# Patient Record
Sex: MALE | Race: WHITE | NOT HISPANIC OR LATINO | Employment: OTHER | URBAN - METROPOLITAN AREA
[De-identification: names, ages, dates, MRNs, and addresses within clinical notes are randomized per-mention and may not be internally consistent; named-entity substitution may affect disease eponyms.]

---

## 2017-02-28 ENCOUNTER — RESOLUTE PROFESSIONAL BILLING HOSPITAL PROF FEE (OUTPATIENT)
Dept: HOSPITALIST | Facility: MEDICAL CENTER | Age: 82
End: 2017-02-28
Payer: MEDICARE

## 2017-02-28 ENCOUNTER — APPOINTMENT (OUTPATIENT)
Dept: RADIOLOGY | Facility: MEDICAL CENTER | Age: 82
End: 2017-02-28
Attending: EMERGENCY MEDICINE
Payer: MEDICARE

## 2017-02-28 ENCOUNTER — HOSPITAL ENCOUNTER (OUTPATIENT)
Facility: MEDICAL CENTER | Age: 82
End: 2017-03-01
Attending: EMERGENCY MEDICINE | Admitting: INTERNAL MEDICINE
Payer: MEDICARE

## 2017-02-28 DIAGNOSIS — I48.0 PAROXYSMAL ATRIAL FIBRILLATION (HCC): ICD-10-CM

## 2017-02-28 DIAGNOSIS — R07.9 CHEST PAIN, UNSPECIFIED TYPE: ICD-10-CM

## 2017-02-28 DIAGNOSIS — R06.02 SHORTNESS OF BREATH: ICD-10-CM

## 2017-02-28 PROBLEM — I48.92 ATRIAL FLUTTER (HCC): Status: ACTIVE | Noted: 2017-02-28

## 2017-02-28 LAB
25(OH)D3 SERPL-MCNC: 72 NG/ML (ref 30–100)
ALBUMIN SERPL BCP-MCNC: 3.9 G/DL (ref 3.2–4.9)
ALBUMIN/GLOB SERPL: 1.2 G/DL
ALP SERPL-CCNC: 89 U/L (ref 30–99)
ALT SERPL-CCNC: 8 U/L (ref 2–50)
ANION GAP SERPL CALC-SCNC: 11 MMOL/L (ref 0–11.9)
APTT PPP: 29.2 SEC (ref 24.7–36)
AST SERPL-CCNC: 11 U/L (ref 12–45)
BASOPHILS # BLD AUTO: 0.4 % (ref 0–1.8)
BASOPHILS # BLD: 0.06 K/UL (ref 0–0.12)
BILIRUB SERPL-MCNC: 0.9 MG/DL (ref 0.1–1.5)
BNP SERPL-MCNC: 23 PG/ML (ref 0–100)
BUN SERPL-MCNC: 21 MG/DL (ref 8–22)
CALCIUM SERPL-MCNC: 10.8 MG/DL (ref 8.5–10.5)
CHLORIDE SERPL-SCNC: 103 MMOL/L (ref 96–112)
CO2 SERPL-SCNC: 21 MMOL/L (ref 20–33)
CREAT SERPL-MCNC: 1.05 MG/DL (ref 0.5–1.4)
EKG IMPRESSION: NORMAL
EKG IMPRESSION: NORMAL
EOSINOPHIL # BLD AUTO: 0.06 K/UL (ref 0–0.51)
EOSINOPHIL NFR BLD: 0.4 % (ref 0–6.9)
ERYTHROCYTE [DISTWIDTH] IN BLOOD BY AUTOMATED COUNT: 40.4 FL (ref 35.9–50)
GFR SERPL CREATININE-BSD FRML MDRD: >60 ML/MIN/1.73 M 2
GLOBULIN SER CALC-MCNC: 3.3 G/DL (ref 1.9–3.5)
GLUCOSE SERPL-MCNC: 130 MG/DL (ref 65–99)
HCT VFR BLD AUTO: 45.6 % (ref 42–52)
HGB BLD-MCNC: 15.7 G/DL (ref 14–18)
IMM GRANULOCYTES # BLD AUTO: 0.1 K/UL (ref 0–0.11)
IMM GRANULOCYTES NFR BLD AUTO: 0.7 % (ref 0–0.9)
INR PPP: 1.04 (ref 0.87–1.13)
LIPASE SERPL-CCNC: 27 U/L (ref 11–82)
LYMPHOCYTES # BLD AUTO: 2.49 K/UL (ref 1–4.8)
LYMPHOCYTES NFR BLD: 18.6 % (ref 22–41)
MCH RBC QN AUTO: 29.6 PG (ref 27–33)
MCHC RBC AUTO-ENTMCNC: 34.4 G/DL (ref 33.7–35.3)
MCV RBC AUTO: 85.9 FL (ref 81.4–97.8)
MONOCYTES # BLD AUTO: 1.12 K/UL (ref 0–0.85)
MONOCYTES NFR BLD AUTO: 8.4 % (ref 0–13.4)
NEUTROPHILS # BLD AUTO: 9.55 K/UL (ref 1.82–7.42)
NEUTROPHILS NFR BLD: 71.5 % (ref 44–72)
NRBC # BLD AUTO: 0.02 K/UL
NRBC BLD AUTO-RTO: 0.1 /100 WBC
PLATELET # BLD AUTO: 347 K/UL (ref 164–446)
PMV BLD AUTO: 10.3 FL (ref 9–12.9)
POTASSIUM SERPL-SCNC: 4.4 MMOL/L (ref 3.6–5.5)
PROT SERPL-MCNC: 7.2 G/DL (ref 6–8.2)
PROTHROMBIN TIME: 13.9 SEC (ref 12–14.6)
PTH-INTACT SERPL-MCNC: 76.6 PG/ML (ref 14–72)
RBC # BLD AUTO: 5.31 M/UL (ref 4.7–6.1)
SODIUM SERPL-SCNC: 135 MMOL/L (ref 135–145)
TROPONIN I SERPL-MCNC: <0.01 NG/ML (ref 0–0.04)
WBC # BLD AUTO: 13.4 K/UL (ref 4.8–10.8)

## 2017-02-28 PROCEDURE — 83880 ASSAY OF NATRIURETIC PEPTIDE: CPT

## 2017-02-28 PROCEDURE — 99285 EMERGENCY DEPT VISIT HI MDM: CPT

## 2017-02-28 PROCEDURE — G0378 HOSPITAL OBSERVATION PER HR: HCPCS

## 2017-02-28 PROCEDURE — 85025 COMPLETE CBC W/AUTO DIFF WBC: CPT

## 2017-02-28 PROCEDURE — A9270 NON-COVERED ITEM OR SERVICE: HCPCS | Performed by: NURSE PRACTITIONER

## 2017-02-28 PROCEDURE — 700102 HCHG RX REV CODE 250 W/ 637 OVERRIDE(OP): Performed by: INTERNAL MEDICINE

## 2017-02-28 PROCEDURE — 96361 HYDRATE IV INFUSION ADD-ON: CPT

## 2017-02-28 PROCEDURE — 99220 PR INITIAL OBSERVATION CARE,LEVL III: CPT | Performed by: INTERNAL MEDICINE

## 2017-02-28 PROCEDURE — 93010 ELECTROCARDIOGRAM REPORT: CPT | Mod: 77 | Performed by: INTERNAL MEDICINE

## 2017-02-28 PROCEDURE — 85610 PROTHROMBIN TIME: CPT

## 2017-02-28 PROCEDURE — 96372 THER/PROPH/DIAG INJ SC/IM: CPT | Mod: XU

## 2017-02-28 PROCEDURE — 84484 ASSAY OF TROPONIN QUANT: CPT

## 2017-02-28 PROCEDURE — A9270 NON-COVERED ITEM OR SERVICE: HCPCS | Performed by: INTERNAL MEDICINE

## 2017-02-28 PROCEDURE — 700102 HCHG RX REV CODE 250 W/ 637 OVERRIDE(OP)

## 2017-02-28 PROCEDURE — 93005 ELECTROCARDIOGRAM TRACING: CPT

## 2017-02-28 PROCEDURE — 83970 ASSAY OF PARATHORMONE: CPT

## 2017-02-28 PROCEDURE — 80053 COMPREHEN METABOLIC PANEL: CPT

## 2017-02-28 PROCEDURE — A9270 NON-COVERED ITEM OR SERVICE: HCPCS

## 2017-02-28 PROCEDURE — 71275 CT ANGIOGRAPHY CHEST: CPT

## 2017-02-28 PROCEDURE — 83690 ASSAY OF LIPASE: CPT

## 2017-02-28 PROCEDURE — 36415 COLL VENOUS BLD VENIPUNCTURE: CPT

## 2017-02-28 PROCEDURE — 93005 ELECTROCARDIOGRAM TRACING: CPT | Performed by: EMERGENCY MEDICINE

## 2017-02-28 PROCEDURE — 700105 HCHG RX REV CODE 258: Performed by: INTERNAL MEDICINE

## 2017-02-28 PROCEDURE — 700117 HCHG RX CONTRAST REV CODE 255: Performed by: EMERGENCY MEDICINE

## 2017-02-28 PROCEDURE — 82306 VITAMIN D 25 HYDROXY: CPT

## 2017-02-28 PROCEDURE — 85730 THROMBOPLASTIN TIME PARTIAL: CPT

## 2017-02-28 PROCEDURE — 700112 HCHG RX REV CODE 229: Performed by: INTERNAL MEDICINE

## 2017-02-28 PROCEDURE — 93005 ELECTROCARDIOGRAM TRACING: CPT | Performed by: INTERNAL MEDICINE

## 2017-02-28 PROCEDURE — 700102 HCHG RX REV CODE 250 W/ 637 OVERRIDE(OP): Performed by: NURSE PRACTITIONER

## 2017-02-28 PROCEDURE — 71010 DX-CHEST-PORTABLE (1 VIEW): CPT

## 2017-02-28 PROCEDURE — A9270 NON-COVERED ITEM OR SERVICE: HCPCS | Performed by: EMERGENCY MEDICINE

## 2017-02-28 PROCEDURE — 700111 HCHG RX REV CODE 636 W/ 250 OVERRIDE (IP): Performed by: INTERNAL MEDICINE

## 2017-02-28 PROCEDURE — 700102 HCHG RX REV CODE 250 W/ 637 OVERRIDE(OP): Performed by: EMERGENCY MEDICINE

## 2017-02-28 PROCEDURE — 96360 HYDRATION IV INFUSION INIT: CPT

## 2017-02-28 RX ORDER — DOCUSATE SODIUM 100 MG/1
100 CAPSULE, LIQUID FILLED ORAL EVERY MORNING
COMMUNITY
End: 2017-09-13

## 2017-02-28 RX ORDER — AMOXICILLIN 250 MG
1 CAPSULE ORAL
Status: DISCONTINUED | OUTPATIENT
Start: 2017-02-28 | End: 2017-03-01 | Stop reason: HOSPADM

## 2017-02-28 RX ORDER — AMOXICILLIN 250 MG
1 CAPSULE ORAL NIGHTLY
Status: DISCONTINUED | OUTPATIENT
Start: 2017-02-28 | End: 2017-03-01 | Stop reason: HOSPADM

## 2017-02-28 RX ORDER — ONDANSETRON 4 MG/1
4 TABLET, ORALLY DISINTEGRATING ORAL EVERY 4 HOURS PRN
Status: DISCONTINUED | OUTPATIENT
Start: 2017-02-28 | End: 2017-03-01 | Stop reason: HOSPADM

## 2017-02-28 RX ORDER — NITROGLYCERIN 0.4 MG/1
0.4 TABLET SUBLINGUAL
Status: DISCONTINUED | OUTPATIENT
Start: 2017-02-28 | End: 2017-03-01 | Stop reason: HOSPADM

## 2017-02-28 RX ORDER — ASPIRIN 81 MG/1
324 TABLET, CHEWABLE ORAL DAILY
Status: DISCONTINUED | OUTPATIENT
Start: 2017-02-28 | End: 2017-03-01 | Stop reason: HOSPADM

## 2017-02-28 RX ORDER — METOPROLOL SUCCINATE 50 MG/1
50 TABLET, EXTENDED RELEASE ORAL EVERY MORNING
Status: DISCONTINUED | OUTPATIENT
Start: 2017-03-01 | End: 2017-03-01 | Stop reason: HOSPADM

## 2017-02-28 RX ORDER — ASPIRIN 300 MG/1
300 SUPPOSITORY RECTAL DAILY
Status: DISCONTINUED | OUTPATIENT
Start: 2017-02-28 | End: 2017-03-01 | Stop reason: HOSPADM

## 2017-02-28 RX ORDER — LACTULOSE 20 G/30ML
30 SOLUTION ORAL
Status: DISCONTINUED | OUTPATIENT
Start: 2017-02-28 | End: 2017-03-01 | Stop reason: HOSPADM

## 2017-02-28 RX ORDER — HYDROCODONE BITARTRATE AND ACETAMINOPHEN 5; 325 MG/1; MG/1
1 TABLET ORAL EVERY 6 HOURS PRN
Status: DISCONTINUED | OUTPATIENT
Start: 2017-02-28 | End: 2017-03-01 | Stop reason: HOSPADM

## 2017-02-28 RX ORDER — ASPIRIN 81 MG/1
TABLET, CHEWABLE ORAL
Status: COMPLETED
Start: 2017-02-28 | End: 2017-02-28

## 2017-02-28 RX ORDER — ATORVASTATIN CALCIUM 20 MG/1
20 TABLET, FILM COATED ORAL EVERY MORNING
Status: DISCONTINUED | OUTPATIENT
Start: 2017-03-01 | End: 2017-03-01 | Stop reason: HOSPADM

## 2017-02-28 RX ORDER — DOCUSATE SODIUM 100 MG/1
100 CAPSULE, LIQUID FILLED ORAL EVERY MORNING
Status: DISCONTINUED | OUTPATIENT
Start: 2017-02-28 | End: 2017-02-28

## 2017-02-28 RX ORDER — ONDANSETRON 2 MG/ML
4 INJECTION INTRAMUSCULAR; INTRAVENOUS EVERY 4 HOURS PRN
Status: DISCONTINUED | OUTPATIENT
Start: 2017-02-28 | End: 2017-03-01 | Stop reason: HOSPADM

## 2017-02-28 RX ORDER — ATORVASTATIN CALCIUM 20 MG/1
20 TABLET, FILM COATED ORAL EVERY MORNING
COMMUNITY
End: 2017-03-21

## 2017-02-28 RX ORDER — DIGOXIN 125 MCG
62.5 TABLET ORAL EVERY MORNING
COMMUNITY
End: 2017-03-21

## 2017-02-28 RX ORDER — ASPIRIN 325 MG
325 TABLET ORAL DAILY
Status: DISCONTINUED | OUTPATIENT
Start: 2017-02-28 | End: 2017-03-01 | Stop reason: HOSPADM

## 2017-02-28 RX ORDER — METOPROLOL SUCCINATE 50 MG/1
25 TABLET, EXTENDED RELEASE ORAL EVERY MORNING
COMMUNITY
End: 2017-03-21

## 2017-02-28 RX ORDER — HYDROCODONE BITARTRATE AND ACETAMINOPHEN 5; 325 MG/1; MG/1
1 TABLET ORAL ONCE
Status: COMPLETED | OUTPATIENT
Start: 2017-02-28 | End: 2017-02-28

## 2017-02-28 RX ORDER — ENEMA 19; 7 G/133ML; G/133ML
1 ENEMA RECTAL
Status: DISCONTINUED | OUTPATIENT
Start: 2017-02-28 | End: 2017-03-01 | Stop reason: HOSPADM

## 2017-02-28 RX ORDER — BISACODYL 10 MG
10 SUPPOSITORY, RECTAL RECTAL
Status: DISCONTINUED | OUTPATIENT
Start: 2017-02-28 | End: 2017-03-01 | Stop reason: HOSPADM

## 2017-02-28 RX ORDER — SODIUM CHLORIDE 9 MG/ML
INJECTION, SOLUTION INTRAVENOUS CONTINUOUS
Status: ACTIVE | OUTPATIENT
Start: 2017-02-28 | End: 2017-03-01

## 2017-02-28 RX ORDER — DIGOXIN 125 MCG
62.5 TABLET ORAL EVERY MORNING
Status: DISCONTINUED | OUTPATIENT
Start: 2017-03-01 | End: 2017-03-01 | Stop reason: HOSPADM

## 2017-02-28 RX ORDER — HEPARIN SODIUM 5000 [USP'U]/ML
5000 INJECTION, SOLUTION INTRAVENOUS; SUBCUTANEOUS EVERY 8 HOURS
Status: DISCONTINUED | OUTPATIENT
Start: 2017-02-28 | End: 2017-03-01 | Stop reason: HOSPADM

## 2017-02-28 RX ORDER — DOCUSATE SODIUM 100 MG/1
100 CAPSULE, LIQUID FILLED ORAL 2 TIMES DAILY
Status: DISCONTINUED | OUTPATIENT
Start: 2017-02-28 | End: 2017-03-01 | Stop reason: HOSPADM

## 2017-02-28 RX ADMIN — STANDARDIZED SENNA CONCENTRATE AND DOCUSATE SODIUM 1 TABLET: 8.6; 5 TABLET, FILM COATED ORAL at 20:28

## 2017-02-28 RX ADMIN — HEPARIN SODIUM 5000 UNITS: 5000 INJECTION, SOLUTION INTRAVENOUS; SUBCUTANEOUS at 15:53

## 2017-02-28 RX ADMIN — ASPIRIN 324 MG: 81 TABLET, CHEWABLE ORAL at 14:45

## 2017-02-28 RX ADMIN — HYDROCODONE BITARTRATE AND ACETAMINOPHEN 1 TABLET: 5; 325 TABLET ORAL at 22:33

## 2017-02-28 RX ADMIN — HYDROCODONE BITARTRATE AND ACETAMINOPHEN 1 TABLET: 5; 325 TABLET ORAL at 10:23

## 2017-02-28 RX ADMIN — MAGNESIUM CITRATE 296 ML: 1.75 LIQUID ORAL at 22:55

## 2017-02-28 RX ADMIN — IOHEXOL 75 ML: 350 INJECTION, SOLUTION INTRAVENOUS at 12:09

## 2017-02-28 RX ADMIN — DOCUSATE SODIUM 100 MG: 100 CAPSULE ORAL at 20:28

## 2017-02-28 RX ADMIN — HEPARIN SODIUM 5000 UNITS: 5000 INJECTION, SOLUTION INTRAVENOUS; SUBCUTANEOUS at 22:33

## 2017-02-28 RX ADMIN — SODIUM CHLORIDE: 9 INJECTION, SOLUTION INTRAVENOUS at 15:53

## 2017-02-28 ASSESSMENT — COPD QUESTIONNAIRES
DURING THE PAST 4 WEEKS HOW MUCH DID YOU FEEL SHORT OF BREATH: NONE/LITTLE OF THE TIME
DO YOU EVER COUGH UP ANY MUCUS OR PHLEGM?: NO/ONLY WITH OCCASIONAL COLDS OR INFECTIONS
COPD SCREENING SCORE: 2
DURING THE PAST 4 WEEKS HOW MUCH DID YOU FEEL SHORT OF BREATH: NONE/LITTLE OF THE TIME
DO YOU EVER COUGH UP ANY MUCUS OR PHLEGM?: NO/ONLY WITH OCCASIONAL COLDS OR INFECTIONS
HAVE YOU SMOKED AT LEAST 100 CIGARETTES IN YOUR ENTIRE LIFE: NO/DON'T KNOW
HAVE YOU SMOKED AT LEAST 100 CIGARETTES IN YOUR ENTIRE LIFE: NO/DON'T KNOW
COPD SCREENING SCORE: 2

## 2017-02-28 ASSESSMENT — PAIN SCALES - GENERAL
PAINLEVEL_OUTOF10: 5
PAINLEVEL_OUTOF10: 5
PAINLEVEL_OUTOF10: 0
PAINLEVEL_OUTOF10: 0

## 2017-02-28 ASSESSMENT — LIFESTYLE VARIABLES
DO YOU DRINK ALCOHOL: NO
EVER_SMOKED: YES
DO YOU DRINK ALCOHOL: NO
EVER_SMOKED: YES
ALCOHOL_USE: NO

## 2017-02-28 NOTE — PROGRESS NOTES
Pt arrived to unit via gurney from main ER at 1522.  Pt insisted on scooting from gurney to bed.  Pt able to scoot independently.  Pt oriented to unit, call light.  Assessment completed, pt denies any pain at this time.  POC updated. Monitors applied, atrial fibrillation noted.   Bed in locked, lowest position.  Call light and possessions within reach.  Yellow socks placed on patient, appropriate signs placed at door.   Boxed meal provided   Needs met, will continue to monitor.

## 2017-02-28 NOTE — ED NOTES
Pt to triage c/o chest pain and right shoulder pain for weeks. + SOB, increasing with activity.  Pt denies any new swelling. Pt still recovering from fall 1.5 weeks ago. Pt advised to return to triage nurse for any changes or concerns.

## 2017-02-28 NOTE — ED NOTES
Med rec complete per pt at bedside  Allergies reviewed - NKDA  No ABX in last month  [facility] [place] [place]  [facility], because he   Just moved here last week (per pt)

## 2017-02-28 NOTE — ED PROVIDER NOTES
.  ED Provider Note    Scribed for Candice Borjas M.D. by Sandhya Mathis. 2/28/2017  10:08 AM    Primary care provider: None  Means of arrival: Walk-in  History obtained from: Patient  History limited by: None    CHIEF COMPLAINT  Chief Complaint   Patient presents with   • Chest Pain       HPI  Jersey Alexis is a 86 y.o. male who presents to the Emergency Department for worsened right-sided chest pains onset 2 weeks ago. The patient reports associated shortness of breath and states symptoms worsen with exertion with pain radiating to the right shoulder. He has been seen by PCP regarding symptoms and had a chest xray performed, which was normal. The patient reports having a ground level fall 1.5 weeks ago and developed left knee and foot pain after falling sideways and twisting his left knee. He had xrays performed on the extremity by his PCP with negative fractures, but he reports of continued left foot pain. The patient experienced another episode of chest pain last night that he describes as pressured and delayed coming to the ED until this morning. He has chronic bilateral leg swelling, but denies symptoms of fever or cough. The patient has past history of atrial fibrillation, but not history of MI or emphysema. He recently moved to town from [place] and has not yet established a PCP.     REVIEW OF SYSTEMS  CARDIAC: Chest pain  PULMONARY: Shortness of breath, no cough  Neuro: Fall  Musculoskeletal: Right shoulder pain, left foot pain, chronic bilateral lower extremity swelling  Endocrine: no fevers    See history of present illness. All other systems are negative. C.     PAST MEDICAL HISTORY  Diabetes, asthma as a child. Atrial fibrillation.     SURGICAL HISTORY  patient denies any surgical history    SOCIAL HISTORY  Recently moved from [place] to [place]. Lives with son's wing.      FAMILY HISTORY  No family history noted    CURRENT MEDICATIONS  Medications can be found in nursing notes.    ALLERGIES  No Known  "Allergies    PHYSICAL EXAM  VITAL SIGNS: /95 mmHg  Pulse 76  Temp(Src) 36.3 °C (97.3 °F) (Temporal)  Resp 14  Ht 1.803 m (5' 11\")  SpO2 97%    Constitutional: Well developed, Well nourished, No acute distress, chronically ill-appearance.   HEENT: Normocephalic, Atraumatic,  external ears normal, pharynx pink,  Mucous  Membranes moist, No rhinorrhea or mucosal edema  Eyes: PERRL, EOMI, Conjunctiva normal, No discharge.   Neck: Normal range of motion, No tenderness, Supple, No stridor.   Lymphatic: No lymphadenopathy    Cardiovascular: Irregularly irregular, No murmurs,  rubs, or gallops.   Thorax & Lungs: Lungs clear to auscultation bilaterally, No respiratory distress, No wheezes, rhales or rhonchi, No chest wall tenderness.   Abdomen: Bowel sounds normal, Small umbilical hernia easily reducible, Soft, non tender, non distended, no rebound guarding or peritoneal signs.   Skin: Warm, Dry, Sutures on left 2nd toe without evidence of infection  Back:  No CVA tenderness,  No spinal tenderness, bony crepitance, step offs, or instability.   Neurologic: Alert & oriented x 3, Normal motor function, Normal sensory function, No focal deficits noted. Normal reflexes. Normal Cranial Nerves.  Extremities: Bilateral lower extremity edema, Equal, intact distal pulses, No cyanosis, clubbing, No tenderness.   Musculoskeletal: Good range of motion in all major joints. No tenderness to palpation or major deformities noted.     DIAGNOSTIC STUDIES / PROCEDURES    LABS  Results for orders placed or performed during the hospital encounter of 02/28/17   CBC WITH DIFFERENTIAL   Result Value Ref Range    WBC 13.4 (H) 4.8 - 10.8 K/uL    RBC 5.31 4.70 - 6.10 M/uL    Hemoglobin 15.7 14.0 - 18.0 g/dL    Hematocrit 45.6 42.0 - 52.0 %    MCV 85.9 81.4 - 97.8 fL    MCH 29.6 27.0 - 33.0 pg    MCHC 34.4 33.7 - 35.3 g/dL    RDW 40.4 35.9 - 50.0 fL    Platelet Count 347 164 - 446 K/uL    MPV 10.3 9.0 - 12.9 fL    Neutrophils-Polys 71.50 " 44.00 - 72.00 %    Lymphocytes 18.60 (L) 22.00 - 41.00 %    Monocytes 8.40 0.00 - 13.40 %    Eosinophils 0.40 0.00 - 6.90 %    Basophils 0.40 0.00 - 1.80 %    Immature Granulocytes 0.70 0.00 - 0.90 %    Nucleated RBC 0.10 /100 WBC    Neutrophils (Absolute) 9.55 (H) 1.82 - 7.42 K/uL    Lymphs (Absolute) 2.49 1.00 - 4.80 K/uL    Monos (Absolute) 1.12 (H) 0.00 - 0.85 K/uL    Eos (Absolute) 0.06 0.00 - 0.51 K/uL    Baso (Absolute) 0.06 0.00 - 0.12 K/uL    Immature Granulocytes (abs) 0.10 0.00 - 0.11 K/uL    NRBC (Absolute) 0.02 K/uL   COMP METABOLIC PANEL   Result Value Ref Range    Sodium 135 135 - 145 mmol/L    Potassium 4.4 3.6 - 5.5 mmol/L    Chloride 103 96 - 112 mmol/L    Co2 21 20 - 33 mmol/L    Anion Gap 11.0 0.0 - 11.9    Glucose 130 (H) 65 - 99 mg/dL    Bun 21 8 - 22 mg/dL    Creatinine 1.05 0.50 - 1.40 mg/dL    Calcium 10.8 (H) 8.5 - 10.5 mg/dL    AST(SGOT) 11 (L) 12 - 45 U/L    ALT(SGPT) 8 2 - 50 U/L    Alkaline Phosphatase 89 30 - 99 U/L    Total Bilirubin 0.9 0.1 - 1.5 mg/dL    Albumin 3.9 3.2 - 4.9 g/dL    Total Protein 7.2 6.0 - 8.2 g/dL    Globulin 3.3 1.9 - 3.5 g/dL    A-G Ratio 1.2 g/dL   LIPASE   Result Value Ref Range    Lipase 27 11 - 82 U/L   PROTHROMBIN TIME   Result Value Ref Range    PT 13.9 12.0 - 14.6 sec    INR 1.04 0.87 - 1.13   APTT   Result Value Ref Range    APTT 29.2 24.7 - 36.0 sec   TROPONIN   Result Value Ref Range    Troponin I <0.01 0.00 - 0.04 ng/mL   BTYPE NATRIURETIC PEPTIDE   Result Value Ref Range    B Natriuretic Peptide 23 0 - 100 pg/mL   ESTIMATED GFR   Result Value Ref Range    GFR If African American >60 >60 mL/min/1.73 m 2    GFR If Non African American >60 >60 mL/min/1.73 m 2   EKG (NOW)   Result Value Ref Range    Report       [facility]    Test Date:  2017  Pt Name:    BRIAN SPARROW               Department: ER  MRN:        [id number]                      Room:  Gender:     M                            Technician: [id number]  :         1930-10-18                   Requested By:ER TRIAGE PROTOCOL  Order #:    [id number]                    Reading MD:    Measurements  Intervals                                Axis  Rate:       73                           P:  PA:                                      QRS:        -14  QRSD:       88                           T:          -13  QT:         384  QTc:        424    Interpretive Statements  ATRIAL FLUTTER/FIBRILLATION, A-RATE 306  EARLY PRECORDIAL R/S TRANSITION  BORDERLINE T ABNORMALITIES, INFERIOR LEADS  No previous ECG available for comparison     All labs reviewed by me.    EKG  12 lead EKG; Interpreted by emergency department physician    Rhythm: Atrial flutter  Rate: 73  Axis: Normal  R Wave: Normal R wave progression  Normal ST-T segments  ST Segments: No ST segment elevation   T waves: Normal  Q waves: None    Clinical Impression: atrial flutter rate controlled, no acute ischemic changes    RADIOLOGY  CT-CTA CHEST PULMONARY ARTERY W/ RECONS   Final Result      No central or segmental pulmonary embolus is identified.      Atherosclerotic plaque.      Emphysematous changes honeycombing in the peripheral right upper lobe.      Mild bilateral fibrotic changes.      Bilateral pulmonary nodules.      Mildly prominent right paratracheal lymph node is nonspecific      Low Risk Patient:  Follow up CT at 12 months; if stable, no further follow up.      High Risk Patient:  Initial follow up CT at 6-12 months, then at 18-24 months if no change.         Low Risk - Minimal or absent history of smoking and of other known risk factors.   High Risk - History of smoking or of other known risk factors.   Fleischner Society Guidelines for Pulmonary Nodules:  Radiology, 237:2005         DX-CHEST-PORTABLE (1 VIEW)   Final Result         1. No acute cardiopulmonary abnormalities are identified.      The radiologist's interpretation of all radiological studies have been reviewed by me.    COURSE & MEDICAL DECISION  MAKING  Nursing notes, VS, PMSFHx reviewed in chart.    10:08 AM - Patient seen and examined at bedside. Patient will be treated with Norco 5-325mg PO. Ordered CTA PE, DX-chest, CBC, CMP, lipase, prothrombin time, APTT, troponin, BNP, and EKG to evaluate his symptoms. The differential diagnoses include but are not limited to: chest wall pain, PE, rib fracture, PE, PNA, arthritis     12:03 PM Reviewed the patient's lab and chest xray result, which are shown above. Patient is in CT now.    12:33 PM Reviewed the patient's CT result, which shows emphysemic changes, but no PE or rib fractures. Patient was reevaluated at bedside. He is resting comfortably in bed and reports mild pain relief after medication administration. Discussed lab and radiology results with the patient and family member and informed them of the results which are shown above. Advised the patient be admitted to the Hospitalist for further medical treatment and care, including stress test, which he understands and agrees with.     12:44 PM I discussed the patient's case and the above findings with Dr. Bower (Hospitalist) who agreed to admit the patient.    DISPOSITION:  Patient will be admitted to Dr. Bower in guarded condition.    FINAL IMPRESSION  1. Chest pain, unspecified type    2. Paroxysmal atrial fibrillation (CMS-HCC)    3. Shortness of breath       Sandhya RENEE (Ambar), am scribing for, and in the presence of, Candice Borjas M.D..    Electronically signed by: Sandhya Mathis (Ambar), 2/28/2017    Candice RENEE M.D. personally performed the services described in this documentation, as scribed by Sandhya Mathis in my presence, and it is both accurate and complete.    The note accurately reflects work and decisions made by me.  Candice Borjas  2/28/2017  1:12 PM

## 2017-02-28 NOTE — ED NOTES
Report called to Alexandr HENRY for room T214-00, vss at time of admission, no acute distress noted.  Pt taken to room via gurney, no acute distress noted.

## 2017-02-28 NOTE — IP AVS SNAPSHOT
" Home Care Instructions                                                                                                                  Name:Jersey Alexis  Medical Record Number:[id number]  CSN: [id number]    YOB: 1930   Age: 86 y.o.  Sex: male  HT:1.803 m (5' 11\") WT: 92.9 kg (204 lb 12.9 oz)          Admit Date: 2/28/2017     Discharge Date:   Today's Date: 3/1/2017  Attending Doctor:  Georgi Grey M.D.                  Allergies:  Review of patient's allergies indicates no known allergies.            Discharge Instructions       Activity: As tolerated.   Diet: cardiac   Followup:   -PCP 1 week   -Home health agency, arrange visit tomorrow   -Followup CT scan in 6 months, eval pulmonary nodules (sept 2017)   Instructions:   -Given instructions to return to the ER if any new or worsening symptoms, worsening condition, or failure to improve   -Call PCP for followup   -No smoking, no alcohol, no caffeine   -Encourage risk factor reduction with tobacco and alcohol abstinence, diet changes, weight loss, and exercise      Discharge Instructions    Discharged to home by car with relative. Discharged via wheelchair, hospital escort: Yes.  Special equipment needed: Not Applicable    Be sure to schedule a follow-up appointment with your primary care doctor or any specialists as instructed.     Discharge Plan:   Diet Plan: Discussed  Activity Level: Discussed  Confirmed Follow up Appointment: Patient to Call and Schedule Appointment  Confirmed Symptoms Management: Discussed  Medication Reconciliation Updated: Yes  Influenza Vaccine Indication: Patient Refuses    I understand that a diet low in cholesterol, fat, and sodium is recommended for good health. Unless I have been given specific instructions below for another diet, I accept this instruction as my diet prescription.   Other diet: Heart healthy    Special Instructions: None    · Is patient discharged on Warfarin / Coumadin?   No     · Is patient Post " Blood Transfusion?  No    Depression / Suicide Risk    As you are discharged from this [facility], it is important to learn how to keep safe from harming yourself.    Recognize the warning signs:  · Abrupt changes in personality, positive or negative- including increase in energy   · Giving away possessions  · Change in eating patterns- significant weight changes-  positive or negative  · Change in sleeping patterns- unable to sleep or sleeping all the time   · Unwillingness or inability to communicate  · Depression  · Unusual sadness, discouragement and loneliness  · Talk of wanting to die  · Neglect of personal appearance   · Rebelliousness- reckless behavior  · Withdrawal from people/activities they love  · Confusion- inability to concentrate     If you or a loved one observes any of these behaviors or has concerns about self-harm, here's what you can do:  · Talk about it- your feelings and reasons for harming yourself  · Remove any means that you might use to hurt yourself (examples: pills, rope, extension cords, firearm)  · Get professional help from the community (Mental Health, Substance Abuse, psychological counseling)  · Do not be alone:Call your Safe Contact- someone whom you trust who will be there for you.  · Call your local CRISIS HOTLINE 643-4629 or 100-394-7560  · Call your local Children's Mobile Crisis Response Team [place] (533) 641-6145 or www.Unified Office  · Call the toll free National Suicide Prevention Hotlines   · National Suicide Prevention Lifeline 425-744-EOYP (7381)  · National Hope Line Network 800-SUICIDE (694-9815)        Your appointments     Mar 02, 2017 11:30 AM   CARE MANAGER TELEPHONE VISIT with CARE MANAGER   [facility] ([facility])    [address] [place] [address] [address]  [place] [address] [phone]           ***IMPORTANT**** This is a phone visit only. Do not come into the office. The Care Manager will call you at the scheduled time for Care Manager  Telephone Visit.            Mar 06, 2017  2:20 PM   Established Patient with JG Roman   [facility] ([facility])    [address] [place] [address] [address] [facility]  [place] [id number] [phone]           You will be receiving a confirmation call a few days before your appointment from our automated call confirmation system.              Follow-up Information     1. Follow up with Followup CT scan. Schedule an appointment as soon as possible for a visit in 6 months.    Why:  Discuss with PCP, to evaluate lung nodules seen on CT scan        2. Follow up with [facility]. Schedule an appointment as soon as possible for a visit in 1 week.    Why:  Please fast for 12 hours prior for blood work. Thank you.    Contact information    To establish with a primary care provider  [address] [place] [address] Amy  [phone]  Monday-Friday check in before 8:00 am.        3. Follow up with Home health agency. Schedule an appointment as soon as possible for a visit in 1 day.    Why:  For PT/OT therapy         Discharge Medication Instructions:    Below are the medications your physician expects you to take upon discharge:    Review all your home medications and newly ordered medications with your doctor and/or pharmacist. Follow medication instructions as directed by your doctor and/or pharmacist.    Please keep your medication list with you and share with your physician.               Medication List      START taking these medications        Instructions    bisacodyl 10 MG Supp   Commonly known as:  DULCOLAX    Insert 1 Suppository in rectum every 24 hours as needed (if lactulose ineffective).   Dose:  10 mg       fleet 7-19 GM/118ML Enem    Insert 133 mL in rectum Once PRN (if bisacodyl ineffective).   Dose:  1 Each       ketorolac 10 MG Tabs   Commonly known as:  TORADOL    Take 1 Tab by mouth every 6 hours as needed for Moderate Pain for up to 5 days. Do not combine with other NSAIDS Do not take longer than 5 days Take with  food   Dose:  10 mg       senna-docusate 8.6-50 MG Tabs   Last time this was given:  1 Tab on 2/28/2017  8:28 PM   Commonly known as:  PERICOLACE or SENOKOT S    Take 1 Tab by mouth every 24 hours as needed for Constipation.   Dose:  1 Tab       tramadol 50 MG Tabs   Commonly known as:  ULTRAM    Take 1 Tab by mouth every 8 hours as needed for Severe Pain.   Dose:  50 mg         CONTINUE taking these medications        Instructions    atorvastatin 20 MG Tabs   Last time this was given:  20 mg on 3/1/2017  9:38 AM   Commonly known as:  LIPITOR    Take 20 mg by mouth every morning.   Dose:  20 mg       digoxin 125 MCG Tabs   Last time this was given:  62.5 mcg on 3/1/2017  9:38 AM   Commonly known as:  LANOXIN    Take 62.5 mcg by mouth every morning. Indications: Chronic Atrial Fibrillation   Dose:  62.5 mcg       docusate sodium 100 MG Caps   Last time this was given:  100 mg on 3/1/2017  9:00 AM   Commonly known as:  COLACE    Take 100 mg by mouth every morning.   Dose:  100 mg       metoprolol SR 50 MG Tb24   Last time this was given:  50 mg on 3/1/2017  9:39 AM   Commonly known as:  TOPROL XL    Take 50 mg by mouth every morning. Indications: A-Fib   Dose:  50 mg       nystatin Powd   Commonly known as:  MYCOSTATIN    Apply 1 Application to affected area(s) 2 Times a Day.   Dose:  1 Application       Vitamin D3 5000 UNITS Tabs    Take 500-10,000 Units by mouth every morning. 5,000 units (1 tab) 4 days a week - Tuesday/Wednesday/Friday/Saturday. 10,000 units (2 tabs) 3 days a week - Sunday/Monday/Thursday   Dose:  500-99879 Units               Instructions           Diet / Nutrition:    Follow any diet instructions given to you by your doctor or the dietician, including how much salt (sodium) you are allowed each day.    If you are overweight, talk to your doctor about a weight reduction plan.    Activity:    Remain physically active following your doctor's instructions about exercise and activity.    Rest often.        Any time you become even a little tired or short of breath, SIT DOWN and rest.    Worsening Symptoms:    Report any of the following signs and symptoms to the doctor's office immediately:    *Pain of jaw, arm, or neck  *Chest pain not relieved by medication                               *Dizziness or loss of consciousness  *Difficulty breathing even when at rest   *More tired than usual                                       *Bleeding drainage or swelling of surgical site  *Swelling of feet, ankles, legs or stomach                 *Fever (>100ºF)  *Pink or blood tinged sputum  *Weight gain (3lbs/day or 5lbs /week)           *Shock from internal defibrillator (if applicable)  *Palpitations or irregular heartbeats                *Cool and/or numb extremities    Stroke Awareness    Common Risk Factors for Stroke include:    Age  Atrial Fibrillation  Carotid Artery Stenosis  Diabetes Mellitus  Excessive alcohol consumption  High blood pressure  Overweight   Physical inactivity  Smoking    Warning signs and symptoms of a stroke include:    *Sudden numbness or weakness of the face, arm or leg (especially on one side of the body).  *Sudden confusion, trouble speaking or understanding.  *Sudden trouble seeing in one or both eyes.  *Sudden trouble walking, dizziness, loss of balance or coordination.Sudden severe headache with no known cause.    It is very important to get treatment quickly when a stroke occurs. If you experience any of the above warning signs, call 911 immediately.                   Disclaimer         Quit Smoking / Tobacco Use:    I understand the use of any tobacco products increases my chance of suffering from future heart disease or stroke and could cause other illnesses which may shorten my life. Quitting the use of tobacco products is the single most important thing I can do to improve my health. For further information on smoking / tobacco cessation call a Toll Free Quit Line at 1-120.831.4849  (*National Cancer [place]) or 1-270.777.6535 (American Lung Association) or you can access the web based program at www.lungusa.org.    [place] Tobacco Users Help Line:  (108) 111-3298       Toll Free: 6-824-463-4173  Quit Tobacco Program [facility] (606)830-8651    Crisis Hotline:    [place] Crisis Hotline:  4-341-IJIQRHU or 1-632.874.1375    [place] Crisis Hotline:    1-295.881.8499 or 830-772-1016    Discharge Survey:   Thank you for choosing [facility]. We hope we did everything we could to make your hospital stay a pleasant one. You may be receiving a phone survey and we would appreciate your time and participation in answering the questions. Your input is very valuable to us in our efforts to improve our service to our patients and their families.        My signature on this form indicates that:    1. I have reviewed and understand the above information.  2. My questions regarding this information have been answered to my satisfaction.  3. I have formulated a plan with my discharge nurse to obtain my prescribed medications for home.                  Disclaimer         __________________________________                     __________       ________                       Patient Signature                                                 Date                    Time

## 2017-03-01 ENCOUNTER — APPOINTMENT (OUTPATIENT)
Dept: RADIOLOGY | Facility: MEDICAL CENTER | Age: 82
End: 2017-03-01
Attending: INTERNAL MEDICINE
Payer: MEDICARE

## 2017-03-01 ENCOUNTER — PATIENT OUTREACH (OUTPATIENT)
Dept: HEALTH INFORMATION MANAGEMENT | Facility: OTHER | Age: 82
End: 2017-03-01

## 2017-03-01 ENCOUNTER — APPOINTMENT (OUTPATIENT)
Dept: RADIOLOGY | Facility: MEDICAL CENTER | Age: 82
End: 2017-03-01
Attending: NURSE PRACTITIONER
Payer: MEDICARE

## 2017-03-01 VITALS
WEIGHT: 204.81 LBS | TEMPERATURE: 97.5 F | DIASTOLIC BLOOD PRESSURE: 62 MMHG | BODY MASS INDEX: 28.67 KG/M2 | OXYGEN SATURATION: 95 % | HEART RATE: 100 BPM | SYSTOLIC BLOOD PRESSURE: 106 MMHG | HEIGHT: 71 IN | RESPIRATION RATE: 18 BRPM

## 2017-03-01 PROBLEM — I48.92 ATRIAL FLUTTER (HCC): Status: RESOLVED | Noted: 2017-02-28 | Resolved: 2017-03-01

## 2017-03-01 PROBLEM — R07.9 CHEST PAIN: Status: RESOLVED | Noted: 2017-02-28 | Resolved: 2017-03-01

## 2017-03-01 PROBLEM — R91.8 PULMONARY NODULES/LESIONS, MULTIPLE: Status: ACTIVE | Noted: 2017-03-01

## 2017-03-01 LAB
ANION GAP SERPL CALC-SCNC: 8 MMOL/L (ref 0–11.9)
BASOPHILS # BLD AUTO: 0.5 % (ref 0–1.8)
BASOPHILS # BLD: 0.06 K/UL (ref 0–0.12)
BUN SERPL-MCNC: 24 MG/DL (ref 8–22)
CALCIUM SERPL-MCNC: 10.5 MG/DL (ref 8.5–10.5)
CHLORIDE SERPL-SCNC: 104 MMOL/L (ref 96–112)
CO2 SERPL-SCNC: 23 MMOL/L (ref 20–33)
CREAT SERPL-MCNC: 1.03 MG/DL (ref 0.5–1.4)
EKG IMPRESSION: NORMAL
EOSINOPHIL # BLD AUTO: 0.23 K/UL (ref 0–0.51)
EOSINOPHIL NFR BLD: 1.9 % (ref 0–6.9)
ERYTHROCYTE [DISTWIDTH] IN BLOOD BY AUTOMATED COUNT: 41.2 FL (ref 35.9–50)
GFR SERPL CREATININE-BSD FRML MDRD: >60 ML/MIN/1.73 M 2
GLUCOSE SERPL-MCNC: 122 MG/DL (ref 65–99)
HCT VFR BLD AUTO: 42.7 % (ref 42–52)
HGB BLD-MCNC: 14.1 G/DL (ref 14–18)
IMM GRANULOCYTES # BLD AUTO: 0.13 K/UL (ref 0–0.11)
IMM GRANULOCYTES NFR BLD AUTO: 1.1 % (ref 0–0.9)
LYMPHOCYTES # BLD AUTO: 3.24 K/UL (ref 1–4.8)
LYMPHOCYTES NFR BLD: 27.2 % (ref 22–41)
Lab: NORMAL
MAGNESIUM SERPL-MCNC: 1.7 MG/DL (ref 1.5–2.5)
MCH RBC QN AUTO: 28.6 PG (ref 27–33)
MCHC RBC AUTO-ENTMCNC: 33 G/DL (ref 33.7–35.3)
MCV RBC AUTO: 86.6 FL (ref 81.4–97.8)
MONOCYTES # BLD AUTO: 1.29 K/UL (ref 0–0.85)
MONOCYTES NFR BLD AUTO: 10.8 % (ref 0–13.4)
NEUTROPHILS # BLD AUTO: 6.95 K/UL (ref 1.82–7.42)
NEUTROPHILS NFR BLD: 58.5 % (ref 44–72)
NRBC # BLD AUTO: 0 K/UL
NRBC BLD AUTO-RTO: 0 /100 WBC
PLATELET # BLD AUTO: 290 K/UL (ref 164–446)
PMV BLD AUTO: 10.7 FL (ref 9–12.9)
POTASSIUM SERPL-SCNC: 3.9 MMOL/L (ref 3.6–5.5)
RBC # BLD AUTO: 4.93 M/UL (ref 4.7–6.1)
SODIUM SERPL-SCNC: 135 MMOL/L (ref 135–145)
WBC # BLD AUTO: 11.9 K/UL (ref 4.8–10.8)

## 2017-03-01 PROCEDURE — A9270 NON-COVERED ITEM OR SERVICE: HCPCS | Performed by: INTERNAL MEDICINE

## 2017-03-01 PROCEDURE — A9502 TC99M TETROFOSMIN: HCPCS

## 2017-03-01 PROCEDURE — 80048 BASIC METABOLIC PNL TOTAL CA: CPT

## 2017-03-01 PROCEDURE — 700102 HCHG RX REV CODE 250 W/ 637 OVERRIDE(OP): Performed by: INTERNAL MEDICINE

## 2017-03-01 PROCEDURE — 73030 X-RAY EXAM OF SHOULDER: CPT | Mod: RT

## 2017-03-01 PROCEDURE — 96361 HYDRATE IV INFUSION ADD-ON: CPT

## 2017-03-01 PROCEDURE — 700111 HCHG RX REV CODE 636 W/ 250 OVERRIDE (IP): Performed by: INTERNAL MEDICINE

## 2017-03-01 PROCEDURE — 36415 COLL VENOUS BLD VENIPUNCTURE: CPT

## 2017-03-01 PROCEDURE — 97162 PT EVAL MOD COMPLEX 30 MIN: CPT

## 2017-03-01 PROCEDURE — G0378 HOSPITAL OBSERVATION PER HR: HCPCS

## 2017-03-01 PROCEDURE — 85025 COMPLETE CBC W/AUTO DIFF WBC: CPT

## 2017-03-01 PROCEDURE — A9270 NON-COVERED ITEM OR SERVICE: HCPCS | Performed by: NURSE PRACTITIONER

## 2017-03-01 PROCEDURE — 700112 HCHG RX REV CODE 229: Performed by: INTERNAL MEDICINE

## 2017-03-01 PROCEDURE — G8987 SELF CARE CURRENT STATUS: HCPCS | Mod: CK

## 2017-03-01 PROCEDURE — G8988 SELF CARE GOAL STATUS: HCPCS | Mod: CI

## 2017-03-01 PROCEDURE — 99217 PR OBSERVATION CARE DISCHARGE: CPT | Performed by: HOSPITALIST

## 2017-03-01 PROCEDURE — 700102 HCHG RX REV CODE 250 W/ 637 OVERRIDE(OP): Performed by: NURSE PRACTITIONER

## 2017-03-01 PROCEDURE — G8978 MOBILITY CURRENT STATUS: HCPCS | Mod: CL

## 2017-03-01 PROCEDURE — 700111 HCHG RX REV CODE 636 W/ 250 OVERRIDE (IP)

## 2017-03-01 PROCEDURE — 96372 THER/PROPH/DIAG INJ SC/IM: CPT

## 2017-03-01 PROCEDURE — G8979 MOBILITY GOAL STATUS: HCPCS | Mod: CJ

## 2017-03-01 PROCEDURE — 97165 OT EVAL LOW COMPLEX 30 MIN: CPT

## 2017-03-01 PROCEDURE — 83735 ASSAY OF MAGNESIUM: CPT

## 2017-03-01 RX ORDER — ENEMA 19; 7 G/133ML; G/133ML
1 ENEMA RECTAL
Status: SHIPPED | COMMUNITY
Start: 2017-03-01 | End: 2017-03-21

## 2017-03-01 RX ORDER — KETOROLAC TROMETHAMINE 10 MG/1
10 TABLET, FILM COATED ORAL EVERY 6 HOURS PRN
Qty: 20 TAB | Refills: 0 | Status: SHIPPED | OUTPATIENT
Start: 2017-03-01 | End: 2017-03-06

## 2017-03-01 RX ORDER — AMOXICILLIN 250 MG
1 CAPSULE ORAL
Qty: 30 TAB | Refills: 0 | Status: SHIPPED | COMMUNITY
Start: 2017-03-01 | End: 2017-03-21

## 2017-03-01 RX ORDER — TRAMADOL HYDROCHLORIDE 50 MG/1
50 TABLET ORAL EVERY 8 HOURS PRN
Qty: 20 TAB | Refills: 0 | Status: SHIPPED | OUTPATIENT
Start: 2017-03-01 | End: 2017-03-21

## 2017-03-01 RX ORDER — BISACODYL 10 MG
10 SUPPOSITORY, RECTAL RECTAL
Refills: 0 | Status: SHIPPED | COMMUNITY
Start: 2017-03-01 | End: 2017-03-21

## 2017-03-01 RX ORDER — REGADENOSON 0.08 MG/ML
INJECTION, SOLUTION INTRAVENOUS
Status: COMPLETED
Start: 2017-03-01 | End: 2017-03-01

## 2017-03-01 RX ADMIN — HYDROCODONE BITARTRATE AND ACETAMINOPHEN 1 TABLET: 5; 325 TABLET ORAL at 16:15

## 2017-03-01 RX ADMIN — METOPROLOL SUCCINATE 50 MG: 50 TABLET, EXTENDED RELEASE ORAL at 09:39

## 2017-03-01 RX ADMIN — ROSUVASTATIN CALCIUM 62.5 MCG: 10 TABLET, FILM COATED ORAL at 09:38

## 2017-03-01 RX ADMIN — HEPARIN SODIUM 5000 UNITS: 5000 INJECTION, SOLUTION INTRAVENOUS; SUBCUTANEOUS at 05:03

## 2017-03-01 RX ADMIN — REGADENOSON 0.4 MG: 0.08 INJECTION, SOLUTION INTRAVENOUS at 08:15

## 2017-03-01 RX ADMIN — DOCUSATE SODIUM 100 MG: 100 CAPSULE ORAL at 09:00

## 2017-03-01 RX ADMIN — ATORVASTATIN CALCIUM 20 MG: 20 TABLET, FILM COATED ORAL at 09:38

## 2017-03-01 RX ADMIN — ASPIRIN 325 MG: 325 TABLET, COATED ORAL at 09:37

## 2017-03-01 RX ADMIN — HYDROCODONE BITARTRATE AND ACETAMINOPHEN 1 TABLET: 5; 325 TABLET ORAL at 05:03

## 2017-03-01 ASSESSMENT — PAIN SCALES - GENERAL
PAINLEVEL_OUTOF10: 0
PAINLEVEL_OUTOF10: 8
PAINLEVEL_OUTOF10: 7
PAINLEVEL_OUTOF10: 0
PAINLEVEL_OUTOF10: 0

## 2017-03-01 ASSESSMENT — GAIT ASSESSMENTS
ASSISTIVE DEVICE: FRONT WHEEL WALKER
DEVIATION: ANTALGIC;STEP TO;BRADYKINETIC
DISTANCE (FEET): 100
GAIT LEVEL OF ASSIST: CONTACT GUARD ASSIST

## 2017-03-01 ASSESSMENT — LIFESTYLE VARIABLES: DO YOU DRINK ALCOHOL: NO

## 2017-03-01 ASSESSMENT — ACTIVITIES OF DAILY LIVING (ADL): TOILETING: INDEPENDENT

## 2017-03-01 NOTE — PROGRESS NOTES
Pt up to standup scale with use of front wheel walker, gait unsteady.  Pt reports dizziness.    Pt back in bed, encouraged to use call light.    Urinal provided to patient

## 2017-03-01 NOTE — PROGRESS NOTES
Raj cabrera r/g pt's c/o constipation, Right shoulder pain, pt requesting Magnesium citrate, [place].

## 2017-03-01 NOTE — PROGRESS NOTES
While sleeping, patient's oxygen saturation at 88% on room air.  Applied 1.5 liters of oxygen via nasal cannula, oxygen saturation currently 97%.

## 2017-03-01 NOTE — FACE TO FACE
Face to Face Supporting Documentation - Home Health    The encounter with this patient was in whole or in part the primary reason for home health admission.    Date of encounter:   Patient:                    MRN:                       YOB: 2017  Jersey Alxeis  [id number]  10/18/1930     Home health to see patient for:  Skilled Nursing care for assessment, interventions & education, Physical Therapy evaluation and treatment and Occupational therapy evaluation and treatment    Skilled need for:  Recent Deterioration of Health Status frequent falls    Skilled nursing interventions to include:  Comment: evaluation and education    Homebound status evidenced by:  Need the aid of supportive devices such as crutches, canes, wheelchairs or walkers. Leaving home requires a considerable and taxing effort. There is a normal inability to leave the home.    Community Physician to provide follow up care: Pcp Pt States None     Optional Interventions? No      I certify the face to face encounter for this home health care referral meets the CMS requirements and the encounter/clinical assessment with the patient was, in whole, or in part, for the medical condition(s) listed above, which is the primary reason for home health care. Based on my clinical findings: the service(s) are medically necessary, support the need for home health care, and the homebound criteria are met.  I certify that this patient has had a face to face encounter by myself.  Camelia Osullivan A.P.R.N. - NPI: [id number]

## 2017-03-01 NOTE — PROGRESS NOTES
Assessment per flow chart. Pt on tele monitor, Afib, flutter noted, HR-75. Pt A&Ox4, denies pain, dizziness.  Right f/a IV site- c,d,i.  Pt's SpO2-96% on 1.5L n/c.  Discussed POC with pt, pt verbalized understanding and agreement.  Pt instructed to call for assistance, pt's belonging's and call light within reach. Will continue to monitor.

## 2017-03-01 NOTE — THERAPY
"Occupational Therapy Evaluation completed.   Functional Status:  Pt very pleasant & co-operative.  Pt c/o pain in his left ankle, worse with WB, as well as pain in his right shoulder blade.  Pt reports a pinching feeling with right shoulder flex. ? If pt may have a slight rotator cuff tear?  Pt also is limited by his fear of falling.  Pt declined to amb to sink with FWW due to fear of falling.  Pt did stand with CGA & took few side steps with FWW.  Pt was SBA for bed mobility in/out of bed.  Pt given AROM exercises to completed in bed for BUE.  Plan of Care: Will benefit from Occupational Therapy 3 times per week  Discharge Recommendations:  Equipment: Will Continue to Assess for Equipment Needs. Post-acute therapy recommended before discharged home.    Pt currently does not appear safe to return home at this time.  Pt is alone during the day while his son-in-law is at work.  Pt also has 3 steps to enter his home.  Pt very fearful of mobilizing with FWW & limited by left ankle pain.    See \"Rehab Therapy-Acute\" Patient Summary Report for complete documentation.    "

## 2017-03-01 NOTE — H&P
CHIEF COMPLAINT:  Chest pain and right shoulder pain for weeks.    HISTORY OF PRESENT ILLNESS:  The patient is an 86-year-old male with history   of multiple medical problems and moved here from [place], [place].  He says   approximately a month ago in [place], [place] he fell.  It was mechanical in   nature.  He apparently went to go to see doctor there and he told him to get a   repeat x-ray.  For he could do that he moved down here.  He did have another   fall a week and half ago, same thing mechanical in nature, never loss of   consciousness.  After that, he has had right-sided chest pain on and off for   the last month and also he has had increasing back pain that is somewhat above   his chronic levels.  Back pain is constant 3/10.  Chest pain at home was up   to an 8/10.  He says there is no position that seems to make it comfortable,   denies any new medications.  No nausea, vomiting, or diarrhea.  There is no   associated diaphoresis with it.  Chest pain is not associated with food.  He   says he used to be on anticoagulation in the past, but after the recurrent   fall, his cardiologist took him off it.    He also endorses some increasing shortness of breath with exertion as well on   and off for the last 4 weeks.    REVIEW OF SYSTEMS:  All other systems reviewed are negative.    In the ED, no intervention was done other than Teller.    PAST MEDICAL HISTORY:  Atrial fibrillation flutter.    PAST SURGICAL HISTORY:  1.  Two back spinal stenosis surgeries.  2.  Appendectomy.  3.  Tonsillectomy.    FAMILY HISTORY:  None.    SOCIAL HISTORY:  Recently moved to [place], [place].  No alcohol, tobacco, or   drugs.    ALLERGIES:  None.    MEDICATIONS PRIOR TO ADMISSION:  1.  Atorvastatin 20 mg every morning.  2.  Vitamin D3 5000 units p.o. 4 days a week.  3.  Digoxin 62.5 mcg every morning.  4.  Docusate 100 mg every morning.  5.  Metoprolol sustained release 50 mg every morning.  6.  Nystatin powder applied to the  affected area.    PHYSICAL EXAMINATION:  VITAL SIGNS:  Temperature is 36.3, heart rate 80, respiratory rate 14, oxygen   saturation 97% on 3 L room air, blood pressure 141/79.  GENERAL:  No acute distress, speaking in full sentences.  A and O x4.  HEENT:  Normocephalic, atraumatic.  Pupils are equal and reactive to light.  NECK:  Flat JVD.  CARDIOVASCULAR:  Irregularly irregular, not tachycardic.  No murmurs, rubs, or   gallops appreciated.  LUNGS:  Clear to auscultation bilaterally.  No use of accessory muscles.  CHEST WALL:  No tenderness to bilateral pectoral region.  ABDOMEN:  Obese, soft, nontender, nondistended, normoactive bowel sounds,   hepatosplenomegaly.  EXTREMITIES:  No clubbing, cyanosis, or edema.  He is warm peripherally, 2 +   radial pulses equal and symmetric.  NEUROLOGIC:  Intact sensation to soft touch, nonfocal.  MUSCULOSKELETAL:  Full range of motion of all extremities, 5/5 muscle strength   throughout.  SKIN:  No rashes, lesions, or excoriations.  PSYCHOLOGICAL:  Appropriate affect, A and O x4.    LABORATORY DATA:  CBC remarkable for white blood cell count 13.4, H and H 15.7   and 45.6, platelet count is 347.  CMP:  Sodium 135, potassium 4.4, glucose   130, BUN and creatinine 20 and 1.05, calcium 10.8, AST and ALT 11 and 8,   lipase 27, troponin less than 0.01 x2.  BNP 23.  PT/INR to 30.0 and 1.04, PTT   29.2.  Vitamin D 25-hydroxy 72.  Intact PTH is 76.    IMAGING:  CT of the chest with contrast.  No central segmental pulmonary   embolus identified.  Atherosclerotic plaque.  Emphysematous changes   honeycombing the peripheral right upper lobe.  Mild bilateral fibrotic   changes.  Bilateral pulmonary nodules.  Mildly prominent right peritracheal   lymph nodes, nonspecific.    EKG personally reviewed by me showed AFib flutter, heart rate 73.  No evidence   of acute ST segment changes.    ASSESSMENT:  1.  Chest pain.  2.  Ground level falls, recurrent.  3.  Elevated white blood cell count, no  evidence of infection.  4.  Hypercalcemia, mild.  5.  Atrial fibrillation flutter.    PLAN:  The patient will be admitted to the telemetry at this time, and it   appears that he has chest pain.  We need to rule out cardiac causes.  He is in   atrial flutter, which is chronic for him and is rate controlled.  He is no   longer on anticoagulation due to recurrent falls.  We will continue all of his   outpatient heart medications, but do full dose aspirin and NS fluids at 50 mL   an hour and get a nuclear medicine cardiac stress test in the morning.  As   far as his elevated calcium, it could be all due to GI.  No evidence of kidney   dysfunction.  He does have a normal vitamin D level and has a mildly elevated   IPTH.  We will see what his calcium is in the morning with gentle hydration;   otherwise, he may need to be further worked up.    CODE STATUS:  Full code, full care.    DIET:  Cardiac, n.p.o. after midnight.    Deep venous thrombosis prophylaxis, heparin 5000 units every 8 hours.       ____________________________________     MD CLAU NASH / JUSTINE    DD:  02/28/2017 18:24:05  DT:  02/28/2017 21:33:03    D#:  [id number]  Job#:  [id number]

## 2017-03-01 NOTE — PROGRESS NOTES
Raj Rosas updated r/g pt's c/o constipation, rt shoulder pain, order received for Norco, Mag Citrate, see MAR.

## 2017-03-01 NOTE — DISCHARGE PLANNING
Care Transition Team Assessment    Information Source  Information Given By: Patient  Informant's Name: Jersey Alexis          Elopement Risk  Legal Hold: No  Ambulatory or Self Mobile in Wheelchair: Yes  Disoriented: No  Psychiatric Symptoms: None  History of Wandering: No  Elopement this Admit: No  Vocalizing Wanting to Leave: No  Displays Behaviors, Body Language Wanting to Leave: No-Not at Risk for Elopement  Elopement Risk: Not at Risk for Elopement    Interdisciplinary Discharge Planning  Does Admitting Nurse Feel This Could be a Complex Discharge?: No  Lives with - Patient's Self Care Capacity: Unrelated Adult  Patient or legal guardian wants to designate a caregiver (see row info): No  Support Systems: Friends / Neighbors  Housing / Facility: [address]  Do You Take your Prescribed Medications Regularly: Yes  Able to Return to Previous ADL's: Yes  Mobility Issues: Yes  Prior Services: None  Patient Expects to be Discharged to:: home  Assistance Needed: No  Durable Medical Equipment: Walker                   Vision / Hearing Impairment  Vision Impairment : Yes  Right Eye Vision: Impaired, Wears Glasses  Left Eye Vision: Impaired, Wears Glasses  Hearing Impairment : No    Values / Beliefs / Concerns  Values / Beliefs Concerns : No         Domestic Abuse  Have you ever been the victim of abuse or violence?: No  Physical Abuse or Sexual Abuse: No  Verbal Abuse or Emotional Abuse: No  Possible Abuse Reported to:: Not Applicable

## 2017-03-01 NOTE — PROGRESS NOTES
Pt on call light c/o rt shoulder pain, see flowchart. Pt requesting Norco for pain, refused Nitroglycerin. Pt requesting Magnesium Citrate for constipation. Dr Ragland paged.

## 2017-03-01 NOTE — PROGRESS NOTES
Assumed patient care. Report received from CARL James.  Pt A&Ox4.  Respirations even, unlabored on room air.  Pt denies any pain at this time.  Monitors applied, Afib, flutter noted.   Call light within reach.  Pt updated on POC, updated communication board. Needs met, will continue to monitor.  Patient remains NPO for upcoming stress test

## 2017-03-01 NOTE — DISCHARGE PLANNING
"Late note: Earlier tubed choice form to CDU, pt signed, faxed to [facility].  Call from RN that pt does not have PCP.  Called  who met with pt and family @ bedside, [facility] as \"[facility]\".   has made arrangements for [facility], in interim this CM met with pt and family and they would like to have apt with [facility] so that they may have [facility]  For now intill able [facility].  Pt is ambulatory in room with walker and CYN O x3.  "

## 2017-03-01 NOTE — THERAPY
"Pt is an 87 y/o male presenting to acute physical therapy with impairments in L shoulder ROM, LE weakness, balance deficits and gross endurance deficits (deconditioning). Pt reports todays ambulation of 100 ft was the most he has walked at one time in a long time. Pt SOB with any functional mobility. Son in law stated he continues to encourage pt to ambulate and exercise; however, pt keeps to short ambualtion distances and highly relies on UE strength and scooter for support. Recommend post acute PT services prior to, or at the least upon D/C home. Son in law and pt verbalized understanding of PT recommendations to follow up with musculoskeletal pain with OP PT.     Physical Therapy Evaluation completed.   Bed Mobility:  Supine to Sit: Supervised  Transfers: Sit to Stand: Contact Guard Assist  Gait: Level Of Assist: Contact Guard Assist with Front-Wheel Walker       Plan of Care: Will benefit from Physical Therapy 3 times per week  Discharge Recommendations: Equipment: Will Continue to Assess for Equipment Needs. Post-acute therapy recommended before/after discharged home depending on support upon D/C.    See \"Rehab Therapy-Acute\" Patient Summary Report for complete documentation.     "

## 2017-03-01 NOTE — DISCHARGE SUMMARY
Hospital Medicine Discharge Note     Patient ID:  Jersey Alexis  [id number]  86 y.o.male  10/18/1930    Admit Date:  2/28/2017       Discharge Date:   3/1/2017    [facility]    Admitting Physician: Lee Bower M.D.  Discharging Physician: Georgi Grey MD    Chief Complaint: chest pain    Discharge Diagnoses:     Chest pain- resolved    Negative nuclear medicine stress test    Pulmonary nodules/lesions, multiple; needs F/U CT in 6 months 9/2017    Hypercalcemia- resolved    Chronic Medical Problems:  Atrial fibrillation/flutter    Code Status: Full Code    Hospital Summary:       Please refer to H&P by Dr Bower on 2/28/2017 for full details.      In brief, Jersey Alexis is a 86 y.o. male who was admitted 2/28/2017 for chest pain and right shoulder pain. He has had right shoulder pain for weeks after falling and hurting his right shoulder. He complains of right sided chest pain that is intermittent for the past month. He has had frequent mechanical falls.     The patient underwent workup as below. He was observed overnight on telemetry without reported event. He has remained chest pain free since admission. His workup has remained benign. He underwent OT evaluation with recommendations for post acute therapy placement, however he does not wish for placement at this time. He has been set up for home health to provide PT/OT.      The patient had hypercalcemia upon admission that resolved with hydration, likely 2/2 dehydration. He is encouraged to hydrate.     Therefore, he is discharged in good and stable condition with close outpatient follow-up.    Consultants:      None    Studies:    Imaging/ Testing:      NM-CARDIAC STRESS TEST   Final Result      NUCLEAR IMAGING INTERPRETATION   No myocardial infarct or inducible ischemia.   Normal LEFT ventricular function.     CT-CTA CHEST PULMONARY ARTERY W/ RECONS   Final Result      No central or segmental pulmonary embolus is identified.       Atherosclerotic plaque.      Emphysematous changes honeycombing in the peripheral right upper lobe.      Mild bilateral fibrotic changes.      Bilateral pulmonary nodules.      Mildly prominent right paratracheal lymph node is nonspecific      Low Risk Patient:  Follow up CT at 12 months; if stable, no further follow up.      High Risk Patient:  Initial follow up CT at 6-12 months, then at 18-24 months if no change.         Low Risk - Minimal or absent history of smoking and of other known risk factors.   High Risk - History of smoking or of other known risk factors.   Fleischner Society Guidelines for Pulmonary Nodules:  Radiology, 237:2005         DX-CHEST-PORTABLE (1 VIEW)   Final Result      1. No acute cardiopulmonary abnormalities are identified.      DX-SHOULDER 2+ RIGHT      1.  No fracture or dislocation of RIGHT shoulder.  2.  Moderate degenerative change of AC joint.  3.  Diffuse osteopenia.      Laboratory:   Recent Labs      02/28/17   1000  03/01/17   0100   WBC  13.4*  11.9*   RBC  5.31  4.93   HEMOGLOBIN  15.7  14.1   HEMATOCRIT  45.6  42.7   MCV  85.9  86.6   MCH  29.6  28.6   MCHC  34.4  33.0*   RDW  40.4  41.2   PLATELETCT  347  290   MPV  10.3  10.7       Recent Labs      02/28/17   1000  03/01/17   0100   SODIUM  135  135   POTASSIUM  4.4  3.9   CHLORIDE  103  104   CO2  21  23   GLUCOSE  130*  122*   BUN  21  24*   CREATININE  1.05  1.03   CALCIUM  10.8*  10.5       Recent Labs      02/28/17   1000  03/01/17   0100   ALTSGPT  8   --    ASTSGOT  11*   --    ALKPHOSPHAT  89   --    TBILIRUBIN  0.9   --    LIPASE  27   --    GLUCOSE  130*  122*        Recent Labs      02/28/17   1000   BNPBTYPENAT  23     Vitamin D 72  PTH 76.6    Recent Labs      02/28/17   1000  02/28/17   1430  02/28/17   1830   TROPONINI  <0.01  <0.01  <0.01     Procedures/Surgeries:        None    Disposition: Discharge home    Condition:  Stable    Instructions:   Activity: As tolerated.  Diet: cardiac  Followup:   -PCP 1  week- [facility]  -Home health agency, arrange visit for PT/OT  -Followup CT scan in 6 months, eval pulmonary nodules (sept 2017)  Instructions:  -Given instructions to return to the ER if any new or worsening symptoms, worsening condition, or failure to improve  -Call PCP for followup  -No smoking, no alcohol, no caffeine  -Encourage risk factor reduction with tobacco and alcohol abstinence, diet changes, weight loss, and exercise.     Future Appointments  Date Time Provider Department Center   3/2/2017 11:30 AM CARE MANAGER [facility]   3/6/2017 2:20 PM [facility]       Discharge Medications:           Medication List      START taking these medications       Instructions    bisacodyl 10 MG Supp   Commonly known as:  DULCOLAX    Insert 1 Suppository in rectum every 24 hours as needed (if lactulose ineffective).   Dose:  10 mg       fleet 7-19 GM/118ML Enem    Insert 133 mL in rectum Once PRN (if bisacodyl ineffective).   Dose:  1 Each       ketorolac 10 MG Tabs   Commonly known as:  TORADOL    Take 1 Tab by mouth every 6 hours as needed for Moderate Pain for up to 5 days. Do not combine with other NSAIDS Do not take longer than 5 days Take with food   Dose:  10 mg       senna-docusate 8.6-50 MG Tabs   Last time this was given:  1 Tab on 2/28/2017  8:28 PM   Commonly known as:  PERICOLACE or SENOKOT S    Take 1 Tab by mouth every 24 hours as needed for Constipation.   Dose:  1 Tab       tramadol 50 MG Tabs   Commonly known as:  ULTRAM    Take 1 Tab by mouth every 8 hours as needed for Severe Pain.   Dose:  50 mg         CONTINUE taking these medications       Instructions    atorvastatin 20 MG Tabs   Last time this was given:  20 mg on 3/1/2017  9:38 AM   Commonly known as:  LIPITOR    Take 20 mg by mouth every morning.   Dose:  20 mg       digoxin 125 MCG Tabs   Last time this was given:  62.5 mcg on 3/1/2017  9:38 AM   Commonly known as:  LANOXIN    Take 62.5  mcg by mouth every morning. Indications: Chronic Atrial Fibrillation   Dose:  62.5 mcg       docusate sodium 100 MG Caps   Last time this was given:  100 mg on 3/1/2017  9:00 AM   Commonly known as:  COLACE    Take 100 mg by mouth every morning.   Dose:  100 mg       metoprolol SR 50 MG Tb24   Last time this was given:  50 mg on 3/1/2017  9:39 AM   Commonly known as:  TOPROL XL    Take 50 mg by mouth every morning. Indications: A-Fib   Dose:  50 mg       nystatin Powd   Commonly known as:  MYCOSTATIN    Apply 1 Application to affected area(s) 2 Times a Day.   Dose:  1 Application       Vitamin D3 5000 UNITS Tabs    Take 500-10,000 Units by mouth every morning. 5,000 units (1 tab) 4 days a week - Tuesday/Wednesday/Friday/Saturday. 10,000 units (2 tabs) 3 days a week - Sunday/Monday/Thursday   Dose:  500-07287 Units           Please CC the above physicians    JG Peres  3/1/2017  1:33 PM

## 2017-03-02 ENCOUNTER — PATIENT OUTREACH (OUTPATIENT)
Dept: HEALTH INFORMATION MANAGEMENT | Facility: OTHER | Age: 82
End: 2017-03-02

## 2017-03-02 NOTE — DISCHARGE INSTRUCTIONS
Activity: As tolerated.   Diet: cardiac   Followup:   -PCP 1 week   -Home health agency, arrange visit tomorrow   -Followup CT scan in 6 months, eval pulmonary nodules (sept 2017)   Instructions:   -Given instructions to return to the ER if any new or worsening symptoms, worsening condition, or failure to improve   -Call PCP for followup   -No smoking, no alcohol, no caffeine   -Encourage risk factor reduction with tobacco and alcohol abstinence, diet changes, weight loss, and exercise      Discharge Instructions    Discharged to home by car with relative. Discharged via wheelchair, hospital escort: Yes.  Special equipment needed: Not Applicable    Be sure to schedule a follow-up appointment with your primary care doctor or any specialists as instructed.     Discharge Plan:   Diet Plan: Discussed  Activity Level: Discussed  Confirmed Follow up Appointment: Patient to Call and Schedule Appointment  Confirmed Symptoms Management: Discussed  Medication Reconciliation Updated: Yes  Influenza Vaccine Indication: Patient Refuses    I understand that a diet low in cholesterol, fat, and sodium is recommended for good health. Unless I have been given specific instructions below for another diet, I accept this instruction as my diet prescription.   Other diet: Heart healthy    Special Instructions: None    · Is patient discharged on Warfarin / Coumadin?   No     · Is patient Post Blood Transfusion?  No    Depression / Suicide Risk    As you are discharged from this [facility] facility, it is important to learn how to keep safe from harming yourself.    Recognize the warning signs:  · Abrupt changes in personality, positive or negative- including increase in energy   · Giving away possessions  · Change in eating patterns- significant weight changes-  positive or negative  · Change in sleeping patterns- unable to sleep or sleeping all the time   · Unwillingness or inability to communicate  · Depression  · Unusual sadness,  discouragement and loneliness  · Talk of wanting to die  · Neglect of personal appearance   · Rebelliousness- reckless behavior  · Withdrawal from people/activities they love  · Confusion- inability to concentrate     If you or a loved one observes any of these behaviors or has concerns about self-harm, here's what you can do:  · Talk about it- your feelings and reasons for harming yourself  · Remove any means that you might use to hurt yourself (examples: pills, rope, extension cords, firearm)  · Get professional help from the community (Mental Health, Substance Abuse, psychological counseling)  · Do not be alone:Call your Safe Contact- someone whom you trust who will be there for you.  · Call your local CRISIS HOTLINE 035-4052 or 942-847-5880  · Call your local Children's Mobile Crisis Response Team [place] (377) 530-5517 or www.[employer]  · Call the toll free National Suicide Prevention Hotlines   · National Suicide Prevention Lifeline 809-415-DJIP (1121)  · National Hope Line Network 800-SUICIDE (173-0990)

## 2017-03-02 NOTE — PROGRESS NOTES
IV dc'd.  Discharge instructions given to patient; patient verbalizes understanding, all questions answered.  Copy of DC summary provided, signed copy in chart.  2 prescriptions provided to patient, copies in chart.  Pt states personal belongings are in possession.  Pt escorted off unit by tech without incident.

## 2017-03-06 ENCOUNTER — OFFICE VISIT (OUTPATIENT)
Dept: MEDICAL GROUP | Facility: CLINIC | Age: 82
End: 2017-03-06
Payer: MEDICARE

## 2017-03-06 VITALS
HEART RATE: 73 BPM | HEIGHT: 71 IN | SYSTOLIC BLOOD PRESSURE: 138 MMHG | RESPIRATION RATE: 16 BRPM | WEIGHT: 209 LBS | OXYGEN SATURATION: 98 % | BODY MASS INDEX: 29.26 KG/M2 | TEMPERATURE: 96.6 F | DIASTOLIC BLOOD PRESSURE: 74 MMHG

## 2017-03-06 DIAGNOSIS — Z09 HOSPITAL DISCHARGE FOLLOW-UP: ICD-10-CM

## 2017-03-06 DIAGNOSIS — M19.019 DEGENERATIVE JOINT DISEASE OF ACROMIOCLAVICULAR JOINT: ICD-10-CM

## 2017-03-06 DIAGNOSIS — R91.8 PULMONARY NODULES/LESIONS, MULTIPLE: ICD-10-CM

## 2017-03-06 PROCEDURE — 99496 TRANSJ CARE MGMT HIGH F2F 7D: CPT | Performed by: NURSE PRACTITIONER

## 2017-03-06 RX ORDER — CELECOXIB 100 MG/1
200 CAPSULE ORAL 2 TIMES DAILY
Qty: 30 CAP | Refills: 1 | Status: SHIPPED | OUTPATIENT
Start: 2017-03-06 | End: 2017-03-21

## 2017-03-06 ASSESSMENT — ENCOUNTER SYMPTOMS
NERVOUS/ANXIOUS: 0
FEVER: 0
COUGH: 0
WHEEZING: 0
HEARTBURN: 0
DIZZINESS: 0
DEPRESSION: 0
PALPITATIONS: 0
FOCAL WEAKNESS: 0
CONSTIPATION: 1
CHILLS: 0
FALLS: 1
HEADACHES: 0
SPUTUM PRODUCTION: 0
VOMITING: 0
SHORTNESS OF BREATH: 0
EYE PAIN: 0
BLURRED VISION: 0
SORE THROAT: 0
MYALGIAS: 1
DIARRHEA: 0
ABDOMINAL PAIN: 0
WEAKNESS: 0
NAUSEA: 0

## 2017-03-06 ASSESSMENT — PATIENT HEALTH QUESTIONNAIRE - PHQ9: CLINICAL INTERPRETATION OF PHQ2 SCORE: 0

## 2017-03-06 NOTE — MR AVS SNAPSHOT
"        Jersey Alexis   3/6/2017 2:20 PM   Office Visit   MRN: [id number]    Department:  [facility]   Dept Phone:  [phone]    Description:  Male : 10/18/1930   Provider:  JG Roman           Reason for Visit     Hospital Follow-up           Allergies as of 3/6/2017     No Known Allergies      You were diagnosed with     Hospital discharge follow-up   [981916]       Pulmonary nodules/lesions, multiple   [863776]       Degenerative joint disease of acromioclavicular joint   [311277]         Vital Signs     Blood Pressure Pulse Temperature Respirations Height Weight    138/74 mmHg 73 35.9 °C (96.6 °F) 16 1.803 m (5' 11\") 94.802 kg (209 lb)    Body Mass Index Oxygen Saturation Smoking Status             29.16 kg/m2 98% Former Smoker         Basic Information     Date Of Birth Sex Race Ethnicity Preferred Language    10/18/1930 Male White Non- English      Your appointments     Mar 21, 2017  8:00 AM   New Patient with Tanisha Flores M.D.   [facility] [address]    [address]  [address]  [facility] [address]   [phone]           Please bring Photo ID, Insurance Cards, All Medication Bottles and copies of any legal documents (such as Living Will, Power of ) If speaking a language besides English please bring an adult . Please arrive 30 minutes prior for check in and registration. You will be receiving a confirmation call a few days before your appointment from our automated call confirmation system.              Problem List              ICD-10-CM Priority Class Noted - Resolved    Pulmonary nodules/lesions, multiple; needs F/U CT in 6 months  R91.8   3/1/2017 - Present      Health Maintenance        Date Due Completion Dates    IMM DTaP/Tdap/Td Vaccine (1 - Tdap) 10/18/1949 ---    COLONOSCOPY 10/18/1980 ---    IMM ZOSTER VACCINE 10/18/1990 ---    IMM PNEUMOCOCCAL 65+ (ADULT) LOW/MEDIUM RISK SERIES (1 of 2 - PCV13) " 10/18/1995 ---    IMM INFLUENZA (1) 9/1/2016 ---            Current Immunizations     No immunizations on file.      Below and/or attached are the medications your provider expects you to take. Review all of your home medications and newly ordered medications with your provider and/or pharmacist. Follow medication instructions as directed by your provider and/or pharmacist. Please keep your medication list with you and share with your provider. Update the information when medications are discontinued, doses are changed, or new medications (including over-the-counter products) are added; and carry medication information at all times in the event of emergency situations     Allergies:  No Known Allergies          Medications  Valid as of: March 06, 2017 -  4:25 PM    Generic Name Brand Name Tablet Size Instructions for use    Atorvastatin Calcium (Tab) LIPITOR 20 MG Take 20 mg by mouth every morning.        Bisacodyl (Suppos) DULCOLAX 10 MG Insert 1 Suppository in rectum every 24 hours as needed (if lactulose ineffective).        Celecoxib (Cap) CELEBREX 100 MG Take 2 Caps by mouth 2 times a day.        Cholecalciferol (Tab) Vitamin D3 5000 UNITS Take 500-10,000 Units by mouth every morning. 5,000 units (1 tab) 4 days a week - Tuesday/Wednesday/Friday/Saturday. 10,000 units (2 tabs) 3 days a week - Sunday/Monday/Thursday        Digoxin (Tab) LANOXIN 125 MCG Take 62.5 mcg by mouth every morning. Indications: Chronic Atrial Fibrillation        Docusate Sodium (Cap) COLACE 100 MG Take 100 mg by mouth every morning.        Lidocaine (Gel) Lidocaine 4 % 1 Application by Apply externally route every 12 hours as needed.        Metoprolol Succinate (TABLET SR 24 HR) TOPROL XL 50 MG Take 50 mg by mouth every morning. Indications: A-Fib        Nystatin (Powder) MYCOSTATIN  Apply 1 Application to affected area(s) 2 Times a Day.        Sennosides-Docusate Sodium (Tab) PERICOLACE or SENOKOT S 8.6-50 MG Take 1 Tab by mouth every 24  hours as needed for Constipation.        Sodium Phosphates (Enema) fleet 7-19 GM/118ML Insert 133 mL in rectum Once PRN (if bisacodyl ineffective).        TraMADol HCl (Tab) ULTRAM 50 MG Take 1 Tab by mouth every 8 hours as needed for Severe Pain.        .                 Medicines prescribed today were sent to:     [id number]  [place] [address] [place] [id number] of [facility] ([place]) [place] [address]    Phone: [phone] Fax: [phone]    Open 24 Hours?: No      Medication refill instructions:       If your prescription bottle indicates you have medication refills left, it is not necessary to call your provider’s office. Please contact your pharmacy and they will refill your medication.    If your prescription bottle indicates you do not have any refills left, you may request refills at any time through one of the following ways: The online Seventymm system (except Urgent Care), by calling your provider’s office, or by asking your pharmacy to contact your provider’s office with a refill request. Medication refills are processed only during regular business hours and may not be available until the next business day. Your provider may request additional information or to have a follow-up visit with you prior to refilling your medication.   *Please Note: Medication refills are assigned a new Rx number when refilled electronically. Your pharmacy may indicate that no refills were authorized even though a new prescription for the same medication is available at the pharmacy. Please request the medicine by name with the pharmacy before contacting your provider for a refill.        Instructions    If you need further assistance, or have any questions; concerns or lingering symptoms before seeing your Primary Care Provider or specialist.     Do not hesitate to contact us.     Please contact us at the Post-Hospital Follow Up Program at [phone].   Our offices hours are Monday-Friday 8 am-5 pm.                    Moe Delo Access Code: Activation code not generated  Current Moe Delo Status: Active

## 2017-03-06 NOTE — PATIENT INSTRUCTIONS
If you need further assistance, or have any questions; concerns or lingering symptoms before seeing your Primary Care Provider or specialist.     Do not hesitate to contact us.     Please contact us at the Post-Hospital Follow Up Program at [phone].   Our offices hours are Monday-Friday 8 am-5 pm.

## 2017-03-06 NOTE — PROGRESS NOTES
Subjective:     Jersey Alexis is a 86 y.o. male who presents for Hospital Follow-up.  Chart reviewed. Discharge summary available for review: Yes   Date of discharge 3/1/2017.  48- hour post discharge RN call completed on 3/2/2017 and documented in the medical record by Alok Stephen.    HPI: Recently hospitalized for chest pain, right shoulder pain, fall incident. Hospitalized from 2/28 to 3/1.     Right shoulder xray:  1. No fracture or dislocation of RIGHT shoulder.  2.  Moderate degenerative change of AC joint.  3.  Diffuse osteopenia., hospitalized from 2/28 to 3/1.     Negative stress test.  CT-CTA bilateral pulmonary nodules (4-5 mm), need follow up in 3-6 months    Since returning home, patient reports still having right sided chest pain, localized to mid clavicle right chest, reproducible and only about quarter size area. No bruises or injury to that area. Pt reported that comes very often. He is discharged with toradol and tramadol and he tries to limit the medication if he can. He has not started tramadol yet because he has no have severe pain, it is just mild to moderate pain. He took toradol once to twice in the past few days.      Upcoming appointment: [facility] PCP[facility] not going to see a provider, only there to register care or pharmacy coverage.    The patient denied weakness and difficulty taking care of self at home. Pt has walker and scooter at home. His most recent fall is because he tripped on the cord for his heating pad and get pulled back by the cord and fall on his back. No injury, just a little bit tenderness to right lower back. However, pt's son reported that he feels that pt seems a little bit winded after walking, maybe less than 20 feet. Pt denied sob or wheezing.     Patient reports taking medications as instructed.    Allergies:   Review of patient's allergies indicates no known allergies.    Social History:  Social History   Substance  "Use Topics   • Smoking status: Former Smoker     Quit date: 01/01/1980   • Smokeless tobacco: Never Used   • Alcohol Use: Not on file        ROS:  Review of Systems   Constitutional: Positive for malaise/fatigue. Negative for fever and chills.   HENT: Negative for congestion, hearing loss, sore throat and tinnitus.    Eyes: Negative for blurred vision and pain.   Respiratory: Negative for cough, sputum production, shortness of breath and wheezing.    Cardiovascular: Positive for chest pain ( right side). Negative for palpitations and leg swelling.   Gastrointestinal: Positive for constipation. Negative for heartburn, nausea, vomiting, abdominal pain and diarrhea.   Genitourinary: Positive for frequency. Negative for dysuria and urgency.   Musculoskeletal: Positive for myalgias, joint pain and falls ( tripped once after discharge. ).   Skin: Negative for rash.   Neurological: Negative for dizziness, focal weakness, weakness and headaches.   Psychiatric/Behavioral: Negative for depression. The patient is not nervous/anxious.         Objective:     Blood pressure 138/74, pulse 73, temperature 35.9 °C (96.6 °F), resp. rate 16, height 1.803 m (5' 11\"), weight 94.802 kg (209 lb), SpO2 98 %.     Physical Exam:  Physical Exam   Constitutional: He is oriented to person, place, and time and well-developed, well-nourished, and in no distress.   HENT:   Head: Normocephalic and atraumatic.   Eyes: Conjunctivae are normal.   Neck: Neck supple. No JVD present.   Cardiovascular: Normal rate and regular rhythm.    No murmur heard.  Pulmonary/Chest: Effort normal and breath sounds normal. No respiratory distress.   Abdominal: Soft. Bowel sounds are normal. He exhibits no distension. There is no tenderness. There is no rebound.   Musculoskeletal: He exhibits no edema.   Limited rom due to left knee disease and right shoulder pain. Has walker and scooter at home.    Neurological: He is alert and oriented to person, place, and time. No " cranial nerve deficit. Gait normal.   Skin: Skin is warm.   Vitals reviewed.        Assessment and Plan:     1. Hospital discharge follow-up  Hospitalization and results reviewed with patient. High risk conditions requiring teaching or care coordination were identified and addressed.The patient demonstrate understanding of admission and underlying conditions. The patient understands discharge instructions and when to seek medical attention. Medications reviewed including instructions regarding high risk medications, dosing and side effects.    The patient is able to safely adhere to ADL/IADL, treatment and medication regimen, self-manage of high-risk conditions? No   The patient requires physical therapy/home health/DME referral? Yes   The patient requires referral to care coordination/behavioral health/social work?  Yes   Patient requires referral for pharmacy consult? No   Advance directive/POLST on file?  No   Required counseled on advance directive?  No     - REFERRAL TO CARE MANAGEMENT SERVICES Reason for Consult:: new pt to [facility], just moved from [place] to [place] few weeks ago. Pt went to hospital for chest pain, [facility] but [facility] to have PCP at [facility]    2. Pulmonary nodules/lesions, multiple; needs F/U CT in 6 months 9/17  - CT report printed out and given to pt. [facility] but they are not sure it is for pharmacy or for primary physician. Pt would prefer to establish one with [facility] right now.     3. Degenerative joint disease of acromioclavicular joint  - celecoxib (CELEBREX) 100 MG Cap; Take 2 Caps by mouth 2 times a day.  Dispense: 30 Cap; Refill: 1  - Lidocaine 4 % Gel; 1 Application by Apply externally route every 12 hours as needed.  Dispense: 1 Tube; Refill: 1 for right chest-mid clavicle area.   - Can continue heating pad  -  order sent but appeared case closed because [facility]. MA has left message to . Will  place another referral order if needed. Waiting for call back.      Medication Reconciliation  Medication list at end of encounter:   Current Outpatient Prescriptions   Medication Sig Dispense Refill   • celecoxib (CELEBREX) 100 MG Cap Take 2 Caps by mouth 2 times a day. 30 Cap 1   • Lidocaine 4 % Gel 1 Application by Apply externally route every 12 hours as needed. 1 Tube 1   • senna-docusate (PERICOLACE OR SENOKOT S) 8.6-50 MG Tab Take 1 Tab by mouth every 24 hours as needed for Constipation. 30 Tab 0   • bisacodyl (DULCOLAX) 10 MG Suppos Insert 1 Suppository in rectum every 24 hours as needed (if lactulose ineffective).  0   • Sodium Phosphates (FLEET) 7-19 GM/118ML Enema Insert 133 mL in rectum Once PRN (if bisacodyl ineffective).     • tramadol (ULTRAM) 50 MG Tab Take 1 Tab by mouth every 8 hours as needed for Severe Pain. 20 Tab 0   • metoprolol SR (TOPROL XL) 50 MG TABLET SR 24 HR Take 50 mg by mouth every morning. Indications: A-Fib     • nystatin (MYCOSTATIN) Powder Apply 1 Application to affected area(s) 2 Times a Day.     • atorvastatin (LIPITOR) 20 MG Tab Take 20 mg by mouth every morning.     • digoxin (LANOXIN) 125 MCG Tab Take 62.5 mcg by mouth every morning. Indications: Chronic Atrial Fibrillation     • docusate sodium (COLACE) 100 MG Cap Take 100 mg by mouth every morning.     • Cholecalciferol (VITAMIN D3) 5000 UNITS Tab Take 500-10,000 Units by mouth every morning. 5,000 units (1 tab) 4 days a week - Tuesday/Wednesday/Friday/Saturday. 10,000 units (2 tabs) 3 days a week - Sunday/Monday/Thursday       No current facility-administered medications for this visit.       Primary care follow-up:  New health conditions identified during hospitalization? Yes   Labs/pathology/imaging requires future PCP follow-up?  No   Changes to medications during hospitalization or today? No     Recommended followup: No Follow-up on file. with Pcp Pt States None; MA to find one for pt --> scheduled to see new PCP with  [facility] on 3/21.  Future Appointments       Provider Department Center    3/6/2017 2:20 PM JG Roman [facility]          Patient Instruction  Patient offered educational material on discharge diagnosis and management of symptoms/red flags. Patient instructed to keep follow-up appointments and to bring written questions and and actual medications to each office visit. Patient instructed to call PCP/specialist with any problems/questions/concerns. Patient verbalizes understanding and has no further questions at this time.    Face-to-face transitional care management services with high complexity medical decision making.

## 2017-03-06 NOTE — PROGRESS NOTES
POST DISCHARGE CALL MADE BY Alok Stephen  Discharge Date:3/1/2017   Date of Outreach Call: 3/2/2017 11:05 AM  Now that you're home, how are you doing? Fair  Comment:Spoke with pt's caregiver and son-in-law Chandler.   Chandler lives with pt.  Chandler states that pt is still having  right sided rib and shoulder pain.  [facility]  was ordered  for pt, however Chandler states that [facility].   [facility]   Do you have questions about your medications? No    Did you fill your medications? No  Comment:Chandler states he will fill pt's prescriptions  today, 3/2.    Do you have a follow-up appointment scheduled?Yes  Comment:Pt will f/u at discharge clinic on 3/6 at 2:20  PM.  Chandler will drive and accompany pt.  Instructed Chandler on  location of [place] .  [facility]   SNF was offered to pt during admission and he declined.    Discharging Department: Emergency Department Regional    Number of Attempts: 1  Current or previous attempts completed within two business days of discharge? Yes  Provided education regarding treatment plan, medication, self-management, ADLs? Yes  Comment:Pt was d/c'ed from the CDU at [facility].  Chandler states  that pt does not walk at all and is w/c bound.  Has patient completed Advance Directive? If yes, advise them to bring to appointment. No  Care Manager phone number provided? Yes  Comment:Dalila at [phone]  Is there anything else I can help you with? No

## 2017-03-10 ENCOUNTER — TELEPHONE (OUTPATIENT)
Dept: MEDICAL GROUP | Facility: MEDICAL CENTER | Age: 82
End: 2017-03-10

## 2017-03-10 RX ORDER — OMEPRAZOLE 20 MG/1
20 CAPSULE, DELAYED RELEASE ORAL DAILY
COMMUNITY
End: 2017-09-13

## 2017-03-10 RX ORDER — MELATONIN 10 MG
TABLET, SUBLINGUAL SUBLINGUAL
COMMUNITY
End: 2017-03-21

## 2017-03-10 RX ORDER — KETOROLAC TROMETHAMINE 10 MG/1
10 TABLET, FILM COATED ORAL EVERY 4 HOURS PRN
COMMUNITY
End: 2017-03-21

## 2017-03-10 RX ORDER — METOPROLOL SUCCINATE 25 MG/1
12.5 TABLET, EXTENDED RELEASE ORAL 2 TIMES DAILY
COMMUNITY
End: 2017-03-21

## 2017-03-10 RX ORDER — ATORVASTATIN CALCIUM 10 MG/1
10 TABLET, FILM COATED ORAL DAILY
COMMUNITY
End: 2017-07-27

## 2017-03-10 NOTE — Clinical Note
March 10, 2017         Dear Chandler Seo,    We are pleased to provide you with secure, online access to medical information via [employer] for:    Jersey Alexis       How Do I Sign Up?  1. In your Internet browser, go to [email or web address].   2. Click on the Sign Up Now link in the Sign In box. You will see the New Member Sign Up page.  3. Enter your [employer]Amp Access Code exactly as it appears below. You will not need to use this code after you’ve completed the sign-up process. If you do not sign up before the expiration date, you must request a new code.  CalAmp Access Code: [email or web address]  Expiration Date: 4/9/2017  3:40 PM    4. Enter your email address and Date of Birth (mm/dd/yyyy) as indicated and click Submit. You will be taken to the next sign-up page.  5. Create a CalAmp ID. This will be your CalAmp login ID and cannot be changed, so think of one that is secure and easy to remember.  6. Create a CalAmp password (case sensitive).   · Your password must be a length of at least 6 characters/digits.  · It must include at least 1 numeric.  · You can change your password at any time.  7. Enter your Password Reset Question and Answer. This can be used at a later time if you forget your password.   8. Enter your e-mail address. You will receive e-mail notification when new information is available in CalAmp.  9. Click Sign Up. You can now view your medical record.       Additional Information  If you have questions, you can email [email or web address] . Remember, CalAmp is NOT to be used for urgent needs. For medical emergencies, dial 911.  Sincerely,

## 2017-03-10 NOTE — TELEPHONE ENCOUNTER
Future Appointments      Provider Department Center   3/21/2017 8:00 AM Tanisha Flores M.D. [facility] [address]       Called Jersey Alexis in order to verify health topics prior to new patient appointment:      1) Medication/Allergies list was updated yes.   2) Emergency contact was updated? yes  3) Communication Preference? yes  4) Pharmacy & Lab were updated? yes  5) All care teams were updated? yes  6) Health, Surgical, Family and Social History was updated?  yes            Health Maintenance Due   Topic Date Due   • Annual Wellness Visit  10/18/1930   • COLONOSCOPY   10/18/1980   • IMM DTaP/Tdap/Td Vaccine (1 - Tdap) 10/18/1949   • IMM ZOSTER VACCINE  10/18/1990   • IMM PREVNAR 13 10/18/1995   • IMM INFLUENZA (1) 09/01/2016       9)  Web Iz Recommendations:        A) requesting records    10) Records Requested:       A) sent to previous PCP       11) The following was sent to pt.:       A)  New Patient Packet emailed to patient?  yes     12)Other medical issues to discuss with the provider during the visit:       A) PT has had non heart related chest pain    Pt was encouraged to keep the appointment and to arrive at least 15-20 minutes early.

## 2017-03-10 NOTE — Clinical Note
[facility]  Tanisha Flores M.D.  [address]  [place] [address]  Phone:[phone]  Fax: [phone]   Authorization for Release/Disclosure of   Protected Health Information   Name: JERSEY ALEXIS : 10/18/1930 SSN: [id number]   Address: [address] [place], [place] [address] [place] [address] Phone:    [phone] (home)    I authorize the entity listed below to release/disclose the PHI below to:   [facility]/Pcp Pt States None and Tanisha Flores M.D.   Provider or Entity Name: Dr. Chandler Jolly   Address   [facility], [facility], [facility]   Phone:    Fax: [phone]   Reason for request: continuity of care   Information to be released:    [  ] LAST COLONOSCOPY,  including any PATH REPORT and follow-up  [  ] LAST FIT/COLOGUARD RESULT [  ] LAST DEXA  [  ] LAST MAMMOGRAM  [  ] LAST PAP  [  ] LAST LABS [  ] RETINA EXAM REPORT  [x] IMMUNIZATION RECORDS  [x] Release all info      [  ] Check here and initial the line next to each item to release ALL health information INCLUDING  _____ Care and treatment for drug and / or alcohol abuse  _____ HIV testing, infection status, or AIDS  _____ Genetic Testing    DATES OF SERVICE OR TIME PERIOD TO BE DISCLOSED: _____________  I understand and acknowledge that:  * This Authorization may be revoked at any time by you in writing, except if your health information has already been used or disclosed.  * Your health information that will be used or disclosed as a result of you signing this authorization could be re-disclosed by the recipient. If this occurs, your re-disclosed health information may no longer be protected by State or Federal laws.  * You may refuse to sign this Authorization. Your refusal will not affect your ability to obtain treatment.  * This Authorization becomes effective upon signing and will  on (date) __________.      If no date is indicated, this Authorization will  one (1) year from the signature date.    Name: Jersey Alexis    Signature:  continuity of care   Date:  3/10/2017       PLEASE FAX REQUESTED RECORDS BACK TO: [phone]

## 2017-03-16 ENCOUNTER — PATIENT OUTREACH (OUTPATIENT)
Dept: HEALTH INFORMATION MANAGEMENT | Facility: OTHER | Age: 82
End: 2017-03-16

## 2017-03-16 NOTE — PROGRESS NOTES
Pt referred to  to assess for community resources/determine if he has any social needs. Per review pt  new to [place], moving here form [place]. He went to [facility] 2/28/2017-3/1/2017 for chest pain. Indicated [facility] but [facility] have [facility]CP at [facility]. Per notes order from home health care has been placed. LSW unable to determine which home health care company orders were sent to.     Outreach call to pt to introduce self and discuss pt's identified needs. Pt reported he is living with his son-in-law, explaining his only daughter passed away. He reported his son-in-law is his care giver and helps with cooking dinner and transportation to medical appts.     Discussed if pt had any identified social needs. Pt denies any. Discussed possible needs such as with the cost of medicine or with ADLs. Pt denied. He reported he has grab bars in the shower and bathroom and is cautions to not fall.     Pt did report he has never heard form home health care and no one has established.  Discussed if pt knew the name of agency he selected to receive services from. Pt did not. Per chart review it is unclear where order was sent. There is a note suggesting it was possibly sent to [facility]. Pt does have appt to establish with PCP (Dr. Flores) on 03/21/2017. Encouraged pt to have discussion with PCP on sending a new order for home health care in the event home health is not established prior.     Outreach call to [facility] and left message to determine if they received order.     Plan:  · LSW will follow up with home health concerns but will not actively follow pt at this time as he denies any current social needs.

## 2017-03-17 ENCOUNTER — PATIENT OUTREACH (OUTPATIENT)
Dept: HEALTH INFORMATION MANAGEMENT | Facility: OTHER | Age: 82
End: 2017-03-17

## 2017-03-17 NOTE — PROGRESS NOTES
Received return call from [facility]. Representative reported home health referral was not open as pt did not have PCP. Indicated pt must have PCP for home health to obtain orders after discharge and agency will not establish with individuals that do not have PCP in place.     Outreach call to pt to inform. Pt voiced understanding. Pt has appt Tuesday 03/21/2017 to establish with Dr. Flores. Informed pt during appt he can request new orders to be sent for home health care services. Pt voiced understanding.     Plan:  · LSW will not actively follow pt at this time as pt denies any other social needs.

## 2017-03-21 ENCOUNTER — APPOINTMENT (OUTPATIENT)
Dept: RADIOLOGY | Facility: MEDICAL CENTER | Age: 82
DRG: 309 | End: 2017-03-21
Attending: EMERGENCY MEDICINE
Payer: MEDICARE

## 2017-03-21 ENCOUNTER — OFFICE VISIT (OUTPATIENT)
Dept: MEDICAL GROUP | Facility: MEDICAL CENTER | Age: 82
End: 2017-03-21
Payer: MEDICARE

## 2017-03-21 ENCOUNTER — HOSPITAL ENCOUNTER (INPATIENT)
Facility: MEDICAL CENTER | Age: 82
LOS: 1 days | DRG: 309 | End: 2017-03-22
Attending: EMERGENCY MEDICINE | Admitting: HOSPITALIST
Payer: MEDICARE

## 2017-03-21 ENCOUNTER — RESOLUTE PROFESSIONAL BILLING HOSPITAL PROF FEE (OUTPATIENT)
Dept: HOSPITALIST | Facility: MEDICAL CENTER | Age: 82
End: 2017-03-21
Payer: MEDICARE

## 2017-03-21 VITALS
WEIGHT: 204 LBS | DIASTOLIC BLOOD PRESSURE: 58 MMHG | RESPIRATION RATE: 14 BRPM | HEART RATE: 148 BPM | SYSTOLIC BLOOD PRESSURE: 80 MMHG | TEMPERATURE: 96.4 F | OXYGEN SATURATION: 96 % | BODY MASS INDEX: 28.56 KG/M2 | HEIGHT: 71 IN

## 2017-03-21 DIAGNOSIS — R53.83 OTHER FATIGUE: ICD-10-CM

## 2017-03-21 DIAGNOSIS — R53.81 PHYSICAL DEBILITY: ICD-10-CM

## 2017-03-21 DIAGNOSIS — I47.10 SVT (SUPRAVENTRICULAR TACHYCARDIA): ICD-10-CM
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
## 2017-03-21 PROBLEM — I48.92 ATRIAL FLUTTER (HCC): Status: ACTIVE | Noted: 2017-03-21

## 2017-03-21 LAB
ALBUMIN SERPL BCP-MCNC: 4 G/DL (ref 3.2–4.9)
ALBUMIN/GLOB SERPL: 1.1 G/DL
ALP SERPL-CCNC: 101 U/L (ref 30–99)
ALT SERPL-CCNC: 9 U/L (ref 2–50)
ANION GAP SERPL CALC-SCNC: 14 MMOL/L (ref 0–11.9)
APTT PPP: 28.6 SEC (ref 24.7–36)
AST SERPL-CCNC: 14 U/L (ref 12–45)
BASOPHILS # BLD AUTO: 0.4 % (ref 0–1.8)
BASOPHILS # BLD: 0.04 K/UL (ref 0–0.12)
BILIRUB SERPL-MCNC: 0.6 MG/DL (ref 0.1–1.5)
BNP SERPL-MCNC: 23 PG/ML (ref 0–100)
BUN SERPL-MCNC: 25 MG/DL (ref 8–22)
CALCIUM SERPL-MCNC: 10.5 MG/DL (ref 8.5–10.5)
CHLORIDE SERPL-SCNC: 104 MMOL/L (ref 96–112)
CO2 SERPL-SCNC: 20 MMOL/L (ref 20–33)
CREAT SERPL-MCNC: 1.04 MG/DL (ref 0.5–1.4)
EKG IMPRESSION: NORMAL
EOSINOPHIL # BLD AUTO: 0.14 K/UL (ref 0–0.51)
EOSINOPHIL NFR BLD: 1.5 % (ref 0–6.9)
ERYTHROCYTE [DISTWIDTH] IN BLOOD BY AUTOMATED COUNT: 42.7 FL (ref 35.9–50)
FLUAV H1 2009 PAND RNA SPEC QL NAA+PROBE: NOT DETECTED
FLUAV RNA SPEC QL NAA+PROBE: NEGATIVE
FLUAV+FLUBV AG SPEC QL IA: NORMAL
FLUBV RNA SPEC QL NAA+PROBE: NEGATIVE
GFR SERPL CREATININE-BSD FRML MDRD: >60 ML/MIN/1.73 M 2
GLOBULIN SER CALC-MCNC: 3.7 G/DL (ref 1.9–3.5)
GLUCOSE SERPL-MCNC: 113 MG/DL (ref 65–99)
HCT VFR BLD AUTO: 47.1 % (ref 42–52)
HGB BLD-MCNC: 15.5 G/DL (ref 14–18)
IMM GRANULOCYTES # BLD AUTO: 0.05 K/UL (ref 0–0.11)
IMM GRANULOCYTES NFR BLD AUTO: 0.5 % (ref 0–0.9)
INR PPP: 0.97 (ref 0.87–1.13)
LACTATE BLD-SCNC: 1.3 MMOL/L (ref 0.5–2)
LACTATE BLD-SCNC: 1.3 MMOL/L (ref 0.5–2)
LACTATE BLD-SCNC: 3.3 MMOL/L (ref 0.5–2)
LIPASE SERPL-CCNC: 33 U/L (ref 11–82)
LYMPHOCYTES # BLD AUTO: 2.75 K/UL (ref 1–4.8)
LYMPHOCYTES NFR BLD: 29.8 % (ref 22–41)
MAGNESIUM SERPL-MCNC: 1.6 MG/DL (ref 1.5–2.5)
MCH RBC QN AUTO: 28.8 PG (ref 27–33)
MCHC RBC AUTO-ENTMCNC: 32.9 G/DL (ref 33.7–35.3)
MCV RBC AUTO: 87.4 FL (ref 81.4–97.8)
MONOCYTES # BLD AUTO: 0.68 K/UL (ref 0–0.85)
MONOCYTES NFR BLD AUTO: 7.4 % (ref 0–13.4)
NEUTROPHILS # BLD AUTO: 5.58 K/UL (ref 1.82–7.42)
NEUTROPHILS NFR BLD: 60.4 % (ref 44–72)
NRBC # BLD AUTO: 0 K/UL
NRBC BLD AUTO-RTO: 0 /100 WBC
PLATELET # BLD AUTO: 257 K/UL (ref 164–446)
PMV BLD AUTO: 10.3 FL (ref 9–12.9)
POTASSIUM SERPL-SCNC: 4.2 MMOL/L (ref 3.6–5.5)
PROT SERPL-MCNC: 7.7 G/DL (ref 6–8.2)
PROTHROMBIN TIME: 13.2 SEC (ref 12–14.6)
RBC # BLD AUTO: 5.39 M/UL (ref 4.7–6.1)
SIGNIFICANT IND 70042: NORMAL
SITE SITE: NORMAL
SODIUM SERPL-SCNC: 138 MMOL/L (ref 135–145)
SOURCE SOURCE: NORMAL
T4 FREE SERPL-MCNC: 0.76 NG/DL (ref 0.53–1.43)
TROPONIN I SERPL-MCNC: <0.01 NG/ML (ref 0–0.04)
TSH SERPL DL<=0.005 MIU/L-ACNC: 2.1 UIU/ML (ref 0.3–3.7)
WBC # BLD AUTO: 9.2 K/UL (ref 4.8–10.8)

## 2017-03-21 PROCEDURE — 74177 CT ABD & PELVIS W/CONTRAST: CPT

## 2017-03-21 PROCEDURE — 700111 HCHG RX REV CODE 636 W/ 250 OVERRIDE (IP): Performed by: HOSPITALIST

## 2017-03-21 PROCEDURE — 85025 COMPLETE CBC W/AUTO DIFF WBC: CPT

## 2017-03-21 PROCEDURE — 87502 INFLUENZA DNA AMP PROBE: CPT

## 2017-03-21 PROCEDURE — 71010 DX-CHEST-PORTABLE (1 VIEW): CPT

## 2017-03-21 PROCEDURE — 4040F PNEUMOC VAC/ADMIN/RCVD: CPT | Mod: 8P | Performed by: FAMILY MEDICINE

## 2017-03-21 PROCEDURE — 770020 HCHG ROOM/CARE - TELE (206)

## 2017-03-21 PROCEDURE — 84439 ASSAY OF FREE THYROXINE: CPT

## 2017-03-21 PROCEDURE — 700117 HCHG RX CONTRAST REV CODE 255: Performed by: EMERGENCY MEDICINE

## 2017-03-21 PROCEDURE — 36415 COLL VENOUS BLD VENIPUNCTURE: CPT

## 2017-03-21 PROCEDURE — 84484 ASSAY OF TROPONIN QUANT: CPT

## 2017-03-21 PROCEDURE — 99214 OFFICE O/P EST MOD 30 MIN: CPT | Mod: 25 | Performed by: FAMILY MEDICINE

## 2017-03-21 PROCEDURE — 96360 HYDRATION IV INFUSION INIT: CPT

## 2017-03-21 PROCEDURE — 99223 1ST HOSP IP/OBS HIGH 75: CPT | Mod: AI | Performed by: HOSPITALIST

## 2017-03-21 PROCEDURE — 83605 ASSAY OF LACTIC ACID: CPT

## 2017-03-21 PROCEDURE — 93005 ELECTROCARDIOGRAM TRACING: CPT | Performed by: EMERGENCY MEDICINE

## 2017-03-21 PROCEDURE — A9270 NON-COVERED ITEM OR SERVICE: HCPCS | Performed by: HOSPITALIST

## 2017-03-21 PROCEDURE — 0518F FALL PLAN OF CARE DOCD: CPT | Mod: 8P | Performed by: FAMILY MEDICINE

## 2017-03-21 PROCEDURE — 85730 THROMBOPLASTIN TIME PARTIAL: CPT

## 2017-03-21 PROCEDURE — 700105 HCHG RX REV CODE 258: Performed by: EMERGENCY MEDICINE

## 2017-03-21 PROCEDURE — 700102 HCHG RX REV CODE 250 W/ 637 OVERRIDE(OP): Performed by: HOSPITALIST

## 2017-03-21 PROCEDURE — 700102 HCHG RX REV CODE 250 W/ 637 OVERRIDE(OP): Performed by: EMERGENCY MEDICINE

## 2017-03-21 PROCEDURE — 3288F FALL RISK ASSESSMENT DOCD: CPT | Mod: 8P | Performed by: FAMILY MEDICINE

## 2017-03-21 PROCEDURE — 84443 ASSAY THYROID STIM HORMONE: CPT

## 2017-03-21 PROCEDURE — G8420 CALC BMI NORM PARAMETERS: HCPCS | Performed by: FAMILY MEDICINE

## 2017-03-21 PROCEDURE — A9270 NON-COVERED ITEM OR SERVICE: HCPCS | Performed by: EMERGENCY MEDICINE

## 2017-03-21 PROCEDURE — 1036F TOBACCO NON-USER: CPT | Performed by: FAMILY MEDICINE

## 2017-03-21 PROCEDURE — 1100F PTFALLS ASSESS-DOCD GE2>/YR: CPT | Performed by: FAMILY MEDICINE

## 2017-03-21 PROCEDURE — 700105 HCHG RX REV CODE 258: Performed by: HOSPITALIST

## 2017-03-21 PROCEDURE — 87400 INFLUENZA A/B EACH AG IA: CPT

## 2017-03-21 PROCEDURE — 83690 ASSAY OF LIPASE: CPT

## 2017-03-21 PROCEDURE — 83880 ASSAY OF NATRIURETIC PEPTIDE: CPT

## 2017-03-21 PROCEDURE — 99285 EMERGENCY DEPT VISIT HI MDM: CPT

## 2017-03-21 PROCEDURE — 87503 INFLUENZA DNA AMP PROB ADDL: CPT

## 2017-03-21 PROCEDURE — 80053 COMPREHEN METABOLIC PANEL: CPT

## 2017-03-21 PROCEDURE — 83735 ASSAY OF MAGNESIUM: CPT

## 2017-03-21 PROCEDURE — 87040 BLOOD CULTURE FOR BACTERIA: CPT

## 2017-03-21 PROCEDURE — 93005 ELECTROCARDIOGRAM TRACING: CPT

## 2017-03-21 PROCEDURE — G8484 FLU IMMUNIZE NO ADMIN: HCPCS | Performed by: FAMILY MEDICINE

## 2017-03-21 PROCEDURE — 85610 PROTHROMBIN TIME: CPT

## 2017-03-21 PROCEDURE — 96361 HYDRATE IV INFUSION ADD-ON: CPT

## 2017-03-21 PROCEDURE — 71275 CT ANGIOGRAPHY CHEST: CPT

## 2017-03-21 RX ORDER — METOPROLOL SUCCINATE 25 MG/1
25 TABLET, EXTENDED RELEASE ORAL DAILY
COMMUNITY
End: 2017-07-06

## 2017-03-21 RX ORDER — DIGOXIN 125 MCG
62.5 TABLET ORAL DAILY
Status: DISCONTINUED | OUTPATIENT
Start: 2017-03-22 | End: 2017-03-22 | Stop reason: HOSPADM

## 2017-03-21 RX ORDER — DIGOXIN 125 MCG
62.5 TABLET ORAL 2 TIMES DAILY
Status: ON HOLD | COMMUNITY
End: 2017-07-25

## 2017-03-21 RX ORDER — POLYETHYLENE GLYCOL 3350 17 G/17G
1 POWDER, FOR SOLUTION ORAL
Status: DISCONTINUED | OUTPATIENT
Start: 2017-03-21 | End: 2017-03-22 | Stop reason: HOSPADM

## 2017-03-21 RX ORDER — SODIUM CHLORIDE 9 MG/ML
INJECTION, SOLUTION INTRAVENOUS CONTINUOUS
Status: DISCONTINUED | OUTPATIENT
Start: 2017-03-21 | End: 2017-03-22 | Stop reason: HOSPADM

## 2017-03-21 RX ORDER — SENNOSIDES A AND B 8.6 MG/1
8.6 TABLET, FILM COATED ORAL DAILY
COMMUNITY
End: 2017-07-06

## 2017-03-21 RX ORDER — DIGOXIN 125 MCG
250 TABLET ORAL EVERY 6 HOURS
Status: DISCONTINUED | OUTPATIENT
Start: 2017-03-21 | End: 2017-03-21

## 2017-03-21 RX ORDER — HEPARIN SODIUM 5000 [USP'U]/ML
5000 INJECTION, SOLUTION INTRAVENOUS; SUBCUTANEOUS EVERY 8 HOURS
Status: DISCONTINUED | OUTPATIENT
Start: 2017-03-21 | End: 2017-03-22 | Stop reason: HOSPADM

## 2017-03-21 RX ORDER — ASPIRIN 325 MG
325 TABLET ORAL DAILY
Status: DISCONTINUED | OUTPATIENT
Start: 2017-03-22 | End: 2017-03-22 | Stop reason: HOSPADM

## 2017-03-21 RX ORDER — SODIUM CHLORIDE 9 MG/ML
INJECTION, SOLUTION INTRAVENOUS CONTINUOUS
Status: DISCONTINUED | OUTPATIENT
Start: 2017-03-21 | End: 2017-03-21

## 2017-03-21 RX ORDER — ASPIRIN 81 MG/1
324 TABLET, CHEWABLE ORAL ONCE
Status: COMPLETED | OUTPATIENT
Start: 2017-03-21 | End: 2017-03-21

## 2017-03-21 RX ORDER — ASPIRIN 81 MG/1
324 TABLET, CHEWABLE ORAL DAILY
Status: DISCONTINUED | OUTPATIENT
Start: 2017-03-22 | End: 2017-03-22 | Stop reason: HOSPADM

## 2017-03-21 RX ORDER — LORATADINE 10 MG/1
10 TABLET ORAL DAILY
COMMUNITY
End: 2017-03-21

## 2017-03-21 RX ORDER — ATORVASTATIN CALCIUM 10 MG/1
10 TABLET, FILM COATED ORAL NIGHTLY
Status: DISCONTINUED | OUTPATIENT
Start: 2017-03-21 | End: 2017-03-22 | Stop reason: HOSPADM

## 2017-03-21 RX ORDER — DIGOXIN 125 MCG
125 TABLET ORAL DAILY
Status: DISCONTINUED | OUTPATIENT
Start: 2017-03-22 | End: 2017-03-21

## 2017-03-21 RX ORDER — OMEPRAZOLE 20 MG/1
20 CAPSULE, DELAYED RELEASE ORAL DAILY
Status: DISCONTINUED | OUTPATIENT
Start: 2017-03-22 | End: 2017-03-22 | Stop reason: HOSPADM

## 2017-03-21 RX ORDER — CETIRIZINE HYDROCHLORIDE 10 MG/1
10 TABLET ORAL DAILY
Status: DISCONTINUED | OUTPATIENT
Start: 2017-03-21 | End: 2017-03-21

## 2017-03-21 RX ORDER — METOPROLOL SUCCINATE 25 MG/1
25 TABLET, EXTENDED RELEASE ORAL DAILY
Status: DISCONTINUED | OUTPATIENT
Start: 2017-03-22 | End: 2017-03-22 | Stop reason: HOSPADM

## 2017-03-21 RX ORDER — AMOXICILLIN 250 MG
2 CAPSULE ORAL 2 TIMES DAILY
Status: DISCONTINUED | OUTPATIENT
Start: 2017-03-21 | End: 2017-03-22 | Stop reason: HOSPADM

## 2017-03-21 RX ORDER — ASPIRIN 300 MG/1
300 SUPPOSITORY RECTAL DAILY
Status: DISCONTINUED | OUTPATIENT
Start: 2017-03-22 | End: 2017-03-22 | Stop reason: HOSPADM

## 2017-03-21 RX ORDER — ERGOCALCIFEROL 1.25 MG/1
50000 CAPSULE ORAL
Status: DISCONTINUED | OUTPATIENT
Start: 2017-03-22 | End: 2017-03-22 | Stop reason: HOSPADM

## 2017-03-21 RX ORDER — DIGOXIN 125 MCG
62.5 TABLET ORAL EVERY MORNING
Status: DISCONTINUED | OUTPATIENT
Start: 2017-03-21 | End: 2017-03-21

## 2017-03-21 RX ORDER — CETIRIZINE HYDROCHLORIDE 10 MG/1
10 TABLET ORAL DAILY
COMMUNITY
End: 2017-03-21

## 2017-03-21 RX ORDER — BISACODYL 10 MG
10 SUPPOSITORY, RECTAL RECTAL
Status: DISCONTINUED | OUTPATIENT
Start: 2017-03-21 | End: 2017-03-22 | Stop reason: HOSPADM

## 2017-03-21 RX ADMIN — STANDARDIZED SENNA CONCENTRATE AND DOCUSATE SODIUM 2 TABLET: 8.6; 5 TABLET, FILM COATED ORAL at 20:35

## 2017-03-21 RX ADMIN — ASPIRIN 324 MG: 81 TABLET, CHEWABLE ORAL at 09:45

## 2017-03-21 RX ADMIN — SODIUM CHLORIDE: 9 INJECTION, SOLUTION INTRAVENOUS at 18:51

## 2017-03-21 RX ADMIN — IOHEXOL 100 ML: 350 INJECTION, SOLUTION INTRAVENOUS at 12:21

## 2017-03-21 RX ADMIN — ATORVASTATIN CALCIUM 10 MG: 10 TABLET, FILM COATED ORAL at 20:35

## 2017-03-21 RX ADMIN — HEPARIN SODIUM 5000 UNITS: 5000 INJECTION, SOLUTION INTRAVENOUS; SUBCUTANEOUS at 20:35

## 2017-03-21 RX ADMIN — SODIUM CHLORIDE: 9 INJECTION, SOLUTION INTRAVENOUS at 09:45

## 2017-03-21 ASSESSMENT — LIFESTYLE VARIABLES
EVER_SMOKED: NEVER
DO YOU DRINK ALCOHOL: NO
EVER FELT BAD OR GUILTY ABOUT YOUR DRINKING: NO
HAVE PEOPLE ANNOYED YOU BY CRITICIZING YOUR DRINKING: NO
CONSUMPTION TOTAL: INCOMPLETE
HOW MANY TIMES IN THE PAST YEAR HAVE YOU HAD 5 OR MORE DRINKS IN A DAY: 0
TOTAL SCORE: 0
DO YOU DRINK ALCOHOL: NO
HAVE YOU EVER FELT YOU SHOULD CUT DOWN ON YOUR DRINKING: NO
EVER HAD A DRINK FIRST THING IN THE MORNING TO STEADY YOUR NERVES TO GET RID OF A HANGOVER: NO
AVERAGE NUMBER OF DAYS PER WEEK YOU HAVE A DRINK CONTAINING ALCOHOL: 0
TOTAL SCORE: 0
EVER_SMOKED: YES
TOTAL SCORE: 0

## 2017-03-21 ASSESSMENT — PAIN SCALES - GENERAL
PAINLEVEL_OUTOF10: 2
PAINLEVEL_OUTOF10: 3
PAINLEVEL_OUTOF10: 0

## 2017-03-21 ASSESSMENT — COPD QUESTIONNAIRES
COPD SCREENING SCORE: 5
DO YOU EVER COUGH UP ANY MUCUS OR PHLEGM?: NO/ONLY WITH OCCASIONAL COLDS OR INFECTIONS
DURING THE PAST 4 WEEKS HOW MUCH DID YOU FEEL SHORT OF BREATH: SOME OF THE TIME
HAVE YOU SMOKED AT LEAST 100 CIGARETTES IN YOUR ENTIRE LIFE: YES

## 2017-03-21 NOTE — ED PROVIDER NOTES
ED Provider Note    Scribed for César Guillen M.D. by Jose Haines. 3/21/2017  9:12 AM    Primary Care Provider: Tanisha Flores M.D.  Means of arrival: Wheel Chair  History limited by: None    CHIEF COMPLAINT  Chief Complaint   Patient presents with   • Sent by MD     sent by PCP for abnormal EKG, sent for high heart rate, and low BP. pt also CO chest pain on the right as well as shoulder pain.        HPI  Jersey Alexis is a 86 y.o. male who presents to the ED after PCP visit complaining of intermittent right sided chest pain onset a few months ago. He reports the pain has been sharp and has recently been dull. In [place], the patient went to the hospital for the same issue. He went to see Dr. Morrison for his first visit since he just moved here from [place]. Patient reports, he drove here with his son. While walking to the clinic, he had to walk 100 yards and felt extreme shortness of breath. Per patient, the doctor said he had a fast heart rate and an abnormal EKG and was sent here. He reports associated swelling in his legs. The patient also includes he had a fall 4 weeks ago.    Risk factors: Patient's parents both had myocardial infarctions. Patient has a history of hyperlipidemia. Patient denies prior history of coronary artery disease, myocardial infarction, Diabetes, hypertension, deep vein thrombosis, pulmonary embolism aortic aneurysm or dissection, or family history of coronary artery disease at a young age. Denies coughing up blood, a history of blood clot in legs or lungs.     REVIEW OF SYSTEMS    CONSTITUTIONAL:  Positive weight gain. Denies fever, chills, or weakness.  EYES:  Denies photophobia.  ENT:  Denies sore throat, nose, or ear pain.  CARDIOVASCULAR:  Positive fast heart rate and chest pain. Denies palpitations, or swelling.  RESPIRATORY: Positive shortness of breath. Denies cough, difficulty breathing.  GI: Denies nausea, vomiting, or diarrhea.  MUSCULOSKELETAL: Positive right  flank pain near kidney, pain in upper right arm, and leg swelling. Denies weakness.  SKIN:  No rash or bruising.  NEUROLOGIC:  Denies headache.  PSYCHIATRIC:  Positive depression.  LYMPHATIC: Denies swollen lymph nodes.  C.    PAST MEDICAL HISTORY  Past Medical History   Diagnosis Date   • Hyperlipidemia    • Arthritis    • Arrhythmia    • Muscle disorder      nerve damage due to spinal stenosis   • Cataract    • GERD (gastroesophageal reflux disease)        FAMILY HISTORY  Family History   Problem Relation Age of Onset   • Heart Failure Mother    • Heart Attack Mother    • Heart Disease Mother      pacemaker   • Paranoid behavior Mother    • Heart Failure Father    • Cancer Sister      Breast   • Other Brother      colostomy   • Cancer Brother    • No Known Problems Maternal Grandmother    • No Known Problems Maternal Grandfather    • No Known Problems Paternal Grandmother    • No Known Problems Paternal Grandfather        SOCIAL HISTORY   reports that he quit smoking about 37 years ago. His smoking use included Cigarettes and Pipe. He has a 43.5 pack-year smoking history. He has never used smokeless tobacco. He reports that he does not drink alcohol or use illicit drugs.    SURGICAL HISTORY  Past Surgical History   Procedure Laterality Date   • Laminotomy  late 80s     spinal stenosis   • Laminotomy  late 90s     spinal fusion    • Appendectomy  Early 2000's   • Tonsillectomy Bilateral 1936   • Cataract phaco with iol Bilateral Early 2000's       CURRENT MEDICATIONS  Home Medications     Reviewed by Gabe Romano R.N. (Registered Nurse) on 03/21/17 at 0904  Med List Status: Partial    Medication Last Dose Status    atorvastatin (LIPITOR) 10 MG Tab  Active    cetirizine (ZYRTEC) 10 MG Tab  Active    Cholecalciferol (VITAMIN D3) 5000 UNITS Tab  Active    digoxin (LANOXIN) 125 MCG Tab  Active    docusate sodium (COLACE) 100 MG Cap  Active    ketorolac (TORADOL) 10 MG Tab  Active    loratadine (CLARITIN) 10 MG Tab   "Active    metoprolol SR (TOPROL XL) 25 MG TABLET SR 24 HR  Active    nystatin (MYCOSTATIN) Powder  Active    omeprazole (PRILOSEC) 20 MG delayed-release capsule  Active    tramadol (ULTRAM) 50 MG Tab PRN Active                ALLERGIES  No Known Allergies    PHYSICAL EXAM  VITAL SIGNS: /98 mmHg  Pulse 147  Temp(Src) 36.1 °C (96.9 °F) (Temporal)  Resp 16  Ht 1.803 m (5' 10.98\")  SpO2 95%     Constitutional: Patient is awake and alert. Talking in short sentences. Well developed, Well nourished, Non-toxic appearance.  HENT: Normocephalic, Atraumatic, Bilateral external ears normal, Oropharynx pink moist.  Eyes: PERRLA, EOMI, Sclera and conjunctiva a little injected, No discharge.   Neck:  Supple no nuchal rigidity, no thyromegaly or mass. Non-tender.  Lymphatic: No supraclavicular lymph nodes.   Cardiovascular: Heart is regular rate and rhythm no murmur. Seems distant.   Thorax & Lungs: Chest is symmetrical. Moving air symmetrical with crackles on right side of lungs. No respiratory distress, No chest tenderness.   Abdomen: Soft, No tenderness no hepatosplenomegaly there is no guarding or rebound.  Skin: Warm, Dry, no petechia, purpura, or rash. Well healed scar on back.   Back: Tenderness to lower thoracic spine.  Extremities: No edema.  Musculoskeletal: Good range of motion to wrists, elbows, shoulders, hips, knees, and ankles. Pulses 2+ radially and femorally. No gross deformities noted.   Neurologic: Alert & oriented to person, time, and place.  Strength is 4 over 5 in upper extremities, 4 over 5 in left lower leg, 3 over 5 in right lower leg. Partially limited by weakness due to previous back injury.   Sensory is intact to light touch to face, arms, and legs.    Psychiatric: Normal affect.    EKG  8:46 AM- 13 Lead EKG interpreted by me shows Atrial flutter at 74.  4 through 1 AV Block. [place] QRS -11, No obvious ST elevations. No change from EKG on 2/28/17.    LABS  Results for orders placed or performed " during the hospital encounter of 03/21/17   CBC WITH DIFFERENTIAL   Result Value Ref Range    WBC 9.2 4.8 - 10.8 K/uL    RBC 5.39 4.70 - 6.10 M/uL    Hemoglobin 15.5 14.0 - 18.0 g/dL    Hematocrit 47.1 42.0 - 52.0 %    MCV 87.4 81.4 - 97.8 fL    MCH 28.8 27.0 - 33.0 pg    MCHC 32.9 (L) 33.7 - 35.3 g/dL    RDW 42.7 35.9 - 50.0 fL    Platelet Count 257 164 - 446 K/uL    MPV 10.3 9.0 - 12.9 fL    Neutrophils-Polys 60.40 44.00 - 72.00 %    Lymphocytes 29.80 22.00 - 41.00 %    Monocytes 7.40 0.00 - 13.40 %    Eosinophils 1.50 0.00 - 6.90 %    Basophils 0.40 0.00 - 1.80 %    Immature Granulocytes 0.50 0.00 - 0.90 %    Nucleated RBC 0.00 /100 WBC    Neutrophils (Absolute) 5.58 1.82 - 7.42 K/uL    Lymphs (Absolute) 2.75 1.00 - 4.80 K/uL    Monos (Absolute) 0.68 0.00 - 0.85 K/uL    Eos (Absolute) 0.14 0.00 - 0.51 K/uL    Baso (Absolute) 0.04 0.00 - 0.12 K/uL    Immature Granulocytes (abs) 0.05 0.00 - 0.11 K/uL    NRBC (Absolute) 0.00 K/uL   COMP METABOLIC PANEL   Result Value Ref Range    Sodium 138 135 - 145 mmol/L    Potassium 4.2 3.6 - 5.5 mmol/L    Chloride 104 96 - 112 mmol/L    Co2 20 20 - 33 mmol/L    Anion Gap 14.0 (H) 0.0 - 11.9    Glucose 113 (H) 65 - 99 mg/dL    Bun 25 (H) 8 - 22 mg/dL    Creatinine 1.04 0.50 - 1.40 mg/dL    Calcium 10.5 8.5 - 10.5 mg/dL    AST(SGOT) 14 12 - 45 U/L    ALT(SGPT) 9 2 - 50 U/L    Alkaline Phosphatase 101 (H) 30 - 99 U/L    Total Bilirubin 0.6 0.1 - 1.5 mg/dL    Albumin 4.0 3.2 - 4.9 g/dL    Total Protein 7.7 6.0 - 8.2 g/dL    Globulin 3.7 (H) 1.9 - 3.5 g/dL    A-G Ratio 1.1 g/dL   TROPONIN   Result Value Ref Range    Troponin I <0.01 0.00 - 0.04 ng/mL   BTYPE NATRIURETIC PEPTIDE   Result Value Ref Range    B Natriuretic Peptide 23 0 - 100 pg/mL   LIPASE   Result Value Ref Range    Lipase 33 11 - 82 U/L   LACTIC ACID   Result Value Ref Range    Lactic Acid 3.3 (H) 0.5 - 2.0 mmol/L   PROTHROMBIN TIME (INR)   Result Value Ref Range    PT 13.2 12.0 - 14.6 sec    INR 0.97 0.87 - 1.13    APTT   Result Value Ref Range    APTT 28.6 24.7 - 36.0 sec   TSH   Result Value Ref Range    TSH 2.100 0.300 - 3.700 uIU/mL   FREE THYROXINE   Result Value Ref Range    Free T-4 0.76 0.53 - 1.43 ng/dL   Influenza Rapid   Result Value Ref Range    Significant Indicator NEG     Source RESP     Site Nasopharyngeal     Rapid Influenza A-B       Negative for Influenza A and Influenza B antigens.  Infection due to influenza A or B cannot be ruled out  since the antigen present in the specimen may be below the  detection limit of the test. Culture confirmation of  negative samples is recommended.     Influenza By PCR, A/B/H1N1   Result Value Ref Range    Influenza virus A RNA Negative Negative    Influenza virus B RNA Negative Negative    Influenza A 2009, H1N1, PCR Not Detected Negative   ESTIMATED GFR   Result Value Ref Range    GFR If African American >60 >60 mL/min/1.73 m 2    GFR If Non African American >60 >60 mL/min/1.73 m 2   MAGNESIUM   Result Value Ref Range    Magnesium 1.6 1.5 - 2.5 mg/dL   All labs reviewed by me.    RADIOLOGY/PROCEDURES  CT-CTA CHEST PULMONARY ARTERY W/ RECONS   Final Result         1. No CT evidence of pulmonary embolism.         2. Bilateral pulmonary nodules are unchanged, measuring up to 5 mm.      Low Risk Patient:  Follow up CT at 12 months; if stable, no further follow up.      High Risk Patient:  Initial follow up CT at 6-12 months, then at 18-24 months if no change.         Low Risk - Minimal or absent history of smoking and of other known risk factors.   High Risk - History of smoking or of other known risk factors.   Fleischner Society Guidelines for Pulmonary Nodules:  Radiology, 237:2005            CT-ABDOMEN-PELVIS WITH   Final Result      Subacute fractures of the transverse processes of L1 and L2 on the right.      Probable nondisplaced fracture of the posterior 12th rib on the right.      Colonic diverticulosis.      Prominent prostate.      Atherosclerotic plaque.      1.7  cm right renal lesion is indeterminate. This may represent a hemorrhagic or proteinaceous cyst. Solid mass not excluded. Further evaluation with renal ultrasound is recommended.      Coarse calcifications in the pancreatic head likely represent sequelae of chronic pancreatitis.               DX-CHEST-PORTABLE (1 VIEW)   Final Result      No acute cardiopulmonary process is seen.      Interstitial prominence likely represents chronic fibrotic changes.        The radiologist's interpretations of all radiological studies have been reviewed by me.     COURSE & MEDICAL DECISION MAKING  Pertinent Labs & Imaging studies reviewed. (See chart for details)    Differential diagnoses include but are not limited to MI, Pulmonary Embolism, Pneumonia, Hemothorax, Pnuemothorax, Rib Fracture, Intraabdominal trauma, Intrathoracic Trauma, aFib vs aFlutter    9:12 AM - Patient seen and examined at bedside. Ordered DX Chest, CBC, CMP, Troponin, BNP, Lipase, Lactic Acid, Blood Culture, INR, APTT, TSH, Free Thyroxine, Influenza Rapid, Influenza by PCR.  Patient will be medicated with 324mg Aspirin, IV fluids for his symptoms.     10:30 AM - Ordered CT-CTA Chest, CT-Abdomen    10:36 AM - Patient seen at bedside and informed them of lab results shown above.    12:48 AM - Patient seen at bedside and he is still resting comfortable. I informed him that I will consult with other doctors for further evaluation on whether to discharge or admit him.     12:54 AM - Consulted with Dr. Sánchez, Trauma, who is aware of the patient.    12:57 AM - Consulted with Dr. Dia, Hospitalist, who is aware of the patient and agrees to admit.    Decision Making  Patient presents to emergency department with fast heart rate weakness shortness of breath chest pain flank pain. Patient states one month ago he fell his in the hospital until the 20th of last month. Since of these had chest pain right sided and right flank pain. He is in poor physical condition in  general. He moved here 2 weeks ago. He waited to see a brand-new primary care doctor today and had to walk farther than usual probably 100 yards to 200 yards. Upon arrival to the doctor's office he was extremely short of breath until looking with a tachycardia and chest discomfort. He was found to have a marked tachycardia of over 150. Was then transferred to the emergency room. Upon arrival here I saw him he was in atrial flutter rate less than 100. Was short of breath. Here in the emergency room we worked him up including x-rays of the chest CAT scan of his chest abdomen pelvis. I was concerned that he may rule out a pulmonary embolus which he does not really does have a fractured right rib along with transverse process fractures ×2 and a renal mass as well. This very time I feel that he needs to be admitted to the hospital for further evaluation care of the shortness of breath and atrial flutter. My assumption is that he had probably gone into an atrial fibrillation rapid ventricular response when he was seen at the doctor's office after exertion. He is currently not anticoagulated but used to be however they stop his anticoagulation when previously he had a fall and had a large hematoma. This patient will be difficult since he does have appears to be chronic atrial fibrillation flutter but also recurrent falls. He certainly is at risk for strokes but also for falls and bleeding as well. This will need to be discussed further with the patient is family and his physicians.    DISPOSITION:  Patient will be admitted to Dr. Dia, Hospitalist, in guarded condition.    FINAL IMPRESSION  1. Shortness of breath  2. Weakness  3. Atrial flutter and presumed atrial fibrillation with rapid ventricular response earlier  4. Rib fracture  5. To spinous process fractures old  7. Generalized debilitated disposition    PLAN  1. Admission [facility]  2. Continue pulmonary toilet  3. Take evaluation of his atrial  fibrillation flutter  4. Continued pain management of his rib fractures and spine fracture     Jose RENEE (Scribe), am scribing for, and in the presence of, César Guillen M.D..    Electronically signed by: Jose Haines (Scribe), 3/21/2017    César RENEE M.D. personally performed the services described in this documentation, as scribed by Jose Haines in my presence, and it is both accurate and complete.    The note accurately reflects work and decisions made by me.  César Guillen  3/21/2017  3:31 PM

## 2017-03-21 NOTE — ED NOTES
"Chief Complaint   Patient presents with   • Sent by MD     sent by PCP for abnormal EKG, sent for high heart rate, and low BP. pt also CO chest pain on the right as well as shoulder pain.      Pt reports that this pain has been going on for a couple of months and he was also seen here and in [place] for same.   Pt is tearful in triage.   Blood pressure 131/98, pulse 147, temperature 36.1 °C (96.9 °F), temperature source Temporal, resp. rate 16, height 1.803 m (5' 10.98\"), SpO2 95 %.  EKG noted A-flutter. Pt SOB. BP now 103/80.  "

## 2017-03-21 NOTE — ED NOTES
Med rec updated and complete  Allergies reviewed. Dicussed current medications and  Last doses taken with pt.  No antibiotic use in last 30 days.

## 2017-03-21 NOTE — IP AVS SNAPSHOT
" <p align=\"LEFT\"><IMG SRC=\"//[email or web address]\" alt=\"Image\" WIDTH=\"50%\" HEIGHT=\"200\" BORDER=\"\"></p>                   Name:Jersey Alexis  Medical Record Number:[id number]  CSN: [id number]    YOB: 1930   Age: 86 y.o.  Sex: male  HT:1.803 m (5' 11\") WT: 96.3 kg (212 lb 4.9 oz)          Admit Date: 3/21/2017     Discharge Date:   Today's Date: 3/22/2017  Attending Doctor:  CLIFTON Anderson*                  Allergies:  Review of patient's allergies indicates no known allergies.          Your appointments     Apr 07, 2017 10:00 AM   Established Patient with Tanisha Flores M.D.   [address])    [address] [place] [address] [address] [place] [id number]   [phone]           You will be receiving a confirmation call a few days before your appointment from our automated call confirmation system.                 Medication List      Take these Medications        Instructions    atorvastatin 10 MG Tabs   Commonly known as:  LIPITOR    Take 10 mg by mouth every evening.   Dose:  10 mg       cholecalciferol 5000 UNIT Caps   Commonly known as:  VITAMIN D3    Take 5,000-10,000 Units by mouth See Admin Instructions. 5000 units, then 10,000 units alternating days for a total weekly dose of 50,000 units   Dose:  5000-75077 Units       digoxin 125 MCG Tabs   Commonly known as:  LANOXIN    Take 62.5 mcg by mouth every day.   Dose:  62.5 mcg       docusate sodium 100 MG Caps   Commonly known as:  COLACE    Take 100 mg by mouth every morning.   Dose:  100 mg       metoprolol SR 25 MG Tb24   Commonly known as:  TOPROL XL    Take 25 mg by mouth every day.   Dose:  25 mg       omeprazole 20 MG delayed-release capsule   Commonly known as:  PRILOSEC    Take 20 mg by mouth every day.   Dose:  20 mg       sennosides 8.6 MG Tabs   Commonly known as:  SENOKOT    Take 8.6 mg by mouth every day.   Dose:  8.6 mg       tramadol 50 MG Tabs   Commonly known as:  ULTRAM    Take 1 Tab by " mouth every 6 hours as needed for Moderate Pain.   Dose:  50 mg

## 2017-03-21 NOTE — MR AVS SNAPSHOT
"        Jersey Alexis   3/21/2017 8:00 AM   Office Visit   MRN: [id number]    Department:  [address] [place] [address]   Dept Phone:  [phone]    Description:  Male : 10/18/1930   Provider:  Tanisha Flores M.D.           Reason for Visit     Establish Care     Fall 2 weeks ago, hurt his rt back side kidney area.    Chest Pain He went into the hospital x2 weeks ago with pain in chest and back area.      Allergies as of 3/21/2017     No Known Allergies      You were diagnosed with     SVT (supraventricular tachycardia) ([facility], located within [facility])   [[id number]]         Vital Signs     Blood Pressure Pulse Temperature Respirations Height Weight    80/58 mmHg 148 35.8 °C (96.4 °F) 14 1.803 m (5' 11\") 92.534 kg (204 lb)    Body Mass Index Oxygen Saturation Smoking Status             28.46 kg/m2 96% Former Smoker         Basic Information     Date Of Birth Sex Race Ethnicity Preferred Language    10/18/1930 Male White Non- English      Problem List              ICD-10-CM Priority Class Noted - Resolved    Pulmonary nodules/lesions, multiple; needs F/U CT in 6 months  R91.8   3/1/2017 - Present      Health Maintenance        Date Due Completion Dates    IMM DTaP/Tdap/Td Vaccine (1 - Tdap) 10/18/1949 ---    COLONOSCOPY 10/18/1980 ---    IMM ZOSTER VACCINE 10/18/1990 ---    IMM PNEUMOCOCCAL 65+ (ADULT) LOW/MEDIUM RISK SERIES (1 of 2 - PCV13) 10/18/1995 ---    IMM INFLUENZA (1) 2016 10/10/2009, 11/3/2007, 10/27/2005            Current Immunizations     Influenza Vaccine Quad Inj (Preserved) 10/10/2009, 11/3/2007, 10/27/2005      Below and/or attached are the medications your provider expects you to take. Review all of your home medications and newly ordered medications with your provider and/or pharmacist. Follow medication instructions as directed by your provider and/or pharmacist. Please keep your medication list with you and share with your provider. Update the information when medications are discontinued, doses are " changed, or new medications (including over-the-counter products) are added; and carry medication information at all times in the event of emergency situations     Allergies:  No Known Allergies          Medications  Valid as of: March 21, 2017 -  8:28 AM    Generic Name Brand Name Tablet Size Instructions for use    Atorvastatin Calcium (Tab) LIPITOR 10 MG Take 10 mg by mouth every evening.        Cetirizine HCl (Tab) ZYRTEC 10 MG Take 10 mg by mouth every day.        Cholecalciferol (Tab) Vitamin D3 5000 UNITS Take 500-10,000 Units by mouth every morning. 5,000 units (1 tab) 4 days a week - Tuesday/Wednesday/Friday/Saturday. 10,000 units (2 tabs) 3 days a week - Sunday/Monday/Thursday        Digoxin (Tab) LANOXIN 125 MCG Take 62.5 mcg by mouth every morning. Indications: Chronic Atrial Fibrillation        Docusate Sodium (Cap) COLACE 100 MG Take 100 mg by mouth every morning.        Ketorolac Tromethamine (Tab) TORADOL 10 MG Take 10 mg by mouth every four hours as needed.        Loratadine (Tab) CLARITIN 10 MG Take 10 mg by mouth every day.        Metoprolol Succinate (TABLET SR 24 HR) TOPROL XL 25 MG Take 12.5 mg by mouth 2 Times a Day.        Nystatin (Powder) MYCOSTATIN  Apply 1 Application to affected area(s) 2 Times a Day.        Omeprazole (CAPSULE DELAYED RELEASE) PRILOSEC 20 MG Take 20 mg by mouth every day.        TraMADol HCl (Tab) ULTRAM 50 MG Take 1 Tab by mouth every 8 hours as needed for Severe Pain.        .                 Medicines prescribed today were sent to:     [address] ([address]), [place] - [address] [id number] [facility] (S) [place] [address]    Phone: [phone] Fax: [phone]    Open 24 Hours?: No      Medication refill instructions:       If your prescription bottle indicates you have medication refills left, it is not necessary to call your provider’s office. Please contact your pharmacy and they will refill your medication.    If your prescription bottle indicates you  do not have any refills left, you may request refills at any time through one of the following ways: The online [employer] system (except Urgent Care), by calling your provider’s office, or by asking your pharmacy to contact your provider’s office with a refill request. Medication refills are processed only during regular business hours and may not be available until the next business day. Your provider may request additional information or to have a follow-up visit with you prior to refilling your medication.   *Please Note: Medication refills are assigned a new Rx number when refilled electronically. Your pharmacy may indicate that no refills were authorized even though a new prescription for the same medication is available at the pharmacy. Please request the medicine by name with the pharmacy before contacting your provider for a refill.           [employer] Access Code: Activation code not generated  Current [employer] Status: Active

## 2017-03-21 NOTE — IP AVS SNAPSHOT
3/22/2017          Jersey Alexis  [address]  [place] [address]    Dear Jersey:    [facility] wants to ensure your discharge home is safe and you or your loved ones have had all your questions answered regarding your care after you leave the hospital.    You may receive a telephone call within two days of your discharge.  This call is to make certain you understand your discharge instructions as well as ensure we provided you with the best care possible during your stay with us.     The call will only last approximately 3-5 minutes and will be done by a nurse.    Once again, we want to ensure your discharge home is safe and that you have a clear understanding of any next steps in your care.  If you have any questions or concerns, please do not hesitate to contact us, we are here for you.  Thank you for choosing [facility] for your healthcare needs.    Sincerely,    Douglas Kimbrough    [facility]

## 2017-03-21 NOTE — PROGRESS NOTES
CC: SOB, palpitation    HPI:   Jersey presents today to establish care but he has been short of breath, and has a racing heart for few days. Has been feeling a little bit dizzy and nauseous but denies any vomiting. He also has been having right chest pain sometimes goes to the back. Patient Was in the hospital recently. For chest pain, Cardiac stress test was negative for ischemia.  Patient advised to RTC after discharged from hospital to establish care.    Patient Active Problem List    Diagnosis Date Noted   • Pulmonary nodules/lesions, multiple; needs F/U CT in 6 months 9/17 03/01/2017       Current Outpatient Prescriptions   Medication Sig Dispense Refill   • loratadine (CLARITIN) 10 MG Tab Take 10 mg by mouth every day.     • cetirizine (ZYRTEC) 10 MG Tab Take 10 mg by mouth every day.     • metoprolol SR (TOPROL XL) 25 MG TABLET SR 24 HR Take 12.5 mg by mouth 2 Times a Day.     • atorvastatin (LIPITOR) 10 MG Tab Take 10 mg by mouth every evening.     • omeprazole (PRILOSEC) 20 MG delayed-release capsule Take 20 mg by mouth every day.     • ketorolac (TORADOL) 10 MG Tab Take 10 mg by mouth every four hours as needed.     • tramadol (ULTRAM) 50 MG Tab Take 1 Tab by mouth every 8 hours as needed for Severe Pain. 20 Tab 0   • nystatin (MYCOSTATIN) Powder Apply 1 Application to affected area(s) 2 Times a Day.     • digoxin (LANOXIN) 125 MCG Tab Take 62.5 mcg by mouth every morning. Indications: Chronic Atrial Fibrillation     • docusate sodium (COLACE) 100 MG Cap Take 100 mg by mouth every morning.     • Cholecalciferol (VITAMIN D3) 5000 UNITS Tab Take 500-10,000 Units by mouth every morning. 5,000 units (1 tab) 4 days a week - Tuesday/Wednesday/Friday/Saturday. 10,000 units (2 tabs) 3 days a week - Sunday/Monday/Thursday       No current facility-administered medications for this visit.         Allergies as of 03/21/2017   • (No Known Allergies)        ROS: Denies any chest pain, Shortness of breath, Changes bowel  "or bladder, Lower extremity edema.    Physical Exam:  BP 80/58 mmHg  Pulse 148  Temp(Src) 35.8 °C (96.4 °F)  Resp 14  Ht 1.803 m (5' 11\")  Wt 92.534 kg (204 lb)  BMI 28.46 kg/m2  SpO2 96%  Gen.: Well-developed, well-nourished, no apparent distress,pleasant and cooperative with the examination  Skin:  Warm and dry with good turgor. No rashes or suspicious lesions in visible areas  Neck: Trachea midline,no masses or adenopathy. No JVD.  Cor: tachycardic rate and rhythm without murmur, gallop or rub.  Lungs: Respirations is mildly labored.Clear to auscultation with equal breath sounds bilaterally.   Extremities: No cyanosis, clubbing or edema.        Assessment and Plan.   86 y.o. male     1. SVT (supraventricular tachycardia) (CMS-[facility])  Rate 157-160.(Manual pulse rate was checked multiple times)  EKG: Rate 147, borderline st elevation on the inferior leads  Will send patient to ER. ER physician was called, case was discussed with him.    - POCT EKG            "

## 2017-03-21 NOTE — IP AVS SNAPSHOT
" Home Care Instructions                                                                                                                  Name:Jersey Alexis  Medical Record Number:[id number]  CSN: [id number]    YOB: 1930   Age: 86 y.o.  Sex: male  HT:1.803 m (5' 11\") WT: 96.3 kg (212 lb 4.9 oz)          Admit Date: 3/21/2017     Discharge Date:   Today's Date: 3/22/2017  Attending Doctor:  CLIFTON Anderson*                  Allergies:  Review of patient's allergies indicates no known allergies.            Discharge Instructions       Discharge Instructions    Discharged to home by car with relative. Discharged via wheelchair, hospital escort: Yes.  Special equipment needed: Not Applicable    Be sure to schedule a follow-up appointment with your primary care doctor or any specialists as instructed.     Discharge Plan:   Diet Plan: Discussed  Activity Level: Discussed  Confirmed Follow up Appointment: Patient to Call and Schedule Appointment  Confirmed Symptoms Management: Discussed  Medication Reconciliation Updated: Yes  Influenza Vaccine Indication: Not indicated: Previously immunized this influenza season and > 8 years of age    I understand that a diet low in cholesterol, fat, and sodium is recommended for good health. Unless I have been given specific instructions below for another diet, I accept this instruction as my diet prescription.   Other diet:     Special Instructions: None    · Is patient discharged on Warfarin / Coumadin?   No     · Is patient Post Blood Transfusion?  No    Depression / Suicide Risk    As you are discharged from this [facility] facility, it is important to learn how to keep safe from harming yourself.    Recognize the warning signs:  · Abrupt changes in personality, positive or negative- including increase in energy   · Giving away possessions  · Change in eating patterns- significant weight changes-  positive or negative  · Change in sleeping patterns- unable to " sleep or sleeping all the time   · Unwillingness or inability to communicate  · Depression  · Unusual sadness, discouragement and loneliness  · Talk of wanting to die  · Neglect of personal appearance   · Rebelliousness- reckless behavior  · Withdrawal from people/activities they love  · Confusion- inability to concentrate     If you or a loved one observes any of these behaviors or has concerns about self-harm, here's what you can do:  · Talk about it- your feelings and reasons for harming yourself  · Remove any means that you might use to hurt yourself (examples: pills, rope, extension cords, firearm)  · Get professional help from the community (Mental Health, Substance Abuse, psychological counseling)  · Do not be alone:Call your Safe Contact- someone whom you trust who will be there for you.  · Call your local CRISIS HOTLINE 493-6911 or 727-182-1684  · Call your local Children's Mobile Crisis Response Team [place] (377) 912-5518 or www.[employer] Inns  · Call the toll free National Suicide Prevention Hotlines   · National Suicide Prevention Lifeline 491-765-CZGM (3037)  · National Hope Line Network 800-SUICIDE (577-8425)        Your appointments     Apr 07, 2017 10:00 AM   Established Patient with Tanisha Flores M.D.   [facility] [address]    [address]  [address]  [place] [address] [phone]           You will be receiving a confirmation call a few days before your appointment from our automated call confirmation system.                 Discharge Medication Instructions:    Below are the medications your physician expects you to take upon discharge:    Review all your home medications and newly ordered medications with your doctor and/or pharmacist. Follow medication instructions as directed by your doctor and/or pharmacist.    Please keep your medication list with you and share with your physician.               Medication List      START taking these  medications        Instructions    tramadol 50 MG Tabs   Last time this was given:  50 mg on 3/22/2017 11:39 AM   Commonly known as:  ULTRAM   Next Dose Due:  As needed every 6 hours for pain    Take 1 Tab by mouth every 6 hours as needed for Moderate Pain.   Dose:  50 mg         CONTINUE taking these medications        Instructions    atorvastatin 10 MG Tabs   Last time this was given:  10 mg on 3/21/2017  8:35 PM   Commonly known as:  LIPITOR   Next Dose Due:  3/22/17 pm    Take 10 mg by mouth every evening.   Dose:  10 mg       cholecalciferol 5000 UNIT Caps   Commonly known as:  VITAMIN D3   Next Dose Due:  Take 10,000 units tomorrow    Take 5,000-10,000 Units by mouth See Admin Instructions. 5000 units, then 10,000 units alternating days for a total weekly dose of 50,000 units   Dose:  5000-17788 Units       digoxin 125 MCG Tabs   Last time this was given:  62.5 mcg on 3/22/2017  8:07 AM   Commonly known as:  LANOXIN   Next Dose Due:  Tomorrow morning    Take 62.5 mcg by mouth every day.   Dose:  62.5 mcg       docusate sodium 100 MG Caps   Commonly known as:  COLACE   Next Dose Due:  Tomorrow morning    Take 100 mg by mouth every morning.   Dose:  100 mg       metoprolol SR 25 MG Tb24   Last time this was given:  25 mg on 3/22/2017  8:07 AM   Commonly known as:  TOPROL XL   Next Dose Due:  Tomorrow morning    Take 25 mg by mouth every day.   Dose:  25 mg       omeprazole 20 MG delayed-release capsule   Last time this was given:  20 mg on 3/22/2017  8:07 AM   Commonly known as:  PRILOSEC   Next Dose Due:  Tomorrow morning    Take 20 mg by mouth every day.   Dose:  20 mg       sennosides 8.6 MG Tabs   Commonly known as:  SENOKOT   Next Dose Due:  Tomorrow morning    Take 8.6 mg by mouth every day.   Dose:  8.6 mg               Instructions           Diet / Nutrition:    Follow any diet instructions given to you by your doctor or the dietician, including how much salt (sodium) you are allowed each day.    If you  are overweight, talk to your doctor about a weight reduction plan.    Activity:    Remain physically active following your doctor's instructions about exercise and activity.    Rest often.     Any time you become even a little tired or short of breath, SIT DOWN and rest.    Worsening Symptoms:    Report any of the following signs and symptoms to the doctor's office immediately:    *Pain of jaw, arm, or neck  *Chest pain not relieved by medication                               *Dizziness or loss of consciousness  *Difficulty breathing even when at rest   *More tired than usual                                       *Bleeding drainage or swelling of surgical site  *Swelling of feet, ankles, legs or stomach                 *Fever (>100ºF)  *Pink or blood tinged sputum  *Weight gain (3lbs/day or 5lbs /week)           *Shock from internal defibrillator (if applicable)  *Palpitations or irregular heartbeats                *Cool and/or numb extremities    Stroke Awareness    Common Risk Factors for Stroke include:    Age  Atrial Fibrillation  Carotid Artery Stenosis  Diabetes Mellitus  Excessive alcohol consumption  High blood pressure  Overweight   Physical inactivity  Smoking    Warning signs and symptoms of a stroke include:    *Sudden numbness or weakness of the face, arm or leg (especially on one side of the body).  *Sudden confusion, trouble speaking or understanding.  *Sudden trouble seeing in one or both eyes.  *Sudden trouble walking, dizziness, loss of balance or coordination.Sudden severe headache with no known cause.    It is very important to get treatment quickly when a stroke occurs. If you experience any of the above warning signs, call 911 immediately.                   Disclaimer         Quit Smoking / Tobacco Use:    I understand the use of any tobacco products increases my chance of suffering from future heart disease or stroke and could cause other illnesses which may shorten my life. Quitting the use  of tobacco products is the single most important thing I can do to improve my health. For further information on smoking / tobacco cessation call a Toll Free Quit Line at 1-380.583.5068 (*National [facility] [place]) or 1-654.887.2198 (American Lung Association) or you can access the web based program at www.lungusa.org.    [place] Tobacco Users Help Line:  (134) 835-5158       Toll Free: 1-844.543.9413  Quit Tobacco Program [facility] (324)240-8121    Crisis Hotline:    [place] Crisis Hotline:  5-160-PYFBOQJ or 1-908.795.9163    [place] Crisis Hotline:    1-952.709.3532 or 764-230-7734    Discharge Survey:   Thank you for choosing [facility]. We hope we did everything we could to make your hospital stay a pleasant one. You may be receiving a phone survey and we would appreciate your time and participation in answering the questions. Your input is very valuable to us in our efforts to improve our service to our patients and their families.        My signature on this form indicates that:    1. I have reviewed and understand the above information.  2. My questions regarding this information have been answered to my satisfaction.  3. I have formulated a plan with my discharge nurse to obtain my prescribed medications for home.                  Disclaimer         __________________________________                     __________       ________                       Patient Signature                                                 Date                    Time

## 2017-03-21 NOTE — IP AVS SNAPSHOT
Virtual Call Center Access Code: Activation code not generated  Current Virtual Call Center Status: Active    [employer]  A secure, online tool to manage your health information     [employer]’s Virtual Call Center® is a secure, online tool that connects you to your personalized health information from the privacy of your home -- day or night - making it very easy for you to manage your healthcare. Once the activation process is completed, you can even access your medical information using the Virtual Call Center benji, which is available for free in the Apple Benji store or Google Play store.     Virtual Call Center provides the following levels of access (as shown below):   My Chart Features   [facility] Primary Care Doctor [facility]  Specialists [facility]  Urgent  Care Non-[facility]  Primary Care  Doctor   Email your healthcare team securely and privately 24/7 X X X X   Manage appointments: schedule your next appointment; view details of past/upcoming appointments X      Request prescription refills. X      View recent personal medical records, including lab and immunizations X X X X   View health record, including health history, allergies, medications X X X X   Read reports about your outpatient visits, procedures, consult and ER notes X X X X   See your discharge summary, which is a recap of your hospital and/or ER visit that includes your diagnosis, lab results, and care plan. X X       How to register for Virtual Call Center:  1. Go to  [email or web address].  2. Click on the Sign Up Now box, which takes you to the New Member Sign Up page. You will need to provide the following information:  a. Enter your Virtual Call Center Access Code exactly as it appears at the top of this page. (You will not need to use this code after you’ve completed the sign-up process. If you do not sign up before the expiration date, you must request a new code.)   b. Enter your date of birth.   c. Enter your home email address.   d. Click Submit, and follow the next screen’s instructions.  3. Create a Virtual Call Center ID. This will  be your [employer] login ID and cannot be changed, so think of one that is secure and easy to remember.  4. Create a [employer] password. You can change your password at any time.  5. Enter your Password Reset Question and Answer. This can be used at a later time if you forget your password.   6. Enter your e-mail address. This allows you to receive e-mail notifications when new information is available in [employer].  7. Click Sign Up. You can now view your health information.    For assistance activating your [employer] account, call [phone]

## 2017-03-22 ENCOUNTER — PATIENT OUTREACH (OUTPATIENT)
Dept: HEALTH INFORMATION MANAGEMENT | Facility: OTHER | Age: 82
End: 2017-03-22

## 2017-03-22 ENCOUNTER — HOME HEALTH ADMISSION (OUTPATIENT)
Dept: HOME HEALTH SERVICES | Facility: HOME HEALTHCARE | Age: 82
End: 2017-03-22
Payer: MEDICARE

## 2017-03-22 VITALS
HEART RATE: 72 BPM | TEMPERATURE: 97.2 F | DIASTOLIC BLOOD PRESSURE: 69 MMHG | WEIGHT: 212.3 LBS | HEIGHT: 71 IN | RESPIRATION RATE: 16 BRPM | OXYGEN SATURATION: 97 % | BODY MASS INDEX: 29.72 KG/M2 | SYSTOLIC BLOOD PRESSURE: 132 MMHG

## 2017-03-22 DIAGNOSIS — I48.91 ATRIAL FIBRILLATION, UNSPECIFIED TYPE (HCC): ICD-10-CM

## 2017-03-22 DIAGNOSIS — R53.83 OTHER FATIGUE: ICD-10-CM

## 2017-03-22 PROBLEM — R53.81 PHYSICAL DEBILITY: Status: ACTIVE | Noted: 2017-03-22

## 2017-03-22 PROBLEM — E87.20 LACTIC ACIDOSIS: Status: ACTIVE | Noted: 2017-03-22

## 2017-03-22 PROBLEM — E86.0 DEHYDRATION: Status: ACTIVE | Noted: 2017-03-22

## 2017-03-22 PROBLEM — M25.511 SHOULDER PAIN, RIGHT: Status: ACTIVE | Noted: 2017-03-22

## 2017-03-22 LAB
ALBUMIN SERPL BCP-MCNC: 3.4 G/DL (ref 3.2–4.9)
ALBUMIN/GLOB SERPL: 1.1 G/DL
ALP SERPL-CCNC: 79 U/L (ref 30–99)
ALT SERPL-CCNC: 5 U/L (ref 2–50)
ANION GAP SERPL CALC-SCNC: 10 MMOL/L (ref 0–11.9)
AST SERPL-CCNC: 14 U/L (ref 12–45)
BASOPHILS # BLD AUTO: 0.5 % (ref 0–1.8)
BASOPHILS # BLD: 0.04 K/UL (ref 0–0.12)
BILIRUB SERPL-MCNC: 0.4 MG/DL (ref 0.1–1.5)
BUN SERPL-MCNC: 19 MG/DL (ref 8–22)
CALCIUM SERPL-MCNC: 9.5 MG/DL (ref 8.5–10.5)
CHLORIDE SERPL-SCNC: 107 MMOL/L (ref 96–112)
CO2 SERPL-SCNC: 20 MMOL/L (ref 20–33)
CREAT SERPL-MCNC: 0.74 MG/DL (ref 0.5–1.4)
EOSINOPHIL # BLD AUTO: 0.25 K/UL (ref 0–0.51)
EOSINOPHIL NFR BLD: 2.9 % (ref 0–6.9)
ERYTHROCYTE [DISTWIDTH] IN BLOOD BY AUTOMATED COUNT: 44.3 FL (ref 35.9–50)
GFR SERPL CREATININE-BSD FRML MDRD: >60 ML/MIN/1.73 M 2
GLOBULIN SER CALC-MCNC: 3.2 G/DL (ref 1.9–3.5)
GLUCOSE SERPL-MCNC: 106 MG/DL (ref 65–99)
HCT VFR BLD AUTO: 43.2 % (ref 42–52)
HGB BLD-MCNC: 14.3 G/DL (ref 14–18)
IMM GRANULOCYTES # BLD AUTO: 0.06 K/UL (ref 0–0.11)
IMM GRANULOCYTES NFR BLD AUTO: 0.7 % (ref 0–0.9)
LACTATE BLD-SCNC: 1.3 MMOL/L (ref 0.5–2)
LYMPHOCYTES # BLD AUTO: 2.98 K/UL (ref 1–4.8)
LYMPHOCYTES NFR BLD: 34.5 % (ref 22–41)
MCH RBC QN AUTO: 29.3 PG (ref 27–33)
MCHC RBC AUTO-ENTMCNC: 33.1 G/DL (ref 33.7–35.3)
MCV RBC AUTO: 88.5 FL (ref 81.4–97.8)
MONOCYTES # BLD AUTO: 0.72 K/UL (ref 0–0.85)
MONOCYTES NFR BLD AUTO: 8.3 % (ref 0–13.4)
NEUTROPHILS # BLD AUTO: 4.59 K/UL (ref 1.82–7.42)
NEUTROPHILS NFR BLD: 53.1 % (ref 44–72)
NRBC # BLD AUTO: 0 K/UL
NRBC BLD AUTO-RTO: 0 /100 WBC
PLATELET # BLD AUTO: 201 K/UL (ref 164–446)
PMV BLD AUTO: 10.4 FL (ref 9–12.9)
POTASSIUM SERPL-SCNC: 4.2 MMOL/L (ref 3.6–5.5)
PROT SERPL-MCNC: 6.6 G/DL (ref 6–8.2)
RBC # BLD AUTO: 4.88 M/UL (ref 4.7–6.1)
SODIUM SERPL-SCNC: 137 MMOL/L (ref 135–145)
WBC # BLD AUTO: 8.6 K/UL (ref 4.8–10.8)

## 2017-03-22 PROCEDURE — G8979 MOBILITY GOAL STATUS: HCPCS | Mod: CK

## 2017-03-22 PROCEDURE — 83605 ASSAY OF LACTIC ACID: CPT

## 2017-03-22 PROCEDURE — 36415 COLL VENOUS BLD VENIPUNCTURE: CPT

## 2017-03-22 PROCEDURE — 97166 OT EVAL MOD COMPLEX 45 MIN: CPT

## 2017-03-22 PROCEDURE — G8987 SELF CARE CURRENT STATUS: HCPCS | Mod: CJ

## 2017-03-22 PROCEDURE — 85025 COMPLETE CBC W/AUTO DIFF WBC: CPT

## 2017-03-22 PROCEDURE — 80053 COMPREHEN METABOLIC PANEL: CPT

## 2017-03-22 PROCEDURE — G8978 MOBILITY CURRENT STATUS: HCPCS | Mod: CK

## 2017-03-22 PROCEDURE — 700111 HCHG RX REV CODE 636 W/ 250 OVERRIDE (IP): Performed by: HOSPITALIST

## 2017-03-22 PROCEDURE — 700105 HCHG RX REV CODE 258: Performed by: HOSPITALIST

## 2017-03-22 PROCEDURE — G8988 SELF CARE GOAL STATUS: HCPCS | Mod: CI

## 2017-03-22 PROCEDURE — 700102 HCHG RX REV CODE 250 W/ 637 OVERRIDE(OP): Performed by: INTERNAL MEDICINE

## 2017-03-22 PROCEDURE — A9270 NON-COVERED ITEM OR SERVICE: HCPCS | Performed by: INTERNAL MEDICINE

## 2017-03-22 PROCEDURE — G8980 MOBILITY D/C STATUS: HCPCS | Mod: CK

## 2017-03-22 PROCEDURE — 700102 HCHG RX REV CODE 250 W/ 637 OVERRIDE(OP): Performed by: HOSPITALIST

## 2017-03-22 PROCEDURE — 99239 HOSP IP/OBS DSCHRG MGMT >30: CPT | Performed by: INTERNAL MEDICINE

## 2017-03-22 PROCEDURE — A9270 NON-COVERED ITEM OR SERVICE: HCPCS | Performed by: HOSPITALIST

## 2017-03-22 PROCEDURE — 97161 PT EVAL LOW COMPLEX 20 MIN: CPT

## 2017-03-22 RX ORDER — TRAMADOL HYDROCHLORIDE 50 MG/1
50 TABLET ORAL EVERY 6 HOURS PRN
Status: DISCONTINUED | OUTPATIENT
Start: 2017-03-22 | End: 2017-03-22 | Stop reason: HOSPADM

## 2017-03-22 RX ORDER — TRAMADOL HYDROCHLORIDE 50 MG/1
50 TABLET ORAL EVERY 6 HOURS PRN
Qty: 30 TAB | Refills: 0 | Status: SHIPPED | OUTPATIENT
Start: 2017-03-22 | End: 2017-07-06

## 2017-03-22 RX ADMIN — METOPROLOL SUCCINATE 25 MG: 25 TABLET, EXTENDED RELEASE ORAL at 08:07

## 2017-03-22 RX ADMIN — STANDARDIZED SENNA CONCENTRATE AND DOCUSATE SODIUM 2 TABLET: 8.6; 5 TABLET, FILM COATED ORAL at 08:08

## 2017-03-22 RX ADMIN — ROSUVASTATIN CALCIUM 62.5 MCG: 10 TABLET, FILM COATED ORAL at 08:07

## 2017-03-22 RX ADMIN — TRAMADOL HYDROCHLORIDE 50 MG: 50 TABLET, FILM COATED ORAL at 11:39

## 2017-03-22 RX ADMIN — HEPARIN SODIUM 5000 UNITS: 5000 INJECTION, SOLUTION INTRAVENOUS; SUBCUTANEOUS at 06:06

## 2017-03-22 RX ADMIN — SODIUM CHLORIDE: 9 INJECTION, SOLUTION INTRAVENOUS at 06:07

## 2017-03-22 RX ADMIN — ERGOCALCIFEROL 50000 UNITS: 1.25 CAPSULE ORAL at 08:08

## 2017-03-22 RX ADMIN — OMEPRAZOLE 20 MG: 20 CAPSULE, DELAYED RELEASE ORAL at 08:07

## 2017-03-22 RX ADMIN — ASPIRIN 325 MG: 325 TABLET, COATED ORAL at 08:07

## 2017-03-22 RX ADMIN — HEPARIN SODIUM 5000 UNITS: 5000 INJECTION, SOLUTION INTRAVENOUS; SUBCUTANEOUS at 13:12

## 2017-03-22 ASSESSMENT — LIFESTYLE VARIABLES: EVER_SMOKED: YES

## 2017-03-22 ASSESSMENT — PAIN SCALES - GENERAL
PAINLEVEL_OUTOF10: 4
PAINLEVEL_OUTOF10: 3
PAINLEVEL_OUTOF10: 0
PAINLEVEL_OUTOF10: 4

## 2017-03-22 ASSESSMENT — GAIT ASSESSMENTS
DEVIATION: SHUFFLED GAIT;BRADYKINETIC
DISTANCE (FEET): 100
GAIT LEVEL OF ASSIST: SUPERVISED
ASSISTIVE DEVICE: FRONT WHEEL WALKER

## 2017-03-22 ASSESSMENT — ACTIVITIES OF DAILY LIVING (ADL): TOILETING: INDEPENDENT

## 2017-03-22 NOTE — PROGRESS NOTES
Two RN skin assessment completed with CARL Addison. Redness noted to panis and groin. Multiple healing scabs noted to bilateral shins. Feet dry/flaky.

## 2017-03-22 NOTE — THERAPY
"Physical Therapy Evaluation completed.   Bed Mobility:  Supine to Sit: Supervised  Transfers: Sit to Stand: Supervised  Gait: Level Of Assist: Supervised with Front-Wheel Walker       Plan of Care: Patient with no further skilled PT needs in the acute care setting at this time  Discharge Recommendations: Equipment: No Equipment Needed. Post-acute therapy Discharge to home with outpatient or home health for additional skilled therapy services.    See \"Rehab Therapy-Acute\" Patient Summary Report for complete documentation.     "

## 2017-03-22 NOTE — FACE TO FACE
Face to Face Supporting Documentation - Home Health    The encounter with this patient was in whole or in part the primary reason for home health admission.    Date of encounter:   Patient:                    MRN:                       YOB: 2017  Jersey Alexis  [id number]  10/18/1930     Home health to see patient for:  Skilled Nursing care for assessment, interventions & education, Physical Therapy evaluation and treatment and Occupational therapy evaluation and treatment    Skilled need for:  Recent Deterioration of Health Status increased fatigue and generalized debility    Skilled nursing interventions to include:  Comment: Assessment    Homebound status evidenced by:  Need the aid of supportive devices such as crutches, canes, wheelchairs or walkers or Needs the assistance of another person in order to leave the home. Leaving home requires a considerable and taxing effort. There is a normal inability to leave the home.    Community Physician to provide follow up care: Tanisha Flores M.D.     Optional Interventions? No      I certify the face to face encounter for this home health care referral meets the CMS requirements and the encounter/clinical assessment with the patient was, in whole, or in part, for the medical condition(s) listed above, which is the primary reason for home health care. Based on my clinical findings: the service(s) are medically necessary, support the need for home health care, and the homebound criteria are met.  I certify that this patient has had a face to face encounter by myself.  Tami Calvo M.D. - NPI: [id number]

## 2017-03-22 NOTE — DISCHARGE PLANNING
[facility] received a HH Choice form. Per the choice form the referral has been sent to [facility]

## 2017-03-22 NOTE — DISCHARGE SUMMARY
CHIEF COMPLAINT ON ADMISSION  Chief Complaint   Patient presents with   • Sent by MD     sent by PCP for abnormal EKG, sent for high heart rate, and low BP. pt also CO chest pain on the right as well as shoulder pain.        CODE STATUS  Full Code    HPI & HOSPITAL COURSE  87 YO male with PMH Atrial fibrillation (on Digoxin and Metoprolol, GERD(on prilosec) and Multiple falls with prior hematoma (so not anticoagulated). He presents c/o increased fatigue and HEBERT. EKG shows a fib/ flutter w/ RVR. He had a recent negative stress test. He has been more fatigued in recent weeks and spends most of time in bed, most of his mobility is via scooter. He thinks his heart was racing due to the walk from the parking lot to the ER. He's been in NSR since coming to floor, ambulated on unit w/o tachycardia. He has been having R shoulder pain in the area of his Pectoralis, he has some pain with abductions but rotational movements are not painful. We started Ultram for his pain. Patient's tachycardia resolved sooner than anticipated and as he had negative stress, he can have further testing (Echo-no contrast, no bubble) as outpatient.      Therefore, he is discharged in fair and stable condition with close outpatient follow-up.    SPECIFIC OUTPATIENT FOLLOW-UP  Initiate Home Health      DISCHARGE PROBLEM LIST  Active Problems:    Atrial flutter (CMS-HCC) POA: Unknown    Falls POA: Unknown    Lactic acidosis POA: Unknown    Physical debility POA: Unknown    Fatigue POA: Unknown  Resolved Problems:    * No resolved hospital problems. *      FOLLOW UP  No future appointments.  No follow-up provider specified.    MEDICATIONS ON DISCHARGE   Jersey Alexis   Home Medication Instructions TAWNYA:[id number]    Printed on:03/22/17 1524   Medication Information                      atorvastatin (LIPITOR) 10 MG Tab  Take 10 mg by mouth every evening.             cholecalciferol (VITAMIN D3) 5000 UNIT Cap  Take 5,000-10,000 Units by mouth See Admin  Instructions. 5000 units, then 10,000 units alternating days for a total weekly dose of 50,000 units             digoxin (LANOXIN) 125 MCG Tab  Take 62.5 mcg by mouth every day.             docusate sodium (COLACE) 100 MG Cap  Take 100 mg by mouth every morning.             metoprolol SR (TOPROL XL) 25 MG TABLET SR 24 HR  Take 25 mg by mouth every day.             omeprazole (PRILOSEC) 20 MG delayed-release capsule  Take 20 mg by mouth every day.             sennosides (SENOKOT) 8.6 MG Tab  Take 8.6 mg by mouth every day.             tramadol (ULTRAM) 50 MG Tab  Take 1 Tab by mouth every 6 hours as needed for Moderate Pain.                 DIET  Orders Placed This Encounter   Procedures   • Diet Order     Standing Status: Standing      Number of Occurrences: 1      Standing Expiration Date:      Order Specific Question:  Diet:     Answer:  Cardiac [6]       ACTIVITY  As tolerated.      CONSULTATIONS  none    PROCEDURES  none    LABORATORY  Lab Results   Component Value Date/Time    SODIUM 137 03/22/2017 02:25 AM    POTASSIUM 4.2 03/22/2017 02:25 AM    CHLORIDE 107 03/22/2017 02:25 AM    CO2 20 03/22/2017 02:25 AM    GLUCOSE 106* 03/22/2017 02:25 AM    BUN 19 03/22/2017 02:25 AM    CREATININE 0.74 03/22/2017 02:25 AM        Lab Results   Component Value Date/Time    WBC 8.6 03/22/2017 02:25 AM    HEMOGLOBIN 14.3 03/22/2017 02:25 AM    HEMATOCRIT 43.2 03/22/2017 02:25 AM    PLATELET COUNT 201 03/22/2017 02:25 AM        Total time of the discharge process exceeds 38 minutes.

## 2017-03-22 NOTE — PROGRESS NOTES
Report received. Patient oriented x4. Pain level 1 out of 10 right upper chest this pain has been for a couple of weeks constant per patient. Vitals stable.  Denies SOB. Fall risk interventions in place, bed in low position, and bed alarm on. Assessment completed.

## 2017-03-22 NOTE — PROGRESS NOTES
Bedside report completed. Patient alert and oriented x4. No c/o or concerns at this time. Call light in reach. Bed alarm in place and on. Safety maintained. See RN flow sheet for full assessment.

## 2017-03-22 NOTE — H&P
CHIEF COMPLAINT:  Atrial fibrillation/flutter with rapid heart rate.    HISTORY OF PRESENT ILLNESS:  The patient is an 86-year-old male with past   medical history of atrial fibrillation/flutter in the past, essentially was at   [facility], primary care physician, undergoing routine followup   visit.  As the patient recently moved from [place], he does not have any   cardiologist established here at [place] at this time.  Upon expressing   complaints to the physician with having shortness of breath on ambulation no   more than 100 yards, physician performed a bedside EKG and was noted to have   an SVT and a heart rate above 150s, was sent to the ER for further management.    Upon our evaluation, the patient's heart rate was in the 150s, which   improved with IV fluid hydration.  In addition, a CTA PE was done and we ruled   the patient out of having any pulmonary embolism.  Of note, the patient is   not on anticoagulation due to multiple falls, as reported by the patient's   history, at one point on anticoagulation, the patient had sustained a   significant hematoma from his left thigh all the way to his foot on previous   falls in the past and is, of note, his CT scan did show evidence of old T   spinal process fractures as well as right 12th rib fracture as well.  At this   time, the patient will be admitted to the telemetry unit for closer monitoring   and for supportive management.    REVIEW OF SYSTEMS:  Please see HPI, otherwise all review of systems are   negative per AMA/CMS criteria.    ALLERGIES:  No known drug allergies.    PAST MEDICAL HISTORY:  1.  History of atrial fibrillation in the past.  2.  Hyperlipidemia.    SOCIAL HISTORY:  Denies using tobacco, alcohol, or illicit drugs.    FAMILY HISTORY:  Reviewed and noncontributory.    MEDICATIONS:  At home include atorvastatin 10 mg daily, digoxin 62.5 mcg   daily, metoprolol 25 mg XL daily, omeprazole 20 mg daily, Senokot.    PHYSICAL  EXAMINATION:  VITAL SIGNS:  Temperature 96.9, pulse 47, blood pressure 131/98, respirations   16, SpO2 95% on 2 liters nasal cannula.  CONSTITUTIONAL:  Well-groomed, well-nourished, nontoxic.  HEENT:  Normocephalic, atraumatic.  Pupils equal and reactive to light,   nonicteric.  Mucous membranes moist.  NECK:  Supple.  No thyromegaly, no JVD, no stridor.  CARDIOVASCULAR:  Irregularly irregular.  No gallops, rubs or murmurs   appreciated.  LUNGS:  Clear bilaterally.  No wheezes, rales or rhonchi.  ABDOMEN:  Soft, nontender, nondistended.  Positive bowel sounds.  No   hepatosplenomegaly or pulsatile masses.  SKIN:  Warm and dry.  No erythema, no rashes.  EXTREMITIES:  Distal pulses intact, +2.  No cyanosis, clubbing or edema.  No   joint deformities.  Range of motion in all 4 extremities is normal.  NEUROLOGIC:  Alert and oriented x3.  Cranial nerves II-XII are grossly intact.    No focal deficits.  Strength 4/5 in bilateral upper and lower extremities   equally.  PSYCHIATRIC:  Affect, judgment, mood is normal.    EKG shows atrial flutter, rate 74, 4:1 AV block.  No acute ST elevations or   depressions noted.    LABORATORY DATA:  WBC count 9.2, hemoglobin 15.5, hematocrit 47.1, platelet   count 257.  CMP unremarkable except for glucose of 113.  Troponin 0.01.  BNP   23, lipase 33.  Rapid influenza negative.  TSH and T4 within normal limits.    IMAGING:  CTA PE negative for pulmonary embolism, bilateral pulmonary nodules   noted and unchanged measuring up to 5 mm.  CT abdomen and pelvis shows   subacute fractures, transverse process L1-L2 on the right, nondisplaced   fracture of the posterior 12th rib on the right, 1.7 cm right renal lesion,   indeterminate, may represent hemorrhagic or proteinaceous cyst, solid mass not   excluded.  Chest x-ray negative for any cardiopulmonary processes.    ASSESSMENT AND PLAN:  1.  Exertional shortness of breath, most likely due to uncontrolled atrial   fibrillation/flutter.  The  patient has had a history of such.  At this time,   the patient was in rapid ventricular response, but did improve after IV fluid   challenge.  At this time, we will continue the patient's home dose of   metoprolol 25 mg daily in addition to continuing with digoxin.  We will make   the necessary adjustments to his beta blocker depending on his rate.  Of note,   the patient was previously on anticoagulation, but was taken off due to   multiple falls.  At this time, we will go ahead and order an echocardiogram   for further information.  Of note, he did undergo nuclear stress test 3 weeks   ago, which was essentially negative for any cardiac ischemia.  2.  Generalized weakness, probably most likely due to deconditioning.    Continue physical and occupational therapy evaluation.  3.  Lactic acidosis.  I do not suspect sepsis at this time.  Continue measured   every 3 hours lactic acid until normalization.  At this time, the patient   will be placed on heparin 5000 units t.i.d. for deep venous thrombosis   prophylaxis and omeprazole for gastrointestinal prophylaxis.  4.  The patient's code status is full code.    I anticipate hospital length of stay to be greater than 2 midnights.       ____________________________________     MD MITALI MITCHELL / JUSTINE    DD:  03/21/2017 18:40:44  DT:  03/21/2017 21:06:54    D#:  [id number]  Job#:  [id number]

## 2017-03-22 NOTE — THERAPY
"Occupational Therapy Evaluation completed.   Functional Status:  spv w/bed mobility ( HOB elevated pt reports he has an adjustable bed), spv w/sit>stand steps in room w/fww, requested txf back to chair, using urinal to toileting, SBA w/LB dressing, pt does report hx of falls and LLE buckling reports hx of back issues, pt set up w/grooming seated in chair, alarm on call light w/in reach. RN aware   Plan of Care: Will benefit from Occupational Therapy 2 times per week  Discharge Recommendations:  Equipment: Will Continue to Assess for Equipment Needs. Post-acute therapy Discharge to home with outpatient or home health for additional skilled therapy services.    86 yr old male dx w/ Atrial Flutter and hx of falls, pt reports falls are related to LLE buckling, at present pt does appear deconditioned pt reports recent decline in mobility d/t fear of falling, pt is able to complete basic ADL's and reports having support from son-in law as needed discussed w/PT see notes for additional recommendations.     See \"Rehab Therapy-Acute\" Patient Summary Report for complete documentation.    "

## 2017-03-22 NOTE — ED NOTES
Report to Chrystal HENRY for telemetry floor, no acute distress noted, vital signs stable at time of transfer to [facility].

## 2017-03-22 NOTE — DISCHARGE INSTRUCTIONS
Discharge Instructions    Discharged to home by car with relative. Discharged via wheelchair, hospital escort: Yes.  Special equipment needed: Not Applicable    Be sure to schedule a follow-up appointment with your primary care doctor or any specialists as instructed.     Discharge Plan:   Diet Plan: Discussed  Activity Level: Discussed  Confirmed Follow up Appointment: Patient to Call and Schedule Appointment  Confirmed Symptoms Management: Discussed  Medication Reconciliation Updated: Yes  Influenza Vaccine Indication: Not indicated: Previously immunized this influenza season and > 8 years of age    I understand that a diet low in cholesterol, fat, and sodium is recommended for good health. Unless I have been given specific instructions below for another diet, I accept this instruction as my diet prescription.   Other diet:     Special Instructions: None    · Is patient discharged on Warfarin / Coumadin?   No     · Is patient Post Blood Transfusion?  No    Depression / Suicide Risk    As you are discharged from this [facility] facility, it is important to learn how to keep safe from harming yourself.    Recognize the warning signs:  · Abrupt changes in personality, positive or negative- including increase in energy   · Giving away possessions  · Change in eating patterns- significant weight changes-  positive or negative  · Change in sleeping patterns- unable to sleep or sleeping all the time   · Unwillingness or inability to communicate  · Depression  · Unusual sadness, discouragement and loneliness  · Talk of wanting to die  · Neglect of personal appearance   · Rebelliousness- reckless behavior  · Withdrawal from people/activities they love  · Confusion- inability to concentrate     If you or a loved one observes any of these behaviors or has concerns about self-harm, here's what you can do:  · Talk about it- your feelings and reasons for harming yourself  · Remove any means that you might use to hurt  yourself (examples: pills, rope, extension cords, firearm)  · Get professional help from the community (Mental Health, Substance Abuse, psychological counseling)  · Do not be alone:Call your Safe Contact- someone whom you trust who will be there for you.  · Call your local CRISIS HOTLINE 908-3221 or 424-679-4326  · Call your local Children's Mobile Crisis Response Team [place] (346) 859-1442 or www.[employer]  · Call the toll free National Suicide Prevention Hotlines   · National Suicide Prevention Lifeline 145-484-YPRZ (1958)  · National Hope Line Network 800-SUICIDE (648-8792)

## 2017-03-22 NOTE — PROGRESS NOTES
Report received from CARL Godfrey.  Patient to room T735-01. Patient alert and oriented x4. Cooperative with all care. Patient accompanied by son-in-law. Patient able to recall all history. Hx of recent fall in February. Strip alarm in place and on. Patient and son-in-law aware of fall precautions. Lungs clear, diminished bibasilar. MP shows aflutter 76. All VS WNL. SKin intact. Peripheral pulses palpable. Patient stages that he has numbness/tingling in BLE. This is patient's baseline. Patient has no c/o or concerns at this time. Call light in reach. See RN flow sheet.

## 2017-03-22 NOTE — CARE PLAN
Problem: Communication  Goal: The ability to communicate needs accurately and effectively will improve  Intervention: Educate patient and significant other/support system about the plan of care, procedures, treatments, medications and allow for questions    03/22/17 0229   OTHER   Pt & Family Have Been Educated on Methods Available to Report Concerns Related to Care, Treatment, Services, and Patient Safety Issues Yes           Problem: Venous Thromboembolism (VTW)/Deep Vein Thrombosis (DVT) Prevention:  Goal: Patient will participate in Venous Thrombosis (VTE)/Deep Vein Thrombosis (DVT)Prevention Measures    03/22/17 0229   OTHER   Pharmacologic Prophylaxis Used Unfractionated Heparin

## 2017-03-22 NOTE — DISCHARGE PLANNING
Care Transition Team Assessment    Information Source  Orientation : Oriented x 4  Information Given By: Patient  Informant's Name: Jersey  Who is responsible for making decisions for patient? : Patient    Readmission Evaluation  Is this a readmission?: Yes - unplanned readmission    Elopement Risk  Legal Hold: No  Ambulatory or Self Mobile in Wheelchair: Yes  Disoriented: No  Psychiatric Symptoms: None  History of Wandering: No  Elopement this Admit: No  Vocalizing Wanting to Leave: No  Displays Behaviors, Body Language Wanting to Leave: No-Not at Risk for Elopement  Elopement Risk: Not at Risk for Elopement    Interdisciplinary Discharge Planning  Does Admitting Nurse Feel This Could be a Complex Discharge?: No  Primary Care Physician: Tanisha Flores  Lives with - Patient's Self Care Capacity: Adult Children  Patient or legal guardian wants to designate a caregiver (see row info): No  Support Systems: Children  Housing / Facility: [address] [place] [address] House  Do You Take your Prescribed Medications Regularly: Yes  Able to Return to Previous ADL's: Yes  Mobility Issues: No  Prior Services: Skilled Home Health Services  Patient Expects to be Discharged to:: Home  Assistance Needed: Yes  Durable Medical Equipment: Walker, Other - Specify (electric scooter)    Discharge Preparedness  What is your plan after discharge?: Home with help  What are your discharge supports?: Child  Prior Functional Level: Independent with Activities of Daily Living, Independent with Medication Management, Uses Walker  Difficulity with ADLs: Walking  Difficulity with IADLs: None    Functional Assesment  Prior Functional Level: Independent with Activities of Daily Living, Independent with Medication Management, Uses Walker    Finances  Financial Barriers to Discharge: No  Prescription Coverage: Yes    Vision / Hearing Impairment  Vision Impairment : Yes  Right Eye Vision: Wears Glasses  Left Eye Vision: Wears Glasses  Hearing Impairment : No    Values /  Beliefs / Concerns  Values / Beliefs Concerns : No    Advance Directive  Advance Directive?: Living Will    Domestic Abuse  Have you ever been the victim of abuse or violence?: No  Physical Abuse or Sexual Abuse: No  Verbal Abuse or Emotional Abuse: No  Possible Abuse Reported to:: Not Applicable    Psychological Assessment  History of Substance Abuse: None  History of Psychiatric Problems: No  Non-compliant with Treatment: No  Newly Diagnosed Illness: No    Discharge Risks or Barriers  Discharge risks or barriers?: No  Patient risk factors: Readmission, Complex medical needs    Anticipated Discharge Information  Anticipated discharge disposition: Home  Discharge Address: [address] [place], [place] [address] [address]  Discharge Contact Phone Number: [phone]

## 2017-03-23 ENCOUNTER — HOME CARE VISIT (OUTPATIENT)
Dept: HOME HEALTH SERVICES | Facility: HOME HEALTHCARE | Age: 82
End: 2017-03-23
Payer: MEDICARE

## 2017-03-23 ENCOUNTER — HOME CARE VISIT (OUTPATIENT)
Dept: HOME HEALTH SERVICES | Facility: HOME HEALTHCARE | Age: 82
End: 2017-03-23

## 2017-03-23 VITALS
HEART RATE: 80 BPM | SYSTOLIC BLOOD PRESSURE: 126 MMHG | BODY MASS INDEX: 29.68 KG/M2 | DIASTOLIC BLOOD PRESSURE: 74 MMHG | WEIGHT: 212 LBS | TEMPERATURE: 98 F | HEIGHT: 71 IN | RESPIRATION RATE: 20 BRPM

## 2017-03-23 PROCEDURE — 665998 HH PPS REVENUE CREDIT

## 2017-03-23 PROCEDURE — 665001 SOC-HOME HEALTH

## 2017-03-23 PROCEDURE — 665999 HH PPS REVENUE DEBIT

## 2017-03-23 PROCEDURE — G0162 HHC RN E&M PLAN SVS, 15 MIN: HCPCS

## 2017-03-23 SDOH — ECONOMIC STABILITY: HOUSING INSECURITY: UNSAFE COOKING RANGE AREA: 0

## 2017-03-23 SDOH — ECONOMIC STABILITY: HOUSING INSECURITY: UNSAFE APPLIANCES: 0

## 2017-03-23 ASSESSMENT — PATIENT HEALTH QUESTIONNAIRE - PHQ9
2. FEELING DOWN, DEPRESSED, IRRITABLE, OR HOPELESS: 00
1. LITTLE INTEREST OR PLEASURE IN DOING THINGS: 00

## 2017-03-23 ASSESSMENT — ACTIVITIES OF DAILY LIVING (ADL)
HOME_HEALTH_OASIS: 01
TRANSPORTATION COMMENTS: POTENTIAL FOR FALLS

## 2017-03-23 NOTE — PROGRESS NOTES
D/C instructions given with son-in-law present. Both parties deny concerns of or complaints at this time.  Patient waiting for Echo to be completed.

## 2017-03-23 NOTE — PROGRESS NOTES
Spoke with Dr. Boland. Pt to follow up out patient to schedule outpatient Echo with Cardiologist. Pt does not want to wait for inpatient Echo, says that he has been waiting for two days and it still hasn't been done. Called Echo tech at [phone] left message. Attempted to contact  and left message as well.

## 2017-03-24 ENCOUNTER — HOME CARE VISIT (OUTPATIENT)
Dept: HOME HEALTH SERVICES | Facility: HOME HEALTHCARE | Age: 82
End: 2017-03-24
Payer: MEDICARE

## 2017-03-24 PROCEDURE — 665998 HH PPS REVENUE CREDIT

## 2017-03-24 PROCEDURE — 665999 HH PPS REVENUE DEBIT

## 2017-03-25 ENCOUNTER — HOME CARE VISIT (OUTPATIENT)
Dept: HOME HEALTH SERVICES | Facility: HOME HEALTHCARE | Age: 82
End: 2017-03-25
Payer: MEDICARE

## 2017-03-25 PROCEDURE — 665998 HH PPS REVENUE CREDIT

## 2017-03-25 PROCEDURE — G0151 HHCP-SERV OF PT,EA 15 MIN: HCPCS

## 2017-03-25 PROCEDURE — 665999 HH PPS REVENUE DEBIT

## 2017-03-26 VITALS
DIASTOLIC BLOOD PRESSURE: 68 MMHG | SYSTOLIC BLOOD PRESSURE: 116 MMHG | TEMPERATURE: 97.8 F | HEART RATE: 84 BPM | RESPIRATION RATE: 20 BRPM

## 2017-03-26 LAB
BACTERIA BLD CULT: NORMAL
BACTERIA BLD CULT: NORMAL
SIGNIFICANT IND 70042: NORMAL
SIGNIFICANT IND 70042: NORMAL
SITE SITE: NORMAL
SITE SITE: NORMAL
SOURCE SOURCE: NORMAL
SOURCE SOURCE: NORMAL

## 2017-03-26 PROCEDURE — 665998 HH PPS REVENUE CREDIT

## 2017-03-26 PROCEDURE — 665999 HH PPS REVENUE DEBIT

## 2017-03-26 ASSESSMENT — ENCOUNTER SYMPTOMS
SEVERE DYSPNEA: 1
POOR JUDGMENT: 1

## 2017-03-26 ASSESSMENT — ACTIVITIES OF DAILY LIVING (ADL)
ADLS_COMMENTS: <!--EPICS-->SEE OT EVAL<!--EPICE-->
IADLS_COMMENTS: <!--EPICS-->SEE OT EVAL<!--EPICE-->

## 2017-03-27 ENCOUNTER — HOME CARE VISIT (OUTPATIENT)
Dept: HOME HEALTH SERVICES | Facility: HOME HEALTHCARE | Age: 82
End: 2017-03-27
Payer: MEDICARE

## 2017-03-27 VITALS
TEMPERATURE: 97.2 F | DIASTOLIC BLOOD PRESSURE: 74 MMHG | SYSTOLIC BLOOD PRESSURE: 113 MMHG | HEART RATE: 72 BPM | RESPIRATION RATE: 18 BRPM

## 2017-03-27 PROCEDURE — G0151 HHCP-SERV OF PT,EA 15 MIN: HCPCS

## 2017-03-27 PROCEDURE — 665999 HH PPS REVENUE DEBIT

## 2017-03-27 PROCEDURE — 665998 HH PPS REVENUE CREDIT

## 2017-03-27 ASSESSMENT — ACTIVITIES OF DAILY LIVING (ADL): TRANSPORTATION COMMENTS: FALL RISK

## 2017-03-28 ENCOUNTER — HOME CARE VISIT (OUTPATIENT)
Dept: HOME HEALTH SERVICES | Facility: HOME HEALTHCARE | Age: 82
End: 2017-03-28
Payer: MEDICARE

## 2017-03-28 PROCEDURE — 665999 HH PPS REVENUE DEBIT

## 2017-03-28 PROCEDURE — G0496 LPN CARE TRAIN/EDU IN HH: HCPCS

## 2017-03-28 PROCEDURE — 665998 HH PPS REVENUE CREDIT

## 2017-03-29 ENCOUNTER — HOME CARE VISIT (OUTPATIENT)
Dept: HOME HEALTH SERVICES | Facility: HOME HEALTHCARE | Age: 82
End: 2017-03-29
Payer: MEDICARE

## 2017-03-29 VITALS
HEART RATE: 74 BPM | SYSTOLIC BLOOD PRESSURE: 120 MMHG | TEMPERATURE: 97 F | RESPIRATION RATE: 18 BRPM | DIASTOLIC BLOOD PRESSURE: 78 MMHG

## 2017-03-29 VITALS
TEMPERATURE: 98.2 F | HEART RATE: 75 BPM | DIASTOLIC BLOOD PRESSURE: 64 MMHG | SYSTOLIC BLOOD PRESSURE: 102 MMHG | RESPIRATION RATE: 16 BRPM

## 2017-03-29 PROCEDURE — 665998 HH PPS REVENUE CREDIT

## 2017-03-29 PROCEDURE — 665999 HH PPS REVENUE DEBIT

## 2017-03-29 PROCEDURE — G0152 HHCP-SERV OF OT,EA 15 MIN: HCPCS

## 2017-03-29 SDOH — ECONOMIC STABILITY: HOUSING INSECURITY: UNSAFE COOKING RANGE AREA: 0

## 2017-03-29 SDOH — ECONOMIC STABILITY: HOUSING INSECURITY: UNSAFE APPLIANCES: 0

## 2017-03-29 ASSESSMENT — ENCOUNTER SYMPTOMS
RESPIRATORY SYMPTOMS COMMENTS: PT LUNGS CLEAR IN ALL FIELDS, NO ADVANTAGEOUS SOUND NOTED.
DEBILITATING PAIN: 1

## 2017-03-29 ASSESSMENT — ACTIVITIES OF DAILY LIVING (ADL)
DRESSING_LB_ASSISTANCE: 1
BATHING_ASSISTANCE: 1
EATING_ASSISTANCE: 0
DRESSING_UB_ASSISTANCE: 1
SHOPPING_ASSISTANCE: 6
BATHING_ASSISTIVE_EQUIPMENT_USED: SHOWER CHAIR
TRANSPORTATION_ASSISTANCE: 6
TELEPHONE_ASSISTANCE: 4
TOILETING_ASSISTANCE: 1
ORAL_CARE_ASSISTANCE: 1
GROOMING_ASSISTANCE: 1
MEAL_PREP_ASSISTANCE: 6
HOUSEKEEPING_ASSISTANCE: 6
LAUNDRY_ASSISTANCE: 6

## 2017-03-30 ENCOUNTER — HOME CARE VISIT (OUTPATIENT)
Dept: HOME HEALTH SERVICES | Facility: HOME HEALTHCARE | Age: 82
End: 2017-03-30
Payer: MEDICARE

## 2017-03-30 VITALS
TEMPERATURE: 97 F | DIASTOLIC BLOOD PRESSURE: 74 MMHG | SYSTOLIC BLOOD PRESSURE: 129 MMHG | RESPIRATION RATE: 18 BRPM | HEART RATE: 74 BPM

## 2017-03-30 PROCEDURE — 665998 HH PPS REVENUE CREDIT

## 2017-03-30 PROCEDURE — 665999 HH PPS REVENUE DEBIT

## 2017-03-30 PROCEDURE — G0151 HHCP-SERV OF PT,EA 15 MIN: HCPCS

## 2017-03-30 PROCEDURE — A9300 EXERCISE EQUIPMENT: HCPCS

## 2017-03-30 PROCEDURE — G0180 MD CERTIFICATION HHA PATIENT: HCPCS | Performed by: FAMILY MEDICINE

## 2017-03-30 PROCEDURE — E0700 SAFETY EQUIPMENT: HCPCS

## 2017-03-30 ASSESSMENT — ACTIVITIES OF DAILY LIVING (ADL)
HOME_HEALTH_OASIS: 01
OASIS_M1830: 02

## 2017-03-31 ENCOUNTER — HOME CARE VISIT (OUTPATIENT)
Dept: HOME HEALTH SERVICES | Facility: HOME HEALTHCARE | Age: 82
End: 2017-03-31
Payer: MEDICARE

## 2017-03-31 PROCEDURE — G0299 HHS/HOSPICE OF RN EA 15 MIN: HCPCS

## 2017-03-31 PROCEDURE — 665998 HH PPS REVENUE CREDIT

## 2017-03-31 PROCEDURE — 665999 HH PPS REVENUE DEBIT

## 2017-04-01 VITALS
DIASTOLIC BLOOD PRESSURE: 70 MMHG | HEART RATE: 74 BPM | TEMPERATURE: 97.8 F | RESPIRATION RATE: 18 BRPM | SYSTOLIC BLOOD PRESSURE: 128 MMHG

## 2017-04-01 PROCEDURE — 665999 HH PPS REVENUE DEBIT

## 2017-04-01 PROCEDURE — 665998 HH PPS REVENUE CREDIT

## 2017-04-01 SDOH — ECONOMIC STABILITY: HOUSING INSECURITY: UNSAFE APPLIANCES: 0

## 2017-04-01 SDOH — ECONOMIC STABILITY: HOUSING INSECURITY: UNSAFE COOKING RANGE AREA: 0

## 2017-04-01 ASSESSMENT — ENCOUNTER SYMPTOMS
VOMITING: DENIES
ADDITIONAL INFORMATION: RIGHT SHOULDER PAIN
NAUSEA: DENIES
RESPIRATORY SYMPTOMS COMMENTS: NO RESP. DISTRESS NOTED

## 2017-04-02 PROCEDURE — 665998 HH PPS REVENUE CREDIT

## 2017-04-02 PROCEDURE — 665999 HH PPS REVENUE DEBIT

## 2017-04-03 PROCEDURE — 665998 HH PPS REVENUE CREDIT

## 2017-04-03 PROCEDURE — 665999 HH PPS REVENUE DEBIT

## 2017-04-04 ENCOUNTER — HOME CARE VISIT (OUTPATIENT)
Dept: HOME HEALTH SERVICES | Facility: HOME HEALTHCARE | Age: 82
End: 2017-04-04
Payer: MEDICARE

## 2017-04-04 VITALS
DIASTOLIC BLOOD PRESSURE: 75 MMHG | RESPIRATION RATE: 18 BRPM | SYSTOLIC BLOOD PRESSURE: 102 MMHG | TEMPERATURE: 97.7 F | HEART RATE: 73 BPM

## 2017-04-04 VITALS
DIASTOLIC BLOOD PRESSURE: 77 MMHG | SYSTOLIC BLOOD PRESSURE: 122 MMHG | TEMPERATURE: 97.8 F | RESPIRATION RATE: 18 BRPM | HEART RATE: 70 BPM

## 2017-04-04 PROCEDURE — G0495 RN CARE TRAIN/EDU IN HH: HCPCS

## 2017-04-04 PROCEDURE — G0151 HHCP-SERV OF PT,EA 15 MIN: HCPCS

## 2017-04-04 PROCEDURE — 665999 HH PPS REVENUE DEBIT

## 2017-04-04 PROCEDURE — 665998 HH PPS REVENUE CREDIT

## 2017-04-04 SDOH — ECONOMIC STABILITY: HOUSING INSECURITY: UNSAFE APPLIANCES: 0

## 2017-04-04 SDOH — ECONOMIC STABILITY: HOUSING INSECURITY: HOME SAFETY: HOUSE HAS 2 STEPS GOING IN THE HOUSE,HAS 2 STEPS GOING TO THE GARAGE

## 2017-04-04 SDOH — ECONOMIC STABILITY: HOUSING INSECURITY: UNSAFE COOKING RANGE AREA: 0

## 2017-04-04 ASSESSMENT — ACTIVITIES OF DAILY LIVING (ADL): TRANSPORTATION COMMENTS: FALL RISK

## 2017-04-04 ASSESSMENT — ENCOUNTER SYMPTOMS
NAUSEA: DENIES
VOMITING: DENIES

## 2017-04-05 PROCEDURE — 665998 HH PPS REVENUE CREDIT

## 2017-04-05 PROCEDURE — 665999 HH PPS REVENUE DEBIT

## 2017-04-06 ENCOUNTER — HOME CARE VISIT (OUTPATIENT)
Dept: HOME HEALTH SERVICES | Facility: HOME HEALTHCARE | Age: 82
End: 2017-04-06
Payer: MEDICARE

## 2017-04-06 VITALS
DIASTOLIC BLOOD PRESSURE: 80 MMHG | RESPIRATION RATE: 18 BRPM | TEMPERATURE: 97.5 F | SYSTOLIC BLOOD PRESSURE: 130 MMHG | HEART RATE: 72 BPM

## 2017-04-06 VITALS
HEART RATE: 72 BPM | SYSTOLIC BLOOD PRESSURE: 132 MMHG | RESPIRATION RATE: 18 BRPM | TEMPERATURE: 97.4 F | DIASTOLIC BLOOD PRESSURE: 80 MMHG

## 2017-04-06 VITALS
DIASTOLIC BLOOD PRESSURE: 80 MMHG | RESPIRATION RATE: 18 BRPM | HEART RATE: 72 BPM | SYSTOLIC BLOOD PRESSURE: 130 MMHG | TEMPERATURE: 97.5 F

## 2017-04-06 PROCEDURE — G0495 RN CARE TRAIN/EDU IN HH: HCPCS

## 2017-04-06 PROCEDURE — G0151 HHCP-SERV OF PT,EA 15 MIN: HCPCS

## 2017-04-06 PROCEDURE — 665999 HH PPS REVENUE DEBIT

## 2017-04-06 PROCEDURE — G0160 HHC OCCUP THERAPY EA 15: HCPCS

## 2017-04-06 PROCEDURE — 665998 HH PPS REVENUE CREDIT

## 2017-04-06 SDOH — ECONOMIC STABILITY: HOUSING INSECURITY: UNSAFE APPLIANCES: 0

## 2017-04-06 SDOH — ECONOMIC STABILITY: HOUSING INSECURITY: UNSAFE COOKING RANGE AREA: 0

## 2017-04-06 ASSESSMENT — ENCOUNTER SYMPTOMS
RESPIRATORY SYMPTOMS COMMENTS: DENIES BREATHING PROBLEM
NAUSEA: DENIES
VOMITING: DENIES
DEBILITATING PAIN: 1

## 2017-04-07 ENCOUNTER — OFFICE VISIT (OUTPATIENT)
Dept: MEDICAL GROUP | Facility: MEDICAL CENTER | Age: 82
End: 2017-04-07
Payer: MEDICARE

## 2017-04-07 VITALS
HEIGHT: 71 IN | DIASTOLIC BLOOD PRESSURE: 70 MMHG | BODY MASS INDEX: 28.86 KG/M2 | TEMPERATURE: 97.2 F | OXYGEN SATURATION: 91 % | RESPIRATION RATE: 14 BRPM | WEIGHT: 206.13 LBS | SYSTOLIC BLOOD PRESSURE: 128 MMHG | HEART RATE: 75 BPM

## 2017-04-07 DIAGNOSIS — I10 ESSENTIAL HYPERTENSION: ICD-10-CM

## 2017-04-07 DIAGNOSIS — Z00.00 HEALTH CARE MAINTENANCE: ICD-10-CM

## 2017-04-07 DIAGNOSIS — K21.9 GASTROESOPHAGEAL REFLUX DISEASE WITHOUT ESOPHAGITIS: ICD-10-CM

## 2017-04-07 DIAGNOSIS — E78.5 HYPERLIPIDEMIA, UNSPECIFIED HYPERLIPIDEMIA TYPE: ICD-10-CM

## 2017-04-07 DIAGNOSIS — Z09 HOSPITAL DISCHARGE FOLLOW-UP: ICD-10-CM

## 2017-04-07 DIAGNOSIS — I48.0 PAROXYSMAL ATRIAL FIBRILLATION (HCC): ICD-10-CM

## 2017-04-07 DIAGNOSIS — R91.8 PULMONARY NODULES/LESIONS, MULTIPLE: ICD-10-CM

## 2017-04-07 PROBLEM — E86.0 DEHYDRATION: Status: RESOLVED | Noted: 2017-03-22 | Resolved: 2017-04-07

## 2017-04-07 PROBLEM — M25.511 SHOULDER PAIN, RIGHT: Status: RESOLVED | Noted: 2017-03-22 | Resolved: 2017-04-07

## 2017-04-07 PROBLEM — R53.81 PHYSICAL DEBILITY: Status: RESOLVED | Noted: 2017-03-22 | Resolved: 2017-04-07

## 2017-04-07 PROBLEM — R53.83 FATIGUE: Status: RESOLVED | Noted: 2017-03-22 | Resolved: 2017-04-07

## 2017-04-07 PROBLEM — E87.20 LACTIC ACIDOSIS: Status: RESOLVED | Noted: 2017-03-22 | Resolved: 2017-04-07

## 2017-04-07 PROCEDURE — 0518F FALL PLAN OF CARE DOCD: CPT | Mod: 8P | Performed by: FAMILY MEDICINE

## 2017-04-07 PROCEDURE — 1111F DSCHRG MED/CURRENT MED MERGE: CPT | Performed by: FAMILY MEDICINE

## 2017-04-07 PROCEDURE — 99214 OFFICE O/P EST MOD 30 MIN: CPT | Mod: 25 | Performed by: FAMILY MEDICINE

## 2017-04-07 PROCEDURE — 665999 HH PPS REVENUE DEBIT

## 2017-04-07 PROCEDURE — 1100F PTFALLS ASSESS-DOCD GE2>/YR: CPT | Performed by: FAMILY MEDICINE

## 2017-04-07 PROCEDURE — 4040F PNEUMOC VAC/ADMIN/RCVD: CPT | Mod: 8P | Performed by: FAMILY MEDICINE

## 2017-04-07 PROCEDURE — G8419 CALC BMI OUT NRM PARAM NOF/U: HCPCS | Performed by: FAMILY MEDICINE

## 2017-04-07 PROCEDURE — 3288F FALL RISK ASSESSMENT DOCD: CPT | Mod: 8P | Performed by: FAMILY MEDICINE

## 2017-04-07 PROCEDURE — 665998 HH PPS REVENUE CREDIT

## 2017-04-07 PROCEDURE — 1036F TOBACCO NON-USER: CPT | Performed by: FAMILY MEDICINE

## 2017-04-07 NOTE — MR AVS SNAPSHOT
"        Jersey Alexis   2017 10:00 AM   Office Visit   MRN: [id number]    Department:  [address] [place] [address]   Dept Phone:  [phone]    Description:  Male : 10/18/1930   Provider:  Tanisha Flores M.D.           Reason for Visit     Follow-Up Hospital F/V, Afib      Allergies as of 2017     No Known Allergies      You were diagnosed with     Hospital discharge follow-up   [635830]       Paroxysmal atrial fibrillation (CMS-HCC)   [510469]         Vital Signs     Blood Pressure Pulse Temperature Respirations Height Weight    128/70 mmHg 75 36.2 °C (97.2 °F) 14 1.803 m (5' 10.98\") 93.5 kg (206 lb 2.1 oz)    Body Mass Index Oxygen Saturation Smoking Status             28.76 kg/m2 91% Former Smoker         Basic Information     Date Of Birth Sex Race Ethnicity Preferred Language    10/18/1930 Male White Non- English      Your appointments     Apr 10, 2017 12:00 PM   PT-HH-HOME VISIT with Karina Guerra P.T.A.   [facility] (--)    [address]  [place] [address]   [phone]            2017  9:30 AM   PT-HH-HOME VISIT with Karina Guerra P.T.A.   [facility] (--)    [address]  [place] [address]   [phone]            2017 To Be Determined   SN-HH-HOME VISIT with Cookie Paris R.N.   [facility] (--)    [address]  [place] [address]   [phone]            2017  9:00 AM   OT-HH-HOME VISIT with Epifanio Herndon O.T.   [facility] (--)    [address]  [place] [address]   [phone]            2017  9:00 AM   PT-HH-HOME VISIT with Tawanda Anaya P.T.   [facility] (--)    [address]  [place] [address]   [phone]            2017 To Be Determined   SN-HH-HOME VISIT with ARI[facility] (--)    [address] [place], [place] [address] [facility]  [place] [address]   [phone]            2017  9:00 AM   [id number] DISCHARGE with [facility] (--)    [address]  [place] [address]   [phone]            Apr 26, 2017 To Be Determined   SN-HH-HOME VISIT with Cookie Paris R.N.   [facility] (--)    [address]  [place] [address]   [phone]            May 03, 2017 To Be Determined   SN-HH-HOME VISIT with Cookie Paris R.N.   [facility] (--)    [address]  [place] [address]   [phone]            May 10, 2017 To Be Determined   SN-HH-HOME VISIT with Cookie Paris R.N.   [facility] (--)    [address]  [place] [address]   [phone]            May 17, 2017 To Be Determined   SN-HH-HOME VISIT with Cookie Paris R.N.   [facility] (--)    [address]  [facility] [address]   [phone]              Problem List              ICD-10-CM Priority Class Noted - Resolved    Pulmonary nodules/lesions, multiple; needs F/U CT in 6 months 9/17 R91.8   3/1/2017 - Present    Atrial flutter (CMS-HCC) I48.92   3/21/2017 - Present    Falls W19.XXXA   3/21/2017 - Present    Lactic acidosis E87.2   3/22/2017 - Present    Physical debility R53.81   3/22/2017 - Present    Fatigue R53.83   3/22/2017 - Present    Dehydration E86.0   3/22/2017 - Present    Shoulder pain, right M25.511   3/22/2017 - Present      Health Maintenance        Date Due Completion Dates    IMM DTaP/Tdap/Td Vaccine (1 - Tdap) 10/18/1949 ---    COLONOSCOPY 10/18/1980 ---    IMM ZOSTER VACCINE 10/18/1990 ---    IMM PNEUMOCOCCAL 65+ (ADULT) LOW/MEDIUM RISK SERIES (1 of 2 - PCV13) 10/18/1995 ---            Current Immunizations     Influenza Vaccine Quad Inj (Preserved) 10/10/2009, 11/3/2007, 10/27/2005      Below and/or attached are the medications your provider expects you to take. Review all of your home medications and newly ordered medications with your provider and/or pharmacist. Follow medication instructions as directed by your provider and/or pharmacist. Please keep your medication list with you and share with your  provider. Update the information when medications are discontinued, doses are changed, or new medications (including over-the-counter products) are added; and carry medication information at all times in the event of emergency situations     Allergies:  No Known Allergies          Medications  Valid as of: April 07, 2017 - 10:23 AM    Generic Name Brand Name Tablet Size Instructions for use    Aspirin (Tab)  MG Take 325 mg by mouth every day.        Atorvastatin Calcium (Tab) LIPITOR 10 MG Take 10 mg by mouth every evening.        Cholecalciferol (Cap) VITAMIN D3 5000 UNIT Take 5,000-10,000 Units by mouth See Admin Instructions. 5000 units, then 10,000 units alternating days for a total weekly dose of 50,000 units        Cyanocobalamin (Tab) VITAMIN B12 1000 MCG Take 1,000 mcg by mouth every day at 6 PM.        Digoxin (Tab) LANOXIN 125 MCG Take 62.5 mcg by mouth every day.        Docusate Sodium (Cap) COLACE 100 MG Take 100 mg by mouth every morning.        Metoprolol Succinate (TABLET SR 24 HR) TOPROL XL 25 MG Take 25 mg by mouth every day.        Omeprazole (CAPSULE DELAYED RELEASE) PRILOSEC 20 MG Take 20 mg by mouth every day.        Sennosides (Tab) SENOKOT 8.6 MG Take 8.6 mg by mouth every day.        TraMADol HCl (Tab) ULTRAM 50 MG Take 1 Tab by mouth every 6 hours as needed for Moderate Pain.        .                 Medicines prescribed today were sent to:     [facility] [address] [place] [address] (S), [place] - [address] [id number] [facility] (S) [place] [address]    Phone: [phone] Fax: [phone]    Open 24 Hours?: No      Medication refill instructions:       If your prescription bottle indicates you have medication refills left, it is not necessary to call your provider’s office. Please contact your pharmacy and they will refill your medication.    If your prescription bottle indicates you do not have any refills left, you may request refills at any time through one of the following ways: The online  [employer] system (except Urgent Care), by calling your provider’s office, or by asking your pharmacy to contact your provider’s office with a refill request. Medication refills are processed only during regular business hours and may not be available until the next business day. Your provider may request additional information or to have a follow-up visit with you prior to refilling your medication.   *Please Note: Medication refills are assigned a new Rx number when refilled electronically. Your pharmacy may indicate that no refills were authorized even though a new prescription for the same medication is available at the pharmacy. Please request the medicine by name with the pharmacy before contacting your provider for a refill.        Your To Do List     Future Labs/Procedures Complete By Expires    ECHOCARDIOGRAM-COMP W/ CONT  As directed 4/7/2018         [employer] Access Code: Activation code not generated  Current [employer] Status: Active

## 2017-04-07 NOTE — PROGRESS NOTES
CC: Hospital discharge follow up/ Establish care.    HPI:   Jersey presents today for post hospitalization follow up, and to establish a new PCP. Patient was seen on 3/21/2017 to establish care was found to have A fib with RVR so was sent to the hospital.    Patient with PMH Atrial fibrillation on Digoxin and Metoprolol,and Multiple falls with prior hematoma (so not anticoagulated). He presented to the clin complaining of increasing tiredness , and racing heart. His EKG shows a fib/ flutter w/ RVR. He had a recent negative stress test.Was sent to ER, then he was admitted, his symptoms has improved fast after hydration. His metoprolol increased to 25 mg( XL) daily. Patient condition resolved and he was discharged next day ( 3/22/2017).    Came in today for follow up and establishing a new PCP. Has been doing fine since the discharge from hospital. The all medical issues were discussed as follows:    Paroxysmal atrial fibrillation , has been asymptomatic, no palpitation, chest pain, or SOB.Has been in NSR, and rate, he has been doing fine on Metoprolol XL 25 mg daily, and digoxin 125 mcg daily.Echo was ordered in the hospital but was not done.    Essential hypertension, BP has been adequately controlled on current medication. Denies headache, chest pain, and SOB.has been on metoprolol 25 mg daily.No side effects.    Hyperlipidemia, He has been tolerating the statin. Denies muscle pain LFTs has been normal. Currently on Lipitor 10 mg daily.    Has a h/o multiple pulmonary nodules. He needs F/U CT in 6 months 9/2017    Gastroesophageal reflux disease, he has been doing fine on Omeprazole 20 mg daily.Denies epigastric pain, and heart burn.    Will discuss pneumonia vaccination next visit.        Patient Active Problem List    Diagnosis Date Noted   • Lactic acidosis 03/22/2017   • Physical debility 03/22/2017   • Fatigue 03/22/2017   • Dehydration 03/22/2017   • Shoulder pain, right 03/22/2017   • Atrial flutter (CMS-HCC)  "03/21/2017   • Falls 03/21/2017   • Pulmonary nodules/lesions, multiple; needs F/U CT in 6 months 9/17 03/01/2017       Current Outpatient Prescriptions   Medication Sig Dispense Refill   • aspirin (ASA) 325 MG Tab Take 325 mg by mouth every day.     • cyanocobalamin (VITAMIN B12) 1000 MCG Tab Take 1,000 mcg by mouth every day at 6 PM.     • tramadol (ULTRAM) 50 MG Tab Take 1 Tab by mouth every 6 hours as needed for Moderate Pain. 30 Tab 0   • cholecalciferol (VITAMIN D3) 5000 UNIT Cap Take 5,000-10,000 Units by mouth See Admin Instructions. 5000 units, then 10,000 units alternating days for a total weekly dose of 50,000 units     • digoxin (LANOXIN) 125 MCG Tab Take 62.5 mcg by mouth every day.     • metoprolol SR (TOPROL XL) 25 MG TABLET SR 24 HR Take 25 mg by mouth every day.     • sennosides (SENOKOT) 8.6 MG Tab Take 8.6 mg by mouth every day.     • atorvastatin (LIPITOR) 10 MG Tab Take 10 mg by mouth every evening.     • omeprazole (PRILOSEC) 20 MG delayed-release capsule Take 20 mg by mouth every day.     • docusate sodium (COLACE) 100 MG Cap Take 100 mg by mouth every morning.       No current facility-administered medications for this visit.         Allergies as of 04/07/2017   • (No Known Allergies)        ROS: Denies any chest pain, Shortness of breath, Changes bowel or bladder, Lower extremity edema.    Physical Exam:  /70 mmHg  Pulse 75  Temp(Src) 36.2 °C (97.2 °F)  Resp 14  Ht 1.803 m (5' 10.98\")  Wt 93.5 kg (206 lb 2.1 oz)  BMI 28.76 kg/m2  SpO2 91%  Gen.: Well-developed, well-nourished, no apparent distress,pleasant and cooperative with the examination  Skin:  Warm and dry with good turgor. No rashes or suspicious lesions in visible areas  HEENT:Sinuses nontender with palpation, TMs clear, nares patent with pink mucosa and clear rhinorrhea,no septal deviation ,polyps or lesions. lips without lesions, oropharynx clear.  Neck: Trachea midline,no masses or adenopathy. No JVD.  Cor: Regular " rate and rhythm without murmur, gallop or rub.  Lungs: Respirations unlabored.Clear to auscultation with equal breath sounds bilaterally. No wheezes, rhonchi.  Extremities: No cyanosis, clubbing or edema.      Assessment and Plan.   86 y.o. male     1. Hospital discharge follow-up  Hospital discharge follow up.    2. Paroxysmal atrial fibrillation (CMS-HCC)  Stable, no RVR.Have a regular rhythm.  Continue on Metoprolol 25 mg daily, and digoxin 125 mcg daily.  Needs a baseline echo.    - ECHOCARDIOGRAM-COMP W/ CONT; Future    3. Essential hypertension  Has been adequately controlled on current medication. Denies headache, chest pain, and SOB.  Continue on metoprolol 25 mg daily.    4. Hyperlipidemia, unspecified hyperlipidemia type  .,statin  Continue on Lipitor 10 mg daily.    5. Pulmonary nodules/lesions, multiple;   He needs F/U CT in 6 months 9/2017    6. Gastroesophageal reflux disease without esophagitis  Has been doing fine on Omeprazole 20 mg daily.    7. Health care maintenance  Will discuss pneumonia vaccination next visit.

## 2017-04-08 PROCEDURE — 665999 HH PPS REVENUE DEBIT

## 2017-04-08 PROCEDURE — 665998 HH PPS REVENUE CREDIT

## 2017-04-09 PROCEDURE — 665999 HH PPS REVENUE DEBIT

## 2017-04-09 PROCEDURE — 665998 HH PPS REVENUE CREDIT

## 2017-04-10 ENCOUNTER — HOME CARE VISIT (OUTPATIENT)
Dept: HOME HEALTH SERVICES | Facility: HOME HEALTHCARE | Age: 82
End: 2017-04-10
Payer: MEDICARE

## 2017-04-10 VITALS
TEMPERATURE: 97 F | SYSTOLIC BLOOD PRESSURE: 110 MMHG | RESPIRATION RATE: 18 BRPM | DIASTOLIC BLOOD PRESSURE: 65 MMHG | HEART RATE: 73 BPM

## 2017-04-10 PROCEDURE — 665999 HH PPS REVENUE DEBIT

## 2017-04-10 PROCEDURE — G0157 HHC PT ASSISTANT EA 15: HCPCS

## 2017-04-10 PROCEDURE — 665998 HH PPS REVENUE CREDIT

## 2017-04-11 PROCEDURE — 665999 HH PPS REVENUE DEBIT

## 2017-04-11 PROCEDURE — 665998 HH PPS REVENUE CREDIT

## 2017-04-12 ENCOUNTER — HOME CARE VISIT (OUTPATIENT)
Dept: HOME HEALTH SERVICES | Facility: HOME HEALTHCARE | Age: 82
End: 2017-04-12
Payer: MEDICARE

## 2017-04-12 VITALS
TEMPERATURE: 97.8 F | DIASTOLIC BLOOD PRESSURE: 70 MMHG | RESPIRATION RATE: 18 BRPM | HEART RATE: 74 BPM | SYSTOLIC BLOOD PRESSURE: 110 MMHG

## 2017-04-12 PROCEDURE — 665999 HH PPS REVENUE DEBIT

## 2017-04-12 PROCEDURE — G0496 LPN CARE TRAIN/EDU IN HH: HCPCS

## 2017-04-12 PROCEDURE — 665998 HH PPS REVENUE CREDIT

## 2017-04-12 PROCEDURE — G0157 HHC PT ASSISTANT EA 15: HCPCS

## 2017-04-13 ENCOUNTER — HOME CARE VISIT (OUTPATIENT)
Dept: HOME HEALTH SERVICES | Facility: HOME HEALTHCARE | Age: 82
End: 2017-04-13
Payer: MEDICARE

## 2017-04-13 VITALS
DIASTOLIC BLOOD PRESSURE: 60 MMHG | SYSTOLIC BLOOD PRESSURE: 110 MMHG | HEART RATE: 68 BPM | RESPIRATION RATE: 16 BRPM | TEMPERATURE: 97.8 F

## 2017-04-13 PROCEDURE — G0152 HHCP-SERV OF OT,EA 15 MIN: HCPCS

## 2017-04-13 PROCEDURE — 665999 HH PPS REVENUE DEBIT

## 2017-04-13 PROCEDURE — 665998 HH PPS REVENUE CREDIT

## 2017-04-13 ASSESSMENT — ACTIVITIES OF DAILY LIVING (ADL)
BATHING_ASSISTANCE: 4
TRANSPORTATION_ASSISTANCE: 6
IADLS_COMMENTS: <!--EPICS-->PT HAS FAMILY TO HELP WITH IADL'S AS NEEDED.<!--EPICE-->
GROOMING_ASSISTANCE: 0
HOUSEKEEPING_ASSISTANCE: 5
ORAL_CARE_ASSISTANCE: 0
SHOPPING_ASSISTANCE: 6
TELEPHONE_ASSISTANCE: 0
MEAL_PREP_ASSISTANCE: 4
LAUNDRY_ASSISTANCE: 5
DRESSING_LB_ASSISTANCE: 0
EATING_ASSISTANCE: 0
TOILETING_ASSISTANCE: 0
ADLS_COMMENTS: <!--EPICS-->PT HAS FAMILY TO HELP WITH ADL'S AS NEEDED.<!--EPICE-->
DRESSING_UB_ASSISTANCE: 0

## 2017-04-14 VITALS — SYSTOLIC BLOOD PRESSURE: 130 MMHG | DIASTOLIC BLOOD PRESSURE: 78 MMHG

## 2017-04-14 PROCEDURE — 665999 HH PPS REVENUE DEBIT

## 2017-04-14 PROCEDURE — 665998 HH PPS REVENUE CREDIT

## 2017-04-15 PROCEDURE — 665999 HH PPS REVENUE DEBIT

## 2017-04-15 PROCEDURE — 665998 HH PPS REVENUE CREDIT

## 2017-04-16 PROCEDURE — 665999 HH PPS REVENUE DEBIT

## 2017-04-16 PROCEDURE — 665998 HH PPS REVENUE CREDIT

## 2017-04-17 PROCEDURE — 665999 HH PPS REVENUE DEBIT

## 2017-04-17 PROCEDURE — 665998 HH PPS REVENUE CREDIT

## 2017-04-18 ENCOUNTER — HOME CARE VISIT (OUTPATIENT)
Dept: HOME HEALTH SERVICES | Facility: HOME HEALTHCARE | Age: 82
End: 2017-04-18
Payer: MEDICARE

## 2017-04-18 VITALS
TEMPERATURE: 97.8 F | HEART RATE: 72 BPM | SYSTOLIC BLOOD PRESSURE: 118 MMHG | RESPIRATION RATE: 14 BRPM | DIASTOLIC BLOOD PRESSURE: 78 MMHG

## 2017-04-18 PROCEDURE — G0162 HHC RN E&M PLAN SVS, 15 MIN: HCPCS

## 2017-04-18 PROCEDURE — 665999 HH PPS REVENUE DEBIT

## 2017-04-18 PROCEDURE — 665998 HH PPS REVENUE CREDIT

## 2017-04-19 ENCOUNTER — HOME CARE VISIT (OUTPATIENT)
Dept: HOME HEALTH SERVICES | Facility: HOME HEALTHCARE | Age: 82
End: 2017-04-19
Payer: MEDICARE

## 2017-04-19 VITALS
HEART RATE: 70 BPM | RESPIRATION RATE: 16 BRPM | SYSTOLIC BLOOD PRESSURE: 120 MMHG | DIASTOLIC BLOOD PRESSURE: 70 MMHG | TEMPERATURE: 207.3 F

## 2017-04-19 PROCEDURE — G0157 HHC PT ASSISTANT EA 15: HCPCS

## 2017-04-19 PROCEDURE — 665998 HH PPS REVENUE CREDIT

## 2017-04-19 PROCEDURE — 665999 HH PPS REVENUE DEBIT

## 2017-04-20 ENCOUNTER — HOME CARE VISIT (OUTPATIENT)
Dept: HOME HEALTH SERVICES | Facility: HOME HEALTHCARE | Age: 82
End: 2017-04-20
Payer: MEDICARE

## 2017-04-20 VITALS
RESPIRATION RATE: 16 BRPM | WEIGHT: 207.25 LBS | TEMPERATURE: 97 F | DIASTOLIC BLOOD PRESSURE: 81 MMHG | HEART RATE: 71 BPM | SYSTOLIC BLOOD PRESSURE: 116 MMHG | BODY MASS INDEX: 28.92 KG/M2

## 2017-04-20 PROCEDURE — 665998 HH PPS REVENUE CREDIT

## 2017-04-20 PROCEDURE — 665999 HH PPS REVENUE DEBIT

## 2017-04-20 PROCEDURE — G0151 HHCP-SERV OF PT,EA 15 MIN: HCPCS

## 2017-04-20 ASSESSMENT — ACTIVITIES OF DAILY LIVING (ADL)
HOME_HEALTH_OASIS: 00
HOME_HEALTH_OASIS: 01
OASIS_M1830: 01

## 2017-04-20 ASSESSMENT — ENCOUNTER SYMPTOMS: SEVERE DYSPNEA: 1

## 2017-04-21 PROCEDURE — 665998 HH PPS REVENUE CREDIT

## 2017-04-21 PROCEDURE — 665999 HH PPS REVENUE DEBIT

## 2017-04-22 PROCEDURE — 665999 HH PPS REVENUE DEBIT

## 2017-04-22 PROCEDURE — 665998 HH PPS REVENUE CREDIT

## 2017-04-23 PROCEDURE — 665999 HH PPS REVENUE DEBIT

## 2017-04-23 PROCEDURE — 665998 HH PPS REVENUE CREDIT

## 2017-04-24 PROCEDURE — 665999 HH PPS REVENUE DEBIT

## 2017-04-24 PROCEDURE — 665998 HH PPS REVENUE CREDIT

## 2017-04-25 PROCEDURE — 665999 HH PPS REVENUE DEBIT

## 2017-04-25 PROCEDURE — 665998 HH PPS REVENUE CREDIT

## 2017-04-26 PROCEDURE — 665999 HH PPS REVENUE DEBIT

## 2017-04-26 PROCEDURE — 665998 HH PPS REVENUE CREDIT

## 2017-04-27 PROCEDURE — 665999 HH PPS REVENUE DEBIT

## 2017-04-27 PROCEDURE — 665998 HH PPS REVENUE CREDIT

## 2017-04-28 PROCEDURE — 665999 HH PPS REVENUE DEBIT

## 2017-04-28 PROCEDURE — 665998 HH PPS REVENUE CREDIT

## 2017-04-29 PROCEDURE — 665998 HH PPS REVENUE CREDIT

## 2017-04-29 PROCEDURE — 665999 HH PPS REVENUE DEBIT

## 2017-04-30 PROCEDURE — 665998 HH PPS REVENUE CREDIT

## 2017-04-30 PROCEDURE — 665999 HH PPS REVENUE DEBIT

## 2017-05-01 ENCOUNTER — HOSPITAL ENCOUNTER (OUTPATIENT)
Dept: CARDIOLOGY | Facility: MEDICAL CENTER | Age: 82
End: 2017-05-01
Attending: FAMILY MEDICINE
Payer: MEDICARE

## 2017-05-01 DIAGNOSIS — I48.0 PAROXYSMAL ATRIAL FIBRILLATION (HCC): ICD-10-CM

## 2017-05-01 PROCEDURE — 665998 HH PPS REVENUE CREDIT

## 2017-05-01 PROCEDURE — 93306 TTE W/DOPPLER COMPLETE: CPT

## 2017-05-01 PROCEDURE — 93306 TTE W/DOPPLER COMPLETE: CPT | Mod: 26 | Performed by: INTERNAL MEDICINE

## 2017-05-01 PROCEDURE — 665999 HH PPS REVENUE DEBIT

## 2017-05-02 LAB
LV EJECT FRACT  99904: 75
LV EJECT FRACT MOD 2C 99903: 64.35
LV EJECT FRACT MOD 4C 99902: 77.1
LV EJECT FRACT MOD BP 99901: 74.14

## 2017-05-02 PROCEDURE — 665999 HH PPS REVENUE DEBIT

## 2017-05-02 PROCEDURE — 665998 HH PPS REVENUE CREDIT

## 2017-05-03 PROCEDURE — 665998 HH PPS REVENUE CREDIT

## 2017-05-03 PROCEDURE — 665999 HH PPS REVENUE DEBIT

## 2017-05-04 PROCEDURE — 665999 HH PPS REVENUE DEBIT

## 2017-05-04 PROCEDURE — 665998 HH PPS REVENUE CREDIT

## 2017-05-05 PROCEDURE — 665998 HH PPS REVENUE CREDIT

## 2017-05-05 PROCEDURE — 665999 HH PPS REVENUE DEBIT

## 2017-05-06 PROCEDURE — 665998 HH PPS REVENUE CREDIT

## 2017-05-06 PROCEDURE — 665999 HH PPS REVENUE DEBIT

## 2017-05-07 PROCEDURE — 665998 HH PPS REVENUE CREDIT

## 2017-05-07 PROCEDURE — 665999 HH PPS REVENUE DEBIT

## 2017-05-08 PROCEDURE — 665999 HH PPS REVENUE DEBIT

## 2017-05-08 PROCEDURE — 665998 HH PPS REVENUE CREDIT

## 2017-05-09 PROCEDURE — 665998 HH PPS REVENUE CREDIT

## 2017-05-09 PROCEDURE — 665999 HH PPS REVENUE DEBIT

## 2017-05-10 PROCEDURE — 665999 HH PPS REVENUE DEBIT

## 2017-05-10 PROCEDURE — 665998 HH PPS REVENUE CREDIT

## 2017-05-11 PROCEDURE — 665999 HH PPS REVENUE DEBIT

## 2017-05-11 PROCEDURE — 665998 HH PPS REVENUE CREDIT

## 2017-05-12 PROCEDURE — 665998 HH PPS REVENUE CREDIT

## 2017-05-12 PROCEDURE — 665999 HH PPS REVENUE DEBIT

## 2017-07-06 ENCOUNTER — APPOINTMENT (OUTPATIENT)
Dept: RADIOLOGY | Facility: MEDICAL CENTER | Age: 82
DRG: 853 | End: 2017-07-06
Attending: EMERGENCY MEDICINE
Payer: MEDICARE

## 2017-07-06 ENCOUNTER — HOSPITAL ENCOUNTER (INPATIENT)
Facility: MEDICAL CENTER | Age: 82
LOS: 19 days | DRG: 853 | End: 2017-07-25
Attending: EMERGENCY MEDICINE | Admitting: INTERNAL MEDICINE
Payer: MEDICARE

## 2017-07-06 ENCOUNTER — APPOINTMENT (OUTPATIENT)
Dept: RADIOLOGY | Facility: MEDICAL CENTER | Age: 82
DRG: 853 | End: 2017-07-06
Attending: INTERNAL MEDICINE
Payer: MEDICARE

## 2017-07-06 ENCOUNTER — RESOLUTE PROFESSIONAL BILLING HOSPITAL PROF FEE (OUTPATIENT)
Dept: HOSPITALIST | Facility: MEDICAL CENTER | Age: 82
End: 2017-07-06
Payer: MEDICARE

## 2017-07-06 DIAGNOSIS — N39.0 ACUTE UTI: ICD-10-CM

## 2017-07-06 DIAGNOSIS — M54.41 ACUTE MIDLINE LOW BACK PAIN WITH BILATERAL SCIATICA: ICD-10-CM

## 2017-07-06 DIAGNOSIS — I48.92 ATRIAL FLUTTER, UNSPECIFIED TYPE (HCC): ICD-10-CM

## 2017-07-06 DIAGNOSIS — M46.46 DISCITIS OF LUMBAR REGION: ICD-10-CM

## 2017-07-06 DIAGNOSIS — D72.829 LEUKOCYTOSIS, UNSPECIFIED TYPE: ICD-10-CM

## 2017-07-06 DIAGNOSIS — M54.5 LOW BACK PAIN, UNSPECIFIED BACK PAIN LATERALITY, UNSPECIFIED CHRONICITY, WITH SCIATICA PRESENCE UNSPECIFIED: ICD-10-CM

## 2017-07-06 DIAGNOSIS — M54.42 ACUTE MIDLINE LOW BACK PAIN WITH BILATERAL SCIATICA: ICD-10-CM

## 2017-07-06 DIAGNOSIS — R78.81 BACTEREMIA: ICD-10-CM

## 2017-07-06 DIAGNOSIS — I48.92 CHRONIC ATRIAL FLUTTER (HCC): ICD-10-CM

## 2017-07-06 PROBLEM — A41.9 SEPSIS, UNSPECIFIED: Status: ACTIVE | Noted: 2017-07-06

## 2017-07-06 PROBLEM — M54.50 LOW BACK PAIN: Status: ACTIVE | Noted: 2017-07-06

## 2017-07-06 LAB
ALBUMIN SERPL BCP-MCNC: 3.7 G/DL (ref 3.2–4.9)
ALBUMIN/GLOB SERPL: 1.1 G/DL
ALP SERPL-CCNC: 74 U/L (ref 30–99)
ALT SERPL-CCNC: 9 U/L (ref 2–50)
ANION GAP SERPL CALC-SCNC: 12 MMOL/L (ref 0–11.9)
APPEARANCE UR: CLEAR
APTT PPP: 32 SEC (ref 24.7–36)
AST SERPL-CCNC: 13 U/L (ref 12–45)
BACTERIA #/AREA URNS HPF: NEGATIVE /HPF
BASOPHILS # BLD AUTO: 0.3 % (ref 0–1.8)
BASOPHILS # BLD: 0.06 K/UL (ref 0–0.12)
BILIRUB SERPL-MCNC: 1.2 MG/DL (ref 0.1–1.5)
BILIRUB UR QL STRIP.AUTO: ABNORMAL
BUN SERPL-MCNC: 14 MG/DL (ref 8–22)
CALCIUM SERPL-MCNC: 10.1 MG/DL (ref 8.5–10.5)
CHLORIDE SERPL-SCNC: 100 MMOL/L (ref 96–112)
CO2 SERPL-SCNC: 18 MMOL/L (ref 20–33)
COLOR UR: ABNORMAL
CREAT SERPL-MCNC: 0.82 MG/DL (ref 0.5–1.4)
CULTURE IF INDICATED INDCX: YES UA CULTURE
EOSINOPHIL # BLD AUTO: 0 K/UL (ref 0–0.51)
EOSINOPHIL NFR BLD: 0 % (ref 0–6.9)
EPI CELLS #/AREA URNS HPF: ABNORMAL /HPF
ERYTHROCYTE [DISTWIDTH] IN BLOOD BY AUTOMATED COUNT: 40.3 FL (ref 35.9–50)
GFR SERPL CREATININE-BSD FRML MDRD: >60 ML/MIN/1.73 M 2
GLOBULIN SER CALC-MCNC: 3.5 G/DL (ref 1.9–3.5)
GLUCOSE SERPL-MCNC: 180 MG/DL (ref 65–99)
GLUCOSE UR STRIP.AUTO-MCNC: NEGATIVE MG/DL
HCT VFR BLD AUTO: 45.2 % (ref 42–52)
HGB BLD-MCNC: 15.4 G/DL (ref 14–18)
HYALINE CASTS #/AREA URNS LPF: >20 /LPF
IMM GRANULOCYTES # BLD AUTO: 0.19 K/UL (ref 0–0.11)
IMM GRANULOCYTES NFR BLD AUTO: 0.9 % (ref 0–0.9)
INR PPP: 1.13 (ref 0.87–1.13)
KETONES UR STRIP.AUTO-MCNC: NEGATIVE MG/DL
LACTATE BLD-SCNC: 2.1 MMOL/L (ref 0.5–2)
LACTATE BLD-SCNC: 2.8 MMOL/L (ref 0.5–2)
LEUKOCYTE ESTERASE UR QL STRIP.AUTO: ABNORMAL
LIPASE SERPL-CCNC: 7 U/L (ref 11–82)
LYMPHOCYTES # BLD AUTO: 0.87 K/UL (ref 1–4.8)
LYMPHOCYTES NFR BLD: 4.3 % (ref 22–41)
MCH RBC QN AUTO: 29.4 PG (ref 27–33)
MCHC RBC AUTO-ENTMCNC: 34.1 G/DL (ref 33.7–35.3)
MCV RBC AUTO: 86.3 FL (ref 81.4–97.8)
MICRO URNS: ABNORMAL
MONOCYTES # BLD AUTO: 2.01 K/UL (ref 0–0.85)
MONOCYTES NFR BLD AUTO: 9.9 % (ref 0–13.4)
MUCOUS THREADS #/AREA URNS HPF: ABNORMAL /HPF
NEUTROPHILS # BLD AUTO: 17.21 K/UL (ref 1.82–7.42)
NEUTROPHILS NFR BLD: 84.6 % (ref 44–72)
NITRITE UR QL STRIP.AUTO: NEGATIVE
NRBC # BLD AUTO: 0 K/UL
NRBC BLD AUTO-RTO: 0 /100 WBC
PH UR STRIP.AUTO: 5 [PH]
PLATELET # BLD AUTO: 202 K/UL (ref 164–446)
PMV BLD AUTO: 11 FL (ref 9–12.9)
POTASSIUM SERPL-SCNC: 4.1 MMOL/L (ref 3.6–5.5)
PROT SERPL-MCNC: 7.2 G/DL (ref 6–8.2)
PROT UR QL STRIP: 30 MG/DL
PROTHROMBIN TIME: 14.9 SEC (ref 12–14.6)
RBC # BLD AUTO: 5.24 M/UL (ref 4.7–6.1)
RBC # URNS HPF: ABNORMAL /HPF
RBC UR QL AUTO: ABNORMAL
SODIUM SERPL-SCNC: 130 MMOL/L (ref 135–145)
SP GR UR STRIP.AUTO: 1.02
WBC # BLD AUTO: 20.3 K/UL (ref 4.8–10.8)
WBC #/AREA URNS HPF: ABNORMAL /HPF

## 2017-07-06 PROCEDURE — 87086 URINE CULTURE/COLONY COUNT: CPT

## 2017-07-06 PROCEDURE — 700111 HCHG RX REV CODE 636 W/ 250 OVERRIDE (IP): Performed by: EMERGENCY MEDICINE

## 2017-07-06 PROCEDURE — 99285 EMERGENCY DEPT VISIT HI MDM: CPT

## 2017-07-06 PROCEDURE — 99223 1ST HOSP IP/OBS HIGH 75: CPT | Mod: AI | Performed by: INTERNAL MEDICINE

## 2017-07-06 PROCEDURE — 80053 COMPREHEN METABOLIC PANEL: CPT

## 2017-07-06 PROCEDURE — 700105 HCHG RX REV CODE 258: Performed by: EMERGENCY MEDICINE

## 2017-07-06 PROCEDURE — 73522 X-RAY EXAM HIPS BI 3-4 VIEWS: CPT

## 2017-07-06 PROCEDURE — 36415 COLL VENOUS BLD VENIPUNCTURE: CPT

## 2017-07-06 PROCEDURE — 700102 HCHG RX REV CODE 250 W/ 637 OVERRIDE(OP): Performed by: INTERNAL MEDICINE

## 2017-07-06 PROCEDURE — 74176 CT ABD & PELVIS W/O CONTRAST: CPT

## 2017-07-06 PROCEDURE — 83690 ASSAY OF LIPASE: CPT

## 2017-07-06 PROCEDURE — 72131 CT LUMBAR SPINE W/O DYE: CPT

## 2017-07-06 PROCEDURE — 93005 ELECTROCARDIOGRAM TRACING: CPT | Performed by: EMERGENCY MEDICINE

## 2017-07-06 PROCEDURE — 770020 HCHG ROOM/CARE - TELE (206)

## 2017-07-06 PROCEDURE — 85025 COMPLETE CBC W/AUTO DIFF WBC: CPT

## 2017-07-06 PROCEDURE — 87077 CULTURE AEROBIC IDENTIFY: CPT

## 2017-07-06 PROCEDURE — 83605 ASSAY OF LACTIC ACID: CPT

## 2017-07-06 PROCEDURE — 85730 THROMBOPLASTIN TIME PARTIAL: CPT

## 2017-07-06 PROCEDURE — 85610 PROTHROMBIN TIME: CPT

## 2017-07-06 PROCEDURE — 96365 THER/PROPH/DIAG IV INF INIT: CPT

## 2017-07-06 PROCEDURE — 96375 TX/PRO/DX INJ NEW DRUG ADDON: CPT

## 2017-07-06 PROCEDURE — 81001 URINALYSIS AUTO W/SCOPE: CPT

## 2017-07-06 PROCEDURE — A9270 NON-COVERED ITEM OR SERVICE: HCPCS | Performed by: INTERNAL MEDICINE

## 2017-07-06 PROCEDURE — 87040 BLOOD CULTURE FOR BACTERIA: CPT

## 2017-07-06 PROCEDURE — 700111 HCHG RX REV CODE 636 W/ 250 OVERRIDE (IP): Performed by: INTERNAL MEDICINE

## 2017-07-06 PROCEDURE — 700105 HCHG RX REV CODE 258: Performed by: INTERNAL MEDICINE

## 2017-07-06 PROCEDURE — 96361 HYDRATE IV INFUSION ADD-ON: CPT

## 2017-07-06 RX ORDER — DIAZEPAM 5 MG/1
5 TABLET ORAL EVERY 6 HOURS PRN
Status: DISCONTINUED | OUTPATIENT
Start: 2017-07-06 | End: 2017-07-06

## 2017-07-06 RX ORDER — OXYCODONE HYDROCHLORIDE 5 MG/1
2.5 TABLET ORAL
Status: DISCONTINUED | OUTPATIENT
Start: 2017-07-06 | End: 2017-07-10

## 2017-07-06 RX ORDER — CHOLECALCIFEROL (VITAMIN D3) 125 MCG
1000 CAPSULE ORAL DAILY
Status: DISCONTINUED | OUTPATIENT
Start: 2017-07-07 | End: 2017-07-25 | Stop reason: HOSPADM

## 2017-07-06 RX ORDER — ATORVASTATIN CALCIUM 10 MG/1
10 TABLET, FILM COATED ORAL NIGHTLY
Status: DISCONTINUED | OUTPATIENT
Start: 2017-07-06 | End: 2017-07-25 | Stop reason: HOSPADM

## 2017-07-06 RX ORDER — SODIUM CHLORIDE 9 MG/ML
500 INJECTION, SOLUTION INTRAVENOUS
Status: COMPLETED | OUTPATIENT
Start: 2017-07-06 | End: 2017-07-06

## 2017-07-06 RX ORDER — ASPIRIN 325 MG
325 TABLET ORAL DAILY
Status: DISCONTINUED | OUTPATIENT
Start: 2017-07-07 | End: 2017-07-07

## 2017-07-06 RX ORDER — MORPHINE SULFATE 4 MG/ML
4 INJECTION, SOLUTION INTRAMUSCULAR; INTRAVENOUS ONCE
Status: COMPLETED | OUTPATIENT
Start: 2017-07-06 | End: 2017-07-06

## 2017-07-06 RX ORDER — ACETAMINOPHEN 325 MG/1
650 TABLET ORAL EVERY 6 HOURS PRN
Status: DISCONTINUED | OUTPATIENT
Start: 2017-07-06 | End: 2017-07-25 | Stop reason: HOSPADM

## 2017-07-06 RX ORDER — OXYCODONE HYDROCHLORIDE 5 MG/1
5 TABLET ORAL
Status: DISCONTINUED | OUTPATIENT
Start: 2017-07-06 | End: 2017-07-10

## 2017-07-06 RX ORDER — ONDANSETRON 2 MG/ML
4 INJECTION INTRAMUSCULAR; INTRAVENOUS ONCE
Status: COMPLETED | OUTPATIENT
Start: 2017-07-06 | End: 2017-07-06

## 2017-07-06 RX ORDER — CEFTRIAXONE 1 G/1
1 INJECTION, POWDER, FOR SOLUTION INTRAMUSCULAR; INTRAVENOUS ONCE
Status: COMPLETED | OUTPATIENT
Start: 2017-07-06 | End: 2017-07-06

## 2017-07-06 RX ORDER — SENNOSIDES A AND B 8.6 MG/1
8.6 TABLET, FILM COATED ORAL DAILY
Status: DISCONTINUED | OUTPATIENT
Start: 2017-07-06 | End: 2017-07-06

## 2017-07-06 RX ORDER — SODIUM CHLORIDE 9 MG/ML
1000 INJECTION, SOLUTION INTRAVENOUS ONCE
Status: COMPLETED | OUTPATIENT
Start: 2017-07-06 | End: 2017-07-06

## 2017-07-06 RX ORDER — DIGOXIN 125 MCG
62.5 TABLET ORAL 2 TIMES DAILY
Status: DISCONTINUED | OUTPATIENT
Start: 2017-07-06 | End: 2017-07-08

## 2017-07-06 RX ORDER — SODIUM CHLORIDE 9 MG/ML
30 INJECTION, SOLUTION INTRAVENOUS
Status: COMPLETED | OUTPATIENT
Start: 2017-07-06 | End: 2017-07-06

## 2017-07-06 RX ORDER — POLYETHYLENE GLYCOL 3350 17 G/17G
1 POWDER, FOR SOLUTION ORAL
Status: DISCONTINUED | OUTPATIENT
Start: 2017-07-06 | End: 2017-07-11

## 2017-07-06 RX ORDER — OMEPRAZOLE 20 MG/1
20 CAPSULE, DELAYED RELEASE ORAL DAILY
Status: DISCONTINUED | OUTPATIENT
Start: 2017-07-06 | End: 2017-07-09

## 2017-07-06 RX ORDER — DOCUSATE SODIUM 100 MG/1
100 CAPSULE, LIQUID FILLED ORAL EVERY MORNING
Status: DISCONTINUED | OUTPATIENT
Start: 2017-07-06 | End: 2017-07-06

## 2017-07-06 RX ORDER — DIGOXIN 125 MCG
125 TABLET ORAL DAILY
COMMUNITY
End: 2019-01-01

## 2017-07-06 RX ORDER — TRAMADOL HYDROCHLORIDE 50 MG/1
50 TABLET ORAL EVERY 8 HOURS PRN
Status: ON HOLD | COMMUNITY
End: 2018-01-12

## 2017-07-06 RX ORDER — BISACODYL 10 MG
10 SUPPOSITORY, RECTAL RECTAL
Status: DISCONTINUED | OUTPATIENT
Start: 2017-07-06 | End: 2017-07-11

## 2017-07-06 RX ORDER — AMOXICILLIN 250 MG
2 CAPSULE ORAL 2 TIMES DAILY
Status: DISCONTINUED | OUTPATIENT
Start: 2017-07-06 | End: 2017-07-11

## 2017-07-06 RX ADMIN — OMEPRAZOLE 20 MG: 20 CAPSULE, DELAYED RELEASE ORAL at 12:45

## 2017-07-06 RX ADMIN — CEFTRIAXONE SODIUM 1 G: 1 INJECTION, POWDER, FOR SOLUTION INTRAMUSCULAR; INTRAVENOUS at 11:24

## 2017-07-06 RX ADMIN — STANDARDIZED SENNA CONCENTRATE AND DOCUSATE SODIUM 2 TABLET: 8.6; 5 TABLET, FILM COATED ORAL at 21:33

## 2017-07-06 RX ADMIN — HYDROMORPHONE HYDROCHLORIDE 0.25 MG: 1 INJECTION, SOLUTION INTRAMUSCULAR; INTRAVENOUS; SUBCUTANEOUS at 12:37

## 2017-07-06 RX ADMIN — ONDANSETRON 4 MG: 2 INJECTION INTRAMUSCULAR; INTRAVENOUS at 09:24

## 2017-07-06 RX ADMIN — DIGOXIN 62.5 MCG: 125 TABLET ORAL at 21:33

## 2017-07-06 RX ADMIN — MORPHINE SULFATE 4 MG: 4 INJECTION INTRAVENOUS at 09:24

## 2017-07-06 RX ADMIN — SODIUM CHLORIDE 1000 ML: 9 INJECTION, SOLUTION INTRAVENOUS at 15:33

## 2017-07-06 RX ADMIN — ATORVASTATIN CALCIUM 10 MG: 10 TABLET, FILM COATED ORAL at 21:33

## 2017-07-06 RX ADMIN — SODIUM CHLORIDE 2817 ML: 9 INJECTION, SOLUTION INTRAVENOUS at 13:14

## 2017-07-06 RX ADMIN — SODIUM CHLORIDE 1000 ML: 9 INJECTION, SOLUTION INTRAVENOUS at 11:24

## 2017-07-06 ASSESSMENT — LIFESTYLE VARIABLES
EVER_SMOKED: YES
ALCOHOL_USE: NO
DO YOU DRINK ALCOHOL: NO

## 2017-07-06 ASSESSMENT — PAIN SCALES - GENERAL
PAINLEVEL_OUTOF10: 8
PAINLEVEL_OUTOF10: 6
PAINLEVEL_OUTOF10: 10
PAINLEVEL_OUTOF10: 9

## 2017-07-06 NOTE — ED PROVIDER NOTES
ED Provider Note    Scribed for Candice Borjas M.D. by Vj Hallman. 7/6/2017  9:11 AM    Primary care provider: Tanisha Flores M.D.  Means of arrival: walk in  History obtained from: patient  History limited by: none    CHIEF COMPLAINT  Chief Complaint   Patient presents with   • Back Pain       HPI  Jersey Alexis is a 86 y.o. male who presents to the Emergency Department complaining of sudden onset low back pain starting yesterday. He reports associated hip pain. Patient states that he has left leg numbness and incontinence of bladder at baseline. He reports a history of spinal stenosis fusion in 2004, diabetes, atrial fibrillation. Patient deneis dysuria, incontinence of stool, fall, history of myocardial infarction.     REVIEW OF SYSTEMS  HEENT:  No ear pain, congestion, or sore throat   EYES: no discharge, redness, or vision changes  CARDIAC: no chest pain, no palpitations    PULMONARY: no dyspnea, cough, or congestion   GI: Positive incontinence. no vomiting, diarrhea, or abdominal pain   : no dysuria or hematuria   Neuro: Positive numbness. no weakness, aphasia, or headache  Musculoskeletal: Positive back pain, hip pain. No swelling, deformity, or joint swelling  Endocrine: no fevers, sweating, or weight loss    See history of present illness. All other systems are negative.  C.    PAST MEDICAL HISTORY   has a past medical history of Hyperlipidemia; Arthritis; Arrhythmia; Muscle disorder; Cataract; and GERD (gastroesophageal reflux disease).    SURGICAL HISTORY   has past surgical history that includes laminotomy (late 80s); laminotomy (late 90s); appendectomy (Early 2000's); tonsillectomy (Bilateral, 1936); and cataract phaco with iol (Bilateral, Early 2000's).    SOCIAL HISTORY  Social History   Substance Use Topics   • Smoking status: Former Smoker -- 1.50 packs/day for 29 years     Types: Cigarettes, Pipe     Quit date: 01/01/1980   • Smokeless tobacco: Never Used      Comment: Started  smoking at age 21   • Alcohol Use: No      History   Drug Use No       FAMILY HISTORY  Family History   Problem Relation Age of Onset   • Heart Failure Mother    • Heart Attack Mother    • Heart Disease Mother      pacemaker   • Paranoid behavior Mother    • Heart Failure Father    • Cancer Sister      Breast   • Other Brother      colostomy   • Cancer Brother    • No Known Problems Maternal Grandmother    • No Known Problems Maternal Grandfather    • No Known Problems Paternal Grandmother    • No Known Problems Paternal Grandfather        CURRENT MEDICATIONS    Current facility-administered medications:   •  [START ON 7/7/2017] aspirin (ASA) tablet 325 mg, 325 mg, Oral, DAILY, Jamaal Molina M.D.  •  atorvastatin (LIPITOR) tablet 10 mg, 10 mg, Oral, Nightly, Jamaal Molina M.D.  •  [START ON 7/8/2017] vitamin D (cholecalciferol) tablet 5,000 Units, 5,000 Units, Oral, Q48HRS, Jamaal Molina M.D.  •  [START ON 7/7/2017] cyanocobalamin (VITAMIN B-12) tablet 1,000 mcg, 1,000 mcg, Oral, DAILY, Jamaal Molina M.D.  •  digoxin (LANOXIN) tablet 62.5 mcg, 62.5 mcg, Oral, BID, Jamaal Molina M.D.  •  omeprazole (PRILOSEC) capsule 20 mg, 20 mg, Oral, DAILY, Jamaal Molina M.D.  •  senna-docusate (PERICOLACE or SENOKOT S) 8.6-50 MG per tablet 2 Tab, 2 Tab, Oral, BID **AND** polyethylene glycol/lytes (MIRALAX) PACKET 1 Packet, 1 Packet, Oral, QDAY PRN **AND** magnesium hydroxide (MILK OF MAGNESIA) suspension 30 mL, 30 mL, Oral, QDAY PRN **AND** bisacodyl (DULCOLAX) suppository 10 mg, 10 mg, Rectal, QDAY PRN, Jamaal Molina M.D.  •  [START ON 7/7/2017] enoxaparin (LOVENOX) inj 40 mg, 40 mg, Subcutaneous, DAILY, Jamaal Molina M.D.  •  NS (BOLUS) infusion 500 mL, 500 mL, Intravenous, Once PRN, Jamaal Molina M.D.  •  [START ON 7/7/2017] cefTRIAXone (ROCEPHIN) 2 g in  mL IVPB, 2 g, Intravenous, Q24HRS, Jamaal Molina M.D.  •  acetaminophen (TYLENOL) tablet 650 mg, 650 mg, Oral, Q6HRS PRN, Jamaal Molina M.D.  •   "Notify provider if pain remains uncontrolled, , , CONTINUOUS **AND** Use the numeric rating scale (NRS-11) on regular floors and Critical-Care Pain Observation Tool (CPOT) on ICUs/Trauma to assess pain, , , CONTINUOUS **AND** Pulse Ox (Oximetry), , , CONTINUOUS **AND** Pharmacy Consult Request ...Pain Management Review, , Other, PRN **AND** If patient difficult to arouse and/or has respiratory depression, stop any opiates that are currently infusing and call a Rapid Response., , , CONTINUOUS **AND** oxycodone immediate-release (ROXICODONE) tablet 2.5 mg, 2.5 mg, Oral, Q3HRS PRN **AND** oxycodone immediate-release (ROXICODONE) tablet 5 mg, 5 mg, Oral, Q3HRS PRN **AND** HYDROmorphone (DILAUDID) injection 0.25 mg, 0.25 mg, Intravenous, Q3HRS PRN, Jamaal Molina M.D., 0.25 mg at 07/06/17 1237    Current outpatient prescriptions:   •  digoxin (LANOXIN) 125 MCG Tab, Take 125 mcg by mouth Once., Disp: , Rfl:   •  metoprolol (LOPRESSOR) 25 MG Tab, Take 25 mg by mouth 2 times a day., Disp: , Rfl:   •  tramadol (ULTRAM) 50 MG Tab, Take 50 mg by mouth every 8 hours as needed., Disp: , Rfl:   •  aspirin (ASA) 325 MG Tab, Take 325 mg by mouth every day., Disp: , Rfl:   •  cyanocobalamin (VITAMIN B12) 1000 MCG Tab, Take 1,000 mcg by mouth every day., Disp: , Rfl:   •  cholecalciferol (VITAMIN D3) 5000 UNIT Cap, Take 5,000 Units by mouth every 48 hours., Disp: , Rfl:   •  digoxin (LANOXIN) 125 MCG Tab, Take 62.5 mcg by mouth 2 Times a Day., Disp: , Rfl:   •  atorvastatin (LIPITOR) 10 MG Tab, Take 10 mg by mouth every day., Disp: , Rfl:   •  omeprazole (PRILOSEC) 20 MG delayed-release capsule, Take 20 mg by mouth every day., Disp: , Rfl:   •  docusate sodium (COLACE) 100 MG Cap, Take 100 mg by mouth every morning., Disp: , Rfl:     ALLERGIES  No Known Allergies    PHYSICAL EXAM  VITAL SIGNS: /62 mmHg  Pulse 114  Temp(Src) 36.8 °C (98.3 °F) (Temporal)  Resp 16  Ht 1.803 m (5' 11\")  Wt 93.895 kg (207 lb)  BMI 28.88 kg/m2 "  SpO2 96%    Constitutional: Well developed, Well nourished, Mild distress secondary to pain, Non-toxic appearance.   HEENT: Normocephalic, Atraumatic,  external ears normal, pharynx pink,  Mucous  Membranes moist, No rhinorrhea or mucosal edema  Eyes: PERRL, EOMI, Conjunctiva normal, No discharge.   Neck: Normal range of motion, No tenderness, Supple, No stridor.   Lymphatic: No lymphadenopathy    Cardiovascular: Tachycardic, Regular Rhythm, No murmurs, rubs, or gallops.   Thorax & Lungs: Lungs clear to auscultation bilaterally, No respiratory distress, No wheezes, rhales or rhonchi, No chest wall tenderness.   Abdomen: Bowel sounds normal, Soft, non distended,  No pulsatile masses, no rebound guarding or peritoneal signs.   Skin: Warm, Dry, No erythema, No rash, Well healed surgical scar down entire L-S spine  Back:  No CVA tenderness,  No bony crepitance, step offs, or instability. Well healed surgical scar down entire L-S spine  Neurologic: Alert & oriented x 3, Normal motor function, Normal sensory function, No focal deficits noted. Normal reflexes. Normal Cranial Nerves.  Extremities: Equal, intact distal pulses, No cyanosis, clubbing or edema,  No tenderness.   Musculoskeletal: Tenderness over bilateral lower SI joints. No major deformities noted. No pelvic instability. No contusion. Range of motion in lower extremities intact but limited by pain in hips.    DIAGNOSTIC STUDIES / PROCEDURES    LABS  Results for orders placed or performed during the hospital encounter of 07/06/17   CBC WITH DIFFERENTIAL   Result Value Ref Range    WBC 20.3 (H) 4.8 - 10.8 K/uL    RBC 5.24 4.70 - 6.10 M/uL    Hemoglobin 15.4 14.0 - 18.0 g/dL    Hematocrit 45.2 42.0 - 52.0 %    MCV 86.3 81.4 - 97.8 fL    MCH 29.4 27.0 - 33.0 pg    MCHC 34.1 33.7 - 35.3 g/dL    RDW 40.3 35.9 - 50.0 fL    Platelet Count 202 164 - 446 K/uL    MPV 11.0 9.0 - 12.9 fL    Neutrophils-Polys 84.60 (H) 44.00 - 72.00 %    Lymphocytes 4.30 (L) 22.00 - 41.00  %    Monocytes 9.90 0.00 - 13.40 %    Eosinophils 0.00 0.00 - 6.90 %    Basophils 0.30 0.00 - 1.80 %    Immature Granulocytes 0.90 0.00 - 0.90 %    Nucleated RBC 0.00 /100 WBC    Neutrophils (Absolute) 17.21 (H) 1.82 - 7.42 K/uL    Lymphs (Absolute) 0.87 (L) 1.00 - 4.80 K/uL    Monos (Absolute) 2.01 (H) 0.00 - 0.85 K/uL    Eos (Absolute) 0.00 0.00 - 0.51 K/uL    Baso (Absolute) 0.06 0.00 - 0.12 K/uL    Immature Granulocytes (abs) 0.19 (H) 0.00 - 0.11 K/uL    NRBC (Absolute) 0.00 K/uL   COMP METABOLIC PANEL   Result Value Ref Range    Sodium 130 (L) 135 - 145 mmol/L    Potassium 4.1 3.6 - 5.5 mmol/L    Chloride 100 96 - 112 mmol/L    Co2 18 (L) 20 - 33 mmol/L    Anion Gap 12.0 (H) 0.0 - 11.9    Glucose 180 (H) 65 - 99 mg/dL    Bun 14 8 - 22 mg/dL    Creatinine 0.82 0.50 - 1.40 mg/dL    Calcium 10.1 8.5 - 10.5 mg/dL    AST(SGOT) 13 12 - 45 U/L    ALT(SGPT) 9 2 - 50 U/L    Alkaline Phosphatase 74 30 - 99 U/L    Total Bilirubin 1.2 0.1 - 1.5 mg/dL    Albumin 3.7 3.2 - 4.9 g/dL    Total Protein 7.2 6.0 - 8.2 g/dL    Globulin 3.5 1.9 - 3.5 g/dL    A-G Ratio 1.1 g/dL   LIPASE   Result Value Ref Range    Lipase 7 (L) 11 - 82 U/L   PROTHROMBIN TIME   Result Value Ref Range    PT 14.9 (H) 12.0 - 14.6 sec    INR 1.13 0.87 - 1.13   APTT   Result Value Ref Range    APTT 32.0 24.7 - 36.0 sec   URINALYSIS CULTURE, IF INDICATED   Result Value Ref Range    Micro Urine Req Microscopic     Color Angelica     Character Clear     Specific Gravity 1.023 <1.035    Ph 5.0 5.0-8.0    Glucose Negative Negative mg/dL    Ketones Negative Negative mg/dL    Protein 30 (A) Negative mg/dL    Bilirubin Small (A) Negative    Nitrite Negative Negative    Leukocyte Esterase Trace (A) Negative    Occult Blood Trace (A) Negative    Culture Indicated Yes UA Culture   URINE MICROSCOPIC (W/UA)   Result Value Ref Range    WBC 5-10 (A) /hpf    RBC 5-10 (A) /hpf    Bacteria Negative None /hpf    Epithelial Cells Few /hpf    Mucous Threads Many /hpf    Hyaline  Cast >20 (A) /lpf   ESTIMATED GFR   Result Value Ref Range    GFR If African American >60 >60 mL/min/1.73 m 2    GFR If Non African American >60 >60 mL/min/1.73 m 2   LACTIC ACID   Result Value Ref Range    Lactic Acid 2.8 (H) 0.5 - 2.0 mmol/L   EKG NOW   Result Value Ref Range    Report       [facility] Emergency Dept.    Test Date:  2017  Pt Name:    BRIAN SPARROW               Department: ER  MRN:        [id number]                      Room:        12  Gender:     M                            Technician: [id number]  :        1930-10-18                   Requested By:CHEY KEYES  Order #:    [id number]                    Reading MD:    Measurements  Intervals                                Axis  Rate:       77                           P:  NH:                                      QRS:        -29  QRSD:       74                           T:          -5  QT:         404  QTc:        458    Interpretive Statements  ATRIAL FLUTTER, A-RATE 277  BORDERLINE LEFT AXIS DEVIATION  EARLY PRECORDIAL R/S TRANSITION  NONSPECIFIC T ABNORMALITIES, LATERAL LEADS  Compared to ECG 2017 08:46:56  No significant changes     All labs reviewed by me.    EKG  12 lead EKG; Interpreted by emergency department physician    Rhythm: Atrial flutter  Rate: 77  Axis: Left axis deviation  R Wave: Normal R wave progression  Normal ST-T segments  ST Segments: Non-specific ST-T wave changes.   T waves: Normal  Q waves: None    Clinical Impression: No acute changes. Similar to 3/21/17    RADIOLOGY  DX-HIP-BILATERAL-WITH PELVIS-3/4 VIEWS   Final Result      No evidence of fracture or arthropathy of the hips.      Degenerative and surgical changes of the lumbar spine.      CT-LSPINE W/O PLUS RECONS   Final Result      1.  No evidence of acute fracture of the lumbar spine.      2.  Surgical change extending from L3 through L5.      3.  Multilevel degenerative disc disease and facet degeneration.      4.  Old fractures of  the right L1 and L2 transverse processes.      5.  Multilevel degenerative subluxation.      CT-ABDOMEN-PELVIS W/O   Final Result      No hydronephrosis or urolithiasis.   Diverticula colon. No evidence diverticulitis. No free fluid.   Pancreatic calcifications/consistent with sequela pancreatitis. No peripancreatic fluid collections.   Coronary artery calcifications.      MR-LUMBAR SPINE-WITH & W/O    (Results Pending)   The radiologist's interpretation of all radiological studies have been reviewed by me.    COURSE & MEDICAL DECISION MAKING  Nursing notes, VS, PMSFHx reviewed in chart.    9:11 AM - Patient seen and examined at bedside. Patient will be treated with Morphine 4 mg, Zofran 4 mg. Ordered CT abdomen, CT L spine, Blood culture x2, Urine microscope, Estimated GFR, CBC with differential, CMP, Lipase, PT/INR, APTT, Urinalysis culture to evaluate his symptoms. The differential diagnoses include but are not limited to: arthritis, hip fracture    11:09 AM - Patient's results indicate that he has a bladder infection. He will be admitted for observation and continued treatment. There are no acute fractures visualized. He verbalizes agreement and is requesting water at this time. Patient's blood pressure has declined and his white blood cell count is elevated possibly indiacting the beginnning of sepsis. As a result, the patinet will be treated with NS 1000 ml for possible sepsis and fluid resuscitation. Ordered for Blood culture x2 to evaluate.     11:29 AM - I discussed the patient's case and the above findings with Dr. Molina (hospitalist) who agrees to admit the patient.     DISPOSITION:  Patient will be admitted to hospital in guarded condition.      FINAL IMPRESSION  1. Acute UTI    2. Acute midline low back pain with bilateral sciatica    3. Leukocytosis, unspecified type    4. Chronic atrial flutter (CMS-HCC)          jV RENEE (Scribe), am scribing for, and in the presence of, Candice Borjas  M.D..    Electronically signed by: Vj Hallman (Scribe), 7/6/2017    ICandice M.D. personally performed the services described in this documentation, as scribed by Vj Hallman in my presence, and it is both accurate and complete.    The note accurately reflects work and decisions made by me.  Candice Borjas  7/6/2017  1:27 PM

## 2017-07-06 NOTE — IP AVS SNAPSHOT
7/25/2017    Jersey Alexis  [address]  [place] [address]    Dear Jersey:    Critical access hospital wants to ensure your discharge home is safe and you or your loved ones have had all of your questions answered regarding your care after you leave the hospital.    Below is a list of resources and contact information should you have any questions regarding your hospital stay, follow-up instructions, or active medical symptoms.    Questions or Concerns Regarding… Contact   Medical Questions Related to Your Discharge  (7 days a week, 8am-5pm) Contact a Nurse Care Coordinator   [phone]   Medical Questions Not Related to Your Discharge  (24 hours a day / 7 days a week)  Contact the Nurse Health Line   [phone]    Medications or Discharge Instructions Refer to your discharge packet   or contact your [facility] Primary Care Provider   [phone]   Follow-up Appointment(s) Schedule your appointment via [employer]   or contact Scheduling [phone]   Billing Review your statement via [employer]  or contact Billing [phone]   Medical Records Review your records via [employer]   or contact Medical Records [phone]     You may receive a telephone call within two days of discharge. This call is to make certain you understand your discharge instructions and have the opportunity to have any questions answered. You can also easily access your medical information, test results and upcoming appointments via the [employer] free online health management tool. You can learn more and sign up at [email or web address]. For assistance setting up your [employer] account, please call [phone].    Once again, we want to ensure your discharge home is safe and that you have a clear understanding of any next steps in your care. If you have any questions or concerns, please do not hesitate to contact us, we are here for you. Thank you for choosing [facility] for your healthcare needs.    Sincerely,    Your [facility] Healthcare Team

## 2017-07-06 NOTE — ED NOTES
Pt to triage c/o back pain increasing since yesterday. Denies any injuries and numbness/tingling. (+) Loss of bladder control. Pt appears restless in triage.    Pt advised to return to triage nurse for any changes or concerns.

## 2017-07-06 NOTE — IP AVS SNAPSHOT
" Home Care Instructions                                                                                                                  Name:Jersey Alexis  Medical Record Number:[id number]  CSN: [id number]    YOB: 1930   Age: 86 y.o.  Sex: male  HT:1.803 m (5' 11\") WT: 98 kg (216 lb 0.8 oz)          Admit Date: 7/6/2017     Discharge Date:   Today's Date: 7/25/2017  Attending Doctor:  Dalton Pillai M.D.                  Allergies:  Vancomycin            Discharge Instructions       Sepsis, Adult  Sepsis is a serious infection of your blood or tissues that affects your whole body. The infection that causes sepsis may be bacterial, viral, fungal, or parasitic. Sepsis may be life threatening. Sepsis can cause your blood pressure to drop. This may result in shock. Shock causes your central nervous system and your organs to stop working correctly.   RISK FACTORS  Sepsis can happen in anyone, but it is more likely to happen in people who have weakened immune systems.  SIGNS AND SYMPTOMS   Symptoms of sepsis can include:  · Fever or low body temperature (hypothermia).  · Rapid breathing (hyperventilation).  · Chills.  · Rapid heartbeat (tachycardia).  · Confusion or light-headedness.  · Trouble breathing.  · Urinating much less than usual.  · Cool, clammy skin or red, flushed skin.  · Other problems with the heart, kidneys, or brain.  DIAGNOSIS   Your health care provider will likely do tests to look for an infection, to see if the infection has spread to your blood, and to see how serious your condition is. Tests can include:  · Blood tests, including cultures of your blood.  · Cultures of other fluids from your body, such as:  ¨ Urine.  ¨ Pus from wounds.  ¨ Mucus coughed up from your lungs.  · Urine tests other than cultures.  · X-ray exams or other imaging tests.  TREATMENT   Treatment will begin with elimination of the source of infection. If your sepsis is likely caused by a bacterial or fungal infection, " you will be given antibiotic or antifungal medicines.  You may also receive:  · Oxygen.  · Fluids through an IV tube.  · Medicines to increase your blood pressure.  · A machine to clean your blood (dialysis) if your kidneys fail.  · A machine to help you breathe if your lungs fail.  SEEK IMMEDIATE MEDICAL CARE IF:  You get an infection or develop any of the signs and symptoms of sepsis after surgery or a hospitalization.     This information is not intended to replace advice given to you by your health care provider. Make sure you discuss any questions you have with your health care provider.     Document Released: 09/15/2004 Document Revised: 05/03/2016 Document Reviewed: 08/25/2014  [employer] Interactive Patient Education ©2016 [employer] Inc.    Urinary Tract Infection  Urinary tract infections (UTIs) can develop anywhere along your urinary tract. Your urinary tract is your body's drainage system for removing wastes and extra water. Your urinary tract includes two kidneys, two ureters, a bladder, and a urethra. Your kidneys are a pair of bean-shaped organs. Each kidney is about the size of your fist. They are located below your ribs, one on each side of your spine.  CAUSES  Infections are caused by microbes, which are microscopic organisms, including fungi, viruses, and bacteria. These organisms are so small that they can only be seen through a microscope. Bacteria are the microbes that most commonly cause UTIs.  SYMPTOMS   Symptoms of UTIs may vary by age and gender of the patient and by the location of the infection. Symptoms in young women typically include a frequent and intense urge to urinate and a painful, burning feeling in the bladder or urethra during urination. Older women and men are more likely to be tired, shaky, and weak and have muscle aches and abdominal pain. A fever may mean the infection is in your kidneys. Other symptoms of a kidney infection include pain in your back or sides below the ribs,  nausea, and vomiting.  DIAGNOSIS  To diagnose a UTI, your caregiver will ask you about your symptoms. Your caregiver also will ask to provide a urine sample. The urine sample will be tested for bacteria and white blood cells. White blood cells are made by your body to help fight infection.  TREATMENT   Typically, UTIs can be treated with medication. Because most UTIs are caused by a bacterial infection, they usually can be treated with the use of antibiotics. The choice of antibiotic and length of treatment depend on your symptoms and the type of bacteria causing your infection.  HOME CARE INSTRUCTIONS  · If you were prescribed antibiotics, take them exactly as your caregiver instructs you. Finish the medication even if you feel better after you have only taken some of the medication.  · Drink enough water and fluids to keep your urine clear or pale yellow.  · Avoid caffeine, tea, and carbonated beverages. They tend to irritate your bladder.  · Empty your bladder often. Avoid holding urine for long periods of time.  · Empty your bladder before and after sexual intercourse.  · After a bowel movement, women should cleanse from front to back. Use each tissue only once.  SEEK MEDICAL CARE IF:   · You have back pain.  · You develop a fever.  · Your symptoms do not begin to resolve within 3 days.  SEEK IMMEDIATE MEDICAL CARE IF:   · You have severe back pain or lower abdominal pain.  · You develop chills.  · You have nausea or vomiting.  · You have continued burning or discomfort with urination.  MAKE SURE YOU:   1. Understand these instructions.  2. Will watch your condition.  3. Will get help right away if you are not doing well or get worse.     This information is not intended to replace advice given to you by your health care provider. Make sure you discuss any questions you have with your health care provider.     Document Released: 09/27/2006 Document Revised: 01/08/2016 Document Reviewed: 01/25/2013  [employer]  Interactive Patient Education ©2016 [employer]  Pneumococcal Conjugate Vaccine (PCV13)   1. Why get vaccinated?  Vaccination can protect both children and adults from pneumococcal disease.  Pneumococcal disease is caused by bacteria that can spread from person to person through close contact. It can cause ear infections, and it can also lead to more serious infections of the:  · Lungs (pneumonia),  · Blood (bacteremia), and  · Covering of the brain and spinal cord (meningitis).  Pneumococcal pneumonia is most common among adults. Pneumococcal meningitis can cause deafness and brain damage, and it kills about 1 child in 10 who get it.  Anyone can get pneumococcal disease, but children under 2 years of age and adults 65 years and older, people with certain medical conditions, and cigarette smokers are at the highest risk.  Before there was a vaccine, the [place] saw:  · more than 700 cases of meningitis,  · about 13,000 blood infections,  · about 5 million ear infections, and  · about 200 deaths  in children under 5 each year from pneumococcal disease. Since vaccine became available, severe pneumococcal disease in these children has fallen by 88%.  About 18,000 older adults die of pneumococcal disease each year in the [place].  Treatment of pneumococcal infections with penicillin and other drugs is not as effective as it used to be, because some strains of the disease have become resistant to these drugs. This makes prevention of the disease, through vaccination, even more important.  2. PCV13 vaccine  Pneumococcal conjugate vaccine (called PCV13) protects against 13 types of pneumococcal bacteria.  PCV13 is routinely given to children at 2, 4, 6, and 12-15 months of age. It is also recommended for children and adults 2 to 64 years of age with certain health conditions, and for all adults 65 years of age and older. Your doctor can give you details.  3. Some people should not get this vaccine  Anyone who  has ever had a life-threatening allergic reaction to a dose of this vaccine, to an earlier pneumococcal vaccine called PCV7, or to any vaccine containing diphtheria toxoid (for example, DTaP), should not get PCV13.  Anyone with a severe allergy to any component of PCV13 should not get the vaccine. Tell your doctor if the person being vaccinated has any severe allergies.  If the person scheduled for vaccination is not feeling well, your healthcare provider might decide to reschedule the shot on another day.  4. Risks of a vaccine reaction  With any medicine, including vaccines, there is a chance of reactions. These are usually mild and go away on their own, but serious reactions are also possible.  Problems reported following PCV13 varied by age and dose in the series. The most common problems reported among children were:  · About half became drowsy after the shot, had a temporary loss of appetite, or had redness or tenderness where the shot was given.  · About 1 out of 3 had swelling where the shot was given.  · About 1 out of 3 had a mild fever, and about 1 in 20 had a fever over 102.2°F.  · Up to about 8 out of 10 became fussy or irritable.  Adults have reported pain, redness, and swelling where the shot was given; also mild fever, fatigue, headache, chills, or muscle pain.  Young children who get PCV13 along with inactivated flu vaccine at the same time may be at increased risk for seizures caused by fever. Ask your doctor for more information.  Problems that could happen after any vaccine:  4. People sometimes faint after a medical procedure, including vaccination. Sitting or lying down for about 15 minutes can help prevent fainting, and injuries caused by a fall. Tell your doctor if you feel dizzy, or have vision changes or ringing in the ears.  5. Some older children and adults get severe pain in the shoulder and have difficulty moving the arm where a shot was given. This happens very rarely.  6. Any  medication can cause a severe allergic reaction. Such reactions from a vaccine are very rare, estimated at about 1 in a million doses, and would happen within a few minutes to a few hours after the vaccination.  As with any medicine, there is a very small chance of a vaccine causing a serious injury or death.  The safety of vaccines is always being monitored. For more information, visit: www.cdc.gov/vaccinesafety/  5. What if there is a serious reaction?  What should I look for?  1. Look for anything that concerns you, such as signs of a severe allergic reaction, very high fever, or unusual behavior.  Signs of a severe allergic reaction can include hives, swelling of the face and throat, difficulty breathing, a fast heartbeat, dizziness, and weakness-usually within a few minutes to a few hours after the vaccination.  What should I do?  · If you think it is a severe allergic reaction or other emergency that can't wait, call 9-1-1 or get the person to the nearest hospital. Otherwise, call your doctor.  Reactions should be reported to the Vaccine Adverse Event Reporting System (VAERS). Your doctor should file this report, or you can do it yourself through the VAERS web site at www.vaers.[facility].gov, or by calling 1-853.573.2773.  Onsite Care does not give medical advice.  6. The National Vaccine Injury Compensation Program  The National Vaccine Injury Compensation Program (VICP) is a federal program that was created to compensate people who may have been injured by certain vaccines.  Persons who believe they may have been injured by a vaccine can learn about the program and about filing a claim by calling 1-743.913.2937 or visiting the VICP website at www.hrsa.gov/vaccinecompensation. There is a time limit to file a claim for compensation.  7. How can I learn more?  · Ask your healthcare provider. He or she can give you the vaccine package insert or suggest other sources of information.  · Call your local or [facility]  department.  · Contact the Centers for Disease Control and Prevention (CDC):  ¨ Call 1-518.955.4725 (8-449-EMR-INFO) or  ¨ Visit CDC's website at www.cdc.gov/vaccines  Vaccine Information Statement  PCV13 Vaccine (11/05/2015)     This information is not intended to replace advice given to you by your health care provider. Make sure you discuss any questions you have with your health care provider.     Document Released: 10/15/2007 Document Revised: 01/08/2016 Document Reviewed: 11/12/2015  [employer] Interactive Patient Education ©2016 Elsevier Inc.  Discharge Instructions    Discharged to other by medical transportation with self. Discharged via wheelchair, hospital escort: Refused.  Special equipment needed: Oxygen and Walker    Be sure to schedule a follow-up appointment with your primary care doctor or any specialists as instructed.     Discharge Plan:   Diet Plan: Discussed  Activity Level: Discussed  Confirmed Follow up Appointment: Patient to Call and Schedule Appointment  Confirmed Symptoms Management: Discussed  Medication Reconciliation Updated: Yes  Influenza Vaccine Indication: Indicated: Not available from distributor/    I understand that a diet low in cholesterol, fat, and sodium is recommended for good health. Unless I have been given specific instructions below for another diet, I accept this instruction as my diet prescription.   Other diet: Regular diet    Special Instructions: Sepsis, Adult  Sepsis is a serious infection of your blood or tissues that affects your whole body. The infection that causes sepsis may be bacterial, viral, fungal, or parasitic. Sepsis may be life threatening. Sepsis can cause your blood pressure to drop. This may result in shock. Shock causes your central nervous system and your organs to stop working correctly.   RISK FACTORS  Sepsis can happen in anyone, but it is more likely to happen in people who have weakened immune systems.  SIGNS AND SYMPTOMS   Symptoms  of sepsis can include:  · Fever or low body temperature (hypothermia).  · Rapid breathing (hyperventilation).  · Chills.  · Rapid heartbeat (tachycardia).  · Confusion or light-headedness.  · Trouble breathing.  · Urinating much less than usual.  · Cool, clammy skin or red, flushed skin.  · Other problems with the heart, kidneys, or brain.  DIAGNOSIS   Your health care provider will likely do tests to look for an infection, to see if the infection has spread to your blood, and to see how serious your condition is. Tests can include:  · Blood tests, including cultures of your blood.  · Cultures of other fluids from your body, such as:  ¨ Urine.  ¨ Pus from wounds.  ¨ Mucus coughed up from your lungs.  · Urine tests other than cultures.  · X-ray exams or other imaging tests.  TREATMENT   Treatment will begin with elimination of the source of infection. If your sepsis is likely caused by a bacterial or fungal infection, you will be given antibiotic or antifungal medicines.  You may also receive:  · Oxygen.  · Fluids through an IV tube.  · Medicines to increase your blood pressure.  · A machine to clean your blood (dialysis) if your kidneys fail.  · A machine to help you breathe if your lungs fail.  SEEK IMMEDIATE MEDICAL CARE IF:  You get an infection or develop any of the signs and symptoms of sepsis after surgery or a hospitalization.     This information is not intended to replace advice given to you by your health care provider. Make sure you discuss any questions you have with your health care provider.     Document Released: 09/15/2004 Document Revised: 05/03/2016 Document Reviewed: 08/25/2014  [employer] Interactive Patient Education ©2016 [employer]      · Is patient discharged on Warfarin / Coumadin?   No     · Is patient Post Blood Transfusion?  NoRegular diet  Depression / Suicide Risk    As you are discharged from this [facility] facility, it is important to learn how to keep safe from harming  yourself.    Recognize the warning signs:  · Abrupt changes in personality, positive or negative- including increase in energy   · Giving away possessions  · Change in eating patterns- significant weight changes-  positive or negative  · Change in sleeping patterns- unable to sleep or sleeping all the time   · Unwillingness or inability to communicate  · Depression  · Unusual sadness, discouragement and loneliness  · Talk of wanting to die  · Neglect of personal appearance   · Rebelliousness- reckless behavior  · Withdrawal from people/activities they love  · Confusion- inability to concentrate     If you or a loved one observes any of these behaviors or has concerns about self-harm, here's what you can do:  · Talk about it- your feelings and reasons for harming yourself  · Remove any means that you might use to hurt yourself (examples: pills, rope, extension cords, firearm)  · Get professional help from the community (Mental Health, Substance Abuse, psychological counseling)  · Do not be alone:Call your Safe Contact- someone whom you trust who will be there for you.  · Call your local CRISIS HOTLINE 670-9403 or 415-877-6303  · Call your local Children's Mobile Crisis Response Team [place] (267) 954-4378 or www.[employer]  · Call the toll free National Suicide Prevention Hotlines   · National Suicide Prevention Lifeline 552-131-UROB (7237)  · National Hope Line Network 800-SUICIDE (581-4538)        Follow-up Information     1. Follow up with Tanisha Flores M.D.. Schedule an appointment as soon as possible for a visit in 2 weeks.    Specialty:  Geriatrics    Why:  Hospital follow-up appointment with PCP    Contact information    [address]  [facility] [address] [phone]          [facility]. [facility]    Contact information    [address] [facility] [address] [phone]         Discharge Medication Instructions:    Below are the  medications your physician expects you to take upon discharge:    Review all your home medications and newly ordered medications with your doctor and/or pharmacist. Follow medication instructions as directed by your doctor and/or pharmacist.    Please keep your medication list with you and share with your physician.               Medication List      START taking these medications        Instructions    Morning Afternoon Evening Bedtime    ferrous sulfate 325 (65 FE) MG tablet   Last time this was given:  325 mg on 7/25/2017  1:08 PM        Take 1 Tab by mouth every day.   Dose:  325 mg                        fluconazole 100 MG Tabs   Last time this was given:  100 mg on 7/25/2017  8:42 AM   Commonly known as:  DIFLUCAN        Take 1 Tab by mouth every day.   Dose:  100 mg                        gabapentin 100 MG Caps   Last time this was given:  200 mg on 7/25/2017  2:40 PM   Commonly known as:  NEURONTIN        Take 2 Caps by mouth 3 times a day.   Dose:  200 mg                        heparin 5000 UNIT/ML Soln   Last time this was given:  5,000 Units on 7/25/2017  2:41 PM        Inject 1 mL as instructed every 8 hours.   Dose:  5000 Units                        levetiracetam 500 MG Tabs   Last time this was given:  500 mg on 7/25/2017  8:42 AM   Commonly known as:  KEPPRA        Take 1 Tab by mouth 2 Times a Day.   Dose:  500 mg                        NS SOLN 100 mL with cefTRIAXone 2 GM SOLR 2 g   Last time this was given:  2 g on 7/25/2017  8:41 AM        Doctor's comments:  Stop date 8/20/2017   2 g by Intravenous route every 24 hours.   Dose:  2 g                        tamsulosin 0.4 MG capsule   Last time this was given:  0.4 mg on 7/25/2017  8:42 AM   Commonly known as:  FLOMAX        Take 1 Cap by mouth ONE-HALF HOUR AFTER BREAKFAST.   Dose:  0.4 mg                          CHANGE how you take these medications        Instructions    Morning Afternoon Evening Bedtime    metoprolol 50 MG Tabs   What  changed:    - medication strength  - how much to take  - when to take this   Last time this was given:  50 mg on 7/25/2017  8:42 AM   Commonly known as:  LOPRESSOR        Take 1 Tab by mouth 2 Times a Day.   Dose:  50 mg                          CONTINUE taking these medications        Instructions    Morning Afternoon Evening Bedtime    aspirin 325 MG Tabs   Last time this was given:  325 mg on 7/7/2017  9:08 AM   Commonly known as:  ASA        Take 325 mg by mouth every day.   Dose:  325 mg                        atorvastatin 10 MG Tabs   Last time this was given:  10 mg on 7/24/2017 10:39 PM   Commonly known as:  LIPITOR        Take 10 mg by mouth every day.   Dose:  10 mg                        cholecalciferol 5000 UNIT Caps   Commonly known as:  VITAMIN D3        Take 5,000 Units by mouth every 48 hours.   Dose:  5000 Units                        cyanocobalamin 1000 MCG Tabs   Last time this was given:  1,000 mcg on 7/25/2017  8:41 AM   Commonly known as:  VITAMIN B12        Take 1,000 mcg by mouth every day.   Dose:  1000 mcg                        * digoxin 125 MCG Tabs   Last time this was given:  125 mcg on 7/24/2017  5:33 PM   Commonly known as:  LANOXIN        Take 62.5 mcg by mouth 2 Times a Day.   Dose:  62.5 mcg                        * digoxin 125 MCG Tabs   Last time this was given:  125 mcg on 7/24/2017  5:33 PM   Commonly known as:  LANOXIN        Take 125 mcg by mouth Once.   Dose:  125 mcg                        docusate sodium 100 MG Caps   Commonly known as:  COLACE        Take 100 mg by mouth every morning.   Dose:  100 mg                        omeprazole 20 MG delayed-release capsule   Last time this was given:  20 mg on 7/25/2017  8:42 AM   Commonly known as:  PRILOSEC        Take 20 mg by mouth every day.   Dose:  20 mg                        tramadol 50 MG Tabs   Last time this was given:  50 mg on 7/25/2017 11:49 AM   Commonly known as:  ULTRAM        Take 50 mg by mouth every 8 hours as  needed.   Dose:  50 mg                        * Notice:  This list has 2 medication(s) that are the same as other medications prescribed for you. Read the directions carefully, and ask your doctor or other care provider to review them with you.         Where to Get Your Medications      Information about where to get these medications is not yet available     ! Ask your nurse or doctor about these medications    - ferrous sulfate 325 (65 FE) MG tablet  - fluconazole 100 MG Tabs  - gabapentin 100 MG Caps  - heparin 5000 UNIT/ML Soln  - levetiracetam 500 MG Tabs  - metoprolol 50 MG Tabs  - NS SOLN 100 mL with cefTRIAXone 2 GM SOLR 2 g  - tamsulosin 0.4 MG capsule            Orders for after discharge     REFERRAL TO LONG TERM ACUTE CARE    Complete by:  As directed        REFERRAL TO SKILLED NURSING FACILITY    Complete by:  As directed              Instructions           Diet / Nutrition:    Follow any diet instructions given to you by your doctor or the dietician, including how much salt (sodium) you are allowed each day.    If you are overweight, talk to your doctor about a weight reduction plan.    Activity:    Remain physically active following your doctor's instructions about exercise and activity.    Rest often.     Any time you become even a little tired or short of breath, SIT DOWN and rest.    Worsening Symptoms:    Report any of the following signs and symptoms to the doctor's office immediately:    *Pain of jaw, arm, or neck  *Chest pain not relieved by medication                               *Dizziness or loss of consciousness  *Difficulty breathing even when at rest   *More tired than usual                                       *Bleeding drainage or swelling of surgical site  *Swelling of feet, ankles, legs or stomach                 *Fever (>100ºF)  *Pink or blood tinged sputum  *Weight gain (3lbs/day or 5lbs /week)           *Shock from internal defibrillator (if applicable)  *Palpitations or  irregular heartbeats                *Cool and/or numb extremities    Stroke Awareness    Common Risk Factors for Stroke include:    Age  Atrial Fibrillation  Carotid Artery Stenosis  Diabetes Mellitus  Excessive alcohol consumption  High blood pressure  Overweight   Physical inactivity  Smoking    Warning signs and symptoms of a stroke include:    *Sudden numbness or weakness of the face, arm or leg (especially on one side of the body).  *Sudden confusion, trouble speaking or understanding.  *Sudden trouble seeing in one or both eyes.  *Sudden trouble walking, dizziness, loss of balance or coordination.Sudden severe headache with no known cause.    It is very important to get treatment quickly when a stroke occurs. If you experience any of the above warning signs, call 911 immediately.                   Disclaimer         Quit Smoking / Tobacco Use:    I understand the use of any tobacco products increases my chance of suffering from future heart disease or stroke and could cause other illnesses which may shorten my life. Quitting the use of tobacco products is the single most important thing I can do to improve my health. For further information on smoking / tobacco cessation call a Toll Free Quit Line at 1-330.780.1153 (*National Cancer [place]) or 1-773.990.6482 (American Lung Association) or you can access the web based program at www.lungusa.org.    [place] Tobacco Users Help Line:  (972) 479-9578       Toll Free: 1-577.354.2958  Quit Tobacco Program [facility] [phone]    Crisis Hotline:    [place] Crisis Hotline:  6-295-KMBAQHZ or 1-298.524.6670    [place] Crisis Hotline:    1-707.429.1853 or 935-478-5390    Discharge Survey:   Thank you for choosing [facility]. We hope we did everything we could to make your hospital stay a pleasant one. You may be receiving a phone survey and we would appreciate your time and participation in answering the questions. Your input is very  valuable to us in our efforts to improve our service to our patients and their families.        My signature on this form indicates that:    1. I have reviewed and understand the above information.  2. My questions regarding this information have been answered to my satisfaction.  3. I have formulated a plan with my discharge nurse to obtain my prescribed medications for home.                  Disclaimer         __________________________________                     __________       ________                       Patient Signature                                                 Date                    Time

## 2017-07-06 NOTE — IP AVS SNAPSHOT
" <p align=\"LEFT\"><IMG SRC=\"//[email or web address]$/[email or web address]\" alt=\"Image\" WIDTH=\"50%\" HEIGHT=\"200\" BORDER=\"\"></p>                   Name:Jersey Alexis  Medical Record Number:[id number]  CSN: [id number]    YOB: 1930   Age: 86 y.o.  Sex: male  HT:1.803 m (5' 11\") WT: 98 kg (216 lb 0.8 oz)          Admit Date: 7/6/2017     Discharge Date:   Today's Date: 7/25/2017  Attending Doctor:  Dalton Pillai M.D.                  Allergies:  Vancomycin          Follow-up Information     1. Follow up with Tanisha Flores M.D.. Schedule an appointment as soon as possible for a visit in 2 weeks.    Specialty:  Geriatrics    Why:  Hospital follow-up appointment with PCP    Contact information    [address]  [facility] [address] [phone]          2. Follow up with [facility] (31) POS. Go today.    Contact information    [address] [address] [phone]         Medication List      Take these Medications        Instructions    aspirin 325 MG Tabs   Commonly known as:  ASA    Take 325 mg by mouth every day.   Dose:  325 mg       atorvastatin 10 MG Tabs   Commonly known as:  LIPITOR    Take 10 mg by mouth every day.   Dose:  10 mg       cholecalciferol 5000 UNIT Caps   Commonly known as:  VITAMIN D3    Take 5,000 Units by mouth every 48 hours.   Dose:  5000 Units       cyanocobalamin 1000 MCG Tabs   Commonly known as:  VITAMIN B12    Take 1,000 mcg by mouth every day.   Dose:  1000 mcg       * digoxin 125 MCG Tabs   Commonly known as:  LANOXIN    Take 62.5 mcg by mouth 2 Times a Day.   Dose:  62.5 mcg       * digoxin 125 MCG Tabs   Commonly known as:  LANOXIN    Take 125 mcg by mouth Once.   Dose:  125 mcg       docusate sodium 100 MG Caps   Commonly known as:  COLACE    Take 100 mg by mouth every morning.   Dose:  100 mg       ferrous sulfate 325 (65 FE) MG tablet    Take 1 Tab by mouth every day.   Dose:  325 mg       fluconazole 100 MG Tabs   Commonly known as:  DIFLUCAN   " Take 1 Tab by mouth every day.   Dose:  100 mg       gabapentin 100 MG Caps   Commonly known as:  NEURONTIN    Take 2 Caps by mouth 3 times a day.   Dose:  200 mg       heparin 5000 UNIT/ML Soln    Inject 1 mL as instructed every 8 hours.   Dose:  5000 Units       levetiracetam 500 MG Tabs   Commonly known as:  KEPPRA    Take 1 Tab by mouth 2 Times a Day.   Dose:  500 mg       metoprolol 50 MG Tabs   What changed:    - medication strength  - how much to take  - when to take this   Commonly known as:  LOPRESSOR    Take 1 Tab by mouth 2 Times a Day.   Dose:  50 mg       NS SOLN 100 mL with cefTRIAXone 2 GM SOLR 2 g    Doctor's comments:  Stop date 8/20/2017   2 g by Intravenous route every 24 hours.   Dose:  2 g       omeprazole 20 MG delayed-release capsule   Commonly known as:  PRILOSEC    Take 20 mg by mouth every day.   Dose:  20 mg       tamsulosin 0.4 MG capsule   Commonly known as:  FLOMAX    Take 1 Cap by mouth ONE-HALF HOUR AFTER BREAKFAST.   Dose:  0.4 mg       tramadol 50 MG Tabs   Commonly known as:  ULTRAM    Take 50 mg by mouth every 8 hours as needed.   Dose:  50 mg       * Notice:  This list has 2 medication(s) that are the same as other medications prescribed for you. Read the directions carefully, and ask your doctor or other care provider to review them with you.

## 2017-07-06 NOTE — IP AVS SNAPSHOT
[employer] Access Code: Activation code not generated  Current [employer] Status: Active    [employer]  A secure, online tool to manage your health information     [employer]’s [employer]® is a secure, online tool that connects you to your personalized health information from the privacy of your home -- day or night - making it very easy for you to manage your healthcare. Once the activation process is completed, you can even access your medical information using the [employer] benji, which is available for free in the Apple Benji store or Google Play store.     [employer] provides the following levels of access (as shown below):   My Chart Features   [facility] Primary Care Doctor [facility]  Specialists [facility]  Urgent  Care Non-[facility]  Primary Care  Doctor   Email your healthcare team securely and privately 24/7 X X X X   Manage appointments: schedule your next appointment; view details of past/upcoming appointments X      Request prescription refills. X      View recent personal medical records, including lab and immunizations X X X X   View health record, including health history, allergies, medications X X X X   Read reports about your outpatient visits, procedures, consult and ER notes X X X X   See your discharge summary, which is a recap of your hospital and/or ER visit that includes your diagnosis, lab results, and care plan. X X       How to register for [employer]:  1. Go to  [email or web address].  2. Click on the Sign Up Now box, which takes you to the New Member Sign Up page. You will need to provide the following information:  a. Enter your [employer] Access Code exactly as it appears at the top of this page. (You will not need to use this code after you’ve completed the sign-up process. If you do not sign up before the expiration date, you must request a new code.)   b. Enter your date of birth.   c. Enter your home email address.   d. Click Submit, and follow the next screen’s instructions.  3. Create a [employer] ID. This will  be your [employer] login ID and cannot be changed, so think of one that is secure and easy to remember.  4. Create a [employer] password. You can change your password at any time.  5. Enter your Password Reset Question and Answer. This can be used at a later time if you forget your password.   6. Enter your e-mail address. This allows you to receive e-mail notifications when new information is available in [employer].  7. Click Sign Up. You can now view your health information.    For assistance activating your [employer] account, call [phone]

## 2017-07-06 NOTE — H&P
HOSPITAL MEDICINE ADMISSION NOTE    Date of Service: 2017   Name: Jersey Alexis   : 10/18/1930  MRN: [id number]  CSN: [id number]  Primary Care Physician: Tanisha Flores M.D.    Chief Complaint  Chief Complaint   Patient presents with   • Back Pain       History of Present Illness  Jersey Alexis is a 86 y.o. male with lower back surgeries in the past and chronic lower back pain presents with worsening low back pain. He denied fever or chills but felt weak, malaised. He denied dysuria but complained of stress incontinence and dysuria  At the ED, he is afebrile and blood pressure low normal side but improved with boluses. Urinalysis showed mild evidence of UTI. He was started on antibiotics. Initial lactic acid negative, then subsequent one that I ordered slightly elevated.  When I saw him at ED, he was in no acute distress, moving him however elicited low back and bilateral hip tenderness. I further examined him. No radiculopathy on leg raise, sphincter a little weak. Can't amublate with the pain.    Home Medications  No current facility-administered medications on file prior to encounter.     Current Outpatient Prescriptions on File Prior to Encounter   Medication Sig Dispense Refill   • aspirin (ASA) 325 MG Tab Take 325 mg by mouth every day.     • cyanocobalamin (VITAMIN B12) 1000 MCG Tab Take 1,000 mcg by mouth every day at 6 PM.     • tramadol (ULTRAM) 50 MG Tab Take 1 Tab by mouth every 6 hours as needed for Moderate Pain. 30 Tab 0   • cholecalciferol (VITAMIN D3) 5000 UNIT Cap Take 5,000-10,000 Units by mouth See Admin Instructions. 5000 units, then 10,000 units alternating days for a total weekly dose of 50,000 units     • digoxin (LANOXIN) 125 MCG Tab Take 62.5 mcg by mouth every day.     • metoprolol SR (TOPROL XL) 25 MG TABLET SR 24 HR Take 25 mg by mouth every day.     • sennosides (SENOKOT) 8.6 MG Tab Take 8.6 mg by mouth every day.     • atorvastatin (LIPITOR) 10 MG Tab Take 10 mg by  mouth every evening.     • omeprazole (PRILOSEC) 20 MG delayed-release capsule Take 20 mg by mouth every day.     • docusate sodium (COLACE) 100 MG Cap Take 100 mg by mouth every morning.         Allergies  Review of patient's allergies indicates no known allergies.    Past Medical and Surgical History  Past Medical History   Diagnosis Date   • Hyperlipidemia    • Arthritis    • Arrhythmia    • Muscle disorder      nerve damage due to spinal stenosis   • Cataract    • GERD (gastroesophageal reflux disease)      Past Surgical History   Procedure Laterality Date   • Laminotomy  late 80s     spinal stenosis   • Laminotomy  late 90s     spinal fusion    • Appendectomy  Early 2000's   • Tonsillectomy Bilateral 1936   • Cataract phaco with iol Bilateral Early 2000's       Family History  Family History   Problem Relation Age of Onset   • Heart Failure Mother    • Heart Attack Mother    • Heart Disease Mother      pacemaker   • Paranoid behavior Mother    • Heart Failure Father    • Cancer Sister      Breast   • Other Brother      colostomy   • Cancer Brother    • No Known Problems Maternal Grandmother    • No Known Problems Maternal Grandfather    • No Known Problems Paternal Grandmother    • No Known Problems Paternal Grandfather        Social History  Social History     Social History   • Marital Status:      Spouse Name: N/A   • Number of Children: N/A   • Years of Education: N/A     Occupational History   • Not on file.     Social History Main Topics   • Smoking status: Former Smoker -- 1.50 packs/day for 29 years     Types: Cigarettes, Pipe     Quit date: 01/01/1980   • Smokeless tobacco: Never Used      Comment: Started smoking at age 21   • Alcohol Use: No   • Drug Use: No   • Sexual Activity: No     Other Topics Concern   • Not on file     Social History Narrative       Review of Systems  ROS    General. No fevers or chills.  Eyes: No vision loss.  ENT: No nasal congestion, epistaxis. No hearing  "loss.  Respiratory: No shortness of breath, productive coughing, wheezing. No hemoptysis.  Cardiovascular: No chest pain, palpitations, murmur or claudications. No orthopnea, exertional chest pain. No dyspnea on exertion.  Gastrointestinal: No nausea, vomiting, diarrhea, constipation. No abdominal pain.  Genitourinary: Dysuria. Incontinence.  Musoskeletal: No falls. Myalgias or arthralgias.  Integumentary: No new rashes  Heme: No obvious lymphadenopathy  Neurologic: No headaches, loss of consciousness or seizure activity.  Psychiatric: Stable mood. Not currently anxious or depressed.    Physical Exam  Filed Vitals:    07/06/17 0814 07/06/17 0820 07/06/17 1101 07/06/17 1142   BP: 103/62      Pulse: 114  67 68   Temp: 36.8 °C (98.3 °F)      TempSrc: Temporal      Resp: 16  16 18   Height: 1.803 m (5' 11\")      Weight:  93.895 kg (207 lb)     SpO2: 96%  95% 93%           No intake or output data in the 24 hours ending 07/06/17 1206  Physical Exam    General/Constitutional: No acute distress.   Head: Normocephalic, atraumatic  ENT: Oral mucosa is moist. No obvious pharyngeal exudates  Eyes: Pink conjunctiva, no scleral icterus  Neck: Supple, no lymphadenopathy  Cardiovascular: Normal rate and regular rhythm to me. S1,2 noted. Murmur noted  Pulmonary: Clear to auscultation bilaterally. No wheezes, rales or rhonchi.  Abdominal: Soft, nontender, not distended, bowel sounds normoactive. No guarding or peritoneal signs.  Musculoskeletal: No tenderness to palpation of chest wall. When moved, severe hip pain and low back pain, no radiculopathy.  Neurologic: Alert and oriented. Grossly nonfocal, moving all extremities.  Genitourinary: No gross hematuria. Dysuria.  Skin: No obvious rash.  Psychiatric: Pleasant, cooperative.  Vitals Reviewed  Labs Reviewed  Imaging reviewed  Nursing notes reviewed    Labs  Lab Results   Component Value Date/Time    WBC 20.3* 07/06/2017 09:30 AM    RBC 5.24 07/06/2017 09:30 AM    HEMOGLOBIN 15.4 " 07/06/2017 09:30 AM    HEMATOCRIT 45.2 07/06/2017 09:30 AM    MCV 86.3 07/06/2017 09:30 AM    MCH 29.4 07/06/2017 09:30 AM    MCHC 34.1 07/06/2017 09:30 AM    MPV 11.0 07/06/2017 09:30 AM    NEUTROPHILS-POLYS 84.60* 07/06/2017 09:30 AM    LYMPHOCYTES 4.30* 07/06/2017 09:30 AM    MONOCYTES 9.90 07/06/2017 09:30 AM    EOSINOPHILS 0.00 07/06/2017 09:30 AM    BASOPHILS 0.30 07/06/2017 09:30 AM      Lab Results   Component Value Date/Time    SODIUM 130* 07/06/2017 09:30 AM    POTASSIUM 4.1 07/06/2017 09:30 AM    CHLORIDE 100 07/06/2017 09:30 AM    CO2 18* 07/06/2017 09:30 AM    GLUCOSE 180* 07/06/2017 09:30 AM    BUN 14 07/06/2017 09:30 AM    CREATININE 0.82 07/06/2017 09:30 AM      Lab Results   Component Value Date/Time    PT 14.9* 07/06/2017 09:30 AM    INR 1.13 07/06/2017 09:30 AM        Imaging  Ct-abdomen-pelvis W/o    7/6/2017 7/6/2017 10:28 AM HISTORY/REASON FOR EXAM:  Pain. TECHNIQUE/EXAM DESCRIPTION: CT scan of the abdomen and pelvis without contrast. Noncontrast helical scanning was obtained from the diaphragmatic domes through the pubic symphysis. Low dose optimization technique was utilized for this CT exam including automated exposure control and adjustment of the mA and/or kV according to patient size. COMPARISON: 3/21/2017 FINDINGS: CT Abdomen: Hyperexpanded lung bases. Atelectasis both lung bases. No basilar pleural effusion. Coronary artery calcifications. Liver and spleen are within normal limits in size. Pancreatic calcifications/consistent with sequela of pancreatitis. No peripancreatic fluid collections identified. No adrenal gland masses. No hydronephrosis or urolithiasis. No evidence of bowel obstruction. The appendix is not delineated. There are diverticula colon. No evidence of diverticulitis. No free fluid. Postoperative changes lumbar spine. CT Pelvis: Urinary bladder wall is mildly and symmetrically thickened. Prostate projects into the fourth of the urinary bladder. No pelvic free fluid.      7/6/2017  No hydronephrosis or urolithiasis. Diverticula colon. No evidence diverticulitis. No free fluid. Pancreatic calcifications/consistent with sequela pancreatitis. No peripancreatic fluid collections. Coronary artery calcifications.    Ct-lspine W/o Plus Recons    7/6/2017 7/6/2017 10:28 AM HISTORY/REASON FOR EXAM: Worsening back pain TECHNIQUE/EXAM DESCRIPTION AND NUMBER OF VIEWS: CT lumbar spine without contrast, with reconstructions. Thin-section helical images were obtained of the lumbar spine in the axial plane.  Sagittal reconstructions were generated from the axial data. Low dose optimization technique was utilized for this CT exam including automated exposure control and adjustment of the mA and/or kV according to patient size. FINDINGS: Sagittal reconstructed images demonstrate surgical change at the L4-5 level characterized by bilateral pedicular screw and zev fixation. There is laminectomy change extending from L3 through L5. There is scoliotic deformity. There is no evidence of fracture or dislocation. There is multilevel decreased disc height and endplate spurring consistent with multilevel degenerative disc disease. There are old fractures of the right L1 and L2 transverse processes. There is degenerative retrolisthesis at L3-4 and anterior subluxation at L4-5. There is a bone graft donor site involving the left ilium.     7/6/2017  1.  No evidence of acute fracture of the lumbar spine. 2.  Surgical change extending from L3 through L5. 3.  Multilevel degenerative disc disease and facet degeneration. 4.  Old fractures of the right L1 and L2 transverse processes. 5.  Multilevel degenerative subluxation.      Assessment and Plan    Sepsis. Hypotension resolved with fluids. Mild lactic acidosis. Sepsis protocol and IVF initiated.    UTI. Rocephin.     Acute on chronic back pain. Out of proportion to just UTI. MR back ordered.    Acute on chronic bilateral hip pain. Cannot ambulate. Ordered hip  XRs.    Aflutter, chronic. Not on anticoagulation as he is a fall risk and had large hematoma in the past. Continue digoxin    Hyperlipidemia on statin. Continue.    Pharmacologic DVT prophylaxis    I spent 75 minutes evaluating Jersey Alexis, reviewing the chart, vitals, labs and imaging, discussing the case with ED physician, doing further ED evaluation and examination, medication reconciliation, placing orders and enacting the plan above.    CC Tanisha Flores M.D.

## 2017-07-06 NOTE — CARE PLAN
Problem: Safety  Goal: Will remain free from injury  Outcome: PROGRESSING AS EXPECTED  Discussed safety with pt, calling before getting up and placed call light within reach. Pt verbalized understanding    Problem: Venous Thromboembolism (VTW)/Deep Vein Thrombosis (DVT) Prevention:  Goal: Patient will participate in Venous Thrombosis (VTE)/Deep Vein Thrombosis (DVT)Prevention Measures  Outcome: PROGRESSING SLOWER THAN EXPECTED  Discussed VTE prevention with pt, pt refusing scds at this time due to pain.

## 2017-07-07 ENCOUNTER — APPOINTMENT (OUTPATIENT)
Dept: RADIOLOGY | Facility: MEDICAL CENTER | Age: 82
DRG: 853 | End: 2017-07-07
Attending: INTERNAL MEDICINE
Payer: MEDICARE

## 2017-07-07 PROBLEM — E66.01 MORBID OBESITY (HCC): Status: ACTIVE | Noted: 2017-07-07

## 2017-07-07 PROBLEM — I95.9 HYPOTENSION: Status: ACTIVE | Noted: 2017-07-07

## 2017-07-07 LAB
LACTATE BLD-SCNC: 1.2 MMOL/L (ref 0.5–2)
LACTATE BLD-SCNC: 1.8 MMOL/L (ref 0.5–2)
LACTATE BLD-SCNC: 1.9 MMOL/L (ref 0.5–2)

## 2017-07-07 PROCEDURE — 72158 MRI LUMBAR SPINE W/O & W/DYE: CPT

## 2017-07-07 PROCEDURE — 700105 HCHG RX REV CODE 258: Performed by: HOSPITALIST

## 2017-07-07 PROCEDURE — 700111 HCHG RX REV CODE 636 W/ 250 OVERRIDE (IP): Performed by: INTERNAL MEDICINE

## 2017-07-07 PROCEDURE — A9270 NON-COVERED ITEM OR SERVICE: HCPCS | Performed by: HOSPITALIST

## 2017-07-07 PROCEDURE — 700117 HCHG RX CONTRAST REV CODE 255: Performed by: HOSPITALIST

## 2017-07-07 PROCEDURE — A9579 GAD-BASE MR CONTRAST NOS,1ML: HCPCS | Performed by: HOSPITALIST

## 2017-07-07 PROCEDURE — 36415 COLL VENOUS BLD VENIPUNCTURE: CPT

## 2017-07-07 PROCEDURE — 700102 HCHG RX REV CODE 250 W/ 637 OVERRIDE(OP): Performed by: HOSPITALIST

## 2017-07-07 PROCEDURE — 83605 ASSAY OF LACTIC ACID: CPT

## 2017-07-07 PROCEDURE — 700105 HCHG RX REV CODE 258: Performed by: INTERNAL MEDICINE

## 2017-07-07 PROCEDURE — 700102 HCHG RX REV CODE 250 W/ 637 OVERRIDE(OP): Performed by: INTERNAL MEDICINE

## 2017-07-07 PROCEDURE — 700111 HCHG RX REV CODE 636 W/ 250 OVERRIDE (IP): Performed by: HOSPITALIST

## 2017-07-07 PROCEDURE — 770020 HCHG ROOM/CARE - TELE (206)

## 2017-07-07 PROCEDURE — A9270 NON-COVERED ITEM OR SERVICE: HCPCS | Performed by: INTERNAL MEDICINE

## 2017-07-07 PROCEDURE — 99233 SBSQ HOSP IP/OBS HIGH 50: CPT | Performed by: HOSPITALIST

## 2017-07-07 RX ORDER — SODIUM CHLORIDE 9 MG/ML
INJECTION, SOLUTION INTRAVENOUS CONTINUOUS
Status: DISCONTINUED | OUTPATIENT
Start: 2017-07-07 | End: 2017-07-08

## 2017-07-07 RX ORDER — LORAZEPAM 2 MG/ML
1 INJECTION INTRAMUSCULAR ONCE
Status: DISCONTINUED | OUTPATIENT
Start: 2017-07-07 | End: 2017-07-07

## 2017-07-07 RX ORDER — SODIUM CHLORIDE 9 MG/ML
500 INJECTION, SOLUTION INTRAVENOUS ONCE
Status: COMPLETED | OUTPATIENT
Start: 2017-07-07 | End: 2017-07-07

## 2017-07-07 RX ORDER — KETOROLAC TROMETHAMINE 30 MG/ML
30 INJECTION, SOLUTION INTRAMUSCULAR; INTRAVENOUS EVERY 6 HOURS PRN
Status: DISCONTINUED | OUTPATIENT
Start: 2017-07-07 | End: 2017-07-08

## 2017-07-07 RX ORDER — GABAPENTIN 100 MG/1
100 CAPSULE ORAL 3 TIMES DAILY
Status: DISCONTINUED | OUTPATIENT
Start: 2017-07-07 | End: 2017-07-10

## 2017-07-07 RX ORDER — LORAZEPAM 1 MG/1
1 TABLET ORAL ONCE
Status: COMPLETED | OUTPATIENT
Start: 2017-07-07 | End: 2017-07-07

## 2017-07-07 RX ORDER — DEXAMETHASONE SODIUM PHOSPHATE 4 MG/ML
4 INJECTION, SOLUTION INTRA-ARTICULAR; INTRALESIONAL; INTRAMUSCULAR; INTRAVENOUS; SOFT TISSUE EVERY 6 HOURS
Status: DISCONTINUED | OUTPATIENT
Start: 2017-07-07 | End: 2017-07-08

## 2017-07-07 RX ADMIN — SODIUM CHLORIDE: 9 INJECTION, SOLUTION INTRAVENOUS at 18:10

## 2017-07-07 RX ADMIN — ATORVASTATIN CALCIUM 10 MG: 10 TABLET, FILM COATED ORAL at 21:37

## 2017-07-07 RX ADMIN — ACETAMINOPHEN 650 MG: 325 TABLET, FILM COATED ORAL at 09:07

## 2017-07-07 RX ADMIN — GADODIAMIDE 20 ML: 287 INJECTION INTRAVENOUS at 20:30

## 2017-07-07 RX ADMIN — GABAPENTIN 100 MG: 100 CAPSULE ORAL at 21:37

## 2017-07-07 RX ADMIN — STANDARDIZED SENNA CONCENTRATE AND DOCUSATE SODIUM 2 TABLET: 8.6; 5 TABLET, FILM COATED ORAL at 09:07

## 2017-07-07 RX ADMIN — CYANOCOBALAMIN TAB 500 MCG 1000 MCG: 500 TAB at 09:08

## 2017-07-07 RX ADMIN — ASPIRIN 325 MG: 325 TABLET, COATED ORAL at 09:08

## 2017-07-07 RX ADMIN — KETOROLAC TROMETHAMINE 30 MG: 30 INJECTION, SOLUTION INTRAMUSCULAR at 21:40

## 2017-07-07 RX ADMIN — STANDARDIZED SENNA CONCENTRATE AND DOCUSATE SODIUM 2 TABLET: 8.6; 5 TABLET, FILM COATED ORAL at 21:37

## 2017-07-07 RX ADMIN — SODIUM CHLORIDE 500 ML: 9 INJECTION, SOLUTION INTRAVENOUS at 09:04

## 2017-07-07 RX ADMIN — DEXAMETHASONE SODIUM PHOSPHATE 4 MG: 4 INJECTION, SOLUTION INTRAMUSCULAR; INTRAVENOUS at 21:39

## 2017-07-07 RX ADMIN — GABAPENTIN 100 MG: 100 CAPSULE ORAL at 14:00

## 2017-07-07 RX ADMIN — OMEPRAZOLE 20 MG: 20 CAPSULE, DELAYED RELEASE ORAL at 09:09

## 2017-07-07 RX ADMIN — KETOROLAC TROMETHAMINE 30 MG: 30 INJECTION, SOLUTION INTRAMUSCULAR at 13:52

## 2017-07-07 RX ADMIN — ENOXAPARIN SODIUM 40 MG: 100 INJECTION SUBCUTANEOUS at 09:09

## 2017-07-07 RX ADMIN — DEXAMETHASONE SODIUM PHOSPHATE 4 MG: 4 INJECTION, SOLUTION INTRAMUSCULAR; INTRAVENOUS at 15:17

## 2017-07-07 RX ADMIN — DIGOXIN 62.5 MCG: 125 TABLET ORAL at 09:08

## 2017-07-07 RX ADMIN — CEFTRIAXONE SODIUM 2 G: 2 INJECTION, POWDER, FOR SOLUTION INTRAMUSCULAR; INTRAVENOUS at 09:04

## 2017-07-07 RX ADMIN — DIGOXIN 62.5 MCG: 125 TABLET ORAL at 21:37

## 2017-07-07 RX ADMIN — SODIUM CHLORIDE: 9 INJECTION, SOLUTION INTRAVENOUS at 13:49

## 2017-07-07 RX ADMIN — LORAZEPAM 1 MG: 1 TABLET ORAL at 18:48

## 2017-07-07 ASSESSMENT — PAIN SCALES - GENERAL
PAINLEVEL_OUTOF10: 6
PAINLEVEL_OUTOF10: 2
PAINLEVEL_OUTOF10: 6
PAINLEVEL_OUTOF10: 6
PAINLEVEL_OUTOF10: 0
PAINLEVEL_OUTOF10: 2

## 2017-07-07 ASSESSMENT — ENCOUNTER SYMPTOMS
DIZZINESS: 0
DIAPHORESIS: 0
CHILLS: 0
NAUSEA: 0
BACK PAIN: 1
WEIGHT LOSS: 0
HEARTBURN: 0
TREMORS: 0
HEMOPTYSIS: 0
FEVER: 0
TINGLING: 0
SPUTUM PRODUCTION: 0
VOMITING: 0
SHORTNESS OF BREATH: 0
HEADACHES: 0
COUGH: 0
SENSORY CHANGE: 0
ABDOMINAL PAIN: 0

## 2017-07-07 NOTE — PROGRESS NOTES
"Report received. Pt reports feeling 10/10 lower back pain with movement but states it's \"fine\" when he is not moving. MRI ordered.Discussed POC for the day with Pt. Call light within reach, encouraged pt to call for assistance.   "

## 2017-07-07 NOTE — ASSESSMENT & PLAN NOTE
Improved rate control Continue digoxin and metoprolol  Patient not on chronic anticoagulation given previous history of hematoma and falls will need to reevaluate after improvement of pain control and mobilization

## 2017-07-07 NOTE — CARE PLAN
Problem: Safety  Goal: Will remain free from injury  Outcome: PROGRESSING AS EXPECTED  Intervention: Provide assistance with mobility  Pt not able to get out of bed at this time reports too much pain call light in reach will monitor and round hourly      Problem: Pain Management  Goal: Pain level will decrease to patient’s comfort goal  Unable to medicate patient r/t low blood pressure attempt other method until bp stable

## 2017-07-07 NOTE — PROGRESS NOTES
[facility]    Date of Service: 2017    Chief Complaint  86 y.o. male admitted 2017 with lower back pain    Interval Problem Update  Pain is 10/10, ,lower back, sharp, no radiation, constant, worse with movement    Has uti    Lactic 2.1    bp is low        Consultants/Specialty  none    Disposition  Home when stable        Review of Systems   Constitutional: Negative for fever, chills, weight loss, malaise/fatigue and diaphoresis.   HENT: Negative for hearing loss and tinnitus.    Respiratory: Negative for cough, hemoptysis, sputum production and shortness of breath.    Gastrointestinal: Negative for heartburn, nausea, vomiting and abdominal pain.   Genitourinary: Negative for dysuria, urgency and frequency.   Musculoskeletal: Positive for back pain.   Neurological: Negative for dizziness, tingling, tremors, sensory change and headaches.   All other systems reviewed and are negative.     Physical Exam  Laboratory/Imaging   Hemodynamics  Temp (24hrs), Av.6 °C (97.8 °F), Min:36.1 °C (97 °F), Max:37.1 °C (98.8 °F)   Temperature: 36.1 °C (97 °F)  Pulse  Av.9  Min: 54  Max: 114 Heart Rate (Monitored): 61  Blood Pressure : (!) 92/57 mmHg (Nurse notified.), NIBP: (!) 96/53 mmHg      Respiratory      Respiration: 16, Pulse Oximetry: 94 %        RUL Breath Sounds: Clear, RML Breath Sounds: Clear, RLL Breath Sounds: Diminished, MIKE Breath Sounds: Clear, LLL Breath Sounds: Diminished    Fluids    Intake/Output Summary (Last 24 hours) at 17 1233  Last data filed at 17 0800   Gross per 24 hour   Intake    240 ml   Output    475 ml   Net   -235 ml       Nutrition  Orders Placed This Encounter   Procedures   • DIET ORDER     Standing Status: Standing      Number of Occurrences: 1      Standing Expiration Date:      Order Specific Question:  Diet:     Answer:  Regular [1]     Physical Exam   Constitutional: He is oriented to person, place, and time. He appears distressed.   Eyes:  Left eye exhibits no discharge.   Neck: No JVD present. No tracheal deviation present. No thyromegaly present.   Cardiovascular: Exam reveals no gallop and no friction rub.    irregular   Pulmonary/Chest: No respiratory distress. He has no wheezes. He has no rales.   Abdominal: He exhibits distension. There is no tenderness. There is no rebound.   Musculoskeletal: He exhibits no edema or tenderness.   Neurological: He is alert and oriented to person, place, and time.   Straight leg raise unable to perform due to lower back pain       Recent Labs      07/06/17   0930   WBC  20.3*   RBC  5.24   HEMOGLOBIN  15.4   HEMATOCRIT  45.2   MCV  86.3   MCH  29.4   MCHC  34.1   RDW  40.3   PLATELETCT  202   MPV  11.0     Recent Labs      07/06/17   0930   SODIUM  130*   POTASSIUM  4.1   CHLORIDE  100   CO2  18*   GLUCOSE  180*   BUN  14   CREATININE  0.82   CALCIUM  10.1     Recent Labs      07/06/17   0930   APTT  32.0   INR  1.13                  Assessment/Plan     * Low back pain (present on admission)  Assessment & Plan  Pain control    Iv dialudid for severe pain    cheeck mri back  Added decadron iv, iv toradol, po neurontin    Atrial flutter (CMS-HCC) (present on admission)  Assessment & Plan  Rate controlled    Sepsis (CMS-HCC) (present on admission)  Assessment & Plan  Saline bolus prn    Treat uti    UTI (urinary tract infection) (present on admission)  Assessment & Plan  Iv rocephin    Follow cultures        Hypotension (present on admission)  Assessment & Plan  Saline boluses    Morbid obesity (CMS-HCC) (present on admission)  Assessment & Plan  Weight loss and exercise    Pt and ot eval        Hearn catheter: No Hearn      DVT Prophylaxis: Enoxaparin (Lovenox)

## 2017-07-07 NOTE — THERAPY
Occupational Therapy Contact Note    Attempted OT evaluation, RN requesting to hold this AM due to low BP and uncontrolled pain. Will attempt later today as appropriate.    Geeta Gallegos, OTR/L

## 2017-07-07 NOTE — PROGRESS NOTES
2 RN Skin Check  BLE have some scabbing, no open wounds  Heels red and boggy but blanching  Red and tender groin and abdominal fold

## 2017-07-08 LAB
ANION GAP SERPL CALC-SCNC: 7 MMOL/L (ref 0–11.9)
BACTERIA UR CULT: NORMAL
BASOPHILS # BLD AUTO: 0.2 % (ref 0–1.8)
BASOPHILS # BLD: 0.02 K/UL (ref 0–0.12)
BUN SERPL-MCNC: 20 MG/DL (ref 8–22)
CALCIUM SERPL-MCNC: 9.5 MG/DL (ref 8.5–10.5)
CHLORIDE SERPL-SCNC: 105 MMOL/L (ref 96–112)
CO2 SERPL-SCNC: 21 MMOL/L (ref 20–33)
CREAT SERPL-MCNC: 0.89 MG/DL (ref 0.5–1.4)
DIGOXIN SERPL-MCNC: 0.4 NG/ML (ref 0.8–2)
EOSINOPHIL # BLD AUTO: 0 K/UL (ref 0–0.51)
EOSINOPHIL NFR BLD: 0 % (ref 0–6.9)
ERYTHROCYTE [DISTWIDTH] IN BLOOD BY AUTOMATED COUNT: 44.4 FL (ref 35.9–50)
GFR SERPL CREATININE-BSD FRML MDRD: >60 ML/MIN/1.73 M 2
GLUCOSE SERPL-MCNC: 186 MG/DL (ref 65–99)
HCT VFR BLD AUTO: 41.5 % (ref 42–52)
HGB BLD-MCNC: 13.5 G/DL (ref 14–18)
IMM GRANULOCYTES # BLD AUTO: 0.06 K/UL (ref 0–0.11)
IMM GRANULOCYTES NFR BLD AUTO: 0.6 % (ref 0–0.9)
LACTATE BLD-SCNC: 1.6 MMOL/L (ref 0.5–2)
LACTATE BLD-SCNC: 1.9 MMOL/L (ref 0.5–2)
LACTATE BLD-SCNC: 2 MMOL/L (ref 0.5–2)
LYMPHOCYTES # BLD AUTO: 0.69 K/UL (ref 1–4.8)
LYMPHOCYTES NFR BLD: 6.7 % (ref 22–41)
MCH RBC QN AUTO: 29.7 PG (ref 27–33)
MCHC RBC AUTO-ENTMCNC: 32.5 G/DL (ref 33.7–35.3)
MCV RBC AUTO: 91.4 FL (ref 81.4–97.8)
MONOCYTES # BLD AUTO: 0.48 K/UL (ref 0–0.85)
MONOCYTES NFR BLD AUTO: 4.6 % (ref 0–13.4)
NEUTROPHILS # BLD AUTO: 9.08 K/UL (ref 1.82–7.42)
NEUTROPHILS NFR BLD: 87.9 % (ref 44–72)
NRBC # BLD AUTO: 0 K/UL
NRBC BLD AUTO-RTO: 0 /100 WBC
PLATELET # BLD AUTO: 186 K/UL (ref 164–446)
PMV BLD AUTO: 11.4 FL (ref 9–12.9)
POTASSIUM SERPL-SCNC: 4.3 MMOL/L (ref 3.6–5.5)
RBC # BLD AUTO: 4.54 M/UL (ref 4.7–6.1)
SIGNIFICANT IND 70042: NORMAL
SITE SITE: NORMAL
SODIUM SERPL-SCNC: 133 MMOL/L (ref 135–145)
SOURCE SOURCE: NORMAL
TSH SERPL DL<=0.005 MIU/L-ACNC: 0.41 UIU/ML (ref 0.3–3.7)
WBC # BLD AUTO: 10.3 K/UL (ref 4.8–10.8)

## 2017-07-08 PROCEDURE — 93010 ELECTROCARDIOGRAM REPORT: CPT | Performed by: INTERNAL MEDICINE

## 2017-07-08 PROCEDURE — A9270 NON-COVERED ITEM OR SERVICE: HCPCS | Performed by: HOSPITALIST

## 2017-07-08 PROCEDURE — 84443 ASSAY THYROID STIM HORMONE: CPT

## 2017-07-08 PROCEDURE — 99232 SBSQ HOSP IP/OBS MODERATE 35: CPT | Performed by: HOSPITALIST

## 2017-07-08 PROCEDURE — 93005 ELECTROCARDIOGRAM TRACING: CPT | Performed by: INTERNAL MEDICINE

## 2017-07-08 PROCEDURE — 80048 BASIC METABOLIC PNL TOTAL CA: CPT

## 2017-07-08 PROCEDURE — A9270 NON-COVERED ITEM OR SERVICE: HCPCS | Performed by: INTERNAL MEDICINE

## 2017-07-08 PROCEDURE — 700105 HCHG RX REV CODE 258: Performed by: INTERNAL MEDICINE

## 2017-07-08 PROCEDURE — 700111 HCHG RX REV CODE 636 W/ 250 OVERRIDE (IP): Performed by: HOSPITALIST

## 2017-07-08 PROCEDURE — 700102 HCHG RX REV CODE 250 W/ 637 OVERRIDE(OP): Performed by: INTERNAL MEDICINE

## 2017-07-08 PROCEDURE — 700111 HCHG RX REV CODE 636 W/ 250 OVERRIDE (IP)

## 2017-07-08 PROCEDURE — 80162 ASSAY OF DIGOXIN TOTAL: CPT

## 2017-07-08 PROCEDURE — 770020 HCHG ROOM/CARE - TELE (206)

## 2017-07-08 PROCEDURE — 700105 HCHG RX REV CODE 258: Performed by: HOSPITALIST

## 2017-07-08 PROCEDURE — 700102 HCHG RX REV CODE 250 W/ 637 OVERRIDE(OP): Performed by: HOSPITALIST

## 2017-07-08 PROCEDURE — 700111 HCHG RX REV CODE 636 W/ 250 OVERRIDE (IP): Performed by: INTERNAL MEDICINE

## 2017-07-08 PROCEDURE — 85025 COMPLETE CBC W/AUTO DIFF WBC: CPT

## 2017-07-08 PROCEDURE — 36415 COLL VENOUS BLD VENIPUNCTURE: CPT

## 2017-07-08 PROCEDURE — 83605 ASSAY OF LACTIC ACID: CPT

## 2017-07-08 RX ORDER — SODIUM CHLORIDE 9 MG/ML
500 INJECTION, SOLUTION INTRAVENOUS ONCE
Status: COMPLETED | OUTPATIENT
Start: 2017-07-08 | End: 2017-07-08

## 2017-07-08 RX ORDER — IBUPROFEN 600 MG/1
600 TABLET ORAL
Status: DISCONTINUED | OUTPATIENT
Start: 2017-07-08 | End: 2017-07-08

## 2017-07-08 RX ORDER — ONDANSETRON 2 MG/ML
4 INJECTION INTRAMUSCULAR; INTRAVENOUS EVERY 8 HOURS PRN
Status: DISCONTINUED | OUTPATIENT
Start: 2017-07-08 | End: 2017-07-09

## 2017-07-08 RX ORDER — DIGOXIN 125 MCG
62.5 TABLET ORAL DAILY
Status: DISCONTINUED | OUTPATIENT
Start: 2017-07-08 | End: 2017-07-08

## 2017-07-08 RX ORDER — IBUPROFEN 800 MG/1
400 TABLET ORAL EVERY 8 HOURS PRN
Status: DISCONTINUED | OUTPATIENT
Start: 2017-07-08 | End: 2017-07-08

## 2017-07-08 RX ORDER — ONDANSETRON 2 MG/ML
INJECTION INTRAMUSCULAR; INTRAVENOUS
Status: COMPLETED
Start: 2017-07-08 | End: 2017-07-08

## 2017-07-08 RX ADMIN — GABAPENTIN 100 MG: 100 CAPSULE ORAL at 21:22

## 2017-07-08 RX ADMIN — ENOXAPARIN SODIUM 40 MG: 100 INJECTION SUBCUTANEOUS at 09:21

## 2017-07-08 RX ADMIN — ONDANSETRON 4 MG: 2 INJECTION INTRAMUSCULAR; INTRAVENOUS at 20:31

## 2017-07-08 RX ADMIN — CEFTRIAXONE SODIUM 2 G: 2 INJECTION, POWDER, FOR SOLUTION INTRAMUSCULAR; INTRAVENOUS at 09:21

## 2017-07-08 RX ADMIN — ATORVASTATIN CALCIUM 10 MG: 10 TABLET, FILM COATED ORAL at 21:22

## 2017-07-08 RX ADMIN — STANDARDIZED SENNA CONCENTRATE AND DOCUSATE SODIUM 2 TABLET: 8.6; 5 TABLET, FILM COATED ORAL at 09:21

## 2017-07-08 RX ADMIN — GABAPENTIN 100 MG: 100 CAPSULE ORAL at 09:21

## 2017-07-08 RX ADMIN — GABAPENTIN 100 MG: 100 CAPSULE ORAL at 17:34

## 2017-07-08 RX ADMIN — DEXAMETHASONE SODIUM PHOSPHATE 4 MG: 4 INJECTION, SOLUTION INTRAMUSCULAR; INTRAVENOUS at 05:09

## 2017-07-08 RX ADMIN — OMEPRAZOLE 20 MG: 20 CAPSULE, DELAYED RELEASE ORAL at 09:21

## 2017-07-08 RX ADMIN — VITAMIN D, TAB 1000IU (100/BT) 5000 UNITS: 25 TAB at 09:22

## 2017-07-08 RX ADMIN — ACETAMINOPHEN 650 MG: 325 TABLET, FILM COATED ORAL at 21:42

## 2017-07-08 RX ADMIN — SODIUM CHLORIDE: 9 INJECTION, SOLUTION INTRAVENOUS at 02:56

## 2017-07-08 RX ADMIN — PROCHLORPERAZINE EDISYLATE 10 MG: 5 INJECTION INTRAMUSCULAR; INTRAVENOUS at 23:22

## 2017-07-08 RX ADMIN — SODIUM CHLORIDE 500 ML: 9 INJECTION, SOLUTION INTRAVENOUS at 21:18

## 2017-07-08 RX ADMIN — CYANOCOBALAMIN TAB 500 MCG 1000 MCG: 500 TAB at 09:21

## 2017-07-08 RX ADMIN — KETOROLAC TROMETHAMINE 30 MG: 30 INJECTION, SOLUTION INTRAMUSCULAR at 09:21

## 2017-07-08 ASSESSMENT — PAIN SCALES - GENERAL
PAINLEVEL_OUTOF10: 0
PAINLEVEL_OUTOF10: 1
PAINLEVEL_OUTOF10: 2
PAINLEVEL_OUTOF10: 1
PAINLEVEL_OUTOF10: 6

## 2017-07-08 ASSESSMENT — ENCOUNTER SYMPTOMS
DIZZINESS: 0
BACK PAIN: 1
FEVER: 1
EYES NEGATIVE: 1
SHORTNESS OF BREATH: 0
HEMOPTYSIS: 0
WEIGHT LOSS: 0
TINGLING: 0
SENSORY CHANGE: 0
VOMITING: 0
SPUTUM PRODUCTION: 0
NAUSEA: 0
COUGH: 0
DEPRESSION: 1
HEARTBURN: 0
NEUROLOGICAL NEGATIVE: 1
DIAPHORESIS: 0
WEIGHT LOSS: 1
SHORTNESS OF BREATH: 1
HEADACHES: 0
ABDOMINAL PAIN: 0
MYALGIAS: 1
ORTHOPNEA: 1
CHILLS: 0
TREMORS: 0
CONSTIPATION: 1
FEVER: 0

## 2017-07-08 NOTE — CARE PLAN
Problem: Safety  Goal: Will remain free from injury  Outcome: PROGRESSING AS EXPECTED  Bed alarm set and call-light within reach. Instructed to call for assistance PRN.  Goal: Will remain free from falls  Outcome: PROGRESSING AS EXPECTED    Problem: Pain Management  Goal: Pain level will decrease to patient’s comfort goal  Outcome: PROGRESSING AS EXPECTED  Tramadol/decardon IVP given as prescribed    Problem: Fluid Volume:  Goal: Will maintain balanced intake and output  Outcome: PROGRESSING AS EXPECTED  IVF infusing    Problem: Mobility  Goal: Risk for activity intolerance will decrease  Outcome: PROGRESSING SLOWER THAN EXPECTED  Patient refusing PT. Will address pain management and MRI imagining completed.

## 2017-07-08 NOTE — PROGRESS NOTES
"Received report from previous nurse regarding prior 12 hours.  POC reviewed with pt, white board updated, pt verbalized understanding, call light within reach.  Pt tolerated morning meds. Pt refusing to sit EOB presently, but states, \"I will get up after lunch.\"  RN will try again in the afternoon.  "

## 2017-07-08 NOTE — CONSULTS
ADULT INFECTIOUS DISEASE INITIAL CONSULT     Date of Service: 7/8/2017    Consult Requested By: Georgi Grey M.D.    Reason for Consultation: Discitis    History of Present Illness:   Jersey Alexis is a 86 y.o. white male with history of diabetes mellitus and hypertension , atrial fibrillation who recently moved from [place] to [place]. He was admitted back in March 2017 with atrial fibrillation and rapid heart rate. Over the past 3-4 days he started having increasing back pain over his chronic back pain. There was some associated hip pain. He has been having difficulty walking. In the ER his WBC count was 20,000. His CT of the abdomen and pelvis was consistent with sequelae of pancreatitis. MRI of the lumbar spine shows possible discitis at the L3-4 level. In view of all these issues infectious disease consultation called.                Review Of Systems:  Gen.-Denies any fevers. Complains of back pain and generalized malaise.  HEENT- denies any sore throat, headache or vision changes  Pulmonary- denies any cough, shortness of breath  Cardiovascular- denies any chest pain, leg swelling.   GI- denies any nausea vomiting diarrhea or abdominal pain  Musculoskeletal- complains of severe back pain and unable to lift his legs. He is able to move them from side to side.  Neuro- denies any weakness or sensory change  Psych- denies any depression or suicidal ideation  Genitourinary-has difficulty with urination at baseline        PMH:   Past Medical History   Diagnosis Date   • Hyperlipidemia    • Arthritis    • Arrhythmia    • Muscle disorder      nerve damage due to spinal stenosis   • Cataract    • GERD (gastroesophageal reflux disease)          PSH:  Past Surgical History   Procedure Laterality Date   • Laminotomy  late 80s     spinal stenosis   • Laminotomy  late 90s     spinal fusion    • Appendectomy  Early 2000's   • Tonsillectomy Bilateral 1936   • Cataract phaco with iol Bilateral Early 2000's       FAMILY  HX:  Family History   Problem Relation Age of Onset   • Heart Failure Mother    • Heart Attack Mother    • Heart Disease Mother      pacemaker   • Paranoid behavior Mother    • Heart Failure Father    • Cancer Sister      Breast   • Other Brother      colostomy   • Cancer Brother    • No Known Problems Maternal Grandmother    • No Known Problems Maternal Grandfather    • No Known Problems Paternal Grandmother    • No Known Problems Paternal Grandfather        SOCIAL HX:  Social History     Social History   • Marital Status:      Spouse Name: N/A   • Number of Children: N/A   • Years of Education: N/A     Occupational History   • Not on file.     Social History Main Topics   • Smoking status: Former Smoker -- 1.50 packs/day for 29 years     Types: Cigarettes, Pipe     Quit date: 01/01/1980   • Smokeless tobacco: Never Used      Comment: Started smoking at age 21   • Alcohol Use: No   • Drug Use: No   • Sexual Activity: No     Other Topics Concern   • Not on file     Social History Narrative     History   Smoking status   • Former Smoker -- 1.50 packs/day for 29 years   • Types: Cigarettes, Pipe   • Quit date: 01/01/1980   Smokeless tobacco   • Never Used     Comment: Started smoking at age 21     History   Alcohol Use No       Allergies/Intolerances:  No Known Allergies    History reviewed with the patient    Other Current Medications:    Current facility-administered medications:   •  pneumococcal 13-Jocelyn Conj Vacc (PREVNAR 13) syringe 0.5 mL, 0.5 mL, Intramuscular, Once PRN, Jamaal Molina M.D.  •  gabapentin (NEURONTIN) capsule 100 mg, 100 mg, Oral, TID, Georgi Grey M.D., 100 mg at 07/08/17 0921  •  atorvastatin (LIPITOR) tablet 10 mg, 10 mg, Oral, Nightly, Jamaal Molina M.D., 10 mg at 07/07/17 2137  •  vitamin D (cholecalciferol) tablet 5,000 Units, 5,000 Units, Oral, Q48HRS, Jamaal Molina M.D., 5,000 Units at 07/08/17 0922  •  cyanocobalamin (VITAMIN B-12) tablet 1,000 mcg, 1,000 mcg, Oral,  "DAILY, Jamaal Molina M.D., 1,000 mcg at 17  •  omeprazole (PRILOSEC) capsule 20 mg, 20 mg, Oral, DAILY, Jamaal Molina M.D., 20 mg at 17  •  senna-docusate (PERICOLACE or SENOKOT S) 8.6-50 MG per tablet 2 Tab, 2 Tab, Oral, BID, 2 Tab at 17 **AND** polyethylene glycol/lytes (MIRALAX) PACKET 1 Packet, 1 Packet, Oral, QDAY PRN **AND** magnesium hydroxide (MILK OF MAGNESIA) suspension 30 mL, 30 mL, Oral, QDAY PRN **AND** bisacodyl (DULCOLAX) suppository 10 mg, 10 mg, Rectal, QDAY PRN, Jamaal Molina M.D.  •  acetaminophen (TYLENOL) tablet 650 mg, 650 mg, Oral, Q6HRS PRN, Jamaal Molina M.D., 650 mg at 17  •  Notify provider if pain remains uncontrolled, , , CONTINUOUS **AND** Use the numeric rating scale (NRS-11) on regular floors and Critical-Care Pain Observation Tool (CPOT) on ICUs/Trauma to assess pain, , , CONTINUOUS **AND** Pulse Ox (Oximetry), , , CONTINUOUS **AND** Pharmacy Consult Request ...Pain Management Review, , Other, PRN **AND** If patient difficult to arouse and/or has respiratory depression, stop any opiates that are currently infusing and call a Rapid Response., , , CONTINUOUS **AND** oxycodone immediate-release (ROXICODONE) tablet 2.5 mg, 2.5 mg, Oral, Q3HRS PRN **AND** oxycodone immediate-release (ROXICODONE) tablet 5 mg, 5 mg, Oral, Q3HRS PRN **AND** HYDROmorphone (DILAUDID) injection 0.25 mg, 0.25 mg, Intravenous, Q3HRS PRN, Jamaal Molina M.D., 0.25 mg at 17 1237  [unfilled]    Most Recent Vital Signs:  /68 mmHg  Pulse 50  Temp(Src) 36.1 °C (97 °F) (Temporal)  Resp 16  Ht 1.803 m (5' 11\")  Wt 101.9 kg (224 lb 10.4 oz)  BMI 31.35 kg/m2  SpO2 95%  Temp  Av.4 °C (97.5 °F)  Min: 36.1 °C (96.9 °F)  Max: 37.1 °C (98.8 °F)    Physical Exam:  General: Nontoxic, no acute distress  HEENT: sclera anicteric, PERRL, EOMI, MMM, no oral lesions  Neck: supple, no lymphadenopathy  Chest: CTAB, no r/r/w, normal work of " "breathing.  Cardiac: Irregularly irregular  Abdomen: + bowel sounds, soft, non-tender, non-distended, no HSM  Extremities: Plus edema. No joint swelling.  Skin: no rashes or erythema  Neuro: Alert and oriented times 3, no gross focal neurological deficit    Pertinent Lab Results:  Recent Labs      07/06/17 0930 07/08/17 0327   WBC  20.3*  10.3      Recent Labs      07/06/17 0930 07/08/17 0327   HEMOGLOBIN  15.4  13.5*   HEMATOCRIT  45.2  41.5*   MCV  86.3  91.4   MCH  29.4  29.7   PLATELETCT  202  186         Recent Labs      07/06/17 0930 07/08/17 0324   SODIUM  130*  133*   POTASSIUM  4.1  4.3   CHLORIDE  100  105   CO2  18*  21   CREATININE  0.82  0.89        Recent Labs      07/06/17 0930   ALBUMIN  3.7        Pertinent Micro:  Results     Procedure Component Value Units Date/Time    BLOOD CULTURE [[id number]] Collected:  07/06/17 1013    Order Status:  Completed Specimen Information:  Blood from Peripheral Updated:  07/08/17 1019     Significant Indicator NEG      Source BLD      Site PERIPHERAL      Blood Culture --      Result:        A significant status has been triggered by the BACTEC  instrument due to an increase in CO2 production.  Gram  stain of blood culture bottle shows NO ORGANISMS SEEN.  Further investigation is in progress.    Note: Blood cultures are incubated for 5 days and  are monitored continuously. Positive blood cultures  are called to the RN and reported as soon as  they are identified.      Narrative:      Per Hospital Policy: Only change Specimen Src: to \"Line\" if  specified by physician order.    URINE CULTURE(NEW) [[id number]] Collected:  07/06/17 0930    Order Status:  Completed Specimen Information:  Urine Updated:  07/08/17 1010     Significant Indicator NEG      Source UR      Site --      Urine Culture Mixed skin braxton <10,000 cfu/mL     BLOOD CULTURE [[id number]] Collected:  07/06/17 1022    Order Status:  Completed Specimen Information:  Blood from Peripheral " "Updated:  07/07/17 0953     Significant Indicator NEG      Source BLD      Site PERIPHERAL      Blood Culture --      Result:        No Growth    Note: Blood cultures are incubated for 5 days and  are monitored continuously.Positive blood cultures  are called to the RN and reported as soon as  they are identified.      Narrative:      Per Hospital Policy: Only change Specimen Src: to \"Line\" if  specified by physician order.    URINALYSIS CULTURE, IF INDICATED [[id number]]  (Abnormal) Collected:  07/06/17 0930    Order Status:  Completed Specimen Information:  Urine Updated:  07/06/17 1044     Micro Urine Req Microscopic      Color Angelica      Character Clear      Specific Gravity 1.023      Ph 5.0      Glucose Negative mg/dL      Ketones Negative mg/dL      Protein 30 (A) mg/dL      Bilirubin Small (A)      Nitrite Negative      Leukocyte Esterase Trace (A)      Occult Blood Trace (A)      Culture Indicated Yes UA Culture     Urine Culture [[id number]] Collected:  07/06/17 0000    Order Status:  Canceled Specimen Information:  Urine     Narrative:      ORDER WAS CANCELLED 07/06/2017 15:10, Duplicate ..  Indication for culture:->Suspected Sepsis        BLOOD CULTURE   Date Value Ref Range Status   07/06/2017   Preliminary    No Growth    Note: Blood cultures are incubated for 5 days and  are monitored continuously.Positive blood cultures  are called to the RN and reported as soon as  they are identified.          Studies:    IMPRESSION:     L3-4 discitis  Diabetes mellitus  Chronic A. Fib  Possible UTI  Obesity        PLAN:   Jersey Alexis is a 86 y.o. male with history of diabetes mellitus, obesity chronic A. fib admitted with back pain. He has previous history of back surgeries. Found to have discitis L3-4.  Hold the antibiotics. The patient is neurologically intact at this time  IR to biopsy.  Check a sedimentation rate and CRP  I have reviewed all the records    Discussed with IM. Will continue to " follow    Oliva Bolanos M.D.

## 2017-07-08 NOTE — PROGRESS NOTES
Report received at bedside and assumed care at this time. Patient AxOx4. Currently resting in bed. C/o BLE/back pain/ aching/sharp with movement. Ativan given by previous shift. Scheduled MRI Spine pending completion. Urinal at bedside. Lower extremities elevated with pillow support to promote comfort. Right FA IV CDI, +patent/blood return, no erythema/swelling noted. IVF infusing. Heart monitor intact, Atrial fibrillation. Verified call-light within reach, wheels locked and bed alarm set. Instructed patient to call for assistance PRN. Hourly rounding performed and will continue to monitor.    1915 - transport on unit to assist patient to MRI.

## 2017-07-08 NOTE — PROGRESS NOTES
Monitor Summary    Atrial Flutter with multiple 2.1-2.4 second pauses  40-55 bpm, as low as 32 (non-sustaining/asymptomatic)  -/.08/-

## 2017-07-08 NOTE — PROGRESS NOTES
[facility] Hospitalist Progress Note    Date of Service: 2017    Chief Complaint  86 y.o. male admitted 2017 with lower back pain    Interval Problem Update  Pain is 6/10, ,lower back, sharp, no radiation, constant, worse with movement    Improved mobility  Right  leg    Result mri of back pening    Has uti    bp is normal        Consultants/Specialty  none    Disposition  Home when stable        Review of Systems   Constitutional: Negative for fever, chills, weight loss, malaise/fatigue and diaphoresis.   HENT: Negative for hearing loss and tinnitus.    Respiratory: Negative for cough, hemoptysis, sputum production and shortness of breath.    Gastrointestinal: Negative for heartburn, nausea, vomiting and abdominal pain.   Genitourinary: Negative for dysuria, urgency and frequency.   Musculoskeletal: Positive for back pain.   Neurological: Negative for dizziness, tingling, tremors, sensory change and headaches.   All other systems reviewed and are negative.     Physical Exam  Laboratory/Imaging   Hemodynamics  Temp (24hrs), Av.2 °C (97.2 °F), Min:36.1 °C (96.9 °F), Max:36.6 °C (97.8 °F)   Temperature: 36.6 °C (97.8 °F)  Pulse  Av.2  Min: 44  Max: 114    Blood Pressure : 127/72 mmHg      Respiratory      Respiration: 18, Pulse Oximetry: 97 %        RUL Breath Sounds: Clear, RML Breath Sounds: Clear, RLL Breath Sounds: Diminished, MIKE Breath Sounds: Clear, LLL Breath Sounds: Diminished    Fluids    Intake/Output Summary (Last 24 hours) at 17 1244  Last data filed at 17 0830   Gross per 24 hour   Intake   2165 ml   Output   1150 ml   Net   1015 ml       Nutrition  Orders Placed This Encounter   Procedures   • DIET ORDER     Standing Status: Standing      Number of Occurrences: 1      Standing Expiration Date:      Order Specific Question:  Diet:     Answer:  Regular [1]     Physical Exam   Constitutional: He is oriented to person, place, and time. He appears distressed.   Eyes: Left eye  exhibits no discharge.   Neck: No JVD present. No tracheal deviation present. No thyromegaly present.   Cardiovascular: Exam reveals no gallop and no friction rub.    irregular   Pulmonary/Chest: No respiratory distress. He has no wheezes. He has no rales.   Abdominal: He exhibits distension. There is no tenderness. There is no rebound.   Musculoskeletal: He exhibits no edema or tenderness.   Neurological: He is alert and oriented to person, place, and time.   Straight leg raise limited in LLE       Recent Labs      07/06/17   0930  07/08/17   0327   WBC  20.3*  10.3   RBC  5.24  4.54*   HEMOGLOBIN  15.4  13.5*   HEMATOCRIT  45.2  41.5*   MCV  86.3  91.4   MCH  29.4  29.7   MCHC  34.1  32.5*   RDW  40.3  44.4   PLATELETCT  202  186   MPV  11.0  11.4     Recent Labs      07/06/17   0930  07/08/17   0324   SODIUM  130*  133*   POTASSIUM  4.1  4.3   CHLORIDE  100  105   CO2  18*  21   GLUCOSE  180*  186*   BUN  14  20   CREATININE  0.82  0.89   CALCIUM  10.1  9.5     Recent Labs      07/06/17   0930   APTT  32.0   INR  1.13                  Assessment/Plan     * Low back pain (present on admission)  Assessment & Plan  Pain control    Iv dialudid for severe pain    Mri shows disciitis      Atrial flutter (CMS-HCC) (present on admission)  Assessment & Plan  Rate controlled    Sepsis (CMS-HCC) (present on admission)  Assessment & Plan      Treat uti with abx    It appears disciitis may be actual source    Id on case for abx      Bone biopsy lumbar area ordered    UTI (urinary tract infection) (present on admission)  Assessment & Plan  Iv rocephin    treated    Follow cultures        Hypotension (present on admission)  Assessment & Plan    Resolved      Now hypertensive      Morbid obesity (CMS-HCC) (present on admission)  Assessment & Plan  Weight loss and exercise    Pt and ot eval    Bigeminy  Assessment & Plan  Replace magnesium      Hypomagnesemia  Assessment & Plan  Replace magnesium        Hearn catheter: No  Hearn      DVT Prophylaxis: Enoxaparin (Lovenox)

## 2017-07-09 PROBLEM — I49.8 BIGEMINY: Status: ACTIVE | Noted: 2017-07-09

## 2017-07-09 PROBLEM — E83.42 HYPOMAGNESEMIA: Status: ACTIVE | Noted: 2017-07-09

## 2017-07-09 LAB
ANION GAP SERPL CALC-SCNC: 9 MMOL/L (ref 0–11.9)
BASOPHILS # BLD AUTO: 0.2 % (ref 0–1.8)
BASOPHILS # BLD: 0.03 K/UL (ref 0–0.12)
BUN SERPL-MCNC: 15 MG/DL (ref 8–22)
CALCIUM SERPL-MCNC: 9.5 MG/DL (ref 8.5–10.5)
CHLORIDE SERPL-SCNC: 102 MMOL/L (ref 96–112)
CO2 SERPL-SCNC: 25 MMOL/L (ref 20–33)
CREAT SERPL-MCNC: 0.64 MG/DL (ref 0.5–1.4)
CRP SERPL HS-MCNC: 7.83 MG/DL (ref 0–0.75)
EKG IMPRESSION: NORMAL
EOSINOPHIL # BLD AUTO: 0.07 K/UL (ref 0–0.51)
EOSINOPHIL NFR BLD: 0.4 % (ref 0–6.9)
ERYTHROCYTE [DISTWIDTH] IN BLOOD BY AUTOMATED COUNT: 42.8 FL (ref 35.9–50)
ERYTHROCYTE [SEDIMENTATION RATE] IN BLOOD BY WESTERGREN METHOD: 46 MM/HOUR (ref 0–20)
GFR SERPL CREATININE-BSD FRML MDRD: >60 ML/MIN/1.73 M 2
GLUCOSE SERPL-MCNC: 114 MG/DL (ref 65–99)
HCT VFR BLD AUTO: 43.2 % (ref 42–52)
HGB BLD-MCNC: 14.2 G/DL (ref 14–18)
IMM GRANULOCYTES # BLD AUTO: 0.15 K/UL (ref 0–0.11)
IMM GRANULOCYTES NFR BLD AUTO: 0.9 % (ref 0–0.9)
LACTATE BLD-SCNC: 2 MMOL/L (ref 0.5–2)
LACTATE BLD-SCNC: 2.4 MMOL/L (ref 0.5–2)
LACTATE BLD-SCNC: 2.7 MMOL/L (ref 0.5–2)
LACTATE BLD-SCNC: 3.3 MMOL/L (ref 0.5–2)
LV EJECT FRACT MOD 2C 99903: 59.47
LV EJECT FRACT MOD 4C 99902: 53.69
LV EJECT FRACT MOD BP 99901: 57.98
LYMPHOCYTES # BLD AUTO: 2.14 K/UL (ref 1–4.8)
LYMPHOCYTES NFR BLD: 12.2 % (ref 22–41)
MAGNESIUM SERPL-MCNC: 1.3 MG/DL (ref 1.5–2.5)
MCH RBC QN AUTO: 29.2 PG (ref 27–33)
MCHC RBC AUTO-ENTMCNC: 32.9 G/DL (ref 33.7–35.3)
MCV RBC AUTO: 88.7 FL (ref 81.4–97.8)
MONOCYTES # BLD AUTO: 1.92 K/UL (ref 0–0.85)
MONOCYTES NFR BLD AUTO: 10.9 % (ref 0–13.4)
NEUTROPHILS # BLD AUTO: 13.23 K/UL (ref 1.82–7.42)
NEUTROPHILS NFR BLD: 75.4 % (ref 44–72)
NRBC # BLD AUTO: 0 K/UL
NRBC BLD AUTO-RTO: 0 /100 WBC
PLATELET # BLD AUTO: 261 K/UL (ref 164–446)
PMV BLD AUTO: 10.7 FL (ref 9–12.9)
POTASSIUM SERPL-SCNC: 3.7 MMOL/L (ref 3.6–5.5)
RBC # BLD AUTO: 4.87 M/UL (ref 4.7–6.1)
SODIUM SERPL-SCNC: 136 MMOL/L (ref 135–145)
WBC # BLD AUTO: 17.5 K/UL (ref 4.8–10.8)

## 2017-07-09 PROCEDURE — 700111 HCHG RX REV CODE 636 W/ 250 OVERRIDE (IP): Performed by: HOSPITALIST

## 2017-07-09 PROCEDURE — 700111 HCHG RX REV CODE 636 W/ 250 OVERRIDE (IP): Performed by: INTERNAL MEDICINE

## 2017-07-09 PROCEDURE — 700102 HCHG RX REV CODE 250 W/ 637 OVERRIDE(OP): Performed by: HOSPITALIST

## 2017-07-09 PROCEDURE — 86140 C-REACTIVE PROTEIN: CPT

## 2017-07-09 PROCEDURE — 700102 HCHG RX REV CODE 250 W/ 637 OVERRIDE(OP): Performed by: INTERNAL MEDICINE

## 2017-07-09 PROCEDURE — 83605 ASSAY OF LACTIC ACID: CPT | Mod: 91

## 2017-07-09 PROCEDURE — 93306 TTE W/DOPPLER COMPLETE: CPT

## 2017-07-09 PROCEDURE — 93306 TTE W/DOPPLER COMPLETE: CPT | Mod: 26 | Performed by: INTERNAL MEDICINE

## 2017-07-09 PROCEDURE — 83735 ASSAY OF MAGNESIUM: CPT

## 2017-07-09 PROCEDURE — A9270 NON-COVERED ITEM OR SERVICE: HCPCS | Performed by: INTERNAL MEDICINE

## 2017-07-09 PROCEDURE — 99233 SBSQ HOSP IP/OBS HIGH 50: CPT | Performed by: HOSPITALIST

## 2017-07-09 PROCEDURE — 700111 HCHG RX REV CODE 636 W/ 250 OVERRIDE (IP): Performed by: PHARMACIST

## 2017-07-09 PROCEDURE — 87040 BLOOD CULTURE FOR BACTERIA: CPT | Mod: 91

## 2017-07-09 PROCEDURE — 80048 BASIC METABOLIC PNL TOTAL CA: CPT

## 2017-07-09 PROCEDURE — 85652 RBC SED RATE AUTOMATED: CPT

## 2017-07-09 PROCEDURE — 85025 COMPLETE CBC W/AUTO DIFF WBC: CPT

## 2017-07-09 PROCEDURE — 770020 HCHG ROOM/CARE - TELE (206)

## 2017-07-09 PROCEDURE — 700105 HCHG RX REV CODE 258: Performed by: HOSPITALIST

## 2017-07-09 PROCEDURE — A9270 NON-COVERED ITEM OR SERVICE: HCPCS | Performed by: HOSPITALIST

## 2017-07-09 PROCEDURE — 36415 COLL VENOUS BLD VENIPUNCTURE: CPT

## 2017-07-09 PROCEDURE — 700105 HCHG RX REV CODE 258: Performed by: PHARMACIST

## 2017-07-09 PROCEDURE — 700105 HCHG RX REV CODE 258: Performed by: INTERNAL MEDICINE

## 2017-07-09 RX ORDER — LISINOPRIL 20 MG/1
20 TABLET ORAL
Status: DISCONTINUED | OUTPATIENT
Start: 2017-07-09 | End: 2017-07-10

## 2017-07-09 RX ORDER — ONDANSETRON 2 MG/ML
4 INJECTION INTRAMUSCULAR; INTRAVENOUS EVERY 6 HOURS PRN
Status: DISCONTINUED | OUTPATIENT
Start: 2017-07-09 | End: 2017-07-25 | Stop reason: HOSPADM

## 2017-07-09 RX ORDER — SODIUM CHLORIDE 9 MG/ML
1000 INJECTION, SOLUTION INTRAVENOUS ONCE
Status: COMPLETED | OUTPATIENT
Start: 2017-07-09 | End: 2017-07-09

## 2017-07-09 RX ORDER — CLONIDINE HYDROCHLORIDE 0.1 MG/1
0.1 TABLET ORAL 4 TIMES DAILY PRN
Status: DISCONTINUED | OUTPATIENT
Start: 2017-07-09 | End: 2017-07-25 | Stop reason: HOSPADM

## 2017-07-09 RX ORDER — HYDRALAZINE HYDROCHLORIDE 10 MG/1
10 TABLET, FILM COATED ORAL EVERY 4 HOURS PRN
Status: DISCONTINUED | OUTPATIENT
Start: 2017-07-09 | End: 2017-07-25 | Stop reason: HOSPADM

## 2017-07-09 RX ORDER — OMEPRAZOLE 20 MG/1
20 CAPSULE, DELAYED RELEASE ORAL 2 TIMES DAILY
Status: DISCONTINUED | OUTPATIENT
Start: 2017-07-09 | End: 2017-07-25 | Stop reason: HOSPADM

## 2017-07-09 RX ORDER — SODIUM CHLORIDE 9 MG/ML
INJECTION, SOLUTION INTRAVENOUS CONTINUOUS
Status: DISCONTINUED | OUTPATIENT
Start: 2017-07-09 | End: 2017-07-13

## 2017-07-09 RX ADMIN — HYDRALAZINE HYDROCHLORIDE 10 MG: 10 TABLET, FILM COATED ORAL at 20:38

## 2017-07-09 RX ADMIN — SODIUM CHLORIDE: 9 INJECTION, SOLUTION INTRAVENOUS at 12:45

## 2017-07-09 RX ADMIN — GABAPENTIN 100 MG: 100 CAPSULE ORAL at 20:38

## 2017-07-09 RX ADMIN — STANDARDIZED SENNA CONCENTRATE AND DOCUSATE SODIUM 2 TABLET: 8.6; 5 TABLET, FILM COATED ORAL at 20:38

## 2017-07-09 RX ADMIN — CYANOCOBALAMIN TAB 500 MCG 1000 MCG: 500 TAB at 09:54

## 2017-07-09 RX ADMIN — GABAPENTIN 100 MG: 100 CAPSULE ORAL at 13:51

## 2017-07-09 RX ADMIN — OXYCODONE HYDROCHLORIDE 2.5 MG: 5 TABLET ORAL at 15:48

## 2017-07-09 RX ADMIN — ATORVASTATIN CALCIUM 10 MG: 10 TABLET, FILM COATED ORAL at 20:38

## 2017-07-09 RX ADMIN — CEFTRIAXONE SODIUM 2 G: 2 INJECTION, POWDER, FOR SOLUTION INTRAMUSCULAR; INTRAVENOUS at 14:12

## 2017-07-09 RX ADMIN — LISINOPRIL 20 MG: 20 TABLET ORAL at 09:54

## 2017-07-09 RX ADMIN — GABAPENTIN 100 MG: 100 CAPSULE ORAL at 09:54

## 2017-07-09 RX ADMIN — ONDANSETRON 4 MG: 2 INJECTION INTRAMUSCULAR; INTRAVENOUS at 04:29

## 2017-07-09 RX ADMIN — VANCOMYCIN HYDROCHLORIDE 2500 MG: 100 INJECTION, POWDER, LYOPHILIZED, FOR SOLUTION INTRAVENOUS at 14:58

## 2017-07-09 RX ADMIN — SODIUM CHLORIDE: 9 INJECTION, SOLUTION INTRAVENOUS at 15:04

## 2017-07-09 RX ADMIN — SODIUM CHLORIDE 1000 ML: 9 INJECTION, SOLUTION INTRAVENOUS at 13:53

## 2017-07-09 RX ADMIN — OMEPRAZOLE 20 MG: 20 CAPSULE, DELAYED RELEASE ORAL at 09:54

## 2017-07-09 RX ADMIN — OXYCODONE HYDROCHLORIDE 2.5 MG: 5 TABLET ORAL at 20:41

## 2017-07-09 RX ADMIN — MAGNESIUM SULFATE IN DEXTROSE 1 G: 10 INJECTION, SOLUTION INTRAVENOUS at 19:30

## 2017-07-09 RX ADMIN — PROCHLORPERAZINE EDISYLATE 10 MG: 5 INJECTION INTRAMUSCULAR; INTRAVENOUS at 08:58

## 2017-07-09 RX ADMIN — ONDANSETRON 4 MG: 2 INJECTION INTRAMUSCULAR; INTRAVENOUS at 15:45

## 2017-07-09 RX ADMIN — STANDARDIZED SENNA CONCENTRATE AND DOCUSATE SODIUM 2 TABLET: 8.6; 5 TABLET, FILM COATED ORAL at 09:54

## 2017-07-09 RX ADMIN — OMEPRAZOLE 20 MG: 20 CAPSULE, DELAYED RELEASE ORAL at 20:38

## 2017-07-09 ASSESSMENT — ENCOUNTER SYMPTOMS
MYALGIAS: 1
NAUSEA: 1
BACK PAIN: 1
FEVER: 0
WEAKNESS: 1
HEADACHES: 0
SHORTNESS OF BREATH: 0
VOMITING: 1
COUGH: 0
SPEECH CHANGE: 0

## 2017-07-09 ASSESSMENT — PAIN SCALES - GENERAL
PAINLEVEL_OUTOF10: 0
PAINLEVEL_OUTOF10: 1
PAINLEVEL_OUTOF10: 0
PAINLEVEL_OUTOF10: 2
PAINLEVEL_OUTOF10: 6

## 2017-07-09 NOTE — H&P
Neurosurgical History and Physical    Date of Service: 7/8/2017     History of Present Illness  Jersey Alexis is a 86 y.o. male with lower back surgeries in the past and chronic lower back pain presents with worsening low back pain.  The patient states that he began to experience severe bilateral hip pain when he attempted to move side-side and significant lower back pain when attempting to mobilize from sitting to standing.  This worsening of his lower back pain has occurred over the past 4 days.  He feels that he has developed an increase in urinary urgency.  He denies any bowel incontinence, but does endorse constipation.  He has been experiencing weakness within his left lower extremity that appears to be chronic subsequent to a prior lumbar surgical treatment (L4-5 fusion).  He has been using a walker to support over the past 2 years.  He has also been utilizing a wheelchair in order to help his son-in-law mobilize him from between doctor's appointments.      Home Medications  No current facility-administered medications on file prior to encounter.     Current Outpatient Prescriptions on File Prior to Encounter   Medication Sig Dispense Refill   • aspirin (ASA) 325 MG Tab Take 325 mg by mouth every day.     • cyanocobalamin (VITAMIN B12) 1000 MCG Tab Take 1,000 mcg by mouth every day at 6 PM.     • tramadol (ULTRAM) 50 MG Tab Take 1 Tab by mouth every 6 hours as needed for Moderate Pain. 30 Tab 0   • cholecalciferol (VITAMIN D3) 5000 UNIT Cap Take 5,000-10,000 Units by mouth See Admin Instructions. 5000 units, then 10,000 units alternating days for a total weekly dose of 50,000 units     • digoxin (LANOXIN) 125 MCG Tab Take 62.5 mcg by mouth every day.     • metoprolol SR (TOPROL XL) 25 MG TABLET SR 24 HR Take 25 mg by mouth every day.     • sennosides (SENOKOT) 8.6 MG Tab Take 8.6 mg by mouth every day.     • atorvastatin (LIPITOR) 10 MG Tab Take 10 mg by mouth every evening.     • omeprazole (PRILOSEC)  20 MG delayed-release capsule Take 20 mg by mouth every day.     • docusate sodium (COLACE) 100 MG Cap Take 100 mg by mouth every morning.         Allergies  Review of patient's allergies indicates no known allergies.    Past Medical and Surgical History  Past Medical History   Diagnosis Date   • Hyperlipidemia    • Arthritis    • Arrhythmia    • Muscle disorder      nerve damage due to spinal stenosis   • Cataract    • GERD (gastroesophageal reflux disease)      Past Surgical History   Procedure Laterality Date   • Laminotomy  late 80s     spinal stenosis   • Laminotomy  late 90s     spinal fusion    • Appendectomy  Early 2000's   • Tonsillectomy Bilateral 1936   • Cataract phaco with iol Bilateral Early 2000's       Family History  Family History   Problem Relation Age of Onset   • Heart Failure Mother    • Heart Attack Mother    • Heart Disease Mother      pacemaker   • Paranoid behavior Mother    • Heart Failure Father    • Cancer Sister      Breast   • Other Brother      colostomy   • Cancer Brother    • No Known Problems Maternal Grandmother    • No Known Problems Maternal Grandfather    • No Known Problems Paternal Grandmother    • No Known Problems Paternal Grandfather        Social History  Social History     Social History   • Marital Status:      Spouse Name: N/A   • Number of Children: N/A   • Years of Education: N/A     Occupational History   • Not on file.     Social History Main Topics   • Smoking status: Former Smoker -- 1.50 packs/day for 29 years     Types: Cigarettes, Pipe     Quit date: 01/01/1980   • Smokeless tobacco: Never Used      Comment: Started smoking at age 21   • Alcohol Use: No   • Drug Use: No   • Sexual Activity: No     Other Topics Concern   • Not on file     Social History Narrative       Review of Systems  Review of Systems   Constitutional: Positive for fever and weight loss.   HENT: Negative.    Eyes: Negative.    Respiratory: Positive for shortness of breath.   "  Cardiovascular: Positive for orthopnea.   Gastrointestinal: Positive for constipation.   Genitourinary: Positive for frequency.   Musculoskeletal: Positive for myalgias and back pain.   Skin: Negative.    Neurological: Negative.    Endo/Heme/Allergies: Negative.    Psychiatric/Behavioral: Positive for depression.       Filed Vitals:    07/06/17 0814 07/06/17 0820 07/06/17 1101 07/06/17 1142   BP: 103/62      Pulse: 114  67 68   Temp: 36.8 °C (98.3 °F)      TempSrc: Temporal      Resp: 16  16 18   Height: 1.803 m (5' 11\")      Weight:  93.895 kg (207 lb)     SpO2: 96%  95% 93%             Intake/Output Summary (Last 24 hours) at 07/08/17 1731  Last data filed at 07/08/17 0830   Gross per 24 hour   Intake   1925 ml   Output    575 ml   Net   1350 ml     Physical Exam   Constitutional: He is oriented to person, place, and time. He appears well-developed and well-nourished.   Eyes: EOM are normal. Pupils are equal, round, and reactive to light.   Neck: Normal range of motion.   Cardiovascular: Normal rate.    Pulmonary/Chest: Effort normal.   Neurological: He is alert and oriented to person, place, and time. No cranial nerve deficit or sensory deficit. GCS eye subscore is 4. GCS verbal subscore is 5. GCS motor subscore is 6.         Labs  Lab Results   Component Value Date/Time    WBC 20.3* 07/06/2017 09:30 AM    RBC 5.24 07/06/2017 09:30 AM    HEMOGLOBIN 15.4 07/06/2017 09:30 AM    HEMATOCRIT 45.2 07/06/2017 09:30 AM    MCV 86.3 07/06/2017 09:30 AM    MCH 29.4 07/06/2017 09:30 AM    MCHC 34.1 07/06/2017 09:30 AM    MPV 11.0 07/06/2017 09:30 AM    NEUTROPHILS-POLYS 84.60* 07/06/2017 09:30 AM    LYMPHOCYTES 4.30* 07/06/2017 09:30 AM    MONOCYTES 9.90 07/06/2017 09:30 AM    EOSINOPHILS 0.00 07/06/2017 09:30 AM    BASOPHILS 0.30 07/06/2017 09:30 AM      Lab Results   Component Value Date/Time    SODIUM 130* 07/06/2017 09:30 AM    POTASSIUM 4.1 07/06/2017 09:30 AM    CHLORIDE 100 07/06/2017 09:30 AM    CO2 18* 07/06/2017 " 09:30 AM    GLUCOSE 180* 07/06/2017 09:30 AM    BUN 14 07/06/2017 09:30 AM    CREATININE 0.82 07/06/2017 09:30 AM      Lab Results   Component Value Date/Time    PT 14.9* 07/06/2017 09:30 AM    INR 1.13 07/06/2017 09:30 AM        Imaging  Ct-abdomen-pelvis W/o    7/6/2017 7/6/2017 10:28 AM HISTORY/REASON FOR EXAM:  Pain. TECHNIQUE/EXAM DESCRIPTION: CT scan of the abdomen and pelvis without contrast. Noncontrast helical scanning was obtained from the diaphragmatic domes through the pubic symphysis. Low dose optimization technique was utilized for this CT exam including automated exposure control and adjustment of the mA and/or kV according to patient size. COMPARISON: 3/21/2017 FINDINGS: CT Abdomen: Hyperexpanded lung bases. Atelectasis both lung bases. No basilar pleural effusion. Coronary artery calcifications. Liver and spleen are within normal limits in size. Pancreatic calcifications/consistent with sequela of pancreatitis. No peripancreatic fluid collections identified. No adrenal gland masses. No hydronephrosis or urolithiasis. No evidence of bowel obstruction. The appendix is not delineated. There are diverticula colon. No evidence of diverticulitis. No free fluid. Postoperative changes lumbar spine. CT Pelvis: Urinary bladder wall is mildly and symmetrically thickened. Prostate projects into the fourth of the urinary bladder. No pelvic free fluid.     7/6/2017  No hydronephrosis or urolithiasis. Diverticula colon. No evidence diverticulitis. No free fluid. Pancreatic calcifications/consistent with sequela pancreatitis. No peripancreatic fluid collections. Coronary artery calcifications.    Ct-lspine W/o Plus Recons    7/6/2017 7/6/2017 10:28 AM HISTORY/REASON FOR EXAM: Worsening back pain TECHNIQUE/EXAM DESCRIPTION AND NUMBER OF VIEWS: CT lumbar spine without contrast, with reconstructions. Thin-section helical images were obtained of the lumbar spine in the axial plane.  Sagittal reconstructions were  generated from the axial data. Low dose optimization technique was utilized for this CT exam including automated exposure control and adjustment of the mA and/or kV according to patient size. FINDINGS: Sagittal reconstructed images demonstrate surgical change at the L4-5 level characterized by bilateral pedicular screw and zev fixation. There is laminectomy change extending from L3 through L5. There is scoliotic deformity. There is no evidence of fracture or dislocation. There is multilevel decreased disc height and endplate spurring consistent with multilevel degenerative disc disease. There are old fractures of the right L1 and L2 transverse processes. There is degenerative retrolisthesis at L3-4 and anterior subluxation at L4-5. There is a bone graft donor site involving the left ilium.     7/6/2017  1.  No evidence of acute fracture of the lumbar spine. 2.  Surgical change extending from L3 through L5. 3.  Multilevel degenerative disc disease and facet degeneration. 4.  Old fractures of the right L1 and L2 transverse processes. 5.  Multilevel degenerative subluxation.      Assessment and Plan    L4-5 posterior segmental instrumentation and fusion.  L3-4 increased T2 signal consistent with discitis versus instability.  There is a broad-based disc bulge at L3-4 that extends into the lateral recess and neuroforamina bilaterally.  The patient has chronic left lower extremity weakness without radiculopathy.  Recommend CT guided biopsy of the L3-4 disc space in order to r/o or r/i discitis.  No new neurological deficits according to the patient since prior lumbar surgical procedure.    Allan Cochran MD MSc

## 2017-07-09 NOTE — CARE PLAN
Problem: Safety  Goal: Will remain free from injury  Outcome: PROGRESSING AS EXPECTED  Bed alarm set and verified call-light within reach. Instructed patient to call for assistance PRN.  Goal: Will remain free from falls  Outcome: PROGRESSING AS EXPECTED    Problem: Pain Management  Goal: Pain level will decrease to patient’s comfort goal  Outcome: PROGRESSING AS EXPECTED  Tylenol given as prescribed    Problem: Fluid Volume:  Goal: Will maintain balanced intake and output  Outcome: PROGRESSING AS EXPECTED  NS bolus - 500 cc given    Problem: Skin Integrity  Goal: Risk for impaired skin integrity will decrease  Outcome: PROGRESSING AS EXPECTED  Turn Q2 and repositioned with pillow support

## 2017-07-09 NOTE — PROGRESS NOTES
"Patient c/o severe nausea. Began vomiting undigested food. Contacted/notified covering hospitalist. Zofran/compazine IVP prescribed/given. During this time patient also c/o chest \"heaviness\" midsternal/upper. Tylenol given. 500 cc mL bolus Normal Saline administered. Repeat EKG completed, no change apparent. Patient remains atrial flutter, mid 30-40's. Will continue to monitor.   "

## 2017-07-09 NOTE — PROGRESS NOTES
Sacrum is red and barely blanching.  Mepilex applied, Q2 turns initiated.  Pt refusing to get OOB again this afternoon but agrees to turn every 2 hours.

## 2017-07-09 NOTE — PROGRESS NOTES
Infectious Disease Progress Note    Author: Oliva Bolanos M.D. Date of service & Time created: 2017  10:53 AM    Chief Complaint:  Chief Complaint   Patient presents with   • Back Pain    follow-up for discitis    Interval History:  86-year-old white male admitted for back pain. Found to have discitis  17- no fevers. WBC 10.3. Ongoing back pain. Sedimentation rate is 46. Now the blood cultures are positive for strep species. Also has new nausea and vomiting today.     Labs Reviewed, Medications Reviewed and Radiology Reviewed.    Review of Systems:  Review of Systems   Constitutional: Positive for malaise/fatigue. Negative for fever.   Respiratory: Negative for cough and shortness of breath.    Cardiovascular: Positive for leg swelling. Negative for chest pain.   Gastrointestinal: Positive for nausea and vomiting.   Genitourinary: Negative for dysuria.   Musculoskeletal: Positive for myalgias and back pain.   Neurological: Positive for weakness. Negative for speech change and headaches.       Hemodynamics:  Temp (24hrs), Av.4 °C (97.5 °F), Min:36.1 °C (96.9 °F), Max:36.9 °C (98.4 °F)  Temperature: 36.4 °C (97.5 °F)  Pulse  Av  Min: 34  Max: 114   Blood Pressure : (!) 170/86 mmHg (rn notified)       Physical Exam:  Physical Exam   Constitutional: He is oriented to person, place, and time. He appears well-nourished. No distress.   Looks ill   Eyes: No scleral icterus.   Neck: Neck supple.   Cardiovascular:   Irregularly irregular   Pulmonary/Chest: He has no wheezes. He has no rales.   Abdominal: Soft. There is no tenderness. There is no rebound.   Musculoskeletal: He exhibits edema.   Neurological: He is alert and oriented to person, place, and time.   Vitals reviewed.      Meds:    Current facility-administered medications:   •  lisinopril (PRINIVIL) tablet 20 mg, 20 mg, Oral, Q DAY, Georgi Grey M.D., 20 mg at 17 0922  •  ondansetron (ZOFRAN) syringe/vial injection 4 mg, 4 mg,  Intravenous, Q8HRS PRN, Jamaal Molina M.D., 4 mg at 07/09/17 0429  •  prochlorperazine (COMPAZINE) injection 10 mg, 10 mg, Intravenous, Q6HRS PRN, Darion Benton M.D., 10 mg at 07/09/17 0858  •  pneumococcal 13-Jocelyn Conj Vacc (PREVNAR 13) syringe 0.5 mL, 0.5 mL, Intramuscular, Once PRN, Jamaal Molina M.D.  •  gabapentin (NEURONTIN) capsule 100 mg, 100 mg, Oral, TID, Georgi Grey M.D., 100 mg at 07/09/17 0954  •  atorvastatin (LIPITOR) tablet 10 mg, 10 mg, Oral, Nightly, Jamaal Molina M.D., 10 mg at 07/08/17 2122  •  vitamin D (cholecalciferol) tablet 5,000 Units, 5,000 Units, Oral, Q48HRS, Jamaal Molina M.D., 5,000 Units at 07/08/17 0922  •  cyanocobalamin (VITAMIN B-12) tablet 1,000 mcg, 1,000 mcg, Oral, DAILY, Jamaal Molina M.D., 1,000 mcg at 07/09/17 0954  •  omeprazole (PRILOSEC) capsule 20 mg, 20 mg, Oral, DAILY, Jamaal Molina M.D., 20 mg at 07/09/17 0954  •  senna-docusate (PERICOLACE or SENOKOT S) 8.6-50 MG per tablet 2 Tab, 2 Tab, Oral, BID, 2 Tab at 07/09/17 0954 **AND** polyethylene glycol/lytes (MIRALAX) PACKET 1 Packet, 1 Packet, Oral, QDAY PRN **AND** magnesium hydroxide (MILK OF MAGNESIA) suspension 30 mL, 30 mL, Oral, QDAY PRN **AND** bisacodyl (DULCOLAX) suppository 10 mg, 10 mg, Rectal, QDAY PRN, Jamaal Molina M.D.  •  acetaminophen (TYLENOL) tablet 650 mg, 650 mg, Oral, Q6HRS PRN, Jamaal Molina M.D., 650 mg at 07/08/17 2142  •  Notify provider if pain remains uncontrolled, , , CONTINUOUS **AND** Use the numeric rating scale (NRS-11) on regular floors and Critical-Care Pain Observation Tool (CPOT) on ICUs/Trauma to assess pain, , , CONTINUOUS **AND** Pulse Ox (Oximetry), , , CONTINUOUS **AND** Pharmacy Consult Request ...Pain Management Review, , Other, PRN **AND** If patient difficult to arouse and/or has respiratory depression, stop any opiates that are currently infusing and call a Rapid Response., , , CONTINUOUS **AND** oxycodone immediate-release (ROXICODONE) tablet 2.5 mg,  2.5 mg, Oral, Q3HRS PRN **AND** oxycodone immediate-release (ROXICODONE) tablet 5 mg, 5 mg, Oral, Q3HRS PRN **AND** HYDROmorphone (DILAUDID) injection 0.25 mg, 0.25 mg, Intravenous, Q3HRS PRN, Jamaal Molian M.D., 0.25 mg at 07/06/17 1237    Labs:  Recent Labs      07/08/17   0327   WBC  10.3   RBC  4.54*   HEMOGLOBIN  13.5*   HEMATOCRIT  41.5*   MCV  91.4   MCH  29.7   RDW  44.4   PLATELETCT  186   MPV  11.4   NEUTSPOLYS  87.90*   LYMPHOCYTES  6.70*   MONOCYTES  4.60   EOSINOPHILS  0.00   BASOPHILS  0.20     Recent Labs      07/08/17   0324   SODIUM  133*   POTASSIUM  4.3   CHLORIDE  105   CO2  21   GLUCOSE  186*   BUN  20     Recent Labs      07/08/17   0324   CREATININE  0.89       Imaging:  Ct-abdomen-pelvis W/o    7/6/2017 7/6/2017 10:28 AM HISTORY/REASON FOR EXAM:  Pain. TECHNIQUE/EXAM DESCRIPTION: CT scan of the abdomen and pelvis without contrast. Noncontrast helical scanning was obtained from the diaphragmatic domes through the pubic symphysis. Low dose optimization technique was utilized for this CT exam including automated exposure control and adjustment of the mA and/or kV according to patient size. COMPARISON: 3/21/2017 FINDINGS: CT Abdomen: Hyperexpanded lung bases. Atelectasis both lung bases. No basilar pleural effusion. Coronary artery calcifications. Liver and spleen are within normal limits in size. Pancreatic calcifications/consistent with sequela of pancreatitis. No peripancreatic fluid collections identified. No adrenal gland masses. No hydronephrosis or urolithiasis. No evidence of bowel obstruction. The appendix is not delineated. There are diverticula colon. No evidence of diverticulitis. No free fluid. Postoperative changes lumbar spine. CT Pelvis: Urinary bladder wall is mildly and symmetrically thickened. Prostate projects into the fourth of the urinary bladder. No pelvic free fluid.     7/6/2017  No hydronephrosis or urolithiasis. Diverticula colon. No evidence diverticulitis. No free  fluid. Pancreatic calcifications/consistent with sequela pancreatitis. No peripancreatic fluid collections. Coronary artery calcifications.    Ct-lspine W/o Plus Recons    7/6/2017 7/6/2017 10:28 AM HISTORY/REASON FOR EXAM: Worsening back pain TECHNIQUE/EXAM DESCRIPTION AND NUMBER OF VIEWS: CT lumbar spine without contrast, with reconstructions. Thin-section helical images were obtained of the lumbar spine in the axial plane.  Sagittal reconstructions were generated from the axial data. Low dose optimization technique was utilized for this CT exam including automated exposure control and adjustment of the mA and/or kV according to patient size. FINDINGS: Sagittal reconstructed images demonstrate surgical change at the L4-5 level characterized by bilateral pedicular screw and zev fixation. There is laminectomy change extending from L3 through L5. There is scoliotic deformity. There is no evidence of fracture or dislocation. There is multilevel decreased disc height and endplate spurring consistent with multilevel degenerative disc disease. There are old fractures of the right L1 and L2 transverse processes. There is degenerative retrolisthesis at L3-4 and anterior subluxation at L4-5. There is a bone graft donor site involving the left ilium.     7/6/2017  1.  No evidence of acute fracture of the lumbar spine. 2.  Surgical change extending from L3 through L5. 3.  Multilevel degenerative disc disease and facet degeneration. 4.  Old fractures of the right L1 and L2 transverse processes. 5.  Multilevel degenerative subluxation.    Mr-lumbar Spine-with & W/o    7/8/2017 7/7/2017 7:28 PM HISTORY/REASON FOR EXAM:  Low Back Pain. TECHNIQUE/EXAM DESCRIPTION: MRI of the lumbar spine without and with contrast. The study was performed on a [employer] 1.5 Lucille MRI scanner. T1 sagittal, T2 fast spin-echo sagittal, and T2 axial images were obtained of the lumbar spine. T1 post-contrast fat-suppressed sagittal images were  obtained. Optional T2 fat-suppressed sagittal and T1 post-contrast axial images may be obtained. 20 mL Omniscan contrast was administered intravenously. COMPARISON:  CT scan lumbar spine dated 7/6/2017. FINDINGS: There is 4 mm retrolisthesis at the L3-4 level. There are no previous laminectomies from the L2 level to the sacrum. Previous bilateral pedicle screw and zev fixation at the L4-L5 levels. Marrow signal in the vertebral bodies is within normal limits. There is no abnormal osseous enhancement. There is no other abnormal extradural enhancement. There are no significant osteophytic changes. The prevertebral and paraspinous soft tissues are unremarkable. The conus is normal in position and signal. There is no abnormal cord enhancement. There is partial intradiscal fusion at the L4-5 level. There is moderate disc space narrowing at the L2-3 and L3-4 levels. There is increased T2 signal intensity in the L3-4 disc space. Additionally there are bandlike regions of decreased T1 and increased T2 signal intensity involving the L3-4 endplates. There is curvilinear increased T1 and T2 signal intensity involving the inferior endplate of the L2 vertebral body. There is a Schmorl's node endplate herniation into this inferior endplate. Level specific findings: L5-S1 level normal. L4-5 level postoperative changes. Mild to moderate bilateral neural foraminal narrowing. L3-4 level moderate broad-based posterior spurring which effaces the ventral surface of thecal sac. Moderate central canal stenosis secondary to facet arthropathy. Severe bilateral neural foraminal stenosis. L2-3 level minimal posterior spurring and mild bilateral neural foraminal narrowing with mild disc bulging into the inferior aspects of the neural foramina. L1-2 level borderline central canal stenosis secondary to facet arthropathy.     7/8/2017  1.  Possible discitis at the L3-4 level. 2.  Mild to moderate retrolisthesis at the L3-4 level. 3.  Previous  extensive laminectomy from the L2 level to the sacrum with bilateral pedicle screw and zev fixation at the L4-L5 levels. 4.   Moderate lumbar spondylotic changes at the L3-4 levels with associated moderate central canal stenosis at this level secondary to facet arthropathy. Additionally there are severe bilateral neural foraminal stenosis. 5.  Minimal lumbar spondylotic changes at the L2-3 level with mild bilateral neural foraminal narrowing and mild disc bulging into the inferior aspects of the neural foramina.    Dx-hip-bilateral-with Pelvis-3/4 Views    7/6/2017 7/6/2017 12:23 PM HISTORY/REASON FOR EXAM:  Bilateral hip pain.. TECHNIQUE/EXAM DESCRIPTION AND NUMBER OF VIEWS:  3 views of the bilateral hip. COMPARISON: None FINDINGS: Osteopenia is present. There are surgical changes involving the lumbar spine. There is no evidence of pelvic or proximal femoral fracture. There is mild degenerative change characterized by ossification of the acetabular labrum bilaterally. There are pelvic phleboliths. There is vascular calcification.     7/6/2017  No evidence of fracture or arthropathy of the hips. Degenerative and surgical changes of the lumbar spine.      Micro:  BLOOD CULTURE   Date Value Ref Range Status   07/06/2017   Preliminary    No Growth    Note: Blood cultures are incubated for 5 days and  are monitored continuously.Positive blood cultures  are called to the RN and reported as soon as  they are identified.          Assessment:  Active Hospital Problems    Diagnosis   • *Low back pain [M54.5]   • Hypotension [I95.9]   • Morbid obesity (CMS-HCC) [E66.01]   • Sepsis (CMS-HCC) [A41.9]   • UTI (urinary tract infection) [N39.0]   • Atrial flutter (CMS-HCC) [I48.92]       Plan:  L3-4 discitis  Sedimentation rate is 46  CRP 7.83  Seen by neurosurgery as well  IR to biopsy    Bacteremia  Blood cultures from 7/6/17 are showing strep species  In view of that we will have to start vancomycin and Rocephin  Repeat  echo  Repeat blood cultures      Nausea and vomiting  Due to sepsis likely  Continue to monitor    Diabetes mellitus  Keep the blood sugars less than 150    Discussed with internal Medicine Dr. Grey

## 2017-07-09 NOTE — PROGRESS NOTES
"Report received at bedside and assumed care at this time. Patient AxOx4. Reports minimal lower back/NANI leg and hip pain, states \"much improved and tolerable\", 2/10. Turn Q2 and repositioned with pillow support to promote comfort. Urinal at bedside. Right FA IV CDI, +patent/blood return, no erythema/swelling noted. Heart monitor intact, atrial fibrillation/flutter with decreased rate into mid 30's (non-sustained) - MD aware. Patient remains asymptomatic. Verified call-light within reach, bed alarm set and wheels locked. Instructed patient to call for assistance PRN. Hourly rounding performed and will continue to monitor.  "

## 2017-07-09 NOTE — CARE PLAN
Problem: Safety  Goal: Will remain free from falls  Outcome: PROGRESSING AS EXPECTED  Verified call-light within reach and instructed patient to call for assistance PRN.    Problem: Bowel/Gastric:  Goal: Normal bowel function is maintained or improved  Outcome: PROGRESSING AS EXPECTED  Loose bowel movement x 2 today. Senna tablets continued to administer as prescribed  Goal: Will not experience complications related to bowel motility  Outcome: PROGRESSING AS EXPECTED    Problem: Skin Integrity  Goal: Risk for impaired skin integrity will decrease  Outcome: PROGRESSING SLOWER THAN EXPECTED  Blanchable erythema sacral/coccyx - mepilex dressing present, CDI    Problem: Psychosocial Needs:  Goal: Level of anxiety will decrease  Outcome: PROGRESSING AS EXPECTED  Patient calm and cooperative, resting comfortably in bed.

## 2017-07-09 NOTE — PROGRESS NOTES
LA at 3.3 up from 2.0.  One blood culture came back positive.  MD notified.  Dr. Grey ordered 1L bolus.  ID MD ordered Ceftriaxone IV, echo, and repeat blood cultures.

## 2017-07-09 NOTE — PROGRESS NOTES
"Pharmacy Kinetics 86 y.o. male on vancomycin day # 1 2017    Currently Dose: Vancomycin 2500 mg iv once  Received Load Dose: Yes (17 1458)    Indication for Treatment: suspected bacteremia & epidural abscess/vertebral osteomyelitis  ID Service Following: Yes    Pertinent history per medical record: Admitted on 2017 for back pain found now to have suspected vertebral/epidural abscess/osteomyelitis. ID service consulting. Currently admitted to telemetry unit.     Other antibiotics: ceftriaxone 2 gm iv q24h    Allergies: Review of patient's allergies indicates no known allergies.     List concerns for accumulation of vancomycin : age >75, DMI ~31, electrolyte derangement, BUN:Cr ~20:1    Pertinent cultures to date:   17 BCx x1 (Streptococcus species)  17 BCx x1 (negative)  17 UCx x1 (negative)    Recent Labs      17   0327   WBC  10.3   NEUTSPOLYS  87.90*     Recent Labs      17   0324   BUN  20   CREATININE  0.89     Intake/Output Summary (Last 24 hours) at 17 1345  Last data filed at 17 1200   Gross per 24 hour   Intake    790 ml   Output   2125 ml   Net  -1335 ml      Blood pressure 114/94, pulse 86, temperature 37.1 °C (98.7 °F), temperature source Temporal, resp. rate 20, height 1.803 m (5' 11\"), weight 101.3 kg (223 lb 5.2 oz), SpO2 95 %. Temp (24hrs), Av.5 °C (97.7 °F), Min:36.1 °C (96.9 °F), Max:37.1 °C (98.7 °F)    Estimated Creatinine Clearance: 72.2 mL/min (by C-G formula based on Cr of 0.89).    A/P    1. Vancomycin dose change: 2000 mg iv q24h (~20 mg/kg)  2. Next vancomycin level: 17 @1430  3. Goal trough: 18-22 mcg/mL  4. Comments: VS stable. Afebrile. WBC at upper end of normal. CrCl ~72 mL/min and likely inaccurate due to age/body habitus. Microbiology concerning for streptococcus in one blood culture. ID consulting. Multiple concerns for accumulation of vancomycin identified. Given identified concerns and indication will supplement with ~20 " mg/kg dose q24h. Trough ordered prior to 3rd dose to ensure clearance pending clinical disposition. Pharmacy will follow and continue to adjust as appropriate.    Robert Mcnulty, PHARMD

## 2017-07-10 ENCOUNTER — APPOINTMENT (OUTPATIENT)
Dept: RADIOLOGY | Facility: MEDICAL CENTER | Age: 82
DRG: 853 | End: 2017-07-10
Attending: HOSPITALIST
Payer: MEDICARE

## 2017-07-10 LAB
ALBUMIN SERPL BCP-MCNC: 3.5 G/DL (ref 3.2–4.9)
ALBUMIN/GLOB SERPL: 1 G/DL
ALP SERPL-CCNC: 68 U/L (ref 30–99)
ALT SERPL-CCNC: 29 U/L (ref 2–50)
ANION GAP SERPL CALC-SCNC: 7 MMOL/L (ref 0–11.9)
AST SERPL-CCNC: 23 U/L (ref 12–45)
BASOPHILS # BLD AUTO: 0.3 % (ref 0–1.8)
BASOPHILS # BLD: 0.05 K/UL (ref 0–0.12)
BILIRUB SERPL-MCNC: 0.6 MG/DL (ref 0.1–1.5)
BUN SERPL-MCNC: 11 MG/DL (ref 8–22)
CALCIUM SERPL-MCNC: 9.5 MG/DL (ref 8.5–10.5)
CHLORIDE SERPL-SCNC: 96 MMOL/L (ref 96–112)
CO2 SERPL-SCNC: 30 MMOL/L (ref 20–33)
CREAT SERPL-MCNC: 0.57 MG/DL (ref 0.5–1.4)
EOSINOPHIL # BLD AUTO: 0.06 K/UL (ref 0–0.51)
EOSINOPHIL NFR BLD: 0.4 % (ref 0–6.9)
ERYTHROCYTE [DISTWIDTH] IN BLOOD BY AUTOMATED COUNT: 41 FL (ref 35.9–50)
GFR SERPL CREATININE-BSD FRML MDRD: >60 ML/MIN/1.73 M 2
GLOBULIN SER CALC-MCNC: 3.5 G/DL (ref 1.9–3.5)
GLUCOSE SERPL-MCNC: 154 MG/DL (ref 65–99)
HCT VFR BLD AUTO: 44.4 % (ref 42–52)
HGB BLD-MCNC: 15.1 G/DL (ref 14–18)
IMM GRANULOCYTES # BLD AUTO: 0.14 K/UL (ref 0–0.11)
IMM GRANULOCYTES NFR BLD AUTO: 0.8 % (ref 0–0.9)
LACTATE BLD-SCNC: 1.7 MMOL/L (ref 0.5–2)
LACTATE BLD-SCNC: 1.9 MMOL/L (ref 0.5–2)
LACTATE BLD-SCNC: 2.2 MMOL/L (ref 0.5–2)
LACTATE BLD-SCNC: 2.6 MMOL/L (ref 0.5–2)
LYMPHOCYTES # BLD AUTO: 1.75 K/UL (ref 1–4.8)
LYMPHOCYTES NFR BLD: 10.6 % (ref 22–41)
MCH RBC QN AUTO: 29.4 PG (ref 27–33)
MCHC RBC AUTO-ENTMCNC: 34 G/DL (ref 33.7–35.3)
MCV RBC AUTO: 86.5 FL (ref 81.4–97.8)
MONOCYTES # BLD AUTO: 1.76 K/UL (ref 0–0.85)
MONOCYTES NFR BLD AUTO: 10.6 % (ref 0–13.4)
NEUTROPHILS # BLD AUTO: 12.78 K/UL (ref 1.82–7.42)
NEUTROPHILS NFR BLD: 77.3 % (ref 44–72)
NRBC # BLD AUTO: 0 K/UL
NRBC BLD AUTO-RTO: 0 /100 WBC
PLATELET # BLD AUTO: 286 K/UL (ref 164–446)
PMV BLD AUTO: 10 FL (ref 9–12.9)
POTASSIUM SERPL-SCNC: 3.6 MMOL/L (ref 3.6–5.5)
PROT SERPL-MCNC: 7 G/DL (ref 6–8.2)
RBC # BLD AUTO: 5.13 M/UL (ref 4.7–6.1)
SODIUM SERPL-SCNC: 133 MMOL/L (ref 135–145)
VANCOMYCIN TROUGH SERPL-MCNC: 5.6 UG/ML (ref 10–20)
WBC # BLD AUTO: 16.5 K/UL (ref 4.8–10.8)

## 2017-07-10 PROCEDURE — 36415 COLL VENOUS BLD VENIPUNCTURE: CPT

## 2017-07-10 PROCEDURE — 87102 FUNGUS ISOLATION CULTURE: CPT

## 2017-07-10 PROCEDURE — 700111 HCHG RX REV CODE 636 W/ 250 OVERRIDE (IP): Performed by: HOSPITALIST

## 2017-07-10 PROCEDURE — A9270 NON-COVERED ITEM OR SERVICE: HCPCS | Performed by: HOSPITALIST

## 2017-07-10 PROCEDURE — 700111 HCHG RX REV CODE 636 W/ 250 OVERRIDE (IP): Performed by: INTERNAL MEDICINE

## 2017-07-10 PROCEDURE — 80048 BASIC METABOLIC PNL TOTAL CA: CPT

## 2017-07-10 PROCEDURE — 700105 HCHG RX REV CODE 258: Performed by: INTERNAL MEDICINE

## 2017-07-10 PROCEDURE — 83605 ASSAY OF LACTIC ACID: CPT

## 2017-07-10 PROCEDURE — 87205 SMEAR GRAM STAIN: CPT

## 2017-07-10 PROCEDURE — 700105 HCHG RX REV CODE 258: Performed by: HOSPITALIST

## 2017-07-10 PROCEDURE — 80053 COMPREHEN METABOLIC PANEL: CPT

## 2017-07-10 PROCEDURE — 700111 HCHG RX REV CODE 636 W/ 250 OVERRIDE (IP)

## 2017-07-10 PROCEDURE — 0Q903ZX DRAINAGE OF LUMBAR VERTEBRA, PERCUTANEOUS APPROACH, DIAGNOSTIC: ICD-10-PCS | Performed by: RADIOLOGY

## 2017-07-10 PROCEDURE — G8987 SELF CARE CURRENT STATUS: HCPCS | Mod: CL

## 2017-07-10 PROCEDURE — 700111 HCHG RX REV CODE 636 W/ 250 OVERRIDE (IP): Performed by: PHARMACIST

## 2017-07-10 PROCEDURE — 700105 HCHG RX REV CODE 258: Performed by: PHARMACIST

## 2017-07-10 PROCEDURE — 20225 BONE BIOPSY TROCAR/NDL DEEP: CPT

## 2017-07-10 PROCEDURE — 700111 HCHG RX REV CODE 636 W/ 250 OVERRIDE (IP): Performed by: RADIOLOGY

## 2017-07-10 PROCEDURE — 87075 CULTR BACTERIA EXCEPT BLOOD: CPT

## 2017-07-10 PROCEDURE — 80202 ASSAY OF VANCOMYCIN: CPT

## 2017-07-10 PROCEDURE — G8988 SELF CARE GOAL STATUS: HCPCS | Mod: CI

## 2017-07-10 PROCEDURE — 87070 CULTURE OTHR SPECIMN AEROBIC: CPT

## 2017-07-10 PROCEDURE — 99233 SBSQ HOSP IP/OBS HIGH 50: CPT | Performed by: HOSPITALIST

## 2017-07-10 PROCEDURE — 97166 OT EVAL MOD COMPLEX 45 MIN: CPT

## 2017-07-10 PROCEDURE — 700102 HCHG RX REV CODE 250 W/ 637 OVERRIDE(OP): Performed by: HOSPITALIST

## 2017-07-10 PROCEDURE — 85025 COMPLETE CBC W/AUTO DIFF WBC: CPT | Mod: 91

## 2017-07-10 PROCEDURE — 770020 HCHG ROOM/CARE - TELE (206)

## 2017-07-10 PROCEDURE — 87015 SPECIMEN INFECT AGNT CONCNTJ: CPT

## 2017-07-10 PROCEDURE — 700102 HCHG RX REV CODE 250 W/ 637 OVERRIDE(OP): Performed by: INTERNAL MEDICINE

## 2017-07-10 PROCEDURE — A9270 NON-COVERED ITEM OR SERVICE: HCPCS | Performed by: INTERNAL MEDICINE

## 2017-07-10 RX ORDER — METOCLOPRAMIDE HYDROCHLORIDE 5 MG/ML
10 INJECTION INTRAMUSCULAR; INTRAVENOUS EVERY 6 HOURS
Status: DISCONTINUED | OUTPATIENT
Start: 2017-07-10 | End: 2017-07-13

## 2017-07-10 RX ORDER — MIDAZOLAM HYDROCHLORIDE 1 MG/ML
.5-2 INJECTION INTRAMUSCULAR; INTRAVENOUS PRN
Status: ACTIVE | OUTPATIENT
Start: 2017-07-10 | End: 2017-07-10

## 2017-07-10 RX ORDER — ONDANSETRON 2 MG/ML
4 INJECTION INTRAMUSCULAR; INTRAVENOUS PRN
Status: ACTIVE | OUTPATIENT
Start: 2017-07-10 | End: 2017-07-10

## 2017-07-10 RX ORDER — NALOXONE HYDROCHLORIDE 0.4 MG/ML
INJECTION, SOLUTION INTRAMUSCULAR; INTRAVENOUS; SUBCUTANEOUS
Status: COMPLETED
Start: 2017-07-10 | End: 2017-07-10

## 2017-07-10 RX ORDER — DIGOXIN 125 MCG
125 TABLET ORAL DAILY
Status: DISCONTINUED | OUTPATIENT
Start: 2017-07-11 | End: 2017-07-25 | Stop reason: HOSPADM

## 2017-07-10 RX ORDER — OXYCODONE HYDROCHLORIDE 5 MG/1
5 TABLET ORAL
Status: DISCONTINUED | OUTPATIENT
Start: 2017-07-10 | End: 2017-07-14

## 2017-07-10 RX ORDER — GABAPENTIN 300 MG/1
600 CAPSULE ORAL
Status: DISCONTINUED | OUTPATIENT
Start: 2017-07-10 | End: 2017-07-10 | Stop reason: CLARIF

## 2017-07-10 RX ORDER — GABAPENTIN 100 MG/1
200 CAPSULE ORAL 3 TIMES DAILY
Status: DISCONTINUED | OUTPATIENT
Start: 2017-07-10 | End: 2017-07-25 | Stop reason: HOSPADM

## 2017-07-10 RX ORDER — DIGOXIN 0.25 MG/ML
125 INJECTION INTRAMUSCULAR; INTRAVENOUS ONCE
Status: COMPLETED | OUTPATIENT
Start: 2017-07-10 | End: 2017-07-10

## 2017-07-10 RX ORDER — LISINOPRIL 20 MG/1
40 TABLET ORAL
Status: DISCONTINUED | OUTPATIENT
Start: 2017-07-10 | End: 2017-07-13

## 2017-07-10 RX ORDER — MIDAZOLAM HYDROCHLORIDE 1 MG/ML
INJECTION INTRAMUSCULAR; INTRAVENOUS
Status: COMPLETED
Start: 2017-07-10 | End: 2017-07-10

## 2017-07-10 RX ORDER — SODIUM CHLORIDE 9 MG/ML
500 INJECTION, SOLUTION INTRAVENOUS
Status: ACTIVE | OUTPATIENT
Start: 2017-07-10 | End: 2017-07-10

## 2017-07-10 RX ORDER — DIGOXIN 0.25 MG/ML
125 INJECTION INTRAMUSCULAR; INTRAVENOUS ONCE
Status: DISCONTINUED | OUTPATIENT
Start: 2017-07-10 | End: 2017-07-10

## 2017-07-10 RX ORDER — SODIUM CHLORIDE 9 MG/ML
500 INJECTION, SOLUTION INTRAVENOUS ONCE
Status: COMPLETED | OUTPATIENT
Start: 2017-07-10 | End: 2017-07-10

## 2017-07-10 RX ORDER — OXYCODONE HYDROCHLORIDE 5 MG/1
2.5 TABLET ORAL
Status: DISCONTINUED | OUTPATIENT
Start: 2017-07-10 | End: 2017-07-14

## 2017-07-10 RX ORDER — GABAPENTIN 100 MG/1
200 CAPSULE ORAL ONCE
Status: COMPLETED | OUTPATIENT
Start: 2017-07-10 | End: 2017-07-10

## 2017-07-10 RX ADMIN — HYDROMORPHONE HYDROCHLORIDE 0.5 MG: 1 INJECTION, SOLUTION INTRAMUSCULAR; INTRAVENOUS; SUBCUTANEOUS at 09:05

## 2017-07-10 RX ADMIN — PROCHLORPERAZINE EDISYLATE 10 MG: 5 INJECTION INTRAMUSCULAR; INTRAVENOUS at 09:05

## 2017-07-10 RX ADMIN — STANDARDIZED SENNA CONCENTRATE AND DOCUSATE SODIUM 2 TABLET: 8.6; 5 TABLET, FILM COATED ORAL at 08:46

## 2017-07-10 RX ADMIN — MIDAZOLAM 1 MG: 1 INJECTION INTRAMUSCULAR; INTRAVENOUS at 14:23

## 2017-07-10 RX ADMIN — GABAPENTIN 200 MG: 100 CAPSULE ORAL at 20:41

## 2017-07-10 RX ADMIN — OXYCODONE HYDROCHLORIDE 2.5 MG: 5 TABLET ORAL at 23:28

## 2017-07-10 RX ADMIN — ONDANSETRON 4 MG: 2 INJECTION INTRAMUSCULAR; INTRAVENOUS at 05:06

## 2017-07-10 RX ADMIN — OXYCODONE HYDROCHLORIDE 2.5 MG: 5 TABLET ORAL at 05:05

## 2017-07-10 RX ADMIN — FENTANYL CITRATE 25 MCG: 50 INJECTION, SOLUTION INTRAMUSCULAR; INTRAVENOUS at 14:23

## 2017-07-10 RX ADMIN — OMEPRAZOLE 20 MG: 20 CAPSULE, DELAYED RELEASE ORAL at 20:41

## 2017-07-10 RX ADMIN — SODIUM CHLORIDE: 9 INJECTION, SOLUTION INTRAVENOUS at 08:38

## 2017-07-10 RX ADMIN — GABAPENTIN 200 MG: 100 CAPSULE ORAL at 08:42

## 2017-07-10 RX ADMIN — LISINOPRIL 40 MG: 20 TABLET ORAL at 08:42

## 2017-07-10 RX ADMIN — STANDARDIZED SENNA CONCENTRATE AND DOCUSATE SODIUM 2 TABLET: 8.6; 5 TABLET, FILM COATED ORAL at 20:41

## 2017-07-10 RX ADMIN — CEFTRIAXONE SODIUM 2 G: 2 INJECTION, POWDER, FOR SOLUTION INTRAMUSCULAR; INTRAVENOUS at 08:35

## 2017-07-10 RX ADMIN — OXYCODONE HYDROCHLORIDE 5 MG: 5 TABLET ORAL at 15:43

## 2017-07-10 RX ADMIN — OMEPRAZOLE 20 MG: 20 CAPSULE, DELAYED RELEASE ORAL at 08:39

## 2017-07-10 RX ADMIN — SODIUM CHLORIDE 500 ML: 9 INJECTION, SOLUTION INTRAVENOUS at 08:41

## 2017-07-10 RX ADMIN — METOCLOPRAMIDE 10 MG: 5 INJECTION, SOLUTION INTRAMUSCULAR; INTRAVENOUS at 18:02

## 2017-07-10 RX ADMIN — VITAMIN D, TAB 1000IU (100/BT) 5000 UNITS: 25 TAB at 08:40

## 2017-07-10 RX ADMIN — VANCOMYCIN HYDROCHLORIDE 2000 MG: 100 INJECTION, POWDER, LYOPHILIZED, FOR SOLUTION INTRAVENOUS at 17:54

## 2017-07-10 RX ADMIN — MIDAZOLAM HYDROCHLORIDE 1 MG: 1 INJECTION INTRAMUSCULAR; INTRAVENOUS at 14:23

## 2017-07-10 RX ADMIN — DIGOXIN 125 MCG: 0.25 INJECTION INTRAMUSCULAR; INTRAVENOUS at 11:16

## 2017-07-10 RX ADMIN — FENTANYL CITRATE 25 MCG: 50 INJECTION, SOLUTION INTRAMUSCULAR; INTRAVENOUS at 14:40

## 2017-07-10 RX ADMIN — GABAPENTIN 200 MG: 100 CAPSULE ORAL at 19:19

## 2017-07-10 RX ADMIN — CYANOCOBALAMIN TAB 500 MCG 1000 MCG: 500 TAB at 08:39

## 2017-07-10 RX ADMIN — SODIUM CHLORIDE: 9 INJECTION, SOLUTION INTRAVENOUS at 23:29

## 2017-07-10 RX ADMIN — ATORVASTATIN CALCIUM 10 MG: 10 TABLET, FILM COATED ORAL at 20:41

## 2017-07-10 ASSESSMENT — ENCOUNTER SYMPTOMS
NAUSEA: 0
SHORTNESS OF BREATH: 0
NAUSEA: 1
VOMITING: 0
SPEECH CHANGE: 0
HEARTBURN: 0
HEADACHES: 0
TINGLING: 0
HEMOPTYSIS: 0
VOMITING: 1
CHILLS: 0
DIZZINESS: 0
FEVER: 0
COUGH: 0
SPUTUM PRODUCTION: 0
SENSORY CHANGE: 0
MYALGIAS: 1
WEAKNESS: 1
TREMORS: 0
DIAPHORESIS: 0
ABDOMINAL PAIN: 0
BACK PAIN: 1

## 2017-07-10 ASSESSMENT — PAIN SCALES - GENERAL
PAINLEVEL_OUTOF10: 4
PAINLEVEL_OUTOF10: 7
PAINLEVEL_OUTOF10: 7
PAINLEVEL_OUTOF10: 1
PAINLEVEL_OUTOF10: 7
PAINLEVEL_OUTOF10: 6
PAINLEVEL_OUTOF10: 0
PAINLEVEL_OUTOF10: 3

## 2017-07-10 ASSESSMENT — COGNITIVE AND FUNCTIONAL STATUS - GENERAL
DRESSING REGULAR LOWER BODY CLOTHING: A LOT
DAILY ACTIVITIY SCORE: 17
SUGGESTED CMS G CODE MODIFIER DAILY ACTIVITY: CK
TOILETING: A LOT
HELP NEEDED FOR BATHING: A LOT
DRESSING REGULAR UPPER BODY CLOTHING: A LITTLE

## 2017-07-10 ASSESSMENT — ACTIVITIES OF DAILY LIVING (ADL): TOILETING: INDEPENDENT

## 2017-07-10 NOTE — PROGRESS NOTES
Pt nauseous, with small amount of clear emesis. Pt given pain medication and anti-nausea medication, per mar.

## 2017-07-10 NOTE — PROGRESS NOTES
CT Procedure Note:    Dr. Sheehan performed a Disk Aspiration of the L3 and L4 Space.  The pt tolerated the procedure well, vital signs stable.  Bandaid applied to lumbar midline, CDI and soft.  Cores x 3 from L3 L4 disk space.  Report given to Rupa HENRY.  Pt transported back to room.

## 2017-07-10 NOTE — PROGRESS NOTES
[facility] Hospitalist Progress Note    Date of Service: 2017    Chief Complaint  86 y.o. male admitted 2017 with lower back pain    Interval Problem Update  Pain is 7/10, ,lower back, sharp, no radiation, constant, worse with movement, radiates down left leg    Improved mobility right   leg    Mri LS spine shows Possible discitis at the L3-4 level.    Has uti.. treated    bp is elevated and uncontrolled    Lactic acid rises to 3.1    He appears toxic    Bigeminy noted on tele    Magnesium is low at 1.3    Wbc worse since stopping rocephin    One positive blood culture            Consultants/Specialty  none    Disposition  Home when stable        Review of Systems   Constitutional: Negative for fever, chills, malaise/fatigue and diaphoresis.   HENT: Negative for tinnitus.    Respiratory: Negative for cough, hemoptysis, sputum production and shortness of breath.    Gastrointestinal: Negative for heartburn, nausea, vomiting and abdominal pain.   Genitourinary: Negative for dysuria and frequency.   Musculoskeletal: Positive for myalgias and back pain.   Neurological: Negative for dizziness, tingling, tremors, sensory change and headaches.   All other systems reviewed and are negative.     Physical Exam  Laboratory/Imaging   Hemodynamics  Temp (24hrs), Av.4 °C (97.6 °F), Min:36.1 °C (96.9 °F), Max:37.1 °C (98.7 °F)   Temperature: 36.2 °C (97.1 °F)  Pulse  Av.1  Min: 34  Max: 114    Blood Pressure : 160/70 mmHg      Respiratory      Respiration: 20, Pulse Oximetry: 95 %     Work Of Breathing / Effort: Mild;Shallow  RUL Breath Sounds: Clear, RML Breath Sounds: Clear, RLL Breath Sounds: Diminished, MIKE Breath Sounds: Clear, LLL Breath Sounds: Diminished    Fluids    Intake/Output Summary (Last 24 hours) at 17 1900  Last data filed at 17 1800   Gross per 24 hour   Intake    910 ml   Output   2775 ml   Net  -1865 ml       Nutrition  Orders Placed This Encounter   Procedures   • DIET ORDER      Standing Status: Standing      Number of Occurrences: 1      Standing Expiration Date:      Order Specific Question:  Diet:     Answer:  Regular [1]     Physical Exam   Constitutional: He is oriented to person, place, and time. He appears distressed.   Eyes: Left eye exhibits no discharge.   Neck: No JVD present. No tracheal deviation present. No thyromegaly present.   Cardiovascular: Exam reveals no gallop and no friction rub.    irregular   Pulmonary/Chest: No respiratory distress. He has no wheezes. He has no rales.   Abdominal: He exhibits distension. There is no tenderness. There is no rebound.   Musculoskeletal: He exhibits no edema or tenderness.   Neurological: He is alert and oriented to person, place, and time.   Straight leg raise limited in LLE   Skin: There is pallor.       Recent Labs      07/08/17   0327  07/09/17   1646   WBC  10.3  17.5*   RBC  4.54*  4.87   HEMOGLOBIN  13.5*  14.2   HEMATOCRIT  41.5*  43.2   MCV  91.4  88.7   MCH  29.7  29.2   MCHC  32.5*  32.9*   RDW  44.4  42.8   PLATELETCT  186  261   MPV  11.4  10.7     Recent Labs      07/08/17   0324  07/09/17   1646   SODIUM  133*  136   POTASSIUM  4.3  3.7   CHLORIDE  105  102   CO2  21  25   GLUCOSE  186*  114*   BUN  20  15   CREATININE  0.89  0.64   CALCIUM  9.5  9.5                      Assessment/Plan     * Low back pain (present on admission)  Assessment & Plan  Pain control    Iv dialudid for severe pain    await mri back  Added decadron iv, iv toradol, po neurontin    Added po motrin    Atrial flutter (CMS-HCC) (present on admission)  Assessment & Plan  Rate controlled    Sepsis (CMS-[facility]) (present on admission)  Assessment & Plan      Treat uti with abx    It appears disciitis may be actual source    Id on case for abx      Bone biopsy lumbar area ordered    UTI (urinary tract infection) (present on admission)  Assessment & Plan  Iv rocephin    With negative cultures- will stop after 3rd dose    Follow cultures        Hypotension  (present on admission)  Assessment & Plan    resolved      Morbid obesity (CMS-HCC) (present on admission)  Assessment & Plan  Weight loss and exercise    Pt and ot eval        Hearn catheter: No Hearn        DVT prophylaxis - mechanical: SCDs

## 2017-07-10 NOTE — PROGRESS NOTES
Pt was NPO at midnight. He is still having urinary frequency but is having a lot of output. Pt's stated pain is at a 1/10.

## 2017-07-10 NOTE — CARE PLAN
Problem: Communication  Goal: The ability to communicate needs accurately and effectively will improve  Outcome: PROGRESSING AS EXPECTED  Pt educated on POC and medications. Pt verbalized understanding.    Problem: Safety  Goal: Will remain free from injury  Outcome: PROGRESSING AS EXPECTED  Bedside table and call light are within reach. Bed is locked and in the lowest position. Treaded socks are on. Patient educated to call for assistance.

## 2017-07-10 NOTE — PROGRESS NOTES
Monitor summary: /.10/ Aflutter 50-61 down to 38 unsustained, with a 20 sec bigeminal run asymptomatic.

## 2017-07-10 NOTE — THERAPY
As per Rn, defer PT eval due to uncontrollable pain and pt sscheduled for bone marrow bx today. Eval to follow when pt able to participate.

## 2017-07-10 NOTE — PROGRESS NOTES
Digoxin given per MAR, see MAR, BP initially down to 90/30s     1245 BP currently 126/81, HR 77, pt resting comfortably in bed, oxymask on while pt sleeping, pt is a mouth breather.

## 2017-07-10 NOTE — PROGRESS NOTES
Progress Note               Author: Jaziel Azar Date & Time created: 7/10/2017  10:33 AM     Interval History:  Hospital day #4 for low back pain, L3-4 discitis  Claims back pain is okay when laying, increases with activity  Planning biopsy of disc space today.   On Vanco, rocephin     Review of Systems:  ROS     Denies new pain, numbness, weakness.   Denies HA, positional HA, N/V, dizziness, chest pain, SOB, difficulty breathing, chills        Physical Exam:  Physical Exam   VSS  A&Ox4, NAD  NM:   RLE: 4/5 hip flexion, knee extension, knee flexion, plantar flexion, dorsiflexion, EHL  LLE: 3/5 hip flexion, 4-/5 knee extension, knee flexion, plantar flexion, dorsiflexion, EHL  Sensation decreased to light touch throughout the LLE below the knee  Sensation intact to light touch throughout the RLE   Abdomen: soft, non-tender  Non-labored breathing on mask normal respiratory effort  Capillary refill in all four extremities <2 seconds  No LE edema, erythema, cyanosis, clubbing  Calves non-tender to compression bilat            Labs:        Invalid input(s): [id number]      Recent Labs      07/08/17   0324  07/09/17   1646  07/10/17   0048   SODIUM  133*  136  133*   POTASSIUM  4.3  3.7  3.6   CHLORIDE  105  102  96   CO2  21  25  30   BUN  20  15  11   CREATININE  0.89  0.64  0.57   MAGNESIUM   --   1.3*   --    CALCIUM  9.5  9.5  9.5     Recent Labs      07/08/17   0324  07/09/17   1646  07/10/17   0048   ALTSGPT   --    --   29   ASTSGOT   --    --   23   ALKPHOSPHAT   --    --   68   TBILIRUBIN   --    --   0.6   GLUCOSE  186*  114*  154*     Recent Labs      07/08/17   0327  07/09/17   1646  07/10/17   0048   RBC  4.54*  4.87  5.13   HEMOGLOBIN  13.5*  14.2  15.1   HEMATOCRIT  41.5*  43.2  44.4   PLATELETCT  186  261  286     Recent Labs      07/08/17   0327  07/09/17   1646  07/10/17   0048   WBC  10.3  17.5*  16.5*   NEUTSPOLYS  87.90*  75.40*  77.30*   LYMPHOCYTES  6.70*  12.20*  10.60*   MONOCYTES  4.60   10.90  10.60   EOSINOPHILS  0.00  0.40  0.40   BASOPHILS  0.20  0.20  0.30   ASTSGOT   --    --   23   ALTSGPT   --    --   29   ALKPHOSPHAT   --    --   68   TBILIRUBIN   --    --   0.6     Hemodynamics:  Temp (24hrs), Av.4 °C (97.5 °F), Min:36 °C (96.8 °F), Max:37.1 °C (98.7 °F)  Temperature: 36.4 °C (97.6 °F)  Pulse  Av.2  Min: 34  Max: 114   Blood Pressure : (!) 170/95 mmHg (rn notified)     Respiratory:    Respiration: 16, Pulse Oximetry: 95 %        RUL Breath Sounds: Clear, RML Breath Sounds: Clear, RLL Breath Sounds: Diminished, MIKE Breath Sounds: Clear, LLL Breath Sounds: Diminished  Fluids:    Intake/Output Summary (Last 24 hours) at 07/10/17 1033  Last data filed at 07/10/17 0915   Gross per 24 hour   Intake   1520 ml   Output   5385 ml   Net  -3865 ml     Weight: 99.2 kg (218 lb 11.1 oz)  GI/Nutrition:  Orders Placed This Encounter   Procedures   • DIET ORDER     Standing Status: Standing      Number of Occurrences: 1      Standing Expiration Date:      Order Specific Question:  Diet:     Answer:  Regular [1]     Medical Decision Making, by Problem:  Active Hospital Problems    Diagnosis   • *Low back pain [M54.5]   • Bigeminy [I49.9]   • Hypomagnesemia [E83.42]   • Hypotension [I95.9]   • Morbid obesity (CMS-[facility]) [E66.01]   • Sepsis (CMS-HCC) [A41.9]   • UTI (urinary tract infection) [N39.0]   • Atrial flutter (CMS-HCC) [I48.92]       Plan:  L3-4 discitis  Low back pain  Chronic left leg weakness  Plan  Continue care per primary team, ID  Planning for biopsy of disc space today  Pain meds PRN  OK to mobilize, PT, etc. From our end.     Jaziel Azar NP    [employer] Measures

## 2017-07-10 NOTE — OR SURGEON
Immediate Post- Operative Note        PostOp Diagnosis: Possible diskitis osteomyelitis at L3-4.       Procedure(s): CT guided disk aspiration.       Estimated Blood Loss: Less than 5 ml        Complications: None            7/10/2017     1449 PM     Chandler Sheehan

## 2017-07-10 NOTE — PROGRESS NOTES
Infectious Disease Progress Note    Author: Ny Chilel M.D. Date of service & Time created: 7/10/2017  8:06 AM    Chief Complaint:  Chief Complaint   Patient presents with   • Back Pain    follow-up for lumbar discitis and bacteremia    Interval History:  86-year-old white male admitted for back pain. Found to have discitis  17- no fevers. WBC 10.3. Ongoing back pain. Sedimentation rate is 46. Now the blood cultures are positive for strep species. Also has new nausea and vomiting today.   7/10 AF, WBC 16.5, having nausea and vomiting, ongoing back pain, plan for bone biopsy today, tolerating abx without issues  Labs Reviewed, Medications Reviewed and Radiology Reviewed.    Review of Systems:  Review of Systems   Constitutional: Positive for malaise/fatigue. Negative for fever.   Respiratory: Negative for cough and shortness of breath.    Cardiovascular: Positive for leg swelling. Negative for chest pain.   Gastrointestinal: Positive for nausea and vomiting.   Genitourinary: Negative for dysuria.   Musculoskeletal: Positive for myalgias and back pain.   Neurological: Positive for weakness. Negative for speech change and headaches.       Hemodynamics:  Temp (24hrs), Av.4 °C (97.5 °F), Min:36 °C (96.8 °F), Max:37.1 °C (98.7 °F)  Temperature: 36.4 °C (97.6 °F)  Pulse  Av.2  Min: 34  Max: 114   Blood Pressure : (!) 170/95 mmHg (rn notified)       Physical Exam:  Physical Exam   Constitutional: He is oriented to person, place, and time. He appears well-nourished. No distress.   Looks ill   Eyes: No scleral icterus.   Neck: Neck supple.   Cardiovascular:   Irregularly irregular   Pulmonary/Chest: He has no wheezes. He has no rales.   Abdominal: Soft. There is no tenderness. There is no rebound.   Musculoskeletal: He exhibits edema.   Neurological: He is alert and oriented to person, place, and time.   Vitals reviewed.      Meds:    Current facility-administered medications:   •  lisinopril (PRINIVIL)  tablet 20 mg, 20 mg, Oral, Q DAY, Georgi Grey M.D., 20 mg at 07/09/17 0954  •  NS infusion, , Intravenous, Continuous, Georgi Grey M.D., Last Rate: 83 mL/hr at 07/09/17 1504  •  ondansetron (ZOFRAN) syringe/vial injection 4 mg, 4 mg, Intravenous, Q6HRS PRN, Georgi Grey M.D., 4 mg at 07/10/17 0506  •  omeprazole (PRILOSEC) capsule 20 mg, 20 mg, Oral, BID, Georgi Grey M.D., 20 mg at 07/09/17 2038  •  MD ALERT... vancomycin per pharmacy protocol, , Other, pharmacy to dose, Oliva Bolanos M.D.  •  cefTRIAXone (ROCEPHIN) 2 g in  mL IVPB, 2 g, Intravenous, Q24HRS, Oliva Bolanos M.D., Stopped at 07/09/17 1442  •  vancomycin 2,000 mg in  mL IVPB, 20 mg/kg, Intravenous, Q24HR, Robert Mcnulty, PHARMD  •  hydrALAZINE (APRESOLINE) tablet 10 mg, 10 mg, Oral, Q4HRS PRN, Jamaal Molina M.D., 10 mg at 07/09/17 2038  •  clonidine (CATAPRES) tablet 0.1 mg, 0.1 mg, Oral, 4X/DAY PRN, Jamaal Molina M.D.  •  prochlorperazine (COMPAZINE) injection 10 mg, 10 mg, Intravenous, Q6HRS PRN, Darion Benton M.D., 10 mg at 07/09/17 0858  •  pneumococcal 13-Jocelyn Conj Vacc (PREVNAR 13) syringe 0.5 mL, 0.5 mL, Intramuscular, Once PRN, Jamaal Molina M.D.  •  gabapentin (NEURONTIN) capsule 100 mg, 100 mg, Oral, TID, Georgi Grey M.D., 100 mg at 07/09/17 2038  •  atorvastatin (LIPITOR) tablet 10 mg, 10 mg, Oral, Nightly, Jamaal Molina M.D., 10 mg at 07/09/17 2038  •  vitamin D (cholecalciferol) tablet 5,000 Units, 5,000 Units, Oral, Q48HRS, Jamaal Molina M.D., 5,000 Units at 07/08/17 0922  •  cyanocobalamin (VITAMIN B-12) tablet 1,000 mcg, 1,000 mcg, Oral, DAILY, Jamaal Molina M.D., 1,000 mcg at 07/09/17 0954  •  senna-docusate (PERICOLACE or SENOKOT S) 8.6-50 MG per tablet 2 Tab, 2 Tab, Oral, BID, 2 Tab at 07/09/17 2038 **AND** polyethylene glycol/lytes (MIRALAX) PACKET 1 Packet, 1 Packet, Oral, QDAY PRN **AND** magnesium hydroxide (MILK OF MAGNESIA) suspension 30 mL, 30 mL, Oral, QDAY PRN **AND**  bisacodyl (DULCOLAX) suppository 10 mg, 10 mg, Rectal, QDAY PRN, Jamaal Molina M.D.  •  acetaminophen (TYLENOL) tablet 650 mg, 650 mg, Oral, Q6HRS PRN, Jamaal Molina M.D., 650 mg at 07/08/17 2142  •  Notify provider if pain remains uncontrolled, , , CONTINUOUS **AND** Use the numeric rating scale (NRS-11) on regular floors and Critical-Care Pain Observation Tool (CPOT) on ICUs/Trauma to assess pain, , , CONTINUOUS **AND** Pulse Ox (Oximetry), , , CONTINUOUS **AND** Pharmacy Consult Request ...Pain Management Review, , Other, PRN **AND** If patient difficult to arouse and/or has respiratory depression, stop any opiates that are currently infusing and call a Rapid Response., , , CONTINUOUS **AND** oxycodone immediate-release (ROXICODONE) tablet 2.5 mg, 2.5 mg, Oral, Q3HRS PRN, 2.5 mg at 07/10/17 0505 **AND** oxycodone immediate-release (ROXICODONE) tablet 5 mg, 5 mg, Oral, Q3HRS PRN, 5 mg at 07/09/17 2041 **AND** HYDROmorphone (DILAUDID) injection 0.25 mg, 0.25 mg, Intravenous, Q3HRS PRN, Jamaal Molina M.D., 0.25 mg at 07/06/17 1237    Labs:  Recent Labs      07/08/17   0327  07/09/17   1646  07/10/17   0048   WBC  10.3  17.5*  16.5*   RBC  4.54*  4.87  5.13   HEMOGLOBIN  13.5*  14.2  15.1   HEMATOCRIT  41.5*  43.2  44.4   MCV  91.4  88.7  86.5   MCH  29.7  29.2  29.4   RDW  44.4  42.8  41.0   PLATELETCT  186  261  286   MPV  11.4  10.7  10.0   NEUTSPOLYS  87.90*  75.40*  77.30*   LYMPHOCYTES  6.70*  12.20*  10.60*   MONOCYTES  4.60  10.90  10.60   EOSINOPHILS  0.00  0.40  0.40   BASOPHILS  0.20  0.20  0.30     Recent Labs      07/08/17   0324  07/09/17   1646  07/10/17   0048   SODIUM  133*  136  133*   POTASSIUM  4.3  3.7  3.6   CHLORIDE  105  102  96   CO2  21  25  30   GLUCOSE  186*  114*  154*   BUN  20  15  11     Recent Labs      07/08/17   0324  07/09/17   1646  07/10/17   0048   ALBUMIN   --    --   3.5   TBILIRUBIN   --    --   0.6   ALKPHOSPHAT   --    --   68   TOTPROTEIN   --    --   7.0   ALTSGPT    --    --   29   ASTSGOT   --    --   23   CREATININE  0.89  0.64  0.57       Imaging:  Ct-abdomen-pelvis W/o    7/6/2017 7/6/2017 10:28 AM HISTORY/REASON FOR EXAM:  Pain. TECHNIQUE/EXAM DESCRIPTION: CT scan of the abdomen and pelvis without contrast. Noncontrast helical scanning was obtained from the diaphragmatic domes through the pubic symphysis. Low dose optimization technique was utilized for this CT exam including automated exposure control and adjustment of the mA and/or kV according to patient size. COMPARISON: 3/21/2017 FINDINGS: CT Abdomen: Hyperexpanded lung bases. Atelectasis both lung bases. No basilar pleural effusion. Coronary artery calcifications. Liver and spleen are within normal limits in size. Pancreatic calcifications/consistent with sequela of pancreatitis. No peripancreatic fluid collections identified. No adrenal gland masses. No hydronephrosis or urolithiasis. No evidence of bowel obstruction. The appendix is not delineated. There are diverticula colon. No evidence of diverticulitis. No free fluid. Postoperative changes lumbar spine. CT Pelvis: Urinary bladder wall is mildly and symmetrically thickened. Prostate projects into the fourth of the urinary bladder. No pelvic free fluid.     7/6/2017  No hydronephrosis or urolithiasis. Diverticula colon. No evidence diverticulitis. No free fluid. Pancreatic calcifications/consistent with sequela pancreatitis. No peripancreatic fluid collections. Coronary artery calcifications.    Ct-lspine W/o Plus Recons    7/6/2017 7/6/2017 10:28 AM HISTORY/REASON FOR EXAM: Worsening back pain TECHNIQUE/EXAM DESCRIPTION AND NUMBER OF VIEWS: CT lumbar spine without contrast, with reconstructions. Thin-section helical images were obtained of the lumbar spine in the axial plane.  Sagittal reconstructions were generated from the axial data. Low dose optimization technique was utilized for this CT exam including automated exposure control and adjustment of the mA  and/or kV according to patient size. FINDINGS: Sagittal reconstructed images demonstrate surgical change at the L4-5 level characterized by bilateral pedicular screw and zev fixation. There is laminectomy change extending from L3 through L5. There is scoliotic deformity. There is no evidence of fracture or dislocation. There is multilevel decreased disc height and endplate spurring consistent with multilevel degenerative disc disease. There are old fractures of the right L1 and L2 transverse processes. There is degenerative retrolisthesis at L3-4 and anterior subluxation at L4-5. There is a bone graft donor site involving the left ilium.     7/6/2017  1.  No evidence of acute fracture of the lumbar spine. 2.  Surgical change extending from L3 through L5. 3.  Multilevel degenerative disc disease and facet degeneration. 4.  Old fractures of the right L1 and L2 transverse processes. 5.  Multilevel degenerative subluxation.    Mr-lumbar Spine-with & W/o    7/8/2017 7/7/2017 7:28 PM HISTORY/REASON FOR EXAM:  Low Back Pain. TECHNIQUE/EXAM DESCRIPTION: MRI of the lumbar spine without and with contrast. The study was performed on a [employer] Sign[employer] 1.5 Lucille MRI scanner. T1 sagittal, T2 fast spin-echo sagittal, and T2 axial images were obtained of the lumbar spine. T1 post-contrast fat-suppressed sagittal images were obtained. Optional T2 fat-suppressed sagittal and T1 post-contrast axial images may be obtained. 20 mL Omniscan contrast was administered intravenously. COMPARISON:  CT scan lumbar spine dated 7/6/2017. FINDINGS: There is 4 mm retrolisthesis at the L3-4 level. There are no previous laminectomies from the L2 level to the sacrum. Previous bilateral pedicle screw and zev fixation at the L4-L5 levels. Marrow signal in the vertebral bodies is within normal limits. There is no abnormal osseous enhancement. There is no other abnormal extradural enhancement. There are no significant osteophytic changes. The prevertebral and  paraspinous soft tissues are unremarkable. The conus is normal in position and signal. There is no abnormal cord enhancement. There is partial intradiscal fusion at the L4-5 level. There is moderate disc space narrowing at the L2-3 and L3-4 levels. There is increased T2 signal intensity in the L3-4 disc space. Additionally there are bandlike regions of decreased T1 and increased T2 signal intensity involving the L3-4 endplates. There is curvilinear increased T1 and T2 signal intensity involving the inferior endplate of the L2 vertebral body. There is a Schmorl's node endplate herniation into this inferior endplate. Level specific findings: L5-S1 level normal. L4-5 level postoperative changes. Mild to moderate bilateral neural foraminal narrowing. L3-4 level moderate broad-based posterior spurring which effaces the ventral surface of thecal sac. Moderate central canal stenosis secondary to facet arthropathy. Severe bilateral neural foraminal stenosis. L2-3 level minimal posterior spurring and mild bilateral neural foraminal narrowing with mild disc bulging into the inferior aspects of the neural foramina. L1-2 level borderline central canal stenosis secondary to facet arthropathy.     7/8/2017  1.  Possible discitis at the L3-4 level. 2.  Mild to moderate retrolisthesis at the L3-4 level. 3.  Previous extensive laminectomy from the L2 level to the sacrum with bilateral pedicle screw and zev fixation at the L4-L5 levels. 4.   Moderate lumbar spondylotic changes at the L3-4 levels with associated moderate central canal stenosis at this level secondary to facet arthropathy. Additionally there are severe bilateral neural foraminal stenosis. 5.  Minimal lumbar spondylotic changes at the L2-3 level with mild bilateral neural foraminal narrowing and mild disc bulging into the inferior aspects of the neural foramina.    Dx-hip-bilateral-with Pelvis-3/4 Views    7/6/2017 7/6/2017 12:23 PM HISTORY/REASON FOR EXAM:  Bilateral  hip pain.. TECHNIQUE/EXAM DESCRIPTION AND NUMBER OF VIEWS:  3 views of the bilateral hip. COMPARISON: None FINDINGS: Osteopenia is present. There are surgical changes involving the lumbar spine. There is no evidence of pelvic or proximal femoral fracture. There is mild degenerative change characterized by ossification of the acetabular labrum bilaterally. There are pelvic phleboliths. There is vascular calcification.     7/6/2017  No evidence of fracture or arthropathy of the hips. Degenerative and surgical changes of the lumbar spine.      Micro:  BLOOD CULTURE   Date Value Ref Range Status   07/06/2017   Preliminary    No Growth    Note: Blood cultures are incubated for 5 days and  are monitored continuously.Positive blood cultures  are called to the RN and reported as soon as  they are identified.          Assessment:  Active Hospital Problems    Diagnosis   • *Low back pain [M54.5]   • Hypotension [I95.9]   • Morbid obesity (CMS-[facility]) [E66.01]   • Sepsis (CMS-[facility]) [A41.9]   • UTI (urinary tract infection) [N39.0]   • Atrial flutter (CMS-[facility]) [I48.92]       Plan:  L3-4 discitis  Sedimentation rate 46  CRP 7.83  Seen by neurosurgery as well  IR to biopsy - plan for today    Bacteremia   Blood cultures from 7/6/17 are showing strep species  Continue vancomycin and Rocephin for now pending strep speciation  TTE - negative  Bcx 7/9 - pending    Nausea and vomiting  Due to sepsis likely  Continue to monitor    Diabetes mellitus  Keep the blood sugars less than 150    Discussed with internal Medicine Dr. Grey

## 2017-07-10 NOTE — PROGRESS NOTES
Monitor room notified RN pt's HR in 150s.  Pt c/o feeling like his heart is racing and RN notes pt is slightly diaphoretic.  Vagal maneuver performed correctly with moderate success.  Pt sustaining in RVR for approximately 8 mins.  RN notified Dr. Grey STAT, orders for digoxin 0.125 IV STAT and digoxin 0.125 PO to start once daily tomorrow.    1053 Pt currently resting in bed and denies SOB or current palpitations.

## 2017-07-10 NOTE — THERAPY
"Occupational Therapy Evaluation completed.   Functional Status: Min A rolling, unable to tolerate transition from sidelying to sit due to spike in pain, supv bed level grooming  Plan of Care: Will benefit from Occupational Therapy 3 times per week  Discharge Recommendations:  Equipment: Will Continue to Assess for Equipment Needs.     See \"Rehab Therapy-Acute\" Patient Summary Report for complete documentation.    86 y.o. male admitted for low back pain. Dx with UTI, discitis. Pt scheduled for bone biopsy today. Pt seen for OT eval. Educated on log roll for bed mobility. Pt able to roll, but when he attempted push from sidelying to sit, had significant spike in low back pain. Unable to make transition. Pt returned to supine. Back pain significantly impacts any and all functional mobility. Acute OT to follow.     "

## 2017-07-11 PROBLEM — R78.81 BACTEREMIA: Status: ACTIVE | Noted: 2017-07-11

## 2017-07-11 PROBLEM — M46.46 DISCITIS OF LUMBAR REGION: Status: ACTIVE | Noted: 2017-07-11

## 2017-07-11 LAB
ANION GAP SERPL CALC-SCNC: 7 MMOL/L (ref 0–11.9)
BACTERIA BLD CULT: NORMAL
BASOPHILS # BLD AUTO: 0.4 % (ref 0–1.8)
BASOPHILS # BLD: 0.05 K/UL (ref 0–0.12)
BUN SERPL-MCNC: 12 MG/DL (ref 8–22)
CALCIUM SERPL-MCNC: 9.3 MG/DL (ref 8.5–10.5)
CHLORIDE SERPL-SCNC: 99 MMOL/L (ref 96–112)
CO2 SERPL-SCNC: 31 MMOL/L (ref 20–33)
CREAT SERPL-MCNC: 0.62 MG/DL (ref 0.5–1.4)
EOSINOPHIL # BLD AUTO: 0.18 K/UL (ref 0–0.51)
EOSINOPHIL NFR BLD: 1.3 % (ref 0–6.9)
ERYTHROCYTE [DISTWIDTH] IN BLOOD BY AUTOMATED COUNT: 39.8 FL (ref 35.9–50)
GFR SERPL CREATININE-BSD FRML MDRD: >60 ML/MIN/1.73 M 2
GLUCOSE SERPL-MCNC: 117 MG/DL (ref 65–99)
GRAM STN SPEC: NORMAL
HCT VFR BLD AUTO: 41.7 % (ref 42–52)
HGB BLD-MCNC: 14.1 G/DL (ref 14–18)
IMM GRANULOCYTES # BLD AUTO: 0.15 K/UL (ref 0–0.11)
IMM GRANULOCYTES NFR BLD AUTO: 1.1 % (ref 0–0.9)
LACTATE BLD-SCNC: 1.1 MMOL/L (ref 0.5–2)
LACTATE BLD-SCNC: 1.4 MMOL/L (ref 0.5–2)
LACTATE BLD-SCNC: 1.4 MMOL/L (ref 0.5–2)
LYMPHOCYTES # BLD AUTO: 2.16 K/UL (ref 1–4.8)
LYMPHOCYTES NFR BLD: 16.2 % (ref 22–41)
MCH RBC QN AUTO: 29.3 PG (ref 27–33)
MCHC RBC AUTO-ENTMCNC: 33.8 G/DL (ref 33.7–35.3)
MCV RBC AUTO: 86.7 FL (ref 81.4–97.8)
MONOCYTES # BLD AUTO: 1.52 K/UL (ref 0–0.85)
MONOCYTES NFR BLD AUTO: 11.4 % (ref 0–13.4)
NEUTROPHILS # BLD AUTO: 9.28 K/UL (ref 1.82–7.42)
NEUTROPHILS NFR BLD: 69.6 % (ref 44–72)
NRBC # BLD AUTO: 0 K/UL
NRBC BLD AUTO-RTO: 0 /100 WBC
PLATELET # BLD AUTO: 248 K/UL (ref 164–446)
PMV BLD AUTO: 10 FL (ref 9–12.9)
POTASSIUM SERPL-SCNC: 3.3 MMOL/L (ref 3.6–5.5)
RBC # BLD AUTO: 4.81 M/UL (ref 4.7–6.1)
SIGNIFICANT IND 70042: NORMAL
SIGNIFICANT IND 70042: NORMAL
SITE SITE: NORMAL
SITE SITE: NORMAL
SODIUM SERPL-SCNC: 137 MMOL/L (ref 135–145)
SOURCE SOURCE: NORMAL
SOURCE SOURCE: NORMAL
WBC # BLD AUTO: 13.3 K/UL (ref 4.8–10.8)

## 2017-07-11 PROCEDURE — 700105 HCHG RX REV CODE 258: Performed by: INTERNAL MEDICINE

## 2017-07-11 PROCEDURE — 97161 PT EVAL LOW COMPLEX 20 MIN: CPT

## 2017-07-11 PROCEDURE — 700105 HCHG RX REV CODE 258: Performed by: HOSPITALIST

## 2017-07-11 PROCEDURE — G8978 MOBILITY CURRENT STATUS: HCPCS | Mod: CM

## 2017-07-11 PROCEDURE — 83605 ASSAY OF LACTIC ACID: CPT

## 2017-07-11 PROCEDURE — A9270 NON-COVERED ITEM OR SERVICE: HCPCS | Performed by: HOSPITALIST

## 2017-07-11 PROCEDURE — 770020 HCHG ROOM/CARE - TELE (206)

## 2017-07-11 PROCEDURE — A9270 NON-COVERED ITEM OR SERVICE: HCPCS

## 2017-07-11 PROCEDURE — 700102 HCHG RX REV CODE 250 W/ 637 OVERRIDE(OP): Performed by: INTERNAL MEDICINE

## 2017-07-11 PROCEDURE — G8979 MOBILITY GOAL STATUS: HCPCS | Mod: CI

## 2017-07-11 PROCEDURE — 99232 SBSQ HOSP IP/OBS MODERATE 35: CPT | Performed by: INTERNAL MEDICINE

## 2017-07-11 PROCEDURE — 700111 HCHG RX REV CODE 636 W/ 250 OVERRIDE (IP): Performed by: INTERNAL MEDICINE

## 2017-07-11 PROCEDURE — 700111 HCHG RX REV CODE 636 W/ 250 OVERRIDE (IP): Performed by: PHARMACIST

## 2017-07-11 PROCEDURE — A9270 NON-COVERED ITEM OR SERVICE: HCPCS | Performed by: INTERNAL MEDICINE

## 2017-07-11 PROCEDURE — 700105 HCHG RX REV CODE 258: Performed by: PHARMACIST

## 2017-07-11 PROCEDURE — 700102 HCHG RX REV CODE 250 W/ 637 OVERRIDE(OP)

## 2017-07-11 PROCEDURE — 700111 HCHG RX REV CODE 636 W/ 250 OVERRIDE (IP): Performed by: HOSPITALIST

## 2017-07-11 PROCEDURE — 700102 HCHG RX REV CODE 250 W/ 637 OVERRIDE(OP): Performed by: HOSPITALIST

## 2017-07-11 RX ORDER — POTASSIUM CHLORIDE 20 MEQ/1
40 TABLET, EXTENDED RELEASE ORAL ONCE
Status: COMPLETED | OUTPATIENT
Start: 2017-07-11 | End: 2017-07-11

## 2017-07-11 RX ORDER — SODIUM CHLORIDE 9 MG/ML
INJECTION, SOLUTION INTRAVENOUS
Status: ACTIVE
Start: 2017-07-11 | End: 2017-07-12

## 2017-07-11 RX ADMIN — GABAPENTIN 200 MG: 100 CAPSULE ORAL at 20:35

## 2017-07-11 RX ADMIN — GABAPENTIN 200 MG: 100 CAPSULE ORAL at 08:06

## 2017-07-11 RX ADMIN — METOCLOPRAMIDE 10 MG: 5 INJECTION, SOLUTION INTRAMUSCULAR; INTRAVENOUS at 00:08

## 2017-07-11 RX ADMIN — SODIUM CHLORIDE: 9 INJECTION, SOLUTION INTRAVENOUS at 22:17

## 2017-07-11 RX ADMIN — ONDANSETRON 4 MG: 2 INJECTION INTRAMUSCULAR; INTRAVENOUS at 21:13

## 2017-07-11 RX ADMIN — CYANOCOBALAMIN TAB 500 MCG 1000 MCG: 500 TAB at 08:06

## 2017-07-11 RX ADMIN — OMEPRAZOLE 20 MG: 20 CAPSULE, DELAYED RELEASE ORAL at 08:06

## 2017-07-11 RX ADMIN — OXYCODONE HYDROCHLORIDE 2.5 MG: 5 TABLET ORAL at 18:02

## 2017-07-11 RX ADMIN — METOCLOPRAMIDE 10 MG: 5 INJECTION, SOLUTION INTRAMUSCULAR; INTRAVENOUS at 11:31

## 2017-07-11 RX ADMIN — METOCLOPRAMIDE 10 MG: 5 INJECTION, SOLUTION INTRAMUSCULAR; INTRAVENOUS at 05:08

## 2017-07-11 RX ADMIN — SODIUM CHLORIDE: 9 INJECTION, SOLUTION INTRAVENOUS at 06:26

## 2017-07-11 RX ADMIN — ROSUVASTATIN CALCIUM 125 MCG: 10 TABLET, FILM COATED ORAL at 17:56

## 2017-07-11 RX ADMIN — OXYCODONE HYDROCHLORIDE 5 MG: 5 TABLET ORAL at 05:08

## 2017-07-11 RX ADMIN — CEFTRIAXONE SODIUM 2 G: 2 INJECTION, POWDER, FOR SOLUTION INTRAMUSCULAR; INTRAVENOUS at 08:07

## 2017-07-11 RX ADMIN — LISINOPRIL 40 MG: 20 TABLET ORAL at 08:06

## 2017-07-11 RX ADMIN — GABAPENTIN 200 MG: 100 CAPSULE ORAL at 14:58

## 2017-07-11 RX ADMIN — OMEPRAZOLE 20 MG: 20 CAPSULE, DELAYED RELEASE ORAL at 20:36

## 2017-07-11 RX ADMIN — POTASSIUM CHLORIDE 40 MEQ: 1500 TABLET, EXTENDED RELEASE ORAL at 10:31

## 2017-07-11 RX ADMIN — METOCLOPRAMIDE 10 MG: 5 INJECTION, SOLUTION INTRAMUSCULAR; INTRAVENOUS at 17:56

## 2017-07-11 RX ADMIN — ATORVASTATIN CALCIUM 10 MG: 10 TABLET, FILM COATED ORAL at 20:36

## 2017-07-11 RX ADMIN — VANCOMYCIN HYDROCHLORIDE 2000 MG: 100 INJECTION, POWDER, LYOPHILIZED, FOR SOLUTION INTRAVENOUS at 14:58

## 2017-07-11 ASSESSMENT — ENCOUNTER SYMPTOMS
HEMOPTYSIS: 0
FEVER: 0
CHILLS: 0
EYE DISCHARGE: 0
DIZZINESS: 0
ABDOMINAL PAIN: 0
HEADACHES: 0
BACK PAIN: 1
HALLUCINATIONS: 0
DEPRESSION: 0
WEAKNESS: 1
NAUSEA: 0
TINGLING: 0
PALPITATIONS: 0
MYALGIAS: 1
SPEECH CHANGE: 0
HEARTBURN: 0
EYE PAIN: 0
VOMITING: 0
TREMORS: 0
COUGH: 0
BLURRED VISION: 0
SENSORY CHANGE: 0
SHORTNESS OF BREATH: 0

## 2017-07-11 ASSESSMENT — COGNITIVE AND FUNCTIONAL STATUS - GENERAL
STANDING UP FROM CHAIR USING ARMS: TOTAL
CLIMB 3 TO 5 STEPS WITH RAILING: TOTAL
MOVING TO AND FROM BED TO CHAIR: A LOT
SUGGESTED CMS G CODE MODIFIER MOBILITY: CM
TURNING FROM BACK TO SIDE WHILE IN FLAT BAD: A LOT
MOBILITY SCORE: 9
WALKING IN HOSPITAL ROOM: TOTAL
MOVING FROM LYING ON BACK TO SITTING ON SIDE OF FLAT BED: A LOT

## 2017-07-11 ASSESSMENT — PAIN SCALES - GENERAL
PAINLEVEL_OUTOF10: 3
PAINLEVEL_OUTOF10: 4
PAINLEVEL_OUTOF10: 7

## 2017-07-11 ASSESSMENT — GAIT ASSESSMENTS: GAIT LEVEL OF ASSIST: REFUSED

## 2017-07-11 ASSESSMENT — LIFESTYLE VARIABLES: SUBSTANCE_ABUSE: 0

## 2017-07-11 NOTE — PROGRESS NOTES
Report received from Rupa RN. Patient lying in bed at this time. Patient does have some pain at this time. Patient also on post op vitals. No immediate concerns for patient. Patient to be medicated per MAR. Bed alarm and all other safety measures in place.

## 2017-07-11 NOTE — THERAPY
"Physical Therapy Evaluation completed.   Bed Mobility:  Supine to Sit: Moderate Assist  Transfers: Sit to Stand: Moderate Assist  Gait: Level Of Assist: Refused   Plan of Care: Will benefit from Physical Therapy 3 times per week  Discharge Recommendations: Equipment: Will Continue to Assess for Equipment Needs. Post-acute therapy Discharge to a transitional care facility for continued skilled therapy services.    See \"Rehab Therapy-Acute\" Patient Summary Report for complete documentation.     "

## 2017-07-11 NOTE — PROGRESS NOTES
Infectious Disease Progress Note    Author: Ny Chilel M.D. Date of service & Time created: 2017  8:04 AM    Chief Complaint:  Chief Complaint   Patient presents with   • Back Pain    follow-up for lumbar discitis and bacteremia    Interval History:  86-year-old white male admitted for back pain. Found to have discitis  17- no fevers. WBC 10.3. Ongoing back pain. Sedimentation rate is 46. Now the blood cultures are positive for strep species. Also has new nausea and vomiting today.   7/10 AF, WBC 16.5, having nausea and vomiting, ongoing back pain, plan for bone biopsy today, tolerating abx without issues   AF, WBC 13.3, more pain after biopsy, feels flushed, having runny stools on bowel regimen  Labs Reviewed, Medications Reviewed and Radiology Reviewed.    Review of Systems:  Review of Systems   Constitutional: Positive for malaise/fatigue. Negative for fever.   Respiratory: Negative for cough and shortness of breath.    Cardiovascular: Positive for leg swelling. Negative for chest pain.   Genitourinary: Negative for dysuria.   Musculoskeletal: Positive for myalgias and back pain.   Neurological: Positive for weakness. Negative for speech change and headaches.       Hemodynamics:  Temp (24hrs), Av.2 °C (97.1 °F), Min:35.9 °C (96.6 °F), Max:36.3 °C (97.4 °F)  Temperature: 36.2 °C (97.1 °F)  Pulse  Av.4  Min: 34  Max: 118   Blood Pressure : 131/70 mmHg       Physical Exam:  Physical Exam   Constitutional: He is oriented to person, place, and time. He appears well-nourished. No distress.   Looks ill   HENT:   Head: Normocephalic and atraumatic.   Eyes: EOM are normal. Pupils are equal, round, and reactive to light. No scleral icterus.   Neck: Neck supple.   Cardiovascular:   Irregularly irregular  Tachy   Pulmonary/Chest: Effort normal and breath sounds normal. He has no wheezes. He has no rales.   Abdominal: Soft. There is no tenderness. There is no rebound.   Musculoskeletal: He  exhibits edema.   Neurological: He is alert and oriented to person, place, and time.   Vitals reviewed.      Meds:    Current facility-administered medications:   •  lisinopril (PRINIVIL) tablet 40 mg, 40 mg, Oral, Q DAY, Georgi Grey M.D., 40 mg at 07/10/17 0842  •  gabapentin (NEURONTIN) capsule 200 mg, 200 mg, Oral, TID, Georgi Grey M.D., 200 mg at 07/10/17 2041  •  metoclopramide (REGLAN) injection 10 mg, 10 mg, Intravenous, Q6HRS, Georgi Grey M.D., 10 mg at 07/11/17 0508  •  Notify provider if pain remains uncontrolled, , , CONTINUOUS **AND** Use the numeric rating scale (NRS-11) on regular floors and Critical-Care Pain Observation Tool (CPOT) on ICUs/Trauma to assess pain, , , CONTINUOUS **AND** Pulse Ox (Oximetry), , , CONTINUOUS **AND** Pharmacy Consult Request ...Pain Management Review, , Other, PRN **AND** If patient difficult to arouse and/or has respiratory depression, stop any opiates that are currently infusing and call a Rapid Response., , , CONTINUOUS **AND** oxycodone immediate-release (ROXICODONE) tablet 2.5 mg, 2.5 mg, Oral, Q3HRS PRN, 2.5 mg at 07/10/17 2328 **AND** oxycodone immediate-release (ROXICODONE) tablet 5 mg, 5 mg, Oral, Q3HRS PRN, 5 mg at 07/11/17 0508 **AND** HYDROmorphone (DILAUDID) injection 0.5 mg, 0.5 mg, Intravenous, Q3HRS PRN, Georgi Grey M.D., 0.5 mg at 07/10/17 0905  •  digoxin (LANOXIN) tablet 125 mcg, 125 mcg, Oral, DAILY AT 1800, Georgi Grey M.D.  •  NS infusion, , Intravenous, Continuous, Georgi Grey M.D., Last Rate: 150 mL/hr at 07/11/17 0626  •  ondansetron (ZOFRAN) syringe/vial injection 4 mg, 4 mg, Intravenous, Q6HRS PRN, Georgi Grey M.D., 4 mg at 07/10/17 0506  •  omeprazole (PRILOSEC) capsule 20 mg, 20 mg, Oral, BID, Georgi Grey M.D., 20 mg at 07/10/17 2041  •  MD ALERT... vancomycin per pharmacy protocol, , Other, pharmacy to dose, Oliva Bolanos M.D.  •  cefTRIAXone (ROCEPHIN) 2 g in  mL IVPB, 2 g, Intravenous, Q24HRS,  Oliva Bolanos M.D., Stopped at 07/10/17 0905  •  vancomycin 2,000 mg in  mL IVPB, 20 mg/kg, Intravenous, Q24HR, Robert Mcnulty, PHARMD, Stopped at 07/10/17 2115  •  hydrALAZINE (APRESOLINE) tablet 10 mg, 10 mg, Oral, Q4HRS PRN, Jamaal Molina M.D., 10 mg at 07/09/17 2038  •  clonidine (CATAPRES) tablet 0.1 mg, 0.1 mg, Oral, 4X/DAY PRN, Jamaal Molina M.D.  •  prochlorperazine (COMPAZINE) injection 10 mg, 10 mg, Intravenous, Q6HRS PRN, Darion Benton M.D., 10 mg at 07/10/17 0905  •  pneumococcal 13-Jocelyn Conj Vacc (PREVNAR 13) syringe 0.5 mL, 0.5 mL, Intramuscular, Once PRN, Jamaal Molina M.D.  •  atorvastatin (LIPITOR) tablet 10 mg, 10 mg, Oral, Nightly, Jamaal Molina M.D., 10 mg at 07/10/17 2041  •  vitamin D (cholecalciferol) tablet 5,000 Units, 5,000 Units, Oral, Q48HRS, Jamaal Molina M.D., 5,000 Units at 07/10/17 0840  •  cyanocobalamin (VITAMIN B-12) tablet 1,000 mcg, 1,000 mcg, Oral, DAILY, Jamaal Molina M.D., 1,000 mcg at 07/10/17 0839  •  senna-docusate (PERICOLACE or SENOKOT S) 8.6-50 MG per tablet 2 Tab, 2 Tab, Oral, BID, 2 Tab at 07/10/17 2041 **AND** polyethylene glycol/lytes (MIRALAX) PACKET 1 Packet, 1 Packet, Oral, QDAY PRN **AND** magnesium hydroxide (MILK OF MAGNESIA) suspension 30 mL, 30 mL, Oral, QDAY PRN **AND** bisacodyl (DULCOLAX) suppository 10 mg, 10 mg, Rectal, QDAY PRN, Jamaal Molina M.D.  •  acetaminophen (TYLENOL) tablet 650 mg, 650 mg, Oral, Q6HRS PRN, Jamaal Molina M.D., 650 mg at 07/08/17 2142    Labs:  Recent Labs      07/09/17   1646  07/10/17   0048  07/10/17   2355   WBC  17.5*  16.5*  13.3*   RBC  4.87  5.13  4.81   HEMOGLOBIN  14.2  15.1  14.1   HEMATOCRIT  43.2  44.4  41.7*   MCV  88.7  86.5  86.7   MCH  29.2  29.4  29.3   RDW  42.8  41.0  39.8   PLATELETCT  261  286  248   MPV  10.7  10.0  10.0   NEUTSPOLYS  75.40*  77.30*  69.60   LYMPHOCYTES  12.20*  10.60*  16.20*   MONOCYTES  10.90  10.60  11.40   EOSINOPHILS  0.40  0.40  1.30   BASOPHILS  0.20  0.30   0.40     Recent Labs      07/09/17   1646  07/10/17   0048  07/10/17   2355   SODIUM  136  133*  137   POTASSIUM  3.7  3.6  3.3*   CHLORIDE  102  96  99   CO2  25  30  31   GLUCOSE  114*  154*  117*   BUN  15  11  12     Recent Labs      07/09/17   1646  07/10/17   0048  07/10/17   2355   ALBUMIN   --   3.5   --    TBILIRUBIN   --   0.6   --    ALKPHOSPHAT   --   68   --    TOTPROTEIN   --   7.0   --    ALTSGPT   --   29   --    ASTSGOT   --   23   --    CREATININE  0.64  0.57  0.62       Imaging:  Ct-abdomen-pelvis W/o    7/6/2017 7/6/2017 10:28 AM HISTORY/REASON FOR EXAM:  Pain. TECHNIQUE/EXAM DESCRIPTION: CT scan of the abdomen and pelvis without contrast. Noncontrast helical scanning was obtained from the diaphragmatic domes through the pubic symphysis. Low dose optimization technique was utilized for this CT exam including automated exposure control and adjustment of the mA and/or kV according to patient size. COMPARISON: 3/21/2017 FINDINGS: CT Abdomen: Hyperexpanded lung bases. Atelectasis both lung bases. No basilar pleural effusion. Coronary artery calcifications. Liver and spleen are within normal limits in size. Pancreatic calcifications/consistent with sequela of pancreatitis. No peripancreatic fluid collections identified. No adrenal gland masses. No hydronephrosis or urolithiasis. No evidence of bowel obstruction. The appendix is not delineated. There are diverticula colon. No evidence of diverticulitis. No free fluid. Postoperative changes lumbar spine. CT Pelvis: Urinary bladder wall is mildly and symmetrically thickened. Prostate projects into the fourth of the urinary bladder. No pelvic free fluid.     7/6/2017  No hydronephrosis or urolithiasis. Diverticula colon. No evidence diverticulitis. No free fluid. Pancreatic calcifications/consistent with sequela pancreatitis. No peripancreatic fluid collections. Coronary artery calcifications.    Ct-lspine W/o Plus Recons    7/6/2017 7/6/2017 10:28 AM  HISTORY/REASON FOR EXAM: Worsening back pain TECHNIQUE/EXAM DESCRIPTION AND NUMBER OF VIEWS: CT lumbar spine without contrast, with reconstructions. Thin-section helical images were obtained of the lumbar spine in the axial plane.  Sagittal reconstructions were generated from the axial data. Low dose optimization technique was utilized for this CT exam including automated exposure control and adjustment of the mA and/or kV according to patient size. FINDINGS: Sagittal reconstructed images demonstrate surgical change at the L4-5 level characterized by bilateral pedicular screw and zev fixation. There is laminectomy change extending from L3 through L5. There is scoliotic deformity. There is no evidence of fracture or dislocation. There is multilevel decreased disc height and endplate spurring consistent with multilevel degenerative disc disease. There are old fractures of the right L1 and L2 transverse processes. There is degenerative retrolisthesis at L3-4 and anterior subluxation at L4-5. There is a bone graft donor site involving the left ilium.     7/6/2017  1.  No evidence of acute fracture of the lumbar spine. 2.  Surgical change extending from L3 through L5. 3.  Multilevel degenerative disc disease and facet degeneration. 4.  Old fractures of the right L1 and L2 transverse processes. 5.  Multilevel degenerative subluxation.    Mr-lumbar Spine-with & W/o    7/8/2017 7/7/2017 7:28 PM HISTORY/REASON FOR EXAM:  Low Back Pain. TECHNIQUE/EXAM DESCRIPTION: MRI of the lumbar spine without and with contrast. The study was performed on a [employer] 1.5 Lucille MRI scanner. T1 sagittal, T2 fast spin-echo sagittal, and T2 axial images were obtained of the lumbar spine. T1 post-contrast fat-suppressed sagittal images were obtained. Optional T2 fat-suppressed sagittal and T1 post-contrast axial images may be obtained. 20 mL Omniscan contrast was administered intravenously. COMPARISON:  CT scan lumbar spine dated 7/6/2017.  FINDINGS: There is 4 mm retrolisthesis at the L3-4 level. There are no previous laminectomies from the L2 level to the sacrum. Previous bilateral pedicle screw and zev fixation at the L4-L5 levels. Marrow signal in the vertebral bodies is within normal limits. There is no abnormal osseous enhancement. There is no other abnormal extradural enhancement. There are no significant osteophytic changes. The prevertebral and paraspinous soft tissues are unremarkable. The conus is normal in position and signal. There is no abnormal cord enhancement. There is partial intradiscal fusion at the L4-5 level. There is moderate disc space narrowing at the L2-3 and L3-4 levels. There is increased T2 signal intensity in the L3-4 disc space. Additionally there are bandlike regions of decreased T1 and increased T2 signal intensity involving the L3-4 endplates. There is curvilinear increased T1 and T2 signal intensity involving the inferior endplate of the L2 vertebral body. There is a Schmorl's node endplate herniation into this inferior endplate. Level specific findings: L5-S1 level normal. L4-5 level postoperative changes. Mild to moderate bilateral neural foraminal narrowing. L3-4 level moderate broad-based posterior spurring which effaces the ventral surface of thecal sac. Moderate central canal stenosis secondary to facet arthropathy. Severe bilateral neural foraminal stenosis. L2-3 level minimal posterior spurring and mild bilateral neural foraminal narrowing with mild disc bulging into the inferior aspects of the neural foramina. L1-2 level borderline central canal stenosis secondary to facet arthropathy.     7/8/2017  1.  Possible discitis at the L3-4 level. 2.  Mild to moderate retrolisthesis at the L3-4 level. 3.  Previous extensive laminectomy from the L2 level to the sacrum with bilateral pedicle screw and zev fixation at the L4-L5 levels. 4.   Moderate lumbar spondylotic changes at the L3-4 levels with associated moderate  central canal stenosis at this level secondary to facet arthropathy. Additionally there are severe bilateral neural foraminal stenosis. 5.  Minimal lumbar spondylotic changes at the L2-3 level with mild bilateral neural foraminal narrowing and mild disc bulging into the inferior aspects of the neural foramina.    Dx-hip-bilateral-with Pelvis-3/4 Views    7/6/2017 7/6/2017 12:23 PM HISTORY/REASON FOR EXAM:  Bilateral hip pain.. TECHNIQUE/EXAM DESCRIPTION AND NUMBER OF VIEWS:  3 views of the bilateral hip. COMPARISON: None FINDINGS: Osteopenia is present. There are surgical changes involving the lumbar spine. There is no evidence of pelvic or proximal femoral fracture. There is mild degenerative change characterized by ossification of the acetabular labrum bilaterally. There are pelvic phleboliths. There is vascular calcification.     7/6/2017  No evidence of fracture or arthropathy of the hips. Degenerative and surgical changes of the lumbar spine.      Micro:  BLOOD CULTURE   Date Value Ref Range Status   07/09/2017   Preliminary    No Growth    Note: Blood cultures are incubated for 5 days and  are monitored continuously.Positive blood cultures  are called to the RN and reported as soon as  they are identified.     07/09/2017   Preliminary    No Growth    Note: Blood cultures are incubated for 5 days and  are monitored continuously.Positive blood cultures  are called to the RN and reported as soon as  they are identified.          Assessment:  Active Hospital Problems    Diagnosis   • *Low back pain [M54.5]   • Hypotension [I95.9]   • Morbid obesity (CMS-HCC) [E66.01]   • Sepsis (CMS-HCC) [A41.9]   • UTI (urinary tract infection) [N39.0]   • Atrial flutter (CMS-HCC) [I48.92]       Plan:  L3-4 discitis  Sedimentation rate 46  CRP 7.83  Seen by neurosurgery as well  S/p IR biopsy 7/10 - cultures pending  Abx as below  Anticipate 6 week course of IV abx    Bacteremia   Blood cultures (1/2) from 7/6/17 - viridans  strep ? contaminant  Continue vancomycin and Rocephin for now pending strep speciation  Monitor renal function while on vancomycin   TTE - negative  Bcx 7/9 - NGTD    Diarrhea  DC bowel regimen  If persists, check C diff    Diabetes mellitus  Keep the blood sugars less than 150    Discussed with internal Medicine

## 2017-07-11 NOTE — PROGRESS NOTES
"Pharmacy Kinetics 86 y.o. male on vancomycin day #2  7/10/2017    Currently Dose: Vancomycin 2500 mg iv once    Indication for Treatment: suspected bacteremia & epidural abscess/vertebral osteomyelitis  ID Service Following: Yes    Pertinent history per medical record: Admitted on 2017 for back pain found now to have suspected vertebral/epidural abscess/osteomyelitis. ID service consulting. Currently admitted to telemetry unit.      Other antibiotics: ceftriaxone 2 gm iv q24h    List concerns for accumulation of vancomycin : age >75, DMI ~31, electrolyte derangement, BUN:Cr ~20:1    Pertinent cultures to date:   17 BCx x1 (Streptococcus species)  17 BCx x1 (negative)  17 UCx x1 (negative)    Recent Labs      17   0327  17   1646  07/10/17   0048   WBC  10.3  17.5*  16.5*   NEUTSPOLYS  87.90*  75.40*  77.30*     Recent Labs      17   0324  17   1646  07/10/17   0048   BUN  20  15  11   CREATININE  0.89  0.64  0.57   ALBUMIN   --    --   3.5     Recent Labs      07/10/17   1614   VANCOTROUGH  5.6*     Intake/Output Summary (Last 24 hours) at 07/10/17 1703  Last data filed at 07/10/17 1454   Gross per 24 hour   Intake   2398 ml   Output   5210 ml   Net  -2812 ml      Blood pressure 121/63, pulse 89, temperature 36.3 °C (97.4 °F), temperature source Temporal, resp. rate 18, height 1.803 m (5' 11\"), weight 99.2 kg (218 lb 11.1 oz), SpO2 99 %. Temp (24hrs), Av.3 °C (97.3 °F), Min:36 °C (96.8 °F), Max:36.5 °C (97.7 °F)    A/P   1. Next vancomycin level: 17 at 1430 (not ordered)  2. Goal trough: 18-22 mcg/mL  3. Comments: I obtained a 24 hour level after the first load dose because the patient has many risk factors for poor clearance and accumulation. The level was low but does not tell us anything except that the patient is clearing the medication as he is not near steady state. Given the patient's multiple risk factors, I will continue with the current regimen and check a " level in 2 days to better assess a trough level. ID is following the patient. Pharmacy will continue to monitor and adjust dosing as clinically appropriate.    Marina Harding, MichD

## 2017-07-11 NOTE — ASSESSMENT & PLAN NOTE
Strep viridans, repeat blood cx NGTD, PICC in place  ID following  On IV ceftriaxone will need 6 weeks course until 8/20

## 2017-07-11 NOTE — PROGRESS NOTES
[facility] Hospitalist Progress Note    Date of Service: 7/10/2017    Chief Complaint  86 y.o. male admitted 2017 with lower back pain    Interval Problem Update  Pain is 5/10, ,lower back, sharp, no radiation, intermittent, worse with movement, radiates down left leg    Mri LS spine shows Possible discitis at the L3-4 level.    Has uti.. treated    He was tachycardic this afternoon    Wbc 16.5    One positive blood culture..strep viridans      Bone  Biopsy planned for today      Consultants/Specialty  none    Disposition  Home when stable        Review of Systems   Constitutional: Negative for fever, chills, malaise/fatigue and diaphoresis.   HENT: Negative for tinnitus.    Respiratory: Negative for cough, hemoptysis, sputum production and shortness of breath.    Gastrointestinal: Negative for heartburn, nausea, vomiting and abdominal pain.   Genitourinary: Negative for dysuria and frequency.   Musculoskeletal: Positive for myalgias and back pain.   Neurological: Negative for dizziness, tingling, tremors, sensory change and headaches.   All other systems reviewed and are negative.     Physical Exam  Laboratory/Imaging   Hemodynamics  Temp (24hrs), Av.2 °C (97.2 °F), Min:36 °C (96.8 °F), Max:36.4 °C (97.6 °F)   Temperature: 36.3 °C (97.4 °F)  Pulse  Av.9  Min: 34  Max: 118    Blood Pressure : 117/83 mmHg      Respiratory      Respiration: 18, Pulse Oximetry: 98 %     Work Of Breathing / Effort: Mild;Shallow  RUL Breath Sounds: Clear, RML Breath Sounds: Clear, RLL Breath Sounds: Diminished, MIKE Breath Sounds: Clear, LLL Breath Sounds: Diminished    Fluids    Intake/Output Summary (Last 24 hours) at 07/10/17 2208  Last data filed at 07/10/17 1737   Gross per 24 hour   Intake   2248 ml   Output   4275 ml   Net  -202 ml       Nutrition  Orders Placed This Encounter   Procedures   • DIET ORDER     Standing Status: Standing      Number of Occurrences: 1      Standing Expiration Date:      Order Specific  Question:  Diet:     Answer:  Regular [1]     Physical Exam   Constitutional: He is oriented to person, place, and time. He appears distressed.   Eyes: Left eye exhibits no discharge.   Neck: No JVD present. No tracheal deviation present. No thyromegaly present.   Cardiovascular: Exam reveals no gallop and no friction rub.    irregular   Pulmonary/Chest: No respiratory distress. He has no wheezes. He has no rales.   Abdominal: He exhibits distension. There is no tenderness. There is no rebound.   Musculoskeletal: He exhibits no edema or tenderness.   Neurological: He is alert and oriented to person, place, and time.   Straight leg raise limited in LLE   Skin: There is pallor.       Recent Labs      07/08/17 0327  07/09/17   1646  07/10/17   0048   WBC  10.3  17.5*  16.5*   RBC  4.54*  4.87  5.13   HEMOGLOBIN  13.5*  14.2  15.1   HEMATOCRIT  41.5*  43.2  44.4   MCV  91.4  88.7  86.5   MCH  29.7  29.2  29.4   MCHC  32.5*  32.9*  34.0   RDW  44.4  42.8  41.0   PLATELETCT  186  261  286   MPV  11.4  10.7  10.0     Recent Labs      07/08/17   0324  07/09/17   1646  07/10/17   0048   SODIUM  133*  136  133*   POTASSIUM  4.3  3.7  3.6   CHLORIDE  105  102  96   CO2  21  25  30   GLUCOSE  186*  114*  154*   BUN  20  15  11   CREATININE  0.89  0.64  0.57   CALCIUM  9.5  9.5  9.5                      Assessment/Plan     * Low back pain (present on admission)  Assessment & Plan  Pain control    Iv dialudid for severe pain    Mri shows disciitis      Atrial flutter (CMS-[facility]) (present on admission)  Assessment & Plan  Rate  Not controlled    Gave iv dig and started po digoxin tomorrow    Monitor on tele    Sepsis (CMS-[facility]) (present on admission)  Assessment & Plan      Treat uti with abx    It appears disciitis may be actual source    Id on case for abx      Bone biopsy lumbar area ordered    UTI (urinary tract infection) (present on admission)  Assessment & Plan  Iv rocephin    treated    Follow cultures        Hypotension  (present on admission)  Assessment & Plan    Resolved      Now hypertensive      Morbid obesity (CMS-HCC) (present on admission)  Assessment & Plan  Weight loss and exercise    Pt and ot eval    Bigeminy  Assessment & Plan  Replace magnesium      Hypomagnesemia  Assessment & Plan  Replace magnesium        Hearn catheter: No Hearn        DVT prophylaxis - mechanical: SCDs

## 2017-07-11 NOTE — PROGRESS NOTES
[facility] Hospitalist Progress Note    Date of Service: 2017    Chief Complaint  86 y.o. male admitted 2017 with lower back pain    Interval Problem Update  87 y/o M with PMH of PAF, HTN, DLD admitted for above and found to have diskitis at L3L4 post biopsy.  Still complaint about 10/10 back pain, can't even move because of the pain.      Consultants/Specialty  none    Disposition  Home when stable        Review of Systems   Constitutional: Positive for malaise/fatigue. Negative for fever and chills.   HENT: Negative for tinnitus.    Eyes: Negative for blurred vision, pain and discharge.   Respiratory: Negative for cough, hemoptysis and shortness of breath.    Cardiovascular: Negative for chest pain, palpitations and leg swelling.   Gastrointestinal: Negative for heartburn, nausea, vomiting and abdominal pain.   Genitourinary: Negative for dysuria and frequency.   Musculoskeletal: Positive for myalgias and back pain.   Neurological: Positive for weakness. Negative for dizziness, tingling, tremors, sensory change and headaches.   Psychiatric/Behavioral: Negative for depression, hallucinations and substance abuse.   All other systems reviewed and are negative.     Physical Exam  Laboratory/Imaging   Hemodynamics  Temp (24hrs), Av.2 °C (97.1 °F), Min:35.9 °C (96.6 °F), Max:36.3 °C (97.4 °F)   Temperature: 36.1 °C (96.9 °F)  Pulse  Av.1  Min: 34  Max: 118    Blood Pressure : 107/65 mmHg      Respiratory      Respiration: 16, Pulse Oximetry: 96 %     Work Of Breathing / Effort: Mild  RUL Breath Sounds: Clear, RML Breath Sounds: Clear, RLL Breath Sounds: Diminished, MIKE Breath Sounds: Clear, LLL Breath Sounds: Diminished    Fluids    Intake/Output Summary (Last 24 hours) at 17 1033  Last data filed at 07/10/17 2200   Gross per 24 hour   Intake    666 ml   Output   1525 ml   Net   -859 ml       Nutrition  Orders Placed This Encounter   Procedures   • DIET ORDER     Standing Status: Standing      Number  of Occurrences: 1      Standing Expiration Date:      Order Specific Question:  Diet:     Answer:  Regular [1]     Physical Exam   Constitutional: He is oriented to person, place, and time. He appears distressed.   HENT:   Head: Normocephalic and atraumatic.   Right Ear: External ear normal.   Eyes: Left eye exhibits no discharge.   Neck: No JVD present. No tracheal deviation present. No thyromegaly present.   Cardiovascular: Exam reveals no gallop and no friction rub.    irregular   Pulmonary/Chest: No respiratory distress. He has no rales.   Abdominal: He exhibits distension. There is no tenderness. There is no rebound.   Musculoskeletal: He exhibits tenderness. He exhibits no edema.   Low back pain   Neurological: He is alert and oriented to person, place, and time.   Straight leg raise limited in LLE   Skin: Skin is warm and dry.   Psychiatric: He has a normal mood and affect. His behavior is normal.       Recent Labs      07/09/17   1646  07/10/17   0048  07/10/17   2355   WBC  17.5*  16.5*  13.3*   RBC  4.87  5.13  4.81   HEMOGLOBIN  14.2  15.1  14.1   HEMATOCRIT  43.2  44.4  41.7*   MCV  88.7  86.5  86.7   MCH  29.2  29.4  29.3   MCHC  32.9*  34.0  33.8   RDW  42.8  41.0  39.8   PLATELETCT  261  286  248   MPV  10.7  10.0  10.0     Recent Labs      07/09/17   1646  07/10/17   0048  07/10/17   2355   SODIUM  136  133*  137   POTASSIUM  3.7  3.6  3.3*   CHLORIDE  102  96  99   CO2  25  30  31   GLUCOSE  114*  154*  117*   BUN  15  11  12   CREATININE  0.64  0.57  0.62   CALCIUM  9.5  9.5  9.3                      Assessment/Plan     * Low back pain (present on admission)  Assessment & Plan  Pain control  From diskitis      Bacteremia  Assessment & Plan  Strep viridans  Follow sensitivy  ID following  On IV vanco and ceftriaxone    Discitis of lumbar region  Assessment & Plan  At L3-L4 level  Biopsy yesterday: pending report  abx as above    Atrial flutter (CMS-HCC) (present on admission)  Assessment &  Plan  Improving  On digoxin, metoprolol  Not on AC    Sepsis ([facility]) (present on admission)  Assessment & Plan  From diskitis and UTI  abx as above          UTI (urinary tract infection) (present on admission)  Assessment & Plan  Iv rocephin  Follow cultures        Hypotension (present on admission)  Assessment & Plan  Resolved            Morbid obesity (CMS-HCC) (present on admission)  Assessment & Plan  Weight loss and exercise    Pt and ot eval    Bigeminy  Assessment & Plan  Replace magnesium      Hypomagnesemia  Assessment & Plan  Replace magnesium        Hearn catheter: No Hearn        DVT prophylaxis - mechanical: SCDs

## 2017-07-11 NOTE — PROGRESS NOTES
Progress Note               Author: Jaziel Azar Date & Time created: 7/11/2017  9:36 AM     Interval History:  Hospital day #5 for low back pain, L3-4 discitis  Claims back pain is somewhat increased since his biopsy yesterday  Denies increased pain, numbness, weakness of the BLE  On Vanco, rocephin     Review of Systems:  ROS     Denies new pain, numbness, weakness.   Denies HA, positional HA, N/V, dizziness, chest pain, SOB, difficulty breathing, chills        Physical Exam:  Physical Exam   VSS  A&Ox4, NAD  NM:   RLE: 4/5 hip flexion, knee extension, knee flexion, plantar flexion, dorsiflexion, EHL  LLE: 3/5 hip flexion, 4-/5 knee extension, knee flexion, plantar flexion, dorsiflexion, EHL  Sensation decreased to light touch throughout the LLE below the knee  Sensation intact to light touch throughout the RLE   Abdomen: soft, non-tender  Non-labored breathing on mask normal respiratory effort  Capillary refill in all four extremities <2 seconds  No LE edema, erythema, cyanosis, clubbing  Calves non-tender to compression bilat            Labs:        Invalid input(s): [id number]      Recent Labs      07/09/17   1646  07/10/17   0048  07/10/17   2355   SODIUM  136  133*  137   POTASSIUM  3.7  3.6  3.3*   CHLORIDE  102  96  99   CO2  25  30  31   BUN  15  11  12   CREATININE  0.64  0.57  0.62   MAGNESIUM  1.3*   --    --    CALCIUM  9.5  9.5  9.3     Recent Labs      07/09/17   1646  07/10/17   0048  07/10/17   2355   ALTSGPT   --   29   --    ASTSGOT   --   23   --    ALKPHOSPHAT   --   68   --    TBILIRUBIN   --   0.6   --    GLUCOSE  114*  154*  117*     Recent Labs      07/09/17   1646  07/10/17   0048  07/10/17   2355   RBC  4.87  5.13  4.81   HEMOGLOBIN  14.2  15.1  14.1   HEMATOCRIT  43.2  44.4  41.7*   PLATELETCT  261  286  248     Recent Labs      07/09/17   1646  07/10/17   0048  07/10/17   2355   WBC  17.5*  16.5*  13.3*   NEUTSPOLYS  75.40*  77.30*  69.60   LYMPHOCYTES  12.20*  10.60*  16.20*    MONOCYTES  10.90  10.60  11.40   EOSINOPHILS  0.40  0.40  1.30   BASOPHILS  0.20  0.30  0.40   ASTSGOT   --   23   --    ALTSGPT   --   29   --    ALKPHOSPHAT   --   68   --    TBILIRUBIN   --   0.6   --      Hemodynamics:  Temp (24hrs), Av.2 °C (97.1 °F), Min:35.9 °C (96.6 °F), Max:36.3 °C (97.4 °F)  Temperature: 36.1 °C (96.9 °F)  Pulse  Av.1  Min: 34  Max: 118   Blood Pressure : 107/65 mmHg     Respiratory:    Respiration: 16, Pulse Oximetry: 96 %     Work Of Breathing / Effort: Mild;Shallow  RUL Breath Sounds: Clear, RML Breath Sounds: Clear, RLL Breath Sounds: Diminished, MIKE Breath Sounds: Clear, LLL Breath Sounds: Diminished  Fluids:    Intake/Output Summary (Last 24 hours) at 07/10/17 1033  Last data filed at 07/10/17 0915   Gross per 24 hour   Intake   1520 ml   Output   5385 ml   Net  -3865 ml        GI/Nutrition:  Orders Placed This Encounter   Procedures   • DIET ORDER     Standing Status: Standing      Number of Occurrences: 1      Standing Expiration Date:      Order Specific Question:  Diet:     Answer:  Regular [1]     Medical Decision Making, by Problem:  Active Hospital Problems    Diagnosis   • *Low back pain [M54.5]   • Bigeminy [I49.9]   • Hypomagnesemia [E83.42]   • Hypotension [I95.9]   • Morbid obesity (CMS-[facility]) [E66.01]   • Sepsis (CMS-[facility]) [A41.9]   • UTI (urinary tract infection) [N39.0]   • Atrial flutter (CMS-[facility]) [I48.92]       Plan:  L3-4 discitis  Low back pain  Chronic left leg weakness  Plan  Continue care per primary team, ID  No plans for surgery from our end  Pain meds PRN  Recommend mobilization, PT, etc. From our end.     Jaziel Azar NP    Core Measures

## 2017-07-11 NOTE — PROGRESS NOTES
"Pharmacy Kinetics 86 y.o. male on vancomycin day #3 2017    Currently on Vancomycin 2000 mg iv q24hr    Indication for Treatment: suspected bacteremia & epidural abscess/vertebral osteomyelitis  ID Service Following: Yes    Pertinent history per medical record: Admitted on 2017 for back pain found now to have suspected vertebral/epidural abscess/osteomyelitis. ID service consulting. Currently admitted to telemetry unit.      Other antibiotics: ceftriaxone 2 gm iv q24h    List concerns for accumulation of vancomycin : age >75, DMI ~31, electrolyte derangement, BUN:Cr ~20:1    Pertinent cultures to date:    17 BCx x1 (Streptococcus species)  17 BCx x1 (negative)    Recent Labs      17   1646  07/10/17   0048  07/10/17   2355   WBC  17.5*  16.5*  13.3*   NEUTSPOLYS  75.40*  77.30*  69.60     Recent Labs      17   1646  07/10/17   0048  07/10/17   2355   BUN  15  11  12   CREATININE  0.64  0.57  0.62   ALBUMIN   --   3.5   --      Recent Labs      07/10/17   1614   VANCOTROUGH  5.6*     Intake/Output Summary (Last 24 hours) at 17 0950  Last data filed at 07/10/17 2200   Gross per 24 hour   Intake    832 ml   Output   1525 ml   Net   -693 ml      Blood pressure 107/65, pulse 113, temperature 36.1 °C (96.9 °F), temperature source Temporal, resp. rate 16, height 1.803 m (5' 11\"), weight 99.2 kg (218 lb 11.1 oz), SpO2 96 %. Temp (24hrs), Av.2 °C (97.1 °F), Min:35.9 °C (96.6 °F), Max:36.3 °C (97.4 °F)      A/P   1. Next vancomycin level: 17 at 2030 (ordered)  2. Goal trough: 18-22 mcg/mL  3. Comments: I obtained a 24 hour level after the first load dose because the patient has many risk factors for poor clearance and accumulation. The level was low but does not tell us anything except that the patient is clearing the medication as he is not near steady state. Given the patient's multiple risk factors, I will continue with the current regimen and check a level in 2 days prior to the " 4th total dose to better assess a trough level. Note the dose was originally at 1500 but the patient was in a procedure yesterday so it was given late. The patient lost IV access and it was not started until 2100. ID is following the patient. Pharmacy will continue to monitor and adjust dosing as clinically appropriate.    Marina Harding, MichD

## 2017-07-11 NOTE — CARE PLAN
Problem: Venous Thromboembolism (VTW)/Deep Vein Thrombosis (DVT) Prevention:  Goal: Patient will participate in Venous Thrombosis (VTE)/Deep Vein Thrombosis (DVT)Prevention Measures  Outcome: PROGRESSING AS EXPECTED    07/10/17 2030   OTHER   Risk Assessment Score 4   VTE RISK High   Mechanical Prophylaxis SCDs, Sequentials (Intermittent Pneumatic Compression Devices)   Patient was on lovenox, patient had procedure. Patient may need to be restarted.     Problem: Pain Management  Goal: Pain level will decrease to patient’s comfort goal  Outcome: PROGRESSING AS EXPECTED  Patient has pain with movement, to be medicated appropriately.

## 2017-07-11 NOTE — CARE PLAN
Problem: Mobility  Goal: Risk for activity intolerance will decrease  Outcome: PROGRESSING SLOWER THAN EXPECTED  Discussed the need to work with PT and not lose strength while laying in bed. Pt not motivated     Problem: Skin Integrity  Goal: Risk for impaired skin integrity will decrease  Outcome: PROGRESSING AS EXPECTED  Discussed turning q2 to prevent skin breakdown. Pt allowing us to turn.

## 2017-07-11 NOTE — ASSESSMENT & PLAN NOTE
At L3-L4 level  S/p Bc Cx remains neg  Continue current abx per ID to continue through August 20  Will likely need LTAC.  Wound care physical therapy

## 2017-07-11 NOTE — PROGRESS NOTES
Monitor summary: /.10/ Aflutter  up to 150s sustained for 10 mins and periodic episodes of unsustained Aflutter with RVR thereafter.

## 2017-07-12 ENCOUNTER — APPOINTMENT (OUTPATIENT)
Dept: RADIOLOGY | Facility: MEDICAL CENTER | Age: 82
DRG: 853 | End: 2017-07-12
Attending: INTERNAL MEDICINE
Payer: MEDICARE

## 2017-07-12 PROBLEM — G93.40 ACUTE ENCEPHALOPATHY: Status: ACTIVE | Noted: 2017-07-12

## 2017-07-12 LAB
ANION GAP SERPL CALC-SCNC: 8 MMOL/L (ref 0–11.9)
BASOPHILS # BLD AUTO: 0.5 % (ref 0–1.8)
BASOPHILS # BLD: 0.08 K/UL (ref 0–0.12)
BUN SERPL-MCNC: 12 MG/DL (ref 8–22)
CALCIUM SERPL-MCNC: 9.6 MG/DL (ref 8.5–10.5)
CHLORIDE SERPL-SCNC: 99 MMOL/L (ref 96–112)
CO2 SERPL-SCNC: 29 MMOL/L (ref 20–33)
CREAT SERPL-MCNC: 0.6 MG/DL (ref 0.5–1.4)
EOSINOPHIL # BLD AUTO: 0.03 K/UL (ref 0–0.51)
EOSINOPHIL NFR BLD: 0.2 % (ref 0–6.9)
ERYTHROCYTE [DISTWIDTH] IN BLOOD BY AUTOMATED COUNT: 40.3 FL (ref 35.9–50)
GFR SERPL CREATININE-BSD FRML MDRD: >60 ML/MIN/1.73 M 2
GLUCOSE SERPL-MCNC: 197 MG/DL (ref 65–99)
HCT VFR BLD AUTO: 45.1 % (ref 42–52)
HGB BLD-MCNC: 15 G/DL (ref 14–18)
IMM GRANULOCYTES # BLD AUTO: 0.28 K/UL (ref 0–0.11)
IMM GRANULOCYTES NFR BLD AUTO: 1.8 % (ref 0–0.9)
LYMPHOCYTES # BLD AUTO: 1.03 K/UL (ref 1–4.8)
LYMPHOCYTES NFR BLD: 6.7 % (ref 22–41)
MAGNESIUM SERPL-MCNC: 1.4 MG/DL (ref 1.5–2.5)
MCH RBC QN AUTO: 29 PG (ref 27–33)
MCHC RBC AUTO-ENTMCNC: 33.3 G/DL (ref 33.7–35.3)
MCV RBC AUTO: 87.2 FL (ref 81.4–97.8)
MONOCYTES # BLD AUTO: 1.13 K/UL (ref 0–0.85)
MONOCYTES NFR BLD AUTO: 7.4 % (ref 0–13.4)
NEUTROPHILS # BLD AUTO: 12.74 K/UL (ref 1.82–7.42)
NEUTROPHILS NFR BLD: 83.4 % (ref 44–72)
NRBC # BLD AUTO: 0 K/UL
NRBC BLD AUTO-RTO: 0 /100 WBC
PLATELET # BLD AUTO: 326 K/UL (ref 164–446)
PMV BLD AUTO: 9.9 FL (ref 9–12.9)
POTASSIUM SERPL-SCNC: 3.4 MMOL/L (ref 3.6–5.5)
RBC # BLD AUTO: 5.17 M/UL (ref 4.7–6.1)
SODIUM SERPL-SCNC: 136 MMOL/L (ref 135–145)
WBC # BLD AUTO: 15.3 K/UL (ref 4.8–10.8)

## 2017-07-12 PROCEDURE — C1751 CATH, INF, PER/CENT/MIDLINE: HCPCS

## 2017-07-12 PROCEDURE — 85025 COMPLETE CBC W/AUTO DIFF WBC: CPT

## 2017-07-12 PROCEDURE — 700111 HCHG RX REV CODE 636 W/ 250 OVERRIDE (IP): Performed by: PHARMACIST

## 2017-07-12 PROCEDURE — A9270 NON-COVERED ITEM OR SERVICE: HCPCS | Performed by: INTERNAL MEDICINE

## 2017-07-12 PROCEDURE — 700101 HCHG RX REV CODE 250: Performed by: INTERNAL MEDICINE

## 2017-07-12 PROCEDURE — 95951 EEG: CPT | Mod: 52

## 2017-07-12 PROCEDURE — 80048 BASIC METABOLIC PNL TOTAL CA: CPT

## 2017-07-12 PROCEDURE — 700105 HCHG RX REV CODE 258: Performed by: PSYCHIATRY & NEUROLOGY

## 2017-07-12 PROCEDURE — 700105 HCHG RX REV CODE 258: Performed by: INTERNAL MEDICINE

## 2017-07-12 PROCEDURE — 83735 ASSAY OF MAGNESIUM: CPT

## 2017-07-12 PROCEDURE — 700111 HCHG RX REV CODE 636 W/ 250 OVERRIDE (IP): Performed by: INTERNAL MEDICINE

## 2017-07-12 PROCEDURE — 770022 HCHG ROOM/CARE - ICU (200)

## 2017-07-12 PROCEDURE — 700105 HCHG RX REV CODE 258: Performed by: PHARMACIST

## 2017-07-12 PROCEDURE — 700111 HCHG RX REV CODE 636 W/ 250 OVERRIDE (IP): Performed by: HOSPITALIST

## 2017-07-12 PROCEDURE — 36569 INSJ PICC 5 YR+ W/O IMAGING: CPT

## 2017-07-12 PROCEDURE — 700102 HCHG RX REV CODE 250 W/ 637 OVERRIDE(OP): Performed by: INTERNAL MEDICINE

## 2017-07-12 PROCEDURE — 700102 HCHG RX REV CODE 250 W/ 637 OVERRIDE(OP): Performed by: HOSPITALIST

## 2017-07-12 PROCEDURE — A9270 NON-COVERED ITEM OR SERVICE: HCPCS | Performed by: HOSPITALIST

## 2017-07-12 PROCEDURE — 70551 MRI BRAIN STEM W/O DYE: CPT

## 2017-07-12 PROCEDURE — 700111 HCHG RX REV CODE 636 W/ 250 OVERRIDE (IP): Performed by: PSYCHIATRY & NEUROLOGY

## 2017-07-12 PROCEDURE — 99233 SBSQ HOSP IP/OBS HIGH 50: CPT | Performed by: INTERNAL MEDICINE

## 2017-07-12 PROCEDURE — 70450 CT HEAD/BRAIN W/O DYE: CPT

## 2017-07-12 RX ORDER — LORAZEPAM 2 MG/ML
INJECTION INTRAMUSCULAR
Status: ACTIVE
Start: 2017-07-12 | End: 2017-07-12

## 2017-07-12 RX ORDER — POTASSIUM CHLORIDE 20 MEQ/1
40 TABLET, EXTENDED RELEASE ORAL ONCE
Status: ACTIVE | OUTPATIENT
Start: 2017-07-12 | End: 2017-07-13

## 2017-07-12 RX ORDER — MAGNESIUM SULFATE HEPTAHYDRATE 40 MG/ML
2 INJECTION, SOLUTION INTRAVENOUS ONCE
Status: DISPENSED | OUTPATIENT
Start: 2017-07-12 | End: 2017-07-13

## 2017-07-12 RX ORDER — LABETALOL HYDROCHLORIDE 5 MG/ML
INJECTION, SOLUTION INTRAVENOUS
Status: ACTIVE
Start: 2017-07-12 | End: 2017-07-12

## 2017-07-12 RX ORDER — HEPARIN SODIUM 5000 [USP'U]/ML
5000 INJECTION, SOLUTION INTRAVENOUS; SUBCUTANEOUS EVERY 8 HOURS
Status: DISCONTINUED | OUTPATIENT
Start: 2017-07-12 | End: 2017-07-25 | Stop reason: HOSPADM

## 2017-07-12 RX ORDER — LORAZEPAM 2 MG/ML
2 INJECTION INTRAMUSCULAR ONCE
Status: COMPLETED | OUTPATIENT
Start: 2017-07-12 | End: 2017-07-12

## 2017-07-12 RX ADMIN — HEPARIN SODIUM 5000 UNITS: 5000 INJECTION, SOLUTION INTRAVENOUS; SUBCUTANEOUS at 21:18

## 2017-07-12 RX ADMIN — METOCLOPRAMIDE 10 MG: 5 INJECTION, SOLUTION INTRAMUSCULAR; INTRAVENOUS at 00:09

## 2017-07-12 RX ADMIN — HEPARIN SODIUM 5000 UNITS: 5000 INJECTION, SOLUTION INTRAVENOUS; SUBCUTANEOUS at 16:26

## 2017-07-12 RX ADMIN — METOCLOPRAMIDE 10 MG: 5 INJECTION, SOLUTION INTRAMUSCULAR; INTRAVENOUS at 06:12

## 2017-07-12 RX ADMIN — DEXTROSE MONOHYDRATE 500 MG: 50 INJECTION, SOLUTION INTRAVENOUS at 16:27

## 2017-07-12 RX ADMIN — LORAZEPAM 2 MG: 2 INJECTION INTRAMUSCULAR; INTRAVENOUS at 07:54

## 2017-07-12 RX ADMIN — SODIUM CHLORIDE: 9 INJECTION, SOLUTION INTRAVENOUS at 15:13

## 2017-07-12 RX ADMIN — METOPROLOL TARTRATE 5 MG: 5 INJECTION INTRAVENOUS at 07:57

## 2017-07-12 RX ADMIN — OMEPRAZOLE 20 MG: 20 CAPSULE, DELAYED RELEASE ORAL at 21:20

## 2017-07-12 RX ADMIN — METOPROLOL TARTRATE 25 MG: 25 TABLET, FILM COATED ORAL at 21:20

## 2017-07-12 RX ADMIN — OXYCODONE HYDROCHLORIDE 5 MG: 5 TABLET ORAL at 00:09

## 2017-07-12 RX ADMIN — ROSUVASTATIN CALCIUM 125 MCG: 10 TABLET, FILM COATED ORAL at 17:48

## 2017-07-12 RX ADMIN — ATORVASTATIN CALCIUM 10 MG: 10 TABLET, FILM COATED ORAL at 21:20

## 2017-07-12 RX ADMIN — METOCLOPRAMIDE 10 MG: 5 INJECTION, SOLUTION INTRAMUSCULAR; INTRAVENOUS at 17:48

## 2017-07-12 RX ADMIN — CEFTRIAXONE SODIUM 2 G: 2 INJECTION, POWDER, FOR SOLUTION INTRAMUSCULAR; INTRAVENOUS at 09:36

## 2017-07-12 RX ADMIN — GABAPENTIN 200 MG: 100 CAPSULE ORAL at 21:20

## 2017-07-12 RX ADMIN — VANCOMYCIN HYDROCHLORIDE 2000 MG: 100 INJECTION, POWDER, LYOPHILIZED, FOR SOLUTION INTRAVENOUS at 15:30

## 2017-07-12 ASSESSMENT — PAIN SCALES - GENERAL
PAINLEVEL_OUTOF10: 0
PAINLEVEL_OUTOF10: 2
PAINLEVEL_OUTOF10: 7
PAINLEVEL_OUTOF10: 2
PAINLEVEL_OUTOF10: 0
PAINLEVEL_OUTOF10: 0

## 2017-07-12 ASSESSMENT — ENCOUNTER SYMPTOMS: FEVER: 0

## 2017-07-12 NOTE — CONSULTS
NEUROLOGY NOTE    Referring Physician  Collin White M.D.      CHIEF COMPLAINT:  Newly onset of seizure like today  Chief Complaint   Patient presents with   • Back Pain         IMPRESSION:    1. Newly onset of seizure like spell this AM  2. Discitis  3. Hx of atrial fib, HTN, DM, UTI, Obesity      PLAN/RECOMMENDATIONS:      ________________________________________________________________________      EEG, MRI of brain are done already-- not remarkable  Therapeutic Trial of Seizure medication for now is reasonable    Patients with multiple medical problems tend to develop seizures ( seizure threshold would decrease)( the patient may have underlined old stroke or new embolic stroke or even brain abscess)  KEPPRA 500mg Q12 H was put on    At times, jesus fontanez spells could present like seizures too  ________________________________________________________________________        MRI of brain today-- not remarkable      SIGNATURE:  Allan Win    CC:  Tanisha Flores M.D.          PRESENT ILLNESS:   Newly onset of seizure like today    The 86 year old male was admitted for back pain and treated as Discitis    Jersey Alexis is a 86 y.o. white male with history of diabetes mellitus and hypertension , atrial fibrillation who recently moved from [place] to [place]. He was admitted back in March 2017 with atrial fibrillation and rapid heart rate. Over the past 3-4 days he started having increasing back pain over his chronic back pain. There was some associated hip pain. He has been having difficulty walking. In the ER his WBC count was 20,000. His CT of the abdomen and pelvis was consistent with sequelae of pancreatitis. MRI of the lumbar spine shows possible discitis at the L3-4 level. In view of all these issues infectious disease consultation called.    86-year-old white male admitted for back pain. Found to have discitis  7/9/17- no fevers. WBC 10.3. Ongoing back pain. Sedimentation rate is 46. Now the blood cultures  are positive for strep species. Also has new nausea and vomiting today.    7/10 AF, WBC 16.5, having nausea and vomiting, ongoing back pain, plan for bone biopsy today, tolerating abx without issues  7/11 AF, WBC 13.3, more pain after biopsy, feels flushed, having runny stools on bowel regimen  7/12 AF WBC 15.3 transferred to ICU due to concern for seizure-obtunded.      PAST MEDICAL HISTORY:  Past Medical History   Diagnosis Date   • Hyperlipidemia    • Arthritis    • Arrhythmia    • Muscle disorder      nerve damage due to spinal stenosis   • Cataract    • GERD (gastroesophageal reflux disease)        PAST SURGICAL HISTORY:  Past Surgical History   Procedure Laterality Date   • Laminotomy  late 80s     spinal stenosis   • Laminotomy  late 90s     spinal fusion    • Appendectomy  Early 2000's   • Tonsillectomy Bilateral 1936   • Cataract phaco with iol Bilateral Early 2000's       FAMILY HISTORY:  Family History   Problem Relation Age of Onset   • Heart Failure Mother    • Heart Attack Mother    • Heart Disease Mother      pacemaker   • Paranoid behavior Mother    • Heart Failure Father    • Cancer Sister      Breast   • Other Brother      colostomy   • Cancer Brother    • No Known Problems Maternal Grandmother    • No Known Problems Maternal Grandfather    • No Known Problems Paternal Grandmother    • No Known Problems Paternal Grandfather        SOCIAL HISTORY:  Social History     Social History   • Marital Status:      Spouse Name: N/A   • Number of Children: N/A   • Years of Education: N/A     Occupational History   • Not on file.     Social History Main Topics   • Smoking status: Former Smoker -- 1.50 packs/day for 29 years     Types: Cigarettes, Pipe     Quit date: 01/01/1980   • Smokeless tobacco: Never Used      Comment: Started smoking at age 21   • Alcohol Use: No   • Drug Use: No   • Sexual Activity: No     Other Topics Concern   • Not on file     Social History Narrative     ALLERGIES:  No Known  Allergies  TOBHX  History   Smoking status   • Former Smoker -- 1.50 packs/day for 29 years   • Types: Cigarettes, Pipe   • Quit date: 01/01/1980   Smokeless tobacco   • Never Used     Comment: Started smoking at age 21     ALCHX  History   Alcohol Use No     DRUGHX  History   Drug Use No           MEDICATIONS:  Current Facility-Administered Medications   Medication Dose   • magnesium sulfate IVPB premix 2 g  2 g   • potassium chloride SA (Kdur) tablet 40 mEq  40 mEq   • LORAZEPAM 2 MG/ML INJ SOLN     • metoprolol (LOPRESSOR) injection 5 mg  5 mg   • metoprolol (LOPRESSOR) tablet 25 mg  25 mg   • LABETALOL HCL 5 MG/ML IV SOLN     • heparin injection 5,000 Units  5,000 Units   • lisinopril (PRINIVIL) tablet 40 mg  40 mg   • gabapentin (NEURONTIN) capsule 200 mg  200 mg   • metoclopramide (REGLAN) injection 10 mg  10 mg   • HYDROmorphone (DILAUDID) injection 0.5 mg  0.5 mg    And   • oxycodone immediate-release (ROXICODONE) tablet 2.5 mg  2.5 mg    And   • oxycodone immediate-release (ROXICODONE) tablet 5 mg  5 mg   • digoxin (LANOXIN) tablet 125 mcg  125 mcg   • NS infusion     • ondansetron (ZOFRAN) syringe/vial injection 4 mg  4 mg   • omeprazole (PRILOSEC) capsule 20 mg  20 mg   • MD ALERT... vancomycin per pharmacy protocol     • cefTRIAXone (ROCEPHIN) 2 g in  mL IVPB  2 g   • vancomycin 2,000 mg in  mL IVPB  20 mg/kg   • hydrALAZINE (APRESOLINE) tablet 10 mg  10 mg   • clonidine (CATAPRES) tablet 0.1 mg  0.1 mg   • prochlorperazine (COMPAZINE) injection 10 mg  10 mg   • pneumococcal 13-Jocelyn Conj Vacc (PREVNAR 13) syringe 0.5 mL  0.5 mL   • atorvastatin (LIPITOR) tablet 10 mg  10 mg   • vitamin D (cholecalciferol) tablet 5,000 Units  5,000 Units   • cyanocobalamin (VITAMIN B-12) tablet 1,000 mcg  1,000 mcg   • acetaminophen (TYLENOL) tablet 650 mg  650 mg       REVIEW OF SYSTEM:    Constitutional: Denies fevers, Denies weight changes   Eyes: Denies changes in vision, no eye pain  "  Ears/Nose/Throat/Mouth: Denies nasal congestion or sore throat   Cardiovascular: HTN , atrial fib  Respiratory: Denies SOB.   Gastrointestinal/Hepatic: Denies abdominal pain, nausea, vomiting, diarrhea, constipation or GI bleeding   Genitourinary: Denies bladder dysfunction, dysuria or frequency   Musculoskeletal/Rheum: Denies joint pain and swelling   Skin/Breast: Denies rash, denies breast lumps or discharge   Neurological: Denies headache, confusion, memory loss or focal weakness/parasthesias   Psychiatric: denies mood disorder   Endocrine: DM  Heme/Oncology/Lymph Nodes: Denies enlarged lymph nodes, denies brusing or known bleeding disorder   Allergic/Immunologic: Denies hx of allergies         PHYSICAL AND NEUROLOGICAL EXMAINATIONS:  VITAL SIGNS: /68 mmHg  Pulse 78  Temp(Src) 36.8 °C (98.2 °F) (Temporal)  Resp 16  Ht 1.803 m (5' 11\")  Wt 100.6 kg (221 lb 12.5 oz)  BMI 30.95 kg/m2  SpO2 98%  CURRENT WEIGHT:   BMI: Body mass index is 30.95 kg/(m^2).  PREVIOUS WEIGHTS:  Wt Readings from Last 25 Encounters:   07/11/17 100.6 kg (221 lb 12.5 oz)   04/20/17 94.008 kg (207 lb 4 oz)   04/07/17 93.5 kg (206 lb 2.1 oz)   03/23/17 96.163 kg (212 lb)   03/21/17 96.3 kg (212 lb 4.9 oz)   03/21/17 92.534 kg (204 lb)   03/06/17 94.802 kg (209 lb)   02/28/17 92.9 kg (204 lb 12.9 oz)       General appearance of patient: WDWN(+) NAD(+)    EYES  o Fundus : Papilledem(-) Exudates(-) Hemorrhage(-)  Nervous System  Drowsy ( after sedative medication)  Able to move all limbs  Cranial Nerves  • Nerve 2: intact  • Nerve 3,4,6: intact  • Nerve 5 : intact  • Nerve 7: intact  • Nerve 8: intact  • Nerve 9 & 10: intact  • Nerve 11: intact  • Nerve 12: intact  Muscle Power and muscle tone: symmetric in upper and lower  Gait : bed ridden  Heart and Vascular  Peripheral Vasucular system : Edema (-) Swelling(-)  abdomen bowel sound normoactive  Extremities freely moveable  Joints no contracture           LAB:  Recent Labs      " 07/10/17   0048  07/10/17   2355  07/12/17   0222   WBC  16.5*  13.3*  15.3*   RBC  5.13  4.81  5.17   HEMOGLOBIN  15.1  14.1  15.0   HEMATOCRIT  44.4  41.7*  45.1   MCV  86.5  86.7  87.2   MCH  29.4  29.3  29.0   MCHC  34.0  33.8  33.3*   RDW  41.0  39.8  40.3   PLATELETCT  286  248  326   MPV  10.0  10.0  9.9           IMPRESSION:    1. Newly onset of seizure like spell this AM  2. Discitis  3. Hx of atrial fib, HTN, DM, UTI, Obesity      PLAN/RECOMMENDATIONS:      ________________________________________________________________________      EEG, MRI of brain are done already-- not remarkable  Therapeutic Trial of Seizure medication for now is reasonable    Patients with multiple medical problems tend to develop seizures ( seizure threshold would decrease)( the patient may have underlined old stroke or new embolic stroke or even brain abscess)  KEPPRA 500mg Q12 H was put on    At times, jesus fontanez spells could present like seizures too  ________________________________________________________________________          SIGNATURE:  Alaln Win    CC:  Tanisha Flores M.D.

## 2017-07-12 NOTE — PROGRESS NOTES
Progress Note               Author: Jaziel Azar Date & Time created: 2017  8:17 AM     Interval History:  Hospital day #6 for low back pain, L3-4 discitis  He had seizure like activity this am at 0700 with disorientation and rapid eye movements. He has had Ativan 2mg and a CT scan since.       Review of Systems:  ROS           Physical Exam:  Physical Exam   Drowsy, mumbles his name  Eyes closed.   NM:   Purposeful, weak movement of extremities x 4  PERRL              Labs:        Invalid input(s): [id number]      Recent Labs      17   1646  07/10/17   0048  07/10/17   2355  07/12/17   0222   SODIUM  136  133*  137  136   POTASSIUM  3.7  3.6  3.3*  3.4*   CHLORIDE  102  96  99  99   CO2  25  30  31  29   BUN  15  11  12  12   CREATININE  0.64  0.57  0.62  0.60   MAGNESIUM  1.3*   --    --   1.4*   CALCIUM  9.5  9.5  9.3  9.6     Recent Labs      07/10/17   0048  07/10/17   2355  07/12/17   0222   ALTSGPT  29   --    --    ASTSGOT  23   --    --    ALKPHOSPHAT  68   --    --    TBILIRUBIN  0.6   --    --    GLUCOSE  154*  117*  197*     Recent Labs      07/10/17   0048  07/10/17   2355  07/12/17   0222   RBC  5.13  4.81  5.17   HEMOGLOBIN  15.1  14.1  15.0   HEMATOCRIT  44.4  41.7*  45.1   PLATELETCT  286  248  326     Recent Labs      07/10/17   0048  07/10/17   2355  07/12/17   0222   WBC  16.5*  13.3*  15.3*   NEUTSPOLYS  77.30*  69.60  83.40*   LYMPHOCYTES  10.60*  16.20*  6.70*   MONOCYTES  10.60  11.40  7.40   EOSINOPHILS  0.40  1.30  0.20   BASOPHILS  0.30  0.40  0.50   ASTSGOT  23   --    --    ALTSGPT  29   --    --    ALKPHOSPHAT  68   --    --    TBILIRUBIN  0.6   --    --      Hemodynamics:  Temp (24hrs), Av.2 °C (97.2 °F), Min:35.9 °C (96.6 °F), Max:36.7 °C (98 °F)  Temperature: 35.9 °C (96.7 °F)  Pulse  Av.4  Min: 34  Max: 154   Blood Pressure : 108/68 mmHg     Respiratory:    Respiration: (!) 22, Pulse Oximetry: 96 %, O2 Daily Delivery Respiratory : Silicone Nasal  Cannula        RUL Breath Sounds: Clear, RML Breath Sounds: Clear, RLL Breath Sounds: Diminished, MIKE Breath Sounds: Clear, LLL Breath Sounds: Diminished  Fluids:    Intake/Output Summary (Last 24 hours) at 07/10/17 1033  Last data filed at 07/10/17 0915   Gross per 24 hour   Intake   1520 ml   Output   5385 ml   Net  -3865 ml     Weight: 100.6 kg (221 lb 12.5 oz)  GI/Nutrition:  Orders Placed This Encounter   Procedures   • DIET ORDER     Standing Status: Standing      Number of Occurrences: 1      Standing Expiration Date:      Order Specific Question:  Diet:     Answer:  Regular [1]     Medical Decision Making, by Problem:  Active Hospital Problems    Diagnosis   • *Low back pain [M54.5]   • Bigeminy [I49.9]   • Hypomagnesemia [E83.42]   • Hypotension [I95.9]   • Morbid obesity (CMS-HCC) [E66.01]   • Sepsis (CMS-[facility]) [A41.9]   • UTI (urinary tract infection) [N39.0]   • Atrial flutter (CMS-[facility]) [I48.92]       Plan:  L3-4 discitis  Low back pain  Chronic left leg weakness  Seizures. CT scan of head shows no acute findings there are age related changes  Plan  Pt was seen en route to ICU  Continue care per primary team, ID  Follow neuro exam  Seizure prophylaxis       Jaziel Azar NP    [employer] Measures

## 2017-07-12 NOTE — ASSESSMENT & PLAN NOTE
Resolved no acute pathology on MRI or EEG, improving   Empirically started on Keppra per neurology, Dr Win recommend outpt  follow up to review need for continued AED  Per pt's son in law pt AAOX4 at baseline   Palliative following

## 2017-07-12 NOTE — CARE PLAN
Problem: Knowledge Deficit  Goal: Knowledge of disease process/condition, treatment plan, diagnostic tests, and medications will improve  Outcome: PROGRESSING AS EXPECTED  Patient educated on the need for walking and ambulation to decrease muscle mass loss.     Problem: Mobility  Goal: Risk for activity intolerance will decrease  Outcome: PROGRESSING SLOWER THAN EXPECTED  Patient is refusing to mobilize related to pain, patient educated on the need to mobilize.

## 2017-07-12 NOTE — PROGRESS NOTES
"Pt to S137 as RRT from [facility]. Responds to calling of name with \"what\". Will not open eyes to command. Weak attempt to squeeze fingers to command. CM with afib noted.   "

## 2017-07-12 NOTE — PROGRESS NOTES
[facility] Hospitalist Progress Note    Date of Service: 2017    Chief Complaint  86 y.o. male admitted 2017 with lower back pain    Interval Problem Update  85 y/o M with PMH of PAF, HTN, DLD admitted for above and found to have diskitis at L3L4 post biopsy.  This morning rapid response was called due to patient not responding verbally. Went and evaluated the patient, no significant sign of neurological deficits, but was confused. Ordered head CT: no acute issue identified. Later on he had episodes of facial and eye twitching noticed: concerning for possible seizure. Patient is full code. MRI and EEG ordered. Consulted Dr. Win. Will be transferred to ICU for higher level of care. Also discussed with Dr. Garcia about the patient transfer, who agreed to see the patient in ICU.      Consultants/Specialty  none    Disposition  Home when stable        Review of Systems   Unable to perform ROS: mental acuity   All other systems reviewed and are negative.     Physical Exam  Laboratory/Imaging   Hemodynamics  Temp (24hrs), Av.2 °C (97.2 °F), Min:35.9 °C (96.6 °F), Max:36.7 °C (98 °F)   Temperature: 35.9 °C (96.7 °F)  Pulse  Av.4  Min: 34  Max: 154    Blood Pressure : 156/83 mmHg      Respiratory      Respiration: (!) 22, Pulse Oximetry: 95 %     Work Of Breathing / Effort: Mild  RUL Breath Sounds: Clear, RML Breath Sounds: Clear, RLL Breath Sounds: Diminished, MIKE Breath Sounds: Clear, LLL Breath Sounds: Diminished    Fluids    Intake/Output Summary (Last 24 hours) at 17 0731  Last data filed at 17 0500   Gross per 24 hour   Intake      0 ml   Output   2150 ml   Net  -2150 ml       Nutrition  Orders Placed This Encounter   Procedures   • DIET ORDER     Standing Status: Standing      Number of Occurrences: 1      Standing Expiration Date:      Order Specific Question:  Diet:     Answer:  Regular [1]     Physical Exam   HENT:   Head: Normocephalic and atraumatic.   Eyes: Pupils are equal, round,  and reactive to light. Left eye exhibits no discharge.   Neck: No tracheal deviation present. No thyromegaly present.   Cardiovascular: Exam reveals no gallop and no friction rub.    irregular   Pulmonary/Chest: No respiratory distress. He has no rales.   Abdominal: He exhibits distension. There is no tenderness.   Musculoskeletal: He exhibits tenderness. He exhibits no edema.   Low back pain   Neurological:   confused   Skin: Skin is warm and dry.       Recent Labs      07/10/17   0048  07/10/17   2355  07/12/17 0222   WBC  16.5*  13.3*  15.3*   RBC  5.13  4.81  5.17   HEMOGLOBIN  15.1  14.1  15.0   HEMATOCRIT  44.4  41.7*  45.1   MCV  86.5  86.7  87.2   MCH  29.4  29.3  29.0   MCHC  34.0  33.8  33.3*   RDW  41.0  39.8  40.3   PLATELETCT  286  248  326   MPV  10.0  10.0  9.9     Recent Labs      07/10/17   0048  07/10/17   2355  07/12/17   0222   SODIUM  133*  137  136   POTASSIUM  3.6  3.3*  3.4*   CHLORIDE  96  99  99   CO2  30  31  29   GLUCOSE  154*  117*  197*   BUN  11  12  12   CREATININE  0.57  0.62  0.60   CALCIUM  9.5  9.3  9.6                      Assessment/Plan     * Low back pain (present on admission)  Assessment & Plan  Pain control  From diskitis      Bacteremia  Assessment & Plan  Strep viridans  Follow sensitivy  ID following  On IV vanco and ceftriaxone    Discitis of lumbar region  Assessment & Plan  At L3-L4 level  Biopsy yesterday: pending report  abx as above    Atrial flutter (CMS-HCC) (present on admission)  Assessment & Plan  Improving  On digoxin, metoprolol  Not on AC    Sepsis ([facility]) (present on admission)  Assessment & Plan  From diskitis and UTI  abx as above          UTI (urinary tract infection) (present on admission)  Assessment & Plan  Iv rocephin  Follow cultures        Hypotension (present on admission)  Assessment & Plan  Resolved            Morbid obesity (CMS-HCC) (present on admission)  Assessment & Plan  Weight loss and exercise    Pt and ot  eval    Bigeminy  Assessment & Plan  Replace magnesium      Hypomagnesemia  Assessment & Plan  Replace magnesium    Acute encephalopathy  Assessment & Plan  Etiology multifactorial: from infection vs metabolic vs medication vs seizure vs CVA  On IV ativan as needed for possible breakthrough seizure  IV abx per ID  CT head  EEG:  MRI:    Dr. Win consulted  Labs reviewed, Medications reviewed and Radiology images reviewed  Hearn catheter: No Hearn      DVT Prophylaxis: Heparin           Critical care time spent 36 minutes without overlap, exclude procedure time.

## 2017-07-12 NOTE — PROGRESS NOTES
More alert. Oriented to person and time. Follows simple commands. Returns to sleep state when left alone.

## 2017-07-12 NOTE — EEG PROGRESS NOTE
EEG 07/12/2017 2:49 PM    ROUTINE ELECTROENCEPHALOGRAM REPORT        INDICATION:     DAY 6 FOR LOW BACK PAIN, L3-4 DISCITIS.  SZ LIKE ACTIVITY THIS MORINING, 7AM, W/DISORIENTATION, RAPID EYE MVMT. HX OF HYPERLIPIDEMIA, ARRHYTHMIA, MUSCLE DISORDER FROM SPINAL STENOSIS, CATARACT, GERD.      TECHNIQUE: 30 channel routine electroencephalogram (EEG) was performed in accordance with the international 10-20 system. The study was reviewed in bipolar and referential montages. The recording examined the patient during wakeful and drowsy state(s).     DESCRIPTION OF THE RECORD:        Background rhythm during awake stage shows well-organized, well-developed, average voltage 7 to 8 hertz alpha activity in the posterior regions.  No spike-and-wave discharges. High amplitude monomorphic delta bursts -- frontal -- slightly over right.  Photic stimulation did not produce any abnormalities.  No abnormalities were found during the procedure. Stage I sleep was achieved.( most of the time, drowsy)      ACTIVATION PROCEDURES:      Photic Stimulation were done    ICTAL AND/OR INTERICTAL FINDINGS:    No focal or generalized epileptiform activity noted. No regional slowing was seen during this routine study.  No clinical events or seizures were reported or recorded during the study.      EKG: sampling of the EKG recording demonstrated arrhythm.        INTERPRETATION:    ________________________________________________________________________    Nonspecific diffuse cortical dysfunction -- might be more over right-- could be metabolic, toxic or even post-ictal    Very drowsy during this record ( advise follow up EEG when the patient is more awake in the future)    No active electrographic seizures    Of note, unremarkable EEG does not completely exclude the diagnosis  of seizures since seizure is an episodic phenomena.  Clinical correlation may help     If clinical suspicion of seizure remains high.  Prolonged outpatient EEG   monitoring may  be of help.    ________________________________________________________________________

## 2017-07-12 NOTE — PROGRESS NOTES
Patient has had nausea and vomiting throughout the night. RN has given scheduled medications and PRN medications for nausea. RN also gave patient gingerale in hopes of helping with nausea and this has not helped.

## 2017-07-12 NOTE — CODE DOCUMENTATION
Pt noted to have rhythmic movement of both eyes; pt non repspnsive to pain during this time. Dr. White called back to bedside. One time order for ativan for seizure. Seizure lasted about 1.5 min and stopped prior to ativan being given. RN to take ativan for head CT in case of reoccurrence of seizure activity.      Pt was generally altered when RRT arrived, but had no specific focal/unilateral deficits. Pt's pupils 3 mm, equal, and reactive. Pt moved all extremities to pain and spontaneously intermittently, but did not follow commands.

## 2017-07-12 NOTE — PROGRESS NOTES
"Pharmacy Kinetics 86 y.o. male on vancomycin day # 4 2017    Currently on Vancomycin 2000 mg iv q24hr    Indication for Treatment: L3-4 discitis/bactermia     Pertinent history per medical record: Admitted on 2017 for sepsis and back pain. Found to have diskitis at L3-4 post biopsy.     Other antibiotics: Ceftriaxone 2g IV q24h    Allergies: Review of patient's allergies indicates no known allergies.     List concerns for renal function: age, BMI ~31, elevated lactic acid, BUN:Cr ~20:1    Pertinent cultures to date:   17 peripheral BCx Streptococcus viridans  17 peripheral BCx: negative x1   17 peripheral BC: NGTD x2   7/10/17 L3-4 Tissue:  NGTD x 3     Recent Labs      17   1646  07/10/17   0048  07/10/17   2355  17   0222   WBC  17.5*  16.5*  13.3*  15.3*   NEUTSPOLYS  75.40*  77.30*  69.60  83.40*     Recent Labs      17   1646  07/10/17   0048  07/10/17   2355  17   0222   BUN  15  11  12  12   CREATININE  0.64  0.57  0.62  0.60   ALBUMIN   --   3.5   --    --      Recent Labs      07/10/17   1614   VANCOTROUGH  5.6*     Intake/Output Summary (Last 24 hours) at 17 1056  Last data filed at 17 0500   Gross per 24 hour   Intake      0 ml   Output   1550 ml   Net  -1550 ml      Blood pressure 108/68, pulse 83, temperature 35.9 °C (96.7 °F), temperature source Temporal, resp. rate 17, height 1.803 m (5' 11\"), weight 100.6 kg (221 lb 12.5 oz), SpO2 97 %. Temp (24hrs), Av.2 °C (97.2 °F), Min:35.9 °C (96.6 °F), Max:36.7 °C (98 °F)      A/P   1. Vancomycin dose change: not indicated at this time  2. Next vancomycin level: 17 @ 1430 (ordered)  3. Goal trough: 18-22 mcg/mL  4. Comments: Pt transferred to ICU today after rapid response today for seizure like activity. Dr. Win has been consulted.  Vancomycin level to be checked prior to tomorrow's dose.  Renal indices have been stable.  I/O's are incomplete.  Peripheral blood culture final result is strep " viridans. Tissue from L3-4 negative at 48 hours.  ID is following patient.  Will follow for de-escalation of antibiotic therapy.     Kristina Murillo, PharmD

## 2017-07-12 NOTE — PROCEDURES
PICC Insertion Procedure Note    Consents confirmed, vessel patency confirmed with ultrasound. Risks and benefits of procedure explained to patient/family and education regarding central line associated bloodstream infections provided. Questions answered.     PICC placed in LUE per MD order with ultrasound guidance. 5 Fr, double lumen PICC placed in basilic vein after 1 attempt(s). 4 cc's of 1% lidocaine injected intradermally, 21 gauge microintroducer needle and modified Seldinger technique used. 48 cm catheter with 0 cm external measurement inserted with good blood return. Each lumen flushed without resistance with 10 mL 0.9% normal saline. PICC line secured with Biopatch and Tegaderm.    CXR ordered, PICC placement confirmation will be provided by the Radiologist via interpretation of the follow-up chest x-ray to confirm tip location. Pt tolerated procedure well.  Patient condition relayed to unit RN or ordering physician via this post procedure note in the EMR.     Zapya Power PICC ref # [id number], Lot # [id number]

## 2017-07-12 NOTE — CODE DOCUMENTATION
Pt had second seizure on way to CT  (eye movements) lasting about 20 seconds. Pt never fully woke up during transport. Upon return to room pt appeared to have another generalized seizure lasting about 3 minutes. Ativan was given and pt started to become a little more responsive.

## 2017-07-12 NOTE — PROGRESS NOTES
Infectious Disease Progress Note    Author: Shante Sequeira M.D. Date of service & Time created: 2017  2:09 PM    Chief Complaint:  Chief Complaint   Patient presents with   • Back Pain    follow-up for lumbar discitis and bacteremia    Interval History:  86-year-old white male admitted for back pain. Found to have discitis  17- no fevers. WBC 10.3. Ongoing back pain. Sedimentation rate is 46. Now the blood cultures are positive for strep species. Also has new nausea and vomiting today.   7/10 AF, WBC 16.5, having nausea and vomiting, ongoing back pain, plan for bone biopsy today, tolerating abx without issues   AF, WBC 13.3, more pain after biopsy, feels flushed, having runny stools on bowel regimen   AF WBC 15.3 transferred to ICU due to concern for seizure-obtunded.  Labs Reviewed, Medications Reviewed and Radiology Reviewed.    Review of Systems:  Review of Systems   Unable to perform ROS: medical condition   Constitutional: Negative for fever.       Hemodynamics:  Temp (24hrs), Av.3 °C (97.4 °F), Min:35.9 °C (96.6 °F), Max:36.8 °C (98.2 °F)  Temperature: 36.8 °C (98.2 °F)  Pulse  Av.8  Min: 34  Max: 154Heart Rate (Monitored): 82  Blood Pressure : 108/68 mmHg, NIBP: (!) 81/52 mmHg       Physical Exam:  Physical Exam   Constitutional: He appears well-developed and well-nourished. No distress.   Looks ill   HENT:   Head: Normocephalic and atraumatic.   Eyes: EOM are normal. Pupils are equal, round, and reactive to light. No scleral icterus.   Neck: Neck supple.   Cardiovascular: Normal rate.    Irregularly irregular  Tachy   Pulmonary/Chest: Effort normal and breath sounds normal. No respiratory distress. He has no wheezes.   Abdominal: Soft. He exhibits no distension. There is no tenderness.   Musculoskeletal: He exhibits no edema.   Neurological:   obtunded   Skin: No rash noted.   Nursing note and vitals reviewed.      Meds:    Current facility-administered medications:   •   magnesium sulfate IVPB premix 2 g, 2 g, Intravenous, Once, Collin White M.D.  •  potassium chloride SA (Kdur) tablet 40 mEq, 40 mEq, Oral, Once, Collin White M.D., Stopped at 07/12/17 0745  •  LORAZEPAM 2 MG/ML INJ SOLN, , , ,   •  metoprolol (LOPRESSOR) injection 5 mg, 5 mg, Intravenous, Q5 MIN PRN, Collin White M.D., 5 mg at 07/12/17 0757  •  metoprolol (LOPRESSOR) tablet 25 mg, 25 mg, Oral, TWICE DAILY, Collin White M.D.  •  LABETALOL HCL 5 MG/ML IV SOLN, , , ,   •  heparin injection 5,000 Units, 5,000 Units, Subcutaneous, Q8HRS, Collin White M.D.  •  lisinopril (PRINIVIL) tablet 40 mg, 40 mg, Oral, Q DAY, Georgi Grey M.D., Stopped at 07/12/17 0900  •  gabapentin (NEURONTIN) capsule 200 mg, 200 mg, Oral, TID, Georgi Grey M.D., Stopped at 07/12/17 0900  •  metoclopramide (REGLAN) injection 10 mg, 10 mg, Intravenous, Q6HRS, Georgi Grey M.D., 10 mg at 07/12/17 0612  •  Notify provider if pain remains uncontrolled, , , CONTINUOUS **AND** Use the numeric rating scale (NRS-11) on regular floors and Critical-Care Pain Observation Tool (CPOT) on ICUs/Trauma to assess pain, , , CONTINUOUS **AND** Pulse Ox (Oximetry), , , CONTINUOUS **AND** [DISCONTINUED] Pharmacy Consult Request ...Pain Management Review, , Other, PRN **AND** If patient difficult to arouse and/or has respiratory depression, stop any opiates that are currently infusing and call a Rapid Response., , , CONTINUOUS **AND** oxycodone immediate-release (ROXICODONE) tablet 2.5 mg, 2.5 mg, Oral, Q3HRS PRN, 2.5 mg at 07/11/17 1802 **AND** oxycodone immediate-release (ROXICODONE) tablet 5 mg, 5 mg, Oral, Q3HRS PRN, 5 mg at 07/12/17 0009 **AND** HYDROmorphone (DILAUDID) injection 0.5 mg, 0.5 mg, Intravenous, Q3HRS PRN, Georgi Grey M.D., 0.5 mg at 07/10/17 0905  •  digoxin (LANOXIN) tablet 125 mcg, 125 mcg, Oral, DAILY AT 1800, Georgi Grey M.D., 125 mcg at 07/11/17 1756  •  NS infusion, , Intravenous, Continuous, Collin White M.D., Last Rate: 50  mL/hr at 07/11/17 2217  •  ondansetron (ZOFRAN) syringe/vial injection 4 mg, 4 mg, Intravenous, Q6HRS PRN, Georgi Grey M.D., 4 mg at 07/11/17 2113  •  omeprazole (PRILOSEC) capsule 20 mg, 20 mg, Oral, BID, Georgi Grey M.D., Stopped at 07/12/17 0900  •  MD ALERT... vancomycin per pharmacy protocol, , Other, pharmacy to dose, Oliva Bolanos M.D.  •  cefTRIAXone (ROCEPHIN) 2 g in  mL IVPB, 2 g, Intravenous, Q24HRS, Oliva Bolanos M.D., Stopped at 07/12/17 1006  •  vancomycin 2,000 mg in  mL IVPB, 20 mg/kg, Intravenous, Q24HR, Robert Mcnulty, PHARMD, Stopped at 07/11/17 1658  •  hydrALAZINE (APRESOLINE) tablet 10 mg, 10 mg, Oral, Q4HRS PRN, Jamaal Molina M.D., 10 mg at 07/09/17 2038  •  clonidine (CATAPRES) tablet 0.1 mg, 0.1 mg, Oral, 4X/DAY PRN, Jamaal Molina M.D.  •  prochlorperazine (COMPAZINE) injection 10 mg, 10 mg, Intravenous, Q6HRS PRN, Darion Benton M.D., 10 mg at 07/10/17 0905  •  pneumococcal 13-Jocelyn Conj Vacc (PREVNAR 13) syringe 0.5 mL, 0.5 mL, Intramuscular, Once PRN, Jamaal Molina M.D.  •  atorvastatin (LIPITOR) tablet 10 mg, 10 mg, Oral, Nightly, Jamaal Molina M.D., 10 mg at 07/11/17 2036  •  vitamin D (cholecalciferol) tablet 5,000 Units, 5,000 Units, Oral, Q48HRS, Jamaal Molina M.D., Stopped at 07/12/17 0900  •  cyanocobalamin (VITAMIN B-12) tablet 1,000 mcg, 1,000 mcg, Oral, DAILY, Jamaal Molina M.D., Stopped at 07/12/17 0900  •  acetaminophen (TYLENOL) tablet 650 mg, 650 mg, Oral, Q6HRS PRN, Jamaal Molina M.D., 650 mg at 07/08/17 2142    Labs:  Recent Labs      07/10/17   0048  07/10/17   2355  07/12/17   0222   WBC  16.5*  13.3*  15.3*   RBC  5.13  4.81  5.17   HEMOGLOBIN  15.1  14.1  15.0   HEMATOCRIT  44.4  41.7*  45.1   MCV  86.5  86.7  87.2   MCH  29.4  29.3  29.0   RDW  41.0  39.8  40.3   PLATELETCT  286  248  326   MPV  10.0  10.0  9.9   NEUTSPOLYS  77.30*  69.60  83.40*   LYMPHOCYTES  10.60*  16.20*  6.70*   MONOCYTES  10.60  11.40  7.40    EOSINOPHILS  0.40  1.30  0.20   BASOPHILS  0.30  0.40  0.50     Recent Labs      07/10/17   0048  07/10/17   2355  07/12/17   0222   SODIUM  133*  137  136   POTASSIUM  3.6  3.3*  3.4*   CHLORIDE  96  99  99   CO2  30  31  29   GLUCOSE  154*  117*  197*   BUN  11  12  12     Recent Labs      07/10/17   0048  07/10/17   2355  07/12/17   0222   ALBUMIN  3.5   --    --    TBILIRUBIN  0.6   --    --    ALKPHOSPHAT  68   --    --    TOTPROTEIN  7.0   --    --    ALTSGPT  29   --    --    ASTSGOT  23   --    --    CREATININE  0.57  0.62  0.60       Imaging:  Ct-abdomen-pelvis W/o  7/6/2017  No hydronephrosis or urolithiasis. Diverticula colon. No evidence diverticulitis. No free fluid. Pancreatic calcifications/consistent with sequela pancreatitis. No peripancreatic fluid collections. Coronary artery calcifications.    Ct-lspine W/o Plus Recons  7/6/2017  1.  No evidence of acute fracture of the lumbar spine. 2.  Surgical change extending from L3 through L5. 3.  Multilevel degenerative disc disease and facet degeneration. 4.  Old fractures of the right L1 and L2 transverse processes. 5.  Multilevel degenerative subluxation.    Mr-lumbar Spine-with & W/o  7/8/2017  1.  Possible discitis at the L3-4 level. 2.  Mild to moderate retrolisthesis at the L3-4 level. 3.  Previous extensive laminectomy from the L2 level to the sacrum with bilateral pedicle screw and zev fixation at the L4-L5 levels. 4.   Moderate lumbar spondylotic changes at the L3-4 levels with associated moderate central canal stenosis at this level secondary to facet arthropathy. Additionally there are severe bilateral neural foraminal stenosis. 5.  Minimal lumbar spondylotic changes at the L2-3 level with mild bilateral neural foraminal narrowing and mild disc bulging into the inferior aspects of the neural foramina.    Dx-hip-bilateral-with Pelvis-3/4 Views  7/6/2017  No evidence of fracture or arthropathy of the hips. Degenerative and surgical changes of  "the lumbar spine.      Micro:  BLOOD CULTURE   Date Value Ref Range Status   07/09/2017   Preliminary    No Growth    Note: Blood cultures are incubated for 5 days and  are monitored continuously.Positive blood cultures  are called to the RN and reported as soon as  they are identified.     07/09/2017   Preliminary    No Growth    Note: Blood cultures are incubated for 5 days and  are monitored continuously.Positive blood cultures  are called to the RN and reported as soon as  they are identified.        Results     Procedure Component Value Units Date/Time    CULTURE TISSUE W/ GRM STAIN [[id number]] Collected:  07/10/17 1445    Order Status:  Completed Specimen Information:  Tissue Updated:  07/12/17 0955     Significant Indicator NEG      Source TISS      Site Saline from L3 L4 Disk      Tissue Culture No growth at 48 hours.      Gram Stain Result No organisms seen.     FUNGAL CULTURE [[id number]] Collected:  07/10/17 1445    Order Status:  Completed Specimen Information:  Tissue Updated:  07/12/17 0955     Significant Indicator NEG      Source TISS      Site Saline from L3 L4 Disk      Fungal Culture Culture in progress.     ANAEROBIC CULTURE [[id number]] Collected:  07/10/17 1445    Order Status:  Completed Specimen Information:  Tissue Updated:  07/12/17 0955     Significant Indicator NEG      Source TISS      Site Saline from L3 L4 Disk      Anaerobic Culture, Culture Res Culture in progress.     BLOOD CULTURE [[id number]] Collected:  07/06/17 1022    Order Status:  Completed Specimen Information:  Blood from Peripheral Updated:  07/11/17 1100     Significant Indicator NEG      Source BLD      Site PERIPHERAL      Blood Culture No growth after 5 days of incubation.     Narrative:      Per Hospital Policy: Only change Specimen Src: to \"Line\" if  specified by physician order.    GRAM STAIN [[id number]] Collected:  07/10/17 1445    Order Status:  Completed Specimen Information:  Tissue Updated:  07/11/17 0938     " "Significant Indicator .      Source TISS      Site Saline from L3 L4 Disk      Gram Stain Result No organisms seen.     BLOOD CULTURE [[id number]]  (Abnormal) Collected:  07/06/17 1013    Order Status:  Completed Specimen Information:  Blood from Peripheral Updated:  07/10/17 1135     Significant Indicator POS (POS)      Source BLD      Site PERIPHERAL      Blood Culture Growth detected by Bactec instrument. (A)      Blood Culture -- (A)      Result:        Viridans streptococcus - possible contaminant  Isolated from one bottle only, possible skin contaminant  please correlate with clinical condition.      Narrative:      CALL  [facility]  183 tel. [phone],  CALLED  183 tel. [phone] 07/09/2017, 11:22, RB PERF. RESULTS CALLED TO:  [id number] RN  Per Hospital Policy: Only change Specimen Src: to \"Line\" if  specified by physician order.    BLOOD CULTURE [[id number]] Collected:  07/09/17 1646    Order Status:  Completed Specimen Information:  Blood from Peripheral Updated:  07/10/17 0849     Significant Indicator NEG      Source BLD      Site PERIPHERAL      Blood Culture --      Result:        No Growth    Note: Blood cultures are incubated for 5 days and  are monitored continuously.Positive blood cultures  are called to the RN and reported as soon as  they are identified.      Narrative:      Per Hospital Policy: Only change Specimen Src: to \"Line\" if  specified by physician order.    BLOOD CULTURE [[id number]] Collected:  07/09/17 1646    Order Status:  Completed Specimen Information:  Blood from Peripheral Updated:  07/10/17 0849     Significant Indicator NEG      Source BLD      Site PERIPHERAL      Blood Culture --      Result:        No Growth    Note: Blood cultures are incubated for 5 days and  are monitored continuously.Positive blood cultures  are called to the RN and reported as soon as  they are identified.      Narrative:      Per Hospital Policy: Only change Specimen Src: to \"Line\" if  specified by physician " order.    URINE CULTURE(NEW) [[id number]] Collected:  07/06/17 0930    Order Status:  Completed Specimen Information:  Urine Updated:  07/08/17 1010     Significant Indicator NEG      Source UR      Site --      Urine Culture Mixed skin braxton <10,000 cfu/mL     URINALYSIS CULTURE, IF INDICATED [[id number]]  (Abnormal) Collected:  07/06/17 0930    Order Status:  Completed Specimen Information:  Urine Updated:  07/06/17 1044     Micro Urine Req Microscopic      Color Angelica      Character Clear      Specific Gravity 1.023      Ph 5.0      Glucose Negative mg/dL      Ketones Negative mg/dL      Protein 30 (A) mg/dL      Bilirubin Small (A)      Nitrite Negative      Leukocyte Esterase Trace (A)      Occult Blood Trace (A)      Culture Indicated Yes UA Culture     Urine Culture [[id number]] Collected:  07/06/17 0000    Order Status:  Canceled Specimen Information:  Urine     Narrative:      ORDER WAS CANCELLED 07/06/2017 15:10, Duplicate ..  Indication for culture:->Suspected Sepsis            Assessment:  Active Hospital Problems    Diagnosis   • *Low back pain [M54.5]   • Hypotension [I95.9]   • Morbid obesity (CMS-HCC) [E66.01]   • Sepsis (CMS-HCC) [A41.9]   • UTI (urinary tract infection) [N39.0]   • Atrial flutter (CMS-HCC) [I48.92]       Plan:  L3-4 discitis  Afebrile  Persistent leukocytosis  S/p IR biopsy 7/10 - cultures pending  Abx as below  Anticipate 6 week course of IV abx    Bacteremia, strep   Blood cultures (1/2) from 7/6/17 - viridans strep   Continue vancomycin and Rocephin for now pending strep speciation  Monitor renal function while on vancomycin   TTE - negative  Bcx 7/9 - NGTD    Diarrhea  DC bowel regimen  If persists, check C diff    Diabetes mellitus  Keep the blood sugars less than 150    Possible seizure  EEG planned    Discussed with internal Medicine

## 2017-07-12 NOTE — RESPIRATORY CARE
Respiratory Rapid Response Note    Symptoms AMS     Breath Sounds  Pre/Post Intervention: Pre Intervention Assessment (07/12/17 0734)  RUL Breath Sounds: Clear (07/12/17 0734)  RML Breath Sounds: Clear (07/12/17 0734)  RLL Breath Sounds: Diminished (07/12/17 0734)  MIKE Breath Sounds: Clear (07/12/17 0734)  LLL Breath Sounds: Diminished (07/12/17 0734)     Sputum Amount: Unable to Evaluate (07/12/17 0734)  Sputum Color: Unable to Evaluate (07/12/17 0734)  Sputum Consistency: Unable to Evaluate (07/12/17 0734)       O2 (LPM): 2 (07/12/17 0734)  O2 Daily Delivery Respiratory : Silicone Nasal Cannula (07/12/17 0734)    Events/Summary/Plan: RT responded to RR. Pt resp status stable on 2 LPM, BS clear to dim bilat.  (07/12/17 0734)  Transferred to ICU Not at this time.

## 2017-07-12 NOTE — PROGRESS NOTES
Report received from Siobhan HENRY. Patient lying in bed sleeping at this time. All safety measures in place. Patient fluids running with appropriate order. No immediate concerns for patient at this time.

## 2017-07-13 PROBLEM — I49.8 BIGEMINY: Status: RESOLVED | Noted: 2017-07-09 | Resolved: 2017-07-13

## 2017-07-13 LAB
ANION GAP SERPL CALC-SCNC: 4 MMOL/L (ref 0–11.9)
BACTERIA TISS AEROBE CULT: NORMAL
BASOPHILS # BLD AUTO: 0.5 % (ref 0–1.8)
BASOPHILS # BLD: 0.06 K/UL (ref 0–0.12)
BUN SERPL-MCNC: 13 MG/DL (ref 8–22)
CALCIUM SERPL-MCNC: 9.2 MG/DL (ref 8.5–10.5)
CHLORIDE SERPL-SCNC: 99 MMOL/L (ref 96–112)
CO2 SERPL-SCNC: 32 MMOL/L (ref 20–33)
CREAT SERPL-MCNC: 0.47 MG/DL (ref 0.5–1.4)
EOSINOPHIL # BLD AUTO: 0.26 K/UL (ref 0–0.51)
EOSINOPHIL NFR BLD: 2 % (ref 0–6.9)
ERYTHROCYTE [DISTWIDTH] IN BLOOD BY AUTOMATED COUNT: 42.1 FL (ref 35.9–50)
GFR SERPL CREATININE-BSD FRML MDRD: >60 ML/MIN/1.73 M 2
GLUCOSE SERPL-MCNC: 104 MG/DL (ref 65–99)
GRAM STN SPEC: NORMAL
HCT VFR BLD AUTO: 38.8 % (ref 42–52)
HGB BLD-MCNC: 12.6 G/DL (ref 14–18)
IMM GRANULOCYTES # BLD AUTO: 0.18 K/UL (ref 0–0.11)
IMM GRANULOCYTES NFR BLD AUTO: 1.4 % (ref 0–0.9)
LYMPHOCYTES # BLD AUTO: 1.91 K/UL (ref 1–4.8)
LYMPHOCYTES NFR BLD: 14.5 % (ref 22–41)
MAGNESIUM SERPL-MCNC: 1.4 MG/DL (ref 1.5–2.5)
MCH RBC QN AUTO: 29.2 PG (ref 27–33)
MCHC RBC AUTO-ENTMCNC: 32.5 G/DL (ref 33.7–35.3)
MCV RBC AUTO: 90 FL (ref 81.4–97.8)
MONOCYTES # BLD AUTO: 1.24 K/UL (ref 0–0.85)
MONOCYTES NFR BLD AUTO: 9.4 % (ref 0–13.4)
NEUTROPHILS # BLD AUTO: 9.51 K/UL (ref 1.82–7.42)
NEUTROPHILS NFR BLD: 72.2 % (ref 44–72)
NRBC # BLD AUTO: 0 K/UL
NRBC BLD AUTO-RTO: 0 /100 WBC
PLATELET # BLD AUTO: 215 K/UL (ref 164–446)
PMV BLD AUTO: 10 FL (ref 9–12.9)
POTASSIUM SERPL-SCNC: 3.7 MMOL/L (ref 3.6–5.5)
RBC # BLD AUTO: 4.31 M/UL (ref 4.7–6.1)
SIGNIFICANT IND 70042: NORMAL
SITE SITE: NORMAL
SODIUM SERPL-SCNC: 135 MMOL/L (ref 135–145)
SOURCE SOURCE: NORMAL
WBC # BLD AUTO: 13.2 K/UL (ref 4.8–10.8)

## 2017-07-13 PROCEDURE — 700111 HCHG RX REV CODE 636 W/ 250 OVERRIDE (IP): Performed by: PSYCHIATRY & NEUROLOGY

## 2017-07-13 PROCEDURE — 700111 HCHG RX REV CODE 636 W/ 250 OVERRIDE (IP): Performed by: INTERNAL MEDICINE

## 2017-07-13 PROCEDURE — 700105 HCHG RX REV CODE 258: Performed by: PHARMACIST

## 2017-07-13 PROCEDURE — 700111 HCHG RX REV CODE 636 W/ 250 OVERRIDE (IP): Performed by: HOSPITALIST

## 2017-07-13 PROCEDURE — 700102 HCHG RX REV CODE 250 W/ 637 OVERRIDE(OP): Performed by: INTERNAL MEDICINE

## 2017-07-13 PROCEDURE — 99232 SBSQ HOSP IP/OBS MODERATE 35: CPT | Performed by: HOSPITALIST

## 2017-07-13 PROCEDURE — 770020 HCHG ROOM/CARE - TELE (206)

## 2017-07-13 PROCEDURE — A9270 NON-COVERED ITEM OR SERVICE: HCPCS | Performed by: HOSPITALIST

## 2017-07-13 PROCEDURE — 80048 BASIC METABOLIC PNL TOTAL CA: CPT

## 2017-07-13 PROCEDURE — 700111 HCHG RX REV CODE 636 W/ 250 OVERRIDE (IP): Performed by: PHARMACIST

## 2017-07-13 PROCEDURE — 700102 HCHG RX REV CODE 250 W/ 637 OVERRIDE(OP): Performed by: HOSPITALIST

## 2017-07-13 PROCEDURE — 83735 ASSAY OF MAGNESIUM: CPT

## 2017-07-13 PROCEDURE — A9270 NON-COVERED ITEM OR SERVICE: HCPCS | Performed by: INTERNAL MEDICINE

## 2017-07-13 PROCEDURE — 700105 HCHG RX REV CODE 258: Performed by: INTERNAL MEDICINE

## 2017-07-13 PROCEDURE — 700105 HCHG RX REV CODE 258: Performed by: PSYCHIATRY & NEUROLOGY

## 2017-07-13 PROCEDURE — 85025 COMPLETE CBC W/AUTO DIFF WBC: CPT

## 2017-07-13 RX ORDER — POLYETHYLENE GLYCOL 3350 17 G/17G
1 POWDER, FOR SOLUTION ORAL
Status: DISCONTINUED | OUTPATIENT
Start: 2017-07-13 | End: 2017-07-25 | Stop reason: HOSPADM

## 2017-07-13 RX ORDER — BISACODYL 10 MG
10 SUPPOSITORY, RECTAL RECTAL
Status: DISCONTINUED | OUTPATIENT
Start: 2017-07-13 | End: 2017-07-25 | Stop reason: HOSPADM

## 2017-07-13 RX ORDER — AMOXICILLIN 250 MG
2 CAPSULE ORAL 2 TIMES DAILY
Status: DISCONTINUED | OUTPATIENT
Start: 2017-07-13 | End: 2017-07-15 | Stop reason: SINTOL

## 2017-07-13 RX ORDER — LISINOPRIL 20 MG/1
20 TABLET ORAL
Status: DISCONTINUED | OUTPATIENT
Start: 2017-07-14 | End: 2017-07-16

## 2017-07-13 RX ORDER — MAGNESIUM SULFATE HEPTAHYDRATE 40 MG/ML
4 INJECTION, SOLUTION INTRAVENOUS ONCE
Status: COMPLETED | OUTPATIENT
Start: 2017-07-13 | End: 2017-07-13

## 2017-07-13 RX ADMIN — METOCLOPRAMIDE 10 MG: 5 INJECTION, SOLUTION INTRAMUSCULAR; INTRAVENOUS at 00:29

## 2017-07-13 RX ADMIN — ROSUVASTATIN CALCIUM 125 MCG: 10 TABLET, FILM COATED ORAL at 17:22

## 2017-07-13 RX ADMIN — GABAPENTIN 200 MG: 100 CAPSULE ORAL at 21:58

## 2017-07-13 RX ADMIN — OMEPRAZOLE 20 MG: 20 CAPSULE, DELAYED RELEASE ORAL at 21:58

## 2017-07-13 RX ADMIN — METOCLOPRAMIDE 10 MG: 5 INJECTION, SOLUTION INTRAMUSCULAR; INTRAVENOUS at 06:02

## 2017-07-13 RX ADMIN — SODIUM CHLORIDE: 9 INJECTION, SOLUTION INTRAVENOUS at 14:22

## 2017-07-13 RX ADMIN — METOPROLOL TARTRATE 25 MG: 25 TABLET, FILM COATED ORAL at 14:00

## 2017-07-13 RX ADMIN — ACETAMINOPHEN 650 MG: 325 TABLET, FILM COATED ORAL at 13:22

## 2017-07-13 RX ADMIN — POLYETHYLENE GLYCOL 3350 1 PACKET: 17 POWDER, FOR SOLUTION ORAL at 14:17

## 2017-07-13 RX ADMIN — DEXTROSE MONOHYDRATE 500 MG: 50 INJECTION, SOLUTION INTRAVENOUS at 21:57

## 2017-07-13 RX ADMIN — METOPROLOL TARTRATE 25 MG: 25 TABLET, FILM COATED ORAL at 13:25

## 2017-07-13 RX ADMIN — STANDARDIZED SENNA CONCENTRATE AND DOCUSATE SODIUM 2 TABLET: 8.6; 5 TABLET, FILM COATED ORAL at 09:14

## 2017-07-13 RX ADMIN — MAGNESIUM SULFATE IN WATER 4 G: 40 INJECTION, SOLUTION INTRAVENOUS at 09:45

## 2017-07-13 RX ADMIN — HEPARIN SODIUM 5000 UNITS: 5000 INJECTION, SOLUTION INTRAVENOUS; SUBCUTANEOUS at 13:31

## 2017-07-13 RX ADMIN — GABAPENTIN 200 MG: 100 CAPSULE ORAL at 13:57

## 2017-07-13 RX ADMIN — OXYCODONE HYDROCHLORIDE 5 MG: 5 TABLET ORAL at 23:11

## 2017-07-13 RX ADMIN — CEFTRIAXONE SODIUM 2 G: 2 INJECTION, POWDER, FOR SOLUTION INTRAMUSCULAR; INTRAVENOUS at 09:14

## 2017-07-13 RX ADMIN — STANDARDIZED SENNA CONCENTRATE AND DOCUSATE SODIUM 2 TABLET: 8.6; 5 TABLET, FILM COATED ORAL at 21:57

## 2017-07-13 RX ADMIN — GABAPENTIN 200 MG: 100 CAPSULE ORAL at 09:15

## 2017-07-13 RX ADMIN — OMEPRAZOLE 20 MG: 20 CAPSULE, DELAYED RELEASE ORAL at 09:15

## 2017-07-13 RX ADMIN — CYANOCOBALAMIN TAB 500 MCG 1000 MCG: 500 TAB at 09:14

## 2017-07-13 RX ADMIN — HEPARIN SODIUM 5000 UNITS: 5000 INJECTION, SOLUTION INTRAVENOUS; SUBCUTANEOUS at 06:02

## 2017-07-13 RX ADMIN — HEPARIN SODIUM 5000 UNITS: 5000 INJECTION, SOLUTION INTRAVENOUS; SUBCUTANEOUS at 21:57

## 2017-07-13 RX ADMIN — MAGNESIUM HYDROXIDE 30 ML: 400 SUSPENSION ORAL at 17:51

## 2017-07-13 RX ADMIN — ATORVASTATIN CALCIUM 10 MG: 10 TABLET, FILM COATED ORAL at 21:57

## 2017-07-13 RX ADMIN — VANCOMYCIN HYDROCHLORIDE 2000 MG: 100 INJECTION, POWDER, LYOPHILIZED, FOR SOLUTION INTRAVENOUS at 15:02

## 2017-07-13 RX ADMIN — ONDANSETRON 4 MG: 2 INJECTION INTRAMUSCULAR; INTRAVENOUS at 21:57

## 2017-07-13 RX ADMIN — DEXTROSE MONOHYDRATE 500 MG: 50 INJECTION, SOLUTION INTRAVENOUS at 09:14

## 2017-07-13 RX ADMIN — METOPROLOL TARTRATE 25 MG: 25 TABLET, FILM COATED ORAL at 21:58

## 2017-07-13 RX ADMIN — ACETAMINOPHEN 650 MG: 325 TABLET, FILM COATED ORAL at 20:06

## 2017-07-13 ASSESSMENT — ENCOUNTER SYMPTOMS
LOSS OF CONSCIOUSNESS: 0
HEADACHES: 0
VOMITING: 0
EYE REDNESS: 0
EYE DISCHARGE: 0
NECK PAIN: 0
SEIZURES: 0
FLANK PAIN: 0
WEAKNESS: 1
LOSS OF CONSCIOUSNESS: 1
SHORTNESS OF BREATH: 0
CHILLS: 0
STRIDOR: 0
BACK PAIN: 1
DIARRHEA: 0
SPUTUM PRODUCTION: 0
BLOOD IN STOOL: 0
HALLUCINATIONS: 0
WEAKNESS: 0
COUGH: 0
NERVOUS/ANXIOUS: 0
NAUSEA: 0
FOCAL WEAKNESS: 1
FALLS: 1
FEVER: 0
CLAUDICATION: 1
SEIZURES: 1
ORTHOPNEA: 0
PALPITATIONS: 0
ABDOMINAL PAIN: 0
HEMOPTYSIS: 0
MEMORY LOSS: 0
MYALGIAS: 1
FALLS: 0
SPEECH CHANGE: 0

## 2017-07-13 ASSESSMENT — LIFESTYLE VARIABLES: SUBSTANCE_ABUSE: 0

## 2017-07-13 ASSESSMENT — PAIN SCALES - GENERAL
PAINLEVEL_OUTOF10: 2
PAINLEVEL_OUTOF10: 1
PAINLEVEL_OUTOF10: 4
PAINLEVEL_OUTOF10: 2
PAINLEVEL_OUTOF10: 2
PAINLEVEL_OUTOF10: 3
PAINLEVEL_OUTOF10: 1
PAINLEVEL_OUTOF10: 1
PAINLEVEL_OUTOF10: 3
PAINLEVEL_OUTOF10: 1
PAINLEVEL_OUTOF10: 3
PAINLEVEL_OUTOF10: 2
PAINLEVEL_OUTOF10: 1
PAINLEVEL_OUTOF10: 2
PAINLEVEL_OUTOF10: 2

## 2017-07-13 NOTE — PROGRESS NOTES
[facility]    Date of Service: 2017    Chief Complaint  86 y.o. male admitted 2017 with lower back pain and noted to have discitis    Interval Problem Update  Patient transferred to the intensive care unit yesterday for altered level of consciousness and seizure-like episode.  No further seizure-like episodes noted since transfer, mental status is improved and is alert and oriented      Consultants/Specialty  Neurology neurosurgery and infectious disease    Disposition  To be determined        Review of Systems   Constitutional: Negative for fever and malaise/fatigue.   HENT: Negative for congestion, ear discharge and nosebleeds.    Eyes: Negative for discharge and redness.   Respiratory: Negative for cough, hemoptysis, sputum production, shortness of breath and stridor.    Cardiovascular: Negative for chest pain, palpitations, orthopnea and leg swelling.   Gastrointestinal: Negative for nausea, vomiting, abdominal pain, diarrhea, blood in stool and melena.   Genitourinary: Negative for dysuria, hematuria and flank pain.   Musculoskeletal: Positive for back pain and joint pain. Negative for falls and neck pain.   Skin: Negative.    Neurological: Positive for focal weakness (lower extremity). Negative for speech change, seizures, loss of consciousness, weakness and headaches.   Psychiatric/Behavioral: Negative for hallucinations, memory loss and substance abuse. The patient is not nervous/anxious.    All other systems reviewed and are negative.     Physical Exam  Laboratory/Imaging   Hemodynamics  Temp (24hrs), Av.9 °C (98.4 °F), Min:36.7 °C (98 °F), Max:37 °C (98.6 °F)   Temperature: 36.8 °C (98.3 °F)  Pulse  Av.1  Min: 34  Max: 154 Heart Rate (Monitored): 67  Blood Pressure : 124/61 mmHg, NIBP: 109/63 mmHg      Respiratory      Respiration: 13, Pulse Oximetry: 100 %        RUL Breath Sounds: Clear, RML Breath Sounds: Clear, RLL Breath Sounds: Diminished, MIKE Breath Sounds:  Clear, LLL Breath Sounds: Diminished    Fluids    Intake/Output Summary (Last 24 hours) at 07/13/17 1425  Last data filed at 07/13/17 1200   Gross per 24 hour   Intake   1745 ml   Output   1325 ml   Net    420 ml       Nutrition  Orders Placed This Encounter   Procedures   • DIET ORDER     Standing Status: Standing      Number of Occurrences: 1      Standing Expiration Date:      Order Specific Question:  Diet:     Answer:  Cardiac [6]     Physical Exam   Constitutional: He is oriented to person, place, and time. He appears well-developed.   HENT:   Head: Normocephalic and atraumatic.   Right Ear: External ear normal.   Left Ear: External ear normal.   Eyes: Conjunctivae are normal. Pupils are equal, round, and reactive to light. Right eye exhibits no discharge. Left eye exhibits no discharge.   Neck: No JVD present. No tracheal deviation present.   Cardiovascular: Exam reveals no gallop and no friction rub.    Murmur heard.  irregular   Pulmonary/Chest: No stridor. No respiratory distress. He has rhonchi. He has no rales. He exhibits no tenderness.   Abdominal: Soft. Bowel sounds are normal. He exhibits no distension. There is no tenderness. There is no rebound.   Musculoskeletal: He exhibits tenderness. He exhibits no edema.   Neurological: He is alert and oriented to person, place, and time. No cranial nerve deficit.   Left lower extremity weakness 4 over 5   Skin: Skin is warm and dry. He is not diaphoretic.   Psychiatric: He has a normal mood and affect. His behavior is normal.   Nursing note and vitals reviewed.      Recent Labs      07/10/17   2355  07/12/17 0222 07/13/17   0510   WBC  13.3*  15.3*  13.2*   RBC  4.81  5.17  4.31*   HEMOGLOBIN  14.1  15.0  12.6*   HEMATOCRIT  41.7*  45.1  38.8*   MCV  86.7  87.2  90.0   MCH  29.3  29.0  29.2   MCHC  33.8  33.3*  32.5*   RDW  39.8  40.3  42.1   PLATELETCT  248  326  215   MPV  10.0  9.9  10.0     Recent Labs      07/10/17   2355  07/12/17 0222 07/13/17    0510   SODIUM  137  136  135   POTASSIUM  3.3*  3.4*  3.7   CHLORIDE  99  99  99   CO2  31  29  32   GLUCOSE  117*  197*  104*   BUN  12  12  13   CREATININE  0.62  0.60  0.47*   CALCIUM  9.3  9.6  9.2                      Assessment/Plan     * Low back pain (present on admission)  Assessment & Plan  Pain management      Bacteremia  Assessment & Plan  Strep viridans  ID following  On IV vanco and ceftriaxone pending final sensitivities    Discitis of lumbar region  Assessment & Plan  At L3-L4 level  Biopsy yesterday: pending report  abx as above    Atrial flutter (CMS-HCC) (present on admission)  Assessment & Plan  Continue dig and metoprolol and telemetry monitoring  No anticoagulation at this time given discitis    Sepsis (CMS-HCC) (present on admission)  Assessment & Plan  Resolved          UTI (urinary tract infection) (present on admission)  Assessment & Plan  Iv rocephin  Urine culture was negative and urine mildly abnormal        Hypotension (present on admission)  Assessment & Plan  Resolved            Hypomagnesemia  Assessment & Plan  Replete and monitor     Acute encephalopathy  Assessment & Plan  Resolved no acute pathology on MRI or EEG  Empirically started on Keppra neurology following      Stable for transfer back to telemetry   Plan of care reviewed with patient and discussed with nursing staff and neurosurgery team   Labs reviewed, Medications reviewed and Radiology images reviewed  Hearn catheter: No Hearn      DVT Prophylaxis: Heparin      Antibiotics: Treating active infection/contamination beyond 24 hours perioperative coverage  Assessed for rehab: Patient was assess for and/or received rehabilitation services during this hospitalization   Critical care time spent 36 minutes without overlap, exclude procedure time.

## 2017-07-13 NOTE — PROGRESS NOTES
RN arrived in room to give bedside report. Patient not following commands and not really able to speak at this time. New onsent, rapid response called. MD lindsey arrived into the room. Orders for stat head CT received. During prepping for the CT patient had seizure like activity. Order for 2mg IV ativan received from MD. When this RN arrived back into the room, patient was done with activity. Medication not given. Patient taken down to CT, upon arrival back to floor patient had more seizure like activity. Ativan given at this time. Per MD, transfer patient to ICU. Patient also in Afib RVR at this time at a rate of 155. IV metroprolol given. Patient transferred to ICU without problems.

## 2017-07-13 NOTE — PROGRESS NOTES
Progress Note               Author: Tracy Serrano Date & Time created: 7/13/2017  8:33 AM     Interval History:  Hospital day #7 for L3-L4 discitis  Transferred to ICU yesterday for seizure activity  EEG, CTOH, MRI brain done yesterday, reviewed  MRI shows age-related changes, no hemorrhagic, ischemic, or vascular changes  MAP has fluctuated between 50s-100s overnight  WBC 13.2      Review of Systems:  ROS   + diplopia and blurred vision when reading, denies diplopia with general visual fields  Denies new pain, numbness, weakness.   Denies HA, positional HA, N/V, dizziness          Physical Exam:  Physical Exam  A&Ox4  NAD  Fatigued, awakens for exam, falls asleep right after   Spontaneous breathing on room air with normal respiratory effort  Follows commands x4  PERRLA. EOMI.   No nystagmus   Face is symmetrical, tongue is midline  CN II-XII grossly intact bilat  No difficulty with speech although speech is slow   Negative pronator drift test  No difficulty with rapid alternating movements  No DDK, some past-pointing with L>R testing   Appropriate muscle tone and sensation intact all four extremities.   No midline, paraspinal TTP  LLE decreased sensation to light touch at medial calf  4/5 left quadricep, 5/5 right  Moves extremities with brisk, purposeful movement       Labs:        Invalid input(s): [id number]      Recent Labs      07/10/17   2355  07/12/17   0222  07/13/17   0510   SODIUM  137  136  135   POTASSIUM  3.3*  3.4*  3.7   CHLORIDE  99  99  99   CO2  31  29  32   BUN  12  12  13   CREATININE  0.62  0.60  0.47*   MAGNESIUM   --   1.4*  1.4*   CALCIUM  9.3  9.6  9.2     Recent Labs      07/10/17   2355  07/12/17   0222  07/13/17   0510   GLUCOSE  117*  197*  104*     Recent Labs      07/10/17   2355  07/12/17   0222  07/13/17   0510   RBC  4.81  5.17  4.31*   HEMOGLOBIN  14.1  15.0  12.6*   HEMATOCRIT  41.7*  45.1  38.8*   PLATELETCT  248  326  215     Recent Labs      07/10/17   2355   17   0222  17   0510   WBC  13.3*  15.3*  13.2*   NEUTSPOLYS  69.60  83.40*  72.20*   LYMPHOCYTES  16.20*  6.70*  14.50*   MONOCYTES  11.40  7.40  9.40   EOSINOPHILS  1.30  0.20  2.00   BASOPHILS  0.40  0.50  0.50     Hemodynamics:  Temp (24hrs), Av.9 °C (98.4 °F), Min:36.8 °C (98.2 °F), Max:37 °C (98.6 °F)  Temperature: 36.8 °C (98.3 °F)  Pulse  Av.3  Min: 34  Max: 154Heart Rate (Monitored): 61  Blood Pressure : 107/50 mmHg, NIBP: 129/64 mmHg     Respiratory:    Respiration: 18, Pulse Oximetry: 95 %        RUL Breath Sounds: Clear, RML Breath Sounds: Clear, RLL Breath Sounds: Diminished, MIKE Breath Sounds: Clear, LLL Breath Sounds: Diminished  Fluids:    Intake/Output Summary (Last 24 hours) at 17 0833  Last data filed at 17 0600   Gross per 24 hour   Intake   1050 ml   Output    850 ml   Net    200 ml     Weight: 98.2 kg (216 lb 7.9 oz)  GI/Nutrition:  Orders Placed This Encounter   Procedures   • DIET NPO     Standing Status: Standing      Number of Occurrences: 1      Standing Expiration Date:      Order Specific Question:  Restrict to:     Answer:  Strict [1]     Medical Decision Making, by Problem:  Active Hospital Problems    Diagnosis   • *Low back pain [M54.5]   • Bacteremia [R78.81]   • Discitis of lumbar region [M46.46]   • Acute encephalopathy [G93.40]   • Bigeminy [I49.9]   • Hypomagnesemia [E83.42]   • Hypotension [I95.9]   • Morbid obesity (CMS-HCC) [E66.01]   • Sepsis (CMS-[facility]) [A41.9]   • UTI (urinary tract infection) [N39.0]   • Atrial flutter (CMS-[facility]) [I48.92]       Plan:  Hospital Day #7 L33-L4 discitis   EEG no epileptic activity, nonspecific diffuse cortical activity  MRI/CTOH reviewed - benign, age-related findings, no acute findings    Continue care per primary team  Recommend maintaining more consistent MAP >70  OK to transfer to floor from neurosurgery standpoint, would recommend discussion with neurology prior to transfer    All questions answered  Patient  agrees with treatment plan  Case discussed with Dr. Cochran        Core Measures

## 2017-07-13 NOTE — CARE PLAN
Problem: Nutritional:  Goal: Achieve adequate nutritional intake  Patient will consume 50% of meals  Outcome: PROGRESSING SLOWER THAN EXPECTED  Was eating well initially but PO intake declined over the past few days.  Patient reported his appetite is improving but that he can only eat small amounts at time.  He was open to having snacks between meals to be able to eat small amounts more often.  Will send. Nutrition Representative will continue to see him for ongoing meal and snack preferences.  RD following.

## 2017-07-13 NOTE — PROGRESS NOTES
"Pharmacy Kinetics 86 y.o. male on vancomycin day # 5  2017    Currently on Vancomycin 2000 mg iv q24hr (1500)    Indication for Treatment: L3-4 discitis/viridans strep bacteremia    Pertinent history per medical record: Admitted on 2017 for sepsis and back pain. Found to have diskitis at L3-4, blood culture positive for viridans strep. Patient on antibiotics planned x6 weeks per ID recommendations.    Other antibiotics: ceftriaxone 2 gm IV daily    Allergies: Review of patient's allergies indicates no known allergies.     Concerns for renal function include age & BUN/SCr ratio > 20:1.    Pertinent cultures to date:   7/10: L3-L4 disc biopsy - NGTD  : peripheral blood cx X2 - NGTD  : peripheral blood cx - Viridans strep in 1/2 sets  : urine cx - Negative    Imagin/7: MRI L spine - Possible discitis at the L3-4 level.    Recent Labs      07/10/17   2355  17   0222  17   0510   WBC  13.3*  15.3*  13.2*   NEUTSPOLYS  69.60  83.40*  72.20*     Recent Labs      07/10/17   2355  17   0222  17   0510   BUN  12  12  13   CREATININE  0.62  0.60  0.47*     Recent Labs      07/10/17   1614   VANCOTROUGH  5.6*     Intake/Output Summary (Last 24 hours) at 17 1525  Last data filed at 17 1400   Gross per 24 hour   Intake   1990 ml   Output   1325 ml   Net    665 ml      Blood pressure 124/61, pulse 81, temperature 36.8 °C (98.3 °F), temperature source Temporal, resp. rate 13, height 1.803 m (5' 11\"), weight 98.2 kg (216 lb 7.9 oz), SpO2 100 %. Temp (24hrs), Av.9 °C (98.4 °F), Min:36.7 °C (98 °F), Max:37 °C (98.6 °F)      A/P   1. Vancomycin dose change: Continue current regimen  2. Next vancomycin level:  at 1400  3. Goal trough: ~ 16 mcg/mL   4. Comments: Patient remains on antibiotics pending finalization of L3-L4 biopsy. Patient's clinical status improved since yesterday's concern for seizure; renal indices stable and good UOP documented. Patient's vanco level " not obtained today, will continue current regimen and reschedule trough for tomorrow to evaluate and adjust as necessary. No evidence of CNS involvement, goal trough reduced accordingly.    Pharmacy will continue to follow.     Isabell Rasmussen, MichD

## 2017-07-13 NOTE — PROGRESS NOTES
Infectious Disease Progress Note    Author: Shante Sequeira M.D. Date of service & Time created: 2017  2:57 PM    Chief Complaint:  Chief Complaint   Patient presents with   • Back Pain    follow-up for lumbar discitis and bacteremia    Interval History:  86-year-old white male admitted for back pain. Found to have discitis  17- no fevers. WBC 10.3. Ongoing back pain. Sedimentation rate is 46. Now the blood cultures are positive for strep species. Also has new nausea and vomiting today.   7/10 AF, WBC 16.5, having nausea and vomiting, ongoing back pain, plan for bone biopsy today, tolerating abx without issues   AF, WBC 13.3, more pain after biopsy, feels flushed, having runny stools on bowel regimen   AF WBC 15.3 transferred to ICU due to concern for seizure-obtunded.   AF alert and eating lunch Has some pain-controlled. Denies SE abx  Labs Reviewed, Medications Reviewed and Radiology Reviewed.    Review of Systems:  Review of Systems   Constitutional: Negative for fever and chills.   Cardiovascular: Negative for chest pain.   Gastrointestinal: Negative for nausea, vomiting, abdominal pain and diarrhea.   Musculoskeletal: Positive for myalgias, back pain and joint pain.       Hemodynamics:  Temp (24hrs), Av.9 °C (98.4 °F), Min:36.7 °C (98 °F), Max:37 °C (98.6 °F)  Temperature: 36.8 °C (98.3 °F)  Pulse  Av.1  Min: 34  Max: 154Heart Rate (Monitored): 67  Blood Pressure : 124/61 mmHg, NIBP: 109/63 mmHg       Physical Exam:  Physical Exam   Constitutional: He appears well-developed and well-nourished. No distress.   HENT:   Head: Normocephalic and atraumatic.   Eyes: EOM are normal. Pupils are equal, round, and reactive to light. No scleral icterus.   Neck: Neck supple.   Cardiovascular: Normal rate.    Irregularly irregular     Pulmonary/Chest: Effort normal and breath sounds normal. No respiratory distress. He has no wheezes.   Abdominal: Soft. He exhibits no distension. There is no  tenderness.   Musculoskeletal: He exhibits no edema.   LUE PICC nontender   Neurological: He is alert.   Skin: No rash noted. There is erythema.   RUE forearm   Nursing note and vitals reviewed.      Meds:    Current facility-administered medications:   •  senna-docusate (PERICOLACE or SENOKOT S) 8.6-50 MG per tablet 2 Tab, 2 Tab, Oral, BID, 2 Tab at 07/13/17 0914 **AND** polyethylene glycol/lytes (MIRALAX) PACKET 1 Packet, 1 Packet, Oral, QDAY PRN, 1 Packet at 07/13/17 1417 **AND** magnesium hydroxide (MILK OF MAGNESIA) suspension 30 mL, 30 mL, Oral, QDAY PRN **AND** bisacodyl (DULCOLAX) suppository 10 mg, 10 mg, Rectal, QDAY PRN, Kirby Mayo M.D.  •  metoprolol (LOPRESSOR) tablet 25 mg, 25 mg, Oral, TWICE DAILY, Kirby Mayo M.D.  •  [START ON 7/14/2017] lisinopril (PRINIVIL) tablet 20 mg, 20 mg, Oral, Q DAY, Kirby Mayo M.D.  •  metoprolol (LOPRESSOR) injection 5 mg, 5 mg, Intravenous, Q5 MIN PRN, Collin White M.D., 5 mg at 07/12/17 0757  •  heparin injection 5,000 Units, 5,000 Units, Subcutaneous, Q8HRS, Collin White M.D., 5,000 Units at 07/13/17 1331  •  levetiracetam (KEPPRA) 500 mg in D5W 100 mL IVPB, 500 mg, Intravenous, Q12HRS, Allan Win M.D., Stopped at 07/13/17 0929  •  PICC Line Insertion has been implemented, , , Once **AND** May use Lidocaine 1% not to exceed 3 mls for local at insertion site, , , CONTINUOUS **AND** NOTIFY MD, , , Once **AND** Tip to dwell in the superior vena cava, , , CONTINUOUS **AND** Do not use PICC Line until placement verified by Chest X Ray, , , CONTINUOUS **AND** DX-CHEST-FOR PICC LINE Perform procedure in:: Patient's Room, , , Once **AND** If radiologist reading of chest X-ray states any of the following the PICC should be used, , , CONTINUOUS **AND** Further evaluation of the PICC placement can be retrieved from X-Ray and Imaging, , , CONTINUOUS **AND** Blood draws through PICC line; draws by RN only, , , CONTINUOUS **AND** FLUSHING GUIDELINES WHEN  IN USE, , , CONTINUOUS **AND** normal saline PF 10-20 mL, 10-20 mL, Intravenous, PRN **AND** FLUSHING GUIDELINES WHEN NOT IN USE, , , CONTINUOUS **AND** DRESSING MAINTENANCE, , , Once **AND** Change needleless pressure ports and IV tubing every 72 hours per hospital policy, , , CONTINUOUS **AND** TUBING, , , CONTINUOUS **AND** If there is an MD order to remove the PICC line, any RN may remove the PICC line, , , CONTINUOUS **AND** [] PATIENT EDUCATION MATERIALS, , , Prior to discharge **AND** NURSING COMMUNICATION, , , CONTINUOUS, Ny Chilel M.D.  •  gabapentin (NEURONTIN) capsule 200 mg, 200 mg, Oral, TID, Georgi Grey M.D., 200 mg at 17 1357  •  Notify provider if pain remains uncontrolled, , , CONTINUOUS **AND** Use the numeric rating scale (NRS-11) on regular floors and Critical-Care Pain Observation Tool (CPOT) on ICUs/Trauma to assess pain, , , CONTINUOUS **AND** Pulse Ox (Oximetry), , , CONTINUOUS **AND** [DISCONTINUED] Pharmacy Consult Request ...Pain Management Review, , Other, PRN **AND** If patient difficult to arouse and/or has respiratory depression, stop any opiates that are currently infusing and call a Rapid Response., , , CONTINUOUS **AND** oxycodone immediate-release (ROXICODONE) tablet 2.5 mg, 2.5 mg, Oral, Q3HRS PRN, 2.5 mg at 17 1802 **AND** oxycodone immediate-release (ROXICODONE) tablet 5 mg, 5 mg, Oral, Q3HRS PRN, 5 mg at 17 0009 **AND** HYDROmorphone (DILAUDID) injection 0.5 mg, 0.5 mg, Intravenous, Q3HRS PRN, Georgi Grey M.D., 0.5 mg at 07/10/17 0905  •  digoxin (LANOXIN) tablet 125 mcg, 125 mcg, Oral, DAILY AT 1800, Georgi Grey M.D., 125 mcg at 17 1748  •  ondansetron (ZOFRAN) syringe/vial injection 4 mg, 4 mg, Intravenous, Q6HRS PRN, Georgi Grey M.D., 4 mg at 17 2113  •  omeprazole (PRILOSEC) capsule 20 mg, 20 mg, Oral, BID, Georgi Grey M.D., 20 mg at 17 0915  •  MD ALERT... vancomycin per pharmacy protocol, ,  Other, pharmacy to dose, Oliva Bolanos M.D.  •  cefTRIAXone (ROCEPHIN) 2 g in  mL IVPB, 2 g, Intravenous, Q24HRS, Oliva Bolanos M.D., Stopped at 07/13/17 0944  •  vancomycin 2,000 mg in  mL IVPB, 20 mg/kg, Intravenous, Q24HR, Robert Mcnulty, PHARMD, Stopped at 07/12/17 1730  •  hydrALAZINE (APRESOLINE) tablet 10 mg, 10 mg, Oral, Q4HRS PRN, Jamaal Molina M.D., 10 mg at 07/09/17 2038  •  clonidine (CATAPRES) tablet 0.1 mg, 0.1 mg, Oral, 4X/DAY PRN, Jamaal Molina M.D.  •  prochlorperazine (COMPAZINE) injection 10 mg, 10 mg, Intravenous, Q6HRS PRN, Darion Benton M.D., 10 mg at 07/10/17 0905  •  pneumococcal 13-Jocelyn Conj Vacc (PREVNAR 13) syringe 0.5 mL, 0.5 mL, Intramuscular, Once PRN, Jamaal Molina M.D.  •  atorvastatin (LIPITOR) tablet 10 mg, 10 mg, Oral, Nightly, Jamaal Molina M.D., 10 mg at 07/12/17 2120  •  vitamin D (cholecalciferol) tablet 5,000 Units, 5,000 Units, Oral, Q48HRS, Jamaal Molina M.D., Stopped at 07/12/17 0900  •  cyanocobalamin (VITAMIN B-12) tablet 1,000 mcg, 1,000 mcg, Oral, DAILY, Jamaal Molina M.D., 1,000 mcg at 07/13/17 0914  •  acetaminophen (TYLENOL) tablet 650 mg, 650 mg, Oral, Q6HRS PRN, Jamaal Molina M.D., 650 mg at 07/13/17 1322    Labs:  Recent Labs      07/10/17   2355  07/12/17   0222  07/13/17   0510   WBC  13.3*  15.3*  13.2*   RBC  4.81  5.17  4.31*   HEMOGLOBIN  14.1  15.0  12.6*   HEMATOCRIT  41.7*  45.1  38.8*   MCV  86.7  87.2  90.0   MCH  29.3  29.0  29.2   RDW  39.8  40.3  42.1   PLATELETCT  248  326  215   MPV  10.0  9.9  10.0   NEUTSPOLYS  69.60  83.40*  72.20*   LYMPHOCYTES  16.20*  6.70*  14.50*   MONOCYTES  11.40  7.40  9.40   EOSINOPHILS  1.30  0.20  2.00   BASOPHILS  0.40  0.50  0.50     Recent Labs      07/10/17   2355  07/12/17   0222  07/13/17   0510   SODIUM  137  136  135   POTASSIUM  3.3*  3.4*  3.7   CHLORIDE  99  99  99   CO2  31  29  32   GLUCOSE  117*  197*  104*   BUN  12  12  13     Recent Labs      07/10/17   2355   07/12/17   0222  07/13/17   0510   CREATININE  0.62  0.60  0.47*       Imaging:  Ct-abdomen-pelvis W/o  7/6/2017  No hydronephrosis or urolithiasis. Diverticula colon. No evidence diverticulitis. No free fluid. Pancreatic calcifications/consistent with sequela pancreatitis. No peripancreatic fluid collections. Coronary artery calcifications.    Ct-lspine W/o Plus Recons  7/6/2017  1.  No evidence of acute fracture of the lumbar spine. 2.  Surgical change extending from L3 through L5. 3.  Multilevel degenerative disc disease and facet degeneration. 4.  Old fractures of the right L1 and L2 transverse processes. 5.  Multilevel degenerative subluxation.    Mr-lumbar Spine-with & W/o  7/8/2017  1.  Possible discitis at the L3-4 level. 2.  Mild to moderate retrolisthesis at the L3-4 level. 3.  Previous extensive laminectomy from the L2 level to the sacrum with bilateral pedicle screw and zev fixation at the L4-L5 levels. 4.   Moderate lumbar spondylotic changes at the L3-4 levels with associated moderate central canal stenosis at this level secondary to facet arthropathy. Additionally there are severe bilateral neural foraminal stenosis. 5.  Minimal lumbar spondylotic changes at the L2-3 level with mild bilateral neural foraminal narrowing and mild disc bulging into the inferior aspects of the neural foramina.    Dx-hip-bilateral-with Pelvis-3/4 Views  7/6/2017  No evidence of fracture or arthropathy of the hips. Degenerative and surgical changes of the lumbar spine.      Micro:  BLOOD CULTURE   Date Value Ref Range Status   07/09/2017   Preliminary    No Growth    Note: Blood cultures are incubated for 5 days and  are monitored continuously.Positive blood cultures  are called to the RN and reported as soon as  they are identified.     07/09/2017   Preliminary    No Growth    Note: Blood cultures are incubated for 5 days and  are monitored continuously.Positive blood cultures  are called to the RN and reported as soon  "as  they are identified.        Results     Procedure Component Value Units Date/Time    CULTURE TISSUE W/ GRM STAIN [[id number]] Collected:  07/10/17 1445    Order Status:  Completed Specimen Information:  Tissue Updated:  07/13/17 0828     Gram Stain Result No organisms seen.      Significant Indicator NEG      Source TISS      Site Saline from L3 L4 Disk      Tissue Culture No growth at 72 hours.     FUNGAL CULTURE [[id number]] Collected:  07/10/17 1445    Order Status:  Completed Specimen Information:  Tissue Updated:  07/13/17 0828     Significant Indicator NEG      Source TISS      Site Saline from L3 L4 Disk      Fungal Culture Culture in progress.     ANAEROBIC CULTURE [[id number]] Collected:  07/10/17 1445    Order Status:  Completed Specimen Information:  Tissue Updated:  07/13/17 0828     Significant Indicator NEG      Source TISS      Site Saline from L3 L4 Disk      Anaerobic Culture, Culture Res Culture in progress.     BLOOD CULTURE [[id number]] Collected:  07/06/17 1022    Order Status:  Completed Specimen Information:  Blood from Peripheral Updated:  07/11/17 1100     Significant Indicator NEG      Source BLD      Site PERIPHERAL      Blood Culture No growth after 5 days of incubation.     Narrative:      Per Hospital Policy: Only change Specimen Src: to \"Line\" if  specified by physician order.    GRAM STAIN [[id number]] Collected:  07/10/17 1445    Order Status:  Completed Specimen Information:  Tissue Updated:  07/11/17 0938     Significant Indicator .      Source TISS      Site Saline from L3 L4 Disk      Gram Stain Result No organisms seen.     BLOOD CULTURE [[id number]]  (Abnormal) Collected:  07/06/17 1013    Order Status:  Completed Specimen Information:  Blood from Peripheral Updated:  07/10/17 1135     Significant Indicator POS (POS)      Source BLD      Site PERIPHERAL      Blood Culture Growth detected by Bactec instrument. (A)      Blood Culture -- (A)      Result:        Viridans " "streptococcus - possible contaminant  Isolated from one bottle only, possible skin contaminant  please correlate with clinical condition.      Narrative:      CALL  [facility]  183 tel. [phone],  CALLED  183 tel. [phone] 07/09/2017, 11:22, RB PERF. RESULTS CALLED TO:  [id number] RN  Per Hospital Policy: Only change Specimen Src: to \"Line\" if  specified by physician order.    BLOOD CULTURE [[id number]] Collected:  07/09/17 1646    Order Status:  Completed Specimen Information:  Blood from Peripheral Updated:  07/10/17 0849     Significant Indicator NEG      Source BLD      Site PERIPHERAL      Blood Culture --      Result:        No Growth    Note: Blood cultures are incubated for 5 days and  are monitored continuously.Positive blood cultures  are called to the RN and reported as soon as  they are identified.      Narrative:      Per Hospital Policy: Only change Specimen Src: to \"Line\" if  specified by physician order.    BLOOD CULTURE [[id number]] Collected:  07/09/17 1646    Order Status:  Completed Specimen Information:  Blood from Peripheral Updated:  07/10/17 0849     Significant Indicator NEG      Source BLD      Site PERIPHERAL      Blood Culture --      Result:        No Growth    Note: Blood cultures are incubated for 5 days and  are monitored continuously.Positive blood cultures  are called to the RN and reported as soon as  they are identified.      Narrative:      Per Hospital Policy: Only change Specimen Src: to \"Line\" if  specified by physician order.    URINE CULTURE(NEW) [[id number]] Collected:  07/06/17 0930    Order Status:  Completed Specimen Information:  Urine Updated:  07/08/17 1010     Significant Indicator NEG      Source UR      Site --      Urine Culture Mixed skin braxton <10,000 cfu/mL             Assessment:  Active Hospital Problems    Diagnosis   • *Low back pain [M54.5]   • Hypotension [I95.9]   • Morbid obesity (CMS-[facility]) [E66.01]   • Sepsis (CMS-HCC) [A41.9]   • UTI (urinary tract infection) " [N39.0]   • Atrial flutter (CMS-HCC) [I48.92]       Plan:  L3-4 discitis  Afebrile  Persistent leukocytosis, decreased  S/p IR biopsy 7/10 - cultures NGTD  Abx as below  Anticipate 6 week course of IV abx    Bacteremia, strep   Blood cultures (1/2) from 7/6/17 - viridans strep   Continue vancomycin and Rocephin for now pending strep speciation  Monitor renal function while on vancomycin   TTE - negative  Bcx 7/9 - NGTD    Cellulitis, new  At prior IV site RUE    Should be covered by current abx  Local care and monitor for worsening    Diarrhea  DC'd bowel regimen  If persists, check C diff    Diabetes mellitus  Keep the blood sugars less than 150    Possible seizure/AMS  EEG done  Appears close to baseline  Plan to transfer back to telemtry    Discussed with internal Medicine

## 2017-07-14 PROBLEM — E83.39 HYPOPHOSPHATEMIA: Status: ACTIVE | Noted: 2017-07-14

## 2017-07-14 LAB
ANION GAP SERPL CALC-SCNC: 4 MMOL/L (ref 0–11.9)
BACTERIA BLD CULT: NORMAL
BACTERIA BLD CULT: NORMAL
BASOPHILS # BLD AUTO: 0.3 % (ref 0–1.8)
BASOPHILS # BLD: 0.04 K/UL (ref 0–0.12)
BUN SERPL-MCNC: 11 MG/DL (ref 8–22)
CALCIUM SERPL-MCNC: 9 MG/DL (ref 8.5–10.5)
CHLORIDE SERPL-SCNC: 99 MMOL/L (ref 96–112)
CO2 SERPL-SCNC: 33 MMOL/L (ref 20–33)
CREAT SERPL-MCNC: 0.58 MG/DL (ref 0.5–1.4)
EOSINOPHIL # BLD AUTO: 0.27 K/UL (ref 0–0.51)
EOSINOPHIL NFR BLD: 1.8 % (ref 0–6.9)
ERYTHROCYTE [DISTWIDTH] IN BLOOD BY AUTOMATED COUNT: 42 FL (ref 35.9–50)
GFR SERPL CREATININE-BSD FRML MDRD: >60 ML/MIN/1.73 M 2
GLUCOSE SERPL-MCNC: 103 MG/DL (ref 65–99)
HCT VFR BLD AUTO: 38.6 % (ref 42–52)
HGB BLD-MCNC: 12.9 G/DL (ref 14–18)
IMM GRANULOCYTES # BLD AUTO: 0.15 K/UL (ref 0–0.11)
IMM GRANULOCYTES NFR BLD AUTO: 1 % (ref 0–0.9)
LYMPHOCYTES # BLD AUTO: 1.53 K/UL (ref 1–4.8)
LYMPHOCYTES NFR BLD: 10.4 % (ref 22–41)
MAGNESIUM SERPL-MCNC: 1.9 MG/DL (ref 1.5–2.5)
MCH RBC QN AUTO: 29.9 PG (ref 27–33)
MCHC RBC AUTO-ENTMCNC: 33.4 G/DL (ref 33.7–35.3)
MCV RBC AUTO: 89.4 FL (ref 81.4–97.8)
MONOCYTES # BLD AUTO: 1.23 K/UL (ref 0–0.85)
MONOCYTES NFR BLD AUTO: 8.4 % (ref 0–13.4)
NEUTROPHILS # BLD AUTO: 11.44 K/UL (ref 1.82–7.42)
NEUTROPHILS NFR BLD: 78.1 % (ref 44–72)
NRBC # BLD AUTO: 0 K/UL
NRBC BLD AUTO-RTO: 0 /100 WBC
PHOSPHATE SERPL-MCNC: 2.4 MG/DL (ref 2.5–4.5)
PLATELET # BLD AUTO: 189 K/UL (ref 164–446)
PMV BLD AUTO: 9.5 FL (ref 9–12.9)
POTASSIUM SERPL-SCNC: 4 MMOL/L (ref 3.6–5.5)
RBC # BLD AUTO: 4.32 M/UL (ref 4.7–6.1)
SIGNIFICANT IND 70042: NORMAL
SIGNIFICANT IND 70042: NORMAL
SITE SITE: NORMAL
SITE SITE: NORMAL
SODIUM SERPL-SCNC: 136 MMOL/L (ref 135–145)
SOURCE SOURCE: NORMAL
SOURCE SOURCE: NORMAL
VANCOMYCIN TROUGH SERPL-MCNC: 12.8 UG/ML (ref 10–20)
WBC # BLD AUTO: 14.7 K/UL (ref 4.8–10.8)

## 2017-07-14 PROCEDURE — 700111 HCHG RX REV CODE 636 W/ 250 OVERRIDE (IP): Performed by: PSYCHIATRY & NEUROLOGY

## 2017-07-14 PROCEDURE — 700101 HCHG RX REV CODE 250: Performed by: HOSPITALIST

## 2017-07-14 PROCEDURE — 700105 HCHG RX REV CODE 258: Performed by: PSYCHIATRY & NEUROLOGY

## 2017-07-14 PROCEDURE — 700105 HCHG RX REV CODE 258: Performed by: INTERNAL MEDICINE

## 2017-07-14 PROCEDURE — 700102 HCHG RX REV CODE 250 W/ 637 OVERRIDE(OP): Performed by: HOSPITALIST

## 2017-07-14 PROCEDURE — 700111 HCHG RX REV CODE 636 W/ 250 OVERRIDE (IP): Performed by: HOSPITALIST

## 2017-07-14 PROCEDURE — A9270 NON-COVERED ITEM OR SERVICE: HCPCS | Performed by: INTERNAL MEDICINE

## 2017-07-14 PROCEDURE — 99232 SBSQ HOSP IP/OBS MODERATE 35: CPT | Performed by: HOSPITALIST

## 2017-07-14 PROCEDURE — A9270 NON-COVERED ITEM OR SERVICE: HCPCS | Performed by: HOSPITALIST

## 2017-07-14 PROCEDURE — 80048 BASIC METABOLIC PNL TOTAL CA: CPT

## 2017-07-14 PROCEDURE — 700105 HCHG RX REV CODE 258: Performed by: HOSPITALIST

## 2017-07-14 PROCEDURE — 80202 ASSAY OF VANCOMYCIN: CPT

## 2017-07-14 PROCEDURE — A9270 NON-COVERED ITEM OR SERVICE: HCPCS | Performed by: PSYCHIATRY & NEUROLOGY

## 2017-07-14 PROCEDURE — 700111 HCHG RX REV CODE 636 W/ 250 OVERRIDE (IP): Performed by: INTERNAL MEDICINE

## 2017-07-14 PROCEDURE — 84100 ASSAY OF PHOSPHORUS: CPT

## 2017-07-14 PROCEDURE — 700102 HCHG RX REV CODE 250 W/ 637 OVERRIDE(OP): Performed by: INTERNAL MEDICINE

## 2017-07-14 PROCEDURE — 770020 HCHG ROOM/CARE - TELE (206)

## 2017-07-14 PROCEDURE — 700105 HCHG RX REV CODE 258: Performed by: PHARMACIST

## 2017-07-14 PROCEDURE — 83735 ASSAY OF MAGNESIUM: CPT

## 2017-07-14 PROCEDURE — 85025 COMPLETE CBC W/AUTO DIFF WBC: CPT

## 2017-07-14 PROCEDURE — 700102 HCHG RX REV CODE 250 W/ 637 OVERRIDE(OP): Performed by: PSYCHIATRY & NEUROLOGY

## 2017-07-14 PROCEDURE — 700111 HCHG RX REV CODE 636 W/ 250 OVERRIDE (IP): Performed by: PHARMACIST

## 2017-07-14 RX ORDER — MAGNESIUM SULFATE HEPTAHYDRATE 40 MG/ML
2 INJECTION, SOLUTION INTRAVENOUS ONCE
Status: COMPLETED | OUTPATIENT
Start: 2017-07-14 | End: 2017-07-14

## 2017-07-14 RX ORDER — BISACODYL 10 MG
10 SUPPOSITORY, RECTAL RECTAL ONCE
Status: COMPLETED | OUTPATIENT
Start: 2017-07-14 | End: 2017-07-14

## 2017-07-14 RX ORDER — NYSTATIN 100000 [USP'U]/G
POWDER TOPICAL 2 TIMES DAILY
Status: DISCONTINUED | OUTPATIENT
Start: 2017-07-14 | End: 2017-07-25 | Stop reason: HOSPADM

## 2017-07-14 RX ORDER — OXYCODONE HYDROCHLORIDE 10 MG/1
10 TABLET ORAL EVERY 4 HOURS PRN
Status: DISCONTINUED | OUTPATIENT
Start: 2017-07-14 | End: 2017-07-19

## 2017-07-14 RX ORDER — OXYCODONE HYDROCHLORIDE 5 MG/1
5 TABLET ORAL EVERY 4 HOURS PRN
Status: DISCONTINUED | OUTPATIENT
Start: 2017-07-14 | End: 2017-07-19

## 2017-07-14 RX ORDER — LEVETIRACETAM 500 MG/1
500 TABLET ORAL 2 TIMES DAILY
Status: DISCONTINUED | OUTPATIENT
Start: 2017-07-14 | End: 2017-07-25 | Stop reason: HOSPADM

## 2017-07-14 RX ADMIN — VANCOMYCIN HYDROCHLORIDE 2000 MG: 100 INJECTION, POWDER, LYOPHILIZED, FOR SOLUTION INTRAVENOUS at 16:51

## 2017-07-14 RX ADMIN — DEXTROSE MONOHYDRATE 500 MG: 50 INJECTION, SOLUTION INTRAVENOUS at 09:43

## 2017-07-14 RX ADMIN — OMEPRAZOLE 20 MG: 20 CAPSULE, DELAYED RELEASE ORAL at 08:57

## 2017-07-14 RX ADMIN — METOPROLOL TARTRATE 25 MG: 25 TABLET, FILM COATED ORAL at 21:13

## 2017-07-14 RX ADMIN — HEPARIN SODIUM 5000 UNITS: 5000 INJECTION, SOLUTION INTRAVENOUS; SUBCUTANEOUS at 06:05

## 2017-07-14 RX ADMIN — METOPROLOL TARTRATE 25 MG: 25 TABLET, FILM COATED ORAL at 08:58

## 2017-07-14 RX ADMIN — OXYCODONE HYDROCHLORIDE 10 MG: 10 TABLET ORAL at 10:46

## 2017-07-14 RX ADMIN — GABAPENTIN 200 MG: 100 CAPSULE ORAL at 14:01

## 2017-07-14 RX ADMIN — BISACODYL 10 MG: 10 SUPPOSITORY RECTAL at 08:58

## 2017-07-14 RX ADMIN — ROSUVASTATIN CALCIUM 125 MCG: 10 TABLET, FILM COATED ORAL at 18:24

## 2017-07-14 RX ADMIN — LISINOPRIL 20 MG: 20 TABLET ORAL at 08:57

## 2017-07-14 RX ADMIN — NYSTATIN 1500000 UNITS: 100000 POWDER TOPICAL at 10:08

## 2017-07-14 RX ADMIN — VITAMIN D, TAB 1000IU (100/BT) 5000 UNITS: 25 TAB at 08:56

## 2017-07-14 RX ADMIN — GABAPENTIN 200 MG: 100 CAPSULE ORAL at 21:14

## 2017-07-14 RX ADMIN — HEPARIN SODIUM 5000 UNITS: 5000 INJECTION, SOLUTION INTRAVENOUS; SUBCUTANEOUS at 21:14

## 2017-07-14 RX ADMIN — HEPARIN SODIUM 5000 UNITS: 5000 INJECTION, SOLUTION INTRAVENOUS; SUBCUTANEOUS at 13:59

## 2017-07-14 RX ADMIN — MAGNESIUM SULFATE IN WATER 2 G: 40 INJECTION, SOLUTION INTRAVENOUS at 08:56

## 2017-07-14 RX ADMIN — SODIUM PHOSPHATE, MONOBASIC, MONOHYDRATE AND SODIUM PHOSPHATE, DIBASIC, ANHYDROUS 30 MMOL: 276; 142 INJECTION, SOLUTION INTRAVENOUS at 10:07

## 2017-07-14 RX ADMIN — OXYCODONE HYDROCHLORIDE 10 MG: 10 TABLET ORAL at 16:14

## 2017-07-14 RX ADMIN — LEVETIRACETAM 500 MG: 500 TABLET, FILM COATED ORAL at 21:14

## 2017-07-14 RX ADMIN — ATORVASTATIN CALCIUM 10 MG: 10 TABLET, FILM COATED ORAL at 22:07

## 2017-07-14 RX ADMIN — ONDANSETRON 4 MG: 2 INJECTION INTRAMUSCULAR; INTRAVENOUS at 18:24

## 2017-07-14 RX ADMIN — CYANOCOBALAMIN TAB 500 MCG 1000 MCG: 500 TAB at 08:57

## 2017-07-14 RX ADMIN — NYSTATIN 1500000 UNITS: 100000 POWDER TOPICAL at 21:14

## 2017-07-14 RX ADMIN — GABAPENTIN 200 MG: 100 CAPSULE ORAL at 08:56

## 2017-07-14 RX ADMIN — CEFTRIAXONE SODIUM 2 G: 2 INJECTION, POWDER, FOR SOLUTION INTRAMUSCULAR; INTRAVENOUS at 08:57

## 2017-07-14 RX ADMIN — STANDARDIZED SENNA CONCENTRATE AND DOCUSATE SODIUM 2 TABLET: 8.6; 5 TABLET, FILM COATED ORAL at 08:56

## 2017-07-14 RX ADMIN — ACETAMINOPHEN 650 MG: 325 TABLET, FILM COATED ORAL at 13:59

## 2017-07-14 RX ADMIN — OMEPRAZOLE 20 MG: 20 CAPSULE, DELAYED RELEASE ORAL at 21:13

## 2017-07-14 ASSESSMENT — ENCOUNTER SYMPTOMS
SPEECH CHANGE: 0
HEADACHES: 0
HEMOPTYSIS: 0
LOSS OF CONSCIOUSNESS: 0
NERVOUS/ANXIOUS: 0
SPUTUM PRODUCTION: 0
BACK PAIN: 1
NAUSEA: 0
BLOOD IN STOOL: 0
MEMORY LOSS: 0
SEIZURES: 0
FEVER: 0
EYES NEGATIVE: 1
ABDOMINAL PAIN: 0
CHILLS: 0
PALPITATIONS: 0
STRIDOR: 0
VOMITING: 0
DIZZINESS: 0
COUGH: 0
FLANK PAIN: 0
MYALGIAS: 1
SHORTNESS OF BREATH: 0
FOCAL WEAKNESS: 1
NECK PAIN: 0
FALLS: 0
DIARRHEA: 0
HALLUCINATIONS: 0

## 2017-07-14 ASSESSMENT — LIFESTYLE VARIABLES: SUBSTANCE_ABUSE: 0

## 2017-07-14 ASSESSMENT — PAIN SCALES - GENERAL
PAINLEVEL_OUTOF10: 1
PAINLEVEL_OUTOF10: 1
PAINLEVEL_OUTOF10: 2
PAINLEVEL_OUTOF10: 4
PAINLEVEL_OUTOF10: 1
PAINLEVEL_OUTOF10: 5
PAINLEVEL_OUTOF10: 4
PAINLEVEL_OUTOF10: 2
PAINLEVEL_OUTOF10: 1

## 2017-07-14 NOTE — DISCHARGE PLANNING
Received consult for assistance with AD. Presented pt with AD Packet. Placed Physician's Certificate on pt chart. Notified Charge RN.

## 2017-07-14 NOTE — PROGRESS NOTES
"Pharmacy Kinetics 86 y.o. male on vancomycin day # 6  2017    Currently on Vancomycin 2000 mg iv q24hr (1500)    Indication for Treatment: L3-4 discitis/viridans strep bacteremia    Pertinent history per medical record: Admitted on 2017 for sepsis and back pain. Found to have diskitis at L3-4, blood culture positive for viridans strep. Patient on antibiotics planned x6 weeks per ID recommendations.    Other antibiotics: ceftriaxone 2 gm IV daily    Allergies: Review of patient's allergies indicates no known allergies.     Concerns for renal function include age, BMI ~31 & BUN/SCr ratio > 20:1.    Pertinent cultures to date:   7/10: L3-L4 disc biopsy - NGTD  : peripheral blood cx X2 - NGTD  : peripheral blood cx - Viridans strep in 1/2 sets  : urine cx - Negative    Imagin/7: MRI L spine - Possible discitis at the L3-4 level.    Recent Labs      17   0222  17   0510  17   0402   WBC  15.3*  13.2*  14.7*   NEUTSPOLYS  83.40*  72.20*  78.10*     Recent Labs      17   0222  17   0510  17   0402   BUN  12  13  11   CREATININE  0.60  0.47*  0.58     No results for input(s): VANCOTROUGH, VANCOPEAK, VANCORANDOM in the last 72 hours.    Intake/Output Summary (Last 24 hours) at 17 1601  Last data filed at 17 1200   Gross per 24 hour   Intake   1455 ml   Output   1695 ml   Net   -240 ml      Blood pressure 122/62, pulse 74, temperature 37.6 °C (99.6 °F), temperature source Temporal, resp. rate 13, height 1.803 m (5' 11\"), weight 99.3 kg (218 lb 14.7 oz), SpO2 96 %. Temp (24hrs), Av.4 °C (97.6 °F), Min:35.6 °C (96 °F), Max:37.6 °C (99.6 °F)      A/P   1. Vancomycin dose change: Continue current regimen  2. Next vancomycin level: In process  3. Goal trough: ~ 16 mcg/mL   4. Comments: No change in patient's clinical status, developed SSTI of his RUE from prior IV site and is awaiting transfer out of ICU. No change in culture data, renal indices and UOP " remain stable as well. Patient's vanco trough missed yesterday, RN to draw level today prior to administration of next dose. Will continue current regimen at this time and review trough to guide further dosing.    Pharmacy will continue to follow.     Isabell Rasmussen, Fifi      Addendum:    Recent Labs      07[facility]14/17   1609   [facility]  [facility]       A/P:  1. Patient's ~25 hour vanco level slightly subtherapeutic. Patient clinically stable and no confirmed resistant cultures. Patient remains at risk for accumulation of vanco with continued dosing d/t his age and BMI.  2. Plan: Continue current vanco regimen. Consider repeat vanco trough in ~3 days if indicated.    Isabell Rasmussen, MichD

## 2017-07-14 NOTE — PROGRESS NOTES
IMPRESSION:    1. Newly onset of seizure like spell AM of 7/13  2. Discitis   3. Hx of atrial fib, HTN, DM, UTI, Obesity      PLAN/RECOMMENDATIONS:    Now is awake, able to express himself  Denied any seizures before    ________________________________________________________________________    If circumstances change do not hesitate to re-consult neurology.     At this point, patient remains stable.    Advise follow up in neurology outpatient clinic-- to manage seizure medication ( may consider to stop seizure medication in the future after repeat of some tests)     Follow up MRI of brain if any more seizures-- to exclude CNS lesions-- such as empyema       Thank you for the consult.     Allan Win M.D.  [facility]  6:08 PM07/13/2017    ________________________________________________________________________        ________________________________________________________________________      EEG, MRI of brain are done already-- not remarkable  Therapeutic Trial of Seizure medication for now is reasonable    Patients with multiple medical problems tend to develop seizures ( seizure threshold would decrease)( the patient may have underlined old stroke or new embolic stroke or even brain abscess)  KEPPRA 500mg Q12 H was put on    At times, jesus fontanez spells could present like seizures too        ________________________________________________________________________        MRI of brain today-- not remarkable      Filed Vitals:    07/13/17 1500 07/13/17 1600 07/13/17 1700 07/13/17 1722   BP:    112/62   Pulse: 70 72 73 73   Temp:  37.3 °C (99.1 °F)     TempSrc:       Resp: 15 14 18    Height:       Weight:       SpO2: 99% 99% 98%      Physical Exam   Constitutional: He is oriented to person, place, and time and well-developed, well-nourished, and in no distress.   HENT:   Head: Normocephalic.   Eyes: Pupils are equal, round, and reactive to light.   Pulmonary/Chest: Effort normal.    Abdominal: Soft.   Musculoskeletal: Normal range of motion.   Neurological: He is alert and oriented to person, place, and time.   Left leg clumsiness-- not new-- knee problems, joint problems   Skin: Skin is warm.     Review of Systems   Eyes: Negative for discharge and redness.   Cardiovascular: Positive for claudication.   Genitourinary: Negative for hematuria.   Musculoskeletal: Positive for back pain, joint pain and falls.   Skin: Negative for itching.   Neurological: Positive for seizures, loss of consciousness and weakness.   Psychiatric/Behavioral: Negative for substance abuse.

## 2017-07-14 NOTE — PROGRESS NOTES
[facility] Hospitalist Progress Note    Date of Service: 2017    Chief Complaint  86 y.o. male admitted 2017 with lower back pain and noted to have discitis    Interval Problem Update  No overnight events, complains of back pain AOx3    Consultants/Specialty  Neurology neurosurgery signed off   infectious disease    Disposition  To be determined        Review of Systems   Constitutional: Negative for fever and chills.   HENT: Negative for congestion, ear discharge and nosebleeds.    Eyes: Negative.    Respiratory: Negative for cough, hemoptysis, sputum production, shortness of breath and stridor.    Cardiovascular: Negative for chest pain, palpitations and leg swelling.   Gastrointestinal: Negative for nausea, vomiting, abdominal pain, diarrhea, blood in stool and melena.   Genitourinary: Negative for dysuria, hematuria and flank pain.   Musculoskeletal: Positive for back pain and joint pain. Negative for falls and neck pain.   Skin: Negative.    Neurological: Positive for focal weakness (lower extremity). Negative for dizziness, speech change, seizures, loss of consciousness and headaches.   Psychiatric/Behavioral: Negative for hallucinations, memory loss and substance abuse. The patient is not nervous/anxious.    All other systems reviewed and are negative.     Physical Exam  Laboratory/Imaging   Hemodynamics  Temp (24hrs), Av.4 °C (97.6 °F), Min:35.6 °C (96 °F), Max:37.6 °C (99.6 °F)   Temperature: 37.6 °C (99.6 °F)  Pulse  Av  Min: 34  Max: 154 Heart Rate (Monitored): 73  Blood Pressure : 122/62 mmHg, NIBP: (!) 164/59 mmHg      Respiratory      Respiration: 13, Pulse Oximetry: 96 %     Work Of Breathing / Effort: Shallow  RUL Breath Sounds: Clear;Diminished, RML Breath Sounds: Clear;Diminished, RLL Breath Sounds: Diminished, MIKE Breath Sounds: Clear;Diminished, LLL Breath Sounds: Diminished    Fluids    Intake/Output Summary (Last 24 hours) at 17 1633  Last data filed at 17 1200   Gross  per 24 hour   Intake   1455 ml   Output   1695 ml   Net   -240 ml       Nutrition  Orders Placed This Encounter   Procedures   • DIET ORDER     Standing Status: Standing      Number of Occurrences: 1      Standing Expiration Date:      Order Specific Question:  Diet:     Answer:  Cardiac [6]     Physical Exam   Constitutional: He is oriented to person, place, and time. He appears well-developed.   HENT:   Head: Normocephalic and atraumatic.   Eyes: Right eye exhibits no discharge. Left eye exhibits no discharge.   Neck: Neck supple. No JVD present. No tracheal deviation present.   Cardiovascular: Exam reveals no gallop and no friction rub.    Murmur heard.  irregular   Pulmonary/Chest: No stridor. No respiratory distress. He has rhonchi. He has no rales. He exhibits no tenderness.   Abdominal: Soft. Bowel sounds are normal. He exhibits no distension. There is no tenderness. There is no rebound and no guarding.   Musculoskeletal: He exhibits tenderness. He exhibits no edema.   Neurological: He is alert and oriented to person, place, and time. No cranial nerve deficit.   RLE and LLE extremity weakness 4 over 5 limited by pain   Skin: Skin is warm and dry. He is not diaphoretic.   Psychiatric: He has a normal mood and affect. His behavior is normal.   Nursing note and vitals reviewed.      Recent Labs      07/12/17 0222 07/13/17 0510  07/14/17   0402   WBC  15.3*  13.2*  14.7*   RBC  5.17  4.31*  4.32*   HEMOGLOBIN  15.0  12.6*  12.9*   HEMATOCRIT  45.1  38.8*  38.6*   MCV  87.2  90.0  89.4   MCH  29.0  29.2  29.9   MCHC  33.3*  32.5*  33.4*   RDW  40.3  42.1  42.0   PLATELETCT  326  215  189   MPV  9.9  10.0  9.5     Recent Labs      07/12/17 0222 07/13/17   0510  07/14/17   0402   SODIUM  136  135  136   POTASSIUM  3.4*  3.7  4.0   CHLORIDE  99  99  99   CO2  29  32  33   GLUCOSE  197*  104*  103*   BUN  12  13  11   CREATININE  0.60  0.47*  0.58   CALCIUM  9.6  9.2  9.0                      Assessment/Plan      * Low back pain (present on admission)  Assessment & Plan  Pain management will increase oxycodone, PT/OT      Bacteremia  Assessment & Plan  Strep viridans  ID following  On IV vanco and ceftriaxone will need 6 weeks course    Discitis of lumbar region  Assessment & Plan  At L3-L4 level  S/p Bc Cx remains neg  abx as above    Atrial flutter (CMS-HCC) (present on admission)  Assessment & Plan  Continue dig and metoprolol and telemetry monitoring  No anticoagulation at this time given discitis    Sepsis (CMS-HCC) (present on admission)  Assessment & Plan  Resolved          UTI (urinary tract infection) (present on admission)  Assessment & Plan  Iv rocephin  Urine culture was negative and urine mildly abnormal        Hypotension (present on admission)  Assessment & Plan  Resolved            Hypomagnesemia  Assessment & Plan  Replete and monitor     Acute encephalopathy  Assessment & Plan  Resolved no acute pathology on MRI or EEG  Empirically started on Keppra per neurology, Dr Win recommend outpt  Follow up to review need for continued meds    Hypophosphatemia  Assessment & Plan  Replete and monitor       Stable for transfer back to telemetry   Plan of care reviewed with patient and discussed with nursing staff    Labs reviewed, Medications reviewed and Radiology images reviewed  Hearn catheter: No Hearn  Central line in place: Need for access    DVT Prophylaxis: Heparin      Antibiotics: Treating active infection/contamination beyond 24 hours perioperative coverage  Assessed for rehab: Patient was assess for and/or received rehabilitation services during this hospitalization   Critical care time spent 36 minutes without overlap, exclude procedure time.

## 2017-07-14 NOTE — CARE PLAN
Problem: Pain Management  Goal: Pain level will decrease to patient’s comfort goal  Intervention: Educate and implement non-pharmacologic comfort measures. Examples: relaxation, distration, play therapy, activity therapy, massage, etc.  Pt educated on importance of pain control.  Oxycodone IR increased from 2.5-5 mg to 5-10 mg.  PRN medication administrated as needed.      Problem: Mobility  Goal: Risk for activity intolerance will decrease  Intervention: Encourage patient to increase activity level in collaboration with Interdisciplinary Team  Pt refusing mobility for both night shift and day shift.  Pt educated on importance of mobility.  Pt states he does not want to move because it causes him pain.  Discussed with provider.  Pain medication modified.  Patient educated on pre-medication prior to activity.  Pt agrees to try method.

## 2017-07-14 NOTE — PROGRESS NOTES
Progress Note               Author: Tracy Serrano Date & Time created: 7/14/2017  8:50 AM     Interval History:  Hospital day #8 for L3-L4 discitis, seizure activity   Remains in ICU   Feels better than yesterday  No seizures overnight  Wants to cut his own hair    Review of Systems:  ROS   States vision improved from yesterday, denies diplopia  Denies new pain, numbness, weakness.   Denies HA, positional HA, N/V, dizziness      Physical Exam:  Physical Exam  A&Ox4  NAD  Fatigued, awakens for exam, falls asleep right after   Spontaneous breathing on room air with normal respiratory effort  Follows commands x4  PERRLA. EOMI.   No nystagmus   Face is symmetrical, tongue is midline  CN II-XII grossly intact bilat  No difficulty with speech, no speech deficit   Negative pronator drift test  No difficulty with rapid alternating movements  No DDK, some past-pointing with L>R testing   Appropriate muscle tone and sensation intact all four extremities.   No midline, paraspinal TTP  LLE decreased sensation to light touch at medial calf  4/5 left quadricep, 5/5 right  Moves extremities with brisk, purposeful movement       Labs:        Invalid input(s): [id number]      Recent Labs      07/12/17 0222 07/13/17   0510  07/14/17   0402   SODIUM  136  135  136   POTASSIUM  3.4*  3.7  4.0   CHLORIDE  99  99  99   CO2  29  32  33   BUN  12  13  11   CREATININE  0.60  0.47*  0.58   MAGNESIUM  1.4*  1.4*  1.9   PHOSPHORUS   --    --   2.4*   CALCIUM  9.6  9.2  9.0     Recent Labs      07/12/17 0222 07/13/17   0510  07/14/17   0402   GLUCOSE  197*  104*  103*     Recent Labs      07/12/17 0222 07/13/17   0510  07/14/17   0402   RBC  5.17  4.31*  4.32*   HEMOGLOBIN  15.0  12.6*  12.9*   HEMATOCRIT  45.1  38.8*  38.6*   PLATELETCT  326  215  189     Recent Labs      07/12/17 0222 07/13/17   0510  07/14/17   0402   WBC  15.3*  13.2*  14.7*   NEUTSPOLYS  83.40*  72.20*  78.10*   LYMPHOCYTES  6.70*  14.50*  10.40*    MONOCYTES  7.40  9.40  8.40   EOSINOPHILS  0.20  2.00  1.80   BASOPHILS  0.50  0.50  0.30     Hemodynamics:  Temp (24hrs), Av.4 °C (97.5 °F), Min:35.6 °C (96 °F), Max:37.3 °C (99.1 °F)  Temperature: (!) 35.6 °C (96 °F)  Pulse  Av.1  Min: 34  Max: 154Heart Rate (Monitored): 73  Blood Pressure : 112/62 mmHg, NIBP: 109/54 mmHg     Respiratory:    Respiration: 16, Pulse Oximetry: 97 %     Work Of Breathing / Effort: Mild  RUL Breath Sounds: Diminished, RML Breath Sounds: Diminished, RLL Breath Sounds: Diminished, MIKE Breath Sounds: Diminished, LLL Breath Sounds: Diminished  Fluids:    Intake/Output Summary (Last 24 hours) at 17 0833  Last data filed at 17 0600   Gross per 24 hour   Intake   1050 ml   Output    850 ml   Net    200 ml     Weight: 99.3 kg (218 lb 14.7 oz)  GI/Nutrition:  Orders Placed This Encounter   Procedures   • DIET ORDER     Standing Status: Standing      Number of Occurrences: 1      Standing Expiration Date:      Order Specific Question:  Diet:     Answer:  Cardiac [6]     Medical Decision Making, by Problem:  Active Hospital Problems    Diagnosis   • *Low back pain [M54.5]   • Bacteremia [R78.81]   • Discitis of lumbar region [M46.46]   • Acute encephalopathy [G93.40]   • Bigeminy [I49.9]   • Hypomagnesemia [E83.42]   • Hypotension [I95.9]   • Morbid obesity (CMS-[facility]) [E66.01]   • Sepsis (CMS-[facility]) [A41.9]   • UTI (urinary tract infection) [N39.0]   • Atrial flutter (CMS-HCC) [I48.92]       Plan:  Hospital Day #8 L33-L4 discitis   Continue care per primary team  Recommend maintaining more consistent MAP >70  OK to transfer to floor from neurosurgery standpoint  Imaging is stable, no surgery indicated for discitis at this time    Continue abx per primary  Will sign off   Please reconsult as needed     All questions answered  Patient agrees with treatment plan  Case discussed with Dr. Cochran        Core Measures

## 2017-07-14 NOTE — CARE PLAN
Problem: Venous Thromboembolism (VTW)/Deep Vein Thrombosis (DVT) Prevention:  Goal: Patient will participate in Venous Thrombosis (VTE)/Deep Vein Thrombosis (DVT)Prevention Measures  VTE prophylaxis in place, pt educated on use and importance, pt verbalizes understanding and agrees to comply.    Problem: Knowledge Deficit  Goal: Knowledge of disease process/condition, treatment plan, diagnostic tests, and medications will improve  Pt and family educated on POC, all questions answered in regards to disease process, treatment and diet. Pt and family verbalize understanding and voice no further questions at this time.

## 2017-07-14 NOTE — PROGRESS NOTES
Infectious Disease Progress Note    Author: Shante Sequeira M.D. Date of service & Time created: 2017  2:10 PM    Chief Complaint:  Chief Complaint   Patient presents with   • Back Pain    follow-up for lumbar discitis and bacteremia    Interval History:  86-year-old white male admitted for back pain. Found to have discitis  17- no fevers. WBC 10.3. Ongoing back pain. Sedimentation rate is 46. Now the blood cultures are positive for strep species. Also has new nausea and vomiting today.   7/10 AF, WBC 16.5, having nausea and vomiting, ongoing back pain, plan for bone biopsy today, tolerating abx without issues   AF, WBC 13.3, more pain after biopsy, feels flushed, having runny stools on bowel regimen   AF WBC 15.3 transferred to ICU due to concern for seizure-obtunded.   AF alert and eating lunch Has some pain-controlled. Denies SE abx   AF WBC 14.7 no new complaints  Labs Reviewed, Medications Reviewed and Radiology Reviewed.    Review of Systems:  Review of Systems   Constitutional: Negative for fever and chills.   Cardiovascular: Negative for chest pain.   Gastrointestinal: Negative for nausea, vomiting, abdominal pain and diarrhea.   Musculoskeletal: Positive for myalgias, back pain and joint pain.       Hemodynamics:  Temp (24hrs), Av.6 °C (97.8 °F), Min:35.6 °C (96 °F), Max:37.6 °C (99.6 °F)  Temperature: 37.6 °C (99.6 °F)  Pulse  Av  Min: 34  Max: 154Heart Rate (Monitored): 73  Blood Pressure : 122/62 mmHg, NIBP: (!) 164/59 mmHg       Physical Exam:  Physical Exam   Constitutional: He appears well-developed and well-nourished. No distress.   HENT:   Head: Normocephalic and atraumatic.   Eyes: EOM are normal. Pupils are equal, round, and reactive to light. No scleral icterus.   Neck: Neck supple.   Cardiovascular: Normal rate.    Irregularly irregular     Pulmonary/Chest: Effort normal and breath sounds normal. No respiratory distress. He has no wheezes.   Abdominal: Soft.  He exhibits no distension. There is no tenderness.   Musculoskeletal: He exhibits no edema.   LUE PICC nontender   Neurological: He is alert.   Skin: No rash noted. There is erythema.   RUE forearm-decreased erythema   Nursing note and vitals reviewed.      Meds:    Current facility-administered medications:   •  nystatin (MYCOSTATIN) powder, , Topical, BID, Kirby Mayo M.D., 1,500,000 Units at 07/14/17 1008  •  sodium phosphate 30 mmol in 1/2  mL ivpb, 30 mmol, Intravenous, Once, Kirby Mayo M.D., Last Rate: 83.3 mL/hr at 07/14/17 1007, 30 mmol at 07/14/17 1007  •  oxycodone immediate-release (ROXICODONE) tablet 5 mg, 5 mg, Oral, Q4HRS PRN **OR** oxycodone immediate release (ROXICODONE) tablet 10 mg, 10 mg, Oral, Q4HRS PRN, Kirby Mayo M.D., 10 mg at 07/14/17 1046  •  levetiracetam (KEPPRA) tablet 500 mg, 500 mg, Oral, BID, Allan Win M.D.  •  senna-docusate (PERICOLACE or SENOKOT S) 8.6-50 MG per tablet 2 Tab, 2 Tab, Oral, BID, 2 Tab at 07/14/17 0856 **AND** polyethylene glycol/lytes (MIRALAX) PACKET 1 Packet, 1 Packet, Oral, QDAY PRN, 1 Packet at 07/13/17 1417 **AND** magnesium hydroxide (MILK OF MAGNESIA) suspension 30 mL, 30 mL, Oral, QDAY PRN, 30 mL at 07/13/17 1751 **AND** bisacodyl (DULCOLAX) suppository 10 mg, 10 mg, Rectal, QDAY PRN, Kirby Mayo M.D.  •  metoprolol (LOPRESSOR) tablet 25 mg, 25 mg, Oral, TWICE DAILY, Kirby Mayo M.D., 25 mg at 07/14/17 0858  •  lisinopril (PRINIVIL) tablet 20 mg, 20 mg, Oral, Q DAY, Kirby Mayo M.D., 20 mg at 07/14/17 0857  •  metoprolol (LOPRESSOR) injection 5 mg, 5 mg, Intravenous, Q5 MIN PRN, Collin White M.D., 5 mg at 07/12/17 0757  •  heparin injection 5,000 Units, 5,000 Units, Subcutaneous, Q8HRS, Collin White M.D., 5,000 Units at 07/14/17 1359  •  PICC Line Insertion has been implemented, , , Once **AND** May use Lidocaine 1% not to exceed 3 mls for local at insertion site, , , CONTINUOUS **AND** NOTIFY MD,  , , Once **AND** Tip to dwell in the superior vena cava, , , CONTINUOUS **AND** Do not use PICC Line until placement verified by Chest X Ray, , , CONTINUOUS **AND** DX-CHEST-FOR PICC LINE Perform procedure in:: Patient's Room, , , Once **AND** If radiologist reading of chest X-ray states any of the following the PICC should be used, , , CONTINUOUS **AND** Further evaluation of the PICC placement can be retrieved from X-Ray and Imaging, , , CONTINUOUS **AND** Blood draws through PICC line; draws by RN only, , , CONTINUOUS **AND** FLUSHING GUIDELINES WHEN IN USE, , , CONTINUOUS **AND** normal saline PF 10-20 mL, 10-20 mL, Intravenous, PRN **AND** FLUSHING GUIDELINES WHEN NOT IN USE, , , CONTINUOUS **AND** DRESSING MAINTENANCE, , , Once **AND** Change needleless pressure ports and IV tubing every 72 hours per hospital policy, , , CONTINUOUS **AND** TUBING, , , CONTINUOUS **AND** If there is an MD order to remove the PICC line, any RN may remove the PICC line, , , CONTINUOUS **AND** [] PATIENT EDUCATION MATERIALS, , , Prior to discharge **AND** NURSING COMMUNICATION, , , CONTINUOUS, Ny Chilel M.D.  •  gabapentin (NEURONTIN) capsule 200 mg, 200 mg, Oral, TID, Georgi Grey M.D., 200 mg at 17 1401  •  Notify provider if pain remains uncontrolled, , , CONTINUOUS **AND** Use the numeric rating scale (NRS-11) on regular floors and Critical-Care Pain Observation Tool (CPOT) on ICUs/Trauma to assess pain, , , CONTINUOUS **AND** Pulse Ox (Oximetry), , , CONTINUOUS **AND** [DISCONTINUED] Pharmacy Consult Request ...Pain Management Review, , Other, PRN **AND** If patient difficult to arouse and/or has respiratory depression, stop any opiates that are currently infusing and call a Rapid Response., , , CONTINUOUS **AND** [DISCONTINUED] oxycodone immediate-release (ROXICODONE) tablet 2.5 mg, 2.5 mg, Oral, Q3HRS PRN, 2.5 mg at 17 1802 **AND** [DISCONTINUED] oxycodone immediate-release (ROXICODONE)  tablet 5 mg, 5 mg, Oral, Q3HRS PRN, 5 mg at 07/13/17 2311 **AND** HYDROmorphone (DILAUDID) injection 0.5 mg, 0.5 mg, Intravenous, Q3HRS PRN, Georgi Grey M.D., 0.5 mg at 07/10/17 0905  •  digoxin (LANOXIN) tablet 125 mcg, 125 mcg, Oral, DAILY AT 1800, Georgi Grey M.D., 125 mcg at 07/13/17 1722  •  ondansetron (ZOFRAN) syringe/vial injection 4 mg, 4 mg, Intravenous, Q6HRS PRN, Georgi Grey M.D., 4 mg at 07/13/17 2157  •  omeprazole (PRILOSEC) capsule 20 mg, 20 mg, Oral, BID, Georgi Grey M.D., 20 mg at 07/14/17 0857  •  MD ALERT... vancomycin per pharmacy protocol, , Other, pharmacy to dose, Oliva Bolanos M.D.  •  cefTRIAXone (ROCEPHIN) 2 g in  mL IVPB, 2 g, Intravenous, Q24HRS, Oliva Bolanos M.D., Stopped at 07/14/17 0927  •  vancomycin 2,000 mg in  mL IVPB, 20 mg/kg, Intravenous, Q24HR, Robert Mcnulty, PHARMD, Stopped at 07/13/17 1702  •  hydrALAZINE (APRESOLINE) tablet 10 mg, 10 mg, Oral, Q4HRS PRN, Jamaal Molina M.D., 10 mg at 07/09/17 2038  •  clonidine (CATAPRES) tablet 0.1 mg, 0.1 mg, Oral, 4X/DAY PRN, Jamaal Molina M.D.  •  prochlorperazine (COMPAZINE) injection 10 mg, 10 mg, Intravenous, Q6HRS PRN, Darion Benton M.D., 10 mg at 07/10/17 0905  •  pneumococcal 13-Jocelyn Conj Vacc (PREVNAR 13) syringe 0.5 mL, 0.5 mL, Intramuscular, Once PRN, Jamaal Molina M.D.  •  atorvastatin (LIPITOR) tablet 10 mg, 10 mg, Oral, Nightly, Jamaal Molina M.D., 10 mg at 07/13/17 2157  •  vitamin D (cholecalciferol) tablet 5,000 Units, 5,000 Units, Oral, Q48HRS, Jamaal Molina M.D., 5,000 Units at 07/14/17 0856  •  cyanocobalamin (VITAMIN B-12) tablet 1,000 mcg, 1,000 mcg, Oral, DAILY, Jamaal Molina M.D., 1,000 mcg at 07/14/17 0857  •  acetaminophen (TYLENOL) tablet 650 mg, 650 mg, Oral, Q6HRS PRN, Jamaal Molina M.D., 650 mg at 07/14/17 1359    Labs:  Recent Labs      07/12/17   0222  07/13/17   0510  07/14/17   0402   WBC  15.3*  13.2*  14.7*   RBC  5.17  4.31*  4.32*   HEMOGLOBIN   15.0  12.6*  12.9*   HEMATOCRIT  45.1  38.8*  38.6*   MCV  87.2  90.0  89.4   MCH  29.0  29.2  29.9   RDW  40.3  42.1  42.0   PLATELETCT  326  215  189   MPV  9.9  10.0  9.5   NEUTSPOLYS  83.40*  72.20*  78.10*   LYMPHOCYTES  6.70*  14.50*  10.40*   MONOCYTES  7.40  9.40  8.40   EOSINOPHILS  0.20  2.00  1.80   BASOPHILS  0.50  0.50  0.30     Recent Labs      07/12/17   0222  07/13/17   0510  07/14/17   0402   SODIUM  136  135  136   POTASSIUM  3.4*  3.7  4.0   CHLORIDE  99  99  99   CO2  29  32  33   GLUCOSE  197*  104*  103*   BUN  12  13  11     Recent Labs      07/12/17 0222  07/13/17   0510  07/14/17   0402   CREATININE  0.60  0.47*  0.58       Imaging:  Ct-abdomen-pelvis W/o  7/6/2017  No hydronephrosis or urolithiasis. Diverticula colon. No evidence diverticulitis. No free fluid. Pancreatic calcifications/consistent with sequela pancreatitis. No peripancreatic fluid collections. Coronary artery calcifications.    Ct-lspine W/o Plus Recons  7/6/2017  1.  No evidence of acute fracture of the lumbar spine. 2.  Surgical change extending from L3 through L5. 3.  Multilevel degenerative disc disease and facet degeneration. 4.  Old fractures of the right L1 and L2 transverse processes. 5.  Multilevel degenerative subluxation.    Mr-lumbar Spine-with & W/o  7/8/2017  1.  Possible discitis at the L3-4 level. 2.  Mild to moderate retrolisthesis at the L3-4 level. 3.  Previous extensive laminectomy from the L2 level to the sacrum with bilateral pedicle screw and zev fixation at the L4-L5 levels. 4.   Moderate lumbar spondylotic changes at the L3-4 levels with associated moderate central canal stenosis at this level secondary to facet arthropathy. Additionally there are severe bilateral neural foraminal stenosis. 5.  Minimal lumbar spondylotic changes at the L2-3 level with mild bilateral neural foraminal narrowing and mild disc bulging into the inferior aspects of the neural foramina.    Dx-hip-bilateral-with Pelvis-3/4  "Views  7/6/2017  No evidence of fracture or arthropathy of the hips. Degenerative and surgical changes of the lumbar spine.      Micro:  BLOOD CULTURE   Date Value Ref Range Status   07/09/2017   Preliminary    No Growth    Note: Blood cultures are incubated for 5 days and  are monitored continuously.Positive blood cultures  are called to the RN and reported as soon as  they are identified.     07/09/2017   Preliminary    No Growth    Note: Blood cultures are incubated for 5 days and  are monitored continuously.Positive blood cultures  are called to the RN and reported as soon as  they are identified.        Results     Procedure Component Value Units Date/Time    CULTURE TISSUE W/ GRM STAIN [[id number]] Collected:  07/10/17 1445    Order Status:  Completed Specimen Information:  Tissue Updated:  07/13/17 0828     Gram Stain Result No organisms seen.      Significant Indicator NEG      Source TISS      Site Saline from L3 L4 Disk      Tissue Culture No growth at 72 hours.     FUNGAL CULTURE [[id number]] Collected:  07/10/17 1445    Order Status:  Completed Specimen Information:  Tissue Updated:  07/13/17 0828     Significant Indicator NEG      Source TISS      Site Saline from L3 L4 Disk      Fungal Culture Culture in progress.     ANAEROBIC CULTURE [[id number]] Collected:  07/10/17 1445    Order Status:  Completed Specimen Information:  Tissue Updated:  07/13/17 0828     Significant Indicator NEG      Source TISS      Site Saline from L3 L4 Disk      Anaerobic Culture, Culture Res Culture in progress.     BLOOD CULTURE [[id number]] Collected:  07/06/17 1022    Order Status:  Completed Specimen Information:  Blood from Peripheral Updated:  07/11/17 1100     Significant Indicator NEG      Source BLD      Site PERIPHERAL      Blood Culture No growth after 5 days of incubation.     Narrative:      Per Hospital Policy: Only change Specimen Src: to \"Line\" if  specified by physician order.    GRAM STAIN [[id number]] Collected: " " 07/10/17 1445    Order Status:  Completed Specimen Information:  Tissue Updated:  07/11/17 0938     Significant Indicator .      Source TISS      Site Saline from L3 L4 Disk      Gram Stain Result No organisms seen.     BLOOD CULTURE [[id number]]  (Abnormal) Collected:  07/06/17 1013    Order Status:  Completed Specimen Information:  Blood from Peripheral Updated:  07/10/17 1135     Significant Indicator POS (POS)      Source BLD      Site PERIPHERAL      Blood Culture Growth detected by Bactec instrument. (A)      Blood Culture -- (A)      Result:        Viridans streptococcus - possible contaminant  Isolated from one bottle only, possible skin contaminant  please correlate with clinical condition.      Narrative:      CALL  [facility]  183 tel. [phone],  CALLED  183 tel. [phone] 07/09/2017, 11:22, RB PERF. RESULTS CALLED TO:  [id number] RN  Per Hospital Policy: Only change Specimen Src: to \"Line\" if  specified by physician order.    BLOOD CULTURE [[id number]] Collected:  07/09/17 1646    Order Status:  Completed Specimen Information:  Blood from Peripheral Updated:  07/10/17 0849     Significant Indicator NEG      Source BLD      Site PERIPHERAL      Blood Culture --      Result:        No Growth    Note: Blood cultures are incubated for 5 days and  are monitored continuously.Positive blood cultures  are called to the RN and reported as soon as  they are identified.      Narrative:      Per Hospital Policy: Only change Specimen Src: to \"Line\" if  specified by physician order.    BLOOD CULTURE [[id number]] Collected:  07/09/17 1646    Order Status:  Completed Specimen Information:  Blood from Peripheral Updated:  07/10/17 0849     Significant Indicator NEG      Source BLD      Site PERIPHERAL      Blood Culture --      Result:        No Growth    Note: Blood cultures are incubated for 5 days and  are monitored continuously.Positive blood cultures  are called to the RN and reported as soon as  they are identified.      " "Narrative:      Per Hospital Policy: Only change Specimen Src: to \"Line\" if  specified by physician order.    URINE CULTURE(NEW) [[id number]] Collected:  07/06/17 0930    Order Status:  Completed Specimen Information:  Urine Updated:  07/08/17 1010     Significant Indicator NEG      Source UR      Site --      Urine Culture Mixed skin braxton <10,000 cfu/mL             Assessment:  Active Hospital Problems    Diagnosis   • *Low back pain [M54.5]   • Hypotension [I95.9]   • Morbid obesity (CMS-HCC) [E66.01]   • Sepsis (CMS-HCC) [A41.9]   • UTI (urinary tract infection) [N39.0]   • Atrial flutter (CMS-HCC) [I48.92]       Plan:  L3-4 discitis  Afebrile  Persistent leukocytosis, decreased  S/p IR biopsy 7/10 - cultures NGTD  Abx as below  Anticipate 6 week course of IV abx    Bacteremia, strep   Blood cultures (1/2) from 7/6/17 - viridans strep   Continue vancomycin and Rocephin. Strep speciation not done  Monitor renal function while on vancomycin   TTE - negative  Bcx 7/9 - NGTD    Cellulitis, improved  At prior IV site RUE    Should be covered by current abx  Local care and monitor for worsening    Diarrhea  DC'd bowel regimen  If persists, check C diff    Diabetes mellitus  Keep the blood sugars less than 150    Possible seizure/AMS  EEG done  Appears close to baseline  Plan to transfer back to telemtry    Discussed with internal Medicine  "

## 2017-07-15 PROBLEM — R57.9 SHOCK (HCC): Status: ACTIVE | Noted: 2017-07-15

## 2017-07-15 LAB
BACTERIA SPEC ANAEROBE CULT: NORMAL
DIGOXIN SERPL-MCNC: 0.5 NG/ML (ref 0.8–2)
EKG IMPRESSION: NORMAL
GLUCOSE BLD-MCNC: 134 MG/DL (ref 65–99)
SIGNIFICANT IND 70042: NORMAL
SITE SITE: NORMAL
SOURCE SOURCE: NORMAL

## 2017-07-15 PROCEDURE — 93010 ELECTROCARDIOGRAM REPORT: CPT | Performed by: INTERNAL MEDICINE

## 2017-07-15 PROCEDURE — 80162 ASSAY OF DIGOXIN TOTAL: CPT

## 2017-07-15 PROCEDURE — 700105 HCHG RX REV CODE 258: Performed by: HOSPITALIST

## 2017-07-15 PROCEDURE — A9270 NON-COVERED ITEM OR SERVICE: HCPCS | Performed by: PSYCHIATRY & NEUROLOGY

## 2017-07-15 PROCEDURE — 700105 HCHG RX REV CODE 258: Performed by: INTERNAL MEDICINE

## 2017-07-15 PROCEDURE — 770022 HCHG ROOM/CARE - ICU (200)

## 2017-07-15 PROCEDURE — 700102 HCHG RX REV CODE 250 W/ 637 OVERRIDE(OP): Performed by: HOSPITALIST

## 2017-07-15 PROCEDURE — 700101 HCHG RX REV CODE 250: Performed by: HOSPITALIST

## 2017-07-15 PROCEDURE — 93005 ELECTROCARDIOGRAM TRACING: CPT | Performed by: HOSPITALIST

## 2017-07-15 PROCEDURE — 700111 HCHG RX REV CODE 636 W/ 250 OVERRIDE (IP): Performed by: INTERNAL MEDICINE

## 2017-07-15 PROCEDURE — 700111 HCHG RX REV CODE 636 W/ 250 OVERRIDE (IP): Performed by: HOSPITALIST

## 2017-07-15 PROCEDURE — 3E033XZ INTRODUCTION OF VASOPRESSOR INTO PERIPHERAL VEIN, PERCUTANEOUS APPROACH: ICD-10-PCS | Performed by: HOSPITALIST

## 2017-07-15 PROCEDURE — 700102 HCHG RX REV CODE 250 W/ 637 OVERRIDE(OP): Performed by: INTERNAL MEDICINE

## 2017-07-15 PROCEDURE — 700105 HCHG RX REV CODE 258: Performed by: PHARMACIST

## 2017-07-15 PROCEDURE — A9270 NON-COVERED ITEM OR SERVICE: HCPCS | Performed by: HOSPITALIST

## 2017-07-15 PROCEDURE — 700102 HCHG RX REV CODE 250 W/ 637 OVERRIDE(OP): Performed by: PSYCHIATRY & NEUROLOGY

## 2017-07-15 PROCEDURE — A9270 NON-COVERED ITEM OR SERVICE: HCPCS | Performed by: INTERNAL MEDICINE

## 2017-07-15 PROCEDURE — 81001 URINALYSIS AUTO W/SCOPE: CPT

## 2017-07-15 PROCEDURE — 700111 HCHG RX REV CODE 636 W/ 250 OVERRIDE (IP): Performed by: PHARMACIST

## 2017-07-15 PROCEDURE — 99291 CRITICAL CARE FIRST HOUR: CPT | Performed by: HOSPITALIST

## 2017-07-15 PROCEDURE — 700101 HCHG RX REV CODE 250

## 2017-07-15 PROCEDURE — 82962 GLUCOSE BLOOD TEST: CPT

## 2017-07-15 RX ORDER — DIGOXIN 0.25 MG/ML
250 INJECTION INTRAMUSCULAR; INTRAVENOUS ONCE
Status: DISCONTINUED | OUTPATIENT
Start: 2017-07-15 | End: 2017-07-16

## 2017-07-15 RX ORDER — METOPROLOL TARTRATE 50 MG/1
50 TABLET, FILM COATED ORAL TWICE DAILY
Status: DISCONTINUED | OUTPATIENT
Start: 2017-07-15 | End: 2017-07-25 | Stop reason: HOSPADM

## 2017-07-15 RX ORDER — NOREPINEPHRINE BITARTRATE 1 MG/ML
INJECTION, SOLUTION INTRAVENOUS
Status: COMPLETED
Start: 2017-07-15 | End: 2017-07-15

## 2017-07-15 RX ORDER — SODIUM CHLORIDE 9 MG/ML
INJECTION, SOLUTION INTRAVENOUS CONTINUOUS
Status: ACTIVE | OUTPATIENT
Start: 2017-07-15 | End: 2017-07-16

## 2017-07-15 RX ADMIN — NOREPINEPHRINE BITARTRATE 30 MCG/MIN: 1 INJECTION INTRAVENOUS at 09:10

## 2017-07-15 RX ADMIN — HEPARIN SODIUM 5000 UNITS: 5000 INJECTION, SOLUTION INTRAVENOUS; SUBCUTANEOUS at 15:21

## 2017-07-15 RX ADMIN — GABAPENTIN 200 MG: 100 CAPSULE ORAL at 10:41

## 2017-07-15 RX ADMIN — LEVETIRACETAM 500 MG: 500 TABLET, FILM COATED ORAL at 21:01

## 2017-07-15 RX ADMIN — ROSUVASTATIN CALCIUM 125 MCG: 10 TABLET, FILM COATED ORAL at 18:22

## 2017-07-15 RX ADMIN — VANCOMYCIN HYDROCHLORIDE 2000 MG: 100 INJECTION, POWDER, LYOPHILIZED, FOR SOLUTION INTRAVENOUS at 15:21

## 2017-07-15 RX ADMIN — OMEPRAZOLE 20 MG: 20 CAPSULE, DELAYED RELEASE ORAL at 10:41

## 2017-07-15 RX ADMIN — LEVETIRACETAM 500 MG: 500 TABLET, FILM COATED ORAL at 10:41

## 2017-07-15 RX ADMIN — GABAPENTIN 200 MG: 100 CAPSULE ORAL at 15:21

## 2017-07-15 RX ADMIN — SODIUM CHLORIDE: 9 INJECTION, SOLUTION INTRAVENOUS at 10:52

## 2017-07-15 RX ADMIN — ONDANSETRON 4 MG: 2 INJECTION INTRAMUSCULAR; INTRAVENOUS at 08:24

## 2017-07-15 RX ADMIN — CEFTRIAXONE SODIUM 2 G: 2 INJECTION, POWDER, FOR SOLUTION INTRAMUSCULAR; INTRAVENOUS at 10:42

## 2017-07-15 RX ADMIN — OMEPRAZOLE 20 MG: 20 CAPSULE, DELAYED RELEASE ORAL at 21:01

## 2017-07-15 RX ADMIN — NYSTATIN 1500000 UNITS: 100000 POWDER TOPICAL at 21:01

## 2017-07-15 RX ADMIN — NOREPINEPHRINE BITARTRATE 8 MG: 1 INJECTION INTRAVENOUS at 10:54

## 2017-07-15 RX ADMIN — ATORVASTATIN CALCIUM 10 MG: 10 TABLET, FILM COATED ORAL at 21:30

## 2017-07-15 RX ADMIN — METOPROLOL TARTRATE 5 MG: 5 INJECTION INTRAVENOUS at 08:39

## 2017-07-15 RX ADMIN — HEPARIN SODIUM 5000 UNITS: 5000 INJECTION, SOLUTION INTRAVENOUS; SUBCUTANEOUS at 06:52

## 2017-07-15 RX ADMIN — GABAPENTIN 200 MG: 100 CAPSULE ORAL at 21:01

## 2017-07-15 RX ADMIN — CYANOCOBALAMIN TAB 500 MCG 1000 MCG: 500 TAB at 10:41

## 2017-07-15 RX ADMIN — HEPARIN SODIUM 5000 UNITS: 5000 INJECTION, SOLUTION INTRAVENOUS; SUBCUTANEOUS at 21:01

## 2017-07-15 RX ADMIN — NYSTATIN 1500000 UNITS: 100000 POWDER TOPICAL at 10:42

## 2017-07-15 ASSESSMENT — ENCOUNTER SYMPTOMS
SPUTUM PRODUCTION: 0
DIARRHEA: 0
CHILLS: 0
HALLUCINATIONS: 0
NECK PAIN: 0
FALLS: 0
SEIZURES: 0
LOSS OF CONSCIOUSNESS: 0
BLOOD IN STOOL: 0
MYALGIAS: 1
ABDOMINAL PAIN: 0
VOMITING: 0
COUGH: 0
FEVER: 0
SHORTNESS OF BREATH: 0
STRIDOR: 0
DIZZINESS: 1
SPEECH CHANGE: 0
DIARRHEA: 1
HEMOPTYSIS: 0
BACK PAIN: 0
NERVOUS/ANXIOUS: 0
MEMORY LOSS: 0
EYES NEGATIVE: 1
BACK PAIN: 1
FLANK PAIN: 0
FOCAL WEAKNESS: 1
PALPITATIONS: 0
NAUSEA: 0

## 2017-07-15 ASSESSMENT — PAIN SCALES - GENERAL
PAINLEVEL_OUTOF10: 2
PAINLEVEL_OUTOF10: 2

## 2017-07-15 ASSESSMENT — LIFESTYLE VARIABLES: SUBSTANCE_ABUSE: 0

## 2017-07-15 NOTE — CARE PLAN
Problem: Pain Management  Goal: Pain level will decrease to patient’s comfort goal  Outcome: PROGRESSING AS EXPECTED  Assessed pt for pain using appropriate pain scale. Administered pain medications as needed per MAR. Educated pt on nonpharmacologic measures for pain managment and assisted pt in these measures.         Problem: Respiratory:  Goal: Respiratory status will improve  Outcome: PROGRESSING AS EXPECTED  Assessed pts pulmonary status. Collaborated with respiratory therapist and administered and titrated oxygen therapy as needed. Educated and encouraged pt to deep breath and cough. Educated and encouraged pt on incentive spirometer usage.

## 2017-07-15 NOTE — PROGRESS NOTES
Infectious Disease Progress Note    Author: Ny Chilel M.D. Date of service & Time created: 7/15/2017  8:28 AM    Chief Complaint:  Chief Complaint   Patient presents with   • Back Pain    follow-up for lumbar discitis and bacteremia    Interval History:  86-year-old white male admitted for back pain. Found to have discitis  17- no fevers. WBC 10.3. Ongoing back pain. Sedimentation rate is 46. Now the blood cultures are positive for strep species. Also has new nausea and vomiting today.   7/10 AF, WBC 16.5, having nausea and vomiting, ongoing back pain, plan for bone biopsy today, tolerating abx without issues   AF, WBC 13.3, more pain after biopsy, feels flushed, having runny stools on bowel regimen   AF WBC 15.3 transferred to ICU due to concern for seizure-obtunded.   AF alert and eating lunch Has some pain-controlled. Denies SE abx   AF WBC 14.7 no new complaints  7/15 AF, no CBC, has not gotten OOB, denies any back pain   Labs Reviewed, Medications Reviewed and Radiology Reviewed.    Review of Systems:  Review of Systems   Constitutional: Negative for fever and chills.   Cardiovascular: Negative for chest pain.   Gastrointestinal: Negative for nausea, vomiting, abdominal pain and diarrhea.   Musculoskeletal: Positive for myalgias and joint pain. Negative for back pain.       Hemodynamics:  Temp (24hrs), Av.3 °C (99.1 °F), Min:36.5 °C (97.7 °F), Max:37.7 °C (99.8 °F)  Temperature: 37 °C (98.6 °F)  Pulse  Av.8  Min: 34  Max: 154Heart Rate (Monitored): 75  Blood Pressure : 122/62 mmHg, NIBP: (!) 162/91 mmHg       Physical Exam:  Physical Exam   Constitutional: He appears well-developed and well-nourished. No distress.   HENT:   Head: Normocephalic and atraumatic.   Eyes: EOM are normal. Pupils are equal, round, and reactive to light. No scleral icterus.   Neck: Neck supple.   Cardiovascular: Normal rate.    Irregularly irregular     Pulmonary/Chest: Effort normal and breath  sounds normal. No respiratory distress. He has no wheezes.   Abdominal: Soft. He exhibits no distension. There is no tenderness.   Musculoskeletal: He exhibits no edema.   LUE PICC nontender   Neurological: He is alert.   Skin: No rash noted. There is erythema.   RUE forearm-decreased erythema   Nursing note and vitals reviewed.      Meds:    Current facility-administered medications:   •  nystatin (MYCOSTATIN) powder, , Topical, BID, Kirby Mayo M.D., 1,500,000 Units at 07/14/17 2114  •  oxycodone immediate-release (ROXICODONE) tablet 5 mg, 5 mg, Oral, Q4HRS PRN **OR** oxycodone immediate release (ROXICODONE) tablet 10 mg, 10 mg, Oral, Q4HRS PRN, Kirby Mayo M.D., 10 mg at 07/14/17 1614  •  levetiracetam (KEPPRA) tablet 500 mg, 500 mg, Oral, BID, Allan Win M.D., 500 mg at 07/14/17 2114  •  senna-docusate (PERICOLACE or SENOKOT S) 8.6-50 MG per tablet 2 Tab, 2 Tab, Oral, BID, Stopped at 07/14/17 2100 **AND** polyethylene glycol/lytes (MIRALAX) PACKET 1 Packet, 1 Packet, Oral, QDAY PRN, 1 Packet at 07/13/17 1417 **AND** magnesium hydroxide (MILK OF MAGNESIA) suspension 30 mL, 30 mL, Oral, QDAY PRN, 30 mL at 07/13/17 1751 **AND** bisacodyl (DULCOLAX) suppository 10 mg, 10 mg, Rectal, QDAY PRN, Kirby Mayo M.D.  •  metoprolol (LOPRESSOR) tablet 25 mg, 25 mg, Oral, TWICE DAILY, Kirby Mayo M.D., 25 mg at 07/14/17 2113  •  lisinopril (PRINIVIL) tablet 20 mg, 20 mg, Oral, Q DAY, Kirby Mayo M.D., 20 mg at 07/14/17 0857  •  metoprolol (LOPRESSOR) injection 5 mg, 5 mg, Intravenous, Q5 MIN PRN, Collin White M.D., 5 mg at 07/12/17 9787  •  heparin injection 5,000 Units, 5,000 Units, Subcutaneous, Q8HRS, Collin White M.D., 5,000 Units at 07/15/17 0652  •  PICC Line Insertion has been implemented, , , Once **AND** May use Lidocaine 1% not to exceed 3 mls for local at insertion site, , , CONTINUOUS **AND** NOTIFY MD, , , Once **AND** Tip to dwell in the superior vena cava, , ,  CONTINUOUS **AND** Do not use PICC Line until placement verified by Chest X Ray, , , CONTINUOUS **AND** DX-CHEST-FOR PICC LINE Perform procedure in:: Patient's Room, , , Once **AND** If radiologist reading of chest X-ray states any of the following the PICC should be used, , , CONTINUOUS **AND** Further evaluation of the PICC placement can be retrieved from X-Ray and Imaging, , , CONTINUOUS **AND** Blood draws through PICC line; draws by RN only, , , CONTINUOUS **AND** FLUSHING GUIDELINES WHEN IN USE, , , CONTINUOUS **AND** normal saline PF 10-20 mL, 10-20 mL, Intravenous, PRN **AND** FLUSHING GUIDELINES WHEN NOT IN USE, , , CONTINUOUS **AND** DRESSING MAINTENANCE, , , Once **AND** Change needleless pressure ports and IV tubing every 72 hours per hospital policy, , , CONTINUOUS **AND** TUBING, , , CONTINUOUS **AND** If there is an MD order to remove the PICC line, any RN may remove the PICC line, , , CONTINUOUS **AND** [] PATIENT EDUCATION MATERIALS, , , Prior to discharge **AND** NURSING COMMUNICATION, , , CONTINUOUS, Ny Chilel M.D.  •  gabapentin (NEURONTIN) capsule 200 mg, 200 mg, Oral, TID, Georgi Grey M.D., 200 mg at 17 2114  •  Notify provider if pain remains uncontrolled, , , CONTINUOUS **AND** Use the numeric rating scale (NRS-11) on regular floors and Critical-Care Pain Observation Tool (CPOT) on ICUs/Trauma to assess pain, , , CONTINUOUS **AND** Pulse Ox (Oximetry), , , CONTINUOUS **AND** [DISCONTINUED] Pharmacy Consult Request ...Pain Management Review, , Other, PRN **AND** If patient difficult to arouse and/or has respiratory depression, stop any opiates that are currently infusing and call a Rapid Response., , , CONTINUOUS **AND** [DISCONTINUED] oxycodone immediate-release (ROXICODONE) tablet 2.5 mg, 2.5 mg, Oral, Q3HRS PRN, 2.5 mg at 17 1802 **AND** [DISCONTINUED] oxycodone immediate-release (ROXICODONE) tablet 5 mg, 5 mg, Oral, Q3HRS PRN, 5 mg at 17 2311 **AND**  HYDROmorphone (DILAUDID) injection 0.5 mg, 0.5 mg, Intravenous, Q3HRS PRN, Georgi Grey M.D., 0.5 mg at 07/10/17 0905  •  digoxin (LANOXIN) tablet 125 mcg, 125 mcg, Oral, DAILY AT 1800, Georgi Grey M.D., 125 mcg at 07/14/17 1824  •  ondansetron (ZOFRAN) syringe/vial injection 4 mg, 4 mg, Intravenous, Q6HRS PRN, Georgi Grey M.D., 4 mg at 07/15/17 0824  •  omeprazole (PRILOSEC) capsule 20 mg, 20 mg, Oral, BID, Georgi Grey M.D., 20 mg at 07/14/17 2113  •  MD ALERT... vancomycin per pharmacy protocol, , Other, pharmacy to dose, Oliva Bolanos M.D.  •  cefTRIAXone (ROCEPHIN) 2 g in  mL IVPB, 2 g, Intravenous, Q24HRS, Oliva Bolanos M.D., Stopped at 07/14/17 0927  •  vancomycin 2,000 mg in  mL IVPB, 20 mg/kg, Intravenous, Q24HR, Robert Mcnulty, PHARMD, Stopped at 07/14/17 1851  •  hydrALAZINE (APRESOLINE) tablet 10 mg, 10 mg, Oral, Q4HRS PRN, Jamaal Molina M.D., 10 mg at 07/09/17 2038  •  clonidine (CATAPRES) tablet 0.1 mg, 0.1 mg, Oral, 4X/DAY PRN, Jamaal Molina M.D.  •  prochlorperazine (COMPAZINE) injection 10 mg, 10 mg, Intravenous, Q6HRS PRN, Darion Benton M.D., 10 mg at 07/10/17 0905  •  pneumococcal 13-Jocelyn Conj Vacc (PREVNAR 13) syringe 0.5 mL, 0.5 mL, Intramuscular, Once PRN, Jamaal Molina M.D.  •  atorvastatin (LIPITOR) tablet 10 mg, 10 mg, Oral, Nightly, Jamaal Molina M.D., 10 mg at 07/14/17 2207  •  vitamin D (cholecalciferol) tablet 5,000 Units, 5,000 Units, Oral, Q48HRS, Jamaal Molina M.D., 5,000 Units at 07/14/17 0856  •  cyanocobalamin (VITAMIN B-12) tablet 1,000 mcg, 1,000 mcg, Oral, DAILY, Jamaal Molina M.D., 1,000 mcg at 07/14/17 0857  •  acetaminophen (TYLENOL) tablet 650 mg, 650 mg, Oral, Q6HRS PRN, Jamaal Molina M.D., 650 mg at 07/14/17 1359    Labs:  Recent Labs      07/13/17   0510  07/14/17   0402   WBC  13.2*  14.7*   RBC  4.31*  4.32*   HEMOGLOBIN  12.6*  12.9*   HEMATOCRIT  38.8*  38.6*   MCV  90.0  89.4   MCH  29.2  29.9   RDW  42.1  42.0    PLATELETCT  215  189   MPV  10.0  9.5   NEUTSPOLYS  72.20*  78.10*   LYMPHOCYTES  14.50*  10.40*   MONOCYTES  9.40  8.40   EOSINOPHILS  2.00  1.80   BASOPHILS  0.50  0.30     Recent Labs      07/13/17   0510  07/14/17   0402   SODIUM  135  136   POTASSIUM  3.7  4.0   CHLORIDE  99  99   CO2  32  33   GLUCOSE  104*  103*   BUN  13  11     Recent Labs      07/13/17   0510  07/14/17   0402   CREATININE  0.47*  0.58       Imaging:  Ct-abdomen-pelvis W/o  7/6/2017  No hydronephrosis or urolithiasis. Diverticula colon. No evidence diverticulitis. No free fluid. Pancreatic calcifications/consistent with sequela pancreatitis. No peripancreatic fluid collections. Coronary artery calcifications.    Ct-lspine W/o Plus Recons  7/6/2017  1.  No evidence of acute fracture of the lumbar spine. 2.  Surgical change extending from L3 through L5. 3.  Multilevel degenerative disc disease and facet degeneration. 4.  Old fractures of the right L1 and L2 transverse processes. 5.  Multilevel degenerative subluxation.    Mr-lumbar Spine-with & W/o  7/8/2017  1.  Possible discitis at the L3-4 level. 2.  Mild to moderate retrolisthesis at the L3-4 level. 3.  Previous extensive laminectomy from the L2 level to the sacrum with bilateral pedicle screw and zev fixation at the L4-L5 levels. 4.   Moderate lumbar spondylotic changes at the L3-4 levels with associated moderate central canal stenosis at this level secondary to facet arthropathy. Additionally there are severe bilateral neural foraminal stenosis. 5.  Minimal lumbar spondylotic changes at the L2-3 level with mild bilateral neural foraminal narrowing and mild disc bulging into the inferior aspects of the neural foramina.    Dx-hip-bilateral-with Pelvis-3/4 Views  7/6/2017  No evidence of fracture or arthropathy of the hips. Degenerative and surgical changes of the lumbar spine.      Micro:  BLOOD CULTURE   Date Value Ref Range Status   07/09/2017 No growth after 5 days of incubation.   "Final   07/09/2017 No growth after 5 days of incubation.  Final      Results     Procedure Component Value Units Date/Time    BLOOD CULTURE [[id number]] Collected:  07/09/17 1646    Order Status:  Completed Specimen Information:  Blood from Peripheral Updated:  07/14/17 2100     Significant Indicator NEG      Source BLD      Site PERIPHERAL      Blood Culture No growth after 5 days of incubation.     Narrative:      Per Hospital Policy: Only change Specimen Src: to \"Line\" if  specified by physician order.    BLOOD CULTURE [[id number]] Collected:  07/09/17 1646    Order Status:  Completed Specimen Information:  Blood from Peripheral Updated:  07/14/17 2100     Significant Indicator NEG      Source BLD      Site PERIPHERAL      Blood Culture No growth after 5 days of incubation.     Narrative:      Per Hospital Policy: Only change Specimen Src: to \"Line\" if  specified by physician order.    CULTURE TISSUE W/ GRM STAIN [[id number]] Collected:  07/10/17 1445    Order Status:  Completed Specimen Information:  Tissue Updated:  07/13/17 0828     Gram Stain Result No organisms seen.      Significant Indicator NEG      Source TISS      Site Saline from L3 L4 Disk      Tissue Culture No growth at 72 hours.     FUNGAL CULTURE [[id number]] Collected:  07/10/17 1445    Order Status:  Completed Specimen Information:  Tissue Updated:  07/13/17 0828     Significant Indicator NEG      Source TISS      Site Saline from L3 L4 Disk      Fungal Culture Culture in progress.     ANAEROBIC CULTURE [[id number]] Collected:  07/10/17 1445    Order Status:  Completed Specimen Information:  Tissue Updated:  07/13/17 0828     Significant Indicator NEG      Source TISS      Site Saline from L3 L4 Disk      Anaerobic Culture, Culture Res Culture in progress.     BLOOD CULTURE [[id number]] Collected:  07/06/17 1022    Order Status:  Completed Specimen Information:  Blood from Peripheral Updated:  07/11/17 1100     Significant Indicator NEG      Source " "BLD      Site PERIPHERAL      Blood Culture No growth after 5 days of incubation.     Narrative:      Per Hospital Policy: Only change Specimen Src: to \"Line\" if  specified by physician order.    GRAM STAIN [[id number]] Collected:  07/10/17 1445    Order Status:  Completed Specimen Information:  Tissue Updated:  07/11/17 0938     Significant Indicator .      Source TISS      Site Saline from L3 L4 Disk      Gram Stain Result No organisms seen.     BLOOD CULTURE [[id number]]  (Abnormal) Collected:  07/06/17 1013    Order Status:  Completed Specimen Information:  Blood from Peripheral Updated:  07/10/17 1135     Significant Indicator POS (POS)      Source BLD      Site PERIPHERAL      Blood Culture Growth detected by Bactec instrument. (A)      Blood Culture -- (A)      Result:        Viridans streptococcus - possible contaminant  Isolated from one bottle only, possible skin contaminant  please correlate with clinical condition.      Narrative:      CALL  [facility]  183 tel. [phone],  CALLED  183 tel. [phone] 07/09/2017, 11:22, RB PERF. RESULTS CALLED TO:  [id number] RN  Per Hospital Policy: Only change Specimen Src: to \"Line\" if  specified by physician order.    URINE CULTURE(NEW) [[id number]] Collected:  07/06/17 0930    Order Status:  Completed Specimen Information:  Urine Updated:  07/08/17 1010     Significant Indicator NEG      Source UR      Site --      Urine Culture Mixed skin braxton <10,000 cfu/mL             Assessment:  Active Hospital Problems    Diagnosis   • *Low back pain [M54.5]   • Hypotension [I95.9]   • Morbid obesity (CMS-HCC) [E66.01]   • Sepsis (CMS-HCC) [A41.9]   • UTI (urinary tract infection) [N39.0]   • Atrial flutter (CMS-HCC) [I48.92]       Plan:  L3-4 discitis  Afebrile  Persistent leukocytosis, decreased  S/p IR biopsy 7/10 - cultures NGTD  Abx as below  Anticipate 6 week course of IV abx. Stop date 08/20/17    Bacteremia, strep   Blood cultures (1/2) from 7/6/17 - viridans strep   Continue " vancomycin and Rocephin. Strep speciation not done  Monitor renal function while on vancomycin   TTE - negative  Bcx 7/9 - NGTD    Cellulitis, improved  At prior IV site RUE    Should be covered by current abx  Local care and monitor for worsening    Diarrhea, improving  DC'd bowel regimen  If persists, check C diff    Diabetes mellitus  Keep the blood sugars less than 150    Possible seizure/AMS  EEG done  Appears close to baseline  Plan to transfer back to telemtry    Dispo: Recommend SNF    Discussed with internal Medicine

## 2017-07-15 NOTE — PROGRESS NOTES
[facility] Hospitalist Progress Note    Date of Service: 7/15/2017    Chief Complaint  86 y.o. male admitted 2017 with lower back pain and noted to have discitis    Interval Problem Update  Episode of a flutter with RVR at this morning with significant hypotension requiring starting pressor    Consultants/Specialty  Neurology neurosurgery signed off   infectious disease    Disposition  To be determined        Review of Systems   Constitutional: Negative for fever and chills.   HENT: Negative for congestion and nosebleeds.    Eyes: Negative.    Respiratory: Negative for cough, hemoptysis, sputum production, shortness of breath and stridor.    Cardiovascular: Negative for chest pain, palpitations and leg swelling.   Gastrointestinal: Positive for diarrhea (improved). Negative for nausea, vomiting, abdominal pain, blood in stool and melena.   Genitourinary: Negative for hematuria and flank pain.   Musculoskeletal: Positive for back pain. Negative for falls and neck pain.   Skin: Negative.    Neurological: Positive for dizziness and focal weakness (lower extremity). Negative for speech change, seizures and loss of consciousness.   Psychiatric/Behavioral: Negative for hallucinations, memory loss and substance abuse. The patient is not nervous/anxious.    All other systems reviewed and are negative.     Physical Exam  Laboratory/Imaging   Hemodynamics  Temp (24hrs), Av.4 °C (99.3 °F), Min:37 °C (98.6 °F), Max:37.7 °C (99.8 °F)   Temperature: 37 °C (98.6 °F)  Pulse  Av  Min: 34  Max: 154 Heart Rate (Monitored): 99  Blood Pressure : 158/85 mmHg, NIBP: 116/66 mmHg      Respiratory      Respiration: (!) 32, Pulse Oximetry: 100 %     Work Of Breathing / Effort: Shallow  RUL Breath Sounds: Diminished, RML Breath Sounds: Diminished, RLL Breath Sounds: Diminished, MIKE Breath Sounds: Diminished, LLL Breath Sounds: Diminished    Fluids    Intake/Output Summary (Last 24 hours) at 07/15/17 0264  Last data filed at 07/15/17  1400   Gross per 24 hour   Intake   1610 ml   Output    550 ml   Net   1060 ml       Nutrition  Orders Placed This Encounter   Procedures   • DIET ORDER     Standing Status: Standing      Number of Occurrences: 1      Standing Expiration Date:      Order Specific Question:  Diet:     Answer:  Cardiac [6]     Physical Exam   Constitutional: He is oriented to person, place, and time. He appears well-developed.   HENT:   Head: Normocephalic and atraumatic.   Eyes: Right eye exhibits no discharge. Left eye exhibits no discharge.   Neck: Neck supple. No JVD present. No tracheal deviation present.   Cardiovascular: Exam reveals no gallop and no friction rub.    Murmur heard.  Irregular  Tachycardia   Pulmonary/Chest: No stridor. No respiratory distress. He has rhonchi. He has no rales. He exhibits no tenderness.   Abdominal: Soft. Bowel sounds are normal. He exhibits no distension. There is no tenderness. There is no rebound.   Musculoskeletal: He exhibits tenderness. He exhibits no edema.   Neurological: He is alert and oriented to person, place, and time. No cranial nerve deficit.   RLE and LLE extremity weakness 4 over 5 limited by pain   Skin: Skin is warm and dry. He is not diaphoretic.   Psychiatric: He has a normal mood and affect. His behavior is normal.   Nursing note and vitals reviewed.      Recent Labs      07/13/17   0510  07/14/17   0402   WBC  13.2*  14.7*   RBC  4.31*  4.32*   HEMOGLOBIN  12.6*  12.9*   HEMATOCRIT  38.8*  38.6*   MCV  90.0  89.4   MCH  29.2  29.9   MCHC  32.5*  33.4*   RDW  42.1  42.0   PLATELETCT  215  189   MPV  10.0  9.5     Recent Labs      07/13/17   0510  07/14/17   0402   SODIUM  135  136   POTASSIUM  3.7  4.0   CHLORIDE  99  99   CO2  32  33   GLUCOSE  104*  103*   BUN  13  11   CREATININE  0.47*  0.58   CALCIUM  9.2  9.0                      Assessment/Plan     * Low back pain (present on admission)  Assessment & Plan  Pain management continue oxycodone,  PT/OT      Bacteremia  Assessment & Plan  Strep viridans  ID following  On IV vanco and ceftriaxone will need 6 weeks course    Discitis of lumbar region  Assessment & Plan  At L3-L4 level  S/p Bc Cx remains neg  abx per ID to continue through August 20    Atrial flutter (CMS-HCC) (present on admission)  Assessment & Plan  We'll give IV digoxin and consider starting amiodarone if not controlled  Patient not on chronic anticoagulation given previous history of hematoma and falls    Sepsis (CMS-HCC) (present on admission)  Assessment & Plan  Resolved          UTI (urinary tract infection) (present on admission)  Assessment & Plan  Iv rocephin  Urine culture was negative and urine mildly abnormal        Hypotension (present on admission)  Assessment & Plan            Hypomagnesemia  Assessment & Plan  Replete and monitor     Acute encephalopathy  Assessment & Plan  Resolved no acute pathology on MRI or EEG  Empirically started on Keppra per neurology, Dr Win recommend outpt  Follow up to review need for continued meds    Hypophosphatemia  Assessment & Plan  Replete and monitor     Shock (CMS-HCC)  Assessment & Plan  Likely related to his tachycardia  Received 2 L fluid boluses and started on levophed wean off as tolerated      >Patient is critically ill.   >The patient is having : The cardiac and shock  >The vital organ system that is effected is the: Cardiovascular  >If untreated there is a high chance of deterioration into: Renal failure and death  >The critical care that I am providing today is: Fluid bolus pressor management  >The critical care that has been undertaken is medically complex.   >There has been no overlap in critical care time.   Critical care time not including procedures, no overlap: 35 minutes      Labs reviewed, Medications reviewed and Radiology images reviewed  Hearn catheter: No Hearn  Central line in place: Need for access    DVT Prophylaxis: Heparin      Antibiotics: Treating active  infection/contamination beyond 24 hours perioperative coverage  Assessed for rehab: Patient was assess for and/or received rehabilitation services during this hospitalization   Critical care time spent 36 minutes without overlap, exclude procedure time.

## 2017-07-15 NOTE — PROGRESS NOTES
"Pt A-flutter at 150 bpm.  Dr. Bev Mayo with orders to give 5 mg metoprolol IV and increase PO metoprolol from 25 mg to 50 mg PO for scheduled morning meds.  As metoprolol was being pushed, pt stated \"I feel dizzy.\"  Pt systolic  before administration.  Tried to obtain BP, with no reading.  Only 2 mg metoprolol given during this point.  Last of med pulled back and wasted with blood from PICC line.  Line flushed.  Immediately obtained help from 2 other staff members to get patient back in bed.  As patient stood up, knees buckled.  Unable to get patient back into chair with help from 3 staff members.  Pt assisted to floor.  Pt supine on floor.  Vitals obtained.  BP 76/55, , RR 13, O2 97%.  Pt cool, clammy, diaphoretic.  Complaining of nausea but no chest pain.  Still in A-flutter.  Dr. Bev Mayo at bedside.  1 L NS bolus initiated and levo gtt started at 30 mcg.  About 1/2 liter into the bolus, patient broke out of A-flutter and into 70 bpm.  Pt lifted back into bed by 6 staff members.  No complaints of pain.  EKG ordered.  Levo being titrated per BP tolerating.  Will keep on unit.  No transfer to telemetry.  Will continue to monitor.  "

## 2017-07-15 NOTE — PROGRESS NOTES
"Pharmacy Kinetics 86 y.o. male on vancomycin day # 7  7/15/2017    Currently on Vancomycin 2000 mg iv q24hr (1500)    Indication for Treatment: L3-4 discitis/viridans strep bacteremia    Pertinent history per medical record: Admitted on 2017 for sepsis and back pain. Found to have diskitis at L3-4, blood culture positive for viridans strep. Patient on antibiotics planned x6 weeks per ID recommendations.    Other antibiotics: ceftriaxone 2 gm IV daily    Allergies: Review of patient's allergies indicates no known allergies.     Concerns for renal function include age, BMI ~31 & BUN/SCr ratio > 20:1 ().    Pertinent cultures to date:   7/10: L3-L4 disc biopsy - NGTD  : peripheral blood cx X2 - NGTD  : peripheral blood cx - Viridans strep in 1/2 sets  : urine cx - Negative    Imagin/7: MRI L spine - Possible discitis at the L3-4 level.    Recent Labs      17   0510  17   0402   WBC  13.2*  14.7*   NEUTSPOLYS  72.20*  78.10*     Recent Labs      17   0510  17   0402   BUN  13  11   CREATININE  0.47*  0.58     Recent Labs      17   1609   VANCOTROUGH  12.8     Intake/Output Summary (Last 24 hours) at 07/15/17 1032  Last data filed at 07/15/17 0600   Gross per 24 hour   Intake    270 ml   Output    900 ml   Net   -630 ml      Blood pressure 158/85, pulse 150, temperature 37 °C (98.6 °F), temperature source Temporal, resp. rate 23, height 1.803 m (5' 11\"), weight 99.3 kg (218 lb 14.7 oz), SpO2 100 %. Temp (24hrs), Av.4 °C (99.3 °F), Min:37 °C (98.6 °F), Max:37.7 °C (99.8 °F)      A/P   1. Vancomycin dose change: Continue current regimen  2. Next vancomycin level: ~3 days (not currently ordered)  3. Goal trough: ~ 16 mcg/mL   4. Comments: Patient stable from ID standpoint, no new labs for review today, RUE cellulitis improving per MD notes. Frederico level evaluated yesterday afternoon and no dose adjustments required. Will continue current vanco regimen and consider " repeat vanco trough next week.     Pharmacy will continue to follow.     Isabell Rasmussen, MichD

## 2017-07-16 LAB
ANION GAP SERPL CALC-SCNC: 7 MMOL/L (ref 0–11.9)
APPEARANCE UR: CLEAR
BACTERIA #/AREA URNS HPF: NEGATIVE /HPF
BASOPHILS # BLD AUTO: 0.3 % (ref 0–1.8)
BASOPHILS # BLD: 0.07 K/UL (ref 0–0.12)
BILIRUB UR QL STRIP.AUTO: NEGATIVE
BUN SERPL-MCNC: 19 MG/DL (ref 8–22)
CALCIUM SERPL-MCNC: 9.5 MG/DL (ref 8.5–10.5)
CHLORIDE SERPL-SCNC: 102 MMOL/L (ref 96–112)
CO2 SERPL-SCNC: 29 MMOL/L (ref 20–33)
COLOR UR: YELLOW
CORTIS SERPL-MCNC: 17 UG/DL (ref 0–23)
CREAT SERPL-MCNC: 0.93 MG/DL (ref 0.5–1.4)
EOSINOPHIL # BLD AUTO: 0.37 K/UL (ref 0–0.51)
EOSINOPHIL NFR BLD: 1.7 % (ref 0–6.9)
EPI CELLS #/AREA URNS HPF: NEGATIVE /HPF
ERYTHROCYTE [DISTWIDTH] IN BLOOD BY AUTOMATED COUNT: 42.5 FL (ref 35.9–50)
GFR SERPL CREATININE-BSD FRML MDRD: >60 ML/MIN/1.73 M 2
GLUCOSE SERPL-MCNC: 112 MG/DL (ref 65–99)
GLUCOSE UR STRIP.AUTO-MCNC: NEGATIVE MG/DL
HCT VFR BLD AUTO: 38.3 % (ref 42–52)
HGB BLD-MCNC: 12.6 G/DL (ref 14–18)
HYALINE CASTS #/AREA URNS LPF: ABNORMAL /LPF
IMM GRANULOCYTES # BLD AUTO: 0.25 K/UL (ref 0–0.11)
IMM GRANULOCYTES NFR BLD AUTO: 1.1 % (ref 0–0.9)
KETONES UR STRIP.AUTO-MCNC: NEGATIVE MG/DL
LEUKOCYTE ESTERASE UR QL STRIP.AUTO: ABNORMAL
LYMPHOCYTES # BLD AUTO: 1.36 K/UL (ref 1–4.8)
LYMPHOCYTES NFR BLD: 6.2 % (ref 22–41)
MAGNESIUM SERPL-MCNC: 1.9 MG/DL (ref 1.5–2.5)
MCH RBC QN AUTO: 29.2 PG (ref 27–33)
MCHC RBC AUTO-ENTMCNC: 32.9 G/DL (ref 33.7–35.3)
MCV RBC AUTO: 88.9 FL (ref 81.4–97.8)
MICRO URNS: ABNORMAL
MONOCYTES # BLD AUTO: 1.8 K/UL (ref 0–0.85)
MONOCYTES NFR BLD AUTO: 8.2 % (ref 0–13.4)
NEUTROPHILS # BLD AUTO: 18.02 K/UL (ref 1.82–7.42)
NEUTROPHILS NFR BLD: 82.5 % (ref 44–72)
NITRITE UR QL STRIP.AUTO: NEGATIVE
NRBC # BLD AUTO: 0 K/UL
NRBC BLD AUTO-RTO: 0 /100 WBC
PH UR STRIP.AUTO: 6.5 [PH]
PLATELET # BLD AUTO: 233 K/UL (ref 164–446)
PMV BLD AUTO: 10 FL (ref 9–12.9)
POTASSIUM SERPL-SCNC: 4 MMOL/L (ref 3.6–5.5)
PROT UR QL STRIP: NEGATIVE MG/DL
RBC # BLD AUTO: 4.31 M/UL (ref 4.7–6.1)
RBC # URNS HPF: ABNORMAL /HPF
RBC UR QL AUTO: NEGATIVE
SODIUM SERPL-SCNC: 138 MMOL/L (ref 135–145)
SP GR UR STRIP.AUTO: 1.01
UROBILINOGEN UR STRIP.AUTO-MCNC: 0.2 MG/DL
WBC # BLD AUTO: 21.9 K/UL (ref 4.8–10.8)
WBC #/AREA URNS HPF: ABNORMAL /HPF

## 2017-07-16 PROCEDURE — 700101 HCHG RX REV CODE 250: Performed by: INTERNAL MEDICINE

## 2017-07-16 PROCEDURE — A9270 NON-COVERED ITEM OR SERVICE: HCPCS | Performed by: INTERNAL MEDICINE

## 2017-07-16 PROCEDURE — 770020 HCHG ROOM/CARE - TELE (206)

## 2017-07-16 PROCEDURE — 700102 HCHG RX REV CODE 250 W/ 637 OVERRIDE(OP): Performed by: HOSPITALIST

## 2017-07-16 PROCEDURE — 700105 HCHG RX REV CODE 258: Performed by: INTERNAL MEDICINE

## 2017-07-16 PROCEDURE — 700111 HCHG RX REV CODE 636 W/ 250 OVERRIDE (IP): Performed by: PHARMACIST

## 2017-07-16 PROCEDURE — A9270 NON-COVERED ITEM OR SERVICE: HCPCS | Performed by: PSYCHIATRY & NEUROLOGY

## 2017-07-16 PROCEDURE — 99232 SBSQ HOSP IP/OBS MODERATE 35: CPT | Performed by: HOSPITALIST

## 2017-07-16 PROCEDURE — 700111 HCHG RX REV CODE 636 W/ 250 OVERRIDE (IP): Performed by: INTERNAL MEDICINE

## 2017-07-16 PROCEDURE — A9270 NON-COVERED ITEM OR SERVICE: HCPCS | Performed by: HOSPITALIST

## 2017-07-16 PROCEDURE — 82533 TOTAL CORTISOL: CPT

## 2017-07-16 PROCEDURE — 80048 BASIC METABOLIC PNL TOTAL CA: CPT

## 2017-07-16 PROCEDURE — 83735 ASSAY OF MAGNESIUM: CPT

## 2017-07-16 PROCEDURE — 700102 HCHG RX REV CODE 250 W/ 637 OVERRIDE(OP): Performed by: INTERNAL MEDICINE

## 2017-07-16 PROCEDURE — 700105 HCHG RX REV CODE 258: Performed by: PHARMACIST

## 2017-07-16 PROCEDURE — 700102 HCHG RX REV CODE 250 W/ 637 OVERRIDE(OP): Performed by: PSYCHIATRY & NEUROLOGY

## 2017-07-16 PROCEDURE — 85025 COMPLETE CBC W/AUTO DIFF WBC: CPT

## 2017-07-16 RX ORDER — OXYCODONE HCL 10 MG/1
10 TABLET, FILM COATED, EXTENDED RELEASE ORAL EVERY 12 HOURS
Status: DISCONTINUED | OUTPATIENT
Start: 2017-07-16 | End: 2017-07-25 | Stop reason: HOSPADM

## 2017-07-16 RX ADMIN — ROSUVASTATIN CALCIUM 125 MCG: 10 TABLET, FILM COATED ORAL at 18:38

## 2017-07-16 RX ADMIN — METOPROLOL TARTRATE 50 MG: 50 TABLET, FILM COATED ORAL at 20:18

## 2017-07-16 RX ADMIN — OMEPRAZOLE 20 MG: 20 CAPSULE, DELAYED RELEASE ORAL at 20:18

## 2017-07-16 RX ADMIN — HEPARIN SODIUM 5000 UNITS: 5000 INJECTION, SOLUTION INTRAVENOUS; SUBCUTANEOUS at 14:54

## 2017-07-16 RX ADMIN — GABAPENTIN 200 MG: 100 CAPSULE ORAL at 08:05

## 2017-07-16 RX ADMIN — VANCOMYCIN HYDROCHLORIDE 2000 MG: 100 INJECTION, POWDER, LYOPHILIZED, FOR SOLUTION INTRAVENOUS at 14:57

## 2017-07-16 RX ADMIN — OXYCODONE HYDROCHLORIDE 10 MG: 10 TABLET, FILM COATED, EXTENDED RELEASE ORAL at 20:16

## 2017-07-16 RX ADMIN — CYANOCOBALAMIN TAB 500 MCG 1000 MCG: 500 TAB at 08:05

## 2017-07-16 RX ADMIN — NYSTATIN 1500000 UNITS: 100000 POWDER TOPICAL at 08:06

## 2017-07-16 RX ADMIN — LISINOPRIL 20 MG: 20 TABLET ORAL at 08:05

## 2017-07-16 RX ADMIN — OXYCODONE HYDROCHLORIDE 10 MG: 10 TABLET ORAL at 07:31

## 2017-07-16 RX ADMIN — ATORVASTATIN CALCIUM 10 MG: 10 TABLET, FILM COATED ORAL at 20:15

## 2017-07-16 RX ADMIN — GABAPENTIN 200 MG: 100 CAPSULE ORAL at 14:54

## 2017-07-16 RX ADMIN — CEFTRIAXONE SODIUM 2 G: 2 INJECTION, POWDER, FOR SOLUTION INTRAMUSCULAR; INTRAVENOUS at 08:04

## 2017-07-16 RX ADMIN — NYSTATIN 1500000 UNITS: 100000 POWDER TOPICAL at 20:19

## 2017-07-16 RX ADMIN — LEVETIRACETAM 500 MG: 500 TABLET, FILM COATED ORAL at 08:05

## 2017-07-16 RX ADMIN — HEPARIN SODIUM 5000 UNITS: 5000 INJECTION, SOLUTION INTRAVENOUS; SUBCUTANEOUS at 05:44

## 2017-07-16 RX ADMIN — LEVETIRACETAM 500 MG: 500 TABLET, FILM COATED ORAL at 20:15

## 2017-07-16 RX ADMIN — GABAPENTIN 200 MG: 100 CAPSULE ORAL at 20:15

## 2017-07-16 RX ADMIN — HEPARIN SODIUM 5000 UNITS: 5000 INJECTION, SOLUTION INTRAVENOUS; SUBCUTANEOUS at 23:12

## 2017-07-16 RX ADMIN — OMEPRAZOLE 20 MG: 20 CAPSULE, DELAYED RELEASE ORAL at 08:05

## 2017-07-16 RX ADMIN — VITAMIN D, TAB 1000IU (100/BT) 5000 UNITS: 25 TAB at 08:05

## 2017-07-16 RX ADMIN — METOPROLOL TARTRATE 5 MG: 5 INJECTION INTRAVENOUS at 07:31

## 2017-07-16 RX ADMIN — METOPROLOL TARTRATE 50 MG: 50 TABLET, FILM COATED ORAL at 08:05

## 2017-07-16 RX ADMIN — ACETAMINOPHEN 650 MG: 325 TABLET, FILM COATED ORAL at 23:45

## 2017-07-16 ASSESSMENT — ENCOUNTER SYMPTOMS
FLANK PAIN: 0
EYES NEGATIVE: 1
SHORTNESS OF BREATH: 0
MYALGIAS: 1
NECK PAIN: 0
DIZZINESS: 0
HALLUCINATIONS: 0
BACK PAIN: 1
BACK PAIN: 0
HEMOPTYSIS: 0
MEMORY LOSS: 0
DIARRHEA: 0
ABDOMINAL PAIN: 0
NERVOUS/ANXIOUS: 0
ORTHOPNEA: 0
FOCAL WEAKNESS: 1
MYALGIAS: 0
VOMITING: 0
CHILLS: 0
PALPITATIONS: 0
DIARRHEA: 1
SPUTUM PRODUCTION: 0
LOSS OF CONSCIOUSNESS: 0
SPEECH CHANGE: 0
STRIDOR: 0
BLOOD IN STOOL: 0
SEIZURES: 0
FEVER: 0
NAUSEA: 0
COUGH: 0

## 2017-07-16 ASSESSMENT — PAIN SCALES - GENERAL
PAINLEVEL_OUTOF10: 0
PAINLEVEL_OUTOF10: 5
PAINLEVEL_OUTOF10: 3
PAINLEVEL_OUTOF10: 0

## 2017-07-16 ASSESSMENT — LIFESTYLE VARIABLES: SUBSTANCE_ABUSE: 0

## 2017-07-16 NOTE — PROGRESS NOTES
"Pharmacy Kinetics 86 y.o. male on vancomycin day # 8   2017    Currently on Vancomycin 2000 mg IV q24h (1500)    Indication for Treatment: L3-4 discitis/viridans strep bacteremia    Pertinent history per medical record: Admitted on 2017 for sepsis and back pain. Found to have diskitis at L3-4, blood culture positive for viridans strep. Patient on antibiotics planned x6 weeks per ID recommendations.    Other antibiotics: ceftriaxone 2 g IV daily    Allergies: Review of patient's allergies indicates no known allergies.     Concerns for renal function include: age, BMI ~31    Pertinent cultures to date:   7/10/17: L3-L4 disc biopsy = NGTD  17: peripheral blood cx X2 = NGTD  17: peripheral blood cx = Viridans strep in 1/2 sets  17: urine cx = Negative    Imagin/7: MRI L spine - Possible discitis at the L3-4 level    Recent Labs      17   0402  17   0330   WBC  14.7*  21.9*   NEUTSPOLYS  78.10*  82.50*     Recent Labs      17   0402  17   0330   BUN  11  19   CREATININE  0.58  0.93     Recent Labs      17   1609   [facility]  [facility]     Intake/Output Summary (Last 24 hours) at 17 1340  Last data filed at 17 1200   Gross per 24 hour   Intake 2228.42 ml   Output   1600 ml   Net 628.42 ml      Blood pressure 147/82, pulse 75, temperature 36.2 °C (97.1 °F), temperature source Temporal, resp. rate 16, height 1.803 m (5' 11\"), weight 97.5 kg (214 lb 15.2 oz), SpO2 100 %. Temp (24hrs), Av.7 °C (98.1 °F), Min:36.2 °C (97.1 °F), Max:36.9 °C (98.5 °F)    A/P   1. Vancomycin dose change: Continue current regimen  2. Next vancomycin level: 2 days (not ordered)  3. Goal trough: ~ 16 mcg/mL   4. Comments: Renal stable. Cx remain negative. Plan for 6 weeks abx per ID. Continue same. Trough in a couple of days.     Kyle Ryan, PharmD, BCPS          "

## 2017-07-16 NOTE — CARE PLAN
Problem: Safety  Goal: Will remain free from falls  Intervention: Assess risk factors for falls  Patient at high fall risk due to history of falls and labile blood pressure while mobilizing. Encouraged proper call light usage for assistance when needed, lower bed rails up and bed alarm in use      Problem: Respiratory:  Goal: Respiratory status will improve  Intervention: Educate and encourage incentive spirometry usage  Patient educated on benefits of IS usage. Encouraged IS and cough and deep breath exercises as tolerated to prevent respiratory complications while in the ICU

## 2017-07-16 NOTE — PROGRESS NOTES
Infectious Disease Progress Note    Author: Ny Chilel M.D. Date of service & Time created: 2017  8:35 AM    Chief Complaint:  Chief Complaint   Patient presents with   • Back Pain    follow-up for lumbar discitis and bacteremia    Interval History:  86-year-old white male admitted for back pain. Found to have discitis  17- no fevers. WBC 10.3. Ongoing back pain. Sedimentation rate is 46. Now the blood cultures are positive for strep species. Also has new nausea and vomiting today.   7/10 AF, WBC 16.5, having nausea and vomiting, ongoing back pain, plan for bone biopsy today, tolerating abx without issues   AF, WBC 13.3, more pain after biopsy, feels flushed, having runny stools on bowel regimen   AF WBC 15.3 transferred to ICU due to concern for seizure-obtunded.   AF alert and eating lunch Has some pain-controlled. Denies SE abx   AF WBC 14.7 no new complaints  7/15 AF, no CBC, has not gotten OOB, denies any back pain    AF, WBC 21.9, having urinary incontinence  Labs Reviewed, Medications Reviewed and Radiology Reviewed.    Review of Systems:  Review of Systems   Constitutional: Negative for fever and chills.   Cardiovascular: Negative for chest pain.   Gastrointestinal: Negative for nausea, vomiting, abdominal pain and diarrhea.   Genitourinary:        Urinary incontinence   Musculoskeletal: Positive for myalgias and joint pain. Negative for back pain.       Hemodynamics:  Temp (24hrs), Av.8 °C (98.3 °F), Min:36.7 °C (98.1 °F), Max:36.9 °C (98.5 °F)  Temperature: 36.8 °C (98.2 °F)  Pulse  Av.3  Min: 34  Max: 154Heart Rate (Monitored): 87  Blood Pressure : 147/82 mmHg, NIBP: 147/82 mmHg       Physical Exam:  Physical Exam   Constitutional: He appears well-developed and well-nourished. No distress.   HENT:   Head: Normocephalic and atraumatic.   Eyes: EOM are normal. Pupils are equal, round, and reactive to light. No scleral icterus.   Neck: Neck supple.    Cardiovascular:   Irregularly irregular    tachy     Pulmonary/Chest: Effort normal and breath sounds normal. No respiratory distress. He has no wheezes.   Abdominal: Soft. He exhibits no distension. There is no tenderness.   Musculoskeletal: He exhibits no edema.   LUE PICC nontender   Neurological: He is alert.   Skin: No rash noted. There is erythema.   RUE forearm-decreased erythema   Nursing note and vitals reviewed.      Meds:    Current facility-administered medications:   •  metoprolol (LOPRESSOR) tablet 50 mg, 50 mg, Oral, TWICE DAILY, Kirby Mayo M.D., 50 mg at 07/16/17 0805  •  nystatin (MYCOSTATIN) powder, , Topical, BID, Kirby Mayo M.D., 1,500,000 Units at 07/16/17 0806  •  oxycodone immediate-release (ROXICODONE) tablet 5 mg, 5 mg, Oral, Q4HRS PRN **OR** oxycodone immediate release (ROXICODONE) tablet 10 mg, 10 mg, Oral, Q4HRS PRN, Kirby Mayo M.D., 10 mg at 07/16/17 0731  •  levetiracetam (KEPPRA) tablet 500 mg, 500 mg, Oral, BID, Allan Win M.D., 500 mg at 07/16/17 0805  •  [DISCONTINUED] senna-docusate (PERICOLACE or SENOKOT S) 8.6-50 MG per tablet 2 Tab, 2 Tab, Oral, BID, Stopped at 07/14/17 2100 **AND** polyethylene glycol/lytes (MIRALAX) PACKET 1 Packet, 1 Packet, Oral, QDAY PRN, 1 Packet at 07/13/17 1417 **AND** magnesium hydroxide (MILK OF MAGNESIA) suspension 30 mL, 30 mL, Oral, QDAY PRN, 30 mL at 07/13/17 1751 **AND** bisacodyl (DULCOLAX) suppository 10 mg, 10 mg, Rectal, QDAY PRN, Kirby Mayo M.D.  •  lisinopril (PRINIVIL) tablet 20 mg, 20 mg, Oral, Q DAY, Kirby Mayo M.D., 20 mg at 07/16/17 0805  •  metoprolol (LOPRESSOR) injection 5 mg, 5 mg, Intravenous, Q5 MIN PRN, Collin White M.D., 5 mg at 07/16/17 0731  •  heparin injection 5,000 Units, 5,000 Units, Subcutaneous, Q8HRS, Collin White M.D., 5,000 Units at 07/16/17 0544  •  PICC Line Insertion has been implemented, , , Once **AND** May use Lidocaine 1% not to exceed 3 mls for local at  insertion site, , , CONTINUOUS **AND** NOTIFY MD, , , Once **AND** Tip to dwell in the superior vena cava, , , CONTINUOUS **AND** Do not use PICC Line until placement verified by Chest X Ray, , , CONTINUOUS **AND** DX-CHEST-FOR PICC LINE Perform procedure in:: Patient's Room, , , Once **AND** If radiologist reading of chest X-ray states any of the following the PICC should be used, , , CONTINUOUS **AND** Further evaluation of the PICC placement can be retrieved from X-Ray and Imaging, , , CONTINUOUS **AND** Blood draws through PICC line; draws by RN only, , , CONTINUOUS **AND** FLUSHING GUIDELINES WHEN IN USE, , , CONTINUOUS **AND** normal saline PF 10-20 mL, 10-20 mL, Intravenous, PRN **AND** FLUSHING GUIDELINES WHEN NOT IN USE, , , CONTINUOUS **AND** DRESSING MAINTENANCE, , , Once **AND** Change needleless pressure ports and IV tubing every 72 hours per hospital policy, , , CONTINUOUS **AND** TUBING, , , CONTINUOUS **AND** If there is an MD order to remove the PICC line, any RN may remove the PICC line, , , CONTINUOUS **AND** [] PATIENT EDUCATION MATERIALS, , , Prior to discharge **AND** NURSING COMMUNICATION, , , CONTINUOUS, Ny Chilel M.D.  •  gabapentin (NEURONTIN) capsule 200 mg, 200 mg, Oral, TID, Georgi Grey M.D., 200 mg at 17 0805  •  Notify provider if pain remains uncontrolled, , , CONTINUOUS **AND** Use the numeric rating scale (NRS-11) on regular floors and Critical-Care Pain Observation Tool (CPOT) on ICUs/Trauma to assess pain, , , CONTINUOUS **AND** Pulse Ox (Oximetry), , , CONTINUOUS **AND** [DISCONTINUED] Pharmacy Consult Request ...Pain Management Review, , Other, PRN **AND** If patient difficult to arouse and/or has respiratory depression, stop any opiates that are currently infusing and call a Rapid Response., , , CONTINUOUS **AND** [DISCONTINUED] oxycodone immediate-release (ROXICODONE) tablet 2.5 mg, 2.5 mg, Oral, Q3HRS PRN, 2.5 mg at 17 1802 **AND**  [DISCONTINUED] oxycodone immediate-release (ROXICODONE) tablet 5 mg, 5 mg, Oral, Q3HRS PRN, 5 mg at 07/13/17 2311 **AND** HYDROmorphone (DILAUDID) injection 0.5 mg, 0.5 mg, Intravenous, Q3HRS PRN, Georgi Grey M.D., 0.5 mg at 07/10/17 0905  •  digoxin (LANOXIN) tablet 125 mcg, 125 mcg, Oral, DAILY AT 1800, Georgi Grey M.D., 125 mcg at 07/15/17 1822  •  ondansetron (ZOFRAN) syringe/vial injection 4 mg, 4 mg, Intravenous, Q6HRS PRN, Georgi Grey M.D., 4 mg at 07/15/17 0824  •  omeprazole (PRILOSEC) capsule 20 mg, 20 mg, Oral, BID, Georgi Grey M.D., 20 mg at 07/16/17 0805  •  MD ALERT... vancomycin per pharmacy protocol, , Other, pharmacy to dose, Oliva Bolanos M.D.  •  cefTRIAXone (ROCEPHIN) 2 g in  mL IVPB, 2 g, Intravenous, Q24HRS, Oliva Bolanos M.D., Last Rate: 200 mL/hr at 07/16/17 0804, 2 g at 07/16/17 0804  •  vancomycin 2,000 mg in  mL IVPB, 20 mg/kg, Intravenous, Q24HR, Robert Mcnulty, PHARMD, Stopped at 07/15/17 1721  •  hydrALAZINE (APRESOLINE) tablet 10 mg, 10 mg, Oral, Q4HRS PRN, Jamaal Molina M.D., 10 mg at 07/09/17 2038  •  clonidine (CATAPRES) tablet 0.1 mg, 0.1 mg, Oral, 4X/DAY PRN, Jamaal Molina M.D.  •  prochlorperazine (COMPAZINE) injection 10 mg, 10 mg, Intravenous, Q6HRS PRN, Darion Benton M.D., 10 mg at 07/10/17 0905  •  pneumococcal 13-Jocelyn Conj Vacc (PREVNAR 13) syringe 0.5 mL, 0.5 mL, Intramuscular, Once PRN, Jamaal Molina M.D.  •  atorvastatin (LIPITOR) tablet 10 mg, 10 mg, Oral, Nightly, Jamaal Molina M.D., 10 mg at 07/15/17 2130  •  vitamin D (cholecalciferol) tablet 5,000 Units, 5,000 Units, Oral, Q48HRS, Jamaal Molina M.D., 5,000 Units at 07/16/17 0805  •  cyanocobalamin (VITAMIN B-12) tablet 1,000 mcg, 1,000 mcg, Oral, DAILY, Jamaal Molina M.D., 1,000 mcg at 07/16/17 0805  •  acetaminophen (TYLENOL) tablet 650 mg, 650 mg, Oral, Q6HRS PRN, Jamaal Molina M.D., 650 mg at 07/14/17 1359    Labs:  Recent Labs      07/14/17   0402  07/16/17    0330   WBC  14.7*  21.9*   RBC  4.32*  4.31*   HEMOGLOBIN  12.9*  12.6*   HEMATOCRIT  38.6*  38.3*   MCV  89.4  88.9   MCH  29.9  29.2   RDW  42.0  42.5   PLATELETCT  189  233   MPV  9.5  10.0   NEUTSPOLYS  78.10*  82.50*   LYMPHOCYTES  10.40*  6.20*   MONOCYTES  8.40  8.20   EOSINOPHILS  1.80  1.70   BASOPHILS  0.30  0.30     Recent Labs      07/14/17   0402  07/16/17   0330   SODIUM  136  138   POTASSIUM  4.0  4.0   CHLORIDE  99  102   CO2  33  29   GLUCOSE  103*  112*   BUN  11  19     Recent Labs      07/14/17   0402  07/16/17   0330   CREATININE  0.58  0.93       Imaging:  Ct-abdomen-pelvis W/o  7/6/2017  No hydronephrosis or urolithiasis. Diverticula colon. No evidence diverticulitis. No free fluid. Pancreatic calcifications/consistent with sequela pancreatitis. No peripancreatic fluid collections. Coronary artery calcifications.    Ct-lspine W/o Plus Recons  7/6/2017  1.  No evidence of acute fracture of the lumbar spine. 2.  Surgical change extending from L3 through L5. 3.  Multilevel degenerative disc disease and facet degeneration. 4.  Old fractures of the right L1 and L2 transverse processes. 5.  Multilevel degenerative subluxation.    Mr-lumbar Spine-with & W/o  7/8/2017  1.  Possible discitis at the L3-4 level. 2.  Mild to moderate retrolisthesis at the L3-4 level. 3.  Previous extensive laminectomy from the L2 level to the sacrum with bilateral pedicle screw and zev fixation at the L4-L5 levels. 4.   Moderate lumbar spondylotic changes at the L3-4 levels with associated moderate central canal stenosis at this level secondary to facet arthropathy. Additionally there are severe bilateral neural foraminal stenosis. 5.  Minimal lumbar spondylotic changes at the L2-3 level with mild bilateral neural foraminal narrowing and mild disc bulging into the inferior aspects of the neural foramina.    Dx-hip-bilateral-with Pelvis-3/4 Views  7/6/2017  No evidence of fracture or arthropathy of the hips. Degenerative  "and surgical changes of the lumbar spine.      Micro:  BLOOD CULTURE   Date Value Ref Range Status   07/09/2017 No growth after 5 days of incubation.  Final   07/09/2017 No growth after 5 days of incubation.  Final      Results     Procedure Component Value Units Date/Time    CULTURE TISSUE W/ GRM STAIN [[id number]] Collected:  07/10/17 1445    Order Status:  Completed Specimen Information:  Tissue Updated:  07/15/17 1413     Gram Stain Result No organisms seen.      Significant Indicator NEG      Source TISS      Site Saline from L3 L4 Disk      Tissue Culture No growth at 72 hours.     FUNGAL CULTURE [[id number]] Collected:  07/10/17 1445    Order Status:  Completed Specimen Information:  Tissue Updated:  07/15/17 1413     Significant Indicator NEG      Source TISS      Site Saline from L3 L4 Disk      Fungal Culture Culture in progress.     ANAEROBIC CULTURE [[id number]] Collected:  07/10/17 1445    Order Status:  Completed Specimen Information:  Tissue Updated:  07/15/17 1413     Significant Indicator NEG      Source TISS      Site Saline from L3 L4 Disk      Anaerobic Culture, Culture Res No Anaerobes isolated.     URINALYSIS [[id number]] Collected:  07/15/17 1400    Order Status:  Sent Specimen Information:  Urine from Urine, Clean Catch     BLOOD CULTURE [[id number]] Collected:  07/09/17 1646    Order Status:  Completed Specimen Information:  Blood from Peripheral Updated:  07/14/17 2100     Significant Indicator NEG      Source BLD      Site PERIPHERAL      Blood Culture No growth after 5 days of incubation.     Narrative:      Per Hospital Policy: Only change Specimen Src: to \"Line\" if  specified by physician order.    BLOOD CULTURE [[id number]] Collected:  07/09/17 1646    Order Status:  Completed Specimen Information:  Blood from Peripheral Updated:  07/14/17 2100     Significant Indicator NEG      Source BLD      Site PERIPHERAL      Blood Culture No growth after 5 days of incubation.     Narrative:      Per " "Hospital Policy: Only change Specimen Src: to \"Line\" if  specified by physician order.    BLOOD CULTURE [[id number]] Collected:  07/06/17 1022    Order Status:  Completed Specimen Information:  Blood from Peripheral Updated:  07/11/17 1100     Significant Indicator NEG      Source BLD      Site PERIPHERAL      Blood Culture No growth after 5 days of incubation.     Narrative:      Per Hospital Policy: Only change Specimen Src: to \"Line\" if  specified by physician order.    GRAM STAIN [[id number]] Collected:  07/10/17 1445    Order Status:  Completed Specimen Information:  Tissue Updated:  07/11/17 0938     Significant Indicator .      Source TISS      Site Saline from L3 L4 Disk      Gram Stain Result No organisms seen.     BLOOD CULTURE [[id number]]  (Abnormal) Collected:  07/06/17 1013    Order Status:  Completed Specimen Information:  Blood from Peripheral Updated:  07/10/17 1135     Significant Indicator POS (POS)      Source BLD      Site PERIPHERAL      Blood Culture Growth detected by Bactec instrument. (A)      Blood Culture -- (A)      Result:        Viridans streptococcus - possible contaminant  Isolated from one bottle only, possible skin contaminant  please correlate with clinical condition.      Narrative:      CALL  [facility]  183 tel. [phone],  CALLED  183 tel. [phone] 07/09/2017, 11:22, RB PERF. RESULTS CALLED TO:  [id number] RN  Per Hospital Policy: Only change Specimen Src: to \"Line\" if  specified by physician order.            Assessment:  Active Hospital Problems    Diagnosis   • *Low back pain [M54.5]   • Hypotension [I95.9]   • Morbid obesity (CMS-HCC) [E66.01]   • Sepsis (CMS-HCC) [A41.9]   • UTI (urinary tract infection) [N39.0]   • Atrial flutter (CMS-HCC) [I48.92]       Plan:  L3-4 discitis  Afebrile  Persistent leukocytosis, worse today  S/p IR biopsy 7/10 - cultures NGTD  Abx as below  Anticipate 6 week course of IV abx. Stop date 08/20/17    Bacteremia, strep   Blood cultures (1/2) from 7/6/17 " - viridans strep   Continue vancomycin and Rocephin. Strep speciation not done  Monitor renal function while on vancomycin   TTE - negative  Bcx 7/9 - NGTD    Cellulitis, improved  At prior IV site RUE    Should be covered by current abx  Local care and monitor for worsening    Diarrhea, improving  DC'd bowel regimen  If persists, check C diff    Diabetes mellitus  Keep the blood sugars less than 150    Possible seizure/AMS  EEG done  Appears close to baseline  Plan to transfer back to telemtry    Dispo: Recommend SNF    Discussed with internal Medicine

## 2017-07-16 NOTE — PROGRESS NOTES
[facility] Hospitalist Progress Note    Date of Service: 2017    Chief Complaint  86 y.o. male admitted 2017 with lower back pain and noted to have discitis    Interval Problem Update  Weaned off pressors blood pressure on the high side  Having urinary frequency and urgency  Back pain not well controlled    Consultants/Specialty  Neurology neurosurgery signed off   infectious disease    Disposition  Likely SNF        Review of Systems   Constitutional: Negative for fever and chills.   HENT: Negative for congestion and nosebleeds.    Eyes: Negative.    Respiratory: Negative for cough, hemoptysis, sputum production, shortness of breath and stridor.    Cardiovascular: Negative for chest pain, palpitations, orthopnea and leg swelling.   Gastrointestinal: Positive for diarrhea (improved). Negative for nausea, vomiting, abdominal pain, blood in stool and melena.   Genitourinary: Negative for hematuria and flank pain.   Musculoskeletal: Positive for back pain. Negative for myalgias and neck pain.   Skin: Negative.    Neurological: Positive for focal weakness (lower extremity). Negative for dizziness, speech change, seizures and loss of consciousness.   Psychiatric/Behavioral: Negative for hallucinations, memory loss and substance abuse. The patient is not nervous/anxious.    All other systems reviewed and are negative.     Physical Exam  Laboratory/Imaging   Hemodynamics  Temp (24hrs), Av.6 °C (97.9 °F), Min:36.2 °C (97.1 °F), Max:36.9 °C (98.5 °F)   Temperature: 36.2 °C (97.1 °F)  Pulse  Av.3  Min: 34  Max: 154 Heart Rate (Monitored): 74  Blood Pressure : 147/82 mmHg, NIBP: 158/79 mmHg      Respiratory      Respiration: 15, Pulse Oximetry: 100 %     Work Of Breathing / Effort: Shallow;Mild  RUL Breath Sounds: Clear;Diminished, RML Breath Sounds: Clear, RLL Breath Sounds: Diminished, MIKE Breath Sounds: Clear, LLL Breath Sounds: Diminished    Fluids    Intake/Output Summary (Last 24 hours) at 17  1524  Last data filed at 07/16/17 1200   Gross per 24 hour   Intake 2076.72 ml   Output   1600 ml   Net 476.72 ml       Nutrition  Orders Placed This Encounter   Procedures   • DIET ORDER     Standing Status: Standing      Number of Occurrences: 1      Standing Expiration Date:      Order Specific Question:  Diet:     Answer:  Cardiac [6]     Physical Exam   Constitutional: He is oriented to person, place, and time. He appears well-developed.   HENT:   Head: Normocephalic and atraumatic.   Right Ear: External ear normal.   Left Ear: External ear normal.   Eyes: Conjunctivae are normal. Right eye exhibits no discharge. Left eye exhibits no discharge.   Neck: Neck supple. No JVD present. No tracheal deviation present.   Cardiovascular: Exam reveals no gallop and no friction rub.    Murmur heard.  Irregular  Tachycardia   Pulmonary/Chest: No stridor. No respiratory distress. He has rhonchi. He has no rales. He exhibits no tenderness.   Abdominal: Soft. Bowel sounds are normal. He exhibits no distension. There is no tenderness. There is no rebound and no guarding.   Musculoskeletal: He exhibits tenderness. He exhibits no edema.   Neurological: He is alert and oriented to person, place, and time. No cranial nerve deficit. He exhibits abnormal muscle tone.   RLE and LLE extremity weakness 4 over 5   Skin: Skin is warm and dry. He is not diaphoretic.   Psychiatric: He has a normal mood and affect. His behavior is normal. Thought content normal.   Nursing note and vitals reviewed.      Recent Labs      07/14/17   0402  07/16/17   0330   WBC  14.7*  21.9*   RBC  4.32*  4.31*   HEMOGLOBIN  12.9*  12.6*   HEMATOCRIT  38.6*  38.3*   MCV  89.4  88.9   MCH  29.9  29.2   MCHC  33.4*  32.9*   RDW  42.0  42.5   PLATELETCT  189  233   MPV  9.5  10.0     Recent Labs      07/14/17   0402  07/16/17   0330   SODIUM  136  138   POTASSIUM  4.0  4.0   CHLORIDE  99  102   CO2  33  29   GLUCOSE  103*  112*   BUN  11  19   CREATININE  0.58   0.93   CALCIUM  9.0  9.5                      Assessment/Plan     * Low back pain (present on admission)  Assessment & Plan  Pain management will add OxyContin and continue oxycodone, PT/OT      Bacteremia  Assessment & Plan  Strep viridans  ID following  On IV vanco and ceftriaxone will need 6 weeks course    Discitis of lumbar region  Assessment & Plan  At L3-L4 level  S/p Bc Cx remains neg  Continue current abx per ID to continue through August 20    Atrial flutter (CMS-[facility]) (present on admission)  Assessment & Plan  Continue digoxin and metoprolol  Will hold lisinopril to allow titration of rate controlling agents  Patient not on chronic anticoagulation given previous history of hematoma and falls will need to reevaluate after improvement of pain control and mobilization    Sepsis (CMS-[facility]) (present on admission)  Assessment & Plan  Resolved          UTI (urinary tract infection) (present on admission)  Assessment & Plan  Iv rocephin  Repeat UA given persistent symptoms  If repeat UA is negative and symptoms persist with stable blood pressure consider adding Flomax      Hypotension (present on admission)  Assessment & Plan            Hypomagnesemia  Assessment & Plan  Replete and monitor     Acute encephalopathy  Assessment & Plan  Resolved no acute pathology on MRI or EEG  Empirically started on Keppra per neurology, Dr Win recommend outpt  Follow up to review need for continued AED    Hypophosphatemia  Assessment & Plan  Replete and monitor     Shock (CMS-HCC)  Assessment & Plan  Resolved           Labs reviewed, Medications reviewed and Radiology images reviewed  Hearn catheter: No Hearn  Central line in place: Need for access    DVT Prophylaxis: Heparin      Antibiotics: Treating active infection/contamination beyond 24 hours perioperative coverage  Assessed for rehab: Patient was assess for and/or received rehabilitation services during this hospitalization   Critical care time spent 36 minutes without  overlap, exclude procedure time.

## 2017-07-17 PROBLEM — I95.9 HYPOTENSION: Status: RESOLVED | Noted: 2017-07-07 | Resolved: 2017-07-17

## 2017-07-17 PROBLEM — N40.1 BENIGN PROSTATIC HYPERPLASIA WITH LOWER URINARY TRACT SYMPTOMS: Status: ACTIVE | Noted: 2017-07-17

## 2017-07-17 PROBLEM — E83.39 HYPOPHOSPHATEMIA: Status: RESOLVED | Noted: 2017-07-14 | Resolved: 2017-07-17

## 2017-07-17 PROCEDURE — 97110 THERAPEUTIC EXERCISES: CPT

## 2017-07-17 PROCEDURE — A9270 NON-COVERED ITEM OR SERVICE: HCPCS | Performed by: INTERNAL MEDICINE

## 2017-07-17 PROCEDURE — 700101 HCHG RX REV CODE 250: Performed by: INTERNAL MEDICINE

## 2017-07-17 PROCEDURE — 700111 HCHG RX REV CODE 636 W/ 250 OVERRIDE (IP): Performed by: INTERNAL MEDICINE

## 2017-07-17 PROCEDURE — 700102 HCHG RX REV CODE 250 W/ 637 OVERRIDE(OP): Performed by: PSYCHIATRY & NEUROLOGY

## 2017-07-17 PROCEDURE — 700102 HCHG RX REV CODE 250 W/ 637 OVERRIDE(OP): Performed by: INTERNAL MEDICINE

## 2017-07-17 PROCEDURE — A9270 NON-COVERED ITEM OR SERVICE: HCPCS | Performed by: HOSPITALIST

## 2017-07-17 PROCEDURE — A9270 NON-COVERED ITEM OR SERVICE: HCPCS | Performed by: PSYCHIATRY & NEUROLOGY

## 2017-07-17 PROCEDURE — 700105 HCHG RX REV CODE 258: Performed by: PHARMACIST

## 2017-07-17 PROCEDURE — 700111 HCHG RX REV CODE 636 W/ 250 OVERRIDE (IP): Performed by: PHARMACIST

## 2017-07-17 PROCEDURE — 700105 HCHG RX REV CODE 258: Performed by: INTERNAL MEDICINE

## 2017-07-17 PROCEDURE — 700102 HCHG RX REV CODE 250 W/ 637 OVERRIDE(OP): Performed by: HOSPITALIST

## 2017-07-17 PROCEDURE — 770020 HCHG ROOM/CARE - TELE (206)

## 2017-07-17 PROCEDURE — 99232 SBSQ HOSP IP/OBS MODERATE 35: CPT | Performed by: HOSPITALIST

## 2017-07-17 RX ORDER — TAMSULOSIN HYDROCHLORIDE 0.4 MG/1
0.4 CAPSULE ORAL
Status: DISCONTINUED | OUTPATIENT
Start: 2017-07-17 | End: 2017-07-25 | Stop reason: HOSPADM

## 2017-07-17 RX ADMIN — CYANOCOBALAMIN TAB 500 MCG 1000 MCG: 500 TAB at 08:39

## 2017-07-17 RX ADMIN — HEPARIN SODIUM 5000 UNITS: 5000 INJECTION, SOLUTION INTRAVENOUS; SUBCUTANEOUS at 14:51

## 2017-07-17 RX ADMIN — NYSTATIN 1500000 UNITS: 100000 POWDER TOPICAL at 20:33

## 2017-07-17 RX ADMIN — GABAPENTIN 200 MG: 100 CAPSULE ORAL at 14:50

## 2017-07-17 RX ADMIN — HEPARIN SODIUM 5000 UNITS: 5000 INJECTION, SOLUTION INTRAVENOUS; SUBCUTANEOUS at 20:31

## 2017-07-17 RX ADMIN — GABAPENTIN 200 MG: 100 CAPSULE ORAL at 08:38

## 2017-07-17 RX ADMIN — METOPROLOL TARTRATE 50 MG: 50 TABLET, FILM COATED ORAL at 14:50

## 2017-07-17 RX ADMIN — METOPROLOL TARTRATE 50 MG: 50 TABLET, FILM COATED ORAL at 20:31

## 2017-07-17 RX ADMIN — OXYCODONE HYDROCHLORIDE 5 MG: 5 TABLET ORAL at 10:02

## 2017-07-17 RX ADMIN — LEVETIRACETAM 500 MG: 500 TABLET, FILM COATED ORAL at 20:32

## 2017-07-17 RX ADMIN — OXYCODONE HYDROCHLORIDE 5 MG: 5 TABLET ORAL at 05:43

## 2017-07-17 RX ADMIN — OMEPRAZOLE 20 MG: 20 CAPSULE, DELAYED RELEASE ORAL at 08:39

## 2017-07-17 RX ADMIN — CEFTRIAXONE SODIUM 2 G: 2 INJECTION, POWDER, FOR SOLUTION INTRAMUSCULAR; INTRAVENOUS at 08:38

## 2017-07-17 RX ADMIN — METOPROLOL TARTRATE 5 MG: 5 INJECTION INTRAVENOUS at 16:44

## 2017-07-17 RX ADMIN — OXYCODONE HYDROCHLORIDE 10 MG: 10 TABLET, FILM COATED, EXTENDED RELEASE ORAL at 20:33

## 2017-07-17 RX ADMIN — HEPARIN SODIUM 5000 UNITS: 5000 INJECTION, SOLUTION INTRAVENOUS; SUBCUTANEOUS at 05:43

## 2017-07-17 RX ADMIN — GABAPENTIN 200 MG: 100 CAPSULE ORAL at 20:32

## 2017-07-17 RX ADMIN — ATORVASTATIN CALCIUM 10 MG: 10 TABLET, FILM COATED ORAL at 20:32

## 2017-07-17 RX ADMIN — OMEPRAZOLE 20 MG: 20 CAPSULE, DELAYED RELEASE ORAL at 20:36

## 2017-07-17 RX ADMIN — NYSTATIN 1500000 UNITS: 100000 POWDER TOPICAL at 08:38

## 2017-07-17 RX ADMIN — VANCOMYCIN HYDROCHLORIDE 2000 MG: 100 INJECTION, POWDER, LYOPHILIZED, FOR SOLUTION INTRAVENOUS at 14:53

## 2017-07-17 RX ADMIN — TAMSULOSIN HYDROCHLORIDE 0.4 MG: 0.4 CAPSULE ORAL at 20:32

## 2017-07-17 RX ADMIN — LEVETIRACETAM 500 MG: 500 TABLET, FILM COATED ORAL at 08:38

## 2017-07-17 RX ADMIN — ROSUVASTATIN CALCIUM 125 MCG: 10 TABLET, FILM COATED ORAL at 17:20

## 2017-07-17 RX ADMIN — OXYCODONE HYDROCHLORIDE 10 MG: 10 TABLET, FILM COATED, EXTENDED RELEASE ORAL at 08:39

## 2017-07-17 ASSESSMENT — PAIN SCALES - GENERAL
PAINLEVEL_OUTOF10: 1
PAINLEVEL_OUTOF10: 2
PAINLEVEL_OUTOF10: 6
PAINLEVEL_OUTOF10: 1
PAINLEVEL_OUTOF10: 0
PAINLEVEL_OUTOF10: 1
PAINLEVEL_OUTOF10: 6
PAINLEVEL_OUTOF10: 5
PAINLEVEL_OUTOF10: 1
PAINLEVEL_OUTOF10: 5
PAINLEVEL_OUTOF10: 2
PAINLEVEL_OUTOF10: 3
PAINLEVEL_OUTOF10: 1

## 2017-07-17 ASSESSMENT — ENCOUNTER SYMPTOMS
CHILLS: 0
MYALGIAS: 0
DIARRHEA: 1
HEMOPTYSIS: 0
NERVOUS/ANXIOUS: 0
SEIZURES: 0
LOSS OF CONSCIOUSNESS: 0
SHORTNESS OF BREATH: 0
FLANK PAIN: 0
EYES NEGATIVE: 1
FOCAL WEAKNESS: 1
ABDOMINAL PAIN: 0
HEADACHES: 0
SPEECH CHANGE: 0
VOMITING: 0
MEMORY LOSS: 0
HALLUCINATIONS: 0
DIARRHEA: 0
DIZZINESS: 0
BACK PAIN: 1
BLOOD IN STOOL: 0
NECK PAIN: 0
COUGH: 0
SPUTUM PRODUCTION: 0
PALPITATIONS: 0
STRIDOR: 0
PND: 0
MYALGIAS: 1
NAUSEA: 0
WEAKNESS: 1
FEVER: 0
CONSTIPATION: 1

## 2017-07-17 ASSESSMENT — COGNITIVE AND FUNCTIONAL STATUS - GENERAL
MOVING FROM LYING ON BACK TO SITTING ON SIDE OF FLAT BED: UNABLE
TURNING FROM BACK TO SIDE WHILE IN FLAT BAD: A LOT
STANDING UP FROM CHAIR USING ARMS: TOTAL
MOVING TO AND FROM BED TO CHAIR: UNABLE
CLIMB 3 TO 5 STEPS WITH RAILING: TOTAL
SUGGESTED CMS G CODE MODIFIER MOBILITY: CM
MOBILITY SCORE: 7
WALKING IN HOSPITAL ROOM: TOTAL

## 2017-07-17 ASSESSMENT — GAIT ASSESSMENTS: GAIT LEVEL OF ASSIST: REFUSED

## 2017-07-17 ASSESSMENT — LIFESTYLE VARIABLES: SUBSTANCE_ABUSE: 0

## 2017-07-17 NOTE — DISCHARGE PLANNING
Went bedside to check the status of previously mentioned AD. The pt was sleeping The previous AD packet that was left for the pt last week appeared not to have been moved or filled out. The Certificate of Competency the was left last week was no longer on the pt's chart. Left new GIOVANNA on pt chart. Updated bedside RN.

## 2017-07-17 NOTE — PROGRESS NOTES
[facility] Hospitalist Progress Note    Date of Service: 2017    Chief Complaint  86 y.o. male admitted 2017 with lower back pain and noted to have discitis    Interval Problem Update  No overnight events back pain is better controlled    Consultants/Specialty  Neurology neurosurgery signed off   infectious disease    Disposition  Likely SNF        Review of Systems   Constitutional: Negative for fever and chills.   HENT: Negative for congestion and nosebleeds.    Eyes: Negative.    Respiratory: Negative for cough, hemoptysis, sputum production, shortness of breath and stridor.    Cardiovascular: Negative for chest pain, palpitations, leg swelling and PND.   Gastrointestinal: Positive for diarrhea (improved). Negative for nausea, vomiting, abdominal pain, blood in stool and melena.   Genitourinary: Positive for frequency. Negative for urgency, hematuria and flank pain.   Musculoskeletal: Positive for back pain. Negative for myalgias and neck pain.   Skin: Negative.    Neurological: Positive for focal weakness (lower extremity) and weakness. Negative for dizziness, speech change, seizures, loss of consciousness and headaches.   Psychiatric/Behavioral: Negative for hallucinations, memory loss and substance abuse. The patient is not nervous/anxious.    All other systems reviewed and are negative.     Physical Exam  Laboratory/Imaging   Hemodynamics  Temp (24hrs), Av.5 °C (97.7 °F), Min:36.1 °C (97 °F), Max:36.9 °C (98.4 °F)   Temperature: 36.8 °C (98.3 °F)  Pulse  Av.1  Min: 34  Max: 154 Heart Rate (Monitored): (!) 148  Blood Pressure : 128/72 mmHg, NIBP: 128/92 mmHg      Respiratory      Respiration: 16, Pulse Oximetry: 97 %     Work Of Breathing / Effort: Shallow;Mild  RUL Breath Sounds: Clear, RML Breath Sounds: Clear, RLL Breath Sounds: Diminished, MIKE Breath Sounds: Clear, LLL Breath Sounds: Diminished    Fluids    Intake/Output Summary (Last 24 hours) at 17 1677  Last data filed at 17  0600   Gross per 24 hour   Intake    680 ml   Output    800 ml   Net   -120 ml       Nutrition  Orders Placed This Encounter   Procedures   • DIET ORDER     Standing Status: Standing      Number of Occurrences: 1      Standing Expiration Date:      Order Specific Question:  Diet:     Answer:  Cardiac [6]     Physical Exam   Constitutional: He is oriented to person, place, and time. He appears well-developed.   HENT:   Head: Normocephalic and atraumatic.   Right Ear: External ear normal.   Left Ear: External ear normal.   Eyes: Conjunctivae are normal. Pupils are equal, round, and reactive to light. Right eye exhibits no discharge. Left eye exhibits no discharge.   Neck: Neck supple. No JVD present. No tracheal deviation present.   Cardiovascular: Exam reveals no gallop and no friction rub.    Murmur heard.  Irregular  Tachycardia   Pulmonary/Chest: No respiratory distress. He has rhonchi. He has no rales. He exhibits no tenderness.   Abdominal: Soft. Bowel sounds are normal. He exhibits no distension. There is no tenderness. There is no rebound.   Musculoskeletal: He exhibits tenderness. He exhibits no edema.   Neurological: He is alert and oriented to person, place, and time. No cranial nerve deficit. He exhibits abnormal muscle tone.   RLE and LLE extremity weakness 4 over 5   Skin: Skin is warm and dry. He is not diaphoretic.   Psychiatric: He has a normal mood and affect. His behavior is normal.   Nursing note and vitals reviewed.      Recent Labs      07/16/17   0330   WBC  21.9*   RBC  4.31*   HEMOGLOBIN  12.6*   HEMATOCRIT  38.3*   MCV  88.9   MCH  29.2   MCHC  32.9*   RDW  42.5   PLATELETCT  233   MPV  10.0     Recent Labs      07/16/17   0330   SODIUM  138   POTASSIUM  4.0   CHLORIDE  102   CO2  29   GLUCOSE  112*   BUN  19   CREATININE  0.93   CALCIUM  9.5                      Assessment/Plan     * Low back pain (present on admission)  Assessment & Plan  Improved continue Pain management with OxyContin and   oxycodone, PT/OT      Bacteremia  Assessment & Plan  Strep viridans  ID following  On IV vanco and ceftriaxone will need 6 weeks course    Discitis of lumbar region  Assessment & Plan  At L3-L4 level  S/p Bc Cx remains neg  Continue current abx per ID to continue through August 20    Atrial flutter (CMS-HCC) (present on admission)  Assessment & Plan  Improved rate control Continue digoxin and metoprolol  Patient not on chronic anticoagulation given previous history of hematoma and falls will need to reevaluate after improvement of pain control and mobilization    Sepsis (CMS-HCC) (present on admission)  Assessment & Plan  Resolved          UTI (urinary tract infection) (present on admission)  Assessment & Plan  Resolved      Hypomagnesemia  Assessment & Plan  Replete and monitor     Acute encephalopathy  Assessment & Plan  Resolved no acute pathology on MRI or EEG  Empirically started on Keppra per neurology, Dr Win recommend outpt  Follow up to review need for continued AED    Shock (CMS-HCC)  Assessment & Plan  Resolved    Benign prostatic hyperplasia with lower urinary tract symptoms  Assessment & Plan  flomax           Labs reviewed, Medications reviewed and Radiology images reviewed  Hearn catheter: No Hearn  Central line in place: Need for access    DVT Prophylaxis: Heparin      Antibiotics: Treating active infection/contamination beyond 24 hours perioperative coverage  Assessed for rehab: Patient was assess for and/or received rehabilitation services during this hospitalization   Critical care time spent 36 minutes without overlap, exclude procedure time.

## 2017-07-17 NOTE — PROGRESS NOTES
"Pharmacy Kinetics 86 y.o. male on vancomycin day # 9   2017    Currently on Vancomycin 2000 mg IV q24h (1500)    Indication for Treatment: L3-4 discitis/viridans strep bacteremia    Pertinent history per medical record: Admitted on 2017 for sepsis and back pain. Found to have diskitis at L3-4, blood culture positive for viridans strep. Patient on antibiotics planned x6 weeks per ID recommendations.    Other antibiotics: Ceftriaxone 2 g IV daily    Allergies: Review of patient's allergies indicates no known allergies.     Concerns for renal function include: age, BMI ~31    Pertinent cultures to date:   7/10/17: L3-L4 disc biopsy = NGTD  17: peripheral blood cx X2 = NGTD  17: peripheral blood cx = Viridans strep in 1/2 sets  17: urine cx = Negative    Imagin/7: MRI L spine - Possible discitis at the L3-4 level    Recent Labs      17   0330   WBC  21.9*   NEUTSPOLYS  82.50*     Recent Labs      17   0330   BUN  19   CREATININE  0.93     Recent Labs      17   1609   [facility]  12.8     Intake/Output Summary (Last 24 hours) at 17 1327  Last data filed at 17 0600   Gross per 24 hour   Intake   1210 ml   Output   1000 ml   Net    210 ml      Blood pressure 95/59, pulse 75, temperature 36.9 °C (98.4 °F), temperature source Temporal, resp. rate 16, height 1.803 m (5' 11\"), weight 97 kg (213 lb 13.5 oz), SpO2 94 %. Temp (24hrs), Av.4 °C (97.6 °F), Min:36.1 °C (97 °F), Max:36.9 °C (98.4 °F)    A/P   1. Vancomycin dose change: Continue same  2. Next vancomycin level: tomorrow at 1430  3. Goal trough: ~16 mcg/mL   4. Comments: No new labs, ordered for tomorrow with trough. Cx remain negative. Plan for 6 weeks abx per ID. Continue same.     Kyle Davis, PharmD, BCPS          "

## 2017-07-17 NOTE — DOCUMENTATION QUERY
"DOCUMENTATION QUERY    PROVIDERS: Please select “Cosign w/ note”to reply to query.    To better represent the severity of illness and risk of mortality of your patient, please review the following information and exercise your independent professional judgment in responding to this query.     Shock (type unspecified) is documented in the progress notes. Considering the clinical findings, risk factors, and treatment, can the type of shock be further specified?     • Septic shock  • Cardiogenic shock  • Hypovolemic shock  • Other type of shock  • Hypotension without shock  • Unable to determine  • Other explanation of clinical findings          The medical record reflects the following:   Clinical Findings  Noted: Shock likely related to his tachycardia; The cardiac and shock; Shock resolved; \"Episode of a flutter with RVR at this morning with significant hypotension requiring starting pressor\"; Hypotension; LOC   Treatment  Rapid response; ICU monitoring; Levophed Gtt; NS IVF Boluses; Antibiotics IV   Risk Factors  Atrial Flutter; Sepsis; Strep viridans - UTI- Cellulitis   Location within medical record  Progress notes; MAR     Thank you,   Sabrina Frias RN   Clinical   Phone - [phone]          "

## 2017-07-17 NOTE — PROGRESS NOTES
Infectious Disease Progress Note    Author: Ny Chilel M.D. Date of service & Time created: 2017  9:46 AM    Chief Complaint:  Chief Complaint   Patient presents with   • Back Pain    follow-up for lumbar discitis and bacteremia    Interval History:  86-year-old white male admitted for back pain. Found to have discitis  17- no fevers. WBC 10.3. Ongoing back pain. Sedimentation rate is 46. Now the blood cultures are positive for strep species. Also has new nausea and vomiting today.   7/10 AF, WBC 16.5, having nausea and vomiting, ongoing back pain, plan for bone biopsy today, tolerating abx without issues   AF, WBC 13.3, more pain after biopsy, feels flushed, having runny stools on bowel regimen   AF WBC 15.3 transferred to ICU due to concern for seizure-obtunded.   AF alert and eating lunch Has some pain-controlled. Denies SE abx   AF WBC 14.7 no new complaints  7/15 AF, no CBC, has not gotten OOB, denies any back pain    AF, WBC 21.9, having urinary incontinence   AF, no CBC, working on Preventiceu, having low blood pressures today, were elevated yesterday, no BM in a few days  Labs Reviewed, Medications Reviewed and Radiology Reviewed.    Review of Systems:  Review of Systems   Constitutional: Negative for fever and chills.   Cardiovascular: Negative for chest pain.   Gastrointestinal: Positive for constipation. Negative for nausea, vomiting, abdominal pain and diarrhea.   Genitourinary:        Urinary incontinence   Musculoskeletal: Positive for myalgias, back pain and joint pain.       Hemodynamics:  Temp (24hrs), Av.3 °C (97.4 °F), Min:36.1 °C (97 °F), Max:36.7 °C (98.1 °F)  Temperature: 36.6 °C (97.9 °F)  Pulse  Av.8  Min: 34  Max: 154Heart Rate (Monitored): 70  Blood Pressure : (!) 95/59 mmHg, NIBP: (!) 97/53 mmHg       Physical Exam:  Physical Exam   Constitutional: He appears well-developed and well-nourished. No distress.   HENT:   Head: Normocephalic and  atraumatic.   Eyes: EOM are normal. Pupils are equal, round, and reactive to light. No scleral icterus.   Neck: Neck supple.   Cardiovascular:   Irregularly irregular    tachy     Pulmonary/Chest: Effort normal and breath sounds normal. No respiratory distress. He has no wheezes.   Abdominal: Soft. He exhibits no distension. There is no tenderness.   Musculoskeletal: He exhibits no edema.   LUE PICC nontender   Neurological: He is alert.   Skin: No rash noted. There is erythema.   RUE forearm-decreased erythema   Nursing note and vitals reviewed.      Meds:    Current facility-administered medications:   •  oxyCODONE CR (OXYCONTIN) tablet 10 mg, 10 mg, Oral, Q12HRS, Kirby Mayo M.D., 10 mg at 07/17/17 0839  •  metoprolol (LOPRESSOR) tablet 50 mg, 50 mg, Oral, TWICE DAILY, Kirby Mayo M.D., Stopped at 07/17/17 0900  •  nystatin (MYCOSTATIN) powder, , Topical, BID, Kirby Mayo M.D., 1,500,000 Units at 07/17/17 0838  •  oxycodone immediate-release (ROXICODONE) tablet 5 mg, 5 mg, Oral, Q4HRS PRN, 5 mg at 07/17/17 0543 **OR** oxycodone immediate release (ROXICODONE) tablet 10 mg, 10 mg, Oral, Q4HRS PRN, Kirby Mayo M.D., 10 mg at 07/16/17 0731  •  levetiracetam (KEPPRA) tablet 500 mg, 500 mg, Oral, BID, Allan Win M.D., 500 mg at 07/17/17 0838  •  [DISCONTINUED] senna-docusate (PERICOLACE or SENOKOT S) 8.6-50 MG per tablet 2 Tab, 2 Tab, Oral, BID, Stopped at 07/14/17 2100 **AND** polyethylene glycol/lytes (MIRALAX) PACKET 1 Packet, 1 Packet, Oral, QDAY PRN, 1 Packet at 07/13/17 1417 **AND** magnesium hydroxide (MILK OF MAGNESIA) suspension 30 mL, 30 mL, Oral, QDAY PRN, 30 mL at 07/13/17 1751 **AND** bisacodyl (DULCOLAX) suppository 10 mg, 10 mg, Rectal, QDAY PRN, Kirby Mayo M.D.  •  metoprolol (LOPRESSOR) injection 5 mg, 5 mg, Intravenous, Q5 MIN PRN, Collin White M.D., 5 mg at 07/16/17 0731  •  heparin injection 5,000 Units, 5,000 Units, Subcutaneous, Q8HRS, Collin White,  M.D., 5,000 Units at 17 0543  •  PICC Line Insertion has been implemented, , , Once **AND** May use Lidocaine 1% not to exceed 3 mls for local at insertion site, , , CONTINUOUS **AND** NOTIFY MD, , , Once **AND** Tip to dwell in the superior vena cava, , , CONTINUOUS **AND** Do not use PICC Line until placement verified by Chest X Ray, , , CONTINUOUS **AND** DX-CHEST-FOR PICC LINE Perform procedure in:: Patient's Room, , , Once **AND** If radiologist reading of chest X-ray states any of the following the PICC should be used, , , CONTINUOUS **AND** Further evaluation of the PICC placement can be retrieved from X-Ray and Imaging, , , CONTINUOUS **AND** Blood draws through PICC line; draws by RN only, , , CONTINUOUS **AND** FLUSHING GUIDELINES WHEN IN USE, , , CONTINUOUS **AND** normal saline PF 10-20 mL, 10-20 mL, Intravenous, PRN **AND** FLUSHING GUIDELINES WHEN NOT IN USE, , , CONTINUOUS **AND** DRESSING MAINTENANCE, , , Once **AND** Change needleless pressure ports and IV tubing every 72 hours per hospital policy, , , CONTINUOUS **AND** TUBING, , , CONTINUOUS **AND** If there is an MD order to remove the PICC line, any RN may remove the PICC line, , , CONTINUOUS **AND** [] PATIENT EDUCATION MATERIALS, , , Prior to discharge **AND** NURSING COMMUNICATION, , , CONTINUOUS, Ny Chilel M.D.  •  gabapentin (NEURONTIN) capsule 200 mg, 200 mg, Oral, TID, Georgi Grey M.D., 200 mg at 17 0838  •  Notify provider if pain remains uncontrolled, , , CONTINUOUS **AND** Use the numeric rating scale (NRS-11) on regular floors and Critical-Care Pain Observation Tool (CPOT) on ICUs/Trauma to assess pain, , , CONTINUOUS **AND** Pulse Ox (Oximetry), , , CONTINUOUS **AND** [DISCONTINUED] Pharmacy Consult Request ...Pain Management Review, , Other, PRN **AND** If patient difficult to arouse and/or has respiratory depression, stop any opiates that are currently infusing and call a Rapid Response., , ,  CONTINUOUS **AND** [DISCONTINUED] oxycodone immediate-release (ROXICODONE) tablet 2.5 mg, 2.5 mg, Oral, Q3HRS PRN, 2.5 mg at 07/11/17 1802 **AND** [DISCONTINUED] oxycodone immediate-release (ROXICODONE) tablet 5 mg, 5 mg, Oral, Q3HRS PRN, 5 mg at 07/13/17 2311 **AND** HYDROmorphone (DILAUDID) injection 0.5 mg, 0.5 mg, Intravenous, Q3HRS PRN, Georgi Grey M.D., 0.5 mg at 07/10/17 0905  •  digoxin (LANOXIN) tablet 125 mcg, 125 mcg, Oral, DAILY AT 1800, Georgi Grey M.D., 125 mcg at 07/16/17 1838  •  ondansetron (ZOFRAN) syringe/vial injection 4 mg, 4 mg, Intravenous, Q6HRS PRN, Georgi Grey M.D., 4 mg at 07/15/17 0824  •  omeprazole (PRILOSEC) capsule 20 mg, 20 mg, Oral, BID, Georgi Grey M.D., 20 mg at 07/17/17 0839  •  MD ALERT... vancomycin per pharmacy protocol, , Other, pharmacy to dose, Oliva Bolanos M.D.  •  cefTRIAXone (ROCEPHIN) 2 g in  mL IVPB, 2 g, Intravenous, Q24HRS, Oliva Bolanos M.D., Last Rate: 200 mL/hr at 07/17/17 0838, 2 g at 07/17/17 0838  •  vancomycin 2,000 mg in  mL IVPB, 20 mg/kg, Intravenous, Q24HR, Robert Mcnulty, PHARMD, Stopped at 07/16/17 1657  •  hydrALAZINE (APRESOLINE) tablet 10 mg, 10 mg, Oral, Q4HRS PRN, Jmaaal Molina M.D., 10 mg at 07/09/17 2038  •  clonidine (CATAPRES) tablet 0.1 mg, 0.1 mg, Oral, 4X/DAY PRN, Jamaal Molina M.D.  •  prochlorperazine (COMPAZINE) injection 10 mg, 10 mg, Intravenous, Q6HRS PRN, Darion Benton M.D., 10 mg at 07/10/17 0905  •  pneumococcal 13-Jocelyn Conj Vacc (PREVNAR 13) syringe 0.5 mL, 0.5 mL, Intramuscular, Once PRN, Jamaal Molina M.D., Stopped at 07/16/17 2032  •  atorvastatin (LIPITOR) tablet 10 mg, 10 mg, Oral, Nightly, Jamaal Molina M.D., 10 mg at 07/16/17 2015  •  vitamin D (cholecalciferol) tablet 5,000 Units, 5,000 Units, Oral, Q48HRS, Jamaal Molina M.D., 5,000 Units at 07/16/17 0805  •  cyanocobalamin (VITAMIN B-12) tablet 1,000 mcg, 1,000 mcg, Oral, DAILY, Jamaal Molina M.D., 1,000 mcg at 07/17/17  0839  •  acetaminophen (TYLENOL) tablet 650 mg, 650 mg, Oral, Q6HRS PRN, Jamaal Molina M.D., 650 mg at 07/16/17 2345    Labs:  Recent Labs      07/16/17   0330   WBC  21.9*   RBC  4.31*   HEMOGLOBIN  12.6*   HEMATOCRIT  38.3*   MCV  88.9   MCH  29.2   RDW  42.5   PLATELETCT  233   MPV  10.0   NEUTSPOLYS  82.50*   LYMPHOCYTES  6.20*   MONOCYTES  8.20   EOSINOPHILS  1.70   BASOPHILS  0.30     Recent Labs      07/16/17   0330   SODIUM  138   POTASSIUM  4.0   CHLORIDE  102   CO2  29   GLUCOSE  112*   BUN  19     Recent Labs      07/16/17   0330   CREATININE  0.93       Imaging:  Ct-abdomen-pelvis W/o  7/6/2017  No hydronephrosis or urolithiasis. Diverticula colon. No evidence diverticulitis. No free fluid. Pancreatic calcifications/consistent with sequela pancreatitis. No peripancreatic fluid collections. Coronary artery calcifications.    Ct-lspine W/o Plus Recons  7/6/2017  1.  No evidence of acute fracture of the lumbar spine. 2.  Surgical change extending from L3 through L5. 3.  Multilevel degenerative disc disease and facet degeneration. 4.  Old fractures of the right L1 and L2 transverse processes. 5.  Multilevel degenerative subluxation.    Mr-lumbar Spine-with & W/o  7/8/2017  1.  Possible discitis at the L3-4 level. 2.  Mild to moderate retrolisthesis at the L3-4 level. 3.  Previous extensive laminectomy from the L2 level to the sacrum with bilateral pedicle screw and zev fixation at the L4-L5 levels. 4.   Moderate lumbar spondylotic changes at the L3-4 levels with associated moderate central canal stenosis at this level secondary to facet arthropathy. Additionally there are severe bilateral neural foraminal stenosis. 5.  Minimal lumbar spondylotic changes at the L2-3 level with mild bilateral neural foraminal narrowing and mild disc bulging into the inferior aspects of the neural foramina.    Dx-hip-bilateral-with Pelvis-3/4 Views  7/6/2017  No evidence of fracture or arthropathy of the hips. Degenerative  and surgical changes of the lumbar spine.      Micro:  BLOOD CULTURE   Date Value Ref Range Status   07/09/2017 No growth after 5 days of incubation.  Final   07/09/2017 No growth after 5 days of incubation.  Final      Results     Procedure Component Value Units Date/Time    URINALYSIS [[id number]]  (Abnormal) Collected:  07/15/17 1620    Order Status:  Completed Specimen Information:  Urine from Urine, Clean Catch Updated:  07/16/17 0849     Color Yellow      Character Clear      Specific Gravity 1.010      Ph 6.5      Glucose Negative mg/dL      Ketones Negative mg/dL      Protein Negative mg/dL      Bilirubin Negative      Urobilinogen, Urine 0.2      Nitrite Negative      Leukocyte Esterase Trace (A)      Occult Blood Negative      Micro Urine Req Microscopic     CULTURE TISSUE W/ GRM STAIN [[id number]] Collected:  07/10/17 1445    Order Status:  Completed Specimen Information:  Tissue Updated:  07/15/17 1413     Gram Stain Result No organisms seen.      Significant Indicator NEG      Source TISS      Site Saline from L3 L4 Disk      Tissue Culture No growth at 72 hours.     FUNGAL CULTURE [[id number]] Collected:  07/10/17 1445    Order Status:  Completed Specimen Information:  Tissue Updated:  07/15/17 1413     Significant Indicator NEG      Source TISS      Site Saline from L3 L4 Disk      Fungal Culture Culture in progress.     ANAEROBIC CULTURE [[id number]] Collected:  07/10/17 1445    Order Status:  Completed Specimen Information:  Tissue Updated:  07/15/17 1413     Significant Indicator NEG      Source TISS      Site Saline from L3 L4 Disk      Anaerobic Culture, Culture Res No Anaerobes isolated.     BLOOD CULTURE [[id number]] Collected:  07/09/17 1646    Order Status:  Completed Specimen Information:  Blood from Peripheral Updated:  07/14/17 2100     Significant Indicator NEG      Source BLD      Site PERIPHERAL      Blood Culture No growth after 5 days of incubation.     Narrative:      Per Hospital Policy:  "Only change Specimen Src: to \"Line\" if  specified by physician order.    BLOOD CULTURE [[id number]] Collected:  07/09/17 1646    Order Status:  Completed Specimen Information:  Blood from Peripheral Updated:  07/14/17 2100     Significant Indicator NEG      Source BLD      Site PERIPHERAL      Blood Culture No growth after 5 days of incubation.     Narrative:      Per Hospital Policy: Only change Specimen Src: to \"Line\" if  specified by physician order.    BLOOD CULTURE [[id number]] Collected:  07/06/17 1022    Order Status:  Completed Specimen Information:  Blood from Peripheral Updated:  07/11/17 1100     Significant Indicator NEG      Source BLD      Site PERIPHERAL      Blood Culture No growth after 5 days of incubation.     Narrative:      Per Hospital Policy: Only change Specimen Src: to \"Line\" if  specified by physician order.    GRAM STAIN [[id number]] Collected:  07/10/17 1445    Order Status:  Completed Specimen Information:  Tissue Updated:  07/11/17 0938     Significant Indicator .      Source TISS      Site Saline from L3 L4 Disk      Gram Stain Result No organisms seen.     BLOOD CULTURE [[id number]]  (Abnormal) Collected:  07/06/17 1013    Order Status:  Completed Specimen Information:  Blood from Peripheral Updated:  07/10/17 1135     Significant Indicator POS (POS)      Source BLD      Site PERIPHERAL      Blood Culture Growth detected by Bactec instrument. (A)      Blood Culture -- (A)      Result:        Viridans streptococcus - possible contaminant  Isolated from one bottle only, possible skin contaminant  please correlate with clinical condition.      Narrative:      CALL  [facility]  183 tel. [phone],  CALLED  183 tel. [phone] 07/09/2017, 11:22, RB PERF. RESULTS CALLED TO:  [id number] RN  Per Hospital Policy: Only change Specimen Src: to \"Line\" if  specified by physician order.            Assessment:  Active Hospital Problems    Diagnosis   • *Low back pain [M54.5]   • Hypotension [I95.9]   • Morbid " obesity (CMS-HCC) [E66.01]   • Sepsis (CMS-HCC) [A41.9]   • UTI (urinary tract infection) [N39.0]   • Atrial flutter (CMS-HCC) [I48.92]       Plan:  L3-4 discitis  Afebrile  Persistent leukocytosis, worse at last check  S/p IR biopsy 7/10 - cultures NGTD  Abx as below  Anticipate 6 week course of IV abx. Stop date 08/20/17    Bacteremia, strep   Blood cultures (1/2) from 7/6/17 - viridans strep   Continue vancomycin and Rocephin. Strep speciation not done  Monitor renal function while on vancomycin   TTE - negative  Bcx 7/9 - NGTD    Cellulitis, improved  At prior IV site RUE    Should be covered by current abx  Local care and monitor for worsening    Diabetes mellitus  Keep the blood sugars less than 150    Possible seizure/AMS  EEG done  Appears close to baseline  Plan to transfer back to telemtry    Dispo: Recommend SNF for IV abx    Discussed with RN

## 2017-07-17 NOTE — CARE PLAN
Problem: Nutritional:  Goal: Achieve adequate nutritional intake  Patient will consume 50% of meals   Outcome: PROGRESSING SLOWER THAN EXPECTED  Per nursing, patient ate well for breakfast, but lunch at bedside uneaten.  Patient sleeping.  Snacks being sent BID.    Nutrition Representative will continue to see him for ongoing meal and snack preferences.  RD following.

## 2017-07-17 NOTE — THERAPY
"Physical Therapy Treatment completed.   Bed Mobility:  Supine to Sit: declined   Transfers: Sit to Stand: declined   Gait: Level Of Assist: declined   Plan of Care: Will benefit from Physical Therapy 2 times per week   Discharge Recommendations: Equipment: Will Continue to Assess for Equipment Needs.   Pt adamantly declining mobility; significant fear of falling with no internalization of need for mobility or other therapeutic interventions to improve back pain, simply requesting more medications despite being premedicated. Pt reporting to this therapist that he 'just wants to die' when discussing PT role in progression. If palliative care has not been consulted may be helpful in symptom/goals management. Will attempt treatment as pt allows and remains in pt's goals of care. Anticipate need for placement at hospital discharge.   See \"Rehab Therapy-Acute\" Patient Summary Report for complete documentation.       "

## 2017-07-17 NOTE — CARE PLAN
Problem: Mobility  Goal: Risk for activity intolerance will decrease  Intervention: Encourage patient to increase activity level in collaboration with Interdisciplinary Team  Refuses to be active with PT or nursing staff for mobilization.

## 2017-07-17 NOTE — CARE PLAN
Problem: Mobility  Goal: Risk for activity intolerance will decrease  Intervention: Encourage patient to increase activity level in collaboration with Interdisciplinary Team  Patient encouraged to mobilize as tolerated with staff.       Problem: Skin Integrity  Goal: Risk for impaired skin integrity will decrease  Intervention: Implement precautions to protect skin integrity in collaboration with the interdisciplinary team  Patient skin already broken down, precautions in place to prevent further breakdown. Given moisture barrier lotion in perineal area and implemented Q2 turns to prevent any pressure wounds

## 2017-07-18 LAB
ANION GAP SERPL CALC-SCNC: 5 MMOL/L (ref 0–11.9)
BASOPHILS # BLD AUTO: 0.4 % (ref 0–1.8)
BASOPHILS # BLD: 0.08 K/UL (ref 0–0.12)
BUN SERPL-MCNC: 18 MG/DL (ref 8–22)
CALCIUM SERPL-MCNC: 9.4 MG/DL (ref 8.5–10.5)
CHLORIDE SERPL-SCNC: 100 MMOL/L (ref 96–112)
CO2 SERPL-SCNC: 28 MMOL/L (ref 20–33)
CREAT SERPL-MCNC: 1.1 MG/DL (ref 0.5–1.4)
EOSINOPHIL # BLD AUTO: 0.35 K/UL (ref 0–0.51)
EOSINOPHIL NFR BLD: 1.7 % (ref 0–6.9)
ERYTHROCYTE [DISTWIDTH] IN BLOOD BY AUTOMATED COUNT: 43.1 FL (ref 35.9–50)
GFR SERPL CREATININE-BSD FRML MDRD: >60 ML/MIN/1.73 M 2
GLUCOSE SERPL-MCNC: 92 MG/DL (ref 65–99)
HCT VFR BLD AUTO: 39.2 % (ref 42–52)
HGB BLD-MCNC: 12.6 G/DL (ref 14–18)
IMM GRANULOCYTES # BLD AUTO: 0.29 K/UL (ref 0–0.11)
IMM GRANULOCYTES NFR BLD AUTO: 1.4 % (ref 0–0.9)
LYMPHOCYTES # BLD AUTO: 1.49 K/UL (ref 1–4.8)
LYMPHOCYTES NFR BLD: 7.3 % (ref 22–41)
MAGNESIUM SERPL-MCNC: 1.6 MG/DL (ref 1.5–2.5)
MAGNESIUM SERPL-MCNC: 2.1 MG/DL (ref 1.5–2.5)
MCH RBC QN AUTO: 29 PG (ref 27–33)
MCHC RBC AUTO-ENTMCNC: 32.1 G/DL (ref 33.7–35.3)
MCV RBC AUTO: 90.3 FL (ref 81.4–97.8)
MONOCYTES # BLD AUTO: 1.87 K/UL (ref 0–0.85)
MONOCYTES NFR BLD AUTO: 9.2 % (ref 0–13.4)
NEUTROPHILS # BLD AUTO: 16.28 K/UL (ref 1.82–7.42)
NEUTROPHILS NFR BLD: 80 % (ref 44–72)
NRBC # BLD AUTO: 0 K/UL
NRBC BLD AUTO-RTO: 0 /100 WBC
PHOSPHATE SERPL-MCNC: 3 MG/DL (ref 2.5–4.5)
PLATELET # BLD AUTO: 223 K/UL (ref 164–446)
PMV BLD AUTO: 10.1 FL (ref 9–12.9)
POTASSIUM SERPL-SCNC: 4 MMOL/L (ref 3.6–5.5)
RBC # BLD AUTO: 4.34 M/UL (ref 4.7–6.1)
SODIUM SERPL-SCNC: 133 MMOL/L (ref 135–145)
VANCOMYCIN TROUGH SERPL-MCNC: 36 UG/ML (ref 10–20)
WBC # BLD AUTO: 20.4 K/UL (ref 4.8–10.8)

## 2017-07-18 PROCEDURE — 770020 HCHG ROOM/CARE - TELE (206)

## 2017-07-18 PROCEDURE — 700111 HCHG RX REV CODE 636 W/ 250 OVERRIDE (IP): Performed by: INTERNAL MEDICINE

## 2017-07-18 PROCEDURE — 97530 THERAPEUTIC ACTIVITIES: CPT

## 2017-07-18 PROCEDURE — 700105 HCHG RX REV CODE 258: Performed by: INTERNAL MEDICINE

## 2017-07-18 PROCEDURE — 700102 HCHG RX REV CODE 250 W/ 637 OVERRIDE(OP): Performed by: HOSPITALIST

## 2017-07-18 PROCEDURE — 97110 THERAPEUTIC EXERCISES: CPT

## 2017-07-18 PROCEDURE — 84100 ASSAY OF PHOSPHORUS: CPT

## 2017-07-18 PROCEDURE — 85025 COMPLETE CBC W/AUTO DIFF WBC: CPT

## 2017-07-18 PROCEDURE — A9270 NON-COVERED ITEM OR SERVICE: HCPCS | Performed by: HOSPITALIST

## 2017-07-18 PROCEDURE — 83735 ASSAY OF MAGNESIUM: CPT | Mod: 91

## 2017-07-18 PROCEDURE — A9270 NON-COVERED ITEM OR SERVICE: HCPCS | Performed by: INTERNAL MEDICINE

## 2017-07-18 PROCEDURE — 80202 ASSAY OF VANCOMYCIN: CPT

## 2017-07-18 PROCEDURE — 80048 BASIC METABOLIC PNL TOTAL CA: CPT

## 2017-07-18 PROCEDURE — 76937 US GUIDE VASCULAR ACCESS: CPT

## 2017-07-18 PROCEDURE — 99233 SBSQ HOSP IP/OBS HIGH 50: CPT | Performed by: HOSPITALIST

## 2017-07-18 PROCEDURE — 700105 HCHG RX REV CODE 258: Performed by: PHARMACIST

## 2017-07-18 PROCEDURE — 700101 HCHG RX REV CODE 250: Performed by: HOSPITALIST

## 2017-07-18 PROCEDURE — 700111 HCHG RX REV CODE 636 W/ 250 OVERRIDE (IP): Performed by: HOSPITALIST

## 2017-07-18 PROCEDURE — A9270 NON-COVERED ITEM OR SERVICE: HCPCS | Performed by: PSYCHIATRY & NEUROLOGY

## 2017-07-18 PROCEDURE — 700102 HCHG RX REV CODE 250 W/ 637 OVERRIDE(OP): Performed by: PSYCHIATRY & NEUROLOGY

## 2017-07-18 PROCEDURE — 700111 HCHG RX REV CODE 636 W/ 250 OVERRIDE (IP): Performed by: PHARMACIST

## 2017-07-18 PROCEDURE — 700102 HCHG RX REV CODE 250 W/ 637 OVERRIDE(OP): Performed by: INTERNAL MEDICINE

## 2017-07-18 RX ORDER — MAGNESIUM SULFATE HEPTAHYDRATE 40 MG/ML
2 INJECTION, SOLUTION INTRAVENOUS ONCE
Status: COMPLETED | OUTPATIENT
Start: 2017-07-18 | End: 2017-07-18

## 2017-07-18 RX ORDER — LIDOCAINE 50 MG/G
1 PATCH TOPICAL EVERY 24 HOURS
Status: DISCONTINUED | OUTPATIENT
Start: 2017-07-18 | End: 2017-07-25 | Stop reason: HOSPADM

## 2017-07-18 RX ADMIN — ROSUVASTATIN CALCIUM 125 MCG: 10 TABLET, FILM COATED ORAL at 18:13

## 2017-07-18 RX ADMIN — HEPARIN SODIUM 5000 UNITS: 5000 INJECTION, SOLUTION INTRAVENOUS; SUBCUTANEOUS at 21:28

## 2017-07-18 RX ADMIN — ALTEPLASE 2 MG: 2.2 INJECTION, POWDER, LYOPHILIZED, FOR SOLUTION INTRAVENOUS at 16:50

## 2017-07-18 RX ADMIN — OMEPRAZOLE 20 MG: 20 CAPSULE, DELAYED RELEASE ORAL at 20:44

## 2017-07-18 RX ADMIN — LIDOCAINE 1 PATCH: 50 PATCH TOPICAL at 23:30

## 2017-07-18 RX ADMIN — OXYCODONE HYDROCHLORIDE 10 MG: 10 TABLET, FILM COATED, EXTENDED RELEASE ORAL at 21:28

## 2017-07-18 RX ADMIN — TAMSULOSIN HYDROCHLORIDE 0.4 MG: 0.4 CAPSULE ORAL at 07:57

## 2017-07-18 RX ADMIN — GABAPENTIN 200 MG: 100 CAPSULE ORAL at 20:43

## 2017-07-18 RX ADMIN — NYSTATIN 1500000 UNITS: 100000 POWDER TOPICAL at 20:44

## 2017-07-18 RX ADMIN — LEVETIRACETAM 500 MG: 500 TABLET, FILM COATED ORAL at 20:44

## 2017-07-18 RX ADMIN — OXYCODONE HYDROCHLORIDE 10 MG: 10 TABLET, FILM COATED, EXTENDED RELEASE ORAL at 07:57

## 2017-07-18 RX ADMIN — OXYCODONE HYDROCHLORIDE 5 MG: 5 TABLET ORAL at 12:48

## 2017-07-18 RX ADMIN — CEFTRIAXONE SODIUM 2 G: 2 INJECTION, POWDER, FOR SOLUTION INTRAMUSCULAR; INTRAVENOUS at 07:59

## 2017-07-18 RX ADMIN — HEPARIN SODIUM 5000 UNITS: 5000 INJECTION, SOLUTION INTRAVENOUS; SUBCUTANEOUS at 05:18

## 2017-07-18 RX ADMIN — GABAPENTIN 200 MG: 100 CAPSULE ORAL at 15:31

## 2017-07-18 RX ADMIN — OMEPRAZOLE 20 MG: 20 CAPSULE, DELAYED RELEASE ORAL at 07:57

## 2017-07-18 RX ADMIN — HEPARIN SODIUM 5000 UNITS: 5000 INJECTION, SOLUTION INTRAVENOUS; SUBCUTANEOUS at 15:31

## 2017-07-18 RX ADMIN — NYSTATIN 1500000 UNITS: 100000 POWDER TOPICAL at 07:58

## 2017-07-18 RX ADMIN — LEVETIRACETAM 500 MG: 500 TABLET, FILM COATED ORAL at 07:57

## 2017-07-18 RX ADMIN — GABAPENTIN 200 MG: 100 CAPSULE ORAL at 07:54

## 2017-07-18 RX ADMIN — METOPROLOL TARTRATE 50 MG: 50 TABLET, FILM COATED ORAL at 07:56

## 2017-07-18 RX ADMIN — ATORVASTATIN CALCIUM 10 MG: 10 TABLET, FILM COATED ORAL at 20:43

## 2017-07-18 RX ADMIN — VANCOMYCIN HYDROCHLORIDE 2000 MG: 100 INJECTION, POWDER, LYOPHILIZED, FOR SOLUTION INTRAVENOUS at 15:31

## 2017-07-18 RX ADMIN — CYANOCOBALAMIN TAB 500 MCG 1000 MCG: 500 TAB at 07:56

## 2017-07-18 RX ADMIN — VITAMIN D, TAB 1000IU (100/BT) 5000 UNITS: 25 TAB at 07:55

## 2017-07-18 RX ADMIN — MAGNESIUM SULFATE IN WATER 2 G: 40 INJECTION, SOLUTION INTRAVENOUS at 09:18

## 2017-07-18 RX ADMIN — POLYETHYLENE GLYCOL 3350 1 PACKET: 17 POWDER, FOR SOLUTION ORAL at 07:53

## 2017-07-18 ASSESSMENT — LIFESTYLE VARIABLES: DO YOU DRINK ALCOHOL: NO

## 2017-07-18 ASSESSMENT — COGNITIVE AND FUNCTIONAL STATUS - GENERAL
HELP NEEDED FOR BATHING: TOTAL
EATING MEALS: A LOT
DRESSING REGULAR LOWER BODY CLOTHING: TOTAL
DAILY ACTIVITIY SCORE: 9
SUGGESTED CMS G CODE MODIFIER DAILY ACTIVITY: CL
TOILETING: TOTAL
DRESSING REGULAR UPPER BODY CLOTHING: A LOT
PERSONAL GROOMING: A LOT

## 2017-07-18 ASSESSMENT — ENCOUNTER SYMPTOMS
MYALGIAS: 1
BACK PAIN: 1
CHILLS: 0
DIARRHEA: 0
NAUSEA: 0
FEVER: 0
ABDOMINAL PAIN: 0
VOMITING: 0
CONSTIPATION: 1

## 2017-07-18 ASSESSMENT — PAIN SCALES - GENERAL
PAINLEVEL_OUTOF10: 2
PAINLEVEL_OUTOF10: 4
PAINLEVEL_OUTOF10: 5
PAINLEVEL_OUTOF10: 0
PAINLEVEL_OUTOF10: 2
PAINLEVEL_OUTOF10: 0
PAINLEVEL_OUTOF10: 5
PAINLEVEL_OUTOF10: 0
PAINLEVEL_OUTOF10: 5

## 2017-07-18 NOTE — CARE PLAN
Problem: Knowledge Deficit  Goal: Knowledge of disease process/condition, treatment plan, diagnostic tests, and medications will improve  Intervention: Explain information regarding disease process/condition, treatment plan, diagnostic tests, and medications and document in education  Education provided on deep breathing and coughing, i/s, and turn q2h for skin integrity. Pt verbalizes understanding, lack of interest due to pain

## 2017-07-18 NOTE — THERAPY
"Occupational Therapy Evaluation completed.   Functional Status:  Pt seen for OT tx today, demonstrating limited internalizing of education on importance of oob/eob mobility, limited by signficant pain which is likely being exacerbated with pt perservating on pain despite attempts to redirect. Pt initially agreeable to therapy, performed supine B shoulder flexion/extension x5 with increased time to move through full range, required mod/max to roll to L, total assist x2 for supine to sit and reverse. Once upright, pt became resistive due to pain, tolerated 5 min seated eob with max encouragement, resisitve to any attempts for postural faciliation and techniques for pain releif in sitting. Pt's frequency for therapy decreased to 1x until pt able to tolerate eob/oob skilled session. Continue working with nursing staff on eob mobilization tolerance.   Plan of Care: Will benefit from Occupational Therapy 1 times per week  Discharge Recommendations:  Equipment: Will Continue to Assess for Equipment Needs. Post-acute therapy Discharge to a transitional care facility for continued skilled therapy services.    See \"Rehab Therapy-Acute\" Patient Summary Report for complete documentation.    "

## 2017-07-18 NOTE — PROGRESS NOTES
Infectious Disease Progress Note    Author: Ny Chilel M.D. Date of service & Time created: 2017  10:21 AM    Chief Complaint:  Chief Complaint   Patient presents with   • Back Pain    follow-up for lumbar discitis and bacteremia    Interval History:  86-year-old white male admitted for back pain. Found to have discitis  17- no fevers. WBC 10.3. Ongoing back pain. Sedimentation rate is 46. Now the blood cultures are positive for strep species. Also has new nausea and vomiting today.   7/10 AF, WBC 16.5, having nausea and vomiting, ongoing back pain, plan for bone biopsy today, tolerating abx without issues   AF, WBC 13.3, more pain after biopsy, feels flushed, having runny stools on bowel regimen   AF WBC 15.3 transferred to ICU due to concern for seizure-obtunded.   AF alert and eating lunch Has some pain-controlled. Denies SE abx   AF WBC 14.7 no new complaints  7/15 AF, no CBC, has not gotten OOB, denies any back pain    AF, WBC 21.9, having urinary incontinence   AF, no CBC, working on EPSu, having low blood pressures today, were elevated yesterday, no BM in a few days   AF, WBC 20, feels better this am, had breakfast, denies any diarrhea  Labs Reviewed, Medications Reviewed and Radiology Reviewed.    Review of Systems:  Review of Systems   Constitutional: Negative for fever and chills.   Cardiovascular: Negative for chest pain.   Gastrointestinal: Positive for constipation. Negative for nausea, vomiting, abdominal pain and diarrhea.   Genitourinary:        Urinary incontinence   Musculoskeletal: Positive for myalgias, back pain and joint pain.       Hemodynamics:  Temp (24hrs), Av °C (98.6 °F), Min:36.8 °C (98.2 °F), Max:37.2 °C (99 °F)  Temperature: 36.9 °C (98.5 °F)  Pulse  Av.2  Min: 34  Max: 154Heart Rate (Monitored): 74  Blood Pressure : 157/82 mmHg, NIBP: (!) 80/43 mmHg       Physical Exam:  Physical Exam   Constitutional: He appears well-developed  and well-nourished. No distress.   HENT:   Head: Normocephalic and atraumatic.   Eyes: EOM are normal. Pupils are equal, round, and reactive to light. No scleral icterus.   Neck: Neck supple.   Cardiovascular: Normal rate.    Irregularly irregular         Pulmonary/Chest: Effort normal and breath sounds normal. No respiratory distress. He has no wheezes.   Abdominal: Soft. He exhibits no distension. There is no tenderness.   Musculoskeletal: He exhibits no edema.   LUE PICC nontender   Neurological: He is alert.   Skin: No rash noted. There is erythema.   RUE forearm-decreased erythema   Nursing note and vitals reviewed.      Meds:    Current facility-administered medications:   •  magnesium sulfate IVPB premix 2 g, 2 g, Intravenous, Once, Yaya Ragland D.O., Last Rate: 25 mL/hr at 07/18/17 0918, 2 g at 07/18/17 0918  •  tamsulosin (FLOMAX) capsule 0.4 mg, 0.4 mg, Oral, AFTER BREAKFAST, Kirby Mayo M.D., 0.4 mg at 07/18/17 0757  •  oxyCODONE CR (OXYCONTIN) tablet 10 mg, 10 mg, Oral, Q12HRS, Kirby Mayo M.D., 10 mg at 07/18/17 0757  •  metoprolol (LOPRESSOR) tablet 50 mg, 50 mg, Oral, TWICE DAILY, Kirby Mayo M.D., 50 mg at 07/18/17 0756  •  nystatin (MYCOSTATIN) powder, , Topical, BID, Kirby Mayo M.D., 1,500,000 Units at 07/18/17 0758  •  oxycodone immediate-release (ROXICODONE) tablet 5 mg, 5 mg, Oral, Q4HRS PRN, 5 mg at 07/17/17 1002 **OR** oxycodone immediate release (ROXICODONE) tablet 10 mg, 10 mg, Oral, Q4HRS PRN, Kirby Mayo M.D., 10 mg at 07/16/17 0731  •  levetiracetam (KEPPRA) tablet 500 mg, 500 mg, Oral, BID, Allan Win M.D., 500 mg at 07/18/17 2397  •  [DISCONTINUED] senna-docusate (PERICOLACE or SENOKOT S) 8.6-50 MG per tablet 2 Tab, 2 Tab, Oral, BID, Stopped at 07/14/17 2100 **AND** polyethylene glycol/lytes (MIRALAX) PACKET 1 Packet, 1 Packet, Oral, QDAY PRN, 1 Packet at 07/18/17 0753 **AND** magnesium hydroxide (MILK OF MAGNESIA) suspension 30 mL, 30  mL, Oral, QDAY PRN, 30 mL at 17 1751 **AND** bisacodyl (DULCOLAX) suppository 10 mg, 10 mg, Rectal, QDAY PRN, Kirby Mayo M.D.  •  metoprolol (LOPRESSOR) injection 5 mg, 5 mg, Intravenous, Q5 MIN PRN, Collin White M.D., Stopped at 17  •  heparin injection 5,000 Units, 5,000 Units, Subcutaneous, Q8HRS, Collin White M.D., 5,000 Units at 17 0518  •  PICC Line Insertion has been implemented, , , Once **AND** May use Lidocaine 1% not to exceed 3 mls for local at insertion site, , , CONTINUOUS **AND** NOTIFY MD, , , Once **AND** Tip to dwell in the superior vena cava, , , CONTINUOUS **AND** Do not use PICC Line until placement verified by Chest X Ray, , , CONTINUOUS **AND** DX-CHEST-FOR PICC LINE Perform procedure in:: Patient's Room, , , Once **AND** If radiologist reading of chest X-ray states any of the following the PICC should be used, , , CONTINUOUS **AND** Further evaluation of the PICC placement can be retrieved from X-Ray and Imaging, , , CONTINUOUS **AND** Blood draws through PICC line; draws by RN only, , , CONTINUOUS **AND** FLUSHING GUIDELINES WHEN IN USE, , , CONTINUOUS **AND** normal saline PF 10-20 mL, 10-20 mL, Intravenous, PRN **AND** FLUSHING GUIDELINES WHEN NOT IN USE, , , CONTINUOUS **AND** DRESSING MAINTENANCE, , , Once **AND** Change needleless pressure ports and IV tubing every 72 hours per hospital policy, , , CONTINUOUS **AND** TUBING, , , CONTINUOUS **AND** If there is an MD order to remove the PICC line, any RN may remove the PICC line, , , CONTINUOUS **AND** [] PATIENT EDUCATION MATERIALS, , , Prior to discharge **AND** NURSING COMMUNICATION, , , CONTINUOUS, Ny N Chilel, M.D.  •  gabapentin (NEURONTIN) capsule 200 mg, 200 mg, Oral, TID, Georgi Grey M.D., 200 mg at 17 0754  •  Notify provider if pain remains uncontrolled, , , CONTINUOUS **AND** Use the numeric rating scale (NRS-11) on regular floors and Critical-Care Pain Observation Tool  (CPOT) on ICUs/Trauma to assess pain, , , CONTINUOUS **AND** Pulse Ox (Oximetry), , , CONTINUOUS **AND** [DISCONTINUED] Pharmacy Consult Request ...Pain Management Review, , Other, PRN **AND** If patient difficult to arouse and/or has respiratory depression, stop any opiates that are currently infusing and call a Rapid Response., , , CONTINUOUS **AND** [DISCONTINUED] oxycodone immediate-release (ROXICODONE) tablet 2.5 mg, 2.5 mg, Oral, Q3HRS PRN, 2.5 mg at 07/11/17 1802 **AND** [DISCONTINUED] oxycodone immediate-release (ROXICODONE) tablet 5 mg, 5 mg, Oral, Q3HRS PRN, 5 mg at 07/13/17 2311 **AND** HYDROmorphone (DILAUDID) injection 0.5 mg, 0.5 mg, Intravenous, Q3HRS PRN, Georgi Grey M.D., 0.5 mg at 07/10/17 0905  •  digoxin (LANOXIN) tablet 125 mcg, 125 mcg, Oral, DAILY AT 1800, Georgi Grey M.D., 125 mcg at 07/17/17 1720  •  ondansetron (ZOFRAN) syringe/vial injection 4 mg, 4 mg, Intravenous, Q6HRS PRN, Georgi Grey M.D., 4 mg at 07/15/17 0824  •  omeprazole (PRILOSEC) capsule 20 mg, 20 mg, Oral, BID, Georgi Grey M.D., 20 mg at 07/18/17 0757  •  MD ALERT... vancomycin per pharmacy protocol, , Other, pharmacy to dose, Oliva Bolanos M.D.  •  cefTRIAXone (ROCEPHIN) 2 g in  mL IVPB, 2 g, Intravenous, Q24HRS, Oliva Bolanos M.D., Stopped at 07/18/17 0829  •  vancomycin 2,000 mg in  mL IVPB, 20 mg/kg, Intravenous, Q24HR, Robert Mcnulty, PHARMD, Stopped at 07/17/17 1653  •  hydrALAZINE (APRESOLINE) tablet 10 mg, 10 mg, Oral, Q4HRS PRN, Jamaal Molina M.D., 10 mg at 07/09/17 2038  •  clonidine (CATAPRES) tablet 0.1 mg, 0.1 mg, Oral, 4X/DAY PRN, Jamaal Molina M.D.  •  prochlorperazine (COMPAZINE) injection 10 mg, 10 mg, Intravenous, Q6HRS PRN, Darion Benton M.D., 10 mg at 07/10/17 0905  •  pneumococcal 13-Jocelyn Conj Vacc (PREVNAR 13) syringe 0.5 mL, 0.5 mL, Intramuscular, Once PRN, Jamaal Molina M.D., Stopped at 07/16/17 2032  •  atorvastatin (LIPITOR) tablet 10 mg, 10 mg, Oral,  Nightly, Jamaal Molina M.D., 10 mg at 07/17/17 2032  •  vitamin D (cholecalciferol) tablet 5,000 Units, 5,000 Units, Oral, Q48HRS, Jamaal Molina M.D., 5,000 Units at 07/18/17 0755  •  cyanocobalamin (VITAMIN B-12) tablet 1,000 mcg, 1,000 mcg, Oral, DAILY, Jamaal Molina M.D., 1,000 mcg at 07/18/17 0756  •  acetaminophen (TYLENOL) tablet 650 mg, 650 mg, Oral, Q6HRS PRN, Jamaal Molina M.D., 650 mg at 07/16/17 2345    Labs:  Recent Labs      07/16/17   0330  07/18/17   0530   WBC  21.9*  20.4*   RBC  4.31*  4.34*   HEMOGLOBIN  12.6*  12.6*   HEMATOCRIT  38.3*  39.2*   MCV  88.9  90.3   MCH  29.2  29.0   RDW  42.5  43.1   PLATELETCT  233  223   MPV  10.0  10.1   NEUTSPOLYS  82.50*  80.00*   LYMPHOCYTES  6.20*  7.30*   MONOCYTES  8.20  9.20   EOSINOPHILS  1.70  1.70   BASOPHILS  0.30  0.40     Recent Labs      07/16/17   0330  07/18/17   0430   SODIUM  138  133*   POTASSIUM  4.0  4.0   CHLORIDE  102  100   CO2  29  28   GLUCOSE  112*  92   BUN  19  18     Recent Labs      07/16/17   0330  07/18/17   0430   CREATININE  0.93  1.10       Imaging:  Ct-abdomen-pelvis W/o  7/6/2017  No hydronephrosis or urolithiasis. Diverticula colon. No evidence diverticulitis. No free fluid. Pancreatic calcifications/consistent with sequela pancreatitis. No peripancreatic fluid collections. Coronary artery calcifications.    Ct-lspine W/o Plus Recons  7/6/2017  1.  No evidence of acute fracture of the lumbar spine. 2.  Surgical change extending from L3 through L5. 3.  Multilevel degenerative disc disease and facet degeneration. 4.  Old fractures of the right L1 and L2 transverse processes. 5.  Multilevel degenerative subluxation.    Mr-lumbar Spine-with & W/o  7/8/2017  1.  Possible discitis at the L3-4 level. 2.  Mild to moderate retrolisthesis at the L3-4 level. 3.  Previous extensive laminectomy from the L2 level to the sacrum with bilateral pedicle screw and zev fixation at the L4-L5 levels. 4.   Moderate lumbar spondylotic  changes at the L3-4 levels with associated moderate central canal stenosis at this level secondary to facet arthropathy. Additionally there are severe bilateral neural foraminal stenosis. 5.  Minimal lumbar spondylotic changes at the L2-3 level with mild bilateral neural foraminal narrowing and mild disc bulging into the inferior aspects of the neural foramina.    Dx-hip-bilateral-with Pelvis-3/4 Views  7/6/2017  No evidence of fracture or arthropathy of the hips. Degenerative and surgical changes of the lumbar spine.      Micro:  BLOOD CULTURE   Date Value Ref Range Status   07/09/2017 No growth after 5 days of incubation.  Final   07/09/2017 No growth after 5 days of incubation.  Final      Results     Procedure Component Value Units Date/Time    URINALYSIS [[id number]]  (Abnormal) Collected:  07/15/17 1620    Order Status:  Completed Specimen Information:  Urine from Urine, Clean Catch Updated:  07/16/17 0849     Color Yellow      Character Clear      Specific Gravity 1.010      Ph 6.5      Glucose Negative mg/dL      Ketones Negative mg/dL      Protein Negative mg/dL      Bilirubin Negative      Urobilinogen, Urine 0.2      Nitrite Negative      Leukocyte Esterase Trace (A)      Occult Blood Negative      Micro Urine Req Microscopic     CULTURE TISSUE W/ GRM STAIN [[id number]] Collected:  07/10/17 1445    Order Status:  Completed Specimen Information:  Tissue Updated:  07/15/17 1413     Gram Stain Result No organisms seen.      Significant Indicator NEG      Source TISS      Site Saline from L3 L4 Disk      Tissue Culture No growth at 72 hours.     FUNGAL CULTURE [[id number]] Collected:  07/10/17 1445    Order Status:  Completed Specimen Information:  Tissue Updated:  07/15/17 1413     Significant Indicator NEG      Source TISS      Site Saline from L3 L4 Disk      Fungal Culture Culture in progress.     ANAEROBIC CULTURE [[id number]] Collected:  07/10/17 1445    Order Status:  Completed Specimen Information:   "Tissue Updated:  07/15/17 1413     Significant Indicator NEG      Source TISS      Site Saline from L3 L4 Disk      Anaerobic Culture, Culture Res No Anaerobes isolated.     BLOOD CULTURE [[id number]] Collected:  07/09/17 1646    Order Status:  Completed Specimen Information:  Blood from Peripheral Updated:  07/14/17 2100     Significant Indicator NEG      Source BLD      Site PERIPHERAL      Blood Culture No growth after 5 days of incubation.     Narrative:      Per Hospital Policy: Only change Specimen Src: to \"Line\" if  specified by physician order.    BLOOD CULTURE [[id number]] Collected:  07/09/17 1646    Order Status:  Completed Specimen Information:  Blood from Peripheral Updated:  07/14/17 2100     Significant Indicator NEG      Source BLD      Site PERIPHERAL      Blood Culture No growth after 5 days of incubation.     Narrative:      Per Hospital Policy: Only change Specimen Src: to \"Line\" if  specified by physician order.    BLOOD CULTURE [[id number]] Collected:  07/06/17 1022    Order Status:  Completed Specimen Information:  Blood from Peripheral Updated:  07/11/17 1100     Significant Indicator NEG      Source BLD      Site PERIPHERAL      Blood Culture No growth after 5 days of incubation.     Narrative:      Per Hospital Policy: Only change Specimen Src: to \"Line\" if  specified by physician order.            Assessment:  Active Hospital Problems    Diagnosis   • *Low back pain [M54.5]   • Hypotension [I95.9]   • Morbid obesity (CMS-[facility]) [E66.01]   • Sepsis (CMS-[facility]) [A41.9]   • UTI (urinary tract infection) [N39.0]   • Atrial flutter (CMS-[facility]) [I48.92]       Plan:  L3-4 discitis  Afebrile  Persistent leukocytosis, worse at last check  S/p IR biopsy 7/10 - cultures NGTD  Abx as below  Anticipate 6 week course of IV abx. Stop date 08/20/17    Bacteremia, strep   Blood cultures (1/2) from 7/6/17 - viridans strep   Continue vancomycin and Rocephin. Strep speciation not done  Monitor renal function while on " vancomycin Cr normal  TTE - negative  Bcx 7/9 - NGTD    RUE cellulitis, resolving  At prior IV site RUE    Abx per above  Local care and monitor for worsening    Diabetes mellitus  Keep the blood sugars less than 150    Possible seizure/AMS  EEG done  Appears close to baseline  Plan to transfer back to telemtry    Dispo: Recommend SNF for IV abx

## 2017-07-18 NOTE — CARE PLAN
Problem: Safety  Goal: Will remain free from injury  Fall prevention and safety protocols in place    Problem: Infection  Goal: Will remain free from infection  Abx per MD, infection prevention protocols in place    Problem: Pain Management  Goal: Pain level will decrease to patient’s comfort goal  Assessing pain, utilizing Rx and nonRx PRN and per schedule    Problem: Respiratory:  Goal: Respiratory status will improve  Pulm toilet

## 2017-07-18 NOTE — PROGRESS NOTES
"Pharmacy Kinetics 86 y.o. male on vancomycin day # 10   2017    Currently on Vancomycin 2000 mg IV q24h (1500)    Indication for Treatment: L3-4 discitis/viridans strep bacteremia    Pertinent history per medical record: Admitted on 2017 for sepsis and back pain. Found to have diskitis at L3-4, blood culture positive for viridans strep. Patient on antibiotics planned x6 weeks per ID recommendations, stop date 17.    Other antibiotics: Ceftriaxone 2 g IV daily    Allergies: Review of patient's allergies indicates no known allergies.     Concerns for renal function include: age, BMI ~31    Pertinent cultures to date:   7/10/17: L3-L4 disc biopsy = NGTD  17: peripheral blood cx X2 = NGTD  17: peripheral blood cx = Viridans strep in 1/2 sets  17: urine cx = Negative    Imagin/7: MRI L spine - Possible discitis at the L3-4 level    Recent Labs      17   0330  17   0530   WBC  21.9*  20.4*   NEUTSPOLYS  82.50*  80.00*     Recent Labs      17   0330  17   0430   BUN  19  18   CREATININE  0.93  1.10     Recent Labs      17   1430   VANCOTROUGH  36.0*     Intake/Output Summary (Last 24 hours) at 17 1601  Last data filed at 17 1200   Gross per 24 hour   Intake   2240 ml   Output   1050 ml   Net   1190 ml      Blood pressure 157/82, pulse 73, temperature 36.9 °C (98.5 °F), temperature source Temporal, resp. rate 5, height 1.803 m (5' 11\"), weight 98.9 kg (218 lb 0.6 oz), SpO2 99 %. Temp (24hrs), Av °C (98.6 °F), Min:36.8 °C (98.2 °F), Max:37.2 °C (99 °F)    A/P   1. Vancomycin dose change: hold further dosing  2. Next vancomycin level: tomorrow at 1500  3. Goal trough: ~16 mcg/mL   4. Comments: SCr up slightly today, UOP not great, down from previous days. Trough back higher than expected. Called RN he will take down drug, only ~500 mg infused, hold further dosing and check level tomorrow.    Kyle Davis, PharmD, BCPS          "

## 2017-07-18 NOTE — PROGRESS NOTES
Patient drowsy, arouses to name, orientedx4. Breathing even, dyspnea with exertion clear/dim, nc@2lpm spo2 99%. Aflutter 4:1 hr 60s-70s. Back pain 6/10. Afebrile. See charted assessment for details. Oriented to call system, call light within reach.

## 2017-07-18 NOTE — PROGRESS NOTES
[facility] Hospitalist Progress Note    Date of Service: 2017    Chief Complaint  86 y.o. male admitted 2017 with L3-L4 discitis and Strep bacteremia.  During admit had complication seizure.    Interval Problem Update  Laying in bed. Awake and alert with some drowsiness.  Lower back pain.  Afib/flutter.  BP labile.  Cardiac diet.  Incontinent of urine.  Small BM yesterday.  Tmax:99.  PICC line.    Care discussed in am ICU MDR.      Consultants/Specialty  Neurology: Dr Win, signed off  Neurosurgery: Signed off  Infectious Disease: Dr Chilel    Disposition  SNF vs Home        Review of Systems   Unable to perform ROS: dementia      Physical Exam  Laboratory/Imaging   Hemodynamics  Temp (24hrs), Av.9 °C (98.5 °F), Min:36.8 °C (98.2 °F), Max:37.2 °C (99 °F)   Temperature: 36.8 °C (98.3 °F)  Pulse  Av.4  Min: 34  Max: 154 Heart Rate (Monitored): 81  NIBP: (!) 81/52 mmHg      Respiratory      Respiration: (!) 21, Pulse Oximetry: 99 %     Work Of Breathing / Effort: Shallow;Mild  RUL Breath Sounds: Clear, RML Breath Sounds: Clear, RLL Breath Sounds: Diminished, MIKE Breath Sounds: Clear, LLL Breath Sounds: Diminished    Fluids    Intake/Output Summary (Last 24 hours) at 17 2141  Last data filed at 17 1400   Gross per 24 hour   Intake   1080 ml   Output    200 ml   Net    880 ml       Nutrition  Orders Placed This Encounter   Procedures   • DIET ORDER     Standing Status: Standing      Number of Occurrences: 1      Standing Expiration Date:      Order Specific Question:  Diet:     Answer:  Regular [1]     Physical Exam   Constitutional: He appears well-developed and well-nourished. No distress.   HENT:   Head: Normocephalic and atraumatic.   Nose: Nose normal.   Mouth/Throat: Oropharynx is clear and moist.   Eyes: Conjunctivae and EOM are normal. Right eye exhibits no discharge. Left eye exhibits no discharge. No scleral icterus.   Neck: No JVD present. No tracheal deviation present.    Cardiovascular: Normal rate, regular rhythm, normal heart sounds and intact distal pulses.    No murmur heard.  Pulmonary/Chest: Effort normal and breath sounds normal. No stridor. No respiratory distress. He has no wheezes. He has no rales.   Abdominal: Soft. Bowel sounds are normal. He exhibits no distension. There is no tenderness.   Musculoskeletal: He exhibits no edema.   Lymphadenopathy:     He has no cervical adenopathy.   Neurological: He is alert. No cranial nerve deficit.   Skin: Skin is warm. He is not diaphoretic.   Psychiatric: He has a normal mood and affect. His speech is normal. Thought content normal. He is slowed. Cognition and memory are impaired.   Vitals reviewed.      Recent Labs      07/16/17   0330  07/18/17   0530   WBC  21.9*  20.4*   RBC  4.31*  4.34*   HEMOGLOBIN  12.6*  12.6*   HEMATOCRIT  38.3*  39.2*   MCV  88.9  90.3   MCH  29.2  29.0   MCHC  32.9*  32.1*   RDW  42.5  43.1   PLATELETCT  233  223   MPV  10.0  10.1     Recent Labs      07/16/17   0330  07/18/17   0430   SODIUM  138  133*   POTASSIUM  4.0  4.0   CHLORIDE  102  100   CO2  29  28   GLUCOSE  112*  92   BUN  19  18   CREATININE  0.93  1.10   CALCIUM  9.5  9.4                      Assessment/Plan     * Low back pain (present on admission)  Assessment & Plan  Concern of worsening dementia with the oxycodone. Discussed with nursing will try Ultram for pain control and monitoring mental status holding oxycodone  Physical therapy, occupational therapy  Add additional nonnarcotic pain medication such as Lidoderm patch and Neurontin    Bacteremia  Assessment & Plan  Strep viridans  ID following  On IV vanco and ceftriaxone will need 6 weeks course    Discitis of lumbar region  Assessment & Plan  At L3-L4 level  S/p Bc Cx remains neg  Continue current abx per ID to continue through August 20  Will likely need LTAC.  Wound care physical therapy    Atrial flutter (CMS-HCC) (present on admission)  Assessment & Plan  Improved rate  control Continue digoxin and metoprolol  Patient not on chronic anticoagulation given previous history of hematoma and falls will need to reevaluate after improvement of pain control and mobilization    Sepsis (CMS-HCC) (present on admission)  Assessment & Plan  Resolved          UTI (urinary tract infection) (present on admission)  Assessment & Plan  Resolved      Hypomagnesemia  Assessment & Plan  Replete and monitor     Acute encephalopathy  Assessment & Plan  Resolved no acute pathology on MRI or EEG  Empirically started on Keppra per neurology, Dr Win recommend outpt  Follow up to review need for continued AED    Shock (CMS-HCC)  Assessment & Plan  Resolved    Benign prostatic hyperplasia with lower urinary tract symptoms  Assessment & Plan  flomax      Radiology images reviewed, Labs reviewed and Medications reviewed  Hearn catheter: No Hearn      DVT Prophylaxis: Heparin      Antibiotics: Treating active infection/contamination beyond 24 hours perioperative coverage

## 2017-07-19 PROBLEM — D64.9 NORMOCYTIC ANEMIA: Status: ACTIVE | Noted: 2017-07-19

## 2017-07-19 PROBLEM — D72.829 LEUKOCYTOSIS: Status: ACTIVE | Noted: 2017-07-19

## 2017-07-19 PROBLEM — N50.89 SCROTAL EDEMA: Status: ACTIVE | Noted: 2017-07-19

## 2017-07-19 LAB
ANION GAP SERPL CALC-SCNC: 6 MMOL/L (ref 0–11.9)
BUN SERPL-MCNC: 15 MG/DL (ref 8–22)
CALCIUM SERPL-MCNC: 9.6 MG/DL (ref 8.5–10.5)
CHLORIDE SERPL-SCNC: 102 MMOL/L (ref 96–112)
CO2 SERPL-SCNC: 29 MMOL/L (ref 20–33)
CREAT SERPL-MCNC: 0.67 MG/DL (ref 0.5–1.4)
ERYTHROCYTE [DISTWIDTH] IN BLOOD BY AUTOMATED COUNT: 43.4 FL (ref 35.9–50)
FERRITIN SERPL-MCNC: 366.5 NG/ML (ref 22–322)
FOLATE SERPL-MCNC: 6.4 NG/ML
GFR SERPL CREATININE-BSD FRML MDRD: >60 ML/MIN/1.73 M 2
GLUCOSE SERPL-MCNC: 101 MG/DL (ref 65–99)
HCT VFR BLD AUTO: 37 % (ref 42–52)
HGB BLD-MCNC: 12 G/DL (ref 14–18)
IRON SATN MFR SERPL: 10 % (ref 15–55)
IRON SERPL-MCNC: 18 UG/DL (ref 50–180)
MCH RBC QN AUTO: 29.3 PG (ref 27–33)
MCHC RBC AUTO-ENTMCNC: 32.4 G/DL (ref 33.7–35.3)
MCV RBC AUTO: 90.2 FL (ref 81.4–97.8)
PLATELET # BLD AUTO: 251 K/UL (ref 164–446)
PMV BLD AUTO: 9.9 FL (ref 9–12.9)
POTASSIUM SERPL-SCNC: 3.7 MMOL/L (ref 3.6–5.5)
RBC # BLD AUTO: 4.1 M/UL (ref 4.7–6.1)
SODIUM SERPL-SCNC: 137 MMOL/L (ref 135–145)
TIBC SERPL-MCNC: 182 UG/DL (ref 250–450)
VANCOMYCIN TROUGH SERPL-MCNC: 18.2 UG/ML (ref 10–20)
VIT B12 SERPL-MCNC: 1228 PG/ML (ref 211–911)
WBC # BLD AUTO: 19.5 K/UL (ref 4.8–10.8)

## 2017-07-19 PROCEDURE — 700105 HCHG RX REV CODE 258: Performed by: INTERNAL MEDICINE

## 2017-07-19 PROCEDURE — 700102 HCHG RX REV CODE 250 W/ 637 OVERRIDE(OP): Performed by: INTERNAL MEDICINE

## 2017-07-19 PROCEDURE — 82746 ASSAY OF FOLIC ACID SERUM: CPT

## 2017-07-19 PROCEDURE — A9270 NON-COVERED ITEM OR SERVICE: HCPCS | Performed by: INTERNAL MEDICINE

## 2017-07-19 PROCEDURE — 80048 BASIC METABOLIC PNL TOTAL CA: CPT

## 2017-07-19 PROCEDURE — 85027 COMPLETE CBC AUTOMATED: CPT

## 2017-07-19 PROCEDURE — 700105 HCHG RX REV CODE 258

## 2017-07-19 PROCEDURE — 82607 VITAMIN B-12: CPT

## 2017-07-19 PROCEDURE — 83540 ASSAY OF IRON: CPT

## 2017-07-19 PROCEDURE — 700111 HCHG RX REV CODE 636 W/ 250 OVERRIDE (IP): Performed by: INTERNAL MEDICINE

## 2017-07-19 PROCEDURE — 99233 SBSQ HOSP IP/OBS HIGH 50: CPT | Performed by: INTERNAL MEDICINE

## 2017-07-19 PROCEDURE — 700102 HCHG RX REV CODE 250 W/ 637 OVERRIDE(OP): Performed by: HOSPITALIST

## 2017-07-19 PROCEDURE — A9270 NON-COVERED ITEM OR SERVICE: HCPCS | Performed by: HOSPITALIST

## 2017-07-19 PROCEDURE — 700111 HCHG RX REV CODE 636 W/ 250 OVERRIDE (IP): Performed by: HOSPITALIST

## 2017-07-19 PROCEDURE — 700101 HCHG RX REV CODE 250: Performed by: HOSPITALIST

## 2017-07-19 PROCEDURE — A9270 NON-COVERED ITEM OR SERVICE: HCPCS | Performed by: PSYCHIATRY & NEUROLOGY

## 2017-07-19 PROCEDURE — 80202 ASSAY OF VANCOMYCIN: CPT

## 2017-07-19 PROCEDURE — 700101 HCHG RX REV CODE 250: Performed by: INTERNAL MEDICINE

## 2017-07-19 PROCEDURE — 82728 ASSAY OF FERRITIN: CPT

## 2017-07-19 PROCEDURE — 83550 IRON BINDING TEST: CPT

## 2017-07-19 PROCEDURE — 770020 HCHG ROOM/CARE - TELE (206)

## 2017-07-19 PROCEDURE — 700102 HCHG RX REV CODE 250 W/ 637 OVERRIDE(OP): Performed by: PSYCHIATRY & NEUROLOGY

## 2017-07-19 RX ORDER — TRAMADOL HYDROCHLORIDE 50 MG/1
50 TABLET ORAL EVERY 6 HOURS PRN
Status: DISCONTINUED | OUTPATIENT
Start: 2017-07-19 | End: 2017-07-25 | Stop reason: HOSPADM

## 2017-07-19 RX ORDER — SODIUM CHLORIDE 9 MG/ML
INJECTION, SOLUTION INTRAVENOUS
Status: COMPLETED
Start: 2017-07-19 | End: 2017-07-19

## 2017-07-19 RX ORDER — FLUCONAZOLE 100 MG/1
100 TABLET ORAL DAILY
Status: DISCONTINUED | OUTPATIENT
Start: 2017-07-19 | End: 2017-07-25 | Stop reason: HOSPADM

## 2017-07-19 RX ADMIN — NYSTATIN: 100000 POWDER TOPICAL at 09:00

## 2017-07-19 RX ADMIN — LEVETIRACETAM 500 MG: 500 TABLET, FILM COATED ORAL at 22:15

## 2017-07-19 RX ADMIN — OMEPRAZOLE 20 MG: 20 CAPSULE, DELAYED RELEASE ORAL at 22:16

## 2017-07-19 RX ADMIN — OXYCODONE HYDROCHLORIDE 10 MG: 10 TABLET, FILM COATED, EXTENDED RELEASE ORAL at 21:00

## 2017-07-19 RX ADMIN — METOPROLOL TARTRATE 5 MG: 5 INJECTION INTRAVENOUS at 12:13

## 2017-07-19 RX ADMIN — OMEPRAZOLE 20 MG: 20 CAPSULE, DELAYED RELEASE ORAL at 07:59

## 2017-07-19 RX ADMIN — CEFTRIAXONE SODIUM 2 G: 2 INJECTION, POWDER, FOR SOLUTION INTRAMUSCULAR; INTRAVENOUS at 08:00

## 2017-07-19 RX ADMIN — HYDROMORPHONE HYDROCHLORIDE 0.5 MG: 1 INJECTION, SOLUTION INTRAMUSCULAR; INTRAVENOUS; SUBCUTANEOUS at 09:55

## 2017-07-19 RX ADMIN — HEPARIN SODIUM 5000 UNITS: 5000 INJECTION, SOLUTION INTRAVENOUS; SUBCUTANEOUS at 05:54

## 2017-07-19 RX ADMIN — OXYCODONE HYDROCHLORIDE 10 MG: 10 TABLET, FILM COATED, EXTENDED RELEASE ORAL at 07:59

## 2017-07-19 RX ADMIN — GABAPENTIN 200 MG: 100 CAPSULE ORAL at 22:09

## 2017-07-19 RX ADMIN — METOPROLOL TARTRATE 50 MG: 50 TABLET, FILM COATED ORAL at 08:04

## 2017-07-19 RX ADMIN — LEVETIRACETAM 500 MG: 500 TABLET, FILM COATED ORAL at 07:59

## 2017-07-19 RX ADMIN — SODIUM CHLORIDE 250 ML: 9 INJECTION, SOLUTION INTRAVENOUS at 08:30

## 2017-07-19 RX ADMIN — HEPARIN SODIUM 5000 UNITS: 5000 INJECTION, SOLUTION INTRAVENOUS; SUBCUTANEOUS at 15:52

## 2017-07-19 RX ADMIN — METOPROLOL TARTRATE 50 MG: 50 TABLET, FILM COATED ORAL at 22:15

## 2017-07-19 RX ADMIN — GABAPENTIN 200 MG: 100 CAPSULE ORAL at 07:59

## 2017-07-19 RX ADMIN — LIDOCAINE 1 PATCH: 50 PATCH TOPICAL at 22:17

## 2017-07-19 RX ADMIN — ATORVASTATIN CALCIUM 10 MG: 10 TABLET, FILM COATED ORAL at 22:09

## 2017-07-19 RX ADMIN — GABAPENTIN 200 MG: 100 CAPSULE ORAL at 15:52

## 2017-07-19 RX ADMIN — NYSTATIN 1500000 UNITS: 100000 POWDER TOPICAL at 22:04

## 2017-07-19 RX ADMIN — HYDROMORPHONE HYDROCHLORIDE 0.5 MG: 1 INJECTION, SOLUTION INTRAMUSCULAR; INTRAVENOUS; SUBCUTANEOUS at 16:14

## 2017-07-19 RX ADMIN — ROSUVASTATIN CALCIUM 125 MCG: 10 TABLET, FILM COATED ORAL at 16:14

## 2017-07-19 RX ADMIN — HEPARIN SODIUM 5000 UNITS: 5000 INJECTION, SOLUTION INTRAVENOUS; SUBCUTANEOUS at 22:14

## 2017-07-19 RX ADMIN — FLUCONAZOLE 100 MG: 100 TABLET ORAL at 15:52

## 2017-07-19 RX ADMIN — TAMSULOSIN HYDROCHLORIDE 0.4 MG: 0.4 CAPSULE ORAL at 07:59

## 2017-07-19 RX ADMIN — CYANOCOBALAMIN TAB 500 MCG 1000 MCG: 500 TAB at 07:59

## 2017-07-19 ASSESSMENT — PAIN SCALES - GENERAL
PAINLEVEL_OUTOF10: ASSUMED PAIN PRESENT
PAINLEVEL_OUTOF10: 2
PAINLEVEL_OUTOF10: 0
PAINLEVEL_OUTOF10: ASSUMED PAIN PRESENT
PAINLEVEL_OUTOF10: 0

## 2017-07-19 ASSESSMENT — ENCOUNTER SYMPTOMS
DIARRHEA: 0
NAUSEA: 0
VOMITING: 0
ABDOMINAL PAIN: 0
CHILLS: 0
FEVER: 0
MYALGIAS: 1
BACK PAIN: 1

## 2017-07-19 NOTE — CONSULTS
"Reason for PC Consult: Advance Care Planning    Consulted by:   Dr. Chua    Assessment:  General:   86 year old male admitted for sepsis on 7/6/17. Pt has a history of dementia (unknown baseline), spinal stenosis fusion (L4-L5, 2004), A-Fib, hyperlipidemia, arthritis, cataract, GERD, HTN, and diabetes mellitus. Pt was just transferred out of the ICU this AM.     Dyspnea: No, 96% on 2L NC  Last BM: 07/17/17  Pain: Yes, Cannot verbalize location of pain  Depression: Unable to determine      Spiritual:  Is Spiritism or spirituality important for coping with this illness? Unable to determine    Has a  or spiritual provider visit been requested? Unable to determine    Palliative Performance Scale: 30%    Advance Directive: None on File  DPOA: None on File  POLST: None on File    Code Status: Full-      Outcome:  PC RN introduced self and role of PC. Pt moaning in pain, holding knee. PC RN asked if pt was in pain, pt said yes. PC RN asked the location of pain and to rate it, pt said \"yes\". PC RN asked if pain was in his leg, pt said \"yes\". PC RN asked if pt had pain in his back, pt said \"yes\". Pt unable to answer any other questions, does not answer questions to assess A&O.     Called Son-in-Law Margo Seo ([phone]). Margo states that pt's mentation prior to hospitalization was \"sharp as a tack. He was totally independent, Lazy but independent. He could make him self a sandwich at home, do sudoku puzzles and watch movies. He would remember things better than me!\". PC RN discussed pt's current mentation, Margo feels that it is due to his uncontrolled pain. Margo expressed that he does not for see pt surviving this hospital stay, but if he does than pt needs to go into a home as he is concerned that his physical needs will be to much for margo to care for. Margo works full time and unable to care for pt if pt cannot be independent like before admission. PC RN asked about private pay care attendants, Margo states that " pt brings in money from social security, a pension and has VA benefits totalling $2,000. Chandler feels that pt would receive better support in a home rather than intermittent help at home. Chandler states that he does have DPOA for pt, he will look in the safe to see if he has it, he will bring it in once he finds it.     Updated:   [place]. Chua[place] [facility] [place] call    Plan:   Meet with Chandler once at bedside, possible discharge to  or AL     Thank you for allowing Palliative Care to participate in this patient's care. Please feel free to call [phone] with any questions or concerns.

## 2017-07-19 NOTE — PROGRESS NOTES
Bedside report received. Patient care assumed. No s/s of distress. Call light within reach. Safety maintained.

## 2017-07-19 NOTE — PROGRESS NOTES
Pt belongings left behind in S137. This RN will transport pt belongings to T737 once shift is over.

## 2017-07-19 NOTE — DIETARY
Nutrition Services: Update/RD follow-up note    Labs: glucose: 101 Na: 137 K: 3.7 M.1 Phos: 3.0  Meds: vitamin B-12, prilosec, metoprolol, vitamin   GI: last BM    Skin: wound to sacrum   Diet: Regular     Pt continues with consistent poor PO of meals at <25% consistently. Pt to benefit from Boost Plus BID for additional kcals/protein daily. If PO does not improve upon f/u, pt to benefit from appetite stimulant vs need for short term nutrition support.     RD to monitor PO adequacy, wt, and labs to leave further recommendations accordingly.

## 2017-07-19 NOTE — CARE PLAN
Problem: Pain Management  Goal: Pain level will decrease to patient’s comfort goal  Pt assessed q2h and PRN. Pt complains of pain with any movement. Lidocaine patches added to the MAR tonight. Non-pharmacologic measures being utilized. Will continue to assess and monitor.     Problem: Mobility  Goal: Risk for activity intolerance will decrease  Pt assessed and encouraged to mobilize to edge of bed. Pt refused to mobilize tonight secondary to pain even after pain medication given. Education provided. Will attempt again in the morning. Collaboration with PT/OT.    Problem: Skin Integrity  Goal: Risk for impaired skin integrity will decrease  Full skin assessment complete. IAD and excoriation present on buttock / perineal area. Moisture barriers in use. Condom catheter placed. Q2h turns in place. Pillows padding bony prominences.

## 2017-07-19 NOTE — PROGRESS NOTES
"Pharmacy Kinetics 86 y.o. male on vancomycin day # 11 2017    Currently on Vancomycin 2,000 mg iv q24hr    Indication for Treatment: L3-4 discitis/viridans strep bacteremia    Pertinent history per medical record: Admitted on 2017 for worsening back pain and meeting SIRS criteria. Found to have diskitis at L3-4, blood culture positive for viridans strep. Patient on antibiotics planned x6 weeks per ID recommendations, stop date 17.    Other antibiotics: Ceftriaxone 2g IV q24h     Allergies: Review of patient's allergies indicates no known allergies.     List concerns for renal function: Age, Obesity (BMI 31)     Pertinent cultures to date:   7/10/17: L3-L4 disc biopsy = NGTD  17: peripheral blood cx X2 = NGTD  17: peripheral blood cx = Viridans strep in 1/2 sets   17: urine cx = Negative    Recent Labs      17   0530  17   0400   WBC  20.4*  19.5*   NEUTSPOLYS  80.00*   --      Recent Labs      17   0430  17   0400   BUN  18  15   CREATININE  1.10  0.67     Recent Labs      17   1430   VANCOTROUGH  36.0*     Intake/Output Summary (Last 24 hours) at 17 1635  Last data filed at 17 0400   Gross per 24 hour   Intake    300 ml   Output    300 ml   Net      0 ml      Blood pressure 111/56, pulse 78, temperature 36.3 °C (97.4 °F), temperature source Temporal, resp. rate 18, height 1.803 m (5' 11\"), weight 97 kg (213 lb 13.5 oz), SpO2 97 %. Temp (24hrs), Av.3 °C (97.4 °F), Min:36.2 °C (97.1 °F), Max:36.8 °C (98.3 °F)      A/P   1. Vancomycin dose change: Currently holding maintenance dose   2. Next vancomycin level: Awaiting level for today,  - in process   3. Goal trough: 16 - 20 mcg/mL   4. Comments: Patient has accumulated drug over time with latest trough level resulting at 36 mcg/mL on  (1430) which is well above desired trough level as outlined above. Repeat level drawn this afternoon, awaiting results. " Pharmacy will continue to monitor patient, change to pulse dosing for now, and likely resume maintenance dose depending on clearance observed over the next few days.    Chrystal Nash, PHARMD

## 2017-07-19 NOTE — PROGRESS NOTES
"Patient combative, refusing care. Patient spitting at this RN. Hits this RN multiple times. Patient screaming \"Yelling!\" and making attempts to scratch his own face. Patient then curls into fetal position and is crying. Patient's behavior bizzarre. CARL Mcgraw and Saad RN at bedside to witness patient's behavior.  MD aware that patient is disoriented. .   "

## 2017-07-19 NOTE — PROGRESS NOTES
Patient's son at bedside. Updated son and told him episodes of this AM assessment. Son tells this RN that patient is not confused. Orientation questions asked by this RN. Patient able to answer orientation questions correctly at this time.

## 2017-07-19 NOTE — ASSESSMENT & PLAN NOTE
Secondary to strep.viridans bacteremia and discitis of L3-L4  Increased, likely 2/2 to steroids, CTM

## 2017-07-19 NOTE — PROGRESS NOTES
[facility] Hospitalist Progress Note    Date of Service: 2017    Chief Complaint  86 y.o. male admitted 2017 with L3-L4 discitis and Strep bacteremia on abx until .  During admit had complication seizure transferred out of ICU on     Interval Problem Update  Patient alert awake oriented ×0. Patient resting in bed. No acute events overnight. Palliative care consulted to help with advanced care planning. Patient's son to come in around noon today discuss plan of care.    Consultants/Specialty  Neurology: Dr Win, signed off  Neurosurgery: Signed off  Infectious Disease: Dr Chilel    Disposition  SNF vs Home        Review of Systems   Unable to perform ROS: dementia      Physical Exam  Laboratory/Imaging   Hemodynamics  Temp (24hrs), Av.5 °C (97.7 °F), Min:36.2 °C (97.1 °F), Max:36.9 °C (98.5 °F)   Temperature: 36.5 °C (97.7 °F)  Pulse  Av.5  Min: 34  Max: 154 Heart Rate (Monitored): 73  Blood Pressure : 102/59 mmHg, NIBP: 132/70 mmHg      Respiratory      Respiration: 20, Pulse Oximetry: 96 %        RUL Breath Sounds: Clear, RML Breath Sounds: Clear, RLL Breath Sounds: Diminished, MIKE Breath Sounds: Clear, LLL Breath Sounds: Diminished    Fluids    Intake/Output Summary (Last 24 hours) at 17 1046  Last data filed at 17 0400   Gross per 24 hour   Intake    570 ml   Output    300 ml   Net    270 ml       Nutrition  Orders Placed This Encounter   Procedures   • DIET ORDER     Standing Status: Standing      Number of Occurrences: 1      Standing Expiration Date:      Order Specific Question:  Diet:     Answer:  Regular [1]     Physical Exam   Constitutional: He appears well-developed and well-nourished. No distress.   HENT:   Head: Normocephalic and atraumatic.   Nose: Nose normal.   Mouth/Throat: Oropharynx is clear and moist.   Eyes: Conjunctivae are normal. Right eye exhibits no discharge. Left eye exhibits no discharge. No scleral icterus.   Neck: No JVD present. No tracheal deviation  present.   Cardiovascular: Normal rate, regular rhythm, normal heart sounds and intact distal pulses.    No murmur heard.  Pulmonary/Chest: Effort normal and breath sounds normal. No respiratory distress. He has no wheezes. He has no rales.   Abdominal: Soft. Bowel sounds are normal. He exhibits no distension. There is no tenderness.   Genitourinary:   Scrotal edema   Musculoskeletal: He exhibits no edema.   LUE PICC    Neurological: He is alert. No cranial nerve deficit.   AAOX0   Skin: Skin is warm. He is not diaphoretic.   Psychiatric: He has a normal mood and affect. His speech is normal. Thought content normal. He is slowed. Cognition and memory are impaired.   Nursing note and vitals reviewed.      Recent Labs      07/18/17   0530  07/19/17   0400   WBC  20.4*  19.5*   RBC  4.34*  4.10*   HEMOGLOBIN  12.6*  12.0*   HEMATOCRIT  39.2*  37.0*   MCV  90.3  90.2   MCH  29.0  29.3   MCHC  32.1*  32.4*   RDW  43.1  43.4   PLATELETCT  223  251   MPV  10.1  9.9     Recent Labs      07/18/17   0430  07/19/17   0400   SODIUM  133*  137   POTASSIUM  4.0  3.7   CHLORIDE  100  102   CO2  28  29   GLUCOSE  92  101*   BUN  18  15   CREATININE  1.10  0.67   CALCIUM  9.4  9.6                      Assessment/Plan     * Low back pain (present on admission)  Assessment & Plan  Concern of worsening dementia with the oxycodone. Discussed with nursing will try Ultram for pain control and monitoring mental status holding oxycodone  Physical therapy, occupational therapy  Add additional nonnarcotic pain medication such as Lidoderm patch and Neurontin    Bacteremia  Assessment & Plan  Strep viridans, repeat blood cx NGTD, PICC in place  ID following  On IV vanco and ceftriaxone will need 6 weeks course until 8/20    Discitis of lumbar region  Assessment & Plan  At L3-L4 level  S/p Bc Cx remains neg  Continue current abx per ID to continue through August 20  Will likely need LTAC.  Wound care physical therapy    Atrial flutter (CMS-[facility])  (present on admission)  Assessment & Plan  Improved rate control Continue digoxin and metoprolol  Patient not on chronic anticoagulation given previous history of hematoma and falls will need to reevaluate after improvement of pain control and mobilization    Sepsis (CMS-HCC) (present on admission)  Assessment & Plan  Resolved          UTI (urinary tract infection) (present on admission)  Assessment & Plan  Resolved      Hypomagnesemia  Assessment & Plan  Resolved, monitor     Acute encephalopathy  Assessment & Plan  Resolved no acute pathology on MRI or EEG  Empirically started on Keppra per neurology, Dr Win recommend outpt  follow up to review need for continued AED  Baseline dementia unknown, will discuss with pt's family     Shock (CMS-HCC)  Assessment & Plan  Resolved    Benign prostatic hyperplasia with lower urinary tract symptoms  Assessment & Plan  flomax    Leukocytosis  Assessment & Plan  Secondary to strep.viridans bacteremia and discitis of L3-L4  Trending down, CTM     Normocytic anemia  Assessment & Plan  will check iron panel, vitamin b12/folate, TSH for further eval     Scrotal edema  Assessment & Plan  Likely dependent, scrotal sling ordered      Radiology images reviewed, Labs reviewed and Medications reviewed  Hearn catheter: No Hearn      DVT Prophylaxis: Heparin      Antibiotics: Treating active infection/contamination beyond 24 hours perioperative coverage

## 2017-07-19 NOTE — PROGRESS NOTES
Infectious Disease Progress Note    Author: Shante Sequeira M.D. Date of service & Time created: 2017  2:53 PM    Chief Complaint:  Chief Complaint   Patient presents with   • Back Pain    follow-up for lumbar discitis and bacteremia    Interval History:  86-year-old white male admitted for back pain. Found to have discitis  17- no fevers. WBC 10.3. Ongoing back pain. Sedimentation rate is 46. Now the blood cultures are positive for strep species. Also has new nausea and vomiting today.   7/10 AF, WBC 16.5, having nausea and vomiting, ongoing back pain, plan for bone biopsy today, tolerating abx without issues   AF, WBC 13.3, more pain after biopsy, feels flushed, having runny stools on bowel regimen   AF WBC 15.3 transferred to ICU due to concern for seizure-obtunded.   AF alert and eating lunch Has some pain-controlled. Denies SE abx   AF WBC 14.7 no new complaints  7/15 AF, no CBC, has not gotten OOB, denies any back pain    AF, WBC 21.9, having urinary incontinence   AF, no CBC, working on [employer], having low blood pressures today, were elevated yesterday, no BM in a few days   AF, WBC 20, feels better this am, had breakfast, denies any diarrhea   AF c/o severe pain in back dolores with movement  Labs Reviewed, Medications Reviewed and Radiology Reviewed.    Review of Systems:  Review of Systems   Constitutional: Negative for fever and chills.   Cardiovascular: Negative for chest pain.   Gastrointestinal: Negative for nausea, vomiting, abdominal pain and diarrhea.   Genitourinary:        Urinary incontinence   Musculoskeletal: Positive for myalgias, back pain and joint pain.     Limited due to pain  Hemodynamics:  Temp (24hrs), Av.4 °C (97.5 °F), Min:36.2 °C (97.1 °F), Max:36.9 °C (98.4 °F)  Temperature: 36.4 °C (97.5 °F)  Pulse  Av.9  Min: 34  Max: 154Heart Rate (Monitored): 73  Blood Pressure : 102/76 mmHg, NIBP: 132/70 mmHg       Physical Exam:  Physical Exam    Constitutional: He appears well-developed and well-nourished.   Obese   HENT:   Head: Normocephalic and atraumatic.   Eyes: EOM are normal. Pupils are equal, round, and reactive to light. No scleral icterus.   Neck: Neck supple.   Cardiovascular: Normal rate.    Irregularly irregular     Pulmonary/Chest: Effort normal and breath sounds normal. No respiratory distress. He has no wheezes.   Abdominal: Soft. He exhibits no distension. There is no tenderness.   Musculoskeletal: He exhibits no edema.   LUE PICC nontender   Neurological: He is alert.   Skin: No rash noted. There is erythema.   RUE forearm-decreased erythema  Groin and buttocks erythema   Nursing note and vitals reviewed.      Meds:    Current facility-administered medications:   •  SODIUM CHLORIDE 0.9 % IV SOLN, , , ,   •  tramadol (ULTRAM) 50 MG tablet 50 mg, 50 mg, Oral, Q6HRS PRN, Johanne Chua M.D.  •  lidocaine (LIDODERM) 5 % 1 Patch, 1 Patch, Transdermal, Q24HR, Yaya Ragland D.O., 1 Patch at 07/18/17 2330  •  tamsulosin (FLOMAX) capsule 0.4 mg, 0.4 mg, Oral, AFTER BREAKFAST, Kirby Mayo M.D., 0.4 mg at 07/19/17 0759  •  oxyCODONE CR (OXYCONTIN) tablet 10 mg, 10 mg, Oral, Q12HRS, Kirby Mayo M.D., 10 mg at 07/19/17 0759  •  metoprolol (LOPRESSOR) tablet 50 mg, 50 mg, Oral, TWICE DAILY, Kirby Mayo M.D., Stopped at 07/19/17 0900  •  nystatin (MYCOSTATIN) powder, , Topical, BID, Kirby Mayo M.D.  •  levetiracetam (KEPPRA) tablet 500 mg, 500 mg, Oral, BID, Allan Win M.D., 500 mg at 07/19/17 0759  •  [DISCONTINUED] senna-docusate (PERICOLACE or SENOKOT S) 8.6-50 MG per tablet 2 Tab, 2 Tab, Oral, BID, Stopped at 07/14/17 2100 **AND** polyethylene glycol/lytes (MIRALAX) PACKET 1 Packet, 1 Packet, Oral, QDAY PRN, 1 Packet at 07/18/17 0753 **AND** magnesium hydroxide (MILK OF MAGNESIA) suspension 30 mL, 30 mL, Oral, QDAY PRN, 30 mL at 07/13/17 1751 **AND** bisacodyl (DULCOLAX) suppository 10 mg, 10 mg, Rectal, QDAY  PRN, Kirby Mayo M.D.  •  metoprolol (LOPRESSOR) injection 5 mg, 5 mg, Intravenous, Q5 MIN PRN, Collin White M.D., 5 mg at 17 1213  •  heparin injection 5,000 Units, 5,000 Units, Subcutaneous, Q8HRS, Collin White M.D., 5,000 Units at 17 0554  •  PICC Line Insertion has been implemented, , , Once **AND** May use Lidocaine 1% not to exceed 3 mls for local at insertion site, , , CONTINUOUS **AND** NOTIFY MD, , , Once **AND** Tip to dwell in the superior vena cava, , , CONTINUOUS **AND** Do not use PICC Line until placement verified by Chest X Ray, , , CONTINUOUS **AND** DX-CHEST-FOR PICC LINE Perform procedure in:: Patient's Room, , , Once **AND** If radiologist reading of chest X-ray states any of the following the PICC should be used, , , CONTINUOUS **AND** Further evaluation of the PICC placement can be retrieved from X-Ray and Imaging, , , CONTINUOUS **AND** Blood draws through PICC line; draws by RN only, , , CONTINUOUS **AND** FLUSHING GUIDELINES WHEN IN USE, , , CONTINUOUS **AND** normal saline PF 10-20 mL, 10-20 mL, Intravenous, PRN **AND** FLUSHING GUIDELINES WHEN NOT IN USE, , , CONTINUOUS **AND** DRESSING MAINTENANCE, , , Once **AND** Change needleless pressure ports and IV tubing every 72 hours per hospital policy, , , CONTINUOUS **AND** TUBING, , , CONTINUOUS **AND** If there is an MD order to remove the PICC line, any RN may remove the PICC line, , , CONTINUOUS **AND** [] PATIENT EDUCATION MATERIALS, , , Prior to discharge **AND** NURSING COMMUNICATION, , , CONTINUOUS, Ny Chilel M.D.  •  gabapentin (NEURONTIN) capsule 200 mg, 200 mg, Oral, TID, Georgi Grey M.D., 200 mg at 17 4241  •  Notify provider if pain remains uncontrolled, , , CONTINUOUS **AND** Use the numeric rating scale (NRS-11) on regular floors and Critical-Care Pain Observation Tool (CPOT) on ICUs/Trauma to assess pain, , , CONTINUOUS **AND** Pulse Ox (Oximetry), , , CONTINUOUS **AND**  [DISCONTINUED] Pharmacy Consult Request ...Pain Management Review, , Other, PRN **AND** If patient difficult to arouse and/or has respiratory depression, stop any opiates that are currently infusing and call a Rapid Response., , , CONTINUOUS **AND** [DISCONTINUED] oxycodone immediate-release (ROXICODONE) tablet 2.5 mg, 2.5 mg, Oral, Q3HRS PRN, 2.5 mg at 07/11/17 1802 **AND** [DISCONTINUED] oxycodone immediate-release (ROXICODONE) tablet 5 mg, 5 mg, Oral, Q3HRS PRN, 5 mg at 07/13/17 2311 **AND** HYDROmorphone (DILAUDID) injection 0.5 mg, 0.5 mg, Intravenous, Q3HRS PRN, Georgi Grey M.D., 0.5 mg at 07/19/17 0955  •  digoxin (LANOXIN) tablet 125 mcg, 125 mcg, Oral, DAILY AT 1800, Georgi Grey M.D., 125 mcg at 07/18/17 1813  •  ondansetron (ZOFRAN) syringe/vial injection 4 mg, 4 mg, Intravenous, Q6HRS PRN, Georgi Grey M.D., 4 mg at 07/15/17 0824  •  omeprazole (PRILOSEC) capsule 20 mg, 20 mg, Oral, BID, Georgi Grey M.D., 20 mg at 07/19/17 0759  •  MD ALERT... vancomycin per pharmacy protocol, , Other, pharmacy to dose, Oliva Bolanos M.D.  •  cefTRIAXone (ROCEPHIN) 2 g in  mL IVPB, 2 g, Intravenous, Q24HRS, Oliva Bolanos M.D., Stopped at 07/19/17 0830  •  hydrALAZINE (APRESOLINE) tablet 10 mg, 10 mg, Oral, Q4HRS PRN, Jamaal Molina M.D., 10 mg at 07/09/17 2038  •  clonidine (CATAPRES) tablet 0.1 mg, 0.1 mg, Oral, 4X/DAY PRN, Jamaal Molina M.D.  •  prochlorperazine (COMPAZINE) injection 10 mg, 10 mg, Intravenous, Q6HRS PRN, Darino Benton M.D., 10 mg at 07/10/17 0905  •  pneumococcal 13-Jocelyn Conj Vacc (PREVNAR 13) syringe 0.5 mL, 0.5 mL, Intramuscular, Once PRN, Jamaal Molina M.D., Stopped at 07/16/17 2032  •  atorvastatin (LIPITOR) tablet 10 mg, 10 mg, Oral, Nightly, Jamaal Molina M.D., 10 mg at 07/18/17 2043  •  vitamin D (cholecalciferol) tablet 5,000 Units, 5,000 Units, Oral, Q48HRS, Jamaal Molina M.D., 5,000 Units at 07/18/17 0755  •  cyanocobalamin (VITAMIN B-12) tablet 1,000  mcg, 1,000 mcg, Oral, DAILY, Jamaal Molina M.D., 1,000 mcg at 07/19/17 0759  •  acetaminophen (TYLENOL) tablet 650 mg, 650 mg, Oral, Q6HRS PRN, Jamaal Molina M.D., 650 mg at 07/16/17 2345    Labs:  Recent Labs      07/18/17   0530  07/19/17   0400   WBC  20.4*  19.5*   RBC  4.34*  4.10*   HEMOGLOBIN  12.6*  12.0*   HEMATOCRIT  39.2*  37.0*   MCV  90.3  90.2   MCH  29.0  29.3   RDW  43.1  43.4   PLATELETCT  223  251   MPV  10.1  9.9   NEUTSPOLYS  80.00*   --    LYMPHOCYTES  7.30*   --    MONOCYTES  9.20   --    EOSINOPHILS  1.70   --    BASOPHILS  0.40   --      Recent Labs      07/18/17   0430  07/19/17   0400   SODIUM  133*  137   POTASSIUM  4.0  3.7   CHLORIDE  100  102   CO2  28  29   GLUCOSE  92  101*   BUN  18  15     Recent Labs      07/18/17   0430  07/19/17   0400   CREATININE  1.10  0.67       Imaging:  Ct-abdomen-pelvis W/o  7/6/2017  No hydronephrosis or urolithiasis. Diverticula colon. No evidence diverticulitis. No free fluid. Pancreatic calcifications/consistent with sequela pancreatitis. No peripancreatic fluid collections. Coronary artery calcifications.    Ct-lspine W/o Plus Recons  7/6/2017  1.  No evidence of acute fracture of the lumbar spine. 2.  Surgical change extending from L3 through L5. 3.  Multilevel degenerative disc disease and facet degeneration. 4.  Old fractures of the right L1 and L2 transverse processes. 5.  Multilevel degenerative subluxation.    Mr-lumbar Spine-with & W/o  7/8/2017  1.  Possible discitis at the L3-4 level. 2.  Mild to moderate retrolisthesis at the L3-4 level. 3.  Previous extensive laminectomy from the L2 level to the sacrum with bilateral pedicle screw and zev fixation at the L4-L5 levels. 4.   Moderate lumbar spondylotic changes at the L3-4 levels with associated moderate central canal stenosis at this level secondary to facet arthropathy. Additionally there are severe bilateral neural foraminal stenosis. 5.  Minimal lumbar spondylotic changes at the L2-3  level with mild bilateral neural foraminal narrowing and mild disc bulging into the inferior aspects of the neural foramina.    Dx-hip-bilateral-with Pelvis-3/4 Views  7/6/2017  No evidence of fracture or arthropathy of the hips. Degenerative and surgical changes of the lumbar spine.      Micro:  BLOOD CULTURE   Date Value Ref Range Status   07/09/2017 No growth after 5 days of incubation.  Final   07/09/2017 No growth after 5 days of incubation.  Final      Results     Procedure Component Value Units Date/Time    URINALYSIS [[id number]]  (Abnormal) Collected:  07/15/17 1620    Order Status:  Completed Specimen Information:  Urine from Urine, Clean Catch Updated:  07/16/17 0849     Color Yellow      Character Clear      Specific Gravity 1.010      Ph 6.5      Glucose Negative mg/dL      Ketones Negative mg/dL      Protein Negative mg/dL      Bilirubin Negative      Urobilinogen, Urine 0.2      Nitrite Negative      Leukocyte Esterase Trace (A)      Occult Blood Negative      Micro Urine Req Microscopic     CULTURE TISSUE W/ GRM STAIN [[id number]] Collected:  07/10/17 1445    Order Status:  Completed Specimen Information:  Tissue Updated:  07/15/17 1413     Gram Stain Result No organisms seen.      Significant Indicator NEG      Source TISS      Site Saline from L3 L4 Disk      Tissue Culture No growth at 72 hours.     FUNGAL CULTURE [[id number]] Collected:  07/10/17 1445    Order Status:  Completed Specimen Information:  Tissue Updated:  07/15/17 1413     Significant Indicator NEG      Source TISS      Site Saline from L3 L4 Disk      Fungal Culture Culture in progress.     ANAEROBIC CULTURE [[id number]] Collected:  07/10/17 1445    Order Status:  Completed Specimen Information:  Tissue Updated:  07/15/17 1413     Significant Indicator NEG      Source TISS      Site Saline from L3 L4 Disk      Anaerobic Culture, Culture Res No Anaerobes isolated.     BLOOD CULTURE [[id number]] Collected:  07/09/17 1646    Order Status:   "Completed Specimen Information:  Blood from Peripheral Updated:  07/14/17 2100     Significant Indicator NEG      Source BLD      Site PERIPHERAL      Blood Culture No growth after 5 days of incubation.     Narrative:      Per Hospital Policy: Only change Specimen Src: to \"Line\" if  specified by physician order.    BLOOD CULTURE [[id number]] Collected:  07/09/17 1646    Order Status:  Completed Specimen Information:  Blood from Peripheral Updated:  07/14/17 2100     Significant Indicator NEG      Source BLD      Site PERIPHERAL      Blood Culture No growth after 5 days of incubation.     Narrative:      Per Hospital Policy: Only change Specimen Src: to \"Line\" if  specified by physician order.            Assessment:  Active Hospital Problems    Diagnosis   • *Low back pain [M54.5]   • Hypotension [I95.9]   • Morbid obesity (CMS-HCC) [E66.01]   • Sepsis (CMS-HCC) [A41.9]   • UTI (urinary tract infection) [N39.0]   • Atrial flutter (CMS-HCC) [I48.92]       Plan:  L3-4 discitis, likely strep  Afebrile  + leukocytosis  S/p IR biopsy 7/10 - cultures NGTD  Abx as below  Anticipate 6 week course of IV abx. Stop date 08/20/17    Bacteremia, strep   Blood cultures (1/2) from 7/6/17 - viridans strep   Monitor renal function while on vancomycin Cr normal  TTE - negative. STEVE if feasible  Bcx 7/9 - NGTD  Continue vancomycin and Rocephin. Strep speciation not done    Leukocytosis, persistent  Multifactorial  Repeat imaging if continues to increase    RUE cellulitis, resolving  At prior IV site RUE    Abx per above  Local care and monitor for worsening    Rash, groin, new  Appears fungal  Severe pain with motion obviating topicals  Start fluc    Diabetes mellitus  Keep the blood sugars less than 150    Possible seizure/AMS  EEG done  Transferred back to telemtry    Dispo: Recommend SNF for IV abx      "

## 2017-07-20 PROCEDURE — A9270 NON-COVERED ITEM OR SERVICE: HCPCS | Performed by: INTERNAL MEDICINE

## 2017-07-20 PROCEDURE — 700111 HCHG RX REV CODE 636 W/ 250 OVERRIDE (IP): Performed by: HOSPITALIST

## 2017-07-20 PROCEDURE — 700102 HCHG RX REV CODE 250 W/ 637 OVERRIDE(OP): Performed by: INTERNAL MEDICINE

## 2017-07-20 PROCEDURE — A9270 NON-COVERED ITEM OR SERVICE: HCPCS | Performed by: PSYCHIATRY & NEUROLOGY

## 2017-07-20 PROCEDURE — 700111 HCHG RX REV CODE 636 W/ 250 OVERRIDE (IP): Performed by: INTERNAL MEDICINE

## 2017-07-20 PROCEDURE — 700101 HCHG RX REV CODE 250: Performed by: HOSPITALIST

## 2017-07-20 PROCEDURE — 700102 HCHG RX REV CODE 250 W/ 637 OVERRIDE(OP): Performed by: HOSPITALIST

## 2017-07-20 PROCEDURE — 36415 COLL VENOUS BLD VENIPUNCTURE: CPT

## 2017-07-20 PROCEDURE — 99233 SBSQ HOSP IP/OBS HIGH 50: CPT | Performed by: INTERNAL MEDICINE

## 2017-07-20 PROCEDURE — A9270 NON-COVERED ITEM OR SERVICE: HCPCS | Performed by: HOSPITALIST

## 2017-07-20 PROCEDURE — 87040 BLOOD CULTURE FOR BACTERIA: CPT

## 2017-07-20 PROCEDURE — 700102 HCHG RX REV CODE 250 W/ 637 OVERRIDE(OP): Performed by: PSYCHIATRY & NEUROLOGY

## 2017-07-20 PROCEDURE — 770020 HCHG ROOM/CARE - TELE (206)

## 2017-07-20 PROCEDURE — 700105 HCHG RX REV CODE 258: Performed by: INTERNAL MEDICINE

## 2017-07-20 RX ORDER — FERROUS SULFATE 325(65) MG
325 TABLET ORAL
Status: DISCONTINUED | OUTPATIENT
Start: 2017-07-20 | End: 2017-07-25 | Stop reason: HOSPADM

## 2017-07-20 RX ADMIN — TRAMADOL HYDROCHLORIDE 50 MG: 50 TABLET, COATED ORAL at 15:11

## 2017-07-20 RX ADMIN — METOPROLOL TARTRATE 50 MG: 50 TABLET, FILM COATED ORAL at 21:27

## 2017-07-20 RX ADMIN — OXYCODONE HYDROCHLORIDE 10 MG: 10 TABLET, FILM COATED, EXTENDED RELEASE ORAL at 21:26

## 2017-07-20 RX ADMIN — CEFTRIAXONE SODIUM 2 G: 2 INJECTION, POWDER, FOR SOLUTION INTRAMUSCULAR; INTRAVENOUS at 10:07

## 2017-07-20 RX ADMIN — ROSUVASTATIN CALCIUM 125 MCG: 10 TABLET, FILM COATED ORAL at 19:18

## 2017-07-20 RX ADMIN — CYANOCOBALAMIN TAB 500 MCG 1000 MCG: 500 TAB at 10:00

## 2017-07-20 RX ADMIN — HYDROMORPHONE HYDROCHLORIDE 0.5 MG: 1 INJECTION, SOLUTION INTRAMUSCULAR; INTRAVENOUS; SUBCUTANEOUS at 05:19

## 2017-07-20 RX ADMIN — NYSTATIN 1500000 UNITS: 100000 POWDER TOPICAL at 21:34

## 2017-07-20 RX ADMIN — FLUCONAZOLE 100 MG: 100 TABLET ORAL at 10:01

## 2017-07-20 RX ADMIN — LIDOCAINE 1 PATCH: 50 PATCH TOPICAL at 21:25

## 2017-07-20 RX ADMIN — METOPROLOL TARTRATE 50 MG: 50 TABLET, FILM COATED ORAL at 10:00

## 2017-07-20 RX ADMIN — ACETAMINOPHEN 650 MG: 325 TABLET, FILM COATED ORAL at 09:55

## 2017-07-20 RX ADMIN — GABAPENTIN 200 MG: 100 CAPSULE ORAL at 15:11

## 2017-07-20 RX ADMIN — VITAMIN D, TAB 1000IU (100/BT) 5000 UNITS: 25 TAB at 09:59

## 2017-07-20 RX ADMIN — NYSTATIN: 100000 POWDER TOPICAL at 09:00

## 2017-07-20 RX ADMIN — HEPARIN SODIUM 5000 UNITS: 5000 INJECTION, SOLUTION INTRAVENOUS; SUBCUTANEOUS at 21:27

## 2017-07-20 RX ADMIN — HEPARIN SODIUM 5000 UNITS: 5000 INJECTION, SOLUTION INTRAVENOUS; SUBCUTANEOUS at 05:12

## 2017-07-20 RX ADMIN — Medication 325 MG: at 19:18

## 2017-07-20 RX ADMIN — OXYCODONE HYDROCHLORIDE 10 MG: 10 TABLET, FILM COATED, EXTENDED RELEASE ORAL at 09:00

## 2017-07-20 RX ADMIN — LEVETIRACETAM 500 MG: 500 TABLET, FILM COATED ORAL at 21:25

## 2017-07-20 RX ADMIN — OMEPRAZOLE 20 MG: 20 CAPSULE, DELAYED RELEASE ORAL at 10:00

## 2017-07-20 RX ADMIN — Medication 325 MG: at 13:14

## 2017-07-20 RX ADMIN — Medication 325 MG: at 09:59

## 2017-07-20 RX ADMIN — HEPARIN SODIUM 5000 UNITS: 5000 INJECTION, SOLUTION INTRAVENOUS; SUBCUTANEOUS at 15:00

## 2017-07-20 RX ADMIN — GABAPENTIN 200 MG: 100 CAPSULE ORAL at 21:26

## 2017-07-20 RX ADMIN — OMEPRAZOLE 20 MG: 20 CAPSULE, DELAYED RELEASE ORAL at 21:26

## 2017-07-20 RX ADMIN — TAMSULOSIN HYDROCHLORIDE 0.4 MG: 0.4 CAPSULE ORAL at 09:59

## 2017-07-20 RX ADMIN — GABAPENTIN 200 MG: 100 CAPSULE ORAL at 10:00

## 2017-07-20 RX ADMIN — ATORVASTATIN CALCIUM 10 MG: 10 TABLET, FILM COATED ORAL at 21:25

## 2017-07-20 RX ADMIN — LEVETIRACETAM 500 MG: 500 TABLET, FILM COATED ORAL at 10:01

## 2017-07-20 ASSESSMENT — ENCOUNTER SYMPTOMS
SHORTNESS OF BREATH: 0
ABDOMINAL PAIN: 0
DIZZINESS: 0
NAUSEA: 0
MYALGIAS: 1
BACK PAIN: 1
VOMITING: 0
DIARRHEA: 0
TINGLING: 0
CHILLS: 0
FEVER: 0
COUGH: 0

## 2017-07-20 ASSESSMENT — PAIN SCALES - GENERAL
PAINLEVEL_OUTOF10: 0
PAINLEVEL_OUTOF10: 10

## 2017-07-20 NOTE — DISCHARGE PLANNING
Medical Social Work    Received notification that pt is accepted at [facility]. [facility] requesting updated PT notes, since pt declined last PT session.

## 2017-07-20 NOTE — CARE PLAN
Problem: Skin Integrity  Goal: Risk for impaired skin integrity will decrease  Outcome: PROGRESSING SLOWER THAN EXPECTED  Pt skin integrity is now complicated by suspected Vanco reaction. ID MD Varner has discontinued this ABX. Will continue to topically treat for severe perineal dermatitis with Nystatin and Zinc paste.

## 2017-07-20 NOTE — PROGRESS NOTES
"Pt is better able to express needs this a.m. Able to answer orientation questions appropriately. Pt states  POC is \"reasonable considering the pain\" he is in.   "

## 2017-07-20 NOTE — PROGRESS NOTES
Patient asks this RN for help with urinal. Attempted to help patient use urinal. Patient slaps this RN's arm and pours urine on himself. Patient alert to self only at this time. Patient cannot tell RN where he is or the year at this time.

## 2017-07-20 NOTE — PROGRESS NOTES
Infectious Disease Progress Note    Author: Shante Sequeira M.D. Date of service & Time created: 2017  2:41 PM    Chief Complaint:  Chief Complaint   Patient presents with   • Back Pain    follow-up for lumbar discitis and bacteremia    Interval History:  86-year-old white male admitted for back pain. Found to have discitis  17- no fevers. WBC 10.3. Ongoing back pain. Sedimentation rate is 46. Now the blood cultures are positive for strep species. Also has new nausea and vomiting today.   7/10 AF, WBC 16.5, having nausea and vomiting, ongoing back pain, plan for bone biopsy today, tolerating abx without issues   AF, WBC 13.3, more pain after biopsy, feels flushed, having runny stools on bowel regimen   AF WBC 15.3 transferred to ICU due to concern for seizure-obtunded.   AF alert and eating lunch Has some pain-controlled. Denies SE abx   AF WBC 14.7 no new complaints  7/15 AF, no CBC, has not gotten OOB, denies any back pain    AF, WBC 21.9, having urinary incontinence   AF, no CBC, working on [employer], having low blood pressures today, were elevated yesterday, no BM in a few days   AF, WBC 20, feels better this am, had breakfast, denies any diarrhea   AF c/o severe pain in back dolores with movement   AF WBC 19 continued c/o back pain with any movement  Labs Reviewed, Medications Reviewed and Radiology Reviewed.    Review of Systems:  Review of Systems   Constitutional: Negative for fever and chills.   Cardiovascular: Negative for chest pain.   Gastrointestinal: Negative for nausea, vomiting, abdominal pain and diarrhea.   Genitourinary:        Urinary incontinence   Musculoskeletal: Positive for myalgias, back pain and joint pain.   Skin: Positive for rash.     Limited due to pain  Hemodynamics:  Temp (24hrs), Av.3 °C (97.4 °F), Min:35.8 °C (96.5 °F), Max:36.7 °C (98.1 °F)  Temperature: 36 °C (96.8 °F)  Pulse  Av.8  Min: 34  Max: 154   Blood Pressure : 115/58  mmHg       Physical Exam:  Physical Exam   Constitutional: He appears well-developed and well-nourished.   Obese   HENT:   Head: Normocephalic and atraumatic.   Eyes: EOM are normal. Pupils are equal, round, and reactive to light. No scleral icterus.   Neck: Neck supple.   Cardiovascular: Normal rate.    Irregularly irregular     Pulmonary/Chest: Effort normal and breath sounds normal. No respiratory distress. He has no wheezes.   Abdominal: Soft. He exhibits no distension. There is no tenderness.   Musculoskeletal: He exhibits no edema.   LUE PICC nontender   Neurological: He is alert.   Skin: Skin is warm. Rash noted. There is erythema.   RUE forearm-decreased erythema  Groin and buttocks erythema    Erythematous blanching rash abd, back, neck   Nursing note and vitals reviewed.      Meds:    Current facility-administered medications:   •  ferrous sulfate tablet 325 mg, 325 mg, Oral, TID WITH MEALS, Johanne Chua M.D., 325 mg at 07/20/17 1314  •  vancomycin 1,200 mg in  mL IVPB, 12 mg/kg, Intravenous, Q24HR, Chrystal Nash, PHARMD  •  tramadol (ULTRAM) 50 MG tablet 50 mg, 50 mg, Oral, Q6HRS PRN, Johanne Chua M.D.  •  fluconazole (DIFLUCAN) tablet 100 mg, 100 mg, Oral, DAILY, Shante Sequeira M.D., 100 mg at 07/20/17 1001  •  lidocaine (LIDODERM) 5 % 1 Patch, 1 Patch, Transdermal, Q24HR, Yaya Ragland D.OIsrael, 1 Patch at 07/19/17 2217  •  tamsulosin (FLOMAX) capsule 0.4 mg, 0.4 mg, Oral, AFTER BREAKFAST, Kirby Mayo M.D., 0.4 mg at 07/20/17 0959  •  oxyCODONE CR (OXYCONTIN) tablet 10 mg, 10 mg, Oral, Q12HRS, Kirby Mayo M.D., 10 mg at 07/20/17 0900  •  metoprolol (LOPRESSOR) tablet 50 mg, 50 mg, Oral, TWICE DAILY, Kirby Mayo M.D., 50 mg at 07/20/17 1000  •  nystatin (MYCOSTATIN) powder, , Topical, BID, Kirby Mayo M.D.  •  levetiracetam (KEPPRA) tablet 500 mg, 500 mg, Oral, BID, Allan Win M.D., 500 mg at 07/20/17 1001  •  [DISCONTINUED] senna-docusate (PERICOLACE or  SENOKOT S) 8.6-50 MG per tablet 2 Tab, 2 Tab, Oral, BID, Stopped at 17 2100 **AND** polyethylene glycol/lytes (MIRALAX) PACKET 1 Packet, 1 Packet, Oral, QDAY PRN, 1 Packet at 17 0753 **AND** magnesium hydroxide (MILK OF MAGNESIA) suspension 30 mL, 30 mL, Oral, QDAY PRN, 30 mL at 17 1751 **AND** bisacodyl (DULCOLAX) suppository 10 mg, 10 mg, Rectal, QDAY PRN, Kirby Mayo M.D.  •  metoprolol (LOPRESSOR) injection 5 mg, 5 mg, Intravenous, Q5 MIN PRN, Collin White M.D., 5 mg at 17 1213  •  heparin injection 5,000 Units, 5,000 Units, Subcutaneous, Q8HRS, Collin White M.D., 5,000 Units at 17 0512  •  PICC Line Insertion has been implemented, , , Once **AND** May use Lidocaine 1% not to exceed 3 mls for local at insertion site, , , CONTINUOUS **AND** NOTIFY MD, , , Once **AND** Tip to dwell in the superior vena cava, , , CONTINUOUS **AND** Do not use PICC Line until placement verified by Chest X Ray, , , CONTINUOUS **AND** DX-CHEST-FOR PICC LINE Perform procedure in:: Patient's Room, , , Once **AND** If radiologist reading of chest X-ray states any of the following the PICC should be used, , , CONTINUOUS **AND** Further evaluation of the PICC placement can be retrieved from X-Ray and Imaging, , , CONTINUOUS **AND** Blood draws through PICC line; draws by RN only, , , CONTINUOUS **AND** FLUSHING GUIDELINES WHEN IN USE, , , CONTINUOUS **AND** normal saline PF 10-20 mL, 10-20 mL, Intravenous, PRN **AND** FLUSHING GUIDELINES WHEN NOT IN USE, , , CONTINUOUS **AND** DRESSING MAINTENANCE, , , Once **AND** Change needleless pressure ports and IV tubing every 72 hours per hospital policy, , , CONTINUOUS **AND** TUBING, , , CONTINUOUS **AND** If there is an MD order to remove the PICC line, any RN may remove the PICC line, , , CONTINUOUS **AND** [] PATIENT EDUCATION MATERIALS, , , Prior to discharge **AND** NURSING COMMUNICATION, , , CONTINUOUS, Ny Chilel M.D.  •  gabapentin  (NEURONTIN) capsule 200 mg, 200 mg, Oral, TID, Georgi Grey M.D., 200 mg at 07/20/17 1000  •  Notify provider if pain remains uncontrolled, , , CONTINUOUS **AND** Use the numeric rating scale (NRS-11) on regular floors and Critical-Care Pain Observation Tool (CPOT) on ICUs/Trauma to assess pain, , , CONTINUOUS **AND** Pulse Ox (Oximetry), , , CONTINUOUS **AND** [DISCONTINUED] Pharmacy Consult Request ...Pain Management Review, , Other, PRN **AND** If patient difficult to arouse and/or has respiratory depression, stop any opiates that are currently infusing and call a Rapid Response., , , CONTINUOUS **AND** [DISCONTINUED] oxycodone immediate-release (ROXICODONE) tablet 2.5 mg, 2.5 mg, Oral, Q3HRS PRN, 2.5 mg at 07/11/17 1802 **AND** [DISCONTINUED] oxycodone immediate-release (ROXICODONE) tablet 5 mg, 5 mg, Oral, Q3HRS PRN, 5 mg at 07/13/17 2311 **AND** HYDROmorphone (DILAUDID) injection 0.5 mg, 0.5 mg, Intravenous, Q3HRS PRN, Georgi Grey M.D., 0.5 mg at 07/20/17 0519  •  digoxin (LANOXIN) tablet 125 mcg, 125 mcg, Oral, DAILY AT 1800, Georgi Grey M.D., 125 mcg at 07/19/17 1614  •  ondansetron (ZOFRAN) syringe/vial injection 4 mg, 4 mg, Intravenous, Q6HRS PRN, Georgi Grey M.D., 4 mg at 07/15/17 0824  •  omeprazole (PRILOSEC) capsule 20 mg, 20 mg, Oral, BID, Georgi Grey M.D., 20 mg at 07/20/17 1000  •  MD ALERT... vancomycin per pharmacy protocol, , Other, pharmacy to dose, Oliva Bolanos M.D.  •  cefTRIAXone (ROCEPHIN) 2 g in  mL IVPB, 2 g, Intravenous, Q24HRS, Oliva Bolanos M.D., Stopped at 07/20/17 1037  •  hydrALAZINE (APRESOLINE) tablet 10 mg, 10 mg, Oral, Q4HRS PRN, Jamaal Molina M.D., 10 mg at 07/09/17 2038  •  clonidine (CATAPRES) tablet 0.1 mg, 0.1 mg, Oral, 4X/DAY PRN, Jamaal Molina M.D.  •  prochlorperazine (COMPAZINE) injection 10 mg, 10 mg, Intravenous, Q6HRS PRN, Darion Benton M.D., 10 mg at 07/10/17 0905  •  pneumococcal 13-Jocleyn Conj Vacc (PREVNAR 13) syringe 0.5  mL, 0.5 mL, Intramuscular, Once PRN, Jamaal Molina M.D., Stopped at 07/16/17 2032  •  atorvastatin (LIPITOR) tablet 10 mg, 10 mg, Oral, Nightly, Jamaal Molina M.D., 10 mg at 07/19/17 2209  •  vitamin D (cholecalciferol) tablet 5,000 Units, 5,000 Units, Oral, Q48HRS, Jamaal Molina M.D., 5,000 Units at 07/20/17 0959  •  cyanocobalamin (VITAMIN B-12) tablet 1,000 mcg, 1,000 mcg, Oral, DAILY, Jamaal Molina M.D., 1,000 mcg at 07/20/17 1000  •  acetaminophen (TYLENOL) tablet 650 mg, 650 mg, Oral, Q6HRS PRN, Jamaal Molina M.D., 650 mg at 07/20/17 0955    Labs:  Recent Labs      07/18/17   0530  07/19/17   0400   WBC  20.4*  19.5*   RBC  4.34*  4.10*   HEMOGLOBIN  12.6*  12.0*   HEMATOCRIT  39.2*  37.0*   MCV  90.3  90.2   MCH  29.0  29.3   RDW  43.1  43.4   PLATELETCT  223  251   MPV  10.1  9.9   NEUTSPOLYS  80.00*   --    LYMPHOCYTES  7.30*   --    MONOCYTES  9.20   --    EOSINOPHILS  1.70   --    BASOPHILS  0.40   --      Recent Labs      07/18/17   0430  07/19/17   0400   SODIUM  133*  137   POTASSIUM  4.0  3.7   CHLORIDE  100  102   CO2  28  29   GLUCOSE  92  101*   BUN  18  15     Recent Labs      07/18/17   0430  07/19/17   0400   CREATININE  1.10  0.67       Imaging:  Ct-abdomen-pelvis W/o  7/6/2017  No hydronephrosis or urolithiasis. Diverticula colon. No evidence diverticulitis. No free fluid. Pancreatic calcifications/consistent with sequela pancreatitis. No peripancreatic fluid collections. Coronary artery calcifications.    Ct-lspine W/o Plus Recons  7/6/2017  1.  No evidence of acute fracture of the lumbar spine. 2.  Surgical change extending from L3 through L5. 3.  Multilevel degenerative disc disease and facet degeneration. 4.  Old fractures of the right L1 and L2 transverse processes. 5.  Multilevel degenerative subluxation.    Mr-lumbar Spine-with & W/o  7/8/2017  1.  Possible discitis at the L3-4 level. 2.  Mild to moderate retrolisthesis at the L3-4 level. 3.  Previous extensive laminectomy  "from the L2 level to the sacrum with bilateral pedicle screw and zev fixation at the L4-L5 levels. 4.   Moderate lumbar spondylotic changes at the L3-4 levels with associated moderate central canal stenosis at this level secondary to facet arthropathy. Additionally there are severe bilateral neural foraminal stenosis. 5.  Minimal lumbar spondylotic changes at the L2-3 level with mild bilateral neural foraminal narrowing and mild disc bulging into the inferior aspects of the neural foramina.    Dx-hip-bilateral-with Pelvis-3/4 Views  7/6/2017  No evidence of fracture or arthropathy of the hips. Degenerative and surgical changes of the lumbar spine.      Micro:  BLOOD CULTURE   Date Value Ref Range Status   07/09/2017 No growth after 5 days of incubation.  Final   07/09/2017 No growth after 5 days of incubation.  Final      Results     Procedure Component Value Units Date/Time    BLOOD CULTURE [[id number]] Collected:  07/20/17 0954    Order Status:  Completed Specimen Information:  Blood from Peripheral Updated:  07/20/17 1252    Narrative:      Per Hospital Policy: Only change Specimen Src: to \"Line\" if  specified by physician order.    BLOOD CULTURE [[id number]] Collected:  07/20/17 0941    Order Status:  Completed Specimen Information:  Blood from Peripheral Updated:  07/20/17 1251    Narrative:      Per Hospital Policy: Only change Specimen Src: to \"Line\" if  specified by physician order.    URINALYSIS [[id number]]  (Abnormal) Collected:  07/15/17 1620    Order Status:  Completed Specimen Information:  Urine from Urine, Clean Catch Updated:  07/16/17 0849     Color Yellow      Character Clear      Specific Gravity 1.010      Ph 6.5      Glucose Negative mg/dL      Ketones Negative mg/dL      Protein Negative mg/dL      Bilirubin Negative      Urobilinogen, Urine 0.2      Nitrite Negative      Leukocyte Esterase Trace (A)      Occult Blood Negative      Micro Urine Req Microscopic     CULTURE TISSUE W/ GRM STAIN " "[[id number]] Collected:  07/10/17 1445    Order Status:  Completed Specimen Information:  Tissue Updated:  07/15/17 1413     Gram Stain Result No organisms seen.      Significant Indicator NEG      Source TISS      Site Saline from L3 L4 Disk      Tissue Culture No growth at 72 hours.     FUNGAL CULTURE [[id number]] Collected:  07/10/17 1445    Order Status:  Completed Specimen Information:  Tissue Updated:  07/15/17 1413     Significant Indicator NEG      Source TISS      Site Saline from L3 L4 Disk      Fungal Culture Culture in progress.     ANAEROBIC CULTURE [[id number]] Collected:  07/10/17 1445    Order Status:  Completed Specimen Information:  Tissue Updated:  07/15/17 1413     Significant Indicator NEG      Source TISS      Site Saline from L3 L4 Disk      Anaerobic Culture, Culture Res No Anaerobes isolated.     BLOOD CULTURE [[id number]] Collected:  07/09/17 1646    Order Status:  Completed Specimen Information:  Blood from Peripheral Updated:  07/14/17 2100     Significant Indicator NEG      Source BLD      Site PERIPHERAL      Blood Culture No growth after 5 days of incubation.     Narrative:      Per Hospital Policy: Only change Specimen Src: to \"Line\" if  specified by physician order.    BLOOD CULTURE [[id number]] Collected:  07/09/17 1646    Order Status:  Completed Specimen Information:  Blood from Peripheral Updated:  07/14/17 2100     Significant Indicator NEG      Source BLD      Site PERIPHERAL      Blood Culture No growth after 5 days of incubation.     Narrative:      Per Hospital Policy: Only change Specimen Src: to \"Line\" if  specified by physician order.            Assessment:  Active Hospital Problems    Diagnosis   • *Low back pain [M54.5]   • Hypotension [I95.9]   • Morbid obesity (CMS-[facility]) [E66.01]   • Sepsis (CMS-[facility]) [A41.9]   • UTI (urinary tract infection) [N39.0]   • Atrial flutter (CMS-[facility]) [I48.92]       Plan:  L3-4 discitis, likely strep  Afebrile  + leukocytosis  S/p IR biopsy 7/10 - " cultures NGTD  Abx as below  Anticipate 6 week course of IV abx. Stop date 08/20/17    Bacteremia, strep   Blood cultures (1/2) from 7/6/17 - viridans strep   TTE - negative. STEVE if feasible  Bcx 7/9 - NGTD  Continue Rocephin. Strep speciation not done    Leukocytosis, persistent  Multifactorial  Repeat imaging if continues to increase    Rash, new  Increased back, neck, abd  Dc vanco and monitor for resolution    Rash, groin, not improved  Appears fungal  Severe pain with motion obviating topicals  Started fluc    RUE cellulitis, resolving  At prior IV site RUE    Abx per above  Local care and monitor for worsening    Diabetes mellitus  Keep the blood sugars less than 150    Possible seizure/AMS  EEG done-no obvious seizure  Transfer back to telemtry    Dispo: Recommend SNF for IV abx

## 2017-07-20 NOTE — THERAPY
"Pt declined to try to get oob 2* to pain in low back, RN was present and disucussed pain management  and med timing, Pt declined to get OOBB and stated emphatically\" Just shoot me\"...  "

## 2017-07-20 NOTE — DISCHARGE PLANNING
Transitional Care Navigator:    Met with pt at bedside to discuss transitional care services. Discussed SNF level of care as recommended by therapy. Pt is agreeable but would like this TCN to contact his son-in-law, who is his only living relative. Contacted pt's JAMES, who is agreeable with plan and has chosen [facility]. Choice form completed and faxed to [facility]. IMM letter reviewed and acknowledged.

## 2017-07-20 NOTE — PROGRESS NOTES
[facility] Hospitalist Progress Note    Date of Service: 2017    Chief Complaint  86 y.o. male admitted 2017 with L3-L4 discitis and Strep bacteremia on abx until .  During admit had complication seizure transferred out of ICU on     Interval Problem Update  Patient alert awake oriented ×3. Patient resting in bed. No acute events overnight. PT/OT evaluated the patient and recommend SNF placement. Referral placed. Patient noted to have iron deficiency, patient started on replacement therapy.    Consultants/Specialty  Neurology: Dr Win, signed off  Neurosurgery: Signed off  Infectious Disease: Dr Chilel    Disposition  SNF referral placed for ongoing rehabilitation therapy and IV antibiotics. Social work assisting        Review of Systems   Constitutional: Negative for fever and chills.   Respiratory: Negative for cough and shortness of breath.    Cardiovascular: Negative for chest pain and leg swelling.   Gastrointestinal: Negative for nausea, vomiting and abdominal pain.   Musculoskeletal: Negative for joint pain.   Neurological: Negative for dizziness and tingling.      Physical Exam  Laboratory/Imaging   Hemodynamics  Temp (24hrs), Av.4 °C (97.5 °F), Min:35.8 °C (96.5 °F), Max:36.7 °C (98.1 °F)   Temperature: 35.8 °C (96.5 °F)  Pulse  Av.8  Min: 34  Max: 154    Blood Pressure : 108/60 mmHg      Respiratory      Respiration: 19, Pulse Oximetry: 98 %     Work Of Breathing / Effort: Shallow;Mild  RUL Breath Sounds: Clear, RML Breath Sounds: Clear, RLL Breath Sounds: Diminished, MIKE Breath Sounds: Clear, LLL Breath Sounds: Diminished    Fluids    Intake/Output Summary (Last 24 hours) at 17 0857  Last data filed at 17 0900   Gross per 24 hour   Intake    100 ml   Output      0 ml   Net    100 ml       Nutrition  Orders Placed This Encounter   Procedures   • DIET ORDER     Standing Status: Standing      Number of Occurrences: 1      Standing Expiration Date:      Order Specific  Question:  Diet:     Answer:  Regular [1]     Physical Exam   Constitutional: He is oriented to person, place, and time. He appears well-developed and well-nourished. No distress.   HENT:   Head: Normocephalic and atraumatic.   Nose: Nose normal.   Mouth/Throat: Oropharynx is clear and moist.   Eyes: Conjunctivae are normal. Right eye exhibits no discharge. Left eye exhibits no discharge. No scleral icterus.   Neck: No JVD present. No tracheal deviation present.   Cardiovascular: Normal rate, regular rhythm, normal heart sounds and intact distal pulses.    No murmur heard.  Pulmonary/Chest: Effort normal and breath sounds normal. No respiratory distress. He has no wheezes. He has no rales.   Abdominal: Soft. Bowel sounds are normal. He exhibits no distension. There is no tenderness.   Genitourinary:   Scrotal edema improving    Musculoskeletal: He exhibits no edema.   LUE PICC    Neurological: He is alert and oriented to person, place, and time. No cranial nerve deficit.   Skin: Skin is warm. He is not diaphoretic.   Psychiatric: He has a normal mood and affect. His speech is normal. Thought content normal. He is slowed. Cognition and memory are impaired.   Nursing note and vitals reviewed.      Recent Labs      07/18/17   0530  07/19/17   0400   WBC  20.4*  19.5*   RBC  4.34*  4.10*   HEMOGLOBIN  12.6*  12.0*   HEMATOCRIT  39.2*  37.0*   MCV  90.3  90.2   MCH  29.0  29.3   MCHC  32.1*  32.4*   RDW  43.1  43.4   PLATELETCT  223  251   MPV  10.1  9.9     Recent Labs      07/18/17   0430  07/19/17   0400   SODIUM  133*  137   POTASSIUM  4.0  3.7   CHLORIDE  100  102   CO2  28  29   GLUCOSE  92  101*   BUN  18  15   CREATININE  1.10  0.67   CALCIUM  9.4  9.6                      Assessment/Plan     * Low back pain (present on admission)  Assessment & Plan  Concern of worsening dementia with the oxycodone, dc'ed oxycodone, on tramadol for pain management  Physical therapy, occupational therapy  Additional nonnarcotic  pain medication such as Lidoderm patch and Neurontin    Bacteremia  Assessment & Plan  Strep viridans, repeat blood cx NGTD, PICC in place  ID following  On IV vanco and ceftriaxone will need 6 weeks course until 8/20    Discitis of lumbar region  Assessment & Plan  At L3-L4 level  S/p Bc Cx remains neg  Continue current abx per ID to continue through August 20  Will likely need LTAC.  Wound care physical therapy    Atrial flutter (CMS-[facility]) (present on admission)  Assessment & Plan  Improved rate control Continue digoxin and metoprolol  Patient not on chronic anticoagulation given previous history of hematoma and falls will need to reevaluate after improvement of pain control and mobilization    Sepsis (CMS-[facility]) (present on admission)  Assessment & Plan  Resolved          UTI (urinary tract infection) (present on admission)  Assessment & Plan  Resolved      Hypomagnesemia  Assessment & Plan  Resolved, monitor     Acute encephalopathy  Assessment & Plan  Resolved no acute pathology on MRI or EEG, improving   Empirically started on Keppra per neurology, Dr Win recommend outpt  follow up to review need for continued AED  Per pt's son in law pt AAOX4 at baseline   Palliative following    Shock (CMS-[facility])  Assessment & Plan  Resolved    Benign prostatic hyperplasia with lower urinary tract symptoms  Assessment & Plan  flomax    Leukocytosis  Assessment & Plan  Secondary to strep.viridans bacteremia and discitis of L3-L4  Trending down, CTM     Normocytic anemia  Assessment & Plan  2/2 to iron def, started on replacement therapy  Vitamin b12/folate, TSH wnl      Scrotal edema  Assessment & Plan  Likely dependent, scrotal sling ordered      Labs reviewed and Medications reviewed  Hearn catheter: No Hearn      DVT Prophylaxis: Heparin    Ulcer prophylaxis: Not indicated  Antibiotics: Treating active infection/contamination beyond 24 hours perioperative coverage

## 2017-07-20 NOTE — CARE PLAN
Problem: Safety  Goal: Will remain free from injury  Outcome: PROGRESSING AS EXPECTED  Pt is better able to follow commands and participate in ADLs today

## 2017-07-20 NOTE — DISCHARGE PLANNING
Received choice form from STEPHEN Saldana for SNF. Referral sent to [facility] at 1040.

## 2017-07-20 NOTE — PROGRESS NOTES
Two RN skin check completed with CARL Nash. Rash noted to right upper arm, redness to right forearm. Buttocks and scrotum excoriated r/t incontinence. Generalized bruising noted. Ears red but blanching. Waffle mattress in place, barrier cream applied.

## 2017-07-20 NOTE — PROGRESS NOTES
Report completed.  Assumed pt care.  Pt AAO x4, resting in bed comfortably with no signs of labored breathing.  Pt tele monitor in place, cardiac rhythm being monitored.  Pt call light within reach, bed in low position, non skid socks in place.  Pt denies any pain or other distress at this time.

## 2017-07-20 NOTE — PROGRESS NOTES
Discussed with ID MD Varner regarding systemic rash on pt body. I non-administered the morning dose of Vanco in response to these findings, per MD

## 2017-07-20 NOTE — PROGRESS NOTES
"Pharmacy Kinetics 86 y.o. male on vancomycin day # 12 2017    Currently on Vancomycin : Vancomycin held for accumulation     Indication for Treatment: L3-4 discitis/viridans strep bacteremia    Pertinent history per medical record: Admitted on 2017 for worsening back pain and meeting SIRS criteria. Found to have diskitis at L3-4, blood culture positive for viridans strep. Patient on antibiotics planned x6 weeks per ID recommendations, stop date 17.    Other antibiotics: Ceftriaxone 2g IV q24h     Allergies: Review of patient's allergies indicates no known allergies.     List concerns for renal function: Age, Obesity (BMI 31)     Pertinent cultures to date:   7/10/17: L3-L4 disc biopsy = NGTD  17: peripheral blood cx X2 = NGTD  17: peripheral blood cx = Viridans strep in 1/2 sets    17: urine cx = Negative    Recent Labs      17   0530  17   0400   WBC  20.4*  19.5*   NEUTSPOLYS  80.00*   --      Recent Labs      17   0430  17   0400   BUN  18  15   CREATININE  1.10  0.67     Recent Labs      17   1430  17   1712   VANCOTROUGH  36.0*  18.2   No intake or output data in the 24 hours ending 17 1123   Blood pressure 108/60, pulse 74, temperature 35.8 °C (96.5 °F), temperature source Temporal, resp. rate 19, height 1.803 m (5' 11\"), weight 98.4 kg (216 lb 14.9 oz), SpO2 98 %. Temp (24hrs), Av.4 °C (97.5 °F), Min:35.8 °C (96.5 °F), Max:36.7 °C (98.1 °F)      A/P   1. Vancomycin dose change: Re-initiated maintenance dose of 1,200mg (12mg/kg) IV q24h (0900)  2. Next vancomycin level: Friday,  @0830   3. Goal trough: 16 - 20 mcg/mL   4. Comments: Vancomycin random level resulted at 18.2 mcg/mL yesterday evening ( @1712). Based on continued clearance into goal range as outlined above, re-initiated vancomycin maintenance dosing at q24h interval with a significant dose reduction from 2,000mg to 1,200mg (12mg/kg). Will continue to monitor " vancomycin levels more closely until patient is stabilized on dosing regimen. Renal indices improved from 7/18 to 7/19 with SCr trending downward which will likely result in increased clearance of vancomycin and ability to tolerate maintenance dosing.     VIKA TorresD

## 2017-07-20 NOTE — CARE PLAN
Problem: Safety  Goal: Will remain free from injury  Intervention: Provide assistance with mobility    07/20/17 0121   OTHER   Assistance / Tolerance Assistance of Two or More     RN educated patient regarding the risk of injury due to falls while patient is in the hospital setting.  Patient verbalized understanding of education and denies any questions at this time.        Problem: Skin Integrity  Goal: Risk for impaired skin integrity will decrease  Intervention: Assess and monitor skin integrity, appearance and/or temperature  RN educated patient regarding the importance of regular movement to decrease the risk of pressure ulcer formation and facilitate healing of excoriated skin.  Patient verbalized understanding of education and denies any questions at this time.

## 2017-07-21 PROBLEM — L27.0 DRUG RASH: Status: ACTIVE | Noted: 2017-07-21

## 2017-07-21 PROBLEM — L27.0 RED MAN SYNDROME: Status: RESOLVED | Noted: 2017-07-21 | Resolved: 2017-07-21

## 2017-07-21 PROBLEM — L27.0 RED MAN SYNDROME: Status: ACTIVE | Noted: 2017-07-21

## 2017-07-21 LAB
ANION GAP SERPL CALC-SCNC: 4 MMOL/L (ref 0–11.9)
BUN SERPL-MCNC: 12 MG/DL (ref 8–22)
CALCIUM SERPL-MCNC: 10.1 MG/DL (ref 8.5–10.5)
CHLORIDE SERPL-SCNC: 102 MMOL/L (ref 96–112)
CO2 SERPL-SCNC: 31 MMOL/L (ref 20–33)
CREAT SERPL-MCNC: 0.77 MG/DL (ref 0.5–1.4)
ERYTHROCYTE [DISTWIDTH] IN BLOOD BY AUTOMATED COUNT: 43.4 FL (ref 35.9–50)
GFR SERPL CREATININE-BSD FRML MDRD: >60 ML/MIN/1.73 M 2
GLUCOSE SERPL-MCNC: 138 MG/DL (ref 65–99)
HCT VFR BLD AUTO: 37.4 % (ref 42–52)
HGB BLD-MCNC: 12.2 G/DL (ref 14–18)
MCH RBC QN AUTO: 29.5 PG (ref 27–33)
MCHC RBC AUTO-ENTMCNC: 32.6 G/DL (ref 33.7–35.3)
MCV RBC AUTO: 90.6 FL (ref 81.4–97.8)
PLATELET # BLD AUTO: 278 K/UL (ref 164–446)
PMV BLD AUTO: 10 FL (ref 9–12.9)
POTASSIUM SERPL-SCNC: 3.7 MMOL/L (ref 3.6–5.5)
RBC # BLD AUTO: 4.13 M/UL (ref 4.7–6.1)
SODIUM SERPL-SCNC: 137 MMOL/L (ref 135–145)
WBC # BLD AUTO: 17.6 K/UL (ref 4.8–10.8)

## 2017-07-21 PROCEDURE — A9270 NON-COVERED ITEM OR SERVICE: HCPCS | Performed by: INTERNAL MEDICINE

## 2017-07-21 PROCEDURE — A9270 NON-COVERED ITEM OR SERVICE: HCPCS | Performed by: HOSPITALIST

## 2017-07-21 PROCEDURE — A9270 NON-COVERED ITEM OR SERVICE: HCPCS | Performed by: PSYCHIATRY & NEUROLOGY

## 2017-07-21 PROCEDURE — 770020 HCHG ROOM/CARE - TELE (206)

## 2017-07-21 PROCEDURE — 700102 HCHG RX REV CODE 250 W/ 637 OVERRIDE(OP): Performed by: INTERNAL MEDICINE

## 2017-07-21 PROCEDURE — 80048 BASIC METABOLIC PNL TOTAL CA: CPT

## 2017-07-21 PROCEDURE — 700102 HCHG RX REV CODE 250 W/ 637 OVERRIDE(OP): Performed by: HOSPITALIST

## 2017-07-21 PROCEDURE — 99233 SBSQ HOSP IP/OBS HIGH 50: CPT | Performed by: INTERNAL MEDICINE

## 2017-07-21 PROCEDURE — 85027 COMPLETE CBC AUTOMATED: CPT

## 2017-07-21 PROCEDURE — 700102 HCHG RX REV CODE 250 W/ 637 OVERRIDE(OP): Performed by: PSYCHIATRY & NEUROLOGY

## 2017-07-21 PROCEDURE — 700111 HCHG RX REV CODE 636 W/ 250 OVERRIDE (IP): Performed by: INTERNAL MEDICINE

## 2017-07-21 PROCEDURE — 700101 HCHG RX REV CODE 250: Performed by: HOSPITALIST

## 2017-07-21 PROCEDURE — 700105 HCHG RX REV CODE 258: Performed by: INTERNAL MEDICINE

## 2017-07-21 RX ORDER — DIPHENHYDRAMINE HCL 25 MG
25 TABLET ORAL EVERY 6 HOURS PRN
Status: DISCONTINUED | OUTPATIENT
Start: 2017-07-21 | End: 2017-07-22

## 2017-07-21 RX ADMIN — LEVETIRACETAM 500 MG: 500 TABLET, FILM COATED ORAL at 08:45

## 2017-07-21 RX ADMIN — TAMSULOSIN HYDROCHLORIDE 0.4 MG: 0.4 CAPSULE ORAL at 08:45

## 2017-07-21 RX ADMIN — GABAPENTIN 200 MG: 100 CAPSULE ORAL at 21:40

## 2017-07-21 RX ADMIN — NYSTATIN 1500000 UNITS: 100000 POWDER TOPICAL at 21:39

## 2017-07-21 RX ADMIN — ROSUVASTATIN CALCIUM 125 MCG: 10 TABLET, FILM COATED ORAL at 17:45

## 2017-07-21 RX ADMIN — TRAMADOL HYDROCHLORIDE 50 MG: 50 TABLET, COATED ORAL at 11:04

## 2017-07-21 RX ADMIN — GABAPENTIN 200 MG: 100 CAPSULE ORAL at 14:51

## 2017-07-21 RX ADMIN — METOPROLOL TARTRATE 50 MG: 50 TABLET, FILM COATED ORAL at 08:45

## 2017-07-21 RX ADMIN — DIPHENHYDRAMINE HCL 25 MG: 25 TABLET ORAL at 08:45

## 2017-07-21 RX ADMIN — DIPHENHYDRAMINE HCL 25 MG: 25 TABLET ORAL at 21:47

## 2017-07-21 RX ADMIN — FLUCONAZOLE 100 MG: 100 TABLET ORAL at 08:45

## 2017-07-21 RX ADMIN — Medication 325 MG: at 17:45

## 2017-07-21 RX ADMIN — CYANOCOBALAMIN TAB 500 MCG 1000 MCG: 500 TAB at 08:45

## 2017-07-21 RX ADMIN — NYSTATIN 1500000 UNITS: 100000 POWDER TOPICAL at 08:47

## 2017-07-21 RX ADMIN — METOPROLOL TARTRATE 50 MG: 50 TABLET, FILM COATED ORAL at 21:39

## 2017-07-21 RX ADMIN — ATORVASTATIN CALCIUM 10 MG: 10 TABLET, FILM COATED ORAL at 21:44

## 2017-07-21 RX ADMIN — OMEPRAZOLE 20 MG: 20 CAPSULE, DELAYED RELEASE ORAL at 21:39

## 2017-07-21 RX ADMIN — LIDOCAINE 1 PATCH: 50 PATCH TOPICAL at 21:39

## 2017-07-21 RX ADMIN — OXYCODONE HYDROCHLORIDE 10 MG: 10 TABLET, FILM COATED, EXTENDED RELEASE ORAL at 21:43

## 2017-07-21 RX ADMIN — GABAPENTIN 200 MG: 100 CAPSULE ORAL at 08:45

## 2017-07-21 RX ADMIN — Medication 325 MG: at 08:45

## 2017-07-21 RX ADMIN — OMEPRAZOLE 20 MG: 20 CAPSULE, DELAYED RELEASE ORAL at 08:45

## 2017-07-21 RX ADMIN — HEPARIN SODIUM 5000 UNITS: 5000 INJECTION, SOLUTION INTRAVENOUS; SUBCUTANEOUS at 05:58

## 2017-07-21 RX ADMIN — OXYCODONE HYDROCHLORIDE 10 MG: 10 TABLET, FILM COATED, EXTENDED RELEASE ORAL at 08:45

## 2017-07-21 RX ADMIN — HEPARIN SODIUM 5000 UNITS: 5000 INJECTION, SOLUTION INTRAVENOUS; SUBCUTANEOUS at 14:51

## 2017-07-21 RX ADMIN — Medication 325 MG: at 11:11

## 2017-07-21 RX ADMIN — HEPARIN SODIUM 5000 UNITS: 5000 INJECTION, SOLUTION INTRAVENOUS; SUBCUTANEOUS at 21:44

## 2017-07-21 RX ADMIN — LEVETIRACETAM 500 MG: 500 TABLET, FILM COATED ORAL at 21:44

## 2017-07-21 RX ADMIN — CEFTRIAXONE SODIUM 2 G: 2 INJECTION, POWDER, FOR SOLUTION INTRAMUSCULAR; INTRAVENOUS at 08:44

## 2017-07-21 ASSESSMENT — PATIENT HEALTH QUESTIONNAIRE - PHQ9
2. FEELING DOWN, DEPRESSED, IRRITABLE, OR HOPELESS: NOT AT ALL
2. FEELING DOWN, DEPRESSED, IRRITABLE, OR HOPELESS: NOT AT ALL
1. LITTLE INTEREST OR PLEASURE IN DOING THINGS: NOT AT ALL
SUM OF ALL RESPONSES TO PHQ9 QUESTIONS 1 AND 2: 0
1. LITTLE INTEREST OR PLEASURE IN DOING THINGS: NOT AT ALL
SUM OF ALL RESPONSES TO PHQ QUESTIONS 1-9: 0
SUM OF ALL RESPONSES TO PHQ QUESTIONS 1-9: 0
SUM OF ALL RESPONSES TO PHQ9 QUESTIONS 1 AND 2: 0

## 2017-07-21 ASSESSMENT — PAIN SCALES - GENERAL
PAINLEVEL_OUTOF10: 6
PAINLEVEL_OUTOF10: 0
PAINLEVEL_OUTOF10: 6
PAINLEVEL_OUTOF10: 0
PAINLEVEL_OUTOF10: 7

## 2017-07-21 ASSESSMENT — ENCOUNTER SYMPTOMS
NAUSEA: 0
MYALGIAS: 1
TINGLING: 0
COUGH: 0
SHORTNESS OF BREATH: 0
BACK PAIN: 1
FEVER: 0
DIZZINESS: 0
VOMITING: 0
CHILLS: 0
DIARRHEA: 0
ABDOMINAL PAIN: 0

## 2017-07-21 NOTE — PROGRESS NOTES
Pt napping at intervals throughout the shift  - pt without complaints - no distress noted - in bed with callbell in reach

## 2017-07-21 NOTE — ASSESSMENT & PLAN NOTE
Likely secondary to vancomycin, dc'ed ctm   Patient on Benadryl 50 mg by mouth PRN q6h for pruritus, prednisone 40 mg

## 2017-07-21 NOTE — CARE PLAN
Problem: Nutritional:  Goal: Achieve adequate nutritional intake  Patient will consume 50% of meals   Outcome: PROGRESSING AS EXPECTED  Pt states that he ate about 50% yesterday. Will add boost plus BID between meals to provide additional Kcal/protein. CNA/RN will document meal % to monitor for PO increase to allow for d/c of Pt per MD. MD aware.

## 2017-07-21 NOTE — PROGRESS NOTES
[facility] Hospitalist Progress Note    Date of Service: 2017    Chief Complaint  86 y.o. male admitted 2017 with L3-L4 discitis and Strep bacteremia on abx until .  During admit had complication seizure transferred out of ICU on     Interval Problem Update  Patient alert awake oriented ×3. Patient resting in bed. No acute events overnight. He complains of rash on his anterior neck, bilateral upper extremities and feet associated with pruritus. Felt to be likely secondary to vancomycin. He has been accepted to [facility]. Per nutrition patient has not been eating his meals. Started on supplements.    Consultants/Specialty  Neurology: Dr Win, signed off  Neurosurgery: Signed off  Infectious Disease: Dr Chilel    Disposition  Patient has been accepted to [facility], will transfer patient once his nutritional status improves.        Review of Systems   Constitutional: Negative for fever and chills.   Respiratory: Negative for cough and shortness of breath.    Cardiovascular: Negative for chest pain and leg swelling.   Gastrointestinal: Negative for nausea, vomiting and abdominal pain.   Musculoskeletal: Negative for joint pain.   Skin: Positive for rash (B/L UE, neck and feet ).   Neurological: Negative for dizziness and tingling.      Physical Exam  Laboratory/Imaging   Hemodynamics  Temp (24hrs), Av.3 °C (97.3 °F), Min:35.8 °C (96.4 °F), Max:36.6 °C (97.8 °F)   Temperature: (!) 35.8 °C (96.4 °F)  Pulse  Av.6  Min: 34  Max: 154    Blood Pressure : 107/67 mmHg      Respiratory      Respiration: 19, Pulse Oximetry: 96 %     Work Of Breathing / Effort: Mild  RUL Breath Sounds: Clear, RML Breath Sounds: Clear, RLL Breath Sounds: Clear;Diminished, MIKE Breath Sounds: Clear, LLL Breath Sounds: Clear    Fluids    Intake/Output Summary (Last 24 hours) at 17 1748  Last data filed at 17 0311   Gross per 24 hour   Intake      0 ml   Output    400 ml   Net   -400 ml       Nutrition  Orders Placed  This Encounter   Procedures   • DIET ORDER     Standing Status: Standing      Number of Occurrences: 1      Standing Expiration Date:      Order Specific Question:  Diet:     Answer:  Regular [1]     Physical Exam   Constitutional: He is oriented to person, place, and time. He appears well-developed and well-nourished. No distress.   HENT:   Head: Normocephalic and atraumatic.   Nose: Nose normal.   Mouth/Throat: Oropharynx is clear and moist.   Eyes: Conjunctivae are normal. Right eye exhibits no discharge. Left eye exhibits no discharge. No scleral icterus.   Neck: No JVD present. No tracheal deviation present.   Cardiovascular: Normal rate, regular rhythm, normal heart sounds and intact distal pulses.    No murmur heard.  Pulmonary/Chest: Effort normal and breath sounds normal. No respiratory distress. He has no wheezes. He has no rales.   Abdominal: Soft. Bowel sounds are normal. He exhibits no distension. There is no tenderness.   Genitourinary:   Scrotal edema improving    Musculoskeletal: He exhibits no edema.   LUE PICC    Neurological: He is alert and oriented to person, place, and time. No cranial nerve deficit.   Skin: Skin is warm. He is not diaphoretic.   Psychiatric: He has a normal mood and affect. His speech is normal. Thought content normal.   Nursing note and vitals reviewed.      Recent Labs      07/19/17   0400  07/21/17   0315   WBC  19.5*  17.6*   RBC  4.10*  4.13*   HEMOGLOBIN  12.0*  12.2*   HEMATOCRIT  37.0*  37.4*   MCV  90.2  90.6   MCH  29.3  29.5   MCHC  32.4*  32.6*   RDW  43.4  43.4   PLATELETCT  251  278   MPV  9.9  10.0     Recent Labs      07/19/17   0400  07/21/17   0315   SODIUM  137  137   POTASSIUM  3.7  3.7   CHLORIDE  102  102   CO2  29  31   GLUCOSE  101*  138*   BUN  15  12   CREATININE  0.67  0.77   CALCIUM  9.6  10.1                      Assessment/Plan     * Low back pain (present on admission)  Assessment & Plan  Concern of worsening dementia with the oxycodone, dc'ed  oxycodone, on tramadol for pain management  Physical therapy, occupational therapy  Additional nonnarcotic pain medication such as Lidoderm patch and Neurontin    Bacteremia  Assessment & Plan  Strep viridans, repeat blood cx NGTD, PICC in place  ID following  On IV vanco and ceftriaxone will need 6 weeks course until 8/20    Discitis of lumbar region  Assessment & Plan  At L3-L4 level  S/p Bc Cx remains neg  Continue current abx per ID to continue through August 20  Will likely need LTAC.  Wound care physical therapy    Atrial flutter (CMS-HCC) (present on admission)  Assessment & Plan  Improved rate control Continue digoxin and metoprolol  Patient not on chronic anticoagulation given previous history of hematoma and falls will need to reevaluate after improvement of pain control and mobilization    Sepsis (CMS-[facility]) (present on admission)  Assessment & Plan  Resolved          UTI (urinary tract infection) (present on admission)  Assessment & Plan  Resolved      Hypomagnesemia  Assessment & Plan  Resolved, monitor     Acute encephalopathy  Assessment & Plan  Resolved no acute pathology on MRI or EEG, improving   Empirically started on Keppra per neurology, Dr Win recommend outpt  follow up to review need for continued AED  Per pt's son in law pt AAOX4 at baseline   Palliative following    Shock (CMS-HCC)  Assessment & Plan  Resolved    Benign prostatic hyperplasia with lower urinary tract symptoms  Assessment & Plan  flomax    Leukocytosis  Assessment & Plan  Secondary to strep.viridans bacteremia and discitis of L3-L4  Trending down, CTM     Normocytic anemia  Assessment & Plan  2/2 to iron def, started on replacement therapy  Vitamin b12/folate, TSH wnl      Scrotal edema  Assessment & Plan  Likely dependent, scrotal sling ordered    Drug rash  Assessment & Plan  Likely secondary to vancomycin, infusion rate decreased  Patient on Benadryl 25 mg by mouth when necessary for pruritus      Labs reviewed and  Medications reviewed  Hearn catheter: No Hearn      DVT Prophylaxis: Heparin    Ulcer prophylaxis: Not indicated  Antibiotics: Treating active infection/contamination beyond 24 hours perioperative coverage

## 2017-07-21 NOTE — CARE PLAN
Problem: Pain Management  Goal: Pain level will decrease to patient’s comfort goal  Intervention: Follow pain managment plan developed in collaboration with patient and Interdisciplinary Team  Passed [facility] for back pain prn      Problem: Skin Integrity  Goal: Risk for impaired skin integrity will decrease  Intervention: Implement precautions to protect skin integrity in collaboration with the interdisciplinary team    07/21/17 0800 07/21/17 0915   OTHER   Skin Preventative Measures --  Pillows in Use for Support / Positioning   Bed Types Pressure Redistribution Mattress (Atmosair) --    Friction Interventions Draw Sheet / Pad Used for Repositioning --    Moisturizers Barrier Wipes;Barrier Cream  (bazo) --    PT / OT Involved in Care Physical Therapy Involved;Occupational Therapy Involved --    Activity  Bed --    Patient Turns / Repositioning Supine --    Assistance / Tolerance for Turning/Repositioning Assistance of Two or More --    Patient is Receiving Nutrition Oral Intake Adequate --    Nutrition Consult Ordered No, Consult has Already been Placed --    Vitamin Therapy in Use Yes --    Reapplied condom cath, applied bazo cream and nystatin powder to groin

## 2017-07-21 NOTE — PROGRESS NOTES
Report received by NOC RN. Assumed care of pt. Assessment complete. Family at bedside. Pt A&O x 4, strange affect-appears intermittently confused but can answer all a+o ? properly. Pt has red rash sacrum and torso-benadryl and bazo cream utilized, has no new s/s infection, and has pain with movement (screams out)-medicated per MAR. Pt in no apparent signs of distress. Plan of care discussed. Call light in reach,  bed in lowest position, and pt has no further questions at this time.

## 2017-07-21 NOTE — THERAPY
Approached patient for TX, Pt was unresponsive verbally to sternal rub and  request to participate in TX, Pt only shook head no...Will attempt again in future.

## 2017-07-21 NOTE — CARE PLAN
Problem: Safety  Goal: Will remain free from injury  Outcome: PROGRESSING AS EXPECTED    Problem: Infection  Goal: Will remain free from infection  Outcome: PROGRESSING AS EXPECTED    Problem: Pain Management  Goal: Pain level will decrease to patient’s comfort goal  Outcome: PROGRESSING AS EXPECTED

## 2017-07-22 LAB
ANION GAP SERPL CALC-SCNC: 6 MMOL/L (ref 0–11.9)
BASOPHILS # BLD AUTO: 0.4 % (ref 0–1.8)
BASOPHILS # BLD: 0.06 K/UL (ref 0–0.12)
BUN SERPL-MCNC: 10 MG/DL (ref 8–22)
CALCIUM SERPL-MCNC: 10.2 MG/DL (ref 8.5–10.5)
CHLORIDE SERPL-SCNC: 100 MMOL/L (ref 96–112)
CO2 SERPL-SCNC: 31 MMOL/L (ref 20–33)
CREAT SERPL-MCNC: 0.78 MG/DL (ref 0.5–1.4)
EOSINOPHIL # BLD AUTO: 0.57 K/UL (ref 0–0.51)
EOSINOPHIL NFR BLD: 3.9 % (ref 0–6.9)
ERYTHROCYTE [DISTWIDTH] IN BLOOD BY AUTOMATED COUNT: 42.9 FL (ref 35.9–50)
GFR SERPL CREATININE-BSD FRML MDRD: >60 ML/MIN/1.73 M 2
GLUCOSE SERPL-MCNC: 111 MG/DL (ref 65–99)
HCT VFR BLD AUTO: 39.7 % (ref 42–52)
HGB BLD-MCNC: 12.6 G/DL (ref 14–18)
IMM GRANULOCYTES # BLD AUTO: 0.49 K/UL (ref 0–0.11)
IMM GRANULOCYTES NFR BLD AUTO: 3.3 % (ref 0–0.9)
LYMPHOCYTES # BLD AUTO: 1.37 K/UL (ref 1–4.8)
LYMPHOCYTES NFR BLD: 9.3 % (ref 22–41)
MCH RBC QN AUTO: 28.8 PG (ref 27–33)
MCHC RBC AUTO-ENTMCNC: 31.7 G/DL (ref 33.7–35.3)
MCV RBC AUTO: 90.6 FL (ref 81.4–97.8)
MONOCYTES # BLD AUTO: 1.26 K/UL (ref 0–0.85)
MONOCYTES NFR BLD AUTO: 8.6 % (ref 0–13.4)
NEUTROPHILS # BLD AUTO: 10.95 K/UL (ref 1.82–7.42)
NEUTROPHILS NFR BLD: 74.5 % (ref 44–72)
NRBC # BLD AUTO: 0 K/UL
NRBC BLD AUTO-RTO: 0 /100 WBC
PLATELET # BLD AUTO: 282 K/UL (ref 164–446)
PMV BLD AUTO: 10.1 FL (ref 9–12.9)
POTASSIUM SERPL-SCNC: 4 MMOL/L (ref 3.6–5.5)
RBC # BLD AUTO: 4.38 M/UL (ref 4.7–6.1)
SODIUM SERPL-SCNC: 137 MMOL/L (ref 135–145)
WBC # BLD AUTO: 14.7 K/UL (ref 4.8–10.8)

## 2017-07-22 PROCEDURE — 700102 HCHG RX REV CODE 250 W/ 637 OVERRIDE(OP): Performed by: INTERNAL MEDICINE

## 2017-07-22 PROCEDURE — 80048 BASIC METABOLIC PNL TOTAL CA: CPT

## 2017-07-22 PROCEDURE — 99233 SBSQ HOSP IP/OBS HIGH 50: CPT | Performed by: INTERNAL MEDICINE

## 2017-07-22 PROCEDURE — A9270 NON-COVERED ITEM OR SERVICE: HCPCS | Performed by: INTERNAL MEDICINE

## 2017-07-22 PROCEDURE — A9270 NON-COVERED ITEM OR SERVICE: HCPCS | Performed by: PSYCHIATRY & NEUROLOGY

## 2017-07-22 PROCEDURE — 700102 HCHG RX REV CODE 250 W/ 637 OVERRIDE(OP): Performed by: HOSPITALIST

## 2017-07-22 PROCEDURE — 700102 HCHG RX REV CODE 250 W/ 637 OVERRIDE(OP): Performed by: PSYCHIATRY & NEUROLOGY

## 2017-07-22 PROCEDURE — 770020 HCHG ROOM/CARE - TELE (206)

## 2017-07-22 PROCEDURE — 700105 HCHG RX REV CODE 258: Performed by: INTERNAL MEDICINE

## 2017-07-22 PROCEDURE — 85025 COMPLETE CBC W/AUTO DIFF WBC: CPT

## 2017-07-22 PROCEDURE — 700111 HCHG RX REV CODE 636 W/ 250 OVERRIDE (IP): Performed by: INTERNAL MEDICINE

## 2017-07-22 PROCEDURE — A9270 NON-COVERED ITEM OR SERVICE: HCPCS | Performed by: HOSPITALIST

## 2017-07-22 PROCEDURE — 700101 HCHG RX REV CODE 250: Performed by: HOSPITALIST

## 2017-07-22 RX ORDER — DIPHENHYDRAMINE HCL 25 MG
50 TABLET ORAL EVERY 6 HOURS PRN
Status: DISCONTINUED | OUTPATIENT
Start: 2017-07-22 | End: 2017-07-25 | Stop reason: HOSPADM

## 2017-07-22 RX ADMIN — FLUCONAZOLE 100 MG: 100 TABLET ORAL at 08:35

## 2017-07-22 RX ADMIN — GABAPENTIN 200 MG: 100 CAPSULE ORAL at 08:34

## 2017-07-22 RX ADMIN — LIDOCAINE 1 PATCH: 50 PATCH TOPICAL at 20:44

## 2017-07-22 RX ADMIN — GABAPENTIN 200 MG: 100 CAPSULE ORAL at 16:23

## 2017-07-22 RX ADMIN — Medication 325 MG: at 13:54

## 2017-07-22 RX ADMIN — NYSTATIN 1500000 UNITS: 100000 POWDER TOPICAL at 08:38

## 2017-07-22 RX ADMIN — OXYCODONE HYDROCHLORIDE 10 MG: 10 TABLET, FILM COATED, EXTENDED RELEASE ORAL at 08:35

## 2017-07-22 RX ADMIN — LEVETIRACETAM 500 MG: 500 TABLET, FILM COATED ORAL at 20:43

## 2017-07-22 RX ADMIN — Medication 325 MG: at 08:35

## 2017-07-22 RX ADMIN — Medication 325 MG: at 18:51

## 2017-07-22 RX ADMIN — OMEPRAZOLE 20 MG: 20 CAPSULE, DELAYED RELEASE ORAL at 20:43

## 2017-07-22 RX ADMIN — OXYCODONE HYDROCHLORIDE 10 MG: 10 TABLET, FILM COATED, EXTENDED RELEASE ORAL at 20:43

## 2017-07-22 RX ADMIN — DIPHENHYDRAMINE HCL 50 MG: 25 TABLET ORAL at 08:36

## 2017-07-22 RX ADMIN — VITAMIN D, TAB 1000IU (100/BT) 5000 UNITS: 25 TAB at 08:36

## 2017-07-22 RX ADMIN — ROSUVASTATIN CALCIUM 125 MCG: 10 TABLET, FILM COATED ORAL at 18:51

## 2017-07-22 RX ADMIN — ATORVASTATIN CALCIUM 10 MG: 10 TABLET, FILM COATED ORAL at 20:43

## 2017-07-22 RX ADMIN — GABAPENTIN 200 MG: 100 CAPSULE ORAL at 20:43

## 2017-07-22 RX ADMIN — OMEPRAZOLE 20 MG: 20 CAPSULE, DELAYED RELEASE ORAL at 08:36

## 2017-07-22 RX ADMIN — LEVETIRACETAM 500 MG: 500 TABLET, FILM COATED ORAL at 08:35

## 2017-07-22 RX ADMIN — DIPHENHYDRAMINE HCL 50 MG: 25 TABLET ORAL at 16:23

## 2017-07-22 RX ADMIN — NYSTATIN 1500000 UNITS: 100000 POWDER TOPICAL at 20:45

## 2017-07-22 RX ADMIN — METOPROLOL TARTRATE 50 MG: 50 TABLET, FILM COATED ORAL at 20:44

## 2017-07-22 RX ADMIN — TRAMADOL HYDROCHLORIDE 50 MG: 50 TABLET, COATED ORAL at 01:33

## 2017-07-22 RX ADMIN — CYANOCOBALAMIN TAB 500 MCG 1000 MCG: 500 TAB at 08:34

## 2017-07-22 RX ADMIN — CEFTRIAXONE SODIUM 2 G: 2 INJECTION, POWDER, FOR SOLUTION INTRAMUSCULAR; INTRAVENOUS at 08:42

## 2017-07-22 RX ADMIN — HEPARIN SODIUM 5000 UNITS: 5000 INJECTION, SOLUTION INTRAVENOUS; SUBCUTANEOUS at 13:54

## 2017-07-22 RX ADMIN — TAMSULOSIN HYDROCHLORIDE 0.4 MG: 0.4 CAPSULE ORAL at 08:35

## 2017-07-22 RX ADMIN — METOPROLOL TARTRATE 50 MG: 50 TABLET, FILM COATED ORAL at 08:35

## 2017-07-22 RX ADMIN — HEPARIN SODIUM 5000 UNITS: 5000 INJECTION, SOLUTION INTRAVENOUS; SUBCUTANEOUS at 20:44

## 2017-07-22 RX ADMIN — HEPARIN SODIUM 5000 UNITS: 5000 INJECTION, SOLUTION INTRAVENOUS; SUBCUTANEOUS at 05:12

## 2017-07-22 ASSESSMENT — COGNITIVE AND FUNCTIONAL STATUS - GENERAL
CLIMB 3 TO 5 STEPS WITH RAILING: TOTAL
STANDING UP FROM CHAIR USING ARMS: TOTAL
WALKING IN HOSPITAL ROOM: TOTAL
MOVING TO AND FROM BED TO CHAIR: UNABLE
MOVING FROM LYING ON BACK TO SITTING ON SIDE OF FLAT BED: UNABLE
PERSONAL GROOMING: A LOT
SUGGESTED CMS G CODE MODIFIER MOBILITY: CM
DRESSING REGULAR LOWER BODY CLOTHING: TOTAL
SUGGESTED CMS G CODE MODIFIER DAILY ACTIVITY: CL
DAILY ACTIVITIY SCORE: 9
HELP NEEDED FOR BATHING: TOTAL
EATING MEALS: A LOT
MOBILITY SCORE: 7
TOILETING: TOTAL
TURNING FROM BACK TO SIDE WHILE IN FLAT BAD: A LOT
DRESSING REGULAR UPPER BODY CLOTHING: A LOT

## 2017-07-22 ASSESSMENT — PAIN SCALES - GENERAL
PAINLEVEL_OUTOF10: 9
PAINLEVEL_OUTOF10: 0
PAINLEVEL_OUTOF10: 3
PAINLEVEL_OUTOF10: 3
PAINLEVEL_OUTOF10: 7

## 2017-07-22 ASSESSMENT — PATIENT HEALTH QUESTIONNAIRE - PHQ9
1. LITTLE INTEREST OR PLEASURE IN DOING THINGS: NOT AT ALL
SUM OF ALL RESPONSES TO PHQ9 QUESTIONS 1 AND 2: 0
SUM OF ALL RESPONSES TO PHQ QUESTIONS 1-9: 0
2. FEELING DOWN, DEPRESSED, IRRITABLE, OR HOPELESS: NOT AT ALL

## 2017-07-22 ASSESSMENT — ENCOUNTER SYMPTOMS
COUGH: 0
ROS SKIN COMMENTS: PRURITUS
DIARRHEA: 0
MYALGIAS: 1
FEVER: 0
VOMITING: 0
CHILLS: 0
ABDOMINAL PAIN: 0
TINGLING: 0
NAUSEA: 0
SHORTNESS OF BREATH: 0
BACK PAIN: 1
DIZZINESS: 0

## 2017-07-22 NOTE — CARE PLAN
Problem: Skin Integrity  Goal: Risk for impaired skin integrity will decrease  Intervention: Assess and monitor skin integrity, appearance and/or temperature  RN identifies generalized reddened skin color.  RN educated patient regarding the importance of regular movement to decrease the risk of pressure ulcer formation.  Patient verbalized understanding of education and denies any questions at this time.

## 2017-07-22 NOTE — PROGRESS NOTES
[facility] Hospitalist Progress Note    Date of Service: 2017    Chief Complaint  86 y.o. male admitted 2017 with L3-L4 discitis and Strep bacteremia on abx until .  During admit had complication seizure transferred out of ICU on     Interval Problem Update  Patient alert awake oriented ×3. Patient resting in bed. Patient complains of pruritus. Patient states that Benadryl helps alleviate his pruritus. Per nursing staff patient only ate about 25% of his dinner and did not eat a great deal of his lunch either. Patient encouraged to eat 3 meals a day. Patient on supplements per dietary.    Consultants/Specialty  Neurology: Dr Win, signed off  Neurosurgery: Signed off  Infectious Disease: Dr Chilel    Disposition  Patient has been accepted to [facility], will transfer patient once his nutritional status improves.        Review of Systems   Constitutional: Negative for fever and chills.   Respiratory: Negative for cough and shortness of breath.    Cardiovascular: Negative for chest pain and leg swelling.   Gastrointestinal: Negative for nausea, vomiting and abdominal pain.   Musculoskeletal: Negative for joint pain.   Skin: Positive for rash (B/L UE, neck and feet ).        Pruritus     Neurological: Negative for dizziness and tingling.      Physical Exam  Laboratory/Imaging   Hemodynamics  Temp (24hrs), Av.4 °C (97.6 °F), Min:35.8 °C (96.5 °F), Max:37 °C (98.6 °F)   Temperature: 35.8 °C (96.5 °F)  Pulse  Av.5  Min: 34  Max: 154    Blood Pressure : 126/70 mmHg      Respiratory      Respiration: 18, Pulse Oximetry: 95 %     Work Of Breathing / Effort: Mild  RUL Breath Sounds: Clear, RML Breath Sounds: Clear, RLL Breath Sounds: Clear;Diminished, MIKE Breath Sounds: Clear, LLL Breath Sounds: Clear;Diminished    Fluids    Intake/Output Summary (Last 24 hours) at 17 0940  Last data filed at 17 0600   Gross per 24 hour   Intake    540 ml   Output    500 ml   Net     40 ml        Nutrition  Orders Placed This Encounter   Procedures   • DIET ORDER     Standing Status: Standing      Number of Occurrences: 1      Standing Expiration Date:      Order Specific Question:  Diet:     Answer:  Regular [1]     Physical Exam   Constitutional: He is oriented to person, place, and time. He appears well-developed and well-nourished. No distress.   HENT:   Head: Normocephalic and atraumatic.   Nose: Nose normal.   Mouth/Throat: Oropharynx is clear and moist.   Eyes: Conjunctivae are normal. Right eye exhibits no discharge. Left eye exhibits no discharge. No scleral icterus.   Neck: No JVD present. No tracheal deviation present.   Cardiovascular: Normal rate, regular rhythm, normal heart sounds and intact distal pulses.    No murmur heard.  Pulmonary/Chest: Effort normal and breath sounds normal. No respiratory distress. He has no wheezes. He has no rales.   Abdominal: Soft. Bowel sounds are normal. He exhibits no distension. There is no tenderness.   Genitourinary:   Scrotal edema improving    Musculoskeletal: He exhibits no edema.   LUE PICC    Neurological: He is alert and oriented to person, place, and time. No cranial nerve deficit.   Skin: Skin is warm. He is not diaphoretic.   Maculopapular rash noted bilateral upper extremities, bilateral lower extremities.   Psychiatric: He has a normal mood and affect. His speech is normal. Thought content normal.   Nursing note and vitals reviewed.      Recent Labs      07/21/17 0315 07/22/17 0159   WBC  17.6*  14.7*   RBC  4.13*  4.38*   HEMOGLOBIN  12.2*  12.6*   HEMATOCRIT  37.4*  39.7*   MCV  90.6  90.6   MCH  29.5  28.8   MCHC  32.6*  31.7*   RDW  43.4  42.9   PLATELETCT  278  282   MPV  10.0  10.1     Recent Labs      07/21/17 0315 07/22/17   0159   SODIUM  137  137   POTASSIUM  3.7  4.0   CHLORIDE  102  100   CO2  31  31   GLUCOSE  138*  111*   BUN  12  10   CREATININE  0.77  0.78   CALCIUM  10.1  10.2                      Assessment/Plan      * Low back pain (present on admission)  Assessment & Plan  Concern of worsening dementia with the oxycodone, dc'ed oxycodone, on tramadol for pain management  Physical therapy, occupational therapy  Additional nonnarcotic pain medication such as Lidoderm patch and Neurontin    Bacteremia  Assessment & Plan  Strep viridans, repeat blood cx NGTD, PICC in place  ID following  On IV ceftriaxone will need 6 weeks course until 8/20    Discitis of lumbar region  Assessment & Plan  At L3-L4 level  S/p Bc Cx remains neg  Continue current abx per ID to continue through August 20  Will likely need LTAC.  Wound care physical therapy    Atrial flutter (CMS-HCC) (present on admission)  Assessment & Plan  Improved rate control Continue digoxin and metoprolol  Patient not on chronic anticoagulation given previous history of hematoma and falls will need to reevaluate after improvement of pain control and mobilization    Sepsis (CMS-[facility]) (present on admission)  Assessment & Plan  Resolved          UTI (urinary tract infection) (present on admission)  Assessment & Plan  Resolved      Hypomagnesemia  Assessment & Plan  Resolved, monitor     Acute encephalopathy  Assessment & Plan  Resolved no acute pathology on MRI or EEG, improving   Empirically started on Keppra per neurology, Dr Win recommend outpt  follow up to review need for continued AED  Per pt's son in law pt AAOX4 at baseline   Palliative following    Shock (CMS-[facility])  Assessment & Plan  Resolved    Benign prostatic hyperplasia with lower urinary tract symptoms  Assessment & Plan  flomax    Leukocytosis  Assessment & Plan  Secondary to strep.viridans bacteremia and discitis of L3-L4  Trending down, CTM     Normocytic anemia  Assessment & Plan  2/2 to iron def, started on replacement therapy  Vitamin b12/folate, TSH wnl      Scrotal edema  Assessment & Plan  Likely dependent, scrotal sling ordered    Drug rash  Assessment & Plan  Likely secondary to vancomycin, dc'ed ctm    Patient on Benadryl 50 mg by mouth PRN q6h for pruritus      Labs reviewed and Medications reviewed  Hearn catheter: No Hearn      DVT Prophylaxis: Heparin    Ulcer prophylaxis: Not indicated  Antibiotics: Treating active infection/contamination beyond 24 hours perioperative coverage  Assessed for rehab: Patient was assess for and/or received rehabilitation services during this hospitalization

## 2017-07-22 NOTE — PROGRESS NOTES
Infectious Disease Progress Note    Author: Shante Sequeira M.D. Date of service & Time created: 2017  8:32 PM    Chief Complaint:  Chief Complaint   Patient presents with   • Back Pain    follow-up for lumbar discitis and bacteremia    Interval History:  86-year-old white male admitted for back pain. Found to have discitis  17- no fevers. WBC 10.3. Ongoing back pain. Sedimentation rate is 46. Now the blood cultures are positive for strep species. Also has new nausea and vomiting today.   7/10 AF, WBC 16.5, having nausea and vomiting, ongoing back pain, plan for bone biopsy today, tolerating abx without issues   AF, WBC 13.3, more pain after biopsy, feels flushed, having runny stools on bowel regimen   AF WBC 15.3 transferred to ICU due to concern for seizure-obtunded.   AF alert and eating lunch Has some pain-controlled. Denies SE abx   AF WBC 14.7 no new complaints  7/15 AF, no CBC, has not gotten OOB, denies any back pain    AF, WBC 21.9, having urinary incontinence   AF, no CBC, working on [place], having low blood pressures today, were elevated yesterday, no BM in a few days   AF, WBC 20, feels better this am, had breakfast, denies any diarrhea   AF c/o severe pain in back dolores with movement   AF WBC 19 continued c/o back pain with any movement   AF WBC 17.6 pain better controlled today  Labs Reviewed, Medications Reviewed and Radiology Reviewed.    Review of Systems:  Review of Systems   Constitutional: Negative for fever and chills.   Cardiovascular: Negative for chest pain.   Gastrointestinal: Negative for nausea, vomiting, abdominal pain and diarrhea.   Genitourinary:        Urinary incontinence   Musculoskeletal: Positive for myalgias, back pain and joint pain.   Skin: Positive for rash.     Limited due to pain  Hemodynamics:  Temp (24hrs), Av.5 °C (97.7 °F), Min:35.8 °C (96.4 °F), Max:37 °C (98.6 °F)  Temperature: 37 °C (98.6 °F)  Pulse  Av.6   Min: 34  Max: 154   Blood Pressure : 124/69 mmHg       Physical Exam:  Physical Exam   Constitutional: He appears well-developed and well-nourished.   Obese   HENT:   Head: Normocephalic and atraumatic.   Eyes: EOM are normal. Pupils are equal, round, and reactive to light. No scleral icterus.   Neck: Neck supple.   Cardiovascular: Normal rate.    Irregularly irregular     Pulmonary/Chest: Effort normal and breath sounds normal. No respiratory distress. He has no wheezes.   Abdominal: Soft. He exhibits no distension. There is no tenderness.   Musculoskeletal: He exhibits no edema.   LUE PICC nontender   Neurological: He is alert.   Skin: Rash noted. There is erythema.   RUE forearm-decreased erythema  Groin and buttocks erythema    Erythematous blanching rash abd, back, neck-slightly improved   Nursing note and vitals reviewed.      Meds:    Current facility-administered medications:   •  diphenhydrAMINE (BENADRYL) tablet/capsule 25 mg, 25 mg, Oral, Q6HRS PRN, Johanne Chua M.D., 25 mg at 07/21/17 0845  •  ferrous sulfate tablet 325 mg, 325 mg, Oral, TID WITH MEALS, Johanne Chua M.D., 325 mg at 07/21/17 1745  •  tramadol (ULTRAM) 50 MG tablet 50 mg, 50 mg, Oral, Q6HRS PRN, Johanne Chua M.D., 50 mg at 07/21/17 1104  •  fluconazole (DIFLUCAN) tablet 100 mg, 100 mg, Oral, DAILY, Shante Sequeira M.D., 100 mg at 07/21/17 0845  •  lidocaine (LIDODERM) 5 % 1 Patch, 1 Patch, Transdermal, Q24HR, Yaya Ragland D.O., 1 Patch at 07/20/17 2125  •  tamsulosin (FLOMAX) capsule 0.4 mg, 0.4 mg, Oral, AFTER BREAKFAST, Kirby Mayo M.D., 0.4 mg at 07/21/17 0845  •  oxyCODONE CR (OXYCONTIN) tablet 10 mg, 10 mg, Oral, Q12HRS, Kirby Mayo M.D., 10 mg at 07/21/17 0845  •  metoprolol (LOPRESSOR) tablet 50 mg, 50 mg, Oral, TWICE DAILY, Kirby Mayo M.D., 50 mg at 07/21/17 0845  •  nystatin (MYCOSTATIN) powder, , Topical, BID, Kirby Mayo M.D., 1,500,000 Units at 07/21/17 0847  •  levetiracetam (KEPPRA)  tablet 500 mg, 500 mg, Oral, BID, Allan Win M.D., 500 mg at 17 0845  •  [DISCONTINUED] senna-docusate (PERICOLACE or SENOKOT S) 8.6-50 MG per tablet 2 Tab, 2 Tab, Oral, BID, Stopped at 17 2100 **AND** polyethylene glycol/lytes (MIRALAX) PACKET 1 Packet, 1 Packet, Oral, QDAY PRN, 1 Packet at 17 0753 **AND** magnesium hydroxide (MILK OF MAGNESIA) suspension 30 mL, 30 mL, Oral, QDAY PRN, 30 mL at 17 1751 **AND** bisacodyl (DULCOLAX) suppository 10 mg, 10 mg, Rectal, QDAY PRN, Kirby Mayo M.D.  •  metoprolol (LOPRESSOR) injection 5 mg, 5 mg, Intravenous, Q5 MIN PRN, Collin White M.D., 5 mg at 17 1213  •  heparin injection 5,000 Units, 5,000 Units, Subcutaneous, Q8HRS, Collin White M.D., 5,000 Units at 17 1451  •  PICC Line Insertion has been implemented, , , Once **AND** May use Lidocaine 1% not to exceed 3 mls for local at insertion site, , , CONTINUOUS **AND** NOTIFY MD, , , Once **AND** Tip to dwell in the superior vena cava, , , CONTINUOUS **AND** Do not use PICC Line until placement verified by Chest X Ray, , , CONTINUOUS **AND** DX-CHEST-FOR PICC LINE Perform procedure in:: Patient's Room, , , Once **AND** If radiologist reading of chest X-ray states any of the following the PICC should be used, , , CONTINUOUS **AND** Further evaluation of the PICC placement can be retrieved from X-Ray and Imaging, , , CONTINUOUS **AND** Blood draws through PICC line; draws by RN only, , , CONTINUOUS **AND** FLUSHING GUIDELINES WHEN IN USE, , , CONTINUOUS **AND** normal saline PF 10-20 mL, 10-20 mL, Intravenous, PRN **AND** FLUSHING GUIDELINES WHEN NOT IN USE, , , CONTINUOUS **AND** DRESSING MAINTENANCE, , , Once **AND** Change needleless pressure ports and IV tubing every 72 hours per hospital policy, , , CONTINUOUS **AND** TUBING, , , CONTINUOUS **AND** If there is an MD order to remove the PICC line, any RN may remove the PICC line, , , CONTINUOUS **AND** [] PATIENT EDUCATION  MATERIALS, , , Prior to discharge **AND** NURSING COMMUNICATION, , , CONTINUOUS, Ny Chilel M.D.  •  gabapentin (NEURONTIN) capsule 200 mg, 200 mg, Oral, TID, Georgi Grey M.D., 200 mg at 07/21/17 1451  •  Notify provider if pain remains uncontrolled, , , CONTINUOUS **AND** Use the numeric rating scale (NRS-11) on regular floors and Critical-Care Pain Observation Tool (CPOT) on ICUs/Trauma to assess pain, , , CONTINUOUS **AND** Pulse Ox (Oximetry), , , CONTINUOUS **AND** [DISCONTINUED] Pharmacy Consult Request ...Pain Management Review, , Other, PRN **AND** If patient difficult to arouse and/or has respiratory depression, stop any opiates that are currently infusing and call a Rapid Response., , , CONTINUOUS **AND** [DISCONTINUED] oxycodone immediate-release (ROXICODONE) tablet 2.5 mg, 2.5 mg, Oral, Q3HRS PRN, 2.5 mg at 07/11/17 1802 **AND** [DISCONTINUED] oxycodone immediate-release (ROXICODONE) tablet 5 mg, 5 mg, Oral, Q3HRS PRN, 5 mg at 07/13/17 2311 **AND** HYDROmorphone (DILAUDID) injection 0.5 mg, 0.5 mg, Intravenous, Q3HRS PRN, Georgi Grey M.D., 0.5 mg at 07/20/17 0519  •  digoxin (LANOXIN) tablet 125 mcg, 125 mcg, Oral, DAILY AT 1800, Georgi Grey M.D., 125 mcg at 07/21/17 1745  •  ondansetron (ZOFRAN) syringe/vial injection 4 mg, 4 mg, Intravenous, Q6HRS PRN, Georgi Grey M.D., 4 mg at 07/15/17 0824  •  omeprazole (PRILOSEC) capsule 20 mg, 20 mg, Oral, BID, Georgi Grey M.D., 20 mg at 07/21/17 0845  •  cefTRIAXone (ROCEPHIN) 2 g in  mL IVPB, 2 g, Intravenous, Q24HRS, Oliva Bolanos M.D., Stopped at 07/21/17 0914  •  hydrALAZINE (APRESOLINE) tablet 10 mg, 10 mg, Oral, Q4HRS PRN, Jamaal Molina M.D., 10 mg at 07/09/17 2038  •  clonidine (CATAPRES) tablet 0.1 mg, 0.1 mg, Oral, 4X/DAY PRN, Jamaal Molina M.D.  •  prochlorperazine (COMPAZINE) injection 10 mg, 10 mg, Intravenous, Q6HRS PRN, Darion Benton M.D., 10 mg at 07/10/17 0905  •  pneumococcal 13-Jocelyn Conj Vacc  (PREVNAR 13) syringe 0.5 mL, 0.5 mL, Intramuscular, Once PRN, Jamaal Molina M.D., Stopped at 07/16/17 2032  •  atorvastatin (LIPITOR) tablet 10 mg, 10 mg, Oral, Nightly, Jamaal Molina M.D., 10 mg at 07/20/17 2125  •  vitamin D (cholecalciferol) tablet 5,000 Units, 5,000 Units, Oral, Q48HRS, Jamaal Molina M.D., 5,000 Units at 07/20/17 0959  •  cyanocobalamin (VITAMIN B-12) tablet 1,000 mcg, 1,000 mcg, Oral, DAILY, Jamaal Molina M.D., 1,000 mcg at 07/21/17 0845  •  acetaminophen (TYLENOL) tablet 650 mg, 650 mg, Oral, Q6HRS PRN, Jamaal Molina M.D., 650 mg at 07/20/17 0955    Labs:  Recent Labs      07/19/17   0400  07/21/17   0315   WBC  19.5*  17.6*   RBC  4.10*  4.13*   HEMOGLOBIN  12.0*  12.2*   HEMATOCRIT  37.0*  37.4*   MCV  90.2  90.6   MCH  29.3  29.5   RDW  43.4  43.4   PLATELETCT  251  278   MPV  9.9  10.0     Recent Labs      07/19/17   0400  07/21/17   0315   SODIUM  137  137   POTASSIUM  3.7  3.7   CHLORIDE  102  102   CO2  29  31   GLUCOSE  101*  138*   BUN  15  12     Recent Labs      07/19/17   0400  07/21/17   0315   CREATININE  0.67  0.77       Imaging:  Ct-abdomen-pelvis W/o  7/6/2017  No hydronephrosis or urolithiasis. Diverticula colon. No evidence diverticulitis. No free fluid. Pancreatic calcifications/consistent with sequela pancreatitis. No peripancreatic fluid collections. Coronary artery calcifications.    Ct-lspine W/o Plus Recons  7/6/2017  1.  No evidence of acute fracture of the lumbar spine. 2.  Surgical change extending from L3 through L5. 3.  Multilevel degenerative disc disease and facet degeneration. 4.  Old fractures of the right L1 and L2 transverse processes. 5.  Multilevel degenerative subluxation.    Mr-lumbar Spine-with & W/o  7/8/2017  1.  Possible discitis at the L3-4 level. 2.  Mild to moderate retrolisthesis at the L3-4 level. 3.  Previous extensive laminectomy from the L2 level to the sacrum with bilateral pedicle screw and zev fixation at the L4-L5 levels. 4.    Moderate lumbar spondylotic changes at the L3-4 levels with associated moderate central canal stenosis at this level secondary to facet arthropathy. Additionally there are severe bilateral neural foraminal stenosis. 5.  Minimal lumbar spondylotic changes at the L2-3 level with mild bilateral neural foraminal narrowing and mild disc bulging into the inferior aspects of the neural foramina.    Dx-hip-bilateral-with Pelvis-3/4 Views  7/6/2017  No evidence of fracture or arthropathy of the hips. Degenerative and surgical changes of the lumbar spine.      Micro:  BLOOD CULTURE   Date Value Ref Range Status   07/20/2017   Preliminary    No Growth    Note: Blood cultures are incubated for 5 days and  are monitored continuously.Positive blood cultures  are called to the RN and reported as soon as  they are identified.        Results     Procedure Component Value Units Date/Time    BLOOD CULTURE [[id number]]  (Abnormal) Collected:  07/06/17 1013    Order Status:  Completed Specimen Information:  Blood from Peripheral Updated:  07/21/17 1001     Significant Indicator POS (POS)      Source BLD      Site PERIPHERAL      Blood Culture Growth detected by Bactec instrument. (A)      Blood Culture -- (A)      Result:        Viridans streptococcus - possible contaminant  Isolated from one bottle only, possible skin contaminant  please correlate with clinical condition.      Narrative:      CALL  [facility]  183 tel. [phone],  CALLED  183 tel. [phone] 07/10/2017, 11:37, RB PERF. RESULTS CALLED TO:[phone]  Previous comment was modified by HAMMAD at 13:48 on 07/20/17.  Aerobic Organism Identification with Reflex to Susceptibility [id number]  [facility]. Actively growing organism, in pure culture.Viridans Streptococcus  species for identification and susceptibility testing. Source Blood.  Ambient.  Aerobic Organism Identification with Reflex to Susceptibility [id number]  [facility]. Actively growing organism, in pure culture. Viridans Streptococcus  species  "for identification and susceptibility testing. Source Blood.  Ambient.    BLOOD CULTURE [[id number]] Collected:  07/20/17 0954    Order Status:  Completed Specimen Information:  Blood from Peripheral Updated:  07/21/17 0842     Significant Indicator NEG      Source BLD      Site PERIPHERAL      Blood Culture --      Result:        No Growth    Note: Blood cultures are incubated for 5 days and  are monitored continuously.Positive blood cultures  are called to the RN and reported as soon as  they are identified.      Narrative:      Per Hospital Policy: Only change Specimen Src: to \"Line\" if  specified by physician order.    BLOOD CULTURE [[id number]] Collected:  07/20/17 0941    Order Status:  Completed Specimen Information:  Blood from Peripheral Updated:  07/21/17 0842     Significant Indicator NEG      Source BLD      Site PERIPHERAL      Blood Culture --      Result:        No Growth    Note: Blood cultures are incubated for 5 days and  are monitored continuously.Positive blood cultures  are called to the RN and reported as soon as  they are identified.      Narrative:      Per Hospital Policy: Only change Specimen Src: to \"Line\" if  specified by physician order.    URINALYSIS [[id number]]  (Abnormal) Collected:  07/15/17 1620    Order Status:  Completed Specimen Information:  Urine from Urine, Clean Catch Updated:  07/16/17 0849     Color Yellow      Character Clear      Specific Gravity 1.010      Ph 6.5      Glucose Negative mg/dL      Ketones Negative mg/dL      Protein Negative mg/dL      Bilirubin Negative      Urobilinogen, Urine 0.2      Nitrite Negative      Leukocyte Esterase Trace (A)      Occult Blood Negative      Micro Urine Req Microscopic     CULTURE TISSUE W/ GRM STAIN [[id number]] Collected:  07/10/17 1445    Order Status:  Completed Specimen Information:  Tissue Updated:  07/15/17 1413     Gram Stain Result No organisms seen.      Significant Indicator NEG      Source TISS      Site Saline from L3 " "L4 Disk      Tissue Culture No growth at 72 hours.     FUNGAL CULTURE [[id number]] Collected:  07/10/17 1445    Order Status:  Completed Specimen Information:  Tissue Updated:  07/15/17 1413     Significant Indicator NEG      Source TISS      Site Saline from L3 L4 Disk      Fungal Culture Culture in progress.     ANAEROBIC CULTURE [[id number]] Collected:  07/10/17 1445    Order Status:  Completed Specimen Information:  Tissue Updated:  07/15/17 1413     Significant Indicator NEG      Source TISS      Site Saline from L3 L4 Disk      Anaerobic Culture, Culture Res No Anaerobes isolated.     BLOOD CULTURE [[id number]] Collected:  07/09/17 1646    Order Status:  Completed Specimen Information:  Blood from Peripheral Updated:  07/14/17 2100     Significant Indicator NEG      Source BLD      Site PERIPHERAL      Blood Culture No growth after 5 days of incubation.     Narrative:      Per Hospital Policy: Only change Specimen Src: to \"Line\" if  specified by physician order.    BLOOD CULTURE [[id number]] Collected:  07/09/17 1646    Order Status:  Completed Specimen Information:  Blood from Peripheral Updated:  07/14/17 2100     Significant Indicator NEG      Source BLD      Site PERIPHERAL      Blood Culture No growth after 5 days of incubation.     Narrative:      Per Hospital Policy: Only change Specimen Src: to \"Line\" if  specified by physician order.            Assessment:  Active Hospital Problems    Diagnosis   • *Low back pain [M54.5]   • Hypotension [I95.9]   • Morbid obesity (CMS-[facility]) [E66.01]   • Sepsis (CMS-[facility]) [A41.9]   • UTI (urinary tract infection) [N39.0]   • Atrial flutter (CMS-[facility]) [I48.92]       Plan:  L3-4 discitis, likely strep  Afebrile  + leukocytosis  S/p IR biopsy 7/10 - cultures NGTD  Abx as below  Anticipate 6 week course of IV abx. Stop date 08/20/17    Bacteremia, strep viridans  Blood cultures (1/2) from 7/6/17 - viridans strep   TTE - negative. STEVE if feasible  Bcx 7/9 - NGTD  Continue " Rocephin. Strep sensi not done    Leukocytosis, persistent  Multifactorial  Repeat imaging if continues to increase    Rash, slight improvement  Increased back, neck, abd  Dc vanco and monitor for resolution  May get worse before it get better    Rash, groin  Appears fungal  Severe pain with motion obviating topicals  Continue fluc    RUE cellulitis, resolving slowly  At prior IV site RUE  Tender, decreased size of erythema but not resolved  Abx per above  Local care and monitor for worsening    Diabetes mellitus  Keep the blood sugars less than 150    Possible seizure/AMS  EEG done-no obvious seizure  Transfer back to telemtry    Dispo: Recommend SNF for IV abx

## 2017-07-22 NOTE — PROGRESS NOTES
Bedside report completed.  Assumed pt care.  Pt AAO x4, resting in bed comfortably with no signs of labored breathing.  Pt tele monitor in place, cardiac rhythm being monitored.  Pt call light within reach, bed in low position, non skid socks in place.  Pt denies any pain or other distress at this time.

## 2017-07-22 NOTE — PROGRESS NOTES
Patient c/o itching. See mar for intervention. Patient resting no signs of discomfort or distress Bed low locked, SRx2, call bell within reach, Non skid socks on, Bed alarm activated.

## 2017-07-22 NOTE — CARE PLAN
Problem: Communication  Goal: The ability to communicate needs accurately and effectively will improve  Intervention: Educate patient and significant other/support system about the plan of care, procedures, treatments, medications and allow for questions  RN educated patient regarding the importance of working with Physical Therapy to help facilitate discharge.  Patient refused visit with PT yesterday morning.

## 2017-07-22 NOTE — PROGRESS NOTES
Patient resting eyes clsoed no signs of discomfort or distress. Bed low locked, SRx2, call bell within reach, Non skid socks on, Bed alarm activated

## 2017-07-23 LAB
ERYTHROCYTE [DISTWIDTH] IN BLOOD BY AUTOMATED COUNT: 43.3 FL (ref 35.9–50)
HCT VFR BLD AUTO: 40.7 % (ref 42–52)
HGB BLD-MCNC: 13 G/DL (ref 14–18)
MCH RBC QN AUTO: 28.7 PG (ref 27–33)
MCHC RBC AUTO-ENTMCNC: 31.9 G/DL (ref 33.7–35.3)
MCV RBC AUTO: 89.8 FL (ref 81.4–97.8)
PLATELET # BLD AUTO: 325 K/UL (ref 164–446)
PMV BLD AUTO: 10.1 FL (ref 9–12.9)
RBC # BLD AUTO: 4.53 M/UL (ref 4.7–6.1)
TEST NAME 95000: NORMAL
WBC # BLD AUTO: 14.9 K/UL (ref 4.8–10.8)

## 2017-07-23 PROCEDURE — 700101 HCHG RX REV CODE 250: Performed by: HOSPITALIST

## 2017-07-23 PROCEDURE — 700102 HCHG RX REV CODE 250 W/ 637 OVERRIDE(OP): Performed by: HOSPITALIST

## 2017-07-23 PROCEDURE — A9270 NON-COVERED ITEM OR SERVICE: HCPCS | Performed by: HOSPITALIST

## 2017-07-23 PROCEDURE — A9270 NON-COVERED ITEM OR SERVICE: HCPCS | Performed by: INTERNAL MEDICINE

## 2017-07-23 PROCEDURE — 770020 HCHG ROOM/CARE - TELE (206)

## 2017-07-23 PROCEDURE — 700102 HCHG RX REV CODE 250 W/ 637 OVERRIDE(OP): Performed by: PSYCHIATRY & NEUROLOGY

## 2017-07-23 PROCEDURE — 85027 COMPLETE CBC AUTOMATED: CPT

## 2017-07-23 PROCEDURE — 700102 HCHG RX REV CODE 250 W/ 637 OVERRIDE(OP): Performed by: INTERNAL MEDICINE

## 2017-07-23 PROCEDURE — 700111 HCHG RX REV CODE 636 W/ 250 OVERRIDE (IP): Performed by: INTERNAL MEDICINE

## 2017-07-23 PROCEDURE — 700105 HCHG RX REV CODE 258

## 2017-07-23 PROCEDURE — 99233 SBSQ HOSP IP/OBS HIGH 50: CPT | Performed by: INTERNAL MEDICINE

## 2017-07-23 PROCEDURE — A9270 NON-COVERED ITEM OR SERVICE: HCPCS | Performed by: PSYCHIATRY & NEUROLOGY

## 2017-07-23 PROCEDURE — 700105 HCHG RX REV CODE 258: Performed by: INTERNAL MEDICINE

## 2017-07-23 RX ORDER — PREDNISONE 20 MG/1
40 TABLET ORAL DAILY
Status: DISCONTINUED | OUTPATIENT
Start: 2017-07-23 | End: 2017-07-25 | Stop reason: HOSPADM

## 2017-07-23 RX ORDER — SODIUM CHLORIDE 9 MG/ML
INJECTION, SOLUTION INTRAVENOUS
Status: COMPLETED
Start: 2017-07-23 | End: 2017-07-23

## 2017-07-23 RX ADMIN — OXYCODONE HYDROCHLORIDE 10 MG: 10 TABLET, FILM COATED, EXTENDED RELEASE ORAL at 20:56

## 2017-07-23 RX ADMIN — NYSTATIN: 100000 POWDER TOPICAL at 09:00

## 2017-07-23 RX ADMIN — PREDNISONE 40 MG: 20 TABLET ORAL at 12:28

## 2017-07-23 RX ADMIN — GABAPENTIN 200 MG: 100 CAPSULE ORAL at 14:03

## 2017-07-23 RX ADMIN — OMEPRAZOLE 20 MG: 20 CAPSULE, DELAYED RELEASE ORAL at 20:56

## 2017-07-23 RX ADMIN — SODIUM CHLORIDE: 9 INJECTION, SOLUTION INTRAVENOUS at 09:15

## 2017-07-23 RX ADMIN — TAMSULOSIN HYDROCHLORIDE 0.4 MG: 0.4 CAPSULE ORAL at 09:14

## 2017-07-23 RX ADMIN — METOPROLOL TARTRATE 50 MG: 50 TABLET, FILM COATED ORAL at 20:56

## 2017-07-23 RX ADMIN — ROSUVASTATIN CALCIUM 125 MCG: 10 TABLET, FILM COATED ORAL at 18:39

## 2017-07-23 RX ADMIN — Medication 325 MG: at 09:14

## 2017-07-23 RX ADMIN — LIDOCAINE 1 PATCH: 50 PATCH TOPICAL at 20:57

## 2017-07-23 RX ADMIN — HEPARIN SODIUM 5000 UNITS: 5000 INJECTION, SOLUTION INTRAVENOUS; SUBCUTANEOUS at 20:56

## 2017-07-23 RX ADMIN — CYANOCOBALAMIN TAB 500 MCG 1000 MCG: 500 TAB at 09:14

## 2017-07-23 RX ADMIN — HEPARIN SODIUM 5000 UNITS: 5000 INJECTION, SOLUTION INTRAVENOUS; SUBCUTANEOUS at 14:02

## 2017-07-23 RX ADMIN — Medication 325 MG: at 18:39

## 2017-07-23 RX ADMIN — HEPARIN SODIUM 5000 UNITS: 5000 INJECTION, SOLUTION INTRAVENOUS; SUBCUTANEOUS at 05:15

## 2017-07-23 RX ADMIN — OMEPRAZOLE 20 MG: 20 CAPSULE, DELAYED RELEASE ORAL at 09:13

## 2017-07-23 RX ADMIN — METOPROLOL TARTRATE 50 MG: 50 TABLET, FILM COATED ORAL at 09:13

## 2017-07-23 RX ADMIN — FLUCONAZOLE 100 MG: 100 TABLET ORAL at 09:14

## 2017-07-23 RX ADMIN — GABAPENTIN 200 MG: 100 CAPSULE ORAL at 20:55

## 2017-07-23 RX ADMIN — LEVETIRACETAM 500 MG: 500 TABLET, FILM COATED ORAL at 09:14

## 2017-07-23 RX ADMIN — DIPHENHYDRAMINE HCL 50 MG: 25 TABLET ORAL at 05:15

## 2017-07-23 RX ADMIN — LEVETIRACETAM 500 MG: 500 TABLET, FILM COATED ORAL at 20:56

## 2017-07-23 RX ADMIN — ATORVASTATIN CALCIUM 10 MG: 10 TABLET, FILM COATED ORAL at 20:55

## 2017-07-23 RX ADMIN — CEFTRIAXONE SODIUM 2 G: 2 INJECTION, POWDER, FOR SOLUTION INTRAMUSCULAR; INTRAVENOUS at 09:17

## 2017-07-23 RX ADMIN — DIPHENHYDRAMINE HCL 50 MG: 25 TABLET ORAL at 14:03

## 2017-07-23 RX ADMIN — GABAPENTIN 200 MG: 100 CAPSULE ORAL at 09:13

## 2017-07-23 RX ADMIN — OXYCODONE HYDROCHLORIDE 10 MG: 10 TABLET, FILM COATED, EXTENDED RELEASE ORAL at 09:13

## 2017-07-23 RX ADMIN — Medication 325 MG: at 14:03

## 2017-07-23 RX ADMIN — NYSTATIN 1500000 UNITS: 100000 POWDER TOPICAL at 20:59

## 2017-07-23 ASSESSMENT — ENCOUNTER SYMPTOMS
BACK PAIN: 1
SHORTNESS OF BREATH: 0
MYALGIAS: 1
VOMITING: 0
COUGH: 0
ROS SKIN COMMENTS: PRURITUS
DIZZINESS: 0
TINGLING: 0
CHILLS: 0
NAUSEA: 0
FEVER: 0
DIARRHEA: 0
ABDOMINAL PAIN: 0

## 2017-07-23 ASSESSMENT — PAIN SCALES - GENERAL
PAINLEVEL_OUTOF10: 3
PAINLEVEL_OUTOF10: 0
PAINLEVEL_OUTOF10: 3
PAINLEVEL_OUTOF10: 5
PAINLEVEL_OUTOF10: 0

## 2017-07-23 ASSESSMENT — PATIENT HEALTH QUESTIONNAIRE - PHQ9
SUM OF ALL RESPONSES TO PHQ QUESTIONS 1-9: 0
SUM OF ALL RESPONSES TO PHQ9 QUESTIONS 1 AND 2: 0
1. LITTLE INTEREST OR PLEASURE IN DOING THINGS: NOT AT ALL
2. FEELING DOWN, DEPRESSED, IRRITABLE, OR HOPELESS: NOT AT ALL

## 2017-07-23 NOTE — PROGRESS NOTES
Infectious Disease Progress Note    Author: Shante Sequeira M.D. Date of service & Time created: 7/23/2017  4:26 PM    Chief Complaint:  Chief Complaint   Patient presents with   • Back Pain    follow-up for lumbar discitis and bacteremia    Interval History:  86-year-old white male admitted for back pain. Found to have discitis  7/9/17- no fevers. WBC 10.3. Ongoing back pain. Sedimentation rate is 46. Now the blood cultures are positive for strep species. Also has new nausea and vomiting today.   7/10 AF, WBC 16.5, having nausea and vomiting, ongoing back pain, plan for bone biopsy today, tolerating abx without issues  7/11 AF, WBC 13.3, more pain after biopsy, feels flushed, having runny stools on bowel regimen  7/12 AF WBC 15.3 transferred to ICU due to concern for seizure-obtunded.  7/13 AF alert and eating lunch Has some pain-controlled. Denies SE abx  7/14 AF WBC 14.7 no new complaints  7/15 AF, no CBC, has not gotten OOB, denies any back pain   7/16 AF, WBC 21.9, having urinary incontinence  7/17 AF, no CBC, working on [employer], having low blood pressures today, were elevated yesterday, no BM in a few days  7/18 AF, WBC 20, feels better this am, had breakfast, denies any diarrhea  7/19 AF c/o severe pain in back dolores with movement  7/20 AF WBC 19 continued c/o back pain with any movement  7/21 AF WBC 17.6 pain better controlled today  7/22 AF WBC 14 rash fading but itchy. Poor po intake noted  7/23 AF WBC 14.9 a little more awake today Less itching-rash pink   Labs Reviewed, Medications Reviewed and Radiology Reviewed.    Review of Systems:  Review of Systems   Constitutional: Negative for fever and chills.   Cardiovascular: Negative for chest pain.   Gastrointestinal: Negative for nausea, vomiting, abdominal pain and diarrhea.   Genitourinary:        Urinary incontinence   Musculoskeletal: Positive for myalgias, back pain and joint pain.   Skin: Positive for itching and rash.     Limited due to  pain  Hemodynamics:  Temp (24hrs), Av.4 °C (97.6 °F), Min:36.2 °C (97.1 °F), Max:36.7 °C (98.1 °F)  Temperature: 36.6 °C (97.8 °F)  Pulse  Av.5  Min: 34  Max: 154   Blood Pressure : (!) 97/62 mmHg       Physical Exam:  Physical Exam   Constitutional: He appears well-developed and well-nourished.   Obese   HENT:   Head: Normocephalic and atraumatic.   Eyes: EOM are normal. Pupils are equal, round, and reactive to light. No scleral icterus.   Neck: Neck supple.   Cardiovascular: Normal rate.    Irregularly irregular     Pulmonary/Chest: Effort normal and breath sounds normal. No respiratory distress. He has no wheezes.   Abdominal: Soft. He exhibits no distension. There is no tenderness.   Musculoskeletal: He exhibits no edema.   LUE PICC nontender   Neurological: He is alert.   Skin: Rash noted. There is erythema.   RUE forearm-decreased erythema  Groin and buttocks erythema    Erythematous blanching rash diffuse   Nursing note and vitals reviewed.      Meds:    Current facility-administered medications:   •  predniSONE (DELTASONE) tablet 40 mg, 40 mg, Oral, DAILY, Johanne Chua M.D., 40 mg at 17 1228  •  diphenhydrAMINE (BENADRYL) tablet/capsule 50 mg, 50 mg, Oral, Q6HRS PRN, Johanne Chua M.D., 50 mg at 17 1403  •  ferrous sulfate tablet 325 mg, 325 mg, Oral, TID WITH MEALS, Johanne Chua M.D., 325 mg at 17 1403  •  tramadol (ULTRAM) 50 MG tablet 50 mg, 50 mg, Oral, Q6HRS PRN, Johanne Chua M.D., 50 mg at 17 0133  •  fluconazole (DIFLUCAN) tablet 100 mg, 100 mg, Oral, DAILY, Shante Sequeira M.D., 100 mg at 17 0914  •  lidocaine (LIDODERM) 5 % 1 Patch, 1 Patch, Transdermal, Q24HR, Yaya Ragland DMAGEN., 1 Patch at 07/2044  •  tamsulosin (FLOMAX) capsule 0.4 mg, 0.4 mg, Oral, AFTER BREAKFAST, Kirby Mayo M.D., 0.4 mg at 17  •  oxyCODONE CR (OXYCONTIN) tablet 10 mg, 10 mg, Oral, Q12HRS, Kirby Mayo M.D., 10 mg at 17  •  metoprolol  (LOPRESSOR) tablet 50 mg, 50 mg, Oral, TWICE DAILY, Kirby Mayo M.D., 50 mg at 07/23/17 0913  •  nystatin (MYCOSTATIN) powder, , Topical, BID, Kirby Mayo M.D.  •  levetiracetam (KEPPRA) tablet 500 mg, 500 mg, Oral, BID, Allan Win M.D., 500 mg at 07/23/17 0914  •  [DISCONTINUED] senna-docusate (PERICOLACE or SENOKOT S) 8.6-50 MG per tablet 2 Tab, 2 Tab, Oral, BID, Stopped at 07/14/17 2100 **AND** polyethylene glycol/lytes (MIRALAX) PACKET 1 Packet, 1 Packet, Oral, QDAY PRN, 1 Packet at 07/18/17 0753 **AND** magnesium hydroxide (MILK OF MAGNESIA) suspension 30 mL, 30 mL, Oral, QDAY PRN, 30 mL at 07/13/17 1751 **AND** bisacodyl (DULCOLAX) suppository 10 mg, 10 mg, Rectal, QDAY PRN, Kirby Mayo M.D.  •  metoprolol (LOPRESSOR) injection 5 mg, 5 mg, Intravenous, Q5 MIN PRN, Collin White M.D., 5 mg at 07/19/17 1213  •  heparin injection 5,000 Units, 5,000 Units, Subcutaneous, Q8HRS, Collin White M.D., 5,000 Units at 07/23/17 1402  •  PICC Line Insertion has been implemented, , , Once **AND** May use Lidocaine 1% not to exceed 3 mls for local at insertion site, , , CONTINUOUS **AND** NOTIFY MD, , , Once **AND** Tip to dwell in the superior vena cava, , , CONTINUOUS **AND** Do not use PICC Line until placement verified by Chest X Ray, , , CONTINUOUS **AND** DX-CHEST-FOR PICC LINE Perform procedure in:: Patient's Room, , , Once **AND** If radiologist reading of chest X-ray states any of the following the PICC should be used, , , CONTINUOUS **AND** Further evaluation of the PICC placement can be retrieved from X-Ray and Imaging, , , CONTINUOUS **AND** Blood draws through PICC line; draws by RN only, , , CONTINUOUS **AND** FLUSHING GUIDELINES WHEN IN USE, , , CONTINUOUS **AND** normal saline PF 10-20 mL, 10-20 mL, Intravenous, PRN **AND** FLUSHING GUIDELINES WHEN NOT IN USE, , , CONTINUOUS **AND** DRESSING MAINTENANCE, , , Once **AND** Change needleless pressure ports and IV tubing every 72 hours  per hospital policy, , , CONTINUOUS **AND** TUBING, , , CONTINUOUS **AND** If there is an MD order to remove the PICC line, any RN may remove the PICC line, , , CONTINUOUS **AND** [] PATIENT EDUCATION MATERIALS, , , Prior to discharge **AND** NURSING COMMUNICATION, , , CONTINUOUS, Ny Chilel M.D.  •  gabapentin (NEURONTIN) capsule 200 mg, 200 mg, Oral, TID, Georgi Grey M.D., 200 mg at 17 1403  •  Notify provider if pain remains uncontrolled, , , CONTINUOUS **AND** Use the numeric rating scale (NRS-11) on regular floors and Critical-Care Pain Observation Tool (CPOT) on ICUs/Trauma to assess pain, , , CONTINUOUS **AND** Pulse Ox (Oximetry), , , CONTINUOUS **AND** [DISCONTINUED] Pharmacy Consult Request ...Pain Management Review, , Other, PRN **AND** If patient difficult to arouse and/or has respiratory depression, stop any opiates that are currently infusing and call a Rapid Response., , , CONTINUOUS **AND** [DISCONTINUED] oxycodone immediate-release (ROXICODONE) tablet 2.5 mg, 2.5 mg, Oral, Q3HRS PRN, 2.5 mg at 17 1802 **AND** [DISCONTINUED] oxycodone immediate-release (ROXICODONE) tablet 5 mg, 5 mg, Oral, Q3HRS PRN, 5 mg at 17 2311 **AND** HYDROmorphone (DILAUDID) injection 0.5 mg, 0.5 mg, Intravenous, Q3HRS PRN, Georgi Grey M.D., 0.5 mg at 17 0519  •  digoxin (LANOXIN) tablet 125 mcg, 125 mcg, Oral, DAILY AT 1800, Georgi Grey M.D., 125 mcg at 17 1851  •  ondansetron (ZOFRAN) syringe/vial injection 4 mg, 4 mg, Intravenous, Q6HRS PRN, Georgi Grey M.D., 4 mg at 07/15/17 0824  •  omeprazole (PRILOSEC) capsule 20 mg, 20 mg, Oral, BID, Georgi Grey M.D., 20 mg at 1713  •  cefTRIAXone (ROCEPHIN) 2 g in  mL IVPB, 2 g, Intravenous, Q24HRS, Oliva Bolanos M.D., Stopped at 17  •  hydrALAZINE (APRESOLINE) tablet 10 mg, 10 mg, Oral, Q4HRS PRN, Jamaal Molina M.D., 10 mg at 17  •  clonidine (CATAPRES) tablet 0.1 mg, 0.1  mg, Oral, 4X/DAY PRN, Jamaal Molina M.D.  •  prochlorperazine (COMPAZINE) injection 10 mg, 10 mg, Intravenous, Q6HRS PRN, Darion Benton M.D., 10 mg at 07/10/17 0905  •  pneumococcal 13-Jocelyn Conj Vacc (PREVNAR 13) syringe 0.5 mL, 0.5 mL, Intramuscular, Once PRN, Jamaal Molina M.D., Stopped at 07/16/17 2032  •  atorvastatin (LIPITOR) tablet 10 mg, 10 mg, Oral, Nightly, Jamaal Molina M.D., 10 mg at 07/22/17 2043  •  vitamin D (cholecalciferol) tablet 5,000 Units, 5,000 Units, Oral, Q48HRS, Jamaal Molina M.D., 5,000 Units at 07/22/17 0836  •  cyanocobalamin (VITAMIN B-12) tablet 1,000 mcg, 1,000 mcg, Oral, DAILY, Jamaal Molina M.D., 1,000 mcg at 07/23/17 0914  •  acetaminophen (TYLENOL) tablet 650 mg, 650 mg, Oral, Q6HRS PRN, Jamaal Molina M.D., 650 mg at 07/20/17 0955    Labs:  Recent Labs      07/21/17 0315 07/22/17   0159  07/23/17   0430   WBC  17.6*  14.7*  14.9*   RBC  4.13*  4.38*  4.53*   HEMOGLOBIN  12.2*  12.6*  13.0*   HEMATOCRIT  37.4*  39.7*  40.7*   MCV  90.6  90.6  89.8   MCH  29.5  28.8  28.7   RDW  43.4  42.9  43.3   PLATELETCT  278  282  325   MPV  10.0  10.1  10.1   NEUTSPOLYS   --   74.50*   --    LYMPHOCYTES   --   9.30*   --    MONOCYTES   --   8.60   --    EOSINOPHILS   --   3.90   --    BASOPHILS   --   0.40   --      Recent Labs      07/21/17 0315 07/22/17   0159   SODIUM  137  137   POTASSIUM  3.7  4.0   CHLORIDE  102  100   CO2  31  31   GLUCOSE  138*  111*   BUN  12  10     Recent Labs      07/21/17   0315  07/22/17   0159   CREATININE  0.77  0.78       Imaging:  Ct-abdomen-pelvis W/o  7/6/2017  No hydronephrosis or urolithiasis. Diverticula colon. No evidence diverticulitis. No free fluid. Pancreatic calcifications/consistent with sequela pancreatitis. No peripancreatic fluid collections. Coronary artery calcifications.    Ct-lspine W/o Plus Recons  7/6/2017  1.  No evidence of acute fracture of the lumbar spine. 2.  Surgical change extending from L3 through L5. 3.   Multilevel degenerative disc disease and facet degeneration. 4.  Old fractures of the right L1 and L2 transverse processes. 5.  Multilevel degenerative subluxation.    Mr-lumbar Spine-with & W/o  7/8/2017  1.  Possible discitis at the L3-4 level. 2.  Mild to moderate retrolisthesis at the L3-4 level. 3.  Previous extensive laminectomy from the L2 level to the sacrum with bilateral pedicle screw and zev fixation at the L4-L5 levels. 4.   Moderate lumbar spondylotic changes at the L3-4 levels with associated moderate central canal stenosis at this level secondary to facet arthropathy. Additionally there are severe bilateral neural foraminal stenosis. 5.  Minimal lumbar spondylotic changes at the L2-3 level with mild bilateral neural foraminal narrowing and mild disc bulging into the inferior aspects of the neural foramina.    Dx-hip-bilateral-with Pelvis-3/4 Views  7/6/2017  No evidence of fracture or arthropathy of the hips. Degenerative and surgical changes of the lumbar spine.      Micro:  BLOOD CULTURE   Date Value Ref Range Status   07/20/2017   Preliminary    No Growth    Note: Blood cultures are incubated for 5 days and  are monitored continuously.Positive blood cultures  are called to the RN and reported as soon as  they are identified.        Results     Procedure Component Value Units Date/Time    BLOOD CULTURE [[id number]]  (Abnormal) Collected:  07/06/17 1013    Order Status:  Completed Specimen Information:  Blood from Peripheral Updated:  07/21/17 1001     Significant Indicator POS (POS)      Source BLD      Site PERIPHERAL      Blood Culture Growth detected by Bactec instrument. (A)      Blood Culture -- (A)      Result:        Viridans streptococcus - possible contaminant  Isolated from one bottle only, possible skin contaminant  please correlate with clinical condition.      Narrative:      CALL  [facility]  183 tel. [phone],  CALLED  183 tel. [phone] 07/10/2017, 11:37, RB PERF. RESULTS CALLED  "TO:[phone]  Previous comment was modified by HAMMAD at 13:48 on 07/20/17.  Aerobic Organism Identification with Reflex to Susceptibility [id number]  [facility]. Actively growing organism, in pure culture.Viridans Streptococcus  species for identification and susceptibility testing. Source Blood.  Ambient.  Aerobic Organism Identification with Reflex to Susceptibility [id number]  [facility]. Actively growing organism, in pure culture. Viridans Streptococcus  species for identification and susceptibility testing. Source Blood.  Ambient.    BLOOD CULTURE [[id number]] Collected:  07/20/17 0954    Order Status:  Completed Specimen Information:  Blood from Peripheral Updated:  07/21/17 0842     Significant Indicator NEG      Source BLD      Site PERIPHERAL      Blood Culture --      Result:        No Growth    Note: Blood cultures are incubated for 5 days and  are monitored continuously.Positive blood cultures  are called to the RN and reported as soon as  they are identified.      Narrative:      Per Hospital Policy: Only change Specimen Src: to \"Line\" if  specified by physician order.    BLOOD CULTURE [[id number]] Collected:  07/20/17 0941    Order Status:  Completed Specimen Information:  Blood from Peripheral Updated:  07/21/17 0842     Significant Indicator NEG      Source BLD      Site PERIPHERAL      Blood Culture --      Result:        No Growth    Note: Blood cultures are incubated for 5 days and  are monitored continuously.Positive blood cultures  are called to the RN and reported as soon as  they are identified.      Narrative:      Per Hospital Policy: Only change Specimen Src: to \"Line\" if  specified by physician order.            Assessment:  Active Hospital Problems    Diagnosis   • *Low back pain [M54.5]   • Hypotension [I95.9]   • Morbid obesity (CMS-HCC) [E66.01]   • Sepsis (CMS-HCC) [A41.9]   • UTI (urinary tract infection) [N39.0]   • Atrial flutter (CMS-HCC) [I48.92]       Plan:  L3-4 discitis, likely strep  Afebrile  + " leukocytosis  S/p IR biopsy 7/10 - cultures NGTD  Ceftriaxone IV  Anticipate 6 week course of IV abx. Stop date 08/20/17    Bacteremia, strep viridans  Blood cultures (1/2) from 7/6/17 - viridans strep   TTE - negative. STEVE if feasible  Bcx 7/9 - NGTD  Continue Rocephin as above. Strep sensi not done    Leukocytosis, persistent   Multifactorial  Repeat imaging if continues to increase    Drug rash, worse today  Involving back, neck, abd  Dc'd vanco and monitor for resolution-anticipated worsening before improvement, however ,if continues to worsen DC ceftriaxone and start dapto  Steroids started    Rash, groin  Appears fungal  Severe pain with motion obviating topicals  Continue fluc for 14 days    RUE cellulitis, resolving slowly  At prior IV site RUE  Tender, decreased size of erythema but not resolved  Abx per above  Local care and monitor for worsening    Diabetes mellitus  Keep the blood sugars less than 150      Dispo: Recommend SNF for IV abx

## 2017-07-23 NOTE — PROGRESS NOTES
Family at bedside. Educated on steroids and blood sugar effects. Bed low locked, SRx2, call bell within reach, Non skid socks on, Bed alarm activated

## 2017-07-23 NOTE — CARE PLAN
Problem: Safety  Goal: Will remain free from injury  Outcome: PROGRESSING AS EXPECTED  Patient remained free of falls or injury during shift    Problem: Infection  Goal: Will remain free from infection  Outcome: PROGRESSING AS EXPECTED  No new signs or symptoms of infection during shift

## 2017-07-23 NOTE — DIETARY
Nutrition Services - PO intake brief update.    Patient being monitored r/t poor PO intake in collaboration with nursing and MD for discharge:    Current Weight: 101.9kg, which is fairly stable since admission     Diet order: regular  Supplements: boost plus TID(360 Kcals each) between meals  PO intake: 25-75% of meals, 25-75% of supplements     This is an overall improvement in PO intake vs his previously documented 0-100%. On 7/22, patient's boost plus was switched to between meals to increase chances for PO intake. Pt's N/V has resolved and PO food/fluids are well-tolerated.     RD following

## 2017-07-23 NOTE — PROGRESS NOTES
Patient c/o itching see MAR for intervention. Educated on diet and nutrition. Bed low locked, SRx2, call bell within reach, Non skid socks on, Bed alarm activated

## 2017-07-23 NOTE — CARE PLAN
Problem: Communication  Goal: The ability to communicate needs accurately and effectively will improve  Outcome: PROGRESSING SLOWER THAN EXPECTED  POC discussed at bedside. Patient verbalizes understanding. Education on medications provided. White board updated, patient encouraged to call for all needs. Calls appropriately. No immediate concerns at this time.    Problem: Safety  Goal: Will remain free from injury  Outcome: PROGRESSING SLOWER THAN EXPECTED  Bed in lowest position. Educated patient on use of call light.

## 2017-07-23 NOTE — PROGRESS NOTES
Received report from Kristina HENRY and assumed care of pt. Pt is A&Ox4. No signs or symptoms of pain or distress. Assessment completed and plan of care discussed. Pt verbalized understanding of call light and communication board. Pt questions answered at this time. Fall precautions in place. Pt needs met at this time. Hourly rounding in place.

## 2017-07-23 NOTE — CARE PLAN
Problem: Nutritional:  Goal: Achieve adequate nutritional intake  Patient will consume 50% of meals   Outcome: PROGRESSING AS EXPECTED  PO intake: 25-75% of food and supplements - improving

## 2017-07-23 NOTE — PROGRESS NOTES
[facility] Hospitalist Progress Note    Date of Service: 2017    Chief Complaint  86 y.o. male admitted 2017 with L3-L4 discitis and Strep bacteremia on abx until .  During admit had complication seizure transferred out of ICU on     Interval Problem Update  Patient alert awake oriented ×3. Patient resting in bed. Patient's rash seems to be worsening on bilateral upper and lower extremities as well as now seen on bilateral flank regions and upper anterior chest wall. Pt started on PO prednisone     Consultants/Specialty  Neurology: Dr Win, signed off  Neurosurgery: Signed off  Infectious Disease: Dr Chilel    Disposition  Patient has been accepted to [facility], will transfer patient once his rash improves.        Review of Systems   Constitutional: Negative for fever and chills.   Respiratory: Negative for cough and shortness of breath.    Cardiovascular: Negative for chest pain and leg swelling.   Gastrointestinal: Negative for nausea, vomiting and abdominal pain.   Musculoskeletal: Negative for joint pain.   Skin: Positive for rash (B/L UE and LE, anterior chest wall, neck and B/L flank region).        Pruritus     Neurological: Negative for dizziness and tingling.      Physical Exam  Laboratory/Imaging   Hemodynamics  Temp (24hrs), Av.3 °C (97.4 °F), Min:36.1 °C (97 °F), Max:36.7 °C (98.1 °F)   Temperature: 36.6 °C (97.8 °F)  Pulse  Av.5  Min: 34  Max: 154    Blood Pressure : (!) 97/62 mmHg      Respiratory      Respiration: 18, Pulse Oximetry: 98 % (extension tubing on)     Work Of Breathing / Effort: Mild  RUL Breath Sounds: Clear, RML Breath Sounds: Clear, RLL Breath Sounds: Clear;Diminished, MIKE Breath Sounds: Clear, LLL Breath Sounds: Clear;Diminished    Fluids  No intake or output data in the 24 hours ending 17 1203    Nutrition  Orders Placed This Encounter   Procedures   • DIET ORDER     Standing Status: Standing      Number of Occurrences: 1      Standing Expiration Date:       Order Specific Question:  Diet:     Answer:  Regular [1]     Physical Exam   Constitutional: He is oriented to person, place, and time. He appears well-developed and well-nourished. No distress.   HENT:   Head: Normocephalic and atraumatic.   Nose: Nose normal.   Mouth/Throat: Oropharynx is clear and moist.   Eyes: Conjunctivae are normal. Right eye exhibits no discharge. Left eye exhibits no discharge. No scleral icterus.   Neck: No JVD present. No tracheal deviation present.   Cardiovascular: Normal rate, regular rhythm, normal heart sounds and intact distal pulses.    No murmur heard.  Pulmonary/Chest: Effort normal and breath sounds normal. No respiratory distress. He has no wheezes. He has no rales.   Abdominal: Soft. Bowel sounds are normal. He exhibits no distension. There is no tenderness.   Genitourinary:   Scrotal edema improving    Musculoskeletal: He exhibits no edema.   LUE PIC    Neurological: He is alert and oriented to person, place, and time. No cranial nerve deficit.   Skin: Skin is warm. He is not diaphoretic.   Maculopapular rash noted bilateral upper extremities, bilateral lower extremities, anterior chest wall, bilateral flank region.   Psychiatric: He has a normal mood and affect. His speech is normal. Thought content normal.   Nursing note and vitals reviewed.      Recent Labs      07/21/17 0315 07/22/17   0159  07/23/17   0430   WBC  17.6*  14.7*  14.9*   RBC  4.13*  4.38*  4.53*   HEMOGLOBIN  12.2*  12.6*  13.0*   HEMATOCRIT  37.4*  39.7*  40.7*   MCV  90.6  90.6  89.8   MCH  29.5  28.8  28.7   MCHC  32.6*  31.7*  31.9*   RDW  43.4  42.9  43.3   PLATELETCT  278  282  325   MPV  10.0  10.1  10.1     Recent Labs      07/21/17   0315  07/22/17   0159   SODIUM  137  137   POTASSIUM  3.7  4.0   CHLORIDE  102  100   CO2  31  31   GLUCOSE  138*  111*   BUN  12  10   CREATININE  0.77  0.78   CALCIUM  10.1  10.2                      Assessment/Plan     * Low back pain (present on  admission)  Assessment & Plan  Concern of worsening dementia with the oxycodone, dc'ed oxycodone, on tramadol for pain management  Physical therapy, occupational therapy  Additional nonnarcotic pain medication such as Lidoderm patch and Neurontin    Bacteremia  Assessment & Plan  Strep viridans, repeat blood cx NGTD, PICC in place  ID following  On IV ceftriaxone will need 6 weeks course until 8/20    Discitis of lumbar region  Assessment & Plan  At L3-L4 level  S/p Bc Cx remains neg  Continue current abx per ID to continue through August 20  Will likely need LTAC.  Wound care physical therapy    Atrial flutter (CMS-[facility]) (present on admission)  Assessment & Plan  Improved rate control Continue digoxin and metoprolol  Patient not on chronic anticoagulation given previous history of hematoma and falls will need to reevaluate after improvement of pain control and mobilization    Sepsis (CMS-[facility]) (present on admission)  Assessment & Plan  Resolved          UTI (urinary tract infection) (present on admission)  Assessment & Plan  Resolved      Hypomagnesemia  Assessment & Plan  Resolved, monitor     Acute encephalopathy  Assessment & Plan  Resolved no acute pathology on MRI or EEG, improving   Empirically started on Keppra per neurology, Dr Win recommend outpt  follow up to review need for continued AED  Per pt's son in law pt AAOX4 at baseline   Palliative following    Shock (CMS-HCC)  Assessment & Plan  Resolved    Benign prostatic hyperplasia with lower urinary tract symptoms  Assessment & Plan  flomax    Leukocytosis  Assessment & Plan  Secondary to strep.viridans bacteremia and discitis of L3-L4  Trending down, CTM     Normocytic anemia  Assessment & Plan  2/2 to iron def, started on replacement therapy  Vitamin b12/folate, TSH wnl      Scrotal edema  Assessment & Plan  Likely dependent, scrotal sling ordered    Drug rash  Assessment & Plan  Likely secondary to vancomycin, dc'ed ctm   Patient on Benadryl 50 mg by  mouth PRN q6h for pruritus      Labs reviewed and Medications reviewed  Hearn catheter: No Hearn      DVT Prophylaxis: Heparin    Ulcer prophylaxis: Not indicated  Antibiotics: Treating active infection/contamination beyond 24 hours perioperative coverage  Assessed for rehab: Patient was assess for and/or received rehabilitation services during this hospitalization

## 2017-07-23 NOTE — CARE PLAN
Problem: Communication  Goal: The ability to communicate needs accurately and effectively will improve  Outcome: PROGRESSING AS EXPECTED  PAtient communicated needs and wants appropriately during shift. Both patient and family (margo) participated in a discussion of expectations of care and nutritional progression.    Problem: Safety  Goal: Will remain free from injury  Outcome: PROGRESSING AS EXPECTED  Patient remained free of falls or injury during shift.

## 2017-07-23 NOTE — PROGRESS NOTES
Infectious Disease Progress Note    Author: Shante Sequeira M.D. Date of service & Time created: 2017  6:10 PM    Chief Complaint:  Chief Complaint   Patient presents with   • Back Pain    follow-up for lumbar discitis and bacteremia    Interval History:  86-year-old white male admitted for back pain. Found to have discitis  17- no fevers. WBC 10.3. Ongoing back pain. Sedimentation rate is 46. Now the blood cultures are positive for strep species. Also has new nausea and vomiting today.   7/10 AF, WBC 16.5, having nausea and vomiting, ongoing back pain, plan for bone biopsy today, tolerating abx without issues   AF, WBC 13.3, more pain after biopsy, feels flushed, having runny stools on bowel regimen   AF WBC 15.3 transferred to ICU due to concern for seizure-obtunded.   AF alert and eating lunch Has some pain-controlled. Denies SE abx   AF WBC 14.7 no new complaints  7/15 AF, no CBC, has not gotten OOB, denies any back pain    AF, WBC 21.9, having urinary incontinence   AF, no CBC, working on [employer], having low blood pressures today, were elevated yesterday, no BM in a few days   AF, WBC 20, feels better this am, had breakfast, denies any diarrhea   AF c/o severe pain in back dolores with movement   AF WBC 19 continued c/o back pain with any movement   AF WBC 17.6 pain better controlled today   AF WBC 14 rash fading but itchy. Poor po intake noted  Labs Reviewed, Medications Reviewed and Radiology Reviewed.    Review of Systems:  Review of Systems   Constitutional: Negative for fever and chills.   Cardiovascular: Negative for chest pain.   Gastrointestinal: Negative for nausea, vomiting, abdominal pain and diarrhea.   Genitourinary:        Urinary incontinence   Musculoskeletal: Positive for myalgias, back pain and joint pain.   Skin: Positive for itching and rash.     Limited due to pain  Hemodynamics:  Temp (24hrs), Av.2 °C (97.2 °F), Min:35.8 °C (96.5 °F),  Max:36.8 °C (98.2 °F)  Temperature: 36.1 °C (97 °F)  Pulse  Av.5  Min: 34  Max: 154   Blood Pressure : 117/76 mmHg       Physical Exam:  Physical Exam   Constitutional: He appears well-developed and well-nourished.   Obese   HENT:   Head: Normocephalic and atraumatic.   Eyes: EOM are normal. Pupils are equal, round, and reactive to light. No scleral icterus.   Neck: Neck supple.   Cardiovascular: Normal rate.    Irregularly irregular     Pulmonary/Chest: Effort normal and breath sounds normal. No respiratory distress. He has no wheezes.   Abdominal: Soft. He exhibits no distension. There is no tenderness.   Musculoskeletal: He exhibits no edema.   LUE PICC nontender   Neurological: He is alert.   Skin: Rash noted. There is erythema.   RUE forearm-decreased erythema  Groin and buttocks erythema    Erythematous blanching rash abd, back, neck-slightly improved   Nursing note and vitals reviewed.      Meds:    Current facility-administered medications:   •  diphenhydrAMINE (BENADRYL) tablet/capsule 50 mg, 50 mg, Oral, Q6HRS PRN, Johanne Chua M.D., 50 mg at 17 1623  •  ferrous sulfate tablet 325 mg, 325 mg, Oral, TID WITH MEALS, Johanne Chua M.D., 325 mg at 17 1354  •  tramadol (ULTRAM) 50 MG tablet 50 mg, 50 mg, Oral, Q6HRS PRN, Johanne Chua M.D., 50 mg at 17 0133  •  fluconazole (DIFLUCAN) tablet 100 mg, 100 mg, Oral, DAILY, Shante Sequeira M.D., 100 mg at 17 0835  •  lidocaine (LIDODERM) 5 % 1 Patch, 1 Patch, Transdermal, Q24HR, Yaya Ragland D.OIsrael, 1 Patch at 17 2139  •  tamsulosin (FLOMAX) capsule 0.4 mg, 0.4 mg, Oral, AFTER BREAKFAST, Kirby Mayo M.D., 0.4 mg at 17 0835  •  oxyCODONE CR (OXYCONTIN) tablet 10 mg, 10 mg, Oral, Q12HRS, Kirby Mayo M.D., 10 mg at 17 0835  •  metoprolol (LOPRESSOR) tablet 50 mg, 50 mg, Oral, TWICE DAILY, Kirby Mayo M.D., 50 mg at 1735  •  nystatin (MYCOSTATIN) powder, , Topical, BID, Kirby Pablo  MARY Mayo, 1,500,000 Units at 07/22/17 0838  •  levetiracetam (KEPPRA) tablet 500 mg, 500 mg, Oral, BID, Allan Win M.D., 500 mg at 07/22/17 0835  •  [DISCONTINUED] senna-docusate (PERICOLACE or SENOKOT S) 8.6-50 MG per tablet 2 Tab, 2 Tab, Oral, BID, Stopped at 07/14/17 2100 **AND** polyethylene glycol/lytes (MIRALAX) PACKET 1 Packet, 1 Packet, Oral, QDAY PRN, 1 Packet at 07/18/17 0753 **AND** magnesium hydroxide (MILK OF MAGNESIA) suspension 30 mL, 30 mL, Oral, QDAY PRN, 30 mL at 07/13/17 1751 **AND** bisacodyl (DULCOLAX) suppository 10 mg, 10 mg, Rectal, QDAY PRN, Kirby Mayo M.D.  •  metoprolol (LOPRESSOR) injection 5 mg, 5 mg, Intravenous, Q5 MIN PRN, Collin White M.D., 5 mg at 07/19/17 1213  •  heparin injection 5,000 Units, 5,000 Units, Subcutaneous, Q8HRS, Collin White M.D., 5,000 Units at 07/22/17 1354  •  PICC Line Insertion has been implemented, , , Once **AND** May use Lidocaine 1% not to exceed 3 mls for local at insertion site, , , CONTINUOUS **AND** NOTIFY MD, , , Once **AND** Tip to dwell in the superior vena cava, , , CONTINUOUS **AND** Do not use PICC Line until placement verified by Chest X Ray, , , CONTINUOUS **AND** DX-CHEST-FOR PICC LINE Perform procedure in:: Patient's Room, , , Once **AND** If radiologist reading of chest X-ray states any of the following the PICC should be used, , , CONTINUOUS **AND** Further evaluation of the PICC placement can be retrieved from X-Ray and Imaging, , , CONTINUOUS **AND** Blood draws through PICC line; draws by RN only, , , CONTINUOUS **AND** FLUSHING GUIDELINES WHEN IN USE, , , CONTINUOUS **AND** normal saline PF 10-20 mL, 10-20 mL, Intravenous, PRN **AND** FLUSHING GUIDELINES WHEN NOT IN USE, , , CONTINUOUS **AND** DRESSING MAINTENANCE, , , Once **AND** Change needleless pressure ports and IV tubing every 72 hours per hospital policy, , , CONTINUOUS **AND** TUBING, , , CONTINUOUS **AND** If there is an MD order to remove the PICC line, any RN may  remove the PICC line, , , CONTINUOUS **AND** [] PATIENT EDUCATION MATERIALS, , , Prior to discharge **AND** NURSING COMMUNICATION, , , CONTINUOUS, Ny Chilel M.D.  •  gabapentin (NEURONTIN) capsule 200 mg, 200 mg, Oral, TID, Georgi Grey M.D., 200 mg at 17 1623  •  Notify provider if pain remains uncontrolled, , , CONTINUOUS **AND** Use the numeric rating scale (NRS-11) on regular floors and Critical-Care Pain Observation Tool (CPOT) on ICUs/Trauma to assess pain, , , CONTINUOUS **AND** Pulse Ox (Oximetry), , , CONTINUOUS **AND** [DISCONTINUED] Pharmacy Consult Request ...Pain Management Review, , Other, PRN **AND** If patient difficult to arouse and/or has respiratory depression, stop any opiates that are currently infusing and call a Rapid Response., , , CONTINUOUS **AND** [DISCONTINUED] oxycodone immediate-release (ROXICODONE) tablet 2.5 mg, 2.5 mg, Oral, Q3HRS PRN, 2.5 mg at 17 1802 **AND** [DISCONTINUED] oxycodone immediate-release (ROXICODONE) tablet 5 mg, 5 mg, Oral, Q3HRS PRN, 5 mg at 17 2311 **AND** HYDROmorphone (DILAUDID) injection 0.5 mg, 0.5 mg, Intravenous, Q3HRS PRN, Georgi Grey M.D., 0.5 mg at 17 0519  •  digoxin (LANOXIN) tablet 125 mcg, 125 mcg, Oral, DAILY AT 1800, Georgi Grey M.D., 125 mcg at 17 1745  •  ondansetron (ZOFRAN) syringe/vial injection 4 mg, 4 mg, Intravenous, Q6HRS PRN, Georgi Grey M.D., 4 mg at 07/15/17 0824  •  omeprazole (PRILOSEC) capsule 20 mg, 20 mg, Oral, BID, Georgi Grey M.D., 20 mg at 17 0836  •  cefTRIAXone (ROCEPHIN) 2 g in  mL IVPB, 2 g, Intravenous, Q24HRS, Oliva Bolanos M.D., Stopped at 17  •  hydrALAZINE (APRESOLINE) tablet 10 mg, 10 mg, Oral, Q4HRS PRN, Jamaal Molina M.D., 10 mg at 17  •  clonidine (CATAPRES) tablet 0.1 mg, 0.1 mg, Oral, 4X/DAY PRN, Jamaal Molina M.D.  •  prochlorperazine (COMPAZINE) injection 10 mg, 10 mg, Intravenous, Q6HRS PRN, Darion NUR  MARY Benton, 10 mg at 07/10/17 0905  •  pneumococcal 13-Jocelyn Conj Vacc (PREVNAR 13) syringe 0.5 mL, 0.5 mL, Intramuscular, Once PRN, Jamaal Molina M.D., Stopped at 07/16/17 2032  •  atorvastatin (LIPITOR) tablet 10 mg, 10 mg, Oral, Nightly, Jamaal Molina M.D., 10 mg at 07/21/17 2144  •  vitamin D (cholecalciferol) tablet 5,000 Units, 5,000 Units, Oral, Q48HRS, Jamaal Molina M.D., 5,000 Units at 07/22/17 0836  •  cyanocobalamin (VITAMIN B-12) tablet 1,000 mcg, 1,000 mcg, Oral, DAILY, Jamaal Molina M.D., 1,000 mcg at 07/22/17 0834  •  acetaminophen (TYLENOL) tablet 650 mg, 650 mg, Oral, Q6HRS PRN, Jamaal Molina M.D., 650 mg at 07/20/17 0955    Labs:  Recent Labs      07/21/17 0315  07/22/17   0159   WBC  17.6*  14.7*   RBC  4.13*  4.38*   HEMOGLOBIN  12.2*  12.6*   HEMATOCRIT  37.4*  39.7*   MCV  90.6  90.6   MCH  29.5  28.8   RDW  43.4  42.9   PLATELETCT  278  282   MPV  10.0  10.1   NEUTSPOLYS   --   74.50*   LYMPHOCYTES   --   9.30*   MONOCYTES   --   8.60   EOSINOPHILS   --   3.90   BASOPHILS   --   0.40     Recent Labs      07/21/17   0315  07/22/17   0159   SODIUM  137  137   POTASSIUM  3.7  4.0   CHLORIDE  102  100   CO2  31  31   GLUCOSE  138*  111*   BUN  12  10     Recent Labs      07/21/17   0315  07/22/17   0159   CREATININE  0.77  0.78       Imaging:  Ct-abdomen-pelvis W/o  7/6/2017  No hydronephrosis or urolithiasis. Diverticula colon. No evidence diverticulitis. No free fluid. Pancreatic calcifications/consistent with sequela pancreatitis. No peripancreatic fluid collections. Coronary artery calcifications.    Ct-lspine W/o Plus Recons  7/6/2017  1.  No evidence of acute fracture of the lumbar spine. 2.  Surgical change extending from L3 through L5. 3.  Multilevel degenerative disc disease and facet degeneration. 4.  Old fractures of the right L1 and L2 transverse processes. 5.  Multilevel degenerative subluxation.    Mr-lumbar Spine-with & W/o  7/8/2017  1.  Possible discitis at the L3-4  level. 2.  Mild to moderate retrolisthesis at the L3-4 level. 3.  Previous extensive laminectomy from the L2 level to the sacrum with bilateral pedicle screw and zev fixation at the L4-L5 levels. 4.   Moderate lumbar spondylotic changes at the L3-4 levels with associated moderate central canal stenosis at this level secondary to facet arthropathy. Additionally there are severe bilateral neural foraminal stenosis. 5.  Minimal lumbar spondylotic changes at the L2-3 level with mild bilateral neural foraminal narrowing and mild disc bulging into the inferior aspects of the neural foramina.    Dx-hip-bilateral-with Pelvis-3/4 Views  7/6/2017  No evidence of fracture or arthropathy of the hips. Degenerative and surgical changes of the lumbar spine.      Micro:  BLOOD CULTURE   Date Value Ref Range Status   07/20/2017   Preliminary    No Growth    Note: Blood cultures are incubated for 5 days and  are monitored continuously.Positive blood cultures  are called to the RN and reported as soon as  they are identified.        Results     Procedure Component Value Units Date/Time    BLOOD CULTURE [[id number]]  (Abnormal) Collected:  07/06/17 1013    Order Status:  Completed Specimen Information:  Blood from Peripheral Updated:  07/21/17 1001     Significant Indicator POS (POS)      Source BLD      Site PERIPHERAL      Blood Culture Growth detected by Bactec instrument. (A)      Blood Culture -- (A)      Result:        Viridans streptococcus - possible contaminant  Isolated from one bottle only, possible skin contaminant  please correlate with clinical condition.      Narrative:      CALL  [facility]  183 tel. [phone],  CALLED  183 tel. [phone] 07/10/2017, 11:37, RB PERF. RESULTS CALLED TO:[phone]  Previous comment was modified by HAMMAD at 13:48 on 07/20/17.  Aerobic Organism Identification with Reflex to Susceptibility [id number]  [facility]. Actively growing organism, in pure culture.Viridans Streptococcus  species for identification and  "susceptibility testing. Source Blood.  Ambient.  Aerobic Organism Identification with Reflex to Susceptibility [id number]  [facility]. Actively growing organism, in pure culture. Viridans Streptococcus  species for identification and susceptibility testing. Source Blood.  Ambient.    BLOOD CULTURE [[id number]] Collected:  07/20/17 0954    Order Status:  Completed Specimen Information:  Blood from Peripheral Updated:  07/21/17 0842     Significant Indicator NEG      Source BLD      Site PERIPHERAL      Blood Culture --      Result:        No Growth    Note: Blood cultures are incubated for 5 days and  are monitored continuously.Positive blood cultures  are called to the RN and reported as soon as  they are identified.      Narrative:      Per Hospital Policy: Only change Specimen Src: to \"Line\" if  specified by physician order.    BLOOD CULTURE [[id number]] Collected:  07/20/17 0941    Order Status:  Completed Specimen Information:  Blood from Peripheral Updated:  07/21/17 0842     Significant Indicator NEG      Source BLD      Site PERIPHERAL      Blood Culture --      Result:        No Growth    Note: Blood cultures are incubated for 5 days and  are monitored continuously.Positive blood cultures  are called to the RN and reported as soon as  they are identified.      Narrative:      Per Hospital Policy: Only change Specimen Src: to \"Line\" if  specified by physician order.    URINALYSIS [[id number]]  (Abnormal) Collected:  07/15/17 1620    Order Status:  Completed Specimen Information:  Urine from Urine, Clean Catch Updated:  07/16/17 0849     Color Yellow      Character Clear      Specific Gravity 1.010      Ph 6.5      Glucose Negative mg/dL      Ketones Negative mg/dL      Protein Negative mg/dL      Bilirubin Negative      Urobilinogen, Urine 0.2      Nitrite Negative      Leukocyte Esterase Trace (A)      Occult Blood Negative      Micro Urine Req Microscopic             Assessment:  Active Hospital Problems    Diagnosis "   • *Low back pain [M54.5]   • Hypotension [I95.9]   • Morbid obesity (CMS-HCC) [E66.01]   • Sepsis (CMS-HCC) [A41.9]   • UTI (urinary tract infection) [N39.0]   • Atrial flutter (CMS-HCC) [I48.92]       Plan:  L3-4 discitis, likely strep  Afebrile  + leukocytosis  S/p IR biopsy 7/10 - cultures NGTD  Ceftriaxone IV  Anticipate 6 week course of IV abx. Stop date 08/20/17    Bacteremia, strep viridans  Blood cultures (1/2) from 7/6/17 - viridans strep   TTE - negative. STEVE if feasible  Bcx 7/9 - NGTD  Continue Rocephin as above. Strep sensi not done    Leukocytosis, persistent but decreased  Multifactorial  Repeat imaging if continues to increase    Drug rash, slight improvement  Involving back, neck, abd  Dc'd vanco and monitor for resolution  May get worse before it get better    Rash, groin  Appears fungal  Severe pain with motion obviating topicals  Continue fluc for 14 days    RUE cellulitis, resolving slowly  At prior IV site RUE  Tender, decreased size of erythema but not resolved  Abx per above  Local care and monitor for worsening    Diabetes mellitus  Keep the blood sugars less than 150      Dispo: Recommend SNF for IV abx

## 2017-07-24 PROBLEM — K59.00 CONSTIPATION: Status: ACTIVE | Noted: 2017-07-24

## 2017-07-24 LAB
ANION GAP SERPL CALC-SCNC: 4 MMOL/L (ref 0–11.9)
BUN SERPL-MCNC: 18 MG/DL (ref 8–22)
CALCIUM SERPL-MCNC: 9.9 MG/DL (ref 8.5–10.5)
CHLORIDE SERPL-SCNC: 98 MMOL/L (ref 96–112)
CO2 SERPL-SCNC: 34 MMOL/L (ref 20–33)
CREAT SERPL-MCNC: 0.67 MG/DL (ref 0.5–1.4)
DIGOXIN SERPL-MCNC: 1 NG/ML (ref 0.8–2)
ERYTHROCYTE [DISTWIDTH] IN BLOOD BY AUTOMATED COUNT: 42.8 FL (ref 35.9–50)
GFR SERPL CREATININE-BSD FRML MDRD: >60 ML/MIN/1.73 M 2
GLUCOSE SERPL-MCNC: 147 MG/DL (ref 65–99)
HCT VFR BLD AUTO: 35.9 % (ref 42–52)
HGB BLD-MCNC: 11.4 G/DL (ref 14–18)
MCH RBC QN AUTO: 28.8 PG (ref 27–33)
MCHC RBC AUTO-ENTMCNC: 31.8 G/DL (ref 33.7–35.3)
MCV RBC AUTO: 90.7 FL (ref 81.4–97.8)
PLATELET # BLD AUTO: 284 K/UL (ref 164–446)
PMV BLD AUTO: 10.1 FL (ref 9–12.9)
POTASSIUM SERPL-SCNC: 4.4 MMOL/L (ref 3.6–5.5)
RBC # BLD AUTO: 3.96 M/UL (ref 4.7–6.1)
SODIUM SERPL-SCNC: 136 MMOL/L (ref 135–145)
WBC # BLD AUTO: 19.9 K/UL (ref 4.8–10.8)

## 2017-07-24 PROCEDURE — 700102 HCHG RX REV CODE 250 W/ 637 OVERRIDE(OP): Performed by: PSYCHIATRY & NEUROLOGY

## 2017-07-24 PROCEDURE — 700102 HCHG RX REV CODE 250 W/ 637 OVERRIDE(OP): Performed by: INTERNAL MEDICINE

## 2017-07-24 PROCEDURE — 700111 HCHG RX REV CODE 636 W/ 250 OVERRIDE (IP): Performed by: INTERNAL MEDICINE

## 2017-07-24 PROCEDURE — A9270 NON-COVERED ITEM OR SERVICE: HCPCS | Performed by: HOSPITALIST

## 2017-07-24 PROCEDURE — 97535 SELF CARE MNGMENT TRAINING: CPT

## 2017-07-24 PROCEDURE — A9270 NON-COVERED ITEM OR SERVICE: HCPCS | Performed by: PSYCHIATRY & NEUROLOGY

## 2017-07-24 PROCEDURE — 80048 BASIC METABOLIC PNL TOTAL CA: CPT

## 2017-07-24 PROCEDURE — 700105 HCHG RX REV CODE 258: Performed by: INTERNAL MEDICINE

## 2017-07-24 PROCEDURE — 700102 HCHG RX REV CODE 250 W/ 637 OVERRIDE(OP): Performed by: HOSPITALIST

## 2017-07-24 PROCEDURE — 700101 HCHG RX REV CODE 250: Performed by: HOSPITALIST

## 2017-07-24 PROCEDURE — 80162 ASSAY OF DIGOXIN TOTAL: CPT

## 2017-07-24 PROCEDURE — 770020 HCHG ROOM/CARE - TELE (206)

## 2017-07-24 PROCEDURE — A9270 NON-COVERED ITEM OR SERVICE: HCPCS | Performed by: INTERNAL MEDICINE

## 2017-07-24 PROCEDURE — 99232 SBSQ HOSP IP/OBS MODERATE 35: CPT | Performed by: INTERNAL MEDICINE

## 2017-07-24 PROCEDURE — 97530 THERAPEUTIC ACTIVITIES: CPT

## 2017-07-24 PROCEDURE — 97116 GAIT TRAINING THERAPY: CPT

## 2017-07-24 PROCEDURE — 85027 COMPLETE CBC AUTOMATED: CPT

## 2017-07-24 RX ORDER — AMOXICILLIN 250 MG
2 CAPSULE ORAL 2 TIMES DAILY
Status: DISCONTINUED | OUTPATIENT
Start: 2017-07-24 | End: 2017-07-25 | Stop reason: HOSPADM

## 2017-07-24 RX ADMIN — NYSTATIN 1500000 UNITS: 100000 POWDER TOPICAL at 08:40

## 2017-07-24 RX ADMIN — OMEPRAZOLE 20 MG: 20 CAPSULE, DELAYED RELEASE ORAL at 22:39

## 2017-07-24 RX ADMIN — LEVETIRACETAM 500 MG: 500 TABLET, FILM COATED ORAL at 22:39

## 2017-07-24 RX ADMIN — TAMSULOSIN HYDROCHLORIDE 0.4 MG: 0.4 CAPSULE ORAL at 08:35

## 2017-07-24 RX ADMIN — DIPHENHYDRAMINE HCL 50 MG: 25 TABLET ORAL at 13:20

## 2017-07-24 RX ADMIN — NYSTATIN 1500000 UNITS: 100000 POWDER TOPICAL at 22:41

## 2017-07-24 RX ADMIN — GABAPENTIN 200 MG: 100 CAPSULE ORAL at 08:34

## 2017-07-24 RX ADMIN — OXYCODONE HYDROCHLORIDE 10 MG: 10 TABLET, FILM COATED, EXTENDED RELEASE ORAL at 22:39

## 2017-07-24 RX ADMIN — STANDARDIZED SENNA CONCENTRATE AND DOCUSATE SODIUM 2 TABLET: 8.6; 5 TABLET, FILM COATED ORAL at 22:39

## 2017-07-24 RX ADMIN — MAGNESIUM HYDROXIDE 30 ML: 400 SUSPENSION ORAL at 08:34

## 2017-07-24 RX ADMIN — HEPARIN SODIUM 5000 UNITS: 5000 INJECTION, SOLUTION INTRAVENOUS; SUBCUTANEOUS at 13:17

## 2017-07-24 RX ADMIN — OMEPRAZOLE 20 MG: 20 CAPSULE, DELAYED RELEASE ORAL at 08:35

## 2017-07-24 RX ADMIN — GABAPENTIN 200 MG: 100 CAPSULE ORAL at 15:13

## 2017-07-24 RX ADMIN — HEPARIN SODIUM 5000 UNITS: 5000 INJECTION, SOLUTION INTRAVENOUS; SUBCUTANEOUS at 05:31

## 2017-07-24 RX ADMIN — CYANOCOBALAMIN TAB 500 MCG 1000 MCG: 500 TAB at 08:35

## 2017-07-24 RX ADMIN — VITAMIN D, TAB 1000IU (100/BT) 5000 UNITS: 25 TAB at 08:34

## 2017-07-24 RX ADMIN — Medication 325 MG: at 08:34

## 2017-07-24 RX ADMIN — OXYCODONE HYDROCHLORIDE 10 MG: 10 TABLET, FILM COATED, EXTENDED RELEASE ORAL at 08:35

## 2017-07-24 RX ADMIN — FLUCONAZOLE 100 MG: 100 TABLET ORAL at 08:34

## 2017-07-24 RX ADMIN — TRAMADOL HYDROCHLORIDE 50 MG: 50 TABLET, COATED ORAL at 05:31

## 2017-07-24 RX ADMIN — TRAMADOL HYDROCHLORIDE 50 MG: 50 TABLET, COATED ORAL at 18:55

## 2017-07-24 RX ADMIN — STANDARDIZED SENNA CONCENTRATE AND DOCUSATE SODIUM 2 TABLET: 8.6; 5 TABLET, FILM COATED ORAL at 10:38

## 2017-07-24 RX ADMIN — Medication 325 MG: at 13:17

## 2017-07-24 RX ADMIN — Medication 325 MG: at 17:33

## 2017-07-24 RX ADMIN — LIDOCAINE 1 PATCH: 50 PATCH TOPICAL at 22:39

## 2017-07-24 RX ADMIN — METOPROLOL TARTRATE 50 MG: 50 TABLET, FILM COATED ORAL at 22:39

## 2017-07-24 RX ADMIN — ATORVASTATIN CALCIUM 10 MG: 10 TABLET, FILM COATED ORAL at 22:39

## 2017-07-24 RX ADMIN — LEVETIRACETAM 500 MG: 500 TABLET, FILM COATED ORAL at 08:34

## 2017-07-24 RX ADMIN — CEFTRIAXONE SODIUM 2 G: 2 INJECTION, POWDER, FOR SOLUTION INTRAMUSCULAR; INTRAVENOUS at 08:34

## 2017-07-24 RX ADMIN — METOPROLOL TARTRATE 50 MG: 50 TABLET, FILM COATED ORAL at 10:40

## 2017-07-24 RX ADMIN — PREDNISONE 40 MG: 20 TABLET ORAL at 08:34

## 2017-07-24 RX ADMIN — HEPARIN SODIUM 5000 UNITS: 5000 INJECTION, SOLUTION INTRAVENOUS; SUBCUTANEOUS at 22:39

## 2017-07-24 RX ADMIN — ROSUVASTATIN CALCIUM 125 MCG: 10 TABLET, FILM COATED ORAL at 17:33

## 2017-07-24 RX ADMIN — GABAPENTIN 200 MG: 100 CAPSULE ORAL at 22:39

## 2017-07-24 ASSESSMENT — ENCOUNTER SYMPTOMS
MYALGIAS: 1
DIZZINESS: 0
COUGH: 0
FEVER: 0
TINGLING: 0
ABDOMINAL PAIN: 0
SHORTNESS OF BREATH: 0
BACK PAIN: 1
ROS SKIN COMMENTS: PRURITUS
NAUSEA: 0
CHILLS: 0
DIARRHEA: 0
VOMITING: 0

## 2017-07-24 ASSESSMENT — COGNITIVE AND FUNCTIONAL STATUS - GENERAL
MOBILITY SCORE: 7
SUGGESTED CMS G CODE MODIFIER MOBILITY: CM
MOVING FROM LYING ON BACK TO SITTING ON SIDE OF FLAT BED: UNABLE
MOVING TO AND FROM BED TO CHAIR: UNABLE
CLIMB 3 TO 5 STEPS WITH RAILING: TOTAL
WALKING IN HOSPITAL ROOM: TOTAL
TURNING FROM BACK TO SIDE WHILE IN FLAT BAD: A LOT
STANDING UP FROM CHAIR USING ARMS: TOTAL

## 2017-07-24 ASSESSMENT — PAIN SCALES - GENERAL
PAINLEVEL_OUTOF10: 5
PAINLEVEL_OUTOF10: 3
PAINLEVEL_OUTOF10: 5
PAINLEVEL_OUTOF10: 3
PAINLEVEL_OUTOF10: 5

## 2017-07-24 ASSESSMENT — GAIT ASSESSMENTS
ASSISTIVE DEVICE: FRONT WHEEL WALKER
GAIT LEVEL OF ASSIST: MAXIMAL ASSIST
DISTANCE (FEET): 3

## 2017-07-24 NOTE — PROGRESS NOTES
[facility] Hospitalist Progress Note    Date of Service: 2017    Chief Complaint  86 y.o. male admitted 2017 with L3-L4 discitis and Strep bacteremia on abx until .  During admit had complication seizure transferred out of ICU on     Interval Problem Update  Patient alert awake oriented ×3. Patient sitting in chair. Patient states that his pruritus has improved compared to yesterday. Patient's rash is stable and has not worsened. Patient has not had a bowel movement in several days. Bowel protocol initiated.      Consultants/Specialty  Neurology: Dr Win, signed off  Neurosurgery: Signed off  Infectious Disease: Dr Chilel    Disposition  Patient has been accepted to [facility], will transfer patient once his rash improves and patient has BM.        Review of Systems   Constitutional: Negative for fever and chills.   Respiratory: Negative for cough and shortness of breath.    Cardiovascular: Negative for chest pain and leg swelling.   Gastrointestinal: Negative for nausea, vomiting and abdominal pain.   Musculoskeletal: Negative for joint pain.   Skin: Positive for rash (B/L UE and LE, anterior chest wall, neck and abdomen).        Pruritus     Neurological: Negative for dizziness and tingling.      Physical Exam  Laboratory/Imaging   Hemodynamics  Temp (24hrs), Av.1 °C (96.9 °F), Min:35.8 °C (96.4 °F), Max:36.4 °C (97.6 °F)   Temperature: 36.4 °C (97.6 °F)  Pulse  Av.4  Min: 34  Max: 154    Blood Pressure : 113/47 mmHg      Respiratory      Respiration: 20, Pulse Oximetry: 97 %     Work Of Breathing / Effort: Mild  RUL Breath Sounds: Clear, RML Breath Sounds: Diminished, RLL Breath Sounds: Diminished, MIKE Breath Sounds: Clear, LLL Breath Sounds: Diminished    Fluids    Intake/Output Summary (Last 24 hours) at 17 1410  Last data filed at 17 1300   Gross per 24 hour   Intake   1030 ml   Output    425 ml   Net    605 ml       Nutrition  Orders Placed This Encounter   Procedures   •  DIET ORDER     Standing Status: Standing      Number of Occurrences: 1      Standing Expiration Date:      Order Specific Question:  Diet:     Answer:  Regular [1]     Physical Exam   Constitutional: He is oriented to person, place, and time. He appears well-developed and well-nourished. No distress.   HENT:   Head: Normocephalic and atraumatic.   Nose: Nose normal.   Mouth/Throat: Oropharynx is clear and moist.   Eyes: Conjunctivae are normal. Right eye exhibits no discharge. Left eye exhibits no discharge. No scleral icterus.   Neck: No JVD present. No tracheal deviation present.   Cardiovascular: Normal rate, regular rhythm, normal heart sounds and intact distal pulses.    No murmur heard.  Pulmonary/Chest: Effort normal and breath sounds normal. No respiratory distress. He has no wheezes. He has no rales.   Abdominal: Soft. Bowel sounds are normal. He exhibits no distension. There is no tenderness.   Genitourinary:   Scrotal edema improving    Musculoskeletal: He exhibits no edema.   LUE PICC    Neurological: He is alert and oriented to person, place, and time. No cranial nerve deficit.   Skin: Skin is warm. He is not diaphoretic.   Maculopapular rash noted bilateral upper extremities, bilateral lower extremities, anterior chest wall, abdomen   Psychiatric: He has a normal mood and affect. His speech is normal. Thought content normal.   Nursing note and vitals reviewed.      Recent Labs      07/22/17   0159  07/23/17   0430  07/24/17   0825   WBC  14.7*  14.9*  19.9*   RBC  4.38*  4.53*  3.96*   HEMOGLOBIN  12.6*  13.0*  11.4*   HEMATOCRIT  39.7*  40.7*  35.9*   MCV  90.6  89.8  90.7   MCH  28.8  28.7  28.8   MCHC  31.7*  31.9*  31.8*   RDW  42.9  43.3  42.8   PLATELETCT  282  325  284   MPV  10.1  10.1  10.1     Recent Labs      07/22/17   0159  07/24/17   0825   SODIUM  137  136   POTASSIUM  4.0  4.4   CHLORIDE  100  98   CO2  31  34*   GLUCOSE  111*  147*   BUN  10  18   CREATININE  0.78  0.67   CALCIUM  10.2   9.9                      Assessment/Plan     * Low back pain (present on admission)  Assessment & Plan  Concern of worsening dementia with the oxycodone, dc'ed oxycodone, on tramadol for pain management  Physical therapy, occupational therapy  Additional nonnarcotic pain medication such as Lidoderm patch and Neurontin    Bacteremia  Assessment & Plan  Strep viridans, repeat blood cx NGTD, PICC in place  ID following  On IV ceftriaxone will need 6 weeks course until 8/20    Discitis of lumbar region  Assessment & Plan  At L3-L4 level  S/p Bc Cx remains neg  Continue current abx per ID to continue through August 20  Will likely need LTAC.  Wound care physical therapy    Atrial flutter (CMS-[facility]) (present on admission)  Assessment & Plan  Improved rate control Continue digoxin and metoprolol  Patient not on chronic anticoagulation given previous history of hematoma and falls will need to reevaluate after improvement of pain control and mobilization    Sepsis (CMS-[facility]) (present on admission)  Assessment & Plan  Resolved          UTI (urinary tract infection) (present on admission)  Assessment & Plan  Resolved      Hypomagnesemia  Assessment & Plan  Resolved, monitor     Acute encephalopathy  Assessment & Plan  Resolved no acute pathology on MRI or EEG, improving   Empirically started on Keppra per neurology, Dr Win recommend outpt  follow up to review need for continued AED  Per pt's son in law pt AAOX4 at baseline   Palliative following    Shock (CMS-[facility])  Assessment & Plan  Resolved    Benign prostatic hyperplasia with lower urinary tract symptoms  Assessment & Plan  flomax    Leukocytosis  Assessment & Plan  Secondary to strep.viridans bacteremia and discitis of L3-L4  Increased, likely 2/2 to steroids, CTM     Normocytic anemia  Assessment & Plan  2/2 to iron def, started on replacement therapy  Vitamin b12/folate, TSH wnl      Scrotal edema  Assessment & Plan  Likely dependent, scrotal sling ordered    Drug  rash  Assessment & Plan  Likely secondary to vancomycin, dc'ed ctm   Patient on Benadryl 50 mg by mouth PRN q6h for pruritus, prednisone 40 mg    Constipation  Assessment & Plan  On bowel protocol        Labs reviewed and Medications reviewed  Hearn catheter: No Hearn      DVT Prophylaxis: Heparin    Ulcer prophylaxis: Not indicated  Antibiotics: Treating active infection/contamination beyond 24 hours perioperative coverage  Assessed for rehab: Patient was assess for and/or received rehabilitation services during this hospitalization

## 2017-07-24 NOTE — PROGRESS NOTES
Pt attempting to get back into bed from chair. Pt c/o fatigue in bilateral legs. 4 staff members assisted patient back to bed.

## 2017-07-24 NOTE — PROGRESS NOTES
Pt observed.  Pt c/o back pain at this time  Pt AAOx4, no other signs or symptoms of distress, fall precautions in place, call light within reach, all questions answered, will continue to monitor.

## 2017-07-24 NOTE — CARE PLAN
Problem: Pain Management  Goal: Pain level will decrease to patient’s comfort goal  Outcome: PROGRESSING AS EXPECTED    07/12/17 1200 07/22/17 2000 07/23/17 2000   OTHER   Nurse Pain Scale 0 - 10  --  --  --    Non Verbal Scale  --  --  --    FLACC Total Score --  0 --    CPOT Total Score 0 --  --    Therapist Pain Assessment --  During Activity;Nurse Notified;8;9  (did not quantify pain, pt constant moaning and groaning) --    Comfort Goal --  --  Comfort at Rest;Comfort with Movement;Sleep Comfortably     07/23/17 1158 07/24/17 0400   OTHER   Nurse Pain Scale 0 - 10  0 --    Non Verbal Scale  --  Calm;Sleeping   FLACC Total Score --  --    CPOT Total Score --  --    Therapist Pain Assessment --  --    Comfort Goal --  --    Pain meds as scheduled,and prn, reposition for comfort    Problem: Skin Integrity  Goal: Risk for impaired skin integrity will decrease  Outcome: PROGRESSING AS EXPECTED  Turn q2 hrs in bed, waffle mattress, check for incontinence q2 hrs and prn, enc po fluids

## 2017-07-24 NOTE — THERAPY
"Pt willing but needs MAX A x 1 to get OOB, use FWW side step to BSC, But Total A x 4 to get BTB and reclining 2* poor coordination, diminished B knee ext and reports of increased LBP with W/S of mass over COG. . Pt sat UIC x 45 mins before needing help BTB. Pt will benifit from increased activity at bedside, continued acute rehab and post acute rehab before going home.Physical Therapy Treatment completed.   Bed Mobility:  Supine to Sit: Maximal Assist  Transfers: Sit to Stand: Total Assist X 2  Gait: Level Of Assist: Maximal Assist with Front-Wheel Walker       Plan of Care: Will benefit from Physical Therapy 3 times per week  Discharge Recommendations: Equipment: Front-Wheel Walker. Post-acute therapy Discharge to a transitional care facility for continued skilled therapy services.     See \"Rehab Therapy-Acute\" Patient Summary Report for complete documentation.       "

## 2017-07-24 NOTE — CARE PLAN
Problem: Communication  Goal: The ability to communicate needs accurately and effectively will improve  Outcome: PROGRESSING AS EXPECTED  Pt asks questions appropriately and understands plan of care. Will educate as needed.    Problem: Safety  Goal: Will remain free from injury  Outcome: PROGRESSING AS EXPECTED  Fall precautions in place. Treaded socks on. Bed alarm on. Educated pt for calling for assistance as needed.

## 2017-07-24 NOTE — CARE PLAN
Problem: Nutritional:  Goal: Achieve adequate nutritional intake  Patient will consume 50% of meals   Outcome: MET Date Met:  07/24/17  Pt consuming 50-75% of most meals and 25-75% of supplements w/ good toleration

## 2017-07-25 VITALS
DIASTOLIC BLOOD PRESSURE: 65 MMHG | OXYGEN SATURATION: 94 % | BODY MASS INDEX: 30.25 KG/M2 | SYSTOLIC BLOOD PRESSURE: 129 MMHG | HEART RATE: 68 BPM | HEIGHT: 71 IN | RESPIRATION RATE: 18 BRPM | TEMPERATURE: 98.4 F | WEIGHT: 216.05 LBS

## 2017-07-25 LAB
BACTERIA BLD CULT: NORMAL
BACTERIA BLD CULT: NORMAL
DIGOXIN SERPL-MCNC: 0.9 NG/ML (ref 0.8–2)
EKG IMPRESSION: NORMAL
SIGNIFICANT IND 70042: NORMAL
SIGNIFICANT IND 70042: NORMAL
SITE SITE: NORMAL
SITE SITE: NORMAL
SOURCE SOURCE: NORMAL
SOURCE SOURCE: NORMAL

## 2017-07-25 PROCEDURE — 700105 HCHG RX REV CODE 258: Performed by: INTERNAL MEDICINE

## 2017-07-25 PROCEDURE — A9270 NON-COVERED ITEM OR SERVICE: HCPCS | Performed by: INTERNAL MEDICINE

## 2017-07-25 PROCEDURE — 700102 HCHG RX REV CODE 250 W/ 637 OVERRIDE(OP): Performed by: INTERNAL MEDICINE

## 2017-07-25 PROCEDURE — A9270 NON-COVERED ITEM OR SERVICE: HCPCS | Performed by: HOSPITALIST

## 2017-07-25 PROCEDURE — 700102 HCHG RX REV CODE 250 W/ 637 OVERRIDE(OP): Performed by: PSYCHIATRY & NEUROLOGY

## 2017-07-25 PROCEDURE — 700111 HCHG RX REV CODE 636 W/ 250 OVERRIDE (IP): Performed by: INTERNAL MEDICINE

## 2017-07-25 PROCEDURE — 80162 ASSAY OF DIGOXIN TOTAL: CPT

## 2017-07-25 PROCEDURE — 700102 HCHG RX REV CODE 250 W/ 637 OVERRIDE(OP): Performed by: HOSPITALIST

## 2017-07-25 PROCEDURE — 99239 HOSP IP/OBS DSCHRG MGMT >30: CPT | Performed by: INTERNAL MEDICINE

## 2017-07-25 PROCEDURE — A9270 NON-COVERED ITEM OR SERVICE: HCPCS | Performed by: PSYCHIATRY & NEUROLOGY

## 2017-07-25 RX ORDER — PREDNISONE 20 MG/1
TABLET ORAL
Qty: 30 TAB | Refills: 0
Start: 2017-07-25 | End: 2017-07-25

## 2017-07-25 RX ORDER — GABAPENTIN 100 MG/1
200 CAPSULE ORAL 3 TIMES DAILY
Qty: 90 CAP
Start: 2017-07-25 | End: 2019-01-01

## 2017-07-25 RX ORDER — FLUCONAZOLE 100 MG/1
100 TABLET ORAL DAILY
Refills: 0
Start: 2017-07-25 | End: 2017-09-13

## 2017-07-25 RX ORDER — FERROUS SULFATE 325(65) MG
325 TABLET ORAL DAILY
Start: 2017-07-25 | End: 2017-09-13

## 2017-07-25 RX ORDER — METOPROLOL TARTRATE 50 MG/1
50 TABLET, FILM COATED ORAL 2 TIMES DAILY
Qty: 60 TAB
Start: 2017-07-25 | End: 2017-09-13

## 2017-07-25 RX ORDER — LEVETIRACETAM 500 MG/1
500 TABLET ORAL 2 TIMES DAILY
Qty: 60 TAB
Start: 2017-07-25 | End: 2019-01-01

## 2017-07-25 RX ORDER — TAMSULOSIN HYDROCHLORIDE 0.4 MG/1
0.4 CAPSULE ORAL
Qty: 30 CAP
Start: 2017-07-25 | End: 2019-01-01

## 2017-07-25 RX ORDER — HEPARIN SODIUM 5000 [USP'U]/ML
5000 INJECTION, SOLUTION INTRAVENOUS; SUBCUTANEOUS EVERY 8 HOURS
Refills: 0
Start: 2017-07-25 | End: 2017-07-27

## 2017-07-25 RX ADMIN — CYANOCOBALAMIN TAB 500 MCG 1000 MCG: 500 TAB at 08:41

## 2017-07-25 RX ADMIN — CEFTRIAXONE SODIUM 2 G: 2 INJECTION, POWDER, FOR SOLUTION INTRAMUSCULAR; INTRAVENOUS at 08:41

## 2017-07-25 RX ADMIN — OMEPRAZOLE 20 MG: 20 CAPSULE, DELAYED RELEASE ORAL at 08:42

## 2017-07-25 RX ADMIN — LEVETIRACETAM 500 MG: 500 TABLET, FILM COATED ORAL at 08:42

## 2017-07-25 RX ADMIN — DIPHENHYDRAMINE HCL 50 MG: 25 TABLET ORAL at 08:41

## 2017-07-25 RX ADMIN — HEPARIN SODIUM 5000 UNITS: 5000 INJECTION, SOLUTION INTRAVENOUS; SUBCUTANEOUS at 14:41

## 2017-07-25 RX ADMIN — MAGNESIUM HYDROXIDE 30 ML: 400 SUSPENSION ORAL at 08:41

## 2017-07-25 RX ADMIN — METOPROLOL TARTRATE 50 MG: 50 TABLET, FILM COATED ORAL at 08:42

## 2017-07-25 RX ADMIN — OXYCODONE HYDROCHLORIDE 10 MG: 10 TABLET, FILM COATED, EXTENDED RELEASE ORAL at 08:42

## 2017-07-25 RX ADMIN — Medication 325 MG: at 13:08

## 2017-07-25 RX ADMIN — HEPARIN SODIUM 5000 UNITS: 5000 INJECTION, SOLUTION INTRAVENOUS; SUBCUTANEOUS at 05:25

## 2017-07-25 RX ADMIN — FLUCONAZOLE 100 MG: 100 TABLET ORAL at 08:42

## 2017-07-25 RX ADMIN — PREDNISONE 40 MG: 20 TABLET ORAL at 08:42

## 2017-07-25 RX ADMIN — GABAPENTIN 200 MG: 100 CAPSULE ORAL at 14:40

## 2017-07-25 RX ADMIN — STANDARDIZED SENNA CONCENTRATE AND DOCUSATE SODIUM 2 TABLET: 8.6; 5 TABLET, FILM COATED ORAL at 08:41

## 2017-07-25 RX ADMIN — GABAPENTIN 200 MG: 100 CAPSULE ORAL at 08:41

## 2017-07-25 RX ADMIN — NYSTATIN 1500000 UNITS: 100000 POWDER TOPICAL at 08:42

## 2017-07-25 RX ADMIN — TAMSULOSIN HYDROCHLORIDE 0.4 MG: 0.4 CAPSULE ORAL at 08:42

## 2017-07-25 RX ADMIN — Medication 325 MG: at 08:42

## 2017-07-25 RX ADMIN — TRAMADOL HYDROCHLORIDE 50 MG: 50 TABLET, COATED ORAL at 11:49

## 2017-07-25 ASSESSMENT — ENCOUNTER SYMPTOMS
FEVER: 0
CHILLS: 0
VOMITING: 0
ABDOMINAL PAIN: 0
NAUSEA: 0
DIARRHEA: 0
MYALGIAS: 1
BACK PAIN: 1

## 2017-07-25 ASSESSMENT — PAIN SCALES - GENERAL
PAINLEVEL_OUTOF10: 3
PAINLEVEL_OUTOF10: 2
PAINLEVEL_OUTOF10: 4
PAINLEVEL_OUTOF10: 3

## 2017-07-25 ASSESSMENT — LIFESTYLE VARIABLES: EVER_SMOKED: YES

## 2017-07-25 NOTE — CARE PLAN
Problem: Communication  Goal: The ability to communicate needs accurately and effectively will improve  Outcome: PROGRESSING AS EXPECTED  Pt understands plan of care and asks questions appropriately. Educate pt as needed.    Problem: Safety  Goal: Will remain free from injury  Outcome: PROGRESSING AS EXPECTED  Fall precautions in place. Bed alarm on. Treaded socks on. Educated pt on calling for assistance as needed.

## 2017-07-25 NOTE — PROGRESS NOTES
Infectious Disease Progress Note    Author: Oliva Bolanos M.D. Date of service & Time created: 7/24/2017  6:26 PM    Chief Complaint:  Chief Complaint   Patient presents with   • Back Pain    follow-up for lumbar discitis and bacteremia    Interval History:  86-year-old white male admitted for back pain. Found to have discitis  7/9/17- no fevers. WBC 10.3. Ongoing back pain. Sedimentation rate is 46. Now the blood cultures are positive for strep species. Also has new nausea and vomiting today.   7/10 AF, WBC 16.5, having nausea and vomiting, ongoing back pain, plan for bone biopsy today, tolerating abx without issues  7/11 AF, WBC 13.3, more pain after biopsy, feels flushed, having runny stools on bowel regimen  7/12 AF WBC 15.3 transferred to ICU due to concern for seizure-obtunded.  7/13 AF alert and eating lunch Has some pain-controlled. Denies SE abx  7/14 AF WBC 14.7 no new complaints  7/15 AF, no CBC, has not gotten OOB, denies any back pain   7/16 AF, WBC 21.9, having urinary incontinence  7/17 AF, no CBC, working on [employer], having low blood pressures today, were elevated yesterday, no BM in a few days  7/18 AF, WBC 20, feels better this am, had breakfast, denies any diarrhea  7/19 AF c/o severe pain in back dolores with movement  7/20 AF WBC 19 continued c/o back pain with any movement  7/21 AF WBC 17.6 pain better controlled today  7/22 AF WBC 14 rash fading but itchy. Poor po intake noted  7/23 AF WBC 14.9 a little more awake today Less itching-rash pink   7/24/17- continues to have rash. Back pain is improved. WBC 20,000  Labs Reviewed, Medications Reviewed and Radiology Reviewed.    Review of Systems:  Review of Systems   Constitutional: Negative for fever and chills.   Cardiovascular: Negative for chest pain.   Gastrointestinal: Negative for nausea, vomiting, abdominal pain and diarrhea.   Genitourinary:        Urinary incontinence   Musculoskeletal: Positive for myalgias, back pain and joint pain.    Skin: Positive for itching and rash.     Limited due to pain  Hemodynamics:  Temp (24hrs), Av.1 °C (97 °F), Min:35.8 °C (96.4 °F), Max:36.4 °C (97.6 °F)  Temperature: 36.3 °C (97.3 °F)  Pulse  Av.4  Min: 34  Max: 154   Blood Pressure : 113/56 mmHg       Physical Exam:  Physical Exam   Constitutional: He appears well-developed and well-nourished.   Obese   HENT:   Head: Normocephalic and atraumatic.   Eyes: EOM are normal. Pupils are equal, round, and reactive to light. No scleral icterus.   Neck: Neck supple.   Cardiovascular: Normal rate.    Irregularly irregular     Pulmonary/Chest: Effort normal and breath sounds normal. No respiratory distress. He has no wheezes.   Abdominal: Soft. He exhibits no distension. There is no tenderness.   Musculoskeletal: He exhibits no edema.   LUE PICC nontender   Neurological: He is alert.   Skin: Rash noted. There is erythema.   RUE forearm-decreased erythema  Groin and buttocks erythema    Erythematous blanching rash diffuse   Nursing note and vitals reviewed.      Meds:    Current facility-administered medications:   •  senna-docusate (PERICOLACE or SENOKOT S) 8.6-50 MG per tablet 2 Tab, 2 Tab, Oral, BID, Johanne Chua M.D., 2 Tab at 17 1038  •  predniSONE (DELTASONE) tablet 40 mg, 40 mg, Oral, DAILY, Johanne Chua M.D., 40 mg at 17 0834  •  diphenhydrAMINE (BENADRYL) tablet/capsule 50 mg, 50 mg, Oral, Q6HRS PRN, Johanne Chua M.D., 50 mg at 17 1320  •  ferrous sulfate tablet 325 mg, 325 mg, Oral, TID WITH MEALS, Johanne Chua M.D., 325 mg at 17 1733  •  tramadol (ULTRAM) 50 MG tablet 50 mg, 50 mg, Oral, Q6HRS PRN, Johanne Chua M.D., 50 mg at 17 0531  •  fluconazole (DIFLUCAN) tablet 100 mg, 100 mg, Oral, DAILY, Shante Sequeira M.D., 100 mg at 17 0834  •  lidocaine (LIDODERM) 5 % 1 Patch, 1 Patch, Transdermal, Q24HR, Yaya Ragland D.O., 1 Patch at 17  •  tamsulosin (FLOMAX) capsule 0.4 mg, 0.4 mg, Oral, AFTER BREAKFAST,  Kirby Mayo M.D., 0.4 mg at 07/24/17 0835  •  oxyCODONE CR (OXYCONTIN) tablet 10 mg, 10 mg, Oral, Q12HRS, Kirby Mayo M.D., 10 mg at 07/24/17 0835  •  metoprolol (LOPRESSOR) tablet 50 mg, 50 mg, Oral, TWICE DAILY, Kirby Mayo M.D., 50 mg at 07/24/17 1040  •  nystatin (MYCOSTATIN) powder, , Topical, BID, Kirby Mayo M.D., 1,500,000 Units at 07/24/17 0840  •  levetiracetam (KEPPRA) tablet 500 mg, 500 mg, Oral, BID, Allan Win M.D., 500 mg at 07/24/17 0834  •  [DISCONTINUED] senna-docusate (PERICOLACE or SENOKOT S) 8.6-50 MG per tablet 2 Tab, 2 Tab, Oral, BID, Stopped at 07/14/17 2100 **AND** polyethylene glycol/lytes (MIRALAX) PACKET 1 Packet, 1 Packet, Oral, QDAY PRN, 1 Packet at 07/18/17 0753 **AND** magnesium hydroxide (MILK OF MAGNESIA) suspension 30 mL, 30 mL, Oral, QDAY PRN, 30 mL at 07/24/17 0834 **AND** bisacodyl (DULCOLAX) suppository 10 mg, 10 mg, Rectal, QDAY PRN, Kirby Mayo M.D.  •  metoprolol (LOPRESSOR) injection 5 mg, 5 mg, Intravenous, Q5 MIN PRN, Collin White M.D., 5 mg at 07/19/17 1213  •  heparin injection 5,000 Units, 5,000 Units, Subcutaneous, Q8HRS, Collin White M.D., 5,000 Units at 07/24/17 1317  •  PICC Line Insertion has been implemented, , , Once **AND** May use Lidocaine 1% not to exceed 3 mls for local at insertion site, , , CONTINUOUS **AND** NOTIFY MD, , , Once **AND** Tip to dwell in the superior vena cava, , , CONTINUOUS **AND** Do not use PICC Line until placement verified by Chest X Ray, , , CONTINUOUS **AND** DX-CHEST-FOR PICC LINE Perform procedure in:: Patient's Room, , , Once **AND** If radiologist reading of chest X-ray states any of the following the PICC should be used, , , CONTINUOUS **AND** Further evaluation of the PICC placement can be retrieved from X-Ray and Imaging, , , CONTINUOUS **AND** Blood draws through PICC line; draws by RN only, , , CONTINUOUS **AND** FLUSHING GUIDELINES WHEN IN USE, , , CONTINUOUS **AND** normal  saline PF 10-20 mL, 10-20 mL, Intravenous, PRN **AND** FLUSHING GUIDELINES WHEN NOT IN USE, , , CONTINUOUS **AND** DRESSING MAINTENANCE, , , Once **AND** Change needleless pressure ports and IV tubing every 72 hours per hospital policy, , , CONTINUOUS **AND** TUBING, , , CONTINUOUS **AND** If there is an MD order to remove the PICC line, any RN may remove the PICC line, , , CONTINUOUS **AND** [] PATIENT EDUCATION MATERIALS, , , Prior to discharge **AND** NURSING COMMUNICATION, , , CONTINUOUS, Ny Chilel M.D.  •  gabapentin (NEURONTIN) capsule 200 mg, 200 mg, Oral, TID, Georgi Grey M.D., 200 mg at 17 1513  •  Notify provider if pain remains uncontrolled, , , CONTINUOUS **AND** Use the numeric rating scale (NRS-11) on regular floors and Critical-Care Pain Observation Tool (CPOT) on ICUs/Trauma to assess pain, , , CONTINUOUS **AND** Pulse Ox (Oximetry), , , CONTINUOUS **AND** [DISCONTINUED] Pharmacy Consult Request ...Pain Management Review, , Other, PRN **AND** If patient difficult to arouse and/or has respiratory depression, stop any opiates that are currently infusing and call a Rapid Response., , , CONTINUOUS **AND** [DISCONTINUED] oxycodone immediate-release (ROXICODONE) tablet 2.5 mg, 2.5 mg, Oral, Q3HRS PRN, 2.5 mg at 17 1802 **AND** [DISCONTINUED] oxycodone immediate-release (ROXICODONE) tablet 5 mg, 5 mg, Oral, Q3HRS PRN, 5 mg at 17 2311 **AND** HYDROmorphone (DILAUDID) injection 0.5 mg, 0.5 mg, Intravenous, Q3HRS PRN, Georgi Grey M.D., 0.5 mg at 17 0519  •  digoxin (LANOXIN) tablet 125 mcg, 125 mcg, Oral, DAILY AT 1800, Georgi Grey M.D., 125 mcg at 17 1733  •  ondansetron (ZOFRAN) syringe/vial injection 4 mg, 4 mg, Intravenous, Q6HRS PRN, Georgi Grey M.D., 4 mg at 07/15/17 0824  •  omeprazole (PRILOSEC) capsule 20 mg, 20 mg, Oral, BID, Georgi Grey M.D., 20 mg at 17 0835  •  cefTRIAXone (ROCEPHIN) 2 g in  mL IVPB, 2 g,  Intravenous, Q24HRS, Oilva Bolanos M.D., Stopped at 07/24/17 0904  •  hydrALAZINE (APRESOLINE) tablet 10 mg, 10 mg, Oral, Q4HRS PRN, Jamaal Molina M.D., 10 mg at 07/09/17 2038  •  clonidine (CATAPRES) tablet 0.1 mg, 0.1 mg, Oral, 4X/DAY PRN, Jamaal Molina M.D.  •  prochlorperazine (COMPAZINE) injection 10 mg, 10 mg, Intravenous, Q6HRS PRN, Darion Benton M.D., 10 mg at 07/10/17 0905  •  pneumococcal 13-Jocelyn Conj Vacc (PREVNAR 13) syringe 0.5 mL, 0.5 mL, Intramuscular, Once PRN, Jamaal Molina M.D., Stopped at 07/16/17 2032  •  atorvastatin (LIPITOR) tablet 10 mg, 10 mg, Oral, Nightly, Jamaal Molina M.D., 10 mg at 07/23/17 2055  •  vitamin D (cholecalciferol) tablet 5,000 Units, 5,000 Units, Oral, Q48HRS, Jamaal Molina M.D., 5,000 Units at 07/24/17 0834  •  cyanocobalamin (VITAMIN B-12) tablet 1,000 mcg, 1,000 mcg, Oral, DAILY, Jamaal Molina M.D., 1,000 mcg at 07/24/17 0835  •  acetaminophen (TYLENOL) tablet 650 mg, 650 mg, Oral, Q6HRS PRN, Jamaal Molina M.D., 650 mg at 07/20/17 0955    Labs:  Recent Labs      07/22/17   0159  07/23/17   0430  07/24/17   0825   WBC  14.7*  14.9*  19.9*   RBC  4.38*  4.53*  3.96*   HEMOGLOBIN  12.6*  13.0*  11.4*   HEMATOCRIT  39.7*  40.7*  35.9*   MCV  90.6  89.8  90.7   MCH  28.8  28.7  28.8   RDW  42.9  43.3  42.8   PLATELETCT  282  325  284   MPV  10.1  10.1  10.1   NEUTSPOLYS  74.50*   --    --    LYMPHOCYTES  9.30*   --    --    MONOCYTES  8.60   --    --    EOSINOPHILS  3.90   --    --    BASOPHILS  0.40   --    --      Recent Labs      07/22/17   0159  07/24/17   0825   SODIUM  137  136   POTASSIUM  4.0  4.4   CHLORIDE  100  98   CO2  31  34*   GLUCOSE  111*  147*   BUN  10  18     Recent Labs      07/22/17   0159  07/24/17   0825   CREATININE  0.78  0.67       Imaging:  Ct-abdomen-pelvis W/o  7/6/2017  No hydronephrosis or urolithiasis. Diverticula colon. No evidence diverticulitis. No free fluid. Pancreatic calcifications/consistent with sequela pancreatitis.  No peripancreatic fluid collections. Coronary artery calcifications.    Ct-lspine W/o Plus Recons  7/6/2017  1.  No evidence of acute fracture of the lumbar spine. 2.  Surgical change extending from L3 through L5. 3.  Multilevel degenerative disc disease and facet degeneration. 4.  Old fractures of the right L1 and L2 transverse processes. 5.  Multilevel degenerative subluxation.    Mr-lumbar Spine-with & W/o  7/8/2017  1.  Possible discitis at the L3-4 level. 2.  Mild to moderate retrolisthesis at the L3-4 level. 3.  Previous extensive laminectomy from the L2 level to the sacrum with bilateral pedicle screw and zev fixation at the L4-L5 levels. 4.   Moderate lumbar spondylotic changes at the L3-4 levels with associated moderate central canal stenosis at this level secondary to facet arthropathy. Additionally there are severe bilateral neural foraminal stenosis. 5.  Minimal lumbar spondylotic changes at the L2-3 level with mild bilateral neural foraminal narrowing and mild disc bulging into the inferior aspects of the neural foramina.    Dx-hip-bilateral-with Pelvis-3/4 Views  7/6/2017  No evidence of fracture or arthropathy of the hips. Degenerative and surgical changes of the lumbar spine.      Micro:  BLOOD CULTURE   Date Value Ref Range Status   07/20/2017   Preliminary    No Growth    Note: Blood cultures are incubated for 5 days and  are monitored continuously.Positive blood cultures  are called to the RN and reported as soon as  they are identified.        Results     Procedure Component Value Units Date/Time    BLOOD CULTURE [[id number]]  (Abnormal) Collected:  07/06/17 1013    Order Status:  Completed Specimen Information:  Blood from Peripheral Updated:  07/21/17 1001     Significant Indicator POS (POS)      Source BLD      Site PERIPHERAL      Blood Culture Growth detected by Bactec instrument. (A)      Blood Culture -- (A)      Result:        Viridans streptococcus - possible contaminant  Isolated from  "one bottle only, possible skin contaminant  please correlate with clinical condition.      Narrative:      CALL  [facility]  183 tel. [phone],  CALLED  183 tel. [phone] 07/10/2017, 11:37, RB PERF. RESULTS CALLED TO:[id number]  Previous comment was modified by HAMMAD at 13:48 on 07/20/17.  Aerobic Organism Identification with Reflex to Susceptibility [id number]  [facility]. Actively growing organism, in pure culture.Viridans Streptococcus  species for identification and susceptibility testing. Source Blood.  Ambient.  Aerobic Organism Identification with Reflex to Susceptibility [id number]  [facility]. Actively growing organism, in pure culture. Viridans Streptococcus  species for identification and susceptibility testing. Source Blood.  Ambient.    BLOOD CULTURE [[id number]] Collected:  07/20/17 0954    Order Status:  Completed Specimen Information:  Blood from Peripheral Updated:  07/21/17 0842     Significant Indicator NEG      Source BLD      Site PERIPHERAL      Blood Culture --      Result:        No Growth    Note: Blood cultures are incubated for 5 days and  are monitored continuously.Positive blood cultures  are called to the RN and reported as soon as  they are identified.      Narrative:      Per Hospital Policy: Only change Specimen Src: to \"Line\" if  specified by physician order.    BLOOD CULTURE [[id number]] Collected:  07/20/17 0941    Order Status:  Completed Specimen Information:  Blood from Peripheral Updated:  07/21/17 0842     Significant Indicator NEG      Source BLD      Site PERIPHERAL      Blood Culture --      Result:        No Growth    Note: Blood cultures are incubated for 5 days and  are monitored continuously.Positive blood cultures  are called to the RN and reported as soon as  they are identified.      Narrative:      Per Hospital Policy: Only change Specimen Src: to \"Line\" if  specified by physician order.            Assessment:  Active Hospital Problems    Diagnosis   • *Low back pain [M54.5]   • Hypotension " [I95.9]   • Morbid obesity (CMS-HCC) [E66.01]   • Sepsis (CMS-HCC) [A41.9]   • UTI (urinary tract infection) [N39.0]   • Atrial flutter (CMS-HCC) [I48.92]       Plan:  L3-4 discitis, likely strep  Afebrile  + leukocytosis  S/p IR biopsy 7/10 - cultures NGTD  Ceftriaxone IV  Anticipate 6 week course of IV abx. Stop date 08/20/17    Bacteremia, strep viridans  Blood cultures (1/2) from 7/6/17 - viridans strep   TTE - negative. STEVE if feasible  Bcx 7/9 - NGTD  Continue Rocephin as above. Strep sensi not done    Leukocytosis, persistent   On steroids    Drug rash, worse today  Involving back, neck, abd  Dc'd vanco and monitor for resolution-anticipated worsening before improvement, however ,if continues to worsen DC ceftriaxone and start dapto  Steroids started    Rash, groin  Appears fungal  Severe pain with motion obviating topicals  Continue fluc for 14 days    RUE cellulitis, resolving slowly  At prior IV site RUE  Tender, decreased size of erythema but not resolved  Abx per above  Local care and monitor for worsening    Diabetes mellitus  Keep the blood sugars less than 150      Dispo: Recommend SNF for IV abx

## 2017-07-25 NOTE — DISCHARGE INSTRUCTIONS
Sepsis, Adult  Sepsis is a serious infection of your blood or tissues that affects your whole body. The infection that causes sepsis may be bacterial, viral, fungal, or parasitic. Sepsis may be life threatening. Sepsis can cause your blood pressure to drop. This may result in shock. Shock causes your central nervous system and your organs to stop working correctly.   RISK FACTORS  Sepsis can happen in anyone, but it is more likely to happen in people who have weakened immune systems.  SIGNS AND SYMPTOMS   Symptoms of sepsis can include:  · Fever or low body temperature (hypothermia).  · Rapid breathing (hyperventilation).  · Chills.  · Rapid heartbeat (tachycardia).  · Confusion or light-headedness.  · Trouble breathing.  · Urinating much less than usual.  · Cool, clammy skin or red, flushed skin.  · Other problems with the heart, kidneys, or brain.  DIAGNOSIS   Your health care provider will likely do tests to look for an infection, to see if the infection has spread to your blood, and to see how serious your condition is. Tests can include:  · Blood tests, including cultures of your blood.  · Cultures of other fluids from your body, such as:  ¨ Urine.  ¨ Pus from wounds.  ¨ Mucus coughed up from your lungs.  · Urine tests other than cultures.  · X-ray exams or other imaging tests.  TREATMENT   Treatment will begin with elimination of the source of infection. If your sepsis is likely caused by a bacterial or fungal infection, you will be given antibiotic or antifungal medicines.  You may also receive:  · Oxygen.  · Fluids through an IV tube.  · Medicines to increase your blood pressure.  · A machine to clean your blood (dialysis) if your kidneys fail.  · A machine to help you breathe if your lungs fail.  SEEK IMMEDIATE MEDICAL CARE IF:  You get an infection or develop any of the signs and symptoms of sepsis after surgery or a hospitalization.     This information is not intended to replace advice given to you by  your health care provider. Make sure you discuss any questions you have with your health care provider.     Document Released: 09/15/2004 Document Revised: 05/03/2016 Document Reviewed: 08/25/2014  SeeMedia Interactive Patient Education ©2016 [employer].    Urinary Tract Infection  Urinary tract infections (UTIs) can develop anywhere along your urinary tract. Your urinary tract is your body's drainage system for removing wastes and extra water. Your urinary tract includes two kidneys, two ureters, a bladder, and a urethra. Your kidneys are a pair of bean-shaped organs. Each kidney is about the size of your fist. They are located below your ribs, one on each side of your spine.  CAUSES  Infections are caused by microbes, which are microscopic organisms, including fungi, viruses, and bacteria. These organisms are so small that they can only be seen through a microscope. Bacteria are the microbes that most commonly cause UTIs.  SYMPTOMS   Symptoms of UTIs may vary by age and gender of the patient and by the location of the infection. Symptoms in young women typically include a frequent and intense urge to urinate and a painful, burning feeling in the bladder or urethra during urination. Older women and men are more likely to be tired, shaky, and weak and have muscle aches and abdominal pain. A fever may mean the infection is in your kidneys. Other symptoms of a kidney infection include pain in your back or sides below the ribs, nausea, and vomiting.  DIAGNOSIS  To diagnose a UTI, your caregiver will ask you about your symptoms. Your caregiver also will ask to provide a urine sample. The urine sample will be tested for bacteria and white blood cells. White blood cells are made by your body to help fight infection.  TREATMENT   Typically, UTIs can be treated with medication. Because most UTIs are caused by a bacterial infection, they usually can be treated with the use of antibiotics. The choice of antibiotic and  length of treatment depend on your symptoms and the type of bacteria causing your infection.  HOME CARE INSTRUCTIONS  · If you were prescribed antibiotics, take them exactly as your caregiver instructs you. Finish the medication even if you feel better after you have only taken some of the medication.  · Drink enough water and fluids to keep your urine clear or pale yellow.  · Avoid caffeine, tea, and carbonated beverages. They tend to irritate your bladder.  · Empty your bladder often. Avoid holding urine for long periods of time.  · Empty your bladder before and after sexual intercourse.  · After a bowel movement, women should cleanse from front to back. Use each tissue only once.  SEEK MEDICAL CARE IF:   · You have back pain.  · You develop a fever.  · Your symptoms do not begin to resolve within 3 days.  SEEK IMMEDIATE MEDICAL CARE IF:   · You have severe back pain or lower abdominal pain.  · You develop chills.  · You have nausea or vomiting.  · You have continued burning or discomfort with urination.  MAKE SURE YOU:   1. Understand these instructions.  2. Will watch your condition.  3. Will get help right away if you are not doing well or get worse.     This information is not intended to replace advice given to you by your health care provider. Make sure you discuss any questions you have with your health care provider.     Document Released: 09/27/2006 Document Revised: 01/08/2016 Document Reviewed: 01/25/2013  V[employer] Interactive Patient Education ©2016 [employer]  Pneumococcal Conjugate Vaccine (PCV13)   1. Why get vaccinated?  Vaccination can protect both children and adults from pneumococcal disease.  Pneumococcal disease is caused by bacteria that can spread from person to person through close contact. It can cause ear infections, and it can also lead to more serious infections of the:  · Lungs (pneumonia),  · Blood (bacteremia), and  · Covering of the brain and spinal cord  (meningitis).  Pneumococcal pneumonia is most common among adults. Pneumococcal meningitis can cause deafness and brain damage, and it kills about 1 child in 10 who get it.  Anyone can get pneumococcal disease, but children under 2 years of age and adults 65 years and older, people with certain medical conditions, and cigarette smokers are at the highest risk.  Before there was a vaccine, the [place] saw:  · more than 700 cases of meningitis,  · about 13,000 blood infections,  · about 5 million ear infections, and  · about 200 deaths  in children under 5 each year from pneumococcal disease. Since vaccine became available, severe pneumococcal disease in these children has fallen by 88%.  About 18,000 older adults die of pneumococcal disease each year in the [place].  Treatment of pneumococcal infections with penicillin and other drugs is not as effective as it used to be, because some strains of the disease have become resistant to these drugs. This makes prevention of the disease, through vaccination, even more important.  2. PCV13 vaccine  Pneumococcal conjugate vaccine (called PCV13) protects against 13 types of pneumococcal bacteria.  PCV13 is routinely given to children at 2, 4, 6, and 12-15 months of age. It is also recommended for children and adults 2 to 64 years of age with certain health conditions, and for all adults 65 years of age and older. Your doctor can give you details.  3. Some people should not get this vaccine  Anyone who has ever had a life-threatening allergic reaction to a dose of this vaccine, to an earlier pneumococcal vaccine called PCV7, or to any vaccine containing diphtheria toxoid (for example, DTaP), should not get PCV13.  Anyone with a severe allergy to any component of PCV13 should not get the vaccine. Tell your doctor if the person being vaccinated has any severe allergies.  If the person scheduled for vaccination is not feeling well, your healthcare provider might  decide to reschedule the shot on another day.  4. Risks of a vaccine reaction  With any medicine, including vaccines, there is a chance of reactions. These are usually mild and go away on their own, but serious reactions are also possible.  Problems reported following PCV13 varied by age and dose in the series. The most common problems reported among children were:  · About half became drowsy after the shot, had a temporary loss of appetite, or had redness or tenderness where the shot was given.  · About 1 out of 3 had swelling where the shot was given.  · About 1 out of 3 had a mild fever, and about 1 in 20 had a fever over 102.2°F.  · Up to about 8 out of 10 became fussy or irritable.  Adults have reported pain, redness, and swelling where the shot was given; also mild fever, fatigue, headache, chills, or muscle pain.  Young children who get PCV13 along with inactivated flu vaccine at the same time may be at increased risk for seizures caused by fever. Ask your doctor for more information.  Problems that could happen after any vaccine:  4. People sometimes faint after a medical procedure, including vaccination. Sitting or lying down for about 15 minutes can help prevent fainting, and injuries caused by a fall. Tell your doctor if you feel dizzy, or have vision changes or ringing in the ears.  5. Some older children and adults get severe pain in the shoulder and have difficulty moving the arm where a shot was given. This happens very rarely.  6. Any medication can cause a severe allergic reaction. Such reactions from a vaccine are very rare, estimated at about 1 in a million doses, and would happen within a few minutes to a few hours after the vaccination.  As with any medicine, there is a very small chance of a vaccine causing a serious injury or death.  The safety of vaccines is always being monitored. For more information, visit: www.cdc.gov/vaccinesafety/  5. What if there is a serious reaction?  What should I  look for?  1. Look for anything that concerns you, such as signs of a severe allergic reaction, very high fever, or unusual behavior.  Signs of a severe allergic reaction can include hives, swelling of the face and throat, difficulty breathing, a fast heartbeat, dizziness, and weakness-usually within a few minutes to a few hours after the vaccination.  What should I do?  · If you think it is a severe allergic reaction or other emergency that can't wait, call 9-1-1 or get the person to the nearest hospital. Otherwise, call your doctor.  Reactions should be reported to the Vaccine Adverse Event Reporting System (VAERS). Your doctor should file this report, or you can do it yourself through the VAERS web site at www.vaers.[facility].gov, or by calling 1-136.656.6556.  VAERS does not give medical advice.  6. The National Vaccine Injury Compensation Program  The National Vaccine Injury Compensation Program (VICP) is a federal program that was created to compensate people who may have been injured by certain vaccines.  Persons who believe they may have been injured by a vaccine can learn about the program and about filing a claim by calling 1-164.815.2909 or visiting the VICP website at www.hrsa.gov/vaccinecompensation. There is a time limit to file a claim for compensation.  7. How can I learn more?  · Ask your healthcare provider. He or she can give you the vaccine package insert or suggest other sources of information.  · Call your local or state health department.  · Contact the Centers for Disease Control and Prevention (CDC):  ¨ Call 1-393.517.6313 (5-484-FES-INFO) or  ¨ Visit CDC's website at www.cdc.gov/vaccines  Vaccine Information Statement  PCV13 Vaccine (11/05/2015)     This information is not intended to replace advice given to you by your health care provider. Make sure you discuss any questions you have with your health care provider.     Document Released: 10/15/2007 Document Revised: 01/08/2016 Document  Reviewed: 11/12/2015  Connectem Interactive Patient Education ©2016 Else[employer] Inc.  Discharge Instructions    Discharged to other by medical transportation with self. Discharged via wheelchair, hospital escort: Refused.  Special equipment needed: Oxygen and Walker    Be sure to schedule a follow-up appointment with your primary care doctor or any specialists as instructed.     Discharge Plan:   Diet Plan: Discussed  Activity Level: Discussed  Confirmed Follow up Appointment: Patient to Call and Schedule Appointment  Confirmed Symptoms Management: Discussed  Medication Reconciliation Updated: Yes  Influenza Vaccine Indication: Indicated: Not available from distributor/    I understand that a diet low in cholesterol, fat, and sodium is recommended for good health. Unless I have been given specific instructions below for another diet, I accept this instruction as my diet prescription.   Other diet: Regular diet    Special Instructions: Sepsis, Adult  Sepsis is a serious infection of your blood or tissues that affects your whole body. The infection that causes sepsis may be bacterial, viral, fungal, or parasitic. Sepsis may be life threatening. Sepsis can cause your blood pressure to drop. This may result in shock. Shock causes your central nervous system and your organs to stop working correctly.   RISK FACTORS  Sepsis can happen in anyone, but it is more likely to happen in people who have weakened immune systems.  SIGNS AND SYMPTOMS   Symptoms of sepsis can include:  · Fever or low body temperature (hypothermia).  · Rapid breathing (hyperventilation).  · Chills.  · Rapid heartbeat (tachycardia).  · Confusion or light-headedness.  · Trouble breathing.  · Urinating much less than usual.  · Cool, clammy skin or red, flushed skin.  · Other problems with the heart, kidneys, or brain.  DIAGNOSIS   Your health care provider will likely do tests to look for an infection, to see if the infection has spread to your  blood, and to see how serious your condition is. Tests can include:  · Blood tests, including cultures of your blood.  · Cultures of other fluids from your body, such as:  ¨ Urine.  ¨ Pus from wounds.  ¨ Mucus coughed up from your lungs.  · Urine tests other than cultures.  · X-ray exams or other imaging tests.  TREATMENT   Treatment will begin with elimination of the source of infection. If your sepsis is likely caused by a bacterial or fungal infection, you will be given antibiotic or antifungal medicines.  You may also receive:  · Oxygen.  · Fluids through an IV tube.  · Medicines to increase your blood pressure.  · A machine to clean your blood (dialysis) if your kidneys fail.  · A machine to help you breathe if your lungs fail.  SEEK IMMEDIATE MEDICAL CARE IF:  You get an infection or develop any of the signs and symptoms of sepsis after surgery or a hospitalization.     This information is not intended to replace advice given to you by your health care provider. Make sure you discuss any questions you have with your health care provider.     Document Released: 09/15/2004 Document Revised: 05/03/2016 Document Reviewed: 08/25/2014  makr Interactive Patient Education ©2016 [employer] Inc.      · Is patient discharged on Warfarin / Coumadin?   No     · Is patient Post Blood Transfusion?  NoRegular diet  Depression / Suicide Risk    As you are discharged [facility], it is important to learn how to keep safe from harming yourself.    Recognize the warning signs:  · Abrupt changes in personality, positive or negative- including increase in energy   · Giving away possessions  · Change in eating patterns- significant weight changes-  positive or negative  · Change in sleeping patterns- unable to sleep or sleeping all the time   · Unwillingness or inability to communicate  · Depression  · Unusual sadness, discouragement and loneliness  · Talk of wanting to die  · Neglect of personal  appearance   · Rebelliousness- reckless behavior  · Withdrawal from people/activities they love  · Confusion- inability to concentrate     If you or a loved one observes any of these behaviors or has concerns about self-harm, here's what you can do:  · Talk about it- your feelings and reasons for harming yourself  · Remove any means that you might use to hurt yourself (examples: pills, rope, extension cords, firearm)  · Get professional help from the community (Mental Health, Substance Abuse, psychological counseling)  · Do not be alone:Call your Safe Contact- someone whom you trust who will be there for you.  · Call your local CRISIS HOTLINE 095-2139 or 778-145-1150  · Call your local Children's Mobile Crisis Response Team [place] (142) 531-3320 or www.[employer]  · Call the toll free National Suicide Prevention Hotlines   · National Suicide Prevention Lifeline 748-353-IHYZ (8876)  · National Hope Line Network 800-SUICIDE (203-7259)

## 2017-07-25 NOTE — CARE PLAN
Problem: Safety  Goal: Will remain free from falls  Intervention: Assess risk factors for falls  Educated patient on use of call light, no slip socks on, bed lowest position. All needs attended to. Patient verbalized understanding.           Problem: Skin Integrity  Goal: Risk for impaired skin integrity will decrease  Intervention: Assess risk factors for impaired skin integrity and/or pressure ulcers  Assessing skin integrity, appearance and temperature as needed. Performing [id number], floating heels and using extra pillows for positioning. Waffle cushion in place. Changing bed sheets PRN to prevent skin breakdown.

## 2017-07-25 NOTE — PROGRESS NOTES
SBAR report given to Mary HENRY at [facility]. Discharge instructions given to pt. No further questions from pt. [employer] at bedside to  patient via wheelchair.

## 2017-07-25 NOTE — DISCHARGE PLANNING
Medical Social Work    Per IDT rounds, pt is cleared to D/C to SNF today. Spoke with bedside RN, pt is able to get into WC van with assistance. Transportation form completed and faxed to [facility].

## 2017-07-25 NOTE — DISCHARGE PLANNING
Received transport form from KERRIE Addison, contacted [facility] , spoke to Selina . Transportation arranged for  via [employer] at 1630 .   Notified [facility] Addison via phone

## 2017-07-25 NOTE — DISCHARGE PLANNING
Medical Social Work    Pt ready to D/C to [facility]. Cobra completed and signed and placed in pt's chart. Bedside RN called and updated son in law.

## 2017-07-25 NOTE — DISCHARGE SUMMARY
CHIEF COMPLAINT ON ADMISSION  Chief Complaint   Patient presents with   • Back Pain       CODE STATUS  Full Code    HPI & HOSPITAL COURSE  This is a 86 y.o. male here with acute on chronic back pain and found to have L3-4 discitis on MRI, along with Strep viridans bacteremia.  Patient was consulted by Neurosurgery and ID for this problem.  Had Vanco/Rocephin but developed drug rash on Vanco, so just on Rocephin now.  Drug rash has improved.  ID rec. To continue Rocephin until 8/20/17.  Patient had acute sz during hospital stay and was seen by Dr. Win in consultation.  MRI/EEG unremarkable and patient was started on anticonvulsants.  Patient is debilitated due to his back pain, with limited mobility and will transfer to [facility] for further rehab and medical rx.     Therefore, he is discharged in good and stable condition with close outpatient follow-up.    SPECIFIC OUTPATIENT FOLLOW-UP  See below    DISCHARGE PROBLEM LIST  Principal Problem:    Low back pain POA: Yes  Active Problems:    Bacteremia POA: Unknown    Discitis of lumbar region POA: Yes    Atrial flutter (CMS-HCC) POA: Yes    Sepsis (CMS-[facility]) POA: Yes    UTI (urinary tract infection) POA: Yes    Morbid obesity (CMS-[facility]) POA: Yes    Hypomagnesemia POA: No    Acute encephalopathy POA: Yes    Shock (CMS-[facility]) POA: Yes    Benign prostatic hyperplasia with lower urinary tract symptoms POA: Yes    Leukocytosis POA: Yes    Normocytic anemia POA: Yes    Scrotal edema POA: Yes    Drug rash POA: Unknown    Constipation POA: Unknown  Resolved Problems:    Hypotension POA: Yes    Bigeminy POA: No    Hypophosphatemia POA: Unknown    Red man syndrome POA: Unknown      FOLLOW UP  No future appointments.  Tanisha Flores M.D.  [address]  [facility] [address]  [place] [address] [phone]    Schedule an appointment as soon as possible for a visit in 2 weeks  Hospital follow-up appointment with PCP    [place] [address]  [address] [facility]  [address]  [phone]  Go today        MEDICATIONS ON DISCHARGE   Jersey Alexis   Home Medication Instructions [id number]    Printed on:07/25/17 2525   Medication Information                      aspirin (ASA) 325 MG Tab  Take 325 mg by mouth every day.             atorvastatin (LIPITOR) 10 MG Tab  Take 10 mg by mouth every day.             cholecalciferol (VITAMIN D3) 5000 UNIT Cap  Take 5,000 Units by mouth every 48 hours.             cyanocobalamin (VITAMIN B12) 1000 MCG Tab  Take 1,000 mcg by mouth every day.             digoxin (LANOXIN) 125 MCG Tab  Take 62.5 mcg by mouth 2 Times a Day.             digoxin (LANOXIN) 125 MCG Tab  Take 125 mcg by mouth Once.             docusate sodium (COLACE) 100 MG Cap  Take 100 mg by mouth every morning.             ferrous sulfate 325 (65 FE) MG tablet  Take 1 Tab by mouth every day.             fluconazole (DIFLUCAN) 100 MG Tab  Take 1 Tab by mouth every day.             gabapentin (NEURONTIN) 100 MG Cap  Take 2 Caps by mouth 3 times a day.             heparin 5000 UNIT/ML Solution  Inject 1 mL as instructed every 8 hours.             levetiracetam (KEPPRA) 500 MG Tab  Take 1 Tab by mouth 2 Times a Day.             metoprolol (LOPRESSOR) 50 MG Tab  Take 1 Tab by mouth 2 Times a Day.             NS SOLN 100 mL with cefTRIAXone 2 GM SOLR 2 g  2 g by Intravenous route every 24 hours.             omeprazole (PRILOSEC) 20 MG delayed-release capsule  Take 20 mg by mouth every day.             tamsulosin (FLOMAX) 0.4 MG capsule  Take 1 Cap by mouth ONE-HALF HOUR AFTER BREAKFAST.             tramadol (ULTRAM) 50 MG Tab  Take 50 mg by mouth every 8 hours as needed.                 DIET  Orders Placed This Encounter   Procedures   • DIET ORDER     Standing Status: Standing      Number of Occurrences: 1      Standing Expiration Date:      Order Specific Question:  Diet:     Answer:  Regular [1]   • DISCONTINUE DIET TRAY     Standing Status: Standing      Number of Occurrences:  1      Standing Expiration Date:        ACTIVITY  As tolerated.  Weight bearing as tolerated      CONSULTATIONS  Neurosurgery, ID, neurology    PROCEDURES  *EEG/MRI/*    LABORATORY  Lab Results   Component Value Date/Time    SODIUM 136 07/24/2017 08:25 AM    POTASSIUM 4.4 07/24/2017 08:25 AM    CHLORIDE 98 07/24/2017 08:25 AM    CO2 34* 07/24/2017 08:25 AM    GLUCOSE 147* 07/24/2017 08:25 AM    BUN 18 07/24/2017 08:25 AM    CREATININE 0.67 07/24/2017 08:25 AM        Lab Results   Component Value Date/Time    WBC 19.9* 07/24/2017 08:25 AM    HEMOGLOBIN 11.4* 07/24/2017 08:25 AM    HEMATOCRIT 35.9* 07/24/2017 08:25 AM    PLATELET COUNT 284 07/24/2017 08:25 AM        Total time of the discharge process exceeds 40 minutes

## 2017-07-25 NOTE — PROGRESS NOTES
Report received at bedside, assumed care. Pt is resting in bed. A&O x 4. Pain 3/10 decreased due to pain medications. No other concerns, complains or distress. Tele box on. Chart reviewed. Bed in lowest position, treaded slipper sock on, and call light within reach.

## 2017-07-25 NOTE — PROGRESS NOTES
Infectious Disease Progress Note    Author: Oliva Bolanos M.D. Date of service & Time created: 7/25/2017  2:29 PM    Chief Complaint:  Chief Complaint   Patient presents with   • Back Pain    follow-up for lumbar discitis and bacteremia    Interval History:  86-year-old white male admitted for back pain. Found to have discitis  7/9/17- no fevers. WBC 10.3. Ongoing back pain. Sedimentation rate is 46. Now the blood cultures are positive for strep species. Also has new nausea and vomiting today.   7/10 AF, WBC 16.5, having nausea and vomiting, ongoing back pain, plan for bone biopsy today, tolerating abx without issues  7/11 AF, WBC 13.3, more pain after biopsy, feels flushed, having runny stools on bowel regimen  7/12 AF WBC 15.3 transferred to ICU due to concern for seizure-obtunded.  7/13 AF alert and eating lunch Has some pain-controlled. Denies SE abx  7/14 AF WBC 14.7 no new complaints  7/15 AF, no CBC, has not gotten OOB, denies any back pain   7/16 AF, WBC 21.9, having urinary incontinence  7/17 AF, no CBC, working on [employer], having low blood pressures today, were elevated yesterday, no BM in a few days  7/18 AF, WBC 20, feels better this am, had breakfast, denies any diarrhea  7/19 AF c/o severe pain in back dolores with movement  7/20 AF WBC 19 continued c/o back pain with any movement  7/21 AF WBC 17.6 pain better controlled today  7/22 AF WBC 14 rash fading but itchy. Poor po intake noted  7/23 AF WBC 14.9 a little more awake today Less itching-rash pink   7/24/17- continues to have rash. Back pain is improved. WBC 20,000  7/25/17- rash is almost resolved. No new issues overnight.   Labs Reviewed, Medications Reviewed and Radiology Reviewed.    Review of Systems:  Review of Systems   Constitutional: Negative for fever and chills.   Cardiovascular: Negative for chest pain.   Gastrointestinal: Negative for nausea, vomiting, abdominal pain and diarrhea.   Genitourinary:        Urinary incontinence    Musculoskeletal: Positive for myalgias, back pain and joint pain.   Skin: Positive for itching and rash.     Limited due to pain  Hemodynamics:  Temp (24hrs), Av.7 °C (98.1 °F), Min:36.3 °C (97.3 °F), Max:37 °C (98.6 °F)  Temperature: 36.9 °C (98.4 °F)  Pulse  Av.3  Min: 34  Max: 154   Blood Pressure : 129/65 mmHg       Physical Exam:  Physical Exam   Constitutional: He appears well-developed and well-nourished.   Obese   HENT:   Head: Normocephalic and atraumatic.   Eyes: EOM are normal. Pupils are equal, round, and reactive to light. No scleral icterus.   Neck: Neck supple.   Cardiovascular: Normal rate.    Irregularly irregular     Pulmonary/Chest: Effort normal and breath sounds normal. No respiratory distress. He has no wheezes.   Abdominal: Soft. He exhibits no distension. There is no tenderness.   Musculoskeletal: He exhibits no edema.   LUE PICC nontender   Neurological: He is alert.   Skin: Rash noted. There is erythema.   RUE forearm-decreased erythema  Groin and buttocks erythema    Rash much improved   Nursing note and vitals reviewed.      Meds:    Current facility-administered medications:   •  senna-docusate (PERICOLACE or SENOKOT S) 8.6-50 MG per tablet 2 Tab, 2 Tab, Oral, BID, Johanne Chua M.D., 2 Tab at 17 0841  •  predniSONE (DELTASONE) tablet 40 mg, 40 mg, Oral, DAILY, Johanne Chua M.D., 40 mg at 17 0842  •  diphenhydrAMINE (BENADRYL) tablet/capsule 50 mg, 50 mg, Oral, Q6HRS PRN, Johanne Chua M.D., 50 mg at 17 0841  •  ferrous sulfate tablet 325 mg, 325 mg, Oral, TID WITH MEALS, Johanne Chua M.D., 325 mg at 17 1308  •  tramadol (ULTRAM) 50 MG tablet 50 mg, 50 mg, Oral, Q6HRS PRN, Johanne Chua M.D., 50 mg at 17 1149  •  fluconazole (DIFLUCAN) tablet 100 mg, 100 mg, Oral, DAILY, Shante Sequeira M.D., 100 mg at 17 0842  •  lidocaine (LIDODERM) 5 % 1 Patch, 1 Patch, Transdermal, Q24HR, Yaya Ragland D.O., 1 Patch at 17 7990  •  tamsulosin (FLOMAX)  capsule 0.4 mg, 0.4 mg, Oral, AFTER BREAKFAST, Kirby Mayo M.D., 0.4 mg at 07/25/17 0842  •  oxyCODONE CR (OXYCONTIN) tablet 10 mg, 10 mg, Oral, Q12HRS, Kirby Mayo M.D., 10 mg at 07/25/17 0842  •  metoprolol (LOPRESSOR) tablet 50 mg, 50 mg, Oral, TWICE DAILY, Kirby Mayo M.D., 50 mg at 07/25/17 0842  •  nystatin (MYCOSTATIN) powder, , Topical, BID, Kirby Mayo M.D., 1,500,000 Units at 07/25/17 0842  •  levetiracetam (KEPPRA) tablet 500 mg, 500 mg, Oral, BID, Allan Win M.D., 500 mg at 07/25/17 0842  •  [DISCONTINUED] senna-docusate (PERICOLACE or SENOKOT S) 8.6-50 MG per tablet 2 Tab, 2 Tab, Oral, BID, Stopped at 07/14/17 2100 **AND** polyethylene glycol/lytes (MIRALAX) PACKET 1 Packet, 1 Packet, Oral, QDAY PRN, 1 Packet at 07/18/17 0753 **AND** magnesium hydroxide (MILK OF MAGNESIA) suspension 30 mL, 30 mL, Oral, QDAY PRN, 30 mL at 07/25/17 0841 **AND** bisacodyl (DULCOLAX) suppository 10 mg, 10 mg, Rectal, QDAY PRN, Kirby Mayo M.D.  •  metoprolol (LOPRESSOR) injection 5 mg, 5 mg, Intravenous, Q5 MIN PRN, Collin White M.D., 5 mg at 07/19/17 1213  •  heparin injection 5,000 Units, 5,000 Units, Subcutaneous, Q8HRS, Collin White M.D., 5,000 Units at 07/25/17 0525  •  PICC Line Insertion has been implemented, , , Once **AND** May use Lidocaine 1% not to exceed 3 mls for local at insertion site, , , CONTINUOUS **AND** NOTIFY MD, , , Once **AND** Tip to dwell in the superior vena cava, , , CONTINUOUS **AND** Do not use PICC Line until placement verified by Chest X Ray, , , CONTINUOUS **AND** DX-CHEST-FOR PICC LINE Perform procedure in:: Patient's Room, , , Once **AND** If radiologist reading of chest X-ray states any of the following the PICC should be used, , , CONTINUOUS **AND** Further evaluation of the PICC placement can be retrieved from X-Ray and Imaging, , , CONTINUOUS **AND** Blood draws through PICC line; draws by RN only, , , CONTINUOUS **AND** FLUSHING GUIDELINES  WHEN IN USE, , , CONTINUOUS **AND** normal saline PF 10-20 mL, 10-20 mL, Intravenous, PRN **AND** FLUSHING GUIDELINES WHEN NOT IN USE, , , CONTINUOUS **AND** DRESSING MAINTENANCE, , , Once **AND** Change needleless pressure ports and IV tubing every 72 hours per hospital policy, , , CONTINUOUS **AND** TUBING, , , CONTINUOUS **AND** If there is an MD order to remove the PICC line, any RN may remove the PICC line, , , CONTINUOUS **AND** [] PATIENT EDUCATION MATERIALS, , , Prior to discharge **AND** NURSING COMMUNICATION, , , CONTINUOUS, Ny Chilel M.D.  •  gabapentin (NEURONTIN) capsule 200 mg, 200 mg, Oral, TID, Georgi Grey M.D., 200 mg at 17 0841  •  Notify provider if pain remains uncontrolled, , , CONTINUOUS **AND** Use the numeric rating scale (NRS-11) on regular floors and Critical-Care Pain Observation Tool (CPOT) on ICUs/Trauma to assess pain, , , CONTINUOUS **AND** Pulse Ox (Oximetry), , , CONTINUOUS **AND** [DISCONTINUED] Pharmacy Consult Request ...Pain Management Review, , Other, PRN **AND** If patient difficult to arouse and/or has respiratory depression, stop any opiates that are currently infusing and call a Rapid Response., , , CONTINUOUS **AND** [DISCONTINUED] oxycodone immediate-release (ROXICODONE) tablet 2.5 mg, 2.5 mg, Oral, Q3HRS PRN, 2.5 mg at 17 1802 **AND** [DISCONTINUED] oxycodone immediate-release (ROXICODONE) tablet 5 mg, 5 mg, Oral, Q3HRS PRN, 5 mg at 17 2311 **AND** HYDROmorphone (DILAUDID) injection 0.5 mg, 0.5 mg, Intravenous, Q3HRS PRN, Georgi Grey M.D., 0.5 mg at 17 0519  •  digoxin (LANOXIN) tablet 125 mcg, 125 mcg, Oral, DAILY AT 1800, Georgi Grey M.D., 125 mcg at 17 1733  •  ondansetron (ZOFRAN) syringe/vial injection 4 mg, 4 mg, Intravenous, Q6HRS PRN, Georgi Grey M.D., 4 mg at 07/15/17 0824  •  omeprazole (PRILOSEC) capsule 20 mg, 20 mg, Oral, BID, Georgi Grey M.D., 20 mg at 17 0842  •  cefTRIAXone  (ROCEPHIN) 2 g in  mL IVPB, 2 g, Intravenous, Q24HRS, Oliva Bolanos M.D., Stopped at 07/25/17 0911  •  hydrALAZINE (APRESOLINE) tablet 10 mg, 10 mg, Oral, Q4HRS PRN, Jamaal Molina M.D., 10 mg at 07/09/17 2038  •  clonidine (CATAPRES) tablet 0.1 mg, 0.1 mg, Oral, 4X/DAY PRN, Jamaal Molina M.D.  •  prochlorperazine (COMPAZINE) injection 10 mg, 10 mg, Intravenous, Q6HRS PRN, Darion Benton M.D., 10 mg at 07/10/17 0905  •  pneumococcal 13-Jocelyn Conj Vacc (PREVNAR 13) syringe 0.5 mL, 0.5 mL, Intramuscular, Once PRN, Jamaal Molina M.D., Stopped at 07/16/17 2032  •  atorvastatin (LIPITOR) tablet 10 mg, 10 mg, Oral, Nightly, Jamaal Molina M.D., 10 mg at 07/24/17 2239  •  vitamin D (cholecalciferol) tablet 5,000 Units, 5,000 Units, Oral, Q48HRS, Jamaal Molina M.D., 5,000 Units at 07/24/17 0834  •  cyanocobalamin (VITAMIN B-12) tablet 1,000 mcg, 1,000 mcg, Oral, DAILY, Jamaal Molina M.D., 1,000 mcg at 07/25/17 0841  •  acetaminophen (TYLENOL) tablet 650 mg, 650 mg, Oral, Q6HRS PRN, Jamaal Molina M.D., 650 mg at 07/20/17 0955    Labs:  Recent Labs      07/23/17   0430  07/24/17   0825   WBC  14.9*  19.9*   RBC  4.53*  3.96*   HEMOGLOBIN  13.0*  11.4*   HEMATOCRIT  40.7*  35.9*   MCV  89.8  90.7   MCH  28.7  28.8   RDW  43.3  42.8   PLATELETCT  325  284   MPV  10.1  10.1     Recent Labs      07/24/17   0825   SODIUM  136   POTASSIUM  4.4   CHLORIDE  98   CO2  34*   GLUCOSE  147*   BUN  18     Recent Labs      07/24/17   0825   CREATININE  0.67       Imaging:  Ct-abdomen-pelvis W/o  7/6/2017  No hydronephrosis or urolithiasis. Diverticula colon. No evidence diverticulitis. No free fluid. Pancreatic calcifications/consistent with sequela pancreatitis. No peripancreatic fluid collections. Coronary artery calcifications.    Ct-lspine W/o Plus Recons  7/6/2017  1.  No evidence of acute fracture of the lumbar spine. 2.  Surgical change extending from L3 through L5. 3.  Multilevel degenerative disc disease and  "facet degeneration. 4.  Old fractures of the right L1 and L2 transverse processes. 5.  Multilevel degenerative subluxation.    Mr-lumbar Spine-with & W/o  7/8/2017  1.  Possible discitis at the L3-4 level. 2.  Mild to moderate retrolisthesis at the L3-4 level. 3.  Previous extensive laminectomy from the L2 level to the sacrum with bilateral pedicle screw and zev fixation at the L4-L5 levels. 4.   Moderate lumbar spondylotic changes at the L3-4 levels with associated moderate central canal stenosis at this level secondary to facet arthropathy. Additionally there are severe bilateral neural foraminal stenosis. 5.  Minimal lumbar spondylotic changes at the L2-3 level with mild bilateral neural foraminal narrowing and mild disc bulging into the inferior aspects of the neural foramina.    Dx-hip-bilateral-with Pelvis-3/4 Views  7/6/2017  No evidence of fracture or arthropathy of the hips. Degenerative and surgical changes of the lumbar spine.      Micro:  BLOOD CULTURE   Date Value Ref Range Status   07/20/2017 No growth after 5 days of incubation.  Final      Results     Procedure Component Value Units Date/Time    BLOOD CULTURE [[id number]] Collected:  07/20/17 0941    Order Status:  Completed Specimen Information:  Blood from Peripheral Updated:  07/25/17 1300     Significant Indicator NEG      Source BLD      Site PERIPHERAL      Blood Culture No growth after 5 days of incubation.     Narrative:      Per Hospital Policy: Only change Specimen Src: to \"Line\" if  specified by physician order.    BLOOD CULTURE [[id number]] Collected:  07/20/17 0954    Order Status:  Completed Specimen Information:  Blood from Peripheral Updated:  07/25/17 1300     Significant Indicator NEG      Source BLD      Site PERIPHERAL      Blood Culture No growth after 5 days of incubation.     Narrative:      Per Hospital Policy: Only change Specimen Src: to \"Line\" if  specified by physician order.    BLOOD CULTURE [[id number]]  (Abnormal) " Collected:  07/06/17 1013    Order Status:  Completed Specimen Information:  Blood from Peripheral Updated:  07/21/17 1001     Significant Indicator POS (POS)      Source BLD      Site PERIPHERAL      Blood Culture Growth detected by Bactec instrument. (A)      Blood Culture -- (A)      Result:        Viridans streptococcus - possible contaminant  Isolated from one bottle only, possible skin contaminant  please correlate with clinical condition.      Narrative:      CALL  [facility]  183 tel. [phone],  CALLED  183 tel. [phone] 07/10/2017, 11:37, RB PERF. RESULTS CALLED TO:[phone]  Previous comment was modified by HAMMAD at 13:48 on 07/20/17.  Aerobic Organism Identification with Reflex to Susceptibility [id number]  [facility]. Actively growing organism, in pure culture.Viridans Streptococcus  species for identification and susceptibility testing. Source Blood.  Ambient.  Aerobic Organism Identification with Reflex to Susceptibility [id number]  [facility]. Actively growing organism, in pure culture. Viridans Streptococcus  species for identification and susceptibility testing. Source Blood.  Ambient.            Assessment:  Active Hospital Problems    Diagnosis   • *Low back pain [M54.5]   • Hypotension [I95.9]   • Morbid obesity (CMS-[facility], located within [facility]) [E66.01]   • Sepsis (CMS-[facility], located within [facility]) [A41.9]   • UTI (urinary tract infection) [N39.0]   • Atrial flutter (CMS-[facility], located within [facility]) [I48.92]       Plan:  L3-4 discitis, likely strep  Afebrile  + leukocytosis  S/p IR biopsy 7/10 - cultures NGTD  Ceftriaxone IV  Anticipate 6 week course of IV abx. Stop date 08/20/17    Bacteremia, strep viridans  Blood cultures (1/2) from 7/6/17 - viridans strep   TTE - negative. STEVE if feasible  Bcx 7/9 - NGTD  Continue Rocephin as above. Strep sensi not done    Leukocytosis, persistent   On steroids    Drug rash, almost resolved  Involving back, neck, abd  Resolving    Rash, groin  Appears fungal  Severe pain with motion obviating topicals  Continue fluc for 14 days    RUE cellulitis,  resolving slowly  At prior IV site RUE  Tender, decreased size of erythema but not resolved  Abx per above  Local care and monitor for worsening    Diabetes mellitus  Keep the blood sugars less than 150      Dispo: Recommend SNF for IV abx

## 2017-07-27 ENCOUNTER — APPOINTMENT (OUTPATIENT)
Dept: RADIOLOGY | Facility: MEDICAL CENTER | Age: 82
End: 2017-07-27
Attending: EMERGENCY MEDICINE
Payer: MEDICARE

## 2017-07-27 ENCOUNTER — HOSPITAL ENCOUNTER (EMERGENCY)
Facility: MEDICAL CENTER | Age: 82
End: 2017-07-27
Attending: EMERGENCY MEDICINE
Payer: MEDICARE

## 2017-07-27 VITALS
TEMPERATURE: 98.3 F | RESPIRATION RATE: 16 BRPM | HEIGHT: 71 IN | OXYGEN SATURATION: 97 % | DIASTOLIC BLOOD PRESSURE: 71 MMHG | WEIGHT: 216 LBS | BODY MASS INDEX: 30.24 KG/M2 | SYSTOLIC BLOOD PRESSURE: 87 MMHG | HEART RATE: 76 BPM

## 2017-07-27 DIAGNOSIS — M46.46 DISCITIS OF LUMBAR REGION: ICD-10-CM

## 2017-07-27 DIAGNOSIS — I95.9 HYPOTENSION, UNSPECIFIED HYPOTENSION TYPE: ICD-10-CM

## 2017-07-27 DIAGNOSIS — I48.21 PERMANENT ATRIAL FIBRILLATION (HCC): ICD-10-CM

## 2017-07-27 LAB
ALBUMIN SERPL BCP-MCNC: 2.8 G/DL (ref 3.2–4.9)
ALBUMIN/GLOB SERPL: 0.8 G/DL
ALP SERPL-CCNC: 77 U/L (ref 30–99)
ALT SERPL-CCNC: 19 U/L (ref 2–50)
ANION GAP SERPL CALC-SCNC: 13 MMOL/L (ref 0–11.9)
APTT PPP: 27.1 SEC (ref 24.7–36)
AST SERPL-CCNC: 19 U/L (ref 12–45)
BASOPHILS # BLD AUTO: 0.5 % (ref 0–1.8)
BASOPHILS # BLD: 0.12 K/UL (ref 0–0.12)
BILIRUB SERPL-MCNC: 0.4 MG/DL (ref 0.1–1.5)
BNP SERPL-MCNC: 65 PG/ML (ref 0–100)
BUN SERPL-MCNC: 22 MG/DL (ref 8–22)
CALCIUM SERPL-MCNC: 9.7 MG/DL (ref 8.5–10.5)
CHLORIDE SERPL-SCNC: 97 MMOL/L (ref 96–112)
CO2 SERPL-SCNC: 27 MMOL/L (ref 20–33)
COMMENT 1642: NORMAL
CREAT SERPL-MCNC: 1.03 MG/DL (ref 0.5–1.4)
DIGOXIN SERPL-MCNC: 0.6 NG/ML (ref 0.8–2)
EKG IMPRESSION: NORMAL
EOSINOPHIL # BLD AUTO: 1.17 K/UL (ref 0–0.51)
EOSINOPHIL NFR BLD: 5.3 % (ref 0–6.9)
ERYTHROCYTE [DISTWIDTH] IN BLOOD BY AUTOMATED COUNT: 42.5 FL (ref 35.9–50)
GFR SERPL CREATININE-BSD FRML MDRD: >60 ML/MIN/1.73 M 2
GLOBULIN SER CALC-MCNC: 3.4 G/DL (ref 1.9–3.5)
GLUCOSE SERPL-MCNC: 129 MG/DL (ref 65–99)
HCT VFR BLD AUTO: 39.9 % (ref 42–52)
HGB BLD-MCNC: 13.3 G/DL (ref 14–18)
IMM GRANULOCYTES # BLD AUTO: 0.8 K/UL (ref 0–0.11)
IMM GRANULOCYTES NFR BLD AUTO: 3.6 % (ref 0–0.9)
INR PPP: 1.13 (ref 0.87–1.13)
LIPASE SERPL-CCNC: 25 U/L (ref 11–82)
LYMPHOCYTES # BLD AUTO: 2.43 K/UL (ref 1–4.8)
LYMPHOCYTES NFR BLD: 11 % (ref 22–41)
MCH RBC QN AUTO: 29.4 PG (ref 27–33)
MCHC RBC AUTO-ENTMCNC: 33.3 G/DL (ref 33.7–35.3)
MCV RBC AUTO: 88.1 FL (ref 81.4–97.8)
MONOCYTES # BLD AUTO: 1.91 K/UL (ref 0–0.85)
MONOCYTES NFR BLD AUTO: 8.7 % (ref 0–13.4)
MORPHOLOGY BLD-IMP: NORMAL
NEUTROPHILS # BLD AUTO: 15.57 K/UL (ref 1.82–7.42)
NEUTROPHILS NFR BLD: 70.9 % (ref 44–72)
NRBC # BLD AUTO: 0 K/UL
NRBC BLD AUTO-RTO: 0 /100 WBC
PLATELET # BLD AUTO: 338 K/UL (ref 164–446)
PMV BLD AUTO: 10.1 FL (ref 9–12.9)
POTASSIUM SERPL-SCNC: 3.5 MMOL/L (ref 3.6–5.5)
PROT SERPL-MCNC: 6.2 G/DL (ref 6–8.2)
PROTHROMBIN TIME: 14.9 SEC (ref 12–14.6)
RBC # BLD AUTO: 4.53 M/UL (ref 4.7–6.1)
SODIUM SERPL-SCNC: 137 MMOL/L (ref 135–145)
TROPONIN I SERPL-MCNC: 0.02 NG/ML (ref 0–0.04)
WBC # BLD AUTO: 22 K/UL (ref 4.8–10.8)

## 2017-07-27 PROCEDURE — 93005 ELECTROCARDIOGRAM TRACING: CPT

## 2017-07-27 PROCEDURE — 83880 ASSAY OF NATRIURETIC PEPTIDE: CPT

## 2017-07-27 PROCEDURE — 83690 ASSAY OF LIPASE: CPT

## 2017-07-27 PROCEDURE — A9270 NON-COVERED ITEM OR SERVICE: HCPCS | Performed by: EMERGENCY MEDICINE

## 2017-07-27 PROCEDURE — 700102 HCHG RX REV CODE 250 W/ 637 OVERRIDE(OP): Performed by: EMERGENCY MEDICINE

## 2017-07-27 PROCEDURE — 85730 THROMBOPLASTIN TIME PARTIAL: CPT

## 2017-07-27 PROCEDURE — 85025 COMPLETE CBC W/AUTO DIFF WBC: CPT

## 2017-07-27 PROCEDURE — 85610 PROTHROMBIN TIME: CPT

## 2017-07-27 PROCEDURE — 84484 ASSAY OF TROPONIN QUANT: CPT

## 2017-07-27 PROCEDURE — 304561 HCHG STAT O2

## 2017-07-27 PROCEDURE — 36415 COLL VENOUS BLD VENIPUNCTURE: CPT

## 2017-07-27 PROCEDURE — 80053 COMPREHEN METABOLIC PANEL: CPT

## 2017-07-27 PROCEDURE — 71010 DX-CHEST-PORTABLE (1 VIEW): CPT

## 2017-07-27 PROCEDURE — 99285 EMERGENCY DEPT VISIT HI MDM: CPT

## 2017-07-27 PROCEDURE — 80162 ASSAY OF DIGOXIN TOTAL: CPT

## 2017-07-27 RX ORDER — ATORVASTATIN CALCIUM 10 MG/1
10 TABLET, FILM COATED ORAL NIGHTLY
COMMUNITY
End: 2019-01-01

## 2017-07-27 RX ORDER — TRAMADOL HYDROCHLORIDE 50 MG/1
50 TABLET ORAL ONCE
Status: COMPLETED | OUTPATIENT
Start: 2017-07-27 | End: 2017-07-27

## 2017-07-27 RX ORDER — OXYCODONE HCL 10 MG/1
10 TABLET, FILM COATED, EXTENDED RELEASE ORAL EVERY 12 HOURS
Status: ON HOLD | COMMUNITY
End: 2018-01-12

## 2017-07-27 RX ORDER — OXYCODONE HYDROCHLORIDE 10 MG/1
10 TABLET ORAL ONCE
Status: DISCONTINUED | OUTPATIENT
Start: 2017-07-27 | End: 2017-07-27 | Stop reason: CLARIF

## 2017-07-27 RX ADMIN — TRAMADOL HYDROCHLORIDE 50 MG: 50 TABLET, COATED ORAL at 19:57

## 2017-07-27 RX ADMIN — TRAMADOL HYDROCHLORIDE 50 MG: 50 TABLET, COATED ORAL at 16:15

## 2017-07-27 ASSESSMENT — ENCOUNTER SYMPTOMS
SENSORY CHANGE: 1
CHILLS: 1
FEVER: 0
DIZZINESS: 1
SHORTNESS OF BREATH: 0
BACK PAIN: 1
COUGH: 0

## 2017-07-27 ASSESSMENT — PAIN SCALES - GENERAL
PAINLEVEL_OUTOF10: 0
PAINLEVEL_OUTOF10: 5

## 2017-07-27 NOTE — ED PROVIDER NOTES
ED Provider Note    Scribed for Tl Gill M.D. by Elena Reid. 7/27/2017, 2:57 PM.    Primary care provider: Tanisha Flores M.D.  Means of arrival: Ambulance  History obtained from: Patient  History limited by: none    CHIEF COMPLAINT  Chief Complaint   Patient presents with   • Atrial Fibrillation       HPI  Jersey Alexis is a 86 y.o. male who presents to the Emergency Department brought in by EMS for evaluation of atrial fibrillation and lower extremity weakness onset this morning. He was [facility] ago following discharge from the hospital for treatment of an infection in his lower back.The patient was receiving physical therapy and was attempting to be stood up by staff when he began to feel bilateral leg weakness and an associated dizziness, needing to be laid down. The patient presented a lowered blood pressure and elevated heart rate when assessed, with atrial flutter present on an EKG completed. He is currently still on antibiotics(Vancomycin) for that spinal infection. After being administered this medication, the patient developed a full body rash. The patient does confirm that he is experiencing severe lower back pain and limited strength in his lower extremities.The patient currently uses a scooter and walker to move around.  He has been on 24/7 home oxygen since discharge.    REVIEW OF SYSTEMS  Review of Systems   Constitutional: Positive for chills. Negative for fever.   Respiratory: Negative for cough and shortness of breath.    Cardiovascular: Negative for chest pain.   Musculoskeletal: Positive for back pain (lower).   Neurological: Positive for dizziness and sensory change (limited strength to lower extremities).   All other systems reviewed and are negative.  C    PAST MEDICAL HISTORY   has a past medical history of Hyperlipidemia; Arthritis; Arrhythmia; Muscle disorder; Cataract; GERD (gastroesophageal reflux disease); Diabetes (CMS-[facility]);  and A-fib (CMS-[facility]).    SURGICAL HISTORY   has past surgical history that includes laminotomy (late 80s); laminotomy (late 90s); appendectomy (Early 2000's); tonsillectomy (Bilateral, 1936); and cataract phaco with iol (Bilateral, Early 2000's).    SOCIAL HISTORY  Social History   Substance Use Topics   • Smoking status: Former Smoker -- 1.50 packs/day for 29 years     Types: Cigarettes, Pipe     Quit date: 01/01/1980   • Smokeless tobacco: Never Used      Comment: Started smoking at age 21   • Alcohol Use: No      History   Drug Use No       FAMILY HISTORY  Family History   Problem Relation Age of Onset   • Heart Failure Mother    • Heart Attack Mother    • Heart Disease Mother      pacemaker   • Paranoid behavior Mother    • Heart Failure Father    • Cancer Sister      Breast   • Other Brother      colostomy   • Cancer Brother    • No Known Problems Maternal Grandmother    • No Known Problems Maternal Grandfather    • No Known Problems Paternal Grandmother    • No Known Problems Paternal Grandfather        CURRENT MEDICATIONS  No current facility-administered medications on file prior to encounter.     Current Outpatient Prescriptions on File Prior to Encounter   Medication Sig Dispense Refill   • gabapentin (NEURONTIN) 100 MG Cap Take 2 Caps by mouth 3 times a day. 90 Cap    • levetiracetam (KEPPRA) 500 MG Tab Take 1 Tab by mouth 2 Times a Day. 60 Tab    • fluconazole (DIFLUCAN) 100 MG Tab Take 1 Tab by mouth every day.  0   • metoprolol (LOPRESSOR) 50 MG Tab Take 1 Tab by mouth 2 Times a Day. 60 Tab    • NS SOLN 100 mL with cefTRIAXone 2 GM SOLR 2 g 2 g by Intravenous route every 24 hours.     • ferrous sulfate 325 (65 FE) MG tablet Take 1 Tab by mouth every day.     • tamsulosin (FLOMAX) 0.4 MG capsule Take 1 Cap by mouth ONE-HALF HOUR AFTER BREAKFAST. 30 Cap    • digoxin (LANOXIN) 125 MCG Tab Take 125 mcg by mouth Once.     • tramadol (ULTRAM) 50 MG Tab Take 50 mg by mouth every 8 hours as needed.     •  "aspirin (ASA) 325 MG Tab Take 325 mg by mouth every day.     • cyanocobalamin (VITAMIN B12) 1000 MCG Tab Take 1,000 mcg by mouth every day.     • cholecalciferol (VITAMIN D3) 5000 UNIT Cap Take 5,000 Units by mouth every 48 hours.     • omeprazole (PRILOSEC) 20 MG delayed-release capsule Take 20 mg by mouth every day.     • docusate sodium (COLACE) 100 MG Cap Take 100 mg by mouth every morning.           ALLERGIES  Allergies   Allergen Reactions   • Vancomycin Rash     diffuse       PHYSICAL EXAM  VITAL SIGNS: BP 87/71 mmHg  Pulse 130  Resp 21  Ht 1.803 m (5' 11\")  Wt 97.977 kg (216 lb)  BMI 30.14 kg/m2  SpO2 99%     Constitutional: Debilitated with no acute distress. Well developed, Well nourished, Non-toxic appearance.   HENT: Normocephalic, Atraumatic, Bilateral external ears normal, No oral exudates, Nose normal. Dry mucous membranes.  Eyes: Pupils are equal round and react to light, extraocular motions are intact, conjunctiva is normal, there are no signs of exudate.   Neck: Supple, no meningeal signs.  Lymphatic: No lymphadenopathy noted.   Cardiovascular: Diminished heart tones, irregular rate and irregular rhythm without murmurs gallops or rubs.   Thorax & Lungs: Diminished on right compared to left. No respiratory distress. . Lungs are clear to auscultation bilaterally, there are no wheezes no rhonchi no rales. Chest wall is nontender.  Abdomen: periumbilical hernia surrounding scar tissue secondary to past appendectomy. Soft, nontender, nondistended. Bowel sounds are present.   Extremities: 1+ pitting edema to lower extremities weak throughout but moving all 4 extremities  Skin: Petechiae rash to lower extremities and upper body(most likely caused by Vancomycin). Warm, Dry, No erythema,   Back: No tenderness, No CVA tenderness.  Musculoskeletal: Good range of motion in all major joints. No tenderness to palpation or major deformities noted. Intact distal pulses, no clubbing, no cyanosis, no edema, "   Neurologic: very weak throughout Alert & oriented x 3, Moving all extremities. No gross abnormalities. No focal deformities.   Psychiatric: Affect normal, Judgment normal, Mood normal.     LABS  Results for orders placed or performed during the hospital encounter of 07/27/17   CBC with Differential   Result Value Ref Range    WBC 22.0 (H) 4.8 - 10.8 K/uL    RBC 4.53 (L) 4.70 - 6.10 M/uL    Hemoglobin 13.3 (L) 14.0 - 18.0 g/dL    Hematocrit 39.9 (L) 42.0 - 52.0 %    MCV 88.1 81.4 - 97.8 fL    MCH 29.4 27.0 - 33.0 pg    MCHC 33.3 (L) 33.7 - 35.3 g/dL    RDW 42.5 35.9 - 50.0 fL    Platelet Count 338 164 - 446 K/uL    MPV 10.1 9.0 - 12.9 fL    Neutrophils-Polys 70.90 44.00 - 72.00 %    Lymphocytes 11.00 (L) 22.00 - 41.00 %    Monocytes 8.70 0.00 - 13.40 %    Eosinophils 5.30 0.00 - 6.90 %    Basophils 0.50 0.00 - 1.80 %    Immature Granulocytes 3.60 (H) 0.00 - 0.90 %    Nucleated RBC 0.00 /100 WBC    Lymphs (Absolute) 2.43 1.00 - 4.80 K/uL    Monos (Absolute) 1.91 (H) 0.00 - 0.85 K/uL    Eos (Absolute) 1.17 (H) 0.00 - 0.51 K/uL    Baso (Absolute) 0.12 0.00 - 0.12 K/uL    Immature Granulocytes (abs) 0.80 (H) 0.00 - 0.11 K/uL    NRBC (Absolute) 0.00 K/uL    Neutrophils (Absolute) 15.57 (H) 1.82 - 7.42 K/uL   Complete Metabolic Panel (CMP)   Result Value Ref Range    Sodium 137 135 - 145 mmol/L    Potassium 3.5 (L) 3.6 - 5.5 mmol/L    Chloride 97 96 - 112 mmol/L    Co2 27 20 - 33 mmol/L    Anion Gap 13.0 (H) 0.0 - 11.9    Glucose 129 (H) 65 - 99 mg/dL    Bun 22 8 - 22 mg/dL    Creatinine 1.03 0.50 - 1.40 mg/dL    Calcium 9.7 8.5 - 10.5 mg/dL    AST(SGOT) 19 12 - 45 U/L    ALT(SGPT) 19 2 - 50 U/L    Alkaline Phosphatase 77 30 - 99 U/L    Total Bilirubin 0.4 0.1 - 1.5 mg/dL    Albumin 2.8 (L) 3.2 - 4.9 g/dL    Total Protein 6.2 6.0 - 8.2 g/dL    Globulin 3.4 1.9 - 3.5 g/dL    A-G Ratio 0.8 g/dL   Btype Natriuretic Peptide (BNP)   Result Value Ref Range    B Natriuretic Peptide 65 0 - 100 pg/mL   Prothrombin Time (PT/INR)    Result Value Ref Range    PT 14.9 (H) 12.0 - 14.6 sec    INR 1.13 0.87 - 1.13   APTT   Result Value Ref Range    APTT 27.1 24.7 - 36.0 sec   Lipase   Result Value Ref Range    Lipase 25 11 - 82 U/L   Troponin STAT   Result Value Ref Range    Troponin I 0.02 0.00 - 0.04 ng/mL   DIGOXIN   Result Value Ref Range    Digoxin 0.6 (L) 0.8 - 2.0 ng/mL   ESTIMATED GFR   Result Value Ref Range    GFR If African American >60 >60 mL/min/1.73 m 2    GFR If Non African American >60 >60 mL/min/1.73 m 2   PERIPHERAL SMEAR REVIEW   Result Value Ref Range    Peripheral Smear Review see below    DIFFERENTIAL COMMENT   Result Value Ref Range    Comments-Diff see below    EKG (ER)   Result Value Ref Range    Report       [facility] Emergency Dept.    Test Date:  2017  Pt Name:    BRIAN SPARROW               Department: ER  MRN:        [id number]                      Room:        10  Gender:     M                            Technician: [id number]  :        1930-10-18                   Requested By:ER TRIAGE PROTOCOL  Order #:    [id number]                    Reading MD:    Measurements  Intervals                                Axis  Rate:       105                          P:  ME:                                      QRS:        -26  QRSD:       80                           T:          26  QT:         340  QTc:        450    Interpretive Statements  ATRIAL FIBRILLATION  BORDERLINE LEFT AXIS DEVIATION  EARLY PRECORDIAL R/S TRANSITION  Compared to ECG 07/15/2017 09:40:02  Atrial flutter no longer present  T-wave abnormality no longer present       All labs reviewed by me.    EKG  As above      RADIOLOGY  DX-CHEST-PORTABLE (1 VIEW)   Final Result      The left PICC line has been retracted currently projects near the origin of the SVC.   Diffuse coarse interstitial opacities both lungs consistent with atelectasis/possible interstitial lung disease.        The radiologist's interpretation of all radiological studies  have been reviewed by me.    COURSE & MEDICAL DECISION MAKING  Pertinent Labs & Imaging studies reviewed. (See chart for details)    2:57 PM - Patient seen and examined at bedside with initial BP of 117/71. Patient will be treated with Ultram 50 mg PO. Ordered DX-Chest, Digoxin, Estimated GFR, Peripheral Smear Review, Differential Comment, CBC, CMP, BNP, PT/INR, APTT, Lipase, and Troponin to evaluate his symptoms. The differential diagnoses include but are not limited to: atrial fibrillation, dehydration, currently experiencing back infection.     5:44 PM Patient was reevaluated at bedside. He was resting comfortably and stable. Discussed lab  and radiology results with the patient which indicated that their white count was increasing. The patient also has a lasting infection and will continue to be treated with antibiotics. I informed they will be transferred back to [facility].    Decision Making:  Patient currently is undergoing antibiotic treatment because of an infection/discitis of his back. Sounds that the patient was being exerted today and had a near syncopal episode and apparently was in atrial fibrillation with RVR. Upon arrival here, the patient's atrial fibrillation was noted, but he is not in a rapid response. The patient's blood pressure has increased. After fluid bolus arrival. Patient was observed here as blood pressures remained stable. The patient is in atrial fibrillation. Laboratory studies are relatively unchanged except for a not a white count that is increasing. On evaluation. Clinically, the patient is having back pain but he states the same type of back pain is been having. At this point, my recommendation is to continue with current therapy, which is his IV antibiotics, physical therapy, and I did discuss it with the patient. He feels comfortable going back to the nursing home. The nursing home was notified. They feel comfortable accepting the patient back.    The patient will return for  new or worsening symptoms and is stable at the time of discharge.    DISPOSITION:  Patient will be discharged back to [facility] in stable condition    FOLLOW UP:  No follow-up provider specified.    FINAL IMPRESSION  1. Permanent atrial fibrillation (CMS-HCC)    2. Hypotension, unspecified hypotension type    3. Discitis of lumbar region          Elena RENEE (Scribe), am scribing for, and in the presence of, Tl Gill M.D..    Electronically signed by: Elena Reid (Scribe), 7/27/2017    ITl M.D. personally performed the services described in this documentation, as scribed by Elena Reid in my presence, and it is both accurate and complete.    The note accurately reflects work and decisions made by me.  Tl Gill  7/27/2017  9:40 PM

## 2017-07-27 NOTE — ED NOTES
Pt resting in [facility]. Has spoken to family on telephone. Medicated for pain per his outpatient regiment with VO by Dr. Mccollum.

## 2017-07-27 NOTE — ED NOTES
Patient Arrived via EMS from [facility]. EMS was called for Hypotension of BP 60/40. EMS found to pt to have HR of 160 in afib with RVR. During transport pt's HR dropped to 70s Afib. BP is now 87/71. Pt received 250 ml NS by EMS. Reports he noticed his heart beating faster and developed dizziness during physical therapy. He denies SOB or chest pain. EKG on arrival.       Pt is in [facility] for IV antibiotics follow sepsis from a spine infection. He does have skin rash but states that has been present since he as inpatient at this facility.

## 2017-07-28 NOTE — ED NOTES
Spoke to lab regarding the BNP. Lab reports that results will be available in approximately 10 minutes.

## 2017-07-28 NOTE — ED NOTES
Spoke to CARL Nazario at [facility]. She is comfortable accepting patient back.

## 2017-07-28 NOTE — DISCHARGE PLANNING
Medical Social Work  Ghazala assisted with transport back to [facility]. GHAZALA completed [facility] transfer form, contacted [facility] and completed transfer packet with COBRA.   DESIRAESA contacted transfer arranged for 2010, CARL Brown notified and packet placed on chart.

## 2017-07-28 NOTE — ED NOTES
Medium formed stool via bedpan.   There are 2 decube ulcers present to buttock. 1 to right and 1 to left, both approx 3 cm x 2cm Moisture related breakdown also noticed.   SS will arrange transport to [facility].

## 2017-08-01 LAB
BACTERIA BLD CULT: ABNORMAL
BACTERIA BLD CULT: ABNORMAL
SIGNIFICANT IND 70042: ABNORMAL
SITE SITE: ABNORMAL
SOURCE SOURCE: ABNORMAL

## 2017-08-02 NOTE — DOCUMENTATION QUERY
"DOCUMENTATION QUERY    PROVIDERS: Please select “Cosign w/ note”to reply to query.    To better represent the severity of illness of your patient, please review the following information and exercise your independent professional judgment in responding to this query.     Sepsis is documented in the History and Physical, Progress Notes, Discharge Summary and Emergency Room Record, Acute Encephalopathy is documented on Progress Notes starting 07/12/17. Per coding guidelines , the clinical indicators for severe sepsis are organ failure, organ dysfunction/shock documented as related to the sepsis.  Based upon the clinical findings, risk factors, and treatment, can the relationship between these two conditions be further specified?    • Acute encephalopathy is related to sepsis  • Acute encephalopathy is not related to sepsis  • Other explanation of clinical findings (please document)  • Unable to determine      The medical record reflects the following:   Clinical Findings  \"Rapid response was called due to patient not responding verbally, no signs of neurological deficits but was confused\"   Treatment  Neurological evaluation, IV Vancomycin, IV Ceftriaxone, IV Fluids   Risk Factors  As clinically indicated   Location within medical record  Progress Notes     Thank you,   LIZANDRO Pollard, CCS        "

## 2017-08-07 ENCOUNTER — HOSPITAL ENCOUNTER (OUTPATIENT)
Dept: RADIOLOGY | Facility: MEDICAL CENTER | Age: 82
End: 2017-08-07
Attending: FAMILY MEDICINE
Payer: MEDICARE

## 2017-08-07 DIAGNOSIS — A40.9 STREPTOCOCCAL SEPSIS (HCC): ICD-10-CM

## 2017-08-07 PROCEDURE — 36597 REPOSITION VENOUS CATHETER: CPT | Mod: XU

## 2017-08-07 PROCEDURE — 76937 US GUIDE VASCULAR ACCESS: CPT

## 2017-08-07 PROCEDURE — 36569 INSJ PICC 5 YR+ W/O IMAGING: CPT

## 2017-08-07 NOTE — PROCEDURES
PICC Insertion Procedure Note    Consents confirmed, vessel patency confirmed with ultrasound. Risks and benefits of procedure explained to patient/family and education regarding central line associated bloodstream infections provided. Questions answered.     PICC placed in RUE per MD order with ultrasound guidance. 4 Fr, single lumen PICC placed in basilic vein after 3 attempt(s). Initially PICC was not long enough and was exchanged for longer, then noticed PICC was curled in axilla and tried to straighten with guidewire, then finally that PICC was removed and a new access below was made and PICC was advanced successfully.  5 cc's of 1% lidocaine injected intradermally, 21 gauge microintroducer needle and modified Seldinger technique used. 53 cm catheter with 1 cm external measurement inserted with good blood return. Each lumen flushed without resistance with 10 mL 0.9% normal saline. PICC line secured with Biopatch and Tegaderm.    CXR ordered, PICC placement confirmation will be provided by the Radiologist via interpretation of the follow-up chest x-ray to confirm tip location. Pt tolerated procedure well.  Patient condition relayed to unit RN or ordering physician via this post procedure note in the EMR.     Zooomr Power PICC ref # [id number], Lot # [id number]

## 2017-08-09 ENCOUNTER — OFFICE VISIT (OUTPATIENT)
Dept: INFECTIOUS DISEASES | Facility: MEDICAL CENTER | Age: 82
End: 2017-08-09
Payer: MEDICARE

## 2017-08-09 VITALS
OXYGEN SATURATION: 95 % | DIASTOLIC BLOOD PRESSURE: 80 MMHG | HEART RATE: 71 BPM | TEMPERATURE: 97.6 F | HEIGHT: 71 IN | SYSTOLIC BLOOD PRESSURE: 140 MMHG

## 2017-08-09 DIAGNOSIS — I48.0 PAROXYSMAL ATRIAL FIBRILLATION (HCC): ICD-10-CM

## 2017-08-09 DIAGNOSIS — D72.829 LEUKOCYTOSIS, UNSPECIFIED TYPE: ICD-10-CM

## 2017-08-09 DIAGNOSIS — L27.0 DRUG RASH: ICD-10-CM

## 2017-08-09 DIAGNOSIS — E11.9 TYPE 2 DIABETES MELLITUS WITHOUT COMPLICATION, WITHOUT LONG-TERM CURRENT USE OF INSULIN (HCC): ICD-10-CM

## 2017-08-09 DIAGNOSIS — R78.81 BACTEREMIA: ICD-10-CM

## 2017-08-09 DIAGNOSIS — R21 RASH OF GROIN: ICD-10-CM

## 2017-08-09 DIAGNOSIS — M46.46 DISCITIS OF LUMBAR REGION: ICD-10-CM

## 2017-08-09 DIAGNOSIS — L03.113 CELLULITIS OF RIGHT UPPER EXTREMITY: ICD-10-CM

## 2017-08-09 PROCEDURE — 99213 OFFICE O/P EST LOW 20 MIN: CPT | Performed by: NURSE PRACTITIONER

## 2017-08-09 NOTE — MR AVS SNAPSHOT
"Jersey Alexis   2017 2:30 PM   Office Visit   MRN: [id number]    Department:  [facility]   Dept Phone:  [phone]    Description:  Male : 10/18/1930   Provider:  JG Gross           Reason for Visit     Hospital Follow-up L3-4 discitis; Strep Elly bacteremia      Allergies as of 2017     Allergen Noted Reactions    Vancomycin 2017   Rash    diffuse      Vital Signs     Blood Pressure Pulse Temperature Height Oxygen Saturation       140/80 mmHg 71 36.4 °C (97.6 °F) 1.803 m (5' 11\") 95%     Smoking Status                   Former Smoker           Basic Information     Date Of Birth Sex Race Ethnicity Preferred Language    10/18/1930 Male White Non- English      Problem List              ICD-10-CM Priority Class Noted - Resolved    Pulmonary nodules/lesions, multiple; needs F/U CT in 6 months  R91.8   3/1/2017 - Present    Atrial flutter (CMS-HCC) I48.92   3/21/2017 - Present    Falls W19.XXXA   3/21/2017 - Present    Paroxysmal atrial fibrillation (CMS-HCC) I48.0   2017 - Present    Essential hypertension I10   2017 - Present    Hyperlipidemia E78.5   2017 - Present    Sepsis (CMS-HCC) A41.9   2017 - Present    Low back pain M54.5   2017 - Present    UTI (urinary tract infection) N39.0   2017 - Present    Morbid obesity (CMS-HCC) E66.01   2017 - Present    Hypomagnesemia E83.42   2017 - Present    Bacteremia R78.81 High  2017 - Present    Discitis of lumbar region M46.46 High  2017 - Present    Acute encephalopathy G93.40   2017 - Present    Shock (CMS-HCC) R57.9   7/15/2017 - Present    Benign prostatic hyperplasia with lower urinary tract symptoms N40.1   2017 - Present    Leukocytosis D72.829   2017 - Present    Normocytic anemia D64.9   2017 - Present    Scrotal edema N50.89   2017 - Present    Drug rash L27.0   2017 - Present    Constipation K59.00   2017 - Present      "   Health Maintenance        Date Due Completion Dates    IMM DTaP/Tdap/Td Vaccine (1 - Tdap) 10/18/1949 ---    COLONOSCOPY 10/18/1980 ---    IMM ZOSTER VACCINE 10/18/1990 ---    IMM PNEUMOCOCCAL 65+ (ADULT) LOW/MEDIUM RISK SERIES (1 of 2 - PCV13) 10/18/1995 ---    IMM INFLUENZA (1) 9/1/2017 11/1/2016, 10/10/2009, 11/3/2007, 10/27/2005            Current Immunizations     Influenza Vaccine Quad Inj (Preserved) 11/1/2016, 10/10/2009, 11/3/2007, 10/27/2005    Pneumococcal Vaccine (UF)Historical Data 11/6/2015      Below and/or attached are the medications your provider expects you to take. Review all of your home medications and newly ordered medications with your provider and/or pharmacist. Follow medication instructions as directed by your provider and/or pharmacist. Please keep your medication list with you and share with your provider. Update the information when medications are discontinued, doses are changed, or new medications (including over-the-counter products) are added; and carry medication information at all times in the event of emergency situations     Allergies:  VANCOMYCIN - Rash               Medications  Valid as of: August 09, 2017 -  2:58 PM    Generic Name Brand Name Tablet Size Instructions for use    Aspirin (Tab)  MG Take 325 mg by mouth every day.        Atorvastatin Calcium (Tab) LIPITOR 10 MG Take 10 mg by mouth every evening.        Cholecalciferol (Cap) VITAMIN D3 5000 UNIT Take 5,000 Units by mouth every 48 hours.        Cyanocobalamin (Tab) VITAMIN B12 1000 MCG Take 1,000 mcg by mouth every day.        Digoxin (Tab) LANOXIN 125 MCG Take 125 mcg by mouth Once.        Docusate Sodium (Cap) COLACE 100 MG Take 100 mg by mouth every morning.        Enoxaparin Sodium (Solution) LOVENOX 40 MG/0.4ML Inject 1 Syringe as instructed every day.        Ferrous Sulfate (Tab) ferrous sulfate 325 (65 FE) MG Take 1 Tab by mouth every day.        Fluconazole (Tab) DIFLUCAN 100 MG Take 1 Tab by  mouth every day.        Gabapentin (Cap) NEURONTIN 100 MG Take 2 Caps by mouth 3 times a day.        LevETIRAcetam (Tab) KEPPRA 500 MG Take 1 Tab by mouth 2 Times a Day.        Metoprolol Tartrate (Tab) LOPRESSOR 50 MG Take 1 Tab by mouth 2 Times a Day.        NS SOLN 100 mL with cefTRIAXone 2 GM SOLR 2 g   2 g by Intravenous route every 24 hours.        Omeprazole (CAPSULE DELAYED RELEASE) PRILOSEC 20 MG Take 20 mg by mouth every day.        OxyCODONE HCl (Tablet Extended Release 12 hour Abuse-Deterrent) OXYCONTIN 10 MG Take 10 mg by mouth every 12 hours.        Tamsulosin HCl (Cap) FLOMAX 0.4 MG Take 1 Cap by mouth ONE-HALF HOUR AFTER BREAKFAST.        TraMADol HCl (Tab) ULTRAM 50 MG Take 50 mg by mouth every 8 hours as needed.        .                 Medicines prescribed today were sent to:     [facility] [address] [place] [address] (S), [place] [id number] [facility] (S) [place] [address]    Phone: [phone] Fax: [phone]    Open 24 Hours?: No      Medication refill instructions:       If your prescription bottle indicates you have medication refills left, it is not necessary to call your provider’s office. Please contact your pharmacy and they will refill your medication.    If your prescription bottle indicates you do not have any refills left, you may request refills at any time through one of the following ways: The online [employer] system (except Urgent Care), by calling your provider’s office, or by asking your pharmacy to contact your provider’s office with a refill request. Medication refills are processed only during regular business hours and may not be available until the next business day. Your provider may request additional information or to have a follow-up visit with you prior to refilling your medication.   *Please Note: Medication refills are assigned a new Rx number when refilled electronically. Your pharmacy may indicate that no refills were authorized even though a new prescription for  the same medication is available at the pharmacy. Please request the medicine by name with the pharmacy before contacting your provider for a refill.           MyChart Access Code: Activation code not generated  Current MyChart Status: Active

## 2017-08-09 NOTE — PROGRESS NOTES
[facility]    Subjective:     Chief Complaint   Patient presents with   • Hospital Follow-up     L3-4 discitis; Strep Elly bacteremia     This is my first time meeting Mr. Alexis.  He is currently residing at [facility].    Interval History: 86 y.o. white male with history of diabetes mellitus, hypertension and atrial fibrillation who recently moved from [place] to [place]. He was admitted back in March 2017 with atrial fibrillation and rapid heart rate.  Hospitalized from 7/6- 7/25/17, admitted d/t increasing back pain with hip pain over his chronic back pain over the prior 3-4 days before admission.  CT of the abdomen and pelvis on 7/6 was consistent with sequelae of pancreatitis. Bcx on 7/6 +Strep Viridans from 1/2 bottles.  TTE negative.  MRI of the lumbar spine on 7/7 showed possible discitis at the L3-4 level.  IR biopsy done 7/10, cx negative.  Pt discharged to [facility] on IV Ceftriaxone, end date 8/20/17.    Hospital records reviewed.    Today, 8/9/2017: Patient reports feeling well and has been tolerating the IV Ceftriaxone without adverse effect.  Denies feeling generally ill, fevers/chills, general malaise, headache, n/v/d, abdominal pain, chest pain or shortness of breath.  Pt reports being constipated, last BM about 2 days ago.  Pt reports having back pain with PT that can go up to a 9/10, but denies pain at rest.  Pt reports rash around groin has resolved.  BS not being checked at [facility] as he is not on any DM medications, nor does he take any DM meds at home.    ROS  As documented above in my HPI.    Past Medical History   Diagnosis Date   • Hyperlipidemia    • Arthritis    • Arrhythmia    • Muscle disorder      nerve damage due to spinal stenosis   • Cataract    • GERD (gastroesophageal reflux disease)    • Diabetes (CMS-[facility])    • A-fib (CMS-HCC)        Social History   Substance Use Topics   • Smoking status: Former Smoker -- 1.50 packs/day for 29 years     Types:  "Cigarettes, Pipe     Quit date: 01/01/1980   • Smokeless tobacco: Never Used      Comment: Started smoking at age 21   • Alcohol Use: No       Allergies: Vancomycin    Pt's medication and problem list reviewed.     Objective:     PE:  /80 mmHg  Pulse 71  Temp(Src) 36.4 °C (97.6 °F)  Ht 1.803 m (5' 11\")  Wt   SpO2 95%    Vital signs reviewed    Constitutional: Appears well-developed and well-nourished. No acute distress.  Obese.   Eyes: Conjunctivae normal and EOM are normal. Pupils are equal, round, and reactive to light.   Neck: Trachea midline. Normal range of motion. No JVD.  Cardiovascular: Normal rate, irregular rhythm, normal heart sounds. No murmur, gallop, or friction rub. Trace BLEs edema.  Respiratory: No respiratory distress, unlabored respiratory effort.  Lungs clear to auscultation bilaterally. No wheezes or rales.   Abdomen: Soft, non tender, non-distended. BS + x 4. No masses.  Musculoskeletal: Wheelchair.  Normal range of motion.  No joint or bone tenderness, swelling, erythema or deformity.  No spinal tenderness to palpation.  RUE PICC- non tender, no erythema.  Skin: Warm and dry. No visible rashes or lesions.  Neurological: No cranial nerve deficit. Coordination normal.    Psychiatric: Alert and oriented to person, place, and time. Normal mood, calm affect.      Labs from [facility] 8/3/17:  WBC 15.2  H&H 12.9 & 38.3  Plt 292  Glucose- 113  BUN & Crt 12 & 1.0  GFR 70  Na 135  K 5.0    Assessment and Plan:   The following treatment plan was discussed with patient at length:    1. Discitis of lumbar region      Complete IV Ceftriaxone on 8/20.  D/C PICC after last infusion dose.  No PO abx to follow.  Monitor for s/sx of worsening off abx: fevers, chills, malaise, feeling ill, worsening back pain, etc.   2. Bacteremia, Strep Viridans      As above   3. Leukocytosis, unspecified type      Persistent.  Improved to 15.2 from 22.0 on 7/27.   4. Drug rash      Resolved.   5. Rash of groin      " Resolved.   6. Cellulitis of right upper extremity      Resolved.   7. Type 2 diabetes mellitus without complication, without long-term current use of insulin (CMS-HCC)      Not on any DM medications at [facility] or at home.  No HgA1C to review.   8. Paroxysmal atrial fibrillation (CMS-HCC)       Follow up: PRN, RTC sooner if needed. FU with PCP for ongoing chronic medical conditions.     LETICIA Gross.    Case discussed with Dr. Chilel.       >15 min spent face to face with patient, >50% of time spent counseling, coordinating care, reviewing records, discussing POC, educating patient.

## 2017-08-10 LAB
FUNGUS SPEC CULT: NORMAL
SIGNIFICANT IND 70042: NORMAL
SITE SITE: NORMAL
SOURCE SOURCE: NORMAL

## 2017-09-13 ENCOUNTER — APPOINTMENT (OUTPATIENT)
Dept: RADIOLOGY | Facility: MEDICAL CENTER | Age: 82
DRG: 871 | End: 2017-09-13
Attending: EMERGENCY MEDICINE
Payer: MEDICARE

## 2017-09-13 ENCOUNTER — HOME HEALTH ADMISSION (OUTPATIENT)
Dept: HOME HEALTH SERVICES | Facility: HOME HEALTHCARE | Age: 82
End: 2017-09-13
Payer: MEDICARE

## 2017-09-13 ENCOUNTER — HOSPITAL ENCOUNTER (INPATIENT)
Facility: MEDICAL CENTER | Age: 82
LOS: 40 days | DRG: 871 | End: 2017-10-23
Attending: EMERGENCY MEDICINE | Admitting: INTERNAL MEDICINE
Payer: MEDICARE

## 2017-09-13 ENCOUNTER — RESOLUTE PROFESSIONAL BILLING HOSPITAL PROF FEE (OUTPATIENT)
Dept: HOSPITALIST | Facility: MEDICAL CENTER | Age: 82
End: 2017-09-13
Payer: MEDICARE

## 2017-09-13 DIAGNOSIS — A41.9 SEPSIS, DUE TO UNSPECIFIED ORGANISM: ICD-10-CM

## 2017-09-13 DIAGNOSIS — R41.82 ALTERED MENTAL STATUS, UNSPECIFIED ALTERED MENTAL STATUS TYPE: ICD-10-CM

## 2017-09-13 DIAGNOSIS — I21.4 NSTEMI (NON-ST ELEVATED MYOCARDIAL INFARCTION) (HCC): ICD-10-CM

## 2017-09-13 DIAGNOSIS — M86.68 CHRONIC OSTEOMYELITIS OF LUMBAR SPINE (HCC): ICD-10-CM

## 2017-09-13 DIAGNOSIS — G93.40 ENCEPHALOPATHY: ICD-10-CM
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
## 2017-09-13 LAB
ALBUMIN SERPL BCP-MCNC: 4.6 G/DL (ref 3.2–4.9)
ALBUMIN/GLOB SERPL: 1.1 G/DL
ALP SERPL-CCNC: 66 U/L (ref 30–99)
ALT SERPL-CCNC: 9 U/L (ref 2–50)
ANION GAP SERPL CALC-SCNC: 23 MMOL/L (ref 0–11.9)
APPEARANCE UR: ABNORMAL
APTT PPP: 24.6 SEC (ref 24.7–36)
AST SERPL-CCNC: 20 U/L (ref 12–45)
BASOPHILS # BLD AUTO: 0.2 % (ref 0–1.8)
BASOPHILS # BLD: 0.04 K/UL (ref 0–0.12)
BILIRUB SERPL-MCNC: 1.5 MG/DL (ref 0.1–1.5)
BILIRUB UR QL STRIP.AUTO: NEGATIVE
BNP SERPL-MCNC: 166 PG/ML (ref 0–100)
BUN SERPL-MCNC: 14 MG/DL (ref 8–22)
CALCIUM SERPL-MCNC: 11.6 MG/DL (ref 8.5–10.5)
CHLORIDE SERPL-SCNC: 96 MMOL/L (ref 96–112)
CK SERPL-CCNC: 161 U/L (ref 0–154)
CO2 SERPL-SCNC: 16 MMOL/L (ref 20–33)
COLOR UR: ABNORMAL
CREAT SERPL-MCNC: 0.81 MG/DL (ref 0.5–1.4)
DIGOXIN SERPL-MCNC: 0.5 NG/ML (ref 0.8–2)
EKG IMPRESSION: NORMAL
EOSINOPHIL # BLD AUTO: 0 K/UL (ref 0–0.51)
EOSINOPHIL NFR BLD: 0 % (ref 0–6.9)
ERYTHROCYTE [DISTWIDTH] IN BLOOD BY AUTOMATED COUNT: 45.1 FL (ref 35.9–50)
FLUAV H1 2009 PAND RNA SPEC QL NAA+PROBE: NOT DETECTED
FLUAV RNA SPEC QL NAA+PROBE: NEGATIVE
FLUAV+FLUBV AG SPEC QL IA: NORMAL
FLUBV RNA SPEC QL NAA+PROBE: NEGATIVE
GFR SERPL CREATININE-BSD FRML MDRD: >60 ML/MIN/1.73 M 2
GLOBULIN SER CALC-MCNC: 4.1 G/DL (ref 1.9–3.5)
GLUCOSE SERPL-MCNC: 183 MG/DL (ref 65–99)
GLUCOSE UR STRIP.AUTO-MCNC: ABNORMAL MG/DL
HCT VFR BLD AUTO: 46.9 % (ref 42–52)
HGB BLD-MCNC: 16.3 G/DL (ref 14–18)
IMM GRANULOCYTES # BLD AUTO: 0.13 K/UL (ref 0–0.11)
IMM GRANULOCYTES NFR BLD AUTO: 0.7 % (ref 0–0.9)
INR PPP: 0.99 (ref 0.87–1.13)
KETONES UR STRIP.AUTO-MCNC: 100 MG/DL
LACTATE BLD-SCNC: 6 MMOL/L (ref 0.5–2)
LACTATE BLD-SCNC: 7.6 MMOL/L (ref 0.5–2)
LEUKOCYTE ESTERASE UR QL STRIP.AUTO: NEGATIVE
LIPASE SERPL-CCNC: 20 U/L (ref 11–82)
LYMPHOCYTES # BLD AUTO: 1.29 K/UL (ref 1–4.8)
LYMPHOCYTES NFR BLD: 7.1 % (ref 22–41)
MAGNESIUM SERPL-MCNC: 1.8 MG/DL (ref 1.5–2.5)
MCH RBC QN AUTO: 30.2 PG (ref 27–33)
MCHC RBC AUTO-ENTMCNC: 34.8 G/DL (ref 33.7–35.3)
MCV RBC AUTO: 87 FL (ref 81.4–97.8)
MICRO URNS: ABNORMAL
MONOCYTES # BLD AUTO: 1.32 K/UL (ref 0–0.85)
MONOCYTES NFR BLD AUTO: 7.3 % (ref 0–13.4)
MUCOUS THREADS #/AREA URNS HPF: ABNORMAL /HPF
NEUTROPHILS # BLD AUTO: 15.27 K/UL (ref 1.82–7.42)
NEUTROPHILS NFR BLD: 84.7 % (ref 44–72)
NITRITE UR QL STRIP.AUTO: NEGATIVE
NRBC # BLD AUTO: 0 K/UL
NRBC BLD AUTO-RTO: 0 /100 WBC
PH UR STRIP.AUTO: 6 [PH]
PHOSPHATE SERPL-MCNC: 1.4 MG/DL (ref 2.5–4.5)
PLATELET # BLD AUTO: 361 K/UL (ref 164–446)
PMV BLD AUTO: 11.2 FL (ref 9–12.9)
POTASSIUM SERPL-SCNC: 4 MMOL/L (ref 3.6–5.5)
PROT SERPL-MCNC: 8.7 G/DL (ref 6–8.2)
PROT UR QL STRIP: 100 MG/DL
PROTHROMBIN TIME: 13.4 SEC (ref 12–14.6)
RBC # BLD AUTO: 5.39 M/UL (ref 4.7–6.1)
RBC # URNS HPF: >150 /HPF
RBC UR QL AUTO: ABNORMAL
SIGNIFICANT IND 70042: NORMAL
SITE SITE: NORMAL
SODIUM SERPL-SCNC: 135 MMOL/L (ref 135–145)
SOURCE SOURCE: NORMAL
SP GR UR STRIP.AUTO: 1.01
TROPONIN I SERPL-MCNC: 0.21 NG/ML (ref 0–0.04)
UROBILINOGEN UR STRIP.AUTO-MCNC: NORMAL MG/DL
WBC # BLD AUTO: 18.1 K/UL (ref 4.8–10.8)

## 2017-09-13 PROCEDURE — 36415 COLL VENOUS BLD VENIPUNCTURE: CPT

## 2017-09-13 PROCEDURE — 700105 HCHG RX REV CODE 258: Performed by: PHARMACIST

## 2017-09-13 PROCEDURE — 83880 ASSAY OF NATRIURETIC PEPTIDE: CPT

## 2017-09-13 PROCEDURE — 81001 URINALYSIS AUTO W/SCOPE: CPT

## 2017-09-13 PROCEDURE — 700105 HCHG RX REV CODE 258: Performed by: EMERGENCY MEDICINE

## 2017-09-13 PROCEDURE — 85610 PROTHROMBIN TIME: CPT

## 2017-09-13 PROCEDURE — 84484 ASSAY OF TROPONIN QUANT: CPT

## 2017-09-13 PROCEDURE — 700111 HCHG RX REV CODE 636 W/ 250 OVERRIDE (IP): Performed by: EMERGENCY MEDICINE

## 2017-09-13 PROCEDURE — 83605 ASSAY OF LACTIC ACID: CPT | Mod: 91

## 2017-09-13 PROCEDURE — 770022 HCHG ROOM/CARE - ICU (200)

## 2017-09-13 PROCEDURE — 87502 INFLUENZA DNA AMP PROBE: CPT

## 2017-09-13 PROCEDURE — 700111 HCHG RX REV CODE 636 W/ 250 OVERRIDE (IP)

## 2017-09-13 PROCEDURE — 85730 THROMBOPLASTIN TIME PARTIAL: CPT

## 2017-09-13 PROCEDURE — 80162 ASSAY OF DIGOXIN TOTAL: CPT

## 2017-09-13 PROCEDURE — 87086 URINE CULTURE/COLONY COUNT: CPT

## 2017-09-13 PROCEDURE — 83690 ASSAY OF LIPASE: CPT

## 2017-09-13 PROCEDURE — 84100 ASSAY OF PHOSPHORUS: CPT

## 2017-09-13 PROCEDURE — 87503 INFLUENZA DNA AMP PROB ADDL: CPT

## 2017-09-13 PROCEDURE — 96365 THER/PROPH/DIAG IV INF INIT: CPT

## 2017-09-13 PROCEDURE — 96367 TX/PROPH/DG ADDL SEQ IV INF: CPT

## 2017-09-13 PROCEDURE — 83735 ASSAY OF MAGNESIUM: CPT

## 2017-09-13 PROCEDURE — 96375 TX/PRO/DX INJ NEW DRUG ADDON: CPT

## 2017-09-13 PROCEDURE — 80053 COMPREHEN METABOLIC PANEL: CPT

## 2017-09-13 PROCEDURE — 99291 CRITICAL CARE FIRST HOUR: CPT

## 2017-09-13 PROCEDURE — 303105 HCHG CATHETER EXTRA

## 2017-09-13 PROCEDURE — 96368 THER/DIAG CONCURRENT INF: CPT

## 2017-09-13 PROCEDURE — 93005 ELECTROCARDIOGRAM TRACING: CPT | Performed by: EMERGENCY MEDICINE

## 2017-09-13 PROCEDURE — 71010 DX-CHEST-PORTABLE (1 VIEW): CPT

## 2017-09-13 PROCEDURE — 700111 HCHG RX REV CODE 636 W/ 250 OVERRIDE (IP): Performed by: PHARMACIST

## 2017-09-13 PROCEDURE — 87040 BLOOD CULTURE FOR BACTERIA: CPT

## 2017-09-13 PROCEDURE — 51702 INSERT TEMP BLADDER CATH: CPT

## 2017-09-13 PROCEDURE — 87400 INFLUENZA A/B EACH AG IA: CPT

## 2017-09-13 PROCEDURE — 85025 COMPLETE CBC W/AUTO DIFF WBC: CPT

## 2017-09-13 PROCEDURE — 99221 1ST HOSP IP/OBS SF/LOW 40: CPT | Mod: AI | Performed by: INTERNAL MEDICINE

## 2017-09-13 PROCEDURE — 82550 ASSAY OF CK (CPK): CPT

## 2017-09-13 PROCEDURE — 70450 CT HEAD/BRAIN W/O DYE: CPT

## 2017-09-13 RX ORDER — LEVETIRACETAM 500 MG/1
500 TABLET ORAL 2 TIMES DAILY
Status: DISCONTINUED | OUTPATIENT
Start: 2017-09-13 | End: 2017-09-20

## 2017-09-13 RX ORDER — MIDAZOLAM HYDROCHLORIDE 1 MG/ML
2.5 INJECTION INTRAMUSCULAR; INTRAVENOUS ONCE
Status: COMPLETED | OUTPATIENT
Start: 2017-09-13 | End: 2017-09-13

## 2017-09-13 RX ORDER — OMEPRAZOLE 20 MG/1
20 CAPSULE, DELAYED RELEASE ORAL DAILY
Status: DISCONTINUED | OUTPATIENT
Start: 2017-09-14 | End: 2017-09-13

## 2017-09-13 RX ORDER — GABAPENTIN 100 MG/1
200 CAPSULE ORAL 3 TIMES DAILY
Status: DISCONTINUED | OUTPATIENT
Start: 2017-09-13 | End: 2017-10-23 | Stop reason: HOSPADM

## 2017-09-13 RX ORDER — ATORVASTATIN CALCIUM 10 MG/1
10 TABLET, FILM COATED ORAL NIGHTLY
Status: DISCONTINUED | OUTPATIENT
Start: 2017-09-13 | End: 2017-10-23 | Stop reason: HOSPADM

## 2017-09-13 RX ORDER — AMOXICILLIN 250 MG
2 CAPSULE ORAL 2 TIMES DAILY
Status: DISCONTINUED | OUTPATIENT
Start: 2017-09-13 | End: 2017-10-13

## 2017-09-13 RX ORDER — METOPROLOL SUCCINATE 50 MG/1
50 TABLET, EXTENDED RELEASE ORAL DAILY
Status: DISCONTINUED | OUTPATIENT
Start: 2017-09-14 | End: 2017-09-14

## 2017-09-13 RX ORDER — TAMSULOSIN HYDROCHLORIDE 0.4 MG/1
0.4 CAPSULE ORAL
Status: DISCONTINUED | OUTPATIENT
Start: 2017-09-14 | End: 2017-10-23 | Stop reason: HOSPADM

## 2017-09-13 RX ORDER — CEFTRIAXONE 2 G/1
2 INJECTION, POWDER, FOR SOLUTION INTRAMUSCULAR; INTRAVENOUS ONCE
Status: COMPLETED | OUTPATIENT
Start: 2017-09-13 | End: 2017-09-13

## 2017-09-13 RX ORDER — SODIUM CHLORIDE 9 MG/ML
30 INJECTION, SOLUTION INTRAVENOUS
Status: COMPLETED | OUTPATIENT
Start: 2017-09-13 | End: 2017-09-13

## 2017-09-13 RX ORDER — BISACODYL 10 MG
10 SUPPOSITORY, RECTAL RECTAL
Status: DISCONTINUED | OUTPATIENT
Start: 2017-09-13 | End: 2017-10-13

## 2017-09-13 RX ORDER — METOPROLOL SUCCINATE 50 MG/1
50 TABLET, EXTENDED RELEASE ORAL DAILY
Status: ON HOLD | COMMUNITY
End: 2017-10-22

## 2017-09-13 RX ORDER — HEPARIN SODIUM 1000 [USP'U]/ML
3200 INJECTION, SOLUTION INTRAVENOUS; SUBCUTANEOUS PRN
Status: DISCONTINUED | OUTPATIENT
Start: 2017-09-13 | End: 2017-09-19

## 2017-09-13 RX ORDER — SODIUM CHLORIDE 9 MG/ML
30 INJECTION, SOLUTION INTRAVENOUS
Status: DISCONTINUED | OUTPATIENT
Start: 2017-09-13 | End: 2017-09-25

## 2017-09-13 RX ORDER — SODIUM CHLORIDE 9 MG/ML
1000 INJECTION, SOLUTION INTRAVENOUS
Status: DISCONTINUED | OUTPATIENT
Start: 2017-09-13 | End: 2017-09-25

## 2017-09-13 RX ORDER — POLYETHYLENE GLYCOL 3350 17 G/17G
1 POWDER, FOR SOLUTION ORAL
Status: DISCONTINUED | OUTPATIENT
Start: 2017-09-13 | End: 2017-10-13

## 2017-09-13 RX ORDER — ASPIRIN 325 MG
325 TABLET ORAL DAILY
Status: DISCONTINUED | OUTPATIENT
Start: 2017-09-14 | End: 2017-09-13

## 2017-09-13 RX ORDER — HEPARIN SODIUM 1000 [USP'U]/ML
6000 INJECTION, SOLUTION INTRAVENOUS; SUBCUTANEOUS ONCE
Status: COMPLETED | OUTPATIENT
Start: 2017-09-13 | End: 2017-09-13

## 2017-09-13 RX ORDER — DIGOXIN 125 MCG
125 TABLET ORAL DAILY
Status: DISCONTINUED | OUTPATIENT
Start: 2017-09-14 | End: 2017-09-14

## 2017-09-13 RX ADMIN — MIDAZOLAM HYDROCHLORIDE 2.5 MG: 1 INJECTION, SOLUTION INTRAMUSCULAR; INTRAVENOUS at 21:37

## 2017-09-13 RX ADMIN — HEPARIN SODIUM 6000 UNITS: 1000 INJECTION, SOLUTION INTRAVENOUS; SUBCUTANEOUS at 23:10

## 2017-09-13 RX ADMIN — VANCOMYCIN HYDROCHLORIDE 2500 MG: 100 INJECTION, POWDER, LYOPHILIZED, FOR SOLUTION INTRAVENOUS at 23:14

## 2017-09-13 RX ADMIN — SODIUM CHLORIDE 2994 ML: 9 INJECTION, SOLUTION INTRAVENOUS at 21:00

## 2017-09-13 RX ADMIN — HEPARIN SODIUM 1200 UNITS/HR: 5000 INJECTION, SOLUTION INTRAVENOUS at 23:10

## 2017-09-13 RX ADMIN — PIPERACILLIN SODIUM AND TAZOBACTAM SODIUM 4.5 G: 4; .5 INJECTION, POWDER, FOR SOLUTION INTRAVENOUS at 22:04

## 2017-09-13 RX ADMIN — CEFTRIAXONE SODIUM 2 G: 2 INJECTION, POWDER, FOR SOLUTION INTRAMUSCULAR; INTRAVENOUS at 21:20

## 2017-09-13 ASSESSMENT — LIFESTYLE VARIABLES: DO YOU DRINK ALCOHOL: NO

## 2017-09-14 PROBLEM — E87.6 HYPOKALEMIA: Status: ACTIVE | Noted: 2017-09-14

## 2017-09-14 PROBLEM — I48.19 PERSISTENT ATRIAL FIBRILLATION (HCC): Status: ACTIVE | Noted: 2017-09-14

## 2017-09-14 PROBLEM — R79.89 ELEVATED TROPONIN: Status: ACTIVE | Noted: 2017-09-14

## 2017-09-14 PROBLEM — R73.9 HYPERGLYCEMIA: Status: ACTIVE | Noted: 2017-09-14

## 2017-09-14 LAB
ALBUMIN SERPL BCP-MCNC: 3.7 G/DL (ref 3.2–4.9)
ALBUMIN/GLOB SERPL: 1 G/DL
ALP SERPL-CCNC: 56 U/L (ref 30–99)
ALT SERPL-CCNC: 11 U/L (ref 2–50)
ANION GAP SERPL CALC-SCNC: 16 MMOL/L (ref 0–11.9)
APTT PPP: 70.8 SEC (ref 24.7–36)
APTT PPP: 81.7 SEC (ref 24.7–36)
APTT PPP: 91.5 SEC (ref 24.7–36)
AST SERPL-CCNC: 36 U/L (ref 12–45)
BILIRUB SERPL-MCNC: 1.3 MG/DL (ref 0.1–1.5)
BUN SERPL-MCNC: 11 MG/DL (ref 8–22)
CALCIUM SERPL-MCNC: 9.7 MG/DL (ref 8.5–10.5)
CHLORIDE SERPL-SCNC: 102 MMOL/L (ref 96–112)
CO2 SERPL-SCNC: 18 MMOL/L (ref 20–33)
CREAT SERPL-MCNC: 0.6 MG/DL (ref 0.5–1.4)
GFR SERPL CREATININE-BSD FRML MDRD: >60 ML/MIN/1.73 M 2
GLOBULIN SER CALC-MCNC: 3.6 G/DL (ref 1.9–3.5)
GLUCOSE BLD-MCNC: 126 MG/DL (ref 65–99)
GLUCOSE SERPL-MCNC: 147 MG/DL (ref 65–99)
LACTATE BLD-SCNC: 2.1 MMOL/L (ref 0.5–2)
LACTATE BLD-SCNC: 2.2 MMOL/L (ref 0.5–2)
LACTATE BLD-SCNC: 3.8 MMOL/L (ref 0.5–2)
LV EJECT FRACT  99904: 70
LV EJECT FRACT MOD 2C 99903: 79.9
LV EJECT FRACT MOD 4C 99902: 91.31
LV EJECT FRACT MOD BP 99901: 88.41
POTASSIUM SERPL-SCNC: 3.4 MMOL/L (ref 3.6–5.5)
PROT SERPL-MCNC: 7.3 G/DL (ref 6–8.2)
SODIUM SERPL-SCNC: 136 MMOL/L (ref 135–145)
TROPONIN I SERPL-MCNC: 0.59 NG/ML (ref 0–0.04)
TROPONIN I SERPL-MCNC: 0.9 NG/ML (ref 0–0.04)
TROPONIN I SERPL-MCNC: 1.01 NG/ML (ref 0–0.04)

## 2017-09-14 PROCEDURE — 93306 TTE W/DOPPLER COMPLETE: CPT

## 2017-09-14 PROCEDURE — 84484 ASSAY OF TROPONIN QUANT: CPT

## 2017-09-14 PROCEDURE — 85730 THROMBOPLASTIN TIME PARTIAL: CPT

## 2017-09-14 PROCEDURE — A9270 NON-COVERED ITEM OR SERVICE: HCPCS | Performed by: INTERNAL MEDICINE

## 2017-09-14 PROCEDURE — 96366 THER/PROPH/DIAG IV INF ADDON: CPT

## 2017-09-14 PROCEDURE — 93306 TTE W/DOPPLER COMPLETE: CPT | Mod: 26 | Performed by: INTERNAL MEDICINE

## 2017-09-14 PROCEDURE — 700111 HCHG RX REV CODE 636 W/ 250 OVERRIDE (IP): Performed by: INTERNAL MEDICINE

## 2017-09-14 PROCEDURE — 700105 HCHG RX REV CODE 258: Performed by: PHARMACIST

## 2017-09-14 PROCEDURE — 83605 ASSAY OF LACTIC ACID: CPT

## 2017-09-14 PROCEDURE — 99233 SBSQ HOSP IP/OBS HIGH 50: CPT | Performed by: HOSPITALIST

## 2017-09-14 PROCEDURE — 700102 HCHG RX REV CODE 250 W/ 637 OVERRIDE(OP): Performed by: INTERNAL MEDICINE

## 2017-09-14 PROCEDURE — 82962 GLUCOSE BLOOD TEST: CPT

## 2017-09-14 PROCEDURE — 700105 HCHG RX REV CODE 258: Performed by: INTERNAL MEDICINE

## 2017-09-14 PROCEDURE — 700105 HCHG RX REV CODE 258: Performed by: HOSPITALIST

## 2017-09-14 PROCEDURE — 700111 HCHG RX REV CODE 636 W/ 250 OVERRIDE (IP): Performed by: HOSPITALIST

## 2017-09-14 PROCEDURE — 700101 HCHG RX REV CODE 250: Performed by: HOSPITALIST

## 2017-09-14 PROCEDURE — 700101 HCHG RX REV CODE 250: Performed by: INTERNAL MEDICINE

## 2017-09-14 PROCEDURE — 80053 COMPREHEN METABOLIC PANEL: CPT

## 2017-09-14 PROCEDURE — 700111 HCHG RX REV CODE 636 W/ 250 OVERRIDE (IP): Performed by: PHARMACIST

## 2017-09-14 PROCEDURE — 770022 HCHG ROOM/CARE - ICU (200)

## 2017-09-14 RX ORDER — LABETALOL HYDROCHLORIDE 5 MG/ML
10 INJECTION, SOLUTION INTRAVENOUS EVERY 6 HOURS PRN
Status: DISCONTINUED | OUTPATIENT
Start: 2017-09-14 | End: 2017-10-07

## 2017-09-14 RX ORDER — SODIUM CHLORIDE 9 MG/ML
INJECTION, SOLUTION INTRAVENOUS CONTINUOUS
Status: DISCONTINUED | OUTPATIENT
Start: 2017-09-14 | End: 2017-10-16

## 2017-09-14 RX ORDER — ONDANSETRON 2 MG/ML
4 INJECTION INTRAMUSCULAR; INTRAVENOUS EVERY 4 HOURS PRN
Status: DISCONTINUED | OUTPATIENT
Start: 2017-09-14 | End: 2017-10-23 | Stop reason: HOSPADM

## 2017-09-14 RX ORDER — METOPROLOL SUCCINATE 25 MG/1
50 TABLET, EXTENDED RELEASE ORAL DAILY
Status: DISCONTINUED | OUTPATIENT
Start: 2017-09-15 | End: 2017-10-16

## 2017-09-14 RX ORDER — MAGNESIUM SULFATE HEPTAHYDRATE 40 MG/ML
2 INJECTION, SOLUTION INTRAVENOUS ONCE
Status: COMPLETED | OUTPATIENT
Start: 2017-09-14 | End: 2017-09-14

## 2017-09-14 RX ORDER — MAGNESIUM SULFATE 1 G/100ML
1 INJECTION INTRAVENOUS ONCE
Status: COMPLETED | OUTPATIENT
Start: 2017-09-14 | End: 2017-09-14

## 2017-09-14 RX ORDER — METOPROLOL SUCCINATE 25 MG/1
75 TABLET, EXTENDED RELEASE ORAL DAILY
Status: DISCONTINUED | OUTPATIENT
Start: 2017-09-14 | End: 2017-09-14

## 2017-09-14 RX ADMIN — PIPERACILLIN SODIUM AND TAZOBACTAM SODIUM 4.5 G: 4; .5 INJECTION, POWDER, FOR SOLUTION INTRAVENOUS at 23:57

## 2017-09-14 RX ADMIN — VANCOMYCIN HYDROCHLORIDE 1700 MG: 100 INJECTION, POWDER, LYOPHILIZED, FOR SOLUTION INTRAVENOUS at 23:21

## 2017-09-14 RX ADMIN — SODIUM CHLORIDE: 9 INJECTION, SOLUTION INTRAVENOUS at 04:55

## 2017-09-14 RX ADMIN — SODIUM CHLORIDE: 9 INJECTION, SOLUTION INTRAVENOUS at 12:11

## 2017-09-14 RX ADMIN — ONDANSETRON 4 MG: 2 INJECTION INTRAMUSCULAR; INTRAVENOUS at 10:45

## 2017-09-14 RX ADMIN — PIPERACILLIN SODIUM AND TAZOBACTAM SODIUM 4.5 G: 4; .5 INJECTION, POWDER, FOR SOLUTION INTRAVENOUS at 17:55

## 2017-09-14 RX ADMIN — LEVETIRACETAM 500 MG: 500 TABLET, FILM COATED ORAL at 21:23

## 2017-09-14 RX ADMIN — POTASSIUM PHOSPHATE, MONOBASIC AND POTASSIUM PHOSPHATE, DIBASIC 30 MMOL: 224; 236 INJECTION, SOLUTION INTRAVENOUS at 12:09

## 2017-09-14 RX ADMIN — STANDARDIZED SENNA CONCENTRATE AND DOCUSATE SODIUM 2 TABLET: 8.6; 5 TABLET, FILM COATED ORAL at 10:29

## 2017-09-14 RX ADMIN — GABAPENTIN 200 MG: 100 CAPSULE ORAL at 14:05

## 2017-09-14 RX ADMIN — ATORVASTATIN CALCIUM 10 MG: 10 TABLET, FILM COATED ORAL at 21:19

## 2017-09-14 RX ADMIN — LABETALOL HYDROCHLORIDE 10 MG: 5 INJECTION, SOLUTION INTRAVENOUS at 06:27

## 2017-09-14 RX ADMIN — METOPROLOL SUCCINATE 75 MG: 25 TABLET, EXTENDED RELEASE ORAL at 10:29

## 2017-09-14 RX ADMIN — ONDANSETRON 4 MG: 2 INJECTION INTRAMUSCULAR; INTRAVENOUS at 10:41

## 2017-09-14 RX ADMIN — GABAPENTIN 200 MG: 100 CAPSULE ORAL at 21:19

## 2017-09-14 RX ADMIN — TAMSULOSIN HYDROCHLORIDE 0.4 MG: 0.4 CAPSULE ORAL at 10:28

## 2017-09-14 RX ADMIN — SODIUM CHLORIDE: 9 INJECTION, SOLUTION INTRAVENOUS at 17:54

## 2017-09-14 RX ADMIN — GABAPENTIN 200 MG: 100 CAPSULE ORAL at 10:28

## 2017-09-14 RX ADMIN — STANDARDIZED SENNA CONCENTRATE AND DOCUSATE SODIUM 2 TABLET: 8.6; 5 TABLET, FILM COATED ORAL at 21:20

## 2017-09-14 RX ADMIN — LEVETIRACETAM 500 MG: 500 TABLET, FILM COATED ORAL at 12:08

## 2017-09-14 RX ADMIN — PIPERACILLIN SODIUM AND TAZOBACTAM SODIUM 4.5 G: 4; .5 INJECTION, POWDER, FOR SOLUTION INTRAVENOUS at 14:03

## 2017-09-14 RX ADMIN — ONDANSETRON 4 MG: 2 INJECTION INTRAMUSCULAR; INTRAVENOUS at 21:19

## 2017-09-14 RX ADMIN — PIPERACILLIN SODIUM AND TAZOBACTAM SODIUM 4.5 G: 4; .5 INJECTION, POWDER, FOR SOLUTION INTRAVENOUS at 04:44

## 2017-09-14 RX ADMIN — MAGNESIUM SULFATE IN WATER 2 G: 40 INJECTION, SOLUTION INTRAVENOUS at 11:30

## 2017-09-14 RX ADMIN — MAGNESIUM SULFATE HEPTAHYDRATE 1 G: 1 INJECTION, SOLUTION INTRAVENOUS at 10:41

## 2017-09-14 ASSESSMENT — PAIN SCALES - GENERAL
PAINLEVEL_OUTOF10: 0

## 2017-09-14 ASSESSMENT — PATIENT HEALTH QUESTIONNAIRE - PHQ9
1. LITTLE INTEREST OR PLEASURE IN DOING THINGS: NOT AT ALL
SUM OF ALL RESPONSES TO PHQ9 QUESTIONS 1 AND 2: 0
2. FEELING DOWN, DEPRESSED, IRRITABLE, OR HOPELESS: NOT AT ALL
SUM OF ALL RESPONSES TO PHQ QUESTIONS 1-9: 0

## 2017-09-14 ASSESSMENT — ENCOUNTER SYMPTOMS: SHORTNESS OF BREATH: 0

## 2017-09-14 NOTE — PROGRESS NOTES
Nora from Lab called with critical result of troponin at 1.01. Critical lab result read back to Nora.   MDs aware.

## 2017-09-14 NOTE — CONSULTS
Reason of Consult: Abnormal troponin    Consulting Physician: Dr. Stiles, ERP    HPI:  This is an 86-year-old gentleman with a history of persistent atrial fibrillation and recent discitis complicated by seizures thought related to his antibiotic regimen. He presents for altered mental status and is found to be febrile with hematuria, left shifted leukocytosis lactic acidosis with a lactate of 7.6, and hypercalcemia. All data is taken from the chart as the patient is unable to effectively communicate due to his altered mental status. Head CT is been found to be unremarkable. In the context of a broad-based evaluation for his altered mental status a troponin was slightly abnormal at 0.21. EKG shows lateral ST depression and atrial fibrillation with rapid ventricular response. He also has a blood pressure of 194/113. Consultation is obtained in this context.      Past Medical History:   Diagnosis Date   • A-fib (CMS-HCC)    • Arrhythmia    • Arthritis    • Cataract    • Diabetes (CMS-HCC)    • GERD (gastroesophageal reflux disease)    • Hyperlipidemia    • Muscle disorder     nerve damage due to spinal stenosis       Social History     Social History   • Marital status:      Spouse name: N/A   • Number of children: N/A   • Years of education: N/A     Occupational History   • Not on file.     Social History Main Topics   • Smoking status: Former Smoker     Packs/day: 1.50     Years: 29.00     Types: Cigarettes, Pipe     Quit date: 1/1/1980   • Smokeless tobacco: Never Used      Comment: Started smoking at age 21   • Alcohol use No   • Drug use: No   • Sexual activity: No     Other Topics Concern   • Not on file     Social History Narrative   • No narrative on file       No current facility-administered medications on file prior to encounter.      Current Outpatient Prescriptions on File Prior to Encounter   Medication Sig Dispense Refill   • atorvastatin (LIPITOR) 10 MG Tab Take 10 mg by mouth every evening.      • enoxaparin (LOVENOX) 40 MG/0.4ML Solution inj Inject 1 Syringe as instructed every day.     • oxyCODONE CR (OXYCONTIN) 10 MG Tablet Extended Release 12 hour Abuse-Deterrent Take 10 mg by mouth every 12 hours.     • gabapentin (NEURONTIN) 100 MG Cap Take 2 Caps by mouth 3 times a day. 90 Cap    • levetiracetam (KEPPRA) 500 MG Tab Take 1 Tab by mouth 2 Times a Day. 60 Tab    • fluconazole (DIFLUCAN) 100 MG Tab Take 1 Tab by mouth every day.  0   • metoprolol (LOPRESSOR) 50 MG Tab Take 1 Tab by mouth 2 Times a Day. 60 Tab    • NS SOLN 100 mL with cefTRIAXone 2 GM SOLR 2 g 2 g by Intravenous route every 24 hours.     • ferrous sulfate 325 (65 FE) MG tablet Take 1 Tab by mouth every day.     • tamsulosin (FLOMAX) 0.4 MG capsule Take 1 Cap by mouth ONE-HALF HOUR AFTER BREAKFAST. 30 Cap    • digoxin (LANOXIN) 125 MCG Tab Take 125 mcg by mouth Once.     • tramadol (ULTRAM) 50 MG Tab Take 50 mg by mouth every 8 hours as needed.     • aspirin (ASA) 325 MG Tab Take 325 mg by mouth every day.     • cyanocobalamin (VITAMIN B12) 1000 MCG Tab Take 1,000 mcg by mouth every day.     • cholecalciferol (VITAMIN D3) 5000 UNIT Cap Take 5,000 Units by mouth every 48 hours.     • omeprazole (PRILOSEC) 20 MG delayed-release capsule Take 20 mg by mouth every day.     • docusate sodium (COLACE) 100 MG Cap Take 100 mg by mouth every morning.         Current Facility-Administered Medications   Medication Dose Frequency Provider Last Rate Last Dose   • piperacillin-tazobactam (ZOSYN) 4.5 g in  mL IVPB  4.5 g Once Martha Stiles D.O. 100 mL/hr at 09/13/17 2204 4.5 g at 09/13/17 2204   • vancomycin 2,500 mg in  mL IVPB  25 mg/kg Once Reuben Herrera, PharmD       • heparin 1000 units/mL injection 6,000 Units  6,000 Units Once Agapito Schultz PharmD        And   • heparin 1000 units/mL injection 3,200 Units  3,200 Units PRN Agapito Schultz, PharmD        And   • heparin infusion 25,000 units in 500 ml 0.45% nacl    "Continuous Agapito Schultz, PharmD         Last reviewed on 8/9/2017  2:39 PM by LETICIA Gross.    Vancomycin    Family History   Problem Relation Age of Onset   • Heart Failure Mother    • Heart Attack Mother    • Heart Disease Mother      pacemaker   • Paranoid behavior Mother    • Heart Failure Father    • Cancer Sister      Breast   • Other Brother      colostomy   • Cancer Brother    • No Known Problems Maternal Grandmother    • No Known Problems Maternal Grandfather    • No Known Problems Paternal Grandmother    • No Known Problems Paternal Grandfather        ROS: Unable to obtain, patient unable to communicate effectively.      Physical Exam   Blood pressure (!) 166/105, pulse (!) 103, temperature 37.4 °C (99.3 °F), resp. rate (!) 25, height 1.803 m (5' 11\"), weight 99.8 kg (220 lb), SpO2 100 %.    Constitutional: Elderly, frail Appears well-developed. Altered.   HENT: Normocephalic and atraumatic. No scleral icterus.   Neck: No JVD present.   Cardiovascular: Irregularly irregular and tachycardic. Exam reveals no gallop and no friction rub. No murmur heard.   Pulmonary/Chest: Coarse   Abdominal: S/NT/ND BS+   Musculoskeletal:  Pulses present. No atrophy. Strength normal.  Extremities: Exhibits no edema. No clubbing or cyanosis.   Skin: Skin is hot and moist.   Neuro: Non-focal, CN 2-12 intact grossly    No intake or output data in the 24 hours ending 09/13/17 2228    Recent Labs      09/13/17 2005   WBC  18.1*   RBC  5.39   HEMOGLOBIN  16.3   HEMATOCRIT  46.9   MCV  87.0   MCH  30.2   MCHC  34.8   RDW  45.1   PLATELETCT  361   MPV  11.2     Recent Labs      09/13/17 2005   SODIUM  135   POTASSIUM  4.0   CHLORIDE  96   CO2  16*   GLUCOSE  183*   BUN  14   CREATININE  0.81   CALCIUM  11.6*     Recent Labs      09/13/17 0000   APTT  24.6*   INR  0.99     Recent Labs      09/13/17   0000   BNPBTYPENAT  166*     Recent Labs      09/13/17   0000  09/13/17 2005   CPKTOTAL   --   161* "   TROPONINI   --   0.21*   BNPBTYPENAT  166*   --            EKG (9/13/2017 ):  I have personally reviewed the EKG this visit and discussed with the patient. It shows AF RVR with lateral ST depression likely rate related    Imaging reviewed    Impressions:  1. Sepsis  2. Recent discitis  3. Permanent atrial fibrillation  4. Atrial fibrillation with rapid ventricular response secondary to above  5. Indeterminate troponin  6. Hypertensive emergency  7. Altered mental status related to above    Recommendations:  I recommend trending troponins and directing evaluation and management as his underlying sepsis syndrome. His atrial fibrillation is rapid and is likely physiologic, currently in the 105-115 range but is being driven by his sepsis. He has significant hypertension and therefore initiation of a Cardizem infusion would not be unreasonable to help control his AF should it become acellerated. Recommend aspirin. Continue outpatient toprol and digoxin as tolerated, dig levels. Recommend searching for an underlying cause of his sepsis. Should his troponins trend upward heparin gtt followed by further ischemic evaluation will be recommended once he has recovered from his critical illness.    Thank you for this interesting consultation. It was my pleasure to see Jersey Alexis today.

## 2017-09-14 NOTE — H&P
CHIEF COMPLAINT:  Altered level of conscious.    HISTORY OF PRESENT ILLNESS:  Patient is an 86-year-old male who actually was   brought here to the ER today by his son after he was found altered at home   down on the floor.  Patient was actually here a long time ago.  At that time,   he was admitted for L3-L4 diskitis on MRI and he was actually transferred to   [facility] where he was for 9 weeks where he finished his Rocephin until August 20th, and then he was actually discharged home from there.  Today, as per   chart review, the patient was relatively altered and could not give medical   history.  His son found him altered in the house and brought him to the ER.    Here, patient is found to be septic with a WBC of 18.1, tachycardic, lactic   acid of 7.6.  There is also some elevated troponin and the EKG shows some ST   depression in the lateral leads.    REVIEW OF SYSTEMS:  Unable to obtain due to patient's mentation.    PAST MEDICAL HISTORY:  Unable to obtain due to patient's mentation.    PAST SURGICAL HISTORY:  Unable to obtain due to patient's mentation.    PHYSICAL EXAMINATION:  VITAL SIGNS:  Blood pressure 156/105, respiration 20, pulse 103, temperature   99.3.  GENERAL:  Patient altered in distress.  HEENT:  Normocephalic, atraumatic.  Pupils are equal, round and reactive to   light.  NECK:  Supple and no adenopathy.  CARDIOVASCULAR:  Tachycardic.  No murmurs or gallops appreciated.  LUNGS:  Clear to auscultation.  No rales, rhonchi or wheezing.  ABDOMEN:  Soft, nondistended.  Positive bowel sound.  EXTREMITIES:  No edema, cyanosis, or clubbing.  NEUROLOGIC:  Alert and oriented to self.  Follows some basic commands, moves   all extremities spontaneously.    LABORATORY DATA:  WBC 18.1, hemoglobin 16.3, hematocrit 46.9, platelets 361.    Sodium 135, potassium 4, chloride 96, bicarbonate 16, glucose 183, BUN 14,   creatinine 0.81, and lactic acid 7.6.  Troponin 0.21.    IMAGING:  Chest x-ray shows some mild  pulmonary edema, no pneumonia.    CT head without contrast negative for any acute issues.    ASSESSMENT AND PLAN:  1.  Altered level of conscious, probably related to sepsis.  CT head is   negative.  We will continue to monitor at this time.  2.  Sepsis, unclear source at this time.  His UA is clear, also, his chest   x-ray.  He was recently here for diskitis of L3-L4 for which he finished a   course of 9 weeks of IV antibiotics, we will just repeat an MRI of the lumbar   spine again at this time.  We will continue on broad spectrum antibiotics.    Continue to monitor.  Blood culture, sputum culture, urine culture already   ordered.  Continue to monitor.  3.  Lactic acidosis, probably related to sepsis.  Continue IV fluid, continue   to monitor.  4.  Elevated troponin.  There is also question of some lateral ST depression   in the lateral leads.  Cardiology, Dr. Vincent, has already been consulted.    Query if this is just a demand ischemia due to the sepsis.  We will continue   to monitor at this time.  We will continue to trend troponin, also order an   echocardiogram.  5.  Prophylactic deep venous thrombosis, patient already on heparin drip.  6.  Code status:  Patient is a full code at this time.       ____________________________________     MD TREVON HULL / JUSTINE    DD:  09/13/2017 23:04:17  DT:  09/14/2017 00:40:48    D#:  [id number]  Job#:  [id number]

## 2017-09-14 NOTE — ED NOTES
Pt restless and anxious pulling off monitors and at lines.  CT here to get pt.  ERP informed and to bedside.  Orders rec'd to medicate pt and place restraints to keep lines in and monitors on.    Pt medicated per MAR.  Pt to CT via kiana.

## 2017-09-14 NOTE — CARE PLAN
Problem: Safety  Goal: Will remain free from injury    Intervention: Provide assistance with mobility  Patient educated on need for assistance and verbalizes understanding      Problem: Urinary Elimination:  Goal: Ability to reestablish a normal urinary elimination pattern will improve    Intervention: Evaluate need to continue indwelling urinary catheter  Hearn catheter in place

## 2017-09-14 NOTE — PROGRESS NOTES
Received report and assumed care. Pt transferred to unit via [facility] on telemetry monitor with osman catheter in place, no personal possessions as clothing was cut off pt in ED. Catheter stabilization band removed from leg and statlock placed. Pt on 2 L O2 NC, heparin and vancomycin gtt in progress.  Pt bathed and placed on new linens, states no pain or discomfort at this time.

## 2017-09-14 NOTE — PROGRESS NOTES
[facility] Hospitalist Progress Note    Date of Service: 2017    Chief Complaint  86 y.o. male admitted 2017 with sepsis after being found down at home by his son.    Interval Problem Update  NPO w/ sips of meds.  Afebrile.  Heparin drip and NS @200cc/hr this a.m.  On vanco and zosyn.  Seen in the afternoon patient awake but quickly falls back to sleep. He was able to follow commands. remains in atrial flutter on telemetry. Care discussed with ICU nurse    Consultants/Specialty  Cardiology    Disposition  Home versus skilled        Review of Systems   Unable to perform ROS: Mental acuity   All other systems reviewed and are negative.     Physical Exam  Laboratory/Imaging   Hemodynamics  Temp (24hrs), Av.8 °C (98.3 °F), Min:36.1 °C (97 °F), Max:37.5 °C (99.5 °F)   Temperature: 36.2 °C (97.2 °F)  Pulse  Av.4  Min: 45  Max: 130 Heart Rate (Monitored): (!) 58  Blood Pressure : (!) 166/105, NIBP: 136/74      Respiratory      Respiration: 20, Pulse Oximetry: 99 %             Fluids    Intake/Output Summary (Last 24 hours) at 17 1906  Last data filed at 17 1800   Gross per 24 hour   Intake             4248 ml   Output             3750 ml   Net              498 ml       Nutrition  Orders Placed This Encounter   Procedures   • DIET ORDER     Standing Status:   Standing     Number of Occurrences:   1     Order Specific Question:   Diet:     Answer:   Regular [1]     Physical Exam   Constitutional: He appears well-developed. No distress.   HENT:   Head: Normocephalic.   Nose: Nose normal.   Mouth/Throat: Oropharynx is clear and moist.   Eyes: Conjunctivae and EOM are normal. Pupils are equal, round, and reactive to light. Right eye exhibits no discharge. Left eye exhibits no discharge.   Neck: No tracheal deviation present.   Cardiovascular: Normal rate, normal heart sounds and intact distal pulses.  An irregularly irregular rhythm present.   No murmur heard.  Pulses:       Radial pulses are 2+ on  the right side, and 2+ on the left side.        Dorsalis pedis pulses are 1+ on the right side, and 1+ on the left side.   Pulmonary/Chest: Effort normal. No stridor. No respiratory distress. He has decreased breath sounds (distant breath sounds throughout). He has no wheezes. He has no rhonchi.   Abdominal: Soft. Bowel sounds are normal. He exhibits no distension. There is no tenderness. There is no rebound.   Musculoskeletal: He exhibits no edema.   Lymphadenopathy:     He has no cervical adenopathy.   Neurological: He is alert. No cranial nerve deficit.   Skin: Skin is warm. He is diaphoretic.   Psychiatric: He has a normal mood and affect. His speech is delayed. He is slowed. He exhibits abnormal recent memory.   Vitals reviewed.      Recent Labs      09/13/17 2005   WBC  18.1*   RBC  5.39   HEMOGLOBIN  16.3   HEMATOCRIT  46.9   MCV  87.0   MCH  30.2   MCHC  34.8   RDW  45.1   PLATELETCT  361   MPV  11.2     Recent Labs      09/13/17   2005  09/14/17   0505   SODIUM  135  136   POTASSIUM  4.0  3.4*   CHLORIDE  96  102   CO2  16*  18*   GLUCOSE  183*  147*   BUN  14  11   CREATININE  0.81  0.60   CALCIUM  11.6*  9.7     Recent Labs      09/13/17   0000  09/14/17   0505  09/14/17   1150  09/14/17   1740   APTT  24.6*  70.8*  81.7*  91.5*   INR  0.99   --    --    --      Recent Labs      09/13/17   0000   BNPBTYPENAT  166*              Assessment/Plan     Elevated troponin   Assessment & Plan    Serial troponins showing decrease  On heparin drip  Cardiology consulting  On atorvastatin        Persistent atrial fibrillation (CMS-HCC)   Assessment & Plan    On telemetry with rhythm showing atrial flutter  Cardiology consulting  On heparin drip, metoprolol SR  Cardiology held digoxin which was a home medication.        Sepsis (CMS-HCC)   Assessment & Plan    This is sepsis (without associated acute organ dysfunction).   Unclear etiology at this point in time. Prior history of discitis which had previously been  treated with antibiotics  Monitor blood cultures  Urine analysis reviewed  Monitor leukocytosis continue current antibiotics  Monitor blood pressure, vitals  Wean IV fluids  Monitoring lactic acids        Hyperglycemia   Assessment & Plan    Check a hemoglobin A1c        Hypokalemia   Assessment & Plan    Replace potassium. Checking magnesium  Check tomorrow a.m. labs            Reviewed items::  Radiology images reviewed, Labs reviewed and Medications reviewed  Hearn catheter::  Critically Ill - Requiring Accurate Measurement of Urinary Output and Strict Intake and Output During Sepisis or Shock  DVT prophylaxis pharmacological::  Heparin  Antibiotics:  Treating active infection/contamination beyond 24 hours perioperative coverage

## 2017-09-14 NOTE — PROGRESS NOTES
"Pharmacy Kinetics 86 y.o. male on vancomycin day # 1 2017    Indication for Treatment: Sepsis     Pertinent history per medical record: Admitted on 2017 for altered mental status. Patient with recent hospitalization at [facility] for extended antibiotic therapy for discitis. With workup consistent for sepsis, empiric antibiotic therapy initiated.    Other antibiotics: Piperacillin/tazobactam 4.5 g IV q6h    Allergies: Vancomycin     List concerns for renal function: Age, elevated lactic acid    Pertinent cultures to date:     17 Respiratory     Negative for influenza    Recent Labs      17   WBC  18.1*   NEUTSPOLYS  84.70*     Recent Labs      17   BUN  14   CREATININE  0.81   ALBUMIN  4.6     Blood pressure (!) 166/105, pulse (!) 103, temperature 37.4 °C (99.3 °F), resp. rate (!) 25, height 1.803 m (5' 11\"), weight 99.8 kg (220 lb), SpO2 100 %. Temp (24hrs), Av.4 °C (99.3 °F), Min:37.4 °C (99.3 °F), Max:37.4 °C (99.3 °F)      A/P   1. Vancomycin dose change: Give 2500 mg IV loading dose followed by 1700 mg IV q24h  2. Next vancomycin level: Trough in ~2 days (not ordered)  3. Goal trough: 16-20 mcg/mL  4. Comments: Therapy with vancomycin initiated empirically for sepsis. Patient at risk of accumulation primarily due to age. Previous dosing of vancomycin reviewed - appears that patient has tolerated scheduled dosing in the past - will provide loading dose and start conservative scheduled dosing thereafter as outlined above. Will plan to check trough level when patient nearing steady state in order to ensure appropriate clearance and drug levels. Recommend de-escalation if MRSA can be ruled out.  Pharmacy will continue to follow.     Damon EppsD, BCPS    "

## 2017-09-14 NOTE — ED NOTES
Med rec completed per pt's family [place] @ [phone]  Family was able to read off of pt's RX bottles but does not know when pt last took his medications  States that pt has only been home from [facility] for a day and a half  Pt unable to provide information

## 2017-09-14 NOTE — PROGRESS NOTES
"Pharmacy Kinetics 86 y.o. male on vancomycin day # 2 2017    Currently on Vancomycin 1700 mg iv q24hr    Indication for Treatment: sepsis of unknown origin    Pertinent history per medical record: Admitted on 2017 for altered mental status. Patient with recent hospitalization at [facility] for extended antibiotic therapy for discitis. With workup consistent for sepsis, empiric antibiotic therapy initiated, pending identification of source.    Other antibiotics: Zosyn 4.5 g IV q6h    Allergies: Vancomycin     List concerns for renal function: age    Pertinent cultures to date:    PBC x 2: NGTD   influenza: negative    urine: in process    Recent Labs      17   WBC  18.1*   NEUTSPOLYS  84.70*     Recent Labs      17   0505   BUN  14  11   CREATININE  0.81  0.60   ALBUMIN  4.6  3.7      Blood pressure (!) 166/105, pulse 61, temperature 36.1 °C (97 °F), resp. rate (!) 27, height 1.803 m (5' 11\"), weight 87 kg (191 lb 12.8 oz), SpO2 96 %. Temp (24hrs), Av.9 °C (98.5 °F), Min:36.1 °C (97 °F), Max:37.5 °C (99.5 °F)      A/P   1. Vancomycin dose change: not indicated at this time  2. Next vancomycin level: ~2 days (not yet ordered)  3. Goal trough: 16-20 mcg/mL  4. Comments: Renal indices continue to be at baseline. WBCs still trending up. PBC with no growth thus far. Patient tolerated vancomycin dose last night. Had some rash, which resolved with slowing of the infusion rate. Appropriate UOP. Will continue with current dose and plan for a level when at steady state. Will continue to follow.    Liliana Minor, PharmD    "

## 2017-09-14 NOTE — PROGRESS NOTES
Cardiology Progress Note               Author: Eron Lott Date & Time created: 2017  7:48 AM     Interval History:  Pt admitted with sepsis and history of chronic atrial fib. Cardiology consulted for mildly elevated Tn level and ST abnormality on ECG.    Review of Systems   Respiratory: Negative for shortness of breath.    Cardiovascular: Negative for chest pain and leg swelling.       Physical Exam   Neck: No JVD present.   Cardiovascular: An irregularly irregular rhythm present. Tachycardia present.  Exam reveals no gallop.    No murmur heard.  Pulmonary/Chest: Effort normal. He has no rales.   Abdominal: Soft. There is no tenderness.   Musculoskeletal: He exhibits no edema.       Hemodynamics:  Temp (24hrs), Av.1 °C (98.8 °F), Min:36.9 °C (98.4 °F), Max:37.5 °C (99.5 °F)  Temperature: 36.9 °C (98.5 °F)  Pulse  Av.7  Min: 90  Max: 130Heart Rate (Monitored): 95  Blood Pressure : (!) 166/105, NIBP: (!) 163/95     Respiratory:    Respiration: (!) 30, Pulse Oximetry: 99 %           Fluids:     Weight: 87 kg (191 lb 12.8 oz)  GI/Nutrition:  Orders Placed This Encounter   Procedures   • Diet NPO     Standing Status:   Standing     Number of Occurrences:   1     Order Specific Question:   Restrict to:     Answer:   Sips with Medications [3]     Lab Results:  Recent Labs      17   WBC  18.1*   RBC  5.39   HEMOGLOBIN  16.3   HEMATOCRIT  46.9   MCV  87.0   MCH  30.2   MCHC  34.8   RDW  45.1   PLATELETCT  361   MPV  11.2     Recent Labs      17   0505   SODIUM  135  136   POTASSIUM  4.0  3.4*   CHLORIDE  96  102   CO2  16*  18*   GLUCOSE  183*  147*   BUN  14  11   CREATININE  0.81  0.60   CALCIUM  11.6*  9.7     Recent Labs      17   0505   APTT  24.6*  70.8*   INR  0.99   --      Recent Labs      17   0000   BNPBTYPENAT  166*     Recent Labs      17   0505   CPKTOTAL   --   161*   --    TROPONINI    --   0.21*  1.01*   BNPBTYPENAT  166*   --    --            Nuc Stress:  Myocardial Perfusion   Report   NUCLEAR IMAGING INTERPRETATION   No myocardial infarct or inducible ischemia.   Normal LEFT ventricular function.      Exam Date: 03/01/2017 07:10      Echo:    CONCLUSIONS  Compared to the images of the study done 5/1/17  Normal left ventricular size, wall thickness, and systolic function.  Left ventricular ejection fraction is visually estimated to be 70%.  Dilated inferior vena cava without inspiratory collapse.  Aortic sclerosis without stenosis.  Trace aortic insufficiency.  Mitral annular calcification.  Trace mitral regurgitation.  Trace tricuspid regurgitation.  Trace pulmonic insufficiency.  Trivial pericardial effusion.  Ascending aorta is dilated with a diameter of  4cm.      Exam Date:         07/09/2017     ECGs from 9/13 reviewed: Atrial fib and flutter. More ST segment depression when faster and in flutter(135 bpm).    Medical Decision Making, by Problem:    Patient Active Problem List    Diagnosis Date Noted   • Persistent atrial fibrillation (CMS-HCC) 09/14/2017     Priority: High   • Elevated troponin 09/14/2017     Priority: High   • Sepsis (CMS-HCC) 07/06/2017     Priority: High       Elevated Tn probably due to sepsis, but, given ST change with tachycardia will increase RX. Pt may require increased Rx for HTN, but, will follow BP for now.        Plan:  Increase BB.    Quality-Core Measures

## 2017-09-14 NOTE — PROGRESS NOTES
Dr. Lott Cardiology paged for update on patient's stable bradycardia with scheduled digoxin. Per Dr. Lott we will discontinue digoxin and adjust 75mg Metoprolol back to 50 mg home dose. Angelika from lab called for critical troponin of 0.9. Dr. Lott updated on lab value.

## 2017-09-14 NOTE — DISCHARGE PLANNING
"Sepsis ADMIT.  Elevated trops.  CARDS consulting.  Likely due to sepsis.   [facility] resident.  87 y/o  male.  Request from Nursing to call son Chandler.  [phone].      Medicare.  And PCP.     Met with JAMES Chandler who was very honest about his ability to continue supervison and  care for his 87 y/o father in Law.  Chandler was  to patient's DTR and she passed a few years ago and Chandler assumed care for her father.  They reside together and Chandler describes his LAURENT as able to use 4 wheel walker and scooter, transfer from bed to chair and toilet and manages his own medications.  Patient was found down at home by Chandler.  Chandler is a working man and not able to always be there for him.  \"It is coming to a safety issue.\"      We discussed getting patient stabilized first and then having PT/OT provided an assessment to assist with DC plan.  Patient is Medicare and we discussed Acute rehab vs SNF placement.  Patient has been to [facility] and Chandler was not that \"thrilled.\"  I explained the difference between acute and sub acute care settings.      Patient has about $2100 monthly income and may benefit from Assisted living post rehab.       Plan: Continue to follow while on ICU.        "

## 2017-09-14 NOTE — ED NOTES
Pt lungs clear, no rash noted.  Continuing with vanco, pharmacist aware. Will continue to monitor closely.

## 2017-09-14 NOTE — ED PROVIDER NOTES
"ED Provider Note    CHIEF COMPLAINT  Chief Complaint   Patient presents with   • ALOC       HPI  Jersey Alexis is a 86 y.o. male who presents To the emergency department by ambulance from home for altered mental status. EMS reports that the patient's son found patient at home, altered mental status on the floor when he arrived home this evening, last seen normal 6:15 AM this morning. Patient febrile on arrival, meet sepsis criteria. Reported history for sepsis from UTI in July, with prolonged [facility] care stay following that admission.    HPI is limited due to patient altered mental status.    REVIEW OF SYSTEMS  See HPI for further details. All other systems are negative.     PAST MEDICAL HISTORY   has a past medical history of A-fib (CMS-[facility]); Arrhythmia; Arthritis; Cataract; Diabetes (CMS-HCC); GERD (gastroesophageal reflux disease); Hyperlipidemia; and Muscle disorder.    SOCIAL HISTORY  Social History     Social History Main Topics   • Smoking status: Former Smoker     Packs/day: 1.50     Years: 29.00     Types: Cigarettes, Pipe     Quit date: 1/1/1980   • Smokeless tobacco: Never Used      Comment: Started smoking at age 21   • Alcohol use No   • Drug use: No   • Sexual activity: No       SURGICAL HISTORY   has a past surgical history that includes laminotomy (late 80s); laminotomy (late 90s); appendectomy (Early 2000's); tonsillectomy (Bilateral, 1936); and cataract phaco with iol (Bilateral, Early 2000's).    CURRENT MEDICATIONS  Home Medications    **Home medications have not yet been reviewed for this encounter**         ALLERGIES  Allergies   Allergen Reactions   • Vancomycin Rash     diffuse       PHYSICAL EXAM  VITAL SIGNS: BP (!) 166/105   Pulse (!) 103   Temp 37.4 °C (99.3 °F)   Resp (!) 25   Ht 1.803 m (5' 11\")   Wt 99.8 kg (220 lb)   SpO2 100%   BMI 30.68 kg/m²   Pulse ox interpretation: I interpret this pulse ox as normal.  Constitutional: Alert, restless. Disheveled.  HENT: Normocephalic, " atraumatic. Bilateral external ears normal, Nose normal. Dry mucous membranes.    Eyes: Pupils are equal and reactive, Conjunctiva normal.   Neck: Normal range of motion, Supple.  Lymphatic: No lymphadenopathy noted.   Cardiovascular: Tachycardic otherwise regular rate and rhythm, no murmurs. Distal pulses intact.  No peripheral edema.  Thorax & Lungs: Normal breath sounds.  No wheezing/rales/ronchi. No increased work of breathing or retractions.   Abdomen: Soft, non-distended, non-tender to palpation. No grimace or withdrawal to palpation. No palpable or pulsatile masses. No peritoneal signs. Large healed vertical abdominal incision.  Skin: Warm, Dry, No erythema, No rash.   Musculoskeletal: Good range of motion in all major joints. No major deformities noted.   Neurologic: Alert to self, follows basic commands, unable to answer additional questions. Grossly nonfocal, moves for extremity spontaneously.  Psychiatric: Alert, restless.      DIAGNOSTIC STUDIES / PROCEDURES    EKG  I interpreted this EKG myself.  This is a 12-lead study.  The rhythm is  narrow complex tachycardia with a rate of 135. Septal lateral ST depressions without ST elevations.  P waves difficult to discern, suspect atrial flutter. Interpretation: Irregular narrow complex tachycardia, atrial flutter versus atrial fibrillation. No ST segment elevation myocardial infarction.  New ST depressions from previous EKG 7/27/17.    @2200 - I interpreted this EKG myself.  This is a 12-lead study.  The rhythm is atrial fibrillation with a rate of 98.  There are lateral ST depressions, no ST elevations. Nonspecific T-wave changes, flattening, isolated T-wave inversion in lead 3.  Interpretation: No ST segment elevation myocardial infarction. Atrial fibrillation, rate controlled.  New lateral ST depressions from 7/27/17.    LABS  Results for orders placed or performed during the hospital encounter of 09/13/17   Lactic acid (lactate)   Result Value Ref Range     Lactic Acid 7.6 (HH) 0.5 - 2.0 mmol/L   CBC WITH DIFFERENTIAL   Result Value Ref Range    WBC 18.1 (H) 4.8 - 10.8 K/uL    RBC 5.39 4.70 - 6.10 M/uL    Hemoglobin 16.3 14.0 - 18.0 g/dL    Hematocrit 46.9 42.0 - 52.0 %    MCV 87.0 81.4 - 97.8 fL    MCH 30.2 27.0 - 33.0 pg    MCHC 34.8 33.7 - 35.3 g/dL    RDW 45.1 35.9 - 50.0 fL    Platelet Count 361 164 - 446 K/uL    MPV 11.2 9.0 - 12.9 fL    Neutrophils-Polys 84.70 (H) 44.00 - 72.00 %    Lymphocytes 7.10 (L) 22.00 - 41.00 %    Monocytes 7.30 0.00 - 13.40 %    Eosinophils 0.00 0.00 - 6.90 %    Basophils 0.20 0.00 - 1.80 %    Immature Granulocytes 0.70 0.00 - 0.90 %    Nucleated RBC 0.00 /100 WBC    Neutrophils (Absolute) 15.27 (H) 1.82 - 7.42 K/uL    Lymphs (Absolute) 1.29 1.00 - 4.80 K/uL    Monos (Absolute) 1.32 (H) 0.00 - 0.85 K/uL    Eos (Absolute) 0.00 0.00 - 0.51 K/uL    Baso (Absolute) 0.04 0.00 - 0.12 K/uL    Immature Granulocytes (abs) 0.13 (H) 0.00 - 0.11 K/uL    NRBC (Absolute) 0.00 K/uL   COMP METABOLIC PANEL   Result Value Ref Range    Sodium 135 135 - 145 mmol/L    Potassium 4.0 3.6 - 5.5 mmol/L    Chloride 96 96 - 112 mmol/L    Co2 16 (L) 20 - 33 mmol/L    Anion Gap 23.0 (H) 0.0 - 11.9    Glucose 183 (H) 65 - 99 mg/dL    Bun 14 8 - 22 mg/dL    Creatinine 0.81 0.50 - 1.40 mg/dL    Calcium 11.6 (H) 8.5 - 10.5 mg/dL    AST(SGOT) 20 12 - 45 U/L    ALT(SGPT) 9 2 - 50 U/L    Alkaline Phosphatase 66 30 - 99 U/L    Total Bilirubin 1.5 0.1 - 1.5 mg/dL    Albumin 4.6 3.2 - 4.9 g/dL    Total Protein 8.7 (H) 6.0 - 8.2 g/dL    Globulin 4.1 (H) 1.9 - 3.5 g/dL    A-G Ratio 1.1 g/dL   URINALYSIS   Result Value Ref Range    Micro Urine Req Microscopic     Color Angelica     Character Bloody (A)     Specific Gravity 1.015 <1.035    Ph 6.0 5.0 - 8.0    Glucose Trace (A) Negative mg/dL    Ketones 100 (A) Negative mg/dL    Protein 100 (A) Negative mg/dL    Bilirubin Negative Negative    Urobilinogen, Urine Normal Negative    Nitrite Negative Negative    Leukocyte Esterase  Negative Negative    Occult Blood Large (A) Negative   APTT   Result Value Ref Range    APTT 24.6 (L) 24.7 - 36.0 sec   PROTHROMBIN TIME   Result Value Ref Range    PT 13.4 12.0 - 14.6 sec    INR 0.99 0.87 - 1.13   Influenza Rapid   Result Value Ref Range    Significant Indicator NEG     Source RESP     Site RESPIRATORY     Rapid Influenza A-B       Negative for Influenza A and Influenza B antigens.  Infection due to influenza A or B cannot be ruled out  since the antigen present in the specimen may be below the  detection limit of the test. Culture confirmation of  negative samples is recommended.     BTYPE NATRIURETIC PEPTIDE   Result Value Ref Range    B Natriuretic Peptide 166 (H) 0 - 100 pg/mL   ESTIMATED GFR   Result Value Ref Range    GFR If African American >60 >60 mL/min/1.73 m 2    GFR If Non African American >60 >60 mL/min/1.73 m 2   CREATINE KINASE   Result Value Ref Range    CPK Total 161 (H) 0 - 154 U/L   DIGOXIN   Result Value Ref Range    Digoxin 0.5 (L) 0.8 - 2.0 ng/mL   LIPASE   Result Value Ref Range    Lipase 20 11 - 82 U/L   MAGNESIUM   Result Value Ref Range    Magnesium 1.8 1.5 - 2.5 mg/dL   PHOSPHORUS   Result Value Ref Range    Phosphorus 1.4 (L) 2.5 - 4.5 mg/dL   TROPONIN   Result Value Ref Range    Troponin I 0.21 (H) 0.00 - 0.04 ng/mL   URINE MICROSCOPIC (W/UA)   Result Value Ref Range    RBC >150 (A) /hpf    Mucous Threads Rare /hpf   EKG   Result Value Ref Range    Report       [facility] Emergency Dept.    Test Date:  2017  Pt Name:    BRIAN SPARROW               Department: ER  MRN:        [id number]                      Room:       [facility]  Gender:     M                            Technician: [id number]  :        1930-10-18                   Requested By:DORIS ONEILL  Order #:    [id number]                    Reading MD: DORIS ONEILL, DO    Measurements  Intervals                                Axis  Rate:       135                          P:  NC:                                       QRS:        82  QRSD:       128                          T:          221  QT:         320  QTc:        480    Interpretive Statements  ATRIAL FLUTTER, A-RATE 293  IVCD, CONSIDER ATYPICAL RBBB  ANTERIOR INFARCT, AGE INDETERMINATE  ST DEPRESSION, CONSIDER ISCHEMIA, INF LEADS  ARTIFACT IN LEAD(S) I,aVR,V1,V2,V3,V5,V6 AND BASELINE WANDER IN LEAD(S)  I,II,aVR,V1,V2,V3,V4,V5,V6  Compared to ECG 07/27/2017 14:37:13  Myocardial infarct finding now present   ST (T wave) deviation now present  Possible ischemia now present  Atrial fibrillation no longer present    Electronically Signed On 9- 22:09:10 PDT by DORIS ONEILL,        RADIOLOGY  CT-HEAD W/O   Final Result      1.  Diffuse atrophy and white matter changes.   2.  No acute intracranial hemorrhage or territorial infarct.         DX-CHEST-PORTABLE (1 VIEW)   Final Result      1.  Diffuse prominence of interstitium again seen, likely interstitial lung disease.  Mild pulmonary edema is also a consideration.   2.  No pneumonia or pneumothorax.   3.  Stable cardiomegaly.          COURSE & MEDICAL DECISION MAKING  Nursing notes and vital signs were reviewed. (See chart for details)  The patients records were reviewed, history was obtained from the patient;     Medical record review: July 2017 admission for sepsis, UTI, pancreatitis, discitis, strep viridans bacteremia. Discharge to [facility] for IV Rocephin via PICC line.    ED evaluation for altered mental status, likely an acute encephalopathy likely secondary to sepsis. Leukocytosis, WBC 18.1, left shift with elevated immature granulocytes and a lactic acidosis at 7.5. Evidence for dehydration, CO2 16, otherwise without left light derangement and renal function is preserved. Troponin is elevated however also 0.21, there are new EKG changes, more significant and rapid rate, heart rate has slowed after initiation of IV fluid, persistent atrial fibrillation, now rate controlled at 98  with persistent mild lateral ST depressions. Although troponin elevation can be seen in sepsis, given normal renal function and degree of elevation with EKG changes, heparin drip has been initiated. There is no evidence for STEMI. Continuous telemetry without other arrhythmia other than the atrial fibrillation, no ectopy. No source for this sepsis has been found as of yet, cath urine without evidence for infection. Chest x-ray with some mild pulmonary edema, BNP is slightly elevated, however clinically patient is not fluid overloaded, therefore have continued 30 mL/kg IV fluid bolus in the setting of sepsis. Abdominal exam is benign, nontender, no grimace or withdrawal to palpation, no clinical evidence for peritonitis. No cutaneous evidence for cellulitis. CT the head is unremarkable other than age-related changes. I am concerned given patient's history of a L3-4 discitis in July, however previous imaging shows evidence for previous lumbar surgeries, laminectomy L2 through sacrum. Lumbar puncture likely quite difficult in this setting, in addition to recent discitis, may interfere with results, therefore held at this time. MRI has been ordered for further evaluation of the lumbar spine. History of bacteremia, blood cultures pending. Broad-spectrum antibiotics initiated immediately after my initial evaluation to include the ankle, Zosyn and Rocephin given history of discitis. Patient is hemodynamically stable, initially tachycardic, this is resolved, never hypotensive or hypoxic. He is agitated and restless, he did receive 2.5 mg a percent prior to CT, resting much more comfortably now. He will be admitted to the hospitalist, intensive care unit for further evaluation and treatment.    2215 - Dr. Guzmán is aware the patient is agreeable to admission. Request cardiology consultation.    2216 - Dr. Vincent is where the patient, EKG findings, troponin, heparin initiation and agreeable consultation.    The total  critical care time on this patient is 40 minutes, continuous hemodynamic monitoring, multiple bedside evaluations and neurologic checks, resuscitating patient and assess her response to treatment, Cipro and test results, speaking with admitting and consulting physician, and arranging for ICU admission. This 40 minutes is exclusive of separately billable procedures.      FINAL IMPRESSION  (R41.82) Altered mental status, unspecified altered mental status type  (G93.40) Encephalopathy  (A41.9) Sepsis, due to unspecified organism (CMS-[facility])  (I21.4) NSTEMI (non-ST elevated myocardial infarction) (CMS-HCC)  Critical care, 40 minutes    Electronically signed by: Martha Stiles, 9/13/2017 8:59 PM      This dictation was created using voice recognition software. The accuracy of the dictation is limited to the abilities of the software. I expect there may be some errors of grammar and possibly content. The nursing notes were reviewed and certain aspects of this information were incorporated into this note.

## 2017-09-14 NOTE — DOCUMENTATION QUERY
"DOCUMENTATION QUERY    PROVIDERS: Please select “Cosign w/ note”to reply to query.    To better represent the severity of illness of your patient, please review the following information and exercise your independent professional judgment in responding to this query.     \"Altered Level of Consciousness\" is documented in the History and Physical, Progress Notes and Consult Notes. Based upon the clinical findings, risk factors, and treatment, can this diagnosis be further specified?    • Acute encephalopathy (if so, please specify type [metabolic, hepatic, toxic, alcoholic, etc.])  • Psychosis (if so, please specify type [acute hysterical, alcoholic, etc.])  • Delirium (if so, please specify underlying cause [alcohol intoxication, alcohol withdrawal, multiple etiologies, unknown etiology])   • Other explanation of clinical findings  • Unable to determine    The medical record reflects the following:   Clinical Findings  Documented \"found altered at home   down on the floor\"; documented \"Altered level of conscious, probably related to sepsis\"; documented \"patient altered in distress\" documented \"alert and oriented to self\"; documented \"Encephalopathy\" in ED Physician Note   Treatment  Keppra; Vancomycin; Zosyn; Rocephin; CBC; BMP; CXR; EKG; Blood Cultures; RR Culture   Risk Factors  Age; dx Sepsis; dx Lactic Acidosis; recent admission for UTI and Sepsis in July of 2017   Location within medical record  History and Physical, Progress Notes, Lab Results, ED Provider Note and MAR     Thank you,     Héctor Calloway  Clinical   P: [phone]        "

## 2017-09-14 NOTE — ED NOTES
Pt BIB EMS from home.  Pt lives with son.  Pt was last seen at 615 AM by son and when son got home pt was altered on the floor ground.  Per EMS pt febrial, temp 37.4 on arrival.  Pt sepsis score 5.  Charge informed.  Pt was here for sepsis from UTI on 7/25.  Pt was also at [facility] for 9 wks for back pain/DM.  Pt A/O x1.  ERP to see.

## 2017-09-14 NOTE — ED NOTES
Urine collected from osman.  Place coude d/t unable to place standard catheter.  16 fr coude placed maintaining sterile technique with urine return.

## 2017-09-15 LAB
ANION GAP SERPL CALC-SCNC: 9 MMOL/L (ref 0–11.9)
APTT PPP: 65.7 SEC (ref 24.7–36)
APTT PPP: 89.8 SEC (ref 24.7–36)
BACTERIA UR CULT: NORMAL
BASOPHILS # BLD AUTO: 0.2 % (ref 0–1.8)
BASOPHILS # BLD AUTO: 0.3 % (ref 0–1.8)
BASOPHILS # BLD: 0.05 K/UL (ref 0–0.12)
BASOPHILS # BLD: 0.07 K/UL (ref 0–0.12)
BUN SERPL-MCNC: 9 MG/DL (ref 8–22)
CALCIUM SERPL-MCNC: 8.9 MG/DL (ref 8.5–10.5)
CHLORIDE SERPL-SCNC: 100 MMOL/L (ref 96–112)
CO2 SERPL-SCNC: 24 MMOL/L (ref 20–33)
CREAT SERPL-MCNC: 0.67 MG/DL (ref 0.5–1.4)
EKG IMPRESSION: NORMAL
EKG IMPRESSION: NORMAL
EOSINOPHIL # BLD AUTO: 0.01 K/UL (ref 0–0.51)
EOSINOPHIL # BLD AUTO: 0.02 K/UL (ref 0–0.51)
EOSINOPHIL NFR BLD: 0 % (ref 0–6.9)
EOSINOPHIL NFR BLD: 0.1 % (ref 0–6.9)
ERYTHROCYTE [DISTWIDTH] IN BLOOD BY AUTOMATED COUNT: 47.8 FL (ref 35.9–50)
ERYTHROCYTE [DISTWIDTH] IN BLOOD BY AUTOMATED COUNT: 49.1 FL (ref 35.9–50)
EST. AVERAGE GLUCOSE BLD GHB EST-MCNC: 111 MG/DL
GFR SERPL CREATININE-BSD FRML MDRD: >60 ML/MIN/1.73 M 2
GLUCOSE SERPL-MCNC: 141 MG/DL (ref 65–99)
HBA1C MFR BLD: 5.5 % (ref 0–5.6)
HCT VFR BLD AUTO: 40 % (ref 42–52)
HCT VFR BLD AUTO: 44.3 % (ref 42–52)
HGB BLD-MCNC: 13.4 G/DL (ref 14–18)
HGB BLD-MCNC: 14.8 G/DL (ref 14–18)
IMM GRANULOCYTES # BLD AUTO: 0.14 K/UL (ref 0–0.11)
IMM GRANULOCYTES # BLD AUTO: 0.16 K/UL (ref 0–0.11)
IMM GRANULOCYTES NFR BLD AUTO: 0.5 % (ref 0–0.9)
IMM GRANULOCYTES NFR BLD AUTO: 0.7 % (ref 0–0.9)
LACTATE BLD-SCNC: 1.6 MMOL/L (ref 0.5–2)
LACTATE BLD-SCNC: 3 MMOL/L (ref 0.5–2)
LYMPHOCYTES # BLD AUTO: 1.81 K/UL (ref 1–4.8)
LYMPHOCYTES # BLD AUTO: 1.94 K/UL (ref 1–4.8)
LYMPHOCYTES NFR BLD: 7.4 % (ref 22–41)
LYMPHOCYTES NFR BLD: 8.1 % (ref 22–41)
MAGNESIUM SERPL-MCNC: 2.2 MG/DL (ref 1.5–2.5)
MCH RBC QN AUTO: 30.4 PG (ref 27–33)
MCH RBC QN AUTO: 30.5 PG (ref 27–33)
MCHC RBC AUTO-ENTMCNC: 33.4 G/DL (ref 33.7–35.3)
MCHC RBC AUTO-ENTMCNC: 33.5 G/DL (ref 33.7–35.3)
MCV RBC AUTO: 90.7 FL (ref 81.4–97.8)
MCV RBC AUTO: 91.2 FL (ref 81.4–97.8)
MONOCYTES # BLD AUTO: 1.51 K/UL (ref 0–0.85)
MONOCYTES # BLD AUTO: 2.38 K/UL (ref 0–0.85)
MONOCYTES NFR BLD AUTO: 6.8 % (ref 0–13.4)
MONOCYTES NFR BLD AUTO: 9.1 % (ref 0–13.4)
NEUTROPHILS # BLD AUTO: 18.81 K/UL (ref 1.82–7.42)
NEUTROPHILS # BLD AUTO: 21.68 K/UL (ref 1.82–7.42)
NEUTROPHILS NFR BLD: 82.6 % (ref 44–72)
NEUTROPHILS NFR BLD: 84.2 % (ref 44–72)
NRBC # BLD AUTO: 0 K/UL
NRBC # BLD AUTO: 0 K/UL
NRBC BLD AUTO-RTO: 0 /100 WBC
NRBC BLD AUTO-RTO: 0 /100 WBC
PLATELET # BLD AUTO: 237 K/UL (ref 164–446)
PLATELET # BLD AUTO: 268 K/UL (ref 164–446)
PMV BLD AUTO: 10.5 FL (ref 9–12.9)
PMV BLD AUTO: 10.6 FL (ref 9–12.9)
POTASSIUM SERPL-SCNC: 3.9 MMOL/L (ref 3.6–5.5)
RBC # BLD AUTO: 4.41 M/UL (ref 4.7–6.1)
RBC # BLD AUTO: 4.86 M/UL (ref 4.7–6.1)
SIGNIFICANT IND 70042: NORMAL
SITE SITE: NORMAL
SODIUM SERPL-SCNC: 133 MMOL/L (ref 135–145)
SOURCE SOURCE: NORMAL
TROPONIN I SERPL-MCNC: 0.24 NG/ML (ref 0–0.04)
TROPONIN I SERPL-MCNC: 0.34 NG/ML (ref 0–0.04)
WBC # BLD AUTO: 22.4 K/UL (ref 4.8–10.8)
WBC # BLD AUTO: 26.6 K/UL (ref 4.8–10.8)

## 2017-09-15 PROCEDURE — 93005 ELECTROCARDIOGRAM TRACING: CPT | Performed by: INTERNAL MEDICINE

## 2017-09-15 PROCEDURE — 700102 HCHG RX REV CODE 250 W/ 637 OVERRIDE(OP): Performed by: INTERNAL MEDICINE

## 2017-09-15 PROCEDURE — A9270 NON-COVERED ITEM OR SERVICE: HCPCS | Performed by: INTERNAL MEDICINE

## 2017-09-15 PROCEDURE — 99232 SBSQ HOSP IP/OBS MODERATE 35: CPT | Performed by: HOSPITALIST

## 2017-09-15 PROCEDURE — 93010 ELECTROCARDIOGRAM REPORT: CPT | Performed by: INTERNAL MEDICINE

## 2017-09-15 PROCEDURE — 770020 HCHG ROOM/CARE - TELE (206)

## 2017-09-15 PROCEDURE — 80048 BASIC METABOLIC PNL TOTAL CA: CPT

## 2017-09-15 PROCEDURE — 700105 HCHG RX REV CODE 258: Performed by: INTERNAL MEDICINE

## 2017-09-15 PROCEDURE — 700111 HCHG RX REV CODE 636 W/ 250 OVERRIDE (IP): Performed by: INTERNAL MEDICINE

## 2017-09-15 PROCEDURE — 83036 HEMOGLOBIN GLYCOSYLATED A1C: CPT

## 2017-09-15 PROCEDURE — 85025 COMPLETE CBC W/AUTO DIFF WBC: CPT

## 2017-09-15 PROCEDURE — 83605 ASSAY OF LACTIC ACID: CPT

## 2017-09-15 PROCEDURE — 85730 THROMBOPLASTIN TIME PARTIAL: CPT

## 2017-09-15 PROCEDURE — 700111 HCHG RX REV CODE 636 W/ 250 OVERRIDE (IP): Performed by: HOSPITALIST

## 2017-09-15 PROCEDURE — 84484 ASSAY OF TROPONIN QUANT: CPT

## 2017-09-15 PROCEDURE — 83735 ASSAY OF MAGNESIUM: CPT

## 2017-09-15 PROCEDURE — 93005 ELECTROCARDIOGRAM TRACING: CPT | Performed by: HOSPITALIST

## 2017-09-15 PROCEDURE — 700111 HCHG RX REV CODE 636 W/ 250 OVERRIDE (IP)

## 2017-09-15 PROCEDURE — 93010 ELECTROCARDIOGRAM REPORT: CPT | Mod: 77 | Performed by: INTERNAL MEDICINE

## 2017-09-15 RX ORDER — LISINOPRIL 10 MG/1
10 TABLET ORAL
Status: DISCONTINUED | OUTPATIENT
Start: 2017-09-15 | End: 2017-10-07

## 2017-09-15 RX ORDER — HYDRALAZINE HYDROCHLORIDE 20 MG/ML
20 INJECTION INTRAMUSCULAR; INTRAVENOUS EVERY 6 HOURS PRN
Status: DISCONTINUED | OUTPATIENT
Start: 2017-09-15 | End: 2017-10-23 | Stop reason: HOSPADM

## 2017-09-15 RX ORDER — PROMETHAZINE HYDROCHLORIDE 25 MG/1
25 TABLET ORAL EVERY 6 HOURS PRN
Status: DISCONTINUED | OUTPATIENT
Start: 2017-09-15 | End: 2017-10-23 | Stop reason: HOSPADM

## 2017-09-15 RX ADMIN — LIDOCAINE HYDROCHLORIDE 30 ML: 20 SOLUTION OROPHARYNGEAL at 20:02

## 2017-09-15 RX ADMIN — TAMSULOSIN HYDROCHLORIDE 0.4 MG: 0.4 CAPSULE ORAL at 08:33

## 2017-09-15 RX ADMIN — PIPERACILLIN SODIUM AND TAZOBACTAM SODIUM 4.5 G: 4; .5 INJECTION, POWDER, FOR SOLUTION INTRAVENOUS at 19:18

## 2017-09-15 RX ADMIN — ONDANSETRON 4 MG: 2 INJECTION INTRAMUSCULAR; INTRAVENOUS at 14:06

## 2017-09-15 RX ADMIN — LISINOPRIL 10 MG: 5 TABLET ORAL at 10:20

## 2017-09-15 RX ADMIN — STANDARDIZED SENNA CONCENTRATE AND DOCUSATE SODIUM 2 TABLET: 8.6; 5 TABLET, FILM COATED ORAL at 19:49

## 2017-09-15 RX ADMIN — PIPERACILLIN SODIUM AND TAZOBACTAM SODIUM 4.5 G: 4; .5 INJECTION, POWDER, FOR SOLUTION INTRAVENOUS at 11:52

## 2017-09-15 RX ADMIN — STANDARDIZED SENNA CONCENTRATE AND DOCUSATE SODIUM 2 TABLET: 8.6; 5 TABLET, FILM COATED ORAL at 08:34

## 2017-09-15 RX ADMIN — LEVETIRACETAM 500 MG: 500 TABLET, FILM COATED ORAL at 08:33

## 2017-09-15 RX ADMIN — ATORVASTATIN CALCIUM 10 MG: 10 TABLET, FILM COATED ORAL at 19:49

## 2017-09-15 RX ADMIN — HEPARIN SODIUM 1200 UNITS/HR: 5000 INJECTION, SOLUTION INTRAVENOUS at 19:07

## 2017-09-15 RX ADMIN — LIDOCAINE HYDROCHLORIDE 30 ML: 20 SOLUTION OROPHARYNGEAL at 16:38

## 2017-09-15 RX ADMIN — GABAPENTIN 200 MG: 100 CAPSULE ORAL at 14:06

## 2017-09-15 RX ADMIN — PROCHLORPERAZINE EDISYLATE 10 MG: 5 INJECTION INTRAMUSCULAR; INTRAVENOUS at 08:29

## 2017-09-15 RX ADMIN — GABAPENTIN 200 MG: 100 CAPSULE ORAL at 08:34

## 2017-09-15 RX ADMIN — GABAPENTIN 200 MG: 100 CAPSULE ORAL at 19:49

## 2017-09-15 RX ADMIN — LEVETIRACETAM 500 MG: 500 TABLET, FILM COATED ORAL at 19:49

## 2017-09-15 RX ADMIN — PIPERACILLIN SODIUM AND TAZOBACTAM SODIUM 4.5 G: 4; .5 INJECTION, POWDER, FOR SOLUTION INTRAVENOUS at 06:11

## 2017-09-15 ASSESSMENT — COGNITIVE AND FUNCTIONAL STATUS - GENERAL
DRESSING REGULAR UPPER BODY CLOTHING: A LITTLE
MOBILITY SCORE: 16
CLIMB 3 TO 5 STEPS WITH RAILING: A LOT
WALKING IN HOSPITAL ROOM: A LOT
HELP NEEDED FOR BATHING: A LITTLE
DAILY ACTIVITIY SCORE: 19
SUGGESTED CMS G CODE MODIFIER DAILY ACTIVITY: CK
STANDING UP FROM CHAIR USING ARMS: A LITTLE
TOILETING: A LITTLE
SUGGESTED CMS G CODE MODIFIER MOBILITY: CK
MOVING TO AND FROM BED TO CHAIR: A LITTLE
DRESSING REGULAR LOWER BODY CLOTHING: A LITTLE
MOVING FROM LYING ON BACK TO SITTING ON SIDE OF FLAT BED: A LITTLE
TURNING FROM BACK TO SIDE WHILE IN FLAT BAD: A LITTLE
PERSONAL GROOMING: A LITTLE

## 2017-09-15 ASSESSMENT — PAIN SCALES - GENERAL
PAINLEVEL_OUTOF10: 0
PAINLEVEL_OUTOF10: 7
PAINLEVEL_OUTOF10: 4
PAINLEVEL_OUTOF10: 0
PAINLEVEL_OUTOF10: 4
PAINLEVEL_OUTOF10: 0
PAINLEVEL_OUTOF10: 9

## 2017-09-15 ASSESSMENT — ENCOUNTER SYMPTOMS: SHORTNESS OF BREATH: 1

## 2017-09-15 ASSESSMENT — LIFESTYLE VARIABLES: DO YOU DRINK ALCOHOL: NO

## 2017-09-15 NOTE — THERAPY
"Occupational Therapy Evaluation Held     OT eval attempted. Patient politely request hold 2/2 7/10 abdominal pain at rest with N/V symptoms. Nurse aware. Will continue to follow.    See \"Rehab Therapy-Acute\" Patient Summary Report for complete documentation.    "

## 2017-09-15 NOTE — PROGRESS NOTES
Verbal orders by Dr. Obie aldana to d/c dawson. However, upon further assessment osman was verified to be a coude. Pt was transfer before further assessment of osman need could be achieved.

## 2017-09-15 NOTE — PROGRESS NOTES
Cardiology Progress Note               Author: Eron Lott Date & Time created: 9/15/2017  8:30 AM     Interval History:  Pt admitted with sepsis and history of chronic atrial fib. Cardiology consulted for mildly elevated Tn level and ST abnormality on ECG.    Anxiety and dyspnea last night, improved with Ativan.    Review of Systems   Respiratory: Positive for shortness of breath.    Cardiovascular: Negative for chest pain and leg swelling.       Physical Exam   Neck: No JVD present.   Cardiovascular: Normal rate.  An irregularly irregular rhythm present. Exam reveals no gallop.    No murmur heard.  Pulmonary/Chest: Effort normal. He has no rales.   Abdominal: Soft. There is no tenderness.   Musculoskeletal: He exhibits no edema.       Hemodynamics:  Temp (24hrs), Av.2 °C (97.1 °F), Min:36.1 °C (97 °F), Max:36.3 °C (97.3 °F)  Temperature: 36.1 °C (97 °F)  Pulse  Av.7  Min: 45  Max: 130Heart Rate (Monitored): 76  NIBP: 159/88     Respiratory:    Respiration: 20, Pulse Oximetry: 99 %     Work Of Breathing / Effort: Mild     Fluids:     Weight: 86.8 kg (191 lb 5.8 oz)  GI/Nutrition:  Orders Placed This Encounter   Procedures   • DIET ORDER     Standing Status:   Standing     Number of Occurrences:   1     Order Specific Question:   Diet:     Answer:   Regular [1]     Lab Results:  Recent Labs      09/13/17   2005  09/15/17   0001   WBC  18.1*  26.6*   RBC  5.39  4.41*   HEMOGLOBIN  16.3  13.4*   HEMATOCRIT  46.9  40.0*   MCV  87.0  90.7   MCH  30.2  30.4   MCHC  34.8  33.5*   RDW  45.1  49.1   PLATELETCT  361  268   MPV  11.2  10.5     Recent Labs      17   0505  09/15/17   0001   SODIUM  135  136  133*   POTASSIUM  4.0  3.4*  3.9   CHLORIDE  96  102  100   CO2  16*  18*  24   GLUCOSE  183*  147*  141*   BUN  14  11  9   CREATININE  0.81  0.60  0.67   CALCIUM  11.6*  9.7  8.9     Recent Labs      17   0000   17   1150  17   1740  09/15/17   0001   APTT  24.6*   <  >  81.7*  91.5*  89.8*   INR  0.99   --    --    --    --     < > = values in this interval not displayed.     Recent Labs      09/13/17   0000   BNPBTYPENAT  166*     Recent Labs      09/13/17   0000  09/13/17 2005 09/14/17   1150  09/14/17   1740  09/15/17   0001   CPKTOTAL   --   161*   --    --    --    --    TROPONINI   --   0.21*   < >  0.90*  0.59*  0.34*   BNPBTYPENAT  166*   --    --    --    --    --     < > = values in this interval not displayed.           Nuc Stress:  Myocardial Perfusion   Report   NUCLEAR IMAGING INTERPRETATION   No myocardial infarct or inducible ischemia.   Normal LEFT ventricular function.      Exam Date: 03/01/2017 07:10      Echo:    CONCLUSIONS  Compared to the images of the study done 5/1/17  Normal left ventricular size, wall thickness, and systolic function.  Left ventricular ejection fraction is visually estimated to be 70%.  Dilated inferior vena cava without inspiratory collapse.  Aortic sclerosis without stenosis.  Trace aortic insufficiency.  Mitral annular calcification.  Trace mitral regurgitation.  Trace tricuspid regurgitation.  Trace pulmonic insufficiency.  Trivial pericardial effusion.  Ascending aorta is dilated with a diameter of  4cm.      Exam Date:         07/09/2017     ECGs from 9/13 reviewed: Atrial fib and flutter. More ST segment depression when faster and in flutter(135 bpm).    Medical Decision Making, by Problem:    Patient Active Problem List    Diagnosis Date Noted   • Persistent atrial fibrillation (CMS-HCC) 09/14/2017     Priority: High   • Elevated troponin 09/14/2017     Priority: High   • Sepsis (CMS-HCC) 07/06/2017     Priority: High       Elevated Tn probably due to sepsis. Better rate control. Will increase Rx for HTN.        Plan:  Add ACE inhibitor.    Quality-Core Measures

## 2017-09-15 NOTE — CARE PLAN
Problem: Nutritional:  Goal: Achieve adequate nutritional intake  Patient will consume ~50% of meals and supplements.  Outcome: PROGRESSING SLOWER THAN EXPECTED

## 2017-09-15 NOTE — CARE PLAN
Problem: Safety  Goal: Will remain free from falls  Outcome: PROGRESSING AS EXPECTED    Intervention: Assess risk factors for falls  Completed and documented in EPIC flow sheets.     Intervention: Implement fall precautions  Bed in low position, bed alarm in use, call light within reach, and patient room near nursing station.     Patient communicates and demonstrates understanding that a nurse must be present during ambulation while patient in the ICU and how to use the call light when said assistance is required.       Problem: Skin Integrity  Goal: Risk for impaired skin integrity will decrease  Outcome: PROGRESSING AS EXPECTED    Intervention: Assess risk factors for impaired skin integrity and/or pressure ulcers  Completed and documented in EPIC flow sheets.     Intervention: Implement precautions to protect skin integrity in collaboration with the interdisciplinary team  Assess patient's skin at the beginning of the shift. Encourage patient turns every two hours and monitor under all medical devices throughout entire shift. Maintain a clean, dry linen environment. Float heals and elbows. Provide appropriate would prevention interventions as they apply. Implement pertinent wound protocols and document them. Please see wound flow sheet for further details.   Intervention: Assess and monitor skin integrity, appearance and/or temperature  Done.

## 2017-09-15 NOTE — THERAPY
"Occupational Therapy Evaluation Held.     OT eval attempted. Patient in process of being tranferred from T6 to T8. Will continue to follow    See \"Rehab Therapy-Acute\" Patient Summary Report for complete documentation.    "

## 2017-09-15 NOTE — PROGRESS NOTES
1230 pt transferred to T8 via transport and bed. All belongings, chart, and medication with pt. Lethargic but arousable at this time.

## 2017-09-15 NOTE — PROGRESS NOTES
"Pharmacy Kinetics 86 y.o. male on vancomycin day # 3 9/15/2017    Currently on Vancomycin 1700 mg iv q24hr    Indication for Treatment: sepsis of unknown origin     Pertinent history per medical record: Admitted on 2017 for altered mental status. Patient with recent hospitalization at [facility] for extended antibiotic therapy for discitis. With workup consistent for sepsis, empiric antibiotic therapy initiated, pending identification of source.      Other antibiotics: Zosyn 4.5 g IV q6h     Allergies: Vancomycin      List concerns for renal function: age     Pertinent cultures to date:    PBC x 2: NGTD   influenza: negative    urine: NGTD    Recent Labs      09/13/17   2005  09/15/17   0001   WBC  18.1*  26.6*   NEUTSPOLYS  84.70*  82.60*     Recent Labs      17   0505  09/15/17   0001   BUN  14  11  9   CREATININE  0.81  0.60  0.67   ALBUMIN  4.6  3.7   --      Intake/Output Summary (Last 24 hours) at 09/15/17 1144  Last data filed at 09/15/17 0800   Gross per 24 hour   Intake             4130 ml   Output             2250 ml   Net             1880 ml      Blood pressure (!) 166/105, pulse 96, temperature 36.4 °C (97.5 °F), resp. rate 20, height 1.803 m (5' 11\"), weight 86.8 kg (191 lb 5.8 oz), SpO2 98 %. Temp (24hrs), Av.2 °C (97.2 °F), Min:36.1 °C (97 °F), Max:36.4 °C (97.5 °F)      A/P   1. Vancomycin dose change: not indicated at this time  2. Next vancomycin level:  @ 2230 (not yet ordered)  3. Goal trough: ~16 mcg/mL  4. Comments: renal indices continue to be at baseline. WBCs up again today. Cultures all negative thus far. Plans to consult ID for further management of antimicrobial agents. Good UOP. Will continue with current dose. Plan for a level tomorrow prior to the 4th dose at 2300. ID to consult. Will continue to follow.    Liliana Minor, PharmD    "

## 2017-09-15 NOTE — PROGRESS NOTES
"Pt states he has belly pain 7/10 when awakened for PT-OT evaluation. Compazine recently given for nausea. When asked if he has any chronic GI complications he states \"I do not understand what you mean.\" RN gave the example Diverticulitis. Pt states \"yes that. Diversisticitis\" Unknown if pt has actual history or is confused at this time. RN noted there is a lateral midline incision scar posterior to umbilicus. Bowel sounds normoactive. Pt denies specific areas of pain when palpating. Last bowel movement charted as 9/14.  Limited food intake at this time due to nausea and fatigue. Dr. Ragland notified.     "

## 2017-09-15 NOTE — PROGRESS NOTES
[facility] Hospitalist Progress Note    Date of Service, patient seen and examined 9/15/2017: Note completed 2017    Chief Complaint  86 y.o. male admitted 2017 with sepsis after being found down at home by his son.    Interval Problem Update  Mobilizing.  Heparin drip. Care discussed with ICU nurse and in a.m. multidisciplinary rounds. Okay to transfer to telemetry     Consultants/Specialty  Cardiology    Disposition  Home versus skilled        Review of Systems   Unable to perform ROS: Mental acuity   All other systems reviewed and are negative.     Physical Exam  Laboratory/Imaging   Hemodynamics  Temp (24hrs), Av.4 °C (97.6 °F), Min:35.7 °C (96.3 °F), Max:37.1 °C (98.7 °F)   Temperature: 36.2 °C (97.1 °F)  Pulse  Av.2  Min: 45  Max: 134 Heart Rate (Monitored): 83  Blood Pressure : 137/71, NIBP: 147/81      Respiratory      Respiration: 18, Pulse Oximetry: 96 %     Work Of Breathing / Effort: Mild  RUL Breath Sounds: Clear, RML Breath Sounds: Clear, RLL Breath Sounds: Diminished, MIKE Breath Sounds: Clear, LLL Breath Sounds: Diminished    Fluids    Intake/Output Summary (Last 24 hours) at 17 0814  Last data filed at 17 0400   Gross per 24 hour   Intake              546 ml   Output             3025 ml   Net            -2479 ml       Nutrition  Orders Placed This Encounter   Procedures   • DIET ORDER     Standing Status:   Standing     Number of Occurrences:   1     Order Specific Question:   Diet:     Answer:   Full Liquid [11]     Comments:   please add ensure per pt. request     Physical Exam   Constitutional: He appears well-developed. No distress.   HENT:   Head: Normocephalic.   Nose: Nose normal.   Mouth/Throat: Oropharynx is clear and moist.   Eyes: Conjunctivae and EOM are normal. Pupils are equal, round, and reactive to light. Right eye exhibits no discharge. Left eye exhibits no discharge.   Neck: No tracheal deviation present.   Cardiovascular: Normal rate, normal heart sounds  and intact distal pulses.  An irregularly irregular rhythm present.   No murmur heard.  Pulses:       Radial pulses are 2+ on the right side, and 2+ on the left side.        Dorsalis pedis pulses are 1+ on the right side, and 1+ on the left side.   Pulmonary/Chest: Effort normal. No stridor. No respiratory distress. He has decreased breath sounds (distant breath sounds throughout). He has no wheezes. He has no rhonchi.   Abdominal: Soft. Bowel sounds are normal. He exhibits no distension. There is no tenderness. There is no rebound.   Musculoskeletal: He exhibits no edema.   Lymphadenopathy:     He has no cervical adenopathy.   Neurological: He is alert. No cranial nerve deficit.   Skin: Skin is warm. He is diaphoretic.   Psychiatric: He has a normal mood and affect. His speech is delayed. He is slowed. He exhibits abnormal recent memory.   Vitals reviewed.      Recent Labs      09/13/17   2005  09/15/17   0001  09/15/17   1428   WBC  18.1*  26.6*  22.4*   RBC  5.39  4.41*  4.86   HEMOGLOBIN  16.3  13.4*  14.8   HEMATOCRIT  46.9  40.0*  44.3   MCV  87.0  90.7  91.2   MCH  30.2  30.4  30.5   MCHC  34.8  33.5*  33.4*   RDW  45.1  49.1  47.8   PLATELETCT  361  268  237   MPV  11.2  10.5  10.6     Recent Labs      09/13/17 2005 09/14/17   0505  09/15/17   0001   SODIUM  135  136  133*   POTASSIUM  4.0  3.4*  3.9   CHLORIDE  96  102  100   CO2  16*  18*  24   GLUCOSE  183*  147*  141*   BUN  14  11  9   CREATININE  0.81  0.60  0.67   CALCIUM  11.6*  9.7  8.9     Recent Labs      09/15/17   0001  09/15/17   1428  09/16/17   0534   APTT  89.8*  65.7*  50.3*                  Assessment/Plan     * Elevated troponin   Assessment & Plan    Serial troponins showing decrease  On heparin drip  Cardiology consulting  On atorvastatin        Persistent atrial fibrillation (CMS-HCC)   Assessment & Plan    On telemetry with rhythm showing atrial flutter  Cardiology consulting  On heparin drip, metoprolol SR  Cardiology held digoxin  which was a home medication.        Hyperglycemia   Assessment & Plan    Check a hemoglobin A1c        Hypokalemia   Assessment & Plan    Replace potassium. Checking magnesium  Check tomorrow a.m. labs        Sepsis (CMS-[facility])   Assessment & Plan    This is sepsis (without associated acute organ dysfunction).   Unclear etiology at this point in time. Prior history of discitis which had previously been treated with antibiotics  Monitor blood cultures, no growth to date  Urine analysis reviewed  Monitor leukocytosis (mild decrease still elevated 9/15/17) continue current antibiotics.  Monitor blood pressure, vitals  Wean IV fluids  Monitoring lactic acids            Reviewed items::  Radiology images reviewed, Labs reviewed and Medications reviewed  Hearn catheter::  Critically Ill - Requiring Accurate Measurement of Urinary Output and Strict Intake and Output During Sepisis or Shock  DVT prophylaxis pharmacological::  Heparin  Antibiotics:  Treating active infection/contamination beyond 24 hours perioperative coverage

## 2017-09-15 NOTE — DIETARY
Nutrition Services: Poor PO intake since admit    Pt is an 86 y.o. Male with Dx: Sepsis    Admit day 2. Pt was found down @ home with AMS 9/13. Pt was too obtunded for PO diet until yesterday afternoon. Pt is currently A/O x4 with some confusion per RN. Regular diet in place but pt eating <25% of meals. Pt had nausea this morning and was given compazine. Pt likely to benefit from nutrition supplements; obtained MD order and sent Boost Plus and high-protein milkshake for pt to try @ lunch today. Nutrition Representative will see pt daily for menu options; will discuss supplement options. Of note, no indication of poor PO intake or weight loss PTA per chart. BMI WNL. No wounds.     RD following for adequate intake and additional interventions.

## 2017-09-15 NOTE — CARE PLAN
Problem: Safety  Goal: Will remain free from injury  Bed alarm in use, call light within reach, lower bed rails up and in locked position. Bed locked.     Problem: Bowel/Gastric:  Goal: Normal bowel function is maintained or improved  Bowel sounds normoactive, generalized abdominal pain and nausea. PRN medication given as needed and MD notified of increased bowel pain at this time. Spoke with son who stated the only abdominal surgery he had was something with his pancreas several years ago. There is no chronic GI history that he knows of.

## 2017-09-16 ENCOUNTER — APPOINTMENT (OUTPATIENT)
Dept: RADIOLOGY | Facility: MEDICAL CENTER | Age: 82
DRG: 871 | End: 2017-09-16
Attending: HOSPITALIST
Payer: MEDICARE

## 2017-09-16 ENCOUNTER — APPOINTMENT (OUTPATIENT)
Dept: RADIOLOGY | Facility: MEDICAL CENTER | Age: 82
DRG: 871 | End: 2017-09-16
Attending: INTERNAL MEDICINE
Payer: MEDICARE

## 2017-09-16 PROBLEM — R11.2 NAUSEA & VOMITING: Status: ACTIVE | Noted: 2017-09-16

## 2017-09-16 LAB
ALBUMIN SERPL BCP-MCNC: 3.2 G/DL (ref 3.2–4.9)
ALBUMIN/GLOB SERPL: 1 G/DL
ALP SERPL-CCNC: 49 U/L (ref 30–99)
ALT SERPL-CCNC: 11 U/L (ref 2–50)
ANION GAP SERPL CALC-SCNC: 14 MMOL/L (ref 0–11.9)
APTT PPP: 117.6 SEC (ref 24.7–36)
APTT PPP: 38.8 SEC (ref 24.7–36)
APTT PPP: 50.3 SEC (ref 24.7–36)
AST SERPL-CCNC: 15 U/L (ref 12–45)
BASE EXCESS BLDA CALC-SCNC: 5 MMOL/L (ref -4–3)
BASOPHILS # BLD AUTO: 0.3 % (ref 0–1.8)
BASOPHILS # BLD: 0.08 K/UL (ref 0–0.12)
BILIRUB SERPL-MCNC: 1.1 MG/DL (ref 0.1–1.5)
BODY TEMPERATURE: ABNORMAL CENTIGRADE
BUN SERPL-MCNC: 14 MG/DL (ref 8–22)
CALCIUM SERPL-MCNC: 9.1 MG/DL (ref 8.5–10.5)
CHLORIDE SERPL-SCNC: 93 MMOL/L (ref 96–112)
CO2 SERPL-SCNC: 26 MMOL/L (ref 20–33)
CREAT SERPL-MCNC: 0.89 MG/DL (ref 0.5–1.4)
EKG IMPRESSION: NORMAL
EOSINOPHIL # BLD AUTO: 0 K/UL (ref 0–0.51)
EOSINOPHIL NFR BLD: 0 % (ref 0–6.9)
ERYTHROCYTE [DISTWIDTH] IN BLOOD BY AUTOMATED COUNT: 42.8 FL (ref 35.9–50)
GFR SERPL CREATININE-BSD FRML MDRD: >60 ML/MIN/1.73 M 2
GLOBULIN SER CALC-MCNC: 3.1 G/DL (ref 1.9–3.5)
GLUCOSE BLD-MCNC: 164 MG/DL (ref 65–99)
GLUCOSE SERPL-MCNC: 209 MG/DL (ref 65–99)
HCO3 BLDA-SCNC: 27 MMOL/L (ref 17–25)
HCT VFR BLD AUTO: 39.9 % (ref 42–52)
HGB BLD-MCNC: 13.7 G/DL (ref 14–18)
IMM GRANULOCYTES # BLD AUTO: 0.27 K/UL (ref 0–0.11)
IMM GRANULOCYTES NFR BLD AUTO: 0.9 % (ref 0–0.9)
LACTATE BLD-SCNC: 1.6 MMOL/L (ref 0.5–2)
LACTATE BLD-SCNC: 1.7 MMOL/L (ref 0.5–2)
LACTATE BLD-SCNC: 3.1 MMOL/L (ref 0.5–2)
LYMPHOCYTES # BLD AUTO: 1.68 K/UL (ref 1–4.8)
LYMPHOCYTES NFR BLD: 5.5 % (ref 22–41)
MCH RBC QN AUTO: 29.7 PG (ref 27–33)
MCHC RBC AUTO-ENTMCNC: 34.3 G/DL (ref 33.7–35.3)
MCV RBC AUTO: 86.6 FL (ref 81.4–97.8)
MONOCYTES # BLD AUTO: 2.15 K/UL (ref 0–0.85)
MONOCYTES NFR BLD AUTO: 7 % (ref 0–13.4)
NEUTROPHILS # BLD AUTO: 26.46 K/UL (ref 1.82–7.42)
NEUTROPHILS NFR BLD: 86.3 % (ref 44–72)
NRBC # BLD AUTO: 0 K/UL
NRBC BLD AUTO-RTO: 0 /100 WBC
PCO2 BLDA: 32.3 MMHG (ref 26–37)
PH BLDA: 7.54 [PH] (ref 7.4–7.5)
PLATELET # BLD AUTO: 261 K/UL (ref 164–446)
PMV BLD AUTO: 10.6 FL (ref 9–12.9)
PO2 BLDA: 274.2 MMHG (ref 64–87)
POTASSIUM SERPL-SCNC: 2.9 MMOL/L (ref 3.6–5.5)
PROT SERPL-MCNC: 6.3 G/DL (ref 6–8.2)
RBC # BLD AUTO: 4.61 M/UL (ref 4.7–6.1)
SAO2 % BLDA: 99.2 % (ref 93–99)
SODIUM SERPL-SCNC: 133 MMOL/L (ref 135–145)
TROPONIN I SERPL-MCNC: 0.06 NG/ML (ref 0–0.04)
TROPONIN I SERPL-MCNC: 0.07 NG/ML (ref 0–0.04)
TROPONIN I SERPL-MCNC: 0.07 NG/ML (ref 0–0.04)
TROPONIN I SERPL-MCNC: 0.11 NG/ML (ref 0–0.04)
WBC # BLD AUTO: 30.6 K/UL (ref 4.8–10.8)

## 2017-09-16 PROCEDURE — 74220 X-RAY XM ESOPHAGUS 1CNTRST: CPT

## 2017-09-16 PROCEDURE — A9270 NON-COVERED ITEM OR SERVICE: HCPCS | Performed by: INTERNAL MEDICINE

## 2017-09-16 PROCEDURE — 700105 HCHG RX REV CODE 258: Performed by: PHARMACIST

## 2017-09-16 PROCEDURE — 93010 ELECTROCARDIOGRAM REPORT: CPT | Performed by: INTERNAL MEDICINE

## 2017-09-16 PROCEDURE — 303746 HCHG EXTERNAL PACEMAKER PAD

## 2017-09-16 PROCEDURE — 700111 HCHG RX REV CODE 636 W/ 250 OVERRIDE (IP): Performed by: PHARMACIST

## 2017-09-16 PROCEDURE — 71010 DX-CHEST-PORTABLE (1 VIEW): CPT

## 2017-09-16 PROCEDURE — 83605 ASSAY OF LACTIC ACID: CPT | Mod: 91

## 2017-09-16 PROCEDURE — 82803 BLOOD GASES ANY COMBINATION: CPT

## 2017-09-16 PROCEDURE — 85730 THROMBOPLASTIN TIME PARTIAL: CPT

## 2017-09-16 PROCEDURE — 700105 HCHG RX REV CODE 258: Performed by: INTERNAL MEDICINE

## 2017-09-16 PROCEDURE — 700102 HCHG RX REV CODE 250 W/ 637 OVERRIDE(OP): Performed by: INTERNAL MEDICINE

## 2017-09-16 PROCEDURE — 80053 COMPREHEN METABOLIC PANEL: CPT

## 2017-09-16 PROCEDURE — 93005 ELECTROCARDIOGRAM TRACING: CPT | Performed by: HOSPITALIST

## 2017-09-16 PROCEDURE — 700111 HCHG RX REV CODE 636 W/ 250 OVERRIDE (IP): Performed by: HOSPITALIST

## 2017-09-16 PROCEDURE — A9270 NON-COVERED ITEM OR SERVICE: HCPCS | Performed by: NURSE PRACTITIONER

## 2017-09-16 PROCEDURE — 82962 GLUCOSE BLOOD TEST: CPT

## 2017-09-16 PROCEDURE — 85025 COMPLETE CBC W/AUTO DIFF WBC: CPT

## 2017-09-16 PROCEDURE — 700102 HCHG RX REV CODE 250 W/ 637 OVERRIDE(OP): Performed by: NURSE PRACTITIONER

## 2017-09-16 PROCEDURE — 99233 SBSQ HOSP IP/OBS HIGH 50: CPT | Performed by: HOSPITALIST

## 2017-09-16 PROCEDURE — 770022 HCHG ROOM/CARE - ICU (200)

## 2017-09-16 PROCEDURE — 84484 ASSAY OF TROPONIN QUANT: CPT

## 2017-09-16 PROCEDURE — 700111 HCHG RX REV CODE 636 W/ 250 OVERRIDE (IP): Performed by: INTERNAL MEDICINE

## 2017-09-16 PROCEDURE — 700111 HCHG RX REV CODE 636 W/ 250 OVERRIDE (IP)

## 2017-09-16 RX ORDER — SODIUM CHLORIDE 9 MG/ML
INJECTION, SOLUTION INTRAVENOUS
Status: ACTIVE
Start: 2017-09-16 | End: 2017-09-17

## 2017-09-16 RX ORDER — DIGOXIN 125 MCG
125 TABLET ORAL DAILY
Status: DISCONTINUED | OUTPATIENT
Start: 2017-09-16 | End: 2017-10-18

## 2017-09-16 RX ADMIN — ONDANSETRON 4 MG: 2 INJECTION INTRAMUSCULAR; INTRAVENOUS at 19:12

## 2017-09-16 RX ADMIN — TAMSULOSIN HYDROCHLORIDE 0.4 MG: 0.4 CAPSULE ORAL at 14:08

## 2017-09-16 RX ADMIN — GABAPENTIN 200 MG: 100 CAPSULE ORAL at 14:07

## 2017-09-16 RX ADMIN — STANDARDIZED SENNA CONCENTRATE AND DOCUSATE SODIUM 2 TABLET: 8.6; 5 TABLET, FILM COATED ORAL at 14:07

## 2017-09-16 RX ADMIN — PIPERACILLIN SODIUM AND TAZOBACTAM SODIUM 4.5 G: 4; .5 INJECTION, POWDER, FOR SOLUTION INTRAVENOUS at 13:22

## 2017-09-16 RX ADMIN — HEPARIN SODIUM 1600 UNITS/HR: 5000 INJECTION, SOLUTION INTRAVENOUS at 16:12

## 2017-09-16 RX ADMIN — LIDOCAINE HYDROCHLORIDE 30 ML: 20 SOLUTION OROPHARYNGEAL at 03:23

## 2017-09-16 RX ADMIN — PIPERACILLIN SODIUM AND TAZOBACTAM SODIUM 4.5 G: 4; .5 INJECTION, POWDER, FOR SOLUTION INTRAVENOUS at 02:19

## 2017-09-16 RX ADMIN — ATORVASTATIN CALCIUM 10 MG: 10 TABLET, FILM COATED ORAL at 21:24

## 2017-09-16 RX ADMIN — HEPARIN SODIUM 3200 UNITS: 1000 INJECTION, SOLUTION INTRAVENOUS; SUBCUTANEOUS at 16:06

## 2017-09-16 RX ADMIN — ONDANSETRON 4 MG: 2 INJECTION INTRAMUSCULAR; INTRAVENOUS at 14:08

## 2017-09-16 RX ADMIN — LEVETIRACETAM 500 MG: 500 TABLET, FILM COATED ORAL at 21:25

## 2017-09-16 RX ADMIN — ONDANSETRON 4 MG: 2 INJECTION INTRAMUSCULAR; INTRAVENOUS at 08:03

## 2017-09-16 RX ADMIN — VANCOMYCIN HYDROCHLORIDE 1700 MG: 100 INJECTION, POWDER, LYOPHILIZED, FOR SOLUTION INTRAVENOUS at 23:52

## 2017-09-16 RX ADMIN — LISINOPRIL 10 MG: 5 TABLET ORAL at 14:07

## 2017-09-16 RX ADMIN — LIDOCAINE HYDROCHLORIDE 30 ML: 20 SOLUTION OROPHARYNGEAL at 19:12

## 2017-09-16 RX ADMIN — VANCOMYCIN HYDROCHLORIDE 1700 MG: 100 INJECTION, POWDER, LYOPHILIZED, FOR SOLUTION INTRAVENOUS at 00:20

## 2017-09-16 RX ADMIN — ROSUVASTATIN CALCIUM 125 MCG: 10 TABLET, FILM COATED ORAL at 17:06

## 2017-09-16 RX ADMIN — HEPARIN SODIUM 3200 UNITS: 1000 INJECTION, SOLUTION INTRAVENOUS; SUBCUTANEOUS at 07:02

## 2017-09-16 RX ADMIN — LEVETIRACETAM 500 MG: 500 TABLET, FILM COATED ORAL at 14:08

## 2017-09-16 RX ADMIN — GABAPENTIN 200 MG: 100 CAPSULE ORAL at 21:25

## 2017-09-16 RX ADMIN — STANDARDIZED SENNA CONCENTRATE AND DOCUSATE SODIUM 2 TABLET: 8.6; 5 TABLET, FILM COATED ORAL at 21:24

## 2017-09-16 RX ADMIN — METOPROLOL SUCCINATE 50 MG: 25 TABLET, EXTENDED RELEASE ORAL at 14:07

## 2017-09-16 RX ADMIN — PIPERACILLIN SODIUM AND TAZOBACTAM SODIUM 4.5 G: 4; .5 INJECTION, POWDER, FOR SOLUTION INTRAVENOUS at 18:23

## 2017-09-16 RX ADMIN — PIPERACILLIN SODIUM AND TAZOBACTAM SODIUM 4.5 G: 4; .5 INJECTION, POWDER, FOR SOLUTION INTRAVENOUS at 06:05

## 2017-09-16 ASSESSMENT — ENCOUNTER SYMPTOMS
CHILLS: 0
BLURRED VISION: 0
ABDOMINAL PAIN: 0
MYALGIAS: 0
ORTHOPNEA: 0
PND: 0
DIZZINESS: 0
FEVER: 0
SHORTNESS OF BREATH: 0
PALPITATIONS: 0
INSOMNIA: 0
LOSS OF CONSCIOUSNESS: 0

## 2017-09-16 ASSESSMENT — PAIN SCALES - GENERAL
PAINLEVEL_OUTOF10: 6
PAINLEVEL_OUTOF10: 0
PAINLEVEL_OUTOF10: 4
PAINLEVEL_OUTOF10: 0

## 2017-09-16 NOTE — PROGRESS NOTES
[facility] Hospitalist Progress Note    Date of Service, patient seen and examined 9/15/2017: Note completed 2017    Chief Complaint  86 y.o. male admitted 2017 with sepsis after being found down at home by his son.    Interval Problem Update  9/15: Mobilizing.  Heparin drip. xfr to telemetry   : Patient felt like a fruit cup got stuck in his throat. Now having lots of nausea and vomiting. Thus ordered a barium swallow as well as small bowel follow-through. Esophagus appears clear. Did have some sustained tachycardia which cardiology is working on.    Consultants/Specialty  Cardiology    Disposition  Home versus skilled    Review of Systems   Unable to perform ROS: Mental acuity      Physical Exam  Laboratory/Imaging   Hemodynamics  Temp (24hrs), Av °C (96.8 °F), Min:35.7 °C (96.3 °F), Max:36.2 °C (97.2 °F)   Temperature: 36 °C (96.8 °F)  Pulse  Av.4  Min: 45  Max: 134    Blood Pressure : 128/74      Respiratory      Respiration: 20, Pulse Oximetry: 97 %     Work Of Breathing / Effort: Mild  RUL Breath Sounds: Clear, RML Breath Sounds: Clear, RLL Breath Sounds: Diminished, MIKE Breath Sounds: Clear, LLL Breath Sounds: Diminished    Fluids    Intake/Output Summary (Last 24 hours) at 17 1637  Last data filed at 17 1600   Gross per 24 hour   Intake                0 ml   Output             2075 ml   Net            -2075 ml       Nutrition  Orders Placed This Encounter   Procedures   • DIET ORDER     Standing Status:   Standing     Number of Occurrences:   1     Order Specific Question:   Diet:     Answer:   Full Liquid [11]     Comments:   please add ensure per pt. request     Physical Exam   Constitutional: He appears well-developed.   HENT:   Head: Normocephalic.   Cardiovascular: An irregularly irregular rhythm present. Tachycardia present.  Exam reveals no gallop.    Pulmonary/Chest: No respiratory distress. He has no wheezes.   Abdominal: He exhibits no distension. There is no  tenderness.   Neurological: He is alert.       Recent Labs      09/13/17   2005  09/15/17   0001  09/15/17   1428   WBC  18.1*  26.6*  22.4*   RBC  5.39  4.41*  4.86   HEMOGLOBIN  16.3  13.4*  14.8   HEMATOCRIT  46.9  40.0*  44.3   MCV  87.0  90.7  91.2   MCH  30.2  30.4  30.5   MCHC  34.8  33.5*  33.4*   RDW  45.1  49.1  47.8   PLATELETCT  361  268  237   MPV  11.2  10.5  10.6     Recent Labs      09/13/17 2005 09/14/17   0505  09/15/17   0001   SODIUM  135  136  133*   POTASSIUM  4.0  3.4*  3.9   CHLORIDE  96  102  100   CO2  16*  18*  24   GLUCOSE  183*  147*  141*   BUN  14  11  9   CREATININE  0.81  0.60  0.67   CALCIUM  11.6*  9.7  8.9     Recent Labs      09/15/17   1428  09/16/17   0534  09/16/17   1513   APTT  65.7*  50.3*  38.8*                  Assessment/Plan     * Elevated troponin   Assessment & Plan    Serial troponins showing decrease  On heparin drip  Cardiology consulting  On atorvastatin        Persistent atrial fibrillation (CMS-HCC)   Assessment & Plan    On telemetry with rhythm showing atrial flutter  Cardiology consulting  On heparin drip, metoprolol SR  Cardiology initially held digoxin which was a home medication.        Nausea & vomiting   Assessment & Plan    - Patient feels that a fruit cup got stuck  - Barium swallow negative  - Small bowel follow-through pending        Hyperglycemia- (present on admission)   Assessment & Plan    HbA1c normal at 5.5        Hypokalemia- (present on admission)   Assessment & Plan    Repleting as needed        Sepsis (CMS-HCC)   Assessment & Plan    This is sepsis (without associated acute organ dysfunction).   Unclear etiology at this point in time. Prior history of discitis which had previously been treated with antibiotics  Monitor blood cultures, no growth to date  Urine analysis reviewed  continue current antibiotics.            Reviewed items::  Labs reviewed and Medications reviewed  Hearn catheter::  Critically Ill - Requiring Accurate  Measurement of Urinary Output  DVT prophylaxis pharmacological::  Heparin  Antibiotics:  Treating active infection/contamination beyond 24 hours perioperative coverage

## 2017-09-16 NOTE — PROGRESS NOTES
Patient's HR sustaining in 150's, patient is lying in bed with no distress. MD Coulter made aware, instructed to call cardiology and make them aware. Spoke with Edwin Hernández in person, she is aware of tachycardia. Will speak with MD Rae about patient. Pt's PTT trending down despite increased heparin drip, Johana pharmacist 8th floor, MD Coulter and Edgar all aware of 2 out of range PTT's per heparin protocol.

## 2017-09-16 NOTE — CARE PLAN
Problem: Communication  Goal: The ability to communicate needs accurately and effectively will improve  Outcome: PROGRESSING AS EXPECTED  Using call bell approp, A+Ox4, english speaking, ashley banegas for needs

## 2017-09-16 NOTE — PROGRESS NOTES
Rapid Response Team Rounding Note    Shift#: 1    Patient Name: Jersey Alexis  Sex: Male  Age: 86 y.o.  Admitted: 2017  Room Number: T804/01  Code Status: Full Code    Cause of Rapid Response    ICU Transfer    Diagnosis  Sepsis (CMS-[facility])  Sepsis (CMS-HCC)    Allergies  Allergies   Allergen Reactions   • Vancomycin Rash     Diffuse  Tolerates at slower infusion rates.        History  Past Medical History:   Diagnosis Date   • A-fib (CMS-HCC)    • Arrhythmia    • Arthritis    • Cataract    • Diabetes (CMS-HCC)    • GERD (gastroesophageal reflux disease)    • Hyperlipidemia    • Muscle disorder     nerve damage due to spinal stenosis       Vital Signs    Temp (24hrs), Av.4 °C (97.5 °F), Min:35.7 °C (96.3 °F), Max:37.1 °C (98.7 °F)    Pulse Readings from Last 1 Encounters:   17 100     Systolic (24hrs), Av , Min:126 , Max:153     Diastolic (24hrs), Av, Min:58, Max:90    @LASTSAO2(1)@    Labs    Lab Results   Component Value Date/Time    WBC 22.4 (H) 09/15/2017 02:28 PM    RBC 4.86 09/15/2017 02:28 PM    HEMOGLOBIN 14.8 09/15/2017 02:28 PM    HEMATOCRIT 44.3 09/15/2017 02:28 PM    MCV 91.2 09/15/2017 02:28 PM    MCH 30.5 09/15/2017 02:28 PM    MCHC 33.4 (L) 09/15/2017 02:28 PM    MPV 10.6 09/15/2017 02:28 PM      Lab Results   Component Value Date/Time    SODIUM 133 (L) 09/15/2017 12:01 AM    POTASSIUM 3.9 09/15/2017 12:01 AM    CHLORIDE 100 09/15/2017 12:01 AM    CO2 24 09/15/2017 12:01 AM    GLUCOSE 141 (H) 09/15/2017 12:01 AM    BUN 9 09/15/2017 12:01 AM    CREATININE 0.67 09/15/2017 12:01 AM     Blood Culture   Date Value Ref Range Status   2017   Preliminary    No Growth    Note: Blood cultures are incubated for 5 days and  are monitored continuously.Positive blood cultures  are called to the RN and reported as soon as  they are identified.          Notes    · V/S Stable: HTN, and AFlut  · C/O Chest Pain Earlier, it was relieved with a GI Cocktail.  · No RN Concerns

## 2017-09-16 NOTE — PROGRESS NOTES
"Pharmacy Kinetics 86 y.o. male on vancomycin day #3  2017    Currently on Vancomycin 1700 mg iv q24hr     Indication for Treatment: sepsis of unknown origin  ID consult: Planned for today     Pertinent history per medical record: Admitted on 2017 for altered mental status. Patient with recent hospitalization at [facility] for extended antibiotic therapy for discitis. With workup consistent for sepsis, empiric antibiotic therapy initiated, pending identification of source.      Other antibiotics: piperacillin/tazobactam 4.5 g IV q6h     List concerns for renal function: age     Pertinent cultures to date:    PBC x 2: NGTD   influenza: negative    urine: NGTD    Recent Labs      09/13/17   2005  09/15/17   0001  09/15/17   1428   WBC  18.1*  26.6*  22.4*   NEUTSPOLYS  84.70*  82.60*  84.20*     Recent Labs      17   0505  09/15/17   0001   BUN  14  11  9   CREATININE  0.81  0.60  0.67   ALBUMIN  4.6  3.7   --      Intake/Output Summary (Last 24 hours) at 17 1042  Last data filed at 17 0400   Gross per 24 hour   Intake              546 ml   Output             3025 ml   Net            -2479 ml      Blood pressure 149/76, pulse 75, temperature 35.9 °C (96.7 °F), resp. rate (!) 21, height 1.803 m (5' 11\"), weight 86.8 kg (191 lb 5.8 oz), SpO2 99 %. Temp (24hrs), Av.4 °C (97.5 °F), Min:35.7 °C (96.3 °F), Max:37.1 °C (98.7 °F)    A/P   1. Next vancomycin level: 17 at 2230 (ordered)  2. Goal trough: 12-16 mcg/mL  3. Comments: SCr has been stable. Plan to consult ID today. I ordered a level for tonight to assess dosing. Cultures negative so far. Afebrile today, no WBC but they had been trending up. Pharmacy will continue to monitor and adjust dosing as clinically appropriate.    Marina Harding, PharmD  "

## 2017-09-16 NOTE — PROGRESS NOTES
Patient back from endo/[facility] at 1330, lab notified that they need to draw PTT and Troponin, timed labs. Heparin drip continues. Patient took all AM meds with sip of water, did not vomit. No distress at this time, barium study/GI results pending.

## 2017-09-16 NOTE — PROGRESS NOTES
Cardiology Progress Note               Author: Luis Rae Date & Time created: 2017  11:37 AM     Interval History:  86 year old male with sepsis, abnormal troponin level, chronic atrial fibrillation.    Review of Systems   Constitutional: Negative for chills and fever.   HENT: Negative for congestion.    Eyes: Negative for blurred vision.   Respiratory: Negative for shortness of breath.    Cardiovascular: Negative for chest pain, palpitations, orthopnea, leg swelling and PND.   Gastrointestinal: Negative for abdominal pain.   Genitourinary: Negative for dysuria.   Musculoskeletal: Negative for joint pain and myalgias.   Skin: Negative for rash.   Neurological: Negative for dizziness and loss of consciousness.   Psychiatric/Behavioral: The patient does not have insomnia.        Physical Exam   Constitutional: He is oriented to person, place, and time. He appears well-developed and well-nourished.   HENT:   Head: Normocephalic and atraumatic.   Eyes: Conjunctivae are normal. Pupils are equal, round, and reactive to light.   Neck: Normal range of motion. Neck supple.   Cardiovascular: Normal rate.  An irregularly irregular rhythm present.   Pulmonary/Chest: Effort normal and breath sounds normal.   Abdominal: Soft. Bowel sounds are normal.   Musculoskeletal: Normal range of motion. He exhibits no edema.   Neurological: He is alert and oriented to person, place, and time.   Skin: Skin is warm and dry.   Psychiatric: He has a normal mood and affect.       Hemodynamics:  Temp (24hrs), Av.4 °C (97.5 °F), Min:35.7 °C (96.3 °F), Max:37.1 °C (98.7 °F)  Temperature: 35.9 °C (96.7 °F)  Pulse  Av.9  Min: 45  Max: 134Heart Rate (Monitored): 83  Blood Pressure : 149/76, NIBP: 147/81     Respiratory:    Respiration: (!) 21, Pulse Oximetry: 99 %     Work Of Breathing / Effort: Mild  RUL Breath Sounds: Clear, RML Breath Sounds: Clear, RLL Breath Sounds: Diminished, MIKE Breath Sounds: Clear, LLL Breath Sounds:  Diminished  Fluids:        GI/Nutrition:  Orders Placed This Encounter   Procedures   • DIET ORDER     Standing Status:   Standing     Number of Occurrences:   1     Order Specific Question:   Diet:     Answer:   Full Liquid [11]     Comments:   please add ensure per pt. request     Lab Results:  Recent Labs      09/13/17   2005  09/15/17   0001  09/15/17   1428   WBC  18.1*  26.6*  22.4*   RBC  5.39  4.41*  4.86   HEMOGLOBIN  16.3  13.4*  14.8   HEMATOCRIT  46.9  40.0*  44.3   MCV  87.0  90.7  91.2   MCH  30.2  30.4  30.5   MCHC  34.8  33.5*  33.4*   RDW  45.1  49.1  47.8   PLATELETCT  361  268  237   MPV  11.2  10.5  10.6     Recent Labs      09/13/17 2005 09/14/17   0505  09/15/17   0001   SODIUM  135  136  133*   POTASSIUM  4.0  3.4*  3.9   CHLORIDE  96  102  100   CO2  16*  18*  24   GLUCOSE  183*  147*  141*   BUN  14  11  9   CREATININE  0.81  0.60  0.67   CALCIUM  11.6*  9.7  8.9     Recent Labs      09/15/17   0001  09/15/17   1428  09/16/17   0534   APTT  89.8*  65.7*  50.3*         Recent Labs      09/13/17   2005   09/15/17   0001  09/15/17   1428  09/16/17   0410   CPKTOTAL  161*   --    --    --    --    TROPONINI  0.21*   < >  0.34*  0.24*  0.11*    < > = values in this interval not displayed.         Medications reviewed.      Current Facility-Administered Medications:   •  prochlorperazine (COMPAZINE) injection 10 mg, 10 mg, Intravenous, Q6HRS PRN, Ger Huang M.D., 10 mg at 09/15/17 0829  •  promethazine (PHENERGAN) tablet 25 mg, 25 mg, Oral, Q6HRS PRN, Ger Huang M.D.  •  hydrALAZINE (APRESOLINE) injection 20 mg, 20 mg, Intravenous, Q6HRS PRN, Ger Huang M.D.  •  lisinopril (PRINIVIL) 10 MG tablet 10 mg, 10 mg, Oral, Q DAY, Eron Lott M.D., Stopped at 09/16/17 0900  •  hyoscyamine-maalox plus-lidocaine viscous (GI COCKTAIL) oral susp 30 mL, 30 mL, Oral, Q6HRS PRN, Eron Lott M.D., 30 mL at 09/16/17 0323  •  NS infusion, , Intravenous, Continuous, Yaya Ragland D.O., Last  Rate: 75 mL/hr at 09/14/17 1754  •  vancomycin 1,700 mg in  mL IVPB, 17 mg/kg, Intravenous, Q24HR, Reuben Herrera, PharmD, Stopped at 09/16/17 0320  •  labetalol (NORMODYNE,TRANDATE) injection 10 mg, 10 mg, Intravenous, Q6HRS PRN, Jeffrey Guzmán M.D., 10 mg at 09/14/17 0627  •  ondansetron (ZOFRAN) syringe/vial injection 4 mg, 4 mg, Intravenous, Q4HRS PRN, Yaya Ragland D.O., 4 mg at 09/16/17 0803  •  metoprolol SR (TOPROL XL) tablet 50 mg, 50 mg, Oral, DAILY, Eron Lott M.D., Stopped at 09/15/17 0900  •  [COMPLETED] heparin 1000 units/mL injection 6,000 Units, 6,000 Units, Intravenous, Once, 6,000 Units at 09/13/17 2310 **AND** heparin 1000 units/mL injection 3,200 Units, 3,200 Units, Intravenous, PRN, 3,200 Units at 09/16/17 0702 **AND** heparin infusion 25,000 units in 500 ml 0.45% nacl, , Intravenous, Continuous, Last Rate: 27 mL/hr at 09/16/17 0705, 1,350 Units/hr at 09/16/17 0705 **AND** Action is required: Protocol 440 Heparin Weight Based has been implemented, , , Once **AND** Protocol 440 Heparin Weight Based DO NOT GIVE ANY HEPARIN BOLUS TO STROKE PATIENT, , , CONTINUOUS **AND** Protocol 440 Heparin Weight Based Discontinue Enoxaparin (Lovenox), Dabigatran (Pradaxa), Rivaroxaban (Xarelto), Apixaban (Eliquis), Edoxaban (Savaysa, Lixiana), Fondaparinux (Arixtra) and Argatroban prior to heparin administration, , , CONTINUOUS **AND** Protocol 440 Heparin Weight Based Draw baseline aPTT, PT, and platelet count if not already done, , , CONTINUOUS **AND** Protocol 440 Heparin Weight Based Draw aPTT 6 hours after beginning infusion. , , , CONTINUOUS **AND** Protocol 440 Heparin Weight Based Draw Platelet count every three days. Contact MD if platelet is 50% lower than baseline count., , , CONTINUOUS **AND** Protocol 440 Heparin Weight Based Record Patient Data, , , CONTINUOUS **AND** Protocol 440 Heparin Weight Based INSTRUCTIONS, , , CONTINUOUS **AND** Protocol 440 Heparin Weight Based Review aPTT  results 6 hours after infusion is begun as detailed, , , CONTINUOUS **AND** Protocol 440 Heparin Weight Based Adjust heparin to maintain aPTT between 55-96 sec, , , CONTINUOUS **AND** Protocol 440 Heparin Weight Based Order aPTT 6 hours after any rate change or hold until aPTT is therapeutic (55-96 seconds), , , CONTINUOUS **AND** Protocol 440 Heparin Weight Based Documentation and verification, , , CONTINUOUS, Agapito Schultz, PharmD  •  atorvastatin (LIPITOR) tablet 10 mg, 10 mg, Oral, Nightly, Jeffrey Guzmán M.D., 10 mg at 09/15/17 1949  •  gabapentin (NEURONTIN) capsule 200 mg, 200 mg, Oral, TID, Jeffrey Guzmán M.D., Stopped at 09/16/17 0900  •  levetiracetam (KEPPRA) tablet 500 mg, 500 mg, Oral, BID, Jeffrey Guzmán M.D., Stopped at 09/16/17 0900  •  tamsulosin (FLOMAX) capsule 0.4 mg, 0.4 mg, Oral, AFTER BREAKFAST, Jeffrey Guzmán M.D., Stopped at 09/16/17 0830  •  NS infusion 2,994 mL, 30 mL/kg, Intravenous, Once PRN, Jefrfey Guzmán M.D.  •  senna-docusate (PERICOLACE or SENOKOT S) 8.6-50 MG per tablet 2 Tab, 2 Tab, Oral, BID, Stopped at 09/16/17 0900 **AND** polyethylene glycol/lytes (MIRALAX) PACKET 1 Packet, 1 Packet, Oral, QDAY PRN **AND** magnesium hydroxide (MILK OF MAGNESIA) suspension 30 mL, 30 mL, Oral, QDAY PRN **AND** bisacodyl (DULCOLAX) suppository 10 mg, 10 mg, Rectal, QDAY PRN, Jeffrey Guzmán M.D.  •  Respiratory Care per Protocol, , Nebulization, Continuous RT, Jeffrey Guzmán M.D.  •  NS (BOLUS) infusion 1,000 mL, 1,000 mL, Intravenous, Once PRN, Jeffrey Guzmán M.D.  •  piperacillin-tazobactam (ZOSYN) 4.5 g in  mL IVPB, 4.5 g, Intravenous, Q6HRS, Jeffrey Guzmán M.D., Stopped at 09/16/17 0705  •  MD ALERT... vancomycin per pharmacy protocol, , Other, pharmacy to dose, Jeffrey Guzmán M.D.    CARDIAC STUDIES/PROCEDURES:    ECHOCARDIOGRAM CONCLUSIONS (09/14/17)  Prior study done on [id number] left ventricular systolic   function.  Normal regional wall motion.  Left ventricular ejection fraction is visually estimated to be  70%.  Diastolic function is difficult to assess with atrial fibrillation.  The right ventricle was normal in size and function.  Structurally normal mitral valve without significant stenosis or   regurgitation.  Aortic sclerosis without stenosis.  No aortic insufficiency.  Normal pericardium without effusion.    EKG performed on (09/15/17) was reviewed: EKG shows atrial fibrillation.    MYOCARDIAL PERFUSION STUDY CONCLUSIONS (03/01/17)  No myocardial infarct or inducible ischemia.  Normal LEFT ventricular function.      Medical Decision Making, by Problem:  Active Hospital Problems    Diagnosis   • *Elevated troponin [R74.8]   • Persistent atrial fibrillation (CMS-Former[facility]) [I48.1]   • Sepsis (CMS-Formerly [facility]) [A41.9]       Plan:  *  1. Abnormal troponin level: The troponin levels were minimally abnormal and unlikely related to true acute coronary syndrome.  2. Atrial fibrillation with rapid ventricular response: The ventricular rate is high. We will restart digoxin for rate control.  Quality-Core Measures

## 2017-09-16 NOTE — PROGRESS NOTES
"Pt A+Ox4, nauseous and dry heaving at this time. \"I feel like something is stuck in my trachea.\" Chest pain 4/10, no relief from prior GI cocktail. Worsens with manual pressure. MD Coulter at bedside, aware of above. Continues on heparin drip. Bed locked in lowest position, RN and CNA numbers on whiteboard. Call bell in hand, using approp. In room across from nurses station. Hourly rounding in place.   "

## 2017-09-16 NOTE — PROGRESS NOTES
2 RN skin check. Skin tear on NAHUM. Blood blister on R 5th tow, sore on R big toe. Sacrum red and blue, non blanching. Possible DTI. Wound team consulted.

## 2017-09-16 NOTE — PROGRESS NOTES
Anh Fournier Fall Risk Assessment:     Last Known Fall:    Mobility:    Medications:    Mental Status/LOC/Awareness:    Toileting Needs:    Volume/Electrolyte Status:    Communication/Sensory:    Behavior:    Anh Fournier Fall Risk Total:    Fall Risk Level:      Universal Fall Precautions:       Fall Risk Level Interventions:          Patient Specific Interventions:     Medication: review medications with patient and family  Mental Status/LOC/Awareness: utilize bed/chair fall alarm  Toileting: monitor intake and output/use of appropriate interventions  Volume/Electrolyte Status: administer medications as ordered for nausea and vomiting  Communication/Sensory: update plan of care on whiteboard  Behavioral: encourage patient to voice feelings  Mobility: ensure bed is locked and in lowest position

## 2017-09-17 ENCOUNTER — APPOINTMENT (OUTPATIENT)
Dept: RADIOLOGY | Facility: MEDICAL CENTER | Age: 82
DRG: 871 | End: 2017-09-17
Attending: INTERNAL MEDICINE
Payer: MEDICARE

## 2017-09-17 LAB
ANION GAP SERPL CALC-SCNC: 7 MMOL/L (ref 0–11.9)
APTT PPP: 125.4 SEC (ref 24.7–36)
APTT PPP: 28 SEC (ref 24.7–36)
APTT PPP: 66.2 SEC (ref 24.7–36)
BUN SERPL-MCNC: 14 MG/DL (ref 8–22)
C DIFF DNA SPEC QL NAA+PROBE: NEGATIVE
C DIFF TOX GENS STL QL NAA+PROBE: NEGATIVE
CALCIUM SERPL-MCNC: 9 MG/DL (ref 8.5–10.5)
CHLORIDE SERPL-SCNC: 93 MMOL/L (ref 96–112)
CO2 SERPL-SCNC: 31 MMOL/L (ref 20–33)
CREAT SERPL-MCNC: 0.77 MG/DL (ref 0.5–1.4)
EKG IMPRESSION: NORMAL
ERYTHROCYTE [DISTWIDTH] IN BLOOD BY AUTOMATED COUNT: 45.1 FL (ref 35.9–50)
GFR SERPL CREATININE-BSD FRML MDRD: >60 ML/MIN/1.73 M 2
GLUCOSE SERPL-MCNC: 170 MG/DL (ref 65–99)
HCT VFR BLD AUTO: 41.8 % (ref 42–52)
HGB BLD-MCNC: 14.1 G/DL (ref 14–18)
LACTATE BLD-SCNC: 2.6 MMOL/L (ref 0.5–2)
LACTATE BLD-SCNC: 2.8 MMOL/L (ref 0.5–2)
MAGNESIUM SERPL-MCNC: 2 MG/DL (ref 1.5–2.5)
MCH RBC QN AUTO: 29.7 PG (ref 27–33)
MCHC RBC AUTO-ENTMCNC: 33.7 G/DL (ref 33.7–35.3)
MCV RBC AUTO: 88.2 FL (ref 81.4–97.8)
MORPHOLOGY BLD-IMP: NORMAL
PLATELET # BLD AUTO: ABNORMAL K/UL (ref 164–446)
PMV BLD AUTO: ABNORMAL FL (ref 9–12.9)
POTASSIUM SERPL-SCNC: 2.7 MMOL/L (ref 3.6–5.5)
POTASSIUM SERPL-SCNC: 3.1 MMOL/L (ref 3.6–5.5)
RBC # BLD AUTO: 4.74 M/UL (ref 4.7–6.1)
SODIUM SERPL-SCNC: 131 MMOL/L (ref 135–145)
TROPONIN I SERPL-MCNC: 0.04 NG/ML (ref 0–0.04)
TROPONIN I SERPL-MCNC: 0.05 NG/ML (ref 0–0.04)
VANCOMYCIN TROUGH SERPL-MCNC: 18.3 UG/ML (ref 10–20)
WBC # BLD AUTO: 29.9 K/UL (ref 4.8–10.8)

## 2017-09-17 PROCEDURE — 83735 ASSAY OF MAGNESIUM: CPT

## 2017-09-17 PROCEDURE — 700105 HCHG RX REV CODE 258: Performed by: PHARMACIST

## 2017-09-17 PROCEDURE — 700105 HCHG RX REV CODE 258: Performed by: HOSPITALIST

## 2017-09-17 PROCEDURE — 85730 THROMBOPLASTIN TIME PARTIAL: CPT

## 2017-09-17 PROCEDURE — A9270 NON-COVERED ITEM OR SERVICE: HCPCS | Performed by: INTERNAL MEDICINE

## 2017-09-17 PROCEDURE — 700111 HCHG RX REV CODE 636 W/ 250 OVERRIDE (IP): Performed by: PHARMACIST

## 2017-09-17 PROCEDURE — 700102 HCHG RX REV CODE 250 W/ 637 OVERRIDE(OP): Performed by: HOSPITALIST

## 2017-09-17 PROCEDURE — 3E0234Z INTRODUCTION OF SERUM, TOXOID AND VACCINE INTO MUSCLE, PERCUTANEOUS APPROACH: ICD-10-PCS | Performed by: INTERNAL MEDICINE

## 2017-09-17 PROCEDURE — 84484 ASSAY OF TROPONIN QUANT: CPT

## 2017-09-17 PROCEDURE — 302255 BARRIER CREAM MOISTURE BAZA PROTECT (ZINC) 5OZ: Performed by: HOSPITALIST

## 2017-09-17 PROCEDURE — 99233 SBSQ HOSP IP/OBS HIGH 50: CPT | Performed by: HOSPITALIST

## 2017-09-17 PROCEDURE — 700105 HCHG RX REV CODE 258: Performed by: INTERNAL MEDICINE

## 2017-09-17 PROCEDURE — 85027 COMPLETE CBC AUTOMATED: CPT

## 2017-09-17 PROCEDURE — 700111 HCHG RX REV CODE 636 W/ 250 OVERRIDE (IP): Performed by: INTERNAL MEDICINE

## 2017-09-17 PROCEDURE — 700111 HCHG RX REV CODE 636 W/ 250 OVERRIDE (IP)

## 2017-09-17 PROCEDURE — 700111 HCHG RX REV CODE 636 W/ 250 OVERRIDE (IP): Performed by: HOSPITALIST

## 2017-09-17 PROCEDURE — 80202 ASSAY OF VANCOMYCIN: CPT

## 2017-09-17 PROCEDURE — 700102 HCHG RX REV CODE 250 W/ 637 OVERRIDE(OP): Performed by: INTERNAL MEDICINE

## 2017-09-17 PROCEDURE — 83605 ASSAY OF LACTIC ACID: CPT | Mod: 91

## 2017-09-17 PROCEDURE — A9270 NON-COVERED ITEM OR SERVICE: HCPCS | Performed by: HOSPITALIST

## 2017-09-17 PROCEDURE — 84132 ASSAY OF SERUM POTASSIUM: CPT

## 2017-09-17 PROCEDURE — A9270 NON-COVERED ITEM OR SERVICE: HCPCS | Performed by: NURSE PRACTITIONER

## 2017-09-17 PROCEDURE — 770020 HCHG ROOM/CARE - TELE (206)

## 2017-09-17 PROCEDURE — 87493 C DIFF AMPLIFIED PROBE: CPT

## 2017-09-17 PROCEDURE — 700102 HCHG RX REV CODE 250 W/ 637 OVERRIDE(OP): Performed by: NURSE PRACTITIONER

## 2017-09-17 PROCEDURE — 74250 X-RAY XM SM INT 1CNTRST STD: CPT

## 2017-09-17 PROCEDURE — 80048 BASIC METABOLIC PNL TOTAL CA: CPT

## 2017-09-17 PROCEDURE — 36415 COLL VENOUS BLD VENIPUNCTURE: CPT

## 2017-09-17 RX ORDER — POTASSIUM CHLORIDE 7.45 MG/ML
10 INJECTION INTRAVENOUS ONCE
Status: DISCONTINUED | OUTPATIENT
Start: 2017-09-17 | End: 2017-09-17

## 2017-09-17 RX ORDER — SODIUM CHLORIDE 9 MG/ML
INJECTION, SOLUTION INTRAVENOUS
Status: ACTIVE
Start: 2017-09-17 | End: 2017-09-17

## 2017-09-17 RX ORDER — SODIUM CHLORIDE 9 MG/ML
1000 INJECTION, SOLUTION INTRAVENOUS ONCE
Status: COMPLETED | OUTPATIENT
Start: 2017-09-17 | End: 2017-09-17

## 2017-09-17 RX ORDER — POTASSIUM CHLORIDE 20 MEQ/1
40 TABLET, EXTENDED RELEASE ORAL EVERY 4 HOURS
Status: COMPLETED | OUTPATIENT
Start: 2017-09-17 | End: 2017-09-17

## 2017-09-17 RX ORDER — POTASSIUM CHLORIDE 7.45 MG/ML
10 INJECTION INTRAVENOUS
Status: COMPLETED | OUTPATIENT
Start: 2017-09-17 | End: 2017-09-17

## 2017-09-17 RX ADMIN — PIPERACILLIN SODIUM AND TAZOBACTAM SODIUM 4.5 G: 4; .5 INJECTION, POWDER, FOR SOLUTION INTRAVENOUS at 23:00

## 2017-09-17 RX ADMIN — TAMSULOSIN HYDROCHLORIDE 0.4 MG: 0.4 CAPSULE ORAL at 08:43

## 2017-09-17 RX ADMIN — GABAPENTIN 200 MG: 100 CAPSULE ORAL at 20:15

## 2017-09-17 RX ADMIN — ATORVASTATIN CALCIUM 10 MG: 10 TABLET, FILM COATED ORAL at 20:15

## 2017-09-17 RX ADMIN — PIPERACILLIN SODIUM AND TAZOBACTAM SODIUM 4.5 G: 4; .5 INJECTION, POWDER, FOR SOLUTION INTRAVENOUS at 17:24

## 2017-09-17 RX ADMIN — STANDARDIZED SENNA CONCENTRATE AND DOCUSATE SODIUM 2 TABLET: 8.6; 5 TABLET, FILM COATED ORAL at 08:43

## 2017-09-17 RX ADMIN — GABAPENTIN 200 MG: 100 CAPSULE ORAL at 15:00

## 2017-09-17 RX ADMIN — HEPARIN SODIUM 3200 UNITS: 1000 INJECTION, SOLUTION INTRAVENOUS; SUBCUTANEOUS at 22:18

## 2017-09-17 RX ADMIN — POTASSIUM CHLORIDE 10 MEQ: 7.46 INJECTION, SOLUTION INTRAVENOUS at 09:50

## 2017-09-17 RX ADMIN — POTASSIUM CHLORIDE 40 MEQ: 1500 TABLET, EXTENDED RELEASE ORAL at 17:24

## 2017-09-17 RX ADMIN — POTASSIUM CHLORIDE 10 MEQ: 7.46 INJECTION, SOLUTION INTRAVENOUS at 06:27

## 2017-09-17 RX ADMIN — POTASSIUM CHLORIDE 10 MEQ: 7.46 INJECTION, SOLUTION INTRAVENOUS at 07:32

## 2017-09-17 RX ADMIN — ROSUVASTATIN CALCIUM 125 MCG: 10 TABLET, FILM COATED ORAL at 17:24

## 2017-09-17 RX ADMIN — POTASSIUM CHLORIDE 10 MEQ: 7.46 INJECTION, SOLUTION INTRAVENOUS at 08:41

## 2017-09-17 RX ADMIN — SODIUM CHLORIDE: 9 INJECTION, SOLUTION INTRAVENOUS at 05:28

## 2017-09-17 RX ADMIN — PIPERACILLIN SODIUM AND TAZOBACTAM SODIUM 4.5 G: 4; .5 INJECTION, POWDER, FOR SOLUTION INTRAVENOUS at 00:15

## 2017-09-17 RX ADMIN — LEVETIRACETAM 500 MG: 500 TABLET, FILM COATED ORAL at 20:15

## 2017-09-17 RX ADMIN — LEVETIRACETAM 500 MG: 500 TABLET, FILM COATED ORAL at 08:44

## 2017-09-17 RX ADMIN — METOPROLOL SUCCINATE 50 MG: 25 TABLET, EXTENDED RELEASE ORAL at 08:43

## 2017-09-17 RX ADMIN — POTASSIUM CHLORIDE 40 MEQ: 1500 TABLET, EXTENDED RELEASE ORAL at 14:59

## 2017-09-17 RX ADMIN — POTASSIUM CHLORIDE 40 MEQ: 1500 TABLET, EXTENDED RELEASE ORAL at 11:25

## 2017-09-17 RX ADMIN — GABAPENTIN 200 MG: 100 CAPSULE ORAL at 08:43

## 2017-09-17 RX ADMIN — HEPARIN SODIUM 1200 UNITS/HR: 5000 INJECTION, SOLUTION INTRAVENOUS at 15:00

## 2017-09-17 RX ADMIN — SODIUM CHLORIDE: 9 INJECTION, SOLUTION INTRAVENOUS at 00:26

## 2017-09-17 RX ADMIN — VANCOMYCIN HYDROCHLORIDE 1700 MG: 100 INJECTION, POWDER, LYOPHILIZED, FOR SOLUTION INTRAVENOUS at 22:54

## 2017-09-17 RX ADMIN — SODIUM CHLORIDE 1000 ML: 9 INJECTION, SOLUTION INTRAVENOUS at 06:21

## 2017-09-17 RX ADMIN — PIPERACILLIN SODIUM AND TAZOBACTAM SODIUM 4.5 G: 4; .5 INJECTION, POWDER, FOR SOLUTION INTRAVENOUS at 11:27

## 2017-09-17 RX ADMIN — PIPERACILLIN SODIUM AND TAZOBACTAM SODIUM 4.5 G: 4; .5 INJECTION, POWDER, FOR SOLUTION INTRAVENOUS at 05:29

## 2017-09-17 ASSESSMENT — ENCOUNTER SYMPTOMS
ABDOMINAL PAIN: 0
NAUSEA: 0
HEADACHES: 0
NERVOUS/ANXIOUS: 0
SHORTNESS OF BREATH: 0
COUGH: 0
DIZZINESS: 0
FEVER: 0
PALPITATIONS: 0
SPEECH CHANGE: 0
BACK PAIN: 0
STRIDOR: 0
CHILLS: 0
EYE DISCHARGE: 0
DEPRESSION: 0

## 2017-09-17 ASSESSMENT — PAIN SCALES - GENERAL
PAINLEVEL_OUTOF10: 4
PAINLEVEL_OUTOF10: 5
PAINLEVEL_OUTOF10: 3
PAINLEVEL_OUTOF10: 4
PAINLEVEL_OUTOF10: 3
PAINLEVEL_OUTOF10: 3
PAINLEVEL_OUTOF10: 4
PAINLEVEL_OUTOF10: 3
PAINLEVEL_OUTOF10: 4

## 2017-09-17 NOTE — PROGRESS NOTES
"Pharmacy Kinetics 86 y.o. male on vancomycin day # 5 2017    Currently on Vancomycin 1700 mg iv q24hr    Indication for Treatment: sepsis of unknown origin     Pertinent history per medical record: Admitted on 2017 for altered mental status. Patient with recent hospitalization at [facility] for extended antibiotic therapy for discitis. With workup consistent for sepsis, empiric antibiotic therapy initiated, pending identification of source.      Other antibiotics: Zosyn 4.5 g IV q6h     Allergies: Vancomycin      List concerns for renal function: age     Pertinent cultures to date:    PBC x 2: NGTD   influenza: negative    urine: NGTD    Recent Labs      09/15/17   0001  09/15/17   1428  17   2245  17   0940   WBC  26.6*  22.4*  30.6*  29.9*   NEUTSPOLYS  82.60*  84.20*  86.30*   --      Recent Labs      09/15/17   0001  17   2245  17   0520   BUN  9  14  14   CREATININE  0.67  0.89  0.77   ALBUMIN   --   3.2   --      Intake/Output Summary (Last 24 hours) at 17 1407  Last data filed at 17 1200   Gross per 24 hour   Intake          4286.78 ml   Output             1365 ml   Net          2921.78 ml      Blood pressure (!) 97/69, pulse 95, temperature 36.2 °C (97.2 °F), resp. rate (!) 22, height 1.803 m (5' 11\"), weight 91 kg (200 lb 9.9 oz), SpO2 97 %. Temp (24hrs), Av °C (96.8 °F), Min:35.7 °C (96.3 °F), Max:36.4 °C (97.5 °F)      A/P   1. Vancomycin dose change: not indicated at this time  2. Next vancomycin level: tonight at 2230  3. Goal trough: 12-16 mg/mL  4. Comments: no MRSA isolated in PBC, will decrease goal range to lower trough. HIGHLY recommend ID consult, given increase in WBCs and lack of source or positive cultures. Will get level tonight as it was missed last night. Will continue with current dose for now, will continue to follow.    Liliana Minor, PharmD    "

## 2017-09-17 NOTE — PROGRESS NOTES
Patient arrives to [facility] T612 from T804 on hospital bed after A. Fib RVR up to 150 with hypotension down to SBP of 65 after transferring from wheelchair to bed. Per report patient was enroute to MRI and became weak and lethargic. Patient now A&O x 4, following commands with normotensive pressures. HR A. Fib 103 to 120. Denies SOB, CP, numbness or tingling, calm and cooperative.

## 2017-09-17 NOTE — PROGRESS NOTES
Received call back from Dr. Meza, notified of critical potassium, hyponatremia and decreased urine output, telephone read back order to give 1L NS bolus at rate of 500cc/hr, 40meq IVPB via 10meq K riders and magnesium level. Ordered to give 1g of Mg if Mg was less than 1.5. Lab called to add on Mg to 0520 draw, pending.

## 2017-09-17 NOTE — PROGRESS NOTES
Pt. Went down for MRI via wheelchair, during transfer from wheelchair to [facility], pt. became very lethargic and weak. pt. then fell back into wheelchair and was brought back up to floor. Vitals started and Blood pressure was low at 65/45 and heart rate was high in 150's in Afib on the monitor. Bolus wast started and Cardiology was notified and MD Tate came to bedside and suggested pt. Be transferred to critical care unit. Hospitalist notified and gave orders to transfer patient to ICU.

## 2017-09-17 NOTE — PROGRESS NOTES
Cardiology Progress Note               Author: Luis Rae Date & Time created: 2017  8:39 AM     Interval History:  86 year old male with sepsis, abnormal troponin level, chronic atrial fibrillation. He is more lethargic today.    Review of Systems   Unable to perform ROS: Other   All other systems reviewed and are negative.      Physical Exam   Constitutional: He is oriented to person, place, and time. He appears well-developed and well-nourished.   HENT:   Head: Normocephalic and atraumatic.   Eyes: Conjunctivae are normal. Pupils are equal, round, and reactive to light.   Neck: Normal range of motion. Neck supple.   Cardiovascular: Normal rate.  An irregularly irregular rhythm present.   Pulmonary/Chest: Effort normal and breath sounds normal.   Abdominal: Soft. Bowel sounds are normal.   Musculoskeletal: Normal range of motion. He exhibits no edema.   Neurological: He is alert and oriented to person, place, and time.   Lethargic.   Skin: Skin is warm and dry.   Psychiatric: He has a normal mood and affect.       Hemodynamics:  Temp (24hrs), Av °C (96.8 °F), Min:35.7 °C (96.3 °F), Max:36.4 °C (97.5 °F)  Temperature: 36.1 °C (97 °F)  Pulse  Av  Min: 45  Max: 135Heart Rate (Monitored): 83  Blood Pressure : (!) 97/69, NIBP: 102/48     Respiratory:    Respiration: 20, Pulse Oximetry: 98 %     Work Of Breathing / Effort: Mild  RUL Breath Sounds: Diminished, RML Breath Sounds: Diminished, RLL Breath Sounds: Diminished, MIKE Breath Sounds: Diminished, LLL Breath Sounds: Diminished  Fluids:     Weight: 91 kg (200 lb 9.9 oz)  GI/Nutrition:  Orders Placed This Encounter   Procedures   • DIET ORDER     Standing Status:   Standing     Number of Occurrences:   1     Order Specific Question:   Diet:     Answer:   Full Liquid [11]     Comments:   please add ensure per pt. request     Lab Results:  Recent Labs      09/15/17   0001  09/15/17   1428  17   2245   WBC  26.6*  22.4*  30.6*   RBC  4.41*  4.86   4.61*   HEMOGLOBIN  13.4*  14.8  13.7*   HEMATOCRIT  40.0*  44.3  39.9*   MCV  90.7  91.2  86.6   MCH  30.4  30.5  29.7   MCHC  33.5*  33.4*  34.3   RDW  49.1  47.8  42.8   PLATELETCT  268  237  261   MPV  10.5  10.6  10.6     Recent Labs      09/15/17   0001  09/16/17   2245  09/17/17   0520   SODIUM  133*  133*  131*   POTASSIUM  3.9  2.9*  2.7*   CHLORIDE  100  93*  93*   CO2  24  26  31   GLUCOSE  141*  209*  170*   BUN  9  14  14   CREATININE  0.67  0.89  0.77   CALCIUM  8.9  9.1  9.0     Recent Labs      09/16/17   1513  09/16/17   2245  09/17/17   0520   APTT  38.8*  117.6*  125.4*         Recent Labs      09/16/17   1827  09/16/17   2245  09/17/17   0520   TROPONINI  0.07*  0.07*  0.05*         Medications reviewed.      Current Facility-Administered Medications:   •  potassium chloride in water (KCL) ivpb 10 mEq, 10 mEq, Intravenous, Q HOUR, Cornelia Meza M.D., Last Rate: 0 mL/hr at 09/17/17 0727, 10 mEq at 09/17/17 0732  •  SODIUM CHLORIDE 0.9 % IV SOLN, , , ,   •  Barium Sulfate 60 % SUSP, 710 mL, Oral, Once, Vinny Coulter M.D.  •  digoxin (LANOXIN) tablet 125 mcg, 125 mcg, Oral, DAILY AT 1800, Edwin Hernández, A.P.N., 125 mcg at 09/16/17 1706  •  SODIUM CHLORIDE 0.9 % IV SOLN, , , ,   •  prochlorperazine (COMPAZINE) injection 10 mg, 10 mg, Intravenous, Q6HRS PRN, Ger Huang M.D., 10 mg at 09/15/17 0829  •  promethazine (PHENERGAN) tablet 25 mg, 25 mg, Oral, Q6HRS PRN, Ger Huang M.D.  •  hydrALAZINE (APRESOLINE) injection 20 mg, 20 mg, Intravenous, Q6HRS PRN, Ger Huang M.D.  •  lisinopril (PRINIVIL) 10 MG tablet 10 mg, 10 mg, Oral, Q DAY, Eron Lott M.D., 10 mg at 09/16/17 1407  •  hyoscyamine-maalox plus-lidocaine viscous (GI COCKTAIL) oral susp 30 mL, 30 mL, Oral, Q6HRS PRN, Eron Lott M.D., 30 mL at 09/16/17 1912  •  NS infusion, , Intravenous, Continuous, Yaya Ragland D.O., Last Rate: 75 mL/hr at 09/17/17 0569  •  vancomycin 1,700 mg in  mL IVPB, 17 mg/kg, Intravenous,  Q24HR, Reuben Herrera, PharmD, Stopped at 09/17/17 0252  •  labetalol (NORMODYNE,TRANDATE) injection 10 mg, 10 mg, Intravenous, Q6HRS PRN, Jeffrey Guzmán M.D., 10 mg at 09/14/17 0627  •  ondansetron (ZOFRAN) syringe/vial injection 4 mg, 4 mg, Intravenous, Q4HRS PRN, Yaya Ragland D.O., 4 mg at 09/16/17 1912  •  metoprolol SR (TOPROL XL) tablet 50 mg, 50 mg, Oral, DAILY, Eron Lott M.D., 50 mg at 09/16/17 1407  •  [COMPLETED] heparin 1000 units/mL injection 6,000 Units, 6,000 Units, Intravenous, Once, 6,000 Units at 09/13/17 2310 **AND** heparin 1000 units/mL injection 3,200 Units, 3,200 Units, Intravenous, PRN, 3,200 Units at 09/16/17 1606 **AND** heparin infusion 25,000 units in 500 ml 0.45% nacl, , Intravenous, Continuous, Last Rate: 24 mL/hr at 09/17/17 0738, 1,200 Units/hr at 09/17/17 0738 **AND** Action is required: Protocol 440 Heparin Weight Based has been implemented, , , Once **AND** Protocol 440 Heparin Weight Based DO NOT GIVE ANY HEPARIN BOLUS TO STROKE PATIENT, , , CONTINUOUS **AND** Protocol 440 Heparin Weight Based Discontinue Enoxaparin (Lovenox), Dabigatran (Pradaxa), Rivaroxaban (Xarelto), Apixaban (Eliquis), Edoxaban (Savaysa, Lixiana), Fondaparinux (Arixtra) and Argatroban prior to heparin administration, , , CONTINUOUS **AND** Protocol 440 Heparin Weight Based Draw baseline aPTT, PT, and platelet count if not already done, , , CONTINUOUS **AND** Protocol 440 Heparin Weight Based Draw aPTT 6 hours after beginning infusion. , , , CONTINUOUS **AND** Protocol 440 Heparin Weight Based Draw Platelet count every three days. Contact MD if platelet is 50% lower than baseline count., , , CONTINUOUS **AND** Protocol 440 Heparin Weight Based Record Patient Data, , , CONTINUOUS **AND** Protocol 440 Heparin Weight Based INSTRUCTIONS, , , CONTINUOUS **AND** Protocol 440 Heparin Weight Based Review aPTT results 6 hours after infusion is begun as detailed, , , CONTINUOUS **AND** Protocol 440 Heparin  Weight Based Adjust heparin to maintain aPTT between 55-96 sec, , , CONTINUOUS **AND** Protocol 440 Heparin Weight Based Order aPTT 6 hours after any rate change or hold until aPTT is therapeutic (55-96 seconds), , , CONTINUOUS **AND** Protocol 440 Heparin Weight Based Documentation and verification, , , CONTINUOUS, Agapito Schultz, PharmD  •  atorvastatin (LIPITOR) tablet 10 mg, 10 mg, Oral, Nightly, Jeffrey Guzmán M.D., 10 mg at 09/16/17 2124  •  gabapentin (NEURONTIN) capsule 200 mg, 200 mg, Oral, TID, Jeffrey Guzmán M.D., 200 mg at 09/16/17 2125  •  levetiracetam (KEPPRA) tablet 500 mg, 500 mg, Oral, BID, Jeffrey Guzmán M.D., 500 mg at 09/16/17 2125  •  tamsulosin (FLOMAX) capsule 0.4 mg, 0.4 mg, Oral, AFTER BREAKFAST, Jeffrey Guzmán M.D., 0.4 mg at 09/16/17 1408  •  NS infusion 2,994 mL, 30 mL/kg, Intravenous, Once PRN, Jeffrey Guzmán M.D.  •  senna-docusate (PERICOLACE or SENOKOT S) 8.6-50 MG per tablet 2 Tab, 2 Tab, Oral, BID, 2 Tab at 09/16/17 2124 **AND** polyethylene glycol/lytes (MIRALAX) PACKET 1 Packet, 1 Packet, Oral, QDAY PRN **AND** magnesium hydroxide (MILK OF MAGNESIA) suspension 30 mL, 30 mL, Oral, QDAY PRN **AND** bisacodyl (DULCOLAX) suppository 10 mg, 10 mg, Rectal, QDAY PRN, Jeffrey Guzmán M.D.  •  Respiratory Care per Protocol, , Nebulization, Continuous RT, Jeffrey Guzmán M.D.  •  NS (BOLUS) infusion 1,000 mL, 1,000 mL, Intravenous, Once PRN, Jeffrey Guzmán M.D.  •  piperacillin-tazobactam (ZOSYN) 4.5 g in  mL IVPB, 4.5 g, Intravenous, Q6HRS, Jeffrey Guzmán M.D., Stopped at 09/17/17 0629  •  MD ALERT... vancomycin per pharmacy protocol, , Other, pharmacy to dose, Jeffrey Guzmán M.D.    CARDIAC STUDIES/PROCEDURES:    ECHOCARDIOGRAM CONCLUSIONS (09/14/17)  Prior study done on [id number] left ventricular systolic   function.  Normal regional wall motion.  Left ventricular ejection fraction is visually estimated to be 70%.  Diastolic function is difficult to assess with atrial fibrillation.  The right ventricle was normal  in size and function.  Structurally normal mitral valve without significant stenosis or   regurgitation.  Aortic sclerosis without stenosis.  No aortic insufficiency.  Normal pericardium without effusion.    EKG performed on (09/15/17) was reviewed: EKG shows atrial fibrillation.    MYOCARDIAL PERFUSION STUDY CONCLUSIONS (03/01/17)  No myocardial infarct or inducible ischemia.  Normal LEFT ventricular function.      Medical Decision Making, by Problem:  Active Hospital Problems    Diagnosis   • *Elevated troponin [R74.8]   • Persistent atrial fibrillation (CMS-[facility]) [I48.1]   • Sepsis (CMS-HCC) [A41.9]       Plan:  *  1. Abnormal troponin level: The troponin levels were minimally abnormal and unlikely related to true acute coronary syndrome.  2. Atrial fibrillation with rapid ventricular response: The ventricular rate was high, but, better controlled today. He was restarted on digoxin and is on beta blockade therapy as well for rate control.  3. Health maintenance: He is more lethargic and is pending CT of brain evaluation.    Quality-Core Measures

## 2017-09-17 NOTE — PROGRESS NOTES
Rapid response called as patient became hypotensive and tachycardia, nurse on duty had patient perform vagal maneuver which successfully decreased his heart rate within normal limits. His blood pressure is >120s systolic and HR in the 80s. Patient is AAOx3 and comfortable. Patient denies fevers/chills, chest pain, shortness of breath or nausea/vommiting. I do not believe his transient sbp decrease was due to sepsis but rather tachycardia related. Patient has already received his dose of metoprolol XL as well as digoxin in the morning. If his heart rate does increase to 130s and become sustained we may use IV Lopressor.

## 2017-09-17 NOTE — PROGRESS NOTES
[facility] Hospitalist Progress Note    Date of Service: 2017    Chief Complaint  86 y.o. male admitted 2017 with ALOC after being found down on the floor at home by his Son. C/O Sepsis.    Interval Problem Update  Back to ICU due to afib rvr last night.  Currently A+Ox4 with Afib w/ HR 80's.  Afebrile.  On full liquid diet.  Liter bolus overnight.  MRI pending.  On 2L.  On heparin drip.    Consultants/Specialty  Cardiology    Disposition  Home        Review of Systems   Constitutional: Negative for chills and fever.   Eyes: Negative for discharge.   Respiratory: Negative for cough, shortness of breath and stridor.    Cardiovascular: Negative for chest pain, palpitations and leg swelling.   Gastrointestinal: Negative for abdominal pain and nausea.   Musculoskeletal: Negative for back pain and joint pain.   Neurological: Negative for dizziness, speech change and headaches.   Psychiatric/Behavioral: Negative for depression. The patient is not nervous/anxious.       Physical Exam  Laboratory/Imaging   Hemodynamics  Temp (24hrs), Av.1 °C (96.9 °F), Min:35.7 °C (96.3 °F), Max:36.5 °C (97.7 °F)   Temperature: 36.5 °C (97.7 °F)  Pulse  Av.9  Min: 45  Max: 135 Heart Rate (Monitored): 76  Blood Pressure : (!) 97/69, NIBP: 120/70      Respiratory      Respiration: (!) 21, Pulse Oximetry: 96 %     Work Of Breathing / Effort: Mild  RUL Breath Sounds: Clear;Diminished, RML Breath Sounds: Diminished, RLL Breath Sounds: Diminished, MIKE Breath Sounds: Clear;Diminished, LLL Breath Sounds: Diminished    Fluids    Intake/Output Summary (Last 24 hours) at 17  Last data filed at 17   Gross per 24 hour   Intake          5551.78 ml   Output             1190 ml   Net          4361.78 ml       Nutrition  Orders Placed This Encounter   Procedures   • DIET ORDER     Standing Status:   Standing     Number of Occurrences:   1     Order Specific Question:   Diet:     Answer:   Full Liquid [11]     Comments:    please add ensure per pt. request     Physical Exam   Constitutional: He appears well-developed and well-nourished. No distress.   HENT:   Head: Normocephalic and atraumatic.   Nose: Nose normal.   Mouth/Throat: Oropharynx is clear and moist.   Eyes: Conjunctivae and EOM are normal. Right eye exhibits no discharge. Left eye exhibits no discharge. No scleral icterus.   Neck: Normal range of motion. No tracheal deviation present.   Cardiovascular: Normal rate, normal heart sounds and intact distal pulses.  An irregularly irregular rhythm present.   No murmur heard.  Pulses:       Radial pulses are 2+ on the right side, and 2+ on the left side.   Pulmonary/Chest: Effort normal and breath sounds normal. No respiratory distress. He has no wheezes.   Abdominal: Soft. Bowel sounds are normal. He exhibits no distension. There is no tenderness.   Musculoskeletal: He exhibits no edema.   Neurological: He is alert. No cranial nerve deficit.   Skin: Skin is warm. He is not diaphoretic.   Psychiatric: He has a normal mood and affect.   Vitals reviewed.      Recent Labs      09/15/17   1428  09/16/17 2245 09/17/17   0940   WBC  22.4*  30.6*  29.9*   RBC  4.86  4.61*  4.74   HEMOGLOBIN  14.8  13.7*  14.1   HEMATOCRIT  44.3  39.9*  41.8*   MCV  91.2  86.6  88.2   MCH  30.5  29.7  29.7   MCHC  33.4*  34.3  33.7   RDW  47.8  42.8  45.1   PLATELETCT  237  261  #SN   MPV  10.6  10.6  #SN     Recent Labs      09/15/17   0001  09/16/17 2245 09/17/17   0520  09/17/17   1208   SODIUM  133*  133*  131*   --    POTASSIUM  3.9  2.9*  2.7*  3.1*   CHLORIDE  100  93*  93*   --    CO2  24  26  31   --    GLUCOSE  141*  209*  170*   --    BUN  9  14  14   --    CREATININE  0.67  0.89  0.77   --    CALCIUM  8.9  9.1  9.0   --      Recent Labs      09/16/17   2245 09/17/17   0520  09/17/17   1321   APTT  117.6*  125.4*  66.2*                  Assessment/Plan     * Elevated troponin   Assessment & Plan    Serial troponins showing decrease  On  heparin drip  Cardiology consulting  On atorvastatin        Persistent atrial fibrillation (CMS-HCC)   Assessment & Plan    On telemetry with rhythm showing atrial flutter  Cardiology consulting  Rate and BP improved.  Transfer back to telemetry floor  On heparin drip, metoprolol SR, digoxin        Nausea & vomiting   Assessment & Plan    - Patient feels that a fruit cup got stuck  - Barium swallow negative  - Small bowel follow-through pending        Hyperglycemia- (present on admission)   Assessment & Plan    HbA1c normal at 5.5        Hypokalemia- (present on admission)   Assessment & Plan    Repleting as needed  Monitor BMP and Mg        Sepsis (CMS-HCC)   Assessment & Plan    This is sepsis (without associated acute organ dysfunction).   Unclear etiology at this point in time. 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  Cdiff ordered due to diarrhea but Negative  Monitor blood cultures, no growth to date  Urine analysis reviewed  continue current antibiotics.            Reviewed items::  Radiology images reviewed, Labs reviewed and Medications reviewed  Hearn catheter::  Critically Ill - Requiring Accurate Measurement of Urinary Output and Strict Intake and Output During Sepisis or Shock  DVT prophylaxis pharmacological::  Heparin  Antibiotics:  Treating active infection/contamination beyond 24 hours perioperative coverage

## 2017-09-17 NOTE — PROGRESS NOTES
Assumed care of pt. at 1900    Bedside report received from CARL Horton   POC discussed with pt. At bedside and Pt. verbalizes understanding.  Pt. Is Awake and AOx 4, on heparin gtt, and complaining of nausea  Call light within reach  with appropriate instructions to call for assistance  Bed locked and in lowest position.    Will continue to monitor

## 2017-09-17 NOTE — CARE PLAN
Problem: Infection  Goal: Will remain free from infection    Intervention: Assess signs and symptoms of infection  Patients individual infection risk assessed. Monitored for signs and symptoms of infection throughout shift. Washed hands or foamed in and out every encounter. Utilized universal precautions. Scrubbed hub before access to IV lines per protocol.         Problem: Skin Integrity  Goal: Risk for impaired skin integrity will decrease    Intervention: Assess risk factors for impaired skin integrity and/or pressure ulcers  Skin assessed at start of shift. Nutrition, moisture, sensory, activity, mobility, friction and shear assessed and addressed with patient and family. Oli clark.

## 2017-09-17 NOTE — CARE PLAN
Problem: Bowel/Gastric:  Goal: Will not experience complications related to bowel motility    Intervention: Assess baseline bowel pattern  Pt has had numerous watery bowel movements throughout the day. Per zaida Marshall to place rectal trumpet.      Problem: Skin Integrity  Goal: Risk for impaired skin integrity will decrease    Intervention: Assess risk factors for impaired skin integrity and/or pressure ulcers  Wound in for consult. Pictures taken of buttocks and right leg. Rectal trumpet placed. Some tenderness around temi area when cleaning.

## 2017-09-18 LAB
ANION GAP SERPL CALC-SCNC: 8 MMOL/L (ref 0–11.9)
APTT PPP: 63.2 SEC (ref 24.7–36)
APTT PPP: 64.8 SEC (ref 24.7–36)
BACTERIA BLD CULT: NORMAL
BACTERIA BLD CULT: NORMAL
BASOPHILS # BLD AUTO: 0.3 % (ref 0–1.8)
BASOPHILS # BLD: 0.05 K/UL (ref 0–0.12)
BUN SERPL-MCNC: 12 MG/DL (ref 8–22)
CALCIUM SERPL-MCNC: 8.5 MG/DL (ref 8.5–10.5)
CHLORIDE SERPL-SCNC: 103 MMOL/L (ref 96–112)
CO2 SERPL-SCNC: 24 MMOL/L (ref 20–33)
CREAT SERPL-MCNC: 0.44 MG/DL (ref 0.5–1.4)
CRP SERPL HS-MCNC: 10.88 MG/DL (ref 0–0.75)
EOSINOPHIL # BLD AUTO: 0.13 K/UL (ref 0–0.51)
EOSINOPHIL NFR BLD: 0.7 % (ref 0–6.9)
ERYTHROCYTE [DISTWIDTH] IN BLOOD BY AUTOMATED COUNT: 47.7 FL (ref 35.9–50)
ERYTHROCYTE [SEDIMENTATION RATE] IN BLOOD BY WESTERGREN METHOD: 31 MM/HOUR (ref 0–20)
GFR SERPL CREATININE-BSD FRML MDRD: >60 ML/MIN/1.73 M 2
GLUCOSE SERPL-MCNC: 112 MG/DL (ref 65–99)
HCT VFR BLD AUTO: 31.6 % (ref 42–52)
HGB BLD-MCNC: 10.3 G/DL (ref 14–18)
IMM GRANULOCYTES # BLD AUTO: 0.22 K/UL (ref 0–0.11)
IMM GRANULOCYTES NFR BLD AUTO: 1.1 % (ref 0–0.9)
LYMPHOCYTES # BLD AUTO: 2.03 K/UL (ref 1–4.8)
LYMPHOCYTES NFR BLD: 10.2 % (ref 22–41)
MCH RBC QN AUTO: 30 PG (ref 27–33)
MCHC RBC AUTO-ENTMCNC: 32.6 G/DL (ref 33.7–35.3)
MCV RBC AUTO: 92.1 FL (ref 81.4–97.8)
MONOCYTES # BLD AUTO: 1.33 K/UL (ref 0–0.85)
MONOCYTES NFR BLD AUTO: 6.7 % (ref 0–13.4)
NEUTROPHILS # BLD AUTO: 16.21 K/UL (ref 1.82–7.42)
NEUTROPHILS NFR BLD: 81 % (ref 44–72)
NRBC # BLD AUTO: 0 K/UL
NRBC BLD AUTO-RTO: 0 /100 WBC
PLATELET # BLD AUTO: 198 K/UL (ref 164–446)
PMV BLD AUTO: 10.9 FL (ref 9–12.9)
POTASSIUM SERPL-SCNC: 4.1 MMOL/L (ref 3.6–5.5)
RBC # BLD AUTO: 3.43 M/UL (ref 4.7–6.1)
SIGNIFICANT IND 70042: NORMAL
SIGNIFICANT IND 70042: NORMAL
SITE SITE: NORMAL
SITE SITE: NORMAL
SODIUM SERPL-SCNC: 135 MMOL/L (ref 135–145)
SOURCE SOURCE: NORMAL
SOURCE SOURCE: NORMAL
WBC # BLD AUTO: 20 K/UL (ref 4.8–10.8)

## 2017-09-18 PROCEDURE — G8987 SELF CARE CURRENT STATUS: HCPCS | Mod: CL

## 2017-09-18 PROCEDURE — 85025 COMPLETE CBC W/AUTO DIFF WBC: CPT

## 2017-09-18 PROCEDURE — 85730 THROMBOPLASTIN TIME PARTIAL: CPT

## 2017-09-18 PROCEDURE — 99232 SBSQ HOSP IP/OBS MODERATE 35: CPT | Performed by: HOSPITALIST

## 2017-09-18 PROCEDURE — 700105 HCHG RX REV CODE 258: Performed by: HOSPITALIST

## 2017-09-18 PROCEDURE — 36415 COLL VENOUS BLD VENIPUNCTURE: CPT

## 2017-09-18 PROCEDURE — 700111 HCHG RX REV CODE 636 W/ 250 OVERRIDE (IP)

## 2017-09-18 PROCEDURE — A9270 NON-COVERED ITEM OR SERVICE: HCPCS | Performed by: INTERNAL MEDICINE

## 2017-09-18 PROCEDURE — 86140 C-REACTIVE PROTEIN: CPT

## 2017-09-18 PROCEDURE — 700102 HCHG RX REV CODE 250 W/ 637 OVERRIDE(OP): Performed by: INTERNAL MEDICINE

## 2017-09-18 PROCEDURE — 85652 RBC SED RATE AUTOMATED: CPT

## 2017-09-18 PROCEDURE — 97162 PT EVAL MOD COMPLEX 30 MIN: CPT

## 2017-09-18 PROCEDURE — 700105 HCHG RX REV CODE 258: Performed by: INTERNAL MEDICINE

## 2017-09-18 PROCEDURE — G8979 MOBILITY GOAL STATUS: HCPCS | Mod: CJ

## 2017-09-18 PROCEDURE — G8988 SELF CARE GOAL STATUS: HCPCS | Mod: CJ

## 2017-09-18 PROCEDURE — 700102 HCHG RX REV CODE 250 W/ 637 OVERRIDE(OP): Performed by: NURSE PRACTITIONER

## 2017-09-18 PROCEDURE — A9270 NON-COVERED ITEM OR SERVICE: HCPCS | Performed by: NURSE PRACTITIONER

## 2017-09-18 PROCEDURE — 770020 HCHG ROOM/CARE - TELE (206)

## 2017-09-18 PROCEDURE — G8978 MOBILITY CURRENT STATUS: HCPCS | Mod: CM

## 2017-09-18 PROCEDURE — 80048 BASIC METABOLIC PNL TOTAL CA: CPT

## 2017-09-18 PROCEDURE — 97165 OT EVAL LOW COMPLEX 30 MIN: CPT

## 2017-09-18 PROCEDURE — 700111 HCHG RX REV CODE 636 W/ 250 OVERRIDE (IP): Performed by: INTERNAL MEDICINE

## 2017-09-18 RX ORDER — LORAZEPAM 2 MG/ML
1 INJECTION INTRAMUSCULAR EVERY 4 HOURS PRN
Status: DISCONTINUED | OUTPATIENT
Start: 2017-09-18 | End: 2017-10-06

## 2017-09-18 RX ORDER — ACETAMINOPHEN 325 MG/1
650 TABLET ORAL EVERY 6 HOURS PRN
Status: DISCONTINUED | OUTPATIENT
Start: 2017-09-18 | End: 2017-10-23 | Stop reason: HOSPADM

## 2017-09-18 RX ADMIN — SODIUM CHLORIDE: 9 INJECTION, SOLUTION INTRAVENOUS at 08:26

## 2017-09-18 RX ADMIN — GABAPENTIN 200 MG: 100 CAPSULE ORAL at 21:32

## 2017-09-18 RX ADMIN — PIPERACILLIN SODIUM AND TAZOBACTAM SODIUM 4.5 G: 4; .5 INJECTION, POWDER, FOR SOLUTION INTRAVENOUS at 17:41

## 2017-09-18 RX ADMIN — METOPROLOL SUCCINATE 50 MG: 25 TABLET, EXTENDED RELEASE ORAL at 08:07

## 2017-09-18 RX ADMIN — ROSUVASTATIN CALCIUM 125 MCG: 10 TABLET, FILM COATED ORAL at 17:46

## 2017-09-18 RX ADMIN — TAMSULOSIN HYDROCHLORIDE 0.4 MG: 0.4 CAPSULE ORAL at 08:07

## 2017-09-18 RX ADMIN — PIPERACILLIN SODIUM AND TAZOBACTAM SODIUM 4.5 G: 4; .5 INJECTION, POWDER, FOR SOLUTION INTRAVENOUS at 05:00

## 2017-09-18 RX ADMIN — GABAPENTIN 200 MG: 100 CAPSULE ORAL at 15:42

## 2017-09-18 RX ADMIN — LEVETIRACETAM 500 MG: 500 TABLET, FILM COATED ORAL at 08:07

## 2017-09-18 RX ADMIN — LEVETIRACETAM 500 MG: 500 TABLET, FILM COATED ORAL at 21:32

## 2017-09-18 RX ADMIN — STANDARDIZED SENNA CONCENTRATE AND DOCUSATE SODIUM 2 TABLET: 8.6; 5 TABLET, FILM COATED ORAL at 21:32

## 2017-09-18 RX ADMIN — ATORVASTATIN CALCIUM 10 MG: 10 TABLET, FILM COATED ORAL at 21:32

## 2017-09-18 RX ADMIN — LISINOPRIL 10 MG: 5 TABLET ORAL at 08:07

## 2017-09-18 RX ADMIN — GABAPENTIN 200 MG: 100 CAPSULE ORAL at 08:07

## 2017-09-18 RX ADMIN — HEPARIN SODIUM 1450 UNITS/HR: 5000 INJECTION, SOLUTION INTRAVENOUS at 13:15

## 2017-09-18 ASSESSMENT — ENCOUNTER SYMPTOMS
BACK PAIN: 1
MYALGIAS: 1
SHORTNESS OF BREATH: 0
NAUSEA: 0
EYE DISCHARGE: 0
WEAKNESS: 1
COUGH: 0
NERVOUS/ANXIOUS: 0
ABDOMINAL PAIN: 0
DIARRHEA: 0
SPUTUM PRODUCTION: 0
DIZZINESS: 0
FEVER: 0
SPEECH CHANGE: 0
STRIDOR: 0
HEADACHES: 0
PALPITATIONS: 0
CHILLS: 0
DEPRESSION: 0

## 2017-09-18 ASSESSMENT — PAIN SCALES - GENERAL
PAINLEVEL_OUTOF10: 5
PAINLEVEL_OUTOF10: 0
PAINLEVEL_OUTOF10: 5
PAINLEVEL_OUTOF10: 3

## 2017-09-18 ASSESSMENT — LIFESTYLE VARIABLES
EVER_SMOKED: YES
ALCOHOL_USE: NO

## 2017-09-18 ASSESSMENT — PATIENT HEALTH QUESTIONNAIRE - PHQ9
1. LITTLE INTEREST OR PLEASURE IN DOING THINGS: NOT AT ALL
2. FEELING DOWN, DEPRESSED, IRRITABLE, OR HOPELESS: NOT AT ALL
SUM OF ALL RESPONSES TO PHQ9 QUESTIONS 1 AND 2: 0
SUM OF ALL RESPONSES TO PHQ QUESTIONS 1-9: 0

## 2017-09-18 ASSESSMENT — COGNITIVE AND FUNCTIONAL STATUS - GENERAL
DAILY ACTIVITIY SCORE: 11
MOBILITY SCORE: 8
PERSONAL GROOMING: A LOT
MOVING FROM LYING ON BACK TO SITTING ON SIDE OF FLAT BED: UNABLE
TURNING FROM BACK TO SIDE WHILE IN FLAT BAD: UNABLE
CLIMB 3 TO 5 STEPS WITH RAILING: TOTAL
WALKING IN HOSPITAL ROOM: A LOT
TOILETING: A LOT
SUGGESTED CMS G CODE MODIFIER DAILY ACTIVITY: CL
DRESSING REGULAR LOWER BODY CLOTHING: A LOT
HELP NEEDED FOR BATHING: A LOT
SUGGESTED CMS G CODE MODIFIER MOBILITY: CM
DRESSING REGULAR UPPER BODY CLOTHING: A LOT
STANDING UP FROM CHAIR USING ARMS: A LOT
MOVING TO AND FROM BED TO CHAIR: UNABLE
EATING MEALS: TOTAL

## 2017-09-18 ASSESSMENT — GAIT ASSESSMENTS: GAIT LEVEL OF ASSIST: UNABLE TO PARTICIPATE

## 2017-09-18 ASSESSMENT — ACTIVITIES OF DAILY LIVING (ADL): TOILETING: INDEPENDENT

## 2017-09-18 NOTE — CARE PLAN
Problem: Knowledge Deficit  Goal: Knowledge of disease process/condition, treatment plan, diagnostic tests, and medications will improve    Intervention: Assess knowledge level of disease process/condition, treatment plan, diagnostic tests, and medications  POC discussed at bedside. Patient verbalizes understanding. Education on medications provided. White board updated, patient encouraged to call for all needs. Calls appropriately. No immediate concerns at this time.        Problem: Pain Management  Goal: Pain level will decrease to patient's comfort goal    Intervention: Follow pain managment plan developed in collaboration with patient and Interdisciplinary Team  Pain assessed and documented per unit protocol and appropriate pharmacological and non-pharmacological interventions implemented. Reviewed pain goals with patient and family.

## 2017-09-18 NOTE — THERAPY
"Occupational Therapy Evaluation completed.   Functional Status:  Pt is max x 2 supine to sit, max x 2 sit to supine, max a to don socks, max a x 2 to scoot to EOB. Pt limited by severe nausea with sitting up and back pain with movement.   Plan of Care: Will benefit from Occupational Therapy 3 times per week  Discharge Recommendations:  Equipment: Will Continue to Assess for Equipment Needs. Post-acute therapy Discharge to a transitional care facility for continued skilled therapy services.    See \"Rehab Therapy-Acute\" Patient Summary Report for complete documentation.    "

## 2017-09-18 NOTE — PROGRESS NOTES
Received patient from ICU, AO X 4, transferred to bed with sliding boardsara on monitor.Hearn to gravity. IVF infusing as ordered. Informed of POC, verbalized understanding.

## 2017-09-18 NOTE — PROGRESS NOTES
Report given to tele 7 RN. Pt has some nausea and chest pain since being mobilized by PT. Pt states that chest pain is consistent with previous chest pain. Possibly associated with ribs due to increased pain upon inspiration. Pt prefers to be lying flat in bed. Stable vitals.

## 2017-09-18 NOTE — PROGRESS NOTES
"Pharmacy Kinetics 86 y.o. male on vancomycin day # 6 2017    Currently on Vancomycin 1700 mg iv q24hr (2300)  Other antibiotics: Zosyn 4.5 g IV q6h    Indication for Treatment: Empiric - sepsis of unknown origin    Pertinent history per medical record: Admitted on 2017 for altered mental status. Patient with recent hospitalization at [facility] for extended antibiotic therapy for discitis. With workup consistent for sepsis, empiric antibiotic therapy initiated, pending identification of source.     Allergies: Vancomycin     List concerns for renal function: age    Pertinent cultures to date:    PBC x 2: NGTD   influenza: negative    urine: NGTD    Recent Labs      17   2245  17   0940  17   0635   WBC  30.6*  29.9*  20.0*   NEUTSPOLYS  86.30*   --   81.00*     Recent Labs      17   2245  17   0520  17   0435   BUN  14  14  12   CREATININE  0.89  0.77  0.44*   ALBUMIN  3.2   --    --      Recent Labs      17   2045   VANCOTROUGH  18.3     Intake/Output Summary (Last 24 hours) at 17 1558  Last data filed at 17 1000   Gross per 24 hour   Intake          2666.62 ml   Output             1375 ml   Net          1291.62 ml      Blood pressure 140/85, pulse 85, temperature 36.3 °C (97.4 °F), resp. rate 20, height 1.803 m (5' 11\"), weight 94.2 kg (207 lb 10.8 oz), SpO2 96 %. Temp (24hrs), Av.3 °C (97.4 °F), Min:36 °C (96.8 °F), Max:36.5 °C (97.7 °F)      A/P   1. Vancomycin dose change: continue current dosing  2. Next vancomycin level: 2-3 days, sooner if decline in renal function  3. Goal trough: 16-20 mg/mL, source still not confirmed  4. Comments: Therapeutic trough level drawn ~ 2 hours early; anticipate true trough to be on lower end of therapeutic range.  Adjusted trough goal back to 16-20 mcg/mL since awaiting MRI results to rule out discitis and still have no other obvious source.  Continue current dosing and recommend ID consult to help " better guide therapy pending imaging results.  Pharmacy will continue to follow.     Jaycee Bautista, PharmD, BCPS

## 2017-09-18 NOTE — PROGRESS NOTES
2 RN skin assessment completed with  Rachael HENRY. Ears, elbows intact. Generalized bruising. Blisters intact bilateral heels,  Blood  blister right 5th toe .Abrasions bilaterals shins Excoriation right abdominal fold, redness to sacrum , blanchable. Mepilex applied.

## 2017-09-18 NOTE — CARE PLAN
Problem: Nutritional:  Goal: Achieve adequate nutritional intake  Patient will consume ~50% of meals and supplements.   Outcome: PROGRESSING SLOWER THAN EXPECTED  PO intake remains poor. Pt is on a full liquid diet and is receiving Boost supplements.     RD following. ?Advance diet.

## 2017-09-18 NOTE — WOUND TEAM
In to see pt for pressure injury mgmt. Pt had large watery stool, cleaned of incontinent episode. Linens changed. Pt positioned L side lying. Pictures and measurements taken of wounds. He has abraded skin to both shins, wounds ULYSSES with no drainage or s/s of infection. Sacrococcygeal skin is discolored red and purple on distal coccyx; it is all blanching. Pt was found down @ home, this could be slowly developing DTI.  With discoloration of sacrococcygeal area pt @ high risk for pressure injury development. Discussion with RN that pt might need rectal trumpet if stool continues to be this consistency. Barrier paste ordered for after incontinent episodes. Appropriate interventions in place.  RSKIN: CURRENT (X) ORDERED (O)  Q shift Oli:  X  Q shift pressure point assessments:  X  Pressure redistribution mattress         DAVID    x  Bariatric DAVID      Bariatric foam        Heel float boots       Heels floated on pillows  x    Barrier wipes x     Barrier Cream      Barrier paste o     Sacral silicone dressing      Silicone O2 tubing  x    Anchorfast      Trach with Optifoam split foam       Waffle cushion      Rectal tube or BMS  recommended    Antifungal tx    Turn q 2 hours  x remind pt  Up to chair    Ambulate   PT/OT     Dietician   x   PO  x   TF   TPN     PVN    NPO   # days   Other

## 2017-09-18 NOTE — PROGRESS NOTES
"Cardiology Progress Note               Author: Amaury Alexis Date & Time created: 2017  8:46 AM     Interval History:  86 year old male with a history of chronic atrial fibrillation treated with metoprolol and digoxin apparently not on anticoagulation admitted on 2017 with sepsis and elevated troponin level.  : /70. Pulse 88. Denies any chest pain or shortness of breath. He is oriented to person and place. Can state that he has \"atrial flutter\".    Review of Systems   Respiratory: Negative for sputum production.    Cardiovascular: Negative for chest pain and palpitations.       Physical Exam   Constitutional: He is oriented to person, place, and time. No distress.   Profoundly weak.   Neck:   Normal jugular venous pressure.   Cardiovascular: Normal rate, S1 normal, S2 normal and normal heart sounds.  An irregular rhythm present. Exam reveals no gallop and no friction rub.    No murmur heard.  Pulses:       Radial pulses are 2+ on the right side, and 2+ on the left side.   Pulmonary/Chest: Effort normal and breath sounds normal. He has no wheezes. He has no rhonchi. He has no rales.   Musculoskeletal: He exhibits no edema.   Neurological: He is alert and oriented to person, place, and time.   Skin: Skin is warm and dry.   Psychiatric: He has a normal mood and affect. His behavior is normal.       Hemodynamics:  Temp (24hrs), Av.3 °C (97.4 °F), Min:36 °C (96.8 °F), Max:36.5 °C (97.7 °F)  Temperature: 36.4 °C (97.5 °F)  Pulse  Av  Min: 45  Max: 135Heart Rate (Monitored): 77  NIBP: 127/67     Respiratory:    Respiration: (!) 21, Pulse Oximetry: 97 %     Work Of Breathing / Effort: Mild  RUL Breath Sounds: Clear;Diminished, RML Breath Sounds: Diminished, RLL Breath Sounds: Diminished, MIKE Breath Sounds: Clear;Diminished, LLL Breath Sounds: Diminished  Fluids:     Weight: 94.2 kg (207 lb 10.8 oz)  GI/Nutrition:  Orders Placed This Encounter   Procedures   • DIET ORDER     Standing " Status:   Standing     Number of Occurrences:   1     Order Specific Question:   Diet:     Answer:   Full Liquid [11]     Comments:   please add ensure per pt. request     Lab Results:  Recent Labs      09/16/17   2245  09/17/17   0940  09/18/17   0635   WBC  30.6*  29.9*  20.0*   RBC  4.61*  4.74  3.43*   HEMOGLOBIN  13.7*  14.1  10.3*   HEMATOCRIT  39.9*  41.8*  31.6*   MCV  86.6  88.2  92.1   MCH  29.7  29.7  30.0   MCHC  34.3  33.7  32.6*   RDW  42.8  45.1  47.7   PLATELETCT  261  #SN  198   MPV  10.6  #SN  10.9     Recent Labs      09/16/17   2245  09/17/17   0520  09/17/17   1208  09/18/17   0435   SODIUM  133*  131*   --   135   POTASSIUM  2.9*  2.7*  3.1*  4.1   CHLORIDE  93*  93*   --   103   CO2  26  31   --   24   GLUCOSE  209*  170*   --   112*   BUN  14  14   --   12   CREATININE  0.89  0.77   --   0.44*   CALCIUM  9.1  9.0   --   8.5     Recent Labs      09/17/17   1321  09/17/17   2045  09/18/17   0435   APTT  66.2*  28.0  63.2*         Recent Labs      09/16/17 2245  09/17/17   0520  09/17/17   1208   TROPONINI  0.07*  0.05*  0.04         09/14/2017. ECHOCARDIOGRAM   Normal left ventricular systolic   function.  Normal regional wall motion.  Left ventricular ejection fraction is visually estimated to be 70%.  Diastolic function is difficult to assess with atrial fibrillation.  The right ventricle was normal in size and function.  Structurally normal mitral valve without significant stenosis or   regurgitation.  Aortic sclerosis without stenosis.  No aortic insufficiency.  Normal pericardium without effusion.     EKG performed on (09/15/17) was reviewed: EKG shows atrial fibrillation.     03/01/2017 MYOCARDIAL PERFUSION STUDY     No myocardial infarct or inducible ischemia.  Normal LEFT ventricular function.      Medical Decision Making, by Problem:  Active Hospital Problems    Diagnosis   • *Elevated troponin [R74.8]   • Persistent atrial fibrillation (CMS-HCC) [I48.1]   • Sepsis (CMS-HCC) [A41.9]    • Nausea & vomiting [R11.2]   • Hypokalemia [E87.6]   • Hyperglycemia [R73.9]       Assessment and Plan:  Elevated troponin level most likely related to acute sepsis with myocardial injury in addition to probable supply demand mismatch. Normal left ventricular function.    Atrial fibrillation/flutter rate controlled.    Sepsis. On antibiotics.    General debility.    Continue current therapy with digoxin and metoprolol.  We'll follow Monday Wednesday Friday and PRN.  Review and reassess the patient's candidacy for anticoagulation.    Reviewed items::  EKG reviewed, Radiology images reviewed, Labs reviewed and Medications reviewed

## 2017-09-18 NOTE — PROGRESS NOTES
[facility] Hospitalist Progress Note    Date of Service: 2017    Chief Complaint  86 y.o. male admitted 2017 with ALOC after being found down on the floor at home by his Son. C/O Sepsis.    Interval Problem Update  Back to ICU due to afib rvr last night.  Currently A+Ox4 with Afib w/ HR 80's.  Afebrile.  On full liquid diet.  Liter bolus overnight.  MRI pending.  On 2L.  On heparin drip.    Consultants/Specialty  Cardiology    Disposition  Home        Review of Systems   Constitutional: Positive for malaise/fatigue. Negative for chills and fever.   Eyes: Negative for discharge.   Respiratory: Negative for cough, shortness of breath and stridor.    Cardiovascular: Negative for chest pain, palpitations and leg swelling.   Gastrointestinal: Negative for abdominal pain, diarrhea and nausea.   Genitourinary: Negative for dysuria.   Musculoskeletal: Positive for back pain, joint pain and myalgias.   Skin: Negative for rash.   Neurological: Positive for weakness. Negative for dizziness, speech change and headaches.   Psychiatric/Behavioral: Negative for depression. The patient is not nervous/anxious.       Physical Exam  Laboratory/Imaging   Hemodynamics  Temp (24hrs), Av.2 °C (97.1 °F), Min:35.8 °C (96.4 °F), Max:36.4 °C (97.5 °F)   Temperature: (!) 35.8 °C (96.4 °F)  Pulse  Av.5  Min: 45  Max: 135 Heart Rate (Monitored): 82  Blood Pressure : 132/79, NIBP: 138/64      Respiratory      Respiration: 18, Pulse Oximetry: 97 %     Work Of Breathing / Effort: Mild  RUL Breath Sounds: Clear;Diminished, RML Breath Sounds: Diminished, RLL Breath Sounds: Diminished, MIKE Breath Sounds: Clear;Diminished, LLL Breath Sounds: Diminished    Fluids    Intake/Output Summary (Last 24 hours) at 17 2347  Last data filed at 17 1800   Gross per 24 hour   Intake          2059.62 ml   Output             2700 ml   Net          -640.38 ml       Nutrition  Orders Placed This Encounter   Procedures   • DIET ORDER      Standing Status:   Standing     Number of Occurrences:   1     Order Specific Question:   Diet:     Answer:   Full Liquid [11]     Comments:   please add ensure per pt. request     Physical Exam   Constitutional: He is oriented to person, place, and time. He appears well-developed. He appears lethargic. He appears ill. No distress.   obese   HENT:   Head: Normocephalic and atraumatic.   Nose: Nose normal.   Mouth/Throat: Oropharynx is clear and moist.   Eyes: Conjunctivae and EOM are normal. Right eye exhibits no discharge. Left eye exhibits no discharge. No scleral icterus.   Neck: No tracheal deviation present.   Cardiovascular: Normal rate, normal heart sounds and intact distal pulses.  An irregularly irregular rhythm present.   No murmur heard.  Pulses:       Radial pulses are 2+ on the right side, and 2+ on the left side.   Pulmonary/Chest: Effort normal and breath sounds normal. No stridor. No respiratory distress. He has no wheezes.   Abdominal: Soft. Bowel sounds are normal. He exhibits no distension. There is no tenderness.   Musculoskeletal: He exhibits no edema.   Neurological: He is oriented to person, place, and time. He appears lethargic. No cranial nerve deficit.   Skin: Skin is warm. He is not diaphoretic.   Psychiatric: He has a normal mood and affect. His behavior is normal.   Vitals reviewed.      Recent Labs      09/16/17   2245  09/17/17   0940  09/18/17   0635   WBC  30.6*  29.9*  20.0*   RBC  4.61*  4.74  3.43*   HEMOGLOBIN  13.7*  14.1  10.3*   HEMATOCRIT  39.9*  41.8*  31.6*   MCV  86.6  88.2  92.1   MCH  29.7  29.7  30.0   MCHC  34.3  33.7  32.6*   RDW  42.8  45.1  47.7   PLATELETCT  261  #SN  198   MPV  10.6  #SN  10.9     Recent Labs      09/16/17   2245  09/17/17   0520  09/17/17   1208  09/18/17   0435   SODIUM  133*  131*   --   135   POTASSIUM  2.9*  2.7*  3.1*  4.1   CHLORIDE  93*  93*   --   103   CO2  26  31   --   24   GLUCOSE  209*  170*   --   112*   BUN  14  14   --   12    CREATININE  0.89  0.77   --   0.44*   CALCIUM  9.1  9.0   --   8.5     Recent Labs      09/17/17   2045  09/18/17   0435  09/18/17   1136   APTT  28.0  63.2*  64.8*                  Assessment/Plan     * Elevated troponin   Assessment & Plan    Serial troponins showing decrease  On heparin drip  Cardiology consulting  On atorvastatin        Persistent atrial fibrillation (CMS-HCC)   Assessment & Plan    On telemetry   Cardiology consulting  Rate and BP improved.  Transfer back to telemetry floor  On heparin drip, metoprolol SR, digoxin        Nausea & vomiting   Assessment & Plan    No current complaint        Hyperglycemia- (present on admission)   Assessment & Plan    HbA1c normal at 5.5        Hypokalemia- (present on admission)   Assessment & Plan    Repleting as needed  Monitor BMP and Mg        Sepsis (CMS-HCC)   Assessment & Plan    This is sepsis (without associated acute organ dysfunction).   Unclear etiology at this point in time. 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  Cdiff ordered due to diarrhea but Negative  Monitor blood cultures, no growth to date  Urine analysis reviewed  continue current antibiotics.            Reviewed items::  Labs reviewed and Medications reviewed  Hearn catheter::  Critically Ill - Requiring Accurate Measurement of Urinary Output and Strict Intake and Output During Sepisis or Shock  DVT prophylaxis pharmacological::  Heparin  Antibiotics:  Treating active infection/contamination beyond 24 hours perioperative coverage

## 2017-09-18 NOTE — THERAPY
"Physical Therapy Evaluation completed.   Bed Mobility:  Supine to Sit: Maximal Assist (of 2 )  Transfers: Sit to Stand: declined  Gait: Level Of Assist: Unable to Participate   Plan of Care: Will benefit from Physical Therapy 3 times per week  Discharge Recommendations: Equipment: Will Continue to Assess for Equipment Needs.     Pt presents with impaired activity tolerance, safety awareness and pain s/p GLF. Pt is most limited by nausea and abdominal pain at this time, worse with sitting up, BP stable; HR peak at 121. anticipate pt to require placement and may need a long term plan for 24/7 care once able to return home. Will follow.     See \"Rehab Therapy-Acute\" Patient Summary Report for complete documentation.     "

## 2017-09-19 ENCOUNTER — APPOINTMENT (OUTPATIENT)
Dept: RADIOLOGY | Facility: MEDICAL CENTER | Age: 82
DRG: 871 | End: 2017-09-19
Attending: HOSPITALIST
Payer: MEDICARE

## 2017-09-19 ENCOUNTER — APPOINTMENT (OUTPATIENT)
Dept: RADIOLOGY | Facility: MEDICAL CENTER | Age: 82
DRG: 871 | End: 2017-09-19
Attending: INTERNAL MEDICINE
Payer: MEDICARE

## 2017-09-19 PROBLEM — M86.68 CHRONIC OSTEOMYELITIS OF LUMBAR SPINE (HCC): Status: ACTIVE | Noted: 2017-09-19

## 2017-09-19 PROBLEM — R11.2 NAUSEA & VOMITING: Status: RESOLVED | Noted: 2017-09-16 | Resolved: 2017-09-19

## 2017-09-19 PROBLEM — E87.6 HYPOKALEMIA: Status: RESOLVED | Noted: 2017-09-14 | Resolved: 2017-09-19

## 2017-09-19 PROBLEM — R73.9 HYPERGLYCEMIA: Status: RESOLVED | Noted: 2017-09-14 | Resolved: 2017-09-19

## 2017-09-19 PROBLEM — R79.89 ELEVATED TROPONIN: Status: RESOLVED | Noted: 2017-09-14 | Resolved: 2017-09-19

## 2017-09-19 LAB
ANION GAP SERPL CALC-SCNC: 5 MMOL/L (ref 0–11.9)
ANION GAP SERPL CALC-SCNC: 6 MMOL/L (ref 0–11.9)
APTT PPP: 101 SEC (ref 24.7–36)
BUN SERPL-MCNC: 16 MG/DL (ref 8–22)
BUN SERPL-MCNC: 16 MG/DL (ref 8–22)
CALCIUM SERPL-MCNC: 8.7 MG/DL (ref 8.5–10.5)
CALCIUM SERPL-MCNC: 8.8 MG/DL (ref 8.5–10.5)
CHLORIDE SERPL-SCNC: 102 MMOL/L (ref 96–112)
CHLORIDE SERPL-SCNC: 102 MMOL/L (ref 96–112)
CO2 SERPL-SCNC: 25 MMOL/L (ref 20–33)
CO2 SERPL-SCNC: 26 MMOL/L (ref 20–33)
CREAT SERPL-MCNC: 0.34 MG/DL (ref 0.5–1.4)
CREAT SERPL-MCNC: 0.36 MG/DL (ref 0.5–1.4)
ERYTHROCYTE [DISTWIDTH] IN BLOOD BY AUTOMATED COUNT: 46.1 FL (ref 35.9–50)
GFR SERPL CREATININE-BSD FRML MDRD: >60 ML/MIN/1.73 M 2
GFR SERPL CREATININE-BSD FRML MDRD: >60 ML/MIN/1.73 M 2
GLUCOSE SERPL-MCNC: 117 MG/DL (ref 65–99)
GLUCOSE SERPL-MCNC: 118 MG/DL (ref 65–99)
HCT VFR BLD AUTO: 25.6 % (ref 42–52)
HGB BLD-MCNC: 8.3 G/DL (ref 14–18)
MCH RBC QN AUTO: 29.5 PG (ref 27–33)
MCHC RBC AUTO-ENTMCNC: 32.4 G/DL (ref 33.7–35.3)
MCV RBC AUTO: 91.1 FL (ref 81.4–97.8)
MORPHOLOGY BLD-IMP: NORMAL
MRSA DNA SPEC QL NAA+PROBE: ABNORMAL
PLATELET # BLD AUTO: 209 K/UL (ref 164–446)
PMV BLD AUTO: 10.6 FL (ref 9–12.9)
POTASSIUM SERPL-SCNC: 3.8 MMOL/L (ref 3.6–5.5)
POTASSIUM SERPL-SCNC: 3.9 MMOL/L (ref 3.6–5.5)
PROCALCITONIN SERPL-MCNC: 0.16 NG/ML
RBC # BLD AUTO: 2.81 M/UL (ref 4.7–6.1)
SIGNIFICANT IND 70042: ABNORMAL
SITE SITE: ABNORMAL
SODIUM SERPL-SCNC: 132 MMOL/L (ref 135–145)
SODIUM SERPL-SCNC: 134 MMOL/L (ref 135–145)
SOURCE SOURCE: ABNORMAL
WBC # BLD AUTO: 19.2 K/UL (ref 4.8–10.8)

## 2017-09-19 PROCEDURE — 99232 SBSQ HOSP IP/OBS MODERATE 35: CPT | Performed by: HOSPITALIST

## 2017-09-19 PROCEDURE — 700105 HCHG RX REV CODE 258: Performed by: INTERNAL MEDICINE

## 2017-09-19 PROCEDURE — 700111 HCHG RX REV CODE 636 W/ 250 OVERRIDE (IP): Performed by: HOSPITALIST

## 2017-09-19 PROCEDURE — 36415 COLL VENOUS BLD VENIPUNCTURE: CPT

## 2017-09-19 PROCEDURE — A9270 NON-COVERED ITEM OR SERVICE: HCPCS | Performed by: INTERNAL MEDICINE

## 2017-09-19 PROCEDURE — 85730 THROMBOPLASTIN TIME PARTIAL: CPT

## 2017-09-19 PROCEDURE — 700102 HCHG RX REV CODE 250 W/ 637 OVERRIDE(OP): Performed by: NURSE PRACTITIONER

## 2017-09-19 PROCEDURE — 74177 CT ABD & PELVIS W/CONTRAST: CPT

## 2017-09-19 PROCEDURE — 84145 PROCALCITONIN (PCT): CPT

## 2017-09-19 PROCEDURE — 700102 HCHG RX REV CODE 250 W/ 637 OVERRIDE(OP): Performed by: INTERNAL MEDICINE

## 2017-09-19 PROCEDURE — 90662 IIV NO PRSV INCREASED AG IM: CPT | Performed by: HOSPITALIST

## 2017-09-19 PROCEDURE — 700111 HCHG RX REV CODE 636 W/ 250 OVERRIDE (IP): Performed by: INTERNAL MEDICINE

## 2017-09-19 PROCEDURE — 770020 HCHG ROOM/CARE - TELE (206)

## 2017-09-19 PROCEDURE — 700105 HCHG RX REV CODE 258: Performed by: PHARMACIST

## 2017-09-19 PROCEDURE — 80048 BASIC METABOLIC PNL TOTAL CA: CPT

## 2017-09-19 PROCEDURE — 700117 HCHG RX CONTRAST REV CODE 255: Performed by: HOSPITALIST

## 2017-09-19 PROCEDURE — 90471 IMMUNIZATION ADMIN: CPT

## 2017-09-19 PROCEDURE — 700111 HCHG RX REV CODE 636 W/ 250 OVERRIDE (IP): Performed by: PHARMACIST

## 2017-09-19 PROCEDURE — 72158 MRI LUMBAR SPINE W/O & W/DYE: CPT

## 2017-09-19 PROCEDURE — 700111 HCHG RX REV CODE 636 W/ 250 OVERRIDE (IP)

## 2017-09-19 PROCEDURE — 85027 COMPLETE CBC AUTOMATED: CPT

## 2017-09-19 PROCEDURE — 87641 MR-STAPH DNA AMP PROBE: CPT

## 2017-09-19 PROCEDURE — 700105 HCHG RX REV CODE 258: Performed by: HOSPITALIST

## 2017-09-19 PROCEDURE — A9270 NON-COVERED ITEM OR SERVICE: HCPCS | Performed by: NURSE PRACTITIONER

## 2017-09-19 PROCEDURE — A9579 GAD-BASE MR CONTRAST NOS,1ML: HCPCS | Performed by: HOSPITALIST

## 2017-09-19 RX ORDER — LORAZEPAM 2 MG/ML
0.25 INJECTION INTRAMUSCULAR ONCE
Status: COMPLETED | OUTPATIENT
Start: 2017-09-19 | End: 2017-09-19

## 2017-09-19 RX ADMIN — SODIUM CHLORIDE: 9 INJECTION, SOLUTION INTRAVENOUS at 20:34

## 2017-09-19 RX ADMIN — IOHEXOL 100 ML: 350 INJECTION, SOLUTION INTRAVENOUS at 19:15

## 2017-09-19 RX ADMIN — GADODIAMIDE 20 ML: 287 INJECTION INTRAVENOUS at 14:09

## 2017-09-19 RX ADMIN — GABAPENTIN 200 MG: 100 CAPSULE ORAL at 15:41

## 2017-09-19 RX ADMIN — ENOXAPARIN SODIUM 40 MG: 100 INJECTION SUBCUTANEOUS at 17:54

## 2017-09-19 RX ADMIN — PIPERACILLIN SODIUM AND TAZOBACTAM SODIUM 4.5 G: 4; .5 INJECTION, POWDER, FOR SOLUTION INTRAVENOUS at 17:54

## 2017-09-19 RX ADMIN — ATORVASTATIN CALCIUM 10 MG: 10 TABLET, FILM COATED ORAL at 20:31

## 2017-09-19 RX ADMIN — INFLUENZA A VIRUSA/MICHIGAN/45/2015 X-275 (H1N1) ANTIGEN (FORMALDEHYDE INACTIVATED), INFLUENZA A VIRUS A/HONG KONG/4801/2014 X-263B (H3N2) ANTIGEN (FORMALDEHYDE INACTIVATED), AND INFLUENZA B VIRUS B/BRISBANE/60/2008 ANTIGEN (FORMALDEHYDE INACTIVATED) 0.5 ML: 60; 60; 60 INJECTION, SUSPENSION INTRAMUSCULAR at 08:52

## 2017-09-19 RX ADMIN — LISINOPRIL 10 MG: 5 TABLET ORAL at 08:52

## 2017-09-19 RX ADMIN — TAMSULOSIN HYDROCHLORIDE 0.4 MG: 0.4 CAPSULE ORAL at 08:52

## 2017-09-19 RX ADMIN — PIPERACILLIN SODIUM AND TAZOBACTAM SODIUM 4.5 G: 4; .5 INJECTION, POWDER, FOR SOLUTION INTRAVENOUS at 12:24

## 2017-09-19 RX ADMIN — VANCOMYCIN HYDROCHLORIDE 1700 MG: 100 INJECTION, POWDER, LYOPHILIZED, FOR SOLUTION INTRAVENOUS at 00:27

## 2017-09-19 RX ADMIN — PIPERACILLIN SODIUM AND TAZOBACTAM SODIUM 4.5 G: 4; .5 INJECTION, POWDER, FOR SOLUTION INTRAVENOUS at 22:19

## 2017-09-19 RX ADMIN — GABAPENTIN 200 MG: 100 CAPSULE ORAL at 22:14

## 2017-09-19 RX ADMIN — GABAPENTIN 200 MG: 100 CAPSULE ORAL at 08:52

## 2017-09-19 RX ADMIN — LORAZEPAM 0.25 MG: 2 INJECTION INTRAMUSCULAR; INTRAVENOUS at 13:05

## 2017-09-19 RX ADMIN — HEPARIN SODIUM 1450 UNITS/HR: 5000 INJECTION, SOLUTION INTRAVENOUS at 06:57

## 2017-09-19 RX ADMIN — ACETAMINOPHEN 650 MG: 325 TABLET, FILM COATED ORAL at 00:33

## 2017-09-19 RX ADMIN — ROSUVASTATIN CALCIUM 125 MCG: 10 TABLET, FILM COATED ORAL at 17:54

## 2017-09-19 RX ADMIN — LEVETIRACETAM 500 MG: 500 TABLET, FILM COATED ORAL at 20:31

## 2017-09-19 RX ADMIN — LEVETIRACETAM 500 MG: 500 TABLET, FILM COATED ORAL at 08:52

## 2017-09-19 RX ADMIN — SODIUM CHLORIDE: 9 INJECTION, SOLUTION INTRAVENOUS at 00:26

## 2017-09-19 RX ADMIN — PIPERACILLIN SODIUM AND TAZOBACTAM SODIUM 4.5 G: 4; .5 INJECTION, POWDER, FOR SOLUTION INTRAVENOUS at 05:17

## 2017-09-19 RX ADMIN — METOPROLOL SUCCINATE 50 MG: 25 TABLET, EXTENDED RELEASE ORAL at 08:52

## 2017-09-19 ASSESSMENT — ENCOUNTER SYMPTOMS
FEVER: 0
BLURRED VISION: 0
HEADACHES: 0
VOMITING: 0
SPUTUM PRODUCTION: 0
DOUBLE VISION: 0
BACK PAIN: 1
PALPITATIONS: 0
COUGH: 0
CHILLS: 0
NAUSEA: 0
SHORTNESS OF BREATH: 0
CONSTIPATION: 0
DIARRHEA: 0
ABDOMINAL PAIN: 0

## 2017-09-19 ASSESSMENT — LIFESTYLE VARIABLES: DO YOU DRINK ALCOHOL: NO

## 2017-09-19 ASSESSMENT — PAIN SCALES - GENERAL: PAINLEVEL_OUTOF10: 0

## 2017-09-19 NOTE — PROGRESS NOTES
Bedside report completed.  Assumed pt care.  Pt AAO x4, resting in bed comfortably with no signs of labored breathing.  Pt tele monitor in place, cardiac rhythm being monitored.  Pt call light within reach, bed in low position, non skid socks in place.  Patient needs addressed.

## 2017-09-19 NOTE — CARE PLAN
Problem: Safety  Goal: Will remain free from injury    Intervention: Provide assistance with mobility  RN educated patient regarding the importance of regular mobility to improve patient experience.  Patient verbalized understanding of education and denies any questions at this time.        Problem: Skin Integrity  Goal: Risk for impaired skin integrity will decrease    Intervention: Assess risk factors for impaired skin integrity and/or pressure ulcers  RN educated patient regarding the importance of regular movement to decrease the risk of pressure ulcer formation.  Patient verbalized understanding of education and denies any questions at this time.

## 2017-09-19 NOTE — CARE PLAN
Problem: Skin Integrity  Goal: Risk for impaired skin integrity will decrease  Outcome: PROGRESSING AS EXPECTED  Q 2hr turns in place, pillows in use for positioning and floating heels. Ensure patient remains dry, remindeded to shift position.    Problem: Pain Management  Goal: Pain level will decrease to patient's comfort goal  Outcome: PROGRESSING SLOWER THAN EXPECTED  Educated on pain scale, implemented non-pharmacological methods: distraction, repositioned for comfort.

## 2017-09-19 NOTE — PROGRESS NOTES
[facility] Hospitalist Progress Note    Date of Service: 2017    Chief Complaint  86 y.o. male admitted 2017 with back pain     Interval Problem Update  Patient was noted to have sepsis.  He was started on antibiotic therapy.  Initially, due to prior history of lumbar osteomyelitis, lumbar spine MRI was ordered.  In addition to the chronic osteomyelitis, it was noted that he has some nearby intramuscular abscesses.  Patient also has been on heparin infusion, for possible cardiac ischemia, however troponin indeterminate, and cardiology consultation without significant cardiac disease.  Heparin discontinued.    Consultants/Specialty  Cardiology     Disposition  Home when stable         Review of Systems   Constitutional: Negative for chills and fever.   Eyes: Negative for blurred vision and double vision.   Respiratory: Negative for cough, sputum production and shortness of breath.    Cardiovascular: Negative for chest pain and palpitations.   Gastrointestinal: Negative for abdominal pain, constipation, diarrhea, nausea and vomiting.   Genitourinary: Negative for dysuria.   Musculoskeletal: Positive for back pain.   Neurological: Negative for headaches.      Physical Exam  Laboratory/Imaging   Hemodynamics  Temp (24hrs), Av.1 °C (96.9 °F), Min:35.8 °C (96.4 °F), Max:36.3 °C (97.3 °F)   Temperature: 36.3 °C (97.3 °F)  Pulse  Av.2  Min: 45  Max: 135    Blood Pressure : 127/81      Respiratory      Respiration: 16, Pulse Oximetry: 99 %     Work Of Breathing / Effort: Mild  RUL Breath Sounds: Clear;Diminished, RML Breath Sounds: Diminished, RLL Breath Sounds: Diminished, MIKE Breath Sounds: Clear;Diminished, LLL Breath Sounds: Diminished    Fluids    Intake/Output Summary (Last 24 hours) at 17 1642  Last data filed at 17 1520   Gross per 24 hour   Intake              240 ml   Output             5100 ml   Net            -4860 ml       Nutrition  Orders Placed This Encounter   Procedures   • DIET  ORDER     Standing Status:   Standing     Number of Occurrences:   1     Order Specific Question:   Diet:     Answer:   Full Liquid [11]     Comments:   please add ensure per pt. request     Physical Exam   Constitutional: He is oriented to person, place, and time. He appears well-developed and well-nourished. No distress.   HENT:   Head: Normocephalic.   Eyes: Pupils are equal, round, and reactive to light.   Neck: Normal range of motion. Neck supple.   Cardiovascular: Normal rate, regular rhythm and normal heart sounds.  Exam reveals no gallop and no friction rub.    No murmur heard.  Pulmonary/Chest: Effort normal and breath sounds normal. No respiratory distress. He has no wheezes.   Abdominal: Soft. Bowel sounds are normal. He exhibits no distension and no mass. There is no tenderness. There is no rebound and no guarding.   Musculoskeletal: Normal range of motion. He exhibits no edema.   Neurological: He is alert and oriented to person, place, and time. No cranial nerve deficit.   Skin: He is not diaphoretic.       Recent Labs      09/17/17   0940  09/18/17   0635  09/19/17   0314   WBC  29.9*  20.0*  19.2*   RBC  4.74  3.43*  2.81*   HEMOGLOBIN  14.1  10.3*  8.3*   HEMATOCRIT  41.8*  31.6*  25.6*   MCV  88.2  92.1  91.1   MCH  29.7  30.0  29.5   MCHC  33.7  32.6*  32.4*   RDW  45.1  47.7  46.1   PLATELETCT  #SN  198  209   MPV  #SN  10.9  10.6     Recent Labs      09/17/17   0520  09/17/17   1208  09/18/17   0435  09/19/17   0314   SODIUM  131*   --   135  132*  134*   POTASSIUM  2.7*  3.1*  4.1  3.8  3.9   CHLORIDE  93*   --   103  102  102   CO2  31   --   24  25  26   GLUCOSE  170*   --   112*  117*  118*   BUN  14   --   12  16  16   CREATININE  0.77   --   0.44*  0.36*  0.34*   CALCIUM  9.0   --   8.5  8.7  8.8     Recent Labs      09/18/17   0435  09/18/17   1136  09/19/17   1127   APTT  63.2*  64.8*  101.0*                  Assessment/Plan     Persistent atrial fibrillation (CMS-HCC)    Assessment & Plan    Rate with variable control.  Lovenox for prophylaxis. Will discontinue heparin infusion.  Patient was noted initially to have indeterminately elevated troponin, which was not associated with chest pain or otherwise.          Chronic osteomyelitis of lumbar spine (CMS-[facility])   Assessment & Plan    Continue antibiotic therapy         Sepsis (CMS-HCC)   Assessment & Plan    This is sepsis (without associated acute organ dysfunction). Evidence of chronic osteomyelitis of lumbar spine, but in addition evidence of nearby intramuscular abscesses.  Obtaining CT abdomen and pelvis to evaluate further, with IV contrast alone.  Continue antibiotic therapy.  May benefit from IR drain, after further evaluation of imaging.             Reviewed items::  Medications reviewed and Labs reviewed  Hearn catheter::  No Hearn  DVT prophylaxis pharmacological::  Enoxaparin (Lovenox)

## 2017-09-19 NOTE — PROGRESS NOTES
"Pharmacy Kinetics 86 y.o. male on vancomycin day # 7 9/19/2017    Currently Dose: Vancomycin 1700 mg iv q24hr  Received Load Dose: Yes    Indication for Treatment: sepsis of unknown origin (empiric therapy)  ID Service Following: No    Pertinent history per medical record: Admitted on 9/13/2017 for AMS suspected to sepsis. Was residing at [facility] prior to admission. Antimicrobials initiated for empiric sepsis treatment.    Other antibiotics: piperacillin/tazobactam 4.5 gm iv q6h    Allergies: Vancomycin     List concerns for accumulation of vancomycin: age ~86, electrolyte derangement, lactic acidosis, Hx DM    Pertinent cultures to date:   Results     Procedure Component Value Units Date/Time    BLOOD CULTURE [[id number]] Collected:  09/13/17 2035    Order Status:  Completed Specimen:  Blood from Peripheral Updated:  09/18/17 2300     Significant Indicator NEG     Source BLD     Site PERIPHERAL     Blood Culture No growth after 5 days of incubation.    Narrative:       Per Hospital Policy: Only change Specimen Src: to \"Line\" if  specified by physician order.    BLOOD CULTURE [[id number]] Collected:  09/13/17 2115    Order Status:  Completed Specimen:  Blood Updated:  09/18/17 2300     Significant Indicator NEG     Source BLD     Site --     Blood Culture No growth after 5 days of incubation.    CDIFF BY PCR WITH TOXIN [[id number]] Collected:  09/17/17 1826    Order Status:  Completed Specimen:  Stool from Stool Updated:  09/17/17 2127     C Diff by PCR Negative     Comment: C. difficile NOT detected by PCR.  Treatment not indicated per guidelines.  Repeat testing not indicated within 7 days.          027-NAP1-BI Presumptive Negative     Comment: Presumptive 027/NAP1/BI target DNA sequences are NOT DETECTED.       Narrative:       Special Contact [id number] IFEOMA TORRES  Does this patient have risk factors for C-diff?->Yes  C-Diff Risk Factors->antibiotic exposure  Has patient taken stool softeners " or laxatives in the last 5  days?->Yes    URINE CULTURE(NEW) [[id number]] Collected:  09/13/17 2103    Order Status:  Completed Specimen:  Urine Updated:  09/15/17 0818     Significant Indicator NEG     Source UR     Site --     Urine Culture No growth at 48 hours    Narrative:       Indication for culture:->Emergency Room Patient    INFLUENZA BY PCR, A/B/H1N1 [[id number]] Collected:  09/13/17 2125    Order Status:  Completed Specimen:  Respirate Updated:  09/14/17 0106     Influenza virus A RNA Negative     Influenza virus B RNA Negative     Influenza A 2009, H1N1, PCR Not Detected    Influenza Rapid [[id number]] Collected:  09/13/17 2125    Order Status:  Completed Specimen:  Respirate from Respiratory Updated:  09/13/17 2159     Significant Indicator NEG     Source RESP     Site RESPIRATORY     Rapid Influenza A-B --     Negative for Influenza A and Influenza B antigens.  Infection due to influenza A or B cannot be ruled out  since the antigen present in the specimen may be below the  detection limit of the test. Culture confirmation of  negative samples is recommended.      URINALYSIS [[id number]]  (Abnormal) Collected:  09/13/17 2103    Order Status:  Completed Specimen:  Urine Updated:  09/13/17 2146     Micro Urine Req Microscopic     Color Angelica     Character Bloody (A)     Specific Gravity 1.015     Ph 6.0     Glucose Trace (A) mg/dL      Ketones 100 (A) mg/dL      Protein 100 (A) mg/dL      Bilirubin Negative     Urobilinogen, Urine Normal     Nitrite Negative     Leukocyte Esterase Negative     Occult Blood Large (A)    Narrative:       Indication for culture:->Emergency Room Patient    BLOOD CULTURE [[id number]] Collected:  09/13/17 0000    Order Status:  Canceled Specimen:  Other from Peripheral     BLOOD CULTURE [[id number]] Collected:  09/13/17 0000    Order Status:  Canceled     Influenza By PCR, A/B/H1N1 [[id number]] Collected:  09/13/17 0000    Order Status:  Canceled Specimen:  Nasal from Nasopharyngeal   "   Blood Culture [[id number]] Collected:  17 0000    Order Status:  Canceled Specimen:  Other from Peripheral     Blood Culture [[id number]] Collected:  17 0000    Order Status:  Canceled Specimen:  Other from Peripheral     Urinalysis [[id number]] Collected:  17 0000    Order Status:  Canceled Specimen:  Urine     Culture Respiratory W/ GRM STN [[id number]] Collected:  17 0000    Order Status:  Canceled Specimen:  Sputum from Sputum         Recent Labs      17   2245  17   0940  17   0635  17   0314   WBC  30.6*  29.9*  20.0*  19.2*   NEUTSPOLYS  86.30*   --   81.00*   --      Recent Labs      17   2245  17   0520  17   0435  17   0314   BUN  14  14  12  16  16   CREATININE  0.89  0.77  0.44*  0.36*  0.34*   ALBUMIN  3.2   --    --    --      Recent Labs      17   2045   [facility]  18.3     Intake/Output Summary (Last 24 hours) at 17 0854  Last data filed at 17 0600   Gross per 24 hour   Intake              808 ml   Output             3275 ml   Net            -2467 ml      Blood pressure 113/60, pulse 85, temperature 35.9 °C (96.6 °F), resp. rate 16, height 1.803 m (5' 11\"), weight 93.8 kg (206 lb 12.7 oz), SpO2 98 %. Temp (24hrs), Av.1 °C (96.9 °F), Min:35.8 °C (96.4 °F), Max:36.3 °C (97.4 °F)    Estimated Creatinine Clearance: 182.4 mL/min (by C-G formula based on SCr of 0.34 mg/dL).    A/P   1. Vancomycin dose change: not indicated   2. Next vancomycin level: ~1-2 days  3. Goal trough: 16-20 mcg/mL  4. Comments: VS stable. Afebrile. WBC elevated/down. CrCl ~182 mL/min (stable). No microbiology reporting. Factors for accumulation identifed. Most recent trough at goal. Likely to repeat trough in ~1-2 days. Recommend ID consult. May consider MRSA nares. Would recommend evaluation of continuing vancomycin given lack of MRSA isolation. Pharmacy will follow and continue to adjust as appropriate.    Robert Mcnulty, PharmD  "

## 2017-09-19 NOTE — PROGRESS NOTES
Aidan from Lab called with critical result of APTT at 101 Critical lab result read back to Tech. Pt on heparin protocol.  1310 Pt off floor to MRI. Off heparin drip and monitor per order  1325 Call from MRI, radiologist recommending for IV contrast. DR Liriano notified.  1400 Back to room. Heparin drip restarted.

## 2017-09-19 NOTE — PROGRESS NOTES
Bedside report from night shift nurse, assumed patient care.  Patient alert and oriented x4, informed of POC , verbalized understanding.  No complaints of pain at this time.

## 2017-09-20 ENCOUNTER — APPOINTMENT (OUTPATIENT)
Dept: RADIOLOGY | Facility: MEDICAL CENTER | Age: 82
DRG: 871 | End: 2017-09-20
Attending: HOSPITALIST
Payer: MEDICARE

## 2017-09-20 PROBLEM — K68.12 PSOAS ABSCESS (HCC): Status: ACTIVE | Noted: 2017-09-20

## 2017-09-20 LAB
ANION GAP SERPL CALC-SCNC: 6 MMOL/L (ref 0–11.9)
BASOPHILS # BLD AUTO: 0.4 % (ref 0–1.8)
BASOPHILS # BLD: 0.07 K/UL (ref 0–0.12)
BUN SERPL-MCNC: 16 MG/DL (ref 8–22)
CALCIUM SERPL-MCNC: 9.2 MG/DL (ref 8.5–10.5)
CHLORIDE SERPL-SCNC: 102 MMOL/L (ref 96–112)
CO2 SERPL-SCNC: 23 MMOL/L (ref 20–33)
CREAT SERPL-MCNC: 0.42 MG/DL (ref 0.5–1.4)
EOSINOPHIL # BLD AUTO: 0.25 K/UL (ref 0–0.51)
EOSINOPHIL NFR BLD: 1.4 % (ref 0–6.9)
ERYTHROCYTE [DISTWIDTH] IN BLOOD BY AUTOMATED COUNT: 46.6 FL (ref 35.9–50)
GFR SERPL CREATININE-BSD FRML MDRD: >60 ML/MIN/1.73 M 2
GLUCOSE SERPL-MCNC: 122 MG/DL (ref 65–99)
HCT VFR BLD AUTO: 26.1 % (ref 42–52)
HEMOCCULT STL QL: POSITIVE
HGB BLD-MCNC: 8.7 G/DL (ref 14–18)
IMM GRANULOCYTES # BLD AUTO: 0.39 K/UL (ref 0–0.11)
IMM GRANULOCYTES NFR BLD AUTO: 2.2 % (ref 0–0.9)
INR PPP: 1.09 (ref 0.87–1.13)
LYMPHOCYTES # BLD AUTO: 2.14 K/UL (ref 1–4.8)
LYMPHOCYTES NFR BLD: 12.2 % (ref 22–41)
MAGNESIUM SERPL-MCNC: 1.5 MG/DL (ref 1.5–2.5)
MCH RBC QN AUTO: 30.3 PG (ref 27–33)
MCHC RBC AUTO-ENTMCNC: 33.3 G/DL (ref 33.7–35.3)
MCV RBC AUTO: 90.9 FL (ref 81.4–97.8)
MONOCYTES # BLD AUTO: 1.95 K/UL (ref 0–0.85)
MONOCYTES NFR BLD AUTO: 11.1 % (ref 0–13.4)
NEUTROPHILS # BLD AUTO: 12.73 K/UL (ref 1.82–7.42)
NEUTROPHILS NFR BLD: 72.7 % (ref 44–72)
NRBC # BLD AUTO: 0 K/UL
NRBC BLD AUTO-RTO: 0 /100 WBC
PLATELET # BLD AUTO: 266 K/UL (ref 164–446)
PMV BLD AUTO: 10.8 FL (ref 9–12.9)
POTASSIUM SERPL-SCNC: 3.8 MMOL/L (ref 3.6–5.5)
PROTHROMBIN TIME: 14.5 SEC (ref 12–14.6)
RBC # BLD AUTO: 2.87 M/UL (ref 4.7–6.1)
SODIUM SERPL-SCNC: 131 MMOL/L (ref 135–145)
WBC # BLD AUTO: 17.5 K/UL (ref 4.8–10.8)

## 2017-09-20 PROCEDURE — 700105 HCHG RX REV CODE 258: Performed by: HOSPITALIST

## 2017-09-20 PROCEDURE — 700101 HCHG RX REV CODE 250: Performed by: HOSPITALIST

## 2017-09-20 PROCEDURE — 700101 HCHG RX REV CODE 250

## 2017-09-20 PROCEDURE — 80048 BASIC METABOLIC PNL TOTAL CA: CPT

## 2017-09-20 PROCEDURE — 770020 HCHG ROOM/CARE - TELE (206)

## 2017-09-20 PROCEDURE — 85610 PROTHROMBIN TIME: CPT

## 2017-09-20 PROCEDURE — 700101 HCHG RX REV CODE 250: Performed by: INTERNAL MEDICINE

## 2017-09-20 PROCEDURE — 700111 HCHG RX REV CODE 636 W/ 250 OVERRIDE (IP): Performed by: HOSPITALIST

## 2017-09-20 PROCEDURE — A9270 NON-COVERED ITEM OR SERVICE: HCPCS

## 2017-09-20 PROCEDURE — 87070 CULTURE OTHR SPECIMN AEROBIC: CPT

## 2017-09-20 PROCEDURE — 82272 OCCULT BLD FECES 1-3 TESTS: CPT

## 2017-09-20 PROCEDURE — 83735 ASSAY OF MAGNESIUM: CPT

## 2017-09-20 PROCEDURE — 700102 HCHG RX REV CODE 250 W/ 637 OVERRIDE(OP): Performed by: INTERNAL MEDICINE

## 2017-09-20 PROCEDURE — 99232 SBSQ HOSP IP/OBS MODERATE 35: CPT | Performed by: HOSPITALIST

## 2017-09-20 PROCEDURE — 700102 HCHG RX REV CODE 250 W/ 637 OVERRIDE(OP)

## 2017-09-20 PROCEDURE — 36415 COLL VENOUS BLD VENIPUNCTURE: CPT

## 2017-09-20 PROCEDURE — 4410213 CT-DRAIN-RETROPERITONEAL

## 2017-09-20 PROCEDURE — 85025 COMPLETE CBC W/AUTO DIFF WBC: CPT

## 2017-09-20 PROCEDURE — 87205 SMEAR GRAM STAIN: CPT

## 2017-09-20 PROCEDURE — 700111 HCHG RX REV CODE 636 W/ 250 OVERRIDE (IP): Performed by: INTERNAL MEDICINE

## 2017-09-20 PROCEDURE — 0K9N30Z DRAINAGE OF RIGHT HIP MUSCLE WITH DRAINAGE DEVICE, PERCUTANEOUS APPROACH: ICD-10-PCS | Performed by: RADIOLOGY

## 2017-09-20 PROCEDURE — A9270 NON-COVERED ITEM OR SERVICE: HCPCS | Performed by: INTERNAL MEDICINE

## 2017-09-20 PROCEDURE — 700102 HCHG RX REV CODE 250 W/ 637 OVERRIDE(OP): Performed by: NURSE PRACTITIONER

## 2017-09-20 PROCEDURE — 700105 HCHG RX REV CODE 258: Performed by: INTERNAL MEDICINE

## 2017-09-20 PROCEDURE — A9270 NON-COVERED ITEM OR SERVICE: HCPCS | Performed by: NURSE PRACTITIONER

## 2017-09-20 PROCEDURE — 700111 HCHG RX REV CODE 636 W/ 250 OVERRIDE (IP)

## 2017-09-20 RX ORDER — NALOXONE HYDROCHLORIDE 0.4 MG/ML
INJECTION, SOLUTION INTRAMUSCULAR; INTRAVENOUS; SUBCUTANEOUS
Status: COMPLETED
Start: 2017-09-20 | End: 2017-09-20

## 2017-09-20 RX ORDER — FLUMAZENIL 0.1 MG/ML
INJECTION INTRAVENOUS
Status: COMPLETED
Start: 2017-09-20 | End: 2017-09-20

## 2017-09-20 RX ORDER — MIDAZOLAM HYDROCHLORIDE 1 MG/ML
INJECTION INTRAMUSCULAR; INTRAVENOUS
Status: COMPLETED
Start: 2017-09-20 | End: 2017-09-20

## 2017-09-20 RX ORDER — ONDANSETRON 2 MG/ML
4 INJECTION INTRAMUSCULAR; INTRAVENOUS PRN
Status: ACTIVE | OUTPATIENT
Start: 2017-09-20 | End: 2017-09-20

## 2017-09-20 RX ORDER — LEVETIRACETAM 100 MG/ML
500 SOLUTION ORAL EVERY 12 HOURS
Status: DISCONTINUED | OUTPATIENT
Start: 2017-09-20 | End: 2017-09-28

## 2017-09-20 RX ORDER — LINEZOLID 2 MG/ML
600 INJECTION, SOLUTION INTRAVENOUS EVERY 12 HOURS
Status: DISCONTINUED | OUTPATIENT
Start: 2017-09-20 | End: 2017-09-20

## 2017-09-20 RX ORDER — METOPROLOL TARTRATE 1 MG/ML
5 INJECTION, SOLUTION INTRAVENOUS EVERY 6 HOURS PRN
Status: DISCONTINUED | OUTPATIENT
Start: 2017-09-20 | End: 2017-10-23 | Stop reason: HOSPADM

## 2017-09-20 RX ORDER — MIDAZOLAM HYDROCHLORIDE 1 MG/ML
.5-2 INJECTION INTRAMUSCULAR; INTRAVENOUS PRN
Status: ACTIVE | OUTPATIENT
Start: 2017-09-20 | End: 2017-09-20

## 2017-09-20 RX ORDER — SODIUM CHLORIDE 9 MG/ML
500 INJECTION, SOLUTION INTRAVENOUS
Status: ACTIVE | OUTPATIENT
Start: 2017-09-20 | End: 2017-09-20

## 2017-09-20 RX ADMIN — ROSUVASTATIN CALCIUM 125 MCG: 10 TABLET, FILM COATED ORAL at 20:05

## 2017-09-20 RX ADMIN — METOPROLOL TARTRATE 5 MG: 5 INJECTION INTRAVENOUS at 22:29

## 2017-09-20 RX ADMIN — MUPIROCIN 1 APPLICATION: 20 OINTMENT TOPICAL at 10:39

## 2017-09-20 RX ADMIN — VANCOMYCIN HYDROCHLORIDE 1700 MG: 100 INJECTION, POWDER, LYOPHILIZED, FOR SOLUTION INTRAVENOUS at 10:39

## 2017-09-20 RX ADMIN — PIPERACILLIN SODIUM AND TAZOBACTAM SODIUM 4.5 G: 4; .5 INJECTION, POWDER, FOR SOLUTION INTRAVENOUS at 12:16

## 2017-09-20 RX ADMIN — MUPIROCIN 1 APPLICATION: 20 OINTMENT TOPICAL at 20:06

## 2017-09-20 RX ADMIN — PIPERACILLIN SODIUM AND TAZOBACTAM SODIUM 4.5 G: 4; .5 INJECTION, POWDER, FOR SOLUTION INTRAVENOUS at 05:17

## 2017-09-20 RX ADMIN — SODIUM CHLORIDE: 9 INJECTION, SOLUTION INTRAVENOUS at 17:27

## 2017-09-20 RX ADMIN — GABAPENTIN 200 MG: 100 CAPSULE ORAL at 20:06

## 2017-09-20 RX ADMIN — LEVETIRACETAM 500 MG: 100 SOLUTION ORAL at 20:08

## 2017-09-20 ASSESSMENT — PAIN SCALES - GENERAL
PAINLEVEL_OUTOF10: 0
PAINLEVEL_OUTOF10: 0

## 2017-09-20 ASSESSMENT — ENCOUNTER SYMPTOMS
ABDOMINAL PAIN: 0
BACK PAIN: 1
VOMITING: 0
PALPITATIONS: 0
HEADACHES: 0
SHORTNESS OF BREATH: 0
FEVER: 0
CONSTIPATION: 0
NAUSEA: 0
CHILLS: 0
SPUTUM PRODUCTION: 0
DIARRHEA: 0
BLURRED VISION: 0
COUGH: 0
DOUBLE VISION: 0

## 2017-09-20 ASSESSMENT — LIFESTYLE VARIABLES: DO YOU DRINK ALCOHOL: NO

## 2017-09-20 NOTE — THERAPY
Orders received for swallow evaluation. Pt currently NPO for procedure. Will reattempt when pt able to participate.

## 2017-09-20 NOTE — CONSULTS
ADULT INFECTIOUS DISEASE CONSULT     Date of Service: 9/20/17    Consult Requested By: Tim Liriano M.D.    Reason for Consultation: Discitis osteomyelitis    History of Present Illness:   Jersey Alexis is a 86 y.o. Male with history of diabetes mellitus, hypertension and atrial fibrillation. He was admitted from 7/6/2017 for 7/25/17 for discitis at L3-4. His blood cultures ×1 was positive for strep viridans and his IR biopsy done on 7/10/17 was negative. He was treated with Rocephin through 8/20/17. He was found altered by his son at home. He had WBC count up to 18,000 on admission. He had high WBC on last admission but he was on steroids at that time. 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 in view of these issues infectious disease consultation,    Review Of Systems:  Gen.-Denies any fevers. Denies any chills  HEENT- denies any sore throat, headache or vision changes  Pulmonary- denies any cough, shortness of breath  Cardiovascular- denies any chest pain, leg swelling.   GI- denies any nausea vomiting diarrhea or abdominal pain  Musculoskeletal-complains of significant back pain more on the right side  Neuro- denies any weakness or sensory change  Psych- denies any depression or suicidal ideation  Genitourinary- denies any frequency or dysuria        PMH:   Past Medical History:   Diagnosis Date   • A-fib (CMS-HCC)    • Arrhythmia    • Arthritis    • Cataract    • Diabetes (CMS-HCC)    • GERD (gastroesophageal reflux disease)    • Hyperlipidemia    • Muscle disorder     nerve damage due to spinal stenosis         PSH:  Past Surgical History:   Procedure Laterality Date   • TONSILLECTOMY Bilateral 1936   • APPENDECTOMY  Early 2000's   • CATARACT PHACO WITH IOL Bilateral Early 2000's   • LAMINOTOMY  late 80s    spinal stenosis   • LAMINOTOMY  late 90s    spinal fusion        FAMILY HX:  Family History   Problem Relation  Age of Onset   • Heart Failure Mother    • Heart Attack Mother    • Heart Disease Mother      pacemaker   • Paranoid behavior Mother    • Heart Failure Father    • Cancer Sister      Breast   • Other Brother      colostomy   • Cancer Brother    • No Known Problems Maternal Grandmother    • No Known Problems Maternal Grandfather    • No Known Problems Paternal Grandmother    • No Known Problems Paternal Grandfather        SOCIAL HX:  Social History     Social History   • Marital status:      Spouse name: N/A   • Number of children: N/A   • Years of education: N/A     Occupational History   • Not on file.     Social History Main Topics   • Smoking status: Former Smoker     Packs/day: 1.50     Years: 29.00     Types: Cigarettes, Pipe     Quit date: 1/1/1980   • Smokeless tobacco: Never Used      Comment: Started smoking at age 21   • Alcohol use No   • Drug use: No   • Sexual activity: No     Other Topics Concern   • Not on file     Social History Narrative   • No narrative on file     History   Smoking Status   • Former Smoker   • Packs/day: 1.50   • Years: 29.00   • Types: Cigarettes, Pipe   • Quit date: 1/1/1980   Smokeless Tobacco   • Never Used     Comment: Started smoking at age 21     History   Alcohol Use No       Allergies/Intolerances:  Allergies   Allergen Reactions   • Vancomycin Rash     Diffuse  Tolerates at slower infusion rates.        History reviewed with the patient    Other Current Medications:    Current Facility-Administered Medications:   •  vancomycin 1,700 mg in  mL IVPB, 19 mg/kg, Intravenous, Q24HR, Tim Liriano M.D., Last Rate: 83.3 mL/hr at 09/20/17 1039, 1,700 mg at 09/20/17 1039  •  MD ALERT... vancomycin per pharmacy protocol, , Other, pharmacy to dose, Tim Liriano M.D.  •  mupirocin (BACTROBAN) 2 % ointment, , Topical, BID, Tim Liriano M.D., 1 Application at 09/20/17 1039  •  enoxaparin (LOVENOX) inj 40 mg, 40 mg, Subcutaneous, DAILY, Tim Liriano M.D., Stopped at  09/20/17 0900  •  pneumococcal 13-Jocelyn Conj Vacc (PREVNAR 13) syringe 0.5 mL, 0.5 mL, Intramuscular, Once PRN, Yaya Ragland D.O.  •  lorazepam (ATIVAN) injection 1 mg, 1 mg, Intravenous, Q4HRS PRN, Denise VALENZUELA Micone, A.P.N.  •  acetaminophen (TYLENOL) tablet 650 mg, 650 mg, Oral, Q6HRS PRN, Denise A Micone, A.P.N., 650 mg at 09/19/17 0033  •  digoxin (LANOXIN) tablet 125 mcg, 125 mcg, Oral, DAILY AT 1800, [facility], A.[facility].N., 125 mcg at 09/19/17 1754  •  prochlorperazine (COMPAZINE) injection 10 mg, 10 mg, Intravenous, Q6HRS PRN, Ger Huang M.D., 10 mg at 09/15/17 0829  •  promethazine (PHENERGAN) tablet 25 mg, 25 mg, Oral, Q6HRS PRN, Ger Huang M.D.  •  hydrALAZINE (APRESOLINE) injection 20 mg, 20 mg, Intravenous, Q6HRS PRN, Ger Huang M.D.  •  lisinopril (PRINIVIL) 10 MG tablet 10 mg, 10 mg, Oral, Q DAY, Eron Lott M.D., Stopped at 09/20/17 0900  •  hyoscyamine-maalox plus-lidocaine viscous (GI COCKTAIL) oral susp 30 mL, 30 mL, Oral, Q6HRS PRN, Eron Lott M.D., 30 mL at 09/16/17 1912  •  NS infusion, , Intravenous, Continuous, Yaya Ragland D.O., Last Rate: 75 mL/hr at 09/19/17 2034  •  labetalol (NORMODYNE,TRANDATE) injection 10 mg, 10 mg, Intravenous, Q6HRS PRN, Jeffrey Guzmán M.D., 10 mg at 09/14/17 0627  •  ondansetron (ZOFRAN) syringe/vial injection 4 mg, 4 mg, Intravenous, Q4HRS PRN, Yaya Ragland D.O., 4 mg at 09/16/17 1912  •  metoprolol SR (TOPROL XL) tablet 50 mg, 50 mg, Oral, DAILY, Eron Lott M.D., Stopped at 09/20/17 0900  •  atorvastatin (LIPITOR) tablet 10 mg, 10 mg, Oral, Nightly, Jeffrey Guzmán M.D., 10 mg at 09/19/17 2031  •  gabapentin (NEURONTIN) capsule 200 mg, 200 mg, Oral, TID, Jeffrey Guzmán M.D., Stopped at 09/20/17 0900  •  levetiracetam (KEPPRA) tablet 500 mg, 500 mg, Oral, BID, Jeffrey Guzmán M.D., Stopped at 09/20/17 0900  •  tamsulosin (FLOMAX) capsule 0.4 mg, 0.4 mg, Oral, AFTER BREAKFAST, Jeffrey Guzmán M.D., Stopped at 09/20/17 0830  •  NS infusion 2,994 mL, 30 mL/kg,  "Intravenous, Once PRN, Jeffrey Guzmán M.D.  •  senna-docusate (PERICOLACE or SENOKOT S) 8.6-50 MG per tablet 2 Tab, 2 Tab, Oral, BID, Stopped at 17 0900 **AND** polyethylene glycol/lytes (MIRALAX) PACKET 1 Packet, 1 Packet, Oral, QDAY PRN **AND** magnesium hydroxide (MILK OF MAGNESIA) suspension 30 mL, 30 mL, Oral, QDAY PRN **AND** bisacodyl (DULCOLAX) suppository 10 mg, 10 mg, Rectal, QDAY PRN, Jeffrey Guzmán M.D.  •  Respiratory Care per Protocol, , Nebulization, Continuous RT, Jeffrey Guzmán M.D.  •  NS (BOLUS) infusion 1,000 mL, 1,000 mL, Intravenous, Once PRN, Jeffrey Guzmán M.D.  •  piperacillin-tazobactam (ZOSYN) 4.5 g in  mL IVPB, 4.5 g, Intravenous, Q6HRS, Jeffrey Guzmán M.D., Last Rate: 100 mL/hr at 17 1216, 4.5 g at 17 1216  [unfilled]    Most Recent Vital Signs:  /65   Pulse 91   Temp 35.9 °C (96.6 °F)   Resp 18   Ht 1.803 m (5' 11\")   Wt 90.8 kg (200 lb 2.8 oz)   SpO2 97%   BMI 27.92 kg/m²   Temp  Av.3 °C (97.3 °F)  Min: 35.6 °C (96.1 °F)  Max: 37.5 °C (99.5 °F)    Physical Exam:  General: He looks  ill  HEENT: sclera anicteric, PERRL, EOMI, MMM, no oral lesions  Neck: supple, no lymphadenopathy  Chest: CTAB, no r/r/w, normal work of breathing.  Cardiac: Irregularly irregular  Abdomen: + bowel sounds, soft, non-tender, non-distended, no HSM  Extremities: No edema. No joint swelling.  Skin: no rashes or erythema  Neuro: Awake and oriented    Pertinent Lab Results:  Recent Labs      17   0635  17   0314  17   0358   WBC  20.0*  19.2*  17.5*      Recent Labs      17   0635  17   0314  17   0358   HEMOGLOBIN  10.3*  8.3*  8.7*   HEMATOCRIT  31.6*  25.6*  26.1*   MCV  92.1  91.1  90.9   MCH  30.0  29.5  30.3   PLATELETCT  198  209  266         Recent Labs      17   0435  17   0314  17   0358   SODIUM  135  132*  134*  131*   POTASSIUM  4.1  3.8  3.9  3.8   CHLORIDE  103  102  102  102   CO2  24  25  26  23   CREATININE  " "0.44*  0.36*  0.34*  0.42*        No results for input(s): ALBUMIN in the last 72 hours.    Invalid input(s): AST, ALT, ALKPHOS, BILITOT, TOTALBILIRUB, BILIRUBINTOT, BILIRUBINDIR, BILIRUBININD, ALKALINEPHOS     Pertinent Micro:  Results     Procedure Component Value Units Date/Time    MRSA BY PCR (AMP) [[id number]]  (Abnormal) Collected:  09/19/17 1230    Order Status:  Completed Specimen:  Respirate from Nares Updated:  09/19/17 1656     Significant Indicator POS (POS)     Source RESP     Site NARES     MRSA PCR POSITIVE for MRSA by PCR. (A)    Narrative:       CALL  [facility]  171 tel. [phone],  CALLED  171 tel. [phone] 09/19/2017, 16:56, RB PERF. RESULTS CALLED TO:RN  [id number]  Collected By:[id number] AZALEA SCOTT    BLOOD CULTURE [[id number]] Collected:  09/13/17 2035    Order Status:  Completed Specimen:  Blood from Peripheral Updated:  09/18/17 2300     Significant Indicator NEG     Source BLD     Site PERIPHERAL     Blood Culture No growth after 5 days of incubation.    Narrative:       Per Hospital Policy: Only change Specimen Src: to \"Line\" if  specified by physician order.    BLOOD CULTURE [[id number]] Collected:  09/13/17 2115    Order Status:  Completed Specimen:  Blood Updated:  09/18/17 2300     Significant Indicator NEG     Source BLD     Site --     Blood Culture No growth after 5 days of incubation.    CDIFF BY PCR WITH TOXIN [[id number]] Collected:  09/17/17 1826    Order Status:  Completed Specimen:  Stool from Stool Updated:  09/17/17 2127     C Diff by PCR Negative     Comment: C. difficile NOT detected by PCR.  Treatment not indicated per guidelines.  Repeat testing not indicated within 7 days.          027-NAP1-BI Presumptive Negative     Comment: Presumptive 027/NAP1/BI target DNA sequences are NOT DETECTED.       Narrative:       Special Contact [id number] IFEOMA TORRES  Does this patient have risk factors for C-diff?->Yes  C-Diff Risk Factors->antibiotic exposure  Has patient taken " stool softeners or laxatives in the last 5  days?->Yes    URINE CULTURE(NEW) [[id number]] Collected:  09/13/17 2103    Order Status:  Completed Specimen:  Urine Updated:  09/15/17 0818     Significant Indicator NEG     Source UR     Site --     Urine Culture No growth at 48 hours    Narrative:       Indication for culture:->Emergency Room Patient    INFLUENZA BY PCR, A/B/H1N1 [[id number]] Collected:  09/13/17 2125    Order Status:  Completed Specimen:  Respirate Updated:  09/14/17 0106     Influenza virus A RNA Negative     Influenza virus B RNA Negative     Influenza A 2009, H1N1, PCR Not Detected    Influenza Rapid [[id number]] Collected:  09/13/17 2125    Order Status:  Completed Specimen:  Respirate from Respiratory Updated:  09/13/17 2159     Significant Indicator NEG     Source RESP     Site RESPIRATORY     Rapid Influenza A-B --     Negative for Influenza A and Influenza B antigens.  Infection due to influenza A or B cannot be ruled out  since the antigen present in the specimen may be below the  detection limit of the test. Culture confirmation of  negative samples is recommended.      URINALYSIS [[id number]]  (Abnormal) Collected:  09/13/17 2103    Order Status:  Completed Specimen:  Urine Updated:  09/13/17 2146     Micro Urine Req Microscopic     Color Angelica     Character Bloody (A)     Specific Gravity 1.015     Ph 6.0     Glucose Trace (A) mg/dL      Ketones 100 (A) mg/dL      Protein 100 (A) mg/dL      Bilirubin Negative     Urobilinogen, Urine Normal     Nitrite Negative     Leukocyte Esterase Negative     Occult Blood Large (A)    Narrative:       Indication for culture:->Emergency Room Patient        Blood Culture   Date Value Ref Range Status   09/13/2017 No growth after 5 days of incubation.  Final        Studies:  Ct-abdomen-pelvis With    Result Date: 9/19/2017 9/19/2017 6:34 PM HISTORY/REASON FOR EXAM:  Abscess noted on MRI. TECHNIQUE/EXAM DESCRIPTION:  CT scan of the abdomen and pelvis with  contrast. Contrast-enhanced helical scanning was obtained from the diaphragmatic domes through the pubic symphysis following the bolus administration of nonionic contrast without complication. 100 mL of Omnipaque 350 nonionic contrast was administered without complication. Low dose optimization technique was utilized for this CT exam including automated exposure control and adjustment of the mA and/or kV according to patient size. COMPARISON: MRI from the same day FINDINGS: Lung bases: There are small bilateral pleural effusions with overlying atelectasis. Abdomen: No free air is seen in the abdomen or pelvis. Evaluation is limited by streak artifact from barium within the colon. There is wall thickening of the distal esophagus with a small hiatal hernia. Liver appears unremarkable. Portal vein is patent. There is calcification in the pancreatic head. No adrenal mass is identified. Tiny hypodense left renal lesion is too small to characterize. There is mild prominence of the renal collecting systems bilaterally. Small hypodense right renal lesions too small to characterize. There are nonobstructing right renal calculi. Atherosclerotic plaque is seen in the aorta and its branches. There are small inguinal, retroperitoneal and mesenteric lymph nodes. There is no evidence of bowel obstruction. There is a moderate amount of stool in the rectosigmoid colon. There is colonic diverticulosis. The appendix is not identified. Stomach is distended with fluid. Small bowel loops are fluid-filled. Bladder is mildly distended with foci of air. There is asymmetric prominence of the right iliopsoas musculature with surrounding stranding. Findings likely related to the previously noted abscess collection. Degenerative changes are seen in the spine. Postsurgical changes are noted in the lumbar spine. Degenerative changes are seen at the hips. There is mild loss of height of the superior endplate of T11 with a Schmorl's node noted.  There is mild endplate irregularity at L3/L4.     Prominence of the right iliopsoas muscle with surrounding inflammatory stranding. The known fluid collection was better delineated on the prior MRI. Mild endplate irregularity at L3/L4 can be seen in discitis/osteomyelitis. Air-fluid level in the bladder. Correlation with urinalysis is recommended. This can be seen in the setting of recent instrumentation, infection or fistula. Colonic diverticulosis. Moderate amount of colonic stool. Nonobstructing right renal calculi. Mild prominence of the renal collecting systems bilaterally. Small hypodense renal lesions are too small to characterize. Fluid-filled loops of small bowel can be seen in the setting of enteritis. Small hiatal hernia with mild thickening of the distal esophagus. Calcifications in the pancreatic head can be seen in chronic pancreatitis. Atherosclerotic plaque. Small bilateral pleural effusions with overlying atelectasis.     Ct-head W/o    Result Date: 9/13/2017 9/13/2017 9:30 PM HISTORY/REASON FOR EXAM:  Altered Mental Status. TECHNIQUE/EXAM DESCRIPTION AND NUMBER OF VIEWS: CT of the head without contrast. The study was performed on a helical multidetector CT scanner. Contiguous 2.5 mm axial sections were obtained from the skull base through the vertex. Up to date radiation dose reduction adjustments have been utilized to meet ALARA standards for radiation dose reduction. COMPARISON:  7/12/2017 FINDINGS: The lateral ventricles are enlarged. Cortical sulci are enlarged. Patchy areas of low attenuation in the white matter bilaterally. No significant mass effect or midline shift. Basal cisterns are patent. No evidence for intracranial hemorrhage. Calvaria are intact. Visualized orbits are unremarkable. Visualized mastoid air cells are clear. Visualized paranasal sinuses are unremarkable. Calcifications of the carotid arteries noted. Calcified scalp nodules again present.     1.  Diffuse atrophy and  white matter changes. 2.  No acute intracranial hemorrhage or territorial infarct.     Dx-chest-portable (1 View)    Result Date: 9/16/2017 9/16/2017 10:31 PM HISTORY/REASON FOR EXAM:  Shortness of Breath. TECHNIQUE/EXAM DESCRIPTION AND NUMBER OF VIEWS: Single portable view of the chest. COMPARISON: 9/13/2017 FINDINGS: Cardiac mediastinal contour is unchanged. Mediastinum is again prominent. Mild prominence of the pulmonary interstitium. No gross pleural fluid or pneumothorax. Dextroconvex curvature of thoracic spine.     No significant change from prior exam.    Dx-chest-portable (1 View)    Result Date: 9/13/2017 9/13/2017 8:44 PM HISTORY/REASON FOR EXAM:  Possible sepsis. TECHNIQUE/EXAM DESCRIPTION AND NUMBER OF VIEWS: Single portable view of the chest. COMPARISON: 8/7/2017 FINDINGS: Cardiac mediastinal contour is unchanged. Mild diffuse prominence of the interstitium again seen. No focal consolidation. No pleural fluid collection or pneumothorax. Dextroconvex curvature of thoracic spine. Diffuse osteopenia.     1.  Diffuse prominence of interstitium again seen, likely interstitial lung disease.  Mild pulmonary edema is also a consideration. 2.  No pneumonia or pneumothorax. 3.  Stable cardiomegaly.    Dx-small Bowel Series    Result Date: 9/17/2017 9/16/2017 10:39 AM HISTORY/REASON FOR EXAM:  Nausea and vomiting. TECHNIQUE/EXAM DESCRIPTION AND NUMBER OF VIEWS: Single contrast barium small bowel follow-through performed. Fluoroscopic images were obtained. No fluoroscopic images obtained. COMPARISON:  None available. FINDINGS:  view the abdomen demonstrates marked gaseous distention and small bowel and colonic distention. Patient drank thin barium. Contrast passed very slowly into dilated small bowel loops. Jejunal loops are dilated up to 5.2 cm. Overhead images were taken for a total of 22 hours before termination of the exam. Contrast reached the ileum, but does not extend into the colon. There is a  large amount of stool in the colon.     1.  Gaseous distention with fairly diffuse bowel dilatation. Contrast extends into the ileum, but does not reach the colon within 22 hours. No transition point is identified to suggest mechanical bowel obstruction. 2.  Large amount of stool in the rectal vault.    Mr-lumbar Spine-with & W/o    Result Date: 9/19/2017 9/19/2017 1:35 PM HISTORY/REASON FOR EXAM:  Altered mental status, weakness. Possible discitis. Previous lumbar surgery. TECHNIQUE/EXAM DESCRIPTION: MRI of the lumbar spine without and with contrast. The study was performed on a [employer] 1.5 [employer] MRI scanner. T1 sagittal, T2 fast spin-echo sagittal, T2 axial images and T1 axial images were obtained of the lumbar spine. T1 post-contrast sagittal and T1 post-contrast axial images were obtained. Optional T2 fat-suppressed sagittal images may be obtained. 20 mL Omniscan gadolinium contrast was administered intravenously. COMPARISON:  MRI lumbar spine 7/7/2017, T abdomen and pelvis 1/6/2017 FINDINGS: Alignment in the lumbar spine again shows retrolisthesis of L3 on L4 of about 5 mm. There is fluid signal T2 hyperintensity again seen in the L3-L4 disc space and prominent marrow edema signal at the L3 inferior endplate and to a lesser extent L4 superior endplate. There is corresponding enhancement, particularly at the inferior endplate of L3. Findings are highly suspicious for discitis with osteomyelitis. There is postoperative change with lumbar laminectomy at L2-L3 through L5. There is posterior fusion with transpedicular screw fixation 4-L5. The posterior paraspinous soft tissues show expected postoperative changes with atrophy and fatty replacement in  the posterior paraspinous muscles at the operative levels. There is no significant postoperative dorsal epidural fluid collection. However, there has been interval development of multilocular fluid collections in the right iliacus muscle spanning about 42 mm. These  are consistent with intramuscular abscesses (T2 axial image 4, series 5, T1 postcontrast axial image 1, series 10). The conus is normal in position and signal with its tip at the L1 level. At T12-L1, is a tiny central and left paramedian disc protrusion with an underlying annular fissure. There is mild hypertrophic facet arthropathy. Thecal sac is toward the lower range of normal without karla central stenosis. The neural foramina are intact. At L1-2, there is negligible disc bulging. There is no central stenosis or significant foraminal stenosis. At L2-3, there is a small broad-based disc-osteophyte complex. There is no central stenosis as there is decompressive laminectomy at this level. There is no significant foraminal stenosis. At L3-4, there is posterior marginal spurring and disc bulging accentuated by the retrolisthesis. There is moderate bilateral lateral recess stenosis. Thecal sac is toward the lower range of normal even though there is a decompressive laminectomy at this  level. However, there is no karla central stenosis (T2 axial image 24, series 5). There is severe bilateral foraminal stenosis, right greater than left. This is unchanged from the previous exam. At L4-5, no central stenosis. There is inferior foraminal blunting with moderate bilateral foraminal stenosis. At L5-S1, there is minimal disc bulging. There is a left paramedian annular fissure. There is mild hypertrophic facet arthropathy. No central stenosis. There is moderate bilateral foraminal stenosis.     1.  L3-4 findings again suggesting discitis with osteomyelitis. No evidence of epidural abscess or epidural phlegmon. 2.  L3-4 retrolisthesis. 3.  Postoperative multilevel lumbar laminectomy and posterior fusion at L4-5. 4.  Interval development of multilocular intramuscular abscesses in the right iliacus muscle, partly in the field of view 5.  Degenerative and spondylotic changes as detailed for each level above in the body of  report.    Dx-esophagus - Barium Swallow    Result Date: 2017 10:39 AM HISTORY/REASON FOR EXAM:  Nausea. Nausea and vomiting. TECHNIQUE/EXAM DESCRIPTION AND NUMBER OF VIEWS: Single contrast esophagram procedure was performed. 5 fluoroscopic images obtained. Fluoroscopy time: 0.37 minutes COMPARISON:  None. FINDINGS: Limited exam secondary to limited patient mobility. The esophagus appears normal in caliber and configuration. No definite mucosal lesions are identified on single-contrast techniques. Contrast passes freely into the stomach. No hiatal hernia is identified.     No abnormalities identified on limited single contrast esophagram.    Echocardiogram Comp W/o Cont    Result Date: 2017  Transthoracic Echo Report Echocardiography Laboratory CONCLUSIONS Prior study done on [id number] left ventricular systolic function. Normal regional wall motion. Left ventricular ejection fraction is visually estimated to be 70%. Diastolic function is difficult to assess with atrial fibrillation. The right ventricle was normal in size and function. Structurally normal mitral valve without significant stenosis or regurgitation. Aortic sclerosis without stenosis. No aortic insufficiency. Normal pericardium without effusion. BRIAN SPARROW Exam Date:         2017                    08:50 Exam Location:     Inpatient Priority:          Routine Ordering Physician:        OLESYA WHITE Referring Physician:       [id number] Still Sonographer:               LENARD Adamson RDCS Age:    86     Gender:    M MRN:    [id number] :    10/18/1930 BSA:    2.2    Ht (in):    71     Wt (lb):    220 Exam Type:     Complete Indications:     Abnormal electrocardiogram ECG EKG ICD Codes:        CPT Codes:       76095 BP:   166    /   105    HR: Technical Quality:       Technically difficult study -                          adequate information is obtained MEASUREMENTS  (Male / Female)  Normal Values 2D ECHO LV Diastolic Diameter PLAX        4.8 cm                4.2 - 5.9 / 3.9 - 5.3 cm LV Systolic Diameter PLAX         2.9 cm                2.1 - 4.0 cm IVS Diastolic Thickness           1.1 cm                LVPW Diastolic Thickness          1 cm                  LVOT Diameter                     2.2 cm                Estimated LV Ejection Fraction    70 %                  LV Ejection Fraction MOD BP       88.4 %                >= 55  % LV Ejection Fraction MOD 4C       91.3 %                LV Ejection Fraction MOD 2C       79.9 %                IVC Diameter                      2.2 cm                M-MODE Aortic Root Diameter MM           2.2 cm                DOPPLER AV Peak Velocity                  1.2 m/s               AV Peak Gradient                  5.8 mmHg              LVOT Peak Velocity                0.92 m/s              * Indicates values subject to auto-interpretation LV EF:  70    % FINDINGS Left Ventricle Normal left ventricular chamber size. Normal left ventricular wall thickness. Normal left ventricular systolic function. Normal regional wall motion. Left ventricular ejection fraction is visually estimated to be 70%. Diastolic function is difficult to assess with atrial fibrillation. Right Ventricle The right ventricle was normal in size and function. Right Atrium The right atrium is normal in size.  Inferior vena cava is not well visualized. Left Atrium The left atrium is normal in size.  Left atrial volume index is 19  mL/sq m. Mitral Valve Structurally normal mitral valve without significant stenosis or regurgitation.  Trace mitral regurgitation. Aortic Valve Aortic sclerosis without stenosis. Tricuspid aortic valve. No aortic insufficiency. Tricuspid Valve Structurally normal tricuspid valve without significant stenosis or regurgitation. Pulmonic Valve Structurally normal pulmonic valve without significant stenosis or regurgitation. Pericardium Normal pericardium without  effusion. Aorta The aortic root is normal.  Ascending aorta diameter is 3.1 cm. Kingsley Hung M.D. (Electronically Signed) Final Date:     14 September 2017                 11:10  IMPRESSION:     Discitis  Osteomyelitis  Myositis  Muscle abscess  A. Fib  Leukocytosis  Diabetes       PLAN:   Jersey Alexis is a 86 y.o. Male with significant medical problems admitted with right iliacus muscle abscess and persistent L3-4 discitis and osteomyelitis  Continues to have significant leukocytosis  IR to drain the abscess  Follow the cultures  We will hold the antibiotics till then since patient is neurologically intact  I have reviewed all the records  Prognosis is guarded    Discussed with IM. Will continue to follow    Oliva Bolanos M.D.

## 2017-09-20 NOTE — OR SURGEON
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 NO PURULENT MATERIAL      Procedure(s): CT GUIDED CATHETER DRAINAGE WITH PLACEMENT OF 8 Uzbek LOCKING LOOP CATHETER RIGHT PELVIS (EXTRAPERITONEAL) WITHIN ILIACUS MUSCLE. 5 ML OLD DARK REDDISH BROWN BLOODY FLUID. SENT FOR C+S, GRAM STAIN.       Estimated Blood Loss: <1CC        Complications: NONE        9/20/2017     3:10 PM     Clayton Alba

## 2017-09-20 NOTE — PROGRESS NOTES
Assumed patient care.  Patient lethargic, oriented X4 when awake.  No complaints of pain at this time.  Safety precautions in place, bed in low locked position, call light within reach.

## 2017-09-20 NOTE — CARE PLAN
Problem: Safety  Goal: Will remain free from injury  Outcome: PROGRESSING AS EXPECTED  Bed locked and in lowest position. Bed alarm on. Treaded socks. Call light and belongings with reach. Patient educated to call for assistance. Pt verbalized understanding. Hourly rounding in place.      Problem: Knowledge Deficit  Goal: Knowledge of disease process/condition, treatment plan, diagnostic tests, and medications will improve  Outcome: PROGRESSING AS EXPECTED  Updated pt on plan of care. Educated on orders (diet, activity). Will cont to update pt as needed.

## 2017-09-20 NOTE — PROGRESS NOTES
CT NOTE:  SEVERAL ATTEMPTS TO GET OXYGEN SATURATION OBTAIN WITH GOOD WAVE FORM WITH NO SUCCESS,  OXYGEN INCREASED FROM 2L NC TO 10L. OXYGEN MASK WITH NO CHANGE, D/W DR. TAPIA, ONLY LOCAL ANESTHESIA USED.

## 2017-09-20 NOTE — PROGRESS NOTES
[facility] Hospitalist Progress Note    Date of Service: 2017    Chief Complaint  Back pain     Interval Problem Update  Patient was treated for recurrence of sepsis due to spinal osteomyelitis.  He continues to have worsening condition, ongoing pain.  MRI of the spine revealed the osteomyelitis as well as psoas abscess.  Plan is to drain the abscess today with IR    Consultants/Specialty  Cardiology     Disposition  Home when stable         Review of Systems   Constitutional: Negative for chills and fever.   Eyes: Negative for blurred vision and double vision.   Respiratory: Negative for cough, sputum production and shortness of breath.    Cardiovascular: Negative for chest pain and palpitations.   Gastrointestinal: Negative for abdominal pain, constipation, diarrhea, nausea and vomiting.   Genitourinary: Negative for dysuria.   Musculoskeletal: Positive for back pain.   Neurological: Negative for headaches.      Physical Exam  Laboratory/Imaging   Hemodynamics  Temp (24hrs), Av.2 °C (97.2 °F), Min:35.6 °C (96.1 °F), Max:36.8 °C (98.3 °F)   Temperature: 35.9 °C (96.6 °F)  Pulse  Av.3  Min: 45  Max: 135 Heart Rate (Monitored): (!) 102  Blood Pressure : 113/65, NIBP: 122/68      Respiratory      Respiration: 16, Pulse Oximetry: 93 %     Work Of Breathing / Effort: Mild  RUL Breath Sounds: Clear;Diminished, RML Breath Sounds: Diminished, RLL Breath Sounds: Diminished, MIKE Breath Sounds: Clear;Diminished, LLL Breath Sounds: Diminished    Fluids    Intake/Output Summary (Last 24 hours) at 17 1442  Last data filed at 17 0633   Gross per 24 hour   Intake             1750 ml   Output             1500 ml   Net              250 ml       Nutrition  Orders Placed This Encounter   Procedures   • DIET NPO     Standing Status:   Standing     Number of Occurrences:   1     Order Specific Question:   Restrict to:     Answer:   Strict [1]     Physical Exam   Constitutional: He is oriented to person, place, and  time. He appears well-developed and well-nourished. No distress.   HENT:   Head: Normocephalic.   Eyes: Pupils are equal, round, and reactive to light.   Neck: Normal range of motion. Neck supple.   Cardiovascular: Normal rate, regular rhythm and normal heart sounds.  Exam reveals no gallop and no friction rub.    No murmur heard.  Pulmonary/Chest: Effort normal and breath sounds normal. No respiratory distress. He has no wheezes.   Abdominal: Soft. Bowel sounds are normal. He exhibits no distension and no mass. There is no tenderness. There is no rebound and no guarding.   Musculoskeletal: Normal range of motion. He exhibits no edema.   Neurological: He is alert and oriented to person, place, and time. No cranial nerve deficit.   Appears fatigued today    Skin: Skin is warm and dry. He is not diaphoretic.       Recent Labs      09/18/17   0635  09/19/17   0314  09/20/17   0358   WBC  20.0*  19.2*  17.5*   RBC  3.43*  2.81*  2.87*   HEMOGLOBIN  10.3*  8.3*  8.7*   HEMATOCRIT  31.6*  25.6*  26.1*   MCV  92.1  91.1  90.9   MCH  30.0  29.5  30.3   MCHC  32.6*  32.4*  33.3*   RDW  47.7  46.1  46.6   PLATELETCT  198  209  266   MPV  10.9  10.6  10.8     Recent Labs      09/18/17   0435  09/19/17   0314  09/20/17   0358   SODIUM  135  132*  134*  131*   POTASSIUM  4.1  3.8  3.9  3.8   CHLORIDE  103  102  102  102   CO2  24  25  26  23   GLUCOSE  112*  117*  118*  122*   BUN  12  16  16  16   CREATININE  0.44*  0.36*  0.34*  0.42*   CALCIUM  8.5  8.7  8.8  9.2     Recent Labs      09/18/17   0435  09/18/17   1136  09/19/17   1127  09/20/17   0931   APTT  63.2*  64.8*  101.0*   --    INR   --    --    --   1.09                  Assessment/Plan     Persistent atrial fibrillation (CMS-HCC)   Assessment & Plan    Continue rate control         Psoas abscess (CMS-HCC)   Assessment & Plan    Likely due to contigusous spread from underlying osteomyelitis of the spine.  Plan for US guided drainage and possible catheter  placement today.  Appreciate ID consultation - plan for monitoring off antibiotic therapy pending results of culture from above.         Chronic osteomyelitis of lumbar spine (CMS-HCC)   Assessment & Plan    Monitoring off antibiotic therapy as per ID        Sepsis (CMS-HCC)   Assessment & Plan    This is sepsis (without associated acute organ dysfunction).   Due to chronic osteomyelitis and right sided psoas abscess.              Reviewed items::  Medications reviewed and Labs reviewed  Hearn catheter::  No Hearn  DVT: Dropping hgb.

## 2017-09-20 NOTE — CARE PLAN
Problem: Bowel/Gastric:  Goal: Normal bowel function is maintained or improved  Outcome: PROGRESSING AS EXPECTED  Dark black liquid stools , positive for occult blood. Md notified    Problem: Urinary Elimination:  Goal: Ability to reestablish a normal urinary elimination pattern will improve  Outcome: PROGRESSING AS EXPECTED  Condom cath in place.

## 2017-09-20 NOTE — PROGRESS NOTES
"Cardiology Progress Note               Author: Amaury Alexis Date & Time created: 2017  9:48 AM     Interval History:  86 year old male with a history of chronic atrial fibrillation treated with metoprolol and digoxin apparently not on anticoagulation admitted on 2017 with sepsis and elevated troponin level.  : /70. Pulse 88. Denies any chest pain or shortness of breath. He is oriented to person and place. Can state that he has \"atrial flutter\".  : /68. Pulse 88. Atrial fibrillation. No significant cardiac events.    Review of Systems   Respiratory: Negative for sputum production.    Cardiovascular: Negative for chest pain and palpitations.       Physical Exam   Constitutional: He is oriented to person, place, and time. No distress.   Profoundly weak.   Cardiovascular: Normal rate, S1 normal, S2 normal and normal heart sounds.  An irregular rhythm present. Exam reveals no gallop and no friction rub.    No murmur heard.  Pulses:       Radial pulses are 2+ on the right side, and 2+ on the left side.   Pulmonary/Chest: Effort normal and breath sounds normal. He has no wheezes. He has no rhonchi. He has no rales.   Musculoskeletal: He exhibits no edema.   Neurological: He is alert and oriented to person, place, and time.   Skin: Skin is warm and dry.   Psychiatric: He has a normal mood and affect. His behavior is normal.       Hemodynamics:  Temp (24hrs), Av.3 °C (97.4 °F), Min:35.6 °C (96.1 °F), Max:36.8 °C (98.3 °F)  Temperature: (!) 35.6 °C (96.1 °F)  Pulse  Av.2  Min: 45  Max: 135   Blood Pressure : 124/68     Respiratory:    Respiration: 18, Pulse Oximetry: 98 %     Work Of Breathing / Effort: Mild  RUL Breath Sounds: Clear;Diminished, RML Breath Sounds: Diminished, RLL Breath Sounds: Diminished, MIKE Breath Sounds: Clear;Diminished, LLL Breath Sounds: Diminished  Fluids:     Weight: 90.8 kg (200 lb 2.8 oz)  GI/Nutrition:  Orders Placed This Encounter   Procedures   • " DIET NPO     Standing Status:   Standing     Number of Occurrences:   1     Order Specific Question:   Restrict to:     Answer:   Strict [1]     Lab Results:  Recent Labs      09/18/17   0635  09/19/17   0314  09/20/17   0358   WBC  20.0*  19.2*  17.5*   RBC  3.43*  2.81*  2.87*   HEMOGLOBIN  10.3*  8.3*  8.7*   HEMATOCRIT  31.6*  25.6*  26.1*   MCV  92.1  91.1  90.9   MCH  30.0  29.5  30.3   MCHC  32.6*  32.4*  33.3*   RDW  47.7  46.1  46.6   PLATELETCT  198  209  266   MPV  10.9  10.6  10.8     Recent Labs      09/18/17   0435  09/19/17   0314  09/20/17   0358   SODIUM  135  132*  134*  131*   POTASSIUM  4.1  3.8  3.9  3.8   CHLORIDE  103  102  102  102   CO2  24  25  26  23   GLUCOSE  112*  117*  118*  122*   BUN  12  16  16  16   CREATININE  0.44*  0.36*  0.34*  0.42*   CALCIUM  8.5  8.7  8.8  9.2     Recent Labs      09/18/17   0435  09/18/17   1136  09/19/17   1127   APTT  63.2*  64.8*  101.0*         Recent Labs      09/17/17   1208   TROPONINI  0.04         09/14/2017 ECHOCARDIOGRAM   Normal left ventricular systolic   function.  Normal regional wall motion.  Left ventricular ejection fraction is visually estimated to be 70%.  Diastolic function is difficult to assess with atrial fibrillation.  The right ventricle was normal in size and function.  Structurally normal mitral valve without significant stenosis or   regurgitation.  Aortic sclerosis without stenosis.  No aortic insufficiency.  Normal pericardium without effusion.     EKG performed on (09/15/17) was reviewed: EKG shows atrial fibrillation.     03/01/2017 MYOCARDIAL PERFUSION STUDY     No myocardial infarct or inducible ischemia.  Normal LEFT ventricular function.    Medical Decision Making, by Problem:  Active Hospital Problems    Diagnosis   • *Elevated troponin [R74.8]   • Persistent atrial fibrillation (CMS-HCC) [I48.1]   • Sepsis (CMS-HCC) [A41.9]   • Nausea & vomiting [R11.2]   • Hypokalemia [E87.6]   • Hyperglycemia [R73.9]      Assessment and Plan:  Elevated troponin level most likely related to acute sepsis with myocardial injury in addition to probable supply demand mismatch. Normal left ventricular function.    Atrial fibrillation/flutter rate controlled.    Sepsis. On antibiotics.    General debility.    Worsening anemia since admission hemoglobin dropping from 16 to 8.3.    Continue current therapy with digoxin and metoprolol and atorvastatin.  We'll follow Monday Wednesday Friday and PRN.  Not a candidate for anticoagulation or aspirin therapy due to precipitous drop in hemoglobin.    Reviewed items::  EKG reviewed, Radiology images reviewed, Labs reviewed and Medications reviewed

## 2017-09-20 NOTE — PROGRESS NOTES
"Pharmacy Kinetics 86 y.o. male on vancomycin day # 8  9/20/2017    Currently Dose: Vancomycin 1700 mg iv q24hr    Indication for Treatment: suspected psoas abscess  ID Service Following: Yes (per provider discussion)     Pertinent history per medical record: Admitted on 9/13/2017 for AMS suspected to sepsis. Was residing at [facility] prior to admission. Antimicrobials initiated for empiric sepsis treatment. Now resumed for suspected psoas abscess per provider. ID consult placed.     Other antibiotics: piperacillin/tazobactam 4.5 gm iv q6h     Allergies: Vancomycin      List concerns for accumulation of vancomycin: age ~86, electrolyte derangement, lactic acidosis, Hx DM     Pertinent cultures to date:   Results     Procedure Component Value Units Date/Time    MRSA BY PCR (AMP) [[id number]]  (Abnormal) Collected:  09/19/17 1230    Order Status:  Completed Specimen:  Respirate from Nares Updated:  09/19/17 1656     Significant Indicator POS (POS)     Source RESP     Site NARES     MRSA PCR POSITIVE for MRSA by PCR. (A)    Narrative:       CALL  [facility]  171 tel. [phone],  CALLED  171 tel. [phone] 09/19/2017, 16:56, RB PERF. RESULTS CALLED TO:RN  [id number]  Collected By:[id number] AZALEA SCOTT    BLOOD CULTURE [[id number]] Collected:  09/13/17 2035    Order Status:  Completed Specimen:  Blood from Peripheral Updated:  09/18/17 2300     Significant Indicator NEG     Source BLD     Site PERIPHERAL     Blood Culture No growth after 5 days of incubation.    Narrative:       Per Hospital Policy: Only change Specimen Src: to \"Line\" if  specified by physician order.    BLOOD CULTURE [[id number]] Collected:  09/13/17 2115    Order Status:  Completed Specimen:  Blood Updated:  09/18/17 2300     Significant Indicator NEG     Source BLD     Site --     Blood Culture No growth after 5 days of incubation.    CDIFF BY PCR WITH TOXIN [[id number]] Collected:  09/17/17 1826    Order Status:  Completed Specimen:  Stool from " Stool Updated:  09/17/17 2127     C Diff by PCR Negative     Comment: C. difficile NOT detected by PCR.  Treatment not indicated per guidelines.  Repeat testing not indicated within 7 days.          027-NAP1-BI Presumptive Negative     Comment: Presumptive 027/NAP1/BI target DNA sequences are NOT DETECTED.       Narrative:       Special Contact [id number] IFEOMA TORRES  Does this patient have risk factors for C-diff?->Yes  C-Diff Risk Factors->antibiotic exposure  Has patient taken stool softeners or laxatives in the last 5  days?->Yes    URINE CULTURE(NEW) [[id number]] Collected:  09/13/17 2103    Order Status:  Completed Specimen:  Urine Updated:  09/15/17 0818     Significant Indicator NEG     Source UR     Site --     Urine Culture No growth at 48 hours    Narrative:       Indication for culture:->Emergency Room Patient    INFLUENZA BY PCR, A/B/H1N1 [[id number]] Collected:  09/13/17 2125    Order Status:  Completed Specimen:  Respirate Updated:  09/14/17 0106     Influenza virus A RNA Negative     Influenza virus B RNA Negative     Influenza A 2009, H1N1, PCR Not Detected    Influenza Rapid [[id number]] Collected:  09/13/17 2125    Order Status:  Completed Specimen:  Respirate from Respiratory Updated:  09/13/17 2159     Significant Indicator NEG     Source RESP     Site RESPIRATORY     Rapid Influenza A-B --     Negative for Influenza A and Influenza B antigens.  Infection due to influenza A or B cannot be ruled out  since the antigen present in the specimen may be below the  detection limit of the test. Culture confirmation of  negative samples is recommended.      URINALYSIS [[id number]]  (Abnormal) Collected:  09/13/17 2103    Order Status:  Completed Specimen:  Urine Updated:  09/13/17 2146     Micro Urine Req Microscopic     Color Angelica     Character Bloody (A)     Specific Gravity 1.015     Ph 6.0     Glucose Trace (A) mg/dL      Ketones 100 (A) mg/dL      Protein 100 (A) mg/dL      Bilirubin Negative  "    Urobilinogen, Urine Normal     Nitrite Negative     Leukocyte Esterase Negative     Occult Blood Large (A)    Narrative:       Indication for culture:->Emergency Room Patient        Recent Labs      17   0940  17   0635  17   0314  17   0358   WBC  29.9*  20.0*  19.2*  17.5*   NEUTSPOLYS   --   81.00*   --   72.70*     Recent Labs      17   0435  17   0314  17   0358   BUN  12  16  16  16   CREATININE  0.44*  0.36*  0.34*  0.42*     Recent Labs      17   2045   [facility]  [facility]     Intake/Output Summary (Last 24 hours) at 17 0854  Last data filed at 17 0633   Gross per 24 hour   Intake             1850 ml   Output             2000 ml   Net             -150 ml      Blood pressure 124/68, pulse 88, temperature (!) 35.6 °C (96.1 °F), resp. rate 18, height 1.803 m (5' 11\"), weight 90.8 kg (200 lb 2.8 oz), SpO2 98 %. Temp (24hrs), Av.3 °C (97.4 °F), Min:35.6 °C (96.1 °F), Max:36.8 °C (98.3 °F)    Estimated Creatinine Clearance: 145.5 mL/min (by C-G formula based on SCr of 0.42 mg/dL).    A/P   1. Vancomycin dose change: not indicated   2. Next vancomycin level: ~1-2 days  3. Goal trough: 12-16 mcg/mL  4. Comments: VS stable. Afebrile. WBC elevated/down. CrCl ~146 mL/min. ID consulted. Microbiology unremarkable. Factors for accumulation identified. Vancomycin resumed at previously stable dose. Pharmacy will follow and continue to adjust as appropriate.    Robert Mcnulty, PharmD  "

## 2017-09-20 NOTE — PROGRESS NOTES
1350 Off floor to IR  1525 Report from IR  1530 Pt back to room, AOX4. RLQ drain to bulb suction. Dressing dry/intact.VSS.

## 2017-09-21 PROBLEM — M79.81 NONTRAUMATIC PSOAS HEMATOMA: Status: ACTIVE | Noted: 2017-09-20

## 2017-09-21 LAB
ANION GAP SERPL CALC-SCNC: 7 MMOL/L (ref 0–11.9)
BASOPHILS # BLD AUTO: 0.4 % (ref 0–1.8)
BASOPHILS # BLD: 0.06 K/UL (ref 0–0.12)
BUN SERPL-MCNC: 10 MG/DL (ref 8–22)
CALCIUM SERPL-MCNC: 9.4 MG/DL (ref 8.5–10.5)
CHLORIDE SERPL-SCNC: 104 MMOL/L (ref 96–112)
CO2 SERPL-SCNC: 24 MMOL/L (ref 20–33)
CREAT SERPL-MCNC: 0.38 MG/DL (ref 0.5–1.4)
EOSINOPHIL # BLD AUTO: 0.45 K/UL (ref 0–0.51)
EOSINOPHIL NFR BLD: 3.2 % (ref 0–6.9)
ERYTHROCYTE [DISTWIDTH] IN BLOOD BY AUTOMATED COUNT: 47.7 FL (ref 35.9–50)
GFR SERPL CREATININE-BSD FRML MDRD: >60 ML/MIN/1.73 M 2
GLUCOSE SERPL-MCNC: 96 MG/DL (ref 65–99)
GRAM STN SPEC: NORMAL
HCT VFR BLD AUTO: 25.1 % (ref 42–52)
HGB BLD-MCNC: 8 G/DL (ref 14–18)
IMM GRANULOCYTES # BLD AUTO: 0.66 K/UL (ref 0–0.11)
IMM GRANULOCYTES NFR BLD AUTO: 4.7 % (ref 0–0.9)
LYMPHOCYTES # BLD AUTO: 2.14 K/UL (ref 1–4.8)
LYMPHOCYTES NFR BLD: 15.1 % (ref 22–41)
MAGNESIUM SERPL-MCNC: 1.6 MG/DL (ref 1.5–2.5)
MCH RBC QN AUTO: 29.2 PG (ref 27–33)
MCHC RBC AUTO-ENTMCNC: 31.9 G/DL (ref 33.7–35.3)
MCV RBC AUTO: 91.6 FL (ref 81.4–97.8)
MONOCYTES # BLD AUTO: 1.8 K/UL (ref 0–0.85)
MONOCYTES NFR BLD AUTO: 12.7 % (ref 0–13.4)
NEUTROPHILS # BLD AUTO: 9.07 K/UL (ref 1.82–7.42)
NEUTROPHILS NFR BLD: 63.9 % (ref 44–72)
NRBC # BLD AUTO: 0 K/UL
NRBC BLD AUTO-RTO: 0 /100 WBC
PLATELET # BLD AUTO: 280 K/UL (ref 164–446)
PMV BLD AUTO: 10.4 FL (ref 9–12.9)
POTASSIUM SERPL-SCNC: 3.9 MMOL/L (ref 3.6–5.5)
RBC # BLD AUTO: 2.74 M/UL (ref 4.7–6.1)
SIGNIFICANT IND 70042: NORMAL
SITE SITE: NORMAL
SODIUM SERPL-SCNC: 135 MMOL/L (ref 135–145)
SOURCE SOURCE: NORMAL
WBC # BLD AUTO: 14.2 K/UL (ref 4.8–10.8)

## 2017-09-21 PROCEDURE — 83735 ASSAY OF MAGNESIUM: CPT

## 2017-09-21 PROCEDURE — G8996 SWALLOW CURRENT STATUS: HCPCS | Mod: CI

## 2017-09-21 PROCEDURE — 700111 HCHG RX REV CODE 636 W/ 250 OVERRIDE (IP): Performed by: INTERNAL MEDICINE

## 2017-09-21 PROCEDURE — 700105 HCHG RX REV CODE 258: Performed by: INTERNAL MEDICINE

## 2017-09-21 PROCEDURE — 92610 EVALUATE SWALLOWING FUNCTION: CPT

## 2017-09-21 PROCEDURE — 700111 HCHG RX REV CODE 636 W/ 250 OVERRIDE (IP): Performed by: HOSPITALIST

## 2017-09-21 PROCEDURE — 36415 COLL VENOUS BLD VENIPUNCTURE: CPT

## 2017-09-21 PROCEDURE — 700102 HCHG RX REV CODE 250 W/ 637 OVERRIDE(OP): Performed by: INTERNAL MEDICINE

## 2017-09-21 PROCEDURE — 700105 HCHG RX REV CODE 258: Performed by: HOSPITALIST

## 2017-09-21 PROCEDURE — 700102 HCHG RX REV CODE 250 W/ 637 OVERRIDE(OP): Performed by: NURSE PRACTITIONER

## 2017-09-21 PROCEDURE — A9270 NON-COVERED ITEM OR SERVICE: HCPCS | Performed by: NURSE PRACTITIONER

## 2017-09-21 PROCEDURE — 80048 BASIC METABOLIC PNL TOTAL CA: CPT

## 2017-09-21 PROCEDURE — 770020 HCHG ROOM/CARE - TELE (206)

## 2017-09-21 PROCEDURE — A9270 NON-COVERED ITEM OR SERVICE: HCPCS

## 2017-09-21 PROCEDURE — A9270 NON-COVERED ITEM OR SERVICE: HCPCS | Performed by: INTERNAL MEDICINE

## 2017-09-21 PROCEDURE — 700102 HCHG RX REV CODE 250 W/ 637 OVERRIDE(OP)

## 2017-09-21 PROCEDURE — G8997 SWALLOW GOAL STATUS: HCPCS | Mod: CH

## 2017-09-21 PROCEDURE — 85025 COMPLETE CBC W/AUTO DIFF WBC: CPT

## 2017-09-21 PROCEDURE — 99232 SBSQ HOSP IP/OBS MODERATE 35: CPT | Performed by: HOSPITALIST

## 2017-09-21 RX ORDER — MAGNESIUM SULFATE HEPTAHYDRATE 40 MG/ML
2 INJECTION, SOLUTION INTRAVENOUS ONCE
Status: COMPLETED | OUTPATIENT
Start: 2017-09-21 | End: 2017-09-21

## 2017-09-21 RX ADMIN — ROSUVASTATIN CALCIUM 125 MCG: 10 TABLET, FILM COATED ORAL at 17:35

## 2017-09-21 RX ADMIN — LISINOPRIL 10 MG: 5 TABLET ORAL at 10:20

## 2017-09-21 RX ADMIN — ATORVASTATIN CALCIUM 10 MG: 10 TABLET, FILM COATED ORAL at 20:25

## 2017-09-21 RX ADMIN — SODIUM CHLORIDE: 9 INJECTION, SOLUTION INTRAVENOUS at 04:57

## 2017-09-21 RX ADMIN — CEFTAROLINE FOSAMIL 600 MG: 600 POWDER, FOR SOLUTION INTRAVENOUS at 20:37

## 2017-09-21 RX ADMIN — LEVETIRACETAM 500 MG: 100 SOLUTION ORAL at 20:25

## 2017-09-21 RX ADMIN — GABAPENTIN 200 MG: 100 CAPSULE ORAL at 20:25

## 2017-09-21 RX ADMIN — METOPROLOL SUCCINATE 50 MG: 25 TABLET, EXTENDED RELEASE ORAL at 10:12

## 2017-09-21 RX ADMIN — MUPIROCIN 2 %: 20 OINTMENT TOPICAL at 10:18

## 2017-09-21 RX ADMIN — SODIUM CHLORIDE: 9 INJECTION, SOLUTION INTRAVENOUS at 19:16

## 2017-09-21 RX ADMIN — MAGNESIUM SULFATE IN WATER 2 G: 40 INJECTION, SOLUTION INTRAVENOUS at 09:49

## 2017-09-21 RX ADMIN — MUPIROCIN 1 APPLICATION: 20 OINTMENT TOPICAL at 20:26

## 2017-09-21 RX ADMIN — TAMSULOSIN HYDROCHLORIDE 0.4 MG: 0.4 CAPSULE ORAL at 10:12

## 2017-09-21 RX ADMIN — LIDOCAINE HYDROCHLORIDE 30 ML: 20 SOLUTION OROPHARYNGEAL at 19:16

## 2017-09-21 RX ADMIN — LEVETIRACETAM 500 MG: 100 SOLUTION ORAL at 11:29

## 2017-09-21 RX ADMIN — GABAPENTIN 200 MG: 100 CAPSULE ORAL at 10:12

## 2017-09-21 ASSESSMENT — ENCOUNTER SYMPTOMS
CHILLS: 0
CONSTIPATION: 0
HEADACHES: 0
FEVER: 0
VOMITING: 0
ABDOMINAL PAIN: 0
SPUTUM PRODUCTION: 0
DIARRHEA: 0
SHORTNESS OF BREATH: 0
BACK PAIN: 1
DOUBLE VISION: 0
BLURRED VISION: 0
SPEECH CHANGE: 0
PALPITATIONS: 0
SHORTNESS OF BREATH: 1
COUGH: 0
NAUSEA: 0

## 2017-09-21 ASSESSMENT — PAIN SCALES - GENERAL
PAINLEVEL_OUTOF10: 0

## 2017-09-21 ASSESSMENT — LIFESTYLE VARIABLES: DO YOU DRINK ALCOHOL: NO

## 2017-09-21 NOTE — CARE PLAN
Problem: Communication  Goal: The ability to communicate needs accurately and effectively will improve  Outcome: PROGRESSING AS EXPECTED  Pt has communicated needs.    Problem: Safety  Goal: Will remain free from injury  Outcome: PROGRESSING AS EXPECTED  Pt has remained free from injury.  Goal: Will remain free from falls  Outcome: PROGRESSING AS EXPECTED  Pt has remained free from falls.    Problem: Venous Thromboembolism (VTW)/Deep Vein Thrombosis (DVT) Prevention:  Goal: Patient will participate in Venous Thrombosis (VTE)/Deep Vein Thrombosis (DVT)Prevention Measures  Outcome: PROGRESSING SLOWER THAN EXPECTED  Pt has refused SCD's.    Problem: Skin Integrity  Goal: Risk for impaired skin integrity will decrease  Outcome: PROGRESSING AS EXPECTED  Pt has remained free from a decrease in skin integrity.    Problem: Pain Management  Goal: Pain level will decrease to patient's comfort goal  Outcome: PROGRESSING AS EXPECTED  Pt has remained free from pain.

## 2017-09-21 NOTE — THERAPY
"Speech Language Therapy dysphagia treatment completed.   Functional Status:  Pt and RN yesterday reporting pt choking with meds and meals yesterday. Pt says his swallowing is better today and RN states he has been taking pills w/o difficulty. He is awake and alert but easily fatigued with eating. No overt s/sx of aspiration noted from full liquid meal tray. He has mild increased WOB after only small portion of food and stating his appetite is poor.   Recommendations: Recommend continue full liquid diet. Hold with any further difficulty or lethargy.    Plan of Care: Will benefit from Speech Therapy 3 times per week  Post-Acute Therapy: Discharge to a transitional care facility for continued skilled therapy services.    See \"Rehab Therapy-Acute\" Patient Summary Report for complete documentation.     "

## 2017-09-21 NOTE — CARE PLAN
Problem: Safety  Goal: Will remain free from injury  Outcome: PROGRESSING AS EXPECTED  Bed locked and in lowest position. Bed alarm on. Treaded socks. Call light and belongings with reach. Patient educated to call for assistance. Pt verbalized understanding. Hourly rounding in place.      Problem: Skin Integrity  Goal: Risk for impaired skin integrity will decrease  Outcome: PROGRESSING AS EXPECTED  Turning patient Q2 and encouraging patent to move. Pt unable to ambulate at this time d/t pain, weakness, and lethargy. Will continue to monitor skin and skin integrity.

## 2017-09-21 NOTE — CARE PLAN
Problem: Nutritional:  Goal: Achieve adequate nutritional intake  Patient will consume ~50% of meals and supplements.   Outcome: PROGRESSING SLOWER THAN EXPECTED  Patient continues to have poor intake between 25-50%. Discussed preferences. RD will continue to follow.

## 2017-09-21 NOTE — DIETARY
Brief Update: update  Spoke with patient at bedside with my co-intern and the dietitian regarding his PO intake & diet order tolerance on full liquids. Patient reported PO intake of 25-50% and is progressing slowly. Discussed preferences with patient and he stated he enjoys chocolate Boost TID & also stated that he would like chocolate ice cream, chocolate pudding, and chocolate milk TID on all his meal trays. Patient refused snacks between meals. Reiterated importance of adequate nutrition and assured him that we will continue to monitor his intake.      RD monitoring.

## 2017-09-21 NOTE — PROGRESS NOTES
Infectious Disease Progress Note    Author: Oliva Bolanos M.D. Date & Time of service: 2017  3:55 PM    Chief Complaint:  Altered mental status    Interval History:  86-year-old male admitted for altered mental status. He was recently treated for L3-4 discitis. His repeat MRI showed right iliacus muscle abscess  17-MAXIMUM TEMPERATURE 98. On 2 liters of oxygen. WBC 14 and creatinine 0.38  Labs Reviewed, Medications Reviewed and Radiology Reviewed.    Review of Systems:  Review of Systems   Constitutional: Positive for malaise/fatigue. Negative for fever.   Respiratory: Positive for shortness of breath.    Cardiovascular: Positive for leg swelling. Negative for chest pain.   Gastrointestinal: Negative for abdominal pain, nausea and vomiting.   Musculoskeletal: Positive for back pain.   Neurological: Negative for speech change and headaches.       Hemodynamics:  Temp (24hrs), Av.1 °C (96.9 °F), Min:35.7 °C (96.2 °F), Max:36.7 °C (98 °F)  Temperature: (!) 35.8 °C (96.4 °F)  Pulse  Av.9  Min: 45  Max: 135   Blood Pressure : 118/63, NIBP: 124/53       Physical Exam:  Physical Exam   Constitutional: He appears lethargic. He is easily aroused.   Eyes: No scleral icterus.   Neck: Neck supple.   Cardiovascular: An irregularly irregular rhythm present.   Pulmonary/Chest: He has no wheezes. He has rales.   Abdominal: Soft. There is no tenderness. There is no rebound.   Musculoskeletal: He exhibits edema.   Neurological: He is easily aroused. He appears lethargic.   Vitals reviewed.      Meds:    Current Facility-Administered Medications:   •  ceftaroline (TEFLARO) ivpb  •  mupirocin  •  levetiracetam  •  Metoprolol Tartrate  •  pneumococcal 13-Jocelyn Conj Vacc  •  lorazepam  •  acetaminophen  •  digoxin  •  prochlorperazine  •  promethazine  •  hydrALAZINE  •  lisinopril  •  hyoscyamine-maalox plus-lidocaine viscous  •  NS  •  labetalol  •  ondansetron  •  metoprolol SR  •  atorvastatin  •  gabapentin  •   tamsulosin  •  NS  •  senna-docusate **AND** polyethylene glycol/lytes **AND** magnesium hydroxide **AND** bisacodyl  •  Respiratory Care per Protocol  •  NS    Labs:  Recent Labs      09/19/17 0314 09/20/17 0358 09/21/17 0229   WBC  19.2*  17.5*  14.2*   RBC  2.81*  2.87*  2.74*   HEMOGLOBIN  8.3*  8.7*  8.0*   HEMATOCRIT  25.6*  26.1*  25.1*   MCV  91.1  90.9  91.6   MCH  29.5  30.3  29.2   RDW  46.1  46.6  47.7   PLATELETCT  209  266  280   MPV  10.6  10.8  10.4   NEUTSPOLYS   --   72.70*  63.90   LYMPHOCYTES   --   12.20*  15.10*   MONOCYTES   --   11.10  12.70   EOSINOPHILS   --   1.40  3.20   BASOPHILS   --   0.40  0.40     Recent Labs      09/19/17 0314 09/20/17 0358 09/21/17 0229   SODIUM  132*  134*  131*  135   POTASSIUM  3.8  3.9  3.8  3.9   CHLORIDE  102  102  102  104   CO2  25  26  23  24   GLUCOSE  117*  118*  122*  96   BUN  16  16  16  10     Recent Labs      09/19/17 0314 09/20/17 0358 09/21/17 0229   CREATININE  0.36*  0.34*  0.42*  0.38*       Imaging:  Ct-abdomen-pelvis With    Result Date: 9/19/2017 9/19/2017 6:34 PM HISTORY/REASON FOR EXAM:  Abscess noted on MRI. TECHNIQUE/EXAM DESCRIPTION:  CT scan of the abdomen and pelvis with contrast. Contrast-enhanced helical scanning was obtained from the diaphragmatic domes through the pubic symphysis following the bolus administration of nonionic contrast without complication. 100 mL of Omnipaque 350 nonionic contrast was administered without complication. Low dose optimization technique was utilized for this CT exam including automated exposure control and adjustment of the mA and/or kV according to patient size. COMPARISON: MRI from the same day FINDINGS: Lung bases: There are small bilateral pleural effusions with overlying atelectasis. Abdomen: No free air is seen in the abdomen or pelvis. Evaluation is limited by streak artifact from barium within the colon. There is wall thickening of the distal esophagus with a  small hiatal hernia. Liver appears unremarkable. Portal vein is patent. There is calcification in the pancreatic head. No adrenal mass is identified. Tiny hypodense left renal lesion is too small to characterize. There is mild prominence of the renal collecting systems bilaterally. Small hypodense right renal lesions too small to characterize. There are nonobstructing right renal calculi. Atherosclerotic plaque is seen in the aorta and its branches. There are small inguinal, retroperitoneal and mesenteric lymph nodes. There is no evidence of bowel obstruction. There is a moderate amount of stool in the rectosigmoid colon. There is colonic diverticulosis. The appendix is not identified. Stomach is distended with fluid. Small bowel loops are fluid-filled. Bladder is mildly distended with foci of air. There is asymmetric prominence of the right iliopsoas musculature with surrounding stranding. Findings likely related to the previously noted abscess collection. Degenerative changes are seen in the spine. Postsurgical changes are noted in the lumbar spine. Degenerative changes are seen at the hips. There is mild loss of height of the superior endplate of T11 with a Schmorl's node noted. There is mild endplate irregularity at L3/L4.     Prominence of the right iliopsoas muscle with surrounding inflammatory stranding. The known fluid collection was better delineated on the prior MRI. Mild endplate irregularity at L3/L4 can be seen in discitis/osteomyelitis. Air-fluid level in the bladder. Correlation with urinalysis is recommended. This can be seen in the setting of recent instrumentation, infection or fistula. Colonic diverticulosis. Moderate amount of colonic stool. Nonobstructing right renal calculi. Mild prominence of the renal collecting systems bilaterally. Small hypodense renal lesions are too small to characterize. Fluid-filled loops of small bowel can be seen in the setting of enteritis. Small hiatal hernia with  mild thickening of the distal esophagus. Calcifications in the pancreatic head can be seen in chronic pancreatitis. Atherosclerotic plaque. Small bilateral pleural effusions with overlying atelectasis.     Ct-drain-retroperitoneal    Result Date: 9/20/2017 9/20/2017 2:30 PM HISTORY/REASON FOR EXAM:  Multiloculated fluid collections in the right iliacus muscle. Suspected abscess. TECHNIQUE/EXAM DESCRIPTION: Right pelvic (extraperitoneal) retroperitoneal abscess drainage with CT guidance. Low dose optimization technique was utilized for this CT exam including automated exposure control and adjustment of the mA and/or kV according to patient size. PROCEDURE: Informed consent was obtained. Procedures performed with local anesthesia only with appropriate continuous patient monitoring by the radiology nurse. Sedation duration: N/A minutes Localizing CT images were obtained with the patient in supine position. The skin was prepped with Betadine and draped in a sterile fashion. Following local anesthesia with 1% Lidocaine, a 17 G guiding needle was placed and extraperitoneal needle path in the right side of the pelvis via the iliacus muscle confirmed with CT. An Amplatz guidewire was placed and following serial tract dilatation, a 8 [place] pigtail locking catheter was placed. A specimen was collected and submitted for culture and sensitivity and Gram stain. Total of 5 mL old bloody reddish-brown fluid was drained. No purulent fluid was obtained. The fluid was not malodorous. The catheter was secured to the skin and connected to suction bulb drainage. Final CT images were obtained documenting catheter position. The patient tolerated the procedure well with no evidence of complication. Low dose optimization technique was utilized for this CT exam including automated exposure control and adjustment of the mA and/or kV according to patient size. COMPARISON: MRI lumbar spine 9/19/2017, CT abdomen and pelvis 9/19/2017. FINDINGS:  The final CT images show satisfactory catheter position within the target collection.     1.  CT GUIDED RETROPERITONEAL (EXTRAPERITONEAL) RIGHT PELVIC CATHETER DRAINAGE WITHIN THE RIGHT ILIACUS MUSCLE. OLD DARK REDDISH-BROWN BLOODY FLUID WAS OBTAINED. NO ABSCESS OR PURULENT MATERIAL. 2.  THE CURRENT PLAN IS TO CHECK CULTURES AND LIKELY REMOVED CATHETER IN THE SHORT-TERM AT 48-72 HOURS AS THERE IS UNLIKELY TO BE AN ABSCESS.    Ct-head W/o    Result Date: 9/13/2017 9/13/2017 9:30 PM HISTORY/REASON FOR EXAM:  Altered Mental Status. TECHNIQUE/EXAM DESCRIPTION AND NUMBER OF VIEWS: CT of the head without contrast. The study was performed on a helical multidetector CT scanner. Contiguous 2.5 mm axial sections were obtained from the skull base through the vertex. Up to date radiation dose reduction adjustments have been utilized to meet ALARA standards for radiation dose reduction. COMPARISON:  7/12/2017 FINDINGS: The lateral ventricles are enlarged. Cortical sulci are enlarged. Patchy areas of low attenuation in the white matter bilaterally. No significant mass effect or midline shift. Basal cisterns are patent. No evidence for intracranial hemorrhage. Calvaria are intact. Visualized orbits are unremarkable. Visualized mastoid air cells are clear. Visualized paranasal sinuses are unremarkable. Calcifications of the carotid arteries noted. Calcified scalp nodules again present.     1.  Diffuse atrophy and white matter changes. 2.  No acute intracranial hemorrhage or territorial infarct.     Dx-chest-portable (1 View)    Result Date: 9/16/2017 9/16/2017 10:31 PM HISTORY/REASON FOR EXAM:  Shortness of Breath. TECHNIQUE/EXAM DESCRIPTION AND NUMBER OF VIEWS: Single portable view of the chest. COMPARISON: 9/13/2017 FINDINGS: Cardiac mediastinal contour is unchanged. Mediastinum is again prominent. Mild prominence of the pulmonary interstitium. No gross pleural fluid or pneumothorax. Dextroconvex curvature of thoracic spine.      No significant change from prior exam.    Dx-chest-portable (1 View)    Result Date: 9/13/2017 9/13/2017 8:44 PM HISTORY/REASON FOR EXAM:  Possible sepsis. TECHNIQUE/EXAM DESCRIPTION AND NUMBER OF VIEWS: Single portable view of the chest. COMPARISON: 8/7/2017 FINDINGS: Cardiac mediastinal contour is unchanged. Mild diffuse prominence of the interstitium again seen. No focal consolidation. No pleural fluid collection or pneumothorax. Dextroconvex curvature of thoracic spine. Diffuse osteopenia.     1.  Diffuse prominence of interstitium again seen, likely interstitial lung disease.  Mild pulmonary edema is also a consideration. 2.  No pneumonia or pneumothorax. 3.  Stable cardiomegaly.    Dx-small Bowel Series    Result Date: 9/17/2017 9/16/2017 10:39 AM HISTORY/REASON FOR EXAM:  Nausea and vomiting. TECHNIQUE/EXAM DESCRIPTION AND NUMBER OF VIEWS: Single contrast barium small bowel follow-through performed. Fluoroscopic images were obtained. No fluoroscopic images obtained. COMPARISON:  None available. FINDINGS:  view the abdomen demonstrates marked gaseous distention and small bowel and colonic distention. Patient drank thin barium. Contrast passed very slowly into dilated small bowel loops. Jejunal loops are dilated up to 5.2 cm. Overhead images were taken for a total of 22 hours before termination of the exam. Contrast reached the ileum, but does not extend into the colon. There is a large amount of stool in the colon.     1.  Gaseous distention with fairly diffuse bowel dilatation. Contrast extends into the ileum, but does not reach the colon within 22 hours. No transition point is identified to suggest mechanical bowel obstruction. 2.  Large amount of stool in the rectal vault.    Mr-lumbar Spine-with & W/o    Result Date: 9/19/2017 9/19/2017 1:35 PM HISTORY/REASON FOR EXAM:  Altered mental status, weakness. Possible discitis. Previous lumbar surgery. TECHNIQUE/EXAM DESCRIPTION: MRI of the lumbar  spine without and with contrast. The study was performed on a [employer] 1.5 Lucille MRI scanner. T1 sagittal, T2 fast spin-echo sagittal, T2 axial images and T1 axial images were obtained of the lumbar spine. T1 post-contrast sagittal and T1 post-contrast axial images were obtained. Optional T2 fat-suppressed sagittal images may be obtained. 20 mL Omniscan gadolinium contrast was administered intravenously. COMPARISON:  MRI lumbar spine 7/7/2017, T abdomen and pelvis 1/6/2017 FINDINGS: Alignment in the lumbar spine again shows retrolisthesis of L3 on L4 of about 5 mm. There is fluid signal T2 hyperintensity again seen in the L3-L4 disc space and prominent marrow edema signal at the L3 inferior endplate and to a lesser extent L4 superior endplate. There is corresponding enhancement, particularly at the inferior endplate of L3. Findings are highly suspicious for discitis with osteomyelitis. There is postoperative change with lumbar laminectomy at L2-L3 through L5. There is posterior fusion with transpedicular screw fixation 4-L5. The posterior paraspinous soft tissues show expected postoperative changes with atrophy and fatty replacement in  the posterior paraspinous muscles at the operative levels. There is no significant postoperative dorsal epidural fluid collection. However, there has been interval development of multilocular fluid collections in the right iliacus muscle spanning about 42 mm. These are consistent with intramuscular abscesses (T2 axial image 4, series 5, T1 postcontrast axial image 1, series 10). The conus is normal in position and signal with its tip at the L1 level. At T12-L1, is a tiny central and left paramedian disc protrusion with an underlying annular fissure. There is mild hypertrophic facet arthropathy. Thecal sac is toward the lower range of normal without karla central stenosis. The neural foramina are intact. At L1-2, there is negligible disc bulging. There is no central stenosis or  significant foraminal stenosis. At L2-3, there is a small broad-based disc-osteophyte complex. There is no central stenosis as there is decompressive laminectomy at this level. There is no significant foraminal stenosis. At L3-4, there is posterior marginal spurring and disc bulging accentuated by the retrolisthesis. There is moderate bilateral lateral recess stenosis. Thecal sac is toward the lower range of normal even though there is a decompressive laminectomy at this  level. However, there is no karla central stenosis (T2 axial image 24, series 5). There is severe bilateral foraminal stenosis, right greater than left. This is unchanged from the previous exam. At L4-5, no central stenosis. There is inferior foraminal blunting with moderate bilateral foraminal stenosis. At L5-S1, there is minimal disc bulging. There is a left paramedian annular fissure. There is mild hypertrophic facet arthropathy. No central stenosis. There is moderate bilateral foraminal stenosis.     1.  L3-4 findings again suggesting discitis with osteomyelitis. No evidence of epidural abscess or epidural phlegmon. 2.  L3-4 retrolisthesis. 3.  Postoperative multilevel lumbar laminectomy and posterior fusion at L4-5. 4.  Interval development of multilocular intramuscular abscesses in the right iliacus muscle, partly in the field of view 5.  Degenerative and spondylotic changes as detailed for each level above in the body of report.    Dx-esophagus - Barium Swallow    Result Date: 9/16/2017 9/16/2017 10:39 AM HISTORY/REASON FOR EXAM:  Nausea. Nausea and vomiting. TECHNIQUE/EXAM DESCRIPTION AND NUMBER OF VIEWS: Single contrast esophagram procedure was performed. 5 fluoroscopic images obtained. Fluoroscopy time: 0.37 minutes COMPARISON:  None. FINDINGS: Limited exam secondary to limited patient mobility. The esophagus appears normal in caliber and configuration. No definite mucosal lesions are identified on single-contrast techniques. Contrast  passes freely into the stomach. No hiatal hernia is identified.     No abnormalities identified on limited single contrast esophagram.    Echocardiogram Comp W/o Cont    Result Date: 2017  Transthoracic Echo Report Echocardiography Laboratory CONCLUSIONS Prior study done on [id number] left ventricular systolic function. Normal regional wall motion. Left ventricular ejection fraction is visually estimated to be 70%. Diastolic function is difficult to assess with atrial fibrillation. The right ventricle was normal in size and function. Structurally normal mitral valve without significant stenosis or regurgitation. Aortic sclerosis without stenosis. No aortic insufficiency. Normal pericardium without effusion. ANDREWSBRIAN Exam Date:         2017                    08:50 Exam Location:     Inpatient Priority:          Routine Ordering Physician:        OLESYA WHITE Referring Physician:       CHRISTOPHER Pickard Sonographer:               LENARD Adamson RDCS Age:    86     Gender:    M MRN:    [id number] :    10/18/1930 BSA:    2.2    Ht (in):    71     Wt (lb):    220 Exam Type:     Complete Indications:     Abnormal electrocardiogram ECG EKG ICD Codes:        CPT Codes:       26068 BP:   166    /   105    HR: Technical Quality:       Technically difficult study -                          adequate information is obtained MEASUREMENTS  (Male / Female) Normal Values 2D ECHO LV Diastolic Diameter PLAX        4.8 cm                4.2 - 5.9 / 3.9 - 5.3 cm LV Systolic Diameter PLAX         2.9 cm                2.1 - 4.0 cm IVS Diastolic Thickness           1.1 cm                LVPW Diastolic Thickness          1 cm                  LVOT Diameter                     2.2 cm                Estimated LV Ejection Fraction    70 %                  LV Ejection Fraction MOD BP       88.4 %                >= 55  % LV Ejection Fraction MOD 4C       91.3 %                LV Ejection  Fraction MOD 2C       79.9 %                IVC Diameter                      2.2 cm                M-MODE Aortic Root Diameter MM           2.2 cm                DOPPLER AV Peak Velocity                  1.2 m/s               AV Peak Gradient                  5.8 mmHg              LVOT Peak Velocity                0.92 m/s              * Indicates values subject to auto-interpretation LV EF:  70    % FINDINGS Left Ventricle Normal left ventricular chamber size. Normal left ventricular wall thickness. Normal left ventricular systolic function. Normal regional wall motion. Left ventricular ejection fraction is visually estimated to be 70%. Diastolic function is difficult to assess with atrial fibrillation. Right Ventricle The right ventricle was normal in size and function. Right Atrium The right atrium is normal in size.  Inferior vena cava is not well visualized. Left Atrium The left atrium is normal in size.  Left atrial volume index is 19  mL/sq m. Mitral Valve Structurally normal mitral valve without significant stenosis or regurgitation.  Trace mitral regurgitation. Aortic Valve Aortic sclerosis without stenosis. Tricuspid aortic valve. No aortic insufficiency. Tricuspid Valve Structurally normal tricuspid valve without significant stenosis or regurgitation. Pulmonic Valve Structurally normal pulmonic valve without significant stenosis or regurgitation. Pericardium Normal pericardium without effusion. Aorta The aortic root is normal.  Ascending aorta diameter is 3.1 cm. Kingsley Hung M.D. (Electronically Signed) Final Date:     14 September 2017                 11:10      Micro:  Results     Procedure Component Value Units Date/Time    CULTURE WOUND W/ GRAM STAIN [[id number]] Collected:  09/20/17 1500    Order Status:  Completed Specimen:  Wound Updated:  09/21/17 1440     Significant Indicator NEG     Source WND     Site Right Ileopsoas Abscess     Culture Result Wound No growth at 24 hours.     Gram Stain  "Result --     Few WBCs.  No organisms seen.      GRAM STAIN [[id number]] Collected:  09/20/17 1500    Order Status:  Completed Specimen:  Wound Updated:  09/21/17 0807     Significant Indicator .     Source WND     Site Right Ileopsoas Abscess     Gram Stain Result --     Few WBCs.  No organisms seen.      MRSA BY PCR (AMP) [[id number]]  (Abnormal) Collected:  09/19/17 1230    Order Status:  Completed Specimen:  Respirate from Nares Updated:  09/19/17 1656     Significant Indicator POS (POS)     Source RESP     Site NARES     MRSA PCR POSITIVE for MRSA by PCR. (A)    Narrative:       CALL  [facility]  171 tel. [phone],  CALLED  171 tel. [phone] 09/19/2017, 16:56, RB PERF. RESULTS CALLED TO:RN  [id number]  Collected By:[id number] AZALEA SCOTT    BLOOD CULTURE [[id number]] Collected:  09/13/17 2035    Order Status:  Completed Specimen:  Blood from Peripheral Updated:  09/18/17 2300     Significant Indicator NEG     Source BLD     Site PERIPHERAL     Blood Culture No growth after 5 days of incubation.    Narrative:       Per Hospital Policy: Only change Specimen Src: to \"Line\" if  specified by physician order.    BLOOD CULTURE [[id number]] Collected:  09/13/17 2115    Order Status:  Completed Specimen:  Blood Updated:  09/18/17 2300     Significant Indicator NEG     Source BLD     Site --     Blood Culture No growth after 5 days of incubation.    CDIFF BY PCR WITH TOXIN [[id number]] Collected:  09/17/17 1826    Order Status:  Completed Specimen:  Stool from Stool Updated:  09/17/17 2127     C Diff by PCR Negative     Comment: C. difficile NOT detected by PCR.  Treatment not indicated per guidelines.  Repeat testing not indicated within 7 days.          027-NAP1-BI Presumptive Negative     Comment: Presumptive 027/NAP1/BI target DNA sequences are NOT DETECTED.       Narrative:       Special Contact [id number] IFEOMA TORRES  Does this patient have risk factors for C-diff?->Yes  C-Diff Risk Factors->antibiotic " exposure  Has patient taken stool softeners or laxatives in the last 5  days?->Yes    URINE CULTURE(NEW) [[id number]] Collected:  09/13/17 2103    Order Status:  Completed Specimen:  Urine Updated:  09/15/17 0818     Significant Indicator NEG     Source UR     Site --     Urine Culture No growth at 48 hours    Narrative:       Indication for culture:->Emergency Room Patient          Assessment:  Active Hospital Problems    Diagnosis   • Persistent atrial fibrillation (CMS-HCC) [I48.1]   • Sepsis (CMS-HCC) [A41.9]   • 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.81]   • 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 Fib    Altered mental status  Due to above  Improvement    Discussed with internal medicine.

## 2017-09-21 NOTE — PROGRESS NOTES
[facility] Hospitalist Progress Note    Date of Service: 2017    Chief Complaint  Back pain     Interval Problem Update  Patient was treated for recurrence of sepsis due to spinal osteomyelitis.  He continues to have worsening condition, ongoing pain.  MRI of the spine revealed the osteomyelitis as well as psoas abscess.      Patient underwent drainage of so S abscess, with findings of probable hematoma. He continues to be watched off antibiotics.    Consultants/Specialty  Cardiology     Disposition  Home when stable         Review of Systems   Constitutional: Negative for chills and fever.   Eyes: Negative for blurred vision and double vision.   Respiratory: Negative for cough, sputum production and shortness of breath.    Cardiovascular: Negative for chest pain and palpitations.   Gastrointestinal: Negative for abdominal pain, constipation, diarrhea, nausea and vomiting.   Genitourinary: Negative for dysuria.   Musculoskeletal: Positive for back pain.   Neurological: Negative for headaches.      Physical Exam  Laboratory/Imaging   Hemodynamics  Temp (24hrs), Av.1 °C (96.9 °F), Min:35.7 °C (96.2 °F), Max:36.7 °C (98 °F)   Temperature: (!) 35.8 °C (96.4 °F)  Pulse  Av.9  Min: 45  Max: 135    Blood Pressure : 118/63, NIBP: 124/53      Respiratory      Respiration: 16, Pulse Oximetry: 100 %     Work Of Breathing / Effort: Mild  RUL Breath Sounds: Diminished, RML Breath Sounds: Diminished, RLL Breath Sounds: Diminished, MIKE Breath Sounds: Diminished, LLL Breath Sounds: Diminished    Fluids    Intake/Output Summary (Last 24 hours) at 17 1524  Last data filed at 17 0911   Gross per 24 hour   Intake              870 ml   Output             1257 ml   Net             -387 ml       Nutrition  Orders Placed This Encounter   Procedures   • DIET ORDER     Standing Status:   Standing     Number of Occurrences:   1     Order Specific Question:   Diet:     Answer:   Full Liquid [11]     Comments:   Ensure on  Each Tray     Physical Exam   Constitutional: He is oriented to person, place, and time. He appears well-developed and well-nourished. No distress.   HENT:   Head: Normocephalic.   Eyes: Pupils are equal, round, and reactive to light.   Neck: Normal range of motion. Neck supple.   Cardiovascular: Normal rate, regular rhythm and normal heart sounds.  Exam reveals no gallop and no friction rub.    No murmur heard.  Pulmonary/Chest: Effort normal and breath sounds normal. No respiratory distress. He has no wheezes.   Abdominal: Soft. Bowel sounds are normal. He exhibits no distension and no mass. There is no tenderness. There is no rebound and no guarding.   Musculoskeletal: Normal range of motion. He exhibits no edema.   Neurological: He is alert and oriented to person, place, and time. No cranial nerve deficit.   Appears fatigued today    Skin: Skin is warm and dry. He is not diaphoretic.       Recent Labs      09/19/17 0314 09/20/17 0358 09/21/17 0229   WBC  19.2*  17.5*  14.2*   RBC  2.81*  2.87*  2.74*   HEMOGLOBIN  8.3*  8.7*  8.0*   HEMATOCRIT  25.6*  26.1*  25.1*   MCV  91.1  90.9  91.6   MCH  29.5  30.3  29.2   MCHC  32.4*  33.3*  31.9*   RDW  46.1  46.6  47.7   PLATELETCT  209  266  280   MPV  10.6  10.8  10.4     Recent Labs      09/19/17 0314 09/20/17 0358 09/21/17 0229   SODIUM  132*  134*  131*  135   POTASSIUM  3.8  3.9  3.8  3.9   CHLORIDE  102  102  102  104   CO2  25  26  23  24   GLUCOSE  117*  118*  122*  96   BUN  16  16  16  10   CREATININE  0.36*  0.34*  0.42*  0.38*   CALCIUM  8.7  8.8  9.2  9.4     Recent Labs      09/19/17   1127  09/20/17 0931   APTT  101.0*   --    INR   --   1.09                  Assessment/Plan     Persistent atrial fibrillation (CMS-HCC)   Assessment & Plan    Continue rate control         Nontraumatic psoas hematoma   Assessment & Plan    Drainage occurred yesterday, no pus identified, presence of hematoma. Pain has improved. Continue to  monitor.        Chronic osteomyelitis of lumbar spine (CMS-[facility])   Assessment & Plan    Continue to monitor.         Sepsis (CMS-HCC)   Assessment & Plan    This is sepsis (without associated acute organ dysfunction).   Due to chronic osteomyelitis.              Reviewed items::  Medications reviewed and Labs reviewed  Hearn catheter::  No Hearn  DVT: Dropping hgb.

## 2017-09-21 NOTE — PROGRESS NOTES
Pt heart rate sustained in 120s, sometimes in 150s Afib/A flutter. Asymptomatic at this time. Dr. Ac notified. 5mg IV lopressor ordered PRN Q6

## 2017-09-22 LAB
ANION GAP SERPL CALC-SCNC: 6 MMOL/L (ref 0–11.9)
BASOPHILS # BLD AUTO: 0.9 % (ref 0–1.8)
BASOPHILS # BLD: 0.12 K/UL (ref 0–0.12)
BUN SERPL-MCNC: 10 MG/DL (ref 8–22)
CALCIUM SERPL-MCNC: 9.2 MG/DL (ref 8.5–10.5)
CHLORIDE SERPL-SCNC: 101 MMOL/L (ref 96–112)
CO2 SERPL-SCNC: 23 MMOL/L (ref 20–33)
CREAT SERPL-MCNC: 0.41 MG/DL (ref 0.5–1.4)
EOSINOPHIL # BLD AUTO: 0.33 K/UL (ref 0–0.51)
EOSINOPHIL NFR BLD: 2.6 % (ref 0–6.9)
ERYTHROCYTE [DISTWIDTH] IN BLOOD BY AUTOMATED COUNT: 45.8 FL (ref 35.9–50)
GFR SERPL CREATININE-BSD FRML MDRD: >60 ML/MIN/1.73 M 2
GLUCOSE SERPL-MCNC: 106 MG/DL (ref 65–99)
HCT VFR BLD AUTO: 23.9 % (ref 42–52)
HGB BLD-MCNC: 8 G/DL (ref 14–18)
LYMPHOCYTES # BLD AUTO: 2.38 K/UL (ref 1–4.8)
LYMPHOCYTES NFR BLD: 18.6 % (ref 22–41)
MAGNESIUM SERPL-MCNC: 1.9 MG/DL (ref 1.5–2.5)
MANUAL DIFF BLD: NORMAL
MCH RBC QN AUTO: 30.5 PG (ref 27–33)
MCHC RBC AUTO-ENTMCNC: 33.5 G/DL (ref 33.7–35.3)
MCV RBC AUTO: 91.2 FL (ref 81.4–97.8)
MONOCYTES # BLD AUTO: 1.02 K/UL (ref 0–0.85)
MONOCYTES NFR BLD AUTO: 8 % (ref 0–13.4)
MORPHOLOGY BLD-IMP: NORMAL
MYELOCYTES NFR BLD MANUAL: 0.9 %
NEUTROPHILS # BLD AUTO: 8.83 K/UL (ref 1.82–7.42)
NEUTROPHILS NFR BLD: 69 % (ref 44–72)
NRBC # BLD AUTO: 0 K/UL
NRBC BLD AUTO-RTO: 0 /100 WBC
PLATELET # BLD AUTO: 308 K/UL (ref 164–446)
PLATELET BLD QL SMEAR: NORMAL
PMV BLD AUTO: 9.9 FL (ref 9–12.9)
POTASSIUM SERPL-SCNC: 3.9 MMOL/L (ref 3.6–5.5)
RBC # BLD AUTO: 2.62 M/UL (ref 4.7–6.1)
RBC BLD AUTO: NORMAL
SODIUM SERPL-SCNC: 130 MMOL/L (ref 135–145)
WBC # BLD AUTO: 12.8 K/UL (ref 4.8–10.8)

## 2017-09-22 PROCEDURE — 700102 HCHG RX REV CODE 250 W/ 637 OVERRIDE(OP): Performed by: INTERNAL MEDICINE

## 2017-09-22 PROCEDURE — 85027 COMPLETE CBC AUTOMATED: CPT

## 2017-09-22 PROCEDURE — 36415 COLL VENOUS BLD VENIPUNCTURE: CPT

## 2017-09-22 PROCEDURE — 700105 HCHG RX REV CODE 258: Performed by: HOSPITALIST

## 2017-09-22 PROCEDURE — 700105 HCHG RX REV CODE 258: Performed by: INTERNAL MEDICINE

## 2017-09-22 PROCEDURE — 80048 BASIC METABOLIC PNL TOTAL CA: CPT

## 2017-09-22 PROCEDURE — 83735 ASSAY OF MAGNESIUM: CPT

## 2017-09-22 PROCEDURE — 700111 HCHG RX REV CODE 636 W/ 250 OVERRIDE (IP): Performed by: INTERNAL MEDICINE

## 2017-09-22 PROCEDURE — 770020 HCHG ROOM/CARE - TELE (206)

## 2017-09-22 PROCEDURE — A9270 NON-COVERED ITEM OR SERVICE: HCPCS | Performed by: INTERNAL MEDICINE

## 2017-09-22 PROCEDURE — 85007 BL SMEAR W/DIFF WBC COUNT: CPT

## 2017-09-22 PROCEDURE — 99232 SBSQ HOSP IP/OBS MODERATE 35: CPT | Performed by: HOSPITALIST

## 2017-09-22 PROCEDURE — 700102 HCHG RX REV CODE 250 W/ 637 OVERRIDE(OP)

## 2017-09-22 PROCEDURE — 97530 THERAPEUTIC ACTIVITIES: CPT

## 2017-09-22 PROCEDURE — A9270 NON-COVERED ITEM OR SERVICE: HCPCS

## 2017-09-22 PROCEDURE — 700111 HCHG RX REV CODE 636 W/ 250 OVERRIDE (IP): Performed by: HOSPITALIST

## 2017-09-22 RX ORDER — MAGNESIUM SULFATE HEPTAHYDRATE 40 MG/ML
2 INJECTION, SOLUTION INTRAVENOUS ONCE
Status: COMPLETED | OUTPATIENT
Start: 2017-09-22 | End: 2017-09-22

## 2017-09-22 RX ADMIN — CEFTAROLINE FOSAMIL 600 MG: 600 POWDER, FOR SOLUTION INTRAVENOUS at 19:42

## 2017-09-22 RX ADMIN — GABAPENTIN 200 MG: 100 CAPSULE ORAL at 09:30

## 2017-09-22 RX ADMIN — METOPROLOL SUCCINATE 50 MG: 25 TABLET, EXTENDED RELEASE ORAL at 09:30

## 2017-09-22 RX ADMIN — MUPIROCIN 1 APPLICATION: 20 OINTMENT TOPICAL at 19:42

## 2017-09-22 RX ADMIN — SODIUM CHLORIDE: 9 INJECTION, SOLUTION INTRAVENOUS at 11:02

## 2017-09-22 RX ADMIN — MAGNESIUM SULFATE IN WATER 2 G: 40 INJECTION, SOLUTION INTRAVENOUS at 09:25

## 2017-09-22 RX ADMIN — MUPIROCIN 1 APPLICATION: 20 OINTMENT TOPICAL at 09:35

## 2017-09-22 RX ADMIN — LISINOPRIL 10 MG: 5 TABLET ORAL at 09:30

## 2017-09-22 RX ADMIN — GABAPENTIN 200 MG: 100 CAPSULE ORAL at 19:43

## 2017-09-22 RX ADMIN — LEVETIRACETAM 500 MG: 100 SOLUTION ORAL at 09:30

## 2017-09-22 RX ADMIN — ATORVASTATIN CALCIUM 10 MG: 10 TABLET, FILM COATED ORAL at 19:43

## 2017-09-22 RX ADMIN — GABAPENTIN 200 MG: 100 CAPSULE ORAL at 16:15

## 2017-09-22 RX ADMIN — CEFTAROLINE FOSAMIL 600 MG: 600 POWDER, FOR SOLUTION INTRAVENOUS at 09:28

## 2017-09-22 RX ADMIN — TAMSULOSIN HYDROCHLORIDE 0.4 MG: 0.4 CAPSULE ORAL at 09:30

## 2017-09-22 RX ADMIN — LEVETIRACETAM 500 MG: 100 SOLUTION ORAL at 19:42

## 2017-09-22 ASSESSMENT — ENCOUNTER SYMPTOMS
BACK PAIN: 1
NAUSEA: 0
COUGH: 0
SHORTNESS OF BREATH: 0
SPEECH CHANGE: 0
DOUBLE VISION: 0
BLURRED VISION: 0
SPUTUM PRODUCTION: 0
HEADACHES: 0
FEVER: 0
ABDOMINAL PAIN: 0
PALPITATIONS: 0
CHILLS: 0
CONSTIPATION: 0
VOMITING: 0
DIARRHEA: 0

## 2017-09-22 ASSESSMENT — COGNITIVE AND FUNCTIONAL STATUS - GENERAL
MOVING TO AND FROM BED TO CHAIR: UNABLE
STANDING UP FROM CHAIR USING ARMS: TOTAL
TURNING FROM BACK TO SIDE WHILE IN FLAT BAD: UNABLE
WALKING IN HOSPITAL ROOM: TOTAL
MOVING FROM LYING ON BACK TO SITTING ON SIDE OF FLAT BED: UNABLE
CLIMB 3 TO 5 STEPS WITH RAILING: TOTAL
MOBILITY SCORE: 6
SUGGESTED CMS G CODE MODIFIER MOBILITY: CN

## 2017-09-22 ASSESSMENT — PAIN SCALES - GENERAL
PAINLEVEL_OUTOF10: 0
PAINLEVEL_OUTOF10: 1
PAINLEVEL_OUTOF10: 0

## 2017-09-22 ASSESSMENT — GAIT ASSESSMENTS: GAIT LEVEL OF ASSIST: UNABLE TO PARTICIPATE

## 2017-09-22 ASSESSMENT — LIFESTYLE VARIABLES: DO YOU DRINK ALCOHOL: NO

## 2017-09-22 NOTE — PROGRESS NOTES
Assumed care of patient. Assessment complete. Patient denies pain at this time. Patient is really lethargic at this time, attempting to eat breakfast.  Educated to use call light for assistance. Fall precautions in place. Tele box in place. Will continue to monitor with hourly rounding.

## 2017-09-22 NOTE — THERAPY
"Physical Therapy Treatment completed.   Bed Mobility:  Supine to Sit: Maximal Assist  Transfers: Sit to Stand: Refused  Gait: Level Of Assist: Unable to Participate with No Equipment Needed       Plan of Care: Will benefit from Physical Therapy 3 times per week  Discharge Recommendations: Equipment: Will Continue to Assess for Equipment Needs. Post-acute therapy Discharge to a transitional care facility for continued skilled therapy services.    Pt presents with gross debilitation and pain limiting his ability to sit upright without support as well as participating in standing activity. Pt reporting dizziness once upright with /67mmHg. Suspect intolerance related to inconsistent upright position even in bed. He will benefit from further acute skilled PT services to improve functional mobility and will require placement upon DC.      See \"Rehab Therapy-Acute\" Patient Summary Report for complete documentation.       "

## 2017-09-22 NOTE — DIETARY
Nutrition Services: Repeat consult for poor PO  Consult received for poor PO.  RD already following patient.  Please see dietary notes for further detail.    RD will continue to monitor

## 2017-09-22 NOTE — CARE PLAN
Problem: Knowledge Deficit  Goal: Knowledge of disease process/condition, treatment plan, diagnostic tests, and medications will improve  Outcome: PROGRESSING AS EXPECTED  Updated pt on plan of care. Educated on orders (diet, activity). Will cont to update pt as needed.    Problem: Skin Integrity  Goal: Risk for impaired skin integrity will decrease  Outcome: PROGRESSING AS EXPECTED  Pt turned Q 2 to maintain skin integrity.

## 2017-09-22 NOTE — PROGRESS NOTES
"Cardiology Progress Note               Author: Amaury Alexis Date & Time created: 2017  8:09 AM     Interval History:  86 year old male with a history of chronic atrial fibrillation treated with metoprolol and digoxin apparently not on anticoagulation admitted on 2017 with sepsis and elevated troponin level.  : /70. Pulse 88. Denies any chest pain or shortness of breath. He is oriented to person and place. Can state that he has \"atrial flutter\".  : /68. Pulse 88. Atrial fibrillation. No significant cardiac events.  : /53. Pulse 70. Atrial fibrillation. No reported cardiac events.    .Review of Systems   Cardiovascular: Negative for chest pain.       Physical Exam   Constitutional: He is oriented to person, place, and time. No distress.   Profoundly weak.   Cardiovascular: Normal rate, S1 normal, S2 normal and normal heart sounds.  An irregular rhythm present. Exam reveals no gallop and no friction rub.    No murmur heard.  Pulses:       Radial pulses are 2+ on the right side, and 2+ on the left side.   Pulmonary/Chest: Effort normal and breath sounds normal. He has no wheezes. He has no rhonchi. He has no rales.   Musculoskeletal: He exhibits no edema.   Neurological: He is alert and oriented to person, place, and time.   Skin: Skin is warm and dry.   Psychiatric: He has a normal mood and affect. His behavior is normal.       Hemodynamics:  Temp (24hrs), Av.1 °C (97 °F), Min:35.7 °C (96.2 °F), Max:36.6 °C (97.8 °F)  Temperature: 36.1 °C (96.9 °F)  Pulse  Av.5  Min: 45  Max: 135   Blood Pressure : 110/53     Respiratory:    Respiration: 17, Pulse Oximetry: 99 %     Work Of Breathing / Effort: Mild  RUL Breath Sounds: Diminished, RML Breath Sounds: Diminished, RLL Breath Sounds: Diminished, MIKE Breath Sounds: Diminished, LLL Breath Sounds: Diminished  Fluids:     Weight: 91.6 kg (201 lb 15.1 oz)  GI/Nutrition:  Orders Placed This Encounter   Procedures   • DIET " ORDER     Standing Status:   Standing     Number of Occurrences:   1     Order Specific Question:   Diet:     Answer:   Full Liquid [11]     Comments:   Ensure on Each Tray     Lab Results:  Recent Labs      09/20/17 0358 09/21/17 0229 09/22/17 0229   WBC  17.5*  14.2*  12.8*   RBC  2.87*  2.74*  2.62*   HEMOGLOBIN  8.7*  8.0*  8.0*   HEMATOCRIT  26.1*  25.1*  23.9*   MCV  90.9  91.6  91.2   MCH  30.3  29.2  30.5   MCHC  33.3*  31.9*  33.5*   RDW  46.6  47.7  45.8   PLATELETCT  266  280  308   MPV  10.8  10.4  9.9     Recent Labs      09/20/17 0358 09/21/17 0229 09/22/17 0229   SODIUM  131*  135  130*   POTASSIUM  3.8  3.9  3.9   CHLORIDE  102  104  101   CO2  23  24  23   GLUCOSE  122*  96  106*   BUN  16  10  10   CREATININE  0.42*  0.38*  0.41*   CALCIUM  9.2  9.4  9.2     Recent Labs      09/19/17   1127  09/20/17   0931   APTT  101.0*   --    INR   --   1.09                 09/14/2017 ECHOCARDIOGRAM   Normal left ventricular systolic   function.  Normal regional wall motion.  Left ventricular ejection fraction is visually estimated to be 70%.  Diastolic function is difficult to assess with atrial fibrillation.  The right ventricle was normal in size and function.  Structurally normal mitral valve without significant stenosis or   regurgitation.  Aortic sclerosis without stenosis.  No aortic insufficiency.  Normal pericardium without effusion.     EKG performed on (09/15/17) was reviewed: EKG shows atrial fibrillation.     03/01/2017 MYOCARDIAL PERFUSION STUDY     No myocardial infarct or inducible ischemia.  Normal LEFT ventricular function.    Medical Decision Making, by Problem:  Active Hospital Problems    Diagnosis   • *Elevated troponin [R74.8]   • Persistent atrial fibrillation (CMS-HCC) [I48.1]   • Sepsis (CMS-HCC) [A41.9]   • Nausea & vomiting [R11.2]   • Hypokalemia [E87.6]   • Hyperglycemia [R73.9]     Assessment and Plan:  Elevated troponin level most likely related to acute sepsis  with myocardial injury in addition to probable supply demand mismatch. Normal left ventricular function. Medical therapy only    Atrial fibrillation/flutter rate controlled.    Sepsis. On antibiotics.    General debility.    Worsening anemia since admission hemoglobin dropping from 16 to 8.3.    Continue current therapy with digoxin and metoprolol and atorvastatin.  Not a candidate for anticoagulation or aspirin therapy due to precipitous drop in hemoglobin suggesting GI bleeding.  No additional cardiac recommendations.  We'll sign off.    Reviewed items::  EKG reviewed, Radiology images reviewed, Labs reviewed and Medications reviewed

## 2017-09-22 NOTE — PROGRESS NOTES
Infectious Disease Progress Note    Author: Oliva Bolanos M.D. Date & Time of service: 2017  4:51 PM    Chief Complaint:  Altered mental status    Interval History:  86-year-old male admitted for altered mental status. He was recently treated for L3-4 discitis. His repeat MRI showed right iliacus muscle abscess  17-MAXIMUM TEMPERATURE 98. On 2 liters of oxygen. WBC 14 and creatinine 0.38  - MAXIMUM TEMPERATURE 97.8. Complains of generalized malaise. Breathing is improved. Right leg pain is improved  Labs Reviewed, Medications Reviewed and Radiology Reviewed.    Review of Systems:  Review of Systems   Constitutional: Positive for malaise/fatigue. Negative for fever.   Respiratory: Negative for cough and shortness of breath.    Cardiovascular: Positive for leg swelling. Negative for chest pain.   Gastrointestinal: Negative for abdominal pain, nausea and vomiting.   Musculoskeletal: Positive for back pain.   Neurological: Negative for speech change and headaches.       Hemodynamics:  Temp (24hrs), Av.1 °C (96.9 °F), Min:35.6 °C (96.1 °F), Max:36.6 °C (97.8 °F)  Temperature: 35.9 °C (96.6 °F)  Pulse  Av.2  Min: 45  Max: 135   Blood Pressure : (!) 92/50 (RN notified)       Physical Exam:  Physical Exam   Constitutional: He appears lethargic. He is easily aroused.   Eyes: No scleral icterus.   Neck: Neck supple.   Cardiovascular: An irregularly irregular rhythm present.   Pulmonary/Chest: He has no wheezes. He has rales.   Abdominal: Soft. There is no tenderness. There is no rebound.   Musculoskeletal: He exhibits edema.   Neurological: He is easily aroused. He appears lethargic.   Vitals reviewed.      Meds:    Current Facility-Administered Medications:   •  ceftaroline (TEFLARO) ivpb  •  mupirocin  •  levetiracetam  •  Metoprolol Tartrate  •  pneumococcal 13-Jocelyn Conj Vacc  •  lorazepam  •  acetaminophen  •  digoxin  •  prochlorperazine  •  promethazine  •  hydrALAZINE  •  lisinopril  •   hyoscyamine-maalox plus-lidocaine viscous  •  NS  •  labetalol  •  ondansetron  •  metoprolol SR  •  atorvastatin  •  gabapentin  •  tamsulosin  •  NS  •  senna-docusate **AND** polyethylene glycol/lytes **AND** magnesium hydroxide **AND** bisacodyl  •  Respiratory Care per Protocol  •  NS    Labs:  Recent Labs      09/20/17   0358  09/21/17 0229 09/22/17 0229   WBC  17.5*  14.2*  12.8*   RBC  2.87*  2.74*  2.62*   HEMOGLOBIN  8.7*  8.0*  8.0*   HEMATOCRIT  26.1*  25.1*  23.9*   MCV  90.9  91.6  91.2   MCH  30.3  29.2  30.5   RDW  46.6  47.7  45.8   PLATELETCT  266  280  308   MPV  10.8  10.4  9.9   NEUTSPOLYS  72.70*  63.90  69.00   LYMPHOCYTES  12.20*  15.10*  18.60*   MONOCYTES  11.10  12.70  8.00   EOSINOPHILS  1.40  3.20  2.60   BASOPHILS  0.40  0.40  0.90   RBCMORPHOLO   --    --   Normal     Recent Labs      09/20/17 0358  09/21/17 0229 09/22/17 0229   SODIUM  131*  135  130*   POTASSIUM  3.8  3.9  3.9   CHLORIDE  102  104  101   CO2  23  24  23   GLUCOSE  122*  96  106*   BUN  16  10  10     Recent Labs      09/20/17   0358  09/21/17 0229 09/22/17 0229   CREATININE  0.42*  0.38*  0.41*       Imaging:  Ct-abdomen-pelvis With    Result Date: 9/19/2017 9/19/2017 6:34 PM HISTORY/REASON FOR EXAM:  Abscess noted on MRI. TECHNIQUE/EXAM DESCRIPTION:  CT scan of the abdomen and pelvis with contrast. Contrast-enhanced helical scanning was obtained from the diaphragmatic domes through the pubic symphysis following the bolus administration of nonionic contrast without complication. 100 mL of Omnipaque 350 nonionic contrast was administered without complication. Low dose optimization technique was utilized for this CT exam including automated exposure control and adjustment of the mA and/or kV according to patient size. COMPARISON: MRI from the same day FINDINGS: Lung bases: There are small bilateral pleural effusions with overlying atelectasis. Abdomen: No free air is seen in the abdomen or pelvis.  Evaluation is limited by streak artifact from barium within the colon. There is wall thickening of the distal esophagus with a small hiatal hernia. Liver appears unremarkable. Portal vein is patent. There is calcification in the pancreatic head. No adrenal mass is identified. Tiny hypodense left renal lesion is too small to characterize. There is mild prominence of the renal collecting systems bilaterally. Small hypodense right renal lesions too small to characterize. There are nonobstructing right renal calculi. Atherosclerotic plaque is seen in the aorta and its branches. There are small inguinal, retroperitoneal and mesenteric lymph nodes. There is no evidence of bowel obstruction. There is a moderate amount of stool in the rectosigmoid colon. There is colonic diverticulosis. The appendix is not identified. Stomach is distended with fluid. Small bowel loops are fluid-filled. Bladder is mildly distended with foci of air. There is asymmetric prominence of the right iliopsoas musculature with surrounding stranding. Findings likely related to the previously noted abscess collection. Degenerative changes are seen in the spine. Postsurgical changes are noted in the lumbar spine. Degenerative changes are seen at the hips. There is mild loss of height of the superior endplate of T11 with a Schmorl's node noted. There is mild endplate irregularity at L3/L4.     Prominence of the right iliopsoas muscle with surrounding inflammatory stranding. The known fluid collection was better delineated on the prior MRI. Mild endplate irregularity at L3/L4 can be seen in discitis/osteomyelitis. Air-fluid level in the bladder. Correlation with urinalysis is recommended. This can be seen in the setting of recent instrumentation, infection or fistula. Colonic diverticulosis. Moderate amount of colonic stool. Nonobstructing right renal calculi. Mild prominence of the renal collecting systems bilaterally. Small hypodense renal lesions are  too small to characterize. Fluid-filled loops of small bowel can be seen in the setting of enteritis. Small hiatal hernia with mild thickening of the distal esophagus. Calcifications in the pancreatic head can be seen in chronic pancreatitis. Atherosclerotic plaque. Small bilateral pleural effusions with overlying atelectasis.     Ct-drain-retroperitoneal    Result Date: 9/20/2017 9/20/2017 2:30 PM HISTORY/REASON FOR EXAM:  Multiloculated fluid collections in the right iliacus muscle. Suspected abscess. TECHNIQUE/EXAM DESCRIPTION: Right pelvic (extraperitoneal) retroperitoneal abscess drainage with CT guidance. Low dose optimization technique was utilized for this CT exam including automated exposure control and adjustment of the mA and/or kV according to patient size. PROCEDURE: Informed consent was obtained. Procedures performed with local anesthesia only with appropriate continuous patient monitoring by the radiology nurse. Sedation duration: N/A minutes Localizing CT images were obtained with the patient in supine position. The skin was prepped with Betadine and draped in a sterile fashion. Following local anesthesia with 1% Lidocaine, a 17 G guiding needle was placed and extraperitoneal needle path in the right side of the pelvis via the iliacus muscle confirmed with CT. An Amplatz guidewire was placed and following serial tract dilatation, a 8 Iranian pigtail locking catheter was placed. A specimen was collected and submitted for culture and sensitivity and Gram stain. Total of 5 mL old bloody reddish-brown fluid was drained. No purulent fluid was obtained. The fluid was not malodorous. The catheter was secured to the skin and connected to suction bulb drainage. Final CT images were obtained documenting catheter position. The patient tolerated the procedure well with no evidence of complication. Low dose optimization technique was utilized for this CT exam including automated exposure control and adjustment of  the mA and/or kV according to patient size. COMPARISON: MRI lumbar spine 9/19/2017, CT abdomen and pelvis 9/19/2017. FINDINGS: The final CT images show satisfactory catheter position within the target collection.     1.  CT GUIDED RETROPERITONEAL (EXTRAPERITONEAL) RIGHT PELVIC CATHETER DRAINAGE WITHIN THE RIGHT ILIACUS MUSCLE. OLD DARK REDDISH-BROWN BLOODY FLUID WAS OBTAINED. NO ABSCESS OR PURULENT MATERIAL. 2.  THE CURRENT PLAN IS TO CHECK CULTURES AND LIKELY REMOVED CATHETER IN THE SHORT-TERM AT 48-72 HOURS AS THERE IS UNLIKELY TO BE AN ABSCESS.    Ct-head W/o    Result Date: 9/13/2017 9/13/2017 9:30 PM HISTORY/REASON FOR EXAM:  Altered Mental Status. TECHNIQUE/EXAM DESCRIPTION AND NUMBER OF VIEWS: CT of the head without contrast. The study was performed on a helical multidetector CT scanner. Contiguous 2.5 mm axial sections were obtained from the skull base through the vertex. Up to date radiation dose reduction adjustments have been utilized to meet ALARA standards for radiation dose reduction. COMPARISON:  7/12/2017 FINDINGS: The lateral ventricles are enlarged. Cortical sulci are enlarged. Patchy areas of low attenuation in the white matter bilaterally. No significant mass effect or midline shift. Basal cisterns are patent. No evidence for intracranial hemorrhage. Calvaria are intact. Visualized orbits are unremarkable. Visualized mastoid air cells are clear. Visualized paranasal sinuses are unremarkable. Calcifications of the carotid arteries noted. Calcified scalp nodules again present.     1.  Diffuse atrophy and white matter changes. 2.  No acute intracranial hemorrhage or territorial infarct.     Dx-chest-portable (1 View)    Result Date: 9/16/2017 9/16/2017 10:31 PM HISTORY/REASON FOR EXAM:  Shortness of Breath. TECHNIQUE/EXAM DESCRIPTION AND NUMBER OF VIEWS: Single portable view of the chest. COMPARISON: 9/13/2017 FINDINGS: Cardiac mediastinal contour is unchanged. Mediastinum is again prominent. Mild  prominence of the pulmonary interstitium. No gross pleural fluid or pneumothorax. Dextroconvex curvature of thoracic spine.     No significant change from prior exam.    Dx-chest-portable (1 View)    Result Date: 9/13/2017 9/13/2017 8:44 PM HISTORY/REASON FOR EXAM:  Possible sepsis. TECHNIQUE/EXAM DESCRIPTION AND NUMBER OF VIEWS: Single portable view of the chest. COMPARISON: 8/7/2017 FINDINGS: Cardiac mediastinal contour is unchanged. Mild diffuse prominence of the interstitium again seen. No focal consolidation. No pleural fluid collection or pneumothorax. Dextroconvex curvature of thoracic spine. Diffuse osteopenia.     1.  Diffuse prominence of interstitium again seen, likely interstitial lung disease.  Mild pulmonary edema is also a consideration. 2.  No pneumonia or pneumothorax. 3.  Stable cardiomegaly.    Dx-small Bowel Series    Result Date: 9/17/2017 9/16/2017 10:39 AM HISTORY/REASON FOR EXAM:  Nausea and vomiting. TECHNIQUE/EXAM DESCRIPTION AND NUMBER OF VIEWS: Single contrast barium small bowel follow-through performed. Fluoroscopic images were obtained. No fluoroscopic images obtained. COMPARISON:  None available. FINDINGS:  view the abdomen demonstrates marked gaseous distention and small bowel and colonic distention. Patient drank thin barium. Contrast passed very slowly into dilated small bowel loops. Jejunal loops are dilated up to 5.2 cm. Overhead images were taken for a total of 22 hours before termination of the exam. Contrast reached the ileum, but does not extend into the colon. There is a large amount of stool in the colon.     1.  Gaseous distention with fairly diffuse bowel dilatation. Contrast extends into the ileum, but does not reach the colon within 22 hours. No transition point is identified to suggest mechanical bowel obstruction. 2.  Large amount of stool in the rectal vault.    Mr-lumbar Spine-with & W/o    Result Date: 9/19/2017 9/19/2017 1:35 PM HISTORY/REASON FOR EXAM:   Altered mental status, weakness. Possible discitis. Previous lumbar surgery. TECHNIQUE/EXAM DESCRIPTION: MRI of the lumbar spine without and with contrast. The study was performed on a [employer] 1.5 [employer] MRI scanner. T1 sagittal, T2 fast spin-echo sagittal, T2 axial images and T1 axial images were obtained of the lumbar spine. T1 post-contrast sagittal and T1 post-contrast axial images were obtained. Optional T2 fat-suppressed sagittal images may be obtained. 20 mL Omniscan gadolinium contrast was administered intravenously. COMPARISON:  MRI lumbar spine 7/7/2017, T abdomen and pelvis 1/6/2017 FINDINGS: Alignment in the lumbar spine again shows retrolisthesis of L3 on L4 of about 5 mm. There is fluid signal T2 hyperintensity again seen in the L3-L4 disc space and prominent marrow edema signal at the L3 inferior endplate and to a lesser extent L4 superior endplate. There is corresponding enhancement, particularly at the inferior endplate of L3. Findings are highly suspicious for discitis with osteomyelitis. There is postoperative change with lumbar laminectomy at L2-L3 through L5. There is posterior fusion with transpedicular screw fixation 4-L5. The posterior paraspinous soft tissues show expected postoperative changes with atrophy and fatty replacement in  the posterior paraspinous muscles at the operative levels. There is no significant postoperative dorsal epidural fluid collection. However, there has been interval development of multilocular fluid collections in the right iliacus muscle spanning about 42 mm. These are consistent with intramuscular abscesses (T2 axial image 4, series 5, T1 postcontrast axial image 1, series 10). The conus is normal in position and signal with its tip at the L1 level. At T12-L1, is a tiny central and left paramedian disc protrusion with an underlying annular fissure. There is mild hypertrophic facet arthropathy. Thecal sac is toward the lower range of normal without karla  central stenosis. The neural foramina are intact. At L1-2, there is negligible disc bulging. There is no central stenosis or significant foraminal stenosis. At L2-3, there is a small broad-based disc-osteophyte complex. There is no central stenosis as there is decompressive laminectomy at this level. There is no significant foraminal stenosis. At L3-4, there is posterior marginal spurring and disc bulging accentuated by the retrolisthesis. There is moderate bilateral lateral recess stenosis. Thecal sac is toward the lower range of normal even though there is a decompressive laminectomy at this  level. However, there is no karla central stenosis (T2 axial image 24, series 5). There is severe bilateral foraminal stenosis, right greater than left. This is unchanged from the previous exam. At L4-5, no central stenosis. There is inferior foraminal blunting with moderate bilateral foraminal stenosis. At L5-S1, there is minimal disc bulging. There is a left paramedian annular fissure. There is mild hypertrophic facet arthropathy. No central stenosis. There is moderate bilateral foraminal stenosis.     1.  L3-4 findings again suggesting discitis with osteomyelitis. No evidence of epidural abscess or epidural phlegmon. 2.  L3-4 retrolisthesis. 3.  Postoperative multilevel lumbar laminectomy and posterior fusion at L4-5. 4.  Interval development of multilocular intramuscular abscesses in the right iliacus muscle, partly in the field of view 5.  Degenerative and spondylotic changes as detailed for each level above in the body of report.    Dx-esophagus - Barium Swallow    Result Date: 9/16/2017 9/16/2017 10:39 AM HISTORY/REASON FOR EXAM:  Nausea. Nausea and vomiting. TECHNIQUE/EXAM DESCRIPTION AND NUMBER OF VIEWS: Single contrast esophagram procedure was performed. 5 fluoroscopic images obtained. Fluoroscopy time: 0.37 minutes COMPARISON:  None. FINDINGS: Limited exam secondary to limited patient mobility. The esophagus  appears normal in caliber and configuration. No definite mucosal lesions are identified on single-contrast techniques. Contrast passes freely into the stomach. No hiatal hernia is identified.     No abnormalities identified on limited single contrast esophagram.    Echocardiogram Comp W/o Cont    Result Date: 2017  Transthoracic Echo Report Echocardiography Laboratory CONCLUSIONS Prior study done on [id number] left ventricular systolic function. Normal regional wall motion. Left ventricular ejection fraction is visually estimated to be 70%. Diastolic function is difficult to assess with atrial fibrillation. The right ventricle was normal in size and function. Structurally normal mitral valve without significant stenosis or regurgitation. Aortic sclerosis without stenosis. No aortic insufficiency. Normal pericardium without effusion. BRIAN SPARROW Exam Date:         2017                    08:50 Exam Location:     Inpatient Priority:          Routine Ordering Physician:        OLESYA WHITE Referring Physician:       [id number] Still Sonographer:               LENARD Adamson RDCS Age:    86     Gender:    M MRN:    [id number] :    10/18/1930 BSA:    2.2    Ht (in):    71     Wt (lb):    220 Exam Type:     Complete Indications:     Abnormal electrocardiogram ECG EKG ICD Codes:        CPT Codes:       41480 BP:   166    /   105    HR: Technical Quality:       Technically difficult study -                          adequate information is obtained MEASUREMENTS  (Male / Female) Normal Values 2D ECHO LV Diastolic Diameter PLAX        4.8 cm                4.2 - 5.9 / 3.9 - 5.3 cm LV Systolic Diameter PLAX         2.9 cm                2.1 - 4.0 cm IVS Diastolic Thickness           1.1 cm                LVPW Diastolic Thickness          1 cm                  LVOT Diameter                     2.2 cm                Estimated LV Ejection Fraction    70 %                  LV  Ejection Fraction MOD BP       88.4 %                >= 55  % LV Ejection Fraction MOD 4C       91.3 %                LV Ejection Fraction MOD 2C       79.9 %                IVC Diameter                      2.2 cm                M-MODE Aortic Root Diameter MM           2.2 cm                DOPPLER AV Peak Velocity                  1.2 m/s               AV Peak Gradient                  5.8 mmHg              LVOT Peak Velocity                0.92 m/s              * Indicates values subject to auto-interpretation LV EF:  70    % FINDINGS Left Ventricle Normal left ventricular chamber size. Normal left ventricular wall thickness. Normal left ventricular systolic function. Normal regional wall motion. Left ventricular ejection fraction is visually estimated to be 70%. Diastolic function is difficult to assess with atrial fibrillation. Right Ventricle The right ventricle was normal in size and function. Right Atrium The right atrium is normal in size.  Inferior vena cava is not well visualized. Left Atrium The left atrium is normal in size.  Left atrial volume index is 19  mL/sq m. Mitral Valve Structurally normal mitral valve without significant stenosis or regurgitation.  Trace mitral regurgitation. Aortic Valve Aortic sclerosis without stenosis. Tricuspid aortic valve. No aortic insufficiency. Tricuspid Valve Structurally normal tricuspid valve without significant stenosis or regurgitation. Pulmonic Valve Structurally normal pulmonic valve without significant stenosis or regurgitation. Pericardium Normal pericardium without effusion. Aorta The aortic root is normal.  Ascending aorta diameter is 3.1 cm. Kingsley Hung M.D. (Electronically Signed) Final Date:     14 September 2017                 11:10      Micro:  Results     Procedure Component Value Units Date/Time    CULTURE WOUND W/ GRAM STAIN [[id number]] Collected:  09/20/17 1500    Order Status:  Completed Specimen:  Wound Updated:  09/22/17 0840      "Significant Indicator NEG     Source WND     Site Right Ileopsoas Abscess     Culture Result Wound No growth at 48 hours.     Gram Stain Result --     Few WBCs.  No organisms seen.      GRAM STAIN [[id number]] Collected:  09/20/17 1500    Order Status:  Completed Specimen:  Wound Updated:  09/21/17 0807     Significant Indicator .     Source WND     Site Right Ileopsoas Abscess     Gram Stain Result --     Few WBCs.  No organisms seen.      MRSA BY PCR (AMP) [[id number]]  (Abnormal) Collected:  09/19/17 1230    Order Status:  Completed Specimen:  Respirate from Nares Updated:  09/19/17 1656     Significant Indicator POS (POS)     Source RESP     Site NARES     MRSA PCR POSITIVE for MRSA by PCR. (A)    Narrative:       CALL  [facility]  171 tel. [phone],  CALLED  171 tel. [phone] 09/19/2017, 16:56, RB PERF. RESULTS CALLED TO:RN  [id number]  Collected By:[id number] AZALEA SCOTT    BLOOD CULTURE [[id number]] Collected:  09/13/17 2035    Order Status:  Completed Specimen:  Blood from Peripheral Updated:  09/18/17 2300     Significant Indicator NEG     Source BLD     Site PERIPHERAL     Blood Culture No growth after 5 days of incubation.    Narrative:       Per Hospital Policy: Only change Specimen Src: to \"Line\" if  specified by physician order.    BLOOD CULTURE [[id number]] Collected:  09/13/17 2115    Order Status:  Completed Specimen:  Blood Updated:  09/18/17 2300     Significant Indicator NEG     Source BLD     Site --     Blood Culture No growth after 5 days of incubation.    CDIFF BY PCR WITH TOXIN [[id number]] Collected:  09/17/17 1826    Order Status:  Completed Specimen:  Stool from Stool Updated:  09/17/17 2127     C Diff by PCR Negative     Comment: C. difficile NOT detected by PCR.  Treatment not indicated per guidelines.  Repeat testing not indicated within 7 days.          027-NAP1-BI Presumptive Negative     Comment: Presumptive 027/NAP1/BI target DNA sequences are NOT DETECTED.       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 Fib    Altered mental status  Due to above  Improved    Generalized debility  Needs rehabilitation    Discussed with internal medicine.

## 2017-09-23 LAB
ANION GAP SERPL CALC-SCNC: 6 MMOL/L (ref 0–11.9)
BACTERIA WND AEROBE CULT: NORMAL
BASOPHILS # BLD AUTO: 0.3 % (ref 0–1.8)
BASOPHILS # BLD: 0.04 K/UL (ref 0–0.12)
BUN SERPL-MCNC: 8 MG/DL (ref 8–22)
CALCIUM SERPL-MCNC: 8.8 MG/DL (ref 8.5–10.5)
CHLORIDE SERPL-SCNC: 101 MMOL/L (ref 96–112)
CO2 SERPL-SCNC: 23 MMOL/L (ref 20–33)
CREAT SERPL-MCNC: 0.37 MG/DL (ref 0.5–1.4)
EOSINOPHIL # BLD AUTO: 0.28 K/UL (ref 0–0.51)
EOSINOPHIL NFR BLD: 2.3 % (ref 0–6.9)
ERYTHROCYTE [DISTWIDTH] IN BLOOD BY AUTOMATED COUNT: 48.2 FL (ref 35.9–50)
GFR SERPL CREATININE-BSD FRML MDRD: >60 ML/MIN/1.73 M 2
GLUCOSE SERPL-MCNC: 129 MG/DL (ref 65–99)
GRAM STN SPEC: NORMAL
HCT VFR BLD AUTO: 24 % (ref 42–52)
HGB BLD-MCNC: 7.9 G/DL (ref 14–18)
IMM GRANULOCYTES # BLD AUTO: 0.61 K/UL (ref 0–0.11)
IMM GRANULOCYTES NFR BLD AUTO: 5 % (ref 0–0.9)
LYMPHOCYTES # BLD AUTO: 2.39 K/UL (ref 1–4.8)
LYMPHOCYTES NFR BLD: 19.7 % (ref 22–41)
MAGNESIUM SERPL-MCNC: 1.9 MG/DL (ref 1.5–2.5)
MCH RBC QN AUTO: 30.4 PG (ref 27–33)
MCHC RBC AUTO-ENTMCNC: 32.9 G/DL (ref 33.7–35.3)
MCV RBC AUTO: 92.3 FL (ref 81.4–97.8)
MONOCYTES # BLD AUTO: 1.4 K/UL (ref 0–0.85)
MONOCYTES NFR BLD AUTO: 11.5 % (ref 0–13.4)
NEUTROPHILS # BLD AUTO: 7.43 K/UL (ref 1.82–7.42)
NEUTROPHILS NFR BLD: 61.2 % (ref 44–72)
NRBC # BLD AUTO: 0 K/UL
NRBC BLD AUTO-RTO: 0 /100 WBC
PLATELET # BLD AUTO: 355 K/UL (ref 164–446)
PMV BLD AUTO: 9.9 FL (ref 9–12.9)
POTASSIUM SERPL-SCNC: 4 MMOL/L (ref 3.6–5.5)
RBC # BLD AUTO: 2.6 M/UL (ref 4.7–6.1)
SIGNIFICANT IND 70042: NORMAL
SITE SITE: NORMAL
SODIUM SERPL-SCNC: 130 MMOL/L (ref 135–145)
SOURCE SOURCE: NORMAL
WBC # BLD AUTO: 12.2 K/UL (ref 4.8–10.8)

## 2017-09-23 PROCEDURE — A9270 NON-COVERED ITEM OR SERVICE: HCPCS | Performed by: INTERNAL MEDICINE

## 2017-09-23 PROCEDURE — 700111 HCHG RX REV CODE 636 W/ 250 OVERRIDE (IP): Performed by: INTERNAL MEDICINE

## 2017-09-23 PROCEDURE — 85025 COMPLETE CBC W/AUTO DIFF WBC: CPT

## 2017-09-23 PROCEDURE — A9270 NON-COVERED ITEM OR SERVICE: HCPCS

## 2017-09-23 PROCEDURE — 99232 SBSQ HOSP IP/OBS MODERATE 35: CPT | Performed by: HOSPITALIST

## 2017-09-23 PROCEDURE — 770020 HCHG ROOM/CARE - TELE (206)

## 2017-09-23 PROCEDURE — 700105 HCHG RX REV CODE 258: Performed by: INTERNAL MEDICINE

## 2017-09-23 PROCEDURE — 700102 HCHG RX REV CODE 250 W/ 637 OVERRIDE(OP): Performed by: NURSE PRACTITIONER

## 2017-09-23 PROCEDURE — A9270 NON-COVERED ITEM OR SERVICE: HCPCS | Performed by: NURSE PRACTITIONER

## 2017-09-23 PROCEDURE — 700102 HCHG RX REV CODE 250 W/ 637 OVERRIDE(OP)

## 2017-09-23 PROCEDURE — 700102 HCHG RX REV CODE 250 W/ 637 OVERRIDE(OP): Performed by: INTERNAL MEDICINE

## 2017-09-23 PROCEDURE — 700111 HCHG RX REV CODE 636 W/ 250 OVERRIDE (IP): Performed by: HOSPITALIST

## 2017-09-23 PROCEDURE — 83735 ASSAY OF MAGNESIUM: CPT

## 2017-09-23 PROCEDURE — 36415 COLL VENOUS BLD VENIPUNCTURE: CPT

## 2017-09-23 PROCEDURE — 80048 BASIC METABOLIC PNL TOTAL CA: CPT

## 2017-09-23 PROCEDURE — 700105 HCHG RX REV CODE 258: Performed by: HOSPITALIST

## 2017-09-23 RX ADMIN — LEVETIRACETAM 500 MG: 100 SOLUTION ORAL at 08:39

## 2017-09-23 RX ADMIN — CEFTAROLINE FOSAMIL 600 MG: 600 POWDER, FOR SOLUTION INTRAVENOUS at 08:37

## 2017-09-23 RX ADMIN — SODIUM CHLORIDE: 9 INJECTION, SOLUTION INTRAVENOUS at 16:17

## 2017-09-23 RX ADMIN — LEVETIRACETAM 500 MG: 100 SOLUTION ORAL at 19:58

## 2017-09-23 RX ADMIN — CEFTAROLINE FOSAMIL 600 MG: 600 POWDER, FOR SOLUTION INTRAVENOUS at 19:58

## 2017-09-23 RX ADMIN — MUPIROCIN 2 %: 20 OINTMENT TOPICAL at 08:38

## 2017-09-23 RX ADMIN — METOPROLOL SUCCINATE 50 MG: 25 TABLET, EXTENDED RELEASE ORAL at 08:39

## 2017-09-23 RX ADMIN — ATORVASTATIN CALCIUM 10 MG: 10 TABLET, FILM COATED ORAL at 19:59

## 2017-09-23 RX ADMIN — LISINOPRIL 10 MG: 5 TABLET ORAL at 08:39

## 2017-09-23 RX ADMIN — ROSUVASTATIN CALCIUM 125 MCG: 10 TABLET, FILM COATED ORAL at 18:20

## 2017-09-23 RX ADMIN — SODIUM CHLORIDE: 9 INJECTION, SOLUTION INTRAVENOUS at 00:30

## 2017-09-23 RX ADMIN — TAMSULOSIN HYDROCHLORIDE 0.4 MG: 0.4 CAPSULE ORAL at 08:39

## 2017-09-23 RX ADMIN — MUPIROCIN 2 %: 20 OINTMENT TOPICAL at 20:01

## 2017-09-23 RX ADMIN — GABAPENTIN 200 MG: 100 CAPSULE ORAL at 16:17

## 2017-09-23 RX ADMIN — GABAPENTIN 200 MG: 100 CAPSULE ORAL at 08:39

## 2017-09-23 RX ADMIN — GABAPENTIN 200 MG: 100 CAPSULE ORAL at 19:59

## 2017-09-23 RX ADMIN — ONDANSETRON 4 MG: 2 INJECTION INTRAMUSCULAR; INTRAVENOUS at 20:09

## 2017-09-23 ASSESSMENT — ENCOUNTER SYMPTOMS
DIARRHEA: 0
HEADACHES: 0
SHORTNESS OF BREATH: 0
FEVER: 0
PALPITATIONS: 0
ABDOMINAL PAIN: 0
VOMITING: 0
BLURRED VISION: 0
CONSTIPATION: 0
SPUTUM PRODUCTION: 0
CHILLS: 0
SPEECH CHANGE: 0
NAUSEA: 0
DOUBLE VISION: 0
COUGH: 0
BACK PAIN: 1

## 2017-09-23 ASSESSMENT — PAIN SCALES - GENERAL
PAINLEVEL_OUTOF10: 0

## 2017-09-23 NOTE — PROGRESS NOTES
Received bedside report from Ml RN, Pt assessed, A&Ox4 however, very lethargic, no SOB Noted.  Pt updated on plan of care, Call light within reach, personal belongings within reach.  Bed in lowest position, Bed Strip alarm is on. Tele monitor on. Will continue to monitor.

## 2017-09-23 NOTE — PROGRESS NOTES
Pt sleeping in room. No complaints of pain. Pt not eating too much about <25% eaten. Pt encouraged to eat. Pt had more of an appetite at breakfast. Call light within reach. Bed in low position, wheels locked, side rails up.

## 2017-09-23 NOTE — PROGRESS NOTES
[facility]    Date of Service: 2017    Chief Complaint  Back pain     Interval Problem Update  Patient states that he is feeling somewhat better. He denies any chest pain or shortness of breath. He wants to move around a little bit. He has no other complaints.    Consultants/Specialty  Cardiology     Disposition  Home when stable         Review of Systems   Constitutional: Negative for chills and fever.   Eyes: Negative for blurred vision and double vision.   Respiratory: Negative for cough, sputum production and shortness of breath.    Cardiovascular: Negative for chest pain and palpitations.   Gastrointestinal: Negative for abdominal pain, constipation, diarrhea, nausea and vomiting.   Genitourinary: Negative for dysuria.   Musculoskeletal: Positive for back pain.   Neurological: Negative for headaches.      Physical Exam  Laboratory/Imaging   Hemodynamics  Temp (24hrs), Av.2 °C (97.1 °F), Min:35.6 °C (96.1 °F), Max:37.5 °C (99.5 °F)   Temperature: 37.5 °C (99.5 °F)  Pulse  Av.8  Min: 45  Max: 135    Blood Pressure : 115/68      Respiratory      Respiration: 16, Pulse Oximetry: 99 %     Work Of Breathing / Effort: Mild  RUL Breath Sounds: Diminished, RML Breath Sounds: Diminished, RLL Breath Sounds: Diminished, MIKE Breath Sounds: Diminished, LLL Breath Sounds: Diminished    Fluids    Intake/Output Summary (Last 24 hours) at 17 1121  Last data filed at 17 0600   Gross per 24 hour   Intake             1240 ml   Output             1700 ml   Net             -460 ml       Nutrition  Orders Placed This Encounter   Procedures   • DIET ORDER     Standing Status:   Standing     Number of Occurrences:   1     Order Specific Question:   Diet:     Answer:   Full Liquid [11]     Comments:   Ensure on Each Tray     Physical Exam   Constitutional: He is oriented to person, place, and time. He appears well-developed and well-nourished. No distress.   HENT:   Head: Normocephalic.    Eyes: Pupils are equal, round, and reactive to light.   Neck: Normal range of motion. Neck supple.   Cardiovascular: Normal rate, regular rhythm and normal heart sounds.  Exam reveals no gallop and no friction rub.    No murmur heard.  Pulmonary/Chest: Effort normal and breath sounds normal. No respiratory distress. He has no wheezes.   Abdominal: Soft. Bowel sounds are normal. He exhibits no distension and no mass. There is no tenderness. There is no rebound and no guarding.   Musculoskeletal: Normal range of motion. He exhibits no edema.   Neurological: He is alert and oriented to person, place, and time. No cranial nerve deficit.   Skin: Skin is warm and dry. He is not diaphoretic.   Psychiatric:   Somewhat depressed mood noted       Recent Labs      09/21/17 0229 09/22/17 0229 09/23/17 0229   WBC  14.2*  12.8*  12.2*   RBC  2.74*  2.62*  2.60*   HEMOGLOBIN  8.0*  8.0*  7.9*   HEMATOCRIT  25.1*  23.9*  24.0*   MCV  91.6  91.2  92.3   MCH  29.2  30.5  30.4   MCHC  31.9*  33.5*  32.9*   RDW  47.7  45.8  48.2   PLATELETCT  280  308  355   MPV  10.4  9.9  9.9     Recent Labs      09/21/17 0229 09/22/17 0229 09/23/17 0229   SODIUM  135  130*  130*   POTASSIUM  3.9  3.9  4.0   CHLORIDE  104  101  101   CO2  24  23  23   GLUCOSE  96  106*  129*   BUN  10  10  8   CREATININE  0.38*  0.41*  0.37*   CALCIUM  9.4  9.2  8.8                      Assessment/Plan     Persistent atrial fibrillation (CMS-HCC)   Assessment & Plan    Variable rate control. Continue to monitor. Telemetry.        Nontraumatic psoas hematoma   Assessment & Plan    Stable.  We'll attempt physical and occupational therapy.        Chronic osteomyelitis of lumbar spine (CMS-HCC)   Assessment & Plan    Continue with Ceftaroline four-week course. Patient will need skilled nursing facility placement for this. Appreciate  for the same.        Normocytic anemia- (present on admission)   Assessment & Plan    Stable, with no  current evidence of bleeding. We'll hold anticoagulation.        Sepsis (CMS-HCC)   Assessment & Plan    This is sepsis (without associated acute organ dysfunction).   Resolved. Episodes of tachycardia can be attributed to atrial fibrillation.            Reviewed items::  Medications reviewed and Labs reviewed  Hearn catheter::  No Hearn  DVT: Dropping hgb.

## 2017-09-23 NOTE — PROGRESS NOTES
Infectious Disease Progress Note    Author: Oliva Bolanos M.D. Date & Time of service: 2017  1:56 PM    Chief Complaint:  Altered mental status    Interval History:  86-year-old male admitted for altered mental status. He was recently treated for L3-4 discitis. His repeat MRI showed right iliacus muscle abscess  17-MAXIMUM TEMPERATURE 98. On 2 liters of oxygen. WBC 14 and creatinine 0.38  - MAXIMUM TEMPERATURE 97.8. Complains of generalized malaise. Breathing is improved. Right leg pain is improved  17-MAXIMUM TEMPERATURE 99.5. Still has back pain when he sits up. The WBC 12  Labs Reviewed, Medications Reviewed and Radiology Reviewed.    Review of Systems:  Review of Systems   Constitutional: Positive for malaise/fatigue. Negative for fever.   Respiratory: Negative for cough and shortness of breath.    Cardiovascular: Positive for leg swelling. Negative for chest pain.   Gastrointestinal: Negative for abdominal pain, nausea and vomiting.   Musculoskeletal: Positive for back pain.   Neurological: Negative for speech change and headaches.       Hemodynamics:  Temp (24hrs), Av.3 °C (97.4 °F), Min:35.7 °C (96.3 °F), Max:37.5 °C (99.5 °F)  Temperature: 36.4 °C (97.6 °F)  Pulse  Av.7  Min: 45  Max: 135   Blood Pressure : (!) 96/51 (RN notified)       Physical Exam:  Physical Exam   Constitutional: He appears lethargic. He is easily aroused.   Eyes: No scleral icterus.   Neck: Neck supple.   Cardiovascular: An irregularly irregular rhythm present.   Pulmonary/Chest: He has no wheezes. He has rales.   Abdominal: Soft. There is no tenderness. There is no rebound.   Musculoskeletal: He exhibits edema.   Neurological: He is easily aroused. He appears lethargic.   Vitals reviewed.      Meds:    Current Facility-Administered Medications:   •  ceftaroline (TEFLARO) ivpb  •  mupirocin  •  levetiracetam  •  Metoprolol Tartrate  •  pneumococcal 13-Jocelyn Conj Vacc  •  lorazepam  •  acetaminophen  •   digoxin  •  prochlorperazine  •  promethazine  •  hydrALAZINE  •  lisinopril  •  hyoscyamine-maalox plus-lidocaine viscous  •  NS  •  labetalol  •  ondansetron  •  metoprolol SR  •  atorvastatin  •  gabapentin  •  tamsulosin  •  NS  •  senna-docusate **AND** polyethylene glycol/lytes **AND** magnesium hydroxide **AND** bisacodyl  •  Respiratory Care per Protocol  •  NS    Labs:  Recent Labs      09/21/17 0229 09/22/17 0229 09/23/17 0229   WBC  14.2*  12.8*  12.2*   RBC  2.74*  2.62*  2.60*   HEMOGLOBIN  8.0*  8.0*  7.9*   HEMATOCRIT  25.1*  23.9*  24.0*   MCV  91.6  91.2  92.3   MCH  29.2  30.5  30.4   RDW  47.7  45.8  48.2   PLATELETCT  280  308  355   MPV  10.4  9.9  9.9   NEUTSPOLYS  63.90  69.00  61.20   LYMPHOCYTES  15.10*  18.60*  19.70*   MONOCYTES  12.70  8.00  11.50   EOSINOPHILS  3.20  2.60  2.30   BASOPHILS  0.40  0.90  0.30   RBCMORPHOLO   --   Normal   --      Recent Labs      09/21/17 0229 09/22/17 0229 09/23/17 0229   SODIUM  135  130*  130*   POTASSIUM  3.9  3.9  4.0   CHLORIDE  104  101  101   CO2  24  23  23   GLUCOSE  96  106*  129*   BUN  10  10  8     Recent Labs      09/21/17 0229 09/22/17 0229 09/23/17 0229   CREATININE  0.38*  0.41*  0.37*       Imaging:  Ct-abdomen-pelvis With    Result Date: 9/19/2017 9/19/2017 6:34 PM HISTORY/REASON FOR EXAM:  Abscess noted on MRI. TECHNIQUE/EXAM DESCRIPTION:  CT scan of the abdomen and pelvis with contrast. Contrast-enhanced helical scanning was obtained from the diaphragmatic domes through the pubic symphysis following the bolus administration of nonionic contrast without complication. 100 mL of Omnipaque 350 nonionic contrast was administered without complication. Low dose optimization technique was utilized for this CT exam including automated exposure control and adjustment of the mA and/or kV according to patient size. COMPARISON: MRI from the same day FINDINGS: Lung bases: There are small bilateral pleural effusions with  overlying atelectasis. Abdomen: No free air is seen in the abdomen or pelvis. Evaluation is limited by streak artifact from barium within the colon. There is wall thickening of the distal esophagus with a small hiatal hernia. Liver appears unremarkable. Portal vein is patent. There is calcification in the pancreatic head. No adrenal mass is identified. Tiny hypodense left renal lesion is too small to characterize. There is mild prominence of the renal collecting systems bilaterally. Small hypodense right renal lesions too small to characterize. There are nonobstructing right renal calculi. Atherosclerotic plaque is seen in the aorta and its branches. There are small inguinal, retroperitoneal and mesenteric lymph nodes. There is no evidence of bowel obstruction. There is a moderate amount of stool in the rectosigmoid colon. There is colonic diverticulosis. The appendix is not identified. Stomach is distended with fluid. Small bowel loops are fluid-filled. Bladder is mildly distended with foci of air. There is asymmetric prominence of the right iliopsoas musculature with surrounding stranding. Findings likely related to the previously noted abscess collection. Degenerative changes are seen in the spine. Postsurgical changes are noted in the lumbar spine. Degenerative changes are seen at the hips. There is mild loss of height of the superior endplate of T11 with a Schmorl's node noted. There is mild endplate irregularity at L3/L4.     Prominence of the right iliopsoas muscle with surrounding inflammatory stranding. The known fluid collection was better delineated on the prior MRI. Mild endplate irregularity at L3/L4 can be seen in discitis/osteomyelitis. Air-fluid level in the bladder. Correlation with urinalysis is recommended. This can be seen in the setting of recent instrumentation, infection or fistula. Colonic diverticulosis. Moderate amount of colonic stool. Nonobstructing right renal calculi. Mild prominence of  the renal collecting systems bilaterally. Small hypodense renal lesions are too small to characterize. Fluid-filled loops of small bowel can be seen in the setting of enteritis. Small hiatal hernia with mild thickening of the distal esophagus. Calcifications in the pancreatic head can be seen in chronic pancreatitis. Atherosclerotic plaque. Small bilateral pleural effusions with overlying atelectasis.     Ct-drain-retroperitoneal    Result Date: 9/20/2017 9/20/2017 2:30 PM HISTORY/REASON FOR EXAM:  Multiloculated fluid collections in the right iliacus muscle. Suspected abscess. TECHNIQUE/EXAM DESCRIPTION: Right pelvic (extraperitoneal) retroperitoneal abscess drainage with CT guidance. Low dose optimization technique was utilized for this CT exam including automated exposure control and adjustment of the mA and/or kV according to patient size. PROCEDURE: Informed consent was obtained. Procedures performed with local anesthesia only with appropriate continuous patient monitoring by the radiology nurse. Sedation duration: N/A minutes Localizing CT images were obtained with the patient in supine position. The skin was prepped with Betadine and draped in a sterile fashion. Following local anesthesia with 1% Lidocaine, a 17 G guiding needle was placed and extraperitoneal needle path in the right side of the pelvis via the iliacus muscle confirmed with CT. An Amplatz guidewire was placed and following serial tract dilatation, a 8 Estonian pigtail locking catheter was placed. A specimen was collected and submitted for culture and sensitivity and Gram stain. Total of 5 mL old bloody reddish-brown fluid was drained. No purulent fluid was obtained. The fluid was not malodorous. The catheter was secured to the skin and connected to suction bulb drainage. Final CT images were obtained documenting catheter position. The patient tolerated the procedure well with no evidence of complication. Low dose optimization technique was  utilized for this CT exam including automated exposure control and adjustment of the mA and/or kV according to patient size. COMPARISON: MRI lumbar spine 9/19/2017, CT abdomen and pelvis 9/19/2017. FINDINGS: The final CT images show satisfactory catheter position within the target collection.     1.  CT GUIDED RETROPERITONEAL (EXTRAPERITONEAL) RIGHT PELVIC CATHETER DRAINAGE WITHIN THE RIGHT ILIACUS MUSCLE. OLD DARK REDDISH-BROWN BLOODY FLUID WAS OBTAINED. NO ABSCESS OR PURULENT MATERIAL. 2.  THE CURRENT PLAN IS TO CHECK CULTURES AND LIKELY REMOVED CATHETER IN THE SHORT-TERM AT 48-72 HOURS AS THERE IS UNLIKELY TO BE AN ABSCESS.    Ct-head W/o    Result Date: 9/13/2017 9/13/2017 9:30 PM HISTORY/REASON FOR EXAM:  Altered Mental Status. TECHNIQUE/EXAM DESCRIPTION AND NUMBER OF VIEWS: CT of the head without contrast. The study was performed on a helical multidetector CT scanner. Contiguous 2.5 mm axial sections were obtained from the skull base through the vertex. Up to date radiation dose reduction adjustments have been utilized to meet ALARA standards for radiation dose reduction. COMPARISON:  7/12/2017 FINDINGS: The lateral ventricles are enlarged. Cortical sulci are enlarged. Patchy areas of low attenuation in the white matter bilaterally. No significant mass effect or midline shift. Basal cisterns are patent. No evidence for intracranial hemorrhage. Calvaria are intact. Visualized orbits are unremarkable. Visualized mastoid air cells are clear. Visualized paranasal sinuses are unremarkable. Calcifications of the carotid arteries noted. Calcified scalp nodules again present.     1.  Diffuse atrophy and white matter changes. 2.  No acute intracranial hemorrhage or territorial infarct.     Dx-chest-portable (1 View)    Result Date: 9/16/2017 9/16/2017 10:31 PM HISTORY/REASON FOR EXAM:  Shortness of Breath. TECHNIQUE/EXAM DESCRIPTION AND NUMBER OF VIEWS: Single portable view of the chest. COMPARISON: 9/13/2017  FINDINGS: Cardiac mediastinal contour is unchanged. Mediastinum is again prominent. Mild prominence of the pulmonary interstitium. No gross pleural fluid or pneumothorax. Dextroconvex curvature of thoracic spine.     No significant change from prior exam.    Dx-chest-portable (1 View)    Result Date: 9/13/2017 9/13/2017 8:44 PM HISTORY/REASON FOR EXAM:  Possible sepsis. TECHNIQUE/EXAM DESCRIPTION AND NUMBER OF VIEWS: Single portable view of the chest. COMPARISON: 8/7/2017 FINDINGS: Cardiac mediastinal contour is unchanged. Mild diffuse prominence of the interstitium again seen. No focal consolidation. No pleural fluid collection or pneumothorax. Dextroconvex curvature of thoracic spine. Diffuse osteopenia.     1.  Diffuse prominence of interstitium again seen, likely interstitial lung disease.  Mild pulmonary edema is also a consideration. 2.  No pneumonia or pneumothorax. 3.  Stable cardiomegaly.    Dx-small Bowel Series    Result Date: 9/17/2017 9/16/2017 10:39 AM HISTORY/REASON FOR EXAM:  Nausea and vomiting. TECHNIQUE/EXAM DESCRIPTION AND NUMBER OF VIEWS: Single contrast barium small bowel follow-through performed. Fluoroscopic images were obtained. No fluoroscopic images obtained. COMPARISON:  None available. FINDINGS:  view the abdomen demonstrates marked gaseous distention and small bowel and colonic distention. Patient drank thin barium. Contrast passed very slowly into dilated small bowel loops. Jejunal loops are dilated up to 5.2 cm. Overhead images were taken for a total of 22 hours before termination of the exam. Contrast reached the ileum, but does not extend into the colon. There is a large amount of stool in the colon.     1.  Gaseous distention with fairly diffuse bowel dilatation. Contrast extends into the ileum, but does not reach the colon within 22 hours. No transition point is identified to suggest mechanical bowel obstruction. 2.  Large amount of stool in the rectal vault.    Mr-lumbar  Spine-with & W/o    Result Date: 9/19/2017 9/19/2017 1:35 PM HISTORY/REASON FOR EXAM:  Altered mental status, weakness. Possible discitis. Previous lumbar surgery. TECHNIQUE/EXAM DESCRIPTION: MRI of the lumbar spine without and with contrast. The study was performed on a [employer] 1.5 Lucille MRI scanner. T1 sagittal, T2 fast spin-echo sagittal, T2 axial images and T1 axial images were obtained of the lumbar spine. T1 post-contrast sagittal and T1 post-contrast axial images were obtained. Optional T2 fat-suppressed sagittal images may be obtained. 20 mL Omniscan gadolinium contrast was administered intravenously. COMPARISON:  MRI lumbar spine 7/7/2017, T abdomen and pelvis 1/6/2017 FINDINGS: Alignment in the lumbar spine again shows retrolisthesis of L3 on L4 of about 5 mm. There is fluid signal T2 hyperintensity again seen in the L3-L4 disc space and prominent marrow edema signal at the L3 inferior endplate and to a lesser extent L4 superior endplate. There is corresponding enhancement, particularly at the inferior endplate of L3. Findings are highly suspicious for discitis with osteomyelitis. There is postoperative change with lumbar laminectomy at L2-L3 through L5. There is posterior fusion with transpedicular screw fixation 4-L5. The posterior paraspinous soft tissues show expected postoperative changes with atrophy and fatty replacement in  the posterior paraspinous muscles at the operative levels. There is no significant postoperative dorsal epidural fluid collection. However, there has been interval development of multilocular fluid collections in the right iliacus muscle spanning about 42 mm. These are consistent with intramuscular abscesses (T2 axial image 4, series 5, T1 postcontrast axial image 1, series 10). The conus is normal in position and signal with its tip at the L1 level. At T12-L1, is a tiny central and left paramedian disc protrusion with an underlying annular fissure. There is mild  hypertrophic facet arthropathy. Thecal sac is toward the lower range of normal without karla central stenosis. The neural foramina are intact. At L1-2, there is negligible disc bulging. There is no central stenosis or significant foraminal stenosis. At L2-3, there is a small broad-based disc-osteophyte complex. There is no central stenosis as there is decompressive laminectomy at this level. There is no significant foraminal stenosis. At L3-4, there is posterior marginal spurring and disc bulging accentuated by the retrolisthesis. There is moderate bilateral lateral recess stenosis. Thecal sac is toward the lower range of normal even though there is a decompressive laminectomy at this  level. However, there is no karla central stenosis (T2 axial image 24, series 5). There is severe bilateral foraminal stenosis, right greater than left. This is unchanged from the previous exam. At L4-5, no central stenosis. There is inferior foraminal blunting with moderate bilateral foraminal stenosis. At L5-S1, there is minimal disc bulging. There is a left paramedian annular fissure. There is mild hypertrophic facet arthropathy. No central stenosis. There is moderate bilateral foraminal stenosis.     1.  L3-4 findings again suggesting discitis with osteomyelitis. No evidence of epidural abscess or epidural phlegmon. 2.  L3-4 retrolisthesis. 3.  Postoperative multilevel lumbar laminectomy and posterior fusion at L4-5. 4.  Interval development of multilocular intramuscular abscesses in the right iliacus muscle, partly in the field of view 5.  Degenerative and spondylotic changes as detailed for each level above in the body of report.    Dx-esophagus - Barium Swallow    Result Date: 9/16/2017 9/16/2017 10:39 AM HISTORY/REASON FOR EXAM:  Nausea. Nausea and vomiting. TECHNIQUE/EXAM DESCRIPTION AND NUMBER OF VIEWS: Single contrast esophagram procedure was performed. 5 fluoroscopic images obtained. Fluoroscopy time: 0.37 minutes  COMPARISON:  None. FINDINGS: Limited exam secondary to limited patient mobility. The esophagus appears normal in caliber and configuration. No definite mucosal lesions are identified on single-contrast techniques. Contrast passes freely into the stomach. No hiatal hernia is identified.     No abnormalities identified on limited single contrast esophagram.    Echocardiogram Comp W/o Cont    Result Date: 2017  Transthoracic Echo Report Echocardiography Laboratory CONCLUSIONS Prior study done on [id number] left ventricular systolic function. Normal regional wall motion. Left ventricular ejection fraction is visually estimated to be 70%. Diastolic function is difficult to assess with atrial fibrillation. The right ventricle was normal in size and function. Structurally normal mitral valve without significant stenosis or regurgitation. Aortic sclerosis without stenosis. No aortic insufficiency. Normal pericardium without effusion. BRIAN SPARROW Exam Date:         2017                    08:50 Exam Location:     Inpatient Priority:          Routine Ordering Physician:        OLESYA WHITE Referring Physician:       [id number], ZIU Sonographer:               LENARD Adamson RDCS Age:    86     Gender:    M MRN:    [id number] :    10/18/1930 BSA:    2.2    Ht (in):    71     Wt (lb):    220 Exam Type:     Complete Indications:     Abnormal electrocardiogram ECG EKG ICD Codes:        CPT Codes:       99741 BP:   166    /   105    HR: Technical Quality:       Technically difficult study -                          adequate information is obtained MEASUREMENTS  (Male / Female) Normal Values 2D ECHO LV Diastolic Diameter PLAX        4.8 cm                4.2 - 5.9 / 3.9 - 5.3 cm LV Systolic Diameter PLAX         2.9 cm                2.1 - 4.0 cm IVS Diastolic Thickness           1.1 cm                LVPW Diastolic Thickness          1 cm                  LVOT Diameter                      2.2 cm                Estimated LV Ejection Fraction    70 %                  LV Ejection Fraction MOD BP       88.4 %                >= 55  % LV Ejection Fraction MOD 4C       91.3 %                LV Ejection Fraction MOD 2C       79.9 %                IVC Diameter                      2.2 cm                M-MODE Aortic Root Diameter MM           2.2 cm                DOPPLER AV Peak Velocity                  1.2 m/s               AV Peak Gradient                  5.8 mmHg              LVOT Peak Velocity                0.92 m/s              * Indicates values subject to auto-interpretation LV EF:  70    % FINDINGS Left Ventricle Normal left ventricular chamber size. Normal left ventricular wall thickness. Normal left ventricular systolic function. Normal regional wall motion. Left ventricular ejection fraction is visually estimated to be 70%. Diastolic function is difficult to assess with atrial fibrillation. Right Ventricle The right ventricle was normal in size and function. Right Atrium The right atrium is normal in size.  Inferior vena cava is not well visualized. Left Atrium The left atrium is normal in size.  Left atrial volume index is 19  mL/sq m. Mitral Valve Structurally normal mitral valve without significant stenosis or regurgitation.  Trace mitral regurgitation. Aortic Valve Aortic sclerosis without stenosis. Tricuspid aortic valve. No aortic insufficiency. Tricuspid Valve Structurally normal tricuspid valve without significant stenosis or regurgitation. Pulmonic Valve Structurally normal pulmonic valve without significant stenosis or regurgitation. Pericardium Normal pericardium without effusion. Aorta The aortic root is normal.  Ascending aorta diameter is 3.1 cm. Kingsley Hung M.D. (Electronically Signed) Final Date:     14 September 2017                 11:10      Micro:  Results     Procedure Component Value Units Date/Time    CULTURE WOUND W/ GRAM STAIN [[id number]] Collected:   "09/20/17 1500    Order Status:  Completed Specimen:  Wound Updated:  09/23/17 0725     Gram Stain Result --     Few WBCs.  No organisms seen.       Significant Indicator NEG     Source WND     Site Right Ileopsoas Abscess     Culture Result Wound No growth at 72 hours.    GRAM STAIN [[id number]] Collected:  09/20/17 1500    Order Status:  Completed Specimen:  Wound Updated:  09/21/17 0807     Significant Indicator .     Source WND     Site Right Ileopsoas Abscess     Gram Stain Result --     Few WBCs.  No organisms seen.      MRSA BY PCR (AMP) [[id number]]  (Abnormal) Collected:  09/19/17 1230    Order Status:  Completed Specimen:  Respirate from Nares Updated:  09/19/17 1656     Significant Indicator POS (POS)     Source RESP     Site NARES     MRSA PCR POSITIVE for MRSA by PCR. (A)    Narrative:       CALL  [facility]  171 tel. [phone],  CALLED  171 tel. [phone] 09/19/2017, 16:56, RB PERF. RESULTS CALLED TO:RN  [id number]  Collected By:[id number] AZALEA SCOTT    BLOOD CULTURE [[id number]] Collected:  09/13/17 2035    Order Status:  Completed Specimen:  Blood from Peripheral Updated:  09/18/17 2300     Significant Indicator NEG     Source BLD     Site PERIPHERAL     Blood Culture No growth after 5 days of incubation.    Narrative:       Per Hospital Policy: Only change Specimen Src: to \"Line\" if  specified by physician order.    BLOOD CULTURE [[id number]] Collected:  09/13/17 2115    Order Status:  Completed Specimen:  Blood Updated:  09/18/17 2300     Significant Indicator NEG     Source BLD     Site --     Blood Culture No growth after 5 days of incubation.    CDIFF BY PCR WITH TOXIN [[id number]] Collected:  09/17/17 1826    Order Status:  Completed Specimen:  Stool from Stool Updated:  09/17/17 2127     C Diff by PCR Negative     Comment: C. difficile NOT detected by PCR.  Treatment not indicated per guidelines.  Repeat testing not indicated within 7 days.          027-NAP1-BI Presumptive Negative     Comment: " Presumptive 027/NAP1/BI target DNA sequences are NOT DETECTED.       Narrative:       Special Contact [id number] IFEOMA TORRES  Does this patient have risk factors for C-diff?->Yes  C-Diff Risk Factors->antibiotic exposure  Has patient taken stool softeners or laxatives in the last 5  days?->Yes          Assessment:  Active Hospital Problems    Diagnosis   • Persistent atrial fibrillation (CMS-[facility]) [I48.1]   • Sepsis (CMS-[facility]) [A41.9]   • 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.81]   • Chronic osteomyelitis of lumbar spine (CMS-[facility]) [M86.68]       Plan:  L3-4 discitis osteomyelitis  Status post treatment with Rocephin  Persistent changes on repeat MRI of 9/19/17  Continue ceftarolin  In for at least 4 weeks with repeat MRI to see improvement    Right iliacus abscess versus hematoma  Status post drainage on 9/20/17  Possibly just old hematoma  Cultures are negative so far    Leukocytosis  Improving  12.2 at last check    A. Fib    Altered mental status  Due to above  Improved    Generalized debility  Needs rehabilitation    Discussed with internal medicine.

## 2017-09-23 NOTE — CARE PLAN
Problem: Safety  Goal: Will remain free from falls  Outcome: PROGRESSING AS EXPECTED  Pt assessed for fall risk. Proper precautions in place. Pt educated to call for assistance, verbalizes understanding. Treaded slipper socks in place, bed alarm on, bed in low position, wheels locked, side rails up x2, call light within reach.     Problem: Infection  Goal: Will remain free from infection  Outcome: PROGRESSING AS EXPECTED  Pt monitored for signs and symptoms of infection. Vitals and labs assessed and monitored for signs of infection. Proper hand hygiene performed prior to entering and exiting pt's room. Pt educated on proper hand hygiene. Pt receiving IV abx.

## 2017-09-23 NOTE — PROGRESS NOTES
Received report and assumed care of patient. Patient is lethargic and flat at this time. Patient is in no signs of distress and denies pain at this time. Patient was updated on the plan of care for the day. Patient does not like to be turned due to pain, and refuses to get out of bed at this time. Patient receiving IV abx. Call light within reach, bed in low position, 2 side rails up. Educated on fall risk. Will continue to monitor

## 2017-09-23 NOTE — CARE PLAN
Problem: Communication  Goal: The ability to communicate needs accurately and effectively will improve  Listened to patients discussion of concerns related to disease process and treatment plan. Discussed with patient concerns, goals and management. Discussed importance of patient reporting new signs and symptoms related to disease process.     Problem: Urinary Elimination:  Goal: Ability to reestablish a normal urinary elimination pattern will improve  Assessing for urinary retention. Voiding being encouraged and understands importance of communicating needs related to urinary elimination.

## 2017-09-23 NOTE — ASSESSMENT & PLAN NOTE
Stable - multi factorial - acute blood loss, chronic dz, iron def.  Fu hgb    Iron supplements.

## 2017-09-23 NOTE — PROGRESS NOTES
[facility]    Date of Service: 2017    Chief Complaint  Back pain     Interval Problem Update  Patient reports feeling okay. Somewhat depressed. No chest pain or shortness of breath. Spoke with son-in-law at bedside, agrees with plan for possible skilled nursing facility placement.    Consultants/Specialty  Cardiology     Disposition  Home when stable         Review of Systems   Constitutional: Negative for chills and fever.   Eyes: Negative for blurred vision and double vision.   Respiratory: Negative for cough, sputum production and shortness of breath.    Cardiovascular: Negative for chest pain and palpitations.   Gastrointestinal: Negative for abdominal pain, constipation, diarrhea, nausea and vomiting.   Genitourinary: Negative for dysuria.   Musculoskeletal: Positive for back pain.   Neurological: Negative for headaches.      Physical Exam  Laboratory/Imaging   Hemodynamics  Temp (24hrs), Av.1 °C (96.9 °F), Min:35.6 °C (96.1 °F), Max:36.6 °C (97.8 °F)   Temperature: 35.9 °C (96.6 °F)  Pulse  Av.2  Min: 45  Max: 135    Blood Pressure : (!) 92/50 (RN notified)      Respiratory      Respiration: 16, Pulse Oximetry: 97 %     Work Of Breathing / Effort: Mild  RUL Breath Sounds: Diminished, RML Breath Sounds: Diminished, RLL Breath Sounds: Diminished, MIKE Breath Sounds: Diminished, LLL Breath Sounds: Diminished    Fluids    Intake/Output Summary (Last 24 hours) at 17 1700  Last data filed at 17 1100   Gross per 24 hour   Intake             1730 ml   Output             2353 ml   Net             -623 ml       Nutrition  Orders Placed This Encounter   Procedures   • DIET ORDER     Standing Status:   Standing     Number of Occurrences:   1     Order Specific Question:   Diet:     Answer:   Full Liquid [11]     Comments:   Ensure on Each Tray     Physical Exam   Constitutional: He is oriented to person, place, and time. He appears well-developed and well-nourished. No  distress.   HENT:   Head: Normocephalic.   Eyes: Pupils are equal, round, and reactive to light.   Neck: Normal range of motion. Neck supple.   Cardiovascular: Normal rate, regular rhythm and normal heart sounds.  Exam reveals no gallop and no friction rub.    No murmur heard.  Pulmonary/Chest: Effort normal and breath sounds normal. No respiratory distress. He has no wheezes.   Abdominal: Soft. Bowel sounds are normal. He exhibits no distension and no mass. There is no tenderness. There is no rebound and no guarding.   Musculoskeletal: Normal range of motion. He exhibits no edema.   Neurological: He is alert and oriented to person, place, and time. No cranial nerve deficit.   Appears fatigued today    Skin: Skin is warm and dry. He is not diaphoretic.       Recent Labs      09/20/17 0358 09/21/17 0229 09/22/17 0229   WBC  17.5*  14.2*  12.8*   RBC  2.87*  2.74*  2.62*   HEMOGLOBIN  8.7*  8.0*  8.0*   HEMATOCRIT  26.1*  25.1*  23.9*   MCV  90.9  91.6  91.2   MCH  30.3  29.2  30.5   MCHC  33.3*  31.9*  33.5*   RDW  46.6  47.7  45.8   PLATELETCT  266  280  308   MPV  10.8  10.4  9.9     Recent Labs      09/20/17 0358 09/21/17 0229 09/22/17 0229   SODIUM  131*  135  130*   POTASSIUM  3.8  3.9  3.9   CHLORIDE  102  104  101   CO2  23  24  23   GLUCOSE  122*  96  106*   BUN  16  10  10   CREATININE  0.42*  0.38*  0.41*   CALCIUM  9.2  9.4  9.2     Recent Labs      09/20/17   0931   INR  1.09                  Assessment/Plan     Persistent atrial fibrillation (CMS-[facility])   Assessment & Plan    Rate currently controlled.        Nontraumatic psoas hematoma   Assessment & Plan    Stable.        Chronic osteomyelitis of lumbar spine (CMS-[facility])   Assessment & Plan    As per infectious disease, will start Ceftaroline.  Plan for 4 week intravenous course. Order placed for possible skilled nursing facility placement. We'll continue current antibiotic therapy. Physical and occupational therapy to evaluate the  patient.        Normocytic anemia- (present on admission)   Assessment & Plan    No current bleeding noted. Continue to monitor.        Sepsis (CMS-[facility])   Assessment & Plan    This is sepsis (without associated acute organ dysfunction).   Resolved. Episodes of tachycardia can be attributed to atrial fibrillation.            Reviewed items::  Medications reviewed and Labs reviewed  Hearn catheter::  No Hearn  DVT: Dropping hgb.

## 2017-09-24 LAB
ANION GAP SERPL CALC-SCNC: 6 MMOL/L (ref 0–11.9)
BASOPHILS # BLD AUTO: 0.3 % (ref 0–1.8)
BASOPHILS # BLD: 0.04 K/UL (ref 0–0.12)
BUN SERPL-MCNC: 9 MG/DL (ref 8–22)
CALCIUM SERPL-MCNC: 8.9 MG/DL (ref 8.5–10.5)
CHLORIDE SERPL-SCNC: 101 MMOL/L (ref 96–112)
CO2 SERPL-SCNC: 24 MMOL/L (ref 20–33)
COMMENT 1642: NORMAL
CREAT SERPL-MCNC: 0.41 MG/DL (ref 0.5–1.4)
EOSINOPHIL # BLD AUTO: 0.25 K/UL (ref 0–0.51)
EOSINOPHIL NFR BLD: 2.1 % (ref 0–6.9)
ERYTHROCYTE [DISTWIDTH] IN BLOOD BY AUTOMATED COUNT: 48 FL (ref 35.9–50)
GFR SERPL CREATININE-BSD FRML MDRD: >60 ML/MIN/1.73 M 2
GLUCOSE SERPL-MCNC: 96 MG/DL (ref 65–99)
HCT VFR BLD AUTO: 24.1 % (ref 42–52)
HGB BLD-MCNC: 8 G/DL (ref 14–18)
IMM GRANULOCYTES # BLD AUTO: 0.62 K/UL (ref 0–0.11)
IMM GRANULOCYTES NFR BLD AUTO: 5.3 % (ref 0–0.9)
LYMPHOCYTES # BLD AUTO: 2.21 K/UL (ref 1–4.8)
LYMPHOCYTES NFR BLD: 18.8 % (ref 22–41)
MAGNESIUM SERPL-MCNC: 1.8 MG/DL (ref 1.5–2.5)
MCH RBC QN AUTO: 30.4 PG (ref 27–33)
MCHC RBC AUTO-ENTMCNC: 33.2 G/DL (ref 33.7–35.3)
MCV RBC AUTO: 91.6 FL (ref 81.4–97.8)
MONOCYTES # BLD AUTO: 1.33 K/UL (ref 0–0.85)
MONOCYTES NFR BLD AUTO: 11.3 % (ref 0–13.4)
MORPHOLOGY BLD-IMP: NORMAL
NEUTROPHILS # BLD AUTO: 7.29 K/UL (ref 1.82–7.42)
NEUTROPHILS NFR BLD: 62.2 % (ref 44–72)
NRBC # BLD AUTO: 0 K/UL
NRBC BLD AUTO-RTO: 0 /100 WBC
PLATELET # BLD AUTO: 397 K/UL (ref 164–446)
PLATELET BLD QL SMEAR: NORMAL
PMV BLD AUTO: 9.8 FL (ref 9–12.9)
POTASSIUM SERPL-SCNC: 3.9 MMOL/L (ref 3.6–5.5)
RBC # BLD AUTO: 2.63 M/UL (ref 4.7–6.1)
RBC BLD AUTO: NORMAL
SODIUM SERPL-SCNC: 131 MMOL/L (ref 135–145)
WBC # BLD AUTO: 11.7 K/UL (ref 4.8–10.8)

## 2017-09-24 PROCEDURE — A9270 NON-COVERED ITEM OR SERVICE: HCPCS | Performed by: INTERNAL MEDICINE

## 2017-09-24 PROCEDURE — 700102 HCHG RX REV CODE 250 W/ 637 OVERRIDE(OP): Performed by: NURSE PRACTITIONER

## 2017-09-24 PROCEDURE — 36415 COLL VENOUS BLD VENIPUNCTURE: CPT

## 2017-09-24 PROCEDURE — 770020 HCHG ROOM/CARE - TELE (206)

## 2017-09-24 PROCEDURE — A9270 NON-COVERED ITEM OR SERVICE: HCPCS | Performed by: NURSE PRACTITIONER

## 2017-09-24 PROCEDURE — 99232 SBSQ HOSP IP/OBS MODERATE 35: CPT | Performed by: HOSPITALIST

## 2017-09-24 PROCEDURE — 80048 BASIC METABOLIC PNL TOTAL CA: CPT

## 2017-09-24 PROCEDURE — 700102 HCHG RX REV CODE 250 W/ 637 OVERRIDE(OP)

## 2017-09-24 PROCEDURE — 700102 HCHG RX REV CODE 250 W/ 637 OVERRIDE(OP): Performed by: INTERNAL MEDICINE

## 2017-09-24 PROCEDURE — 700105 HCHG RX REV CODE 258: Performed by: HOSPITALIST

## 2017-09-24 PROCEDURE — 700111 HCHG RX REV CODE 636 W/ 250 OVERRIDE (IP): Performed by: HOSPITALIST

## 2017-09-24 PROCEDURE — 700111 HCHG RX REV CODE 636 W/ 250 OVERRIDE (IP): Performed by: INTERNAL MEDICINE

## 2017-09-24 PROCEDURE — A9270 NON-COVERED ITEM OR SERVICE: HCPCS

## 2017-09-24 PROCEDURE — 700105 HCHG RX REV CODE 258: Performed by: INTERNAL MEDICINE

## 2017-09-24 PROCEDURE — 85025 COMPLETE CBC W/AUTO DIFF WBC: CPT

## 2017-09-24 PROCEDURE — 83735 ASSAY OF MAGNESIUM: CPT

## 2017-09-24 RX ORDER — MAGNESIUM SULFATE HEPTAHYDRATE 40 MG/ML
2 INJECTION, SOLUTION INTRAVENOUS ONCE
Status: COMPLETED | OUTPATIENT
Start: 2017-09-24 | End: 2017-09-24

## 2017-09-24 RX ADMIN — GABAPENTIN 200 MG: 100 CAPSULE ORAL at 08:58

## 2017-09-24 RX ADMIN — MAGNESIUM SULFATE IN WATER 2 G: 40 INJECTION, SOLUTION INTRAVENOUS at 09:02

## 2017-09-24 RX ADMIN — GABAPENTIN 200 MG: 100 CAPSULE ORAL at 14:53

## 2017-09-24 RX ADMIN — CEFTAROLINE FOSAMIL 600 MG: 600 POWDER, FOR SOLUTION INTRAVENOUS at 08:58

## 2017-09-24 RX ADMIN — MUPIROCIN 1 %: 20 OINTMENT TOPICAL at 20:37

## 2017-09-24 RX ADMIN — ACETAMINOPHEN 650 MG: 325 TABLET, FILM COATED ORAL at 20:37

## 2017-09-24 RX ADMIN — ATORVASTATIN CALCIUM 10 MG: 10 TABLET, FILM COATED ORAL at 20:37

## 2017-09-24 RX ADMIN — ROSUVASTATIN CALCIUM 125 MCG: 10 TABLET, FILM COATED ORAL at 17:44

## 2017-09-24 RX ADMIN — SODIUM CHLORIDE: 9 INJECTION, SOLUTION INTRAVENOUS at 20:38

## 2017-09-24 RX ADMIN — LEVETIRACETAM 500 MG: 100 SOLUTION ORAL at 08:58

## 2017-09-24 RX ADMIN — GABAPENTIN 200 MG: 100 CAPSULE ORAL at 20:37

## 2017-09-24 RX ADMIN — TAMSULOSIN HYDROCHLORIDE 0.4 MG: 0.4 CAPSULE ORAL at 08:58

## 2017-09-24 RX ADMIN — MUPIROCIN 2 %: 20 OINTMENT TOPICAL at 08:58

## 2017-09-24 RX ADMIN — SODIUM CHLORIDE: 9 INJECTION, SOLUTION INTRAVENOUS at 06:03

## 2017-09-24 RX ADMIN — CEFTAROLINE FOSAMIL 600 MG: 600 POWDER, FOR SOLUTION INTRAVENOUS at 20:36

## 2017-09-24 RX ADMIN — METOPROLOL SUCCINATE 50 MG: 25 TABLET, EXTENDED RELEASE ORAL at 08:58

## 2017-09-24 RX ADMIN — LEVETIRACETAM 500 MG: 100 SOLUTION ORAL at 20:37

## 2017-09-24 ASSESSMENT — PAIN SCALES - GENERAL
PAINLEVEL_OUTOF10: 0
PAINLEVEL_OUTOF10: 3
PAINLEVEL_OUTOF10: 0

## 2017-09-24 ASSESSMENT — ENCOUNTER SYMPTOMS
BLURRED VISION: 0
FEVER: 0
COUGH: 0
VOMITING: 0
PALPITATIONS: 0
DOUBLE VISION: 0
CHILLS: 0
SPUTUM PRODUCTION: 0
SHORTNESS OF BREATH: 0
HEADACHES: 0
CONSTIPATION: 0
BACK PAIN: 1
ABDOMINAL PAIN: 0
NAUSEA: 0
DIARRHEA: 0
SPEECH CHANGE: 0

## 2017-09-24 NOTE — PROGRESS NOTES
Infectious Disease Progress Note    Author: Oliva Bolanos M.D. Date & Time of service: 2017  4:21 PM    Chief Complaint:  Altered mental status    Interval History:  86-year-old male admitted for altered mental status. He was recently treated for L3-4 discitis. His repeat MRI showed right iliacus muscle abscess  17-MAXIMUM TEMPERATURE 98. On 2 liters of oxygen. WBC 14 and creatinine 0.38  - MAXIMUM TEMPERATURE 97.8. Complains of generalized malaise. Breathing is improved. Right leg pain is improved  17-MAXIMUM TEMPERATURE 99.5. Still has back pain when he sits up. The WBC 12  17-MAXIMUM TEMPERATURE 97.6. Feels better. No new issues overnight. WBC11.7  Labs Reviewed, Medications Reviewed and Radiology Reviewed.    Review of Systems:  Review of Systems   Constitutional: Positive for malaise/fatigue. Negative for fever.   Respiratory: Negative for cough and shortness of breath.    Cardiovascular: Positive for leg swelling. Negative for chest pain.   Gastrointestinal: Negative for abdominal pain, nausea and vomiting.   Musculoskeletal: Positive for back pain.   Neurological: Negative for speech change and headaches.       Hemodynamics:  Temp (24hrs), Av.3 °C (97.3 °F), Min:36.1 °C (97 °F), Max:36.4 °C (97.6 °F)  Temperature: 36.3 °C (97.4 °F)  Pulse  Av.2  Min: 45  Max: 135   Blood Pressure : 101/61       Physical Exam:  Physical Exam   Constitutional: He appears lethargic. He is easily aroused.   Eyes: No scleral icterus.   Neck: Neck supple.   Cardiovascular: An irregularly irregular rhythm present.   Pulmonary/Chest: He has no wheezes. He has rales.   Abdominal: Soft. There is no tenderness. There is no rebound.   Musculoskeletal: He exhibits edema.   Neurological: He is easily aroused. He appears lethargic.   Vitals reviewed.      Meds:    Current Facility-Administered Medications:   •  ceftaroline (TEFLARO) ivpb  •  mupirocin  •  levetiracetam  •  Metoprolol Tartrate  •   pneumococcal 13-Jocelyn Conj Vacc  •  lorazepam  •  acetaminophen  •  digoxin  •  prochlorperazine  •  promethazine  •  hydrALAZINE  •  lisinopril  •  hyoscyamine-maalox plus-lidocaine viscous  •  NS  •  labetalol  •  ondansetron  •  metoprolol SR  •  atorvastatin  •  gabapentin  •  tamsulosin  •  NS  •  senna-docusate **AND** polyethylene glycol/lytes **AND** magnesium hydroxide **AND** bisacodyl  •  Respiratory Care per Protocol  •  NS    Labs:  Recent Labs      09/22/17 0229 09/23/17 0229 09/24/17   0240   WBC  12.8*  12.2*  11.7*   RBC  2.62*  2.60*  2.63*   HEMOGLOBIN  8.0*  7.9*  8.0*   HEMATOCRIT  23.9*  24.0*  24.1*   MCV  91.2  92.3  91.6   MCH  30.5  30.4  30.4   RDW  45.8  48.2  48.0   PLATELETCT  308  355  397   MPV  9.9  9.9  9.8   NEUTSPOLYS  69.00  61.20  62.20   LYMPHOCYTES  18.60*  19.70*  18.80*   MONOCYTES  8.00  11.50  11.30   EOSINOPHILS  2.60  2.30  2.10   BASOPHILS  0.90  0.30  0.30   RBCMORPHOLO  Normal   --   Normal     Recent Labs      09/22/17 0229 09/23/17 0229 09/24/17   0240   SODIUM  130*  130*  131*   POTASSIUM  3.9  4.0  3.9   CHLORIDE  101  101  101   CO2  23  23  24   GLUCOSE  106*  129*  96   BUN  10  8  9     Recent Labs      09/22/17 0229 09/23/17 0229 09/24/17   0240   CREATININE  0.41*  0.37*  0.41*       Imaging:  Ct-abdomen-pelvis With    Result Date: 9/19/2017 9/19/2017 6:34 PM HISTORY/REASON FOR EXAM:  Abscess noted on MRI. TECHNIQUE/EXAM DESCRIPTION:  CT scan of the abdomen and pelvis with contrast. Contrast-enhanced helical scanning was obtained from the diaphragmatic domes through the pubic symphysis following the bolus administration of nonionic contrast without complication. 100 mL of Omnipaque 350 nonionic contrast was administered without complication. Low dose optimization technique was utilized for this CT exam including automated exposure control and adjustment of the mA and/or kV according to patient size. COMPARISON: MRI from the same day  FINDINGS: Lung bases: There are small bilateral pleural effusions with overlying atelectasis. Abdomen: No free air is seen in the abdomen or pelvis. Evaluation is limited by streak artifact from barium within the colon. There is wall thickening of the distal esophagus with a small hiatal hernia. Liver appears unremarkable. Portal vein is patent. There is calcification in the pancreatic head. No adrenal mass is identified. Tiny hypodense left renal lesion is too small to characterize. There is mild prominence of the renal collecting systems bilaterally. Small hypodense right renal lesions too small to characterize. There are nonobstructing right renal calculi. Atherosclerotic plaque is seen in the aorta and its branches. There are small inguinal, retroperitoneal and mesenteric lymph nodes. There is no evidence of bowel obstruction. There is a moderate amount of stool in the rectosigmoid colon. There is colonic diverticulosis. The appendix is not identified. Stomach is distended with fluid. Small bowel loops are fluid-filled. Bladder is mildly distended with foci of air. There is asymmetric prominence of the right iliopsoas musculature with surrounding stranding. Findings likely related to the previously noted abscess collection. Degenerative changes are seen in the spine. Postsurgical changes are noted in the lumbar spine. Degenerative changes are seen at the hips. There is mild loss of height of the superior endplate of T11 with a Schmorl's node noted. There is mild endplate irregularity at L3/L4.     Prominence of the right iliopsoas muscle with surrounding inflammatory stranding. The known fluid collection was better delineated on the prior MRI. Mild endplate irregularity at L3/L4 can be seen in discitis/osteomyelitis. Air-fluid level in the bladder. Correlation with urinalysis is recommended. This can be seen in the setting of recent instrumentation, infection or fistula. Colonic diverticulosis. Moderate amount  of colonic stool. Nonobstructing right renal calculi. Mild prominence of the renal collecting systems bilaterally. Small hypodense renal lesions are too small to characterize. Fluid-filled loops of small bowel can be seen in the setting of enteritis. Small hiatal hernia with mild thickening of the distal esophagus. Calcifications in the pancreatic head can be seen in chronic pancreatitis. Atherosclerotic plaque. Small bilateral pleural effusions with overlying atelectasis.     Ct-drain-retroperitoneal    Result Date: 9/20/2017 9/20/2017 2:30 PM HISTORY/REASON FOR EXAM:  Multiloculated fluid collections in the right iliacus muscle. Suspected abscess. TECHNIQUE/EXAM DESCRIPTION: Right pelvic (extraperitoneal) retroperitoneal abscess drainage with CT guidance. Low dose optimization technique was utilized for this CT exam including automated exposure control and adjustment of the mA and/or kV according to patient size. PROCEDURE: Informed consent was obtained. Procedures performed with local anesthesia only with appropriate continuous patient monitoring by the radiology nurse. Sedation duration: N/A minutes Localizing CT images were obtained with the patient in supine position. The skin was prepped with Betadine and draped in a sterile fashion. Following local anesthesia with 1% Lidocaine, a 17 G guiding needle was placed and extraperitoneal needle path in the right side of the pelvis via the iliacus muscle confirmed with CT. An Amplatz guidewire was placed and following serial tract dilatation, a 8 Azeri pigtail locking catheter was placed. A specimen was collected and submitted for culture and sensitivity and Gram stain. Total of 5 mL old bloody reddish-brown fluid was drained. No purulent fluid was obtained. The fluid was not malodorous. The catheter was secured to the skin and connected to suction bulb drainage. Final CT images were obtained documenting catheter position. The patient tolerated the procedure well  with no evidence of complication. Low dose optimization technique was utilized for this CT exam including automated exposure control and adjustment of the mA and/or kV according to patient size. COMPARISON: MRI lumbar spine 9/19/2017, CT abdomen and pelvis 9/19/2017. FINDINGS: The final CT images show satisfactory catheter position within the target collection.     1.  CT GUIDED RETROPERITONEAL (EXTRAPERITONEAL) RIGHT PELVIC CATHETER DRAINAGE WITHIN THE RIGHT ILIACUS MUSCLE. OLD DARK REDDISH-BROWN BLOODY FLUID WAS OBTAINED. NO ABSCESS OR PURULENT MATERIAL. 2.  THE CURRENT PLAN IS TO CHECK CULTURES AND LIKELY REMOVED CATHETER IN THE SHORT-TERM AT 48-72 HOURS AS THERE IS UNLIKELY TO BE AN ABSCESS.    Ct-head W/o    Result Date: 9/13/2017 9/13/2017 9:30 PM HISTORY/REASON FOR EXAM:  Altered Mental Status. TECHNIQUE/EXAM DESCRIPTION AND NUMBER OF VIEWS: CT of the head without contrast. The study was performed on a helical multidetector CT scanner. Contiguous 2.5 mm axial sections were obtained from the skull base through the vertex. Up to date radiation dose reduction adjustments have been utilized to meet ALARA standards for radiation dose reduction. COMPARISON:  7/12/2017 FINDINGS: The lateral ventricles are enlarged. Cortical sulci are enlarged. Patchy areas of low attenuation in the white matter bilaterally. No significant mass effect or midline shift. Basal cisterns are patent. No evidence for intracranial hemorrhage. Calvaria are intact. Visualized orbits are unremarkable. Visualized mastoid air cells are clear. Visualized paranasal sinuses are unremarkable. Calcifications of the carotid arteries noted. Calcified scalp nodules again present.     1.  Diffuse atrophy and white matter changes. 2.  No acute intracranial hemorrhage or territorial infarct.     Dx-chest-portable (1 View)    Result Date: 9/16/2017 9/16/2017 10:31 PM HISTORY/REASON FOR EXAM:  Shortness of Breath. TECHNIQUE/EXAM DESCRIPTION AND NUMBER OF  VIEWS: Single portable view of the chest. COMPARISON: 9/13/2017 FINDINGS: Cardiac mediastinal contour is unchanged. Mediastinum is again prominent. Mild prominence of the pulmonary interstitium. No gross pleural fluid or pneumothorax. Dextroconvex curvature of thoracic spine.     No significant change from prior exam.    Dx-chest-portable (1 View)    Result Date: 9/13/2017 9/13/2017 8:44 PM HISTORY/REASON FOR EXAM:  Possible sepsis. TECHNIQUE/EXAM DESCRIPTION AND NUMBER OF VIEWS: Single portable view of the chest. COMPARISON: 8/7/2017 FINDINGS: Cardiac mediastinal contour is unchanged. Mild diffuse prominence of the interstitium again seen. No focal consolidation. No pleural fluid collection or pneumothorax. Dextroconvex curvature of thoracic spine. Diffuse osteopenia.     1.  Diffuse prominence of interstitium again seen, likely interstitial lung disease.  Mild pulmonary edema is also a consideration. 2.  No pneumonia or pneumothorax. 3.  Stable cardiomegaly.    Dx-small Bowel Series    Result Date: 9/17/2017 9/16/2017 10:39 AM HISTORY/REASON FOR EXAM:  Nausea and vomiting. TECHNIQUE/EXAM DESCRIPTION AND NUMBER OF VIEWS: Single contrast barium small bowel follow-through performed. Fluoroscopic images were obtained. No fluoroscopic images obtained. COMPARISON:  None available. FINDINGS:  view the abdomen demonstrates marked gaseous distention and small bowel and colonic distention. Patient drank thin barium. Contrast passed very slowly into dilated small bowel loops. Jejunal loops are dilated up to 5.2 cm. Overhead images were taken for a total of 22 hours before termination of the exam. Contrast reached the ileum, but does not extend into the colon. There is a large amount of stool in the colon.     1.  Gaseous distention with fairly diffuse bowel dilatation. Contrast extends into the ileum, but does not reach the colon within 22 hours. No transition point is identified to suggest mechanical bowel  obstruction. 2.  Large amount of stool in the rectal vault.    Mr-lumbar Spine-with & W/o    Result Date: 9/19/2017 9/19/2017 1:35 PM HISTORY/REASON FOR EXAM:  Altered mental status, weakness. Possible discitis. Previous lumbar surgery. TECHNIQUE/EXAM DESCRIPTION: MRI of the lumbar spine without and with contrast. The study was performed on a [employer] Signa 1.5 Lucille MRI scanner. T1 sagittal, T2 fast spin-echo sagittal, T2 axial images and T1 axial images were obtained of the lumbar spine. T1 post-contrast sagittal and T1 post-contrast axial images were obtained. Optional T2 fat-suppressed sagittal images may be obtained. 20 mL Omniscan gadolinium contrast was administered intravenously. COMPARISON:  MRI lumbar spine 7/7/2017, T abdomen and pelvis 1/6/2017 FINDINGS: Alignment in the lumbar spine again shows retrolisthesis of L3 on L4 of about 5 mm. There is fluid signal T2 hyperintensity again seen in the L3-L4 disc space and prominent marrow edema signal at the L3 inferior endplate and to a lesser extent L4 superior endplate. There is corresponding enhancement, particularly at the inferior endplate of L3. Findings are highly suspicious for discitis with osteomyelitis. There is postoperative change with lumbar laminectomy at L2-L3 through L5. There is posterior fusion with transpedicular screw fixation 4-L5. The posterior paraspinous soft tissues show expected postoperative changes with atrophy and fatty replacement in  the posterior paraspinous muscles at the operative levels. There is no significant postoperative dorsal epidural fluid collection. However, there has been interval development of multilocular fluid collections in the right iliacus muscle spanning about 42 mm. These are consistent with intramuscular abscesses (T2 axial image 4, series 5, T1 postcontrast axial image 1, series 10). The conus is normal in position and signal with its tip at the L1 level. At T12-L1, is a tiny central and left paramedian disc  protrusion with an underlying annular fissure. There is mild hypertrophic facet arthropathy. Thecal sac is toward the lower range of normal without karla central stenosis. The neural foramina are intact. At L1-2, there is negligible disc bulging. There is no central stenosis or significant foraminal stenosis. At L2-3, there is a small broad-based disc-osteophyte complex. There is no central stenosis as there is decompressive laminectomy at this level. There is no significant foraminal stenosis. At L3-4, there is posterior marginal spurring and disc bulging accentuated by the retrolisthesis. There is moderate bilateral lateral recess stenosis. Thecal sac is toward the lower range of normal even though there is a decompressive laminectomy at this  level. However, there is no karla central stenosis (T2 axial image 24, series 5). There is severe bilateral foraminal stenosis, right greater than left. This is unchanged from the previous exam. At L4-5, no central stenosis. There is inferior foraminal blunting with moderate bilateral foraminal stenosis. At L5-S1, there is minimal disc bulging. There is a left paramedian annular fissure. There is mild hypertrophic facet arthropathy. No central stenosis. There is moderate bilateral foraminal stenosis.     1.  L3-4 findings again suggesting discitis with osteomyelitis. No evidence of epidural abscess or epidural phlegmon. 2.  L3-4 retrolisthesis. 3.  Postoperative multilevel lumbar laminectomy and posterior fusion at L4-5. 4.  Interval development of multilocular intramuscular abscesses in the right iliacus muscle, partly in the field of view 5.  Degenerative and spondylotic changes as detailed for each level above in the body of report.    Dx-esophagus - Barium Swallow    Result Date: 9/16/2017 9/16/2017 10:39 AM HISTORY/REASON FOR EXAM:  Nausea. Nausea and vomiting. TECHNIQUE/EXAM DESCRIPTION AND NUMBER OF VIEWS: Single contrast esophagram procedure was performed. 5  fluoroscopic images obtained. Fluoroscopy time: 0.37 minutes COMPARISON:  None. FINDINGS: Limited exam secondary to limited patient mobility. The esophagus appears normal in caliber and configuration. No definite mucosal lesions are identified on single-contrast techniques. Contrast passes freely into the stomach. No hiatal hernia is identified.     No abnormalities identified on limited single contrast esophagram.    Echocardiogram Comp W/o Cont    Result Date: 2017  Transthoracic Echo Report Echocardiography Laboratory CONCLUSIONS Prior study done on [id number] left ventricular systolic function. Normal regional wall motion. Left ventricular ejection fraction is visually estimated to be 70%. Diastolic function is difficult to assess with atrial fibrillation. The right ventricle was normal in size and function. Structurally normal mitral valve without significant stenosis or regurgitation. Aortic sclerosis without stenosis. No aortic insufficiency. Normal pericardium without effusion. ANDREWS BRIAN Exam Date:         2017                    08:50 Exam Location:     Inpatient Priority:          Routine Ordering Physician:        OLESYA WHITE Referring Physician:       CHRISTOPHER Pickard Sonographer:               LENARD Adamson RDCS Age:    86     Gender:    M MRN:    [id number] :    10/18/1930 BSA:    2.2    Ht (in):    71     Wt (lb):    220 Exam Type:     Complete Indications:     Abnormal electrocardiogram ECG EKG ICD Codes:        CPT Codes:       40259 BP:   166    /   105    HR: Technical Quality:       Technically difficult study -                          adequate information is obtained MEASUREMENTS  (Male / Female) Normal Values 2D ECHO LV Diastolic Diameter PLAX        4.8 cm                4.2 - 5.9 / 3.9 - 5.3 cm LV Systolic Diameter PLAX         2.9 cm                2.1 - 4.0 cm IVS Diastolic Thickness           1.1 cm                LVPW Diastolic  Thickness          1 cm                  LVOT Diameter                     2.2 cm                Estimated LV Ejection Fraction    70 %                  LV Ejection Fraction MOD BP       88.4 %                >= 55  % LV Ejection Fraction MOD 4C       91.3 %                LV Ejection Fraction MOD 2C       79.9 %                IVC Diameter                      2.2 cm                M-MODE Aortic Root Diameter MM           2.2 cm                DOPPLER AV Peak Velocity                  1.2 m/s               AV Peak Gradient                  5.8 mmHg              LVOT Peak Velocity                0.92 m/s              * Indicates values subject to auto-interpretation LV EF:  70    % FINDINGS Left Ventricle Normal left ventricular chamber size. Normal left ventricular wall thickness. Normal left ventricular systolic function. Normal regional wall motion. Left ventricular ejection fraction is visually estimated to be 70%. Diastolic function is difficult to assess with atrial fibrillation. Right Ventricle The right ventricle was normal in size and function. Right Atrium The right atrium is normal in size.  Inferior vena cava is not well visualized. Left Atrium The left atrium is normal in size.  Left atrial volume index is 19  mL/sq m. Mitral Valve Structurally normal mitral valve without significant stenosis or regurgitation.  Trace mitral regurgitation. Aortic Valve Aortic sclerosis without stenosis. Tricuspid aortic valve. No aortic insufficiency. Tricuspid Valve Structurally normal tricuspid valve without significant stenosis or regurgitation. Pulmonic Valve Structurally normal pulmonic valve without significant stenosis or regurgitation. Pericardium Normal pericardium without effusion. Aorta The aortic root is normal.  Ascending aorta diameter is 3.1 cm. Kingsley Hung M.D. (Electronically Signed) Final Date:     14 September 2017                 11:10      Micro:  Results     Procedure Component Value Units  "Date/Time    CULTURE WOUND W/ GRAM STAIN [[id number]] Collected:  09/20/17 1500    Order Status:  Completed Specimen:  Wound Updated:  09/23/17 0725     Gram Stain Result --     Few WBCs.  No organisms seen.       Significant Indicator NEG     Source WND     Site Right Ileopsoas Abscess     Culture Result Wound No growth at 72 hours.    GRAM STAIN [[id number]] Collected:  09/20/17 1500    Order Status:  Completed Specimen:  Wound Updated:  09/21/17 0807     Significant Indicator .     Source WND     Site Right Ileopsoas Abscess     Gram Stain Result --     Few WBCs.  No organisms seen.      MRSA BY PCR (AMP) [[id number]]  (Abnormal) Collected:  09/19/17 1230    Order Status:  Completed Specimen:  Respirate from Nares Updated:  09/19/17 1656     Significant Indicator POS (POS)     Source RESP     Site NARES     MRSA PCR POSITIVE for MRSA by PCR. (A)    Narrative:       CALL  [facility]  171 tel. [phone],  CALLED  171 tel. [phone] 09/19/2017, 16:56, RB PERF. RESULTS CALLED TO:RN  [id number]  Collected By:[id number] AZALEA SCOTT    BLOOD CULTURE [[id number]] Collected:  09/13/17 2035    Order Status:  Completed Specimen:  Blood from Peripheral Updated:  09/18/17 2300     Significant Indicator NEG     Source BLD     Site PERIPHERAL     Blood Culture No growth after 5 days of incubation.    Narrative:       Per Hospital Policy: Only change Specimen Src: to \"Line\" if  specified by physician order.    BLOOD CULTURE [[id number]] Collected:  09/13/17 2115    Order Status:  Completed Specimen:  Blood Updated:  09/18/17 2300     Significant Indicator NEG     Source BLD     Site --     Blood Culture No growth after 5 days of incubation.    CDIFF BY PCR WITH TOXIN [[id number]] Collected:  09/17/17 1826    Order Status:  Completed Specimen:  Stool from Stool Updated:  09/17/17 2127     C Diff by PCR Negative     Comment: C. difficile NOT detected by PCR.  Treatment not indicated per guidelines.  Repeat testing not indicated within 7 " days.          027-NAP1-BI Presumptive Negative     Comment: Presumptive 027/NAP1/BI target DNA sequences are NOT DETECTED.       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 Fib    Altered mental status  Due to above  Improved    Generalized debility  Needs rehabilitation    Discussed with internal medicine.

## 2017-09-24 NOTE — PROGRESS NOTES
Received report and assumed care of patient. Patient is lethargic and sleeps most the day. Patient is in no signs of distress and denies pain at this time. Patient was updated on the plan of care for the day. Patient is awaiting SNF placement to receiving IV abx for few weeks. Call light within reach, bed in low position, 2 side rails up. Educated on fall risk, verbalizes understanding. Will continue to monitor

## 2017-09-24 NOTE — PROGRESS NOTES
Pt sleeping in room. Pt ate ice cream for breakfast. Will encourage more food for lunch today. Pt has no complaints of pain. Call light within reach. Pt awaiting possible snf discharge tomorrow.

## 2017-09-24 NOTE — PROGRESS NOTES
[facility]ist Progress Note    Date of Service: 2017    Chief Complaint  Back pain     Interval Problem Update  Patient reports ongoing pain in his back. He has difficulty moving his head up due to the back pain. He otherwise has no chest pain or shortness of breath. He reports wanting to walk today.    Consultants/Specialty  Cardiology     Disposition  Home when stable         Review of Systems   Constitutional: Negative for chills and fever.   Eyes: Negative for blurred vision and double vision.   Respiratory: Negative for cough, sputum production and shortness of breath.    Cardiovascular: Negative for chest pain and palpitations.   Gastrointestinal: Negative for abdominal pain, constipation, diarrhea, nausea and vomiting.   Genitourinary: Negative for dysuria.   Musculoskeletal: Positive for back pain.   Neurological: Negative for headaches.      Physical Exam  Laboratory/Imaging   Hemodynamics  Temp (24hrs), Av.4 °C (97.5 °F), Min:36.1 °C (97 °F), Max:36.8 °C (98.2 °F)   Temperature: 36.3 °C (97.3 °F)  Pulse  Av.3  Min: 45  Max: 135    Blood Pressure : (!) 99/65 (RN notified)      Respiratory      Respiration: 18, Pulse Oximetry: 94 %     Work Of Breathing / Effort: Mild  RUL Breath Sounds: Diminished, RML Breath Sounds: Diminished, RLL Breath Sounds: Diminished, MIKE Breath Sounds: Diminished, LLL Breath Sounds: Diminished    Fluids    Intake/Output Summary (Last 24 hours) at 17 1213  Last data filed at 17 0631   Gross per 24 hour   Intake             2220 ml   Output             3200 ml   Net             -980 ml       Nutrition  Orders Placed This Encounter   Procedures   • DIET ORDER     Standing Status:   Standing     Number of Occurrences:   1     Order Specific Question:   Diet:     Answer:   Full Liquid [11]     Comments:   Ensure on Each Tray     Physical Exam   Constitutional: He is oriented to person, place, and time. He appears well-developed and well-nourished. No  distress.   HENT:   Head: Normocephalic.   Eyes: Pupils are equal, round, and reactive to light.   Neck: Normal range of motion. Neck supple.   Cardiovascular: Normal rate, regular rhythm and normal heart sounds.  Exam reveals no gallop and no friction rub.    No murmur heard.  Pulmonary/Chest: Effort normal and breath sounds normal. No respiratory distress. He has no wheezes.   Abdominal: Soft. Bowel sounds are normal. He exhibits no distension and no mass. There is no tenderness. There is no rebound and no guarding.   Musculoskeletal: Normal range of motion. He exhibits no edema.   Neurological: He is alert and oriented to person, place, and time. No cranial nerve deficit.   Skin: Skin is warm and dry. He is not diaphoretic.   Psychiatric:   Somewhat depressed mood noted       Recent Labs      09/22/17 0229 09/23/17 0229 09/24/17   0240   WBC  12.8*  12.2*  11.7*   RBC  2.62*  2.60*  2.63*   HEMOGLOBIN  8.0*  7.9*  8.0*   HEMATOCRIT  23.9*  24.0*  24.1*   MCV  91.2  92.3  91.6   MCH  30.5  30.4  30.4   MCHC  33.5*  32.9*  33.2*   RDW  45.8  48.2  48.0   PLATELETCT  308  355  397   MPV  9.9  9.9  9.8     Recent Labs      09/22/17 0229 09/23/17 0229 09/24/17   0240   SODIUM  130*  130*  131*   POTASSIUM  3.9  4.0  3.9   CHLORIDE  101  101  101   CO2  23  23  24   GLUCOSE  106*  129*  96   BUN  10  8  9   CREATININE  0.41*  0.37*  0.41*   CALCIUM  9.2  8.8  8.9                      Assessment/Plan     Persistent atrial fibrillation (CMS-HCC)   Assessment & Plan    Variable rate control. Continue to monitor. Telemetry.        Nontraumatic psoas hematoma   Assessment & Plan    Stable.  Continue with physical and occupational therapy as tolerated.        Chronic osteomyelitis of lumbar spine (CMS-HCC)   Assessment & Plan    Continue with Ceftaroline four-week course. Skilled nursing placement as per social work.        Normocytic anemia- (present on admission)   Assessment & Plan    Stable        Sepsis  (CMS-[facility])   Assessment & Plan    This is sepsis (without associated acute organ dysfunction).   Resolved. Episodes of tachycardia can be attributed to atrial fibrillation.            Reviewed items::  Medications reviewed and Labs reviewed  Hearn catheter::  No Hearn  DVT: Dropping hgb.

## 2017-09-25 PROCEDURE — 700102 HCHG RX REV CODE 250 W/ 637 OVERRIDE(OP)

## 2017-09-25 PROCEDURE — A9270 NON-COVERED ITEM OR SERVICE: HCPCS | Performed by: NURSE PRACTITIONER

## 2017-09-25 PROCEDURE — A9270 NON-COVERED ITEM OR SERVICE: HCPCS

## 2017-09-25 PROCEDURE — 700105 HCHG RX REV CODE 258: Performed by: HOSPITALIST

## 2017-09-25 PROCEDURE — A9270 NON-COVERED ITEM OR SERVICE: HCPCS | Performed by: INTERNAL MEDICINE

## 2017-09-25 PROCEDURE — 90670 PCV13 VACCINE IM: CPT | Performed by: HOSPITALIST

## 2017-09-25 PROCEDURE — 700102 HCHG RX REV CODE 250 W/ 637 OVERRIDE(OP): Performed by: INTERNAL MEDICINE

## 2017-09-25 PROCEDURE — 90471 IMMUNIZATION ADMIN: CPT

## 2017-09-25 PROCEDURE — 700102 HCHG RX REV CODE 250 W/ 637 OVERRIDE(OP): Performed by: NURSE PRACTITIONER

## 2017-09-25 PROCEDURE — 770020 HCHG ROOM/CARE - TELE (206)

## 2017-09-25 PROCEDURE — 700105 HCHG RX REV CODE 258: Performed by: INTERNAL MEDICINE

## 2017-09-25 PROCEDURE — 700111 HCHG RX REV CODE 636 W/ 250 OVERRIDE (IP): Performed by: INTERNAL MEDICINE

## 2017-09-25 PROCEDURE — 700111 HCHG RX REV CODE 636 W/ 250 OVERRIDE (IP): Performed by: HOSPITALIST

## 2017-09-25 PROCEDURE — 3E0234Z INTRODUCTION OF SERUM, TOXOID AND VACCINE INTO MUSCLE, PERCUTANEOUS APPROACH: ICD-10-PCS | Performed by: INTERNAL MEDICINE

## 2017-09-25 PROCEDURE — 99232 SBSQ HOSP IP/OBS MODERATE 35: CPT | Performed by: HOSPITALIST

## 2017-09-25 RX ORDER — SODIUM CHLORIDE 9 MG/ML
500 INJECTION, SOLUTION INTRAVENOUS ONCE
Status: COMPLETED | OUTPATIENT
Start: 2017-09-25 | End: 2017-09-25

## 2017-09-25 RX ADMIN — ROSUVASTATIN CALCIUM 125 MCG: 10 TABLET, FILM COATED ORAL at 17:17

## 2017-09-25 RX ADMIN — PNEUMOCOCCAL 13-VALENT CONJUGATE VACCINE 0.5 ML: 2.2; 2.2; 2.2; 2.2; 2.2; 4.4; 2.2; 2.2; 2.2; 2.2; 2.2; 2.2; 2.2 INJECTION, SUSPENSION INTRAMUSCULAR at 05:56

## 2017-09-25 RX ADMIN — SODIUM CHLORIDE: 9 INJECTION, SOLUTION INTRAVENOUS at 21:14

## 2017-09-25 RX ADMIN — GABAPENTIN 200 MG: 100 CAPSULE ORAL at 08:41

## 2017-09-25 RX ADMIN — GABAPENTIN 200 MG: 100 CAPSULE ORAL at 16:00

## 2017-09-25 RX ADMIN — ATORVASTATIN CALCIUM 10 MG: 10 TABLET, FILM COATED ORAL at 21:05

## 2017-09-25 RX ADMIN — TAMSULOSIN HYDROCHLORIDE 0.4 MG: 0.4 CAPSULE ORAL at 08:41

## 2017-09-25 RX ADMIN — CEFTAROLINE FOSAMIL 600 MG: 600 POWDER, FOR SOLUTION INTRAVENOUS at 21:05

## 2017-09-25 RX ADMIN — METOPROLOL SUCCINATE 50 MG: 25 TABLET, EXTENDED RELEASE ORAL at 08:41

## 2017-09-25 RX ADMIN — LEVETIRACETAM 500 MG: 100 SOLUTION ORAL at 08:45

## 2017-09-25 RX ADMIN — SODIUM CHLORIDE 500 ML: 9 INJECTION, SOLUTION INTRAVENOUS at 17:17

## 2017-09-25 RX ADMIN — GABAPENTIN 200 MG: 100 CAPSULE ORAL at 21:05

## 2017-09-25 RX ADMIN — LISINOPRIL 10 MG: 5 TABLET ORAL at 08:41

## 2017-09-25 RX ADMIN — ACETAMINOPHEN 650 MG: 325 TABLET, FILM COATED ORAL at 21:05

## 2017-09-25 RX ADMIN — CEFTAROLINE FOSAMIL 600 MG: 600 POWDER, FOR SOLUTION INTRAVENOUS at 08:45

## 2017-09-25 RX ADMIN — LEVETIRACETAM 500 MG: 100 SOLUTION ORAL at 21:05

## 2017-09-25 ASSESSMENT — ENCOUNTER SYMPTOMS
DOUBLE VISION: 0
DIARRHEA: 0
FEVER: 0
PALPITATIONS: 0
SPEECH CHANGE: 0
BACK PAIN: 1
SPUTUM PRODUCTION: 0
CONSTIPATION: 0
BLURRED VISION: 0
CHILLS: 0
NAUSEA: 0
COUGH: 0
VOMITING: 0
ABDOMINAL PAIN: 0
SHORTNESS OF BREATH: 0
HEADACHES: 0

## 2017-09-25 ASSESSMENT — PAIN SCALES - GENERAL
PAINLEVEL_OUTOF10: 3
PAINLEVEL_OUTOF10: 3

## 2017-09-25 ASSESSMENT — PATIENT HEALTH QUESTIONNAIRE - PHQ9
1. LITTLE INTEREST OR PLEASURE IN DOING THINGS: NOT AT ALL
SUM OF ALL RESPONSES TO PHQ QUESTIONS 1-9: 0
2. FEELING DOWN, DEPRESSED, IRRITABLE, OR HOPELESS: NOT AT ALL
SUM OF ALL RESPONSES TO PHQ9 QUESTIONS 1 AND 2: 0

## 2017-09-25 NOTE — PROGRESS NOTES
Bedside report completed. Assumed patient care. Pt AOx4. Denies pain, stated having chronic back pain. Dicussed POC and possible SNF placement tdoay. Bed locked in lowest position, call light in reach, bed alarm on. All patient needs met at this time.

## 2017-09-25 NOTE — DISCHARGE PLANNING
[facility] received a call from [facility]. The referral has been denied. Due to care exceed capacity. As of right now they are at capacity for max assist patient's

## 2017-09-25 NOTE — PROGRESS NOTES
Infectious Disease Progress Note    Author: Shante Sequeira M.D. Date & Time of service: 2017  3:38 PM    Chief Complaint:  Altered mental status    Interval History:  86-year-old male admitted for altered mental status. He was recently treated for L3-4 discitis. His repeat MRI showed right iliacus muscle abscess  17-MAXIMUM TEMPERATURE 98. On 2 liters of oxygen. WBC 14 and creatinine 0.38  - MAXIMUM TEMPERATURE 97.8. Complains of generalized malaise. Breathing is improved. Right leg pain is improved  17-MAXIMUM TEMPERATURE 99.5. Still has back pain when he sits up. The WBC 12  17-MAXIMUM TEMPERATURE 97.6. Feels better. No new issues overnight. WBC11.7   AF resting comfortably  Labs Reviewed, Medications Reviewed and Radiology Reviewed.    Review of Systems:  Review of Systems   Constitutional: Negative for chills and fever.   Respiratory: Negative for cough and shortness of breath.    Cardiovascular: Positive for leg swelling. Negative for chest pain.   Gastrointestinal: Negative for abdominal pain, nausea and vomiting.   Musculoskeletal: Positive for back pain.   Neurological: Negative for speech change and headaches.       Hemodynamics:  Temp (24hrs), Av.3 °C (97.3 °F), Min:36.1 °C (96.9 °F), Max:36.8 °C (98.2 °F)  Temperature: 36.1 °C (96.9 °F)  Pulse  Av.6  Min: 45  Max: 135   Blood Pressure : 101/62       Physical Exam:  Physical Exam   Constitutional: He appears well-developed. He appears lethargic. He is easily aroused. No distress.   Obese   HENT:   Head: Normocephalic and atraumatic.   Eyes: No scleral icterus.   Neck: Neck supple.   Cardiovascular: Normal rate.  An irregularly irregular rhythm present.   Pulmonary/Chest: Effort normal. No respiratory distress. He has no wheezes. He has rales.   Abdominal: Soft. He exhibits no distension. There is no tenderness.   Musculoskeletal: He exhibits edema.   Neurological: He is easily aroused. He appears lethargic.    sleepy   Nursing note and vitals reviewed.      Meds:    Current Facility-Administered Medications:   •  ceftaroline (TEFLARO) ivpb  •  levetiracetam  •  Metoprolol Tartrate  •  lorazepam  •  acetaminophen  •  digoxin  •  prochlorperazine  •  promethazine  •  hydrALAZINE  •  lisinopril  •  hyoscyamine-maalox plus-lidocaine viscous  •  NS  •  labetalol  •  ondansetron  •  metoprolol SR  •  atorvastatin  •  gabapentin  •  tamsulosin  •  senna-docusate **AND** polyethylene glycol/lytes **AND** magnesium hydroxide **AND** bisacodyl  •  Respiratory Care per Protocol    Labs:  Recent Labs      09/23/17 0229 09/24/17   0240   WBC  12.2*  11.7*   RBC  2.60*  2.63*   HEMOGLOBIN  7.9*  8.0*   HEMATOCRIT  24.0*  24.1*   MCV  92.3  91.6   MCH  30.4  30.4   RDW  48.2  48.0   PLATELETCT  355  397   MPV  9.9  9.8   NEUTSPOLYS  61.20  62.20   LYMPHOCYTES  19.70*  18.80*   MONOCYTES  11.50  11.30   EOSINOPHILS  2.30  2.10   BASOPHILS  0.30  0.30   RBCMORPHOLO   --   Normal     Recent Labs      09/23/17 0229 09/24/17   0240   SODIUM  130*  131*   POTASSIUM  4.0  3.9   CHLORIDE  101  101   CO2  23  24   GLUCOSE  129*  96   BUN  8  9     Recent Labs      09/23/17 0229 09/24/17   0240   CREATININE  0.37*  0.41*       Imaging:  Ct-abdomen-pelvis With    Result Date: 9/19/2017 9/19/2017 6:34 PM HISTORY/REASON FOR EXAM:  Abscess noted on MRI. TECHNIQUE/EXAM DESCRIPTION:  CT scan of the abdomen and pelvis with contrast. Contrast-enhanced helical scanning was obtained from the diaphragmatic domes through the pubic symphysis following the bolus administration of nonionic contrast without complication. 100 mL of Omnipaque 350 nonionic contrast was administered without complication. Low dose optimization technique was utilized for this CT exam including automated exposure control and adjustment of the mA and/or kV according to patient size. COMPARISON: MRI from the same day FINDINGS: Lung bases: There are small bilateral pleural  effusions with overlying atelectasis. Abdomen: No free air is seen in the abdomen or pelvis. Evaluation is limited by streak artifact from barium within the colon. There is wall thickening of the distal esophagus with a small hiatal hernia. Liver appears unremarkable. Portal vein is patent. There is calcification in the pancreatic head. No adrenal mass is identified. Tiny hypodense left renal lesion is too small to characterize. There is mild prominence of the renal collecting systems bilaterally. Small hypodense right renal lesions too small to characterize. There are nonobstructing right renal calculi. Atherosclerotic plaque is seen in the aorta and its branches. There are small inguinal, retroperitoneal and mesenteric lymph nodes. There is no evidence of bowel obstruction. There is a moderate amount of stool in the rectosigmoid colon. There is colonic diverticulosis. The appendix is not identified. Stomach is distended with fluid. Small bowel loops are fluid-filled. Bladder is mildly distended with foci of air. There is asymmetric prominence of the right iliopsoas musculature with surrounding stranding. Findings likely related to the previously noted abscess collection. Degenerative changes are seen in the spine. Postsurgical changes are noted in the lumbar spine. Degenerative changes are seen at the hips. There is mild loss of height of the superior endplate of T11 with a Schmorl's node noted. There is mild endplate irregularity at L3/L4.     Prominence of the right iliopsoas muscle with surrounding inflammatory stranding. The known fluid collection was better delineated on the prior MRI. Mild endplate irregularity at L3/L4 can be seen in discitis/osteomyelitis. Air-fluid level in the bladder. Correlation with urinalysis is recommended. This can be seen in the setting of recent instrumentation, infection or fistula. Colonic diverticulosis. Moderate amount of colonic stool. Nonobstructing right renal calculi.  Mild prominence of the renal collecting systems bilaterally. Small hypodense renal lesions are too small to characterize. Fluid-filled loops of small bowel can be seen in the setting of enteritis. Small hiatal hernia with mild thickening of the distal esophagus. Calcifications in the pancreatic head can be seen in chronic pancreatitis. Atherosclerotic plaque. Small bilateral pleural effusions with overlying atelectasis.     Ct-drain-retroperitoneal    Result Date: 9/20/2017 9/20/2017 2:30 PM HISTORY/REASON FOR EXAM:  Multiloculated fluid collections in the right iliacus muscle. Suspected abscess. TECHNIQUE/EXAM DESCRIPTION: Right pelvic (extraperitoneal) retroperitoneal abscess drainage with CT guidance. Low dose optimization technique was utilized for this CT exam including automated exposure control and adjustment of the mA and/or kV according to patient size. PROCEDURE: Informed consent was obtained. Procedures performed with local anesthesia only with appropriate continuous patient monitoring by the radiology nurse. Sedation duration: N/A minutes Localizing CT images were obtained with the patient in supine position. The skin was prepped with Betadine and draped in a sterile fashion. Following local anesthesia with 1% Lidocaine, a 17 G guiding needle was placed and extraperitoneal needle path in the right side of the pelvis via the iliacus muscle confirmed with CT. An Amplatz guidewire was placed and following serial tract dilatation, a 8 Slovak pigtail locking catheter was placed. A specimen was collected and submitted for culture and sensitivity and Gram stain. Total of 5 mL old bloody reddish-brown fluid was drained. No purulent fluid was obtained. The fluid was not malodorous. The catheter was secured to the skin and connected to suction bulb drainage. Final CT images were obtained documenting catheter position. The patient tolerated the procedure well with no evidence of complication. Low dose optimization  technique was utilized for this CT exam including automated exposure control and adjustment of the mA and/or kV according to patient size. COMPARISON: MRI lumbar spine 9/19/2017, CT abdomen and pelvis 9/19/2017. FINDINGS: The final CT images show satisfactory catheter position within the target collection.     1.  CT GUIDED RETROPERITONEAL (EXTRAPERITONEAL) RIGHT PELVIC CATHETER DRAINAGE WITHIN THE RIGHT ILIACUS MUSCLE. OLD DARK REDDISH-BROWN BLOODY FLUID WAS OBTAINED. NO ABSCESS OR PURULENT MATERIAL. 2.  THE CURRENT PLAN IS TO CHECK CULTURES AND LIKELY REMOVED CATHETER IN THE SHORT-TERM AT 48-72 HOURS AS THERE IS UNLIKELY TO BE AN ABSCESS.    Ct-head W/o    Result Date: 9/13/2017 9/13/2017 9:30 PM HISTORY/REASON FOR EXAM:  Altered Mental Status. TECHNIQUE/EXAM DESCRIPTION AND NUMBER OF VIEWS: CT of the head without contrast. The study was performed on a helical multidetector CT scanner. Contiguous 2.5 mm axial sections were obtained from the skull base through the vertex. Up to date radiation dose reduction adjustments have been utilized to meet ALARA standards for radiation dose reduction. COMPARISON:  7/12/2017 FINDINGS: The lateral ventricles are enlarged. Cortical sulci are enlarged. Patchy areas of low attenuation in the white matter bilaterally. No significant mass effect or midline shift. Basal cisterns are patent. No evidence for intracranial hemorrhage. Calvaria are intact. Visualized orbits are unremarkable. Visualized mastoid air cells are clear. Visualized paranasal sinuses are unremarkable. Calcifications of the carotid arteries noted. Calcified scalp nodules again present.     1.  Diffuse atrophy and white matter changes. 2.  No acute intracranial hemorrhage or territorial infarct.     Dx-chest-portable (1 View)    Result Date: 9/16/2017 9/16/2017 10:31 PM HISTORY/REASON FOR EXAM:  Shortness of Breath. TECHNIQUE/EXAM DESCRIPTION AND NUMBER OF VIEWS: Single portable view of the chest. COMPARISON:  9/13/2017 FINDINGS: Cardiac mediastinal contour is unchanged. Mediastinum is again prominent. Mild prominence of the pulmonary interstitium. No gross pleural fluid or pneumothorax. Dextroconvex curvature of thoracic spine.     No significant change from prior exam.    Dx-chest-portable (1 View)    Result Date: 9/13/2017 9/13/2017 8:44 PM HISTORY/REASON FOR EXAM:  Possible sepsis. TECHNIQUE/EXAM DESCRIPTION AND NUMBER OF VIEWS: Single portable view of the chest. COMPARISON: 8/7/2017 FINDINGS: Cardiac mediastinal contour is unchanged. Mild diffuse prominence of the interstitium again seen. No focal consolidation. No pleural fluid collection or pneumothorax. Dextroconvex curvature of thoracic spine. Diffuse osteopenia.     1.  Diffuse prominence of interstitium again seen, likely interstitial lung disease.  Mild pulmonary edema is also a consideration. 2.  No pneumonia or pneumothorax. 3.  Stable cardiomegaly.    Dx-small Bowel Series    Result Date: 9/17/2017 9/16/2017 10:39 AM HISTORY/REASON FOR EXAM:  Nausea and vomiting. TECHNIQUE/EXAM DESCRIPTION AND NUMBER OF VIEWS: Single contrast barium small bowel follow-through performed. Fluoroscopic images were obtained. No fluoroscopic images obtained. COMPARISON:  None available. FINDINGS:  view the abdomen demonstrates marked gaseous distention and small bowel and colonic distention. Patient drank thin barium. Contrast passed very slowly into dilated small bowel loops. Jejunal loops are dilated up to 5.2 cm. Overhead images were taken for a total of 22 hours before termination of the exam. Contrast reached the ileum, but does not extend into the colon. There is a large amount of stool in the colon.     1.  Gaseous distention with fairly diffuse bowel dilatation. Contrast extends into the ileum, but does not reach the colon within 22 hours. No transition point is identified to suggest mechanical bowel obstruction. 2.  Large amount of stool in the rectal  vault.    Mr-lumbar Spine-with & W/o    Result Date: 9/19/2017 9/19/2017 1:35 PM HISTORY/REASON FOR EXAM:  Altered mental status, weakness. Possible discitis. Previous lumbar surgery. TECHNIQUE/EXAM DESCRIPTION: MRI of the lumbar spine without and with contrast. The study was performed on a [employer] 1.5 Lucille MRI scanner. T1 sagittal, T2 fast spin-echo sagittal, T2 axial images and T1 axial images were obtained of the lumbar spine. T1 post-contrast sagittal and T1 post-contrast axial images were obtained. Optional T2 fat-suppressed sagittal images may be obtained. 20 mL Omniscan gadolinium contrast was administered intravenously. COMPARISON:  MRI lumbar spine 7/7/2017, T abdomen and pelvis 1/6/2017 FINDINGS: Alignment in the lumbar spine again shows retrolisthesis of L3 on L4 of about 5 mm. There is fluid signal T2 hyperintensity again seen in the L3-L4 disc space and prominent marrow edema signal at the L3 inferior endplate and to a lesser extent L4 superior endplate. There is corresponding enhancement, particularly at the inferior endplate of L3. Findings are highly suspicious for discitis with osteomyelitis. There is postoperative change with lumbar laminectomy at L2-L3 through L5. There is posterior fusion with transpedicular screw fixation 4-L5. The posterior paraspinous soft tissues show expected postoperative changes with atrophy and fatty replacement in  the posterior paraspinous muscles at the operative levels. There is no significant postoperative dorsal epidural fluid collection. However, there has been interval development of multilocular fluid collections in the right iliacus muscle spanning about 42 mm. These are consistent with intramuscular abscesses (T2 axial image 4, series 5, T1 postcontrast axial image 1, series 10). The conus is normal in position and signal with its tip at the L1 level. At T12-L1, is a tiny central and left paramedian disc protrusion with an underlying annular fissure. There  is mild hypertrophic facet arthropathy. Thecal sac is toward the lower range of normal without karla central stenosis. The neural foramina are intact. At L1-2, there is negligible disc bulging. There is no central stenosis or significant foraminal stenosis. At L2-3, there is a small broad-based disc-osteophyte complex. There is no central stenosis as there is decompressive laminectomy at this level. There is no significant foraminal stenosis. At L3-4, there is posterior marginal spurring and disc bulging accentuated by the retrolisthesis. There is moderate bilateral lateral recess stenosis. Thecal sac is toward the lower range of normal even though there is a decompressive laminectomy at this  level. However, there is no karla central stenosis (T2 axial image 24, series 5). There is severe bilateral foraminal stenosis, right greater than left. This is unchanged from the previous exam. At L4-5, no central stenosis. There is inferior foraminal blunting with moderate bilateral foraminal stenosis. At L5-S1, there is minimal disc bulging. There is a left paramedian annular fissure. There is mild hypertrophic facet arthropathy. No central stenosis. There is moderate bilateral foraminal stenosis.     1.  L3-4 findings again suggesting discitis with osteomyelitis. No evidence of epidural abscess or epidural phlegmon. 2.  L3-4 retrolisthesis. 3.  Postoperative multilevel lumbar laminectomy and posterior fusion at L4-5. 4.  Interval development of multilocular intramuscular abscesses in the right iliacus muscle, partly in the field of view 5.  Degenerative and spondylotic changes as detailed for each level above in the body of report.    Dx-esophagus - Barium Swallow    Result Date: 9/16/2017 9/16/2017 10:39 AM HISTORY/REASON FOR EXAM:  Nausea. Nausea and vomiting. TECHNIQUE/EXAM DESCRIPTION AND NUMBER OF VIEWS: Single contrast esophagram procedure was performed. 5 fluoroscopic images obtained. Fluoroscopy time: 0.37 minutes  COMPARISON:  None. FINDINGS: Limited exam secondary to limited patient mobility. The esophagus appears normal in caliber and configuration. No definite mucosal lesions are identified on single-contrast techniques. Contrast passes freely into the stomach. No hiatal hernia is identified.     No abnormalities identified on limited single contrast esophagram.    Echocardiogram Comp W/o Cont    Result Date: 9/14/2017  Transthoracic Echo Report Echocardiography Laboratory CONCLUSIONS Prior study done on [id number] left ventricular systolic function. Normal regional wall motion. Left ventricular ejection fraction is visually estimated to be 70%. Diastolic function is difficult to assess with atrial fibrillation. The right ventricle was normal in size and function. Structurally normal mitral valve without significant stenosis or regurgitation. Aortic sclerosis without stenosis. No aortic insufficiency. Normal pericardium without effusion.FINDINGS Left Ventricle Normal left ventricular chamber size. Normal left ventricular wall thickness. Normal left ventricular systolic function. Normal regional wall motion. Left ventricular ejection fraction is visually estimated to be 70%. Diastolic function is difficult to assess with atrial fibrillation. Right Ventricle The right ventricle was normal in size and function. Right Atrium The right atrium is normal in size.  Inferior vena cava is not well visualized. Left Atrium The left atrium is normal in size.  Left atrial volume index is 19  mL/sq m. Mitral Valve Structurally normal mitral valve without significant stenosis or regurgitation.  Trace mitral regurgitation. Aortic Valve Aortic sclerosis without stenosis. Tricuspid aortic valve. No aortic insufficiency. Tricuspid Valve Structurally normal tricuspid valve without significant stenosis or regurgitation. Pulmonic Valve Structurally normal pulmonic valve without significant stenosis or regurgitation. Pericardium Normal  "pericardium without effusion. Aorta The aortic root is normal.  Ascending aorta diameter is 3.1 cm. Kingsley Hung M.D. (Electronically Signed) Final Date:     14 September 2017                 11:10      Micro:  Results     Procedure Component Value Units Date/Time    CULTURE WOUND W/ GRAM STAIN [[id number]] Collected:  09/20/17 1500    Order Status:  Completed Specimen:  Wound Updated:  09/23/17 0725     Gram Stain Result --     Few WBCs.  No organisms seen.       Significant Indicator NEG     Source WND     Site Right Ileopsoas Abscess     Culture Result Wound No growth at 72 hours.    GRAM STAIN [[id number]] Collected:  09/20/17 1500    Order Status:  Completed Specimen:  Wound Updated:  09/21/17 0807     Significant Indicator .     Source WND     Site Right Ileopsoas Abscess     Gram Stain Result --     Few WBCs.  No organisms seen.      MRSA BY PCR (AMP) [[id number]]  (Abnormal) Collected:  09/19/17 1230    Order Status:  Completed Specimen:  Respirate from Nares Updated:  09/19/17 1656     Significant Indicator POS (POS)     Source RESP     Site NARES     MRSA PCR POSITIVE for MRSA by PCR. (A)    Narrative:       CALL  [facility]  171 tel. [phone],  CALLED  171 tel. [phone] 09/19/2017, 16:56, RB PERF. RESULTS CALLED TO:RN  [id number]  Collected By:[id number] AZALEA SCOTT    BLOOD CULTURE [[id number]] Collected:  09/13/17 2035    Order Status:  Completed Specimen:  Blood from Peripheral Updated:  09/18/17 2300     Significant Indicator NEG     Source BLD     Site PERIPHERAL     Blood Culture No growth after 5 days of incubation.    Narrative:       Per Hospital Policy: Only change Specimen Src: to \"Line\" if  specified by physician order.    BLOOD CULTURE [[id number]] Collected:  09/13/17 2115    Order Status:  Completed Specimen:  Blood Updated:  09/18/17 2300     Significant Indicator NEG     Source BLD     Site --     Blood Culture No growth after 5 days of incubation.          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

## 2017-09-25 NOTE — DIETARY
Nutrition Services: Update  Pt is on a full liquid diet and receiving Boost Plus TID. Pt states his appetite is pretty bad. Pt likes the boost and reports drinking 3 per day. Pt states he likes the Boost at room temperature. Pt declined additional snacks or supplements. Per chart pt PO < 25%. Wt on 9/25 - 86.5 kg via bed scale, wt fluctuation noted since admit.     Pertinent Labs 9/24: Na 131, Creatinine 0.41  Pertinent Meds: teflaro, lanoxin, neurontin, keppra, prinivil, toprol xl, pericolace  Fluids: NS infusion @ 75 ml/hr  Skin: No skin breakdown noted in chart  GI: Last BM 9/25    PLAN/RECOMMEND -  1) Nutrition rep to see patient daily for meal and snack preferences.  2) Encourage PO  3) Weekly weights to monitor fluid and nutrition status  4) Please document PO intake for each meal as percentage of meals consumed    RD following

## 2017-09-25 NOTE — CARE PLAN
Problem: Skin Integrity  Goal: Risk for impaired skin integrity will decrease  Outcome: PROGRESSING AS EXPECTED  Patient's skin remains intact no breakdown noted

## 2017-09-25 NOTE — PROGRESS NOTES
[facility]    Date of Service: 2017    Chief Complaint  Back pain     Interval Problem Update  Patient states that he is feeling okay.   He reports some mouth and throat discomfort with cold foods.  He has no chest pain or shortness of breath.     Consultants/Specialty  Cardiology     Disposition  Home when stable         Review of Systems   Constitutional: Negative for chills and fever.   Eyes: Negative for blurred vision and double vision.   Respiratory: Negative for cough, sputum production and shortness of breath.    Cardiovascular: Negative for chest pain and palpitations.   Gastrointestinal: Negative for abdominal pain, constipation, diarrhea, nausea and vomiting.   Genitourinary: Negative for dysuria.   Musculoskeletal: Positive for back pain.   Neurological: Negative for headaches.      Physical Exam  Laboratory/Imaging   Hemodynamics  Temp (24hrs), Av.3 °C (97.3 °F), Min:36.1 °C (96.9 °F), Max:36.8 °C (98.2 °F)   Temperature: 36.1 °C (96.9 °F)  Pulse  Av.6  Min: 45  Max: 135    Blood Pressure : 101/62      Respiratory      Respiration: 18, Pulse Oximetry: 94 %     Work Of Breathing / Effort: Mild  RUL Breath Sounds: Diminished, RML Breath Sounds: Diminished, RLL Breath Sounds: Diminished, MIKE Breath Sounds: Diminished, LLL Breath Sounds: Diminished    Fluids    Intake/Output Summary (Last 24 hours) at 17 1544  Last data filed at 17 1200   Gross per 24 hour   Intake             1490 ml   Output             1000 ml   Net              490 ml       Nutrition  Orders Placed This Encounter   Procedures   • DIET ORDER     Standing Status:   Standing     Number of Occurrences:   1     Order Specific Question:   Diet:     Answer:   Full Liquid [11]     Comments:   Ensure on Each Tray     Physical Exam   Constitutional: He is oriented to person, place, and time. He appears well-developed and well-nourished. No distress.   HENT:   Head: Normocephalic.   Eyes: Pupils are  equal, round, and reactive to light.   Neck: Normal range of motion. Neck supple.   Cardiovascular: Normal rate, regular rhythm and normal heart sounds.  Exam reveals no gallop and no friction rub.    No murmur heard.  Pulmonary/Chest: Effort normal and breath sounds normal. No respiratory distress. He has no wheezes.   Abdominal: Soft. Bowel sounds are normal. He exhibits no distension and no mass. There is no tenderness. There is no rebound and no guarding.   Musculoskeletal: Normal range of motion. He exhibits no edema.   Neurological: He is alert and oriented to person, place, and time. No cranial nerve deficit.   Skin: Skin is warm and dry. He is not diaphoretic.   Psychiatric:   Somewhat depressed mood noted       Recent Labs      09/23/17 0229 09/24/17   0240   WBC  12.2*  11.7*   RBC  2.60*  2.63*   HEMOGLOBIN  7.9*  8.0*   HEMATOCRIT  24.0*  24.1*   MCV  92.3  91.6   MCH  30.4  30.4   MCHC  32.9*  33.2*   RDW  48.2  48.0   PLATELETCT  355  397   MPV  9.9  9.8     Recent Labs      09/23/17 0229 09/24/17   0240   SODIUM  130*  131*   POTASSIUM  4.0  3.9   CHLORIDE  101  101   CO2  23  24   GLUCOSE  129*  96   BUN  8  9   CREATININE  0.37*  0.41*   CALCIUM  8.8  8.9                      Assessment/Plan     Persistent atrial fibrillation (CMS-HCC)   Assessment & Plan    Variable rate control. Continue to monitor. Telemetry.        Nontraumatic psoas hematoma   Assessment & Plan    Stable.  Continue with physical and occupational therapy as tolerated.        Chronic osteomyelitis of lumbar spine (CMS-HCC)   Assessment & Plan    Continue with Ceftaroline four-week course. Will need skilled placement.  Social work already in process with this.         Normocytic anemia- (present on admission)   Assessment & Plan    Stable        Debility- (present on admission)   Assessment & Plan    Encouraging patient to work with PT and OT         Sepsis (CMS-HCC)   Assessment & Plan    This is sepsis (without associated  acute organ dysfunction).   Resolved.             Reviewed items::  Medications reviewed and Labs reviewed  Hearn catheter::  No Hearn  DVT: Dropping hgb.

## 2017-09-25 NOTE — CARE PLAN
Problem: Discharge Barriers/Planning  Goal: Patient's continuum of care needs will be met  Outcome: PROGRESSING AS EXPECTED  Waiting for SNF placement. Discussed POC with patiet.     Problem: Mobility  Goal: Risk for activity intolerance will decrease  Outcome: PROGRESSING SLOWER THAN EXPECTED  Patient refused to get to both edge of bed or chair for breakfast. After a discussion about ambulation Pt states understanding and continues to refuse.

## 2017-09-25 NOTE — DISCHARGE PLANNING
[facility] received notification from [facility]. The referral has been denied. Not able to accept the patient on teflaro

## 2017-09-25 NOTE — DISCHARGE PLANNING
Medical Social Work    SW left VM for CHAVEZ Kaplan, to see if [facility] will be able to take pt if IV teflaro is paid for and to see how much IV teflaro would be.

## 2017-09-25 NOTE — DISCHARGE PLANNING
Medical Social Work    SW spoke with pt's son in law regarding SNF choice. He chose 1) [facility] 2) [facility] and 3) [facility].    SW will f/u in morning. Pt will likely d/c tomorrow, 9/25.

## 2017-09-25 NOTE — DISCHARGE PLANNING
Medical Social Work    SW paged Dr. Sequeira to see if IV teflaro could be changed, as SNF can't accommodate this medication.     Per Dr. Sequeira, medication is not able to be switched. SW will see if we can do approved services for medication.

## 2017-09-25 NOTE — DISCHARGE PLANNING
[facility] received a SNF choice form. Per the choice form the referral has been sent to [facility]

## 2017-09-25 NOTE — DISCHARGE PLANNING
Medical Social Work    SW left  for Arpit with [facility] to see if they were able to come out and assess pt today.     Annette from [facility] is going to look at referral.

## 2017-09-26 ENCOUNTER — APPOINTMENT (OUTPATIENT)
Dept: RADIOLOGY | Facility: MEDICAL CENTER | Age: 82
DRG: 871 | End: 2017-09-26
Attending: INTERNAL MEDICINE
Payer: MEDICARE

## 2017-09-26 PROBLEM — Z66 DNR (DO NOT RESUSCITATE): Status: ACTIVE | Noted: 2017-09-26

## 2017-09-26 LAB
ANION GAP SERPL CALC-SCNC: 6 MMOL/L (ref 0–11.9)
BASOPHILS # BLD AUTO: 0.5 % (ref 0–1.8)
BASOPHILS # BLD: 0.06 K/UL (ref 0–0.12)
BUN SERPL-MCNC: 7 MG/DL (ref 8–22)
CALCIUM SERPL-MCNC: 9 MG/DL (ref 8.5–10.5)
CHLORIDE SERPL-SCNC: 103 MMOL/L (ref 96–112)
CO2 SERPL-SCNC: 25 MMOL/L (ref 20–33)
CREAT SERPL-MCNC: 0.56 MG/DL (ref 0.5–1.4)
EOSINOPHIL # BLD AUTO: 0.15 K/UL (ref 0–0.51)
EOSINOPHIL NFR BLD: 1.3 % (ref 0–6.9)
ERYTHROCYTE [DISTWIDTH] IN BLOOD BY AUTOMATED COUNT: 50.3 FL (ref 35.9–50)
GFR SERPL CREATININE-BSD FRML MDRD: >60 ML/MIN/1.73 M 2
GLUCOSE SERPL-MCNC: 107 MG/DL (ref 65–99)
HCT VFR BLD AUTO: 27.7 % (ref 42–52)
HGB BLD-MCNC: 8.8 G/DL (ref 14–18)
IMM GRANULOCYTES # BLD AUTO: 0.32 K/UL (ref 0–0.11)
IMM GRANULOCYTES NFR BLD AUTO: 2.8 % (ref 0–0.9)
LYMPHOCYTES # BLD AUTO: 1.88 K/UL (ref 1–4.8)
LYMPHOCYTES NFR BLD: 16.7 % (ref 22–41)
MAGNESIUM SERPL-MCNC: 1.7 MG/DL (ref 1.5–2.5)
MCH RBC QN AUTO: 29.7 PG (ref 27–33)
MCHC RBC AUTO-ENTMCNC: 31.8 G/DL (ref 33.7–35.3)
MCV RBC AUTO: 93.6 FL (ref 81.4–97.8)
MONOCYTES # BLD AUTO: 1.19 K/UL (ref 0–0.85)
MONOCYTES NFR BLD AUTO: 10.6 % (ref 0–13.4)
NEUTROPHILS # BLD AUTO: 7.66 K/UL (ref 1.82–7.42)
NEUTROPHILS NFR BLD: 68.1 % (ref 44–72)
NRBC # BLD AUTO: 0 K/UL
NRBC BLD AUTO-RTO: 0 /100 WBC
PLATELET # BLD AUTO: 423 K/UL (ref 164–446)
PMV BLD AUTO: 9.3 FL (ref 9–12.9)
POTASSIUM SERPL-SCNC: 3.8 MMOL/L (ref 3.6–5.5)
RBC # BLD AUTO: 2.96 M/UL (ref 4.7–6.1)
SODIUM SERPL-SCNC: 134 MMOL/L (ref 135–145)
WBC # BLD AUTO: 11.3 K/UL (ref 4.8–10.8)

## 2017-09-26 PROCEDURE — 770020 HCHG ROOM/CARE - TELE (206)

## 2017-09-26 PROCEDURE — 700102 HCHG RX REV CODE 250 W/ 637 OVERRIDE(OP)

## 2017-09-26 PROCEDURE — 700111 HCHG RX REV CODE 636 W/ 250 OVERRIDE (IP): Performed by: INTERNAL MEDICINE

## 2017-09-26 PROCEDURE — 700102 HCHG RX REV CODE 250 W/ 637 OVERRIDE(OP): Performed by: INTERNAL MEDICINE

## 2017-09-26 PROCEDURE — 700102 HCHG RX REV CODE 250 W/ 637 OVERRIDE(OP): Performed by: NURSE PRACTITIONER

## 2017-09-26 PROCEDURE — 36415 COLL VENOUS BLD VENIPUNCTURE: CPT

## 2017-09-26 PROCEDURE — 700105 HCHG RX REV CODE 258: Performed by: HOSPITALIST

## 2017-09-26 PROCEDURE — 85025 COMPLETE CBC W/AUTO DIFF WBC: CPT

## 2017-09-26 PROCEDURE — 92526 ORAL FUNCTION THERAPY: CPT

## 2017-09-26 PROCEDURE — 700105 HCHG RX REV CODE 258: Performed by: INTERNAL MEDICINE

## 2017-09-26 PROCEDURE — 36571 INSERT PICVAD CATH: CPT

## 2017-09-26 PROCEDURE — 80048 BASIC METABOLIC PNL TOTAL CA: CPT

## 2017-09-26 PROCEDURE — A9270 NON-COVERED ITEM OR SERVICE: HCPCS | Performed by: INTERNAL MEDICINE

## 2017-09-26 PROCEDURE — 99233 SBSQ HOSP IP/OBS HIGH 50: CPT | Performed by: INTERNAL MEDICINE

## 2017-09-26 PROCEDURE — 83735 ASSAY OF MAGNESIUM: CPT

## 2017-09-26 PROCEDURE — A9270 NON-COVERED ITEM OR SERVICE: HCPCS

## 2017-09-26 PROCEDURE — A9270 NON-COVERED ITEM OR SERVICE: HCPCS | Performed by: NURSE PRACTITIONER

## 2017-09-26 RX ADMIN — ATORVASTATIN CALCIUM 10 MG: 10 TABLET, FILM COATED ORAL at 20:49

## 2017-09-26 RX ADMIN — LEVETIRACETAM 500 MG: 100 SOLUTION ORAL at 08:20

## 2017-09-26 RX ADMIN — GABAPENTIN 200 MG: 100 CAPSULE ORAL at 14:41

## 2017-09-26 RX ADMIN — CEFTAROLINE FOSAMIL 600 MG: 600 POWDER, FOR SOLUTION INTRAVENOUS at 08:20

## 2017-09-26 RX ADMIN — GABAPENTIN 200 MG: 100 CAPSULE ORAL at 08:13

## 2017-09-26 RX ADMIN — LEVETIRACETAM 500 MG: 100 SOLUTION ORAL at 20:57

## 2017-09-26 RX ADMIN — ROSUVASTATIN CALCIUM 125 MCG: 10 TABLET, FILM COATED ORAL at 18:14

## 2017-09-26 RX ADMIN — CEFTAROLINE FOSAMIL 600 MG: 600 POWDER, FOR SOLUTION INTRAVENOUS at 20:47

## 2017-09-26 RX ADMIN — LISINOPRIL 10 MG: 5 TABLET ORAL at 08:13

## 2017-09-26 RX ADMIN — GABAPENTIN 200 MG: 100 CAPSULE ORAL at 20:47

## 2017-09-26 RX ADMIN — SODIUM CHLORIDE: 9 INJECTION, SOLUTION INTRAVENOUS at 20:51

## 2017-09-26 RX ADMIN — TAMSULOSIN HYDROCHLORIDE 0.4 MG: 0.4 CAPSULE ORAL at 08:13

## 2017-09-26 RX ADMIN — METOPROLOL SUCCINATE 50 MG: 25 TABLET, EXTENDED RELEASE ORAL at 08:13

## 2017-09-26 ASSESSMENT — ENCOUNTER SYMPTOMS
COUGH: 0
BACK PAIN: 1
CHILLS: 0
SHORTNESS OF BREATH: 0
CONSTIPATION: 0
VOMITING: 0
FEVER: 0
ABDOMINAL PAIN: 0
SPEECH CHANGE: 0
HEADACHES: 0
NAUSEA: 0
PALPITATIONS: 0

## 2017-09-26 ASSESSMENT — PAIN SCALES - GENERAL: PAINLEVEL_OUTOF10: 0

## 2017-09-26 NOTE — PROGRESS NOTES
Patients blood pressure 85/50, MD made aware. 500 mL bolus ordered and now running, will recheck in 1 hour.

## 2017-09-26 NOTE — PROGRESS NOTES
Bedside report completed. Assumed patient care. Pt AOx4. Denies pain. Dicussed POC and SNF placement tdoay. Bed locked in lowest position, call light in reach, bed alarm on. All patient needs met at this time.

## 2017-09-26 NOTE — PROCEDURES
PICC Insertion Procedure Note    Consents confirmed, vessel patency confirmed with ultrasound. Risks and benefits of procedure explained to patient/family and education regarding central line associated bloodstream infections provided. Questions answered.     PICC placed in LUE per MD order with ultrasound guidance. 4 Fr, SINGLE lumen PICC placed in BASILIC vein after 1 attempt(s). 3 cc's of 1% lidocaine injected intradermally, 21 gauge microintroducer needle and modified Seldinger technique used. 44  cm total catheter length, 0 cm external measurement inserted with good blood return. Each lumen flushed without resistance with 10 mL 0.9% normal saline. PICC line secured with Biopatch and Tegaderm.    PICC tip location in the SVC confirmed by CXR INTERPRETATION. Pt tolerated procedure WELL.  Patient condition relayed to unit RN or ordering physician via this post procedure note in the EMR.     [employer] PICC REF [id number] LOT [id number]

## 2017-09-26 NOTE — CARE PLAN
Problem: Discharge Barriers/Planning  Goal: Patient's continuum of care needs will be met  Outcome: PROGRESSING AS EXPECTED  Patient in need of a PICC placement before discharge to SNF. Getting PICC now.     Problem: Skin Integrity  Goal: Risk for impaired skin integrity will decrease  Outcome: PROGRESSING AS EXPECTED  Repositioning Q2, skin intact.

## 2017-09-26 NOTE — CARE PLAN
Problem: Skin Integrity  Goal: Risk for impaired skin integrity will decrease  Outcome: PROGRESSING AS EXPECTED  Skin remains intact

## 2017-09-26 NOTE — PROGRESS NOTES
[facility] Progress Note    Date of Service: 2017    Chief Complaint  This is a 87 y/o M with altered mental status, chronic osteomyelitis on IV abx, afib     Interval Problem Update  Pt eating breakfast in bed this am, no new complaints. Long term abx per ID recs.     Consultants/Specialty  Cardiology  - signed off  ID     Disposition  SNF for long term abx when accepted, SW assisting.        Review of Systems   Constitutional: Negative for chills and fever.   Respiratory: Negative for cough and shortness of breath.    Cardiovascular: Negative for chest pain and palpitations.   Gastrointestinal: Negative for abdominal pain, constipation and nausea.   Musculoskeletal: Positive for back pain.      Physical Exam  Laboratory/Imaging   Hemodynamics  Temp (24hrs), Av.2 °C (97.1 °F), Min:35.8 °C (96.5 °F), Max:36.4 °C (97.6 °F)   Temperature: 35.8 °C (96.5 °F)  Pulse  Av.9  Min: 45  Max: 135    Blood Pressure : 121/81      Respiratory      Respiration: 20, Pulse Oximetry: 99 %     Work Of Breathing / Effort: Mild  RUL Breath Sounds: Diminished, RML Breath Sounds: Diminished, RLL Breath Sounds: Diminished, MIKE Breath Sounds: Diminished, LLL Breath Sounds: Diminished    Fluids    Intake/Output Summary (Last 24 hours) at 17 1351  Last data filed at 17 0552   Gross per 24 hour   Intake             2650 ml   Output             1100 ml   Net             1550 ml       Nutrition  Orders Placed This Encounter   Procedures   • DIET ORDER     Standing Status:   Standing     Number of Occurrences:   1     Order Specific Question:   Diet:     Answer:   Full Liquid [11]     Comments:   Ensure on Each Tray     Physical Exam   Constitutional: He is oriented to person, place, and time. He appears well-developed and well-nourished. No distress.   HENT:   Head: Normocephalic and atraumatic.   Eyes: Conjunctivae are normal. Right eye exhibits no discharge. Left eye exhibits no discharge.   Neck: Normal range  of motion. Neck supple.   Cardiovascular: Normal rate, regular rhythm and normal heart sounds.    No murmur heard.  Pulmonary/Chest: Effort normal and breath sounds normal. No respiratory distress. He has no wheezes.   Abdominal: Soft. Bowel sounds are normal. He exhibits no distension. There is no tenderness.   Genitourinary:   Genitourinary Comments: Condom catheter in place     Musculoskeletal: He exhibits no edema.   Neurological: He is alert and oriented to person, place, and time.   Skin: Skin is warm and dry. No rash noted. He is not diaphoretic.       Recent Labs      09/24/17 0240  09/26/17   0303   WBC  11.7*  11.3*   RBC  2.63*  2.96*   HEMOGLOBIN  8.0*  8.8*   HEMATOCRIT  24.1*  27.7*   MCV  91.6  93.6   MCH  30.4  29.7   MCHC  33.2*  31.8*   RDW  48.0  50.3*   PLATELETCT  397  423   MPV  9.8  9.3     Recent Labs      09/24/17 0240  09/26/17   0303   SODIUM  131*  134*   POTASSIUM  3.9  3.8   CHLORIDE  101  103   CO2  24  25   GLUCOSE  96  107*   BUN  9  7*   CREATININE  0.41*  0.56   CALCIUM  8.9  9.0                      Assessment/Plan     Persistent atrial fibrillation (CMS-HCC)- (present on admission)   Assessment & Plan    Rate controlled. Continue to monitor. Telemetry.  Anticoagulation held due to pelvic hematoma, will need to readdress prior to dc   Cardiology - signed off  Echo shows EF 70%, Normal regional wall motion        DNR (do not resuscitate)   Assessment & Plan    - per pt        Nontraumatic psoas hematoma- (present on admission)   Assessment & Plan    Stable.  Continue with physical and occupational therapy as tolerated.        Chronic osteomyelitis of lumbar spine (CMS-HCC)- (present on admission)   Assessment & Plan    Continue with Ceftaroline, end date 10/18, will need skilled placement.  Social work assisting           Normocytic anemia- (present on admission)   Assessment & Plan    Stable        Debility- (present on admission)   Assessment & Plan    Encouraging patient to  work with PT and OT, lacks motivation          Sepsis (CMS-[facility])   Assessment & Plan    This is sepsis (without associated acute organ dysfunction).   Resolved.             Reviewed items::  Medications reviewed and Labs reviewed  Hearn catheter::  No Hearn  DVT: Dropping hgb.

## 2017-09-26 NOTE — PROGRESS NOTES
Infectious Disease Progress Note    Author: Shante Sequeira M.D. Date & Time of service: 2017  12:13 PM    Chief Complaint:  Altered mental status    Interval History:  86-year-old male admitted for altered mental status. He was recently treated for L3-4 discitis. His repeat MRI showed right iliacus muscle abscess  17-MAXIMUM TEMPERATURE 98. On 2 liters of oxygen. WBC 14 and creatinine 0.38  - MAXIMUM TEMPERATURE 97.8. Complains of generalized malaise. Breathing is improved. Right leg pain is improved  17-MAXIMUM TEMPERATURE 99.5. Still has back pain when he sits up. The WBC 12  17-MAXIMUM TEMPERATURE 97.6. Feels better. No new issues overnight. WBC11.7   AF resting comfortably   AF, WBC 11.3 more awake eating OK.  Back pain about the same  Labs Reviewed, Medications Reviewed and Radiology Reviewed.    Review of Systems:  Review of Systems   Constitutional: Negative for chills and fever.   Respiratory: Negative for cough and shortness of breath.    Cardiovascular: Positive for leg swelling. Negative for chest pain.   Gastrointestinal: Negative for abdominal pain, nausea and vomiting.   Musculoskeletal: Positive for back pain.   Neurological: Negative for speech change and headaches.       Hemodynamics:  Temp (24hrs), Av.2 °C (97.1 °F), Min:35.8 °C (96.5 °F), Max:36.4 °C (97.6 °F)  Temperature: 35.8 °C (96.5 °F)  Pulse  Av.9  Min: 45  Max: 135   Blood Pressure : 121/81       Physical Exam:  Physical Exam   Constitutional: He appears well-developed. He is easily aroused. No distress.   Obese   HENT:   Head: Normocephalic and atraumatic.   Eyes: No scleral icterus.   Neck: Neck supple.   Cardiovascular: Normal rate.  An irregularly irregular rhythm present.   Pulmonary/Chest: Effort normal. No respiratory distress. He has no wheezes. He has rales.   Abdominal: Soft. He exhibits no distension. There is no tenderness.   Musculoskeletal: He exhibits edema.   Neurological: He is  alert and easily aroused.   Nursing note and vitals reviewed.      Meds:    Current Facility-Administered Medications:   •  ceftaroline (TEFLARO) ivpb  •  levetiracetam  •  Metoprolol Tartrate  •  lorazepam  •  acetaminophen  •  digoxin  •  prochlorperazine  •  promethazine  •  hydrALAZINE  •  lisinopril  •  hyoscyamine-maalox plus-lidocaine viscous  •  NS  •  labetalol  •  ondansetron  •  metoprolol SR  •  atorvastatin  •  gabapentin  •  tamsulosin  •  senna-docusate **AND** polyethylene glycol/lytes **AND** magnesium hydroxide **AND** bisacodyl  •  Respiratory Care per Protocol    Labs:  Recent Labs      09/24/17   0240  09/26/17   0303   WBC  11.7*  11.3*   RBC  2.63*  2.96*   HEMOGLOBIN  8.0*  8.8*   HEMATOCRIT  24.1*  27.7*   MCV  91.6  93.6   MCH  30.4  29.7   RDW  48.0  50.3*   PLATELETCT  397  423   MPV  9.8  9.3   NEUTSPOLYS  62.20  68.10   LYMPHOCYTES  18.80*  16.70*   MONOCYTES  11.30  10.60   EOSINOPHILS  2.10  1.30   BASOPHILS  0.30  0.50   RBCMORPHOLO  Normal   --      Recent Labs      09/24/17   0240  09/26/17   0303   SODIUM  131*  134*   POTASSIUM  3.9  3.8   CHLORIDE  101  103   CO2  24  25   GLUCOSE  96  107*   BUN  9  7*     Recent Labs      09/24/17   0240  09/26/17   0303   CREATININE  0.41*  0.56       Imaging:  Ct-abdomen-pelvis With    Result Date: 9/19/2017 9/19/2017 6:34 PM HISTORY/REASON FOR EXAM:  Abscess noted on MRI. TECHNIQUE/EXAM DESCRIPTION:  CT scan of the abdomen and pelvis with contrast. Contrast-enhanced helical scanning was obtained from the diaphragmatic domes through the pubic symphysis following the bolus administration of nonionic contrast without complication. 100 mL of Omnipaque 350 nonionic contrast was administered without complication. Low dose optimization technique was utilized for this CT exam including automated exposure control and adjustment of the mA and/or kV according to patient size. COMPARISON: MRI from the same day FINDINGS: Lung bases: There are small  bilateral pleural effusions with overlying atelectasis. Abdomen: No free air is seen in the abdomen or pelvis. Evaluation is limited by streak artifact from barium within the colon. There is wall thickening of the distal esophagus with a small hiatal hernia. Liver appears unremarkable. Portal vein is patent. There is calcification in the pancreatic head. No adrenal mass is identified. Tiny hypodense left renal lesion is too small to characterize. There is mild prominence of the renal collecting systems bilaterally. Small hypodense right renal lesions too small to characterize. There are nonobstructing right renal calculi. Atherosclerotic plaque is seen in the aorta and its branches. There are small inguinal, retroperitoneal and mesenteric lymph nodes. There is no evidence of bowel obstruction. There is a moderate amount of stool in the rectosigmoid colon. There is colonic diverticulosis. The appendix is not identified. Stomach is distended with fluid. Small bowel loops are fluid-filled. Bladder is mildly distended with foci of air. There is asymmetric prominence of the right iliopsoas musculature with surrounding stranding. Findings likely related to the previously noted abscess collection. Degenerative changes are seen in the spine. Postsurgical changes are noted in the lumbar spine. Degenerative changes are seen at the hips. There is mild loss of height of the superior endplate of T11 with a Schmorl's node noted. There is mild endplate irregularity at L3/L4.     Prominence of the right iliopsoas muscle with surrounding inflammatory stranding. The known fluid collection was better delineated on the prior MRI. Mild endplate irregularity at L3/L4 can be seen in discitis/osteomyelitis. Air-fluid level in the bladder. Correlation with urinalysis is recommended. This can be seen in the setting of recent instrumentation, infection or fistula. Colonic diverticulosis. Moderate amount of colonic stool. Nonobstructing right  renal calculi. Mild prominence of the renal collecting systems bilaterally. Small hypodense renal lesions are too small to characterize. Fluid-filled loops of small bowel can be seen in the setting of enteritis. Small hiatal hernia with mild thickening of the distal esophagus. Calcifications in the pancreatic head can be seen in chronic pancreatitis. Atherosclerotic plaque. Small bilateral pleural effusions with overlying atelectasis.     Ct-drain-retroperitoneal    Result Date: 9/20/2017 9/20/2017 2:30 PM HISTORY/REASON FOR EXAM:  Multiloculated fluid collections in the right iliacus muscle. Suspected abscess. TECHNIQUE/EXAM DESCRIPTION: Right pelvic (extraperitoneal) retroperitoneal abscess drainage with CT guidance. Low dose optimization technique was utilized for this CT exam including automated exposure control and adjustment of the mA and/or kV according to patient size. PROCEDURE: Informed consent was obtained. Procedures performed with local anesthesia only with appropriate continuous patient monitoring by the radiology nurse. Sedation duration: N/A minutes Localizing CT images were obtained with the patient in supine position. The skin was prepped with Betadine and draped in a sterile fashion. Following local anesthesia with 1% Lidocaine, a 17 G guiding needle was placed and extraperitoneal needle path in the right side of the pelvis via the iliacus muscle confirmed with CT. An Amplatz guidewire was placed and following serial tract dilatation, a 8 Romanian pigtail locking catheter was placed. A specimen was collected and submitted for culture and sensitivity and Gram stain. Total of 5 mL old bloody reddish-brown fluid was drained. No purulent fluid was obtained. The fluid was not malodorous. The catheter was secured to the skin and connected to suction bulb drainage. Final CT images were obtained documenting catheter position. The patient tolerated the procedure well with no evidence of complication. Low  dose optimization technique was utilized for this CT exam including automated exposure control and adjustment of the mA and/or kV according to patient size. COMPARISON: MRI lumbar spine 9/19/2017, CT abdomen and pelvis 9/19/2017. FINDINGS: The final CT images show satisfactory catheter position within the target collection.     1.  CT GUIDED RETROPERITONEAL (EXTRAPERITONEAL) RIGHT PELVIC CATHETER DRAINAGE WITHIN THE RIGHT ILIACUS MUSCLE. OLD DARK REDDISH-BROWN BLOODY FLUID WAS OBTAINED. NO ABSCESS OR PURULENT MATERIAL. 2.  THE CURRENT PLAN IS TO CHECK CULTURES AND LIKELY REMOVED CATHETER IN THE SHORT-TERM AT 48-72 HOURS AS THERE IS UNLIKELY TO BE AN ABSCESS.    Ct-head W/o    Result Date: 9/13/2017 9/13/2017 9:30 PM HISTORY/REASON FOR EXAM:  Altered Mental Status. TECHNIQUE/EXAM DESCRIPTION AND NUMBER OF VIEWS: CT of the head without contrast. The study was performed on a helical multidetector CT scanner. Contiguous 2.5 mm axial sections were obtained from the skull base through the vertex. Up to date radiation dose reduction adjustments have been utilized to meet ALARA standards for radiation dose reduction. COMPARISON:  7/12/2017 FINDINGS: The lateral ventricles are enlarged. Cortical sulci are enlarged. Patchy areas of low attenuation in the white matter bilaterally. No significant mass effect or midline shift. Basal cisterns are patent. No evidence for intracranial hemorrhage. Calvaria are intact. Visualized orbits are unremarkable. Visualized mastoid air cells are clear. Visualized paranasal sinuses are unremarkable. Calcifications of the carotid arteries noted. Calcified scalp nodules again present.     1.  Diffuse atrophy and white matter changes. 2.  No acute intracranial hemorrhage or territorial infarct.     Dx-chest-portable (1 View)    Result Date: 9/16/2017 9/16/2017 10:31 PM HISTORY/REASON FOR EXAM:  Shortness of Breath. TECHNIQUE/EXAM DESCRIPTION AND NUMBER OF VIEWS: Single portable view of the  chest. COMPARISON: 9/13/2017 FINDINGS: Cardiac mediastinal contour is unchanged. Mediastinum is again prominent. Mild prominence of the pulmonary interstitium. No gross pleural fluid or pneumothorax. Dextroconvex curvature of thoracic spine.     No significant change from prior exam.    Dx-chest-portable (1 View)    Result Date: 9/13/2017 9/13/2017 8:44 PM HISTORY/REASON FOR EXAM:  Possible sepsis. TECHNIQUE/EXAM DESCRIPTION AND NUMBER OF VIEWS: Single portable view of the chest. COMPARISON: 8/7/2017 FINDINGS: Cardiac mediastinal contour is unchanged. Mild diffuse prominence of the interstitium again seen. No focal consolidation. No pleural fluid collection or pneumothorax. Dextroconvex curvature of thoracic spine. Diffuse osteopenia.     1.  Diffuse prominence of interstitium again seen, likely interstitial lung disease.  Mild pulmonary edema is also a consideration. 2.  No pneumonia or pneumothorax. 3.  Stable cardiomegaly.    Dx-small Bowel Series    Result Date: 9/17/2017 9/16/2017 10:39 AM HISTORY/REASON FOR EXAM:  Nausea and vomiting. TECHNIQUE/EXAM DESCRIPTION AND NUMBER OF VIEWS: Single contrast barium small bowel follow-through performed. Fluoroscopic images were obtained. No fluoroscopic images obtained. COMPARISON:  None available. FINDINGS:  view the abdomen demonstrates marked gaseous distention and small bowel and colonic distention. Patient drank thin barium. Contrast passed very slowly into dilated small bowel loops. Jejunal loops are dilated up to 5.2 cm. Overhead images were taken for a total of 22 hours before termination of the exam. Contrast reached the ileum, but does not extend into the colon. There is a large amount of stool in the colon.     1.  Gaseous distention with fairly diffuse bowel dilatation. Contrast extends into the ileum, but does not reach the colon within 22 hours. No transition point is identified to suggest mechanical bowel obstruction. 2.  Large amount of stool in the  rectal vault.    Mr-lumbar Spine-with & W/o    Result Date: 9/19/2017 9/19/2017 1:35 PM HISTORY/REASON FOR EXAM:  Altered mental status, weakness. Possible discitis. Previous lumbar surgery. TECHNIQUE/EXAM DESCRIPTION: MRI of the lumbar spine without and with contrast. The study was performed on a [employer] 1.5 Lucille MRI scanner. T1 sagittal, T2 fast spin-echo sagittal, T2 axial images and T1 axial images were obtained of the lumbar spine. T1 post-contrast sagittal and T1 post-contrast axial images were obtained. Optional T2 fat-suppressed sagittal images may be obtained. 20 mL Omniscan gadolinium contrast was administered intravenously. COMPARISON:  MRI lumbar spine 7/7/2017, T abdomen and pelvis 1/6/2017 FINDINGS: Alignment in the lumbar spine again shows retrolisthesis of L3 on L4 of about 5 mm. There is fluid signal T2 hyperintensity again seen in the L3-L4 disc space and prominent marrow edema signal at the L3 inferior endplate and to a lesser extent L4 superior endplate. There is corresponding enhancement, particularly at the inferior endplate of L3. Findings are highly suspicious for discitis with osteomyelitis. There is postoperative change with lumbar laminectomy at L2-L3 through L5. There is posterior fusion with transpedicular screw fixation 4-L5. The posterior paraspinous soft tissues show expected postoperative changes with atrophy and fatty replacement in  the posterior paraspinous muscles at the operative levels. There is no significant postoperative dorsal epidural fluid collection. However, there has been interval development of multilocular fluid collections in the right iliacus muscle spanning about 42 mm. These are consistent with intramuscular abscesses (T2 axial image 4, series 5, T1 postcontrast axial image 1, series 10). The conus is normal in position and signal with its tip at the L1 level. At T12-L1, is a tiny central and left paramedian disc protrusion with an underlying annular  fissure. There is mild hypertrophic facet arthropathy. Thecal sac is toward the lower range of normal without karla central stenosis. The neural foramina are intact. At L1-2, there is negligible disc bulging. There is no central stenosis or significant foraminal stenosis. At L2-3, there is a small broad-based disc-osteophyte complex. There is no central stenosis as there is decompressive laminectomy at this level. There is no significant foraminal stenosis. At L3-4, there is posterior marginal spurring and disc bulging accentuated by the retrolisthesis. There is moderate bilateral lateral recess stenosis. Thecal sac is toward the lower range of normal even though there is a decompressive laminectomy at this  level. However, there is no karla central stenosis (T2 axial image 24, series 5). There is severe bilateral foraminal stenosis, right greater than left. This is unchanged from the previous exam. At L4-5, no central stenosis. There is inferior foraminal blunting with moderate bilateral foraminal stenosis. At L5-S1, there is minimal disc bulging. There is a left paramedian annular fissure. There is mild hypertrophic facet arthropathy. No central stenosis. There is moderate bilateral foraminal stenosis.     1.  L3-4 findings again suggesting discitis with osteomyelitis. No evidence of epidural abscess or epidural phlegmon. 2.  L3-4 retrolisthesis. 3.  Postoperative multilevel lumbar laminectomy and posterior fusion at L4-5. 4.  Interval development of multilocular intramuscular abscesses in the right iliacus muscle, partly in the field of view 5.  Degenerative and spondylotic changes as detailed for each level above in the body of report.    Dx-esophagus - Barium Swallow    Result Date: 9/16/2017 9/16/2017 10:39 AM HISTORY/REASON FOR EXAM:  Nausea. Nausea and vomiting. TECHNIQUE/EXAM DESCRIPTION AND NUMBER OF VIEWS: Single contrast esophagram procedure was performed. 5 fluoroscopic images obtained. Fluoroscopy  time: 0.37 minutes COMPARISON:  None. FINDINGS: Limited exam secondary to limited patient mobility. The esophagus appears normal in caliber and configuration. No definite mucosal lesions are identified on single-contrast techniques. Contrast passes freely into the stomach. No hiatal hernia is identified.     No abnormalities identified on limited single contrast esophagram.    Echocardiogram Comp W/o Cont    Result Date: 9/14/2017  Transthoracic Echo Report Echocardiography Laboratory CONCLUSIONS Prior study done on [id number] left ventricular systolic function. Normal regional wall motion. Left ventricular ejection fraction is visually estimated to be 70%. Diastolic function is difficult to assess with atrial fibrillation. The right ventricle was normal in size and function. Structurally normal mitral valve without significant stenosis or regurgitation. Aortic sclerosis without stenosis. No aortic insufficiency. Normal pericardium without effusion.FINDINGS Left Ventricle Normal left ventricular chamber size. Normal left ventricular wall thickness. Normal left ventricular systolic function. Normal regional wall motion. Left ventricular ejection fraction is visually estimated to be 70%. Diastolic function is difficult to assess with atrial fibrillation. Right Ventricle The right ventricle was normal in size and function. Right Atrium The right atrium is normal in size.  Inferior vena cava is not well visualized. Left Atrium The left atrium is normal in size.  Left atrial volume index is 19  mL/sq m. Mitral Valve Structurally normal mitral valve without significant stenosis or regurgitation.  Trace mitral regurgitation. Aortic Valve Aortic sclerosis without stenosis. Tricuspid aortic valve. No aortic insufficiency. Tricuspid Valve Structurally normal tricuspid valve without significant stenosis or regurgitation. Pulmonic Valve Structurally normal pulmonic valve without significant stenosis or regurgitation.  Pericardium Normal pericardium without effusion. Aorta The aortic root is normal.  Ascending aorta diameter is 3.1 cm. Kingsley Hung M.D. (Electronically Signed) Final Date:     14 September 2017                 11:10      Micro:  Results     Procedure Component Value Units Date/Time    CULTURE WOUND W/ GRAM STAIN [[id number]] Collected:  09/20/17 1500    Order Status:  Completed Specimen:  Wound Updated:  09/23/17 0725     Gram Stain Result --     Few WBCs.  No organisms seen.       Significant Indicator NEG     Source WND     Site Right Ileopsoas Abscess     Culture Result Wound No growth at 72 hours.    GRAM STAIN [[id number]] Collected:  09/20/17 1500    Order Status:  Completed Specimen:  Wound Updated:  09/21/17 0807     Significant Indicator .     Source WND     Site Right Ileopsoas Abscess     Gram Stain Result --     Few WBCs.  No organisms seen.      MRSA BY PCR (AMP) [[id number]]  (Abnormal) Collected:  09/19/17 1230    Order Status:  Completed Specimen:  Respirate from Nares Updated:  09/19/17 1656     Significant Indicator POS (POS)     Source RESP     Site NARES     MRSA PCR POSITIVE for MRSA by PCR. (A)    Narrative:       CALL  [facility]  171 tel. [phone],  CALLED  171 tel. [phone] 09/19/2017, 16:56, RB PERF. RESULTS CALLED TO:RN  [id number]  Collected By:[id number] AZALEA SCOTT          Assessment:  Active Hospital Problems    Diagnosis   • Persistent atrial fibrillation (CMS-[facility]) [I48.1]   • Sepsis (CMS-[facility]) [A41.9]   • 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.81]   • Chronic osteomyelitis of lumbar spine (CMS-[facility]) [M86.68]       Plan:  L3-4 discitis osteomyelitis  Status post treatment with Rocephin (prior strep viridans)  Persistent changes on repeat MRI of 9/19/17  Continue ceftaroline  Continue for at least 4 weeks with repeat MRI to see improvement  Stop date 10/18/2017    Right iliacus abscess versus hematoma  Status post drainage on 9/20/17  Possibly just old hematoma  Cultures are negative      Leukocytosis, mild  Improving    Generalized debility  Needs rehabilitation    Discussed with internal medicine. Dr Chua

## 2017-09-26 NOTE — DISCHARGE PLANNING
Medical Social Work    SW left  for [facility] to update on stop date for IV abx, 10/18. Awaiting call back with price.

## 2017-09-26 NOTE — DISCHARGE PLANNING
Medical Social Work    Awaiting ID recommendation for how long pt will be on IV teflaro. Pt also needing PICC placed.

## 2017-09-26 NOTE — THERAPY
"Speech Language Therapy dysphagia treatment completed.   Functional Status:  Pt awake, alert and cooperative. Tolerating full liquid diet. Pt declines any trials of solids at this time and prefers to stay on this current diet.   Recommendations: Continue full liquids per pt request. Will remain available if/when he wishes to trial solids.    Plan of Care: Will benefit from Speech Therapy 3 times per week  Post-Acute Therapy: Discharge to home with outpatient or home health for additional skilled therapy services.    See \"Rehab Therapy-Acute\" Patient Summary Report for complete documentation.     "

## 2017-09-27 LAB
ERYTHROCYTE [DISTWIDTH] IN BLOOD BY AUTOMATED COUNT: 51.1 FL (ref 35.9–50)
HCT VFR BLD AUTO: 24.5 % (ref 42–52)
HGB BLD-MCNC: 8.2 G/DL (ref 14–18)
MCH RBC QN AUTO: 31.2 PG (ref 27–33)
MCHC RBC AUTO-ENTMCNC: 33.5 G/DL (ref 33.7–35.3)
MCV RBC AUTO: 93.2 FL (ref 81.4–97.8)
PLATELET # BLD AUTO: 382 K/UL (ref 164–446)
PMV BLD AUTO: 10 FL (ref 9–12.9)
RBC # BLD AUTO: 2.63 M/UL (ref 4.7–6.1)
WBC # BLD AUTO: 11 K/UL (ref 4.8–10.8)

## 2017-09-27 PROCEDURE — 700102 HCHG RX REV CODE 250 W/ 637 OVERRIDE(OP)

## 2017-09-27 PROCEDURE — 700102 HCHG RX REV CODE 250 W/ 637 OVERRIDE(OP): Performed by: INTERNAL MEDICINE

## 2017-09-27 PROCEDURE — A9270 NON-COVERED ITEM OR SERVICE: HCPCS | Performed by: INTERNAL MEDICINE

## 2017-09-27 PROCEDURE — 770020 HCHG ROOM/CARE - TELE (206)

## 2017-09-27 PROCEDURE — 85027 COMPLETE CBC AUTOMATED: CPT

## 2017-09-27 PROCEDURE — 700111 HCHG RX REV CODE 636 W/ 250 OVERRIDE (IP): Performed by: INTERNAL MEDICINE

## 2017-09-27 PROCEDURE — 99233 SBSQ HOSP IP/OBS HIGH 50: CPT | Performed by: INTERNAL MEDICINE

## 2017-09-27 PROCEDURE — 700105 HCHG RX REV CODE 258: Performed by: INTERNAL MEDICINE

## 2017-09-27 PROCEDURE — 97530 THERAPEUTIC ACTIVITIES: CPT

## 2017-09-27 PROCEDURE — A9270 NON-COVERED ITEM OR SERVICE: HCPCS

## 2017-09-27 PROCEDURE — 700102 HCHG RX REV CODE 250 W/ 637 OVERRIDE(OP): Performed by: NURSE PRACTITIONER

## 2017-09-27 PROCEDURE — A9270 NON-COVERED ITEM OR SERVICE: HCPCS | Performed by: NURSE PRACTITIONER

## 2017-09-27 RX ADMIN — LISINOPRIL 10 MG: 5 TABLET ORAL at 08:14

## 2017-09-27 RX ADMIN — GABAPENTIN 200 MG: 100 CAPSULE ORAL at 08:14

## 2017-09-27 RX ADMIN — LEVETIRACETAM 500 MG: 100 SOLUTION ORAL at 08:19

## 2017-09-27 RX ADMIN — ROSUVASTATIN CALCIUM 125 MCG: 10 TABLET, FILM COATED ORAL at 18:26

## 2017-09-27 RX ADMIN — CEFTAROLINE FOSAMIL 600 MG: 600 POWDER, FOR SOLUTION INTRAVENOUS at 20:58

## 2017-09-27 RX ADMIN — METOPROLOL SUCCINATE 50 MG: 25 TABLET, EXTENDED RELEASE ORAL at 08:14

## 2017-09-27 RX ADMIN — GABAPENTIN 200 MG: 100 CAPSULE ORAL at 16:27

## 2017-09-27 RX ADMIN — ATORVASTATIN CALCIUM 10 MG: 10 TABLET, FILM COATED ORAL at 20:58

## 2017-09-27 RX ADMIN — TAMSULOSIN HYDROCHLORIDE 0.4 MG: 0.4 CAPSULE ORAL at 08:14

## 2017-09-27 RX ADMIN — GABAPENTIN 200 MG: 100 CAPSULE ORAL at 20:58

## 2017-09-27 RX ADMIN — CEFTAROLINE FOSAMIL 600 MG: 600 POWDER, FOR SOLUTION INTRAVENOUS at 08:19

## 2017-09-27 ASSESSMENT — COGNITIVE AND FUNCTIONAL STATUS - GENERAL
MOBILITY SCORE: 6
TURNING FROM BACK TO SIDE WHILE IN FLAT BAD: UNABLE
WALKING IN HOSPITAL ROOM: TOTAL
MOVING TO AND FROM BED TO CHAIR: UNABLE
STANDING UP FROM CHAIR USING ARMS: TOTAL
MOVING FROM LYING ON BACK TO SITTING ON SIDE OF FLAT BED: UNABLE
CLIMB 3 TO 5 STEPS WITH RAILING: TOTAL
SUGGESTED CMS G CODE MODIFIER MOBILITY: CN

## 2017-09-27 ASSESSMENT — ENCOUNTER SYMPTOMS
BACK PAIN: 0
BACK PAIN: 1
ABDOMINAL PAIN: 0
SPEECH CHANGE: 0
COUGH: 0
VOMITING: 0
CONSTIPATION: 0
FEVER: 0
CHILLS: 0
SHORTNESS OF BREATH: 0
NAUSEA: 0
HEADACHES: 0

## 2017-09-27 ASSESSMENT — PAIN SCALES - GENERAL
PAINLEVEL_OUTOF10: 0

## 2017-09-27 ASSESSMENT — GAIT ASSESSMENTS: GAIT LEVEL OF ASSIST: UNABLE TO PARTICIPATE

## 2017-09-27 NOTE — PROGRESS NOTES
Pt sleeping with no signs of distress. Call light within reach, bed in lowest locked position, hourly rounding in place. Tele box on and rhythm verified. Bed alarm on.

## 2017-09-27 NOTE — DISCHARGE PLANNING
"Medical Social Work    Quote for IV teflaro is $8400 until 10/18. [facility]. [facility] gave me of care manager [facility]. SW left , but per his  message, he will be out of the office until October.     Estela with [facility] mejialled a [facility] \"contact\" to see how service connected pt is and to obtain more information about them paying for medication. Per Estela, [facility] won't pay for medications because pt is not service connected.     KERRIE will escalate to see if COURTNEY is possible.     "

## 2017-09-27 NOTE — CARE PLAN
Problem: Nutritional:  Goal: Achieve adequate nutritional intake  Patient will consume ~50% of meals and supplements.   Outcome: PROGRESSING SLOWER THAN EXPECTED  Pt is on a full liquid diet and receiving Boost Plus TID. Pt states his appetite is still pretty bad at 25% or less due to throat pain. Pt likes the Boost and reports drinking 3 per day. Pt states he likes the Boost and ice cream at room temperature. Recommended magic cup instead of ice cream at room temp as magic cup should help increase kcals and will be safer for consumption at room temp. He also stated he would like to try orange jello for snacks 1 & 2.

## 2017-09-27 NOTE — PROGRESS NOTES
Bedside report received from day shift RN, assumed pt care. Pt assessment complete. Pt A&Ox4. Reviewed plan of care with pt. Tele box on and rhythm verified. Pt on o2 at 2 liters. NS IV running @ 10 TKO.  Chart and labs reviewed.  Bed in lowest position, call light within reach. Hourly rounding in place. Educated about calling for assistance.

## 2017-09-27 NOTE — PROGRESS NOTES
Infectious Disease Progress Note    Author: Shante Sequeira M.D. Date & Time of service: 2017  1:45 PM    Chief Complaint:  Altered mental status    Interval History:  86-year-old male admitted for altered mental status. He was recently treated for L3-4 discitis. His repeat MRI showed right iliacus muscle abscess  17-MAXIMUM TEMPERATURE 98. On 2 liters of oxygen. WBC 14 and creatinine 0.38  - MAXIMUM TEMPERATURE 97.8. Complains of generalized malaise. Breathing is improved. Right leg pain is improved  17-MAXIMUM TEMPERATURE 99.5. Still has back pain when he sits up. The WBC 12  17-MAXIMUM TEMPERATURE 97.6. Feels better. No new issues overnight. WBC11.7   AF resting comfortably   AF, WBC 11.3 more awake eating OK.  Back pain about the same   AF WBC 11 had PICC placed-denies SE abx. No newe complaints  Labs Reviewed, Medications Reviewed and Radiology Reviewed.    Review of Systems:  Review of Systems   Constitutional: Negative for chills and fever.   Respiratory: Negative for cough and shortness of breath.    Cardiovascular: Positive for leg swelling. Negative for chest pain.   Gastrointestinal: Negative for abdominal pain, nausea and vomiting.   Musculoskeletal: Positive for back pain.   Neurological: Negative for speech change and headaches.       Hemodynamics:  Temp (24hrs), Av °C (96.8 °F), Min:35.7 °C (96.3 °F), Max:36.3 °C (97.3 °F)  Temperature: (!) 35.7 °C (96.3 °F)  Pulse  Av.7  Min: 45  Max: 135   Blood Pressure : (!) 96/47       Physical Exam:  Physical Exam   Constitutional: He appears well-developed. He is easily aroused. No distress.   Obese   HENT:   Head: Normocephalic and atraumatic.   Poor dentition   Eyes: No scleral icterus.   Neck: Neck supple.   Cardiovascular: Normal rate.  An irregularly irregular rhythm present.   Pulmonary/Chest: Effort normal. No respiratory distress. He has no wheezes. He has rales.   Abdominal: Soft. He exhibits no  distension. There is no tenderness.   Musculoskeletal: He exhibits edema.   LUE PICC   Neurological: He is alert and easily aroused.   Nursing note and vitals reviewed.      Meds:    Current Facility-Administered Medications:   •  PICC Line Insertion has been implemented **AND** May use Lidocaine 1% not to exceed 3 mls for local at insertion site **AND** NOTIFY MD **AND** Tip to dwell in the superior vena cava **AND** Do not use PICC Line until placement verified by Chest X Ray **AND** DX-CHEST-FOR PICC LINE Perform procedure in: PICC Room **AND** If radiologist reading of chest X-ray states any of the following the PICC should be used **AND** Further evaluation of the PICC placement can be retrieved from X-Ray and Imaging **AND** Blood draws through PICC line; draws by RN only **AND** FLUSHING GUIDELINES WHEN IN USE **AND** normal saline PF **AND** FLUSHING GUIDELINES WHEN NOT IN USE **AND** DRESSING MAINTENANCE **AND** Change needleless pressure ports and IV tubing every 72 hours per hospital policy **AND** TUBING **AND** If there is an MD order to remove the PICC line, any RN may remove the PICC line **AND** [] PATIENT EDUCATION MATERIALS **AND** NURSING COMMUNICATION  •  ceftaroline (TEFLARO) ivpb  •  levetiracetam  •  Metoprolol Tartrate  •  lorazepam  •  acetaminophen  •  digoxin  •  prochlorperazine  •  promethazine  •  hydrALAZINE  •  lisinopril  •  hyoscyamine-maalox plus-lidocaine viscous  •  NS  •  labetalol  •  ondansetron  •  metoprolol SR  •  atorvastatin  •  gabapentin  •  tamsulosin  •  senna-docusate **AND** polyethylene glycol/lytes **AND** magnesium hydroxide **AND** bisacodyl  •  Respiratory Care per Protocol    Labs:  Recent Labs      17   0303  17   0332   WBC  11.3*  11.0*   RBC  2.96*  2.63*   HEMOGLOBIN  8.8*  8.2*   HEMATOCRIT  27.7*  24.5*   MCV  93.6  93.2   MCH  29.7  31.2   RDW  50.3*  51.1*   PLATELETCT  423  382   MPV  9.3  10.0   NEUTSPOLYS  68.10   --     LYMPHOCYTES  16.70*   --    MONOCYTES  10.60   --    EOSINOPHILS  1.30   --    BASOPHILS  0.50   --      Recent Labs      09/26/17   0303   SODIUM  134*   POTASSIUM  3.8   CHLORIDE  103   CO2  25   GLUCOSE  107*   BUN  7*     Recent Labs      09/26/17   0303   CREATININE  0.56       Imaging:  Ct-abdomen-pelvis With    Result Date: 9/19/2017 9/19/2017 6:34 PM HISTORY/REASON FOR EXAM:  Abscess noted on MRI. TECHNIQUE/EXAM DESCRIPTION:  CT scan of the abdomen and pelvis with contrast. Contrast-enhanced helical scanning was obtained from the diaphragmatic domes through the pubic symphysis following the bolus administration of nonionic contrast without complication. 100 mL of Omnipaque 350 nonionic contrast was administered without complication. Low dose optimization technique was utilized for this CT exam including automated exposure control and adjustment of the mA and/or kV according to patient size. COMPARISON: MRI from the same day FINDINGS: Lung bases: There are small bilateral pleural effusions with overlying atelectasis. Abdomen: No free air is seen in the abdomen or pelvis. Evaluation is limited by streak artifact from barium within the colon. There is wall thickening of the distal esophagus with a small hiatal hernia. Liver appears unremarkable. Portal vein is patent. There is calcification in the pancreatic head. No adrenal mass is identified. Tiny hypodense left renal lesion is too small to characterize. There is mild prominence of the renal collecting systems bilaterally. Small hypodense right renal lesions too small to characterize. There are nonobstructing right renal calculi. Atherosclerotic plaque is seen in the aorta and its branches. There are small inguinal, retroperitoneal and mesenteric lymph nodes. There is no evidence of bowel obstruction. There is a moderate amount of stool in the rectosigmoid colon. There is colonic diverticulosis. The appendix is not identified. Stomach is distended with  fluid. Small bowel loops are fluid-filled. Bladder is mildly distended with foci of air. There is asymmetric prominence of the right iliopsoas musculature with surrounding stranding. Findings likely related to the previously noted abscess collection. Degenerative changes are seen in the spine. Postsurgical changes are noted in the lumbar spine. Degenerative changes are seen at the hips. There is mild loss of height of the superior endplate of T11 with a Schmorl's node noted. There is mild endplate irregularity at L3/L4.     Prominence of the right iliopsoas muscle with surrounding inflammatory stranding. The known fluid collection was better delineated on the prior MRI. Mild endplate irregularity at L3/L4 can be seen in discitis/osteomyelitis. Air-fluid level in the bladder. Correlation with urinalysis is recommended. This can be seen in the setting of recent instrumentation, infection or fistula. Colonic diverticulosis. Moderate amount of colonic stool. Nonobstructing right renal calculi. Mild prominence of the renal collecting systems bilaterally. Small hypodense renal lesions are too small to characterize. Fluid-filled loops of small bowel can be seen in the setting of enteritis. Small hiatal hernia with mild thickening of the distal esophagus. Calcifications in the pancreatic head can be seen in chronic pancreatitis. Atherosclerotic plaque. Small bilateral pleural effusions with overlying atelectasis.     Ct-drain-retroperitoneal    Result Date: 9/20/2017 9/20/2017 2:30 PM HISTORY/REASON FOR EXAM:  Multiloculated fluid collections in the right iliacus muscle. Suspected abscess. TECHNIQUE/EXAM DESCRIPTION: Right pelvic (extraperitoneal) retroperitoneal abscess drainage with CT guidance. Low dose optimization technique was utilized for this CT exam including automated exposure control and adjustment of the mA and/or kV according to patient size. PROCEDURE: Informed consent was obtained. Procedures performed with  local anesthesia only with appropriate continuous patient monitoring by the radiology nurse. Sedation duration: N/A minutes Localizing CT images were obtained with the patient in supine position. The skin was prepped with Betadine and draped in a sterile fashion. Following local anesthesia with 1% Lidocaine, a 17 G guiding needle was placed and extraperitoneal needle path in the right side of the pelvis via the iliacus muscle confirmed with CT. An Amplatz guidewire was placed and following serial tract dilatation, a 8 Urdu pigtail locking catheter was placed. A specimen was collected and submitted for culture and sensitivity and Gram stain. Total of 5 mL old bloody reddish-brown fluid was drained. No purulent fluid was obtained. The fluid was not malodorous. The catheter was secured to the skin and connected to suction bulb drainage. Final CT images were obtained documenting catheter position. The patient tolerated the procedure well with no evidence of complication. Low dose optimization technique was utilized for this CT exam including automated exposure control and adjustment of the mA and/or kV according to patient size. COMPARISON: MRI lumbar spine 9/19/2017, CT abdomen and pelvis 9/19/2017. FINDINGS: The final CT images show satisfactory catheter position within the target collection.     1.  CT GUIDED RETROPERITONEAL (EXTRAPERITONEAL) RIGHT PELVIC CATHETER DRAINAGE WITHIN THE RIGHT ILIACUS MUSCLE. OLD DARK REDDISH-BROWN BLOODY FLUID WAS OBTAINED. NO ABSCESS OR PURULENT MATERIAL. 2.  THE CURRENT PLAN IS TO CHECK CULTURES AND LIKELY REMOVED CATHETER IN THE SHORT-TERM AT 48-72 HOURS AS THERE IS UNLIKELY TO BE AN ABSCESS.    Ct-head W/o    Result Date: 9/13/2017 9/13/2017 9:30 PM HISTORY/REASON FOR EXAM:  Altered Mental Status. TECHNIQUE/EXAM DESCRIPTION AND NUMBER OF VIEWS: CT of the head without contrast. The study was performed on a helical multidetector CT scanner. Contiguous 2.5 mm axial sections were  obtained from the skull base through the vertex. Up to date radiation dose reduction adjustments have been utilized to meet ALARA standards for radiation dose reduction. COMPARISON:  7/12/2017 FINDINGS: The lateral ventricles are enlarged. Cortical sulci are enlarged. Patchy areas of low attenuation in the white matter bilaterally. No significant mass effect or midline shift. Basal cisterns are patent. No evidence for intracranial hemorrhage. Calvaria are intact. Visualized orbits are unremarkable. Visualized mastoid air cells are clear. Visualized paranasal sinuses are unremarkable. Calcifications of the carotid arteries noted. Calcified scalp nodules again present.     1.  Diffuse atrophy and white matter changes. 2.  No acute intracranial hemorrhage or territorial infarct.     Dx-chest-portable (1 View)    Result Date: 9/16/2017 9/16/2017 10:31 PM HISTORY/REASON FOR EXAM:  Shortness of Breath. TECHNIQUE/EXAM DESCRIPTION AND NUMBER OF VIEWS: Single portable view of the chest. COMPARISON: 9/13/2017 FINDINGS: Cardiac mediastinal contour is unchanged. Mediastinum is again prominent. Mild prominence of the pulmonary interstitium. No gross pleural fluid or pneumothorax. Dextroconvex curvature of thoracic spine.     No significant change from prior exam.    Dx-chest-portable (1 View)    Result Date: 9/13/2017 9/13/2017 8:44 PM HISTORY/REASON FOR EXAM:  Possible sepsis. TECHNIQUE/EXAM DESCRIPTION AND NUMBER OF VIEWS: Single portable view of the chest. COMPARISON: 8/7/2017 FINDINGS: Cardiac mediastinal contour is unchanged. Mild diffuse prominence of the interstitium again seen. No focal consolidation. No pleural fluid collection or pneumothorax. Dextroconvex curvature of thoracic spine. Diffuse osteopenia.     1.  Diffuse prominence of interstitium again seen, likely interstitial lung disease.  Mild pulmonary edema is also a consideration. 2.  No pneumonia or pneumothorax. 3.  Stable cardiomegaly.    Dx-small Bowel  Series    Result Date: 9/17/2017 9/16/2017 10:39 AM HISTORY/REASON FOR EXAM:  Nausea and vomiting. TECHNIQUE/EXAM DESCRIPTION AND NUMBER OF VIEWS: Single contrast barium small bowel follow-through performed. Fluoroscopic images were obtained. No fluoroscopic images obtained. COMPARISON:  None available. FINDINGS:  view the abdomen demonstrates marked gaseous distention and small bowel and colonic distention. Patient drank thin barium. Contrast passed very slowly into dilated small bowel loops. Jejunal loops are dilated up to 5.2 cm. Overhead images were taken for a total of 22 hours before termination of the exam. Contrast reached the ileum, but does not extend into the colon. There is a large amount of stool in the colon.     1.  Gaseous distention with fairly diffuse bowel dilatation. Contrast extends into the ileum, but does not reach the colon within 22 hours. No transition point is identified to suggest mechanical bowel obstruction. 2.  Large amount of stool in the rectal vault.    Mr-lumbar Spine-with & W/o    Result Date: 9/19/2017 9/19/2017 1:35 PM HISTORY/REASON FOR EXAM:  Altered mental status, weakness. Possible discitis. Previous lumbar surgery. TECHNIQUE/EXAM DESCRIPTION: MRI of the lumbar spine without and with contrast. The study was performed on a [employer] Signa 1.5 Lucille MRI scanner. T1 sagittal, T2 fast spin-echo sagittal, T2 axial images and T1 axial images were obtained of the lumbar spine. T1 post-contrast sagittal and T1 post-contrast axial images were obtained. Optional T2 fat-suppressed sagittal images may be obtained. 20 mL Omniscan gadolinium contrast was administered intravenously. COMPARISON:  MRI lumbar spine 7/7/2017, T abdomen and pelvis 1/6/2017 FINDINGS: Alignment in the lumbar spine again shows retrolisthesis of L3 on L4 of about 5 mm. There is fluid signal T2 hyperintensity again seen in the L3-L4 disc space and prominent marrow edema signal at the L3 inferior endplate and to a  lesser extent L4 superior endplate. There is corresponding enhancement, particularly at the inferior endplate of L3. Findings are highly suspicious for discitis with osteomyelitis. There is postoperative change with lumbar laminectomy at L2-L3 through L5. There is posterior fusion with transpedicular screw fixation 4-L5. The posterior paraspinous soft tissues show expected postoperative changes with atrophy and fatty replacement in  the posterior paraspinous muscles at the operative levels. There is no significant postoperative dorsal epidural fluid collection. However, there has been interval development of multilocular fluid collections in the right iliacus muscle spanning about 42 mm. These are consistent with intramuscular abscesses (T2 axial image 4, series 5, T1 postcontrast axial image 1, series 10). The conus is normal in position and signal with its tip at the L1 level. At T12-L1, is a tiny central and left paramedian disc protrusion with an underlying annular fissure. There is mild hypertrophic facet arthropathy. Thecal sac is toward the lower range of normal without karla central stenosis. The neural foramina are intact. At L1-2, there is negligible disc bulging. There is no central stenosis or significant foraminal stenosis. At L2-3, there is a small broad-based disc-osteophyte complex. There is no central stenosis as there is decompressive laminectomy at this level. There is no significant foraminal stenosis. At L3-4, there is posterior marginal spurring and disc bulging accentuated by the retrolisthesis. There is moderate bilateral lateral recess stenosis. Thecal sac is toward the lower range of normal even though there is a decompressive laminectomy at this  level. However, there is no karla central stenosis (T2 axial image 24, series 5). There is severe bilateral foraminal stenosis, right greater than left. This is unchanged from the previous exam. At L4-5, no central stenosis. There is inferior  foraminal blunting with moderate bilateral foraminal stenosis. At L5-S1, there is minimal disc bulging. There is a left paramedian annular fissure. There is mild hypertrophic facet arthropathy. No central stenosis. There is moderate bilateral foraminal stenosis.     1.  L3-4 findings again suggesting discitis with osteomyelitis. No evidence of epidural abscess or epidural phlegmon. 2.  L3-4 retrolisthesis. 3.  Postoperative multilevel lumbar laminectomy and posterior fusion at L4-5. 4.  Interval development of multilocular intramuscular abscesses in the right iliacus muscle, partly in the field of view 5.  Degenerative and spondylotic changes as detailed for each level above in the body of report.    Dx-esophagus - Barium Swallow    Result Date: 9/16/2017 9/16/2017 10:39 AM HISTORY/REASON FOR EXAM:  Nausea. Nausea and vomiting. TECHNIQUE/EXAM DESCRIPTION AND NUMBER OF VIEWS: Single contrast esophagram procedure was performed. 5 fluoroscopic images obtained. Fluoroscopy time: 0.37 minutes COMPARISON:  None. FINDINGS: Limited exam secondary to limited patient mobility. The esophagus appears normal in caliber and configuration. No definite mucosal lesions are identified on single-contrast techniques. Contrast passes freely into the stomach. No hiatal hernia is identified.     No abnormalities identified on limited single contrast esophagram.    Echocardiogram Comp W/o Cont    Result Date: 9/14/2017  Transthoracic Echo Report Echocardiography Laboratory CONCLUSIONS Prior study done on [id number] left ventricular systolic function. Normal regional wall motion. Left ventricular ejection fraction is visually estimated to be 70%. Diastolic function is difficult to assess with atrial fibrillation. The right ventricle was normal in size and function. Structurally normal mitral valve without significant stenosis or regurgitation. Aortic sclerosis without stenosis. No aortic insufficiency. Normal pericardium without  effusion.FINDINGS Left Ventricle Normal left ventricular chamber size. Normal left ventricular wall thickness. Normal left ventricular systolic function. Normal regional wall motion. Left ventricular ejection fraction is visually estimated to be 70%. Diastolic function is difficult to assess with atrial fibrillation. Right Ventricle The right ventricle was normal in size and function. Right Atrium The right atrium is normal in size.  Inferior vena cava is not well visualized. Left Atrium The left atrium is normal in size.  Left atrial volume index is 19  mL/sq m. Mitral Valve Structurally normal mitral valve without significant stenosis or regurgitation.  Trace mitral regurgitation. Aortic Valve Aortic sclerosis without stenosis. Tricuspid aortic valve. No aortic insufficiency. Tricuspid Valve Structurally normal tricuspid valve without significant stenosis or regurgitation. Pulmonic Valve Structurally normal pulmonic valve without significant stenosis or regurgitation. Pericardium Normal pericardium without effusion. Aorta The aortic root is normal.  Ascending aorta diameter is 3.1 cm. Kingsley Hung M.D. (Electronically Signed) Final Date:     14 September 2017                 11:10      Micro:  Results     Procedure Component Value Units Date/Time    CULTURE WOUND W/ GRAM STAIN [[id number]] Collected:  09/20/17 1500    Order Status:  Completed Specimen:  Wound Updated:  09/23/17 0725     Gram Stain Result --     Few WBCs.  No organisms seen.       Significant Indicator NEG     Source WND     Site Right Ileopsoas Abscess     Culture Result Wound No growth at 72 hours.    GRAM STAIN [[id number]] Collected:  09/20/17 1500    Order Status:  Completed Specimen:  Wound Updated:  09/21/17 0807     Significant Indicator .     Source WND     Site Right Ileopsoas Abscess     Gram Stain Result --     Few WBCs.  No organisms seen.            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

## 2017-09-27 NOTE — CARE PLAN
Problem: Discharge Barriers/Planning  Goal: Patient's continuum of care needs will be met  Outcome: PROGRESSING AS EXPECTED  Pt had a PICC line placed for long term antibiotics, waiting for placement.     Problem: Mobility  Goal: Risk for activity intolerance will decrease  Outcome: PROGRESSING SLOWER THAN EXPECTED  Educate pt to perform ROM and to ambulate, assess for barriers to ambulation.

## 2017-09-27 NOTE — THERAPY
"Physical Therapy Treatment completed.   Bed Mobility:  Supine to Sit: Maximal Assist  Transfers: Sit to Stand: Maximal Assist  Gait: Level Of Assist: Unable to Participate with No Equipment Needed       Plan of Care: Will benefit from Physical Therapy 3 times per week  Discharge Recommendations: Equipment: Will Continue to Assess for Equipment Needs. Post-acute therapy Discharge to a transitional care facility for continued skilled therapy services.    Pt appears grossly debilitated with B LE weakness. He is able to perform partial ROM gravity eliminated, but this currently appears inadequate for standing. Initially, pt appeared quite motivated to participate, but upon realization of how weak his B LE actually were, he ceased effort and asked PT, \"why are you torturing me?\" He will benefit from further acute skilled PT services to improve functional mobiltiy and will certainly require placement upon DC.      See \"Rehab Therapy-Acute\" Patient Summary Report for complete documentation.       "

## 2017-09-27 NOTE — CARE PLAN
Problem: Discharge Barriers/Planning  Goal: Patient's continuum of care needs will be met  Outcome: PROGRESSING AS EXPECTED  Patient now has PICC in place and able to go to SNF on IV abx. Discussed discharge barriers and POC. Pt states understanding.     Problem: Mobility  Goal: Risk for activity intolerance will decrease  Outcome: PROGRESSING SLOWER THAN EXPECTED  Patient working with PT now, patient reluctant to ambulate or sit on edge of bed. Ambulation encouraged but Pt states he is too tired.

## 2017-09-27 NOTE — PROGRESS NOTES
Pt denies any pain, SOB or chest pain. Call light within reach, bed in lowest locked position, hourly rounding in place. Tele box on and rhythm verified. Bed alarm on.

## 2017-09-27 NOTE — PROGRESS NOTES
[facility]    Date of Service: 2017    Chief Complaint  This is a 85 y/o M with altered mental status, chronic osteomyelitis on IV abx, afib     Interval Problem Update  No acute events overnight. Patient resting in bed. Patient had PICC line placed yesterday. Patient continued on IV antibiotics per ID recommendations    Consultants/Specialty  Cardiology  - signed off  ID     Disposition  SNF for long term abx when accepted, SW assisting.        Review of Systems   Constitutional: Negative for chills and fever.   Respiratory: Negative for cough and shortness of breath.    Cardiovascular: Negative for chest pain.   Gastrointestinal: Negative for abdominal pain, constipation and nausea.   Musculoskeletal: Negative for back pain.      Physical Exam  Laboratory/Imaging   Hemodynamics  Temp (24hrs), Av °C (96.8 °F), Min:35.7 °C (96.3 °F), Max:36.3 °C (97.3 °F)   Temperature: (!) 35.7 °C (96.3 °F)  Pulse  Av.7  Min: 45  Max: 135    Blood Pressure : (!) 96/47      Respiratory      Respiration: 19, Pulse Oximetry: 99 %     Work Of Breathing / Effort: Mild  RUL Breath Sounds: Diminished, RML Breath Sounds: Diminished, RLL Breath Sounds: Diminished, MIKE Breath Sounds: Diminished, LLL Breath Sounds: Diminished    Fluids    Intake/Output Summary (Last 24 hours) at 17 1234  Last data filed at 17 1147   Gross per 24 hour   Intake             1670 ml   Output             1000 ml   Net              670 ml       Nutrition  Orders Placed This Encounter   Procedures   • DIET ORDER     Standing Status:   Standing     Number of Occurrences:   1     Order Specific Question:   Diet:     Answer:   Full Liquid [11]     Comments:   Ensure on Each Tray     Physical Exam   Constitutional: He is oriented to person, place, and time. He appears well-developed and well-nourished. No distress.   HENT:   Head: Normocephalic and atraumatic.   Eyes: Conjunctivae are normal. Right eye exhibits no  discharge. Left eye exhibits no discharge.   Neck: Normal range of motion. Neck supple.   Cardiovascular: Normal rate, regular rhythm and normal heart sounds.    No murmur heard.  Pulmonary/Chest: Effort normal and breath sounds normal. No respiratory distress. He has no wheezes.   Abdominal: Soft. Bowel sounds are normal. He exhibits no distension. There is no tenderness.   Genitourinary:   Genitourinary Comments: Condom catheter in place     Musculoskeletal: He exhibits no edema.   LUE PICC line in place C/D/I   Neurological: He is alert and oriented to person, place, and time.   Skin: Skin is warm and dry. No rash noted. He is not diaphoretic.       Recent Labs      09/26/17   0303  09/27/17   0332   WBC  11.3*  11.0*   RBC  2.96*  2.63*   HEMOGLOBIN  8.8*  8.2*   HEMATOCRIT  27.7*  24.5*   MCV  93.6  93.2   MCH  29.7  31.2   MCHC  31.8*  33.5*   RDW  50.3*  51.1*   PLATELETCT  423  382   MPV  9.3  10.0     Recent Labs      09/26/17   0303   SODIUM  134*   POTASSIUM  3.8   CHLORIDE  103   CO2  25   GLUCOSE  107*   BUN  7*   CREATININE  0.56   CALCIUM  9.0                      Assessment/Plan     Persistent atrial fibrillation (CMS-HCC)- (present on admission)   Assessment & Plan    Rate controlled. Continue to monitor. Telemetry.  Not a candidate for anticoagulation due to pelvic hematoma, GI bleeding  Cardiology - signed off  Echo shows EF 70%, Normal regional wall motion        DNR (do not resuscitate)   Assessment & Plan    - per pt        Nontraumatic psoas hematoma- (present on admission)   Assessment & Plan    Stable.  Continue with physical and occupational therapy as tolerated.        Chronic osteomyelitis of lumbar spine (CMS-HCC)- (present on admission)   Assessment & Plan    Continue with Ceftaroline, end date 10/18, will need skilled placement.  Social work assisting           Normocytic anemia- (present on admission)   Assessment & Plan    Stable        Debility- (present on admission)   Assessment &  Plan    Encouraging patient to work with PT and OT, lacks motivation          Sepsis (CMS-[facility])   Assessment & Plan    This is sepsis (without associated acute organ dysfunction).   Resolved.             Reviewed items::  Medications reviewed and Labs reviewed  Hearn catheter::  No Hearn  DVT: Dropping hgb.

## 2017-09-28 LAB
ANION GAP SERPL CALC-SCNC: 6 MMOL/L (ref 0–11.9)
BUN SERPL-MCNC: 6 MG/DL (ref 8–22)
CALCIUM SERPL-MCNC: 8.8 MG/DL (ref 8.5–10.5)
CHLORIDE SERPL-SCNC: 102 MMOL/L (ref 96–112)
CO2 SERPL-SCNC: 25 MMOL/L (ref 20–33)
CREAT SERPL-MCNC: 0.48 MG/DL (ref 0.5–1.4)
GFR SERPL CREATININE-BSD FRML MDRD: >60 ML/MIN/1.73 M 2
GLUCOSE SERPL-MCNC: 95 MG/DL (ref 65–99)
POTASSIUM SERPL-SCNC: 3.7 MMOL/L (ref 3.6–5.5)
SODIUM SERPL-SCNC: 133 MMOL/L (ref 135–145)

## 2017-09-28 PROCEDURE — 700102 HCHG RX REV CODE 250 W/ 637 OVERRIDE(OP): Performed by: NURSE PRACTITIONER

## 2017-09-28 PROCEDURE — A9270 NON-COVERED ITEM OR SERVICE: HCPCS | Performed by: NURSE PRACTITIONER

## 2017-09-28 PROCEDURE — 700102 HCHG RX REV CODE 250 W/ 637 OVERRIDE(OP): Performed by: INTERNAL MEDICINE

## 2017-09-28 PROCEDURE — 700105 HCHG RX REV CODE 258: Performed by: HOSPITALIST

## 2017-09-28 PROCEDURE — 99232 SBSQ HOSP IP/OBS MODERATE 35: CPT | Performed by: INTERNAL MEDICINE

## 2017-09-28 PROCEDURE — 80048 BASIC METABOLIC PNL TOTAL CA: CPT

## 2017-09-28 PROCEDURE — A9270 NON-COVERED ITEM OR SERVICE: HCPCS | Performed by: INTERNAL MEDICINE

## 2017-09-28 PROCEDURE — 700105 HCHG RX REV CODE 258: Performed by: INTERNAL MEDICINE

## 2017-09-28 PROCEDURE — 92526 ORAL FUNCTION THERAPY: CPT

## 2017-09-28 PROCEDURE — 770006 HCHG ROOM/CARE - MED/SURG/GYN SEMI*

## 2017-09-28 PROCEDURE — 700111 HCHG RX REV CODE 636 W/ 250 OVERRIDE (IP): Performed by: INTERNAL MEDICINE

## 2017-09-28 RX ORDER — LEVETIRACETAM 500 MG/1
500 TABLET ORAL EVERY 12 HOURS
Status: DISCONTINUED | OUTPATIENT
Start: 2017-09-28 | End: 2017-10-08

## 2017-09-28 RX ADMIN — CEFTAROLINE FOSAMIL 600 MG: 600 POWDER, FOR SOLUTION INTRAVENOUS at 20:19

## 2017-09-28 RX ADMIN — GABAPENTIN 200 MG: 100 CAPSULE ORAL at 10:12

## 2017-09-28 RX ADMIN — METOPROLOL SUCCINATE 50 MG: 25 TABLET, EXTENDED RELEASE ORAL at 10:12

## 2017-09-28 RX ADMIN — SODIUM CHLORIDE: 9 INJECTION, SOLUTION INTRAVENOUS at 16:23

## 2017-09-28 RX ADMIN — GABAPENTIN 200 MG: 100 CAPSULE ORAL at 16:37

## 2017-09-28 RX ADMIN — ROSUVASTATIN CALCIUM 125 MCG: 10 TABLET, FILM COATED ORAL at 17:00

## 2017-09-28 RX ADMIN — TAMSULOSIN HYDROCHLORIDE 0.4 MG: 0.4 CAPSULE ORAL at 10:12

## 2017-09-28 RX ADMIN — LEVETIRACETAM 500 MG: 500 TABLET, FILM COATED ORAL at 22:32

## 2017-09-28 RX ADMIN — LISINOPRIL 10 MG: 5 TABLET ORAL at 10:12

## 2017-09-28 RX ADMIN — CEFTAROLINE FOSAMIL 600 MG: 600 POWDER, FOR SOLUTION INTRAVENOUS at 10:12

## 2017-09-28 RX ADMIN — GABAPENTIN 200 MG: 100 CAPSULE ORAL at 20:19

## 2017-09-28 RX ADMIN — ACETAMINOPHEN 650 MG: 325 TABLET, FILM COATED ORAL at 21:07

## 2017-09-28 RX ADMIN — ATORVASTATIN CALCIUM 10 MG: 10 TABLET, FILM COATED ORAL at 20:19

## 2017-09-28 ASSESSMENT — ENCOUNTER SYMPTOMS
CHILLS: 0
SPEECH CHANGE: 0
CONSTIPATION: 0
ABDOMINAL PAIN: 0
COUGH: 0
SHORTNESS OF BREATH: 0
NAUSEA: 0
HEADACHES: 0
BACK PAIN: 1
DEPRESSION: 0
BACK PAIN: 0
FEVER: 0
VOMITING: 0
PALPITATIONS: 0

## 2017-09-28 ASSESSMENT — PAIN SCALES - GENERAL
PAINLEVEL_OUTOF10: 0

## 2017-09-28 NOTE — PROGRESS NOTES
Infectious Disease Progress Note    Author: Shante Sequeira M.D. Date & Time of service: 2017  10:15 AM    Chief Complaint:  Altered mental status    Interval History:  86-year-old male admitted for altered mental status. He was recently treated for L3-4 discitis. His repeat MRI showed right iliacus muscle abscess  17-MAXIMUM TEMPERATURE 98. On 2 liters of oxygen. WBC 14 and creatinine 0.38  - MAXIMUM TEMPERATURE 97.8. Complains of generalized malaise. Breathing is improved. Right leg pain is improved  17-MAXIMUM TEMPERATURE 99.5. Still has back pain when he sits up. The WBC 12  17-MAXIMUM TEMPERATURE 97.6. Feels better. No new issues overnight. WBC11.7   AF resting comfortably   AF, WBC 11.3 more awake eating OK.  Back pain about the same   AF WBC 11 had PICC placed-denies SE abx. No new complaints   AF tolerating abx well feels about the same  Labs Reviewed, Medications Reviewed and Radiology Reviewed.    Review of Systems:  Review of Systems   Constitutional: Negative for chills and fever.   Respiratory: Negative for cough and shortness of breath.    Cardiovascular: Positive for leg swelling. Negative for chest pain.   Gastrointestinal: Negative for abdominal pain, nausea and vomiting.   Musculoskeletal: Positive for back pain.   Neurological: Negative for speech change and headaches.       Hemodynamics:  Temp (24hrs), Av.2 °C (97.1 °F), Min:35.7 °C (96.3 °F), Max:36.4 °C (97.5 °F)  Temperature: 36.2 °C (97.2 °F)  Pulse  Av.3  Min: 45  Max: 135   Blood Pressure : 115/64       Physical Exam:  Physical Exam   Constitutional: He appears well-developed. He is easily aroused. No distress.   Obese   HENT:   Head: Normocephalic and atraumatic.   Poor dentition   Eyes: No scleral icterus.   Neck: Neck supple.   Cardiovascular: Normal rate.  An irregularly irregular rhythm present.   Pulmonary/Chest: Effort normal. No respiratory distress. He has no wheezes. He has  rales.   Abdominal: Soft. He exhibits no distension. There is no tenderness.   Musculoskeletal: He exhibits edema.   LUE PICC   Neurological: He is alert and easily aroused.   Nursing note and vitals reviewed.      Meds:    Current Facility-Administered Medications:   •  PICC Line Insertion has been implemented **AND** May use Lidocaine 1% not to exceed 3 mls for local at insertion site **AND** NOTIFY MD **AND** Tip to dwell in the superior vena cava **AND** Do not use PICC Line until placement verified by Chest X Ray **AND** DX-CHEST-FOR PICC LINE Perform procedure in: PICC Room **AND** If radiologist reading of chest X-ray states any of the following the PICC should be used **AND** Further evaluation of the PICC placement can be retrieved from X-Ray and Imaging **AND** Blood draws through PICC line; draws by RN only **AND** FLUSHING GUIDELINES WHEN IN USE **AND** normal saline PF **AND** FLUSHING GUIDELINES WHEN NOT IN USE **AND** DRESSING MAINTENANCE **AND** Change needleless pressure ports and IV tubing every 72 hours per hospital policy **AND** TUBING **AND** If there is an MD order to remove the PICC line, any RN may remove the PICC line **AND** [] PATIENT EDUCATION MATERIALS **AND** NURSING COMMUNICATION  •  ceftaroline (TEFLARO) ivpb  •  levetiracetam  •  Metoprolol Tartrate  •  lorazepam  •  acetaminophen  •  digoxin  •  prochlorperazine  •  promethazine  •  hydrALAZINE  •  lisinopril  •  hyoscyamine-maalox plus-lidocaine viscous  •  NS  •  labetalol  •  ondansetron  •  metoprolol SR  •  atorvastatin  •  gabapentin  •  tamsulosin  •  senna-docusate **AND** polyethylene glycol/lytes **AND** magnesium hydroxide **AND** bisacodyl  •  Respiratory Care per Protocol    Labs:  Recent Labs      17   0303  17   0332   WBC  11.3*  11.0*   RBC  2.96*  2.63*   HEMOGLOBIN  8.8*  8.2*   HEMATOCRIT  27.7*  24.5*   MCV  93.6  93.2   MCH  29.7  31.2   RDW  50.3*  51.1*   PLATELETCT  423  382   MPV  9.3   10.0   NEUTSPOLYS  68.10   --    LYMPHOCYTES  16.70*   --    MONOCYTES  10.60   --    EOSINOPHILS  1.30   --    BASOPHILS  0.50   --      Recent Labs      09/26/17   0303  09/28/17   0245   SODIUM  134*  133*   POTASSIUM  3.8  3.7   CHLORIDE  103  102   CO2  25  25   GLUCOSE  107*  95   BUN  7*  6*     Recent Labs      09/26/17   0303  09/28/17   0245   CREATININE  0.56  0.48*       Imaging:  Ct-abdomen-pelvis With    Result Date: 9/19/2017 9/19/2017 6:34 PM HISTORY/REASON FOR EXAM:  Abscess noted on MRI. TECHNIQUE/EXAM DESCRIPTION:  CT scan of the abdomen and pelvis with contrast. Contrast-enhanced helical scanning was obtained from the diaphragmatic domes through the pubic symphysis following the bolus administration of nonionic contrast without complication. 100 mL of Omnipaque 350 nonionic contrast was administered without complication. Low dose optimization technique was utilized for this CT exam including automated exposure control and adjustment of the mA and/or kV according to patient size. COMPARISON: MRI from the same day FINDINGS: Lung bases: There are small bilateral pleural effusions with overlying atelectasis. Abdomen: No free air is seen in the abdomen or pelvis. Evaluation is limited by streak artifact from barium within the colon. There is wall thickening of the distal esophagus with a small hiatal hernia. Liver appears unremarkable. Portal vein is patent. There is calcification in the pancreatic head. No adrenal mass is identified. Tiny hypodense left renal lesion is too small to characterize. There is mild prominence of the renal collecting systems bilaterally. Small hypodense right renal lesions too small to characterize. There are nonobstructing right renal calculi. Atherosclerotic plaque is seen in the aorta and its branches. There are small inguinal, retroperitoneal and mesenteric lymph nodes. There is no evidence of bowel obstruction. There is a moderate amount of stool in the rectosigmoid  colon. There is colonic diverticulosis. The appendix is not identified. Stomach is distended with fluid. Small bowel loops are fluid-filled. Bladder is mildly distended with foci of air. There is asymmetric prominence of the right iliopsoas musculature with surrounding stranding. Findings likely related to the previously noted abscess collection. Degenerative changes are seen in the spine. Postsurgical changes are noted in the lumbar spine. Degenerative changes are seen at the hips. There is mild loss of height of the superior endplate of T11 with a Schmorl's node noted. There is mild endplate irregularity at L3/L4.     Prominence of the right iliopsoas muscle with surrounding inflammatory stranding. The known fluid collection was better delineated on the prior MRI. Mild endplate irregularity at L3/L4 can be seen in discitis/osteomyelitis. Air-fluid level in the bladder. Correlation with urinalysis is recommended. This can be seen in the setting of recent instrumentation, infection or fistula. Colonic diverticulosis. Moderate amount of colonic stool. Nonobstructing right renal calculi. Mild prominence of the renal collecting systems bilaterally. Small hypodense renal lesions are too small to characterize. Fluid-filled loops of small bowel can be seen in the setting of enteritis. Small hiatal hernia with mild thickening of the distal esophagus. Calcifications in the pancreatic head can be seen in chronic pancreatitis. Atherosclerotic plaque. Small bilateral pleural effusions with overlying atelectasis.     Ct-drain-retroperitoneal    Result Date: 9/20/2017 9/20/2017 2:30 PM HISTORY/REASON FOR EXAM:  Multiloculated fluid collections in the right iliacus muscle. Suspected abscess. TECHNIQUE/EXAM DESCRIPTION: Right pelvic (extraperitoneal) retroperitoneal abscess drainage with CT guidance. Low dose optimization technique was utilized for this CT exam including automated exposure control and adjustment of the mA and/or  kV according to patient size. PROCEDURE: Informed consent was obtained. Procedures performed with local anesthesia only with appropriate continuous patient monitoring by the radiology nurse. Sedation duration: N/A minutes Localizing CT images were obtained with the patient in supine position. The skin was prepped with Betadine and draped in a sterile fashion. Following local anesthesia with 1% Lidocaine, a 17 G guiding needle was placed and extraperitoneal needle path in the right side of the pelvis via the iliacus muscle confirmed with CT. An Amplatz guidewire was placed and following serial tract dilatation, a 8 Czech pigtail locking catheter was placed. A specimen was collected and submitted for culture and sensitivity and Gram stain. Total of 5 mL old bloody reddish-brown fluid was drained. No purulent fluid was obtained. The fluid was not malodorous. The catheter was secured to the skin and connected to suction bulb drainage. Final CT images were obtained documenting catheter position. The patient tolerated the procedure well with no evidence of complication. Low dose optimization technique was utilized for this CT exam including automated exposure control and adjustment of the mA and/or kV according to patient size. COMPARISON: MRI lumbar spine 9/19/2017, CT abdomen and pelvis 9/19/2017. FINDINGS: The final CT images show satisfactory catheter position within the target collection.     1.  CT GUIDED RETROPERITONEAL (EXTRAPERITONEAL) RIGHT PELVIC CATHETER DRAINAGE WITHIN THE RIGHT ILIACUS MUSCLE. OLD DARK REDDISH-BROWN BLOODY FLUID WAS OBTAINED. NO ABSCESS OR PURULENT MATERIAL. 2.  THE CURRENT PLAN IS TO CHECK CULTURES AND LIKELY REMOVED CATHETER IN THE SHORT-TERM AT 48-72 HOURS AS THERE IS UNLIKELY TO BE AN ABSCESS.    Ct-head W/o    Result Date: 9/13/2017 9/13/2017 9:30 PM HISTORY/REASON FOR EXAM:  Altered Mental Status. TECHNIQUE/EXAM DESCRIPTION AND NUMBER OF VIEWS: CT of the head without contrast. The  study was performed on a helical multidetector CT scanner. Contiguous 2.5 mm axial sections were obtained from the skull base through the vertex. Up to date radiation dose reduction adjustments have been utilized to meet ALARA standards for radiation dose reduction. COMPARISON:  7/12/2017 FINDINGS: The lateral ventricles are enlarged. Cortical sulci are enlarged. Patchy areas of low attenuation in the white matter bilaterally. No significant mass effect or midline shift. Basal cisterns are patent. No evidence for intracranial hemorrhage. Calvaria are intact. Visualized orbits are unremarkable. Visualized mastoid air cells are clear. Visualized paranasal sinuses are unremarkable. Calcifications of the carotid arteries noted. Calcified scalp nodules again present.     1.  Diffuse atrophy and white matter changes. 2.  No acute intracranial hemorrhage or territorial infarct.     Dx-chest-portable (1 View)    Result Date: 9/16/2017 9/16/2017 10:31 PM HISTORY/REASON FOR EXAM:  Shortness of Breath. TECHNIQUE/EXAM DESCRIPTION AND NUMBER OF VIEWS: Single portable view of the chest. COMPARISON: 9/13/2017 FINDINGS: Cardiac mediastinal contour is unchanged. Mediastinum is again prominent. Mild prominence of the pulmonary interstitium. No gross pleural fluid or pneumothorax. Dextroconvex curvature of thoracic spine.     No significant change from prior exam.    Dx-chest-portable (1 View)    Result Date: 9/13/2017 9/13/2017 8:44 PM HISTORY/REASON FOR EXAM:  Possible sepsis. TECHNIQUE/EXAM DESCRIPTION AND NUMBER OF VIEWS: Single portable view of the chest. COMPARISON: 8/7/2017 FINDINGS: Cardiac mediastinal contour is unchanged. Mild diffuse prominence of the interstitium again seen. No focal consolidation. No pleural fluid collection or pneumothorax. Dextroconvex curvature of thoracic spine. Diffuse osteopenia.     1.  Diffuse prominence of interstitium again seen, likely interstitial lung disease.  Mild pulmonary edema is also  a consideration. 2.  No pneumonia or pneumothorax. 3.  Stable cardiomegaly.    Dx-small Bowel Series    Result Date: 9/17/2017 9/16/2017 10:39 AM HISTORY/REASON FOR EXAM:  Nausea and vomiting. TECHNIQUE/EXAM DESCRIPTION AND NUMBER OF VIEWS: Single contrast barium small bowel follow-through performed. Fluoroscopic images were obtained. No fluoroscopic images obtained. COMPARISON:  None available. FINDINGS:  view the abdomen demonstrates marked gaseous distention and small bowel and colonic distention. Patient drank thin barium. Contrast passed very slowly into dilated small bowel loops. Jejunal loops are dilated up to 5.2 cm. Overhead images were taken for a total of 22 hours before termination of the exam. Contrast reached the ileum, but does not extend into the colon. There is a large amount of stool in the colon.     1.  Gaseous distention with fairly diffuse bowel dilatation. Contrast extends into the ileum, but does not reach the colon within 22 hours. No transition point is identified to suggest mechanical bowel obstruction. 2.  Large amount of stool in the rectal vault.    Mr-lumbar Spine-with & W/o    Result Date: 9/19/2017 9/19/2017 1:35 PM HISTORY/REASON FOR EXAM:  Altered mental status, weakness. Possible discitis. Previous lumbar surgery. TECHNIQUE/EXAM DESCRIPTION: MRI of the lumbar spine without and with contrast. The study was performed on a [employer] 1.5 Lucille MRI scanner. T1 sagittal, T2 fast spin-echo sagittal, T2 axial images and T1 axial images were obtained of the lumbar spine. T1 post-contrast sagittal and T1 post-contrast axial images were obtained. Optional T2 fat-suppressed sagittal images may be obtained. 20 mL Omniscan gadolinium contrast was administered intravenously. COMPARISON:  MRI lumbar spine 7/7/2017, T abdomen and pelvis 1/6/2017 FINDINGS: Alignment in the lumbar spine again shows retrolisthesis of L3 on L4 of about 5 mm. There is fluid signal T2 hyperintensity again seen  in the L3-L4 disc space and prominent marrow edema signal at the L3 inferior endplate and to a lesser extent L4 superior endplate. There is corresponding enhancement, particularly at the inferior endplate of L3. Findings are highly suspicious for discitis with osteomyelitis. There is postoperative change with lumbar laminectomy at L2-L3 through L5. There is posterior fusion with transpedicular screw fixation 4-L5. The posterior paraspinous soft tissues show expected postoperative changes with atrophy and fatty replacement in  the posterior paraspinous muscles at the operative levels. There is no significant postoperative dorsal epidural fluid collection. However, there has been interval development of multilocular fluid collections in the right iliacus muscle spanning about 42 mm. These are consistent with intramuscular abscesses (T2 axial image 4, series 5, T1 postcontrast axial image 1, series 10). The conus is normal in position and signal with its tip at the L1 level. At T12-L1, is a tiny central and left paramedian disc protrusion with an underlying annular fissure. There is mild hypertrophic facet arthropathy. Thecal sac is toward the lower range of normal without karla central stenosis. The neural foramina are intact. At L1-2, there is negligible disc bulging. There is no central stenosis or significant foraminal stenosis. At L2-3, there is a small broad-based disc-osteophyte complex. There is no central stenosis as there is decompressive laminectomy at this level. There is no significant foraminal stenosis. At L3-4, there is posterior marginal spurring and disc bulging accentuated by the retrolisthesis. There is moderate bilateral lateral recess stenosis. Thecal sac is toward the lower range of normal even though there is a decompressive laminectomy at this  level. However, there is no karla central stenosis (T2 axial image 24, series 5). There is severe bilateral foraminal stenosis, right greater than left.  This is unchanged from the previous exam. At L4-5, no central stenosis. There is inferior foraminal blunting with moderate bilateral foraminal stenosis. At L5-S1, there is minimal disc bulging. There is a left paramedian annular fissure. There is mild hypertrophic facet arthropathy. No central stenosis. There is moderate bilateral foraminal stenosis.     1.  L3-4 findings again suggesting discitis with osteomyelitis. No evidence of epidural abscess or epidural phlegmon. 2.  L3-4 retrolisthesis. 3.  Postoperative multilevel lumbar laminectomy and posterior fusion at L4-5. 4.  Interval development of multilocular intramuscular abscesses in the right iliacus muscle, partly in the field of view 5.  Degenerative and spondylotic changes as detailed for each level above in the body of report.    Dx-esophagus - Barium Swallow    Result Date: 9/16/2017 9/16/2017 10:39 AM HISTORY/REASON FOR EXAM:  Nausea. Nausea and vomiting. TECHNIQUE/EXAM DESCRIPTION AND NUMBER OF VIEWS: Single contrast esophagram procedure was performed. 5 fluoroscopic images obtained. Fluoroscopy time: 0.37 minutes COMPARISON:  None. FINDINGS: Limited exam secondary to limited patient mobility. The esophagus appears normal in caliber and configuration. No definite mucosal lesions are identified on single-contrast techniques. Contrast passes freely into the stomach. No hiatal hernia is identified.     No abnormalities identified on limited single contrast esophagram.    Echocardiogram Comp W/o Cont    Result Date: 9/14/2017  Transthoracic Echo Report Echocardiography Laboratory CONCLUSIONS Prior study done on [id number] left ventricular systolic function. Normal regional wall motion. Left ventricular ejection fraction is visually estimated to be 70%. Diastolic function is difficult to assess with atrial fibrillation. The right ventricle was normal in size and function. Structurally normal mitral valve without significant stenosis or regurgitation.  Aortic sclerosis without stenosis. No aortic insufficiency. Normal pericardium without effusion.FINDINGS Left Ventricle Normal left ventricular chamber size. Normal left ventricular wall thickness. Normal left ventricular systolic function. Normal regional wall motion. Left ventricular ejection fraction is visually estimated to be 70%. Diastolic function is difficult to assess with atrial fibrillation. Right Ventricle The right ventricle was normal in size and function. Right Atrium The right atrium is normal in size.  Inferior vena cava is not well visualized. Left Atrium The left atrium is normal in size.  Left atrial volume index is 19  mL/sq m. Mitral Valve Structurally normal mitral valve without significant stenosis or regurgitation.  Trace mitral regurgitation. Aortic Valve Aortic sclerosis without stenosis. Tricuspid aortic valve. No aortic insufficiency. Tricuspid Valve Structurally normal tricuspid valve without significant stenosis or regurgitation. Pulmonic Valve Structurally normal pulmonic valve without significant stenosis or regurgitation. Pericardium Normal pericardium without effusion. Aorta The aortic root is normal.  Ascending aorta diameter is 3.1 cm. Kingsley Hung M.D. (Electronically Signed) Final Date:     14 September 2017                 11:10      Micro:  Results     Procedure Component Value Units Date/Time    CULTURE WOUND W/ GRAM STAIN [[id number]] Collected:  09/20/17 1500    Order Status:  Completed Specimen:  Wound Updated:  09/23/17 0725     Gram Stain Result --     Few WBCs.  No organisms seen.       Significant Indicator NEG     Source WND     Site Right Ileopsoas Abscess     Culture Result Wound No growth at 72 hours.          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

## 2017-09-28 NOTE — CARE PLAN
Problem: Urinary Elimination:  Goal: Ability to reestablish a normal urinary elimination pattern will improve  Outcome: PROGRESSING AS EXPECTED  Pt is able to ask for bedpan when needed as well as accept or decline when offered.

## 2017-09-28 NOTE — PROGRESS NOTES
Patient resting in bed. Denies having any pain. No distress noted. Call light within patient's reach. Hourly rounding in place.

## 2017-09-28 NOTE — PROGRESS NOTES
Bedside report received from day shift RN, assumed pt care. Pt assessment complete. Pt A&Ox4. Reviewed plan of care with pt. Tele box on and rhythm verified. Pt on room air, trial for titration stating at 96. Chart and labs reviewed.  Bed in lowest position, call light within reach. Hourly rounding in place. Educated about calling for assistance. Bed alarm on.

## 2017-09-28 NOTE — PROGRESS NOTES
[facility]ist Progress Note    Date of Service: 2017    Chief Complaint  This is a 87 y/o M with altered mental status, chronic osteomyelitis on IV abx, afib     Interval Problem Update  No acute events overnight, no new complaints this morning. Patient resting in bed.     Consultants/Specialty  Cardiology  - signed off  ID     Disposition  SNF for long term abx when accepted, SW assisting. Transfer to medical floor when bed is available        Review of Systems   Constitutional: Negative for chills and fever.   Respiratory: Negative for cough and shortness of breath.    Cardiovascular: Negative for chest pain and palpitations.   Gastrointestinal: Negative for abdominal pain, constipation and nausea.   Musculoskeletal: Negative for back pain.   Neurological: Negative for headaches.   Psychiatric/Behavioral: Negative for depression.      Physical Exam  Laboratory/Imaging   Hemodynamics  Temp (24hrs), Av.3 °C (97.3 °F), Min:36.1 °C (96.9 °F), Max:36.4 °C (97.5 °F)   Temperature: 36.2 °C (97.2 °F)  Pulse  Av.3  Min: 45  Max: 135    Blood Pressure : 115/64      Respiratory      Respiration: 16, Pulse Oximetry: 98 %     Work Of Breathing / Effort: Mild  RUL Breath Sounds: Diminished, RML Breath Sounds: Diminished, RLL Breath Sounds: Diminished, MIKE Breath Sounds: Diminished, LLL Breath Sounds: Diminished    Fluids    Intake/Output Summary (Last 24 hours) at 17 1534  Last data filed at 17 1218   Gross per 24 hour   Intake                0 ml   Output           2400.5 ml   Net          -2400.5 ml       Nutrition  Orders Placed This Encounter   Procedures   • DIET ORDER     Standing Status:   Standing     Number of Occurrences:   1     Order Specific Question:   Diet:     Answer:   Full Liquid [11]     Comments:   Ensure on Each Tray     Physical Exam   Constitutional: He is oriented to person, place, and time. He appears well-developed and well-nourished. No distress.   HENT:   Head:  Normocephalic and atraumatic.   Mouth/Throat: Oropharynx is clear and moist.   Eyes: Conjunctivae are normal. Right eye exhibits no discharge. Left eye exhibits no discharge.   Neck: Normal range of motion. Neck supple.   Cardiovascular: Normal rate, regular rhythm and normal heart sounds.    No murmur heard.  Pulmonary/Chest: Effort normal and breath sounds normal. No respiratory distress. He has no wheezes.   Abdominal: Soft. Bowel sounds are normal. He exhibits no distension. There is no tenderness.   Genitourinary:   Genitourinary Comments: Condom catheter in place     Musculoskeletal: He exhibits no edema.   Neurological: He is alert and oriented to person, place, and time.   Skin: Skin is warm and dry. No rash noted. He is not diaphoretic.   Psychiatric: He has a normal mood and affect. His behavior is normal.       Recent Labs      09/26/17   0303  09/27/17   0332   WBC  11.3*  11.0*   RBC  2.96*  2.63*   HEMOGLOBIN  8.8*  8.2*   HEMATOCRIT  27.7*  24.5*   MCV  93.6  93.2   MCH  29.7  31.2   MCHC  31.8*  33.5*   RDW  50.3*  51.1*   PLATELETCT  423  382   MPV  9.3  10.0     Recent Labs      09/26/17   0303  09/28/17   0245   SODIUM  134*  133*   POTASSIUM  3.8  3.7   CHLORIDE  103  102   CO2  25  25   GLUCOSE  107*  95   BUN  7*  6*   CREATININE  0.56  0.48*   CALCIUM  9.0  8.8                      Assessment/Plan     Persistent atrial fibrillation (CMS-HCC)- (present on admission)   Assessment & Plan    Rate controlled. Continue to monitor. Telemetry.  Not a candidate for anticoagulation due to pelvic hematoma, GI bleeding  Cardiology - signed off  Echo shows EF 70%, Normal regional wall motion        DNR (do not resuscitate)   Assessment & Plan    - per pt        Nontraumatic psoas hematoma- (present on admission)   Assessment & Plan    Stable.  Continue with physical and occupational therapy as tolerated.        Chronic osteomyelitis of lumbar spine (CMS-HCC)- (present on admission)   Assessment & Plan     Continue with Ceftaroline, end date 10/18, will need skilled placement.  Social work assisting           Normocytic anemia- (present on admission)   Assessment & Plan    Stable        Debility- (present on admission)   Assessment & Plan    Encouraging patient to work with PT and OT, lacks motivation          Sepsis (CMS-[facility])   Assessment & Plan    This is sepsis (without associated acute organ dysfunction).   Resolved.             Reviewed items::  Medications reviewed and Labs reviewed  Hearn catheter::  No Hearn  DVT prophylaxis - mechanical:  SCDs  Antibiotics:  Treating active infection/contamination beyond 24 hours perioperative coverage

## 2017-09-28 NOTE — PROGRESS NOTES
Pt sleeping comfortably with no signs of distress. Call light within reach, bed in lowest locked position, hourly rounding in place. Tele box on and rhythm verified. Bed alarm on.

## 2017-09-28 NOTE — THERAPY
"Speech Language Therapy dysphagia treatment completed.   Functional Status: Pt still does not want any solid foods. Pt appears to be safely tolerating full liquid diet.   Recommendations: Continue full liquids per pt request   Plan of Care: Will benefit from Speech Therapy 3 times per week  Post-Acute Therapy: Discharge to home with outpatient or home health for additional skilled therapy services.    See \"Rehab Therapy-Acute\" Patient Summary Report for complete documentation.     "

## 2017-09-28 NOTE — PROGRESS NOTES
Care of patient assumed after report. Assessment completed. Bed alarm on, call light in reach, fall precautions in place. Discussed plan of care with patient. Will continue to monitor.

## 2017-09-29 PROCEDURE — A9270 NON-COVERED ITEM OR SERVICE: HCPCS | Performed by: INTERNAL MEDICINE

## 2017-09-29 PROCEDURE — 700102 HCHG RX REV CODE 250 W/ 637 OVERRIDE(OP): Performed by: INTERNAL MEDICINE

## 2017-09-29 PROCEDURE — 97112 NEUROMUSCULAR REEDUCATION: CPT

## 2017-09-29 PROCEDURE — 700111 HCHG RX REV CODE 636 W/ 250 OVERRIDE (IP): Performed by: INTERNAL MEDICINE

## 2017-09-29 PROCEDURE — 700105 HCHG RX REV CODE 258: Performed by: INTERNAL MEDICINE

## 2017-09-29 PROCEDURE — 700105 HCHG RX REV CODE 258: Performed by: HOSPITALIST

## 2017-09-29 PROCEDURE — 302135 SEQUENTIAL COMPRESSION MACHINE: Performed by: INTERNAL MEDICINE

## 2017-09-29 PROCEDURE — 99232 SBSQ HOSP IP/OBS MODERATE 35: CPT | Performed by: INTERNAL MEDICINE

## 2017-09-29 PROCEDURE — A9270 NON-COVERED ITEM OR SERVICE: HCPCS | Performed by: NURSE PRACTITIONER

## 2017-09-29 PROCEDURE — 92526 ORAL FUNCTION THERAPY: CPT

## 2017-09-29 PROCEDURE — 700102 HCHG RX REV CODE 250 W/ 637 OVERRIDE(OP): Performed by: NURSE PRACTITIONER

## 2017-09-29 PROCEDURE — 97530 THERAPEUTIC ACTIVITIES: CPT

## 2017-09-29 PROCEDURE — 770006 HCHG ROOM/CARE - MED/SURG/GYN SEMI*

## 2017-09-29 RX ADMIN — GABAPENTIN 200 MG: 100 CAPSULE ORAL at 16:02

## 2017-09-29 RX ADMIN — SODIUM CHLORIDE: 9 INJECTION, SOLUTION INTRAVENOUS at 23:10

## 2017-09-29 RX ADMIN — CEFTAROLINE FOSAMIL 600 MG: 600 POWDER, FOR SOLUTION INTRAVENOUS at 10:52

## 2017-09-29 RX ADMIN — STANDARDIZED SENNA CONCENTRATE AND DOCUSATE SODIUM 2 TABLET: 8.6; 5 TABLET, FILM COATED ORAL at 22:53

## 2017-09-29 RX ADMIN — LISINOPRIL 10 MG: 5 TABLET ORAL at 08:43

## 2017-09-29 RX ADMIN — TAMSULOSIN HYDROCHLORIDE 0.4 MG: 0.4 CAPSULE ORAL at 08:42

## 2017-09-29 RX ADMIN — ROSUVASTATIN CALCIUM 125 MCG: 10 TABLET, FILM COATED ORAL at 18:19

## 2017-09-29 RX ADMIN — SODIUM CHLORIDE: 9 INJECTION, SOLUTION INTRAVENOUS at 06:39

## 2017-09-29 RX ADMIN — CEFTAROLINE FOSAMIL 600 MG: 600 POWDER, FOR SOLUTION INTRAVENOUS at 21:00

## 2017-09-29 RX ADMIN — METOPROLOL SUCCINATE 50 MG: 25 TABLET, EXTENDED RELEASE ORAL at 08:43

## 2017-09-29 RX ADMIN — LEVETIRACETAM 500 MG: 500 TABLET, FILM COATED ORAL at 08:43

## 2017-09-29 RX ADMIN — ATORVASTATIN CALCIUM 10 MG: 10 TABLET, FILM COATED ORAL at 22:53

## 2017-09-29 RX ADMIN — GABAPENTIN 200 MG: 100 CAPSULE ORAL at 08:42

## 2017-09-29 RX ADMIN — LEVETIRACETAM 500 MG: 500 TABLET, FILM COATED ORAL at 22:53

## 2017-09-29 RX ADMIN — GABAPENTIN 200 MG: 100 CAPSULE ORAL at 22:53

## 2017-09-29 ASSESSMENT — COGNITIVE AND FUNCTIONAL STATUS - GENERAL
SUGGESTED CMS G CODE MODIFIER DAILY ACTIVITY: CK
DRESSING REGULAR LOWER BODY CLOTHING: A LITTLE
HELP NEEDED FOR BATHING: A LOT
EATING MEALS: A LITTLE
PERSONAL GROOMING: A LITTLE
DAILY ACTIVITIY SCORE: 16
DRESSING REGULAR UPPER BODY CLOTHING: A LITTLE
TOILETING: A LOT

## 2017-09-29 ASSESSMENT — ENCOUNTER SYMPTOMS
HEADACHES: 0
CONSTIPATION: 0
DEPRESSION: 0
NAUSEA: 0
SPEECH CHANGE: 0
CHILLS: 0
PALPITATIONS: 0
SHORTNESS OF BREATH: 0
FEVER: 0
BACK PAIN: 1
BACK PAIN: 0
ABDOMINAL PAIN: 0
VOMITING: 0
COUGH: 0

## 2017-09-29 ASSESSMENT — PAIN SCALES - GENERAL
PAINLEVEL_OUTOF10: 0
PAINLEVEL_OUTOF10: 0

## 2017-09-29 NOTE — PROGRESS NOTES
Received report on patient. Patient is laying down in bed alert and oriented x4. Patient denies any pain. Bed in the lowest position and call light and personal belongings within reach.

## 2017-09-29 NOTE — THERAPY
"Speech Language Therapy dysphagia treatment completed.   Functional Status:  Entered room and pt eating laying down in bed at a 30 degree angle. Pt educated extensively re swallow precautions and reason he needs to elevate HoB closer to 90 degrees to improve swallow safety and decrease aspiration risk. Pt refused to have HoB elevated despite multiple attempts at education r/t back pain. He had no overt s/sx of aspiration on trials of trials from breakfast tray. Min cues given to take small drinks and bites. Pt still refusing solid foods and is with poor appetite.   Recommendations: Continue full liquid diet and encourage pt to follow basic swallow/aspiration precautions.   Plan of Care: Will benefit from Speech Therapy 3 times per week  Post-Acute Therapy: Discharge to home with outpatient or home health for additional skilled therapy services.    See \"Rehab Therapy-Acute\" Patient Summary Report for complete documentation.     "

## 2017-09-29 NOTE — PROGRESS NOTES
Infectious Disease Progress Note    Author: Ny Chilel M.D. Date & Time of service: 2017  7:48 AM    Chief Complaint:  Altered mental status    Interval History:  86-year-old male admitted for altered mental status. He was recently treated for L3-4 discitis. His repeat MRI showed right iliacus muscle abscess  17-MAXIMUM TEMPERATURE 98. On 2 liters of oxygen. WBC 14 and creatinine 0.38  - MAXIMUM TEMPERATURE 97.8. Complains of generalized malaise. Breathing is improved. Right leg pain is improved  17-MAXIMUM TEMPERATURE 99.5. Still has back pain when he sits up. The WBC 12  17-MAXIMUM TEMPERATURE 97.6. Feels better. No new issues overnight. WBC11.7   AF resting comfortably   AF, WBC 11.3 more awake eating OK.  Back pain about the same   AF WBC 11 had PICC placed-denies SE abx. No new complaints   AF tolerating abx well feels about the same   AF, no CBC, pt has no new complaints this morning, asking when he will be discharged and how long he will be on abx,  Labs Reviewed, Medications Reviewed and Radiology Reviewed.    Review of Systems:  Review of Systems   Constitutional: Negative for chills and fever.   Respiratory: Negative for cough and shortness of breath.    Cardiovascular: Positive for leg swelling. Negative for chest pain.   Gastrointestinal: Negative for abdominal pain, nausea and vomiting.   Musculoskeletal: Positive for back pain.   Neurological: Negative for speech change and headaches.       Hemodynamics:  Temp (24hrs), Av.4 °C (97.5 °F), Min:36.1 °C (97 °F), Max:36.7 °C (98 °F)  Temperature: 36.1 °C (97 °F)  Pulse  Av.1  Min: 45  Max: 135   Blood Pressure : 116/67       Physical Exam:  Physical Exam   Constitutional: He appears well-developed. He is easily aroused. No distress.   Obese   HENT:   Head: Normocephalic and atraumatic.   Poor dentition   Eyes: No scleral icterus.   Neck: Neck supple.   Cardiovascular: Normal rate.  An irregularly  irregular rhythm present.   Pulmonary/Chest: Effort normal. No respiratory distress. He has no wheezes. He has rales.   Abdominal: Soft. He exhibits no distension. There is no tenderness.   Musculoskeletal: He exhibits edema.   LUE PICC   Neurological: He is alert and easily aroused.   Nursing note and vitals reviewed.      Meds:    Current Facility-Administered Medications:   •  levetiracetam  •  PICC Line Insertion has been implemented **AND** May use Lidocaine 1% not to exceed 3 mls for local at insertion site **AND** NOTIFY MD **AND** Tip to dwell in the superior vena cava **AND** Do not use PICC Line until placement verified by Chest X Ray **AND** DX-CHEST-FOR PICC LINE Perform procedure in: PICC Room **AND** If radiologist reading of chest X-ray states any of the following the PICC should be used **AND** Further evaluation of the PICC placement can be retrieved from X-Ray and Imaging **AND** Blood draws through PICC line; draws by RN only **AND** FLUSHING GUIDELINES WHEN IN USE **AND** normal saline PF **AND** FLUSHING GUIDELINES WHEN NOT IN USE **AND** DRESSING MAINTENANCE **AND** Change needleless pressure ports and IV tubing every 72 hours per hospital policy **AND** TUBING **AND** If there is an MD order to remove the PICC line, any RN may remove the PICC line **AND** [] PATIENT EDUCATION MATERIALS **AND** NURSING COMMUNICATION  •  ceftaroline (TEFLARO) ivpb  •  Metoprolol Tartrate  •  lorazepam  •  acetaminophen  •  digoxin  •  prochlorperazine  •  promethazine  •  hydrALAZINE  •  lisinopril  •  hyoscyamine-maalox plus-lidocaine viscous  •  NS  •  labetalol  •  ondansetron  •  metoprolol SR  •  atorvastatin  •  gabapentin  •  tamsulosin  •  senna-docusate **AND** polyethylene glycol/lytes **AND** magnesium hydroxide **AND** bisacodyl  •  Respiratory Care per Protocol    Labs:  Recent Labs      17   0332   WBC  11.0*   RBC  2.63*   HEMOGLOBIN  8.2*   HEMATOCRIT  24.5*   MCV  93.2   MCH  31.2    RDW  51.1*   PLATELETCT  382   MPV  10.0     Recent Labs      09/28/17   0245   SODIUM  133*   POTASSIUM  3.7   CHLORIDE  102   CO2  25   GLUCOSE  95   BUN  6*     Recent Labs      09/28/17   0245   CREATININE  0.48*       Imaging:  Ct-abdomen-pelvis With    Result Date: 9/19/2017 9/19/2017 6:34 PM HISTORY/REASON FOR EXAM:  Abscess noted on MRI. TECHNIQUE/EXAM DESCRIPTION:  CT scan of the abdomen and pelvis with contrast. Contrast-enhanced helical scanning was obtained from the diaphragmatic domes through the pubic symphysis following the bolus administration of nonionic contrast without complication. 100 mL of Omnipaque 350 nonionic contrast was administered without complication. Low dose optimization technique was utilized for this CT exam including automated exposure control and adjustment of the mA and/or kV according to patient size. COMPARISON: MRI from the same day FINDINGS: Lung bases: There are small bilateral pleural effusions with overlying atelectasis. Abdomen: No free air is seen in the abdomen or pelvis. Evaluation is limited by streak artifact from barium within the colon. There is wall thickening of the distal esophagus with a small hiatal hernia. Liver appears unremarkable. Portal vein is patent. There is calcification in the pancreatic head. No adrenal mass is identified. Tiny hypodense left renal lesion is too small to characterize. There is mild prominence of the renal collecting systems bilaterally. Small hypodense right renal lesions too small to characterize. There are nonobstructing right renal calculi. Atherosclerotic plaque is seen in the aorta and its branches. There are small inguinal, retroperitoneal and mesenteric lymph nodes. There is no evidence of bowel obstruction. There is a moderate amount of stool in the rectosigmoid colon. There is colonic diverticulosis. The appendix is not identified. Stomach is distended with fluid. Small bowel loops are fluid-filled. Bladder is mildly  distended with foci of air. There is asymmetric prominence of the right iliopsoas musculature with surrounding stranding. Findings likely related to the previously noted abscess collection. Degenerative changes are seen in the spine. Postsurgical changes are noted in the lumbar spine. Degenerative changes are seen at the hips. There is mild loss of height of the superior endplate of T11 with a Schmorl's node noted. There is mild endplate irregularity at L3/L4.     Prominence of the right iliopsoas muscle with surrounding inflammatory stranding. The known fluid collection was better delineated on the prior MRI. Mild endplate irregularity at L3/L4 can be seen in discitis/osteomyelitis. Air-fluid level in the bladder. Correlation with urinalysis is recommended. This can be seen in the setting of recent instrumentation, infection or fistula. Colonic diverticulosis. Moderate amount of colonic stool. Nonobstructing right renal calculi. Mild prominence of the renal collecting systems bilaterally. Small hypodense renal lesions are too small to characterize. Fluid-filled loops of small bowel can be seen in the setting of enteritis. Small hiatal hernia with mild thickening of the distal esophagus. Calcifications in the pancreatic head can be seen in chronic pancreatitis. Atherosclerotic plaque. Small bilateral pleural effusions with overlying atelectasis.     Ct-drain-retroperitoneal    Result Date: 9/20/2017 9/20/2017 2:30 PM HISTORY/REASON FOR EXAM:  Multiloculated fluid collections in the right iliacus muscle. Suspected abscess. TECHNIQUE/EXAM DESCRIPTION: Right pelvic (extraperitoneal) retroperitoneal abscess drainage with CT guidance. Low dose optimization technique was utilized for this CT exam including automated exposure control and adjustment of the mA and/or kV according to patient size. PROCEDURE: Informed consent was obtained. Procedures performed with local anesthesia only with appropriate continuous patient  monitoring by the radiology nurse. Sedation duration: N/A minutes Localizing CT images were obtained with the patient in supine position. The skin was prepped with Betadine and draped in a sterile fashion. Following local anesthesia with 1% Lidocaine, a 17 G guiding needle was placed and extraperitoneal needle path in the right side of the pelvis via the iliacus muscle confirmed with CT. An Amplatz guidewire was placed and following serial tract dilatation, a 8 Slovenian pigtail locking catheter was placed. A specimen was collected and submitted for culture and sensitivity and Gram stain. Total of 5 mL old bloody reddish-brown fluid was drained. No purulent fluid was obtained. The fluid was not malodorous. The catheter was secured to the skin and connected to suction bulb drainage. Final CT images were obtained documenting catheter position. The patient tolerated the procedure well with no evidence of complication. Low dose optimization technique was utilized for this CT exam including automated exposure control and adjustment of the mA and/or kV according to patient size. COMPARISON: MRI lumbar spine 9/19/2017, CT abdomen and pelvis 9/19/2017. FINDINGS: The final CT images show satisfactory catheter position within the target collection.     1.  CT GUIDED RETROPERITONEAL (EXTRAPERITONEAL) RIGHT PELVIC CATHETER DRAINAGE WITHIN THE RIGHT ILIACUS MUSCLE. OLD DARK REDDISH-BROWN BLOODY FLUID WAS OBTAINED. NO ABSCESS OR PURULENT MATERIAL. 2.  THE CURRENT PLAN IS TO CHECK CULTURES AND LIKELY REMOVED CATHETER IN THE SHORT-TERM AT 48-72 HOURS AS THERE IS UNLIKELY TO BE AN ABSCESS.    Ct-head W/o    Result Date: 9/13/2017 9/13/2017 9:30 PM HISTORY/REASON FOR EXAM:  Altered Mental Status. TECHNIQUE/EXAM DESCRIPTION AND NUMBER OF VIEWS: CT of the head without contrast. The study was performed on a helical multidetector CT scanner. Contiguous 2.5 mm axial sections were obtained from the skull base through the vertex. Up to date  radiation dose reduction adjustments have been utilized to meet ALARA standards for radiation dose reduction. COMPARISON:  7/12/2017 FINDINGS: The lateral ventricles are enlarged. Cortical sulci are enlarged. Patchy areas of low attenuation in the white matter bilaterally. No significant mass effect or midline shift. Basal cisterns are patent. No evidence for intracranial hemorrhage. Calvaria are intact. Visualized orbits are unremarkable. Visualized mastoid air cells are clear. Visualized paranasal sinuses are unremarkable. Calcifications of the carotid arteries noted. Calcified scalp nodules again present.     1.  Diffuse atrophy and white matter changes. 2.  No acute intracranial hemorrhage or territorial infarct.     Dx-chest-portable (1 View)    Result Date: 9/16/2017 9/16/2017 10:31 PM HISTORY/REASON FOR EXAM:  Shortness of Breath. TECHNIQUE/EXAM DESCRIPTION AND NUMBER OF VIEWS: Single portable view of the chest. COMPARISON: 9/13/2017 FINDINGS: Cardiac mediastinal contour is unchanged. Mediastinum is again prominent. Mild prominence of the pulmonary interstitium. No gross pleural fluid or pneumothorax. Dextroconvex curvature of thoracic spine.     No significant change from prior exam.    Dx-chest-portable (1 View)    Result Date: 9/13/2017 9/13/2017 8:44 PM HISTORY/REASON FOR EXAM:  Possible sepsis. TECHNIQUE/EXAM DESCRIPTION AND NUMBER OF VIEWS: Single portable view of the chest. COMPARISON: 8/7/2017 FINDINGS: Cardiac mediastinal contour is unchanged. Mild diffuse prominence of the interstitium again seen. No focal consolidation. No pleural fluid collection or pneumothorax. Dextroconvex curvature of thoracic spine. Diffuse osteopenia.     1.  Diffuse prominence of interstitium again seen, likely interstitial lung disease.  Mild pulmonary edema is also a consideration. 2.  No pneumonia or pneumothorax. 3.  Stable cardiomegaly.    Dx-small Bowel Series    Result Date: 9/17/2017 9/16/2017 10:39 AM  HISTORY/REASON FOR EXAM:  Nausea and vomiting. TECHNIQUE/EXAM DESCRIPTION AND NUMBER OF VIEWS: Single contrast barium small bowel follow-through performed. Fluoroscopic images were obtained. No fluoroscopic images obtained. COMPARISON:  None available. FINDINGS:  view the abdomen demonstrates marked gaseous distention and small bowel and colonic distention. Patient drank thin barium. Contrast passed very slowly into dilated small bowel loops. Jejunal loops are dilated up to 5.2 cm. Overhead images were taken for a total of 22 hours before termination of the exam. Contrast reached the ileum, but does not extend into the colon. There is a large amount of stool in the colon.     1.  Gaseous distention with fairly diffuse bowel dilatation. Contrast extends into the ileum, but does not reach the colon within 22 hours. No transition point is identified to suggest mechanical bowel obstruction. 2.  Large amount of stool in the rectal vault.    Mr-lumbar Spine-with & W/o    Result Date: 9/19/2017 9/19/2017 1:35 PM HISTORY/REASON FOR EXAM:  Altered mental status, weakness. Possible discitis. Previous lumbar surgery. TECHNIQUE/EXAM DESCRIPTION: MRI of the lumbar spine without and with contrast. The study was performed on a [employer] 1.5 [employer] MRI scanner. T1 sagittal, T2 fast spin-echo sagittal, T2 axial images and T1 axial images were obtained of the lumbar spine. T1 post-contrast sagittal and T1 post-contrast axial images were obtained. Optional T2 fat-suppressed sagittal images may be obtained. 20 mL Omniscan gadolinium contrast was administered intravenously. COMPARISON:  MRI lumbar spine 7/7/2017, T abdomen and pelvis 1/6/2017 FINDINGS: Alignment in the lumbar spine again shows retrolisthesis of L3 on L4 of about 5 mm. There is fluid signal T2 hyperintensity again seen in the L3-L4 disc space and prominent marrow edema signal at the L3 inferior endplate and to a lesser extent L4 superior endplate. There is  corresponding enhancement, particularly at the inferior endplate of L3. Findings are highly suspicious for discitis with osteomyelitis. There is postoperative change with lumbar laminectomy at L2-L3 through L5. There is posterior fusion with transpedicular screw fixation 4-L5. The posterior paraspinous soft tissues show expected postoperative changes with atrophy and fatty replacement in  the posterior paraspinous muscles at the operative levels. There is no significant postoperative dorsal epidural fluid collection. However, there has been interval development of multilocular fluid collections in the right iliacus muscle spanning about 42 mm. These are consistent with intramuscular abscesses (T2 axial image 4, series 5, T1 postcontrast axial image 1, series 10). The conus is normal in position and signal with its tip at the L1 level. At T12-L1, is a tiny central and left paramedian disc protrusion with an underlying annular fissure. There is mild hypertrophic facet arthropathy. Thecal sac is toward the lower range of normal without karla central stenosis. The neural foramina are intact. At L1-2, there is negligible disc bulging. There is no central stenosis or significant foraminal stenosis. At L2-3, there is a small broad-based disc-osteophyte complex. There is no central stenosis as there is decompressive laminectomy at this level. There is no significant foraminal stenosis. At L3-4, there is posterior marginal spurring and disc bulging accentuated by the retrolisthesis. There is moderate bilateral lateral recess stenosis. Thecal sac is toward the lower range of normal even though there is a decompressive laminectomy at this  level. However, there is no karla central stenosis (T2 axial image 24, series 5). There is severe bilateral foraminal stenosis, right greater than left. This is unchanged from the previous exam. At L4-5, no central stenosis. There is inferior foraminal blunting with moderate bilateral  foraminal stenosis. At L5-S1, there is minimal disc bulging. There is a left paramedian annular fissure. There is mild hypertrophic facet arthropathy. No central stenosis. There is moderate bilateral foraminal stenosis.     1.  L3-4 findings again suggesting discitis with osteomyelitis. No evidence of epidural abscess or epidural phlegmon. 2.  L3-4 retrolisthesis. 3.  Postoperative multilevel lumbar laminectomy and posterior fusion at L4-5. 4.  Interval development of multilocular intramuscular abscesses in the right iliacus muscle, partly in the field of view 5.  Degenerative and spondylotic changes as detailed for each level above in the body of report.    Dx-esophagus - Barium Swallow    Result Date: 9/16/2017 9/16/2017 10:39 AM HISTORY/REASON FOR EXAM:  Nausea. Nausea and vomiting. TECHNIQUE/EXAM DESCRIPTION AND NUMBER OF VIEWS: Single contrast esophagram procedure was performed. 5 fluoroscopic images obtained. Fluoroscopy time: 0.37 minutes COMPARISON:  None. FINDINGS: Limited exam secondary to limited patient mobility. The esophagus appears normal in caliber and configuration. No definite mucosal lesions are identified on single-contrast techniques. Contrast passes freely into the stomach. No hiatal hernia is identified.     No abnormalities identified on limited single contrast esophagram.    Echocardiogram Comp W/o Cont    Result Date: 9/14/2017  Transthoracic Echo Report Echocardiography Laboratory CONCLUSIONS Prior study done on [id number] left ventricular systolic function. Normal regional wall motion. Left ventricular ejection fraction is visually estimated to be 70%. Diastolic function is difficult to assess with atrial fibrillation. The right ventricle was normal in size and function. Structurally normal mitral valve without significant stenosis or regurgitation. Aortic sclerosis without stenosis. No aortic insufficiency. Normal pericardium without effusion.FINDINGS Left Ventricle Normal left  ventricular chamber size. Normal left ventricular wall thickness. Normal left ventricular systolic function. Normal regional wall motion. Left ventricular ejection fraction is visually estimated to be 70%. Diastolic function is difficult to assess with atrial fibrillation. Right Ventricle The right ventricle was normal in size and function. Right Atrium The right atrium is normal in size.  Inferior vena cava is not well visualized. Left Atrium The left atrium is normal in size.  Left atrial volume index is 19  mL/sq m. Mitral Valve Structurally normal mitral valve without significant stenosis or regurgitation.  Trace mitral regurgitation. Aortic Valve Aortic sclerosis without stenosis. Tricuspid aortic valve. No aortic insufficiency. Tricuspid Valve Structurally normal tricuspid valve without significant stenosis or regurgitation. Pulmonic Valve Structurally normal pulmonic valve without significant stenosis or regurgitation. Pericardium Normal pericardium without effusion. Aorta The aortic root is normal.  Ascending aorta diameter is 3.1 cm. Kingsley Hung M.D. (Electronically Signed) Final Date:     14 September 2017                 11:10      Micro:  Results     Procedure Component Value Units Date/Time    CULTURE WOUND W/ GRAM STAIN [[id number]] Collected:  09/20/17 1500    Order Status:  Completed Specimen:  Wound Updated:  09/23/17 0725     Gram Stain Result --     Few WBCs.  No organisms seen.       Significant Indicator NEG     Source WND     Site Right Ileopsoas Abscess     Culture Result Wound No growth at 72 hours.          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

## 2017-09-29 NOTE — CARE PLAN
Problem: Safety  Goal: Will remain free from falls  Outcome: PROGRESSING AS EXPECTED  Fall precautions are in place, bed locked in lowest position, call light within reach, and bed alarm on.  Pt has been using call light appropriately for assistance.      Problem: Urinary Elimination:  Goal: Ability to reestablish a normal urinary elimination pattern will improve  Outcome: PROGRESSING AS EXPECTED  Pt has been voiding appropriate about of urine, able to use urinal with assistance but pt prefers condom cath

## 2017-09-29 NOTE — CARE PLAN
Problem: Safety  Goal: Will remain free from injury  Fall risk assessed, fall precautions in place, pt educated to call, pt verbalizes understanding of fall risk.    Problem: Knowledge Deficit  Goal: Knowledge of disease process/condition, treatment plan, diagnostic tests, and medications will improve  Pt and family educated on POC, all questions answered in regards to disease process, treatment and diet. Pt and family verbalize understanding and voice no further questions at this time.

## 2017-09-29 NOTE — PROGRESS NOTES
[facility] Hospitalist Progress Note    Date of Service: 2017    Chief Complaint  This is a 85 y/o M with altered mental status, chronic osteomyelitis on IV abx, afib     Interval Problem Update  No acute events overnight, no new complaints this morning. Patient resting in bed.     Consultants/Specialty  Cardiology  - signed off  ID     Disposition  SNF for long term abx when accepted, SW assisting. Transfer to medical floor when bed is available        Review of Systems   Constitutional: Negative for chills and fever.   Respiratory: Negative for cough and shortness of breath.    Cardiovascular: Negative for chest pain and palpitations.   Gastrointestinal: Negative for abdominal pain, constipation and nausea.   Musculoskeletal: Negative for back pain.   Psychiatric/Behavioral: Negative for depression.      Physical Exam  Laboratory/Imaging   Hemodynamics  Temp (24hrs), Av.4 °C (97.5 °F), Min:36.1 °C (97 °F), Max:36.7 °C (98 °F)   Temperature: 36.2 °C (97.1 °F)  Pulse  Av  Min: 45  Max: 135    Blood Pressure : 107/68      Respiratory      Respiration: 16, Pulse Oximetry: 95 %     Work Of Breathing / Effort: Mild  RUL Breath Sounds: Clear, RML Breath Sounds: Diminished, RLL Breath Sounds: Diminished, MIKE Breath Sounds: Clear, LLL Breath Sounds: Diminished    Fluids    Intake/Output Summary (Last 24 hours) at 17 1215  Last data filed at 17 0500   Gross per 24 hour   Intake                0 ml   Output             2100 ml   Net            -2100 ml       Nutrition  Orders Placed This Encounter   Procedures   • DIET ORDER     Standing Status:   Standing     Number of Occurrences:   1     Order Specific Question:   Diet:     Answer:   Full Liquid [11]     Comments:   Ensure on Each Tray     Physical Exam   Constitutional: He is oriented to person, place, and time. He appears well-developed and well-nourished. No distress.   HENT:   Head: Normocephalic and atraumatic.   Mouth/Throat: Oropharynx is  clear and moist.   Eyes: Conjunctivae are normal. Right eye exhibits no discharge. Left eye exhibits no discharge.   Neck: Normal range of motion. Neck supple.   Cardiovascular: Normal rate, regular rhythm and normal heart sounds.    No murmur heard.  Pulmonary/Chest: Effort normal and breath sounds normal. No respiratory distress.   Abdominal: Soft. Bowel sounds are normal. He exhibits no distension.   Genitourinary:   Genitourinary Comments: Condom catheter in place     Musculoskeletal: He exhibits no edema.   Neurological: He is alert and oriented to person, place, and time.   Skin: Skin is warm and dry. No rash noted. He is not diaphoretic.   Psychiatric: He has a normal mood and affect. His behavior is normal.       Recent Labs      09/27/17   0332   WBC  11.0*   RBC  2.63*   HEMOGLOBIN  8.2*   HEMATOCRIT  24.5*   MCV  93.2   MCH  31.2   MCHC  33.5*   RDW  51.1*   PLATELETCT  382   MPV  10.0     Recent Labs      09/28/17   0245   SODIUM  133*   POTASSIUM  3.7   CHLORIDE  102   CO2  25   GLUCOSE  95   BUN  6*   CREATININE  0.48*   CALCIUM  8.8                      Assessment/Plan     Persistent atrial fibrillation (CMS-HCC)- (present on admission)   Assessment & Plan    Rate controlled. Continue to monitor. Telemetry.  Not a candidate for anticoagulation due to pelvic hematoma, GI bleeding  Cardiology - signed off  Echo shows EF 70%, Normal regional wall motion        DNR (do not resuscitate)   Assessment & Plan    - per pt        Nontraumatic psoas hematoma- (present on admission)   Assessment & Plan    Stable.  Continue with physical and occupational therapy as tolerated.        Chronic osteomyelitis of lumbar spine (CMS-HCC)- (present on admission)   Assessment & Plan    Continue with Ceftaroline, end date 10/18, will need skilled placement.  Social work assisting           Normocytic anemia- (present on admission)   Assessment & Plan    Stable        Debility- (present on admission)   Assessment & Plan     Encouraging patient to work with PT and OT, lacks motivation          Sepsis (CMS-[facility])   Assessment & Plan    This is sepsis (without associated acute organ dysfunction).   Resolved.             Reviewed items::  Medications reviewed and Labs reviewed  Hearn catheter::  No Hearn  DVT prophylaxis - mechanical:  SCDs  Antibiotics:  Treating active infection/contamination beyond 24 hours perioperative coverage

## 2017-09-29 NOTE — CARE PLAN
Problem: Nutritional:  Goal: Achieve adequate nutritional intake  Patient will consume ~50% of meals and supplements.   Outcome: PROGRESSING AS EXPECTED

## 2017-09-29 NOTE — PROGRESS NOTES
Assumed pt care at 1900, bedside report received. Pt in no distress.  A&Ox4.  Denies any chest pain or SOB. NO further complaints.  Bed locked in lowest position, call light within reach, and bed alarm on.  Will continue to  Monitor and follow plan of care.

## 2017-09-29 NOTE — DIETARY
Nutrition Services: Brief Update  Spoke with patient at beside regarding po intake. Patient was noted to be eating well at 50-75% per chart, but patient believes his intake is still at 50% or less. He is receiving Boost and magic cups TID and his intake with both supplements is %. Discussed preferences with patient who stated he dislikes cream of wheat, oatmeal, cranberry and orange juice, but likes apple juice. Will update in computrition.     Pertinent Labs: sodium: 133, BUN: 6, creatinine: 0.48  Pertinent Meds: ceftaroline, gabapentin, Keppra, pericolace, metoprolol, flomax  Fluids: NS infusion 75 ml/hr  Skin: intact  GI: Last BM 9/28    PLAN/RECOMMEND -   1) Nutrition rep to see patient daily for meal and snack preferences.  2) Encourage PO and supplement intake   3) Weekly weights to monitor nutrition status    RD following

## 2017-09-29 NOTE — THERAPY
"Occupational Therapy Treatment completed with focus on ADLs, patient education and upper extremity function.  Functional Status: Mod A supine > EOB, min A sock donning EOB, CGA-mod A sitting balance, unable to stand  Plan of Care: Will benefit from Occupational Therapy 3 times per week  Discharge Recommendations:  Equipment Will Continue to Assess for Equipment Needs. Post-acute therapy Discharge to a transitional care facility for continued skilled therapy services.    See \"Rehab Therapy-Acute\" Patient Summary Report for complete documentation.     Pt seen for OT tx to include: AROM BUE, supine > EOB, sitting balance activities, LB dressing (socks only), partial sit to stands. Pt demo'd fair bed mobility, variable sitting balance, unable to achieve full stands, but overall improved from last OT tx. He is limited by generalized weakness, balance impairment, activity intolerance. Acute OT to follow.    "

## 2017-09-30 PROCEDURE — 700102 HCHG RX REV CODE 250 W/ 637 OVERRIDE(OP): Performed by: HOSPITALIST

## 2017-09-30 PROCEDURE — 99233 SBSQ HOSP IP/OBS HIGH 50: CPT | Performed by: HOSPITALIST

## 2017-09-30 PROCEDURE — A9270 NON-COVERED ITEM OR SERVICE: HCPCS | Performed by: NURSE PRACTITIONER

## 2017-09-30 PROCEDURE — 700102 HCHG RX REV CODE 250 W/ 637 OVERRIDE(OP): Performed by: INTERNAL MEDICINE

## 2017-09-30 PROCEDURE — A9270 NON-COVERED ITEM OR SERVICE: HCPCS | Performed by: HOSPITALIST

## 2017-09-30 PROCEDURE — 700111 HCHG RX REV CODE 636 W/ 250 OVERRIDE (IP): Performed by: INTERNAL MEDICINE

## 2017-09-30 PROCEDURE — A9270 NON-COVERED ITEM OR SERVICE: HCPCS | Performed by: INTERNAL MEDICINE

## 2017-09-30 PROCEDURE — 700105 HCHG RX REV CODE 258: Performed by: HOSPITALIST

## 2017-09-30 PROCEDURE — 770006 HCHG ROOM/CARE - MED/SURG/GYN SEMI*

## 2017-09-30 PROCEDURE — 700105 HCHG RX REV CODE 258: Performed by: INTERNAL MEDICINE

## 2017-09-30 PROCEDURE — 700105 HCHG RX REV CODE 258

## 2017-09-30 PROCEDURE — 700102 HCHG RX REV CODE 250 W/ 637 OVERRIDE(OP): Performed by: NURSE PRACTITIONER

## 2017-09-30 RX ORDER — SODIUM CHLORIDE 9 MG/ML
INJECTION, SOLUTION INTRAVENOUS
Status: COMPLETED
Start: 2017-09-30 | End: 2017-09-30

## 2017-09-30 RX ORDER — FAMOTIDINE 20 MG/1
20 TABLET, FILM COATED ORAL 2 TIMES DAILY
Status: DISCONTINUED | OUTPATIENT
Start: 2017-09-30 | End: 2017-09-30

## 2017-09-30 RX ORDER — CALCIUM CARBONATE 500 MG/1
1000 TABLET, CHEWABLE ORAL
Status: DISCONTINUED | OUTPATIENT
Start: 2017-09-30 | End: 2017-10-18

## 2017-09-30 RX ORDER — OMEPRAZOLE 20 MG/1
20 CAPSULE, DELAYED RELEASE ORAL DAILY
Status: DISCONTINUED | OUTPATIENT
Start: 2017-09-30 | End: 2017-10-06 | Stop reason: SDDI

## 2017-09-30 RX ADMIN — STANDARDIZED SENNA CONCENTRATE AND DOCUSATE SODIUM 2 TABLET: 8.6; 5 TABLET, FILM COATED ORAL at 08:52

## 2017-09-30 RX ADMIN — FAMOTIDINE 20 MG: 20 TABLET, FILM COATED ORAL at 11:38

## 2017-09-30 RX ADMIN — ROSUVASTATIN CALCIUM 125 MCG: 10 TABLET, FILM COATED ORAL at 17:22

## 2017-09-30 RX ADMIN — SODIUM CHLORIDE 500 ML: 9 INJECTION, SOLUTION INTRAVENOUS at 15:11

## 2017-09-30 RX ADMIN — SODIUM CHLORIDE: 9 INJECTION, SOLUTION INTRAVENOUS at 21:12

## 2017-09-30 RX ADMIN — GABAPENTIN 200 MG: 100 CAPSULE ORAL at 08:52

## 2017-09-30 RX ADMIN — CEFTAROLINE FOSAMIL 600 MG: 600 POWDER, FOR SOLUTION INTRAVENOUS at 21:11

## 2017-09-30 RX ADMIN — ANTACID TABLETS 1000 MG: 500 TABLET, CHEWABLE ORAL at 17:22

## 2017-09-30 RX ADMIN — ATORVASTATIN CALCIUM 10 MG: 10 TABLET, FILM COATED ORAL at 21:11

## 2017-09-30 RX ADMIN — LEVETIRACETAM 500 MG: 500 TABLET, FILM COATED ORAL at 08:53

## 2017-09-30 RX ADMIN — OMEPRAZOLE 20 MG: 20 CAPSULE, DELAYED RELEASE ORAL at 12:46

## 2017-09-30 RX ADMIN — CEFTAROLINE FOSAMIL 600 MG: 600 POWDER, FOR SOLUTION INTRAVENOUS at 08:52

## 2017-09-30 RX ADMIN — GABAPENTIN 200 MG: 100 CAPSULE ORAL at 15:06

## 2017-09-30 RX ADMIN — TAMSULOSIN HYDROCHLORIDE 0.4 MG: 0.4 CAPSULE ORAL at 08:53

## 2017-09-30 RX ADMIN — SODIUM CHLORIDE: 9 INJECTION, SOLUTION INTRAVENOUS at 15:08

## 2017-09-30 RX ADMIN — LISINOPRIL 10 MG: 5 TABLET ORAL at 08:53

## 2017-09-30 RX ADMIN — LEVETIRACETAM 500 MG: 500 TABLET, FILM COATED ORAL at 22:02

## 2017-09-30 RX ADMIN — GABAPENTIN 200 MG: 100 CAPSULE ORAL at 21:11

## 2017-09-30 RX ADMIN — METOPROLOL SUCCINATE 50 MG: 25 TABLET, EXTENDED RELEASE ORAL at 08:52

## 2017-09-30 ASSESSMENT — ENCOUNTER SYMPTOMS
CONSTIPATION: 0
HEARTBURN: 0
BRUISES/BLEEDS EASILY: 0
ABDOMINAL PAIN: 0
WHEEZING: 0
SPEECH CHANGE: 0
LOSS OF CONSCIOUSNESS: 0
DIAPHORESIS: 0
BLOOD IN STOOL: 0
CARDIOVASCULAR NEGATIVE: 1
PALPITATIONS: 0
HEMOPTYSIS: 0
SHORTNESS OF BREATH: 0
FEVER: 0
HEADACHES: 0
NERVOUS/ANXIOUS: 0
PSYCHIATRIC NEGATIVE: 1
CHILLS: 0
DIZZINESS: 0
BACK PAIN: 1
COUGH: 0
GASTROINTESTINAL NEGATIVE: 1
FEVER: 1
EYES NEGATIVE: 1
DIARRHEA: 0
SEIZURES: 0
DOUBLE VISION: 0
MYALGIAS: 1
HEADACHES: 1
RESPIRATORY NEGATIVE: 1
VOMITING: 0
FOCAL WEAKNESS: 0
NAUSEA: 0

## 2017-09-30 ASSESSMENT — PAIN SCALES - GENERAL
PAINLEVEL_OUTOF10: 0
PAINLEVEL_OUTOF10: 0

## 2017-09-30 NOTE — PROGRESS NOTES
[facility] Hospitalist Progress Note    Date of Service: 2017    Chief Complaint  86 y.o. male admitted 2017 with acute ground-level fall and back pain.    Interval Problem Update  Patient was admitted to recuperate from previous injury. The patient had a fall and was found to have back pain. The patient was then transferred over to the [facility] emergency room where he was identified to be septic. His sepsis at this point seems to be secondary to osteomyelitis of the lumbar spine with acute discitis. The patient was placed on Ceftaroline  until 10/16/2017 he will need at this point LTAC placement. Continue with pain management and physical and occupational therapy.    Consultants/Specialty  Infectious diseases Dr. Cici Vincent  of cardiology    Disposition  Pending LTAC referral        Review of Systems   Constitutional: Positive for fever. Negative for chills and diaphoresis.   Eyes: Negative.  Negative for double vision.   Respiratory: Negative.  Negative for cough, hemoptysis and wheezing.    Cardiovascular: Negative.  Negative for chest pain, palpitations and leg swelling.   Gastrointestinal: Negative.  Negative for abdominal pain, blood in stool, constipation, diarrhea, heartburn, nausea and vomiting.   Genitourinary: Negative.  Negative for frequency, hematuria and urgency.   Musculoskeletal: Positive for back pain, joint pain and myalgias.        Unable to walk   Skin: Negative.  Negative for itching and rash.   Neurological: Positive for headaches. Negative for dizziness, focal weakness, seizures and loss of consciousness.   Endo/Heme/Allergies: Negative.  Does not bruise/bleed easily.   Psychiatric/Behavioral: Negative.  Negative for suicidal ideas. The patient is not nervous/anxious.       Physical Exam  Laboratory/Imaging   Hemodynamics  Temp (24hrs), Av.3 °C (97.4 °F), Min:35.9 °C (96.6 °F), Max:37 °C (98.6 °F)   Temperature: 36.4 °C (97.6 °F)  Pulse  Av.4  Min: 45  Max: 135     Blood Pressure : 103/62      Respiratory      Respiration: 16, Pulse Oximetry: 100 %     Work Of Breathing / Effort: Mild  RUL Breath Sounds: Clear, RML Breath Sounds: Diminished;Clear, RLL Breath Sounds: Diminished;Clear, MIKE Breath Sounds: Clear, LLL Breath Sounds: Diminished;Clear    Fluids    Intake/Output Summary (Last 24 hours) at 09/30/17 1344  Last data filed at 09/30/17 1142   Gross per 24 hour   Intake              770 ml   Output              660 ml   Net              110 ml       Nutrition  Orders Placed This Encounter   Procedures   • DIET ORDER     Standing Status:   Standing     Number of Occurrences:   1     Order Specific Question:   Diet:     Answer:   Full Liquid [11]     Comments:   Ensure on Each Tray     Physical Exam   Constitutional: He is oriented to person, place, and time. He appears well-developed and well-nourished. He is cooperative. He does not appear ill. No distress. Nasal cannula in place.   HENT:   Head: Normocephalic and atraumatic.   Right Ear: External ear normal.   Left Ear: External ear normal.   Nose: Nose normal.   Mouth/Throat: Oropharynx is clear and moist.   Eyes: Conjunctivae and EOM are normal. Pupils are equal, round, and reactive to light. Right eye exhibits no discharge. Left eye exhibits no discharge.   Neck: Normal range of motion. Neck supple. No JVD present. No thyromegaly present.   Cardiovascular: Normal rate and regular rhythm.    No murmur heard.  Pulmonary/Chest: Effort normal and breath sounds normal. He has no wheezes. He has no rales. He exhibits no tenderness.   Abdominal: Soft. Bowel sounds are normal. He exhibits no distension and no mass. There is no tenderness. There is no rebound and no guarding.   Genitourinary:   Genitourinary Comments: Hearn catheter   Musculoskeletal: Normal range of motion. He exhibits no edema or tenderness.   Lymphadenopathy:     He has no cervical adenopathy.   Neurological: He is alert and oriented to person, place, and  time. He has normal reflexes. No cranial nerve deficit.   Skin: Skin is warm and dry. Bruising (right temple area) noted. No rash noted. He is not diaphoretic. No erythema.   Psychiatric: He has a normal mood and affect. His behavior is normal. Judgment and thought content normal.   Nursing note and vitals reviewed.          Recent Labs      09/28/17   0245   SODIUM  133*   POTASSIUM  3.7   CHLORIDE  102   CO2  25   GLUCOSE  95   BUN  6*   CREATININE  0.48*   CALCIUM  8.8                      Assessment/Plan     Chronic osteomyelitis of lumbar spine (CMS-HCC)- (present on admission)   Assessment & Plan    Patient remains on Ceftaroline until end date of 1018.  I have placed in LTAC referral as the patient is not able to be accepted into a skilled facility with the current antibiotic regimen.          Sepsis (CMS-HCC)- (present on admission)   Assessment & Plan    Patient's sepsis has resolved initial sepsis was secondary to acute discitis of the lumbar spine.        Nontraumatic psoas hematoma- (present on admission)   Assessment & Plan    Hold any anticoagulation with a source hematoma.  It most likely developed from a fall. The fall was most likely associated with sepsis.        Persistent atrial fibrillation (CMS-HCC)- (present on admission)   Assessment & Plan    Patient has chronic atrial fibrillation. The patient continues at this point would rate control no anticoagulation due to the fact that the patient already has a pelvic hematoma and has a recent history of GI bleed.  His ejection fraction on echocardiogram was over 70%.  He is followed as an outpatient by GI inpatient wise the patient has been signed off by cardiology.        Debility- (present on admission)   Assessment & Plan    Continued this point with physical therapy occupational therapy the patient will need aggressive management and LTAC will be able to provide him physical therapy at their facility as well.        DNR (do not resuscitate)    Assessment & Plan    Confirmed with patient and he continues to be an DNR code status.        Normocytic anemia- (present on admission)   Assessment & Plan    Monitor H&H and if below 7 and 21 transfuse.            Reviewed items::  EKG reviewed, Labs reviewed, Medications reviewed and Radiology images reviewed  Hearn catheter::  No Hearn  DVT prophylaxis - mechanical:  SCDs  Ulcer Prophylaxis::  Yes  Antibiotics:  Treating active infection/contamination beyond 24 hours perioperative coverage

## 2017-09-30 NOTE — PROGRESS NOTES
Infectious Disease Progress Note    Author: Ny Chilel M.D. Date & Time of service: 2017  2:59 PM    Chief Complaint:  Altered mental status    Interval History:  86-year-old male admitted for altered mental status. He was recently treated for L3-4 discitis. His repeat MRI showed right iliacus muscle abscess  17-MAXIMUM TEMPERATURE 98. On 2 liters of oxygen. WBC 14 and creatinine 0.38  - MAXIMUM TEMPERATURE 97.8. Complains of generalized malaise. Breathing is improved. Right leg pain is improved  17-MAXIMUM TEMPERATURE 99.5. Still has back pain when he sits up. The WBC 12  17-MAXIMUM TEMPERATURE 97.6. Feels better. No new issues overnight. WBC11.7   AF resting comfortably   AF, WBC 11.3 more awake eating OK.  Back pain about the same   AF WBC 11 had PICC placed-denies SE abx. No new complaints   AF tolerating abx well feels about the same   AF, no CBC, pt has no new complaints this morning, asking when he will be discharged and how long he will be on abx,   AF no CBC, eating lots of chocolate pudding/ice cream, no new issues per pt  Labs Reviewed, Medications Reviewed and Radiology Reviewed.    Review of Systems:  Review of Systems   Constitutional: Negative for chills and fever.   Respiratory: Negative for cough and shortness of breath.    Cardiovascular: Positive for leg swelling. Negative for chest pain.   Gastrointestinal: Negative for abdominal pain, nausea and vomiting.   Musculoskeletal: Positive for back pain.   Neurological: Negative for speech change and headaches.       Hemodynamics:  Temp (24hrs), Av.3 °C (97.3 °F), Min:35.9 °C (96.6 °F), Max:37 °C (98.6 °F)  Temperature: 36.4 °C (97.6 °F)  Pulse  Av.4  Min: 45  Max: 135   Blood Pressure : 103/62       Physical Exam:  Physical Exam   Constitutional: He appears well-developed. He is easily aroused. No distress.   Obese   HENT:   Head: Normocephalic and atraumatic.   Poor dentition   Eyes: No  scleral icterus.   Neck: Neck supple.   Cardiovascular: Normal rate.  An irregularly irregular rhythm present.   Pulmonary/Chest: Effort normal. No respiratory distress. He has no wheezes. He has rales.   Abdominal: Soft. He exhibits no distension. There is no tenderness.   Musculoskeletal: He exhibits edema.   LUE PICC   Neurological: He is alert and easily aroused.   Nursing note and vitals reviewed.      Meds:    Current Facility-Administered Medications:   •  omeprazole  •  calcium carbonate  •  levetiracetam  •  PICC Line Insertion has been implemented **AND** May use Lidocaine 1% not to exceed 3 mls for local at insertion site **AND** NOTIFY MD **AND** Tip to dwell in the superior vena cava **AND** Do not use PICC Line until placement verified by Chest X Ray **AND** DX-CHEST-FOR PICC LINE Perform procedure in: PICC Room **AND** If radiologist reading of chest X-ray states any of the following the PICC should be used **AND** Further evaluation of the PICC placement can be retrieved from X-Ray and Imaging **AND** Blood draws through PICC line; draws by RN only **AND** FLUSHING GUIDELINES WHEN IN USE **AND** normal saline PF **AND** FLUSHING GUIDELINES WHEN NOT IN USE **AND** DRESSING MAINTENANCE **AND** Change needleless pressure ports and IV tubing every 72 hours per hospital policy **AND** TUBING **AND** If there is an MD order to remove the PICC line, any RN may remove the PICC line **AND** [] PATIENT EDUCATION MATERIALS **AND** NURSING COMMUNICATION  •  ceftaroline (TEFLARO) ivpb  •  Metoprolol Tartrate  •  lorazepam  •  acetaminophen  •  digoxin  •  prochlorperazine  •  promethazine  •  hydrALAZINE  •  lisinopril  •  hyoscyamine-maalox plus-lidocaine viscous  •  NS  •  labetalol  •  ondansetron  •  metoprolol SR  •  atorvastatin  •  gabapentin  •  tamsulosin  •  senna-docusate **AND** polyethylene glycol/lytes **AND** magnesium hydroxide **AND** bisacodyl  •  Respiratory Care per  Protocol    Labs:  No results for input(s): WBC, RBC, HEMOGLOBIN, HEMATOCRIT, MCV, MCH, RDW, PLATELETCT, MPV, NEUTSPOLYS, LYMPHOCYTES, MONOCYTES, EOSINOPHILS, BASOPHILS, RBCMORPHOLO in the last 72 hours.  Recent Labs      09/28/17   0245   SODIUM  133*   POTASSIUM  3.7   CHLORIDE  102   CO2  25   GLUCOSE  95   BUN  6*     Recent Labs      09/28/17   0245   CREATININE  0.48*       Imaging:  Ct-abdomen-pelvis With    Result Date: 9/19/2017 9/19/2017 6:34 PM HISTORY/REASON FOR EXAM:  Abscess noted on MRI. TECHNIQUE/EXAM DESCRIPTION:  CT scan of the abdomen and pelvis with contrast. Contrast-enhanced helical scanning was obtained from the diaphragmatic domes through the pubic symphysis following the bolus administration of nonionic contrast without complication. 100 mL of Omnipaque 350 nonionic contrast was administered without complication. Low dose optimization technique was utilized for this CT exam including automated exposure control and adjustment of the mA and/or kV according to patient size. COMPARISON: MRI from the same day FINDINGS: Lung bases: There are small bilateral pleural effusions with overlying atelectasis. Abdomen: No free air is seen in the abdomen or pelvis. Evaluation is limited by streak artifact from barium within the colon. There is wall thickening of the distal esophagus with a small hiatal hernia. Liver appears unremarkable. Portal vein is patent. There is calcification in the pancreatic head. No adrenal mass is identified. Tiny hypodense left renal lesion is too small to characterize. There is mild prominence of the renal collecting systems bilaterally. Small hypodense right renal lesions too small to characterize. There are nonobstructing right renal calculi. Atherosclerotic plaque is seen in the aorta and its branches. There are small inguinal, retroperitoneal and mesenteric lymph nodes. There is no evidence of bowel obstruction. There is a moderate amount of stool in the rectosigmoid  colon. There is colonic diverticulosis. The appendix is not identified. Stomach is distended with fluid. Small bowel loops are fluid-filled. Bladder is mildly distended with foci of air. There is asymmetric prominence of the right iliopsoas musculature with surrounding stranding. Findings likely related to the previously noted abscess collection. Degenerative changes are seen in the spine. Postsurgical changes are noted in the lumbar spine. Degenerative changes are seen at the hips. There is mild loss of height of the superior endplate of T11 with a Schmorl's node noted. There is mild endplate irregularity at L3/L4.     Prominence of the right iliopsoas muscle with surrounding inflammatory stranding. The known fluid collection was better delineated on the prior MRI. Mild endplate irregularity at L3/L4 can be seen in discitis/osteomyelitis. Air-fluid level in the bladder. Correlation with urinalysis is recommended. This can be seen in the setting of recent instrumentation, infection or fistula. Colonic diverticulosis. Moderate amount of colonic stool. Nonobstructing right renal calculi. Mild prominence of the renal collecting systems bilaterally. Small hypodense renal lesions are too small to characterize. Fluid-filled loops of small bowel can be seen in the setting of enteritis. Small hiatal hernia with mild thickening of the distal esophagus. Calcifications in the pancreatic head can be seen in chronic pancreatitis. Atherosclerotic plaque. Small bilateral pleural effusions with overlying atelectasis.     Ct-drain-retroperitoneal    Result Date: 9/20/2017 9/20/2017 2:30 PM HISTORY/REASON FOR EXAM:  Multiloculated fluid collections in the right iliacus muscle. Suspected abscess. TECHNIQUE/EXAM DESCRIPTION: Right pelvic (extraperitoneal) retroperitoneal abscess drainage with CT guidance. Low dose optimization technique was utilized for this CT exam including automated exposure control and adjustment of the mA and/or  kV according to patient size. PROCEDURE: Informed consent was obtained. Procedures performed with local anesthesia only with appropriate continuous patient monitoring by the radiology nurse. Sedation duration: N/A minutes Localizing CT images were obtained with the patient in supine position. The skin was prepped with Betadine and draped in a sterile fashion. Following local anesthesia with 1% Lidocaine, a 17 G guiding needle was placed and extraperitoneal needle path in the right side of the pelvis via the iliacus muscle confirmed with CT. An Amplatz guidewire was placed and following serial tract dilatation, a 8 [place] pigtail locking catheter was placed. A specimen was collected and submitted for culture and sensitivity and Gram stain. Total of 5 mL old bloody reddish-brown fluid was drained. No purulent fluid was obtained. The fluid was not malodorous. The catheter was secured to the skin and connected to suction bulb drainage. Final CT images were obtained documenting catheter position. The patient tolerated the procedure well with no evidence of complication. Low dose optimization technique was utilized for this CT exam including automated exposure control and adjustment of the mA and/or kV according to patient size. COMPARISON: MRI lumbar spine 9/19/2017, CT abdomen and pelvis 9/19/2017. FINDINGS: The final CT images show satisfactory catheter position within the target collection.     1.  CT GUIDED RETROPERITONEAL (EXTRAPERITONEAL) RIGHT PELVIC CATHETER DRAINAGE WITHIN THE RIGHT ILIACUS MUSCLE. OLD DARK REDDISH-BROWN BLOODY FLUID WAS OBTAINED. NO ABSCESS OR PURULENT MATERIAL. 2.  THE CURRENT PLAN IS TO CHECK CULTURES AND LIKELY REMOVED CATHETER IN THE SHORT-TERM AT 48-72 HOURS AS THERE IS UNLIKELY TO BE AN ABSCESS.    Ct-head W/o    Result Date: 9/13/2017 9/13/2017 9:30 PM HISTORY/REASON FOR EXAM:  Altered Mental Status. TECHNIQUE/EXAM DESCRIPTION AND NUMBER OF VIEWS: CT of the head without contrast. The  study was performed on a helical multidetector CT scanner. Contiguous 2.5 mm axial sections were obtained from the skull base through the vertex. Up to date radiation dose reduction adjustments have been utilized to meet ALARA standards for radiation dose reduction. COMPARISON:  7/12/2017 FINDINGS: The lateral ventricles are enlarged. Cortical sulci are enlarged. Patchy areas of low attenuation in the white matter bilaterally. No significant mass effect or midline shift. Basal cisterns are patent. No evidence for intracranial hemorrhage. Calvaria are intact. Visualized orbits are unremarkable. Visualized mastoid air cells are clear. Visualized paranasal sinuses are unremarkable. Calcifications of the carotid arteries noted. Calcified scalp nodules again present.     1.  Diffuse atrophy and white matter changes. 2.  No acute intracranial hemorrhage or territorial infarct.     Dx-chest-portable (1 View)    Result Date: 9/16/2017 9/16/2017 10:31 PM HISTORY/REASON FOR EXAM:  Shortness of Breath. TECHNIQUE/EXAM DESCRIPTION AND NUMBER OF VIEWS: Single portable view of the chest. COMPARISON: 9/13/2017 FINDINGS: Cardiac mediastinal contour is unchanged. Mediastinum is again prominent. Mild prominence of the pulmonary interstitium. No gross pleural fluid or pneumothorax. Dextroconvex curvature of thoracic spine.     No significant change from prior exam.    Dx-chest-portable (1 View)    Result Date: 9/13/2017 9/13/2017 8:44 PM HISTORY/REASON FOR EXAM:  Possible sepsis. TECHNIQUE/EXAM DESCRIPTION AND NUMBER OF VIEWS: Single portable view of the chest. COMPARISON: 8/7/2017 FINDINGS: Cardiac mediastinal contour is unchanged. Mild diffuse prominence of the interstitium again seen. No focal consolidation. No pleural fluid collection or pneumothorax. Dextroconvex curvature of thoracic spine. Diffuse osteopenia.     1.  Diffuse prominence of interstitium again seen, likely interstitial lung disease.  Mild pulmonary edema is also  a consideration. 2.  No pneumonia or pneumothorax. 3.  Stable cardiomegaly.    Dx-small Bowel Series    Result Date: 9/17/2017 9/16/2017 10:39 AM HISTORY/REASON FOR EXAM:  Nausea and vomiting. TECHNIQUE/EXAM DESCRIPTION AND NUMBER OF VIEWS: Single contrast barium small bowel follow-through performed. Fluoroscopic images were obtained. No fluoroscopic images obtained. COMPARISON:  None available. FINDINGS:  view the abdomen demonstrates marked gaseous distention and small bowel and colonic distention. Patient drank thin barium. Contrast passed very slowly into dilated small bowel loops. Jejunal loops are dilated up to 5.2 cm. Overhead images were taken for a total of 22 hours before termination of the exam. Contrast reached the ileum, but does not extend into the colon. There is a large amount of stool in the colon.     1.  Gaseous distention with fairly diffuse bowel dilatation. Contrast extends into the ileum, but does not reach the colon within 22 hours. No transition point is identified to suggest mechanical bowel obstruction. 2.  Large amount of stool in the rectal vault.    Mr-lumbar Spine-with & W/o    Result Date: 9/19/2017 9/19/2017 1:35 PM HISTORY/REASON FOR EXAM:  Altered mental status, weakness. Possible discitis. Previous lumbar surgery. TECHNIQUE/EXAM DESCRIPTION: MRI of the lumbar spine without and with contrast. The study was performed on a [employer] 1.5 [employer] MRI scanner. T1 sagittal, T2 fast spin-echo sagittal, T2 axial images and T1 axial images were obtained of the lumbar spine. T1 post-contrast sagittal and T1 post-contrast axial images were obtained. Optional T2 fat-suppressed sagittal images may be obtained. 20 mL Omniscan gadolinium contrast was administered intravenously. COMPARISON:  MRI lumbar spine 7/7/2017, T abdomen and pelvis 1/6/2017 FINDINGS: Alignment in the lumbar spine again shows retrolisthesis of L3 on L4 of about 5 mm. There is fluid signal T2 hyperintensity again seen  in the L3-L4 disc space and prominent marrow edema signal at the L3 inferior endplate and to a lesser extent L4 superior endplate. There is corresponding enhancement, particularly at the inferior endplate of L3. Findings are highly suspicious for discitis with osteomyelitis. There is postoperative change with lumbar laminectomy at L2-L3 through L5. There is posterior fusion with transpedicular screw fixation 4-L5. The posterior paraspinous soft tissues show expected postoperative changes with atrophy and fatty replacement in  the posterior paraspinous muscles at the operative levels. There is no significant postoperative dorsal epidural fluid collection. However, there has been interval development of multilocular fluid collections in the right iliacus muscle spanning about 42 mm. These are consistent with intramuscular abscesses (T2 axial image 4, series 5, T1 postcontrast axial image 1, series 10). The conus is normal in position and signal with its tip at the L1 level. At T12-L1, is a tiny central and left paramedian disc protrusion with an underlying annular fissure. There is mild hypertrophic facet arthropathy. Thecal sac is toward the lower range of normal without karla central stenosis. The neural foramina are intact. At L1-2, there is negligible disc bulging. There is no central stenosis or significant foraminal stenosis. At L2-3, there is a small broad-based disc-osteophyte complex. There is no central stenosis as there is decompressive laminectomy at this level. There is no significant foraminal stenosis. At L3-4, there is posterior marginal spurring and disc bulging accentuated by the retrolisthesis. There is moderate bilateral lateral recess stenosis. Thecal sac is toward the lower range of normal even though there is a decompressive laminectomy at this  level. However, there is no karla central stenosis (T2 axial image 24, series 5). There is severe bilateral foraminal stenosis, right greater than left.  This is unchanged from the previous exam. At L4-5, no central stenosis. There is inferior foraminal blunting with moderate bilateral foraminal stenosis. At L5-S1, there is minimal disc bulging. There is a left paramedian annular fissure. There is mild hypertrophic facet arthropathy. No central stenosis. There is moderate bilateral foraminal stenosis.     1.  L3-4 findings again suggesting discitis with osteomyelitis. No evidence of epidural abscess or epidural phlegmon. 2.  L3-4 retrolisthesis. 3.  Postoperative multilevel lumbar laminectomy and posterior fusion at L4-5. 4.  Interval development of multilocular intramuscular abscesses in the right iliacus muscle, partly in the field of view 5.  Degenerative and spondylotic changes as detailed for each level above in the body of report.    Dx-esophagus - Barium Swallow    Result Date: 9/16/2017 9/16/2017 10:39 AM HISTORY/REASON FOR EXAM:  Nausea. Nausea and vomiting. TECHNIQUE/EXAM DESCRIPTION AND NUMBER OF VIEWS: Single contrast esophagram procedure was performed. 5 fluoroscopic images obtained. Fluoroscopy time: 0.37 minutes COMPARISON:  None. FINDINGS: Limited exam secondary to limited patient mobility. The esophagus appears normal in caliber and configuration. No definite mucosal lesions are identified on single-contrast techniques. Contrast passes freely into the stomach. No hiatal hernia is identified.     No abnormalities identified on limited single contrast esophagram.    Echocardiogram Comp W/o Cont    Result Date: 9/14/2017  Transthoracic Echo Report Echocardiography Laboratory CONCLUSIONS Prior study done on [id number] left ventricular systolic function. Normal regional wall motion. Left ventricular ejection fraction is visually estimated to be 70%. Diastolic function is difficult to assess with atrial fibrillation. The right ventricle was normal in size and function. Structurally normal mitral valve without significant stenosis or regurgitation.  Aortic sclerosis without stenosis. No aortic insufficiency. Normal pericardium without effusion.FINDINGS Left Ventricle Normal left ventricular chamber size. Normal left ventricular wall thickness. Normal left ventricular systolic function. Normal regional wall motion. Left ventricular ejection fraction is visually estimated to be 70%. Diastolic function is difficult to assess with atrial fibrillation. Right Ventricle The right ventricle was normal in size and function. Right Atrium The right atrium is normal in size.  Inferior vena cava is not well visualized. Left Atrium The left atrium is normal in size.  Left atrial volume index is 19  mL/sq m. Mitral Valve Structurally normal mitral valve without significant stenosis or regurgitation.  Trace mitral regurgitation. Aortic Valve Aortic sclerosis without stenosis. Tricuspid aortic valve. No aortic insufficiency. Tricuspid Valve Structurally normal tricuspid valve without significant stenosis or regurgitation. Pulmonic Valve Structurally normal pulmonic valve without significant stenosis or regurgitation. Pericardium Normal pericardium without effusion. Aorta The aortic root is normal.  Ascending aorta diameter is 3.1 cm. Kingsley Hung M.D. (Electronically Signed) Final Date:     14 September 2017                 11:10      Micro:  Results     ** No results found for the last 168 hours. 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

## 2017-09-30 NOTE — PROGRESS NOTES
2RN skin assessment done. Redness noted to sacrum but blanching. Mepliex placed. Bilateral shins noted to have abrasions.

## 2017-09-30 NOTE — CARE PLAN
"Problem: Knowledge Deficit  Goal: Knowledge of disease process/condition, treatment plan, diagnostic tests, and medications will improve    Intervention: Explain information regarding disease process/condition, treatment plan, diagnostic tests, and medications and document in education  Discussed new medication prescribed prilosec and tums three times daily, patient aware of plan of care, no adverse effects noted.       Problem: Urinary Elimination:  Goal: Ability to reestablish a normal urinary elimination pattern will improve    Intervention: Implement bladder training program  Received report that patient is incontinent and condom cath in place, at beginning of shift, condom cath had been dislodged and bed was soaked, changed linens and performed perineal as well as partial bed bath, patient states \"I can pee in the urinal, I dont' want the condom cath\". Patient able to continent throughout shift, documenting output in flowsheet.         "

## 2017-09-30 NOTE — PROGRESS NOTES
"Patient complaining of intermittent heartburn, patient has a history of GERD, no medications on MAR, no home medication for GERD noted in med rec, notified Dr. Wu, received telephone order for 20 mg pepcid twice a day, administered according to MAR.     During rounds, reported to Dr. Schwartz that patient still complaining of heartburn, patient states \"I have heartburn every 20 minutes\", Dr. Schwartz placed orders for omeprazole 20 mg PO daily and calcium carbonate chewable tab 1,000 mg three times daily with meals, administered prilosec according to MAR, new dose of tums due at 1730. Pepcid discontinued.   "

## 2017-09-30 NOTE — PROGRESS NOTES
"Paged hospitalist Dr. Schwartz regarding unrelieved heartburn despite pharmacological interventions, patient states \"the heart burn lasts for half a minute and it's intermittent, for every 20 minutes\". Received no new orders.   "

## 2017-10-01 PROCEDURE — 99232 SBSQ HOSP IP/OBS MODERATE 35: CPT | Performed by: HOSPITALIST

## 2017-10-01 PROCEDURE — 700102 HCHG RX REV CODE 250 W/ 637 OVERRIDE(OP): Performed by: NURSE PRACTITIONER

## 2017-10-01 PROCEDURE — A9270 NON-COVERED ITEM OR SERVICE: HCPCS | Performed by: INTERNAL MEDICINE

## 2017-10-01 PROCEDURE — 770006 HCHG ROOM/CARE - MED/SURG/GYN SEMI*

## 2017-10-01 PROCEDURE — 700111 HCHG RX REV CODE 636 W/ 250 OVERRIDE (IP): Performed by: INTERNAL MEDICINE

## 2017-10-01 PROCEDURE — 700105 HCHG RX REV CODE 258: Performed by: HOSPITALIST

## 2017-10-01 PROCEDURE — A9270 NON-COVERED ITEM OR SERVICE: HCPCS | Performed by: NURSE PRACTITIONER

## 2017-10-01 PROCEDURE — A9270 NON-COVERED ITEM OR SERVICE: HCPCS | Performed by: HOSPITALIST

## 2017-10-01 PROCEDURE — 700102 HCHG RX REV CODE 250 W/ 637 OVERRIDE(OP): Performed by: INTERNAL MEDICINE

## 2017-10-01 PROCEDURE — 700105 HCHG RX REV CODE 258: Performed by: INTERNAL MEDICINE

## 2017-10-01 PROCEDURE — 700102 HCHG RX REV CODE 250 W/ 637 OVERRIDE(OP): Performed by: HOSPITALIST

## 2017-10-01 RX ADMIN — GABAPENTIN 200 MG: 100 CAPSULE ORAL at 21:14

## 2017-10-01 RX ADMIN — GABAPENTIN 200 MG: 100 CAPSULE ORAL at 15:19

## 2017-10-01 RX ADMIN — GABAPENTIN 200 MG: 100 CAPSULE ORAL at 08:41

## 2017-10-01 RX ADMIN — LEVETIRACETAM 500 MG: 500 TABLET, FILM COATED ORAL at 08:41

## 2017-10-01 RX ADMIN — SODIUM CHLORIDE: 9 INJECTION, SOLUTION INTRAVENOUS at 12:43

## 2017-10-01 RX ADMIN — ATORVASTATIN CALCIUM 10 MG: 10 TABLET, FILM COATED ORAL at 21:13

## 2017-10-01 RX ADMIN — LISINOPRIL 10 MG: 5 TABLET ORAL at 08:41

## 2017-10-01 RX ADMIN — CEFTAROLINE FOSAMIL 600 MG: 600 POWDER, FOR SOLUTION INTRAVENOUS at 08:47

## 2017-10-01 RX ADMIN — METOPROLOL SUCCINATE 50 MG: 25 TABLET, EXTENDED RELEASE ORAL at 08:41

## 2017-10-01 RX ADMIN — ANTACID TABLETS 1000 MG: 500 TABLET, CHEWABLE ORAL at 08:41

## 2017-10-01 RX ADMIN — ANTACID TABLETS 1000 MG: 500 TABLET, CHEWABLE ORAL at 17:01

## 2017-10-01 RX ADMIN — OMEPRAZOLE 20 MG: 20 CAPSULE, DELAYED RELEASE ORAL at 08:41

## 2017-10-01 RX ADMIN — CEFTAROLINE FOSAMIL 600 MG: 600 POWDER, FOR SOLUTION INTRAVENOUS at 21:13

## 2017-10-01 RX ADMIN — ACETAMINOPHEN 650 MG: 325 TABLET, FILM COATED ORAL at 21:28

## 2017-10-01 RX ADMIN — TAMSULOSIN HYDROCHLORIDE 0.4 MG: 0.4 CAPSULE ORAL at 08:41

## 2017-10-01 RX ADMIN — LEVETIRACETAM 500 MG: 500 TABLET, FILM COATED ORAL at 21:14

## 2017-10-01 ASSESSMENT — ENCOUNTER SYMPTOMS
FLANK PAIN: 0
VOMITING: 0
BRUISES/BLEEDS EASILY: 0
DOUBLE VISION: 0
MYALGIAS: 0
DEPRESSION: 0
RESPIRATORY NEGATIVE: 1
DIZZINESS: 0
CHILLS: 0
EYES NEGATIVE: 1
PSYCHIATRIC NEGATIVE: 1
HEMOPTYSIS: 0
HEADACHES: 0
NECK PAIN: 0
TINGLING: 0
TREMORS: 0
ABDOMINAL PAIN: 0
WEIGHT LOSS: 0
DIARRHEA: 0
GASTROINTESTINAL NEGATIVE: 1
BACK PAIN: 1
COUGH: 0
PALPITATIONS: 0

## 2017-10-01 ASSESSMENT — LIFESTYLE VARIABLES
SUBSTANCE_ABUSE: 0
DO YOU DRINK ALCOHOL: NO

## 2017-10-01 ASSESSMENT — PAIN SCALES - GENERAL: PAINLEVEL_OUTOF10: 8

## 2017-10-01 NOTE — PROGRESS NOTES
Shift report received from night RN, assumed care at 0715. Patient awake in bed eating breakfast, AOx4, patient not currently expressing any pain at this time, routinely medicated prn per MAR. Pt to weak to get OOB, PT/OT on for patient, calls appropriately, bed alarm on. PICC assessed and CDI, dressing changed last night. O2 RA, SPO2 92%. VTE SCDs. Plan is discharge to possible LTAC. Discussed POC for multimodal pain management, monitoring VS/labs/I&O, mobility, day time routine, comfort, and safety. Patient has call light and personal belongings within reach. Safety and fall precautions in place. Reviewed current labs, notes, medications, and orders. Hourly rounding in place with RN rounding on odd hours and CNA on even hours.

## 2017-10-01 NOTE — PROGRESS NOTES
[facility] Hospitalist Progress Note    Date of Service: 10/1/2017    Chief Complaint  86 y.o. male admitted 2017 with acute ground-level fall and back pain.    Interval Problem Update  Patient was admitted from skilled nursing facility with sepsis secondary to osteomyelitis and discitis of the lumbar spine. Patient has been placed on IV antibiotics and at this point recommendations for antibiotic management until 10/18/2017. Due to the expense of antibiotics regular skilled nursing facilities have not been able to except the patient will be referred to an LTAC. Currently today patient's pain is well-controlled he's very debilitated he sleeps a lot in bed he is starting to eat better he has otherwise no acute complaints.    Consultants/Specialty  Infectious diseases Dr. Cici Vincent  of cardiology    Disposition  Pending LTAC referral        Review of Systems   Constitutional: Negative for chills and weight loss.   HENT: Negative for ear pain and tinnitus.    Eyes: Negative.  Negative for double vision.   Respiratory: Negative.  Negative for cough and hemoptysis.    Cardiovascular: Positive for chest pain (chest wall on the left). Negative for palpitations and leg swelling.   Gastrointestinal: Negative.  Negative for abdominal pain, diarrhea and vomiting.   Genitourinary: Negative.  Negative for dysuria and flank pain.   Musculoskeletal: Positive for back pain. Negative for joint pain, myalgias and neck pain.        Unable to walk   Skin: Negative.  Negative for itching and rash.   Neurological: Negative for dizziness, tingling, tremors and headaches.   Endo/Heme/Allergies: Negative.  Does not bruise/bleed easily.   Psychiatric/Behavioral: Negative.  Negative for depression, substance abuse and suicidal ideas.      Physical Exam  Laboratory/Imaging   Hemodynamics  Temp (24hrs), Av.2 °C (97.2 °F), Min:35.8 °C (96.5 °F), Max:36.7 °C (98 °F)   Temperature: 36.1 °C (97 °F)  Pulse  Av  Min: 45  Max:  135    Blood Pressure : 126/71      Respiratory      Respiration: 15, Pulse Oximetry: 92 %     Work Of Breathing / Effort: Mild  RUL Breath Sounds: Clear, RML Breath Sounds: Diminished;Clear, RLL Breath Sounds: Diminished;Clear, MIKE Breath Sounds: Clear, LLL Breath Sounds: Diminished;Clear    Fluids    Intake/Output Summary (Last 24 hours) at 10/01/17 1109  Last data filed at 10/01/17 1044   Gross per 24 hour   Intake             1600 ml   Output             1350 ml   Net              250 ml       Nutrition  Orders Placed This Encounter   Procedures   • DIET ORDER     Standing Status:   Standing     Number of Occurrences:   1     Order Specific Question:   Diet:     Answer:   Regular [1]     Comments:   Ensure on Each Tray     Physical Exam   Constitutional: He is oriented to person, place, and time. He appears well-developed and well-nourished. He is cooperative. He does not appear ill. Nasal cannula in place.   HENT:   Head: Normocephalic and atraumatic.   Right Ear: External ear normal.   Left Ear: External ear normal.   Mouth/Throat: No oropharyngeal exudate.   Eyes: Conjunctivae and EOM are normal. Pupils are equal, round, and reactive to light. No scleral icterus.   Neck: Normal range of motion. Neck supple. No tracheal deviation present.   Cardiovascular: Normal rate and regular rhythm.    No murmur heard.  Pulmonary/Chest: Effort normal and breath sounds normal. He has no wheezes. He has no rales. He exhibits no tenderness.   Abdominal: Soft. Bowel sounds are normal. He exhibits no distension and no mass. There is no tenderness. There is no rebound and no guarding.   Genitourinary:   Genitourinary Comments: Hearn catheter   Musculoskeletal: Normal range of motion. He exhibits no edema or tenderness.   Lymphadenopathy:     He has no cervical adenopathy.   Neurological: He is alert and oriented to person, place, and time. He has normal reflexes. No cranial nerve deficit.   Skin: Skin is warm and dry. Bruising  (right temple area) noted. No rash noted. No erythema. No pallor.   Psychiatric: He has a normal mood and affect. His behavior is normal. Judgment and thought content normal.   Nursing note and vitals reviewed.                               Assessment/Plan     Chronic osteomyelitis of lumbar spine (CMS-[facility])- (present on admission)   Assessment & Plan    Patient remains on Ceftaroline until end date of 1018.  LTAC consult pending.          Sepsis (CMS-[facility])- (present on admission)   Assessment & Plan    Resolved.        Nontraumatic psoas hematoma- (present on admission)   Assessment & Plan    Psoas hematoma at this point is causing him some pain pain is controlled no anticoagulation monitor size this should reabsorb without any surgery.        Persistent atrial fibrillation (CMS-HCC)- (present on admission)   Assessment & Plan    Continue with rate control to continue atrial fibrillation management.  Cardiogenic has signed off.  No anticoagulation with his risk of falls.  Medical optimization.        Debility- (present on admission)   Assessment & Plan    Continue to get patient out of bed as tolerating.  Patient at this point is extremely weak and he sleeping most of the day.  He will need aggressive physical therapy and occupational therapy to regain his functioning level of status.        DNR (do not resuscitate)   Assessment & Plan    Confirmed with patient and he continues to be an DNR code status.        Normocytic anemia- (present on admission)   Assessment & Plan    Monitor hemoglobin and hematocrit currently he is stable and not requiring a transfusion.            Reviewed items::  EKG reviewed, Labs reviewed, Medications reviewed and Radiology images reviewed  Hearn catheter::  No Hearn  DVT prophylaxis - mechanical:  SCDs  Ulcer Prophylaxis::  Yes  Antibiotics:  Treating active infection/contamination beyond 24 hours perioperative coverage

## 2017-10-01 NOTE — PROGRESS NOTES
Pt resting in bed. VSS. No BM. C/o heartburn but declined GI cocktail. No complaints of pain or nausea. Able to use urinal. Will cont to monitor.

## 2017-10-02 PROCEDURE — 700102 HCHG RX REV CODE 250 W/ 637 OVERRIDE(OP): Performed by: HOSPITALIST

## 2017-10-02 PROCEDURE — 700102 HCHG RX REV CODE 250 W/ 637 OVERRIDE(OP): Performed by: INTERNAL MEDICINE

## 2017-10-02 PROCEDURE — A9270 NON-COVERED ITEM OR SERVICE: HCPCS | Performed by: INTERNAL MEDICINE

## 2017-10-02 PROCEDURE — 700105 HCHG RX REV CODE 258: Performed by: HOSPITALIST

## 2017-10-02 PROCEDURE — 700105 HCHG RX REV CODE 258: Performed by: INTERNAL MEDICINE

## 2017-10-02 PROCEDURE — A9270 NON-COVERED ITEM OR SERVICE: HCPCS | Performed by: NURSE PRACTITIONER

## 2017-10-02 PROCEDURE — 700102 HCHG RX REV CODE 250 W/ 637 OVERRIDE(OP): Performed by: NURSE PRACTITIONER

## 2017-10-02 PROCEDURE — 97535 SELF CARE MNGMENT TRAINING: CPT

## 2017-10-02 PROCEDURE — 97112 NEUROMUSCULAR REEDUCATION: CPT

## 2017-10-02 PROCEDURE — A9270 NON-COVERED ITEM OR SERVICE: HCPCS | Performed by: HOSPITALIST

## 2017-10-02 PROCEDURE — 97530 THERAPEUTIC ACTIVITIES: CPT

## 2017-10-02 PROCEDURE — 99232 SBSQ HOSP IP/OBS MODERATE 35: CPT | Performed by: HOSPITALIST

## 2017-10-02 PROCEDURE — 700111 HCHG RX REV CODE 636 W/ 250 OVERRIDE (IP): Performed by: INTERNAL MEDICINE

## 2017-10-02 PROCEDURE — 770006 HCHG ROOM/CARE - MED/SURG/GYN SEMI*

## 2017-10-02 RX ORDER — SUCRALFATE ORAL 1 G/10ML
1 SUSPENSION ORAL
Status: DISCONTINUED | OUTPATIENT
Start: 2017-10-02 | End: 2017-10-23 | Stop reason: HOSPADM

## 2017-10-02 RX ADMIN — METOPROLOL SUCCINATE 50 MG: 25 TABLET, EXTENDED RELEASE ORAL at 10:10

## 2017-10-02 RX ADMIN — ANTACID TABLETS 1000 MG: 500 TABLET, CHEWABLE ORAL at 17:54

## 2017-10-02 RX ADMIN — SUCRALFATE 1 G: 1 SUSPENSION ORAL at 12:05

## 2017-10-02 RX ADMIN — SUCRALFATE 1 G: 1 SUSPENSION ORAL at 20:41

## 2017-10-02 RX ADMIN — GABAPENTIN 200 MG: 100 CAPSULE ORAL at 10:15

## 2017-10-02 RX ADMIN — LEVETIRACETAM 500 MG: 500 TABLET, FILM COATED ORAL at 20:40

## 2017-10-02 RX ADMIN — CEFTAROLINE FOSAMIL 600 MG: 600 POWDER, FOR SOLUTION INTRAVENOUS at 20:54

## 2017-10-02 RX ADMIN — SODIUM CHLORIDE: 9 INJECTION, SOLUTION INTRAVENOUS at 04:11

## 2017-10-02 RX ADMIN — ROSUVASTATIN CALCIUM 125 MCG: 10 TABLET, FILM COATED ORAL at 17:54

## 2017-10-02 RX ADMIN — ATORVASTATIN CALCIUM 10 MG: 10 TABLET, FILM COATED ORAL at 20:40

## 2017-10-02 RX ADMIN — OMEPRAZOLE 20 MG: 20 CAPSULE, DELAYED RELEASE ORAL at 10:10

## 2017-10-02 RX ADMIN — GABAPENTIN 200 MG: 100 CAPSULE ORAL at 20:40

## 2017-10-02 RX ADMIN — ANTACID TABLETS 1000 MG: 500 TABLET, CHEWABLE ORAL at 06:08

## 2017-10-02 RX ADMIN — STANDARDIZED SENNA CONCENTRATE AND DOCUSATE SODIUM 2 TABLET: 8.6; 5 TABLET, FILM COATED ORAL at 10:10

## 2017-10-02 RX ADMIN — SODIUM CHLORIDE: 9 INJECTION, SOLUTION INTRAVENOUS at 20:57

## 2017-10-02 RX ADMIN — LEVETIRACETAM 500 MG: 500 TABLET, FILM COATED ORAL at 10:10

## 2017-10-02 RX ADMIN — CEFTAROLINE FOSAMIL 600 MG: 600 POWDER, FOR SOLUTION INTRAVENOUS at 10:17

## 2017-10-02 RX ADMIN — TAMSULOSIN HYDROCHLORIDE 0.4 MG: 0.4 CAPSULE ORAL at 10:10

## 2017-10-02 RX ADMIN — LISINOPRIL 10 MG: 5 TABLET ORAL at 10:10

## 2017-10-02 RX ADMIN — GABAPENTIN 200 MG: 100 CAPSULE ORAL at 15:00

## 2017-10-02 ASSESSMENT — COGNITIVE AND FUNCTIONAL STATUS - GENERAL
DRESSING REGULAR UPPER BODY CLOTHING: A LITTLE
SUGGESTED CMS G CODE MODIFIER DAILY ACTIVITY: CK
DAILY ACTIVITIY SCORE: 15
EATING MEALS: A LITTLE
DRESSING REGULAR LOWER BODY CLOTHING: A LOT
TOILETING: A LOT
HELP NEEDED FOR BATHING: A LOT
PERSONAL GROOMING: A LITTLE

## 2017-10-02 ASSESSMENT — ENCOUNTER SYMPTOMS
HEADACHES: 0
TREMORS: 0
CHILLS: 0
NECK PAIN: 0
SHORTNESS OF BREATH: 0
DOUBLE VISION: 0
WEAKNESS: 0
PSYCHIATRIC NEGATIVE: 1
WEIGHT LOSS: 0
STRIDOR: 0
DIARRHEA: 0
NAUSEA: 0
ABDOMINAL PAIN: 0
SPEECH CHANGE: 0
HEARTBURN: 1
DIAPHORESIS: 0
BACK PAIN: 1
SORE THROAT: 0
DEPRESSION: 0
FOCAL WEAKNESS: 0
MYALGIAS: 0
EYES NEGATIVE: 1
RESPIRATORY NEGATIVE: 1
BRUISES/BLEEDS EASILY: 0
SPUTUM PRODUCTION: 0
COUGH: 0
FEVER: 0
TINGLING: 0
DIZZINESS: 0
VOMITING: 0

## 2017-10-02 ASSESSMENT — PAIN SCALES - GENERAL: PAINLEVEL_OUTOF10: 2

## 2017-10-02 ASSESSMENT — LIFESTYLE VARIABLES
DO YOU DRINK ALCOHOL: NO
SUBSTANCE_ABUSE: 0

## 2017-10-02 NOTE — PROGRESS NOTES
Infectious Disease Progress Note    Author: Ny Chilel M.D. Date & Time of service: 10/2/2017  11:40 AM    Chief Complaint:  Altered mental status    Interval History:  86-year-old male admitted for altered mental status. He was recently treated for L3-4 discitis. His repeat MRI showed right iliacus muscle abscess  17-MAXIMUM TEMPERATURE 98. On 2 liters of oxygen. WBC 14 and creatinine 0.38  - MAXIMUM TEMPERATURE 97.8. Complains of generalized malaise. Breathing is improved. Right leg pain is improved  17-MAXIMUM TEMPERATURE 99.5. Still has back pain when he sits up. The WBC 12  17-MAXIMUM TEMPERATURE 97.6. Feels better. No new issues overnight. WBC11.7   AF resting comfortably   AF, WBC 11.3 more awake eating OK.  Back pain about the same   AF WBC 11 had PICC placed-denies SE abx. No new complaints   AF tolerating abx well feels about the same   AF, no CBC, pt has no new complaints this morning, asking when he will be discharged and how long he will be on abx,   AF no CBC, eating lots of chocolate pudding/ice cream, no new issues per pt  10/2 AF, feels well and denies any pain, feels he is getting stronger and appetite improved  Labs Reviewed, Medications Reviewed and Radiology Reviewed.    Review of Systems:  Review of Systems   Constitutional: Negative for chills and fever.   Respiratory: Negative for cough and shortness of breath.    Cardiovascular: Positive for leg swelling. Negative for chest pain.   Gastrointestinal: Negative for abdominal pain, nausea and vomiting.   Neurological: Negative for speech change and headaches.       Hemodynamics:  Temp (24hrs), Av.8 °C (98.3 °F), Min:36.1 °C (96.9 °F), Max:37.3 °C (99.2 °F)  Temperature: 37.3 °C (99.2 °F)  Pulse  Av.7  Min: 45  Max: 135   Blood Pressure : 117/70       Physical Exam:  Physical Exam   Constitutional: He appears well-developed. He is easily aroused. No distress.   Obese   HENT:   Head:  Normocephalic and atraumatic.   Poor dentition   Eyes: No scleral icterus.   Neck: Neck supple.   Cardiovascular: Normal rate.  An irregularly irregular rhythm present.   Pulmonary/Chest: Effort normal. No respiratory distress. He has no wheezes. He has rales.   Abdominal: Soft. He exhibits no distension. There is no tenderness.   Musculoskeletal: He exhibits edema.   LUE PICC   Neurological: He is alert and easily aroused.   Nursing note and vitals reviewed.      Meds:    Current Facility-Administered Medications:   •  sucralfate  •  omeprazole  •  calcium carbonate  •  levetiracetam  •  PICC Line Insertion has been implemented **AND** May use Lidocaine 1% not to exceed 3 mls for local at insertion site **AND** NOTIFY MD **AND** Tip to dwell in the superior vena cava **AND** Do not use PICC Line until placement verified by Chest X Ray **AND** DX-CHEST-FOR PICC LINE Perform procedure in: PICC Room **AND** If radiologist reading of chest X-ray states any of the following the PICC should be used **AND** Further evaluation of the PICC placement can be retrieved from X-Ray and Imaging **AND** Blood draws through PICC line; draws by RN only **AND** FLUSHING GUIDELINES WHEN IN USE **AND** normal saline PF **AND** FLUSHING GUIDELINES WHEN NOT IN USE **AND** DRESSING MAINTENANCE **AND** Change needleless pressure ports and IV tubing every 72 hours per hospital policy **AND** TUBING **AND** If there is an MD order to remove the PICC line, any RN may remove the PICC line **AND** [] PATIENT EDUCATION MATERIALS **AND** NURSING COMMUNICATION  •  ceftaroline (TEFLARO) ivpb  •  Metoprolol Tartrate  •  lorazepam  •  acetaminophen  •  digoxin  •  prochlorperazine  •  promethazine  •  hydrALAZINE  •  lisinopril  •  hyoscyamine-maalox plus-lidocaine viscous  •  NS  •  labetalol  •  ondansetron  •  metoprolol SR  •  atorvastatin  •  gabapentin  •  tamsulosin  •  senna-docusate **AND** polyethylene glycol/lytes **AND** magnesium  hydroxide **AND** bisacodyl  •  Respiratory Care per Protocol    Labs:  No results for input(s): WBC, RBC, HEMOGLOBIN, HEMATOCRIT, MCV, MCH, RDW, PLATELETCT, MPV, NEUTSPOLYS, LYMPHOCYTES, MONOCYTES, EOSINOPHILS, BASOPHILS, RBCMORPHOLO in the last 72 hours.  No results for input(s): SODIUM, POTASSIUM, CHLORIDE, CO2, GLUCOSE, BUN, CPKTOTAL in the last 72 hours.  No results for input(s): ALBUMIN, TBILIRUBIN, ALKPHOSPHAT, TOTPROTEIN, ALTSGPT, ASTSGOT, CREATININE in the last 72 hours.    Imaging:  Ct-abdomen-pelvis With    Result Date: 9/19/2017 9/19/2017 6:34 PM HISTORY/REASON FOR EXAM:  Abscess noted on MRI. TECHNIQUE/EXAM DESCRIPTION:  CT scan of the abdomen and pelvis with contrast. Contrast-enhanced helical scanning was obtained from the diaphragmatic domes through the pubic symphysis following the bolus administration of nonionic contrast without complication. 100 mL of Omnipaque 350 nonionic contrast was administered without complication. Low dose optimization technique was utilized for this CT exam including automated exposure control and adjustment of the mA and/or kV according to patient size. COMPARISON: MRI from the same day FINDINGS: Lung bases: There are small bilateral pleural effusions with overlying atelectasis. Abdomen: No free air is seen in the abdomen or pelvis. Evaluation is limited by streak artifact from barium within the colon. There is wall thickening of the distal esophagus with a small hiatal hernia. Liver appears unremarkable. Portal vein is patent. There is calcification in the pancreatic head. No adrenal mass is identified. Tiny hypodense left renal lesion is too small to characterize. There is mild prominence of the renal collecting systems bilaterally. Small hypodense right renal lesions too small to characterize. There are nonobstructing right renal calculi. Atherosclerotic plaque is seen in the aorta and its branches. There are small inguinal, retroperitoneal and mesenteric lymph  nodes. There is no evidence of bowel obstruction. There is a moderate amount of stool in the rectosigmoid colon. There is colonic diverticulosis. The appendix is not identified. Stomach is distended with fluid. Small bowel loops are fluid-filled. Bladder is mildly distended with foci of air. There is asymmetric prominence of the right iliopsoas musculature with surrounding stranding. Findings likely related to the previously noted abscess collection. Degenerative changes are seen in the spine. Postsurgical changes are noted in the lumbar spine. Degenerative changes are seen at the hips. There is mild loss of height of the superior endplate of T11 with a Schmorl's node noted. There is mild endplate irregularity at L3/L4.     Prominence of the right iliopsoas muscle with surrounding inflammatory stranding. The known fluid collection was better delineated on the prior MRI. Mild endplate irregularity at L3/L4 can be seen in discitis/osteomyelitis. Air-fluid level in the bladder. Correlation with urinalysis is recommended. This can be seen in the setting of recent instrumentation, infection or fistula. Colonic diverticulosis. Moderate amount of colonic stool. Nonobstructing right renal calculi. Mild prominence of the renal collecting systems bilaterally. Small hypodense renal lesions are too small to characterize. Fluid-filled loops of small bowel can be seen in the setting of enteritis. Small hiatal hernia with mild thickening of the distal esophagus. Calcifications in the pancreatic head can be seen in chronic pancreatitis. Atherosclerotic plaque. Small bilateral pleural effusions with overlying atelectasis.     Ct-drain-retroperitoneal    Result Date: 9/20/2017 9/20/2017 2:30 PM HISTORY/REASON FOR EXAM:  Multiloculated fluid collections in the right iliacus muscle. Suspected abscess. TECHNIQUE/EXAM DESCRIPTION: Right pelvic (extraperitoneal) retroperitoneal abscess drainage with CT guidance. Low dose optimization  technique was utilized for this CT exam including automated exposure control and adjustment of the mA and/or kV according to patient size. PROCEDURE: Informed consent was obtained. Procedures performed with local anesthesia only with appropriate continuous patient monitoring by the radiology nurse. Sedation duration: N/A minutes Localizing CT images were obtained with the patient in supine position. The skin was prepped with Betadine and draped in a sterile fashion. Following local anesthesia with 1% Lidocaine, a 17 G guiding needle was placed and extraperitoneal needle path in the right side of the pelvis via the iliacus muscle confirmed with CT. An Amplatz guidewire was placed and following serial tract dilatation, a 8 Croatian pigtail locking catheter was placed. A specimen was collected and submitted for culture and sensitivity and Gram stain. Total of 5 mL old bloody reddish-brown fluid was drained. No purulent fluid was obtained. The fluid was not malodorous. The catheter was secured to the skin and connected to suction bulb drainage. Final CT images were obtained documenting catheter position. The patient tolerated the procedure well with no evidence of complication. Low dose optimization technique was utilized for this CT exam including automated exposure control and adjustment of the mA and/or kV according to patient size. COMPARISON: MRI lumbar spine 9/19/2017, CT abdomen and pelvis 9/19/2017. FINDINGS: The final CT images show satisfactory catheter position within the target collection.     1.  CT GUIDED RETROPERITONEAL (EXTRAPERITONEAL) RIGHT PELVIC CATHETER DRAINAGE WITHIN THE RIGHT ILIACUS MUSCLE. OLD DARK REDDISH-BROWN BLOODY FLUID WAS OBTAINED. NO ABSCESS OR PURULENT MATERIAL. 2.  THE CURRENT PLAN IS TO CHECK CULTURES AND LIKELY REMOVED CATHETER IN THE SHORT-TERM AT 48-72 HOURS AS THERE IS UNLIKELY TO BE AN ABSCESS.    Ct-head W/o    Result Date: 9/13/2017 9/13/2017 9:30 PM HISTORY/REASON FOR EXAM:   Altered Mental Status. TECHNIQUE/EXAM DESCRIPTION AND NUMBER OF VIEWS: CT of the head without contrast. The study was performed on a helical multidetector CT scanner. Contiguous 2.5 mm axial sections were obtained from the skull base through the vertex. Up to date radiation dose reduction adjustments have been utilized to meet ALARA standards for radiation dose reduction. COMPARISON:  7/12/2017 FINDINGS: The lateral ventricles are enlarged. Cortical sulci are enlarged. Patchy areas of low attenuation in the white matter bilaterally. No significant mass effect or midline shift. Basal cisterns are patent. No evidence for intracranial hemorrhage. Calvaria are intact. Visualized orbits are unremarkable. Visualized mastoid air cells are clear. Visualized paranasal sinuses are unremarkable. Calcifications of the carotid arteries noted. Calcified scalp nodules again present.     1.  Diffuse atrophy and white matter changes. 2.  No acute intracranial hemorrhage or territorial infarct.     Dx-chest-portable (1 View)    Result Date: 9/16/2017 9/16/2017 10:31 PM HISTORY/REASON FOR EXAM:  Shortness of Breath. TECHNIQUE/EXAM DESCRIPTION AND NUMBER OF VIEWS: Single portable view of the chest. COMPARISON: 9/13/2017 FINDINGS: Cardiac mediastinal contour is unchanged. Mediastinum is again prominent. Mild prominence of the pulmonary interstitium. No gross pleural fluid or pneumothorax. Dextroconvex curvature of thoracic spine.     No significant change from prior exam.    Dx-chest-portable (1 View)    Result Date: 9/13/2017 9/13/2017 8:44 PM HISTORY/REASON FOR EXAM:  Possible sepsis. TECHNIQUE/EXAM DESCRIPTION AND NUMBER OF VIEWS: Single portable view of the chest. COMPARISON: 8/7/2017 FINDINGS: Cardiac mediastinal contour is unchanged. Mild diffuse prominence of the interstitium again seen. No focal consolidation. No pleural fluid collection or pneumothorax. Dextroconvex curvature of thoracic spine. Diffuse osteopenia.     1.   Diffuse prominence of interstitium again seen, likely interstitial lung disease.  Mild pulmonary edema is also a consideration. 2.  No pneumonia or pneumothorax. 3.  Stable cardiomegaly.    Dx-small Bowel Series    Result Date: 9/17/2017 9/16/2017 10:39 AM HISTORY/REASON FOR EXAM:  Nausea and vomiting. TECHNIQUE/EXAM DESCRIPTION AND NUMBER OF VIEWS: Single contrast barium small bowel follow-through performed. Fluoroscopic images were obtained. No fluoroscopic images obtained. COMPARISON:  None available. FINDINGS:  view the abdomen demonstrates marked gaseous distention and small bowel and colonic distention. Patient drank thin barium. Contrast passed very slowly into dilated small bowel loops. Jejunal loops are dilated up to 5.2 cm. Overhead images were taken for a total of 22 hours before termination of the exam. Contrast reached the ileum, but does not extend into the colon. There is a large amount of stool in the colon.     1.  Gaseous distention with fairly diffuse bowel dilatation. Contrast extends into the ileum, but does not reach the colon within 22 hours. No transition point is identified to suggest mechanical bowel obstruction. 2.  Large amount of stool in the rectal vault.    Mr-lumbar Spine-with & W/o    Result Date: 9/19/2017 9/19/2017 1:35 PM HISTORY/REASON FOR EXAM:  Altered mental status, weakness. Possible discitis. Previous lumbar surgery. TECHNIQUE/EXAM DESCRIPTION: MRI of the lumbar spine without and with contrast. The study was performed on a [employer] 1.5 [employer] MRI scanner. T1 sagittal, T2 fast spin-echo sagittal, T2 axial images and T1 axial images were obtained of the lumbar spine. T1 post-contrast sagittal and T1 post-contrast axial images were obtained. Optional T2 fat-suppressed sagittal images may be obtained. 20 mL Omniscan gadolinium contrast was administered intravenously. COMPARISON:  MRI lumbar spine 7/7/2017, T abdomen and pelvis 1/6/2017 FINDINGS: Alignment in the lumbar  spine again shows retrolisthesis of L3 on L4 of about 5 mm. There is fluid signal T2 hyperintensity again seen in the L3-L4 disc space and prominent marrow edema signal at the L3 inferior endplate and to a lesser extent L4 superior endplate. There is corresponding enhancement, particularly at the inferior endplate of L3. Findings are highly suspicious for discitis with osteomyelitis. There is postoperative change with lumbar laminectomy at L2-L3 through L5. There is posterior fusion with transpedicular screw fixation 4-L5. The posterior paraspinous soft tissues show expected postoperative changes with atrophy and fatty replacement in  the posterior paraspinous muscles at the operative levels. There is no significant postoperative dorsal epidural fluid collection. However, there has been interval development of multilocular fluid collections in the right iliacus muscle spanning about 42 mm. These are consistent with intramuscular abscesses (T2 axial image 4, series 5, T1 postcontrast axial image 1, series 10). The conus is normal in position and signal with its tip at the L1 level. At T12-L1, is a tiny central and left paramedian disc protrusion with an underlying annular fissure. There is mild hypertrophic facet arthropathy. Thecal sac is toward the lower range of normal without karla central stenosis. The neural foramina are intact. At L1-2, there is negligible disc bulging. There is no central stenosis or significant foraminal stenosis. At L2-3, there is a small broad-based disc-osteophyte complex. There is no central stenosis as there is decompressive laminectomy at this level. There is no significant foraminal stenosis. At L3-4, there is posterior marginal spurring and disc bulging accentuated by the retrolisthesis. There is moderate bilateral lateral recess stenosis. Thecal sac is toward the lower range of normal even though there is a decompressive laminectomy at this  level. However, there is no karla central  stenosis (T2 axial image 24, series 5). There is severe bilateral foraminal stenosis, right greater than left. This is unchanged from the previous exam. At L4-5, no central stenosis. There is inferior foraminal blunting with moderate bilateral foraminal stenosis. At L5-S1, there is minimal disc bulging. There is a left paramedian annular fissure. There is mild hypertrophic facet arthropathy. No central stenosis. There is moderate bilateral foraminal stenosis.     1.  L3-4 findings again suggesting discitis with osteomyelitis. No evidence of epidural abscess or epidural phlegmon. 2.  L3-4 retrolisthesis. 3.  Postoperative multilevel lumbar laminectomy and posterior fusion at L4-5. 4.  Interval development of multilocular intramuscular abscesses in the right iliacus muscle, partly in the field of view 5.  Degenerative and spondylotic changes as detailed for each level above in the body of report.    Dx-esophagus - Barium Swallow    Result Date: 9/16/2017 9/16/2017 10:39 AM HISTORY/REASON FOR EXAM:  Nausea. Nausea and vomiting. TECHNIQUE/EXAM DESCRIPTION AND NUMBER OF VIEWS: Single contrast esophagram procedure was performed. 5 fluoroscopic images obtained. Fluoroscopy time: 0.37 minutes COMPARISON:  None. FINDINGS: Limited exam secondary to limited patient mobility. The esophagus appears normal in caliber and configuration. No definite mucosal lesions are identified on single-contrast techniques. Contrast passes freely into the stomach. No hiatal hernia is identified.     No abnormalities identified on limited single contrast esophagram.    Echocardiogram Comp W/o Cont    Result Date: 9/14/2017  Transthoracic Echo Report Echocardiography Laboratory CONCLUSIONS Prior study done on [id number] left ventricular systolic function. Normal regional wall motion. Left ventricular ejection fraction is visually estimated to be 70%. Diastolic function is difficult to assess with atrial fibrillation. The right ventricle was  normal in size and function. Structurally normal mitral valve without significant stenosis or regurgitation. Aortic sclerosis without stenosis. No aortic insufficiency. Normal pericardium without effusion.FINDINGS Left Ventricle Normal left ventricular chamber size. Normal left ventricular wall thickness. Normal left ventricular systolic function. Normal regional wall motion. Left ventricular ejection fraction is visually estimated to be 70%. Diastolic function is difficult to assess with atrial fibrillation. Right Ventricle The right ventricle was normal in size and function. Right Atrium The right atrium is normal in size.  Inferior vena cava is not well visualized. Left Atrium The left atrium is normal in size.  Left atrial volume index is 19  mL/sq m. Mitral Valve Structurally normal mitral valve without significant stenosis or regurgitation.  Trace mitral regurgitation. Aortic Valve Aortic sclerosis without stenosis. Tricuspid aortic valve. No aortic insufficiency. Tricuspid Valve Structurally normal tricuspid valve without significant stenosis or regurgitation. Pulmonic Valve Structurally normal pulmonic valve without significant stenosis or regurgitation. Pericardium Normal pericardium without effusion. Aorta The aortic root is normal.  Ascending aorta diameter is 3.1 cm. Kingsley Hung M.D. (Electronically Signed) Final Date:     14 September 2017                 11:10      Micro:  Results     ** No results found for the last 168 hours. 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

## 2017-10-02 NOTE — PROGRESS NOTES
[facility] Hospitalist Progress Note    Date of Service: 10/2/2017    Chief Complaint  86 y.o. male admitted 9/13/2017 with acute ground-level fall and back pain.    Interval Problem Update  Patient has acute on chronic osteomyelitis. He also developed discitis. He was at a skilled facility getting rehabilitation when his condition worsened. Patient was transferred to rehab for higher level of care he has been on antibiotics and fluid resuscitation and pain management since. Since the infection treatment at this point is expensive the skilled nursing facility is unable take him back. Long-term acute care consultation has been requested. We are at this point pending their decision. Continue at this point with antibiotic management and physical therapy and occupational therapy.    Consultants/Specialty  Infectious diseases Dr. Cici Vincent  of cardiology    Disposition  Pending LTAC referral        Review of Systems   Constitutional: Negative for chills, diaphoresis and weight loss.   HENT: Negative for sore throat.    Eyes: Negative.  Negative for double vision.   Respiratory: Negative.  Negative for sputum production, shortness of breath and stridor.    Cardiovascular: Negative for chest pain (chest wall on the left) and leg swelling.   Gastrointestinal: Positive for heartburn. Negative for abdominal pain, diarrhea, nausea and vomiting.   Genitourinary: Negative.  Negative for dysuria, frequency and urgency.   Musculoskeletal: Positive for back pain and joint pain (right shoulder pain). Negative for myalgias and neck pain.        Unable to walk   Skin: Negative.  Negative for itching and rash.   Neurological: Negative for dizziness, tingling, tremors, speech change, focal weakness, weakness and headaches.   Endo/Heme/Allergies: Negative.  Does not bruise/bleed easily.   Psychiatric/Behavioral: Negative.  Negative for depression, substance abuse and suicidal ideas.      Physical Exam  Laboratory/Imaging    Hemodynamics  Temp (24hrs), Av.8 °C (98.3 °F), Min:36.1 °C (96.9 °F), Max:37.3 °C (99.2 °F)   Temperature: 37.3 °C (99.2 °F)  Pulse  Av.7  Min: 45  Max: 135    Blood Pressure : 117/70      Respiratory      Respiration: 16, Pulse Oximetry: 99 %        RUL Breath Sounds: Clear, RML Breath Sounds: Diminished, RLL Breath Sounds: Diminished, MIKE Breath Sounds: Clear, LLL Breath Sounds: Diminished    Fluids    Intake/Output Summary (Last 24 hours) at 10/02/17 1104  Last data filed at 10/02/17 0600   Gross per 24 hour   Intake              100 ml   Output             1050 ml   Net             -950 ml       Nutrition  Orders Placed This Encounter   Procedures   • DIET ORDER     Standing Status:   Standing     Number of Occurrences:   1     Order Specific Question:   Diet:     Answer:   Regular [1]     Comments:   Ensure on Each Tray     Physical Exam   Constitutional: He is oriented to person, place, and time. He appears well-developed and well-nourished. He is cooperative. He does not appear ill. No distress. Nasal cannula in place.   HENT:   Head: Normocephalic and atraumatic.   Right Ear: External ear normal.   Left Ear: External ear normal.   Nose: Nose normal.   Mouth/Throat: Oropharynx is clear and moist.   Eyes: Conjunctivae and EOM are normal. Pupils are equal, round, and reactive to light. Right eye exhibits no discharge. Left eye exhibits no discharge.   Neck: Normal range of motion. Neck supple. No JVD present. No thyromegaly present.   Cardiovascular: Normal rate and regular rhythm.    No murmur heard.  Pulmonary/Chest: Effort normal and breath sounds normal. No stridor. No respiratory distress. He has no wheezes.   Abdominal: Soft. Bowel sounds are normal. He exhibits no distension. There is no tenderness.   Genitourinary:   Genitourinary Comments: Hearn catheter   Musculoskeletal: Normal range of motion. He exhibits no edema or tenderness.   Neurological: He is alert and oriented to person, place,  and time. He has normal reflexes. No cranial nerve deficit. Coordination normal.   Skin: Skin is warm and dry. Bruising (right temple area) noted. No rash noted. He is not diaphoretic. No erythema. No pallor.   Psychiatric: He has a normal mood and affect. His behavior is normal. Judgment and thought content normal.   Nursing note and vitals reviewed.                               Assessment/Plan     Chronic osteomyelitis of lumbar spine (CMS-[facility])- (present on admission)   Assessment & Plan    Patient remains on Ceftaroline until end date of 10/18.  Long-term acute care consult has been placed we are pending their decision.          Sepsis(995.91)- (present on admission)   Assessment & Plan    Signs of sepsis at this point have abated. We are monitoring his vitals which at this point have stabilized. Lab values have also stabilized. He will need continued IV antibiotics for an additional 2+ weeks.        Nontraumatic psoas hematoma- (present on admission)   Assessment & Plan    Patient remains off of anticoagulation continue to monitor psoas hematoma as well as monitor H&H for any drops.        Persistent atrial fibrillation (CMS-[facility])- (present on admission)   Assessment & Plan    Currently rate is well controlled.  He is not anticoagulated with his history of falls and possible bleeding.  Continue this point with rate control only.        Debility- (present on admission)   Assessment & Plan    Continue at this point with physical therapy and occupational therapy in-house.  Once against a long-term acute care the patient will need also continued physical therapy and occupational therapy.        DNR (do not resuscitate)   Assessment & Plan    Confirmed with patient and he continues to be an DNR code status.        Normocytic anemia- (present on admission)   Assessment & Plan    Follow hemoglobin and hematocrit levels.  [facility] and 21 transfusion will be considered.  Patient will need at this point continued  monitoring of his iron levels B12 and folic acid levels long-term.            Reviewed items::  EKG reviewed, Labs reviewed, Medications reviewed and Radiology images reviewed  Hearn catheter::  No Hearn  DVT prophylaxis - mechanical:  SCDs  Ulcer Prophylaxis::  Yes  Antibiotics:  Treating active infection/contamination beyond 24 hours perioperative coverage

## 2017-10-02 NOTE — CARE PLAN
Problem: Pain Management  Goal: Pain level will decrease to patient's comfort goal  Patient given tylenol for right shoulder discomfort.  Patient able to sleep comfortably.

## 2017-10-02 NOTE — CARE PLAN
Problem: Safety  Goal: Will remain free from falls  Patient compliant with call bell.  Patient waits for staff assistance.  Patient near nursing station and monitored frequently.

## 2017-10-02 NOTE — THERAPY
"Occupational Therapy Treatment completed with focus on ADLs, ADL transfers and patient education.  Functional Status:  Pt seen for OT tx. Min A supine to sit, once seated EOB pt is able to hold self w/ intermittent assistance from therapist d/t pain for seated ADL tasks. Pt demonstrated good UE strength and ROM to assist w/ ADLs. Mod A x2 sit to stand to change linens. Pt c/o being dizzy during session and after 2nd stand requested to return to supine. O2 stats were low 80's once returned back to supine pt 02 went up to 94%. Discussed w/ RN. Pt pleasant and cooperative during session. Pt expresses motivation to regain strength and endurance for ADLs and ADL transfers.   Plan of Care: Will benefit from Occupational Therapy 3 times per week  Discharge Recommendations:  Equipment Will Continue to Assess for Equipment Needs. Post-acute therapy Discharge to a transitional care facility for continued skilled therapy services.    See \"Rehab Therapy-Acute\" Patient Summary Report for complete documentation.   "

## 2017-10-03 LAB
ANION GAP SERPL CALC-SCNC: 7 MMOL/L (ref 0–11.9)
BASOPHILS # BLD AUTO: 0.4 % (ref 0–1.8)
BASOPHILS # BLD: 0.04 K/UL (ref 0–0.12)
BUN SERPL-MCNC: 10 MG/DL (ref 8–22)
CALCIUM SERPL-MCNC: 9.2 MG/DL (ref 8.5–10.5)
CHLORIDE SERPL-SCNC: 103 MMOL/L (ref 96–112)
CO2 SERPL-SCNC: 24 MMOL/L (ref 20–33)
CREAT SERPL-MCNC: 0.59 MG/DL (ref 0.5–1.4)
EOSINOPHIL # BLD AUTO: 0.28 K/UL (ref 0–0.51)
EOSINOPHIL NFR BLD: 3.1 % (ref 0–6.9)
ERYTHROCYTE [DISTWIDTH] IN BLOOD BY AUTOMATED COUNT: 55 FL (ref 35.9–50)
GFR SERPL CREATININE-BSD FRML MDRD: >60 ML/MIN/1.73 M 2
GLUCOSE SERPL-MCNC: 91 MG/DL (ref 65–99)
HCT VFR BLD AUTO: 27 % (ref 42–52)
HGB BLD-MCNC: 8.8 G/DL (ref 14–18)
IMM GRANULOCYTES # BLD AUTO: 0.08 K/UL (ref 0–0.11)
IMM GRANULOCYTES NFR BLD AUTO: 0.9 % (ref 0–0.9)
LYMPHOCYTES # BLD AUTO: 2.29 K/UL (ref 1–4.8)
LYMPHOCYTES NFR BLD: 25.1 % (ref 22–41)
MCH RBC QN AUTO: 30.7 PG (ref 27–33)
MCHC RBC AUTO-ENTMCNC: 32.6 G/DL (ref 33.7–35.3)
MCV RBC AUTO: 94.1 FL (ref 81.4–97.8)
MONOCYTES # BLD AUTO: 0.83 K/UL (ref 0–0.85)
MONOCYTES NFR BLD AUTO: 9.1 % (ref 0–13.4)
NEUTROPHILS # BLD AUTO: 5.62 K/UL (ref 1.82–7.42)
NEUTROPHILS NFR BLD: 61.4 % (ref 44–72)
NRBC # BLD AUTO: 0 K/UL
NRBC BLD AUTO-RTO: 0 /100 WBC
PLATELET # BLD AUTO: 395 K/UL (ref 164–446)
PMV BLD AUTO: 9.1 FL (ref 9–12.9)
POTASSIUM SERPL-SCNC: 4.3 MMOL/L (ref 3.6–5.5)
RBC # BLD AUTO: 2.87 M/UL (ref 4.7–6.1)
SODIUM SERPL-SCNC: 134 MMOL/L (ref 135–145)
WBC # BLD AUTO: 9.1 K/UL (ref 4.8–10.8)

## 2017-10-03 PROCEDURE — 700105 HCHG RX REV CODE 258: Performed by: HOSPITALIST

## 2017-10-03 PROCEDURE — A9270 NON-COVERED ITEM OR SERVICE: HCPCS | Performed by: NURSE PRACTITIONER

## 2017-10-03 PROCEDURE — 700102 HCHG RX REV CODE 250 W/ 637 OVERRIDE(OP): Performed by: HOSPITALIST

## 2017-10-03 PROCEDURE — 92526 ORAL FUNCTION THERAPY: CPT

## 2017-10-03 PROCEDURE — 700102 HCHG RX REV CODE 250 W/ 637 OVERRIDE(OP): Performed by: INTERNAL MEDICINE

## 2017-10-03 PROCEDURE — 99232 SBSQ HOSP IP/OBS MODERATE 35: CPT | Performed by: INTERNAL MEDICINE

## 2017-10-03 PROCEDURE — A9270 NON-COVERED ITEM OR SERVICE: HCPCS | Performed by: INTERNAL MEDICINE

## 2017-10-03 PROCEDURE — A9270 NON-COVERED ITEM OR SERVICE: HCPCS | Performed by: HOSPITALIST

## 2017-10-03 PROCEDURE — 770006 HCHG ROOM/CARE - MED/SURG/GYN SEMI*

## 2017-10-03 PROCEDURE — 700102 HCHG RX REV CODE 250 W/ 637 OVERRIDE(OP): Performed by: NURSE PRACTITIONER

## 2017-10-03 PROCEDURE — 80048 BASIC METABOLIC PNL TOTAL CA: CPT

## 2017-10-03 PROCEDURE — 700111 HCHG RX REV CODE 636 W/ 250 OVERRIDE (IP): Performed by: INTERNAL MEDICINE

## 2017-10-03 PROCEDURE — 700105 HCHG RX REV CODE 258: Performed by: INTERNAL MEDICINE

## 2017-10-03 PROCEDURE — 85025 COMPLETE CBC W/AUTO DIFF WBC: CPT

## 2017-10-03 PROCEDURE — 97530 THERAPEUTIC ACTIVITIES: CPT

## 2017-10-03 RX ADMIN — GABAPENTIN 200 MG: 100 CAPSULE ORAL at 16:56

## 2017-10-03 RX ADMIN — GABAPENTIN 200 MG: 100 CAPSULE ORAL at 21:20

## 2017-10-03 RX ADMIN — SODIUM CHLORIDE: 9 INJECTION, SOLUTION INTRAVENOUS at 12:33

## 2017-10-03 RX ADMIN — ANTACID TABLETS 1000 MG: 500 TABLET, CHEWABLE ORAL at 08:18

## 2017-10-03 RX ADMIN — CEFTAROLINE FOSAMIL 600 MG: 600 POWDER, FOR SOLUTION INTRAVENOUS at 21:20

## 2017-10-03 RX ADMIN — ROSUVASTATIN CALCIUM 125 MCG: 10 TABLET, FILM COATED ORAL at 16:56

## 2017-10-03 RX ADMIN — CEFTAROLINE FOSAMIL 600 MG: 600 POWDER, FOR SOLUTION INTRAVENOUS at 08:18

## 2017-10-03 RX ADMIN — ATORVASTATIN CALCIUM 10 MG: 10 TABLET, FILM COATED ORAL at 21:20

## 2017-10-03 RX ADMIN — LISINOPRIL 10 MG: 5 TABLET ORAL at 08:18

## 2017-10-03 RX ADMIN — TAMSULOSIN HYDROCHLORIDE 0.4 MG: 0.4 CAPSULE ORAL at 08:19

## 2017-10-03 RX ADMIN — LEVETIRACETAM 500 MG: 500 TABLET, FILM COATED ORAL at 21:20

## 2017-10-03 RX ADMIN — LEVETIRACETAM 500 MG: 500 TABLET, FILM COATED ORAL at 08:19

## 2017-10-03 RX ADMIN — OMEPRAZOLE 20 MG: 20 CAPSULE, DELAYED RELEASE ORAL at 08:18

## 2017-10-03 RX ADMIN — SUCRALFATE 1 G: 1 SUSPENSION ORAL at 21:20

## 2017-10-03 RX ADMIN — GABAPENTIN 200 MG: 100 CAPSULE ORAL at 08:18

## 2017-10-03 RX ADMIN — METOPROLOL SUCCINATE 50 MG: 25 TABLET, EXTENDED RELEASE ORAL at 08:19

## 2017-10-03 RX ADMIN — ANTACID TABLETS 1000 MG: 500 TABLET, CHEWABLE ORAL at 16:56

## 2017-10-03 RX ADMIN — LIDOCAINE HYDROCHLORIDE 30 ML: 20 SOLUTION OROPHARYNGEAL at 05:11

## 2017-10-03 RX ADMIN — ANTACID TABLETS 1000 MG: 500 TABLET, CHEWABLE ORAL at 12:02

## 2017-10-03 ASSESSMENT — ENCOUNTER SYMPTOMS
FEVER: 0
ABDOMINAL PAIN: 0
MYALGIAS: 0
SHORTNESS OF BREATH: 0
SPEECH CHANGE: 0
HEADACHES: 0
DIZZINESS: 1
NAUSEA: 0
BACK PAIN: 1
BLURRED VISION: 0
EYE PAIN: 0
CHILLS: 0
COUGH: 0
VOMITING: 0
WEIGHT LOSS: 0
DOUBLE VISION: 0
NECK PAIN: 0
WEAKNESS: 0
DEPRESSION: 0

## 2017-10-03 ASSESSMENT — COGNITIVE AND FUNCTIONAL STATUS - GENERAL
MOVING TO AND FROM BED TO CHAIR: A LOT
SUGGESTED CMS G CODE MODIFIER MOBILITY: CM
TURNING FROM BACK TO SIDE WHILE IN FLAT BAD: A LOT
WALKING IN HOSPITAL ROOM: TOTAL
MOVING FROM LYING ON BACK TO SITTING ON SIDE OF FLAT BED: UNABLE
STANDING UP FROM CHAIR USING ARMS: TOTAL
MOBILITY SCORE: 8
CLIMB 3 TO 5 STEPS WITH RAILING: TOTAL

## 2017-10-03 ASSESSMENT — PAIN SCALES - GENERAL
PAINLEVEL_OUTOF10: 0

## 2017-10-03 ASSESSMENT — GAIT ASSESSMENTS: GAIT LEVEL OF ASSIST: UNABLE TO PARTICIPATE

## 2017-10-03 NOTE — THERAPY
"Speech Language Therapy dysphagia treatment completed.   Functional Status:  Pt started on regular diet ordered 10/1. Per CNA and nurs, pt tolerating regular texture with % intake at lunch and breakfast. Pt agreeable to having HOB elev to approx 70 degrees but not to moving up in bed. 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 Pt then with facial grimacing and stating he has pain that occurs every 15 minutes with the pain felt from his head to shoulder area that is brief. Pt then resting comfortably without complaints of pain. Pt experienced this x2 while the SLP was present. Pt stating that he has experienced this type of pain since he started the liquid diet and added that he had a barium swallow on admit that was normal. 9/16 barium swallow indicating no esophageal abnormality. SLP collaborated with the nurs regarding these observations and is aware of pt's complaints of intermittent indigestion. Pt is on medications for indigestion. Plan-see pt at a meal. Pt declining solids this visit related to indigestion.  Recommendations: regular and thins as tolerated-see pt at meal.  Plan of Care: Will benefit from Speech Therapy 3 times per week  Post-Acute Therapy: anticipate pt will not require further SLP tx once dcd. ThanksParmjit     See \"Rehab Therapy-Acute\" Patient Summary Report for complete documentation.     "

## 2017-10-03 NOTE — PROGRESS NOTES
Infectious Disease Progress Note    Author: Ny Chilel M.D. Date & Time of service: 10/3/2017  10:22 AM    Chief Complaint:  Altered mental status    Interval History:  86-year-old male admitted for altered mental status. He was recently treated for L3-4 discitis. His repeat MRI showed right iliacus muscle abscess  17-MAXIMUM TEMPERATURE 98. On 2 liters of oxygen. WBC 14 and creatinine 0.38  - MAXIMUM TEMPERATURE 97.8. Complains of generalized malaise. Breathing is improved. Right leg pain is improved  17-MAXIMUM TEMPERATURE 99.5. Still has back pain when he sits up. The WBC 12  17-MAXIMUM TEMPERATURE 97.6. Feels better. No new issues overnight. WBC11.7   AF resting comfortably   AF, WBC 11.3 more awake eating OK.  Back pain about the same   AF WBC 11 had PICC placed-denies SE abx. No new complaints   AF tolerating abx well feels about the same   AF, no CBC, pt has no new complaints this morning, asking when he will be discharged and how long he will be on abx,   AF no CBC, eating lots of chocolate pudding/ice cream, no new issues per pt  10/2 AF, feels well and denies any pain, feels he is getting stronger and appetite improved  10/3 AF, WBC 9.1, felt dizzy while watching TV yesterday with head pressure but no HA, no nausea or vomiting, no recurrent symptoms today but states he was dizzy at home prior to admit, back pain only with getting out of bed  Labs Reviewed, Medications Reviewed and Radiology Reviewed.    Review of Systems:  Review of Systems   Constitutional: Negative for chills and fever.   Respiratory: Negative for cough and shortness of breath.    Cardiovascular: Positive for leg swelling. Negative for chest pain.   Gastrointestinal: Negative for abdominal pain, nausea and vomiting.   Musculoskeletal: Positive for back pain.   Neurological: Positive for dizziness. Negative for speech change and headaches.       Hemodynamics:  Temp (24hrs), Av.4 °C  (97.6 °F), Min:36 °C (96.8 °F), Max:36.7 °C (98.1 °F)  Temperature: 36 °C (96.8 °F)  Pulse  Av.3  Min: 45  Max: 135   Blood Pressure : 112/75       Physical Exam:  Physical Exam   Constitutional: He appears well-developed. He is easily aroused. No distress.   Obese   HENT:   Head: Normocephalic and atraumatic.   Poor dentition   Eyes: No scleral icterus.   Neck: Neck supple.   Cardiovascular: Normal rate.  An irregularly irregular rhythm present.   Pulmonary/Chest: Effort normal. No respiratory distress. He has no wheezes. He has rales.   Abdominal: Soft. He exhibits no distension. There is no tenderness.   Musculoskeletal: He exhibits edema.   LUE PICC   Neurological: He is alert and easily aroused.   Nursing note and vitals reviewed.      Meds:    Current Facility-Administered Medications:   •  sucralfate  •  omeprazole  •  calcium carbonate  •  levetiracetam  •  PICC Line Insertion has been implemented **AND** May use Lidocaine 1% not to exceed 3 mls for local at insertion site **AND** NOTIFY MD **AND** Tip to dwell in the superior vena cava **AND** Do not use PICC Line until placement verified by Chest X Ray **AND** DX-CHEST-FOR PICC LINE Perform procedure in: PICC Room **AND** If radiologist reading of chest X-ray states any of the following the PICC should be used **AND** Further evaluation of the PICC placement can be retrieved from X-Ray and Imaging **AND** Blood draws through PICC line; draws by RN only **AND** FLUSHING GUIDELINES WHEN IN USE **AND** normal saline PF **AND** FLUSHING GUIDELINES WHEN NOT IN USE **AND** DRESSING MAINTENANCE **AND** Change needleless pressure ports and IV tubing every 72 hours per hospital policy **AND** TUBING **AND** If there is an MD order to remove the PICC line, any RN may remove the PICC line **AND** [] PATIENT EDUCATION MATERIALS **AND** NURSING COMMUNICATION  •  ceftaroline (TEFLARO) ivpb  •  Metoprolol Tartrate  •  lorazepam  •  acetaminophen  •   digoxin  •  prochlorperazine  •  promethazine  •  hydrALAZINE  •  lisinopril  •  hyoscyamine-maalox plus-lidocaine viscous  •  NS  •  labetalol  •  ondansetron  •  metoprolol SR  •  atorvastatin  •  gabapentin  •  tamsulosin  •  senna-docusate **AND** polyethylene glycol/lytes **AND** magnesium hydroxide **AND** bisacodyl  •  Respiratory Care per Protocol    Labs:  Recent Labs      10/03/17   0245   WBC  9.1   RBC  2.87*   HEMOGLOBIN  8.8*   HEMATOCRIT  27.0*   MCV  94.1   MCH  30.7   RDW  55.0*   PLATELETCT  395   MPV  9.1   NEUTSPOLYS  61.40   LYMPHOCYTES  25.10   MONOCYTES  9.10   EOSINOPHILS  3.10   BASOPHILS  0.40     Recent Labs      10/03/17   0245   SODIUM  134*   POTASSIUM  4.3   CHLORIDE  103   CO2  24   GLUCOSE  91   BUN  10     Recent Labs      10/03/17   0245   CREATININE  0.59       Imaging:  Ct-abdomen-pelvis With    Result Date: 9/19/2017 9/19/2017 6:34 PM HISTORY/REASON FOR EXAM:  Abscess noted on MRI. TECHNIQUE/EXAM DESCRIPTION:  CT scan of the abdomen and pelvis with contrast. Contrast-enhanced helical scanning was obtained from the diaphragmatic domes through the pubic symphysis following the bolus administration of nonionic contrast without complication. 100 mL of Omnipaque 350 nonionic contrast was administered without complication. Low dose optimization technique was utilized for this CT exam including automated exposure control and adjustment of the mA and/or kV according to patient size. COMPARISON: MRI from the same day FINDINGS: Lung bases: There are small bilateral pleural effusions with overlying atelectasis. Abdomen: No free air is seen in the abdomen or pelvis. Evaluation is limited by streak artifact from barium within the colon. There is wall thickening of the distal esophagus with a small hiatal hernia. Liver appears unremarkable. Portal vein is patent. There is calcification in the pancreatic head. No adrenal mass is identified. Tiny hypodense left renal lesion is too small to  characterize. There is mild prominence of the renal collecting systems bilaterally. Small hypodense right renal lesions too small to characterize. There are nonobstructing right renal calculi. Atherosclerotic plaque is seen in the aorta and its branches. There are small inguinal, retroperitoneal and mesenteric lymph nodes. There is no evidence of bowel obstruction. There is a moderate amount of stool in the rectosigmoid colon. There is colonic diverticulosis. The appendix is not identified. Stomach is distended with fluid. Small bowel loops are fluid-filled. Bladder is mildly distended with foci of air. There is asymmetric prominence of the right iliopsoas musculature with surrounding stranding. Findings likely related to the previously noted abscess collection. Degenerative changes are seen in the spine. Postsurgical changes are noted in the lumbar spine. Degenerative changes are seen at the hips. There is mild loss of height of the superior endplate of T11 with a Schmorl's node noted. There is mild endplate irregularity at L3/L4.     Prominence of the right iliopsoas muscle with surrounding inflammatory stranding. The known fluid collection was better delineated on the prior MRI. Mild endplate irregularity at L3/L4 can be seen in discitis/osteomyelitis. Air-fluid level in the bladder. Correlation with urinalysis is recommended. This can be seen in the setting of recent instrumentation, infection or fistula. Colonic diverticulosis. Moderate amount of colonic stool. Nonobstructing right renal calculi. Mild prominence of the renal collecting systems bilaterally. Small hypodense renal lesions are too small to characterize. Fluid-filled loops of small bowel can be seen in the setting of enteritis. Small hiatal hernia with mild thickening of the distal esophagus. Calcifications in the pancreatic head can be seen in chronic pancreatitis. Atherosclerotic plaque. Small bilateral pleural effusions with overlying  atelectasis.     Ct-drain-retroperitoneal    Result Date: 9/20/2017 9/20/2017 2:30 PM HISTORY/REASON FOR EXAM:  Multiloculated fluid collections in the right iliacus muscle. Suspected abscess. TECHNIQUE/EXAM DESCRIPTION: Right pelvic (extraperitoneal) retroperitoneal abscess drainage with CT guidance. Low dose optimization technique was utilized for this CT exam including automated exposure control and adjustment of the mA and/or kV according to patient size. PROCEDURE: Informed consent was obtained. Procedures performed with local anesthesia only with appropriate continuous patient monitoring by the radiology nurse. Sedation duration: N/A minutes Localizing CT images were obtained with the patient in supine position. The skin was prepped with Betadine and draped in a sterile fashion. Following local anesthesia with 1% Lidocaine, a 17 G guiding needle was placed and extraperitoneal needle path in the right side of the pelvis via the iliacus muscle confirmed with CT. An Amplatz guidewire was placed and following serial tract dilatation, a 8 [place]ien pigtail locking catheter was placed. A specimen was collected and submitted for culture and sensitivity and Gram stain. Total of 5 mL old bloody reddish-brown fluid was drained. No purulent fluid was obtained. The fluid was not malodorous. The catheter was secured to the skin and connected to suction bulb drainage. Final CT images were obtained documenting catheter position. The patient tolerated the procedure well with no evidence of complication. Low dose optimization technique was utilized for this CT exam including automated exposure control and adjustment of the mA and/or kV according to patient size. COMPARISON: MRI lumbar spine 9/19/2017, CT abdomen and pelvis 9/19/2017. FINDINGS: The final CT images show satisfactory catheter position within the target collection.     1.  CT GUIDED RETROPERITONEAL (EXTRAPERITONEAL) RIGHT PELVIC CATHETER DRAINAGE WITHIN THE RIGHT  ILIACUS MUSCLE. OLD DARK REDDISH-BROWN BLOODY FLUID WAS OBTAINED. NO ABSCESS OR PURULENT MATERIAL. 2.  THE CURRENT PLAN IS TO CHECK CULTURES AND LIKELY REMOVED CATHETER IN THE SHORT-TERM AT 48-72 HOURS AS THERE IS UNLIKELY TO BE AN ABSCESS.    Ct-head W/o    Result Date: 9/13/2017 9/13/2017 9:30 PM HISTORY/REASON FOR EXAM:  Altered Mental Status. TECHNIQUE/EXAM DESCRIPTION AND NUMBER OF VIEWS: CT of the head without contrast. The study was performed on a helical multidetector CT scanner. Contiguous 2.5 mm axial sections were obtained from the skull base through the vertex. Up to date radiation dose reduction adjustments have been utilized to meet ALARA standards for radiation dose reduction. COMPARISON:  7/12/2017 FINDINGS: The lateral ventricles are enlarged. Cortical sulci are enlarged. Patchy areas of low attenuation in the white matter bilaterally. No significant mass effect or midline shift. Basal cisterns are patent. No evidence for intracranial hemorrhage. Calvaria are intact. Visualized orbits are unremarkable. Visualized mastoid air cells are clear. Visualized paranasal sinuses are unremarkable. Calcifications of the carotid arteries noted. Calcified scalp nodules again present.     1.  Diffuse atrophy and white matter changes. 2.  No acute intracranial hemorrhage or territorial infarct.     Dx-chest-portable (1 View)    Result Date: 9/16/2017 9/16/2017 10:31 PM HISTORY/REASON FOR EXAM:  Shortness of Breath. TECHNIQUE/EXAM DESCRIPTION AND NUMBER OF VIEWS: Single portable view of the chest. COMPARISON: 9/13/2017 FINDINGS: Cardiac mediastinal contour is unchanged. Mediastinum is again prominent. Mild prominence of the pulmonary interstitium. No gross pleural fluid or pneumothorax. Dextroconvex curvature of thoracic spine.     No significant change from prior exam.    Dx-chest-portable (1 View)    Result Date: 9/13/2017 9/13/2017 8:44 PM HISTORY/REASON FOR EXAM:  Possible sepsis. TECHNIQUE/EXAM DESCRIPTION  AND NUMBER OF VIEWS: Single portable view of the chest. COMPARISON: 8/7/2017 FINDINGS: Cardiac mediastinal contour is unchanged. Mild diffuse prominence of the interstitium again seen. No focal consolidation. No pleural fluid collection or pneumothorax. Dextroconvex curvature of thoracic spine. Diffuse osteopenia.     1.  Diffuse prominence of interstitium again seen, likely interstitial lung disease.  Mild pulmonary edema is also a consideration. 2.  No pneumonia or pneumothorax. 3.  Stable cardiomegaly.    Dx-small Bowel Series    Result Date: 9/17/2017 9/16/2017 10:39 AM HISTORY/REASON FOR EXAM:  Nausea and vomiting. TECHNIQUE/EXAM DESCRIPTION AND NUMBER OF VIEWS: Single contrast barium small bowel follow-through performed. Fluoroscopic images were obtained. No fluoroscopic images obtained. COMPARISON:  None available. FINDINGS:  view the abdomen demonstrates marked gaseous distention and small bowel and colonic distention. Patient drank thin barium. Contrast passed very slowly into dilated small bowel loops. Jejunal loops are dilated up to 5.2 cm. Overhead images were taken for a total of 22 hours before termination of the exam. Contrast reached the ileum, but does not extend into the colon. There is a large amount of stool in the colon.     1.  Gaseous distention with fairly diffuse bowel dilatation. Contrast extends into the ileum, but does not reach the colon within 22 hours. No transition point is identified to suggest mechanical bowel obstruction. 2.  Large amount of stool in the rectal vault.    Mr-lumbar Spine-with & W/o    Result Date: 9/19/2017 9/19/2017 1:35 PM HISTORY/REASON FOR EXAM:  Altered mental status, weakness. Possible discitis. Previous lumbar surgery. TECHNIQUE/EXAM DESCRIPTION: MRI of the lumbar spine without and with contrast. The study was performed on a [employer] 1.5 [employer] MRI scanner. T1 sagittal, T2 fast spin-echo sagittal, T2 axial images and T1 axial images were obtained of  the lumbar spine. T1 post-contrast sagittal and T1 post-contrast axial images were obtained. Optional T2 fat-suppressed sagittal images may be obtained. 20 mL Omniscan gadolinium contrast was administered intravenously. COMPARISON:  MRI lumbar spine 7/7/2017, T abdomen and pelvis 1/6/2017 FINDINGS: Alignment in the lumbar spine again shows retrolisthesis of L3 on L4 of about 5 mm. There is fluid signal T2 hyperintensity again seen in the L3-L4 disc space and prominent marrow edema signal at the L3 inferior endplate and to a lesser extent L4 superior endplate. There is corresponding enhancement, particularly at the inferior endplate of L3. Findings are highly suspicious for discitis with osteomyelitis. There is postoperative change with lumbar laminectomy at L2-L3 through L5. There is posterior fusion with transpedicular screw fixation 4-L5. The posterior paraspinous soft tissues show expected postoperative changes with atrophy and fatty replacement in  the posterior paraspinous muscles at the operative levels. There is no significant postoperative dorsal epidural fluid collection. However, there has been interval development of multilocular fluid collections in the right iliacus muscle spanning about 42 mm. These are consistent with intramuscular abscesses (T2 axial image 4, series 5, T1 postcontrast axial image 1, series 10). The conus is normal in position and signal with its tip at the L1 level. At T12-L1, is a tiny central and left paramedian disc protrusion with an underlying annular fissure. There is mild hypertrophic facet arthropathy. Thecal sac is toward the lower range of normal without karla central stenosis. The neural foramina are intact. At L1-2, there is negligible disc bulging. There is no central stenosis or significant foraminal stenosis. At L2-3, there is a small broad-based disc-osteophyte complex. There is no central stenosis as there is decompressive laminectomy at this level. There is no  significant foraminal stenosis. At L3-4, there is posterior marginal spurring and disc bulging accentuated by the retrolisthesis. There is moderate bilateral lateral recess stenosis. Thecal sac is toward the lower range of normal even though there is a decompressive laminectomy at this  level. However, there is no karla central stenosis (T2 axial image 24, series 5). There is severe bilateral foraminal stenosis, right greater than left. This is unchanged from the previous exam. At L4-5, no central stenosis. There is inferior foraminal blunting with moderate bilateral foraminal stenosis. At L5-S1, there is minimal disc bulging. There is a left paramedian annular fissure. There is mild hypertrophic facet arthropathy. No central stenosis. There is moderate bilateral foraminal stenosis.     1.  L3-4 findings again suggesting discitis with osteomyelitis. No evidence of epidural abscess or epidural phlegmon. 2.  L3-4 retrolisthesis. 3.  Postoperative multilevel lumbar laminectomy and posterior fusion at L4-5. 4.  Interval development of multilocular intramuscular abscesses in the right iliacus muscle, partly in the field of view 5.  Degenerative and spondylotic changes as detailed for each level above in the body of report.    Dx-esophagus - Barium Swallow    Result Date: 9/16/2017 9/16/2017 10:39 AM HISTORY/REASON FOR EXAM:  Nausea. Nausea and vomiting. TECHNIQUE/EXAM DESCRIPTION AND NUMBER OF VIEWS: Single contrast esophagram procedure was performed. 5 fluoroscopic images obtained. Fluoroscopy time: 0.37 minutes COMPARISON:  None. FINDINGS: Limited exam secondary to limited patient mobility. The esophagus appears normal in caliber and configuration. No definite mucosal lesions are identified on single-contrast techniques. Contrast passes freely into the stomach. No hiatal hernia is identified.     No abnormalities identified on limited single contrast esophagram.    Echocardiogram Comp W/o Cont    Result Date:  9/14/2017  Transthoracic Echo Report Echocardiography Laboratory CONCLUSIONS Prior study done on [id number] left ventricular systolic function. Normal regional wall motion. Left ventricular ejection fraction is visually estimated to be 70%. Diastolic function is difficult to assess with atrial fibrillation. The right ventricle was normal in size and function. Structurally normal mitral valve without significant stenosis or regurgitation. Aortic sclerosis without stenosis. No aortic insufficiency. Normal pericardium without effusion.FINDINGS Left Ventricle Normal left ventricular chamber size. Normal left ventricular wall thickness. Normal left ventricular systolic function. Normal regional wall motion. Left ventricular ejection fraction is visually estimated to be 70%. Diastolic function is difficult to assess with atrial fibrillation. Right Ventricle The right ventricle was normal in size and function. Right Atrium The right atrium is normal in size.  Inferior vena cava is not well visualized. Left Atrium The left atrium is normal in size.  Left atrial volume index is 19  mL/sq m. Mitral Valve Structurally normal mitral valve without significant stenosis or regurgitation.  Trace mitral regurgitation. Aortic Valve Aortic sclerosis without stenosis. Tricuspid aortic valve. No aortic insufficiency. Tricuspid Valve Structurally normal tricuspid valve without significant stenosis or regurgitation. Pulmonic Valve Structurally normal pulmonic valve without significant stenosis or regurgitation. Pericardium Normal pericardium without effusion. Aorta The aortic root is normal.  Ascending aorta diameter is 3.1 cm. Kingsley Hung M.D. (Electronically Signed) Final Date:     14 September 2017                 11:10      Micro:  Results     ** No results found for the last 168 hours. 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

## 2017-10-03 NOTE — PROGRESS NOTES
[facility]    Date of Service: 10/3/2017    Chief Complaint  86 y.o. male admitted 2017 with acute ground-level fall and back pain found to have  acute on chronic osteomyelitis and discitis, on long term abx     Interval Problem Update  Patient complains of dizziness with prolonged sitting but denies any blurred vision, double vision, numbness/tingling.     Consultants/Specialty  Infectious diseases Dr. Getachew Vincent  of cardiology - signed off    Disposition  Pt to go to [facility] to complete abx therapy.        Review of Systems   Constitutional: Negative for chills and weight loss.   Eyes: Negative for blurred vision, double vision and pain.   Respiratory: Negative for cough and shortness of breath.    Cardiovascular: Negative for chest pain (chest wall on the left).   Gastrointestinal: Negative for abdominal pain and nausea.   Musculoskeletal: Positive for back pain (with prolonged sitting). Negative for myalgias and neck pain.   Neurological: Positive for dizziness (with prolonged sitting). Negative for weakness and headaches.   Psychiatric/Behavioral: Negative for depression.      Physical Exam  Laboratory/Imaging   Hemodynamics  Temp (24hrs), Av.4 °C (97.6 °F), Min:36 °C (96.8 °F), Max:36.7 °C (98.1 °F)   Temperature: 36 °C (96.8 °F)  Pulse  Av.3  Min: 45  Max: 135    Blood Pressure : 112/75      Respiratory      Respiration: 16, Pulse Oximetry: 97 %        RUL Breath Sounds: Clear, RML Breath Sounds: Diminished, RLL Breath Sounds: Diminished, MIKE Breath Sounds: Clear, LLL Breath Sounds: Diminished    Fluids    Intake/Output Summary (Last 24 hours) at 10/03/17 1343  Last data filed at 10/03/17 0900   Gross per 24 hour   Intake                0 ml   Output              900 ml   Net             -900 ml       Nutrition  Orders Placed This Encounter   Procedures   • DIET ORDER     Standing Status:   Standing     Number of Occurrences:   1     Order Specific  Question:   Diet:     Answer:   Regular [1]     Comments:   Ensure on Each Tray     Physical Exam   Constitutional: He is oriented to person, place, and time. He appears well-developed and well-nourished. He is cooperative. He does not appear ill. No distress. Nasal cannula in place.   Obese Male     HENT:   Head: Normocephalic and atraumatic.   Mouth/Throat: Oropharynx is clear and moist.   Eyes: Conjunctivae and EOM are normal. Right eye exhibits no discharge. Left eye exhibits no discharge.   Neck: Normal range of motion. Neck supple.   Cardiovascular: Normal rate and regular rhythm.    No murmur heard.  Pulmonary/Chest: Effort normal and breath sounds normal. No stridor. No respiratory distress. He has no wheezes.   Abdominal: Soft. Bowel sounds are normal. He exhibits no distension. There is no tenderness.   Genitourinary:   Genitourinary Comments: Hearn catheter   Musculoskeletal: Normal range of motion. He exhibits no edema or tenderness.   Neurological: He is alert and oriented to person, place, and time. He has normal reflexes.   Skin: Skin is warm and dry.   Psychiatric: He has a normal mood and affect. His behavior is normal. Judgment and thought content normal.   Nursing note and vitals reviewed.      Recent Labs      10/03/17   0245   WBC  9.1   RBC  2.87*   HEMOGLOBIN  8.8*   HEMATOCRIT  27.0*   MCV  94.1   MCH  30.7   MCHC  32.6*   RDW  55.0*   PLATELETCT  395   MPV  9.1     Recent Labs      10/03/17   0245   SODIUM  134*   POTASSIUM  4.3   CHLORIDE  103   CO2  24   GLUCOSE  91   BUN  10   CREATININE  0.59   CALCIUM  9.2                      Assessment/Plan     Chronic osteomyelitis of lumbar spine (CMS-HCC)- (present on admission)   Assessment & Plan    Patient remains on Ceftaroline until end date of 10/18.            Sepsis(995.91)- (present on admission)   Assessment & Plan    Resolved, we are monitoring his vitals which at this point have stabilized. Lab values have also stabilized. He will need  continued IV antibiotics for an additional 2+ weeks.        Nontraumatic psoas hematoma- (present on admission)   Assessment & Plan    Patient remains off of anticoagulation continue to monitor psoas hematoma as well as monitor H&H for any drops.        Persistent atrial fibrillation (CMS-HCC)- (present on admission)   Assessment & Plan    Currently rate is well controlled.  He is not anticoagulated with his history of falls and possible bleeding.  Continue this point with rate control only.        Debility- (present on admission)   Assessment & Plan    Continue at this point with physical therapy and occupational therapy in-house.  Once against a long-term acute care the patient will need also continued physical therapy and occupational therapy.        DNR (do not resuscitate)- (present on admission)   Assessment & Plan    Confirmed with patient and he continues to be an DNR code status.        Normocytic anemia- (present on admission)   Assessment & Plan    Follow hemoglobin and hematocrit levels.  Transfuse PRN if Hb 7 and Hct 21 transfusion will be considered.  Patient will need at this point continued monitoring of his iron levels B12 and folic acid levels long-term.            Reviewed items::  Labs reviewed and Medications reviewed  Hearn catheter::  Urinary Tract Retention or Urinary Tract Obstruction  DVT prophylaxis - mechanical:  SCDs  Ulcer Prophylaxis::  Yes  Antibiotics:  Treating active infection/contamination beyond 24 hours perioperative coverage

## 2017-10-03 NOTE — CARE PLAN
Problem: Nutritional:  Goal: Achieve adequate nutritional intake  Patient will consume ~50% of meals and supplements.   Outcome: MET Date Met: 10/03/17  Pt's intake improving, consistent PO >50% of meals. Continue current POC as tolerated by pt. RD available PRN.

## 2017-10-03 NOTE — THERAPY
"Pt agreeable to tx today. Pt presents w/functional limitations due to pain. Pt able to perform bed mobility @ [facility]. Pt able to sit on EOB, but became lightheaded. Pt attempted sit<->stands, however unable to perform w/2 person assist. Pt educated about importance of OOB. Pt declined chair. Pt stated he lays in bed @ home 23 hrs a day. Pt educated about effects of immobillity. Pt receptive. Pt became discouraged wehn he could not perform sit<->stands.  Pt encouraged throughout tx. Pt able to initiate rolling for pericare. Pt required Elke to complete roll. Pt would benefit from further acute PT txs to progress towards goals and independence.    Physical Therapy Treatment completed.   Bed Mobility:  Supine to Sit: Moderate Assist (x2)  Transfers: Sit to Stand: Unable to Participate (attempted)  Gait: Level Of Assist: Unable to Participate       Plan of Care: Will benefit from Physical Therapy 3 times per week    See \"Rehab Therapy-Acute\" Patient Summary Report for complete documentation.       "

## 2017-10-03 NOTE — DISCHARGE PLANNING
[facility] is working on [facility] for IVABX. If this is accepted, we should be able to transfer pt to [place] today.

## 2017-10-03 NOTE — DISCHARGE PLANNING
SNFs have declined pt due to high cost of IV Teflaro. Have requested administration to look into [facility] for remainder of IVABX therapy.

## 2017-10-03 NOTE — PROGRESS NOTES
Pt is AAO x4.  Denies any pain or discomfort at this time.  VS WNL.  L PICC in place + blood return, running AB.  SCDs in place.  POC discussed.  All needs met at this time.  Bed in low position.  Call light within reach.  Rounding in place.

## 2017-10-03 NOTE — PROGRESS NOTES
Nursing called and asked about antibiotics because ceftaroline is going to cost ~$8400. I deferred decision to ID doctor as infectious disease is following this patient. Dr. Chilel needs to be the one to make the decision on discharge antibiotics.     Marina Harding, PharmD

## 2017-10-03 NOTE — CARE PLAN
Problem: Knowledge Deficit  Goal: Knowledge of the prescribed therapeutic regimen will improve  POC discussed in length with pt. All questions and concerns addressed. Will speak with  to determine SNF placement.    Problem: Mobility  Goal: Risk for activity intolerance will decrease  Will get pt to OOB as pt reports that he has not been very mobile since his fall in September.

## 2017-10-03 NOTE — CARE PLAN
Problem: Safety  Goal: Will remain free from injury  Outcome: PROGRESSING AS EXPECTED  Call light within reach. Pt calls for assistance at all times.  Bed in lowest position, upper bedrails up, personal possessions within reach, treaded socks on.  Hourly rounding in place.        Problem: Urinary Elimination:  Goal: Ability to reestablish a normal urinary elimination pattern will improve  Outcome: PROGRESSING AS EXPECTED  Pt uses urinal. Output is WNL. Continue to monitor

## 2017-10-04 PROBLEM — M79.81 NONTRAUMATIC PSOAS HEMATOMA: Status: RESOLVED | Noted: 2017-09-20 | Resolved: 2017-10-04

## 2017-10-04 PROBLEM — A41.9 SEPSIS, UNSPECIFIED: Status: RESOLVED | Noted: 2017-07-06 | Resolved: 2017-10-04

## 2017-10-04 PROBLEM — M86.68 CHRONIC OSTEOMYELITIS OF LUMBAR SPINE (HCC): Status: RESOLVED | Noted: 2017-09-19 | Resolved: 2017-10-04

## 2017-10-04 PROCEDURE — 99232 SBSQ HOSP IP/OBS MODERATE 35: CPT | Performed by: INTERNAL MEDICINE

## 2017-10-04 PROCEDURE — A9270 NON-COVERED ITEM OR SERVICE: HCPCS | Performed by: NURSE PRACTITIONER

## 2017-10-04 PROCEDURE — 700105 HCHG RX REV CODE 258: Performed by: HOSPITALIST

## 2017-10-04 PROCEDURE — 770006 HCHG ROOM/CARE - MED/SURG/GYN SEMI*

## 2017-10-04 PROCEDURE — 700102 HCHG RX REV CODE 250 W/ 637 OVERRIDE(OP): Performed by: INTERNAL MEDICINE

## 2017-10-04 PROCEDURE — A9270 NON-COVERED ITEM OR SERVICE: HCPCS | Performed by: INTERNAL MEDICINE

## 2017-10-04 PROCEDURE — A9270 NON-COVERED ITEM OR SERVICE: HCPCS | Performed by: HOSPITALIST

## 2017-10-04 PROCEDURE — 700102 HCHG RX REV CODE 250 W/ 637 OVERRIDE(OP): Performed by: NURSE PRACTITIONER

## 2017-10-04 PROCEDURE — 700105 HCHG RX REV CODE 258: Performed by: INTERNAL MEDICINE

## 2017-10-04 PROCEDURE — 94760 N-INVAS EAR/PLS OXIMETRY 1: CPT

## 2017-10-04 PROCEDURE — 700102 HCHG RX REV CODE 250 W/ 637 OVERRIDE(OP): Performed by: HOSPITALIST

## 2017-10-04 PROCEDURE — 92526 ORAL FUNCTION THERAPY: CPT

## 2017-10-04 PROCEDURE — 700111 HCHG RX REV CODE 636 W/ 250 OVERRIDE (IP): Performed by: INTERNAL MEDICINE

## 2017-10-04 RX ORDER — LISINOPRIL 10 MG/1
10 TABLET ORAL DAILY
Qty: 30 TAB | Refills: 0
Start: 2017-10-04 | End: 2017-10-23

## 2017-10-04 RX ORDER — SUCRALFATE ORAL 1 G/10ML
1 SUSPENSION ORAL
Refills: 3
Start: 2017-10-04 | End: 2019-01-01

## 2017-10-04 RX ORDER — OMEPRAZOLE 20 MG/1
20 CAPSULE, DELAYED RELEASE ORAL DAILY
Qty: 30 CAP
Start: 2017-10-04 | End: 2017-10-23

## 2017-10-04 RX ADMIN — SODIUM CHLORIDE: 9 INJECTION, SOLUTION INTRAVENOUS at 08:03

## 2017-10-04 RX ADMIN — ANTACID TABLETS 1000 MG: 500 TABLET, CHEWABLE ORAL at 06:33

## 2017-10-04 RX ADMIN — ROSUVASTATIN CALCIUM 125 MCG: 10 TABLET, FILM COATED ORAL at 18:00

## 2017-10-04 RX ADMIN — CEFTAROLINE FOSAMIL 600 MG: 600 POWDER, FOR SOLUTION INTRAVENOUS at 08:12

## 2017-10-04 RX ADMIN — SUCRALFATE 1 G: 1 SUSPENSION ORAL at 06:33

## 2017-10-04 RX ADMIN — LEVETIRACETAM 500 MG: 500 TABLET, FILM COATED ORAL at 08:11

## 2017-10-04 RX ADMIN — METOPROLOL SUCCINATE 50 MG: 25 TABLET, EXTENDED RELEASE ORAL at 08:11

## 2017-10-04 RX ADMIN — SUCRALFATE 1 G: 1 SUSPENSION ORAL at 20:35

## 2017-10-04 RX ADMIN — LISINOPRIL 10 MG: 5 TABLET ORAL at 08:11

## 2017-10-04 RX ADMIN — SODIUM CHLORIDE: 9 INJECTION, SOLUTION INTRAVENOUS at 23:06

## 2017-10-04 RX ADMIN — OMEPRAZOLE 20 MG: 20 CAPSULE, DELAYED RELEASE ORAL at 08:11

## 2017-10-04 RX ADMIN — CEFTAROLINE FOSAMIL 600 MG: 600 POWDER, FOR SOLUTION INTRAVENOUS at 20:42

## 2017-10-04 ASSESSMENT — ENCOUNTER SYMPTOMS
SHORTNESS OF BREATH: 0
WEIGHT LOSS: 0
NECK PAIN: 0
COUGH: 0
BLURRED VISION: 0
DEPRESSION: 0
MYALGIAS: 0
NAUSEA: 0
BACK PAIN: 1
VOMITING: 0
WEAKNESS: 0
HEADACHES: 0
CHILLS: 0
ABDOMINAL PAIN: 0
DIZZINESS: 1
SPEECH CHANGE: 0
DOUBLE VISION: 0
FEVER: 0

## 2017-10-04 ASSESSMENT — LIFESTYLE VARIABLES: EVER_SMOKED: YES

## 2017-10-04 ASSESSMENT — COPD QUESTIONNAIRES
HAVE YOU SMOKED AT LEAST 100 CIGARETTES IN YOUR ENTIRE LIFE: YES
DURING THE PAST 4 WEEKS HOW MUCH DID YOU FEEL SHORT OF BREATH: SOME OF THE TIME
COPD SCREENING SCORE: 5
DO YOU EVER COUGH UP ANY MUCUS OR PHLEGM?: NO/ONLY WITH OCCASIONAL COLDS OR INFECTIONS

## 2017-10-04 ASSESSMENT — PAIN SCALES - GENERAL
PAINLEVEL_OUTOF10: 6
PAINLEVEL_OUTOF10: 6

## 2017-10-04 NOTE — PROGRESS NOTES
Infectious Disease Progress Note    Author: Ny Chilel M.D. Date & Time of service: 10/4/2017  11:36 AM    Chief Complaint:  Altered mental status    Interval History:  86-year-old male admitted for altered mental status. He was recently treated for L3-4 discitis. His repeat MRI showed right iliacus muscle abscess  9/21/17-MAXIMUM TEMPERATURE 98. On 2 liters of oxygen. WBC 14 and creatinine 0.38  9/22- MAXIMUM TEMPERATURE 97.8. Complains of generalized malaise. Breathing is improved. Right leg pain is improved  9/23/17-MAXIMUM TEMPERATURE 99.5. Still has back pain when he sits up. The WBC 12  9/24/17-MAXIMUM TEMPERATURE 97.6. Feels better. No new issues overnight. WBC11.7  9/25 AF resting comfortably  9/26 AF, WBC 11.3 more awake eating OK.  Back pain about the same  9/27 AF WBC 11 had PICC placed-denies SE abx. No new complaints  9/28 AF tolerating abx well feels about the same  9/29 AF, no CBC, pt has no new complaints this morning, asking when he will be discharged and how long he will be on abx,  9/30 AF no CBC, eating lots of chocolate pudding/ice cream, no new issues per pt  10/2 AF, feels well and denies any pain, feels he is getting stronger and appetite improved  10/3 AF, WBC 9.1, felt dizzy while watching TV yesterday with head pressure but no HA, no nausea or vomiting, no recurrent symptoms today but states he was dizzy at home prior to admit, back pain only with getting out of bed  10/4 AF, no CBC, no dizziness, now having painful swallowing that started last night, did not eat breakfast  Labs Reviewed, Medications Reviewed and Radiology Reviewed.    Review of Systems:  Review of Systems   Constitutional: Negative for chills and fever.   HENT:        Painful swallowing   Respiratory: Negative for cough and shortness of breath.    Cardiovascular: Positive for leg swelling. Negative for chest pain.   Gastrointestinal: Negative for abdominal pain, nausea and vomiting.   Musculoskeletal: Positive for  back pain.   Neurological: Positive for dizziness. Negative for speech change and headaches.       Hemodynamics:  Temp (24hrs), Av.5 °C (97.7 °F), Min:36.3 °C (97.4 °F), Max:36.7 °C (98.1 °F)  Temperature: 36.7 °C (98.1 °F)  Pulse  Av.1  Min: 45  Max: 135   Blood Pressure : 107/71       Physical Exam:  Physical Exam   Constitutional: He appears well-developed. He is easily aroused. No distress.   Obese   HENT:   Head: Normocephalic and atraumatic.   Poor dentition   Eyes: No scleral icterus.   Neck: Neck supple.   Cardiovascular: Normal rate.  An irregularly irregular rhythm present.   Pulmonary/Chest: Effort normal. No respiratory distress. He has no wheezes. He has rales.   Abdominal: Soft. He exhibits no distension. There is no tenderness.   Musculoskeletal: He exhibits edema.   LUE PICC   Neurological: He is alert and easily aroused.   Nursing note and vitals reviewed.      Meds:    Current Facility-Administered Medications:   •  sucralfate  •  omeprazole  •  calcium carbonate  •  levetiracetam  •  PICC Line Insertion has been implemented **AND** May use Lidocaine 1% not to exceed 3 mls for local at insertion site **AND** NOTIFY MD **AND** Tip to dwell in the superior vena cava **AND** Do not use PICC Line until placement verified by Chest X Ray **AND** DX-CHEST-FOR PICC LINE Perform procedure in: PICC Room **AND** If radiologist reading of chest X-ray states any of the following the PICC should be used **AND** Further evaluation of the PICC placement can be retrieved from X-Ray and Imaging **AND** Blood draws through PICC line; draws by RN only **AND** FLUSHING GUIDELINES WHEN IN USE **AND** normal saline PF **AND** FLUSHING GUIDELINES WHEN NOT IN USE **AND** DRESSING MAINTENANCE **AND** Change needleless pressure ports and IV tubing every 72 hours per hospital policy **AND** TUBING **AND** If there is an MD order to remove the PICC line, any RN may remove the PICC line **AND** [] PATIENT  EDUCATION MATERIALS **AND** NURSING COMMUNICATION  •  ceftaroline (TEFLARO) ivpb  •  Metoprolol Tartrate  •  lorazepam  •  acetaminophen  •  digoxin  •  prochlorperazine  •  promethazine  •  hydrALAZINE  •  lisinopril  •  hyoscyamine-maalox plus-lidocaine viscous  •  NS  •  labetalol  •  ondansetron  •  metoprolol SR  •  atorvastatin  •  gabapentin  •  tamsulosin  •  senna-docusate **AND** polyethylene glycol/lytes **AND** magnesium hydroxide **AND** bisacodyl  •  Respiratory Care per Protocol    Labs:  Recent Labs      10/03/17   0245   WBC  9.1   RBC  2.87*   HEMOGLOBIN  8.8*   HEMATOCRIT  27.0*   MCV  94.1   MCH  30.7   RDW  55.0*   PLATELETCT  395   MPV  9.1   NEUTSPOLYS  61.40   LYMPHOCYTES  25.10   MONOCYTES  9.10   EOSINOPHILS  3.10   BASOPHILS  0.40     Recent Labs      10/03/17   0245   SODIUM  134*   POTASSIUM  4.3   CHLORIDE  103   CO2  24   GLUCOSE  91   BUN  10     Recent Labs      10/03/17   0245   CREATININE  0.59       Imaging:  Ct-abdomen-pelvis With    Result Date: 9/19/2017 9/19/2017 6:34 PM HISTORY/REASON FOR EXAM:  Abscess noted on MRI. TECHNIQUE/EXAM DESCRIPTION:  CT scan of the abdomen and pelvis with contrast. Contrast-enhanced helical scanning was obtained from the diaphragmatic domes through the pubic symphysis following the bolus administration of nonionic contrast without complication. 100 mL of Omnipaque 350 nonionic contrast was administered without complication. Low dose optimization technique was utilized for this CT exam including automated exposure control and adjustment of the mA and/or kV according to patient size. COMPARISON: MRI from the same day FINDINGS: Lung bases: There are small bilateral pleural effusions with overlying atelectasis. Abdomen: No free air is seen in the abdomen or pelvis. Evaluation is limited by streak artifact from barium within the colon. There is wall thickening of the distal esophagus with a small hiatal hernia. Liver appears unremarkable. Portal vein  is patent. There is calcification in the pancreatic head. No adrenal mass is identified. Tiny hypodense left renal lesion is too small to characterize. There is mild prominence of the renal collecting systems bilaterally. Small hypodense right renal lesions too small to characterize. There are nonobstructing right renal calculi. Atherosclerotic plaque is seen in the aorta and its branches. There are small inguinal, retroperitoneal and mesenteric lymph nodes. There is no evidence of bowel obstruction. There is a moderate amount of stool in the rectosigmoid colon. There is colonic diverticulosis. The appendix is not identified. Stomach is distended with fluid. Small bowel loops are fluid-filled. Bladder is mildly distended with foci of air. There is asymmetric prominence of the right iliopsoas musculature with surrounding stranding. Findings likely related to the previously noted abscess collection. Degenerative changes are seen in the spine. Postsurgical changes are noted in the lumbar spine. Degenerative changes are seen at the hips. There is mild loss of height of the superior endplate of T11 with a Schmorl's node noted. There is mild endplate irregularity at L3/L4.     Prominence of the right iliopsoas muscle with surrounding inflammatory stranding. The known fluid collection was better delineated on the prior MRI. Mild endplate irregularity at L3/L4 can be seen in discitis/osteomyelitis. Air-fluid level in the bladder. Correlation with urinalysis is recommended. This can be seen in the setting of recent instrumentation, infection or fistula. Colonic diverticulosis. Moderate amount of colonic stool. Nonobstructing right renal calculi. Mild prominence of the renal collecting systems bilaterally. Small hypodense renal lesions are too small to characterize. Fluid-filled loops of small bowel can be seen in the setting of enteritis. Small hiatal hernia with mild thickening of the distal esophagus. Calcifications in the  pancreatic head can be seen in chronic pancreatitis. Atherosclerotic plaque. Small bilateral pleural effusions with overlying atelectasis.     Ct-drain-retroperitoneal    Result Date: 9/20/2017 9/20/2017 2:30 PM HISTORY/REASON FOR EXAM:  Multiloculated fluid collections in the right iliacus muscle. Suspected abscess. TECHNIQUE/EXAM DESCRIPTION: Right pelvic (extraperitoneal) retroperitoneal abscess drainage with CT guidance. Low dose optimization technique was utilized for this CT exam including automated exposure control and adjustment of the mA and/or kV according to patient size. PROCEDURE: Informed consent was obtained. Procedures performed with local anesthesia only with appropriate continuous patient monitoring by the radiology nurse. Sedation duration: N/A minutes Localizing CT images were obtained with the patient in supine position. The skin was prepped with Betadine and draped in a sterile fashion. Following local anesthesia with 1% Lidocaine, a 17 G guiding needle was placed and extraperitoneal needle path in the right side of the pelvis via the iliacus muscle confirmed with CT. An Amplatz guidewire was placed and following serial tract dilatation, a 8 Estonian pigtail locking catheter was placed. A specimen was collected and submitted for culture and sensitivity and Gram stain. Total of 5 mL old bloody reddish-brown fluid was drained. No purulent fluid was obtained. The fluid was not malodorous. The catheter was secured to the skin and connected to suction bulb drainage. Final CT images were obtained documenting catheter position. The patient tolerated the procedure well with no evidence of complication. Low dose optimization technique was utilized for this CT exam including automated exposure control and adjustment of the mA and/or kV according to patient size. COMPARISON: MRI lumbar spine 9/19/2017, CT abdomen and pelvis 9/19/2017. FINDINGS: The final CT images show satisfactory catheter position within  the target collection.     1.  CT GUIDED RETROPERITONEAL (EXTRAPERITONEAL) RIGHT PELVIC CATHETER DRAINAGE WITHIN THE RIGHT ILIACUS MUSCLE. OLD DARK REDDISH-BROWN BLOODY FLUID WAS OBTAINED. NO ABSCESS OR PURULENT MATERIAL. 2.  THE CURRENT PLAN IS TO CHECK CULTURES AND LIKELY REMOVED CATHETER IN THE SHORT-TERM AT 48-72 HOURS AS THERE IS UNLIKELY TO BE AN ABSCESS.    Ct-head W/o    Result Date: 9/13/2017 9/13/2017 9:30 PM HISTORY/REASON FOR EXAM:  Altered Mental Status. TECHNIQUE/EXAM DESCRIPTION AND NUMBER OF VIEWS: CT of the head without contrast. The study was performed on a helical multidetector CT scanner. Contiguous 2.5 mm axial sections were obtained from the skull base through the vertex. Up to date radiation dose reduction adjustments have been utilized to meet ALARA standards for radiation dose reduction. COMPARISON:  7/12/2017 FINDINGS: The lateral ventricles are enlarged. Cortical sulci are enlarged. Patchy areas of low attenuation in the white matter bilaterally. No significant mass effect or midline shift. Basal cisterns are patent. No evidence for intracranial hemorrhage. Calvaria are intact. Visualized orbits are unremarkable. Visualized mastoid air cells are clear. Visualized paranasal sinuses are unremarkable. Calcifications of the carotid arteries noted. Calcified scalp nodules again present.     1.  Diffuse atrophy and white matter changes. 2.  No acute intracranial hemorrhage or territorial infarct.     Dx-chest-portable (1 View)    Result Date: 9/16/2017 9/16/2017 10:31 PM HISTORY/REASON FOR EXAM:  Shortness of Breath. TECHNIQUE/EXAM DESCRIPTION AND NUMBER OF VIEWS: Single portable view of the chest. COMPARISON: 9/13/2017 FINDINGS: Cardiac mediastinal contour is unchanged. Mediastinum is again prominent. Mild prominence of the pulmonary interstitium. No gross pleural fluid or pneumothorax. Dextroconvex curvature of thoracic spine.     No significant change from prior exam.    Dx-chest-portable  (1 View)    Result Date: 9/13/2017 9/13/2017 8:44 PM HISTORY/REASON FOR EXAM:  Possible sepsis. TECHNIQUE/EXAM DESCRIPTION AND NUMBER OF VIEWS: Single portable view of the chest. COMPARISON: 8/7/2017 FINDINGS: Cardiac mediastinal contour is unchanged. Mild diffuse prominence of the interstitium again seen. No focal consolidation. No pleural fluid collection or pneumothorax. Dextroconvex curvature of thoracic spine. Diffuse osteopenia.     1.  Diffuse prominence of interstitium again seen, likely interstitial lung disease.  Mild pulmonary edema is also a consideration. 2.  No pneumonia or pneumothorax. 3.  Stable cardiomegaly.    Dx-small Bowel Series    Result Date: 9/17/2017 9/16/2017 10:39 AM HISTORY/REASON FOR EXAM:  Nausea and vomiting. TECHNIQUE/EXAM DESCRIPTION AND NUMBER OF VIEWS: Single contrast barium small bowel follow-through performed. Fluoroscopic images were obtained. No fluoroscopic images obtained. COMPARISON:  None available. FINDINGS:  view the abdomen demonstrates marked gaseous distention and small bowel and colonic distention. Patient drank thin barium. Contrast passed very slowly into dilated small bowel loops. Jejunal loops are dilated up to 5.2 cm. Overhead images were taken for a total of 22 hours before termination of the exam. Contrast reached the ileum, but does not extend into the colon. There is a large amount of stool in the colon.     1.  Gaseous distention with fairly diffuse bowel dilatation. Contrast extends into the ileum, but does not reach the colon within 22 hours. No transition point is identified to suggest mechanical bowel obstruction. 2.  Large amount of stool in the rectal vault.    Mr-lumbar Spine-with & W/o    Result Date: 9/19/2017 9/19/2017 1:35 PM HISTORY/REASON FOR EXAM:  Altered mental status, weakness. Possible discitis. Previous lumbar surgery. TECHNIQUE/EXAM DESCRIPTION: MRI of the lumbar spine without and with contrast. The study was performed on a [employer] 1.5 Lucille MRI scanner. T1 sagittal, T2 fast spin-echo sagittal, T2 axial images and T1 axial images were obtained of the lumbar spine. T1 post-contrast sagittal and T1 post-contrast axial images were obtained. Optional T2 fat-suppressed sagittal images may be obtained. 20 mL Omniscan gadolinium contrast was administered intravenously. COMPARISON:  MRI lumbar spine 7/7/2017, T abdomen and pelvis 1/6/2017 FINDINGS: Alignment in the lumbar spine again shows retrolisthesis of L3 on L4 of about 5 mm. There is fluid signal T2 hyperintensity again seen in the L3-L4 disc space and prominent marrow edema signal at the L3 inferior endplate and to a lesser extent L4 superior endplate. There is corresponding enhancement, particularly at the inferior endplate of L3. Findings are highly suspicious for discitis with osteomyelitis. There is postoperative change with lumbar laminectomy at L2-L3 through L5. There is posterior fusion with transpedicular screw fixation 4-L5. The posterior paraspinous soft tissues show expected postoperative changes with atrophy and fatty replacement in  the posterior paraspinous muscles at the operative levels. There is no significant postoperative dorsal epidural fluid collection. However, there has been interval development of multilocular fluid collections in the right iliacus muscle spanning about 42 mm. These are consistent with intramuscular abscesses (T2 axial image 4, series 5, T1 postcontrast axial image 1, series 10). The conus is normal in position and signal with its tip at the L1 level. At T12-L1, is a tiny central and left paramedian disc protrusion with an underlying annular fissure. There is mild hypertrophic facet arthropathy. Thecal sac is toward the lower range of normal without karla central stenosis. The neural foramina are intact. At L1-2, there is negligible disc bulging. There is no central stenosis or significant foraminal stenosis. At L2-3, there is a small broad-based  disc-osteophyte complex. There is no central stenosis as there is decompressive laminectomy at this level. There is no significant foraminal stenosis. At L3-4, there is posterior marginal spurring and disc bulging accentuated by the retrolisthesis. There is moderate bilateral lateral recess stenosis. Thecal sac is toward the lower range of normal even though there is a decompressive laminectomy at this  level. However, there is no karla central stenosis (T2 axial image 24, series 5). There is severe bilateral foraminal stenosis, right greater than left. This is unchanged from the previous exam. At L4-5, no central stenosis. There is inferior foraminal blunting with moderate bilateral foraminal stenosis. At L5-S1, there is minimal disc bulging. There is a left paramedian annular fissure. There is mild hypertrophic facet arthropathy. No central stenosis. There is moderate bilateral foraminal stenosis.     1.  L3-4 findings again suggesting discitis with osteomyelitis. No evidence of epidural abscess or epidural phlegmon. 2.  L3-4 retrolisthesis. 3.  Postoperative multilevel lumbar laminectomy and posterior fusion at L4-5. 4.  Interval development of multilocular intramuscular abscesses in the right iliacus muscle, partly in the field of view 5.  Degenerative and spondylotic changes as detailed for each level above in the body of report.    Dx-esophagus - Barium Swallow    Result Date: 9/16/2017 9/16/2017 10:39 AM HISTORY/REASON FOR EXAM:  Nausea. Nausea and vomiting. TECHNIQUE/EXAM DESCRIPTION AND NUMBER OF VIEWS: Single contrast esophagram procedure was performed. 5 fluoroscopic images obtained. Fluoroscopy time: 0.37 minutes COMPARISON:  None. FINDINGS: Limited exam secondary to limited patient mobility. The esophagus appears normal in caliber and configuration. No definite mucosal lesions are identified on single-contrast techniques. Contrast passes freely into the stomach. No hiatal hernia is identified.     No  abnormalities identified on limited single contrast esophagram.    Echocardiogram Comp W/o Cont    Result Date: 9/14/2017  Transthoracic Echo Report Echocardiography Laboratory CONCLUSIONS Prior study done on [id number] left ventricular systolic function. Normal regional wall motion. Left ventricular ejection fraction is visually estimated to be 70%. Diastolic function is difficult to assess with atrial fibrillation. The right ventricle was normal in size and function. Structurally normal mitral valve without significant stenosis or regurgitation. Aortic sclerosis without stenosis. No aortic insufficiency. Normal pericardium without effusion.FINDINGS Left Ventricle Normal left ventricular chamber size. Normal left ventricular wall thickness. Normal left ventricular systolic function. Normal regional wall motion. Left ventricular ejection fraction is visually estimated to be 70%. Diastolic function is difficult to assess with atrial fibrillation. Right Ventricle The right ventricle was normal in size and function. Right Atrium The right atrium is normal in size.  Inferior vena cava is not well visualized. Left Atrium The left atrium is normal in size.  Left atrial volume index is 19  mL/sq m. Mitral Valve Structurally normal mitral valve without significant stenosis or regurgitation.  Trace mitral regurgitation. Aortic Valve Aortic sclerosis without stenosis. Tricuspid aortic valve. No aortic insufficiency. Tricuspid Valve Structurally normal tricuspid valve without significant stenosis or regurgitation. Pulmonic Valve Structurally normal pulmonic valve without significant stenosis or regurgitation. Pericardium Normal pericardium without effusion. Aorta The aortic root is normal.  Ascending aorta diameter is 3.1 cm. Kingsley Hung M.D. (Electronically Signed) Final Date:     14 September 2017                 11:10      Micro:  Results     ** No results found for the last 168 hours. 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

## 2017-10-04 NOTE — PROGRESS NOTES
With morning meds the patient was having a severely hard time getting his meds down. He was stating he was in too much pain to finish swallowing any other meds or to eat anything. Cold makes pain worse but we tried hot tea and he stated this seemed to help. The speech therapist saw patient and recommends a FEES to be done and possibly a GI scope with follow up with someone to evaluate his nutrition status. She does not clear him for discharge, I attempted to contact social work/case management but she was unavailable, message left on her desk and to find me when available. Dr. Chua also paged to convey new information.

## 2017-10-04 NOTE — DISCHARGE PLANNING
SW notified that pt was not cleared by Speech. SW to follow pt to see if he is m/c for d/c tomorrow.

## 2017-10-04 NOTE — PROGRESS NOTES
Shift report received from night RN, assumed care at 0715. Patient asleep in bed, AOx4, patient not currently expressing any pain at this time, routinely medicated prn per MAR. Pt up a few times with PT/OT yesterday with dizziness will monitor today, calls appropriately, bed alarm not needed. PICC assessed and running NS @75. O2 RA, SPO2 92%. VTE SCDs. Plan is discharge to [facility] today. Discussed POC for multimodal pain management, monitoring VS/labs/I&O, mobility, day time routine, comfort, and safety. Patient has call light and personal belongings within reach. Safety and fall precautions in place. Reviewed current labs, notes, medications, and orders. Hourly rounding in place with RN rounding on odd hours and CNA on even hours.

## 2017-10-04 NOTE — PROGRESS NOTES
[facility]ist Progress Note    Date of Service: 10/4/2017    Chief Complaint  86 y.o. male admitted 2017 with acute ground-level fall and back pain found to have  acute on chronic osteomyelitis and discitis, on long term abx     Interval Problem Update  Patient complains of trouble swallowing pills and eating breakfast this am. Speech therapy to re-eval patient. Will hold off transfer to [facility] until further eval by SLP.     Consultants/Specialty  Infectious diseases Dr. Getachew Vincent  of cardiology - signed off    Disposition  Pt to go to [facility] to complete abx therapy once medically clear.        Review of Systems   Constitutional: Negative for chills and weight loss.   HENT:        Trouble swallowing, denies any regurgitation of food     Eyes: Negative for blurred vision and double vision.   Respiratory: Negative for cough and shortness of breath.    Cardiovascular: Negative for chest pain (chest wall on the left).   Gastrointestinal: Negative for abdominal pain and nausea.   Musculoskeletal: Positive for back pain (with prolonged sitting). Negative for myalgias and neck pain.   Neurological: Negative for weakness and headaches.   Psychiatric/Behavioral: Negative for depression.      Physical Exam  Laboratory/Imaging   Hemodynamics  Temp (24hrs), Av.6 °C (97.8 °F), Min:36.3 °C (97.4 °F), Max:36.7 °C (98.1 °F)   Temperature: 36.7 °C (98 °F)  Pulse  Av  Min: 45  Max: 135    Blood Pressure : 105/62      Respiratory      Respiration: 20, Pulse Oximetry: 98 %, O2 Daily Delivery Respiratory : Room Air with O2 Available     Work Of Breathing / Effort: Mild  RUL Breath Sounds: Clear, RML Breath Sounds: Clear, RLL Breath Sounds: Diminished, MIKE Breath Sounds: Clear, LLL Breath Sounds: Diminished    Fluids    Intake/Output Summary (Last 24 hours) at 10/04/17 1323  Last data filed at 10/04/17 1130   Gross per 24 hour   Intake             2689 ml   Output             1600 ml   Net              1089 ml       Nutrition  Orders Placed This Encounter   Procedures   • DIET ORDER     Standing Status:   Standing     Number of Occurrences:   1     Order Specific Question:   Diet:     Answer:   Regular [1]     Comments:   Ensure on Each Tray     Physical Exam   Constitutional: He is oriented to person, place, and time. He appears well-developed and well-nourished. He is cooperative. He does not appear ill. No distress. Nasal cannula in place.   Obese Male     HENT:   Head: Normocephalic and atraumatic.   Eyes: Conjunctivae and EOM are normal. Right eye exhibits no discharge. Left eye exhibits no discharge.   Neck: Normal range of motion. Neck supple.   Cardiovascular: Normal rate and regular rhythm.    No murmur heard.  Pulmonary/Chest: Effort normal and breath sounds normal. No stridor. No respiratory distress. He has no wheezes.   Abdominal: Soft. Bowel sounds are normal. He exhibits no distension. There is no tenderness.   Genitourinary:   Genitourinary Comments: Hearn catheter   Musculoskeletal: Normal range of motion. He exhibits no edema or tenderness.   Neurological: He is alert and oriented to person, place, and time.   Skin: Skin is warm and dry.   Psychiatric: He has a normal mood and affect. His behavior is normal.   Nursing note and vitals reviewed.      Recent Labs      10/03/17   0245   WBC  9.1   RBC  2.87*   HEMOGLOBIN  8.8*   HEMATOCRIT  27.0*   MCV  94.1   MCH  30.7   MCHC  32.6*   RDW  55.0*   PLATELETCT  395   MPV  9.1     Recent Labs      10/03/17   0245   SODIUM  134*   POTASSIUM  4.3   CHLORIDE  103   CO2  24   GLUCOSE  91   BUN  10   CREATININE  0.59   CALCIUM  9.2                      Assessment/Plan     Nontraumatic psoas hematoma- (present on admission)   Assessment & Plan    Patient remains off of anticoagulation continue to monitor psoas hematoma as well as monitor H&H for any drops.        Persistent atrial fibrillation (CMS-HCC)- (present on admission)   Assessment & Plan     Currently rate is well controlled.  He is not anticoagulated with his history of falls and possible bleeding.  Continue this point with rate control only.        Discitis of lumbar region- (present on admission)   Assessment & Plan    Patient currently on abx per ID recs, stop date 10/18          Debility- (present on admission)   Assessment & Plan    Continue at this point with physical therapy and occupational therapy in-house.  Once against a long-term acute care the patient will need also continued physical therapy and occupational therapy.        DNR (do not resuscitate)- (present on admission)   Assessment & Plan    Confirmed with patient and he continues to be an DNR code status.        Normocytic anemia- (present on admission)   Assessment & Plan    Follow hemoglobin and hematocrit levels.  Transfuse PRN if Hb 7 and Hct 21 transfusion will be considered.  Patient will need at this point continued monitoring of his iron levels B12 and folic acid levels long-term.            Reviewed items::  Labs reviewed and Medications reviewed  Hearn catheter::  Urinary Tract Retention or Urinary Tract Obstruction  DVT prophylaxis - mechanical:  SCDs  Ulcer Prophylaxis::  Yes  Antibiotics:  Treating active infection/contamination beyond 24 hours perioperative coverage

## 2017-10-04 NOTE — PROGRESS NOTES
Patient discussed with Dr. Chua she is ok with holding discharge and ordering FEES and then possible scope.

## 2017-10-04 NOTE — DISCHARGE PLANNING
Transport arranged with [facility]. Patient will be leaving tomorrow @1300 via the [facility] van going to [facility]. KERRIE Uribe notified.

## 2017-10-04 NOTE — ASSESSMENT & PLAN NOTE
Afebrile, improved pain -- fu Improved Mri findings   Oral  bactrim, augmentin for 2 more weeks from 10/18

## 2017-10-04 NOTE — DISCHARGE PLANNING
SW notified [facility] that pt is pending speech eval.     KERRIE to send new transport form to [facility] requesting 1300 transport time to [facility].

## 2017-10-04 NOTE — DISCHARGE PLANNING
Pt has swallow eval with speech. D/c is pending on eval.     Bedside nurse to update SW after speech eval.

## 2017-10-05 ENCOUNTER — HOSPITAL ENCOUNTER (OUTPATIENT)
Dept: LAB | Facility: MEDICAL CENTER | Age: 82
End: 2017-10-05
Attending: INTERNAL MEDICINE
Payer: COMMERCIAL

## 2017-10-05 PROCEDURE — A9270 NON-COVERED ITEM OR SERVICE: HCPCS | Performed by: HOSPITALIST

## 2017-10-05 PROCEDURE — A9270 NON-COVERED ITEM OR SERVICE: HCPCS | Performed by: INTERNAL MEDICINE

## 2017-10-05 PROCEDURE — 700102 HCHG RX REV CODE 250 W/ 637 OVERRIDE(OP): Performed by: HOSPITALIST

## 2017-10-05 PROCEDURE — 92612 ENDOSCOPY SWALLOW (FEES) VID: CPT

## 2017-10-05 PROCEDURE — 700102 HCHG RX REV CODE 250 W/ 637 OVERRIDE(OP): Performed by: INTERNAL MEDICINE

## 2017-10-05 PROCEDURE — G8997 SWALLOW GOAL STATUS: HCPCS | Mod: CI

## 2017-10-05 PROCEDURE — 700111 HCHG RX REV CODE 636 W/ 250 OVERRIDE (IP): Performed by: INTERNAL MEDICINE

## 2017-10-05 PROCEDURE — 770006 HCHG ROOM/CARE - MED/SURG/GYN SEMI*

## 2017-10-05 PROCEDURE — 700111 HCHG RX REV CODE 636 W/ 250 OVERRIDE (IP): Performed by: HOSPITALIST

## 2017-10-05 PROCEDURE — 700105 HCHG RX REV CODE 258: Performed by: HOSPITALIST

## 2017-10-05 PROCEDURE — G8996 SWALLOW CURRENT STATUS: HCPCS | Mod: CJ

## 2017-10-05 PROCEDURE — 99232 SBSQ HOSP IP/OBS MODERATE 35: CPT | Performed by: INTERNAL MEDICINE

## 2017-10-05 PROCEDURE — 700105 HCHG RX REV CODE 258: Performed by: INTERNAL MEDICINE

## 2017-10-05 RX ADMIN — SODIUM CHLORIDE: 9 INJECTION, SOLUTION INTRAVENOUS at 13:23

## 2017-10-05 RX ADMIN — SUCRALFATE 1 G: 1 SUSPENSION ORAL at 21:35

## 2017-10-05 RX ADMIN — PROCHLORPERAZINE EDISYLATE 10 MG: 5 INJECTION INTRAMUSCULAR; INTRAVENOUS at 15:37

## 2017-10-05 RX ADMIN — CEFTAROLINE FOSAMIL 600 MG: 600 POWDER, FOR SOLUTION INTRAVENOUS at 21:32

## 2017-10-05 RX ADMIN — METOPROLOL SUCCINATE 50 MG: 25 TABLET, EXTENDED RELEASE ORAL at 09:08

## 2017-10-05 RX ADMIN — CEFTAROLINE FOSAMIL 600 MG: 600 POWDER, FOR SOLUTION INTRAVENOUS at 09:08

## 2017-10-05 RX ADMIN — ONDANSETRON 4 MG: 2 INJECTION INTRAMUSCULAR; INTRAVENOUS at 12:33

## 2017-10-05 ASSESSMENT — ENCOUNTER SYMPTOMS
ABDOMINAL PAIN: 0
BACK PAIN: 0
WEAKNESS: 0
NECK PAIN: 0
DEPRESSION: 0
NAUSEA: 0
COUGH: 0
VOMITING: 0
BACK PAIN: 1
WEIGHT LOSS: 0
DIZZINESS: 1
SPEECH CHANGE: 0
CHILLS: 0
BLURRED VISION: 0
HEADACHES: 0
DOUBLE VISION: 0
FEVER: 0
MYALGIAS: 0
SHORTNESS OF BREATH: 0

## 2017-10-05 ASSESSMENT — PAIN SCALES - GENERAL
PAINLEVEL_OUTOF10: 4
PAINLEVEL_OUTOF10: 6
PAINLEVEL_OUTOF10: 5
PAINLEVEL_OUTOF10: 5

## 2017-10-05 NOTE — PROGRESS NOTES
Dr Chua pagealpa. Pt nauseated this afternoon even with Zofran. Will give Compazine and continue to monitor.

## 2017-10-05 NOTE — PROGRESS NOTES
[facility]ist Progress Note    Date of Service: 10/5/2017    Chief Complaint  86 y.o. male admitted 2017 with acute ground-level fall and back pain found to have  acute on chronic osteomyelitis and discitis, on long term abx     Interval Problem Update  Patient complains of trouble swallowing and refused medications overnight. Pt states he was able to drink 1/4 of his Boost drink. Later this morning he complained of pain with swallowing medications, FEES study at 1 pm today.     Consultants/Specialty  Infectious diseases Dr. Getachew Vincent  of cardiology - signed off    Disposition  Pt to go to [facility] to complete abx therapy once medically clear.        Review of Systems   Constitutional: Negative for chills and weight loss.   HENT:        Trouble swallowing, denies any regurgitation of food, N/V     Eyes: Negative for blurred vision and double vision.   Respiratory: Negative for cough and shortness of breath.    Cardiovascular: Negative for chest pain (chest wall on the left).   Gastrointestinal: Negative for abdominal pain and nausea.   Musculoskeletal: Negative for back pain, joint pain, myalgias and neck pain.   Neurological: Negative for weakness and headaches.   Psychiatric/Behavioral: Negative for depression.      Physical Exam  Laboratory/Imaging   Hemodynamics  Temp (24hrs), Av.3 °C (97.4 °F), Min:36.2 °C (97.1 °F), Max:36.6 °C (97.9 °F)   Temperature: 36.2 °C (97.1 °F)  Pulse  Av.9  Min: 45  Max: 135    Blood Pressure : 113/60      Respiratory      Respiration: 17, Pulse Oximetry: 95 %     Work Of Breathing / Effort: Mild  RUL Breath Sounds: Clear, RML Breath Sounds: Clear, RLL Breath Sounds: Diminished, MIKE Breath Sounds: Clear, LLL Breath Sounds: Diminished    Fluids    Intake/Output Summary (Last 24 hours) at 10/05/17 1201  Last data filed at 10/04/17 1700   Gross per 24 hour   Intake                0 ml   Output              225 ml   Net             -225 ml        Nutrition  Orders Placed This Encounter   Procedures   • DIET ORDER     Standing Status:   Standing     Number of Occurrences:   1     Order Specific Question:   Diet:     Answer:   Regular [1]     Comments:   Ensure on Each Tray     Physical Exam   Constitutional: He is oriented to person, place, and time. He appears well-developed and well-nourished. He is cooperative. He does not appear ill. No distress. Nasal cannula in place.   Obese Male     HENT:   Head: Normocephalic and atraumatic.   Eyes: Conjunctivae are normal. Right eye exhibits no discharge. Left eye exhibits no discharge.   Neck: Normal range of motion. Neck supple.   Cardiovascular: Normal rate and regular rhythm.    No murmur heard.  Pulmonary/Chest: Effort normal and breath sounds normal. No stridor. No respiratory distress. He has no wheezes.   Abdominal: Soft. Bowel sounds are normal. He exhibits no distension. There is no tenderness.   Musculoskeletal: Normal range of motion. He exhibits no edema or tenderness.   Neurological: He is alert and oriented to person, place, and time.   Skin: Skin is warm and dry.   Psychiatric: He has a normal mood and affect. His behavior is normal.   Nursing note and vitals reviewed.      Recent Labs      10/03/17   0245   WBC  9.1   RBC  2.87*   HEMOGLOBIN  8.8*   HEMATOCRIT  27.0*   MCV  94.1   MCH  30.7   MCHC  32.6*   RDW  55.0*   PLATELETCT  395   MPV  9.1     Recent Labs      10/03/17   0245   SODIUM  134*   POTASSIUM  4.3   CHLORIDE  103   CO2  24   GLUCOSE  91   BUN  10   CREATININE  0.59   CALCIUM  9.2                      Assessment/Plan     Nontraumatic psoas hematoma- (present on admission)   Assessment & Plan    Patient remains off of anticoagulation continue to monitor psoas hematoma as well as monitor H&H for any drops.        Persistent atrial fibrillation (CMS-HCC)- (present on admission)   Assessment & Plan    Currently rate is well controlled.  He is not anticoagulated with his history of  falls and possible bleeding.  Continue this point with rate control only.        Discitis of lumbar region- (present on admission)   Assessment & Plan    Patient currently on abx per ID recs, stop date 10/18          Debility- (present on admission)   Assessment & Plan    Continue at this point with physical therapy and occupational therapy in-house.  Once against a long-term acute care the patient will need also continued physical therapy and occupational therapy.        DNR (do not resuscitate)- (present on admission)   Assessment & Plan    Confirmed with patient and he continues to be an DNR code status.        Normocytic anemia- (present on admission)   Assessment & Plan    Follow hemoglobin and hematocrit levels.  Transfuse PRN if Hb 7 and Hct 21 transfusion will be considered.  Patient will need at this point continued monitoring of his iron levels B12 and folic acid levels long-term.            Reviewed items::  Labs reviewed and Medications reviewed  Hearn catheter::  No Hearn  DVT prophylaxis - mechanical:  SCDs  Ulcer Prophylaxis::  Yes  Antibiotics:  Treating active infection/contamination beyond 24 hours perioperative coverage

## 2017-10-05 NOTE — DISCHARGE PLANNING
Discussed in rounds. Pt had FEES done and liquid diet recommended. SNF transfer on hold at this time.

## 2017-10-05 NOTE — CARE PLAN
Problem: Communication  Goal: The ability to communicate needs accurately and effectively will improve  Outcome: PROGRESSING AS EXPECTED  Patient utilizing call light appropriately when needing to be cleaned due to incontinence    Problem: Bowel/Gastric:  Goal: Normal bowel function is maintained or improved  Outcome: PROGRESSING SLOWER THAN EXPECTED  Episodes of incontinence    Problem: Urinary Elimination:  Goal: Ability to reestablish a normal urinary elimination pattern will improve  Outcome: PROGRESSING SLOWER THAN EXPECTED  Episodes of incontinence    Problem: Skin Integrity  Goal: Risk for impaired skin integrity will decrease  Outcome: PROGRESSING AS EXPECTED  Patient remained free from skin injury during this shift

## 2017-10-05 NOTE — DISCHARGE PLANNING
FEES scheduled for 1300 today. Transport to [facility] has been canceled. If pt cleared for po after FEES, will attempt to reschedule transport for today.

## 2017-10-05 NOTE — THERAPY
"Speech Language Therapy FEES completed.  Functional Status: Patient tolerated passing of scope. He agreed to take PO but only small amounts of specific items. He consumed sips of water, Boost, and a few tastes of chocolate pudding. The patient had one episode of bubbling secretions from his UES. No aspiration was noted, no reflux of PO was noted. The patient would grab his chest and moan in pain. After the test was finished he reclined and closed his eyes. Approximately 10 minutes later the patient could be heard from the hallway dry heaving and gagging. I walked into the room to find the patient spitting up a large amount of thick phlegm with specks of pudding in it. RN at bedside to provide antinausea meds. Currently, this patient is unable to meet nutritional needs. Consider GI consult due to patient's symptoms.  Recommendations - Diet: Diet / Liquid: Liquid diet as tolerated                           Strategies: Head of Bed at 90 Degrees                          Medication Administration: Medication Administration : Float Whole with Puree  Plan of Care: Speech tx services 2xs a week for consult and support until further POC determined.  Post-Acute Therapy: Currently anticipate no further skilled therapy needs once patient is discharged from the inpatient setting.    See \"Rehab Therapy-Acute\" Patient Summary Report for complete documentation.   "

## 2017-10-05 NOTE — PROGRESS NOTES
Infectious Disease Progress Note    Author: Ny Chilel M.D. Date & Time of service: 10/5/2017  10:15 AM    Chief Complaint:  Altered mental status    Interval History:  86-year-old male admitted for altered mental status. He was recently treated for L3-4 discitis. His repeat MRI showed right iliacus muscle abscess  9/21/17-MAXIMUM TEMPERATURE 98. On 2 liters of oxygen. WBC 14 and creatinine 0.38  9/22- MAXIMUM TEMPERATURE 97.8. Complains of generalized malaise. Breathing is improved. Right leg pain is improved  9/23/17-MAXIMUM TEMPERATURE 99.5. Still has back pain when he sits up. The WBC 12  9/24/17-MAXIMUM TEMPERATURE 97.6. Feels better. No new issues overnight. WBC11.7  9/25 AF resting comfortably  9/26 AF, WBC 11.3 more awake eating OK.  Back pain about the same  9/27 AF WBC 11 had PICC placed-denies SE abx. No new complaints  9/28 AF tolerating abx well feels about the same  9/29 AF, no CBC, pt has no new complaints this morning, asking when he will be discharged and how long he will be on abx,  9/30 AF no CBC, eating lots of chocolate pudding/ice cream, no new issues per pt  10/2 AF, feels well and denies any pain, feels he is getting stronger and appetite improved  10/3 AF, WBC 9.1, felt dizzy while watching TV yesterday with head pressure but no HA, no nausea or vomiting, no recurrent symptoms today but states he was dizzy at home prior to admit, back pain only with getting out of bed  10/4 AF, no CBC, no dizziness, now having painful swallowing that started last night, did not eat breakfast  10/5 AF, no CBC, complaining of painful swallowing, could not swallow pills this am, waiting of FEES  Labs Reviewed, Medications Reviewed and Radiology Reviewed.    Review of Systems:  Review of Systems   Constitutional: Negative for chills and fever.   HENT:        Painful swallowing   Respiratory: Negative for cough and shortness of breath.    Cardiovascular: Positive for leg swelling. Negative for chest pain.    Gastrointestinal: Negative for abdominal pain, nausea and vomiting.   Musculoskeletal: Positive for back pain.        Improving   Neurological: Positive for dizziness. Negative for speech change and headaches.       Hemodynamics:  Temp (24hrs), Av.3 °C (97.4 °F), Min:36.2 °C (97.1 °F), Max:36.6 °C (97.9 °F)  Temperature: 36.2 °C (97.1 °F)  Pulse  Av.9  Min: 45  Max: 135   Blood Pressure : 113/60       Physical Exam:  Physical Exam   Constitutional: He appears well-developed. He is easily aroused. No distress.   Obese   HENT:   Head: Normocephalic and atraumatic.   Poor dentition   Eyes: No scleral icterus.   Neck: Neck supple.   Cardiovascular: Normal rate.  An irregularly irregular rhythm present.   Pulmonary/Chest: Effort normal. No respiratory distress. He has no wheezes. He has rales.   Abdominal: Soft. He exhibits no distension. There is no tenderness.   Musculoskeletal: He exhibits edema.   LUE PICC   Neurological: He is alert and easily aroused.   Nursing note and vitals reviewed.      Meds:    Current Facility-Administered Medications:   •  sucralfate  •  omeprazole  •  calcium carbonate  •  levetiracetam  •  PICC Line Insertion has been implemented **AND** May use Lidocaine 1% not to exceed 3 mls for local at insertion site **AND** NOTIFY MD **AND** Tip to dwell in the superior vena cava **AND** Do not use PICC Line until placement verified by Chest X Ray **AND** DX-CHEST-FOR PICC LINE Perform procedure in: PICC Room **AND** If radiologist reading of chest X-ray states any of the following the PICC should be used **AND** Further evaluation of the PICC placement can be retrieved from X-Ray and Imaging **AND** Blood draws through PICC line; draws by RN only **AND** FLUSHING GUIDELINES WHEN IN USE **AND** normal saline PF **AND** FLUSHING GUIDELINES WHEN NOT IN USE **AND** DRESSING MAINTENANCE **AND** Change needleless pressure ports and IV tubing every 72 hours per hospital policy **AND** TUBING  **AND** If there is an MD order to remove the PICC line, any RN may remove the PICC line **AND** [] PATIENT EDUCATION MATERIALS **AND** NURSING COMMUNICATION  •  ceftaroline (TEFLARO) ivpb  •  Metoprolol Tartrate  •  lorazepam  •  acetaminophen  •  digoxin  •  prochlorperazine  •  promethazine  •  hydrALAZINE  •  lisinopril  •  hyoscyamine-maalox plus-lidocaine viscous  •  NS  •  labetalol  •  ondansetron  •  metoprolol SR  •  atorvastatin  •  gabapentin  •  tamsulosin  •  senna-docusate **AND** polyethylene glycol/lytes **AND** magnesium hydroxide **AND** bisacodyl  •  Respiratory Care per Protocol    Labs:  Recent Labs      10/03/17   0245   WBC  9.1   RBC  2.87*   HEMOGLOBIN  8.8*   HEMATOCRIT  27.0*   MCV  94.1   MCH  30.7   RDW  55.0*   PLATELETCT  395   MPV  9.1   NEUTSPOLYS  61.40   LYMPHOCYTES  25.10   MONOCYTES  9.10   EOSINOPHILS  3.10   BASOPHILS  0.40     Recent Labs      10/03/17   0245   SODIUM  134*   POTASSIUM  4.3   CHLORIDE  103   CO2  24   GLUCOSE  91   BUN  10     Recent Labs      10/03/17   0245   CREATININE  0.59       Imaging:  Ct-abdomen-pelvis With    Result Date: 2017 6:34 PM HISTORY/REASON FOR EXAM:  Abscess noted on MRI. TECHNIQUE/EXAM DESCRIPTION:  CT scan of the abdomen and pelvis with contrast. Contrast-enhanced helical scanning was obtained from the diaphragmatic domes through the pubic symphysis following the bolus administration of nonionic contrast without complication. 100 mL of Omnipaque 350 nonionic contrast was administered without complication. Low dose optimization technique was utilized for this CT exam including automated exposure control and adjustment of the mA and/or kV according to patient size. COMPARISON: MRI from the same day FINDINGS: Lung bases: There are small bilateral pleural effusions with overlying atelectasis. Abdomen: No free air is seen in the abdomen or pelvis. Evaluation is limited by streak artifact from barium within the colon.  There is wall thickening of the distal esophagus with a small hiatal hernia. Liver appears unremarkable. Portal vein is patent. There is calcification in the pancreatic head. No adrenal mass is identified. Tiny hypodense left renal lesion is too small to characterize. There is mild prominence of the renal collecting systems bilaterally. Small hypodense right renal lesions too small to characterize. There are nonobstructing right renal calculi. Atherosclerotic plaque is seen in the aorta and its branches. There are small inguinal, retroperitoneal and mesenteric lymph nodes. There is no evidence of bowel obstruction. There is a moderate amount of stool in the rectosigmoid colon. There is colonic diverticulosis. The appendix is not identified. Stomach is distended with fluid. Small bowel loops are fluid-filled. Bladder is mildly distended with foci of air. There is asymmetric prominence of the right iliopsoas musculature with surrounding stranding. Findings likely related to the previously noted abscess collection. Degenerative changes are seen in the spine. Postsurgical changes are noted in the lumbar spine. Degenerative changes are seen at the hips. There is mild loss of height of the superior endplate of T11 with a Schmorl's node noted. There is mild endplate irregularity at L3/L4.     Prominence of the right iliopsoas muscle with surrounding inflammatory stranding. The known fluid collection was better delineated on the prior MRI. Mild endplate irregularity at L3/L4 can be seen in discitis/osteomyelitis. Air-fluid level in the bladder. Correlation with urinalysis is recommended. This can be seen in the setting of recent instrumentation, infection or fistula. Colonic diverticulosis. Moderate amount of colonic stool. Nonobstructing right renal calculi. Mild prominence of the renal collecting systems bilaterally. Small hypodense renal lesions are too small to characterize. Fluid-filled loops of small bowel can be seen  in the setting of enteritis. Small hiatal hernia with mild thickening of the distal esophagus. Calcifications in the pancreatic head can be seen in chronic pancreatitis. Atherosclerotic plaque. Small bilateral pleural effusions with overlying atelectasis.     Ct-drain-retroperitoneal    Result Date: 9/20/2017 9/20/2017 2:30 PM HISTORY/REASON FOR EXAM:  Multiloculated fluid collections in the right iliacus muscle. Suspected abscess. TECHNIQUE/EXAM DESCRIPTION: Right pelvic (extraperitoneal) retroperitoneal abscess drainage with CT guidance. Low dose optimization technique was utilized for this CT exam including automated exposure control and adjustment of the mA and/or kV according to patient size. PROCEDURE: Informed consent was obtained. Procedures performed with local anesthesia only with appropriate continuous patient monitoring by the radiology nurse. Sedation duration: N/A minutes Localizing CT images were obtained with the patient in supine position. The skin was prepped with Betadine and draped in a sterile fashion. Following local anesthesia with 1% Lidocaine, a 17 G guiding needle was placed and extraperitoneal needle path in the right side of the pelvis via the iliacus muscle confirmed with CT. An Amplatz guidewire was placed and following serial tract dilatation, a 8 Brazilian pigtail locking catheter was placed. A specimen was collected and submitted for culture and sensitivity and Gram stain. Total of 5 mL old bloody reddish-brown fluid was drained. No purulent fluid was obtained. The fluid was not malodorous. The catheter was secured to the skin and connected to suction bulb drainage. Final CT images were obtained documenting catheter position. The patient tolerated the procedure well with no evidence of complication. Low dose optimization technique was utilized for this CT exam including automated exposure control and adjustment of the mA and/or kV according to patient size. COMPARISON: MRI lumbar spine  9/19/2017, CT abdomen and pelvis 9/19/2017. FINDINGS: The final CT images show satisfactory catheter position within the target collection.     1.  CT GUIDED RETROPERITONEAL (EXTRAPERITONEAL) RIGHT PELVIC CATHETER DRAINAGE WITHIN THE RIGHT ILIACUS MUSCLE. OLD DARK REDDISH-BROWN BLOODY FLUID WAS OBTAINED. NO ABSCESS OR PURULENT MATERIAL. 2.  THE CURRENT PLAN IS TO CHECK CULTURES AND LIKELY REMOVED CATHETER IN THE SHORT-TERM AT 48-72 HOURS AS THERE IS UNLIKELY TO BE AN ABSCESS.    Ct-head W/o    Result Date: 9/13/2017 9/13/2017 9:30 PM HISTORY/REASON FOR EXAM:  Altered Mental Status. TECHNIQUE/EXAM DESCRIPTION AND NUMBER OF VIEWS: CT of the head without contrast. The study was performed on a helical multidetector CT scanner. Contiguous 2.5 mm axial sections were obtained from the skull base through the vertex. Up to date radiation dose reduction adjustments have been utilized to meet ALARA standards for radiation dose reduction. COMPARISON:  7/12/2017 FINDINGS: The lateral ventricles are enlarged. Cortical sulci are enlarged. Patchy areas of low attenuation in the white matter bilaterally. No significant mass effect or midline shift. Basal cisterns are patent. No evidence for intracranial hemorrhage. Calvaria are intact. Visualized orbits are unremarkable. Visualized mastoid air cells are clear. Visualized paranasal sinuses are unremarkable. Calcifications of the carotid arteries noted. Calcified scalp nodules again present.     1.  Diffuse atrophy and white matter changes. 2.  No acute intracranial hemorrhage or territorial infarct.     Dx-chest-portable (1 View)    Result Date: 9/16/2017 9/16/2017 10:31 PM HISTORY/REASON FOR EXAM:  Shortness of Breath. TECHNIQUE/EXAM DESCRIPTION AND NUMBER OF VIEWS: Single portable view of the chest. COMPARISON: 9/13/2017 FINDINGS: Cardiac mediastinal contour is unchanged. Mediastinum is again prominent. Mild prominence of the pulmonary interstitium. No gross pleural fluid or  pneumothorax. Dextroconvex curvature of thoracic spine.     No significant change from prior exam.    Dx-chest-portable (1 View)    Result Date: 9/13/2017 9/13/2017 8:44 PM HISTORY/REASON FOR EXAM:  Possible sepsis. TECHNIQUE/EXAM DESCRIPTION AND NUMBER OF VIEWS: Single portable view of the chest. COMPARISON: 8/7/2017 FINDINGS: Cardiac mediastinal contour is unchanged. Mild diffuse prominence of the interstitium again seen. No focal consolidation. No pleural fluid collection or pneumothorax. Dextroconvex curvature of thoracic spine. Diffuse osteopenia.     1.  Diffuse prominence of interstitium again seen, likely interstitial lung disease.  Mild pulmonary edema is also a consideration. 2.  No pneumonia or pneumothorax. 3.  Stable cardiomegaly.    Dx-small Bowel Series    Result Date: 9/17/2017 9/16/2017 10:39 AM HISTORY/REASON FOR EXAM:  Nausea and vomiting. TECHNIQUE/EXAM DESCRIPTION AND NUMBER OF VIEWS: Single contrast barium small bowel follow-through performed. Fluoroscopic images were obtained. No fluoroscopic images obtained. COMPARISON:  None available. FINDINGS:  view the abdomen demonstrates marked gaseous distention and small bowel and colonic distention. Patient drank thin barium. Contrast passed very slowly into dilated small bowel loops. Jejunal loops are dilated up to 5.2 cm. Overhead images were taken for a total of 22 hours before termination of the exam. Contrast reached the ileum, but does not extend into the colon. There is a large amount of stool in the colon.     1.  Gaseous distention with fairly diffuse bowel dilatation. Contrast extends into the ileum, but does not reach the colon within 22 hours. No transition point is identified to suggest mechanical bowel obstruction. 2.  Large amount of stool in the rectal vault.    Mr-lumbar Spine-with & W/o    Result Date: 9/19/2017 9/19/2017 1:35 PM HISTORY/REASON FOR EXAM:  Altered mental status, weakness. Possible discitis. Previous lumbar  surgery. TECHNIQUE/EXAM DESCRIPTION: MRI of the lumbar spine without and with contrast. The study was performed on a [employer] 1.5 Luc[employer] MRI scanner. T1 sagittal, T2 fast spin-echo sagittal, T2 axial images and T1 axial images were obtained of the lumbar spine. T1 post-contrast sagittal and T1 post-contrast axial images were obtained. Optional T2 fat-suppressed sagittal images may be obtained. 20 mL Omniscan gadolinium contrast was administered intravenously. COMPARISON:  MRI lumbar spine 7/7/2017, T abdomen and pelvis 1/6/2017 FINDINGS: Alignment in the lumbar spine again shows retrolisthesis of L3 on L4 of about 5 mm. There is fluid signal T2 hyperintensity again seen in the L3-L4 disc space and prominent marrow edema signal at the L3 inferior endplate and to a lesser extent L4 superior endplate. There is corresponding enhancement, particularly at the inferior endplate of L3. Findings are highly suspicious for discitis with osteomyelitis. There is postoperative change with lumbar laminectomy at L2-L3 through L5. There is posterior fusion with transpedicular screw fixation 4-L5. The posterior paraspinous soft tissues show expected postoperative changes with atrophy and fatty replacement in  the posterior paraspinous muscles at the operative levels. There is no significant postoperative dorsal epidural fluid collection. However, there has been interval development of multilocular fluid collections in the right iliacus muscle spanning about 42 mm. These are consistent with intramuscular abscesses (T2 axial image 4, series 5, T1 postcontrast axial image 1, series 10). The conus is normal in position and signal with its tip at the L1 level. At T12-L1, is a tiny central and left paramedian disc protrusion with an underlying annular fissure. There is mild hypertrophic facet arthropathy. Thecal sac is toward the lower range of normal without karla central stenosis. The neural foramina are intact. At L1-2, there is  negligible disc bulging. There is no central stenosis or significant foraminal stenosis. At L2-3, there is a small broad-based disc-osteophyte complex. There is no central stenosis as there is decompressive laminectomy at this level. There is no significant foraminal stenosis. At L3-4, there is posterior marginal spurring and disc bulging accentuated by the retrolisthesis. There is moderate bilateral lateral recess stenosis. Thecal sac is toward the lower range of normal even though there is a decompressive laminectomy at this  level. However, there is no karla central stenosis (T2 axial image 24, series 5). There is severe bilateral foraminal stenosis, right greater than left. This is unchanged from the previous exam. At L4-5, no central stenosis. There is inferior foraminal blunting with moderate bilateral foraminal stenosis. At L5-S1, there is minimal disc bulging. There is a left paramedian annular fissure. There is mild hypertrophic facet arthropathy. No central stenosis. There is moderate bilateral foraminal stenosis.     1.  L3-4 findings again suggesting discitis with osteomyelitis. No evidence of epidural abscess or epidural phlegmon. 2.  L3-4 retrolisthesis. 3.  Postoperative multilevel lumbar laminectomy and posterior fusion at L4-5. 4.  Interval development of multilocular intramuscular abscesses in the right iliacus muscle, partly in the field of view 5.  Degenerative and spondylotic changes as detailed for each level above in the body of report.    Dx-esophagus - Barium Swallow    Result Date: 9/16/2017 9/16/2017 10:39 AM HISTORY/REASON FOR EXAM:  Nausea. Nausea and vomiting. TECHNIQUE/EXAM DESCRIPTION AND NUMBER OF VIEWS: Single contrast esophagram procedure was performed. 5 fluoroscopic images obtained. Fluoroscopy time: 0.37 minutes COMPARISON:  None. FINDINGS: Limited exam secondary to limited patient mobility. The esophagus appears normal in caliber and configuration. No definite mucosal lesions  are identified on single-contrast techniques. Contrast passes freely into the stomach. No hiatal hernia is identified.     No abnormalities identified on limited single contrast esophagram.    Echocardiogram Comp W/o Cont    Result Date: 9/14/2017  Transthoracic Echo Report Echocardiography Laboratory CONCLUSIONS Prior study done on [id number] left ventricular systolic function. Normal regional wall motion. Left ventricular ejection fraction is visually estimated to be 70%. Diastolic function is difficult to assess with atrial fibrillation. The right ventricle was normal in size and function. Structurally normal mitral valve without significant stenosis or regurgitation. Aortic sclerosis without stenosis. No aortic insufficiency. Normal pericardium without effusion.FINDINGS Left Ventricle Normal left ventricular chamber size. Normal left ventricular wall thickness. Normal left ventricular systolic function. Normal regional wall motion. Left ventricular ejection fraction is visually estimated to be 70%. Diastolic function is difficult to assess with atrial fibrillation. Right Ventricle The right ventricle was normal in size and function. Right Atrium The right atrium is normal in size.  Inferior vena cava is not well visualized. Left Atrium The left atrium is normal in size.  Left atrial volume index is 19  mL/sq m. Mitral Valve Structurally normal mitral valve without significant stenosis or regurgitation.  Trace mitral regurgitation. Aortic Valve Aortic sclerosis without stenosis. Tricuspid aortic valve. No aortic insufficiency. Tricuspid Valve Structurally normal tricuspid valve without significant stenosis or regurgitation. Pulmonic Valve Structurally normal pulmonic valve without significant stenosis or regurgitation. Pericardium Normal pericardium without effusion. Aorta The aortic root is normal.  Ascending aorta diameter is 3.1 cm. Kingsley Hung M.D. (Electronically Signed) Final Date:     14  September 2017                 11:10      Micro:  Results     ** No results found for the last 168 hours. 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 Chua

## 2017-10-06 ENCOUNTER — APPOINTMENT (OUTPATIENT)
Dept: RADIOLOGY | Facility: MEDICAL CENTER | Age: 82
DRG: 871 | End: 2017-10-06
Attending: INTERNAL MEDICINE
Payer: MEDICARE

## 2017-10-06 PROBLEM — R13.10 DYSPHAGIA: Status: ACTIVE | Noted: 2017-10-06

## 2017-10-06 PROCEDURE — A9270 NON-COVERED ITEM OR SERVICE: HCPCS | Performed by: HOSPITALIST

## 2017-10-06 PROCEDURE — 700111 HCHG RX REV CODE 636 W/ 250 OVERRIDE (IP): Performed by: INTERNAL MEDICINE

## 2017-10-06 PROCEDURE — 74220 X-RAY XM ESOPHAGUS 1CNTRST: CPT

## 2017-10-06 PROCEDURE — 700102 HCHG RX REV CODE 250 W/ 637 OVERRIDE(OP): Performed by: HOSPITALIST

## 2017-10-06 PROCEDURE — 700105 HCHG RX REV CODE 258: Performed by: INTERNAL MEDICINE

## 2017-10-06 PROCEDURE — 700105 HCHG RX REV CODE 258: Performed by: HOSPITALIST

## 2017-10-06 PROCEDURE — 99232 SBSQ HOSP IP/OBS MODERATE 35: CPT | Performed by: INTERNAL MEDICINE

## 2017-10-06 PROCEDURE — 770006 HCHG ROOM/CARE - MED/SURG/GYN SEMI*

## 2017-10-06 RX ORDER — PANTOPRAZOLE SODIUM 40 MG/10ML
40 INJECTION, POWDER, LYOPHILIZED, FOR SOLUTION INTRAVENOUS DAILY
Status: DISCONTINUED | OUTPATIENT
Start: 2017-10-07 | End: 2017-10-11

## 2017-10-06 RX ORDER — LORAZEPAM 2 MG/ML
1 INJECTION INTRAMUSCULAR EVERY 4 HOURS PRN
Status: DISCONTINUED | OUTPATIENT
Start: 2017-10-06 | End: 2017-10-23 | Stop reason: HOSPADM

## 2017-10-06 RX ORDER — FLUCONAZOLE 100 MG/1
100 TABLET ORAL DAILY
Status: DISCONTINUED | OUTPATIENT
Start: 2017-10-06 | End: 2017-10-09

## 2017-10-06 RX ADMIN — PROCHLORPERAZINE EDISYLATE 10 MG: 5 INJECTION INTRAMUSCULAR; INTRAVENOUS at 08:52

## 2017-10-06 RX ADMIN — CEFTAROLINE FOSAMIL 600 MG: 600 POWDER, FOR SOLUTION INTRAVENOUS at 08:47

## 2017-10-06 RX ADMIN — CEFTAROLINE FOSAMIL 600 MG: 600 POWDER, FOR SOLUTION INTRAVENOUS at 22:08

## 2017-10-06 RX ADMIN — SODIUM CHLORIDE: 9 INJECTION, SOLUTION INTRAVENOUS at 05:26

## 2017-10-06 RX ADMIN — SUCRALFATE 1 G: 1 SUSPENSION ORAL at 06:08

## 2017-10-06 ASSESSMENT — ENCOUNTER SYMPTOMS
BLURRED VISION: 0
DOUBLE VISION: 0
BACK PAIN: 1
MYALGIAS: 0
WEAKNESS: 0
HEADACHES: 0
COUGH: 0
ABDOMINAL PAIN: 0
WEIGHT LOSS: 0
VOMITING: 0
NAUSEA: 0
DEPRESSION: 0
NAUSEA: 1
CHILLS: 0
SHORTNESS OF BREATH: 0
FEVER: 0

## 2017-10-06 ASSESSMENT — PAIN SCALES - GENERAL
PAINLEVEL_OUTOF10: 6
PAINLEVEL_OUTOF10: 5
PAINLEVEL_OUTOF10: 5
PAINLEVEL_OUTOF10: 4
PAINLEVEL_OUTOF10: 4

## 2017-10-06 ASSESSMENT — LIFESTYLE VARIABLES: DO YOU DRINK ALCOHOL: NO

## 2017-10-06 NOTE — THERAPY
Pt nauseated today and c/o trouble swallowing. Pt had a procedure today and is too fatigued. Will attempt @ another time.

## 2017-10-06 NOTE — PROGRESS NOTES
[facility] Hospitalist Progress Note    Date of Service: 10/6/2017    Chief Complaint  86 y.o. 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 Patient had a barium swallow which did not show any acute findings. GI, Dr. Waters consulted for further input.     Consultants/Specialty  Infectious diseases Dr. Getachew Vincent  of cardiology - signed off  GI Dr. Waters    Disposition  Pt to go to [facility] to complete abx therapy once medically clear.        Review of Systems   Constitutional: Negative for chills and weight loss.   HENT:        Trouble swallowing, denies any regurgitation of food, N/V     Eyes: Negative for blurred vision and double vision.   Respiratory: Negative for cough and shortness of breath.    Cardiovascular: Negative for chest pain (chest wall on the left).   Gastrointestinal: Positive for nausea. Negative for abdominal pain and vomiting.        Trouble swallowing medications and food. Denies regurgitation   Musculoskeletal: Negative for joint pain and myalgias.   Neurological: Negative for weakness and headaches.   Psychiatric/Behavioral: Negative for depression.      Physical Exam  Laboratory/Imaging   Hemodynamics  Temp (24hrs), Av.4 °C (97.6 °F), Min:36.1 °C (97 °F), Max:36.6 °C (97.9 °F)   Temperature: 36.1 °C (97 °F)  Pulse  Av.8  Min: 45  Max: 135    Blood Pressure : 134/76      Respiratory      Respiration: 17, Pulse Oximetry: 96 %     Work Of Breathing / Effort: Mild  RUL Breath Sounds: Diminished, RML Breath Sounds: Diminished, RLL Breath Sounds: Diminished, MIKE Breath Sounds: Diminished, LLL Breath Sounds: Diminished    Fluids    Intake/Output Summary (Last 24 hours) at 10/06/17 1233  Last data filed at 10/06/17 0243   Gross per 24 hour   Intake                0 ml   Output               550 ml   Net             -550 ml       Nutrition  Orders Placed This Encounter   Procedures   • DIET ORDER     Standing Status:   Standing     Number of Occurrences:   1     Order Specific Question:   Diet:     Answer:   Regular [1]     Comments:   Ensure on Each Tray     Physical Exam   Constitutional: He is oriented to person, place, and time. He appears well-developed and well-nourished. He is cooperative. He does not appear ill. No distress. Nasal cannula in place.   Obese Male     HENT:   Head: Normocephalic and atraumatic.   Eyes: Conjunctivae are normal. Right eye exhibits no discharge. Left eye exhibits no discharge.   Neck: Normal range of motion. Neck supple. No tracheal deviation present.   Cardiovascular: Normal rate and regular rhythm.    No murmur heard.  Pulmonary/Chest: Effort normal. No stridor. No respiratory distress.   Decreased breath sounds bilateral bases   Abdominal: Soft. Bowel sounds are normal. He exhibits no distension. There is no tenderness. There is no rebound and no guarding.   Musculoskeletal: Normal range of motion. He exhibits no edema or tenderness.   Neurological: He is alert and oriented to person, place, and time.   Skin: Skin is warm and dry.   Psychiatric: He has a normal mood and affect. His behavior is normal.   Nursing note and vitals reviewed.                               Assessment/Plan     Nontraumatic psoas hematoma- (present on admission)   Assessment & Plan    Patient remains off of anticoagulation continue to monitor psoas hematoma as well as monitor H&H for any drops.        Persistent atrial fibrillation (CMS-HCC)- (present on admission)   Assessment & Plan    Currently rate is well controlled.  He is not anticoagulated with his history of falls and possible bleeding.  Continue this point with rate control only.        Discitis of lumbar region- (present on admission)   Assessment & Plan    Patient currently on abx per ID recs, stop date 10/18           Debility- (present on admission)   Assessment & Plan    Continue at this point with physical therapy and occupational therapy in-house.  Once against a long-term acute care the patient will need also continued physical therapy and occupational therapy.        Dysphagia   Assessment & Plan    SLP following, on liquid diet  Barium Swallow shows no acute findings, hiatal hernia  GI consulted, Dr. Waters for further evaluation  On aspiration precautions        DNR (do not resuscitate)- (present on admission)   Assessment & Plan    Confirmed with patient and he continues to be an DNR code status.        Normocytic anemia- (present on admission)   Assessment & Plan    Follow hemoglobin and hematocrit levels.  Transfuse PRN if Hb 7 and Hct 21 transfusion will be considered.  Patient will need at this point continued monitoring of his iron levels B12 and folic acid levels long-term.            Reviewed items::  Labs reviewed and Medications reviewed  Hearn catheter::  No Hearn  DVT prophylaxis - mechanical:  SCDs  Ulcer Prophylaxis::  Yes  Antibiotics:  Treating active infection/contamination beyond 24 hours perioperative coverage

## 2017-10-06 NOTE — ASSESSMENT & PLAN NOTE
secondary to persistent esophagitis    Esophagitis aggressively managed with IV Diflucan, topical Nystatin  His symptoms are much improved, will de-escalate Diflucan to oral

## 2017-10-06 NOTE — PROGRESS NOTES
Pt A/O x 4. On RA. Reports pain 4/10 on his throat, denies pain medication at this time. Refusing all po meds due to pain on throat. All assessment completed. SCD's on to BLE. Call light within reach. Bed in the lowest position. Hourly rounding in place.

## 2017-10-07 LAB
ALBUMIN SERPL BCP-MCNC: 3.5 G/DL (ref 3.2–4.9)
ALBUMIN/GLOB SERPL: 1 G/DL
ALP SERPL-CCNC: 72 U/L (ref 30–99)
ALT SERPL-CCNC: 7 U/L (ref 2–50)
ANION GAP SERPL CALC-SCNC: 12 MMOL/L (ref 0–11.9)
APPEARANCE UR: CLEAR
AST SERPL-CCNC: 12 U/L (ref 12–45)
BASOPHILS # BLD AUTO: 0.5 % (ref 0–1.8)
BASOPHILS # BLD: 0.05 K/UL (ref 0–0.12)
BILIRUB SERPL-MCNC: 0.6 MG/DL (ref 0.1–1.5)
BILIRUB UR QL STRIP.AUTO: NEGATIVE
BUN SERPL-MCNC: 7 MG/DL (ref 8–22)
CALCIUM SERPL-MCNC: 10 MG/DL (ref 8.5–10.5)
CHLORIDE SERPL-SCNC: 100 MMOL/L (ref 96–112)
CO2 SERPL-SCNC: 19 MMOL/L (ref 20–33)
COLOR UR: YELLOW
CREAT SERPL-MCNC: 0.48 MG/DL (ref 0.5–1.4)
DIGOXIN SERPL-MCNC: 0.2 NG/ML (ref 0.8–2)
EKG IMPRESSION: NORMAL
EOSINOPHIL # BLD AUTO: 0.04 K/UL (ref 0–0.51)
EOSINOPHIL NFR BLD: 0.4 % (ref 0–6.9)
ERYTHROCYTE [DISTWIDTH] IN BLOOD BY AUTOMATED COUNT: 52 FL (ref 35.9–50)
GFR SERPL CREATININE-BSD FRML MDRD: >60 ML/MIN/1.73 M 2
GLOBULIN SER CALC-MCNC: 3.6 G/DL (ref 1.9–3.5)
GLUCOSE SERPL-MCNC: 133 MG/DL (ref 65–99)
GLUCOSE UR STRIP.AUTO-MCNC: NEGATIVE MG/DL
HCT VFR BLD AUTO: 34.6 % (ref 42–52)
HGB BLD-MCNC: 11.2 G/DL (ref 14–18)
IMM GRANULOCYTES # BLD AUTO: 0.04 K/UL (ref 0–0.11)
IMM GRANULOCYTES NFR BLD AUTO: 0.4 % (ref 0–0.9)
KETONES UR STRIP.AUTO-MCNC: NEGATIVE MG/DL
LACTATE BLD-SCNC: 2.3 MMOL/L (ref 0.5–2)
LEUKOCYTE ESTERASE UR QL STRIP.AUTO: NEGATIVE
LYMPHOCYTES # BLD AUTO: 1.26 K/UL (ref 1–4.8)
LYMPHOCYTES NFR BLD: 12.9 % (ref 22–41)
MCH RBC QN AUTO: 30.2 PG (ref 27–33)
MCHC RBC AUTO-ENTMCNC: 32.8 G/DL (ref 33.7–35.3)
MCV RBC AUTO: 92 FL (ref 81.4–97.8)
MICRO URNS: NORMAL
MONOCYTES # BLD AUTO: 0.92 K/UL (ref 0–0.85)
MONOCYTES NFR BLD AUTO: 9.4 % (ref 0–13.4)
NEUTROPHILS # BLD AUTO: 7.49 K/UL (ref 1.82–7.42)
NEUTROPHILS NFR BLD: 76.4 % (ref 44–72)
NITRITE UR QL STRIP.AUTO: NEGATIVE
NRBC # BLD AUTO: 0 K/UL
NRBC BLD AUTO-RTO: 0 /100 WBC
PH UR STRIP.AUTO: 5.5 [PH]
PLATELET # BLD AUTO: 326 K/UL (ref 164–446)
PMV BLD AUTO: 9 FL (ref 9–12.9)
POTASSIUM SERPL-SCNC: 3.7 MMOL/L (ref 3.6–5.5)
PROT SERPL-MCNC: 7.1 G/DL (ref 6–8.2)
PROT UR QL STRIP: NEGATIVE MG/DL
RBC # BLD AUTO: 3.74 M/UL (ref 4.7–6.1)
RBC UR QL AUTO: NEGATIVE
SODIUM SERPL-SCNC: 131 MMOL/L (ref 135–145)
SP GR UR STRIP.AUTO: 1.01
TROPONIN I SERPL-MCNC: <0.01 NG/ML (ref 0–0.04)
UROBILINOGEN UR STRIP.AUTO-MCNC: 0.2 MG/DL
WBC # BLD AUTO: 9.8 K/UL (ref 4.8–10.8)

## 2017-10-07 PROCEDURE — 700101 HCHG RX REV CODE 250: Performed by: INTERNAL MEDICINE

## 2017-10-07 PROCEDURE — 93005 ELECTROCARDIOGRAM TRACING: CPT | Performed by: INTERNAL MEDICINE

## 2017-10-07 PROCEDURE — 99232 SBSQ HOSP IP/OBS MODERATE 35: CPT | Performed by: FAMILY MEDICINE

## 2017-10-07 PROCEDURE — 700105 HCHG RX REV CODE 258: Performed by: INTERNAL MEDICINE

## 2017-10-07 PROCEDURE — 700102 HCHG RX REV CODE 250 W/ 637 OVERRIDE(OP): Performed by: HOSPITALIST

## 2017-10-07 PROCEDURE — 700111 HCHG RX REV CODE 636 W/ 250 OVERRIDE (IP): Performed by: FAMILY MEDICINE

## 2017-10-07 PROCEDURE — 87086 URINE CULTURE/COLONY COUNT: CPT

## 2017-10-07 PROCEDURE — 700105 HCHG RX REV CODE 258: Performed by: HOSPITALIST

## 2017-10-07 PROCEDURE — A9270 NON-COVERED ITEM OR SERVICE: HCPCS | Performed by: INTERNAL MEDICINE

## 2017-10-07 PROCEDURE — 84484 ASSAY OF TROPONIN QUANT: CPT

## 2017-10-07 PROCEDURE — 80053 COMPREHEN METABOLIC PANEL: CPT

## 2017-10-07 PROCEDURE — 85025 COMPLETE CBC W/AUTO DIFF WBC: CPT

## 2017-10-07 PROCEDURE — 93005 ELECTROCARDIOGRAM TRACING: CPT | Performed by: FAMILY MEDICINE

## 2017-10-07 PROCEDURE — 700102 HCHG RX REV CODE 250 W/ 637 OVERRIDE(OP): Performed by: INTERNAL MEDICINE

## 2017-10-07 PROCEDURE — 80162 ASSAY OF DIGOXIN TOTAL: CPT

## 2017-10-07 PROCEDURE — C9113 INJ PANTOPRAZOLE SODIUM, VIA: HCPCS | Performed by: INTERNAL MEDICINE

## 2017-10-07 PROCEDURE — 93010 ELECTROCARDIOGRAM REPORT: CPT | Mod: 76 | Performed by: INTERNAL MEDICINE

## 2017-10-07 PROCEDURE — 770020 HCHG ROOM/CARE - TELE (206)

## 2017-10-07 PROCEDURE — 81003 URINALYSIS AUTO W/O SCOPE: CPT

## 2017-10-07 PROCEDURE — 700111 HCHG RX REV CODE 636 W/ 250 OVERRIDE (IP): Performed by: INTERNAL MEDICINE

## 2017-10-07 PROCEDURE — 83605 ASSAY OF LACTIC ACID: CPT

## 2017-10-07 PROCEDURE — A9270 NON-COVERED ITEM OR SERVICE: HCPCS | Performed by: HOSPITALIST

## 2017-10-07 PROCEDURE — 700111 HCHG RX REV CODE 636 W/ 250 OVERRIDE (IP): Performed by: HOSPITALIST

## 2017-10-07 RX ORDER — DIGOXIN 125 MCG
500 TABLET ORAL DAILY
Status: DISCONTINUED | OUTPATIENT
Start: 2017-10-07 | End: 2017-10-07

## 2017-10-07 RX ORDER — METOPROLOL TARTRATE 1 MG/ML
5 INJECTION, SOLUTION INTRAVENOUS EVERY 6 HOURS
Status: DISCONTINUED | OUTPATIENT
Start: 2017-10-07 | End: 2017-10-11

## 2017-10-07 RX ORDER — MORPHINE SULFATE 4 MG/ML
2 INJECTION, SOLUTION INTRAMUSCULAR; INTRAVENOUS ONCE
Status: COMPLETED | OUTPATIENT
Start: 2017-10-07 | End: 2017-10-07

## 2017-10-07 RX ORDER — DIGOXIN 0.25 MG/ML
500 INJECTION INTRAMUSCULAR; INTRAVENOUS ONCE
Status: COMPLETED | OUTPATIENT
Start: 2017-10-07 | End: 2017-10-07

## 2017-10-07 RX ORDER — DILTIAZEM HYDROCHLORIDE 5 MG/ML
10 INJECTION INTRAVENOUS ONCE
Status: COMPLETED | OUTPATIENT
Start: 2017-10-07 | End: 2017-10-07

## 2017-10-07 RX ADMIN — DILTIAZEM HYDROCHLORIDE 10 MG: 5 INJECTION INTRAVENOUS at 06:44

## 2017-10-07 RX ADMIN — SUCRALFATE 1 G: 1 SUSPENSION ORAL at 16:25

## 2017-10-07 RX ADMIN — HYDRALAZINE HYDROCHLORIDE 20 MG: 20 INJECTION INTRAMUSCULAR; INTRAVENOUS at 04:57

## 2017-10-07 RX ADMIN — DIGOXIN 500 MCG: 0.25 INJECTION INTRAMUSCULAR; INTRAVENOUS at 12:33

## 2017-10-07 RX ADMIN — CEFTAROLINE FOSAMIL 600 MG: 600 POWDER, FOR SOLUTION INTRAVENOUS at 21:05

## 2017-10-07 RX ADMIN — ANTACID TABLETS 1000 MG: 500 TABLET, CHEWABLE ORAL at 16:25

## 2017-10-07 RX ADMIN — PANTOPRAZOLE SODIUM 40 MG: 40 INJECTION, POWDER, FOR SOLUTION INTRAVENOUS at 12:32

## 2017-10-07 RX ADMIN — LIDOCAINE HYDROCHLORIDE 30 ML: 20 SOLUTION OROPHARYNGEAL at 21:25

## 2017-10-07 RX ADMIN — SODIUM CHLORIDE: 9 INJECTION, SOLUTION INTRAVENOUS at 16:55

## 2017-10-07 RX ADMIN — ONDANSETRON 4 MG: 2 INJECTION INTRAMUSCULAR; INTRAVENOUS at 06:55

## 2017-10-07 RX ADMIN — SUCRALFATE 1 G: 1 SUSPENSION ORAL at 21:05

## 2017-10-07 RX ADMIN — METOPROLOL TARTRATE 5 MG: 5 INJECTION INTRAVENOUS at 14:22

## 2017-10-07 RX ADMIN — SODIUM CHLORIDE: 9 INJECTION, SOLUTION INTRAVENOUS at 00:19

## 2017-10-07 RX ADMIN — MORPHINE SULFATE 2 MG: 4 INJECTION INTRAVENOUS at 06:51

## 2017-10-07 RX ADMIN — CEFTAROLINE FOSAMIL 600 MG: 600 POWDER, FOR SOLUTION INTRAVENOUS at 08:48

## 2017-10-07 ASSESSMENT — ENCOUNTER SYMPTOMS
COUGH: 0
DEPRESSION: 0
PALPITATIONS: 1
SHORTNESS OF BREATH: 0
HEADACHES: 0
WEIGHT LOSS: 0
PSYCHIATRIC NEGATIVE: 1
CHILLS: 0
BLURRED VISION: 0
BACK PAIN: 1
ABDOMINAL PAIN: 0
VOMITING: 0
FEVER: 0
NAUSEA: 0
HEARTBURN: 1
WEAKNESS: 0
MYALGIAS: 0
DOUBLE VISION: 0

## 2017-10-07 ASSESSMENT — COPD QUESTIONNAIRES
COPD SCREENING SCORE: 5
HAVE YOU SMOKED AT LEAST 100 CIGARETTES IN YOUR ENTIRE LIFE: YES
DURING THE PAST 4 WEEKS HOW MUCH DID YOU FEEL SHORT OF BREATH: SOME OF THE TIME
DO YOU EVER COUGH UP ANY MUCUS OR PHLEGM?: NO/ONLY WITH OCCASIONAL COLDS OR INFECTIONS

## 2017-10-07 ASSESSMENT — PATIENT HEALTH QUESTIONNAIRE - PHQ9
SUM OF ALL RESPONSES TO PHQ9 QUESTIONS 1 AND 2: 0
2. FEELING DOWN, DEPRESSED, IRRITABLE, OR HOPELESS: NOT AT ALL
1. LITTLE INTEREST OR PLEASURE IN DOING THINGS: NOT AT ALL
SUM OF ALL RESPONSES TO PHQ QUESTIONS 1-9: 0

## 2017-10-07 ASSESSMENT — LIFESTYLE VARIABLES: DO YOU DRINK ALCOHOL: NO

## 2017-10-07 ASSESSMENT — PAIN SCALES - GENERAL
PAINLEVEL_OUTOF10: 3
PAINLEVEL_OUTOF10: 0
PAINLEVEL_OUTOF10: 0

## 2017-10-07 NOTE — PROGRESS NOTES
Infectious Disease Progress Note    Author: Shante Sequeira M.D. Date & Time of service: 10/7/2017  2:38 PM    Chief Complaint:  Altered mental status    Interval History:  86-year-old male admitted for altered mental status. He was recently treated for L3-4 discitis. His repeat MRI showed right iliacus muscle abscess  9/21/17-MAXIMUM TEMPERATURE 98. On 2 liters of oxygen. WBC 14 and creatinine 0.38  9/22- MAXIMUM TEMPERATURE 97.8. Complains of generalized malaise. Breathing is improved. Right leg pain is improved  9/23/17-MAXIMUM TEMPERATURE 99.5. Still has back pain when he sits up. The WBC 12  9/24/17-MAXIMUM TEMPERATURE 97.6. Feels better. No new issues overnight. WBC11.7  9/25 AF resting comfortably  9/26 AF, WBC 11.3 more awake eating OK.  Back pain about the same  9/27 AF WBC 11 had PICC placed-denies SE abx. No new complaints  9/28 AF tolerating abx well feels about the same  9/29 AF, no CBC, pt has no new complaints this morning, asking when he will be discharged and how long he will be on abx,  9/30 AF no CBC, eating lots of chocolate pudding/ice cream, no new issues per pt  10/2 AF, feels well and denies any pain, feels he is getting stronger and appetite improved  10/3 AF, WBC 9.1, felt dizzy while watching TV yesterday with head pressure but no HA, no nausea or vomiting, no recurrent symptoms today but states he was dizzy at home prior to admit, back pain only with getting out of bed  10/4 AF, no CBC, no dizziness, now having painful swallowing that started last night, did not eat breakfast  10/5 AF, no CBC, complaining of painful swallowing, could not swallow pills this am, waiting of FEES  10/6 AF WBC 9.1 tired and feels like he has pressure on his eyes  10/7 AF WBC 9.8 episode CP and Afib with RVR-eyes sxs resolved. No current CP or SOB  Labs Reviewed, Medications Reviewed and Radiology Reviewed.    Review of Systems:  Review of Systems   Constitutional: Positive for malaise/fatigue. Negative for  chills and fever.   HENT:        Painful swallowing   Respiratory: Negative for cough and shortness of breath.    Cardiovascular: Positive for chest pain and leg swelling.   Gastrointestinal: Negative for abdominal pain, nausea and vomiting.   Musculoskeletal: Positive for back pain.        Improving   Neurological: Negative for headaches.       Hemodynamics:  Temp (24hrs), Av.4 °C (97.5 °F), Min:36 °C (96.8 °F), Max:37.1 °C (98.8 °F)  Temperature: 36.3 °C (97.4 °F)  Pulse  Av.6  Min: 45  Max: 147   Blood Pressure : 105/58       Physical Exam:  Physical Exam   Constitutional: He appears well-developed. He is easily aroused. No distress.   Obese   HENT:   Head: Normocephalic and atraumatic.   Poor dentition   Eyes: Conjunctivae and EOM are normal. Pupils are equal, round, and reactive to light. No scleral icterus.   Neck: Neck supple.   Cardiovascular: Normal rate.  An irregularly irregular rhythm present.   IRR   Pulmonary/Chest: Effort normal. No respiratory distress. He has no wheezes. He has rales.   Abdominal: Soft. He exhibits no distension. There is no tenderness.   Musculoskeletal: He exhibits edema.   LUE PICC   Neurological: He is alert and easily aroused.   Nursing note and vitals reviewed.      Meds:    Current Facility-Administered Medications:   •  Metoprolol Tartrate  •  pantoprazole  •  fluconazole  •  lorazepam  •  ceftaroline (TEFLARO) ivpb  •  sucralfate  •  calcium carbonate  •  levetiracetam  •  PICC Line Insertion has been implemented **AND** May use Lidocaine 1% not to exceed 3 mls for local at insertion site **AND** NOTIFY MD **AND** Tip to dwell in the superior vena cava **AND** Do not use PICC Line until placement verified by Chest X Ray **AND** DX-CHEST-FOR PICC LINE Perform procedure in: PICC Room **AND** If radiologist reading of chest X-ray states any of the following the PICC should be used **AND** Further evaluation of the PICC placement can be retrieved from X-Ray and Imaging  **AND** Blood draws through PICC line; draws by RN only **AND** FLUSHING GUIDELINES WHEN IN USE **AND** normal saline PF **AND** FLUSHING GUIDELINES WHEN NOT IN USE **AND** DRESSING MAINTENANCE **AND** Change needleless pressure ports and IV tubing every 72 hours per hospital policy **AND** TUBING **AND** If there is an MD order to remove the PICC line, any RN may remove the PICC line **AND** [] PATIENT EDUCATION MATERIALS **AND** NURSING COMMUNICATION  •  Metoprolol Tartrate  •  acetaminophen  •  digoxin  •  prochlorperazine  •  promethazine  •  hydrALAZINE  •  hyoscyamine-maalox plus-lidocaine viscous  •  NS  •  ondansetron  •  metoprolol SR  •  atorvastatin  •  gabapentin  •  tamsulosin  •  senna-docusate **AND** polyethylene glycol/lytes **AND** magnesium hydroxide **AND** bisacodyl  •  Respiratory Care per Protocol    Labs:  Recent Labs      10/07/17   0550   WBC  9.8   RBC  3.74*   HEMOGLOBIN  11.2*   HEMATOCRIT  34.6*   MCV  92.0   MCH  30.2   RDW  52.0*   PLATELETCT  326   MPV  9.0   NEUTSPOLYS  76.40*   LYMPHOCYTES  12.90*   MONOCYTES  9.40   EOSINOPHILS  0.40   BASOPHILS  0.50     Recent Labs      10/07/17   0550   SODIUM  131*   POTASSIUM  3.7   CHLORIDE  100   CO2  19*   GLUCOSE  133*   BUN  7*     Recent Labs      10/07/17   0550   ALBUMIN  3.5   TBILIRUBIN  0.6   ALKPHOSPHAT  72   TOTPROTEIN  7.1   ALTSGPT  7   ASTSGOT  12   CREATININE  0.48*       Imaging:  Ct-abdomen-pelvis With    Result Date: 2017 6:34 PM HISTORY/REASON FOR EXAM:  Abscess noted on MRI. TECHNIQUE/EXAM DESCRIPTION:  CT scan of the abdomen and pelvis with contrast. Contrast-enhanced helical scanning was obtained from the diaphragmatic domes through the pubic symphysis following the bolus administration of nonionic contrast without complication. 100 mL of Omnipaque 350 nonionic contrast was administered without complication. Low dose optimization technique was utilized for this CT exam including automated  exposure control and adjustment of the mA and/or kV according to patient size. COMPARISON: MRI from the same day FINDINGS: Lung bases: There are small bilateral pleural effusions with overlying atelectasis. Abdomen: No free air is seen in the abdomen or pelvis. Evaluation is limited by streak artifact from barium within the colon. There is wall thickening of the distal esophagus with a small hiatal hernia. Liver appears unremarkable. Portal vein is patent. There is calcification in the pancreatic head. No adrenal mass is identified. Tiny hypodense left renal lesion is too small to characterize. There is mild prominence of the renal collecting systems bilaterally. Small hypodense right renal lesions too small to characterize. There are nonobstructing right renal calculi. Atherosclerotic plaque is seen in the aorta and its branches. There are small inguinal, retroperitoneal and mesenteric lymph nodes. There is no evidence of bowel obstruction. There is a moderate amount of stool in the rectosigmoid colon. There is colonic diverticulosis. The appendix is not identified. Stomach is distended with fluid. Small bowel loops are fluid-filled. Bladder is mildly distended with foci of air. There is asymmetric prominence of the right iliopsoas musculature with surrounding stranding. Findings likely related to the previously noted abscess collection. Degenerative changes are seen in the spine. Postsurgical changes are noted in the lumbar spine. Degenerative changes are seen at the hips. There is mild loss of height of the superior endplate of T11 with a Schmorl's node noted. There is mild endplate irregularity at L3/L4.     Prominence of the right iliopsoas muscle with surrounding inflammatory stranding. The known fluid collection was better delineated on the prior MRI. Mild endplate irregularity at L3/L4 can be seen in discitis/osteomyelitis. Air-fluid level in the bladder. Correlation with urinalysis is recommended. This can  be seen in the setting of recent instrumentation, infection or fistula. Colonic diverticulosis. Moderate amount of colonic stool. Nonobstructing right renal calculi. Mild prominence of the renal collecting systems bilaterally. Small hypodense renal lesions are too small to characterize. Fluid-filled loops of small bowel can be seen in the setting of enteritis. Small hiatal hernia with mild thickening of the distal esophagus. Calcifications in the pancreatic head can be seen in chronic pancreatitis. Atherosclerotic plaque. Small bilateral pleural effusions with overlying atelectasis.     Ct-drain-retroperitoneal    Result Date: 9/20/2017 9/20/2017 2:30 PM HISTORY/REASON FOR EXAM:  Multiloculated fluid collections in the right iliacus muscle. Suspected abscess. TECHNIQUE/EXAM DESCRIPTION: Right pelvic (extraperitoneal) retroperitoneal abscess drainage with CT guidance. Low dose optimization technique was utilized for this CT exam including automated exposure control and adjustment of the mA and/or kV according to patient size. PROCEDURE: Informed consent was obtained. Procedures performed with local anesthesia only with appropriate continuous patient monitoring by the radiology nurse. Sedation duration: N/A minutes Localizing CT images were obtained with the patient in supine position. The skin was prepped with Betadine and draped in a sterile fashion. Following local anesthesia with 1% Lidocaine, a 17 G guiding needle was placed and extraperitoneal needle path in the right side of the pelvis via the iliacus muscle confirmed with CT. An Amplatz guidewire was placed and following serial tract dilatation, a 8 [place]an pigtail locking catheter was placed. A specimen was collected and submitted for culture and sensitivity and Gram stain. Total of 5 mL old bloody reddish-brown fluid was drained. No purulent fluid was obtained. The fluid was not malodorous. The catheter was secured to the skin and connected to suction bulb  drainage. Final CT images were obtained documenting catheter position. The patient tolerated the procedure well with no evidence of complication. Low dose optimization technique was utilized for this CT exam including automated exposure control and adjustment of the mA and/or kV according to patient size. COMPARISON: MRI lumbar spine 9/19/2017, CT abdomen and pelvis 9/19/2017. FINDINGS: The final CT images show satisfactory catheter position within the target collection.     1.  CT GUIDED RETROPERITONEAL (EXTRAPERITONEAL) RIGHT PELVIC CATHETER DRAINAGE WITHIN THE RIGHT ILIACUS MUSCLE. OLD DARK REDDISH-BROWN BLOODY FLUID WAS OBTAINED. NO ABSCESS OR PURULENT MATERIAL. 2.  THE CURRENT PLAN IS TO CHECK CULTURES AND LIKELY REMOVED CATHETER IN THE SHORT-TERM AT 48-72 HOURS AS THERE IS UNLIKELY TO BE AN ABSCESS.    Ct-head W/o    Result Date: 9/13/2017 9/13/2017 9:30 PM HISTORY/REASON FOR EXAM:  Altered Mental Status. TECHNIQUE/EXAM DESCRIPTION AND NUMBER OF VIEWS: CT of the head without contrast. The study was performed on a helical multidetector CT scanner. Contiguous 2.5 mm axial sections were obtained from the skull base through the vertex. Up to date radiation dose reduction adjustments have been utilized to meet ALARA standards for radiation dose reduction. COMPARISON:  7/12/2017 FINDINGS: The lateral ventricles are enlarged. Cortical sulci are enlarged. Patchy areas of low attenuation in the white matter bilaterally. No significant mass effect or midline shift. Basal cisterns are patent. No evidence for intracranial hemorrhage. Calvaria are intact. Visualized orbits are unremarkable. Visualized mastoid air cells are clear. Visualized paranasal sinuses are unremarkable. Calcifications of the carotid arteries noted. Calcified scalp nodules again present.     1.  Diffuse atrophy and white matter changes. 2.  No acute intracranial hemorrhage or territorial infarct.     Dx-chest-portable (1 View)    Result Date:  9/16/2017 9/16/2017 10:31 PM HISTORY/REASON FOR EXAM:  Shortness of Breath. TECHNIQUE/EXAM DESCRIPTION AND NUMBER OF VIEWS: Single portable view of the chest. COMPARISON: 9/13/2017 FINDINGS: Cardiac mediastinal contour is unchanged. Mediastinum is again prominent. Mild prominence of the pulmonary interstitium. No gross pleural fluid or pneumothorax. Dextroconvex curvature of thoracic spine.     No significant change from prior exam.    Dx-chest-portable (1 View)    Result Date: 9/13/2017 9/13/2017 8:44 PM HISTORY/REASON FOR EXAM:  Possible sepsis. TECHNIQUE/EXAM DESCRIPTION AND NUMBER OF VIEWS: Single portable view of the chest. COMPARISON: 8/7/2017 FINDINGS: Cardiac mediastinal contour is unchanged. Mild diffuse prominence of the interstitium again seen. No focal consolidation. No pleural fluid collection or pneumothorax. Dextroconvex curvature of thoracic spine. Diffuse osteopenia.     1.  Diffuse prominence of interstitium again seen, likely interstitial lung disease.  Mild pulmonary edema is also a consideration. 2.  No pneumonia or pneumothorax. 3.  Stable cardiomegaly.    Dx-small Bowel Series    Result Date: 9/17/2017 9/16/2017 10:39 AM HISTORY/REASON FOR EXAM:  Nausea and vomiting. TECHNIQUE/EXAM DESCRIPTION AND NUMBER OF VIEWS: Single contrast barium small bowel follow-through performed. Fluoroscopic images were obtained. No fluoroscopic images obtained. COMPARISON:  None available. FINDINGS:  view the abdomen demonstrates marked gaseous distention and small bowel and colonic distention. Patient drank thin barium. Contrast passed very slowly into dilated small bowel loops. Jejunal loops are dilated up to 5.2 cm. Overhead images were taken for a total of 22 hours before termination of the exam. Contrast reached the ileum, but does not extend into the colon. There is a large amount of stool in the colon.     1.  Gaseous distention with fairly diffuse bowel dilatation. Contrast extends into the ileum,  but does not reach the colon within 22 hours. No transition point is identified to suggest mechanical bowel obstruction. 2.  Large amount of stool in the rectal vault.    Mr-lumbar Spine-with & W/o    Result Date: 9/19/2017 9/19/2017 1:35 PM HISTORY/REASON FOR EXAM:  Altered mental status, weakness. Possible discitis. Previous lumbar surgery. TECHNIQUE/EXAM DESCRIPTION: MRI of the lumbar spine without and with contrast. The study was performed on a [employer] Signa 1.5 Lucille MRI scanner. T1 sagittal, T2 fast spin-echo sagittal, T2 axial images and T1 axial images were obtained of the lumbar spine. T1 post-contrast sagittal and T1 post-contrast axial images were obtained. Optional T2 fat-suppressed sagittal images may be obtained. 20 mL Omniscan gadolinium contrast was administered intravenously. COMPARISON:  MRI lumbar spine 7/7/2017, T abdomen and pelvis 1/6/2017 FINDINGS: Alignment in the lumbar spine again shows retrolisthesis of L3 on L4 of about 5 mm. There is fluid signal T2 hyperintensity again seen in the L3-L4 disc space and prominent marrow edema signal at the L3 inferior endplate and to a lesser extent L4 superior endplate. There is corresponding enhancement, particularly at the inferior endplate of L3. Findings are highly suspicious for discitis with osteomyelitis. There is postoperative change with lumbar laminectomy at L2-L3 through L5. There is posterior fusion with transpedicular screw fixation 4-L5. The posterior paraspinous soft tissues show expected postoperative changes with atrophy and fatty replacement in  the posterior paraspinous muscles at the operative levels. There is no significant postoperative dorsal epidural fluid collection. However, there has been interval development of multilocular fluid collections in the right iliacus muscle spanning about 42 mm. These are consistent with intramuscular abscesses (T2 axial image 4, series 5, T1 postcontrast axial image 1, series 10). The conus is  normal in position and signal with its tip at the L1 level. At T12-L1, is a tiny central and left paramedian disc protrusion with an underlying annular fissure. There is mild hypertrophic facet arthropathy. Thecal sac is toward the lower range of normal without karla central stenosis. The neural foramina are intact. At L1-2, there is negligible disc bulging. There is no central stenosis or significant foraminal stenosis. At L2-3, there is a small broad-based disc-osteophyte complex. There is no central stenosis as there is decompressive laminectomy at this level. There is no significant foraminal stenosis. At L3-4, there is posterior marginal spurring and disc bulging accentuated by the retrolisthesis. There is moderate bilateral lateral recess stenosis. Thecal sac is toward the lower range of normal even though there is a decompressive laminectomy at this  level. However, there is no karla central stenosis (T2 axial image 24, series 5). There is severe bilateral foraminal stenosis, right greater than left. This is unchanged from the previous exam. At L4-5, no central stenosis. There is inferior foraminal blunting with moderate bilateral foraminal stenosis. At L5-S1, there is minimal disc bulging. There is a left paramedian annular fissure. There is mild hypertrophic facet arthropathy. No central stenosis. There is moderate bilateral foraminal stenosis.     1.  L3-4 findings again suggesting discitis with osteomyelitis. No evidence of epidural abscess or epidural phlegmon. 2.  L3-4 retrolisthesis. 3.  Postoperative multilevel lumbar laminectomy and posterior fusion at L4-5. 4.  Interval development of multilocular intramuscular abscesses in the right iliacus muscle, partly in the field of view 5.  Degenerative and spondylotic changes as detailed for each level above in the body of report.    Dx-esophagus - Barium Swallow    Result Date: 9/16/2017 9/16/2017 10:39 AM HISTORY/REASON FOR EXAM:  Nausea. Nausea and  vomiting. TECHNIQUE/EXAM DESCRIPTION AND NUMBER OF VIEWS: Single contrast esophagram procedure was performed. 5 fluoroscopic images obtained. Fluoroscopy time: 0.37 minutes COMPARISON:  None. FINDINGS: Limited exam secondary to limited patient mobility. The esophagus appears normal in caliber and configuration. No definite mucosal lesions are identified on single-contrast techniques. Contrast passes freely into the stomach. No hiatal hernia is identified.     No abnormalities identified on limited single contrast esophagram.    Echocardiogram Comp W/o Cont    Result Date: 9/14/2017  Transthoracic Echo Report Echocardiography Laboratory CONCLUSIONS Prior study done on [id number] left ventricular systolic function. Normal regional wall motion. Left ventricular ejection fraction is visually estimated to be 70%. Diastolic function is difficult to assess with atrial fibrillation. The right ventricle was normal in size and function. Structurally normal mitral valve without significant stenosis or regurgitation. Aortic sclerosis without stenosis. No aortic insufficiency. Normal pericardium without effusion.FINDINGS Left Ventricle Normal left ventricular chamber size. Normal left ventricular wall thickness. Normal left ventricular systolic function. Normal regional wall motion. Left ventricular ejection fraction is visually estimated to be 70%. Diastolic function is difficult to assess with atrial fibrillation. Right Ventricle The right ventricle was normal in size and function. Right Atrium The right atrium is normal in size.  Inferior vena cava is not well visualized. Left Atrium The left atrium is normal in size.  Left atrial volume index is 19  mL/sq m. Mitral Valve Structurally normal mitral valve without significant stenosis or regurgitation.  Trace mitral regurgitation. Aortic Valve Aortic sclerosis without stenosis. Tricuspid aortic valve. No aortic insufficiency. Tricuspid Valve Structurally normal tricuspid  valve without significant stenosis or regurgitation. Pulmonic Valve Structurally normal pulmonic valve without significant stenosis or regurgitation. Pericardium Normal pericardium without effusion. Aorta The aortic root is normal.  Ascending aorta diameter is 3.1 cm. Kingsley Hung M.D. (Electronically Signed) Final Date:     14 September 2017                 11:10      Micro:  Results     Procedure Component Value Units Date/Time    URINALYSIS [[id number]] Collected:  10/07/17 0032    Order Status:  Completed Specimen:  Urine from Urine, Clean Catch Updated:  10/07/17 0038     Color Yellow     Character Clear     Specific Gravity 1.011     Ph 5.5     Glucose Negative mg/dL      Ketones Negative mg/dL      Protein Negative mg/dL      Bilirubin Negative     Urobilinogen, Urine 0.2     Nitrite Negative     Leukocyte Esterase Negative     Occult Blood Negative     Micro Urine Req see below     Comment: Microscopic examination not performed when specimen is clear  and chemically negative for protein, blood, leukocyte esterase  and nitrite.         Narrative:       Collected By:[id number] ARIA LI  Indication for culture:->Dysuria/Frequency/Burning    URINE CULTURE(NEW) [[id number]] Collected:  10/07/17 0032    Order Status:  Completed Specimen:  Urine from Urine, Clean Catch Updated:  10/07/17 0035    Narrative:       Collected By:[id number] ARIA LI  Indication for culture:->Dysuria/Frequency/Burning          Assessment:  Active Hospital Problems    Diagnosis   • Persistent atrial fibrillation (CMS-[facility]) [I48.1]   • Sepsis (CMS-[facility]) [A41.9]   • 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.81]   • Chronic osteomyelitis of lumbar spine (CMS-[facility]) [M86.68]       Plan:  L3-4 discitis osteomyelitis  Afebrile  Resolved leukocytosis  Status post treatment with Rocephin (prior strep viridans)  Persistent changes on repeat MRI of 9/19/17  Continue ceftaroline for at least 4 weeks with repeat MRI to see improvement  Stop  date 10/18/2017  Needs repeat MRI prior to stopping abx-next week if feasible    Right iliacus abscess versus hematoma  Status post drainage on 9/20/17  Possibly hematoma  Cultures are negative     Odynophagia  Has hiatal hernia  EGD cancelled due to Afib  Added fluc as possible candida espohagitis    Generalized debility  Needs rehabilitation  Difficult discharge due to cost of abx

## 2017-10-07 NOTE — PROGRESS NOTES
Pt transferred to the unit during the night shift from ortho. Pt in Afib w/rvr. Pt had c/o CP. CP is down to a 3/10 post Morphine IVP. Pt now has c/o of muscle spasms. Will monitor labs. HR currently between . HR was initially in 130-140's at time of transfer. 10 mg IV cardizem given.

## 2017-10-07 NOTE — PROGRESS NOTES
[facility]ist Progress Note    Date of Service: 10/7/2017    Chief Complaint  86 y.o. male admitted 2017 with ***    Interval Problem Update  ***    Consultants/Specialty  ***    Disposition  ***        ROS   Physical Exam  Laboratory/Imaging   Hemodynamics  Temp (24hrs), Av.2 °C (97.2 °F), Min:36.1 °C (97 °F), Max:36.4 °C (97.5 °F)   Temperature: 36.2 °C (97.2 °F)  Pulse  Av.8  Min: 45  Max: 135    Blood Pressure : (!) 165/95 (Rn informed)      Respiratory      Respiration: 17, Pulse Oximetry: 97 %     Work Of Breathing / Effort: Mild  RUL Breath Sounds: Diminished, RML Breath Sounds: Diminished, RLL Breath Sounds: Diminished, MIKE Breath Sounds: Diminished, LLL Breath Sounds: Diminished    Fluids    Intake/Output Summary (Last 24 hours) at 10/07/17 0518  Last data filed at 10/07/17 0338   Gross per 24 hour   Intake                0 ml   Output              750 ml   Net             -750 ml       Nutrition  Orders Placed This Encounter   Procedures   • DIET NPO     Standing Status:   Standing     Number of Occurrences:   8     Order Specific Question:   Restrict to:     Answer:   Strict [1]     Physical Exam                             Assessment/Plan     Nontraumatic psoas hematoma- (present on admission)   Assessment & Plan    Patient remains off of anticoagulation continue to monitor psoas hematoma as well as monitor H&H for any drops.        Persistent atrial fibrillation (CMS-HCC)- (present on admission)   Assessment & Plan    Currently rate is well controlled.  He is not anticoagulated with his history of falls and possible bleeding.  Continue this point with rate control only.        Discitis of lumbar region- (present on admission)   Assessment & Plan    Patient currently on abx per ID recs, stop date 10/18          Debility- (present on admission)   Assessment & Plan    Continue at this point with physical therapy and occupational therapy in-house.  Once against a long-term acute care the  patient will need also continued physical therapy and occupational therapy.        Dysphagia   Assessment & Plan    SLP following, on liquid diet  Barium Swallow shows no acute findings, hiatal hernia  GI consulted, Dr. Waters for further evaluation  On aspiration precautions        DNR (do not resuscitate)- (present on admission)   Assessment & Plan    Confirmed with patient and he continues to be an DNR code status.        Normocytic anemia- (present on admission)   Assessment & Plan    Follow hemoglobin and hematocrit levels.  Transfuse PRN if Hb 7 and Hct 21 transfusion will be considered.  Patient will need at this point continued monitoring of his iron levels B12 and folic acid levels long-term.          Quality-Core Measures

## 2017-10-07 NOTE — PROGRESS NOTES
"At around 0525, Pt reported, \" I am having afib\" while guarding his chest. Pt states he feels terrible and in a lot of discomfort. Rapid response team was called. His VS at that time of the call was T 97.9F Pulse 146 Resp 20 /78. Hydralazine IV was given at around 0430 ( prior to patient complain) for BP of 165/95. Labs were ordered ( lactic acid, troponin), EKG was ordered ( Sinus tach). Unable to give PRN metropolol IV as policy states pt has to be on a tele monitor. Updated Dr. Villar of the above statement. Ordered tele transfer, troponin lab, and cardizem 10 mg IV.  "

## 2017-10-07 NOTE — PROGRESS NOTES
AOx4. VS stable. On 1L of O2 when sleeping due to pt oxygen dipping when asleep. Pt claims he's been feeling weak.  on. Pt complaining of nausea with no vomiting. Zofran and compazine given earlier with no relief accdg to patient. Pt claims he vomited once this admission. Pt reports swallowing difficulty and pain. Declines PO meds and food. IV fluids running as ordered. Pt has been NPO Strict. Does not complain of any other pain. Decreased breath sounds and bowel sounds. Pt reports being able to pass gas. Voiding. Denies numbing or tingling on extremities. SCDs on.

## 2017-10-07 NOTE — PROGRESS NOTES
2 RN skin assessment completed. Abrasions noted to bilateral shins. Otherwise skin clean, dry, and intact.

## 2017-10-07 NOTE — CONSULTS
DATE OF SERVICE:  10/06/2017    CHIEF COMPLAINT:  The patient complained of dysphagia.    HISTORY OF PRESENT ILLNESS:  He has been unable to tolerate his pills.  He has   been unable to eat well.  The patient is being transferred and was going to   be transferred to a skilled nursing facility, but that will now be postponed.    The patient was recently admitted with abdominal pain and back pain, mostly.    The patient had apparently diskitis of the lumbar spine.  He also had   iliopsoas muscle abscesses.  The patient also complains of taking Tums on a   regular basis.  He has been bedridden.  At home, he lives with his son, I   believe or his son-in-law and he uses a walker, as well as a scooter.  Here in   the hospital, he has been unable to ambulate.  He is being found to be weak   and also have some mental status changes.    We were asked to assist because of the inability to swallow.  Upper GI did not   show significant findings.  We are asked by the primary care physician to   visit in this regard.    The patient is not sure if he has had upper endoscopies in the past.  He is   unable to tell.    PAST MEDICAL HISTORY:  Atrial fibrillation, arthritis, diabetes, GERD,   dyslipidemia, spinal stenosis with muscle disorder, tonsillectomy,   appendectomy, cataract surgery, laminectomy, and spinal fusion.    SOCIAL HISTORY:  The patient does not abuse alcohol, and he has a remote   history of tobacco.    FAMILY HISTORY:  Noncontributory.    REVIEW OF SYSTEMS:  The patient uses a scooter.  He is bedridden.  He now has   mental status changes.  He has back pain.  He has a history of atrial   fibrillation.  The rest of the review of systems is negative or per HPI.    MEDICATIONS:  Vancomycin, Bactroban, Lovenox, Ativan, Tylenol, Lanoxin,   Compazine, Phenergan, hydralazine, Prinivil, hyoscyamine, labetalol, and   atorvastatin.    PHYSICAL EXAMINATION:  GENERAL:  Nontoxic, nondiaphoretic.  Vital signs stable,  pleasant.  HEENT:  Sclerae are anicteric.  Conjunctivae are injected.  NECK:  Supple.  No asymmetry.  CHEST:  Nonlabored speaking full sentences.  He is able to lie flat.  CARDIOVASCULAR:  Irregularly irregular.  ABDOMEN:  Soft, nontender, no rebound, and no rigidity.  EXTREMITIES:  _____.  Appropriate to examination.  Nonfocal.  SKIN:  No rash, erythema or gross indurations.    IMPRESSION:  1.  Dysphagia.  2.  Anemia.  3.  Diskitis.  4.  History of gastroesophageal reflux disease.  5.  Taking Tums on a regular basis.    RECOMMENDATIONS:  EGD n.p.o. after midnight, hold anticoagulants.  We think   that the patient has been lying on his back for several days.  This certainly   may have exacerbated GERD and reflux disease.  I do not believe he is   currently taking any typical PPIs for that purpose because he is declining to   take pills and was given intravenous PPIs, currently scheduling for procedure   tomorrow and keep him n.p.o. after midnight.    We thank you for this consultation, and we will follow the patient with you.       ____________________________________     Douglas Rodríguez MD EMD / JUSTINE    DD:  10/06/2017 21:25:55  DT:  10/06/2017 23:26:39    D#:  [id number]  Job#:  [id number]

## 2017-10-07 NOTE — PROGRESS NOTES
Infectious Disease Progress Note    Author: Shante Sequeira M.D. Date & Time of service: 10/6/2017  7:13 PM    Chief Complaint:  Altered mental status    Interval History:  86-year-old male admitted for altered mental status. He was recently treated for L3-4 discitis. His repeat MRI showed right iliacus muscle abscess  9/21/17-MAXIMUM TEMPERATURE 98. On 2 liters of oxygen. WBC 14 and creatinine 0.38  9/22- MAXIMUM TEMPERATURE 97.8. Complains of generalized malaise. Breathing is improved. Right leg pain is improved  9/23/17-MAXIMUM TEMPERATURE 99.5. Still has back pain when he sits up. The WBC 12  9/24/17-MAXIMUM TEMPERATURE 97.6. Feels better. No new issues overnight. WBC11.7  9/25 AF resting comfortably  9/26 AF, WBC 11.3 more awake eating OK.  Back pain about the same  9/27 AF WBC 11 had PICC placed-denies SE abx. No new complaints  9/28 AF tolerating abx well feels about the same  9/29 AF, no CBC, pt has no new complaints this morning, asking when he will be discharged and how long he will be on abx,  9/30 AF no CBC, eating lots of chocolate pudding/ice cream, no new issues per pt  10/2 AF, feels well and denies any pain, feels he is getting stronger and appetite improved  10/3 AF, WBC 9.1, felt dizzy while watching TV yesterday with head pressure but no HA, no nausea or vomiting, no recurrent symptoms today but states he was dizzy at home prior to admit, back pain only with getting out of bed  10/4 AF, no CBC, no dizziness, now having painful swallowing that started last night, did not eat breakfast  10/5 AF, no CBC, complaining of painful swallowing, could not swallow pills this am, waiting of FEES  10/6 AF WBC 9.1 tired and feels like he has pressure on his eyes  Labs Reviewed, Medications Reviewed and Radiology Reviewed.    Review of Systems:  Review of Systems   Constitutional: Positive for malaise/fatigue. Negative for chills and fever.   HENT:        Painful swallowing   Respiratory: Negative for cough  and shortness of breath.    Cardiovascular: Positive for leg swelling. Negative for chest pain.   Gastrointestinal: Negative for abdominal pain, nausea and vomiting.   Musculoskeletal: Positive for back pain.        Improving   Neurological: Negative for headaches.       Hemodynamics:  Temp (24hrs), Av.3 °C (97.4 °F), Min:36.1 °C (97 °F), Max:36.6 °C (97.9 °F)  Temperature: 36.2 °C (97.1 °F)  Pulse  Av.7  Min: 45  Max: 135   Blood Pressure : 113/60       Physical Exam:  Physical Exam   Constitutional: He appears well-developed. He is easily aroused. No distress.   Obese   HENT:   Head: Normocephalic and atraumatic.   Poor dentition   Eyes: Conjunctivae and EOM are normal. Pupils are equal, round, and reactive to light. No scleral icterus.   Neck: Neck supple.   Cardiovascular: Normal rate.  An irregularly irregular rhythm present.   Pulmonary/Chest: Effort normal. No respiratory distress. He has no wheezes. He has rales.   Abdominal: Soft. He exhibits no distension. There is no tenderness.   Musculoskeletal: He exhibits edema.   LUE PICC   Neurological: He is alert and easily aroused.   Nursing note and vitals reviewed.      Meds:    Current Facility-Administered Medications:   •  [START ON 10/7/2017] pantoprazole  •  sucralfate  •  calcium carbonate  •  levetiracetam  •  PICC Line Insertion has been implemented **AND** May use Lidocaine 1% not to exceed 3 mls for local at insertion site **AND** NOTIFY MD **AND** Tip to dwell in the superior vena cava **AND** Do not use PICC Line until placement verified by Chest X Ray **AND** DX-CHEST-FOR PICC LINE Perform procedure in: PICC Room **AND** If radiologist reading of chest X-ray states any of the following the PICC should be used **AND** Further evaluation of the PICC placement can be retrieved from X-Ray and Imaging **AND** Blood draws through PICC line; draws by RN only **AND** FLUSHING GUIDELINES WHEN IN USE **AND** normal saline PF **AND** FLUSHING  GUIDELINES WHEN NOT IN USE **AND** DRESSING MAINTENANCE **AND** Change needleless pressure ports and IV tubing every 72 hours per hospital policy **AND** TUBING **AND** If there is an MD order to remove the PICC line, any RN may remove the PICC line **AND** [] PATIENT EDUCATION MATERIALS **AND** NURSING COMMUNICATION  •  ceftaroline (TEFLARO) ivpb  •  Metoprolol Tartrate  •  lorazepam  •  acetaminophen  •  digoxin  •  prochlorperazine  •  promethazine  •  hydrALAZINE  •  lisinopril  •  hyoscyamine-maalox plus-lidocaine viscous  •  NS  •  labetalol  •  ondansetron  •  metoprolol SR  •  atorvastatin  •  gabapentin  •  tamsulosin  •  senna-docusate **AND** polyethylene glycol/lytes **AND** magnesium hydroxide **AND** bisacodyl  •  Respiratory Care per Protocol    Labs:  No results for input(s): WBC, RBC, HEMOGLOBIN, HEMATOCRIT, MCV, MCH, RDW, PLATELETCT, MPV, NEUTSPOLYS, LYMPHOCYTES, MONOCYTES, EOSINOPHILS, BASOPHILS, RBCMORPHOLO in the last 72 hours.  No results for input(s): SODIUM, POTASSIUM, CHLORIDE, CO2, GLUCOSE, BUN, CPKTOTAL in the last 72 hours.  No results for input(s): ALBUMIN, TBILIRUBIN, ALKPHOSPHAT, TOTPROTEIN, ALTSGPT, ASTSGOT, CREATININE in the last 72 hours.    Imaging:  Ct-abdomen-pelvis With    Result Date: 2017 6:34 PM HISTORY/REASON FOR EXAM:  Abscess noted on MRI. TECHNIQUE/EXAM DESCRIPTION:  CT scan of the abdomen and pelvis with contrast. Contrast-enhanced helical scanning was obtained from the diaphragmatic domes through the pubic symphysis following the bolus administration of nonionic contrast without complication. 100 mL of Omnipaque 350 nonionic contrast was administered without complication. Low dose optimization technique was utilized for this CT exam including automated exposure control and adjustment of the mA and/or kV according to patient size. COMPARISON: MRI from the same day FINDINGS: Lung bases: There are small bilateral pleural effusions with overlying  atelectasis. Abdomen: No free air is seen in the abdomen or pelvis. Evaluation is limited by streak artifact from barium within the colon. There is wall thickening of the distal esophagus with a small hiatal hernia. Liver appears unremarkable. Portal vein is patent. There is calcification in the pancreatic head. No adrenal mass is identified. Tiny hypodense left renal lesion is too small to characterize. There is mild prominence of the renal collecting systems bilaterally. Small hypodense right renal lesions too small to characterize. There are nonobstructing right renal calculi. Atherosclerotic plaque is seen in the aorta and its branches. There are small inguinal, retroperitoneal and mesenteric lymph nodes. There is no evidence of bowel obstruction. There is a moderate amount of stool in the rectosigmoid colon. There is colonic diverticulosis. The appendix is not identified. Stomach is distended with fluid. Small bowel loops are fluid-filled. Bladder is mildly distended with foci of air. There is asymmetric prominence of the right iliopsoas musculature with surrounding stranding. Findings likely related to the previously noted abscess collection. Degenerative changes are seen in the spine. Postsurgical changes are noted in the lumbar spine. Degenerative changes are seen at the hips. There is mild loss of height of the superior endplate of T11 with a Schmorl's node noted. There is mild endplate irregularity at L3/L4.     Prominence of the right iliopsoas muscle with surrounding inflammatory stranding. The known fluid collection was better delineated on the prior MRI. Mild endplate irregularity at L3/L4 can be seen in discitis/osteomyelitis. Air-fluid level in the bladder. Correlation with urinalysis is recommended. This can be seen in the setting of recent instrumentation, infection or fistula. Colonic diverticulosis. Moderate amount of colonic stool. Nonobstructing right renal calculi. Mild prominence of the renal  collecting systems bilaterally. Small hypodense renal lesions are too small to characterize. Fluid-filled loops of small bowel can be seen in the setting of enteritis. Small hiatal hernia with mild thickening of the distal esophagus. Calcifications in the pancreatic head can be seen in chronic pancreatitis. Atherosclerotic plaque. Small bilateral pleural effusions with overlying atelectasis.     Ct-drain-retroperitoneal    Result Date: 9/20/2017 9/20/2017 2:30 PM HISTORY/REASON FOR EXAM:  Multiloculated fluid collections in the right iliacus muscle. Suspected abscess. TECHNIQUE/EXAM DESCRIPTION: Right pelvic (extraperitoneal) retroperitoneal abscess drainage with CT guidance. Low dose optimization technique was utilized for this CT exam including automated exposure control and adjustment of the mA and/or kV according to patient size. PROCEDURE: Informed consent was obtained. Procedures performed with local anesthesia only with appropriate continuous patient monitoring by the radiology nurse. Sedation duration: N/A minutes Localizing CT images were obtained with the patient in supine position. The skin was prepped with Betadine and draped in a sterile fashion. Following local anesthesia with 1% Lidocaine, a 17 G guiding needle was placed and extraperitoneal needle path in the right side of the pelvis via the iliacus muscle confirmed with CT. An Amplatz guidewire was placed and following serial tract dilatation, a 8 Slovenian pigtail locking catheter was placed. A specimen was collected and submitted for culture and sensitivity and Gram stain. Total of 5 mL old bloody reddish-brown fluid was drained. No purulent fluid was obtained. The fluid was not malodorous. The catheter was secured to the skin and connected to suction bulb drainage. Final CT images were obtained documenting catheter position. The patient tolerated the procedure well with no evidence of complication. Low dose optimization technique was utilized for  this CT exam including automated exposure control and adjustment of the mA and/or kV according to patient size. COMPARISON: MRI lumbar spine 9/19/2017, CT abdomen and pelvis 9/19/2017. FINDINGS: The final CT images show satisfactory catheter position within the target collection.     1.  CT GUIDED RETROPERITONEAL (EXTRAPERITONEAL) RIGHT PELVIC CATHETER DRAINAGE WITHIN THE RIGHT ILIACUS MUSCLE. OLD DARK REDDISH-BROWN BLOODY FLUID WAS OBTAINED. NO ABSCESS OR PURULENT MATERIAL. 2.  THE CURRENT PLAN IS TO CHECK CULTURES AND LIKELY REMOVED CATHETER IN THE SHORT-TERM AT 48-72 HOURS AS THERE IS UNLIKELY TO BE AN ABSCESS.    Ct-head W/o    Result Date: 9/13/2017 9/13/2017 9:30 PM HISTORY/REASON FOR EXAM:  Altered Mental Status. TECHNIQUE/EXAM DESCRIPTION AND NUMBER OF VIEWS: CT of the head without contrast. The study was performed on a helical multidetector CT scanner. Contiguous 2.5 mm axial sections were obtained from the skull base through the vertex. Up to date radiation dose reduction adjustments have been utilized to meet ALARA standards for radiation dose reduction. COMPARISON:  7/12/2017 FINDINGS: The lateral ventricles are enlarged. Cortical sulci are enlarged. Patchy areas of low attenuation in the white matter bilaterally. No significant mass effect or midline shift. Basal cisterns are patent. No evidence for intracranial hemorrhage. Calvaria are intact. Visualized orbits are unremarkable. Visualized mastoid air cells are clear. Visualized paranasal sinuses are unremarkable. Calcifications of the carotid arteries noted. Calcified scalp nodules again present.     1.  Diffuse atrophy and white matter changes. 2.  No acute intracranial hemorrhage or territorial infarct.     Dx-chest-portable (1 View)    Result Date: 9/16/2017 9/16/2017 10:31 PM HISTORY/REASON FOR EXAM:  Shortness of Breath. TECHNIQUE/EXAM DESCRIPTION AND NUMBER OF VIEWS: Single portable view of the chest. COMPARISON: 9/13/2017 FINDINGS: Cardiac  mediastinal contour is unchanged. Mediastinum is again prominent. Mild prominence of the pulmonary interstitium. No gross pleural fluid or pneumothorax. Dextroconvex curvature of thoracic spine.     No significant change from prior exam.    Dx-chest-portable (1 View)    Result Date: 9/13/2017 9/13/2017 8:44 PM HISTORY/REASON FOR EXAM:  Possible sepsis. TECHNIQUE/EXAM DESCRIPTION AND NUMBER OF VIEWS: Single portable view of the chest. COMPARISON: 8/7/2017 FINDINGS: Cardiac mediastinal contour is unchanged. Mild diffuse prominence of the interstitium again seen. No focal consolidation. No pleural fluid collection or pneumothorax. Dextroconvex curvature of thoracic spine. Diffuse osteopenia.     1.  Diffuse prominence of interstitium again seen, likely interstitial lung disease.  Mild pulmonary edema is also a consideration. 2.  No pneumonia or pneumothorax. 3.  Stable cardiomegaly.    Dx-small Bowel Series    Result Date: 9/17/2017 9/16/2017 10:39 AM HISTORY/REASON FOR EXAM:  Nausea and vomiting. TECHNIQUE/EXAM DESCRIPTION AND NUMBER OF VIEWS: Single contrast barium small bowel follow-through performed. Fluoroscopic images were obtained. No fluoroscopic images obtained. COMPARISON:  None available. FINDINGS:  view the abdomen demonstrates marked gaseous distention and small bowel and colonic distention. Patient drank thin barium. Contrast passed very slowly into dilated small bowel loops. Jejunal loops are dilated up to 5.2 cm. Overhead images were taken for a total of 22 hours before termination of the exam. Contrast reached the ileum, but does not extend into the colon. There is a large amount of stool in the colon.     1.  Gaseous distention with fairly diffuse bowel dilatation. Contrast extends into the ileum, but does not reach the colon within 22 hours. No transition point is identified to suggest mechanical bowel obstruction. 2.  Large amount of stool in the rectal vault.    Mr-lumbar Spine-with &  W/o    Result Date: 9/19/2017 9/19/2017 1:35 PM HISTORY/REASON FOR EXAM:  Altered mental status, weakness. Possible discitis. Previous lumbar surgery. TECHNIQUE/EXAM DESCRIPTION: MRI of the lumbar spine without and with contrast. The study was performed on a Alaris Signa 1.5 Lucille MRI scanner. T1 sagittal, T2 fast spin-echo sagittal, T2 axial images and T1 axial images were obtained of the lumbar spine. T1 post-contrast sagittal and T1 post-contrast axial images were obtained. Optional T2 fat-suppressed sagittal images may be obtained. 20 mL Omniscan gadolinium contrast was administered intravenously. COMPARISON:  MRI lumbar spine 7/7/2017, T abdomen and pelvis 1/6/2017 FINDINGS: Alignment in the lumbar spine again shows retrolisthesis of L3 on L4 of about 5 mm. There is fluid signal T2 hyperintensity again seen in the L3-L4 disc space and prominent marrow edema signal at the L3 inferior endplate and to a lesser extent L4 superior endplate. There is corresponding enhancement, particularly at the inferior endplate of L3. Findings are highly suspicious for discitis with osteomyelitis. There is postoperative change with lumbar laminectomy at L2-L3 through L5. There is posterior fusion with transpedicular screw fixation 4-L5. The posterior paraspinous soft tissues show expected postoperative changes with atrophy and fatty replacement in  the posterior paraspinous muscles at the operative levels. There is no significant postoperative dorsal epidural fluid collection. However, there has been interval development of multilocular fluid collections in the right iliacus muscle spanning about 42 mm. These are consistent with intramuscular abscesses (T2 axial image 4, series 5, T1 postcontrast axial image 1, series 10). The conus is normal in position and signal with its tip at the L1 level. At T12-L1, is a tiny central and left paramedian disc protrusion with an underlying annular fissure. There is mild hypertrophic facet  arthropathy. Thecal sac is toward the lower range of normal without karla central stenosis. The neural foramina are intact. At L1-2, there is negligible disc bulging. There is no central stenosis or significant foraminal stenosis. At L2-3, there is a small broad-based disc-osteophyte complex. There is no central stenosis as there is decompressive laminectomy at this level. There is no significant foraminal stenosis. At L3-4, there is posterior marginal spurring and disc bulging accentuated by the retrolisthesis. There is moderate bilateral lateral recess stenosis. Thecal sac is toward the lower range of normal even though there is a decompressive laminectomy at this  level. However, there is no karla central stenosis (T2 axial image 24, series 5). There is severe bilateral foraminal stenosis, right greater than left. This is unchanged from the previous exam. At L4-5, no central stenosis. There is inferior foraminal blunting with moderate bilateral foraminal stenosis. At L5-S1, there is minimal disc bulging. There is a left paramedian annular fissure. There is mild hypertrophic facet arthropathy. No central stenosis. There is moderate bilateral foraminal stenosis.     1.  L3-4 findings again suggesting discitis with osteomyelitis. No evidence of epidural abscess or epidural phlegmon. 2.  L3-4 retrolisthesis. 3.  Postoperative multilevel lumbar laminectomy and posterior fusion at L4-5. 4.  Interval development of multilocular intramuscular abscesses in the right iliacus muscle, partly in the field of view 5.  Degenerative and spondylotic changes as detailed for each level above in the body of report.    Dx-esophagus - Barium Swallow    Result Date: 9/16/2017 9/16/2017 10:39 AM HISTORY/REASON FOR EXAM:  Nausea. Nausea and vomiting. TECHNIQUE/EXAM DESCRIPTION AND NUMBER OF VIEWS: Single contrast esophagram procedure was performed. 5 fluoroscopic images obtained. Fluoroscopy time: 0.37 minutes COMPARISON:  None.  FINDINGS: Limited exam secondary to limited patient mobility. The esophagus appears normal in caliber and configuration. No definite mucosal lesions are identified on single-contrast techniques. Contrast passes freely into the stomach. No hiatal hernia is identified.     No abnormalities identified on limited single contrast esophagram.    Echocardiogram Comp W/o Cont    Result Date: 9/14/2017  Transthoracic Echo Report Echocardiography Laboratory CONCLUSIONS Prior study done on [id number] left ventricular systolic function. Normal regional wall motion. Left ventricular ejection fraction is visually estimated to be 70%. Diastolic function is difficult to assess with atrial fibrillation. The right ventricle was normal in size and function. Structurally normal mitral valve without significant stenosis or regurgitation. Aortic sclerosis without stenosis. No aortic insufficiency. Normal pericardium without effusion.FINDINGS Left Ventricle Normal left ventricular chamber size. Normal left ventricular wall thickness. Normal left ventricular systolic function. Normal regional wall motion. Left ventricular ejection fraction is visually estimated to be 70%. Diastolic function is difficult to assess with atrial fibrillation. Right Ventricle The right ventricle was normal in size and function. Right Atrium The right atrium is normal in size.  Inferior vena cava is not well visualized. Left Atrium The left atrium is normal in size.  Left atrial volume index is 19  mL/sq m. Mitral Valve Structurally normal mitral valve without significant stenosis or regurgitation.  Trace mitral regurgitation. Aortic Valve Aortic sclerosis without stenosis. Tricuspid aortic valve. No aortic insufficiency. Tricuspid Valve Structurally normal tricuspid valve without significant stenosis or regurgitation. Pulmonic Valve Structurally normal pulmonic valve without significant stenosis or regurgitation. Pericardium Normal pericardium without  effusion. Aorta The aortic root is normal.  Ascending aorta diameter is 3.1 cm. Kingsley Hung M.D. 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]       Plan:  L3-4 discitis osteomyelitis  Afebrile  Resolved leukocytosis  Status post treatment with Rocephin (prior strep viridans)  Persistent changes on repeat MRI of 9/19/17  Continue ceftaroline for at least 4 weeks with repeat MRI to see improvement  Stop date 10/18/2017  Needs repeat MRI prior to stopping abx    Right iliacus abscess versus hematoma  Status post drainage on 9/20/17  Possibly hematoma  Cultures are negative     Odynophagia, new  Negative Barium  Add fluc as possible candida espohagitis        Generalized debility    Needs rehabilitation    Difficult discharge due to cost of abx

## 2017-10-07 NOTE — PROGRESS NOTES
Gastroenterology Progress Note     Author: Douglas Gilliam   Date & Time Created: 10/7/2017 12:57 PM    Interval History:  HR irregular up to 140    Review of Systems:  Review of Systems   Constitutional: Negative for fever.   HENT: Negative.    Respiratory: Negative for cough.    Cardiovascular: Positive for palpitations.   Gastrointestinal: Positive for heartburn.   Musculoskeletal: Positive for back pain.   Psychiatric/Behavioral: Negative.        Physical Exam:  Physical Exam   Constitutional: He is oriented to person, place, and time. He appears well-developed and well-nourished.   HENT:   Head: Normocephalic and atraumatic.   Eyes: Left eye exhibits no discharge. No scleral icterus.   Neck: No tracheal deviation present. No thyromegaly present.   Cardiovascular:   irreg irreg   Pulmonary/Chest: Effort normal. No stridor. He has no wheezes.   Abdominal: Soft. He exhibits no distension. There is no tenderness. There is no rebound and no guarding.   Neurological: He is alert and oriented to person, place, and time.   Skin: Skin is dry. He is not diaphoretic.       Labs:        Invalid input(s): [id number]  Recent Labs      10/07/17   0550   TROPONINI  <0.01     Recent Labs      10/07/17   0550   SODIUM  131*   POTASSIUM  3.7   CHLORIDE  100   CO2  19*   BUN  7*   CREATININE  0.48*   CALCIUM  10.0     Recent Labs      10/07/17   0550   ALTSGPT  7   ASTSGOT  12   ALKPHOSPHAT  72   TBILIRUBIN  0.6   GLUCOSE  133*     Recent Labs      10/07/17   0550   RBC  3.74*   HEMOGLOBIN  11.2*   HEMATOCRIT  34.6*   PLATELETCT  326     Recent Labs      10/07/17   0550   WBC  9.8   NEUTSPOLYS  76.40*   LYMPHOCYTES  12.90*   MONOCYTES  9.40   EOSINOPHILS  0.40   BASOPHILS  0.50   ASTSGOT  12   ALTSGPT  7   ALKPHOSPHAT  72   TBILIRUBIN  0.6     Hemodynamics:  Temp (24hrs), Av.4 °C (97.5 °F), Min:36 °C (96.8 °F), Max:37.1 °C (98.8 °F)  Temperature: 36.1 °C (96.9 °F)  Pulse  Av.3  Min: 45  Max: 147   Blood Pressure :  102/66     Respiratory:    Respiration: 18, Pulse Oximetry: 99 %     Work Of Breathing / Effort: Mild  RUL Breath Sounds: Diminished;Clear, RML Breath Sounds: Diminished;Clear, RLL Breath Sounds: Diminished;Clear, MIKE Breath Sounds: Diminished;Clear, LLL Breath Sounds: Diminished;Clear  Fluids:    Intake/Output Summary (Last 24 hours) at 10/07/17 1257  Last data filed at 10/07/17 0900   Gross per 24 hour   Intake                0 ml   Output              950 ml   Net             -950 ml     Weight: 83 kg (182 lb 15.7 oz)  GI/Nutrition:  Orders Placed This Encounter   Procedures   • DIET NPO     Standing Status:   Standing     Number of Occurrences:   8     Order Specific Question:   Restrict to:     Answer:   Strict [1]     Medical Decision Making, by Problem:  Active Hospital Problems    Diagnosis   • 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    Rec:  Continue IV PPI  EGD when stable. Discussed with Dr. Fountain      Quality-Core Measures

## 2017-10-07 NOTE — PROGRESS NOTES
Pt transported via hospital bed to room 734 bed 2. Pt with c/o chest pain 6/10. .  Pt described pain as pressure and stated it was not new. Pt stated he has been having this pain. STAT EKG ordered. Notified . Orders given for Morphine. Pt medicated for nausea and cardizem administered for HR. No other needs voiced at this time. Will endorse care to oncoming RN. Belongings and call light in reach. Safety maintained.

## 2017-10-07 NOTE — CARE PLAN
Problem: Venous Thromboembolism (VTW)/Deep Vein Thrombosis (DVT) Prevention:  Goal: Patient will participate in Venous Thrombosis (VTE)/Deep Vein Thrombosis (DVT)Prevention Measures  Outcome: PROGRESSING AS EXPECTED  SCDs on.    Problem: Skin Integrity  Goal: Risk for impaired skin integrity will decrease  Outcome: PROGRESSING AS EXPECTED  Encouraged to turn q 2 hrs. Pt is to be kept dry.

## 2017-10-07 NOTE — PROGRESS NOTES
[facility] Hospitalist Progress Note    Date of Service: 10/7/2017    Chief Complaint  86 y.o. 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 Patient had a barium swallow which did not show any acute findings. GI, Dr. Waters consulted for further input.     Consultants/Specialty  Infectious diseases Dr. Getachew Vincent  of cardiology - signed off  GI Dr. Waters    Disposition  Pt to go to [facility] to complete abx therapy once medically clear.        Review of Systems   Constitutional: Negative for chills and weight loss.   HENT:        Trouble swallowing, denies any regurgitation of food, N/V     Eyes: Negative for blurred vision and double vision.   Respiratory: Negative for cough and shortness of breath.    Cardiovascular: Negative for chest pain (chest wall on the left).   Gastrointestinal: Negative for abdominal pain, nausea and vomiting.        Trouble swallowing medications and food. Denies regurgitation   Musculoskeletal: Negative for joint pain and myalgias.   Neurological: Negative for weakness and headaches.   Psychiatric/Behavioral: Negative for depression.      Physical Exam  Laboratory/Imaging   Hemodynamics  Temp (24hrs), Av.4 °C (97.5 °F), Min:36 °C (96.8 °F), Max:37.1 °C (98.8 °F)   Temperature: 36.1 °C (96.9 °F)  Pulse  Av.2  Min: 45  Max: 147    Blood Pressure : 122/60      Respiratory      Respiration: 18, Pulse Oximetry: 99 %     Work Of Breathing / Effort: Mild  RUL Breath Sounds: Diminished, RML Breath Sounds: Diminished, RLL Breath Sounds: Diminished, MIKE Breath Sounds: Diminished, LLL Breath Sounds: Diminished    Fluids    Intake/Output Summary (Last 24 hours) at 10/07/17 1103  Last data filed at 10/07/17 0900   Gross per 24 hour   Intake                0 ml   Output              950 ml    Net             -950 ml       Nutrition  Orders Placed This Encounter   Procedures   • DIET NPO     Standing Status:   Standing     Number of Occurrences:   8     Order Specific Question:   Restrict to:     Answer:   Strict [1]     Physical Exam   Constitutional: He is oriented to person, place, and time. He appears well-developed and well-nourished. He is cooperative. He does not appear ill. No distress. Nasal cannula in place.   Obese Male     HENT:   Head: Normocephalic and atraumatic.   Eyes: Conjunctivae are normal. Right eye exhibits no discharge. Left eye exhibits no discharge.   Neck: Normal range of motion. Neck supple. No tracheal deviation present.   Cardiovascular: Normal rate and regular rhythm.    No murmur heard.  Pulmonary/Chest: Effort normal. No stridor. No respiratory distress.   Decreased breath sounds bilateral bases   Abdominal: Soft. Bowel sounds are normal. He exhibits no distension. There is no tenderness. There is no rebound and no guarding.   Musculoskeletal: Normal range of motion. He exhibits no edema or tenderness.   Neurological: He is alert and oriented to person, place, and time.   Skin: Skin is warm and dry.   Psychiatric: He has a normal mood and affect. His behavior is normal.   Nursing note and vitals reviewed.      Recent Labs      10/07/17   0550   WBC  9.8   RBC  3.74*   HEMOGLOBIN  11.2*   HEMATOCRIT  34.6*   MCV  92.0   MCH  30.2   MCHC  32.8*   RDW  52.0*   PLATELETCT  326   MPV  9.0     Recent Labs      10/07/17   0550   SODIUM  131*   POTASSIUM  3.7   CHLORIDE  100   CO2  19*   GLUCOSE  133*   BUN  7*   CREATININE  0.48*   CALCIUM  10.0                      Assessment/Plan     Nontraumatic psoas hematoma- (present on admission)   Assessment & Plan    Patient remains off of anticoagulation continue to monitor psoas hematoma as well as monitor H&H for any drops.        Persistent atrial fibrillation (CMS-HCC)- (present on admission)   Assessment & Plan    RATE IS NOT  CONTROLLED THIS AM  CHECK DIGOXIN LEVEL  CONTINUE WITH DIGOXIN  CONTINUE WITH LOPRESSOR PO AND IV  DC LOBATOLOL DUE MILD LOW BP  NO ANTICOAGULATION 2ND TO HEMATOMA        Discitis of lumbar region- (present on admission)   Assessment & Plan    TEFLARON   ID recs, stop date 10/18          Debility- (present on admission)   Assessment & Plan    Continue at this point with physical therapy and occupational therapy in-house.  Once against a long-term acute care the patient will need also continued physical therapy and occupational therapy.        Dysphagia   Assessment & Plan    SLP following, on liquid diet  Barium Swallow shows no acute findings, hiatal hernia  GI consulted, Dr. Waters for further evaluation  On aspiration precautions        DNR (do not resuscitate)- (present on admission)   Assessment & Plan    Confirmed with patient and he continues to be an DNR code status.        Normocytic anemia- (present on admission)   Assessment & Plan    Follow hemoglobin and hematocrit levels.  Transfuse PRN if Hb 7 and Hct 21 transfusion will be considered.  Patient will need at this point continued monitoring of his iron levels B12 and folic acid levels long-term.            Reviewed items::  Labs reviewed and Medications reviewed  Hearn catheter::  No Hearn  DVT prophylaxis - mechanical:  SCDs  Ulcer Prophylaxis::  Yes  Antibiotics:  Treating active infection/contamination beyond 24 hours perioperative coverage

## 2017-10-07 NOTE — PROGRESS NOTES
Report given to tele nurse. Pt was sent to tele floor with all of his belongings, meds, and oxygen tank.

## 2017-10-08 LAB
ANION GAP SERPL CALC-SCNC: 9 MMOL/L (ref 0–11.9)
BASOPHILS # BLD AUTO: 0.6 % (ref 0–1.8)
BASOPHILS # BLD: 0.04 K/UL (ref 0–0.12)
BUN SERPL-MCNC: 9 MG/DL (ref 8–22)
CALCIUM SERPL-MCNC: 9 MG/DL (ref 8.5–10.5)
CHLORIDE SERPL-SCNC: 102 MMOL/L (ref 96–112)
CO2 SERPL-SCNC: 24 MMOL/L (ref 20–33)
CREAT SERPL-MCNC: 0.49 MG/DL (ref 0.5–1.4)
EOSINOPHIL # BLD AUTO: 0.3 K/UL (ref 0–0.51)
EOSINOPHIL NFR BLD: 4.2 % (ref 0–6.9)
ERYTHROCYTE [DISTWIDTH] IN BLOOD BY AUTOMATED COUNT: 56.2 FL (ref 35.9–50)
GFR SERPL CREATININE-BSD FRML MDRD: >60 ML/MIN/1.73 M 2
GLUCOSE SERPL-MCNC: 97 MG/DL (ref 65–99)
HCT VFR BLD AUTO: 30.7 % (ref 42–52)
HGB BLD-MCNC: 9.8 G/DL (ref 14–18)
IMM GRANULOCYTES # BLD AUTO: 0.03 K/UL (ref 0–0.11)
IMM GRANULOCYTES NFR BLD AUTO: 0.4 % (ref 0–0.9)
LYMPHOCYTES # BLD AUTO: 1.27 K/UL (ref 1–4.8)
LYMPHOCYTES NFR BLD: 17.8 % (ref 22–41)
MCH RBC QN AUTO: 30.8 PG (ref 27–33)
MCHC RBC AUTO-ENTMCNC: 31.9 G/DL (ref 33.7–35.3)
MCV RBC AUTO: 96.5 FL (ref 81.4–97.8)
MONOCYTES # BLD AUTO: 1.06 K/UL (ref 0–0.85)
MONOCYTES NFR BLD AUTO: 14.9 % (ref 0–13.4)
NEUTROPHILS # BLD AUTO: 4.43 K/UL (ref 1.82–7.42)
NEUTROPHILS NFR BLD: 62.1 % (ref 44–72)
NRBC # BLD AUTO: 0 K/UL
NRBC BLD AUTO-RTO: 0 /100 WBC
PLATELET # BLD AUTO: 279 K/UL (ref 164–446)
PMV BLD AUTO: 9.2 FL (ref 9–12.9)
POTASSIUM SERPL-SCNC: 4 MMOL/L (ref 3.6–5.5)
RBC # BLD AUTO: 3.18 M/UL (ref 4.7–6.1)
SODIUM SERPL-SCNC: 135 MMOL/L (ref 135–145)
WBC # BLD AUTO: 7.1 K/UL (ref 4.8–10.8)

## 2017-10-08 PROCEDURE — A9270 NON-COVERED ITEM OR SERVICE: HCPCS | Performed by: INTERNAL MEDICINE

## 2017-10-08 PROCEDURE — 700105 HCHG RX REV CODE 258: Performed by: INTERNAL MEDICINE

## 2017-10-08 PROCEDURE — 700111 HCHG RX REV CODE 636 W/ 250 OVERRIDE (IP): Performed by: INTERNAL MEDICINE

## 2017-10-08 PROCEDURE — 85025 COMPLETE CBC W/AUTO DIFF WBC: CPT

## 2017-10-08 PROCEDURE — 99232 SBSQ HOSP IP/OBS MODERATE 35: CPT | Performed by: FAMILY MEDICINE

## 2017-10-08 PROCEDURE — 700101 HCHG RX REV CODE 250: Performed by: FAMILY MEDICINE

## 2017-10-08 PROCEDURE — 770020 HCHG ROOM/CARE - TELE (206)

## 2017-10-08 PROCEDURE — A9270 NON-COVERED ITEM OR SERVICE: HCPCS | Performed by: FAMILY MEDICINE

## 2017-10-08 PROCEDURE — 80048 BASIC METABOLIC PNL TOTAL CA: CPT

## 2017-10-08 PROCEDURE — 700102 HCHG RX REV CODE 250 W/ 637 OVERRIDE(OP): Performed by: INTERNAL MEDICINE

## 2017-10-08 PROCEDURE — 700102 HCHG RX REV CODE 250 W/ 637 OVERRIDE(OP): Performed by: HOSPITALIST

## 2017-10-08 PROCEDURE — 700111 HCHG RX REV CODE 636 W/ 250 OVERRIDE (IP): Performed by: FAMILY MEDICINE

## 2017-10-08 PROCEDURE — A9270 NON-COVERED ITEM OR SERVICE: HCPCS | Performed by: HOSPITALIST

## 2017-10-08 PROCEDURE — C9113 INJ PANTOPRAZOLE SODIUM, VIA: HCPCS | Performed by: INTERNAL MEDICINE

## 2017-10-08 PROCEDURE — 700105 HCHG RX REV CODE 258: Performed by: HOSPITALIST

## 2017-10-08 PROCEDURE — 700102 HCHG RX REV CODE 250 W/ 637 OVERRIDE(OP): Performed by: FAMILY MEDICINE

## 2017-10-08 RX ORDER — ALUMINA, MAGNESIA, AND SIMETHICONE 2400; 2400; 240 MG/30ML; MG/30ML; MG/30ML
30 SUSPENSION ORAL ONCE
Status: COMPLETED | OUTPATIENT
Start: 2017-10-08 | End: 2017-10-08

## 2017-10-08 RX ORDER — LEVETIRACETAM 100 MG/ML
500 SOLUTION ORAL EVERY 12 HOURS
Status: DISCONTINUED | OUTPATIENT
Start: 2017-10-08 | End: 2017-10-11

## 2017-10-08 RX ORDER — MORPHINE SULFATE 4 MG/ML
2 INJECTION, SOLUTION INTRAMUSCULAR; INTRAVENOUS ONCE
Status: COMPLETED | OUTPATIENT
Start: 2017-10-08 | End: 2017-10-08

## 2017-10-08 RX ADMIN — METOPROLOL TARTRATE 5 MG: 5 INJECTION INTRAVENOUS at 11:28

## 2017-10-08 RX ADMIN — ANTACID TABLETS 1000 MG: 500 TABLET, CHEWABLE ORAL at 08:02

## 2017-10-08 RX ADMIN — SUCRALFATE 1 G: 1 SUSPENSION ORAL at 21:07

## 2017-10-08 RX ADMIN — LEVETIRACETAM 500 MG: 100 SOLUTION ORAL at 21:07

## 2017-10-08 RX ADMIN — SUCRALFATE 1 G: 1 SUSPENSION ORAL at 06:13

## 2017-10-08 RX ADMIN — ANTACID TABLETS 1000 MG: 500 TABLET, CHEWABLE ORAL at 18:17

## 2017-10-08 RX ADMIN — ANTACID TABLETS 1000 MG: 500 TABLET, CHEWABLE ORAL at 11:28

## 2017-10-08 RX ADMIN — METOPROLOL TARTRATE 5 MG: 5 INJECTION INTRAVENOUS at 18:18

## 2017-10-08 RX ADMIN — SUCRALFATE 1 G: 1 SUSPENSION ORAL at 11:27

## 2017-10-08 RX ADMIN — ALUMINUM HYDROXIDE, MAGNESIUM HYDROXIDE,SIMETHICONE 30 ML: 400; 400; 40 LIQUID ORAL at 03:15

## 2017-10-08 RX ADMIN — CEFTAROLINE FOSAMIL 600 MG: 600 POWDER, FOR SOLUTION INTRAVENOUS at 21:07

## 2017-10-08 RX ADMIN — LEVETIRACETAM 500 MG: 100 SOLUTION ORAL at 12:30

## 2017-10-08 RX ADMIN — MORPHINE SULFATE 2 MG: 4 INJECTION INTRAVENOUS at 03:15

## 2017-10-08 RX ADMIN — LIDOCAINE HYDROCHLORIDE 30 ML: 20 SOLUTION OROPHARYNGEAL at 08:02

## 2017-10-08 RX ADMIN — CEFTAROLINE FOSAMIL 600 MG: 600 POWDER, FOR SOLUTION INTRAVENOUS at 09:46

## 2017-10-08 RX ADMIN — SODIUM CHLORIDE: 9 INJECTION, SOLUTION INTRAVENOUS at 21:14

## 2017-10-08 RX ADMIN — SODIUM CHLORIDE: 9 INJECTION, SOLUTION INTRAVENOUS at 06:11

## 2017-10-08 RX ADMIN — SUCRALFATE 1 G: 1 SUSPENSION ORAL at 18:17

## 2017-10-08 RX ADMIN — PANTOPRAZOLE SODIUM 40 MG: 40 INJECTION, POWDER, FOR SOLUTION INTRAVENOUS at 09:46

## 2017-10-08 ASSESSMENT — ENCOUNTER SYMPTOMS
DEPRESSION: 0
RESPIRATORY NEGATIVE: 1
NAUSEA: 0
VOMITING: 0
EYES NEGATIVE: 1
ABDOMINAL PAIN: 0
WEIGHT LOSS: 0
ROS GI COMMENTS: DYSPHAGIA
CHILLS: 0
WEAKNESS: 0
SHORTNESS OF BREATH: 0
FEVER: 0
BLURRED VISION: 0
COUGH: 0
PALPITATIONS: 1
DOUBLE VISION: 0
BACK PAIN: 1
HEADACHES: 0
MYALGIAS: 0

## 2017-10-08 ASSESSMENT — PAIN SCALES - GENERAL
PAINLEVEL_OUTOF10: 8
PAINLEVEL_OUTOF10: 8
PAINLEVEL_OUTOF10: 5
PAINLEVEL_OUTOF10: 0
PAINLEVEL_OUTOF10: 2
PAINLEVEL_OUTOF10: 0
PAINLEVEL_OUTOF10: 0
PAINLEVEL_OUTOF10: 8

## 2017-10-08 ASSESSMENT — LIFESTYLE VARIABLES: DO YOU DRINK ALCOHOL: NO

## 2017-10-08 NOTE — PROGRESS NOTES
Gastroenterology Progress Note     Author: Douglas Gillaim   Date & Time Created: 10/8/2017 4:43 PM    Interval History:  Declines meds  Trying to eat soft diet    Review of Systems:  Review of Systems   Constitutional: Negative for chills and fever.   Eyes: Negative.    Respiratory: Negative.    Cardiovascular: Positive for palpitations.   Gastrointestinal:        Dysphagia   Genitourinary: Negative.        Physical Exam:  Physical Exam   Constitutional: He is oriented to person, place, and time. He appears distressed.   HENT:   Mouth/Throat: No oropharyngeal exudate.   Eyes: Scleral icterus is present.   Neck: No tracheal deviation present. No thyromegaly present.   Pulmonary/Chest: He is in respiratory distress. He has wheezes.   Abdominal: Soft. He exhibits no distension.   Neurological: He is alert and oriented to person, place, and time.       Labs:        Invalid input(s): [id number]  Recent Labs      10/07/17   0550   TROPONINI  <0.01     Recent Labs      10/07/17   0550  10/08/17   0430   SODIUM  131*  135   POTASSIUM  3.7  4.0   CHLORIDE  100  102   CO2  19*  24   BUN  7*  9   CREATININE  0.48*  0.49*   CALCIUM  10.0  9.0     Recent Labs      10/07/17   0550  10/08/17   0430   ALTSGPT  7   --    ASTSGOT  12   --    ALKPHOSPHAT  72   --    TBILIRUBIN  0.6   --    GLUCOSE  133*  97     Recent Labs      10/07/17   0550  10/08/17   0430   RBC  3.74*  3.18*   HEMOGLOBIN  11.2*  9.8*   HEMATOCRIT  34.6*  30.7*   PLATELETCT  326  279     Recent Labs      10/07/17   0550  10/08/17   0430   WBC  9.8  7.1   NEUTSPOLYS  76.40*  62.10   LYMPHOCYTES  12.90*  17.80*   MONOCYTES  9.40  14.90*   EOSINOPHILS  0.40  4.20   BASOPHILS  0.50  0.60   ASTSGOT  12   --    ALTSGPT  7   --    ALKPHOSPHAT  72   --    TBILIRUBIN  0.6   --      Hemodynamics:  Temp (24hrs), Av.2 °C (97.2 °F), Min:35.8 °C (96.5 °F), Max:36.7 °C (98 °F)  Temperature: 36.1 °C (96.9 °F)  Pulse  Av.1  Min: 45  Max: 147   Blood Pressure :  108/59     Respiratory:    Respiration: 14, Pulse Oximetry: 99 %     Work Of Breathing / Effort: Mild  RUL Breath Sounds: Clear;Diminished, RML Breath Sounds: Clear;Diminished, RLL Breath Sounds: Clear;Diminished, MIKE Breath Sounds: Clear;Diminished, LLL Breath Sounds: Clear;Diminished  Fluids:    Intake/Output Summary (Last 24 hours) at 10/08/17 1643  Last data filed at 10/08/17 1259   Gross per 24 hour   Intake             1000 ml   Output             1050 ml   Net              -50 ml     Weight: 85.5 kg (188 lb 7.9 oz)  GI/Nutrition:  Orders Placed This Encounter   Procedures   • DIET ORDER     Standing Status:   Standing     Number of Occurrences:   1     Order Specific Question:   Diet:     Answer:   Full Liquid [11]   • DIET NPO     Standing Status:   Standing     Number of Occurrences:   8     Order Specific Question:   Restrict to:     Answer:   Sips with Medications [3]     Medical Decision Making, by Problem:  Active Hospital Problems    Diagnosis   • 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.81]   • Persistent atrial fibrillation (CMS-HCC) [I48.1]   • Discitis of lumbar region [M46.46]   • Debility [R53.81]   • DNR (do not resuscitate) [Z66]   • Normocytic anemia [D64.9]   • Dysphagia [R13.10]       Plan:  Dysphagia  Rapid SVT  Not readily willing to take oral meds and eat  C/o dysphagia  Takes Tums at home  Now on IV PPI      REC:  EGD tomorrow.    Quality-Core Measures

## 2017-10-08 NOTE — THERAPY
Speech Therapy Contact Note:    Attempted to see pt for a repeat swallow evaluation, however per RN, pt is refusing PO intake at this time, and may go for an EGD soon.  SLP will hold eval today and follow up after pt has EGD.

## 2017-10-08 NOTE — PROGRESS NOTES
Infectious Disease Progress Note    Author: Shante Sequeira M.D. Date & Time of service: 10/8/2017  11:35 AM    Chief Complaint:  Altered mental status    Interval History:  86-year-old male admitted for altered mental status. He was recently treated for L3-4 discitis. His repeat MRI showed right iliacus muscle abscess  9/21/17-MAXIMUM TEMPERATURE 98. On 2 liters of oxygen. WBC 14 and creatinine 0.38  9/22- MAXIMUM TEMPERATURE 97.8. Complains of generalized malaise. Breathing is improved. Right leg pain is improved  9/23/17-MAXIMUM TEMPERATURE 99.5. Still has back pain when he sits up. The WBC 12  9/24/17-MAXIMUM TEMPERATURE 97.6. Feels better. No new issues overnight. WBC11.7  9/25 AF resting comfortably  9/26 AF, WBC 11.3 more awake eating OK.  Back pain about the same  9/27 AF WBC 11 had PICC placed-denies SE abx. No new complaints  9/28 AF tolerating abx well feels about the same  9/29 AF, no CBC, pt has no new complaints this morning, asking when he will be discharged and how long he will be on abx,  9/30 AF no CBC, eating lots of chocolate pudding/ice cream, no new issues per pt  10/2 AF, feels well and denies any pain, feels he is getting stronger and appetite improved  10/3 AF, WBC 9.1, felt dizzy while watching TV yesterday with head pressure but no HA, no nausea or vomiting, no recurrent symptoms today but states he was dizzy at home prior to admit, back pain only with getting out of bed  10/4 AF, no CBC, no dizziness, now having painful swallowing that started last night, did not eat breakfast  10/5 AF, no CBC, complaining of painful swallowing, could not swallow pills this am, waiting of FEES  10/6 AF WBC 9.1 tired and feels like he has pressure on his eyes  10/7 AF WBC 9.8 episode CP and Afib with RVR-eyes sxs resolved. No current CP or SOB  10/8 AF WBC 7.1 thinks having GERD-sxs improved with Maalox  Labs Reviewed, Medications Reviewed and Radiology Reviewed.    Review of Systems:  Review of  Systems   Constitutional: Positive for malaise/fatigue. Negative for chills and fever.   HENT:        Painful swallowing   Respiratory: Negative for cough and shortness of breath.    Cardiovascular: Positive for leg swelling. Negative for chest pain.   Gastrointestinal: Negative for abdominal pain, nausea and vomiting.   Musculoskeletal: Positive for back pain.        Improving   Neurological: Negative for headaches.       Hemodynamics:  Temp (24hrs), Av.3 °C (97.4 °F), Min:35.8 °C (96.5 °F), Max:36.7 °C (98 °F)  Temperature: 35.8 °C (96.5 °F)  Pulse  Av.1  Min: 45  Max: 147   Blood Pressure : 115/61       Physical Exam:  Physical Exam   Constitutional: He appears well-developed. He is easily aroused. No distress.   Obese   HENT:   Head: Normocephalic and atraumatic.   Poor dentition   Eyes: Conjunctivae and EOM are normal. Pupils are equal, round, and reactive to light. No scleral icterus.   Neck: Neck supple.   Cardiovascular: Normal rate.  An irregularly irregular rhythm present.   IRR   Pulmonary/Chest: Effort normal. No respiratory distress. He has no wheezes. He has rales.   Abdominal: Soft. He exhibits no distension. There is no tenderness.   Musculoskeletal: He exhibits edema.   LUE PICC   Neurological: He is alert and easily aroused.   Nursing note and vitals reviewed.      Meds:    Current Facility-Administered Medications:   •  Metoprolol Tartrate  •  pantoprazole  •  fluconazole  •  lorazepam  •  ceftaroline (TEFLARO) ivpb  •  sucralfate  •  calcium carbonate  •  levetiracetam  •  PICC Line Insertion has been implemented **AND** May use Lidocaine 1% not to exceed 3 mls for local at insertion site **AND** NOTIFY MD **AND** Tip to dwell in the superior vena cava **AND** Do not use PICC Line until placement verified by Chest X Ray **AND** DX-CHEST-FOR PICC LINE Perform procedure in: PICC Room **AND** If radiologist reading of chest X-ray states any of the following the PICC should be used **AND**  Further evaluation of the PICC placement can be retrieved from X-Ray and Imaging **AND** Blood draws through PICC line; draws by RN only **AND** FLUSHING GUIDELINES WHEN IN USE **AND** normal saline PF **AND** FLUSHING GUIDELINES WHEN NOT IN USE **AND** DRESSING MAINTENANCE **AND** Change needleless pressure ports and IV tubing every 72 hours per hospital policy **AND** TUBING **AND** If there is an MD order to remove the PICC line, any RN may remove the PICC line **AND** [] PATIENT EDUCATION MATERIALS **AND** NURSING COMMUNICATION  •  Metoprolol Tartrate  •  acetaminophen  •  digoxin  •  prochlorperazine  •  promethazine  •  hydrALAZINE  •  NS  •  ondansetron  •  metoprolol SR  •  atorvastatin  •  gabapentin  •  tamsulosin  •  senna-docusate **AND** polyethylene glycol/lytes **AND** magnesium hydroxide **AND** bisacodyl  •  Respiratory Care per Protocol    Labs:  Recent Labs      10/07/17   0550  10/08/17   0430   WBC  9.8  7.1   RBC  3.74*  3.18*   HEMOGLOBIN  11.2*  9.8*   HEMATOCRIT  34.6*  30.7*   MCV  92.0  96.5   MCH  30.2  30.8   RDW  52.0*  56.2*   PLATELETCT  326  279   MPV  9.0  9.2   NEUTSPOLYS  76.40*  62.10   LYMPHOCYTES  12.90*  17.80*   MONOCYTES  9.40  14.90*   EOSINOPHILS  0.40  4.20   BASOPHILS  0.50  0.60     Recent Labs      10/07/17   0550  10/08/17   0430   SODIUM  131*  135   POTASSIUM  3.7  4.0   CHLORIDE  100  102   CO2  19*  24   GLUCOSE  133*  97   BUN  7*  9     Recent Labs      10/07/17   0550  10/08/17   0430   ALBUMIN  3.5   --    TBILIRUBIN  0.6   --    ALKPHOSPHAT  72   --    TOTPROTEIN  7.1   --    ALTSGPT  7   --    ASTSGOT  12   --    CREATININE  0.48*  0.49*       Imaging:  Ct-abdomen-pelvis With    Result Date: 2017 6:34 PM HISTORY/REASON FOR EXAM:  Abscess noted on MRI. TECHNIQUE/EXAM DESCRIPTION:  CT scan of the abdomen and pelvis with contrast. Contrast-enhanced helical scanning was obtained from the diaphragmatic domes through the pubic symphysis  following the bolus administration of nonionic contrast without complication. 100 mL of Omnipaque 350 nonionic contrast was administered without complication. Low dose optimization technique was utilized for this CT exam including automated exposure control and adjustment of the mA and/or kV according to patient size. COMPARISON: MRI from the same day FINDINGS: Lung bases: There are small bilateral pleural effusions with overlying atelectasis. Abdomen: No free air is seen in the abdomen or pelvis. Evaluation is limited by streak artifact from barium within the colon. There is wall thickening of the distal esophagus with a small hiatal hernia. Liver appears unremarkable. Portal vein is patent. There is calcification in the pancreatic head. No adrenal mass is identified. Tiny hypodense left renal lesion is too small to characterize. There is mild prominence of the renal collecting systems bilaterally. Small hypodense right renal lesions too small to characterize. There are nonobstructing right renal calculi. Atherosclerotic plaque is seen in the aorta and its branches. There are small inguinal, retroperitoneal and mesenteric lymph nodes. There is no evidence of bowel obstruction. There is a moderate amount of stool in the rectosigmoid colon. There is colonic diverticulosis. The appendix is not identified. Stomach is distended with fluid. Small bowel loops are fluid-filled. Bladder is mildly distended with foci of air. There is asymmetric prominence of the right iliopsoas musculature with surrounding stranding. Findings likely related to the previously noted abscess collection. Degenerative changes are seen in the spine. Postsurgical changes are noted in the lumbar spine. Degenerative changes are seen at the hips. There is mild loss of height of the superior endplate of T11 with a Schmorl's node noted. There is mild endplate irregularity at L3/L4.     Prominence of the right iliopsoas muscle with surrounding  inflammatory stranding. The known fluid collection was better delineated on the prior MRI. Mild endplate irregularity at L3/L4 can be seen in discitis/osteomyelitis. Air-fluid level in the bladder. Correlation with urinalysis is recommended. This can be seen in the setting of recent instrumentation, infection or fistula. Colonic diverticulosis. Moderate amount of colonic stool. Nonobstructing right renal calculi. Mild prominence of the renal collecting systems bilaterally. Small hypodense renal lesions are too small to characterize. Fluid-filled loops of small bowel can be seen in the setting of enteritis. Small hiatal hernia with mild thickening of the distal esophagus. Calcifications in the pancreatic head can be seen in chronic pancreatitis. Atherosclerotic plaque. Small bilateral pleural effusions with overlying atelectasis.     Ct-drain-retroperitoneal    Result Date: 9/20/2017 9/20/2017 2:30 PM HISTORY/REASON FOR EXAM:  Multiloculated fluid collections in the right iliacus muscle. Suspected abscess. TECHNIQUE/EXAM DESCRIPTION: Right pelvic (extraperitoneal) retroperitoneal abscess drainage with CT guidance. Low dose optimization technique was utilized for this CT exam including automated exposure control and adjustment of the mA and/or kV according to patient size. PROCEDURE: Informed consent was obtained. Procedures performed with local anesthesia only with appropriate continuous patient monitoring by the radiology nurse. Sedation duration: N/A minutes Localizing CT images were obtained with the patient in supine position. The skin was prepped with Betadine and draped in a sterile fashion. Following local anesthesia with 1% Lidocaine, a 17 G guiding needle was placed and extraperitoneal needle path in the right side of the pelvis via the iliacus muscle confirmed with CT. An Amplatz guidewire was placed and following serial tract dilatation, a 8 Armenian pigtail locking catheter was placed. A specimen was  collected and submitted for culture and sensitivity and Gram stain. Total of 5 mL old bloody reddish-brown fluid was drained. No purulent fluid was obtained. The fluid was not malodorous. The catheter was secured to the skin and connected to suction bulb drainage. Final CT images were obtained documenting catheter position. The patient tolerated the procedure well with no evidence of complication. Low dose optimization technique was utilized for this CT exam including automated exposure control and adjustment of the mA and/or kV according to patient size. COMPARISON: MRI lumbar spine 9/19/2017, CT abdomen and pelvis 9/19/2017. FINDINGS: The final CT images show satisfactory catheter position within the target collection.     1.  CT GUIDED RETROPERITONEAL (EXTRAPERITONEAL) RIGHT PELVIC CATHETER DRAINAGE WITHIN THE RIGHT ILIACUS MUSCLE. OLD DARK REDDISH-BROWN BLOODY FLUID WAS OBTAINED. NO ABSCESS OR PURULENT MATERIAL. 2.  THE CURRENT PLAN IS TO CHECK CULTURES AND LIKELY REMOVED CATHETER IN THE SHORT-TERM AT 48-72 HOURS AS THERE IS UNLIKELY TO BE AN ABSCESS.    Ct-head W/o    Result Date: 9/13/2017 9/13/2017 9:30 PM HISTORY/REASON FOR EXAM:  Altered Mental Status. TECHNIQUE/EXAM DESCRIPTION AND NUMBER OF VIEWS: CT of the head without contrast. The study was performed on a helical multidetector CT scanner. Contiguous 2.5 mm axial sections were obtained from the skull base through the vertex. Up to date radiation dose reduction adjustments have been utilized to meet ALARA standards for radiation dose reduction. COMPARISON:  7/12/2017 FINDINGS: The lateral ventricles are enlarged. Cortical sulci are enlarged. Patchy areas of low attenuation in the white matter bilaterally. No significant mass effect or midline shift. Basal cisterns are patent. No evidence for intracranial hemorrhage. Calvaria are intact. Visualized orbits are unremarkable. Visualized mastoid air cells are clear. Visualized paranasal sinuses are  unremarkable. Calcifications of the carotid arteries noted. Calcified scalp nodules again present.     1.  Diffuse atrophy and white matter changes. 2.  No acute intracranial hemorrhage or territorial infarct.     Dx-chest-portable (1 View)    Result Date: 9/16/2017 9/16/2017 10:31 PM HISTORY/REASON FOR EXAM:  Shortness of Breath. TECHNIQUE/EXAM DESCRIPTION AND NUMBER OF VIEWS: Single portable view of the chest. COMPARISON: 9/13/2017 FINDINGS: Cardiac mediastinal contour is unchanged. Mediastinum is again prominent. Mild prominence of the pulmonary interstitium. No gross pleural fluid or pneumothorax. Dextroconvex curvature of thoracic spine.     No significant change from prior exam.    Dx-chest-portable (1 View)    Result Date: 9/13/2017 9/13/2017 8:44 PM HISTORY/REASON FOR EXAM:  Possible sepsis. TECHNIQUE/EXAM DESCRIPTION AND NUMBER OF VIEWS: Single portable view of the chest. COMPARISON: 8/7/2017 FINDINGS: Cardiac mediastinal contour is unchanged. Mild diffuse prominence of the interstitium again seen. No focal consolidation. No pleural fluid collection or pneumothorax. Dextroconvex curvature of thoracic spine. Diffuse osteopenia.     1.  Diffuse prominence of interstitium again seen, likely interstitial lung disease.  Mild pulmonary edema is also a consideration. 2.  No pneumonia or pneumothorax. 3.  Stable cardiomegaly.    Dx-small Bowel Series    Result Date: 9/17/2017 9/16/2017 10:39 AM HISTORY/REASON FOR EXAM:  Nausea and vomiting. TECHNIQUE/EXAM DESCRIPTION AND NUMBER OF VIEWS: Single contrast barium small bowel follow-through performed. Fluoroscopic images were obtained. No fluoroscopic images obtained. COMPARISON:  None available. FINDINGS:  view the abdomen demonstrates marked gaseous distention and small bowel and colonic distention. Patient drank thin barium. Contrast passed very slowly into dilated small bowel loops. Jejunal loops are dilated up to 5.2 cm. Overhead images were taken for a  total of 22 hours before termination of the exam. Contrast reached the ileum, but does not extend into the colon. There is a large amount of stool in the colon.     1.  Gaseous distention with fairly diffuse bowel dilatation. Contrast extends into the ileum, but does not reach the colon within 22 hours. No transition point is identified to suggest mechanical bowel obstruction. 2.  Large amount of stool in the rectal vault.    Mr-lumbar Spine-with & W/o    Result Date: 9/19/2017 9/19/2017 1:35 PM HISTORY/REASON FOR EXAM:  Altered mental status, weakness. Possible discitis. Previous lumbar surgery. TECHNIQUE/EXAM DESCRIPTION: MRI of the lumbar spine without and with contrast. The study was performed on a [employer] Signa 1.5 Lucille MRI scanner. T1 sagittal, T2 fast spin-echo sagittal, T2 axial images and T1 axial images were obtained of the lumbar spine. T1 post-contrast sagittal and T1 post-contrast axial images were obtained. Optional T2 fat-suppressed sagittal images may be obtained. 20 mL Omniscan gadolinium contrast was administered intravenously. COMPARISON:  MRI lumbar spine 7/7/2017, T abdomen and pelvis 1/6/2017 FINDINGS: Alignment in the lumbar spine again shows retrolisthesis of L3 on L4 of about 5 mm. There is fluid signal T2 hyperintensity again seen in the L3-L4 disc space and prominent marrow edema signal at the L3 inferior endplate and to a lesser extent L4 superior endplate. There is corresponding enhancement, particularly at the inferior endplate of L3. Findings are highly suspicious for discitis with osteomyelitis. There is postoperative change with lumbar laminectomy at L2-L3 through L5. There is posterior fusion with transpedicular screw fixation 4-L5. The posterior paraspinous soft tissues show expected postoperative changes with atrophy and fatty replacement in  the posterior paraspinous muscles at the operative levels. There is no significant postoperative dorsal epidural fluid collection. However,  there has been interval development of multilocular fluid collections in the right iliacus muscle spanning about 42 mm. These are consistent with intramuscular abscesses (T2 axial image 4, series 5, T1 postcontrast axial image 1, series 10). The conus is normal in position and signal with its tip at the L1 level. At T12-L1, is a tiny central and left paramedian disc protrusion with an underlying annular fissure. There is mild hypertrophic facet arthropathy. Thecal sac is toward the lower range of normal without karla central stenosis. The neural foramina are intact. At L1-2, there is negligible disc bulging. There is no central stenosis or significant foraminal stenosis. At L2-3, there is a small broad-based disc-osteophyte complex. There is no central stenosis as there is decompressive laminectomy at this level. There is no significant foraminal stenosis. At L3-4, there is posterior marginal spurring and disc bulging accentuated by the retrolisthesis. There is moderate bilateral lateral recess stenosis. Thecal sac is toward the lower range of normal even though there is a decompressive laminectomy at this  level. However, there is no karla central stenosis (T2 axial image 24, series 5). There is severe bilateral foraminal stenosis, right greater than left. This is unchanged from the previous exam. At L4-5, no central stenosis. There is inferior foraminal blunting with moderate bilateral foraminal stenosis. At L5-S1, there is minimal disc bulging. There is a left paramedian annular fissure. There is mild hypertrophic facet arthropathy. No central stenosis. There is moderate bilateral foraminal stenosis.     1.  L3-4 findings again suggesting discitis with osteomyelitis. No evidence of epidural abscess or epidural phlegmon. 2.  L3-4 retrolisthesis. 3.  Postoperative multilevel lumbar laminectomy and posterior fusion at L4-5. 4.  Interval development of multilocular intramuscular abscesses in the right iliacus muscle,  partly in the field of view 5.  Degenerative and spondylotic changes as detailed for each level above in the body of report.    Dx-esophagus - Barium Swallow    Result Date: 9/16/2017 9/16/2017 10:39 AM HISTORY/REASON FOR EXAM:  Nausea. Nausea and vomiting. TECHNIQUE/EXAM DESCRIPTION AND NUMBER OF VIEWS: Single contrast esophagram procedure was performed. 5 fluoroscopic images obtained. Fluoroscopy time: 0.37 minutes COMPARISON:  None. FINDINGS: Limited exam secondary to limited patient mobility. The esophagus appears normal in caliber and configuration. No definite mucosal lesions are identified on single-contrast techniques. Contrast passes freely into the stomach. No hiatal hernia is identified.     No abnormalities identified on limited single contrast esophagram.    Echocardiogram Comp W/o Cont    Result Date: 9/14/2017  Transthoracic Echo Report Echocardiography Laboratory CONCLUSIONS Prior study done on [id number] left ventricular systolic function. Normal regional wall motion. Left ventricular ejection fraction is visually estimated to be 70%. Diastolic function is difficult to assess with atrial fibrillation. The right ventricle was normal in size and function. Structurally normal mitral valve without significant stenosis or regurgitation. Aortic sclerosis without stenosis. No aortic insufficiency. Normal pericardium without effusion.FINDINGS Left Ventricle Normal left ventricular chamber size. Normal left ventricular wall thickness. Normal left ventricular systolic function. Normal regional wall motion. Left ventricular ejection fraction is visually estimated to be 70%. Diastolic function is difficult to assess with atrial fibrillation. Right Ventricle The right ventricle was normal in size and function. Right Atrium The right atrium is normal in size.  Inferior vena cava is not well visualized. Left Atrium The left atrium is normal in size.  Left atrial volume index is 19  mL/sq m. Mitral Valve  Structurally normal mitral valve without significant stenosis or regurgitation.  Trace mitral regurgitation. Aortic Valve Aortic sclerosis without stenosis. Tricuspid aortic valve. No aortic insufficiency. Tricuspid Valve Structurally normal tricuspid valve without significant stenosis or regurgitation. Pulmonic Valve Structurally normal pulmonic valve without significant stenosis or regurgitation. Pericardium Normal pericardium without effusion. Aorta The aortic root is normal.  Ascending aorta diameter is 3.1 cm. Kingsley Hung M.D. (Electronically Signed) Final Date:     14 September 2017                 11:10      Micro:  Results     Procedure Component Value Units Date/Time    URINALYSIS [[id number]] Collected:  10/07/17 0032    Order Status:  Completed Specimen:  Urine from Urine, Clean Catch Updated:  10/07/17 0038     Color Yellow     Character Clear     Specific Gravity 1.011     Ph 5.5     Glucose Negative mg/dL      Ketones Negative mg/dL      Protein Negative mg/dL      Bilirubin Negative     Urobilinogen, Urine 0.2     Nitrite Negative     Leukocyte Esterase Negative     Occult Blood Negative     Micro Urine Req see below     Comment: Microscopic examination not performed when specimen is clear  and chemically negative for protein, blood, leukocyte esterase  and nitrite.         Narrative:       Collected By:[id number] ARIA LI  Indication for culture:->Dysuria/Frequency/Burning    URINE CULTURE(NEW) [[id number]] Collected:  10/07/17 0032    Order Status:  Completed Specimen:  Urine from Urine, Clean Catch Updated:  10/07/17 0035    Narrative:       Collected By:[id number] ARIA LI  Indication for culture:->Dysuria/Frequency/Burning          Assessment:  Active Hospital Problems    Diagnosis   • Persistent atrial fibrillation (CMS-formerly [facility]) [I48.1]   • Sepsis (CMS-formerly [facility]) [A41.9]   • 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.81]   • Chronic osteomyelitis of lumbar spine (CMS-formerly [facility]) [M86.68]        Plan:  L3-4 discitis osteomyelitis  Afebrile  Resolved leukocytosis  Status post treatment with Rocephin (prior strep viridans)  Persistent changes on repeat MRI of 9/19/17  Continue ceftaroline for at least 4 weeks with repeat MRI to see improvement  Stop date 10/18/2017  Needs repeat MRI prior to stopping abx-next week if feasible    Right iliacus abscess versus hematoma  Status post drainage on 9/20/17  Possibly hematoma  Cultures are negative     Odynophagia  Has hiatal hernia  EGD cancelled due to Afib  Continue fluc as possible candida espohagitis    Generalized debility  Needs rehabilitation  Difficult discharge due to cost of abx

## 2017-10-08 NOTE — PROGRESS NOTES
Notified by CNA pt with c/o chest pain. RN assessed pt. VSS. Pt stated the chest pain felt like real bad indigestion.    0303-Paged .    0306- called back with orders for one time dose of Morphine 2mg IVP and Maalox 30ml for one time dose.    0315-Medication administered to pt.    0327-Pt rating pain 2/10, will continue to monitor.     0500-Pt denies any pain at this time. VSS. HR stable. Will continue to monitor.

## 2017-10-08 NOTE — DISCHARGE PLANNING
Medical Social Work    Per bedside RN, pt not medically clear for d/c. Pt still having swallowing issues. Pt may go down for EGD.

## 2017-10-08 NOTE — PROGRESS NOTES
Monitor Summary:  Afib/Aflutter   PT has has several runs up to 150+ but not maintained.   -/.08/-

## 2017-10-08 NOTE — PROGRESS NOTES
Report received and pt assessed. Pt resting quietly in bed, no acute s/s of distress noted. Pt denies any needs at this time. Belongings and call light in reach. Safety maintained, will continue to monitor.

## 2017-10-08 NOTE — CARE PLAN
Problem: Safety  Goal: Will remain free from injury    Intervention: Provide assistance with mobility  Pt utilizes call light for assistance. Bed in lowest position and locked, upper side rails up x2, treaded socks on, and bed alarm activated. Belongings and call light in reach Safety ongoing.       Problem: Knowledge Deficit  Goal: Knowledge of disease process/condition, treatment plan, diagnostic tests, and medications will improve    Intervention: Assess knowledge level of disease process/condition, treatment plan, diagnostic tests, and medications  Plan of care and medication administration discussed with pt.       Problem: Skin Integrity  Goal: Risk for impaired skin integrity will decrease    Intervention: Assess risk factors for impaired skin integrity and/or pressure ulcers  Skin assessment complete. Pt turned and repositioned every two hours. Waffle overlay mattress placed onto bed.       Problem: Pain Management  Goal: Pain level will decrease to patient's comfort goal    Intervention: Educate and implement non-pharmacologic comfort measures. Examples: relaxation, distration, play therapy, activity therapy, massage, etc.  Encouraged pt to notify staff of any pain before it becomes too severe. Pain management ongoing.

## 2017-10-08 NOTE — PROGRESS NOTES
[facility] Hospitalist Progress Note    Date of Service: 10/8/2017    Chief Complaint  86 y.o. 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 Patient had a barium swallow which did not show any acute findings. GI, Dr. Waters consulted for further input.     Consultants/Specialty  Infectious diseases Dr. Getachew Vincent  of cardiology - signed off  GI Dr. Waters    Disposition  Pt to go to [facility] to complete abx therapy once medically clear.        Review of Systems   Constitutional: Negative for chills and weight loss.   HENT:        Trouble swallowing, denies any regurgitation of food, N/V     Eyes: Negative for blurred vision and double vision.   Respiratory: Negative for cough and shortness of breath.    Cardiovascular: Negative for chest pain (chest wall on the left).   Gastrointestinal: Negative for abdominal pain, nausea and vomiting.        Trouble swallowing medications and food. Denies regurgitation   Musculoskeletal: Negative for joint pain and myalgias.   Neurological: Negative for weakness and headaches.   Psychiatric/Behavioral: Negative for depression.      Physical Exam  Laboratory/Imaging   Hemodynamics  Temp (24hrs), Av.3 °C (97.4 °F), Min:35.8 °C (96.5 °F), Max:36.7 °C (98 °F)   Temperature: 35.8 °C (96.5 °F)  Pulse  Av.1  Min: 45  Max: 147    Blood Pressure : 113/63      Respiratory      Respiration: 16, Pulse Oximetry: 92 %     Work Of Breathing / Effort: Mild  RUL Breath Sounds: Clear;Diminished, RML Breath Sounds: Clear;Diminished, RLL Breath Sounds: Clear;Diminished, MIKE Breath Sounds: Clear;Diminished, LLL Breath Sounds: Clear;Diminished    Fluids    Intake/Output Summary (Last 24 hours) at 10/08/17 1005  Last data filed at 10/08/17 0324   Gross per 24 hour   Intake             1000  ml   Output              850 ml   Net              150 ml       Nutrition  Orders Placed This Encounter   Procedures   • DIET ORDER     Standing Status:   Standing     Number of Occurrences:   1     Order Specific Question:   Diet:     Answer:   Full Liquid [11]   • DIET NPO     Standing Status:   Standing     Number of Occurrences:   8     Order Specific Question:   Restrict to:     Answer:   Sips with Medications [3]     Physical Exam   Constitutional: He is oriented to person, place, and time. He appears well-developed and well-nourished. He is cooperative. He does not appear ill. No distress. Nasal cannula in place.   Obese Male     HENT:   Head: Normocephalic and atraumatic.   Eyes: Conjunctivae are normal. Right eye exhibits no discharge. Left eye exhibits no discharge.   Neck: Normal range of motion. Neck supple. No tracheal deviation present.   Cardiovascular: Normal rate and regular rhythm.    No murmur heard.  Pulmonary/Chest: Effort normal. No stridor. No respiratory distress.   Decreased breath sounds bilateral bases   Abdominal: Soft. Bowel sounds are normal. He exhibits no distension. There is no tenderness. There is no rebound and no guarding.   Musculoskeletal: Normal range of motion. He exhibits no edema or tenderness.   Neurological: He is alert and oriented to person, place, and time.   Skin: Skin is warm and dry.   Psychiatric: He has a normal mood and affect. His behavior is normal.   Nursing note and vitals reviewed.      Recent Labs      10/07/17   0550  10/08/17   0430   WBC  9.8  7.1   RBC  3.74*  3.18*   HEMOGLOBIN  11.2*  9.8*   HEMATOCRIT  34.6*  30.7*   MCV  92.0  96.5   MCH  30.2  30.8   MCHC  32.8*  31.9*   RDW  52.0*  56.2*   PLATELETCT  326  279   MPV  9.0  9.2     Recent Labs      10/07/17   0550  10/08/17   0430   SODIUM  131*  135   POTASSIUM  3.7  4.0   CHLORIDE  100  102   CO2  19*  24   GLUCOSE  133*  97   BUN  7*  9   CREATININE  0.48*  0.49*   CALCIUM  10.0  9.0                       Assessment/Plan     Nontraumatic psoas hematoma- (present on admission)   Assessment & Plan    Patient remains off of anticoagulation continue to monitor psoas hematoma as well as monitor H&H for any drops.        Persistent atrial fibrillation (CMS-HCC)- (present on admission)   Assessment & Plan    RATE IS CONTROLLED  D/W THE PT ABOUT TAKING THE PILLS SINCE THE NURSE DECIDED HE IS  NOT TAKING THEM.HE WANTS HISS PILLS CRUSHED.  PT STATED THAT HE IS HAVING PROBLEMS SWALLOWING IT  LOW  DIGOXIN LEVEL  CONTINUE WITH DIGOXIN  CONTINUE WITH LOPRESSOR PO AND IV  DC LOBATOLOL DUE MILD LOW BP  NO ANTICOAGULATION 2ND TO HEMATOMA        Discitis of lumbar region- (present on admission)   Assessment & Plan    TEFLARON   ID recs, stop date 10/18          Debility- (present on admission)   Assessment & Plan    Continue at this point with physical therapy and occupational therapy in-house.  Once against a long-term acute care the patient will need also continued physical therapy and occupational therapy.        Dysphagia   Assessment & Plan    D/W GI  ENDOSCOPY IN AM  NPO PASSED MIDNIGHT        DNR (do not resuscitate)- (present on admission)   Assessment & Plan    Confirmed with patient and he continues to be an DNR code status.        Normocytic anemia- (present on admission)   Assessment & Plan    Follow hemoglobin and hematocrit levels.  Transfuse PRN if Hb 7 and Hct 21 transfusion will be considered.  Patient will need at this point continued monitoring of his iron levels B12 and folic acid levels long-term.            Reviewed items::  Labs reviewed and Medications reviewed  Hearn catheter::  No Hearn  DVT prophylaxis - mechanical:  SCDs  Ulcer Prophylaxis::  Yes  Antibiotics:  Treating active infection/contamination beyond 24 hours perioperative coverage

## 2017-10-08 NOTE — CARE PLAN
Problem: Skin Integrity  Goal: Risk for impaired skin integrity will decrease    Intervention: Assess risk factors for impaired skin integrity and/or pressure ulcers  Skin assessed this morning with no breakdown. Pt repositioned Q2 hours as needed for prevention of breakdown and as patient allowed. No knew breakdown of any skin at this time.

## 2017-10-08 NOTE — CARE PLAN
Problem: Safety  Goal: Will remain free from falls  Outcome: PROGRESSING AS EXPECTED  No falls this shift. Bed alarm set and call light at patients reach.

## 2017-10-09 LAB
ALBUMIN SERPL BCP-MCNC: 2.7 G/DL (ref 3.2–4.9)
ALBUMIN/GLOB SERPL: 0.9 G/DL
ALP SERPL-CCNC: 54 U/L (ref 30–99)
ALT SERPL-CCNC: 5 U/L (ref 2–50)
ANION GAP SERPL CALC-SCNC: 3 MMOL/L (ref 0–11.9)
AST SERPL-CCNC: 12 U/L (ref 12–45)
BACTERIA UR CULT: NORMAL
BASOPHILS # BLD AUTO: 0.7 % (ref 0–1.8)
BASOPHILS # BLD: 0.04 K/UL (ref 0–0.12)
BILIRUB SERPL-MCNC: 0.4 MG/DL (ref 0.1–1.5)
BUN SERPL-MCNC: 9 MG/DL (ref 8–22)
CALCIUM SERPL-MCNC: 9.1 MG/DL (ref 8.5–10.5)
CHLORIDE SERPL-SCNC: 103 MMOL/L (ref 96–112)
CO2 SERPL-SCNC: 28 MMOL/L (ref 20–33)
CREAT SERPL-MCNC: 0.5 MG/DL (ref 0.5–1.4)
EOSINOPHIL # BLD AUTO: 0.33 K/UL (ref 0–0.51)
EOSINOPHIL NFR BLD: 5.6 % (ref 0–6.9)
ERYTHROCYTE [DISTWIDTH] IN BLOOD BY AUTOMATED COUNT: 55.2 FL (ref 35.9–50)
GFR SERPL CREATININE-BSD FRML MDRD: >60 ML/MIN/1.73 M 2
GLOBULIN SER CALC-MCNC: 2.9 G/DL (ref 1.9–3.5)
GLUCOSE SERPL-MCNC: 85 MG/DL (ref 65–99)
HCT VFR BLD AUTO: 29.8 % (ref 42–52)
HGB BLD-MCNC: 9.3 G/DL (ref 14–18)
IMM GRANULOCYTES # BLD AUTO: 0.02 K/UL (ref 0–0.11)
IMM GRANULOCYTES NFR BLD AUTO: 0.3 % (ref 0–0.9)
LYMPHOCYTES # BLD AUTO: 1.08 K/UL (ref 1–4.8)
LYMPHOCYTES NFR BLD: 18.4 % (ref 22–41)
MCH RBC QN AUTO: 30.3 PG (ref 27–33)
MCHC RBC AUTO-ENTMCNC: 31.2 G/DL (ref 33.7–35.3)
MCV RBC AUTO: 97.1 FL (ref 81.4–97.8)
MONOCYTES # BLD AUTO: 0.86 K/UL (ref 0–0.85)
MONOCYTES NFR BLD AUTO: 14.6 % (ref 0–13.4)
NEUTROPHILS # BLD AUTO: 3.55 K/UL (ref 1.82–7.42)
NEUTROPHILS NFR BLD: 60.4 % (ref 44–72)
NRBC # BLD AUTO: 0 K/UL
NRBC BLD AUTO-RTO: 0 /100 WBC
PLATELET # BLD AUTO: 256 K/UL (ref 164–446)
PMV BLD AUTO: 9.5 FL (ref 9–12.9)
POTASSIUM SERPL-SCNC: 4 MMOL/L (ref 3.6–5.5)
PROT SERPL-MCNC: 5.6 G/DL (ref 6–8.2)
RBC # BLD AUTO: 3.07 M/UL (ref 4.7–6.1)
SIGNIFICANT IND 70042: NORMAL
SITE SITE: NORMAL
SODIUM SERPL-SCNC: 134 MMOL/L (ref 135–145)
SOURCE SOURCE: NORMAL
WBC # BLD AUTO: 5.9 K/UL (ref 4.8–10.8)

## 2017-10-09 PROCEDURE — 770020 HCHG ROOM/CARE - TELE (206)

## 2017-10-09 PROCEDURE — 0DJ08ZZ INSPECTION OF UPPER INTESTINAL TRACT, VIA NATURAL OR ARTIFICIAL OPENING ENDOSCOPIC: ICD-10-PCS | Performed by: INTERNAL MEDICINE

## 2017-10-09 PROCEDURE — 700105 HCHG RX REV CODE 258: Performed by: HOSPITALIST

## 2017-10-09 PROCEDURE — 160203 HCHG ENDO MINUTES - 1ST 30 MINS LEVEL 4: Performed by: INTERNAL MEDICINE

## 2017-10-09 PROCEDURE — 160048 HCHG OR STATISTICAL LEVEL 1-5: Performed by: INTERNAL MEDICINE

## 2017-10-09 PROCEDURE — 700102 HCHG RX REV CODE 250 W/ 637 OVERRIDE(OP): Performed by: INTERNAL MEDICINE

## 2017-10-09 PROCEDURE — 80053 COMPREHEN METABOLIC PANEL: CPT

## 2017-10-09 PROCEDURE — 700111 HCHG RX REV CODE 636 W/ 250 OVERRIDE (IP): Performed by: INTERNAL MEDICINE

## 2017-10-09 PROCEDURE — 500066 HCHG BITE BLOCK, ECT: Performed by: INTERNAL MEDICINE

## 2017-10-09 PROCEDURE — 700102 HCHG RX REV CODE 250 W/ 637 OVERRIDE(OP): Performed by: NURSE PRACTITIONER

## 2017-10-09 PROCEDURE — 160035 HCHG PACU - 1ST 60 MINS PHASE I: Performed by: INTERNAL MEDICINE

## 2017-10-09 PROCEDURE — 700111 HCHG RX REV CODE 636 W/ 250 OVERRIDE (IP): Performed by: HOSPITALIST

## 2017-10-09 PROCEDURE — A9270 NON-COVERED ITEM OR SERVICE: HCPCS | Performed by: INTERNAL MEDICINE

## 2017-10-09 PROCEDURE — 160009 HCHG ANES TIME/MIN: Performed by: INTERNAL MEDICINE

## 2017-10-09 PROCEDURE — A9270 NON-COVERED ITEM OR SERVICE: HCPCS | Performed by: HOSPITALIST

## 2017-10-09 PROCEDURE — 502240 HCHG MISC OR SUPPLY RC 0272: Performed by: INTERNAL MEDICINE

## 2017-10-09 PROCEDURE — 700111 HCHG RX REV CODE 636 W/ 250 OVERRIDE (IP)

## 2017-10-09 PROCEDURE — A9270 NON-COVERED ITEM OR SERVICE: HCPCS | Performed by: NURSE PRACTITIONER

## 2017-10-09 PROCEDURE — 700105 HCHG RX REV CODE 258: Performed by: INTERNAL MEDICINE

## 2017-10-09 PROCEDURE — C9113 INJ PANTOPRAZOLE SODIUM, VIA: HCPCS | Performed by: INTERNAL MEDICINE

## 2017-10-09 PROCEDURE — 99232 SBSQ HOSP IP/OBS MODERATE 35: CPT | Performed by: FAMILY MEDICINE

## 2017-10-09 PROCEDURE — 700102 HCHG RX REV CODE 250 W/ 637 OVERRIDE(OP): Performed by: HOSPITALIST

## 2017-10-09 PROCEDURE — 160002 HCHG RECOVERY MINUTES (STAT): Performed by: INTERNAL MEDICINE

## 2017-10-09 PROCEDURE — 85025 COMPLETE CBC W/AUTO DIFF WBC: CPT

## 2017-10-09 RX ORDER — LINEZOLID 600 MG/1
600 TABLET, FILM COATED ORAL EVERY 12 HOURS
Status: COMPLETED | OUTPATIENT
Start: 2017-10-10 | End: 2017-10-17

## 2017-10-09 RX ADMIN — SODIUM CHLORIDE: 9 INJECTION, SOLUTION INTRAVENOUS at 16:50

## 2017-10-09 RX ADMIN — CEFTAROLINE FOSAMIL 600 MG: 600 POWDER, FOR SOLUTION INTRAVENOUS at 08:56

## 2017-10-09 RX ADMIN — LEVETIRACETAM 500 MG: 100 SOLUTION ORAL at 08:56

## 2017-10-09 RX ADMIN — ONDANSETRON 4 MG: 2 INJECTION INTRAMUSCULAR; INTRAVENOUS at 20:45

## 2017-10-09 RX ADMIN — CEFTAROLINE FOSAMIL 600 MG: 600 POWDER, FOR SOLUTION INTRAVENOUS at 20:52

## 2017-10-09 RX ADMIN — SUCRALFATE 1 G: 1 SUSPENSION ORAL at 18:46

## 2017-10-09 RX ADMIN — PANTOPRAZOLE SODIUM 40 MG: 40 INJECTION, POWDER, FOR SOLUTION INTRAVENOUS at 08:56

## 2017-10-09 RX ADMIN — GABAPENTIN 200 MG: 100 CAPSULE ORAL at 17:00

## 2017-10-09 RX ADMIN — SUCRALFATE 1 G: 1 SUSPENSION ORAL at 06:43

## 2017-10-09 RX ADMIN — ROSUVASTATIN CALCIUM 125 MCG: 10 TABLET, FILM COATED ORAL at 18:46

## 2017-10-09 RX ADMIN — ANTACID TABLETS 1000 MG: 500 TABLET, CHEWABLE ORAL at 18:46

## 2017-10-09 ASSESSMENT — ENCOUNTER SYMPTOMS
MYALGIAS: 0
NAUSEA: 1
ABDOMINAL PAIN: 0
FEVER: 0
DOUBLE VISION: 0
BLURRED VISION: 0
VOMITING: 0
HEADACHES: 0
WEAKNESS: 0
DEPRESSION: 0
CHILLS: 0
WEIGHT LOSS: 0
NAUSEA: 0
SHORTNESS OF BREATH: 0
BACK PAIN: 0
COUGH: 0

## 2017-10-09 ASSESSMENT — PAIN SCALES - GENERAL
PAINLEVEL_OUTOF10: 0

## 2017-10-09 NOTE — OP REPORT
DATE OF SERVICE:  10/07/2017    PREOPERATIVE DIAGNOSIS:  Dysphagia.    POSTOPERATIVE DIAGNOSES:  1.  Pan esophagitis.  2.  A 4 mm distal esophageal stricture at 36 cm.    PROCEDURE PERFORMED:  Esophagoscopy.    SUB-PROCEDURE:  None.    HISTORY OF PRESENT ILLNESS:  This is an 86-year-old male with dysphagia.    Informed consent was obtained after explanation of risks, benefits, and   alternatives.    DESCRIPTION OF PROCEDURE:  The procedure was performed in the main OR at   [facility].  The patient was placed under deep sedation by   anesthesiology and monitored throughout the procedure.  The Olympus    endoscope was passed via the oropharynx into the esophagus.  I was then   advanced while visualizing lumen.  There was circumferential severe   esophagitis present in the proximal to the distal esophagus.  At the distal   esophagus at 36 cm from the incisors, a 4 mm stricture was identified.    Endoscope could not pass distal to this area.  Due to the significant   inflammatory change, dilation was not performed.  The endoscope was then   withdrawn.  The patient tolerated the procedure well.  There were no apparent   complications.    IMPRESSION:  An 86-year-old male with dysphagia.  Endoscopy shows severe pan   esophagitis and a distal esophageal stricture.  Dilation was not performed due   to severe active esophagitis.  Recommend medical therapy with IV proton pump   inhibitor and Carafate suspension with repeat endoscopy and dilation next   week.       ____________________________________     RADHA DRISCOLL DO    PIS / NTS    DD:  10/09/2017 12:27:31  DT:  10/09/2017 12:58:56    D#:  [id number]  Job#:  [id number]

## 2017-10-09 NOTE — PROGRESS NOTES
Report received and pt assessed. No acute s/s of distress noted. Pt denies any needs at this time. Belongings and call light in reach. Safety maintained, will continue to monitor.

## 2017-10-09 NOTE — PROGRESS NOTES
Infectious Disease Progress Note    Author: Oliva Bolanos M.D. Date & Time of service: 10/9/2017  10:06 AM    Chief Complaint:  Altered mental status    Interval History:  86-year-old male admitted for altered mental status. He was recently treated for L3-4 discitis. His repeat MRI showed right iliacus muscle abscess  9/21/17-MAXIMUM TEMPERATURE 98. On 2 liters of oxygen. WBC 14 and creatinine 0.38  9/22- MAXIMUM TEMPERATURE 97.8. Complains of generalized malaise. Breathing is improved. Right leg pain is improved  9/23/17-MAXIMUM TEMPERATURE 99.5. Still has back pain when he sits up. The WBC 12  9/24/17-MAXIMUM TEMPERATURE 97.6. Feels better. No new issues overnight. WBC11.7  9/25 AF resting comfortably  9/26 AF, WBC 11.3 more awake eating OK.  Back pain about the same  9/27 AF WBC 11 had PICC placed-denies SE abx. No new complaints  9/28 AF tolerating abx well feels about the same  9/29 AF, no CBC, pt has no new complaints this morning, asking when he will be discharged and how long he will be on abx,  9/30 AF no CBC, eating lots of chocolate pudding/ice cream, no new issues per pt  10/2 AF, feels well and denies any pain, feels he is getting stronger and appetite improved  10/3 AF, WBC 9.1, felt dizzy while watching TV yesterday with head pressure but no HA, no nausea or vomiting, no recurrent symptoms today but states he was dizzy at home prior to admit, back pain only with getting out of bed  10/4 AF, no CBC, no dizziness, now having painful swallowing that started last night, did not eat breakfast  10/5 AF, no CBC, complaining of painful swallowing, could not swallow pills this am, waiting of FEES  10/6 AF WBC 9.1 tired and feels like he has pressure on his eyes  10/7 AF WBC 9.8 episode CP and Afib with RVR-eyes sxs resolved. No current CP or SOB  10/8 AF WBC 7.1 thinks having GERD-sxs improved with Maalox  10/9/17-denies any back pain at rest. No fevers. He says his main issue is nausea and issues  swallowing                   Labs Reviewed, Medications Reviewed and Radiology Reviewed.    Review of Systems:  Review of Systems   Constitutional: Positive for malaise/fatigue. Negative for chills and fever.   HENT:        Painful swallowing   Respiratory: Negative for cough and shortness of breath.    Cardiovascular: Positive for leg swelling. Negative for chest pain.   Gastrointestinal: Positive for nausea. Negative for abdominal pain and vomiting.   Musculoskeletal: Negative for back pain.        Improving. Only occurs when he sits up   Neurological: Negative for headaches.       Hemodynamics:  Temp (24hrs), Av.2 °C (97.1 °F), Min:36 °C (96.8 °F), Max:36.4 °C (97.5 °F)  Temperature: 36 °C (96.8 °F)  Pulse  Av.8  Min: 45  Max: 147   Blood Pressure : 124/63       Physical Exam:  Physical Exam   Constitutional: He appears well-developed. He is easily aroused. No distress.   Obese   HENT:   Head: Normocephalic and atraumatic.   Poor dentition   Eyes: Conjunctivae and EOM are normal. Pupils are equal, round, and reactive to light. No scleral icterus.   Neck: Neck supple.   Cardiovascular: Normal rate.  An irregularly irregular rhythm present.   IRR   Pulmonary/Chest: Effort normal. No respiratory distress. He has no wheezes. He has rales.   Abdominal: Soft. He exhibits no distension. There is no tenderness.   Musculoskeletal: He exhibits edema.   LUE PICC   Neurological: He is alert and easily aroused.   Nursing note and vitals reviewed.      Meds:    Current Facility-Administered Medications:   •  levetiracetam  •  Metoprolol Tartrate  •  pantoprazole  •  fluconazole  •  lorazepam  •  ceftaroline (TEFLARO) ivpb  •  sucralfate  •  calcium carbonate  •  PICC Line Insertion has been implemented **AND** May use Lidocaine 1% not to exceed 3 mls for local at insertion site **AND** NOTIFY MD **AND** Tip to dwell in the superior vena cava **AND** Do not use PICC Line until placement verified by Chest X Ray **AND**  DX-CHEST-FOR PICC LINE Perform procedure in: PICC Room **AND** If radiologist reading of chest X-ray states any of the following the PICC should be used **AND** Further evaluation of the PICC placement can be retrieved from X-Ray and Imaging **AND** Blood draws through PICC line; draws by RN only **AND** FLUSHING GUIDELINES WHEN IN USE **AND** normal saline PF **AND** FLUSHING GUIDELINES WHEN NOT IN USE **AND** DRESSING MAINTENANCE **AND** Change needleless pressure ports and IV tubing every 72 hours per hospital policy **AND** TUBING **AND** If there is an MD order to remove the PICC line, any RN may remove the PICC line **AND** [] PATIENT EDUCATION MATERIALS **AND** NURSING COMMUNICATION  •  Metoprolol Tartrate  •  acetaminophen  •  digoxin  •  prochlorperazine  •  promethazine  •  hydrALAZINE  •  NS  •  ondansetron  •  metoprolol SR  •  atorvastatin  •  gabapentin  •  tamsulosin  •  senna-docusate **AND** polyethylene glycol/lytes **AND** magnesium hydroxide **AND** bisacodyl  •  Respiratory Care per Protocol    Labs:  Recent Labs      10/07/17   0550  10/08/17   0430  10/09/17   0432   WBC  9.8  7.1  5.9   RBC  3.74*  3.18*  3.07*   HEMOGLOBIN  11.2*  9.8*  9.3*   HEMATOCRIT  34.6*  30.7*  29.8*   MCV  92.0  96.5  97.1   MCH  30.2  30.8  30.3   RDW  52.0*  56.2*  55.2*   PLATELETCT  326  279  256   MPV  9.0  9.2  9.5   NEUTSPOLYS  76.40*  62.10  60.40   LYMPHOCYTES  12.90*  17.80*  18.40*   MONOCYTES  9.40  14.90*  14.60*   EOSINOPHILS  0.40  4.20  5.60   BASOPHILS  0.50  0.60  0.70     Recent Labs      10/07/17   0550  10/08/17   0430  10/09/17   0432   SODIUM  131*  135  134*   POTASSIUM  3.7  4.0  4.0   CHLORIDE  100  102  103   CO2  19*  24  28   GLUCOSE  133*  97  85   BUN  7*  9  9     Recent Labs      10/07/17   0550  10/08/17   0430  10/09/17   043   ALBUMIN  3.5   --   2.7*   TBILIRUBIN  0.6   --   0.4   ALKPHOSPHAT  72   --   54   TOTPROTEIN  7.1   --   5.6*   ALTSGPT  7   --   5   ASTSGOT  12    --   12   CREATININE  0.48*  0.49*  0.50       Imaging:  Ct-abdomen-pelvis With    Result Date: 9/19/2017 9/19/2017 6:34 PM HISTORY/REASON FOR EXAM:  Abscess noted on MRI. TECHNIQUE/EXAM DESCRIPTION:  CT scan of the abdomen and pelvis with contrast. Contrast-enhanced helical scanning was obtained from the diaphragmatic domes through the pubic symphysis following the bolus administration of nonionic contrast without complication. 100 mL of Omnipaque 350 nonionic contrast was administered without complication. Low dose optimization technique was utilized for this CT exam including automated exposure control and adjustment of the mA and/or kV according to patient size. COMPARISON: MRI from the same day FINDINGS: Lung bases: There are small bilateral pleural effusions with overlying atelectasis. Abdomen: No free air is seen in the abdomen or pelvis. Evaluation is limited by streak artifact from barium within the colon. There is wall thickening of the distal esophagus with a small hiatal hernia. Liver appears unremarkable. Portal vein is patent. There is calcification in the pancreatic head. No adrenal mass is identified. Tiny hypodense left renal lesion is too small to characterize. There is mild prominence of the renal collecting systems bilaterally. Small hypodense right renal lesions too small to characterize. There are nonobstructing right renal calculi. Atherosclerotic plaque is seen in the aorta and its branches. There are small inguinal, retroperitoneal and mesenteric lymph nodes. There is no evidence of bowel obstruction. There is a moderate amount of stool in the rectosigmoid colon. There is colonic diverticulosis. The appendix is not identified. Stomach is distended with fluid. Small bowel loops are fluid-filled. Bladder is mildly distended with foci of air. There is asymmetric prominence of the right iliopsoas musculature with surrounding stranding. Findings likely related to the previously noted abscess  collection. Degenerative changes are seen in the spine. Postsurgical changes are noted in the lumbar spine. Degenerative changes are seen at the hips. There is mild loss of height of the superior endplate of T11 with a Schmorl's node noted. There is mild endplate irregularity at L3/L4.     Prominence of the right iliopsoas muscle with surrounding inflammatory stranding. The known fluid collection was better delineated on the prior MRI. Mild endplate irregularity at L3/L4 can be seen in discitis/osteomyelitis. Air-fluid level in the bladder. Correlation with urinalysis is recommended. This can be seen in the setting of recent instrumentation, infection or fistula. Colonic diverticulosis. Moderate amount of colonic stool. Nonobstructing right renal calculi. Mild prominence of the renal collecting systems bilaterally. Small hypodense renal lesions are too small to characterize. Fluid-filled loops of small bowel can be seen in the setting of enteritis. Small hiatal hernia with mild thickening of the distal esophagus. Calcifications in the pancreatic head can be seen in chronic pancreatitis. Atherosclerotic plaque. Small bilateral pleural effusions with overlying atelectasis.     Ct-drain-retroperitoneal    Result Date: 9/20/2017 9/20/2017 2:30 PM HISTORY/REASON FOR EXAM:  Multiloculated fluid collections in the right iliacus muscle. Suspected abscess. TECHNIQUE/EXAM DESCRIPTION: Right pelvic (extraperitoneal) retroperitoneal abscess drainage with CT guidance. Low dose optimization technique was utilized for this CT exam including automated exposure control and adjustment of the mA and/or kV according to patient size. PROCEDURE: Informed consent was obtained. Procedures performed with local anesthesia only with appropriate continuous patient monitoring by the radiology nurse. Sedation duration: N/A minutes Localizing CT images were obtained with the patient in supine position. The skin was prepped with Betadine and  draped in a sterile fashion. Following local anesthesia with 1% Lidocaine, a 17 G guiding needle was placed and extraperitoneal needle path in the right side of the pelvis via the iliacus muscle confirmed with CT. An Amplatz guidewire was placed and following serial tract dilatation, a 8 Amharic pigtail locking catheter was placed. A specimen was collected and submitted for culture and sensitivity and Gram stain. Total of 5 mL old bloody reddish-brown fluid was drained. No purulent fluid was obtained. The fluid was not malodorous. The catheter was secured to the skin and connected to suction bulb drainage. Final CT images were obtained documenting catheter position. The patient tolerated the procedure well with no evidence of complication. Low dose optimization technique was utilized for this CT exam including automated exposure control and adjustment of the mA and/or kV according to patient size. COMPARISON: MRI lumbar spine 9/19/2017, CT abdomen and pelvis 9/19/2017. FINDINGS: The final CT images show satisfactory catheter position within the target collection.     1.  CT GUIDED RETROPERITONEAL (EXTRAPERITONEAL) RIGHT PELVIC CATHETER DRAINAGE WITHIN THE RIGHT ILIACUS MUSCLE. OLD DARK REDDISH-BROWN BLOODY FLUID WAS OBTAINED. NO ABSCESS OR PURULENT MATERIAL. 2.  THE CURRENT PLAN IS TO CHECK CULTURES AND LIKELY REMOVED CATHETER IN THE SHORT-TERM AT 48-72 HOURS AS THERE IS UNLIKELY TO BE AN ABSCESS.    Ct-head W/o    Result Date: 9/13/2017 9/13/2017 9:30 PM HISTORY/REASON FOR EXAM:  Altered Mental Status. TECHNIQUE/EXAM DESCRIPTION AND NUMBER OF VIEWS: CT of the head without contrast. The study was performed on a helical multidetector CT scanner. Contiguous 2.5 mm axial sections were obtained from the skull base through the vertex. Up to date radiation dose reduction adjustments have been utilized to meet ALARA standards for radiation dose reduction. COMPARISON:  7/12/2017 FINDINGS: The lateral ventricles are  enlarged. Cortical sulci are enlarged. Patchy areas of low attenuation in the white matter bilaterally. No significant mass effect or midline shift. Basal cisterns are patent. No evidence for intracranial hemorrhage. Calvaria are intact. Visualized orbits are unremarkable. Visualized mastoid air cells are clear. Visualized paranasal sinuses are unremarkable. Calcifications of the carotid arteries noted. Calcified scalp nodules again present.     1.  Diffuse atrophy and white matter changes. 2.  No acute intracranial hemorrhage or territorial infarct.     Dx-chest-portable (1 View)    Result Date: 9/16/2017 9/16/2017 10:31 PM HISTORY/REASON FOR EXAM:  Shortness of Breath. TECHNIQUE/EXAM DESCRIPTION AND NUMBER OF VIEWS: Single portable view of the chest. COMPARISON: 9/13/2017 FINDINGS: Cardiac mediastinal contour is unchanged. Mediastinum is again prominent. Mild prominence of the pulmonary interstitium. No gross pleural fluid or pneumothorax. Dextroconvex curvature of thoracic spine.     No significant change from prior exam.    Dx-chest-portable (1 View)    Result Date: 9/13/2017 9/13/2017 8:44 PM HISTORY/REASON FOR EXAM:  Possible sepsis. TECHNIQUE/EXAM DESCRIPTION AND NUMBER OF VIEWS: Single portable view of the chest. COMPARISON: 8/7/2017 FINDINGS: Cardiac mediastinal contour is unchanged. Mild diffuse prominence of the interstitium again seen. No focal consolidation. No pleural fluid collection or pneumothorax. Dextroconvex curvature of thoracic spine. Diffuse osteopenia.     1.  Diffuse prominence of interstitium again seen, likely interstitial lung disease.  Mild pulmonary edema is also a consideration. 2.  No pneumonia or pneumothorax. 3.  Stable cardiomegaly.    Dx-small Bowel Series    Result Date: 9/17/2017 9/16/2017 10:39 AM HISTORY/REASON FOR EXAM:  Nausea and vomiting. TECHNIQUE/EXAM DESCRIPTION AND NUMBER OF VIEWS: Single contrast barium small bowel follow-through performed. Fluoroscopic images were  obtained. No fluoroscopic images obtained. COMPARISON:  None available. FINDINGS:  view the abdomen demonstrates marked gaseous distention and small bowel and colonic distention. Patient drank thin barium. Contrast passed very slowly into dilated small bowel loops. Jejunal loops are dilated up to 5.2 cm. Overhead images were taken for a total of 22 hours before termination of the exam. Contrast reached the ileum, but does not extend into the colon. There is a large amount of stool in the colon.     1.  Gaseous distention with fairly diffuse bowel dilatation. Contrast extends into the ileum, but does not reach the colon within 22 hours. No transition point is identified to suggest mechanical bowel obstruction. 2.  Large amount of stool in the rectal vault.    Mr-lumbar Spine-with & W/o    Result Date: 9/19/2017 9/19/2017 1:35 PM HISTORY/REASON FOR EXAM:  Altered mental status, weakness. Possible discitis. Previous lumbar surgery. TECHNIQUE/EXAM DESCRIPTION: MRI of the lumbar spine without and with contrast. The study was performed on a [employer] Signa 1.5 Lucille MRI scanner. T1 sagittal, T2 fast spin-echo sagittal, T2 axial images and T1 axial images were obtained of the lumbar spine. T1 post-contrast sagittal and T1 post-contrast axial images were obtained. Optional T2 fat-suppressed sagittal images may be obtained. 20 mL Omniscan gadolinium contrast was administered intravenously. COMPARISON:  MRI lumbar spine 7/7/2017, T abdomen and pelvis 1/6/2017 FINDINGS: Alignment in the lumbar spine again shows retrolisthesis of L3 on L4 of about 5 mm. There is fluid signal T2 hyperintensity again seen in the L3-L4 disc space and prominent marrow edema signal at the L3 inferior endplate and to a lesser extent L4 superior endplate. There is corresponding enhancement, particularly at the inferior endplate of L3. Findings are highly suspicious for discitis with osteomyelitis. There is postoperative change with lumbar laminectomy  at L2-L3 through L5. There is posterior fusion with transpedicular screw fixation 4-L5. The posterior paraspinous soft tissues show expected postoperative changes with atrophy and fatty replacement in  the posterior paraspinous muscles at the operative levels. There is no significant postoperative dorsal epidural fluid collection. However, there has been interval development of multilocular fluid collections in the right iliacus muscle spanning about 42 mm. These are consistent with intramuscular abscesses (T2 axial image 4, series 5, T1 postcontrast axial image 1, series 10). The conus is normal in position and signal with its tip at the L1 level. At T12-L1, is a tiny central and left paramedian disc protrusion with an underlying annular fissure. There is mild hypertrophic facet arthropathy. Thecal sac is toward the lower range of normal without karla central stenosis. The neural foramina are intact. At L1-2, there is negligible disc bulging. There is no central stenosis or significant foraminal stenosis. At L2-3, there is a small broad-based disc-osteophyte complex. There is no central stenosis as there is decompressive laminectomy at this level. There is no significant foraminal stenosis. At L3-4, there is posterior marginal spurring and disc bulging accentuated by the retrolisthesis. There is moderate bilateral lateral recess stenosis. Thecal sac is toward the lower range of normal even though there is a decompressive laminectomy at this  level. However, there is no karla central stenosis (T2 axial image 24, series 5). There is severe bilateral foraminal stenosis, right greater than left. This is unchanged from the previous exam. At L4-5, no central stenosis. There is inferior foraminal blunting with moderate bilateral foraminal stenosis. At L5-S1, there is minimal disc bulging. There is a left paramedian annular fissure. There is mild hypertrophic facet arthropathy. No central stenosis. There is moderate  bilateral foraminal stenosis.     1.  L3-4 findings again suggesting discitis with osteomyelitis. No evidence of epidural abscess or epidural phlegmon. 2.  L3-4 retrolisthesis. 3.  Postoperative multilevel lumbar laminectomy and posterior fusion at L4-5. 4.  Interval development of multilocular intramuscular abscesses in the right iliacus muscle, partly in the field of view 5.  Degenerative and spondylotic changes as detailed for each level above in the body of report.    Dx-esophagus - Barium Swallow    Result Date: 9/16/2017 9/16/2017 10:39 AM HISTORY/REASON FOR EXAM:  Nausea. Nausea and vomiting. TECHNIQUE/EXAM DESCRIPTION AND NUMBER OF VIEWS: Single contrast esophagram procedure was performed. 5 fluoroscopic images obtained. Fluoroscopy time: 0.37 minutes COMPARISON:  None. FINDINGS: Limited exam secondary to limited patient mobility. The esophagus appears normal in caliber and configuration. No definite mucosal lesions are identified on single-contrast techniques. Contrast passes freely into the stomach. No hiatal hernia is identified.     No abnormalities identified on limited single contrast esophagram.    Echocardiogram Comp W/o Cont    Result Date: 9/14/2017  Transthoracic Echo Report Echocardiography Laboratory CONCLUSIONS Prior study done on [id number] left ventricular systolic function. Normal regional wall motion. Left ventricular ejection fraction is visually estimated to be 70%. Diastolic function is difficult to assess with atrial fibrillation. The right ventricle was normal in size and function. Structurally normal mitral valve without significant stenosis or regurgitation. Aortic sclerosis without stenosis. No aortic insufficiency. Normal pericardium without effusion.FINDINGS Left Ventricle Normal left ventricular chamber size. Normal left ventricular wall thickness. Normal left ventricular systolic function. Normal regional wall motion. Left ventricular ejection fraction is visually  estimated to be 70%. Diastolic function is difficult to assess with atrial fibrillation. Right Ventricle The right ventricle was normal in size and function. Right Atrium The right atrium is normal in size.  Inferior vena cava is not well visualized. Left Atrium The left atrium is normal in size.  Left atrial volume index is 19  mL/sq m. Mitral Valve Structurally normal mitral valve without significant stenosis or regurgitation.  Trace mitral regurgitation. Aortic Valve Aortic sclerosis without stenosis. Tricuspid aortic valve. No aortic insufficiency. Tricuspid Valve Structurally normal tricuspid valve without significant stenosis or regurgitation. Pulmonic Valve Structurally normal pulmonic valve without significant stenosis or regurgitation. Pericardium Normal pericardium without effusion. Aorta The aortic root is normal.  Ascending aorta diameter is 3.1 cm. Kingsley Hung M.D. (Electronically Signed) Final Date:     14 September 2017                 11:10      Micro:  Results     Procedure Component Value Units Date/Time    URINE CULTURE(NEW) [[id number]] Collected:  10/07/17 0032    Order Status:  Completed Specimen:  Urine from Urine, Clean Catch Updated:  10/09/17 0829     Significant Indicator NEG     Source UR     Site URINE, CLEAN CATCH     Urine Culture No growth at 48 hours    Narrative:       Collected By:[id number] ARIA LI  Indication for culture:->Dysuria/Frequency/Burning    URINALYSIS [[id number]] Collected:  10/07/17 0032    Order Status:  Completed Specimen:  Urine from Urine, Clean Catch Updated:  10/07/17 0038     Color Yellow     Character Clear     Specific Gravity 1.011     Ph 5.5     Glucose Negative mg/dL      Ketones Negative mg/dL      Protein Negative mg/dL      Bilirubin Negative     Urobilinogen, Urine 0.2     Nitrite Negative     Leukocyte Esterase Negative     Occult Blood Negative     Micro Urine Req see below     Comment: Microscopic examination not performed when  specimen is clear  and chemically negative for protein, blood, leukocyte esterase  and nitrite.         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]       Plan:  L3-4 discitis osteomyelitis  Afebrile  Resolved leukocytosis  Status post treatment with Rocephin (prior strep viridans)  Persistent changes on repeat MRI of 9/19/17  Continue ceftaroline for at least 4 weeks with repeat MRI to see improvement  Stop date 10/18/2017  Needs repeat MRI prior to stopping abx-next week if feasible    Right iliacus abscess versus hematoma  Status post drainage on 9/20/17  Possibly hematoma  Cultures are negative     Odynophagia  Has hiatal hernia  EGD cancelled due to Afib  For EGD today  DC Diflucan if no candida found    Generalized debility  Needs rehabilitation  Difficult discharge due to cost of abx

## 2017-10-09 NOTE — CARE PLAN
Problem: Safety  Goal: Will remain free from injury    Intervention: Provide assistance with mobility  Pt call for assistance using the call light. Bed in lowest position and locked, upper side rails up x2, bed alarm on. Safety ongoing.       Problem: Knowledge Deficit  Goal: Knowledge of disease process/condition, treatment plan, diagnostic tests, and medications will improve    Intervention: Assess knowledge level of disease process/condition, treatment plan, diagnostic tests, and medications  Discussed plan of care with pt. EGD scheduled for 10/9, pt is to be NPO after midnight.       Problem: Skin Integrity  Goal: Risk for impaired skin integrity will decrease    Intervention: Assess risk factors for impaired skin integrity and/or pressure ulcers  Pt will be turned and repositioned every 2 hours to prevent skin breakdown. Pt checked for incontinence and kept dry.       Problem: Pain Management  Goal: Pain level will decrease to patient's comfort goal    Intervention: Follow pain managment plan developed in collaboration with patient and Interdisciplinary Team  Encouraged pt to notify staff of any pain before it becomes too severe.

## 2017-10-09 NOTE — PROGRESS NOTES
[facility] Hospitalist Progress Note    Date of Service: 10/9/2017    Chief Complaint  86 y.o. 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 Patient had a barium swallow which did not show any acute findings. GI, Dr. Waters consulted for further input.     Consultants/Specialty  Infectious diseases Dr. Getachew Vincent  of cardiology - signed off  GI Dr. Waters    Disposition  Pt to go to [facility] to complete abx therapy once medically clear.        Review of Systems   Constitutional: Negative for chills and weight loss.   HENT:        Trouble swallowing, denies any regurgitation of food, N/V     Eyes: Negative for blurred vision and double vision.   Respiratory: Negative for cough and shortness of breath.    Cardiovascular: Negative for chest pain (chest wall on the left).   Gastrointestinal: Negative for abdominal pain, nausea and vomiting.        Trouble swallowing medications and food. Denies regurgitation   Musculoskeletal: Negative for joint pain and myalgias.   Neurological: Negative for weakness and headaches.   Psychiatric/Behavioral: Negative for depression.      Physical Exam  Laboratory/Imaging   Hemodynamics  Temp (24hrs), Av.2 °C (97.1 °F), Min:36 °C (96.8 °F), Max:36.4 °C (97.5 °F)   Temperature: 36 °C (96.8 °F)  Pulse  Av.8  Min: 45  Max: 147    Blood Pressure : 124/63      Respiratory      Respiration: 16, Pulse Oximetry: 99 %     Work Of Breathing / Effort: Mild  RUL Breath Sounds: Clear, RML Breath Sounds: Diminished, RLL Breath Sounds: Diminished, MIKE Breath Sounds: Clear, LLL Breath Sounds: Diminished    Fluids    Intake/Output Summary (Last 24 hours) at 10/09/17 1041  Last data filed at 10/09/17 0700   Gross per 24 hour   Intake             1440 ml   Output              700 ml   Net               740 ml       Nutrition  Orders Placed This Encounter   Procedures   • DIET NPO     Standing Status:   Standing     Number of Occurrences:   8     Order Specific Question:   Restrict to:     Answer:   Sips with Medications [3]     Physical Exam   Constitutional: He is oriented to person, place, and time. He appears well-developed and well-nourished. He is cooperative. He does not appear ill. No distress. Nasal cannula in place.   Obese Male     HENT:   Head: Normocephalic and atraumatic.   Eyes: Conjunctivae are normal. Right eye exhibits no discharge. Left eye exhibits no discharge.   Neck: Normal range of motion. Neck supple. No tracheal deviation present.   Cardiovascular: Normal rate and regular rhythm.    No murmur heard.  Pulmonary/Chest: Effort normal. No stridor. No respiratory distress.   Decreased breath sounds bilateral bases   Abdominal: Soft. Bowel sounds are normal. He exhibits no distension. There is no tenderness. There is no rebound and no guarding.   Musculoskeletal: Normal range of motion. He exhibits no edema or tenderness.   Neurological: He is alert and oriented to person, place, and time.   Skin: Skin is warm and dry.   Psychiatric: He has a normal mood and affect. His behavior is normal.   Nursing note and vitals reviewed.      Recent Labs      10/07/17   0550  10/08/17   0430  10/09/17   0432   WBC  9.8  7.1  5.9   RBC  3.74*  3.18*  3.07*   HEMOGLOBIN  11.2*  9.8*  9.3*   HEMATOCRIT  34.6*  30.7*  29.8*   MCV  92.0  96.5  97.1   MCH  30.2  30.8  30.3   MCHC  32.8*  31.9*  31.2*   RDW  52.0*  56.2*  55.2*   PLATELETCT  326  279  256   MPV  9.0  9.2  9.5     Recent Labs      10/07/17   0550  10/08/17   0430  10/09/17   0432   SODIUM  131*  135  134*   POTASSIUM  3.7  4.0  4.0   CHLORIDE  100  102  103   CO2  19*  24  28   GLUCOSE  133*  97  85   BUN  7*  9  9   CREATININE  0.48*  0.49*  0.50   CALCIUM  10.0  9.0  9.1                      Assessment/Plan     Nontraumatic psoas hematoma-  (present on admission)   Assessment & Plan    Patient remains off of anticoagulation continue to monitor psoas hematoma as well as monitor H&H for any drops.        Persistent atrial fibrillation (CMS-HCC)- (present on admission)   Assessment & Plan    RATE IS CONTROLLED  D/W THE PT ABOUT TAKING THE PILLS SINCE THE NURSE DECIDED HE IS  NOT TAKING THEM.HE WANTS HIS PILLS CRUSHED.  PT STATED THAT HE IS HAVING PROBLEMS SWALLOWING IT  LOW  DIGOXIN LEVEL  CONTINUE WITH DIGOXIN  CONTINUE WITH LOPRESSOR PO AND IV  NO ANTICOAGULATION 2ND TO HEMATOMA        Discitis of lumbar region- (present on admission)   Assessment & Plan    TEFLARON   ID recs, stop date 10/18          Debility- (present on admission)   Assessment & Plan    Continue at this point with physical therapy and occupational therapy in-house.  Once against a long-term acute care the patient will need also continued physical therapy and occupational therapy.        Dysphagia   Assessment & Plan    D/W GI  ENDOSCOPY TODAY TO RULE OUT CANDIDA ESOPHAGITIS  NPO         DNR (do not resuscitate)- (present on admission)   Assessment & Plan    Confirmed with patient and he continues to be an DNR code status.        Normocytic anemia- (present on admission)   Assessment & Plan    Follow hemoglobin and hematocrit levels.  Transfuse PRN if Hb 7 and Hct 21 transfusion will be considered.  Patient will need at this point continued monitoring of his iron levels B12 and folic acid levels long-term.            Reviewed items::  Labs reviewed and Medications reviewed  Hearn catheter::  No Hearn  DVT prophylaxis - mechanical:  SCDs  Ulcer Prophylaxis::  Yes  Antibiotics:  Treating active infection/contamination beyond 24 hours perioperative coverage

## 2017-10-09 NOTE — PROGRESS NOTES
Gastroenterology Progress Note     Author: Oh Hull   Date & Time Created: 10/9/2017 11:50 AM    Interval History:  Ready for EGD.    Review of Systems:  Review of Systems   Gastrointestinal: Negative for nausea and vomiting.       Physical Exam:  Physical Exam   Constitutional: He is oriented to person, place, and time. No distress.   Eyes: No scleral icterus.   Cardiovascular: Normal rate and regular rhythm.    Pulmonary/Chest: Effort normal and breath sounds normal.   Abdominal: Soft. Bowel sounds are normal.   Neurological: He is alert and oriented to person, place, and time.   Nursing note and vitals reviewed.      Labs:        Invalid input(s): [id number]  Recent Labs      10/07/17   0550   TROPONINI  <0.01     Recent Labs      10/07/17   0550  10/08/17   0430  10/09/17   0432   SODIUM  131*  135  134*   POTASSIUM  3.7  4.0  4.0   CHLORIDE  100  102  103   CO2  19*  24  28   BUN  7*  9  9   CREATININE  0.48*  0.49*  0.50   CALCIUM  10.0  9.0  9.1     Recent Labs      10/07/17   0550  10/08/17   0430  10/09/17   0432   ALTSGPT  7   --   5   ASTSGOT  12   --   12   ALKPHOSPHAT  72   --   54   TBILIRUBIN  0.6   --   0.4   GLUCOSE  133*  97  85     Recent Labs      10/07/17   0550  10/08/17   0430  10/09/17   0432   RBC  3.74*  3.18*  3.07*   HEMOGLOBIN  11.2*  9.8*  9.3*   HEMATOCRIT  34.6*  30.7*  29.8*   PLATELETCT  326  279  256     Recent Labs      10/07/17   0550  10/08/17   0430  10/09/17   0432   WBC  9.8  7.1  5.9   NEUTSPOLYS  76.40*  62.10  60.40   LYMPHOCYTES  12.90*  17.80*  18.40*   MONOCYTES  9.40  14.90*  14.60*   EOSINOPHILS  0.40  4.20  5.60   BASOPHILS  0.50  0.60  0.70   ASTSGOT  12   --   12   ALTSGPT  7   --   5   ALKPHOSPHAT  72   --   54   TBILIRUBIN  0.6   --   0.4     Hemodynamics:  Temp (24hrs), Av.2 °C (97.1 °F), Min:36 °C (96.8 °F), Max:36.4 °C (97.5 °F)  Temperature: 36 °C (96.8 °F)  Pulse  Av.8  Min: 45  Max: 147   Blood Pressure : 124/63     Respiratory:     Respiration: 16, Pulse Oximetry: 99 %     Work Of Breathing / Effort: Mild  RUL Breath Sounds: Clear, RML Breath Sounds: Diminished, RLL Breath Sounds: Diminished, MIKE Breath Sounds: Clear, LLL Breath Sounds: Diminished  Fluids:    Intake/Output Summary (Last 24 hours) at 10/09/17 1150  Last data filed at 10/09/17 1000   Gross per 24 hour   Intake             1740 ml   Output              700 ml   Net             1040 ml     Weight: 87.9 kg (193 lb 12.6 oz)  GI/Nutrition:  Orders Placed This Encounter   Procedures   • DIET NPO     Standing Status:   Standing     Number of Occurrences:   8     Order Specific Question:   Restrict to:     Answer:   Sips with Medications [3]     Medical Decision Making, by Problem:  Active Hospital Problems    Diagnosis   • 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.81]   • Persistent atrial fibrillation (CMS-HCC) [I48.1]   • Discitis of lumbar region [M46.46]   • Debility [R53.81]   • DNR (do not resuscitate) [Z66]   • Normocytic anemia [D64.9]   • Dysphagia [R13.10]       Plan:  EGD today.      Reviewed items::  Labs reviewed, Radiology images reviewed and Medications reviewed

## 2017-10-09 NOTE — OR SURGEON
Operative Report    PreOp Diagnosis: Dysphagia    PostOp Diagnosis: 1. Pan esophagitis 2. 4mm distal esophageal stricture    Procedure(s):  GASTROSCOPY - Wound Class: Clean Contaminated    Surgeon(s):  Oh Hull D.O.    Anesthesiologist/Type of Anesthesia:  Anesthesiologist: Pranay Zhu M.D./ILA    Surgical Staff:  Circulator: Hi Post R.N.  Endoscopy Technician: Na Mari  Relief Circulator: Kaleigh Schumacher R.N.    Specimens:  * No specimens in log *    Estimated Blood Loss: none    Findings: above    Complications: none    Rec: Continue medical therapy with PPI/Carafate. Repeat EGD w/ dilation next week after treatment of esophagitis.         10/9/2017 12:21 PM Oh Hull

## 2017-10-09 NOTE — CARE PLAN
Problem: Safety  Goal: Will remain free from falls    Intervention: Implement fall precautions   10/09/17 1049   OTHER   Environmental Precautions Treaded Slipper Socks on Patient;Personal Belongings, Wastebasket, Call Bell etc. in Easy Reach;Transferred to Stronger Side;Report Given to Other Health Care Providers Regarding Fall Risk;Bed in Low Position;Communication Sign for Patients & Families;Mobility Assessed & Appropriate Sign Placed   IV Pole on Same Side of Bed as Bathroom Yes   Bedrails Bedrails Closest to Bathroom Down   Chair/Bed Strip Alarm Yes - Alarm On   Safety measures in place. Call light in reach, skid proof socks on, bed and strip alarm activated and hourly rounding completed for pt. Needs and safety.      Problem: Knowledge Deficit  Goal: Knowledge of disease process/condition, treatment plan, diagnostic tests, and medications will improve  Outcome: PROGRESSING AS EXPECTED  Plan of care discussed and questions answered.

## 2017-10-10 LAB
ANION GAP SERPL CALC-SCNC: 4 MMOL/L (ref 0–11.9)
BASOPHILS # BLD AUTO: 0.3 % (ref 0–1.8)
BASOPHILS # BLD: 0.02 K/UL (ref 0–0.12)
BUN SERPL-MCNC: 7 MG/DL (ref 8–22)
CALCIUM SERPL-MCNC: 9.1 MG/DL (ref 8.5–10.5)
CHLORIDE SERPL-SCNC: 101 MMOL/L (ref 96–112)
CO2 SERPL-SCNC: 29 MMOL/L (ref 20–33)
CREAT SERPL-MCNC: 0.48 MG/DL (ref 0.5–1.4)
EOSINOPHIL # BLD AUTO: 0.12 K/UL (ref 0–0.51)
EOSINOPHIL NFR BLD: 1.9 % (ref 0–6.9)
ERYTHROCYTE [DISTWIDTH] IN BLOOD BY AUTOMATED COUNT: 54.2 FL (ref 35.9–50)
GFR SERPL CREATININE-BSD FRML MDRD: >60 ML/MIN/1.73 M 2
GLUCOSE SERPL-MCNC: 145 MG/DL (ref 65–99)
HCT VFR BLD AUTO: 30.7 % (ref 42–52)
HGB BLD-MCNC: 10.1 G/DL (ref 14–18)
IMM GRANULOCYTES # BLD AUTO: 0.04 K/UL (ref 0–0.11)
IMM GRANULOCYTES NFR BLD AUTO: 0.6 % (ref 0–0.9)
LYMPHOCYTES # BLD AUTO: 0.82 K/UL (ref 1–4.8)
LYMPHOCYTES NFR BLD: 12.9 % (ref 22–41)
MCH RBC QN AUTO: 31.9 PG (ref 27–33)
MCHC RBC AUTO-ENTMCNC: 32.9 G/DL (ref 33.7–35.3)
MCV RBC AUTO: 96.8 FL (ref 81.4–97.8)
MONOCYTES # BLD AUTO: 0.82 K/UL (ref 0–0.85)
MONOCYTES NFR BLD AUTO: 12.9 % (ref 0–13.4)
NEUTROPHILS # BLD AUTO: 4.52 K/UL (ref 1.82–7.42)
NEUTROPHILS NFR BLD: 71.4 % (ref 44–72)
NRBC # BLD AUTO: 0 K/UL
NRBC BLD AUTO-RTO: 0 /100 WBC
PLATELET # BLD AUTO: 219 K/UL (ref 164–446)
PMV BLD AUTO: 9.9 FL (ref 9–12.9)
POTASSIUM SERPL-SCNC: 3.8 MMOL/L (ref 3.6–5.5)
RBC # BLD AUTO: 3.17 M/UL (ref 4.7–6.1)
SODIUM SERPL-SCNC: 134 MMOL/L (ref 135–145)
WBC # BLD AUTO: 6.3 K/UL (ref 4.8–10.8)

## 2017-10-10 PROCEDURE — 97530 THERAPEUTIC ACTIVITIES: CPT

## 2017-10-10 PROCEDURE — 700111 HCHG RX REV CODE 636 W/ 250 OVERRIDE (IP): Performed by: INTERNAL MEDICINE

## 2017-10-10 PROCEDURE — 700102 HCHG RX REV CODE 250 W/ 637 OVERRIDE(OP): Performed by: NURSE PRACTITIONER

## 2017-10-10 PROCEDURE — A9270 NON-COVERED ITEM OR SERVICE: HCPCS | Performed by: NURSE PRACTITIONER

## 2017-10-10 PROCEDURE — 700102 HCHG RX REV CODE 250 W/ 637 OVERRIDE(OP): Performed by: INTERNAL MEDICINE

## 2017-10-10 PROCEDURE — 770020 HCHG ROOM/CARE - TELE (206)

## 2017-10-10 PROCEDURE — C9113 INJ PANTOPRAZOLE SODIUM, VIA: HCPCS | Performed by: INTERNAL MEDICINE

## 2017-10-10 PROCEDURE — 85025 COMPLETE CBC W/AUTO DIFF WBC: CPT

## 2017-10-10 PROCEDURE — 92526 ORAL FUNCTION THERAPY: CPT

## 2017-10-10 PROCEDURE — 700101 HCHG RX REV CODE 250: Performed by: FAMILY MEDICINE

## 2017-10-10 PROCEDURE — A9270 NON-COVERED ITEM OR SERVICE: HCPCS | Performed by: INTERNAL MEDICINE

## 2017-10-10 PROCEDURE — 80048 BASIC METABOLIC PNL TOTAL CA: CPT

## 2017-10-10 PROCEDURE — 700111 HCHG RX REV CODE 636 W/ 250 OVERRIDE (IP): Performed by: FAMILY MEDICINE

## 2017-10-10 PROCEDURE — 700105 HCHG RX REV CODE 258: Performed by: HOSPITALIST

## 2017-10-10 PROCEDURE — 700111 HCHG RX REV CODE 636 W/ 250 OVERRIDE (IP): Performed by: HOSPITALIST

## 2017-10-10 PROCEDURE — 99232 SBSQ HOSP IP/OBS MODERATE 35: CPT | Performed by: HOSPITALIST

## 2017-10-10 PROCEDURE — A9270 NON-COVERED ITEM OR SERVICE: HCPCS | Performed by: HOSPITALIST

## 2017-10-10 PROCEDURE — 700102 HCHG RX REV CODE 250 W/ 637 OVERRIDE(OP): Performed by: HOSPITALIST

## 2017-10-10 PROCEDURE — 97535 SELF CARE MNGMENT TRAINING: CPT

## 2017-10-10 RX ADMIN — SUCRALFATE 1 G: 1 SUSPENSION ORAL at 06:09

## 2017-10-10 RX ADMIN — LEVETIRACETAM 500 MG: 100 SOLUTION ORAL at 20:23

## 2017-10-10 RX ADMIN — LINEZOLID 600 MG: 600 TABLET, FILM COATED ORAL at 20:22

## 2017-10-10 RX ADMIN — ROSUVASTATIN CALCIUM 125 MCG: 10 TABLET, FILM COATED ORAL at 18:15

## 2017-10-10 RX ADMIN — ANTACID TABLETS 1000 MG: 500 TABLET, CHEWABLE ORAL at 16:34

## 2017-10-10 RX ADMIN — SUCRALFATE 1 G: 1 SUSPENSION ORAL at 20:22

## 2017-10-10 RX ADMIN — ANTACID TABLETS 1000 MG: 500 TABLET, CHEWABLE ORAL at 13:42

## 2017-10-10 RX ADMIN — SODIUM CHLORIDE: 9 INJECTION, SOLUTION INTRAVENOUS at 06:08

## 2017-10-10 RX ADMIN — ATORVASTATIN CALCIUM 10 MG: 10 TABLET, FILM COATED ORAL at 20:22

## 2017-10-10 RX ADMIN — STANDARDIZED SENNA CONCENTRATE AND DOCUSATE SODIUM 2 TABLET: 8.6; 5 TABLET, FILM COATED ORAL at 20:22

## 2017-10-10 RX ADMIN — ONDANSETRON 4 MG: 2 INJECTION INTRAMUSCULAR; INTRAVENOUS at 09:23

## 2017-10-10 RX ADMIN — GABAPENTIN 200 MG: 100 CAPSULE ORAL at 20:22

## 2017-10-10 RX ADMIN — SUCRALFATE 1 G: 1 SUSPENSION ORAL at 16:34

## 2017-10-10 RX ADMIN — ANTACID TABLETS 1000 MG: 500 TABLET, CHEWABLE ORAL at 09:35

## 2017-10-10 RX ADMIN — PANTOPRAZOLE SODIUM 40 MG: 40 INJECTION, POWDER, FOR SOLUTION INTRAVENOUS at 09:29

## 2017-10-10 RX ADMIN — METOPROLOL TARTRATE 5 MG: 5 INJECTION INTRAVENOUS at 00:33

## 2017-10-10 RX ADMIN — LINEZOLID 600 MG: 600 TABLET, FILM COATED ORAL at 09:26

## 2017-10-10 RX ADMIN — LORAZEPAM 1 MG: 2 INJECTION INTRAMUSCULAR; INTRAVENOUS at 00:00

## 2017-10-10 RX ADMIN — LEVETIRACETAM 500 MG: 100 SOLUTION ORAL at 09:27

## 2017-10-10 RX ADMIN — GABAPENTIN 200 MG: 100 CAPSULE ORAL at 09:21

## 2017-10-10 RX ADMIN — GABAPENTIN 200 MG: 100 CAPSULE ORAL at 16:34

## 2017-10-10 RX ADMIN — SUCRALFATE 1 G: 1 SUSPENSION ORAL at 13:41

## 2017-10-10 RX ADMIN — METOPROLOL SUCCINATE 50 MG: 25 TABLET, EXTENDED RELEASE ORAL at 09:15

## 2017-10-10 ASSESSMENT — ENCOUNTER SYMPTOMS
STRIDOR: 0
HALLUCINATIONS: 0
ABDOMINAL PAIN: 0
SHORTNESS OF BREATH: 0
SPEECH CHANGE: 0
FEVER: 0
COUGH: 0
HEADACHES: 0
CHILLS: 0
VOMITING: 0
DOUBLE VISION: 0
NECK PAIN: 0
PALPITATIONS: 0
FOCAL WEAKNESS: 0
WHEEZING: 0
DIARRHEA: 0
FLANK PAIN: 0
BACK PAIN: 0
EYE REDNESS: 0
MYALGIAS: 0
DIAPHORESIS: 0
EYE DISCHARGE: 0
BLURRED VISION: 0
SENSORY CHANGE: 0
NAUSEA: 1

## 2017-10-10 ASSESSMENT — COGNITIVE AND FUNCTIONAL STATUS - GENERAL
SUGGESTED CMS G CODE MODIFIER DAILY ACTIVITY: CK
HELP NEEDED FOR BATHING: A LOT
DRESSING REGULAR UPPER BODY CLOTHING: A LITTLE
TOILETING: A LOT
DAILY ACTIVITIY SCORE: 16
DRESSING REGULAR LOWER BODY CLOTHING: A LOT
PERSONAL GROOMING: A LITTLE

## 2017-10-10 ASSESSMENT — PAIN SCALES - GENERAL
PAINLEVEL_OUTOF10: 0
PAINLEVEL_OUTOF10: 0

## 2017-10-10 ASSESSMENT — LIFESTYLE VARIABLES: SUBSTANCE_ABUSE: 0

## 2017-10-10 NOTE — CARE PLAN
Problem: Knowledge Deficit  Goal: Knowledge of disease process/condition, treatment plan, diagnostic tests, and medications will improve  Pt and family educated on POC, all questions answered in regards to disease process, treatment and diet. Pt and family verbalize understanding and voice no further questions at this time.    Problem: Skin Integrity  Goal: Risk for impaired skin integrity will decrease  Skin assessment complete, skin intact, pt encouraged to turn self and ambulate as tolerated.

## 2017-10-10 NOTE — THERAPY
"Speech Language Therapy dysphagia treatment completed.   Functional Status:  Pt awake, alert and cooperative. Tolerating clear liquid diet with no s/sx of aspiration.   Recommendations: diet per GI  Plan of Care: Will f/u post dilation next week if/when pt cleared for solids.   Post-Acute Therapy: Discharge to home with outpatient or home health for additional skilled therapy services.    See \"Rehab Therapy-Acute\" Patient Summary Report for complete documentation.     "

## 2017-10-10 NOTE — PROGRESS NOTES
Infectious Disease Progress Note    Author: Oliva Bolanos M.D. Date & Time of service: 10/10/2017  8:58 AM    Chief Complaint:  Altered mental status    Interval History:  86-year-old male admitted for altered mental status. He was recently treated for L3-4 discitis. His repeat MRI showed right iliacus muscle abscess  9/21/17-MAXIMUM TEMPERATURE 98. On 2 liters of oxygen. WBC 14 and creatinine 0.38  9/22- MAXIMUM TEMPERATURE 97.8. Complains of generalized malaise. Breathing is improved. Right leg pain is improved  9/23/17-MAXIMUM TEMPERATURE 99.5. Still has back pain when he sits up. The WBC 12  9/24/17-MAXIMUM TEMPERATURE 97.6. Feels better. No new issues overnight. WBC11.7  9/25 AF resting comfortably  9/26 AF, WBC 11.3 more awake eating OK.  Back pain about the same  9/27 AF WBC 11 had PICC placed-denies SE abx. No new complaints  9/28 AF tolerating abx well feels about the same  9/29 AF, no CBC, pt has no new complaints this morning, asking when he will be discharged and how long he will be on abx,  9/30 AF no CBC, eating lots of chocolate pudding/ice cream, no new issues per pt  10/2 AF, feels well and denies any pain, feels he is getting stronger and appetite improved  10/3 AF, WBC 9.1, felt dizzy while watching TV yesterday with head pressure but no HA, no nausea or vomiting, no recurrent symptoms today but states he was dizzy at home prior to admit, back pain only with getting out of bed  10/4 AF, no CBC, no dizziness, now having painful swallowing that started last night, did not eat breakfast  10/5 AF, no CBC, complaining of painful swallowing, could not swallow pills this am, waiting of FEES  10/6 AF WBC 9.1 tired and feels like he has pressure on his eyes  10/7 AF WBC 9.8 episode CP and Afib with RVR-eyes sxs resolved. No current CP or SOB  10/8 AF WBC 7.1 thinks having GERD-sxs improved with Maalox  10/9/17-denies any back pain at rest. No fevers. He says his main issue is nausea and issues  swallowing   10/10/17-no fevers. No back pain. Still significantly deconditioned.                  Labs Reviewed, Medications Reviewed and Radiology Reviewed.    Review of Systems:  Review of Systems   Constitutional: Positive for malaise/fatigue. Negative for chills and fever.   HENT:        Painful swallowing   Respiratory: Negative for cough and shortness of breath.    Cardiovascular: Positive for leg swelling. Negative for chest pain.   Gastrointestinal: Positive for nausea. Negative for abdominal pain and vomiting.   Musculoskeletal: Negative for back pain.        Improving. Only occurs when he sits up   Neurological: Negative for headaches.       Hemodynamics:  Temp (24hrs), Av.4 °C (97.5 °F), Min:36.1 °C (97 °F), Max:36.7 °C (98 °F)  Temperature: 36.4 °C (97.6 °F)  Pulse  Av.4  Min: 45  Max: 147Heart Rate (Monitored): (!) 56  Blood Pressure : 147/75, NIBP: 117/60       Physical Exam:  Physical Exam   Constitutional: He appears well-developed. He is easily aroused. No distress.   Obese   HENT:   Head: Normocephalic and atraumatic.   Poor dentition   Eyes: Conjunctivae and EOM are normal. Pupils are equal, round, and reactive to light. No scleral icterus.   Neck: Neck supple.   Cardiovascular: Normal rate.  An irregularly irregular rhythm present.   IRR   Pulmonary/Chest: Effort normal. No respiratory distress. He has no wheezes. He has rales.   Abdominal: Soft. He exhibits no distension. There is no tenderness.   Musculoskeletal: He exhibits edema.   LUE PICC   Neurological: He is alert and easily aroused.   Nursing note and vitals reviewed.      Meds:    Current Facility-Administered Medications:   •  linezolid  •  levetiracetam  •  Metoprolol Tartrate  •  pantoprazole  •  lorazepam  •  sucralfate  •  calcium carbonate  •  PICC Line Insertion has been implemented **AND** May use Lidocaine 1% not to exceed 3 mls for local at insertion site **AND** NOTIFY MD **AND** Tip to dwell in the superior vena  cava **AND** Do not use PICC Line until placement verified by Chest X Ray **AND** DX-CHEST-FOR PICC LINE Perform procedure in: PICC Room **AND** If radiologist reading of chest X-ray states any of the following the PICC should be used **AND** Further evaluation of the PICC placement can be retrieved from X-Ray and Imaging **AND** Blood draws through PICC line; draws by RN only **AND** FLUSHING GUIDELINES WHEN IN USE **AND** normal saline PF **AND** FLUSHING GUIDELINES WHEN NOT IN USE **AND** DRESSING MAINTENANCE **AND** Change needleless pressure ports and IV tubing every 72 hours per hospital policy **AND** TUBING **AND** If there is an MD order to remove the PICC line, any RN may remove the PICC line **AND** [] PATIENT EDUCATION MATERIALS **AND** NURSING COMMUNICATION  •  Metoprolol Tartrate  •  acetaminophen  •  digoxin  •  prochlorperazine  •  promethazine  •  hydrALAZINE  •  NS  •  ondansetron  •  metoprolol SR  •  atorvastatin  •  gabapentin  •  tamsulosin  •  senna-docusate **AND** polyethylene glycol/lytes **AND** magnesium hydroxide **AND** bisacodyl  •  Respiratory Care per Protocol    Labs:  Recent Labs      10/08/17   0430  10/09/17   0432  10/10/17   0352   WBC  7.1  5.9  6.3   RBC  3.18*  3.07*  3.17*   HEMOGLOBIN  9.8*  9.3*  10.1*   HEMATOCRIT  30.7*  29.8*  30.7*   MCV  96.5  97.1  96.8   MCH  30.8  30.3  31.9   RDW  56.2*  55.2*  54.2*   PLATELETCT  279  256  219   MPV  9.2  9.5  9.9   NEUTSPOLYS  62.10  60.40  71.40   LYMPHOCYTES  17.80*  18.40*  12.90*   MONOCYTES  14.90*  14.60*  12.90   EOSINOPHILS  4.20  5.60  1.90   BASOPHILS  0.60  0.70  0.30     Recent Labs      10/08/17   0430  10/09/17   0432  10/10/17   0352   SODIUM  135  134*  134*   POTASSIUM  4.0  4.0  3.8   CHLORIDE  102  103  101   CO2  24  28  29   GLUCOSE  97  85  145*   BUN  9  9  7*     Recent Labs      10/08/17   0430  10/09/17   0432  10/10/17   035   ALBUMIN   --   2.7*   --    TBILIRUBIN   --   0.4   --     ALKPHOSPHAT   --   54   --    TOTPROTEIN   --   5.6*   --    ALTSGPT   --   5   --    ASTSGOT   --   12   --    CREATININE  0.49*  0.50  0.48*       Imaging:  Ct-abdomen-pelvis With    Result Date: 9/19/2017 9/19/2017 6:34 PM HISTORY/REASON FOR EXAM:  Abscess noted on MRI. TECHNIQUE/EXAM DESCRIPTION:  CT scan of the abdomen and pelvis with contrast. Contrast-enhanced helical scanning was obtained from the diaphragmatic domes through the pubic symphysis following the bolus administration of nonionic contrast without complication. 100 mL of Omnipaque 350 nonionic contrast was administered without complication. Low dose optimization technique was utilized for this CT exam including automated exposure control and adjustment of the mA and/or kV according to patient size. COMPARISON: MRI from the same day FINDINGS: Lung bases: There are small bilateral pleural effusions with overlying atelectasis. Abdomen: No free air is seen in the abdomen or pelvis. Evaluation is limited by streak artifact from barium within the colon. There is wall thickening of the distal esophagus with a small hiatal hernia. Liver appears unremarkable. Portal vein is patent. There is calcification in the pancreatic head. No adrenal mass is identified. Tiny hypodense left renal lesion is too small to characterize. There is mild prominence of the renal collecting systems bilaterally. Small hypodense right renal lesions too small to characterize. There are nonobstructing right renal calculi. Atherosclerotic plaque is seen in the aorta and its branches. There are small inguinal, retroperitoneal and mesenteric lymph nodes. There is no evidence of bowel obstruction. There is a moderate amount of stool in the rectosigmoid colon. There is colonic diverticulosis. The appendix is not identified. Stomach is distended with fluid. Small bowel loops are fluid-filled. Bladder is mildly distended with foci of air. There is asymmetric prominence of the right  iliopsoas musculature with surrounding stranding. Findings likely related to the previously noted abscess collection. Degenerative changes are seen in the spine. Postsurgical changes are noted in the lumbar spine. Degenerative changes are seen at the hips. There is mild loss of height of the superior endplate of T11 with a Schmorl's node noted. There is mild endplate irregularity at L3/L4.     Prominence of the right iliopsoas muscle with surrounding inflammatory stranding. The known fluid collection was better delineated on the prior MRI. Mild endplate irregularity at L3/L4 can be seen in discitis/osteomyelitis. Air-fluid level in the bladder. Correlation with urinalysis is recommended. This can be seen in the setting of recent instrumentation, infection or fistula. Colonic diverticulosis. Moderate amount of colonic stool. Nonobstructing right renal calculi. Mild prominence of the renal collecting systems bilaterally. Small hypodense renal lesions are too small to characterize. Fluid-filled loops of small bowel can be seen in the setting of enteritis. Small hiatal hernia with mild thickening of the distal esophagus. Calcifications in the pancreatic head can be seen in chronic pancreatitis. Atherosclerotic plaque. Small bilateral pleural effusions with overlying atelectasis.     Ct-drain-retroperitoneal    Result Date: 9/20/2017 9/20/2017 2:30 PM HISTORY/REASON FOR EXAM:  Multiloculated fluid collections in the right iliacus muscle. Suspected abscess. TECHNIQUE/EXAM DESCRIPTION: Right pelvic (extraperitoneal) retroperitoneal abscess drainage with CT guidance. Low dose optimization technique was utilized for this CT exam including automated exposure control and adjustment of the mA and/or kV according to patient size. PROCEDURE: Informed consent was obtained. Procedures performed with local anesthesia only with appropriate continuous patient monitoring by the radiology nurse. Sedation duration: N/A minutes  Localizing CT images were obtained with the patient in supine position. The skin was prepped with Betadine and draped in a sterile fashion. Following local anesthesia with 1% Lidocaine, a 17 G guiding needle was placed and extraperitoneal needle path in the right side of the pelvis via the iliacus muscle confirmed with CT. An Amplatz guidewire was placed and following serial tract dilatation, a 8 [place] pigtail locking catheter was placed. A specimen was collected and submitted for culture and sensitivity and Gram stain. Total of 5 mL old bloody reddish-brown fluid was drained. No purulent fluid was obtained. The fluid was not malodorous. The catheter was secured to the skin and connected to suction bulb drainage. Final CT images were obtained documenting catheter position. The patient tolerated the procedure well with no evidence of complication. Low dose optimization technique was utilized for this CT exam including automated exposure control and adjustment of the mA and/or kV according to patient size. COMPARISON: MRI lumbar spine 9/19/2017, CT abdomen and pelvis 9/19/2017. FINDINGS: The final CT images show satisfactory catheter position within the target collection.     1.  CT GUIDED RETROPERITONEAL (EXTRAPERITONEAL) RIGHT PELVIC CATHETER DRAINAGE WITHIN THE RIGHT ILIACUS MUSCLE. OLD DARK REDDISH-BROWN BLOODY FLUID WAS OBTAINED. NO ABSCESS OR PURULENT MATERIAL. 2.  THE CURRENT PLAN IS TO CHECK CULTURES AND LIKELY REMOVED CATHETER IN THE SHORT-TERM AT 48-72 HOURS AS THERE IS UNLIKELY TO BE AN ABSCESS.    Ct-head W/o    Result Date: 9/13/2017 9/13/2017 9:30 PM HISTORY/REASON FOR EXAM:  Altered Mental Status. TECHNIQUE/EXAM DESCRIPTION AND NUMBER OF VIEWS: CT of the head without contrast. The study was performed on a helical multidetector CT scanner. Contiguous 2.5 mm axial sections were obtained from the skull base through the vertex. Up to date radiation dose reduction adjustments have been utilized to meet  ALARA standards for radiation dose reduction. COMPARISON:  7/12/2017 FINDINGS: The lateral ventricles are enlarged. Cortical sulci are enlarged. Patchy areas of low attenuation in the white matter bilaterally. No significant mass effect or midline shift. Basal cisterns are patent. No evidence for intracranial hemorrhage. Calvaria are intact. Visualized orbits are unremarkable. Visualized mastoid air cells are clear. Visualized paranasal sinuses are unremarkable. Calcifications of the carotid arteries noted. Calcified scalp nodules again present.     1.  Diffuse atrophy and white matter changes. 2.  No acute intracranial hemorrhage or territorial infarct.     Dx-chest-portable (1 View)    Result Date: 9/16/2017 9/16/2017 10:31 PM HISTORY/REASON FOR EXAM:  Shortness of Breath. TECHNIQUE/EXAM DESCRIPTION AND NUMBER OF VIEWS: Single portable view of the chest. COMPARISON: 9/13/2017 FINDINGS: Cardiac mediastinal contour is unchanged. Mediastinum is again prominent. Mild prominence of the pulmonary interstitium. No gross pleural fluid or pneumothorax. Dextroconvex curvature of thoracic spine.     No significant change from prior exam.    Dx-chest-portable (1 View)    Result Date: 9/13/2017 9/13/2017 8:44 PM HISTORY/REASON FOR EXAM:  Possible sepsis. TECHNIQUE/EXAM DESCRIPTION AND NUMBER OF VIEWS: Single portable view of the chest. COMPARISON: 8/7/2017 FINDINGS: Cardiac mediastinal contour is unchanged. Mild diffuse prominence of the interstitium again seen. No focal consolidation. No pleural fluid collection or pneumothorax. Dextroconvex curvature of thoracic spine. Diffuse osteopenia.     1.  Diffuse prominence of interstitium again seen, likely interstitial lung disease.  Mild pulmonary edema is also a consideration. 2.  No pneumonia or pneumothorax. 3.  Stable cardiomegaly.    Dx-small Bowel Series    Result Date: 9/17/2017 9/16/2017 10:39 AM HISTORY/REASON FOR EXAM:  Nausea and vomiting. TECHNIQUE/EXAM DESCRIPTION  AND NUMBER OF VIEWS: Single contrast barium small bowel follow-through performed. Fluoroscopic images were obtained. No fluoroscopic images obtained. COMPARISON:  None available. FINDINGS:  view the abdomen demonstrates marked gaseous distention and small bowel and colonic distention. Patient drank thin barium. Contrast passed very slowly into dilated small bowel loops. Jejunal loops are dilated up to 5.2 cm. Overhead images were taken for a total of 22 hours before termination of the exam. Contrast reached the ileum, but does not extend into the colon. There is a large amount of stool in the colon.     1.  Gaseous distention with fairly diffuse bowel dilatation. Contrast extends into the ileum, but does not reach the colon within 22 hours. No transition point is identified to suggest mechanical bowel obstruction. 2.  Large amount of stool in the rectal vault.    Mr-lumbar Spine-with & W/o    Result Date: 9/19/2017 9/19/2017 1:35 PM HISTORY/REASON FOR EXAM:  Altered mental status, weakness. Possible discitis. Previous lumbar surgery. TECHNIQUE/EXAM DESCRIPTION: MRI of the lumbar spine without and with contrast. The study was performed on a [employer] 1.5 Lucille MRI scanner. T1 sagittal, T2 fast spin-echo sagittal, T2 axial images and T1 axial images were obtained of the lumbar spine. T1 post-contrast sagittal and T1 post-contrast axial images were obtained. Optional T2 fat-suppressed sagittal images may be obtained. 20 mL Omniscan gadolinium contrast was administered intravenously. COMPARISON:  MRI lumbar spine 7/7/2017, T abdomen and pelvis 1/6/2017 FINDINGS: Alignment in the lumbar spine again shows retrolisthesis of L3 on L4 of about 5 mm. There is fluid signal T2 hyperintensity again seen in the L3-L4 disc space and prominent marrow edema signal at the L3 inferior endplate and to a lesser extent L4 superior endplate. There is corresponding enhancement, particularly at the inferior endplate of L3. Findings  are highly suspicious for discitis with osteomyelitis. There is postoperative change with lumbar laminectomy at L2-L3 through L5. There is posterior fusion with transpedicular screw fixation 4-L5. The posterior paraspinous soft tissues show expected postoperative changes with atrophy and fatty replacement in  the posterior paraspinous muscles at the operative levels. There is no significant postoperative dorsal epidural fluid collection. However, there has been interval development of multilocular fluid collections in the right iliacus muscle spanning about 42 mm. These are consistent with intramuscular abscesses (T2 axial image 4, series 5, T1 postcontrast axial image 1, series 10). The conus is normal in position and signal with its tip at the L1 level. At T12-L1, is a tiny central and left paramedian disc protrusion with an underlying annular fissure. There is mild hypertrophic facet arthropathy. Thecal sac is toward the lower range of normal without karla central stenosis. The neural foramina are intact. At L1-2, there is negligible disc bulging. There is no central stenosis or significant foraminal stenosis. At L2-3, there is a small broad-based disc-osteophyte complex. There is no central stenosis as there is decompressive laminectomy at this level. There is no significant foraminal stenosis. At L3-4, there is posterior marginal spurring and disc bulging accentuated by the retrolisthesis. There is moderate bilateral lateral recess stenosis. Thecal sac is toward the lower range of normal even though there is a decompressive laminectomy at this  level. However, there is no karla central stenosis (T2 axial image 24, series 5). There is severe bilateral foraminal stenosis, right greater than left. This is unchanged from the previous exam. At L4-5, no central stenosis. There is inferior foraminal blunting with moderate bilateral foraminal stenosis. At L5-S1, there is minimal disc bulging. There is a left paramedian  annular fissure. There is mild hypertrophic facet arthropathy. No central stenosis. There is moderate bilateral foraminal stenosis.     1.  L3-4 findings again suggesting discitis with osteomyelitis. No evidence of epidural abscess or epidural phlegmon. 2.  L3-4 retrolisthesis. 3.  Postoperative multilevel lumbar laminectomy and posterior fusion at L4-5. 4.  Interval development of multilocular intramuscular abscesses in the right iliacus muscle, partly in the field of view 5.  Degenerative and spondylotic changes as detailed for each level above in the body of report.    Dx-esophagus - Barium Swallow    Result Date: 9/16/2017 9/16/2017 10:39 AM HISTORY/REASON FOR EXAM:  Nausea. Nausea and vomiting. TECHNIQUE/EXAM DESCRIPTION AND NUMBER OF VIEWS: Single contrast esophagram procedure was performed. 5 fluoroscopic images obtained. Fluoroscopy time: 0.37 minutes COMPARISON:  None. FINDINGS: Limited exam secondary to limited patient mobility. The esophagus appears normal in caliber and configuration. No definite mucosal lesions are identified on single-contrast techniques. Contrast passes freely into the stomach. No hiatal hernia is identified.     No abnormalities identified on limited single contrast esophagram.    Echocardiogram Comp W/o Cont    Result Date: 9/14/2017  Transthoracic Echo Report Echocardiography Laboratory CONCLUSIONS Prior study done on [id number] left ventricular systolic function. Normal regional wall motion. Left ventricular ejection fraction is visually estimated to be 70%. Diastolic function is difficult to assess with atrial fibrillation. The right ventricle was normal in size and function. Structurally normal mitral valve without significant stenosis or regurgitation. Aortic sclerosis without stenosis. No aortic insufficiency. Normal pericardium without effusion.FINDINGS Left Ventricle Normal left ventricular chamber size. Normal left ventricular wall thickness. Normal left ventricular  systolic function. Normal regional wall motion. Left ventricular ejection fraction is visually estimated to be 70%. Diastolic function is difficult to assess with atrial fibrillation. Right Ventricle The right ventricle was normal in size and function. Right Atrium The right atrium is normal in size.  Inferior vena cava is not well visualized. Left Atrium The left atrium is normal in size.  Left atrial volume index is 19  mL/sq m. Mitral Valve Structurally normal mitral valve without significant stenosis or regurgitation.  Trace mitral regurgitation. Aortic Valve Aortic sclerosis without stenosis. Tricuspid aortic valve. No aortic insufficiency. Tricuspid Valve Structurally normal tricuspid valve without significant stenosis or regurgitation. Pulmonic Valve Structurally normal pulmonic valve without significant stenosis or regurgitation. Pericardium Normal pericardium without effusion. Aorta The aortic root is normal.  Ascending aorta diameter is 3.1 cm. Kingsley Hung M.D. (Electronically Signed) Final Date:     14 September 2017                 11:10      Micro:  Results     Procedure Component Value Units Date/Time    URINE CULTURE(NEW) [[id number]] Collected:  10/07/17 0032    Order Status:  Completed Specimen:  Urine from Urine, Clean Catch Updated:  10/09/17 0829     Significant Indicator NEG     Source UR     Site URINE, CLEAN CATCH     Urine Culture No growth at 48 hours    Narrative:       Collected By:[id number] ARIA LI  Indication for culture:->Dysuria/Frequency/Burning    URINALYSIS [[id number]] Collected:  10/07/17 0032    Order Status:  Completed Specimen:  Urine from Urine, Clean Catch Updated:  10/07/17 0038     Color Yellow     Character Clear     Specific Gravity 1.011     Ph 5.5     Glucose Negative mg/dL      Ketones Negative mg/dL      Protein Negative mg/dL      Bilirubin Negative     Urobilinogen, Urine 0.2     Nitrite Negative     Leukocyte Esterase Negative     Occult Blood  Negative     Micro Urine Req see below     Comment: Microscopic examination not performed when specimen is clear  and chemically negative for protein, blood, leukocyte esterase  and nitrite.         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]       Plan:  L3-4 discitis osteomyelitis  Afebrile  Resolved leukocytosis  Status post treatment with Rocephin (prior strep viridans)  Persistent changes on repeat MRI of 9/19/17  Change ceftarolin to Zyvox  Stop date 10/18/2017  Needs repeat MRI prior to stopping abx-next week if feasible    Right iliacus abscess versus hematoma  Status post drainage on 9/20/17  Possibly hematoma  Cultures are negative     Odynophagia  Has hiatal hernia  EGD shows severe erosive gastritis and esophagitis  Discontinue Diflucan    Generalized debility  Needs rehabilitation  Difficult discharge due to cost of abx

## 2017-10-10 NOTE — THERAPY
"Occupational Therapy Treatment completed with focus on ADLs, ADL transfers and patient education.  Functional Status: Mod A supine > EOB, unable to stand, max A LB dressing, max A EOB > supine  Plan of Care: Will benefit from Occupational Therapy 3 times per week  Discharge Recommendations:  Equipment Will Continue to Assess for Equipment Needs. Post-acute therapy Discharge to a transitional care facility for continued skilled therapy services.    See \"Rehab Therapy-Acute\" Patient Summary Report for complete documentation.     Pt seen for OT tx to include: bed mobility, sitting balance EOB, LB dressing for socks. Attempted sit to stand x3 with 2-person assist, but pt unable to achieve adequate WB through LEs. Pt donned socks in tailor sit with assist. Returned to supine with HOB elevated, set-up to self-feed. Pt is limited by generalized weakness, balance impairment, activity intolerance. Acute OT to continue following.   "

## 2017-10-10 NOTE — PROGRESS NOTES
Bedside report received and pt assessed. Pt sitting up in bed eating clear liquid diet. No acute s/s of distress noted. No needs voiced at this time. Belongings and call light in reach. Safety maintained, will continue to monitor.

## 2017-10-10 NOTE — CARE PLAN
Problem: Safety  Goal: Will remain free from injury    Intervention: Collaborate with Interdisciplinary Team for safe transfer and mobilization techniques  Personal belongings and call light in reach. Pt utilize the call light system appropriately.       Problem: Infection  Goal: Will remain free from infection    Intervention: Implement standard precautions and perform hand washing before and after patient contact  Hand hygiene before and after contact with pt. Pt on IVPB antibiotics.       Problem: Knowledge Deficit  Goal: Knowledge of disease process/condition, treatment plan, diagnostic tests, and medications will improve    Intervention: Assess knowledge level of disease process/condition, treatment plan, diagnostic tests, and medications  Plan of care and medications discussed with pt.       Problem: Skin Integrity  Goal: Risk for impaired skin integrity will decrease    Intervention: Assess and monitor skin integrity, appearance and/or temperature  Skin assessment completed. Pt will be kept clean and dry if incontinent. Turned and repositioned q2 hours.

## 2017-10-10 NOTE — PROGRESS NOTES
[facility] Hospitalist Progress Note    Date of Service: 10/10/2017    Chief Complaint  86 y.o. male admitted 2017 with acute ground-level fall and back pain found to have  acute on chronic osteomyelitis and discitis, on long term abx     Interval Problem Update    Chest pain, odynophagia w nausea w oral  intake. afeb on IV atbs. No back pain. EGD findings of severe esophagitis .     Consultants/Specialty  Infectious diseases Dr. Getachew Vincent  of cardiology - signed off  GI Dr. Waters    Disposition  Pt to go to [facility] to complete abx therapy once medically clear.        Review of Systems   Constitutional: Negative for chills, diaphoresis and fever.   HENT: Negative for congestion.         Trouble swallowing, denies any regurgitation of food, N/V     Eyes: Negative for blurred vision, double vision, discharge and redness.   Respiratory: Negative for cough, shortness of breath, wheezing and stridor.    Cardiovascular: Negative for chest pain (chest wall w intake), palpitations and leg swelling.   Gastrointestinal: Positive for nausea. Negative for abdominal pain, diarrhea and vomiting.        Trouble swallowing medications and food. Denies regurgitation   Genitourinary: Negative for flank pain and hematuria.   Musculoskeletal: Negative for back pain, joint pain, myalgias and neck pain.   Neurological: Negative for sensory change, speech change, focal weakness and headaches.   Psychiatric/Behavioral: Negative for hallucinations and substance abuse.      Physical Exam  Laboratory/Imaging   Hemodynamics  Temp (24hrs), Av.3 °C (97.3 °F), Min:35.9 °C (96.6 °F), Max:36.7 °C (98 °F)   Temperature: 35.9 °C (96.6 °F)  Pulse  Av.4  Min: 45  Max: 147   Blood Pressure : 117/60      Respiratory      Respiration: 20, Pulse Oximetry: 100 %     Work Of Breathing / Effort: Mild  RUL Breath Sounds: Clear, RML Breath Sounds: Diminished, RLL Breath Sounds: Diminished, MIKE Breath Sounds: Clear, LLL Breath  Sounds: Diminished    Fluids    Intake/Output Summary (Last 24 hours) at 10/10/17 1446  Last data filed at 10/10/17 0900   Gross per 24 hour   Intake             1425 ml   Output              930 ml   Net              495 ml       Nutrition  Orders Placed This Encounter   Procedures   • DIET ORDER     Standing Status:   Standing     Number of Occurrences:   1     Order Specific Question:   Diet:     Answer:   Clear Liquids - No Red Foods [12]     Physical Exam   Constitutional: He is oriented to person, place, and time. He appears well-developed and well-nourished. He is cooperative. He does not appear ill. No distress. Nasal cannula in place.   HENT:   Head: Normocephalic and atraumatic.   Eyes: Conjunctivae and EOM are normal. Right eye exhibits no discharge. Left eye exhibits no discharge.   Neck: No JVD present. No tracheal deviation present.   Cardiovascular:   No murmur heard.  irreg reg   Pulmonary/Chest: Effort normal. No stridor. No respiratory distress. He has no wheezes. He has no rales.   Decreased breath sounds bilateral bases   Abdominal: Soft. Bowel sounds are normal. He exhibits no distension. There is no tenderness. There is no rebound and no guarding.   Obese.    Musculoskeletal: Normal range of motion. He exhibits no edema or tenderness.   Neurological: He is alert and oriented to person, place, and time.   Skin: Skin is warm and dry. He is not diaphoretic.   Psychiatric: He has a normal mood and affect. His behavior is normal.   Calm, cooperative .   Vitals reviewed.      Recent Labs      10/08/17   0430  10/09/17   0432  10/10/17   0352   WBC  7.1  5.9  6.3   RBC  3.18*  3.07*  3.17*   HEMOGLOBIN  9.8*  9.3*  10.1*   HEMATOCRIT  30.7*  29.8*  30.7*   MCV  96.5  97.1  96.8   MCH  30.8  30.3  31.9   MCHC  31.9*  31.2*  32.9*   RDW  56.2*  55.2*  54.2*   PLATELETCT  279  256  219   MPV  9.2  9.5  9.9     Recent Labs      10/08/17   0430  10/09/17   0432  10/10/17   0352   SODIUM  135  134*  134*    POTASSIUM  4.0  4.0  3.8   CHLORIDE  102  103  101   CO2  24  28  29   GLUCOSE  97  85  145*   BUN  9  9  7*   CREATININE  0.49*  0.50  0.48*   CALCIUM  9.0  9.1  9.1                      Assessment/Plan     Dysphagia   Assessment & Plan    w severe esophagitis   Diflucan for Candida coverage  Post EGD w recs by Gi for IV proton pump inhibitor and Carafate suspension with repeat endoscopy and dilation next   week.  clears as yareli        Nontraumatic psoas hematoma- (present on admission)   Assessment & Plan    Ac stopped  Monitor for acute bleed.        Persistent atrial fibrillation (CMS-HCC)- (present on admission)   Assessment & Plan    Persistent w aflutter . No Ac candidate w hematoma.  Rate control w lopressor , digoxin   Tele monitoring            Discitis of lumbar region- (present on admission)   Assessment & Plan    Improved back pain , afeb  IV TEFLARON - changed to oral zyvox plan thru 10/18  ID input .          DNR (do not resuscitate)- (present on admission)   Assessment & Plan    Discussed with patient- will update to DNR code status current admission .        Normocytic anemia- (present on admission)   Assessment & Plan    Stable.   Transfuse PRN if Hb 7 or symptoms.   Monitor for acute bleeding.         Debility- (present on admission)   Assessment & Plan    Continue PT /OT   Discussed with case management- anticipate tx to [facility] when stabilized.             Reviewed items::  Labs reviewed and Medications reviewed  Hearn catheter::  No Hearn  DVT prophylaxis - mechanical:  SCDs  Ulcer Prophylaxis::  Yes  Antibiotics:  Treating active infection/contamination beyond 24 hours perioperative coverage

## 2017-10-10 NOTE — PROGRESS NOTES
Notified by monitor tech pt heart rate sustaining at 146 for 5 minutes. RN assessed pt. Pt resting quietly in bed, RR noted even and unlabored.  Scheduled metoprolol administered to pt. Will continue to monitor.

## 2017-10-11 PROBLEM — K22.2 ESOPHAGEAL STRICTURE: Status: ACTIVE | Noted: 2017-10-11

## 2017-10-11 PROBLEM — K20.90 ESOPHAGITIS: Status: ACTIVE | Noted: 2017-10-11

## 2017-10-11 LAB
BASOPHILS # BLD AUTO: 0.5 % (ref 0–1.8)
BASOPHILS # BLD: 0.03 K/UL (ref 0–0.12)
EOSINOPHIL # BLD AUTO: 0.46 K/UL (ref 0–0.51)
EOSINOPHIL NFR BLD: 7.6 % (ref 0–6.9)
ERYTHROCYTE [DISTWIDTH] IN BLOOD BY AUTOMATED COUNT: 55 FL (ref 35.9–50)
HCT VFR BLD AUTO: 30 % (ref 42–52)
HGB BLD-MCNC: 9.5 G/DL (ref 14–18)
IMM GRANULOCYTES # BLD AUTO: 0.03 K/UL (ref 0–0.11)
IMM GRANULOCYTES NFR BLD AUTO: 0.5 % (ref 0–0.9)
LYMPHOCYTES # BLD AUTO: 1.25 K/UL (ref 1–4.8)
LYMPHOCYTES NFR BLD: 20.6 % (ref 22–41)
MCH RBC QN AUTO: 30.5 PG (ref 27–33)
MCHC RBC AUTO-ENTMCNC: 31.7 G/DL (ref 33.7–35.3)
MCV RBC AUTO: 96.5 FL (ref 81.4–97.8)
MONOCYTES # BLD AUTO: 0.76 K/UL (ref 0–0.85)
MONOCYTES NFR BLD AUTO: 12.5 % (ref 0–13.4)
NEUTROPHILS # BLD AUTO: 3.53 K/UL (ref 1.82–7.42)
NEUTROPHILS NFR BLD: 58.3 % (ref 44–72)
NRBC # BLD AUTO: 0 K/UL
NRBC BLD AUTO-RTO: 0 /100 WBC
PLATELET # BLD AUTO: 233 K/UL (ref 164–446)
PMV BLD AUTO: 10.3 FL (ref 9–12.9)
RBC # BLD AUTO: 3.11 M/UL (ref 4.7–6.1)
WBC # BLD AUTO: 6.1 K/UL (ref 4.8–10.8)

## 2017-10-11 PROCEDURE — 700102 HCHG RX REV CODE 250 W/ 637 OVERRIDE(OP): Performed by: NURSE PRACTITIONER

## 2017-10-11 PROCEDURE — 700105 HCHG RX REV CODE 258: Performed by: HOSPITALIST

## 2017-10-11 PROCEDURE — A9270 NON-COVERED ITEM OR SERVICE: HCPCS | Performed by: INTERNAL MEDICINE

## 2017-10-11 PROCEDURE — A9270 NON-COVERED ITEM OR SERVICE: HCPCS | Performed by: NURSE PRACTITIONER

## 2017-10-11 PROCEDURE — A9270 NON-COVERED ITEM OR SERVICE: HCPCS | Performed by: HOSPITALIST

## 2017-10-11 PROCEDURE — 700102 HCHG RX REV CODE 250 W/ 637 OVERRIDE(OP): Performed by: INTERNAL MEDICINE

## 2017-10-11 PROCEDURE — 97530 THERAPEUTIC ACTIVITIES: CPT

## 2017-10-11 PROCEDURE — 700102 HCHG RX REV CODE 250 W/ 637 OVERRIDE(OP): Performed by: HOSPITALIST

## 2017-10-11 PROCEDURE — 85025 COMPLETE CBC W/AUTO DIFF WBC: CPT

## 2017-10-11 PROCEDURE — 99232 SBSQ HOSP IP/OBS MODERATE 35: CPT | Performed by: HOSPITALIST

## 2017-10-11 PROCEDURE — C9113 INJ PANTOPRAZOLE SODIUM, VIA: HCPCS | Performed by: INTERNAL MEDICINE

## 2017-10-11 PROCEDURE — 770020 HCHG ROOM/CARE - TELE (206)

## 2017-10-11 PROCEDURE — 700111 HCHG RX REV CODE 636 W/ 250 OVERRIDE (IP): Performed by: INTERNAL MEDICINE

## 2017-10-11 RX ORDER — OMEPRAZOLE 20 MG/1
20 CAPSULE, DELAYED RELEASE ORAL DAILY
Status: DISCONTINUED | OUTPATIENT
Start: 2017-10-12 | End: 2017-10-11

## 2017-10-11 RX ORDER — OMEPRAZOLE 20 MG/1
20 CAPSULE, DELAYED RELEASE ORAL EVERY 12 HOURS
Status: DISCONTINUED | OUTPATIENT
Start: 2017-10-11 | End: 2017-10-12

## 2017-10-11 RX ORDER — LEVETIRACETAM 500 MG/1
500 TABLET ORAL 2 TIMES DAILY
Status: DISCONTINUED | OUTPATIENT
Start: 2017-10-11 | End: 2017-10-12

## 2017-10-11 RX ORDER — LEVETIRACETAM 100 MG/ML
500 SOLUTION ORAL EVERY 12 HOURS
Status: DISCONTINUED | OUTPATIENT
Start: 2017-10-11 | End: 2017-10-23 | Stop reason: HOSPADM

## 2017-10-11 RX ADMIN — PANTOPRAZOLE SODIUM 40 MG: 40 INJECTION, POWDER, FOR SOLUTION INTRAVENOUS at 09:09

## 2017-10-11 RX ADMIN — SUCRALFATE 1 G: 1 SUSPENSION ORAL at 11:55

## 2017-10-11 RX ADMIN — TAMSULOSIN HYDROCHLORIDE 0.4 MG: 0.4 CAPSULE ORAL at 09:09

## 2017-10-11 RX ADMIN — SUCRALFATE 1 G: 1 SUSPENSION ORAL at 17:44

## 2017-10-11 RX ADMIN — LEVETIRACETAM 500 MG: 100 SOLUTION ORAL at 09:10

## 2017-10-11 RX ADMIN — LINEZOLID 600 MG: 600 TABLET, FILM COATED ORAL at 09:09

## 2017-10-11 RX ADMIN — GABAPENTIN 200 MG: 100 CAPSULE ORAL at 21:30

## 2017-10-11 RX ADMIN — SODIUM CHLORIDE: 9 INJECTION, SOLUTION INTRAVENOUS at 03:51

## 2017-10-11 RX ADMIN — GABAPENTIN 200 MG: 100 CAPSULE ORAL at 16:08

## 2017-10-11 RX ADMIN — STANDARDIZED SENNA CONCENTRATE AND DOCUSATE SODIUM 2 TABLET: 8.6; 5 TABLET, FILM COATED ORAL at 09:09

## 2017-10-11 RX ADMIN — ANTACID TABLETS 1000 MG: 500 TABLET, CHEWABLE ORAL at 11:55

## 2017-10-11 RX ADMIN — SUCRALFATE 1 G: 1 SUSPENSION ORAL at 06:06

## 2017-10-11 RX ADMIN — METOPROLOL SUCCINATE 50 MG: 25 TABLET, EXTENDED RELEASE ORAL at 09:09

## 2017-10-11 RX ADMIN — SODIUM CHLORIDE: 9 INJECTION, SOLUTION INTRAVENOUS at 18:59

## 2017-10-11 RX ADMIN — ANTACID TABLETS 1000 MG: 500 TABLET, CHEWABLE ORAL at 09:09

## 2017-10-11 RX ADMIN — LINEZOLID 600 MG: 600 TABLET, FILM COATED ORAL at 21:30

## 2017-10-11 RX ADMIN — SUCRALFATE 1 G: 1 SUSPENSION ORAL at 21:30

## 2017-10-11 RX ADMIN — STANDARDIZED SENNA CONCENTRATE AND DOCUSATE SODIUM 2 TABLET: 8.6; 5 TABLET, FILM COATED ORAL at 21:30

## 2017-10-11 RX ADMIN — GABAPENTIN 200 MG: 100 CAPSULE ORAL at 09:09

## 2017-10-11 RX ADMIN — LEVETIRACETAM 500 MG: 100 SOLUTION ORAL at 21:57

## 2017-10-11 RX ADMIN — ROSUVASTATIN CALCIUM 125 MCG: 10 TABLET, FILM COATED ORAL at 17:44

## 2017-10-11 RX ADMIN — OMEPRAZOLE 20 MG: 20 CAPSULE, DELAYED RELEASE ORAL at 21:30

## 2017-10-11 RX ADMIN — ATORVASTATIN CALCIUM 10 MG: 10 TABLET, FILM COATED ORAL at 21:30

## 2017-10-11 ASSESSMENT — ENCOUNTER SYMPTOMS
NAUSEA: 0
NAUSEA: 1
VOMITING: 0
WHEEZING: 0
NECK PAIN: 0
DIARRHEA: 0
ABDOMINAL PAIN: 0
CHILLS: 0
HEADACHES: 0
COUGH: 0
FOCAL WEAKNESS: 0
FEVER: 0
BACK PAIN: 0
FLANK PAIN: 0
STRIDOR: 0
SPEECH CHANGE: 0
SHORTNESS OF BREATH: 0

## 2017-10-11 ASSESSMENT — COGNITIVE AND FUNCTIONAL STATUS - GENERAL
TURNING FROM BACK TO SIDE WHILE IN FLAT BAD: UNABLE
WALKING IN HOSPITAL ROOM: TOTAL
STANDING UP FROM CHAIR USING ARMS: TOTAL
MOVING TO AND FROM BED TO CHAIR: UNABLE
SUGGESTED CMS G CODE MODIFIER MOBILITY: CN
MOVING FROM LYING ON BACK TO SITTING ON SIDE OF FLAT BED: UNABLE
MOBILITY SCORE: 6
CLIMB 3 TO 5 STEPS WITH RAILING: TOTAL

## 2017-10-11 ASSESSMENT — PAIN SCALES - GENERAL
PAINLEVEL_OUTOF10: 0
PAINLEVEL_OUTOF10: 0
PAINLEVEL_OUTOF10: 3
PAINLEVEL_OUTOF10: 0
PAINLEVEL_OUTOF10: 0

## 2017-10-11 ASSESSMENT — GAIT ASSESSMENTS: GAIT LEVEL OF ASSIST: UNABLE TO PARTICIPATE

## 2017-10-11 NOTE — CARE PLAN
Problem: Bowel/Gastric:  Goal: Normal bowel function is maintained or improved    Intervention: Collaborate with Interdisciplinary Team for optimal positioning for bowel evacuation  Pt receiving stool softener BID. PRN stool softeners available as needed. Pt has been passing gas and the last noted BM was on the 7th. PRN stool softeners available as needed.       Problem: Skin Integrity  Goal: Risk for impaired skin integrity will decrease  Outcome: PROGRESSING AS EXPECTED  Pt turned and repositioned every two hours. Waffle overlay mattress in place to prevent skin breakdown.

## 2017-10-11 NOTE — PROGRESS NOTES
Bedside report received and pt assessed. Pt sitting up in bed eating clear liquid diet. Pt denies indigestion or nausea at this time. Belongings and call light in reach. Safety maintained, will continue to monitor.

## 2017-10-11 NOTE — DISCHARGE PLANNING
Medical Social Work    VM left for [facility] admissions to see if they are still able to accept pt.

## 2017-10-11 NOTE — CARE PLAN
Problem: Safety  Goal: Will remain free from injury  Fall risk assessed, fall precautions in place, bed alarm set, pt verbalizes understanding of fall risk.    Problem: Skin Integrity  Goal: Risk for impaired skin integrity will decrease  Skin assessment complete, skin intact, pt encouraged to turn self and ambulate as tolerated.

## 2017-10-11 NOTE — DISCHARGE PLANNING
Medical Social Work    Per bedside RN, pt not medically cleared for DC.     Notified [facility] no need to schedule transport

## 2017-10-11 NOTE — THERAPY
"Pt required less assisst for bed mobility. Continues to have significant difficulty maintaining seated balance at EOB. Requires Mod A to find and maintain midline orientation as he tends to fwd flex and L laterally lean while EOB. Pt is very deconditioned and gloabally weak. Will continut to benefit from acute skilled PT interventions to address present impairments. Consider use of cardiac chair to obtain improved upright seated position    Physical Therapy Treatment completed.   Bed Mobility:  Supine to Sit: Moderate Assist  Transfers: Sit to Stand: Unable to Participate  Gait: Level Of Assist: Unable to Participate       Plan of Care: Will benefit from Physical Therapy 3 times per week  Discharge Recommendations: Equipment: Will Continue to Assess for Equipment Needs. Post-acute therapy Discharge to a transitional care facility for continued skilled therapy services.     See \"Rehab Therapy-Acute\" Patient Summary Report for complete documentation.       "

## 2017-10-11 NOTE — PROGRESS NOTES
Infectious Disease Progress Note    Author: Oliva Bolanos M.D. Date & Time of service: 10/11/2017  8:51 AM    Chief Complaint:  Altered mental status    Interval History:  86-year-old male admitted for altered mental status. He was recently treated for L3-4 discitis. His repeat MRI showed right iliacus muscle abscess  9/21/17-MAXIMUM TEMPERATURE 98. On 2 liters of oxygen. WBC 14 and creatinine 0.38  9/22- MAXIMUM TEMPERATURE 97.8. Complains of generalized malaise. Breathing is improved. Right leg pain is improved  9/23/17-MAXIMUM TEMPERATURE 99.5. Still has back pain when he sits up. The WBC 12  9/24/17-MAXIMUM TEMPERATURE 97.6. Feels better. No new issues overnight. WBC11.7  9/25 AF resting comfortably  9/26 AF, WBC 11.3 more awake eating OK.  Back pain about the same  9/27 AF WBC 11 had PICC placed-denies SE abx. No new complaints  9/28 AF tolerating abx well feels about the same  9/29 AF, no CBC, pt has no new complaints this morning, asking when he will be discharged and how long he will be on abx,  9/30 AF no CBC, eating lots of chocolate pudding/ice cream, no new issues per pt  10/2 AF, feels well and denies any pain, feels he is getting stronger and appetite improved  10/3 AF, WBC 9.1, felt dizzy while watching TV yesterday with head pressure but no HA, no nausea or vomiting, no recurrent symptoms today but states he was dizzy at home prior to admit, back pain only with getting out of bed  10/4 AF, no CBC, no dizziness, now having painful swallowing that started last night, did not eat breakfast  10/5 AF, no CBC, complaining of painful swallowing, could not swallow pills this am, waiting of FEES  10/6 AF WBC 9.1 tired and feels like he has pressure on his eyes  10/7 AF WBC 9.8 episode CP and Afib with RVR-eyes sxs resolved. No current CP or SOB  10/8 AF WBC 7.1 thinks having GERD-sxs improved with Maalox  10/9/17-denies any back pain at rest. No fevers. He says his main issue is nausea and issues  swallowing   10/10/17-no fevers. No back pain. Still significantly deconditioned.        10/11/17 10/11/17-no fevers. Says the back does not hurt. Still has some difficulty swallowing.          Labs Reviewed, Medications Reviewed and Radiology Reviewed.    Review of Systems:  Review of Systems   Constitutional: Positive for malaise/fatigue. Negative for chills and fever.   HENT:        Painful swallowing   Respiratory: Negative for cough and shortness of breath.    Cardiovascular: Positive for leg swelling. Negative for chest pain.   Gastrointestinal: Positive for nausea. Negative for abdominal pain and vomiting.   Musculoskeletal: Negative for back pain.        Improving. Only occurs when he sits up   Neurological: Negative for headaches.       Hemodynamics:  Temp (24hrs), Av.8 °C (96.5 °F), Min:35.7 °C (96.2 °F), Max:35.9 °C (96.7 °F)  Temperature: 35.9 °C (96.6 °F)  Pulse  Av  Min: 45  Max: 147  Blood Pressure : 135/81       Physical Exam:  Physical Exam   Constitutional: He appears well-developed. He is easily aroused. No distress.   Obese   HENT:   Head: Normocephalic and atraumatic.   Poor dentition   Eyes: Conjunctivae and EOM are normal. Pupils are equal, round, and reactive to light. No scleral icterus.   Neck: Neck supple.   Cardiovascular: Normal rate.  An irregularly irregular rhythm present.   IRR   Pulmonary/Chest: Effort normal. No respiratory distress. He has no wheezes. He has rales.   Abdominal: Soft. He exhibits no distension. There is no tenderness.   Musculoskeletal: He exhibits edema.   LUE PICC   Neurological: He is alert and easily aroused.   Nursing note and vitals reviewed.      Meds:    Current Facility-Administered Medications:   •  linezolid  •  levetiracetam  •  Metoprolol Tartrate  •  pantoprazole  •  lorazepam  •  sucralfate  •  calcium carbonate  •  PICC Line Insertion has been implemented **AND** May use Lidocaine 1% not to exceed 3 mls for local at insertion site **AND**  NOTIFY MD **AND** Tip to dwell in the superior vena cava **AND** Do not use PICC Line until placement verified by Chest X Ray **AND** DX-CHEST-FOR PICC LINE Perform procedure in: PICC Room **AND** If radiologist reading of chest X-ray states any of the following the PICC should be used **AND** Further evaluation of the PICC placement can be retrieved from X-Ray and Imaging **AND** Blood draws through PICC line; draws by RN only **AND** FLUSHING GUIDELINES WHEN IN USE **AND** normal saline PF **AND** FLUSHING GUIDELINES WHEN NOT IN USE **AND** DRESSING MAINTENANCE **AND** Change needleless pressure ports and IV tubing every 72 hours per hospital policy **AND** TUBING **AND** If there is an MD order to remove the PICC line, any RN may remove the PICC line **AND** [] PATIENT EDUCATION MATERIALS **AND** NURSING COMMUNICATION  •  Metoprolol Tartrate  •  acetaminophen  •  digoxin  •  prochlorperazine  •  promethazine  •  hydrALAZINE  •  NS  •  ondansetron  •  metoprolol SR  •  atorvastatin  •  gabapentin  •  tamsulosin  •  senna-docusate **AND** polyethylene glycol/lytes **AND** magnesium hydroxide **AND** bisacodyl  •  Respiratory Care per Protocol    Labs:  Recent Labs      10/09/17   0432  10/10/17   0352  10/11/17   0357   WBC  5.9  6.3  6.1   RBC  3.07*  3.17*  3.11*   HEMOGLOBIN  9.3*  10.1*  9.5*   HEMATOCRIT  29.8*  30.7*  30.0*   MCV  97.1  96.8  96.5   MCH  30.3  31.9  30.5   RDW  55.2*  54.2*  55.0*   PLATELETCT  256  219  233   MPV  9.5  9.9  10.3   NEUTSPOLYS  60.40  71.40  58.30   LYMPHOCYTES  18.40*  12.90*  20.60*   MONOCYTES  14.60*  12.90  12.50   EOSINOPHILS  5.60  1.90  7.60*   BASOPHILS  0.70  0.30  0.50     Recent Labs      10/09/17   0432  10/10/17   035   SODIUM  134*  134*   POTASSIUM  4.0  3.8   CHLORIDE  103  101   CO2  28  29   GLUCOSE  85  145*   BUN  9  7*     Recent Labs      10/09/17   0432  10/10/17   0352   ALBUMIN  2.7*   --    TBILIRUBIN  0.4   --    ALKPHOSPHAT  54   --     TOTPROTEIN  5.6*   --    ALTSGPT  5   --    ASTSGOT  12   --    CREATININE  0.50  0.48*       Imaging:  Ct-abdomen-pelvis With    Result Date: 9/19/2017 9/19/2017 6:34 PM HISTORY/REASON FOR EXAM:  Abscess noted on MRI. TECHNIQUE/EXAM DESCRIPTION:  CT scan of the abdomen and pelvis with contrast. Contrast-enhanced helical scanning was obtained from the diaphragmatic domes through the pubic symphysis following the bolus administration of nonionic contrast without complication. 100 mL of Omnipaque 350 nonionic contrast was administered without complication. Low dose optimization technique was utilized for this CT exam including automated exposure control and adjustment of the mA and/or kV according to patient size. COMPARISON: MRI from the same day FINDINGS: Lung bases: There are small bilateral pleural effusions with overlying atelectasis. Abdomen: No free air is seen in the abdomen or pelvis. Evaluation is limited by streak artifact from barium within the colon. There is wall thickening of the distal esophagus with a small hiatal hernia. Liver appears unremarkable. Portal vein is patent. There is calcification in the pancreatic head. No adrenal mass is identified. Tiny hypodense left renal lesion is too small to characterize. There is mild prominence of the renal collecting systems bilaterally. Small hypodense right renal lesions too small to characterize. There are nonobstructing right renal calculi. Atherosclerotic plaque is seen in the aorta and its branches. There are small inguinal, retroperitoneal and mesenteric lymph nodes. There is no evidence of bowel obstruction. There is a moderate amount of stool in the rectosigmoid colon. There is colonic diverticulosis. The appendix is not identified. Stomach is distended with fluid. Small bowel loops are fluid-filled. Bladder is mildly distended with foci of air. There is asymmetric prominence of the right iliopsoas musculature with surrounding stranding. Findings  likely related to the previously noted abscess collection. Degenerative changes are seen in the spine. Postsurgical changes are noted in the lumbar spine. Degenerative changes are seen at the hips. There is mild loss of height of the superior endplate of T11 with a Schmorl's node noted. There is mild endplate irregularity at L3/L4.     Prominence of the right iliopsoas muscle with surrounding inflammatory stranding. The known fluid collection was better delineated on the prior MRI. Mild endplate irregularity at L3/L4 can be seen in discitis/osteomyelitis. Air-fluid level in the bladder. Correlation with urinalysis is recommended. This can be seen in the setting of recent instrumentation, infection or fistula. Colonic diverticulosis. Moderate amount of colonic stool. Nonobstructing right renal calculi. Mild prominence of the renal collecting systems bilaterally. Small hypodense renal lesions are too small to characterize. Fluid-filled loops of small bowel can be seen in the setting of enteritis. Small hiatal hernia with mild thickening of the distal esophagus. Calcifications in the pancreatic head can be seen in chronic pancreatitis. Atherosclerotic plaque. Small bilateral pleural effusions with overlying atelectasis.     Ct-drain-retroperitoneal    Result Date: 9/20/2017 9/20/2017 2:30 PM HISTORY/REASON FOR EXAM:  Multiloculated fluid collections in the right iliacus muscle. Suspected abscess. TECHNIQUE/EXAM DESCRIPTION: Right pelvic (extraperitoneal) retroperitoneal abscess drainage with CT guidance. Low dose optimization technique was utilized for this CT exam including automated exposure control and adjustment of the mA and/or kV according to patient size. PROCEDURE: Informed consent was obtained. Procedures performed with local anesthesia only with appropriate continuous patient monitoring by the radiology nurse. Sedation duration: N/A minutes Localizing CT images were obtained with the patient in supine  position. The skin was prepped with Betadine and draped in a sterile fashion. Following local anesthesia with 1% Lidocaine, a 17 G guiding needle was placed and extraperitoneal needle path in the right side of the pelvis via the iliacus muscle confirmed with CT. An Amplatz guidewire was placed and following serial tract dilatation, a 8 Irish pigtail locking catheter was placed. A specimen was collected and submitted for culture and sensitivity and Gram stain. Total of 5 mL old bloody reddish-brown fluid was drained. No purulent fluid was obtained. The fluid was not malodorous. The catheter was secured to the skin and connected to suction bulb drainage. Final CT images were obtained documenting catheter position. The patient tolerated the procedure well with no evidence of complication. Low dose optimization technique was utilized for this CT exam including automated exposure control and adjustment of the mA and/or kV according to patient size. COMPARISON: MRI lumbar spine 9/19/2017, CT abdomen and pelvis 9/19/2017. FINDINGS: The final CT images show satisfactory catheter position within the target collection.     1.  CT GUIDED RETROPERITONEAL (EXTRAPERITONEAL) RIGHT PELVIC CATHETER DRAINAGE WITHIN THE RIGHT ILIACUS MUSCLE. OLD DARK REDDISH-BROWN BLOODY FLUID WAS OBTAINED. NO ABSCESS OR PURULENT MATERIAL. 2.  THE CURRENT PLAN IS TO CHECK CULTURES AND LIKELY REMOVED CATHETER IN THE SHORT-TERM AT 48-72 HOURS AS THERE IS UNLIKELY TO BE AN ABSCESS.    Ct-head W/o    Result Date: 9/13/2017 9/13/2017 9:30 PM HISTORY/REASON FOR EXAM:  Altered Mental Status. TECHNIQUE/EXAM DESCRIPTION AND NUMBER OF VIEWS: CT of the head without contrast. The study was performed on a helical multidetector CT scanner. Contiguous 2.5 mm axial sections were obtained from the skull base through the vertex. Up to date radiation dose reduction adjustments have been utilized to meet ALARA standards for radiation dose reduction. COMPARISON:   7/12/2017 FINDINGS: The lateral ventricles are enlarged. Cortical sulci are enlarged. Patchy areas of low attenuation in the white matter bilaterally. No significant mass effect or midline shift. Basal cisterns are patent. No evidence for intracranial hemorrhage. Calvaria are intact. Visualized orbits are unremarkable. Visualized mastoid air cells are clear. Visualized paranasal sinuses are unremarkable. Calcifications of the carotid arteries noted. Calcified scalp nodules again present.     1.  Diffuse atrophy and white matter changes. 2.  No acute intracranial hemorrhage or territorial infarct.     Dx-chest-portable (1 View)    Result Date: 9/16/2017 9/16/2017 10:31 PM HISTORY/REASON FOR EXAM:  Shortness of Breath. TECHNIQUE/EXAM DESCRIPTION AND NUMBER OF VIEWS: Single portable view of the chest. COMPARISON: 9/13/2017 FINDINGS: Cardiac mediastinal contour is unchanged. Mediastinum is again prominent. Mild prominence of the pulmonary interstitium. No gross pleural fluid or pneumothorax. Dextroconvex curvature of thoracic spine.     No significant change from prior exam.    Dx-chest-portable (1 View)    Result Date: 9/13/2017 9/13/2017 8:44 PM HISTORY/REASON FOR EXAM:  Possible sepsis. TECHNIQUE/EXAM DESCRIPTION AND NUMBER OF VIEWS: Single portable view of the chest. COMPARISON: 8/7/2017 FINDINGS: Cardiac mediastinal contour is unchanged. Mild diffuse prominence of the interstitium again seen. No focal consolidation. No pleural fluid collection or pneumothorax. Dextroconvex curvature of thoracic spine. Diffuse osteopenia.     1.  Diffuse prominence of interstitium again seen, likely interstitial lung disease.  Mild pulmonary edema is also a consideration. 2.  No pneumonia or pneumothorax. 3.  Stable cardiomegaly.    Dx-small Bowel Series    Result Date: 9/17/2017 9/16/2017 10:39 AM HISTORY/REASON FOR EXAM:  Nausea and vomiting. TECHNIQUE/EXAM DESCRIPTION AND NUMBER OF VIEWS: Single contrast barium small bowel  follow-through performed. Fluoroscopic images were obtained. No fluoroscopic images obtained. COMPARISON:  None available. FINDINGS:  view the abdomen demonstrates marked gaseous distention and small bowel and colonic distention. Patient drank thin barium. Contrast passed very slowly into dilated small bowel loops. Jejunal loops are dilated up to 5.2 cm. Overhead images were taken for a total of 22 hours before termination of the exam. Contrast reached the ileum, but does not extend into the colon. There is a large amount of stool in the colon.     1.  Gaseous distention with fairly diffuse bowel dilatation. Contrast extends into the ileum, but does not reach the colon within 22 hours. No transition point is identified to suggest mechanical bowel obstruction. 2.  Large amount of stool in the rectal vault.    Mr-lumbar Spine-with & W/o    Result Date: 9/19/2017 9/19/2017 1:35 PM HISTORY/REASON FOR EXAM:  Altered mental status, weakness. Possible discitis. Previous lumbar surgery. TECHNIQUE/EXAM DESCRIPTION: MRI of the lumbar spine without and with contrast. The study was performed on a [employer] Signa 1.5 Lucille MRI scanner. T1 sagittal, T2 fast spin-echo sagittal, T2 axial images and T1 axial images were obtained of the lumbar spine. T1 post-contrast sagittal and T1 post-contrast axial images were obtained. Optional T2 fat-suppressed sagittal images may be obtained. 20 mL Omniscan gadolinium contrast was administered intravenously. COMPARISON:  MRI lumbar spine 7/7/2017, T abdomen and pelvis 1/6/2017 FINDINGS: Alignment in the lumbar spine again shows retrolisthesis of L3 on L4 of about 5 mm. There is fluid signal T2 hyperintensity again seen in the L3-L4 disc space and prominent marrow edema signal at the L3 inferior endplate and to a lesser extent L4 superior endplate. There is corresponding enhancement, particularly at the inferior endplate of L3. Findings are highly suspicious for discitis with osteomyelitis.  There is postoperative change with lumbar laminectomy at L2-L3 through L5. There is posterior fusion with transpedicular screw fixation 4-L5. The posterior paraspinous soft tissues show expected postoperative changes with atrophy and fatty replacement in  the posterior paraspinous muscles at the operative levels. There is no significant postoperative dorsal epidural fluid collection. However, there has been interval development of multilocular fluid collections in the right iliacus muscle spanning about 42 mm. These are consistent with intramuscular abscesses (T2 axial image 4, series 5, T1 postcontrast axial image 1, series 10). The conus is normal in position and signal with its tip at the L1 level. At T12-L1, is a tiny central and left paramedian disc protrusion with an underlying annular fissure. There is mild hypertrophic facet arthropathy. Thecal sac is toward the lower range of normal without karla central stenosis. The neural foramina are intact. At L1-2, there is negligible disc bulging. There is no central stenosis or significant foraminal stenosis. At L2-3, there is a small broad-based disc-osteophyte complex. There is no central stenosis as there is decompressive laminectomy at this level. There is no significant foraminal stenosis. At L3-4, there is posterior marginal spurring and disc bulging accentuated by the retrolisthesis. There is moderate bilateral lateral recess stenosis. Thecal sac is toward the lower range of normal even though there is a decompressive laminectomy at this  level. However, there is no karla central stenosis (T2 axial image 24, series 5). There is severe bilateral foraminal stenosis, right greater than left. This is unchanged from the previous exam. At L4-5, no central stenosis. There is inferior foraminal blunting with moderate bilateral foraminal stenosis. At L5-S1, there is minimal disc bulging. There is a left paramedian annular fissure. There is mild hypertrophic facet  arthropathy. No central stenosis. There is moderate bilateral foraminal stenosis.     1.  L3-4 findings again suggesting discitis with osteomyelitis. No evidence of epidural abscess or epidural phlegmon. 2.  L3-4 retrolisthesis. 3.  Postoperative multilevel lumbar laminectomy and posterior fusion at L4-5. 4.  Interval development of multilocular intramuscular abscesses in the right iliacus muscle, partly in the field of view 5.  Degenerative and spondylotic changes as detailed for each level above in the body of report.    Dx-esophagus - Barium Swallow    Result Date: 9/16/2017 9/16/2017 10:39 AM HISTORY/REASON FOR EXAM:  Nausea. Nausea and vomiting. TECHNIQUE/EXAM DESCRIPTION AND NUMBER OF VIEWS: Single contrast esophagram procedure was performed. 5 fluoroscopic images obtained. Fluoroscopy time: 0.37 minutes COMPARISON:  None. FINDINGS: Limited exam secondary to limited patient mobility. The esophagus appears normal in caliber and configuration. No definite mucosal lesions are identified on single-contrast techniques. Contrast passes freely into the stomach. No hiatal hernia is identified.     No abnormalities identified on limited single contrast esophagram.    Echocardiogram Comp W/o Cont    Result Date: 9/14/2017  Transthoracic Echo Report Echocardiography Laboratory CONCLUSIONS Prior study done on [id number] left ventricular systolic function. Normal regional wall motion. Left ventricular ejection fraction is visually estimated to be 70%. Diastolic function is difficult to assess with atrial fibrillation. The right ventricle was normal in size and function. Structurally normal mitral valve without significant stenosis or regurgitation. Aortic sclerosis without stenosis. No aortic insufficiency. Normal pericardium without effusion.FINDINGS Left Ventricle Normal left ventricular chamber size. Normal left ventricular wall thickness. Normal left ventricular systolic function. Normal regional wall motion.  Left ventricular ejection fraction is visually estimated to be 70%. Diastolic function is difficult to assess with atrial fibrillation. Right Ventricle The right ventricle was normal in size and function. Right Atrium The right atrium is normal in size.  Inferior vena cava is not well visualized. Left Atrium The left atrium is normal in size.  Left atrial volume index is 19  mL/sq m. Mitral Valve Structurally normal mitral valve without significant stenosis or regurgitation.  Trace mitral regurgitation. Aortic Valve Aortic sclerosis without stenosis. Tricuspid aortic valve. No aortic insufficiency. Tricuspid Valve Structurally normal tricuspid valve without significant stenosis or regurgitation. Pulmonic Valve Structurally normal pulmonic valve without significant stenosis or regurgitation. Pericardium Normal pericardium without effusion. Aorta The aortic root is normal.  Ascending aorta diameter is 3.1 cm. Kingsley Hung M.D. (Electronically Signed) Final Date:     14 September 2017                 11:10      Micro:  Results     Procedure Component Value Units Date/Time    URINE CULTURE(NEW) [[id number]] Collected:  10/07/17 0032    Order Status:  Completed Specimen:  Urine from Urine, Clean Catch Updated:  10/09/17 0829     Significant Indicator NEG     Source UR     Site URINE, CLEAN CATCH     Urine Culture No growth at 48 hours    Narrative:       Collected By:[id number] ARIA LI  Indication for culture:->Dysuria/Frequency/Burning    URINALYSIS [[id number]] Collected:  10/07/17 0032    Order Status:  Completed Specimen:  Urine from Urine, Clean Catch Updated:  10/07/17 0038     Color Yellow     Character Clear     Specific Gravity 1.011     Ph 5.5     Glucose Negative mg/dL      Ketones Negative mg/dL      Protein Negative mg/dL      Bilirubin Negative     Urobilinogen, Urine 0.2     Nitrite Negative     Leukocyte Esterase Negative     Occult Blood Negative     Micro Urine Req see below     Comment:  Microscopic examination not performed when specimen is clear  and chemically negative for protein, blood, leukocyte esterase  and nitrite.         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

## 2017-10-11 NOTE — DISCHARGE PLANNING
Medical Social Work    Spoke with Medina at [facility]. They are able to accept pt today. MD stated pt is medically cleared for DC.    REMSA form completed and faxed to [facility] for transfer to [facility]

## 2017-10-12 ENCOUNTER — APPOINTMENT (OUTPATIENT)
Dept: RADIOLOGY | Facility: MEDICAL CENTER | Age: 82
DRG: 871 | End: 2017-10-12
Attending: INTERNAL MEDICINE
Payer: MEDICARE

## 2017-10-12 LAB — ERYTHROCYTE [SEDIMENTATION RATE] IN BLOOD BY WESTERGREN METHOD: 40 MM/HOUR (ref 0–20)

## 2017-10-12 PROCEDURE — 700102 HCHG RX REV CODE 250 W/ 637 OVERRIDE(OP): Performed by: NURSE PRACTITIONER

## 2017-10-12 PROCEDURE — A9270 NON-COVERED ITEM OR SERVICE: HCPCS | Performed by: INTERNAL MEDICINE

## 2017-10-12 PROCEDURE — 700105 HCHG RX REV CODE 258: Performed by: HOSPITALIST

## 2017-10-12 PROCEDURE — 93010 ELECTROCARDIOGRAM REPORT: CPT | Performed by: INTERNAL MEDICINE

## 2017-10-12 PROCEDURE — 700102 HCHG RX REV CODE 250 W/ 637 OVERRIDE(OP)

## 2017-10-12 PROCEDURE — 94760 N-INVAS EAR/PLS OXIMETRY 1: CPT

## 2017-10-12 PROCEDURE — 700102 HCHG RX REV CODE 250 W/ 637 OVERRIDE(OP): Performed by: HOSPITALIST

## 2017-10-12 PROCEDURE — 85652 RBC SED RATE AUTOMATED: CPT

## 2017-10-12 PROCEDURE — 700102 HCHG RX REV CODE 250 W/ 637 OVERRIDE(OP): Performed by: INTERNAL MEDICINE

## 2017-10-12 PROCEDURE — A9579 GAD-BASE MR CONTRAST NOS,1ML: HCPCS | Performed by: HOSPITALIST

## 2017-10-12 PROCEDURE — A9270 NON-COVERED ITEM OR SERVICE: HCPCS

## 2017-10-12 PROCEDURE — 770020 HCHG ROOM/CARE - TELE (206)

## 2017-10-12 PROCEDURE — 72158 MRI LUMBAR SPINE W/O & W/DYE: CPT

## 2017-10-12 PROCEDURE — 700117 HCHG RX CONTRAST REV CODE 255: Performed by: HOSPITALIST

## 2017-10-12 PROCEDURE — 93005 ELECTROCARDIOGRAM TRACING: CPT | Performed by: INTERNAL MEDICINE

## 2017-10-12 PROCEDURE — A9270 NON-COVERED ITEM OR SERVICE: HCPCS | Performed by: HOSPITALIST

## 2017-10-12 PROCEDURE — A9270 NON-COVERED ITEM OR SERVICE: HCPCS | Performed by: NURSE PRACTITIONER

## 2017-10-12 PROCEDURE — 99232 SBSQ HOSP IP/OBS MODERATE 35: CPT | Performed by: HOSPITALIST

## 2017-10-12 RX ADMIN — LEVETIRACETAM 500 MG: 100 SOLUTION ORAL at 09:10

## 2017-10-12 RX ADMIN — SUCRALFATE 1 G: 1 SUSPENSION ORAL at 12:18

## 2017-10-12 RX ADMIN — OMEPRAZOLE 40 MG: 20 CAPSULE, DELAYED RELEASE ORAL at 20:38

## 2017-10-12 RX ADMIN — GABAPENTIN 200 MG: 100 CAPSULE ORAL at 20:45

## 2017-10-12 RX ADMIN — ANTACID TABLETS 1000 MG: 500 TABLET, CHEWABLE ORAL at 09:09

## 2017-10-12 RX ADMIN — ANTACID TABLETS 1000 MG: 500 TABLET, CHEWABLE ORAL at 17:08

## 2017-10-12 RX ADMIN — ATORVASTATIN CALCIUM 10 MG: 10 TABLET, FILM COATED ORAL at 20:45

## 2017-10-12 RX ADMIN — SUCRALFATE 1 G: 1 SUSPENSION ORAL at 20:37

## 2017-10-12 RX ADMIN — SUCRALFATE 1 G: 1 SUSPENSION ORAL at 06:00

## 2017-10-12 RX ADMIN — SODIUM CHLORIDE: 9 INJECTION, SOLUTION INTRAVENOUS at 06:00

## 2017-10-12 RX ADMIN — GADODIAMIDE 20 ML: 287 INJECTION INTRAVENOUS at 18:39

## 2017-10-12 RX ADMIN — TAMSULOSIN HYDROCHLORIDE 0.4 MG: 0.4 CAPSULE ORAL at 09:10

## 2017-10-12 RX ADMIN — ROSUVASTATIN CALCIUM 125 MCG: 10 TABLET, FILM COATED ORAL at 17:08

## 2017-10-12 RX ADMIN — LINEZOLID 600 MG: 600 TABLET, FILM COATED ORAL at 09:10

## 2017-10-12 RX ADMIN — GABAPENTIN 200 MG: 100 CAPSULE ORAL at 09:10

## 2017-10-12 RX ADMIN — LEVETIRACETAM 500 MG: 100 SOLUTION ORAL at 20:38

## 2017-10-12 RX ADMIN — GABAPENTIN 200 MG: 100 CAPSULE ORAL at 15:53

## 2017-10-12 RX ADMIN — SUCRALFATE 1 G: 1 SUSPENSION ORAL at 17:08

## 2017-10-12 RX ADMIN — OMEPRAZOLE 40 MG: 20 CAPSULE, DELAYED RELEASE ORAL at 12:17

## 2017-10-12 RX ADMIN — LINEZOLID 600 MG: 600 TABLET, FILM COATED ORAL at 20:50

## 2017-10-12 ASSESSMENT — ENCOUNTER SYMPTOMS
NECK PAIN: 0
DIARRHEA: 0
CHILLS: 0
WHEEZING: 0
FEVER: 0
WEAKNESS: 1
STRIDOR: 0
ABDOMINAL PAIN: 0
COUGH: 0
SHORTNESS OF BREATH: 0
SPEECH CHANGE: 0
HALLUCINATIONS: 0
FLANK PAIN: 0
VOMITING: 0
NAUSEA: 1
BACK PAIN: 0
HEADACHES: 0
FOCAL WEAKNESS: 0
EYE DISCHARGE: 0
EYE REDNESS: 0

## 2017-10-12 ASSESSMENT — LIFESTYLE VARIABLES: SUBSTANCE_ABUSE: 0

## 2017-10-12 ASSESSMENT — PAIN SCALES - GENERAL
PAINLEVEL_OUTOF10: 0
PAINLEVEL_OUTOF10: 0
PAINLEVEL_OUTOF10: 8
PAINLEVEL_OUTOF10: 5
PAINLEVEL_OUTOF10: 0
PAINLEVEL_OUTOF10: 8
PAINLEVEL_OUTOF10: 8

## 2017-10-12 NOTE — ASSESSMENT & PLAN NOTE
Change Diflucan to oral 5 more days  His prior treatment was incomplete which resulted in clinical decline, due to poor oral intake

## 2017-10-12 NOTE — PROGRESS NOTES
Infectious Disease Progress Note    Author: Oliva Bolanos M.D. Date & Time of service: 10/12/2017  7:50 AM    Chief Complaint:  Altered mental status    Interval History:  86-year-old male admitted for altered mental status. He was recently treated for L3-4 discitis. His repeat MRI showed right iliacus muscle abscess  9/21/17-MAXIMUM TEMPERATURE 98. On 2 liters of oxygen. WBC 14 and creatinine 0.38  9/22- MAXIMUM TEMPERATURE 97.8. Complains of generalized malaise. Breathing is improved. Right leg pain is improved  9/23/17-MAXIMUM TEMPERATURE 99.5. Still has back pain when he sits up. The WBC 12  9/24/17-MAXIMUM TEMPERATURE 97.6. Feels better. No new issues overnight. WBC11.7  9/25 AF resting comfortably  9/26 AF, WBC 11.3 more awake eating OK.  Back pain about the same  9/27 AF WBC 11 had PICC placed-denies SE abx. No new complaints  9/28 AF tolerating abx well feels about the same  9/29 AF, no CBC, pt has no new complaints this morning, asking when he will be discharged and how long he will be on abx,  9/30 AF no CBC, eating lots of chocolate pudding/ice cream, no new issues per pt  10/2 AF, feels well and denies any pain, feels he is getting stronger and appetite improved  10/3 AF, WBC 9.1, felt dizzy while watching TV yesterday with head pressure but no HA, no nausea or vomiting, no recurrent symptoms today but states he was dizzy at home prior to admit, back pain only with getting out of bed  10/4 AF, no CBC, no dizziness, now having painful swallowing that started last night, did not eat breakfast  10/5 AF, no CBC, complaining of painful swallowing, could not swallow pills this am, waiting of FEES  10/6 AF WBC 9.1 tired and feels like he has pressure on his eyes  10/7 AF WBC 9.8 episode CP and Afib with RVR-eyes sxs resolved. No current CP or SOB  10/8 AF WBC 7.1 thinks having GERD-sxs improved with Maalox  10/9/17-denies any back pain at rest. No fevers. He says his main issue is nausea and issues  swallowing   10/10/17-no fevers. No back pain. Still significantly deconditioned.      10/11/17-no fevers. Says the back does not hurt. Still has some difficulty swallowing.  10/12/17-no fevers. No new issues overnight. MRI is still pending          Labs Reviewed, Medications Reviewed and Radiology Reviewed.    Review of Systems:  Review of Systems   Constitutional: Positive for malaise/fatigue. Negative for chills and fever.   HENT:        Painful swallowing   Respiratory: Negative for cough and shortness of breath.    Cardiovascular: Positive for leg swelling. Negative for chest pain.   Gastrointestinal: Positive for nausea. Negative for abdominal pain and vomiting.   Musculoskeletal: Negative for back pain.        Improving. Only occurs when he sits up   Neurological: Negative for headaches.       Hemodynamics:  Temp (24hrs), Av.9 °C (96.7 °F), Min:35.8 °C (96.5 °F), Max:36.2 °C (97.1 °F)  Temperature: 36.1 °C (96.9 °F)  Pulse  Av.8  Min: 45  Max: 147  Blood Pressure : (!) 97/63       Physical Exam:  Physical Exam   Constitutional: He appears well-developed. He is easily aroused. No distress.   Obese   HENT:   Head: Normocephalic and atraumatic.   Poor dentition   Eyes: Conjunctivae and EOM are normal. Pupils are equal, round, and reactive to light. No scleral icterus.   Neck: Neck supple.   Cardiovascular: Normal rate.  An irregularly irregular rhythm present.   IRR   Pulmonary/Chest: Effort normal. No respiratory distress. He has no wheezes. He has rales.   Abdominal: Soft. He exhibits no distension. There is no tenderness.   Musculoskeletal: He exhibits edema.   LUE PICC   Neurological: He is alert and easily aroused.   Nursing note and vitals reviewed.      Meds:    Current Facility-Administered Medications:   •  levetiracetam  •  omeprazole  •  levetiracetam  •  linezolid  •  lorazepam  •  sucralfate  •  calcium carbonate  •  PICC Line Insertion has been implemented **AND** May use Lidocaine 1% not  to exceed 3 mls for local at insertion site **AND** NOTIFY MD **AND** Tip to dwell in the superior vena cava **AND** Do not use PICC Line until placement verified by Chest X Ray **AND** DX-CHEST-FOR PICC LINE Perform procedure in: PICC Room **AND** If radiologist reading of chest X-ray states any of the following the PICC should be used **AND** Further evaluation of the PICC placement can be retrieved from X-Ray and Imaging **AND** Blood draws through PICC line; draws by RN only **AND** FLUSHING GUIDELINES WHEN IN USE **AND** normal saline PF **AND** FLUSHING GUIDELINES WHEN NOT IN USE **AND** DRESSING MAINTENANCE **AND** Change needleless pressure ports and IV tubing every 72 hours per hospital policy **AND** TUBING **AND** If there is an MD order to remove the PICC line, any RN may remove the PICC line **AND** [] PATIENT EDUCATION MATERIALS **AND** NURSING COMMUNICATION  •  Metoprolol Tartrate  •  acetaminophen  •  digoxin  •  prochlorperazine  •  promethazine  •  hydrALAZINE  •  NS  •  ondansetron  •  metoprolol SR  •  atorvastatin  •  gabapentin  •  tamsulosin  •  senna-docusate **AND** polyethylene glycol/lytes **AND** magnesium hydroxide **AND** bisacodyl  •  Respiratory Care per Protocol    Labs:  Recent Labs      10/10/17   0352  10/11/17   0357   WBC  6.3  6.1   RBC  3.17*  3.11*   HEMOGLOBIN  10.1*  9.5*   HEMATOCRIT  30.7*  30.0*   MCV  96.8  96.5   MCH  31.9  30.5   RDW  54.2*  55.0*   PLATELETCT  219  233   MPV  9.9  10.3   NEUTSPOLYS  71.40  58.30   LYMPHOCYTES  12.90*  20.60*   MONOCYTES  12.90  12.50   EOSINOPHILS  1.90  7.60*   BASOPHILS  0.30  0.50     Recent Labs      10/10/17   035   SODIUM  134*   POTASSIUM  3.8   CHLORIDE  101   CO2  29   GLUCOSE  145*   BUN  7*     Recent Labs      10/10/17   0352   CREATININE  0.48*       Imaging:  Ct-abdomen-pelvis With    Result Date: 2017 6:34 PM HISTORY/REASON FOR EXAM:  Abscess noted on MRI. TECHNIQUE/EXAM DESCRIPTION:  CT scan of  the abdomen and pelvis with contrast. Contrast-enhanced helical scanning was obtained from the diaphragmatic domes through the pubic symphysis following the bolus administration of nonionic contrast without complication. 100 mL of Omnipaque 350 nonionic contrast was administered without complication. Low dose optimization technique was utilized for this CT exam including automated exposure control and adjustment of the mA and/or kV according to patient size. COMPARISON: MRI from the same day FINDINGS: Lung bases: There are small bilateral pleural effusions with overlying atelectasis. Abdomen: No free air is seen in the abdomen or pelvis. Evaluation is limited by streak artifact from barium within the colon. There is wall thickening of the distal esophagus with a small hiatal hernia. Liver appears unremarkable. Portal vein is patent. There is calcification in the pancreatic head. No adrenal mass is identified. Tiny hypodense left renal lesion is too small to characterize. There is mild prominence of the renal collecting systems bilaterally. Small hypodense right renal lesions too small to characterize. There are nonobstructing right renal calculi. Atherosclerotic plaque is seen in the aorta and its branches. There are small inguinal, retroperitoneal and mesenteric lymph nodes. There is no evidence of bowel obstruction. There is a moderate amount of stool in the rectosigmoid colon. There is colonic diverticulosis. The appendix is not identified. Stomach is distended with fluid. Small bowel loops are fluid-filled. Bladder is mildly distended with foci of air. There is asymmetric prominence of the right iliopsoas musculature with surrounding stranding. Findings likely related to the previously noted abscess collection. Degenerative changes are seen in the spine. Postsurgical changes are noted in the lumbar spine. Degenerative changes are seen at the hips. There is mild loss of height of the superior endplate of T11 with  a Schmorl's node noted. There is mild endplate irregularity at L3/L4.     Prominence of the right iliopsoas muscle with surrounding inflammatory stranding. The known fluid collection was better delineated on the prior MRI. Mild endplate irregularity at L3/L4 can be seen in discitis/osteomyelitis. Air-fluid level in the bladder. Correlation with urinalysis is recommended. This can be seen in the setting of recent instrumentation, infection or fistula. Colonic diverticulosis. Moderate amount of colonic stool. Nonobstructing right renal calculi. Mild prominence of the renal collecting systems bilaterally. Small hypodense renal lesions are too small to characterize. Fluid-filled loops of small bowel can be seen in the setting of enteritis. Small hiatal hernia with mild thickening of the distal esophagus. Calcifications in the pancreatic head can be seen in chronic pancreatitis. Atherosclerotic plaque. Small bilateral pleural effusions with overlying atelectasis.     Ct-drain-retroperitoneal    Result Date: 9/20/2017 9/20/2017 2:30 PM HISTORY/REASON FOR EXAM:  Multiloculated fluid collections in the right iliacus muscle. Suspected abscess. TECHNIQUE/EXAM DESCRIPTION: Right pelvic (extraperitoneal) retroperitoneal abscess drainage with CT guidance. Low dose optimization technique was utilized for this CT exam including automated exposure control and adjustment of the mA and/or kV according to patient size. PROCEDURE: Informed consent was obtained. Procedures performed with local anesthesia only with appropriate continuous patient monitoring by the radiology nurse. Sedation duration: N/A minutes Localizing CT images were obtained with the patient in supine position. The skin was prepped with Betadine and draped in a sterile fashion. Following local anesthesia with 1% Lidocaine, a 17 G guiding needle was placed and extraperitoneal needle path in the right side of the pelvis via the iliacus muscle confirmed with CT. An  Amplatz guidewire was placed and following serial tract dilatation, a 8 [place] pigtail locking catheter was placed. A specimen was collected and submitted for culture and sensitivity and Gram stain. Total of 5 mL old bloody reddish-brown fluid was drained. No purulent fluid was obtained. The fluid was not malodorous. The catheter was secured to the skin and connected to suction bulb drainage. Final CT images were obtained documenting catheter position. The patient tolerated the procedure well with no evidence of complication. Low dose optimization technique was utilized for this CT exam including automated exposure control and adjustment of the mA and/or kV according to patient size. COMPARISON: MRI lumbar spine 9/19/2017, CT abdomen and pelvis 9/19/2017. FINDINGS: The final CT images show satisfactory catheter position within the target collection.     1.  CT GUIDED RETROPERITONEAL (EXTRAPERITONEAL) RIGHT PELVIC CATHETER DRAINAGE WITHIN THE RIGHT ILIACUS MUSCLE. OLD DARK REDDISH-BROWN BLOODY FLUID WAS OBTAINED. NO ABSCESS OR PURULENT MATERIAL. 2.  THE CURRENT PLAN IS TO CHECK CULTURES AND LIKELY REMOVED CATHETER IN THE SHORT-TERM AT 48-72 HOURS AS THERE IS UNLIKELY TO BE AN ABSCESS.    Ct-head W/o    Result Date: 9/13/2017 9/13/2017 9:30 PM HISTORY/REASON FOR EXAM:  Altered Mental Status. TECHNIQUE/EXAM DESCRIPTION AND NUMBER OF VIEWS: CT of the head without contrast. The study was performed on a helical multidetector CT scanner. Contiguous 2.5 mm axial sections were obtained from the skull base through the vertex. Up to date radiation dose reduction adjustments have been utilized to meet ALARA standards for radiation dose reduction. COMPARISON:  7/12/2017 FINDINGS: The lateral ventricles are enlarged. Cortical sulci are enlarged. Patchy areas of low attenuation in the white matter bilaterally. No significant mass effect or midline shift. Basal cisterns are patent. No evidence for intracranial hemorrhage. Calvaria  are intact. Visualized orbits are unremarkable. Visualized mastoid air cells are clear. Visualized paranasal sinuses are unremarkable. Calcifications of the carotid arteries noted. Calcified scalp nodules again present.     1.  Diffuse atrophy and white matter changes. 2.  No acute intracranial hemorrhage or territorial infarct.     Dx-chest-portable (1 View)    Result Date: 9/16/2017 9/16/2017 10:31 PM HISTORY/REASON FOR EXAM:  Shortness of Breath. TECHNIQUE/EXAM DESCRIPTION AND NUMBER OF VIEWS: Single portable view of the chest. COMPARISON: 9/13/2017 FINDINGS: Cardiac mediastinal contour is unchanged. Mediastinum is again prominent. Mild prominence of the pulmonary interstitium. No gross pleural fluid or pneumothorax. Dextroconvex curvature of thoracic spine.     No significant change from prior exam.    Dx-chest-portable (1 View)    Result Date: 9/13/2017 9/13/2017 8:44 PM HISTORY/REASON FOR EXAM:  Possible sepsis. TECHNIQUE/EXAM DESCRIPTION AND NUMBER OF VIEWS: Single portable view of the chest. COMPARISON: 8/7/2017 FINDINGS: Cardiac mediastinal contour is unchanged. Mild diffuse prominence of the interstitium again seen. No focal consolidation. No pleural fluid collection or pneumothorax. Dextroconvex curvature of thoracic spine. Diffuse osteopenia.     1.  Diffuse prominence of interstitium again seen, likely interstitial lung disease.  Mild pulmonary edema is also a consideration. 2.  No pneumonia or pneumothorax. 3.  Stable cardiomegaly.    Dx-small Bowel Series    Result Date: 9/17/2017 9/16/2017 10:39 AM HISTORY/REASON FOR EXAM:  Nausea and vomiting. TECHNIQUE/EXAM DESCRIPTION AND NUMBER OF VIEWS: Single contrast barium small bowel follow-through performed. Fluoroscopic images were obtained. No fluoroscopic images obtained. COMPARISON:  None available. FINDINGS:  view the abdomen demonstrates marked gaseous distention and small bowel and colonic distention. Patient drank thin barium. Contrast  passed very slowly into dilated small bowel loops. Jejunal loops are dilated up to 5.2 cm. Overhead images were taken for a total of 22 hours before termination of the exam. Contrast reached the ileum, but does not extend into the colon. There is a large amount of stool in the colon.     1.  Gaseous distention with fairly diffuse bowel dilatation. Contrast extends into the ileum, but does not reach the colon within 22 hours. No transition point is identified to suggest mechanical bowel obstruction. 2.  Large amount of stool in the rectal vault.    Mr-lumbar Spine-with & W/o    Result Date: 9/19/2017 9/19/2017 1:35 PM HISTORY/REASON FOR EXAM:  Altered mental status, weakness. Possible discitis. Previous lumbar surgery. TECHNIQUE/EXAM DESCRIPTION: MRI of the lumbar spine without and with contrast. The study was performed on a Digital H2O Signa 1.5 [employer] MRI scanner. T1 sagittal, T2 fast spin-echo sagittal, T2 axial images and T1 axial images were obtained of the lumbar spine. T1 post-contrast sagittal and T1 post-contrast axial images were obtained. Optional T2 fat-suppressed sagittal images may be obtained. 20 mL Omniscan gadolinium contrast was administered intravenously. COMPARISON:  MRI lumbar spine 7/7/2017, T abdomen and pelvis 1/6/2017 FINDINGS: Alignment in the lumbar spine again shows retrolisthesis of L3 on L4 of about 5 mm. There is fluid signal T2 hyperintensity again seen in the L3-L4 disc space and prominent marrow edema signal at the L3 inferior endplate and to a lesser extent L4 superior endplate. There is corresponding enhancement, particularly at the inferior endplate of L3. Findings are highly suspicious for discitis with osteomyelitis. There is postoperative change with lumbar laminectomy at L2-L3 through L5. There is posterior fusion with transpedicular screw fixation 4-L5. The posterior paraspinous soft tissues show expected postoperative changes with atrophy and fatty replacement in  the posterior  paraspinous muscles at the operative levels. There is no significant postoperative dorsal epidural fluid collection. However, there has been interval development of multilocular fluid collections in the right iliacus muscle spanning about 42 mm. These are consistent with intramuscular abscesses (T2 axial image 4, series 5, T1 postcontrast axial image 1, series 10). The conus is normal in position and signal with its tip at the L1 level. At T12-L1, is a tiny central and left paramedian disc protrusion with an underlying annular fissure. There is mild hypertrophic facet arthropathy. Thecal sac is toward the lower range of normal without karla central stenosis. The neural foramina are intact. At L1-2, there is negligible disc bulging. There is no central stenosis or significant foraminal stenosis. At L2-3, there is a small broad-based disc-osteophyte complex. There is no central stenosis as there is decompressive laminectomy at this level. There is no significant foraminal stenosis. At L3-4, there is posterior marginal spurring and disc bulging accentuated by the retrolisthesis. There is moderate bilateral lateral recess stenosis. Thecal sac is toward the lower range of normal even though there is a decompressive laminectomy at this  level. However, there is no karla central stenosis (T2 axial image 24, series 5). There is severe bilateral foraminal stenosis, right greater than left. This is unchanged from the previous exam. At L4-5, no central stenosis. There is inferior foraminal blunting with moderate bilateral foraminal stenosis. At L5-S1, there is minimal disc bulging. There is a left paramedian annular fissure. There is mild hypertrophic facet arthropathy. No central stenosis. There is moderate bilateral foraminal stenosis.     1.  L3-4 findings again suggesting discitis with osteomyelitis. No evidence of epidural abscess or epidural phlegmon. 2.  L3-4 retrolisthesis. 3.  Postoperative multilevel lumbar  laminectomy and posterior fusion at L4-5. 4.  Interval development of multilocular intramuscular abscesses in the right iliacus muscle, partly in the field of view 5.  Degenerative and spondylotic changes as detailed for each level above in the body of report.    Dx-esophagus - Barium Swallow    Result Date: 9/16/2017 9/16/2017 10:39 AM HISTORY/REASON FOR EXAM:  Nausea. Nausea and vomiting. TECHNIQUE/EXAM DESCRIPTION AND NUMBER OF VIEWS: Single contrast esophagram procedure was performed. 5 fluoroscopic images obtained. Fluoroscopy time: 0.37 minutes COMPARISON:  None. FINDINGS: Limited exam secondary to limited patient mobility. The esophagus appears normal in caliber and configuration. No definite mucosal lesions are identified on single-contrast techniques. Contrast passes freely into the stomach. No hiatal hernia is identified.     No abnormalities identified on limited single contrast esophagram.    Echocardiogram Comp W/o Cont    Result Date: 9/14/2017  Transthoracic Echo Report Echocardiography Laboratory CONCLUSIONS Prior study done on [id number] left ventricular systolic function. Normal regional wall motion. Left ventricular ejection fraction is visually estimated to be 70%. Diastolic function is difficult to assess with atrial fibrillation. The right ventricle was normal in size and function. Structurally normal mitral valve without significant stenosis or regurgitation. Aortic sclerosis without stenosis. No aortic insufficiency. Normal pericardium without effusion.FINDINGS Left Ventricle Normal left ventricular chamber size. Normal left ventricular wall thickness. Normal left ventricular systolic function. Normal regional wall motion. Left ventricular ejection fraction is visually estimated to be 70%. Diastolic function is difficult to assess with atrial fibrillation. Right Ventricle The right ventricle was normal in size and function. Right Atrium The right atrium is normal in size.  Inferior  vena cava is not well visualized. Left Atrium The left atrium is normal in size.  Left atrial volume index is 19  mL/sq m. Mitral Valve Structurally normal mitral valve without significant stenosis or regurgitation.  Trace mitral regurgitation. Aortic Valve Aortic sclerosis without stenosis. Tricuspid aortic valve. No aortic insufficiency. Tricuspid Valve Structurally normal tricuspid valve without significant stenosis or regurgitation. Pulmonic Valve Structurally normal pulmonic valve without significant stenosis or regurgitation. Pericardium Normal pericardium without effusion. Aorta The aortic root is normal.  Ascending aorta diameter is 3.1 cm. Kingsley Hung M.D. (Electronically Signed) Final Date:     14 September 2017                 11:10      Micro:  Results     Procedure Component Value Units Date/Time    URINE CULTURE(NEW) [[id number]] Collected:  10/07/17 0032    Order Status:  Completed Specimen:  Urine from Urine, Clean Catch Updated:  10/09/17 0829     Significant Indicator NEG     Source UR     Site URINE, CLEAN CATCH     Urine Culture No growth at 48 hours    Narrative:       Collected By:[id number] ARIA LI  Indication for culture:->Dysuria/Frequency/Burning    URINALYSIS [[id number]] Collected:  10/07/17 0032    Order Status:  Completed Specimen:  Urine from Urine, Clean Catch Updated:  10/07/17 0038     Color Yellow     Character Clear     Specific Gravity 1.011     Ph 5.5     Glucose Negative mg/dL      Ketones Negative mg/dL      Protein Negative mg/dL      Bilirubin Negative     Urobilinogen, Urine 0.2     Nitrite Negative     Leukocyte Esterase Negative     Occult Blood Negative     Micro Urine Req see below     Comment: Microscopic examination not performed when specimen is clear  and chemically negative for protein, blood, leukocyte esterase  and nitrite.         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

## 2017-10-12 NOTE — DISCHARGE PLANNING
Medical Social Work    Pt discussed during IDT rounds, Pt not medically cleared for SNF yet pt is pending MRI of the back.

## 2017-10-12 NOTE — CARE PLAN
Problem: Knowledge Deficit  Goal: Knowledge of disease process/condition, treatment plan, diagnostic tests, and medications will improve    Intervention: Assess knowledge level of disease process/condition, treatment plan, diagnostic tests, and medications  Plan of care and medication administration discussed with pt. Informed pt of upcoming MRI. Questions and concerns answered.       Problem: Skin Integrity  Goal: Risk for impaired skin integrity will decrease    Intervention: Assess risk factors for impaired skin integrity and/or pressure ulcers  Skin assessment completed. Pt turned and repositioned every 2 hours and upon pt request. Pt kept clean and dry after each incontinence episode.

## 2017-10-12 NOTE — PROGRESS NOTES
[facility]ist Progress Note    Date of Service: 10/11/2017    Chief Complaint  86 y.o. male admitted 2017 with acute ground-level fall and back pain found to have  acute on chronic osteomyelitis and discitis, on long term abx     Interval Problem Update    Tolerating clears, jello . Odynophagia, chest pain - improved. afeb on IV atbs.  Plan fu MRI lumber spine.      Consultants/Specialty  Infectious diseases Dr. Getachew Vincent  of cardiology - signed off  GI Dr. Waters    Disposition  Pt to go to [facility] if MRI Stable.         Review of Systems   Constitutional: Negative for chills and fever.   HENT: Negative for congestion.              Respiratory: Negative for cough, shortness of breath, wheezing and stridor.    Cardiovascular: Negative for chest pain (chest wall w intake) and leg swelling.   Gastrointestinal: Negative for abdominal pain, diarrhea, nausea and vomiting.        Decreased chest  pain w oral intake   Genitourinary: Negative for flank pain and hematuria.   Musculoskeletal: Negative for back pain and neck pain.   Neurological: Negative for speech change and focal weakness.      Physical Exam  Laboratory/Imaging   Hemodynamics  Temp (24hrs), Av.8 °C (96.5 °F), Min:35.7 °C (96.2 °F), Max:35.9 °C (96.6 °F)   Temperature: 35.8 °C (96.5 °F)  Pulse  Av.9  Min: 45  Max: 147   Blood Pressure : 100/44      Respiratory      Respiration: 20, Pulse Oximetry: 100 %     Work Of Breathing / Effort: Mild  RUL Breath Sounds: Clear, RML Breath Sounds: Diminished, RLL Breath Sounds: Diminished, MIKE Breath Sounds: Clear, LLL Breath Sounds: Diminished    Fluids    Intake/Output Summary (Last 24 hours) at 10/11/17 1845  Last data filed at 10/11/17 1800   Gross per 24 hour   Intake             2580 ml   Output             1575 ml   Net             1005 ml       Nutrition  Orders Placed This Encounter   Procedures   • DIET ORDER     Standing Status:   Standing     Number of Occurrences:   1      Order Specific Question:   Diet:     Answer:   Clear Liquids - No Red Foods [12]     Physical Exam   Constitutional: He is oriented to person, place, and time. He is cooperative. He does not appear ill. No distress. Nasal cannula in place.   HENT:   Head: Normocephalic and atraumatic.   Eyes: Conjunctivae and EOM are normal. Right eye exhibits no discharge. Left eye exhibits no discharge.   Neck: Normal range of motion.   Cardiovascular:   No murmur heard.  irreg reg   Pulmonary/Chest: Effort normal. No stridor. No respiratory distress. He has no wheezes. He has no rales.   Abdominal: Soft. Bowel sounds are normal. He exhibits no distension. There is no tenderness.   Obese.    Musculoskeletal: Normal range of motion. He exhibits no edema or tenderness.   Neurological: He is alert and oriented to person, place, and time.   Skin: Skin is warm and dry. He is not diaphoretic.   Psychiatric:   Calm, cooperative .   Vitals reviewed.      Recent Labs      10/09/17   0432  10/10/17   0352  10/11/17   0357   WBC  5.9  6.3  6.1   RBC  3.07*  3.17*  3.11*   HEMOGLOBIN  9.3*  10.1*  9.5*   HEMATOCRIT  29.8*  30.7*  30.0*   MCV  97.1  96.8  96.5   MCH  30.3  31.9  30.5   MCHC  31.2*  32.9*  31.7*   RDW  55.2*  54.2*  55.0*   PLATELETCT  256  219  233   MPV  9.5  9.9  10.3     Recent Labs      10/09/17   0432  10/10/17   0352   SODIUM  134*  134*   POTASSIUM  4.0  3.8   CHLORIDE  103  101   CO2  28  29   GLUCOSE  85  145*   BUN  9  7*   CREATININE  0.50  0.48*   CALCIUM  9.1  9.1                      Assessment/Plan     * Discitis of lumbar region- (present on admission)   Assessment & Plan    Improved back pain , afeb  Ooral zyvox plan thru 10/18  Plan repeat MRI lumbar spine to eval progress           Dysphagia   Assessment & Plan    Post EGD w recs by Gi for IV proton pump inhibitor and Carafate suspension with repeat endoscopy and dilation in 1-2 weeks   clears as yareli        Nontraumatic psoas hematoma- (present on admission)    Assessment & Plan    Ac stopped, hgb stable.  Monitor for acute bleed.        Persistent atrial fibrillation (CMS-HCC)- (present on admission)   Assessment & Plan    Persistent w aflutter .- rate controlled  Not  Ac candidate w hematoma.  lopressor , digoxin   Tele monitoring            Esophageal stricture   Assessment & Plan    Fu outpt EGD for eval dilation         Esophagitis   Assessment & Plan    Candidal   Oral ppi, carafate   Diflucan  Discussed with Dr. Mathias- recommends outpt fu endoscopy in 1-2 weeks.        DNR (do not resuscitate)- (present on admission)   Assessment & Plan    Discussed with patient- will update to DNR code status current admission .        Normocytic anemia- (present on admission)   Assessment & Plan    Stable.   Transfuse PRN if Hb 7 or symptoms.   Monitor for acute bleeding.         Debility- (present on admission)   Assessment & Plan    Continue PT /OT               Reviewed items::  Labs reviewed and Medications reviewed  Hearn catheter::  No Hearn  DVT prophylaxis - mechanical:  SCDs  Ulcer Prophylaxis::  Yes  Antibiotics:  Treating active infection/contamination beyond 24 hours perioperative coverage

## 2017-10-12 NOTE — PROGRESS NOTES
Bedside report received and pt assessed. Pt resting quietly in bed, easily aroused. Dinner set up for pt. No needs voiced at this time. Belongings and call light in reach. Safety maintained, will continue to monitor.

## 2017-10-13 LAB
ANION GAP SERPL CALC-SCNC: 5 MMOL/L (ref 0–11.9)
BASOPHILS # BLD AUTO: 0.6 % (ref 0–1.8)
BASOPHILS # BLD: 0.03 K/UL (ref 0–0.12)
BUN SERPL-MCNC: 5 MG/DL (ref 8–22)
CALCIUM SERPL-MCNC: 8.7 MG/DL (ref 8.5–10.5)
CHLORIDE SERPL-SCNC: 101 MMOL/L (ref 96–112)
CO2 SERPL-SCNC: 30 MMOL/L (ref 20–33)
CREAT SERPL-MCNC: 0.45 MG/DL (ref 0.5–1.4)
DIGOXIN SERPL-MCNC: 0.6 NG/ML (ref 0.8–2)
EKG IMPRESSION: NORMAL
EKG IMPRESSION: NORMAL
EOSINOPHIL # BLD AUTO: 0.25 K/UL (ref 0–0.51)
EOSINOPHIL NFR BLD: 4.6 % (ref 0–6.9)
ERYTHROCYTE [DISTWIDTH] IN BLOOD BY AUTOMATED COUNT: 50.1 FL (ref 35.9–50)
GFR SERPL CREATININE-BSD FRML MDRD: >60 ML/MIN/1.73 M 2
GLUCOSE SERPL-MCNC: 119 MG/DL (ref 65–99)
HCT VFR BLD AUTO: 28 % (ref 42–52)
HGB BLD-MCNC: 9 G/DL (ref 14–18)
IMM GRANULOCYTES # BLD AUTO: 0.01 K/UL (ref 0–0.11)
IMM GRANULOCYTES NFR BLD AUTO: 0.2 % (ref 0–0.9)
LYMPHOCYTES # BLD AUTO: 1.4 K/UL (ref 1–4.8)
LYMPHOCYTES NFR BLD: 25.8 % (ref 22–41)
MAGNESIUM SERPL-MCNC: 1.5 MG/DL (ref 1.5–2.5)
MCH RBC QN AUTO: 30.4 PG (ref 27–33)
MCHC RBC AUTO-ENTMCNC: 32.1 G/DL (ref 33.7–35.3)
MCV RBC AUTO: 94.6 FL (ref 81.4–97.8)
MONOCYTES # BLD AUTO: 0.7 K/UL (ref 0–0.85)
MONOCYTES NFR BLD AUTO: 12.9 % (ref 0–13.4)
NEUTROPHILS # BLD AUTO: 3.04 K/UL (ref 1.82–7.42)
NEUTROPHILS NFR BLD: 55.9 % (ref 44–72)
NRBC # BLD AUTO: 0 K/UL
NRBC BLD AUTO-RTO: 0 /100 WBC
PLATELET # BLD AUTO: 173 K/UL (ref 164–446)
PMV BLD AUTO: 10 FL (ref 9–12.9)
POTASSIUM SERPL-SCNC: 2.7 MMOL/L (ref 3.6–5.5)
RBC # BLD AUTO: 2.96 M/UL (ref 4.7–6.1)
SODIUM SERPL-SCNC: 136 MMOL/L (ref 135–145)
TROPONIN I SERPL-MCNC: <0.01 NG/ML (ref 0–0.04)
WBC # BLD AUTO: 5.4 K/UL (ref 4.8–10.8)

## 2017-10-13 PROCEDURE — A9270 NON-COVERED ITEM OR SERVICE: HCPCS | Performed by: HOSPITALIST

## 2017-10-13 PROCEDURE — 99232 SBSQ HOSP IP/OBS MODERATE 35: CPT | Performed by: HOSPITALIST

## 2017-10-13 PROCEDURE — 84132 ASSAY OF SERUM POTASSIUM: CPT

## 2017-10-13 PROCEDURE — 700105 HCHG RX REV CODE 258: Performed by: HOSPITALIST

## 2017-10-13 PROCEDURE — 700102 HCHG RX REV CODE 250 W/ 637 OVERRIDE(OP): Performed by: NURSE PRACTITIONER

## 2017-10-13 PROCEDURE — 80048 BASIC METABOLIC PNL TOTAL CA: CPT

## 2017-10-13 PROCEDURE — 85025 COMPLETE CBC W/AUTO DIFF WBC: CPT

## 2017-10-13 PROCEDURE — 700102 HCHG RX REV CODE 250 W/ 637 OVERRIDE(OP): Performed by: INTERNAL MEDICINE

## 2017-10-13 PROCEDURE — 700102 HCHG RX REV CODE 250 W/ 637 OVERRIDE(OP): Performed by: HOSPITALIST

## 2017-10-13 PROCEDURE — A9270 NON-COVERED ITEM OR SERVICE: HCPCS | Performed by: INTERNAL MEDICINE

## 2017-10-13 PROCEDURE — A9270 NON-COVERED ITEM OR SERVICE: HCPCS

## 2017-10-13 PROCEDURE — 84484 ASSAY OF TROPONIN QUANT: CPT

## 2017-10-13 PROCEDURE — 93010 ELECTROCARDIOGRAM REPORT: CPT | Performed by: INTERNAL MEDICINE

## 2017-10-13 PROCEDURE — 770020 HCHG ROOM/CARE - TELE (206)

## 2017-10-13 PROCEDURE — 700102 HCHG RX REV CODE 250 W/ 637 OVERRIDE(OP)

## 2017-10-13 PROCEDURE — 83735 ASSAY OF MAGNESIUM: CPT

## 2017-10-13 PROCEDURE — 80162 ASSAY OF DIGOXIN TOTAL: CPT

## 2017-10-13 PROCEDURE — 93005 ELECTROCARDIOGRAM TRACING: CPT | Performed by: HOSPITALIST

## 2017-10-13 PROCEDURE — 700111 HCHG RX REV CODE 636 W/ 250 OVERRIDE (IP): Performed by: HOSPITALIST

## 2017-10-13 PROCEDURE — A9270 NON-COVERED ITEM OR SERVICE: HCPCS | Performed by: NURSE PRACTITIONER

## 2017-10-13 RX ORDER — AMOXICILLIN 250 MG
2 CAPSULE ORAL 2 TIMES DAILY
Status: DISCONTINUED | OUTPATIENT
Start: 2017-10-13 | End: 2017-10-23 | Stop reason: HOSPADM

## 2017-10-13 RX ORDER — BISACODYL 10 MG
10 SUPPOSITORY, RECTAL RECTAL
Status: DISCONTINUED | OUTPATIENT
Start: 2017-10-13 | End: 2017-10-23 | Stop reason: HOSPADM

## 2017-10-13 RX ORDER — POTASSIUM CHLORIDE 7.45 MG/ML
10 INJECTION INTRAVENOUS
Status: COMPLETED | OUTPATIENT
Start: 2017-10-13 | End: 2017-10-13

## 2017-10-13 RX ORDER — SULFAMETHOXAZOLE AND TRIMETHOPRIM 800; 160 MG/1; MG/1
2 TABLET ORAL EVERY 12 HOURS
Status: DISCONTINUED | OUTPATIENT
Start: 2017-10-19 | End: 2017-10-18

## 2017-10-13 RX ORDER — AMOXICILLIN AND CLAVULANATE POTASSIUM 875; 125 MG/1; MG/1
1 TABLET, FILM COATED ORAL EVERY 12 HOURS
Status: DISCONTINUED | OUTPATIENT
Start: 2017-10-19 | End: 2017-10-18

## 2017-10-13 RX ORDER — POLYETHYLENE GLYCOL 3350 17 G/17G
1 POWDER, FOR SOLUTION ORAL
Status: DISCONTINUED | OUTPATIENT
Start: 2017-10-13 | End: 2017-10-23 | Stop reason: HOSPADM

## 2017-10-13 RX ADMIN — LEVETIRACETAM 500 MG: 100 SOLUTION ORAL at 20:20

## 2017-10-13 RX ADMIN — ROSUVASTATIN CALCIUM 125 MCG: 10 TABLET, FILM COATED ORAL at 17:36

## 2017-10-13 RX ADMIN — SUCRALFATE 1 G: 1 SUSPENSION ORAL at 20:20

## 2017-10-13 RX ADMIN — TAMSULOSIN HYDROCHLORIDE 0.4 MG: 0.4 CAPSULE ORAL at 09:59

## 2017-10-13 RX ADMIN — OMEPRAZOLE 40 MG: 20 CAPSULE, DELAYED RELEASE ORAL at 10:02

## 2017-10-13 RX ADMIN — MAGNESIUM SULFATE HEPTAHYDRATE 3 G: 500 INJECTION, SOLUTION INTRAMUSCULAR; INTRAVENOUS at 18:52

## 2017-10-13 RX ADMIN — OMEPRAZOLE 40 MG: 20 CAPSULE, DELAYED RELEASE ORAL at 20:20

## 2017-10-13 RX ADMIN — POTASSIUM CHLORIDE 10 MEQ: 10 INJECTION, SOLUTION INTRAVENOUS at 20:15

## 2017-10-13 RX ADMIN — LIDOCAINE HYDROCHLORIDE 15 ML: 20 SOLUTION OROPHARYNGEAL at 13:03

## 2017-10-13 RX ADMIN — SUCRALFATE 1 G: 1 SUSPENSION ORAL at 17:37

## 2017-10-13 RX ADMIN — SODIUM CHLORIDE: 9 INJECTION, SOLUTION INTRAVENOUS at 17:37

## 2017-10-13 RX ADMIN — POTASSIUM CHLORIDE 10 MEQ: 10 INJECTION, SOLUTION INTRAVENOUS at 18:52

## 2017-10-13 RX ADMIN — STANDARDIZED SENNA CONCENTRATE AND DOCUSATE SODIUM 2 TABLET: 8.6; 5 TABLET, FILM COATED ORAL at 09:57

## 2017-10-13 RX ADMIN — POTASSIUM CHLORIDE 10 MEQ: 10 INJECTION, SOLUTION INTRAVENOUS at 21:32

## 2017-10-13 RX ADMIN — POTASSIUM CHLORIDE 10 MEQ: 10 INJECTION, SOLUTION INTRAVENOUS at 17:36

## 2017-10-13 RX ADMIN — LINEZOLID 600 MG: 600 TABLET, FILM COATED ORAL at 10:02

## 2017-10-13 RX ADMIN — SUCRALFATE 1 G: 1 SUSPENSION ORAL at 09:57

## 2017-10-13 RX ADMIN — GABAPENTIN 200 MG: 100 CAPSULE ORAL at 09:59

## 2017-10-13 RX ADMIN — ATORVASTATIN CALCIUM 10 MG: 10 TABLET, FILM COATED ORAL at 20:23

## 2017-10-13 RX ADMIN — SUCRALFATE 1 G: 1 SUSPENSION ORAL at 06:00

## 2017-10-13 RX ADMIN — ACETAMINOPHEN 650 MG: 325 TABLET, FILM COATED ORAL at 10:48

## 2017-10-13 RX ADMIN — LEVETIRACETAM 500 MG: 100 SOLUTION ORAL at 10:02

## 2017-10-13 RX ADMIN — LINEZOLID 600 MG: 600 TABLET, FILM COATED ORAL at 20:23

## 2017-10-13 RX ADMIN — GABAPENTIN 200 MG: 100 CAPSULE ORAL at 17:37

## 2017-10-13 RX ADMIN — SODIUM CHLORIDE: 9 INJECTION, SOLUTION INTRAVENOUS at 02:12

## 2017-10-13 RX ADMIN — GABAPENTIN 200 MG: 100 CAPSULE ORAL at 20:23

## 2017-10-13 ASSESSMENT — ENCOUNTER SYMPTOMS
COUGH: 0
NAUSEA: 0
ABDOMINAL PAIN: 0
BACK PAIN: 0
NECK PAIN: 0
WHEEZING: 0
WEAKNESS: 1
HEADACHES: 0
DIARRHEA: 0
HALLUCINATIONS: 0
VOMITING: 0
SHORTNESS OF BREATH: 0
SPEECH CHANGE: 0
FLANK PAIN: 0
FOCAL WEAKNESS: 0
CHILLS: 0
FEVER: 0

## 2017-10-13 ASSESSMENT — PAIN SCALES - GENERAL
PAINLEVEL_OUTOF10: 3
PAINLEVEL_OUTOF10: 0
PAINLEVEL_OUTOF10: 4
PAINLEVEL_OUTOF10: 0

## 2017-10-13 ASSESSMENT — LIFESTYLE VARIABLES: SUBSTANCE_ABUSE: 0

## 2017-10-13 NOTE — PROGRESS NOTES
Infectious Disease Progress Note    Author: Oliva Bolanos M.D. Date & Time of service: 10/13/2017  9:25 AM    Chief Complaint:  Altered mental status    Interval History:  86-year-old male admitted for altered mental status. He was recently treated for L3-4 discitis. His repeat MRI showed right iliacus muscle abscess  9/21/17-MAXIMUM TEMPERATURE 98. On 2 liters of oxygen. WBC 14 and creatinine 0.38  9/22- MAXIMUM TEMPERATURE 97.8. Complains of generalized malaise. Breathing is improved. Right leg pain is improved  9/23/17-MAXIMUM TEMPERATURE 99.5. Still has back pain when he sits up. The WBC 12  9/24/17-MAXIMUM TEMPERATURE 97.6. Feels better. No new issues overnight. WBC11.7  9/25 AF resting comfortably  9/26 AF, WBC 11.3 more awake eating OK.  Back pain about the same  9/27 AF WBC 11 had PICC placed-denies SE abx. No new complaints  9/28 AF tolerating abx well feels about the same  9/29 AF, no CBC, pt has no new complaints this morning, asking when he will be discharged and how long he will be on abx,  9/30 AF no CBC, eating lots of chocolate pudding/ice cream, no new issues per pt  10/2 AF, feels well and denies any pain, feels he is getting stronger and appetite improved  10/3 AF, WBC 9.1, felt dizzy while watching TV yesterday with head pressure but no HA, no nausea or vomiting, no recurrent symptoms today but states he was dizzy at home prior to admit, back pain only with getting out of bed  10/4 AF, no CBC, no dizziness, now having painful swallowing that started last night, did not eat breakfast  10/5 AF, no CBC, complaining of painful swallowing, could not swallow pills this am, waiting of FEES  10/6 AF WBC 9.1 tired and feels like he has pressure on his eyes  10/7 AF WBC 9.8 episode CP and Afib with RVR-eyes sxs resolved. No current CP or SOB  10/8 AF WBC 7.1 thinks having GERD-sxs improved with Maalox  10/9/17-denies any back pain at rest. No fevers. He says his main issue is nausea and issues  swallowing   10/10/17-no fevers. No back pain. Still significantly deconditioned.      10/11/17-no fevers. Says the back does not hurt. Still has some difficulty swallowing.  10/12/17-no fevers. No new issues overnight. MRI is still pending     10/13/17 10/13/17-no fevers. No new issues overnight     Labs Reviewed, Medications Reviewed and Radiology Reviewed.    Review of Systems:  Review of Systems   Constitutional: Positive for malaise/fatigue. Negative for chills and fever.   HENT:        Painful swallowing   Respiratory: Negative for cough and shortness of breath.    Cardiovascular: Negative for chest pain and leg swelling.   Gastrointestinal: Negative for abdominal pain, nausea and vomiting.   Musculoskeletal: Negative for back pain.        Improving. Only occurs when he sits up   Neurological: Negative for headaches.       Hemodynamics:  Temp (24hrs), Av.4 °C (97.6 °F), Min:36.3 °C (97.3 °F), Max:36.6 °C (97.8 °F)  Temperature: 36.5 °C (97.7 °F)  Pulse  Av.5  Min: 45  Max: 147  Blood Pressure : 104/55       Physical Exam:  Physical Exam   Constitutional: He appears well-developed. He is easily aroused. No distress.   Obese   HENT:   Head: Normocephalic and atraumatic.   Poor dentition   Eyes: Conjunctivae and EOM are normal. Pupils are equal, round, and reactive to light. No scleral icterus.   Neck: Neck supple.   Cardiovascular: Normal rate.  An irregularly irregular rhythm present.   IRR   Pulmonary/Chest: Effort normal. No respiratory distress. He has no wheezes. He has no rales.   Abdominal: Soft. He exhibits no distension. There is no tenderness.   Musculoskeletal: He exhibits edema.   LUE PICC   Neurological: He is alert and easily aroused.   Nursing note and vitals reviewed.      Meds:    Current Facility-Administered Medications:   •  omeprazole 2 mg/mL in sodium bicarbonate  •  levetiracetam  •  linezolid  •  lorazepam  •  sucralfate  •  calcium carbonate  •  PICC Line Insertion has been  implemented **AND** May use Lidocaine 1% not to exceed 3 mls for local at insertion site **AND** NOTIFY MD **AND** Tip to dwell in the superior vena cava **AND** Do not use PICC Line until placement verified by Chest X Ray **AND** DX-CHEST-FOR PICC LINE Perform procedure in: PICC Room **AND** If radiologist reading of chest X-ray states any of the following the PICC should be used **AND** Further evaluation of the PICC placement can be retrieved from X-Ray and Imaging **AND** Blood draws through PICC line; draws by RN only **AND** FLUSHING GUIDELINES WHEN IN USE **AND** normal saline PF **AND** FLUSHING GUIDELINES WHEN NOT IN USE **AND** DRESSING MAINTENANCE **AND** Change needleless pressure ports and IV tubing every 72 hours per hospital policy **AND** TUBING **AND** If there is an MD order to remove the PICC line, any RN may remove the PICC line **AND** [] PATIENT EDUCATION MATERIALS **AND** NURSING COMMUNICATION  •  Metoprolol Tartrate  •  acetaminophen  •  digoxin  •  prochlorperazine  •  promethazine  •  hydrALAZINE  •  NS  •  ondansetron  •  metoprolol SR  •  atorvastatin  •  gabapentin  •  tamsulosin  •  senna-docusate **AND** polyethylene glycol/lytes **AND** magnesium hydroxide **AND** bisacodyl  •  Respiratory Care per Protocol    Labs:  Recent Labs      10/11/17   0357   WBC  6.1   RBC  3.11*   HEMOGLOBIN  9.5*   HEMATOCRIT  30.0*   MCV  96.5   MCH  30.5   RDW  55.0*   PLATELETCT  233   MPV  10.3   NEUTSPOLYS  58.30   LYMPHOCYTES  20.60*   MONOCYTES  12.50   EOSINOPHILS  7.60*   BASOPHILS  0.50     No results for input(s): SODIUM, POTASSIUM, CHLORIDE, CO2, GLUCOSE, BUN, CPKTOTAL in the last 72 hours.  No results for input(s): ALBUMIN, TBILIRUBIN, ALKPHOSPHAT, TOTPROTEIN, ALTSGPT, ASTSGOT, CREATININE in the last 72 hours.    Imaging:  Ct-abdomen-pelvis With    Result Date: 2017 6:34 PM HISTORY/REASON FOR EXAM:  Abscess noted on MRI. TECHNIQUE/EXAM DESCRIPTION:  CT scan of the  abdomen and pelvis with contrast. Contrast-enhanced helical scanning was obtained from the diaphragmatic domes through the pubic symphysis following the bolus administration of nonionic contrast without complication. 100 mL of Omnipaque 350 nonionic contrast was administered without complication. Low dose optimization technique was utilized for this CT exam including automated exposure control and adjustment of the mA and/or kV according to patient size. COMPARISON: MRI from the same day FINDINGS: Lung bases: There are small bilateral pleural effusions with overlying atelectasis. Abdomen: No free air is seen in the abdomen or pelvis. Evaluation is limited by streak artifact from barium within the colon. There is wall thickening of the distal esophagus with a small hiatal hernia. Liver appears unremarkable. Portal vein is patent. There is calcification in the pancreatic head. No adrenal mass is identified. Tiny hypodense left renal lesion is too small to characterize. There is mild prominence of the renal collecting systems bilaterally. Small hypodense right renal lesions too small to characterize. There are nonobstructing right renal calculi. Atherosclerotic plaque is seen in the aorta and its branches. There are small inguinal, retroperitoneal and mesenteric lymph nodes. There is no evidence of bowel obstruction. There is a moderate amount of stool in the rectosigmoid colon. There is colonic diverticulosis. The appendix is not identified. Stomach is distended with fluid. Small bowel loops are fluid-filled. Bladder is mildly distended with foci of air. There is asymmetric prominence of the right iliopsoas musculature with surrounding stranding. Findings likely related to the previously noted abscess collection. Degenerative changes are seen in the spine. Postsurgical changes are noted in the lumbar spine. Degenerative changes are seen at the hips. There is mild loss of height of the superior endplate of T11 with a  Schmorl's node noted. There is mild endplate irregularity at L3/L4.     Prominence of the right iliopsoas muscle with surrounding inflammatory stranding. The known fluid collection was better delineated on the prior MRI. Mild endplate irregularity at L3/L4 can be seen in discitis/osteomyelitis. Air-fluid level in the bladder. Correlation with urinalysis is recommended. This can be seen in the setting of recent instrumentation, infection or fistula. Colonic diverticulosis. Moderate amount of colonic stool. Nonobstructing right renal calculi. Mild prominence of the renal collecting systems bilaterally. Small hypodense renal lesions are too small to characterize. Fluid-filled loops of small bowel can be seen in the setting of enteritis. Small hiatal hernia with mild thickening of the distal esophagus. Calcifications in the pancreatic head can be seen in chronic pancreatitis. Atherosclerotic plaque. Small bilateral pleural effusions with overlying atelectasis.     Ct-drain-retroperitoneal    Result Date: 9/20/2017 9/20/2017 2:30 PM HISTORY/REASON FOR EXAM:  Multiloculated fluid collections in the right iliacus muscle. Suspected abscess. TECHNIQUE/EXAM DESCRIPTION: Right pelvic (extraperitoneal) retroperitoneal abscess drainage with CT guidance. Low dose optimization technique was utilized for this CT exam including automated exposure control and adjustment of the mA and/or kV according to patient size. PROCEDURE: Informed consent was obtained. Procedures performed with local anesthesia only with appropriate continuous patient monitoring by the radiology nurse. Sedation duration: N/A minutes Localizing CT images were obtained with the patient in supine position. The skin was prepped with Betadine and draped in a sterile fashion. Following local anesthesia with 1% Lidocaine, a 17 G guiding needle was placed and extraperitoneal needle path in the right side of the pelvis via the iliacus muscle confirmed with CT. An  Amplatz guidewire was placed and following serial tract dilatation, a 8 Arabic pigtail locking catheter was placed. A specimen was collected and submitted for culture and sensitivity and Gram stain. Total of 5 mL old bloody reddish-brown fluid was drained. No purulent fluid was obtained. The fluid was not malodorous. The catheter was secured to the skin and connected to suction bulb drainage. Final CT images were obtained documenting catheter position. The patient tolerated the procedure well with no evidence of complication. Low dose optimization technique was utilized for this CT exam including automated exposure control and adjustment of the mA and/or kV according to patient size. COMPARISON: MRI lumbar spine 9/19/2017, CT abdomen and pelvis 9/19/2017. FINDINGS: The final CT images show satisfactory catheter position within the target collection.     1.  CT GUIDED RETROPERITONEAL (EXTRAPERITONEAL) RIGHT PELVIC CATHETER DRAINAGE WITHIN THE RIGHT ILIACUS MUSCLE. OLD DARK REDDISH-BROWN BLOODY FLUID WAS OBTAINED. NO ABSCESS OR PURULENT MATERIAL. 2.  THE CURRENT PLAN IS TO CHECK CULTURES AND LIKELY REMOVED CATHETER IN THE SHORT-TERM AT 48-72 HOURS AS THERE IS UNLIKELY TO BE AN ABSCESS.    Ct-head W/o    Result Date: 9/13/2017 9/13/2017 9:30 PM HISTORY/REASON FOR EXAM:  Altered Mental Status. TECHNIQUE/EXAM DESCRIPTION AND NUMBER OF VIEWS: CT of the head without contrast. The study was performed on a helical multidetector CT scanner. Contiguous 2.5 mm axial sections were obtained from the skull base through the vertex. Up to date radiation dose reduction adjustments have been utilized to meet ALARA standards for radiation dose reduction. COMPARISON:  7/12/2017 FINDINGS: The lateral ventricles are enlarged. Cortical sulci are enlarged. Patchy areas of low attenuation in the white matter bilaterally. No significant mass effect or midline shift. Basal cisterns are patent. No evidence for intracranial hemorrhage. Calvaria  are intact. Visualized orbits are unremarkable. Visualized mastoid air cells are clear. Visualized paranasal sinuses are unremarkable. Calcifications of the carotid arteries noted. Calcified scalp nodules again present.     1.  Diffuse atrophy and white matter changes. 2.  No acute intracranial hemorrhage or territorial infarct.     Dx-chest-portable (1 View)    Result Date: 9/16/2017 9/16/2017 10:31 PM HISTORY/REASON FOR EXAM:  Shortness of Breath. TECHNIQUE/EXAM DESCRIPTION AND NUMBER OF VIEWS: Single portable view of the chest. COMPARISON: 9/13/2017 FINDINGS: Cardiac mediastinal contour is unchanged. Mediastinum is again prominent. Mild prominence of the pulmonary interstitium. No gross pleural fluid or pneumothorax. Dextroconvex curvature of thoracic spine.     No significant change from prior exam.    Dx-chest-portable (1 View)    Result Date: 9/13/2017 9/13/2017 8:44 PM HISTORY/REASON FOR EXAM:  Possible sepsis. TECHNIQUE/EXAM DESCRIPTION AND NUMBER OF VIEWS: Single portable view of the chest. COMPARISON: 8/7/2017 FINDINGS: Cardiac mediastinal contour is unchanged. Mild diffuse prominence of the interstitium again seen. No focal consolidation. No pleural fluid collection or pneumothorax. Dextroconvex curvature of thoracic spine. Diffuse osteopenia.     1.  Diffuse prominence of interstitium again seen, likely interstitial lung disease.  Mild pulmonary edema is also a consideration. 2.  No pneumonia or pneumothorax. 3.  Stable cardiomegaly.    Dx-small Bowel Series    Result Date: 9/17/2017 9/16/2017 10:39 AM HISTORY/REASON FOR EXAM:  Nausea and vomiting. TECHNIQUE/EXAM DESCRIPTION AND NUMBER OF VIEWS: Single contrast barium small bowel follow-through performed. Fluoroscopic images were obtained. No fluoroscopic images obtained. COMPARISON:  None available. FINDINGS:  view the abdomen demonstrates marked gaseous distention and small bowel and colonic distention. Patient drank thin barium. Contrast  passed very slowly into dilated small bowel loops. Jejunal loops are dilated up to 5.2 cm. Overhead images were taken for a total of 22 hours before termination of the exam. Contrast reached the ileum, but does not extend into the colon. There is a large amount of stool in the colon.     1.  Gaseous distention with fairly diffuse bowel dilatation. Contrast extends into the ileum, but does not reach the colon within 22 hours. No transition point is identified to suggest mechanical bowel obstruction. 2.  Large amount of stool in the rectal vault.    Mr-lumbar Spine-with & W/o    Result Date: 9/19/2017 9/19/2017 1:35 PM HISTORY/REASON FOR EXAM:  Altered mental status, weakness. Possible discitis. Previous lumbar surgery. TECHNIQUE/EXAM DESCRIPTION: MRI of the lumbar spine without and with contrast. The study was performed on a [employer] 1.5 Lucille MRI scanner. T1 sagittal, T2 fast spin-echo sagittal, T2 axial images and T1 axial images were obtained of the lumbar spine. T1 post-contrast sagittal and T1 post-contrast axial images were obtained. Optional T2 fat-suppressed sagittal images may be obtained. 20 mL Omniscan gadolinium contrast was administered intravenously. COMPARISON:  MRI lumbar spine 7/7/2017, T abdomen and pelvis 1/6/2017 FINDINGS: Alignment in the lumbar spine again shows retrolisthesis of L3 on L4 of about 5 mm. There is fluid signal T2 hyperintensity again seen in the L3-L4 disc space and prominent marrow edema signal at the L3 inferior endplate and to a lesser extent L4 superior endplate. There is corresponding enhancement, particularly at the inferior endplate of L3. Findings are highly suspicious for discitis with osteomyelitis. There is postoperative change with lumbar laminectomy at L2-L3 through L5. There is posterior fusion with transpedicular screw fixation 4-L5. The posterior paraspinous soft tissues show expected postoperative changes with atrophy and fatty replacement in  the posterior  paraspinous muscles at the operative levels. There is no significant postoperative dorsal epidural fluid collection. However, there has been interval development of multilocular fluid collections in the right iliacus muscle spanning about 42 mm. These are consistent with intramuscular abscesses (T2 axial image 4, series 5, T1 postcontrast axial image 1, series 10). The conus is normal in position and signal with its tip at the L1 level. At T12-L1, is a tiny central and left paramedian disc protrusion with an underlying annular fissure. There is mild hypertrophic facet arthropathy. Thecal sac is toward the lower range of normal without karla central stenosis. The neural foramina are intact. At L1-2, there is negligible disc bulging. There is no central stenosis or significant foraminal stenosis. At L2-3, there is a small broad-based disc-osteophyte complex. There is no central stenosis as there is decompressive laminectomy at this level. There is no significant foraminal stenosis. At L3-4, there is posterior marginal spurring and disc bulging accentuated by the retrolisthesis. There is moderate bilateral lateral recess stenosis. Thecal sac is toward the lower range of normal even though there is a decompressive laminectomy at this  level. However, there is no karla central stenosis (T2 axial image 24, series 5). There is severe bilateral foraminal stenosis, right greater than left. This is unchanged from the previous exam. At L4-5, no central stenosis. There is inferior foraminal blunting with moderate bilateral foraminal stenosis. At L5-S1, there is minimal disc bulging. There is a left paramedian annular fissure. There is mild hypertrophic facet arthropathy. No central stenosis. There is moderate bilateral foraminal stenosis.     1.  L3-4 findings again suggesting discitis with osteomyelitis. No evidence of epidural abscess or epidural phlegmon. 2.  L3-4 retrolisthesis. 3.  Postoperative multilevel lumbar  laminectomy and posterior fusion at L4-5. 4.  Interval development of multilocular intramuscular abscesses in the right iliacus muscle, partly in the field of view 5.  Degenerative and spondylotic changes as detailed for each level above in the body of report.    Dx-esophagus - Barium Swallow    Result Date: 9/16/2017 9/16/2017 10:39 AM HISTORY/REASON FOR EXAM:  Nausea. Nausea and vomiting. TECHNIQUE/EXAM DESCRIPTION AND NUMBER OF VIEWS: Single contrast esophagram procedure was performed. 5 fluoroscopic images obtained. Fluoroscopy time: 0.37 minutes COMPARISON:  None. FINDINGS: Limited exam secondary to limited patient mobility. The esophagus appears normal in caliber and configuration. No definite mucosal lesions are identified on single-contrast techniques. Contrast passes freely into the stomach. No hiatal hernia is identified.     No abnormalities identified on limited single contrast esophagram.    Echocardiogram Comp W/o Cont    Result Date: 9/14/2017  Transthoracic Echo Report Echocardiography Laboratory CONCLUSIONS Prior study done on [id number] left ventricular systolic function. Normal regional wall motion. Left ventricular ejection fraction is visually estimated to be 70%. Diastolic function is difficult to assess with atrial fibrillation. The right ventricle was normal in size and function. Structurally normal mitral valve without significant stenosis or regurgitation. Aortic sclerosis without stenosis. No aortic insufficiency. Normal pericardium without effusion.FINDINGS Left Ventricle Normal left ventricular chamber size. Normal left ventricular wall thickness. Normal left ventricular systolic function. Normal regional wall motion. Left ventricular ejection fraction is visually estimated to be 70%. Diastolic function is difficult to assess with atrial fibrillation. Right Ventricle The right ventricle was normal in size and function. Right Atrium The right atrium is normal in size.  Inferior  vena cava is not well visualized. Left Atrium The left atrium is normal in size.  Left atrial volume index is 19  mL/sq m. Mitral Valve Structurally normal mitral valve without significant stenosis or regurgitation.  Trace mitral regurgitation. Aortic Valve Aortic sclerosis without stenosis. Tricuspid aortic valve. No aortic insufficiency. Tricuspid Valve Structurally normal tricuspid valve without significant stenosis or regurgitation. Pulmonic Valve Structurally normal pulmonic valve without significant stenosis or regurgitation. Pericardium Normal pericardium without effusion. Aorta The aortic root is normal.  Ascending aorta diameter is 3.1 cm. Kingsley Hung M.D. (Electronically Signed) Final Date:     14 September 2017                 11:10      Micro:  Results     Procedure Component Value Units Date/Time    URINE CULTURE(NEW) [[id number]] Collected:  10/07/17 0032    Order Status:  Completed Specimen:  Urine from Urine, Clean Catch Updated:  10/09/17 0829     Significant Indicator NEG     Source UR     Site URINE, CLEAN CATCH     Urine Culture No growth at 48 hours    Narrative:       Collected By:[id number] ARIA LI  Indication for culture:->Dysuria/Frequency/Burning    URINALYSIS [[id number]] Collected:  10/07/17 0032    Order Status:  Completed Specimen:  Urine from Urine, Clean Catch Updated:  10/07/17 0038     Color Yellow     Character Clear     Specific Gravity 1.011     Ph 5.5     Glucose Negative mg/dL      Ketones Negative mg/dL      Protein Negative mg/dL      Bilirubin Negative     Urobilinogen, Urine 0.2     Nitrite Negative     Leukocyte Esterase Negative     Occult Blood Negative     Micro Urine Req see below     Comment: Microscopic examination not performed when specimen is clear  and chemically negative for protein, blood, leukocyte esterase  and nitrite.         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]       Plan:  L3-4 discitis osteomyelitis  Afebrile  Resolved leukocytosis  Status post treatment with Rocephin (prior strep viridans)  Persistent changes on repeat MRI of 9/19/17  Change ceftarolin to Zyvox  Stop date 10/18/2017  Repeat MRI done on 10/12/17 shows improvement in the discitis osteomyelitis  I would recommend continue to Zyvox through 10/18/17  Follow up with another 2-4 weeks of Bactrim and Augmentin    Right iliacus abscess versus hematoma  Status post drainage on 9/20/17  Possibly hematoma  Cultures are negative     Odynophagia  Has hiatal hernia  EGD shows severe erosive gastritis and esophagitis  Discontinued Diflucan    Generalized debility  Needs rehabilitation

## 2017-10-13 NOTE — PROGRESS NOTES
[facility] Hospitalist Progress Note    Date of Service: 10/13/2017    Chief Complaint  86 y.o. male admitted 2017 with acute ground-level fall and back pain found to have  acute on chronic osteomyelitis and discitis, on long term abx     Interval Problem Update    Episodes of aflutter  150s w bradycardia.  MRI spine improved Lumbar OM  / diskitis.  Increased chest pain w oral intake.     Consultants/Specialty  Infectious diseases Dr. Getachew Vincent  of cardiology - signed off  GI Dr. Waters    Disposition  Pt to go to [facility] if MRI Stable.         Review of Systems   Constitutional: Negative for chills and fever.   HENT: Negative for congestion.              Respiratory: Negative for cough, shortness of breath and wheezing.    Cardiovascular: Positive for chest pain (w intake.). Negative for leg swelling.   Gastrointestinal: Negative for abdominal pain, diarrhea and vomiting.   Genitourinary: Negative for flank pain and hematuria.   Musculoskeletal: Negative for back pain and neck pain.   Neurological: Positive for weakness. Negative for speech change, focal weakness and headaches.   Psychiatric/Behavioral: Negative for hallucinations and substance abuse.      Physical Exam  Laboratory/Imaging   Hemodynamics  Temp (24hrs), Av.4 °C (97.6 °F), Min:36.3 °C (97.3 °F), Max:36.6 °C (97.8 °F)   Temperature: 36.5 °C (97.7 °F)  Pulse  Av.5  Min: 45  Max: 147   Blood Pressure : 104/55      Respiratory      Respiration: 16, Pulse Oximetry: 91 %     Work Of Breathing / Effort: Mild  RUL Breath Sounds: Clear, RML Breath Sounds: Diminished, RLL Breath Sounds: Diminished, MIKE Breath Sounds: Clear, LLL Breath Sounds: Diminished    Fluids    Intake/Output Summary (Last 24 hours) at 10/13/17 1242  Last data filed at 10/13/17 1100   Gross per 24 hour   Intake             1065 ml   Output              625 ml   Net              440 ml       Nutrition  Orders Placed This Encounter   Procedures   • DIET ORDER      Standing Status:   Standing     Number of Occurrences:   1     Order Specific Question:   Diet:     Answer:   Clear Liquids - No Red Foods [12]     Physical Exam   Constitutional: He is oriented to person, place, and time. He is cooperative. He does not appear ill. No distress.   HENT:   Head: Normocephalic and atraumatic.   Eyes: Conjunctivae and EOM are normal. Right eye exhibits no discharge. Left eye exhibits no discharge.   Neck: Normal range of motion.   Cardiovascular:   No murmur heard.  irreg reg   Pulmonary/Chest: Effort normal and breath sounds normal. No stridor. No respiratory distress. He has no wheezes. He has no rales.   Abdominal: Soft. Bowel sounds are normal. He exhibits no distension. There is no tenderness.   Obese.    Musculoskeletal: Normal range of motion. He exhibits no edema or tenderness.   Neurological: He is alert and oriented to person, place, and time. No cranial nerve deficit.   Skin: Skin is warm and dry. No rash noted. He is not diaphoretic.   Psychiatric:   Calm, cooperative .   Vitals reviewed.      Recent Labs      10/11/17   0357   WBC  6.1   RBC  3.11*   HEMOGLOBIN  9.5*   HEMATOCRIT  30.0*   MCV  96.5   MCH  30.5   MCHC  31.7*   RDW  55.0*   PLATELETCT  233   MPV  10.3                          Assessment/Plan     * Persistent atrial fibrillation (CMS-HCC)- (present on admission)   Assessment & Plan    Persistent w aflutter - periods of tachycardia Hr 150s w bradycardia   Not  Ac candidate w hematoma.  Lopressor , digoxin for rate control-- Tele monitoring-- will re consult Cardiology             Dysphagia   Assessment & Plan    Post EGD w recs by Gi for IV proton pump inhibitor and Carafate suspension with repeat endoscopy and dilation in 1-2 weeks   clears as yareli        Discitis of lumbar region- (present on admission)   Assessment & Plan    Improved Mri findings   Id to determine duration of trmt.  Oral zyvox            Nontraumatic psoas hematoma- (present on admission)    Assessment & Plan    Ac stopped, hgb stable.  Monitor for acute bleed.        Esophageal stricture   Assessment & Plan    Fu outpt EGD for eval dilation         Esophagitis   Assessment & Plan    Improved symptoms overall - likely Candidal   Increased pain w swallowing - trial Gi cocktail  Oral ppi, carafate   Diflucan  Dr. Mathias- recommends outpt fu endoscopy in 1-2 weeks.        DNR (do not resuscitate)- (present on admission)   Assessment & Plan    Discussed with patient- will update to DNR code status current admission .        Normocytic anemia- (present on admission)   Assessment & Plan    Concern for worsening anemia w tachycardia   repeat Hgb  tx prbcs if signif worsened         Debility- (present on admission)   Assessment & Plan    Continue PT /OT   Anticipate dc to [facility] when cardiac stable.              Reviewed items::  Labs reviewed and Medications reviewed  Heanr catheter::  No Hearn  DVT prophylaxis - mechanical:  SCDs  Ulcer Prophylaxis::  Yes  Antibiotics:  Treating active infection/contamination beyond 24 hours perioperative coverage

## 2017-10-13 NOTE — CARE PLAN
Problem: Safety  Goal: Will remain free from falls    Intervention: Implement fall precautions   10/11/17 1930 10/12/17 2000   OTHER   Environmental Precautions --  Treaded Slipper Socks on Patient;Report Given to Other Health Care Providers Regarding Fall Risk;Bed in Low Position   IV Pole on Same Side of Bed as Bathroom Yes --    Bedrails --  Bedrails Closest to Bathroom Down   Chair/Bed Strip Alarm --  Yes - Alarm On   Bed Alarm (Built in - for ICU ONLY) Yes - Alarm On --

## 2017-10-13 NOTE — PROGRESS NOTES
Pt observed, no change from original assessment, vss, pt sleeping, fall precautions in place, call light within reach, all questions answered, will continue to monitor.

## 2017-10-13 NOTE — DIETARY
Nutrition Services:  Brief Update    Admit day 30.  Pt on day 4 of clear liquid - no red foods diet.  Spoke with RN today, pt not tolerating clear liquids and will be on them a bit longer.      RD will continue to follow.

## 2017-10-13 NOTE — CARE PLAN
Problem: Safety  Goal: Will remain free from injury    Intervention: Provide assistance with mobility  Bed alarm on, bed locked in low position, treaded socks on, call light and items within reach. Hourly rounding in place, near nurses station. Assessed mobility and communicated with CNA and patient regarding risk of fall.  Assisted to edge of bed and tried helping pt stand, pt was unable to stand, but was able to tolerate sitting at edge of bed for 15 min.        Problem: Skin Integrity  Goal: Risk for impaired skin integrity will decrease  Outcome: PROGRESSING AS EXPECTED  Waffle cushion mattress topper, attempted to get up to chair x3 day, edge of bed was required. Q2 turns all day, bony prominences padded, heels floated, encouraged PO intake. Assessed skin thoroughly and ensured moisture was minimized.

## 2017-10-13 NOTE — PROGRESS NOTES
Bedside report completed with suleman RN. Pt sleeping. Bed locked in low position, call light within reach. Bed alarm on. Reviewed plan of care with noc RN.

## 2017-10-13 NOTE — PROGRESS NOTES
Pharmacy Pharmacotherapy Consult for LOS >30 days    Admit Date: 9/13/2017    Medications were reviewed for appropriateness and ongoing need.     Current Facility-Administered Medications   Medication Dose Route Frequency Provider Last Rate Last Dose   • [START ON 10/19/2017] sulfamethoxazole-trimethoprim (BACTRIM DS) 800-160 MG tablet 2 Tab  2 Tab Oral Q12HRS Oliva Bolanos M.D.       • [START ON 10/19/2017] amoxicillin-clavulanate (AUGMENTIN) 875-125 MG per tablet 1 Tab  1 Tab Oral Q12HRS Oliva Bolanos M.D.       • hyoscyamine-maalox plus-lidocaine viscous (GI COCKTAIL) oral susp 15 mL  15 mL Oral Once Herber Wu M.D.       • omeprazole 2 mg/mL in sodium bicarbonate (PRILOSEC) oral susp 40 mg  40 mg Oral Q12HRS Radha Salas, PharmD   40 mg at 10/13/17 1002   • levetiracetam (KEPPRA) 100 MG/ML solution 500 mg  500 mg Oral Q12HRS Herber Wu M.D.   500 mg at 10/13/17 1002   • linezolid (ZYVOX) tablet 600 mg  600 mg Oral Q12HRS Oliva Bolanos M.D.   600 mg at 10/13/17 1002   • lorazepam (ATIVAN) injection 1 mg  1 mg Intravenous Q4HRS PRN Ji Villar D.O.   1 mg at 10/10/17 0000   • sucralfate (CARAFATE) 1 GM/10ML suspension 1 g  1 g Oral 4X/DAY ACHS Ozzie Schwartz M.D.   1 g at 10/13/17 0957   • calcium carbonate (TUMS) chewable tab 1,000 mg  1,000 mg Oral TID WITH MEALS Ozzie Schwartz M.D.   1,000 mg at 10/12/17 1708   • normal saline PF 10-20 mL  10-20 mL Intravenous PRN Johanne Chua M.D.       • Metoprolol Tartrate (LOPRESSOR) injection 5 mg  5 mg Intravenous Q6HRS PRN Petr Ac M.D.   5 mg at 10/07/17 1422   • acetaminophen (TYLENOL) tablet 650 mg  650 mg Oral Q6HRS PRN Denise Polanco, A.P.N.   650 mg at 10/13/17 1048   • digoxin (LANOXIN) tablet 125 mcg  125 mcg Oral DAILY AT 1800 Edwin Hernández, A.P.N.   125 mcg at 10/12/17 1708   • prochlorperazine (COMPAZINE) injection 10 mg  10 mg Intravenous Q6HRS PRN Ger Huang M.D.   10 mg at 10/06/17 0852   • promethazine (PHENERGAN) tablet 25  mg  25 mg Oral Q6HRS PRN Ger Huang M.D.       • hydrALAZINE (APRESOLINE) injection 20 mg  20 mg Intravenous Q6HRS PRN Ger Huang M.D.   20 mg at 10/07/17 0457   • NS infusion   Intravenous Continuous CESAR [place] 75 mL/hr at 10/13/17 0212     • ondansetron (ZOFRAN) syringe/vial injection 4 mg  4 mg Intravenous Q4HRS PRN CESAR [place]ersO[place]   4 mg at 10/10/17 0923   • metoprolol SR (TOPROL XL) tablet 50 mg  50 mg Oral DAILY Eron Lott M.D.   Stopped at 10/12/17 0900   • atorvastatin (LIPITOR) tablet 10 mg  10 mg Oral Nightly Jeffrey Guzmán M.D.   10 mg at 10/12/17 2045   • gabapentin (NEURONTIN) capsule 200 mg  200 mg Oral TID Jeffrey Guzmán M.D.   200 mg at 10/13/17 0959   • tamsulosin (FLOMAX) capsule 0.4 mg  0.4 mg Oral AFTER BREAKFAST Jeffrey Guzmán M.D.   0.4 mg at 10/13/17 0959   • senna-docusate (PERICOLACE or SENOKOT S) 8.6-50 MG per tablet 2 Tab  2 Tab Oral BID Jeffrey Guzmán M.D.   2 Tab at 10/13/17 0957    And   • polyethylene glycol/lytes (MIRALAX) PACKET 1 Packet  1 Packet Oral QDAY PRN Jeffrey Guzmán M.D.        And   • magnesium hydroxide (MILK OF MAGNESIA) suspension 30 mL  30 mL Oral QDAY PRN Jeffrey Guzmán M.D.        And   • bisacodyl (DULCOLAX) suppository 10 mg  10 mg Rectal QDAY PRN Jeffrey Guzmán M.D.       • Respiratory Care per Protocol   Nebulization Continuous RT Jeffrey Guzmán M.D.           Recommendations:    Re-evaluate need for continuous NS infusion. Patient is on clear liquid diet. BP stable. No other changes recommended at this time.     Renee GUERRERO Candidate    Pharmacy Addendum:  Assessment and plan discussed with pharmacy intern. Will apprise provider of possible recommendations.    Comments:  1. ID service consulting and with tentative stop dates placed on antimicrobials.  2. Given variable intake reasonable to continue with supplemental MIVF.  3. Reasonable PRN agents on MAR with occasional use (e.g. bowel protocol, anxiety, etc...).    Robert Mcnulty, PharmD

## 2017-10-13 NOTE — PROGRESS NOTES
[facility] Hospitalist Progress Note    Date of Service: 10/12/2017    Chief Complaint  86 y.o. male admitted 2017 with acute ground-level fall and back pain found to have  acute on chronic osteomyelitis and discitis, on long term abx     Interval Problem Update    Variable intake due to odynophagia. Weak, unsteady. Plan MRI lumbar spine.     Consultants/Specialty  Infectious diseases Dr. Getachew Vincent  of cardiology - signed off  GI Dr. Waters    Disposition  Pt to go to [facility] if MRI Stable.         Review of Systems   Constitutional: Negative for chills and fever.   HENT: Negative for congestion.              Eyes: Negative for discharge and redness.   Respiratory: Negative for cough, shortness of breath, wheezing and stridor.    Cardiovascular: Positive for chest pain (w intake.). Negative for leg swelling.   Gastrointestinal: Negative for abdominal pain, diarrhea and vomiting.   Genitourinary: Negative for flank pain and hematuria.   Musculoskeletal: Negative for back pain and neck pain.   Skin: Negative for itching and rash.   Neurological: Positive for weakness. Negative for speech change and focal weakness.   Psychiatric/Behavioral: Negative for hallucinations and substance abuse.      Physical Exam  Laboratory/Imaging   Hemodynamics  Temp (24hrs), Av.3 °C (97.4 °F), Min:35.9 °C (96.6 °F), Max:37.1 °C (98.7 °F)   Temperature: 36.3 °C (97.3 °F)  Pulse  Av.5  Min: 45  Max: 147   Blood Pressure : 118/85      Respiratory      Respiration: 15, Pulse Oximetry: 99 %, O2 Daily Delivery Respiratory : Silicone Nasal Cannula     Work Of Breathing / Effort: Mild  RUL Breath Sounds: Clear, RML Breath Sounds: Diminished, RLL Breath Sounds: Diminished, MIKE Breath Sounds: Clear, LLL Breath Sounds: Diminished    Fluids    Intake/Output Summary (Last 24 hours) at 10/12/17 1831  Last data filed at 10/12/17 1800   Gross per 24 hour   Intake             1965 ml   Output              720 ml   Net              1245 ml       Nutrition  Orders Placed This Encounter   Procedures   • DIET ORDER     Standing Status:   Standing     Number of Occurrences:   1     Order Specific Question:   Diet:     Answer:   Clear Liquids - No Red Foods [12]     Physical Exam   Constitutional: He is oriented to person, place, and time. He is cooperative. He does not appear ill. No distress.   HENT:   Head: Normocephalic and atraumatic.   Eyes: EOM are normal. Right eye exhibits no discharge. Left eye exhibits no discharge.   Neck: Normal range of motion.   Cardiovascular:   No murmur heard.  irreg reg   Pulmonary/Chest: Effort normal and breath sounds normal. No stridor. No respiratory distress. He has no wheezes. He has no rales.   Abdominal: Soft. Bowel sounds are normal. He exhibits no distension. There is no tenderness.   Obese.    Musculoskeletal: Normal range of motion. He exhibits no edema or tenderness.   Neurological: He is alert and oriented to person, place, and time. No cranial nerve deficit.   Skin: Skin is warm and dry. No rash noted. He is not diaphoretic. No pallor.   Psychiatric:   Calm, cooperative .   Vitals reviewed.      Recent Labs      10/10/17   0352  10/11/17   0357   WBC  6.3  6.1   RBC  3.17*  3.11*   HEMOGLOBIN  10.1*  9.5*   HEMATOCRIT  30.7*  30.0*   MCV  96.8  96.5   MCH  31.9  30.5   MCHC  32.9*  31.7*   RDW  54.2*  55.0*   PLATELETCT  219  233   MPV  9.9  10.3     Recent Labs      10/10/17   0352   SODIUM  134*   POTASSIUM  3.8   CHLORIDE  101   CO2  29   GLUCOSE  145*   BUN  7*   CREATININE  0.48*   CALCIUM  9.1                      Assessment/Plan     * Discitis of lumbar region- (present on admission)   Assessment & Plan    Improved back pain , afeb  Ooral zyvox plan thru 10/18  Plan repeat MRI lumbar spine to eval progress           Dysphagia   Assessment & Plan    Post EGD w recs by Gi for IV proton pump inhibitor and Carafate suspension with repeat endoscopy and dilation in 1-2 weeks   clears as yareli         Nontraumatic psoas hematoma- (present on admission)   Assessment & Plan    Ac stopped, hgb stable.  Monitor for acute bleed.        Persistent atrial fibrillation (CMS-HCC)- (present on admission)   Assessment & Plan    Persistent w aflutter .- rate controlled  Not  Ac candidate w hematoma.  lopressor , digoxin , Tele monitoring            Esophageal stricture   Assessment & Plan    Fu outpt EGD for eval dilation         Esophagitis   Assessment & Plan    Improved symptoms overall - likely Candidal   Oral ppi, carafate   Diflucan   Dr. Mathias- recommends outpt fu endoscopy in 1-2 weeks.        DNR (do not resuscitate)- (present on admission)   Assessment & Plan    Discussed with patient- will update to DNR code status current admission .        Normocytic anemia- (present on admission)   Assessment & Plan    Stable.   Transfuse PRN if Hb 7 or symptoms.   Monitor for acute bleeding.         Debility- (present on admission)   Assessment & Plan    Continue PT /OT   Anticipate dc to snf for rehab if MRI stable.               Reviewed items::  Labs reviewed and Medications reviewed  Hearn catheter::  No Hearn  DVT prophylaxis - mechanical:  SCDs  Ulcer Prophylaxis::  Yes  Antibiotics:  Treating active infection/contamination beyond 24 hours perioperative coverage

## 2017-10-13 NOTE — PROGRESS NOTES
Received report from Alcon HENRY. Assumed pt care. Assessment completed. A&Ox4. Pt denies pain.   Pt is currently in bed. Bed alarm on, call light within reach, pt calls appropriately and does not get out of bed. RN and CNA numbers provided, no further needs at this time. Hourly rounding in progress.

## 2017-10-13 NOTE — CONSULTS
Cardiology Consult Note:    Nehemias Stewart  Date of Service:    10/13/2017   2:24 PM     Referring MD:  Dr. Herber Wu    Patient ID:   Name:             Jersey Alexis     YOB: 1930  Age:                 86 y.o.  male   MRN:               [id number]                                                             Chief Complaint:      AFlutter RVR    History of Present Illness:    86 y.o. male admitted 9/13/2017 with acute ground-level fall and back pain found to have  acute on chronic osteomyelitis and discitis, on long term abx   He is found in AFlutter + bpm, episodic fleeting and with basically period of bradycardia; Asx    Review of Systems:      Constitutional: Denies fevers, Denies weight changes  Eyes: Denies changes in vision, no eye pain  Ears/Nose/Throat/Mouth: Denies nasal congestion or sore throat   Cardiovascular: - chest pain, + palpitations   Respiratory: - shortness of breath , Denies cough  Gastrointestinal/Hepatic: Denies abdominal pain, nausea, vomiting, diarrhea, constipation or GI bleeding   Genitourinary: Denies dysuria or frequency  Musculoskeletal/Rheum: Denies  joint pain and swelling   Skin: Denies rash  Neurological: Denies headache, confusion, memory loss or focal weakness/parasthesias  Psychiatric: denies mood disorder   Endocrine: Love thyroid problems  Heme/Oncology/Lymph Nodes: Denies enlarged lymph nodes, denies brusing or known bleeding disorder  All other systems were reviewed and are negative (AMA/CMS criteria)                Past Medical History:   Past Medical History:   Diagnosis Date   • A-fib (CMS-Formerly [facility])    • Arrhythmia    • Arthritis    • Cataract    • Diabetes (CMS-Formerly [facility])    • GERD (gastroesophageal reflux disease)    • Hyperlipidemia    • Muscle disorder     nerve damage due to spinal stenosis     Active Hospital Problems    Diagnosis   • Dysphagia [R13.10]     Priority: High   • Persistent atrial fibrillation (CMS-HCC) [I48.1]     Priority: High   •  Discitis of lumbar region [M46.46]     Priority: High   • 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.81]     Priority: Medium   • DNR (do not resuscitate) [Z66]     Priority: Low   • Normocytic anemia [D64.9]     Priority: Low   • Debility [R53.81]     Priority: Low   • Esophagitis [K20.9]   • Esophageal stricture [K22.2]       Past Surgical History:  Past Surgical History:   Procedure Laterality Date   • GASTROSCOPY N/A 10/9/2017    Procedure: GASTROSCOPY;  Surgeon: Oh Hull D.O.;  Location: SURGERY [facility];  Service: Gastroenterology   • TONSILLECTOMY Bilateral 1936   • APPENDECTOMY  Early 2000's   • CATARACT PHACO WITH IOL Bilateral Early 2000's   • LAMINOTOMY  late 80s    spinal stenosis   • LAMINOTOMY  late 90s    spinal fusion        Hospital Medications:    Current Facility-Administered Medications:   •  [START ON 10/19/2017] sulfamethoxazole-trimethoprim (BACTRIM DS) 800-160 MG tablet 2 Tab, 2 Tab, Oral, Q12HRS, Oliva Bolanos M.D.  •  [START ON 10/19/2017] amoxicillin-clavulanate (AUGMENTIN) 875-125 MG per tablet 1 Tab, 1 Tab, Oral, Q12HRS, Oliva Bolanos M.D.  •  omeprazole 2 mg/mL in sodium bicarbonate (PRILOSEC) oral susp 40 mg, 40 mg, Oral, Q12HRS, Radha Salas, PharmD, 40 mg at 10/13/17 1002  •  levetiracetam (KEPPRA) 100 MG/ML solution 500 mg, 500 mg, Oral, Q12HRS, Herber Wu M.D., 500 mg at 10/13/17 1002  •  linezolid (ZYVOX) tablet 600 mg, 600 mg, Oral, Q12HRS, Oliva Bolanos M.D., 600 mg at 10/13/17 1002  •  lorazepam (ATIVAN) injection 1 mg, 1 mg, Intravenous, Q4HRS PRN, Ji Villar D.O., 1 mg at 10/10/17 0000  •  sucralfate (CARAFATE) 1 GM/10ML suspension 1 g, 1 g, Oral, 4X/DAY ACHSOzzie M.D., 1 g at 10/13/17 0981  •  calcium carbonate (TUMS) chewable tab 1,000 mg, 1,000 mg, Oral, TID WITH MEALS, Ozzie Schwartz M.D., Stopped at 10/13/17 1130  •  PICC Line Insertion has been implemented, , , Once **AND** May use Lidocaine 1% not to exceed 3 mls for local at  insertion site, , , CONTINUOUS **AND** NOTIFY MD, , , Once **AND** Tip to dwell in the superior vena cava, , , CONTINUOUS **AND** Do not use PICC Line until placement verified by Chest X Ray, , , CONTINUOUS **AND** DX-CHEST-FOR PICC LINE Perform procedure in: PICC Room, , , Once **AND** If radiologist reading of chest X-ray states any of the following the PICC should be used, , , CONTINUOUS **AND** Further evaluation of the PICC placement can be retrieved from X-Ray and Imaging, , , CONTINUOUS **AND** Blood draws through PICC line; draws by RN only, , , CONTINUOUS **AND** FLUSHING GUIDELINES WHEN IN USE, , , CONTINUOUS **AND** normal saline PF 10-20 mL, 10-20 mL, Intravenous, PRN **AND** FLUSHING GUIDELINES WHEN NOT IN USE, , , CONTINUOUS **AND** DRESSING MAINTENANCE, , , Once **AND** Change needleless pressure ports and IV tubing every 72 hours per hospital policy, , , CONTINUOUS **AND** TUBING, , , CONTINUOUS **AND** If there is an MD order to remove the PICC line, any RN may remove the PICC line, , , CONTINUOUS **AND** [] PATIENT EDUCATION MATERIALS, , , Prior to discharge **AND** NURSING COMMUNICATION, , , CONTINUOUS, Johanne Chua M.D.  •  Metoprolol Tartrate (LOPRESSOR) injection 5 mg, 5 mg, Intravenous, Q6HRS PRN, Petr Ac M.D., 5 mg at 10/07/17 1422  •  acetaminophen (TYLENOL) tablet 650 mg, 650 mg, Oral, Q6HRS PRN, Denise Polanco, A.P.N., 650 mg at 10/13/17 1048  •  digoxin (LANOXIN) tablet 125 mcg, 125 mcg, Oral, DAILY AT 1800, Edwin Hernández, A.P.N., 125 mcg at 10/12/17 1708  •  prochlorperazine (COMPAZINE) injection 10 mg, 10 mg, Intravenous, Q6HRS PRN, Ger Huang M.D., 10 mg at 10/06/17 0852  •  promethazine (PHENERGAN) tablet 25 mg, 25 mg, Oral, Q6HRS PRN, Ger Huang M.D.  •  hydrALAZINE (APRESOLINE) injection 20 mg, 20 mg, Intravenous, Q6HRS PRN, Ger Huang M.D., 20 mg at 10/07/17 0457  •  NS infusion, , Intravenous, Continuous, CESAR ChildersOIsrael, Last Rate: 75 mL/hr at 10/13/17  0212  •  ondansetron (ZOFRAN) syringe/vial injection 4 mg, 4 mg, Intravenous, Q4HRS PRN, Yaya Ragland, JOYCE, 4 mg at 10/10/17 0923  •  metoprolol SR (TOPROL XL) tablet 50 mg, 50 mg, Oral, DAILY, Eron Lott M.D., Stopped at 10/12/17 0900  •  atorvastatin (LIPITOR) tablet 10 mg, 10 mg, Oral, Nightly, Jeffrey Guzmán M.D., 10 mg at 10/12/17 2045  •  gabapentin (NEURONTIN) capsule 200 mg, 200 mg, Oral, TID, Jeffrey Guzmán M.D., 200 mg at 10/13/17 0959  •  tamsulosin (FLOMAX) capsule 0.4 mg, 0.4 mg, Oral, AFTER BREAKFAST, Jeffrey Guzmán M.D., 0.4 mg at 10/13/17 0959  •  senna-docusate (PERICOLACE or SENOKOT S) 8.6-50 MG per tablet 2 Tab, 2 Tab, Oral, BID, 2 Tab at 10/13/17 0957 **AND** polyethylene glycol/lytes (MIRALAX) PACKET 1 Packet, 1 Packet, Oral, QDAY PRN **AND** magnesium hydroxide (MILK OF MAGNESIA) suspension 30 mL, 30 mL, Oral, QDAY PRN **AND** bisacodyl (DULCOLAX) suppository 10 mg, 10 mg, Rectal, QDAY PRN, Jeffrey Guzmán M.D.  •  Respiratory Care per Protocol, , Nebulization, Continuous RT, Jeffrey Guzmán M.D.    Current Outpatient Medications:  Prescriptions Prior to Admission   Medication Sig Dispense Refill Last Dose   • metoprolol SR (TOPROL XL) 50 MG TABLET SR 24 HR Take 50 mg by mouth every day.   unknown at unknown   • atorvastatin (LIPITOR) 10 MG Tab Take 10 mg by mouth every evening.   unknown at unknown   • oxyCODONE CR (OXYCONTIN) 10 MG Tablet Extended Release 12 hour Abuse-Deterrent Take 10 mg by mouth every 12 hours.   unknown at unknown   • gabapentin (NEURONTIN) 100 MG Cap Take 2 Caps by mouth 3 times a day. 90 Cap  unknown at unknown   • levetiracetam (KEPPRA) 500 MG Tab Take 1 Tab by mouth 2 Times a Day. 60 Tab  unknown at unknown   • tamsulosin (FLOMAX) 0.4 MG capsule Take 1 Cap by mouth ONE-HALF HOUR AFTER BREAKFAST. 30 Cap  unknown at unknown   • digoxin (LANOXIN) 125 MCG Tab Take 125 mcg by mouth every day.   unknown at unknown   • tramadol (ULTRAM) 50 MG Tab Take 50 mg by mouth every 8 hours as  "needed.   unknown at unknown       Medication Allergy:  Allergies   Allergen Reactions   • Vancomycin Rash     Diffuse  Tolerates at slower infusion rates.        Family History:    Family History   Problem Relation Age of Onset   • Heart Failure Mother    • Heart Attack Mother    • Heart Disease Mother      pacemaker   • Paranoid behavior Mother    • Heart Failure Father    • Cancer Sister      Breast   • Other Brother      colostomy   • Cancer Brother    • No Known Problems Maternal Grandmother    • No Known Problems Maternal Grandfather    • No Known Problems Paternal Grandmother    • No Known Problems Paternal Grandfather        Social History:  Social History     Social History   • Marital status:      Spouse name: N/A   • Number of children: N/A   • Years of education: N/A     Occupational History   • Not on file.     Social History Main Topics   • Smoking status: Former Smoker     Packs/day: 1.50     Years: 29.00     Types: Cigarettes, Pipe     Quit date: 1/1/1980   • Smokeless tobacco: Never Used      Comment: Started smoking at age 21   • Alcohol use No   • Drug use: No   • Sexual activity: No     Other Topics Concern   • Not on file     Social History Narrative   • No narrative on file         Physical Exam:  Vitals  Weight/BMI: Body mass index is 27.27 kg/m².  Blood pressure 110/58, pulse 60, temperature 36.7 °C (98.1 °F), resp. rate 18, height 1.803 m (5' 11\"), weight 88.7 kg (195 lb 8.8 oz), SpO2 92 %.  Vitals:    10/12/17 2000 10/13/17 0000 10/13/17 0400 10/13/17 1220   BP: (!) 98/56 101/51 104/55 110/58   Pulse: (!) 104 72 69 60   Resp: 15 18 16 18   Temp: 36.6 °C (97.8 °F) 36.5 °C (97.7 °F) 36.5 °C (97.7 °F) 36.7 °C (98.1 °F)   SpO2:  94% 91% 92%   Weight: 88.7 kg (195 lb 8.8 oz)      Height:         Oxygen Therapy:  Pulse Oximetry: 92 %, O2 (LPM): 0, O2 Delivery: None (Room Air)  General Appearance:   Well developed, Well nourished, No acute distress, Non-toxic appearance.   HENT:  " Normocephalic, Atraumatic, Oropharynx moist mucous membranes, Dentition: , Nose normal.    Eyes:  PERRLA, EOMI, Conjunctiva normal, No discharge.  Neck:  Normal range of motion, No cervical tenderness, Supple, No stridor, no JVD .  No thyromegaly.  No carotid bruit.  Cardiovascular:  Fast heart rate, Normal rhythm,  S1, S2, no S3,  S4; No gallops; No murmurs, No rubs, .   Extremitites with intact distal pulses, no cyanosis, clubbing or edema.  No heaves, thrills, HJR;  Peripheral pulses: carotid 2+, brachial 2+, radial 2+, ulnar 2+, femoral 2+, popliteal 2+, PT 2+, DP 2+;  Lungs:  Respiratory effort is normal. Normal breath sounds, breath sounds clear to auscultation bilaterally,  no rales, no rhonchi, no wheezing.   Abdomen: Bowel sounds normal, Soft, No tenderness, No guarding, No rebound, No masses, No hepatosplenomegaly.  Skin: Bruise; Warm, Dry, No erythema, No rash, no induration or crepitus.  Neurologic: Alert & oriented x 3, Normal motor function, Normal sensory function, No focal deficits noted, cranial nerves II through XII are normal,    Psychiatric: Affect normal, Judgment normal, Mood normal.      MDM (Data Review):     Records reviewed and summarized in current documentation    Lab Data Review:  Recent Results (from the past 24 hour(s))   EKG (IP)    Collection Time: 10/12/17  8:30 PM   Result Value Ref Range    Report       [facility]    Test Date:  2017-10-12  Pt Name:    BRIAN SPARROW               Department: 171  MRN:        [id number]                      Room:       T734  Gender:     M                            Technician: IVAN  :        1930-10-18                   Requested By:JUSTIN AGUIRRE  Order #:    [id number]                    Reading MD: Luis Rae MD    Measurements  Intervals                                Axis  Rate:       73                           P:  MA:                                      QRS:        -41  QRSD:       92                           T:          -12  QT:          360  QTc:        397    Interpretive Statements  ATRIAL FIBRILLATION  LEFT AXIS DEVIATION  BORDERLINE REPOLARIZATION ABNORMALITY  Compared to ECG 10/07/2017 09:26:34  Atrial flutter no longer present      Electronically Signed On 10- 7:47:20 PDT by Luis Rae MD     EKG    Collection Time: 10/13/17 12:58 PM   Result Value Ref Range    Report       [facility]    Test Date:  2017-10-13  Pt Name:    BRIAN SPARROW               Department: 171  MRN:        [id number]                      Room:       T734  Gender:     M                            Technician: REGULO  :        1930-10-18                   Requested By:PETE CUNNINGHAM  Order #:    [id number]                    Reading MD: Luis Rae MD    Measurements  Intervals                                Axis  Rate:       77                           P:  NJ:                                      QRS:        -43  QRSD:       92                           T:          1  QT:         384  QTc:        435    Interpretive Statements  ATRIAL FIBRILLATION  INFERIOR INFARCT, AGE INDETERMINATE  CONSIDER POSTERIOR WALL INVOLVEMENT  Compared to ECG 10/12/2017 20:30:31  No significant changes  Electronically Signed On 10- 13:48:05 PDT by Luis Rae MD         Imaging/Procedures Review:    Chest Xray:  Reviewed  2017 Echo: Prior study done on [id number] left ventricular systolic   function.  Normal regional wall motion.  Left ventricular ejection fraction is visually estimated to be 70%.  Diastolic function is difficult to assess with atrial fibrillation.  The right ventricle was normal in size and function.  Structurally normal mitral valve without significant stenosis or regurgitation.  Aortic sclerosis without stenosis.  No aortic insufficiency.  Normal pericardium without effusion    EKG:   As in HPI. 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

## 2017-10-13 NOTE — PROGRESS NOTES
Bedside report received, Pt care assumed. Tele box on. VSS, Pt assessment complete. Pt AOX4,  Pt denies any additional needs at this time. Bed in lowest position, bed alarm on, call light within reach, will continue  monitor.

## 2017-10-14 LAB
POTASSIUM SERPL-SCNC: 3 MMOL/L (ref 3.6–5.5)
POTASSIUM SERPL-SCNC: 3.5 MMOL/L (ref 3.6–5.5)

## 2017-10-14 PROCEDURE — A9270 NON-COVERED ITEM OR SERVICE: HCPCS | Performed by: HOSPITALIST

## 2017-10-14 PROCEDURE — 84132 ASSAY OF SERUM POTASSIUM: CPT

## 2017-10-14 PROCEDURE — 700102 HCHG RX REV CODE 250 W/ 637 OVERRIDE(OP): Performed by: HOSPITALIST

## 2017-10-14 PROCEDURE — 99232 SBSQ HOSP IP/OBS MODERATE 35: CPT | Performed by: HOSPITALIST

## 2017-10-14 PROCEDURE — 700101 HCHG RX REV CODE 250: Performed by: HOSPITALIST

## 2017-10-14 PROCEDURE — A9270 NON-COVERED ITEM OR SERVICE: HCPCS | Performed by: INTERNAL MEDICINE

## 2017-10-14 PROCEDURE — 700111 HCHG RX REV CODE 636 W/ 250 OVERRIDE (IP): Performed by: HOSPITALIST

## 2017-10-14 PROCEDURE — 770020 HCHG ROOM/CARE - TELE (206)

## 2017-10-14 PROCEDURE — 700105 HCHG RX REV CODE 258: Performed by: HOSPITALIST

## 2017-10-14 PROCEDURE — 700102 HCHG RX REV CODE 250 W/ 637 OVERRIDE(OP): Performed by: INTERNAL MEDICINE

## 2017-10-14 RX ORDER — POTASSIUM CHLORIDE 7.45 MG/ML
10 INJECTION INTRAVENOUS
Status: COMPLETED | OUTPATIENT
Start: 2017-10-14 | End: 2017-10-14

## 2017-10-14 RX ORDER — FERROUS SULFATE 325(65) MG
325 TABLET ORAL 2 TIMES DAILY WITH MEALS
Status: DISCONTINUED | OUTPATIENT
Start: 2017-10-14 | End: 2017-10-18

## 2017-10-14 RX ADMIN — GABAPENTIN 200 MG: 100 CAPSULE ORAL at 09:45

## 2017-10-14 RX ADMIN — PROCHLORPERAZINE EDISYLATE 10 MG: 5 INJECTION INTRAMUSCULAR; INTRAVENOUS at 14:48

## 2017-10-14 RX ADMIN — LINEZOLID 600 MG: 600 TABLET, FILM COATED ORAL at 09:45

## 2017-10-14 RX ADMIN — ONDANSETRON 4 MG: 2 INJECTION INTRAMUSCULAR; INTRAVENOUS at 13:22

## 2017-10-14 RX ADMIN — SUCRALFATE 1 G: 1 SUSPENSION ORAL at 10:12

## 2017-10-14 RX ADMIN — ROSUVASTATIN CALCIUM 125 MCG: 10 TABLET, FILM COATED ORAL at 18:14

## 2017-10-14 RX ADMIN — LIDOCAINE HYDROCHLORIDE 15 ML: 20 SOLUTION OROPHARYNGEAL at 18:19

## 2017-10-14 RX ADMIN — Medication 325 MG: at 18:14

## 2017-10-14 RX ADMIN — SUCRALFATE 1 G: 1 SUSPENSION ORAL at 06:12

## 2017-10-14 RX ADMIN — SODIUM CHLORIDE: 9 INJECTION, SOLUTION INTRAVENOUS at 09:47

## 2017-10-14 RX ADMIN — SODIUM CHLORIDE: 9 INJECTION, SOLUTION INTRAVENOUS at 22:07

## 2017-10-14 RX ADMIN — POTASSIUM CHLORIDE 10 MEQ: 10 INJECTION, SOLUTION INTRAVENOUS at 00:57

## 2017-10-14 RX ADMIN — OMEPRAZOLE 40 MG: 20 CAPSULE, DELAYED RELEASE ORAL at 21:49

## 2017-10-14 RX ADMIN — LEVETIRACETAM 500 MG: 100 SOLUTION ORAL at 09:47

## 2017-10-14 RX ADMIN — GABAPENTIN 200 MG: 100 CAPSULE ORAL at 21:50

## 2017-10-14 RX ADMIN — OMEPRAZOLE 40 MG: 20 CAPSULE, DELAYED RELEASE ORAL at 09:46

## 2017-10-14 RX ADMIN — POTASSIUM CHLORIDE 10 MEQ: 10 INJECTION, SOLUTION INTRAVENOUS at 02:01

## 2017-10-14 RX ADMIN — TAMSULOSIN HYDROCHLORIDE 0.4 MG: 0.4 CAPSULE ORAL at 09:46

## 2017-10-14 RX ADMIN — LINEZOLID 600 MG: 600 TABLET, FILM COATED ORAL at 21:49

## 2017-10-14 RX ADMIN — SUCRALFATE 1 G: 1 SUSPENSION ORAL at 18:14

## 2017-10-14 RX ADMIN — METOPROLOL TARTRATE 5 MG: 5 INJECTION INTRAVENOUS at 15:34

## 2017-10-14 RX ADMIN — POTASSIUM CHLORIDE 10 MEQ: 10 INJECTION, SOLUTION INTRAVENOUS at 04:03

## 2017-10-14 RX ADMIN — ANTACID TABLETS 1000 MG: 500 TABLET, CHEWABLE ORAL at 10:07

## 2017-10-14 RX ADMIN — SUCRALFATE 1 G: 1 SUSPENSION ORAL at 21:50

## 2017-10-14 RX ADMIN — POTASSIUM CHLORIDE 10 MEQ: 10 INJECTION, SOLUTION INTRAVENOUS at 03:00

## 2017-10-14 RX ADMIN — ANTACID TABLETS 1000 MG: 500 TABLET, CHEWABLE ORAL at 13:19

## 2017-10-14 RX ADMIN — LEVETIRACETAM 500 MG: 100 SOLUTION ORAL at 21:49

## 2017-10-14 RX ADMIN — ATORVASTATIN CALCIUM 10 MG: 10 TABLET, FILM COATED ORAL at 21:50

## 2017-10-14 ASSESSMENT — ENCOUNTER SYMPTOMS
STRIDOR: 0
SPEECH CHANGE: 0
WHEEZING: 0
DIARRHEA: 0
ABDOMINAL PAIN: 0
FOCAL WEAKNESS: 0
DIZZINESS: 0
WEAKNESS: 1
SHORTNESS OF BREATH: 0
CHILLS: 0
NAUSEA: 1
NAUSEA: 0
VOMITING: 0
HALLUCINATIONS: 0
HEADACHES: 0
VOMITING: 1
BACK PAIN: 0
COUGH: 0
FLANK PAIN: 0
NECK PAIN: 0
FEVER: 0

## 2017-10-14 ASSESSMENT — PAIN SCALES - GENERAL
PAINLEVEL_OUTOF10: 0

## 2017-10-14 ASSESSMENT — LIFESTYLE VARIABLES: SUBSTANCE_ABUSE: 0

## 2017-10-14 NOTE — PROGRESS NOTES
"Date of Service: 10/14/2017  Chief Complaint:  Chief Complaint   Patient presents with   • ALOC   86 y.o. male admitted 9/13/2017 with acute ground-level fall and back pain found to have  acute on chronic osteomyelitis and discitis, on long term abx   He is found in AFlutter + bpm, episodic fleeting and with basically period of bradycardia; Asx  Interval History:  Doing well; no more spike of HR to 160;  No cardiac complaints  All recent medication, labs, imaging studies and procedures reviewed    Physical Exam   Blood pressure 129/66, pulse 72, temperature 36.9 °C (98.4 °F), resp. rate 14, height 1.803 m (5' 11\"), weight 93.9 kg (207 lb 0.2 oz), SpO2 94 %.    Constitutional:  He appears well-developed.   HENT: Normocephalic and atraumatic. No scleral icterus.   Neck: No JVD present.   Cardiovascular: Normal rate. RRR; Exam reveals no gallop and no friction rub. No murmur heard.   Pulmonary/Chest: CTAB coarse   Abdominal: S/NT/ND BS+   Musculoskeletal: He exhibits no edema. Pulses present.  Skin: Skin is warm and dry.       Intake/Output Summary (Last 24 hours) at 10/14/17 0402  Last data filed at 10/14/17 0300   Gross per 24 hour   Intake              585 ml   Output              625 ml   Net              -40 ml       LABS:  No results found for: CHOLSTRLTOT, LDL, HDL, TRIGLYCERIDE    Lab Results   Component Value Date/Time    WBC 5.4 10/13/2017 04:03 PM    RBC 2.96 (L) 10/13/2017 04:03 PM    HEMOGLOBIN 9.0 (L) 10/13/2017 04:03 PM    HEMATOCRIT 28.0 (L) 10/13/2017 04:03 PM    MCV 94.6 10/13/2017 04:03 PM    NEUTSPOLYS 55.90 10/13/2017 04:03 PM    LYMPHOCYTES 25.80 10/13/2017 04:03 PM    MONOCYTES 12.90 10/13/2017 04:03 PM    EOSINOPHILS 4.60 10/13/2017 04:03 PM    BASOPHILS 0.60 10/13/2017 04:03 PM     Lab Results   Component Value Date/Time    SODIUM 136 10/13/2017 04:03 PM    POTASSIUM 3.0 (L) 10/13/2017 11:40 PM    CHLORIDE 101 10/13/2017 04:03 PM    CO2 30 10/13/2017 04:03 PM    GLUCOSE 119 (H) " 10/13/2017 04:03 PM    BUN 5 (L) 10/13/2017 04:03 PM    CREATININE 0.45 (L) 10/13/2017 04:03 PM     Lab Results   Component Value Date    HBA1C 5.5 09/15/2017      Lab Results   Component Value Date/Time    ALKPHOSPHAT 54 10/09/2017 04:32 AM    ASTSGOT 12 10/09/2017 04:32 AM    ALTSGPT 5 10/09/2017 04:32 AM    TBILIRUBIN 0.4 10/09/2017 04:32 AM      Lab Results   Component Value Date/Time    BNPBTYPENAT 166 (H) 09/13/2017 12:00 AM      No results found for: TSH  Lab Results   Component Value Date/Time    PROTHROMBTM 14.5 09/20/2017 09:31 AM    INR 1.09 09/20/2017 09:31 AM        Medications reviewed    Imaging reviewed    ECHO(9/13/2017):Normal regional wall motion.  Left ventricular ejection fraction is visually estimated to be 70%.  Diastolic function is difficult to assess with atrial fibrillation.  The right ventricle was normal in size and function.  Structurally normal mitral valve without significant stenosis or   regurgitation.  Aortic sclerosis without stenosis.  No aortic insufficiency.  Normal pericardium without effusion.    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

## 2017-10-14 NOTE — PROGRESS NOTES
Pharmacy Pharmacotherapy Consult for LOS >30 days    Admit Date: 9/13/2017      Medications were reviewed for appropriateness and ongoing need.     Current Facility-Administered Medications   Medication Dose Route Frequency Provider Last Rate Last Dose   • hyoscyamine-maalox plus-lidocaine viscous (GI COCKTAIL) oral susp 15 mL  15 mL Oral Q12HRS PRN Herber Wu M.D.       • [START ON 10/19/2017] sulfamethoxazole-trimethoprim (BACTRIM DS) 800-160 MG tablet 2 Tab  2 Tab Oral Q12HRS Oliva Bolanos M.D.       • [START ON 10/19/2017] amoxicillin-clavulanate (AUGMENTIN) 875-125 MG per tablet 1 Tab  1 Tab Oral Q12HRS Oliva Bolanos M.D.       • senna-docusate (PERICOLACE or SENOKOT S) 8.6-50 MG per tablet 2 Tab  2 Tab Oral BID Herber Wu M.D.        And   • polyethylene glycol/lytes (MIRALAX) PACKET 1 Packet  1 Packet Oral QDAY PRN Herber Wu M.D.        And   • magnesium hydroxide (MILK OF MAGNESIA) suspension 30 mL  30 mL Oral QDAY PRN Herber Wu M.D.        And   • bisacodyl (DULCOLAX) suppository 10 mg  10 mg Rectal QDAY PRN Herber Wu M.D.       • omeprazole 2 mg/mL in sodium bicarbonate (PRILOSEC) oral susp 40 mg  40 mg Oral Q12HRS Herber Wu M.D.   40 mg at 10/14/17 0946   • levetiracetam (KEPPRA) 100 MG/ML solution 500 mg  500 mg Oral Q12HRS Herber Wu M.D.   500 mg at 10/14/17 0947   • linezolid (ZYVOX) tablet 600 mg  600 mg Oral Q12HRS Oliva Bolanos M.D.   600 mg at 10/14/17 0945   • lorazepam (ATIVAN) injection 1 mg  1 mg Intravenous Q4HRS PRN Herber Wu M.D.   1 mg at 10/10/17 0000   • sucralfate (CARAFATE) 1 GM/10ML suspension 1 g  1 g Oral 4X/DAY ACHS Herber Wu M.D.   1 g at 10/14/17 1012   • calcium carbonate (TUMS) chewable tab 1,000 mg  1,000 mg Oral TID WITH MEALS Herber Wu M.D.   1,000 mg at 10/14/17 1319   • normal saline PF 10-20 mL  10-20 mL Intravenous PRN Johanne Chua M.D.       • Metoprolol Tartrate (LOPRESSOR) injection 5 mg  5 mg Intravenous Q6HRS PRN Herber WALSH  MARY Wu   5 mg at 10/07/17 1422   • acetaminophen (TYLENOL) tablet 650 mg  650 mg Oral Q6HRS PRN Herber Wu M.D.   650 mg at 10/13/17 1048   • digoxin (LANOXIN) tablet 125 mcg  125 mcg Oral DAILY AT 1800 Herber Wu M.D.   125 mcg at 10/13/17 1736   • prochlorperazine (COMPAZINE) injection 10 mg  10 mg Intravenous Q6HRS PRN Herber Wu M.D.   10 mg at 10/14/17 1448   • promethazine (PHENERGAN) tablet 25 mg  25 mg Oral Q6HRS PRN Herber Wu M.D.       • hydrALAZINE (APRESOLINE) injection 20 mg  20 mg Intravenous Q6HRS PRN Herber Wu M.D.   20 mg at 10/07/17 0457   • NS infusion   Intravenous Continuous Yaya Ragland D.O. 75 mL/hr at 10/14/17 0947     • ondansetron (ZOFRAN) syringe/vial injection 4 mg  4 mg Intravenous Q4HRS PRN Herber Wu M.D.   4 mg at 10/14/17 1322   • metoprolol SR (TOPROL XL) tablet 50 mg  50 mg Oral DAILY Herber Wu M.D.   Stopped at 10/12/17 0900   • atorvastatin (LIPITOR) tablet 10 mg  10 mg Oral Nightly Herber Wu M.D.   10 mg at 10/13/17 2023   • gabapentin (NEURONTIN) capsule 200 mg  200 mg Oral TID Herber Wu M.D.   200 mg at 10/14/17 0945   • tamsulosin (FLOMAX) capsule 0.4 mg  0.4 mg Oral AFTER BREAKFAST Herber Wu M.D.   0.4 mg at 10/14/17 0946   • Respiratory Care per Protocol   Nebulization Continuous RT Herber Wu M.D.           Recommendations:  None at this time. Has some unused prn meds (promethazine tabs, bowel meds, metoprolol IV last used 10/7).    Aysha Nazario, PharmD

## 2017-10-14 NOTE — PROGRESS NOTES
Received bedside report and assumed pt care. Pt is resting in bed. A&Ox4 Denies any pain or discomfort.  Call light in place, bed in the lowest position, bed alarm on and calls appropriately, tele box on, hourly rounding in place.

## 2017-10-14 NOTE — PROGRESS NOTES
Infectious Disease Progress Note    Author: Oliva Bolanos M.D. Date & Time of service: 10/14/2017  3:57 PM    Chief Complaint:  Altered mental status    Interval History:  86-year-old male admitted for altered mental status. He was recently treated for L3-4 discitis. His repeat MRI showed right iliacus muscle abscess  9/21/17-MAXIMUM TEMPERATURE 98. On 2 liters of oxygen. WBC 14 and creatinine 0.38  9/22- MAXIMUM TEMPERATURE 97.8. Complains of generalized malaise. Breathing is improved. Right leg pain is improved  9/23/17-MAXIMUM TEMPERATURE 99.5. Still has back pain when he sits up. The WBC 12  9/24/17-MAXIMUM TEMPERATURE 97.6. Feels better. No new issues overnight. WBC11.7  9/25 AF resting comfortably  9/26 AF, WBC 11.3 more awake eating OK.  Back pain about the same  9/27 AF WBC 11 had PICC placed-denies SE abx. No new complaints  9/28 AF tolerating abx well feels about the same  9/29 AF, no CBC, pt has no new complaints this morning, asking when he will be discharged and how long he will be on abx,  9/30 AF no CBC, eating lots of chocolate pudding/ice cream, no new issues per pt  10/2 AF, feels well and denies any pain, feels he is getting stronger and appetite improved  10/3 AF, WBC 9.1, felt dizzy while watching TV yesterday with head pressure but no HA, no nausea or vomiting, no recurrent symptoms today but states he was dizzy at home prior to admit, back pain only with getting out of bed  10/4 AF, no CBC, no dizziness, now having painful swallowing that started last night, did not eat breakfast  10/5 AF, no CBC, complaining of painful swallowing, could not swallow pills this am, waiting of FEES  10/6 AF WBC 9.1 tired and feels like he has pressure on his eyes  10/7 AF WBC 9.8 episode CP and Afib with RVR-eyes sxs resolved. No current CP or SOB  10/8 AF WBC 7.1 thinks having GERD-sxs improved with Maalox  10/9/17-denies any back pain at rest. No fevers. He says his main issue is nausea and issues  swallowing   10/10/17-no fevers. No back pain. Still significantly deconditioned.      10/11/17-no fevers. Says the back does not hurt. Still has some difficulty swallowing.  10/12/17-no fevers. No new issues overnight. MRI is still pending     10/13/17 10/13/17-no fevers. No new issues overnight  10/14- no fevers. No new issues overnight.     Labs Reviewed, Medications Reviewed and Radiology Reviewed.    Review of Systems:  Review of Systems   Constitutional: Positive for malaise/fatigue. Negative for chills and fever.   HENT:        Painful swallowing   Respiratory: Negative for cough and shortness of breath.    Cardiovascular: Negative for chest pain and leg swelling.   Gastrointestinal: Negative for abdominal pain, nausea and vomiting.   Musculoskeletal: Negative for back pain.        Improving. Only occurs when he sits up   Neurological: Negative for headaches.       Hemodynamics:  Temp (24hrs), Av.2 °C (97.2 °F), Min:35.8 °C (96.4 °F), Max:36.9 °C (98.4 °F)  Temperature: 36.6 °C (97.8 °F)  Pulse  Av.1  Min: 45  Max: 147  Blood Pressure : 149/68       Physical Exam:  Physical Exam   Constitutional: He appears well-developed. He is easily aroused. No distress.   Obese   HENT:   Head: Normocephalic and atraumatic.   Poor dentition   Eyes: Conjunctivae and EOM are normal. Pupils are equal, round, and reactive to light. No scleral icterus.   Neck: Neck supple.   Cardiovascular: Normal rate.  An irregularly irregular rhythm present.   IRR   Pulmonary/Chest: Effort normal. No respiratory distress. He has no wheezes. He has no rales.   Abdominal: Soft. He exhibits no distension. There is no tenderness.   Musculoskeletal: He exhibits edema.   LUE PICC   Neurological: He is alert and easily aroused.   Nursing note and vitals reviewed.      Meds:    Current Facility-Administered Medications:   •  hyoscyamine-maalox plus-lidocaine viscous  •  [START ON 10/19/2017] sulfamethoxazole-trimethoprim  •  [START ON  10/19/2017] amoxicillin-clavulanate  •  senna-docusate **AND** polyethylene glycol/lytes **AND** magnesium hydroxide **AND** bisacodyl  •  omeprazole 2 mg/mL in sodium bicarbonate  •  levetiracetam  •  linezolid  •  lorazepam  •  sucralfate  •  calcium carbonate  •  PICC Line Insertion has been implemented **AND** May use Lidocaine 1% not to exceed 3 mls for local at insertion site **AND** NOTIFY MD **AND** Tip to dwell in the superior vena cava **AND** Do not use PICC Line until placement verified by Chest X Ray **AND** DX-CHEST-FOR PICC LINE Perform procedure in: PICC Room **AND** If radiologist reading of chest X-ray states any of the following the PICC should be used **AND** Further evaluation of the PICC placement can be retrieved from X-Ray and Imaging **AND** Blood draws through PICC line; draws by RN only **AND** FLUSHING GUIDELINES WHEN IN USE **AND** normal saline PF **AND** FLUSHING GUIDELINES WHEN NOT IN USE **AND** DRESSING MAINTENANCE **AND** Change needleless pressure ports and IV tubing every 72 hours per hospital policy **AND** TUBING **AND** If there is an MD order to remove the PICC line, any RN may remove the PICC line **AND** [] PATIENT EDUCATION MATERIALS **AND** NURSING COMMUNICATION  •  Metoprolol Tartrate  •  acetaminophen  •  digoxin  •  prochlorperazine  •  promethazine  •  hydrALAZINE  •  NS  •  ondansetron  •  metoprolol SR  •  atorvastatin  •  gabapentin  •  tamsulosin  •  Respiratory Care per Protocol    Labs:  Recent Labs      10/13/17   1603   WBC  5.4   RBC  2.96*   HEMOGLOBIN  9.0*   HEMATOCRIT  28.0*   MCV  94.6   MCH  30.4   RDW  50.1*   PLATELETCT  173   MPV  10.0   NEUTSPOLYS  55.90   LYMPHOCYTES  25.80   MONOCYTES  12.90   EOSINOPHILS  4.60   BASOPHILS  0.60     Recent Labs      10/13/17   1603  10/13/17   2340  10/14/17   0617   SODIUM  136   --    --    POTASSIUM  2.7*  3.0*  3.5*   CHLORIDE  101   --    --    CO2  30   --    --    GLUCOSE  119*   --    --    BUN  5*    --    --      Recent Labs      10/13/17   1603   CREATININE  0.45*       Imaging:  Ct-abdomen-pelvis With    Result Date: 9/19/2017 9/19/2017 6:34 PM HISTORY/REASON FOR EXAM:  Abscess noted on MRI. TECHNIQUE/EXAM DESCRIPTION:  CT scan of the abdomen and pelvis with contrast. Contrast-enhanced helical scanning was obtained from the diaphragmatic domes through the pubic symphysis following the bolus administration of nonionic contrast without complication. 100 mL of Omnipaque 350 nonionic contrast was administered without complication. Low dose optimization technique was utilized for this CT exam including automated exposure control and adjustment of the mA and/or kV according to patient size. COMPARISON: MRI from the same day FINDINGS: Lung bases: There are small bilateral pleural effusions with overlying atelectasis. Abdomen: No free air is seen in the abdomen or pelvis. Evaluation is limited by streak artifact from barium within the colon. There is wall thickening of the distal esophagus with a small hiatal hernia. Liver appears unremarkable. Portal vein is patent. There is calcification in the pancreatic head. No adrenal mass is identified. Tiny hypodense left renal lesion is too small to characterize. There is mild prominence of the renal collecting systems bilaterally. Small hypodense right renal lesions too small to characterize. There are nonobstructing right renal calculi. Atherosclerotic plaque is seen in the aorta and its branches. There are small inguinal, retroperitoneal and mesenteric lymph nodes. There is no evidence of bowel obstruction. There is a moderate amount of stool in the rectosigmoid colon. There is colonic diverticulosis. The appendix is not identified. Stomach is distended with fluid. Small bowel loops are fluid-filled. Bladder is mildly distended with foci of air. There is asymmetric prominence of the right iliopsoas musculature with surrounding stranding. Findings likely related to the  previously noted abscess collection. Degenerative changes are seen in the spine. Postsurgical changes are noted in the lumbar spine. Degenerative changes are seen at the hips. There is mild loss of height of the superior endplate of T11 with a Schmorl's node noted. There is mild endplate irregularity at L3/L4.     Prominence of the right iliopsoas muscle with surrounding inflammatory stranding. The known fluid collection was better delineated on the prior MRI. Mild endplate irregularity at L3/L4 can be seen in discitis/osteomyelitis. Air-fluid level in the bladder. Correlation with urinalysis is recommended. This can be seen in the setting of recent instrumentation, infection or fistula. Colonic diverticulosis. Moderate amount of colonic stool. Nonobstructing right renal calculi. Mild prominence of the renal collecting systems bilaterally. Small hypodense renal lesions are too small to characterize. Fluid-filled loops of small bowel can be seen in the setting of enteritis. Small hiatal hernia with mild thickening of the distal esophagus. Calcifications in the pancreatic head can be seen in chronic pancreatitis. Atherosclerotic plaque. Small bilateral pleural effusions with overlying atelectasis.     Ct-drain-retroperitoneal    Result Date: 9/20/2017 9/20/2017 2:30 PM HISTORY/REASON FOR EXAM:  Multiloculated fluid collections in the right iliacus muscle. Suspected abscess. TECHNIQUE/EXAM DESCRIPTION: Right pelvic (extraperitoneal) retroperitoneal abscess drainage with CT guidance. Low dose optimization technique was utilized for this CT exam including automated exposure control and adjustment of the mA and/or kV according to patient size. PROCEDURE: Informed consent was obtained. Procedures performed with local anesthesia only with appropriate continuous patient monitoring by the radiology nurse. Sedation duration: N/A minutes Localizing CT images were obtained with the patient in supine position. The skin was  prepped with Betadine and draped in a sterile fashion. Following local anesthesia with 1% Lidocaine, a 17 G guiding needle was placed and extraperitoneal needle path in the right side of the pelvis via the iliacus muscle confirmed with CT. An Amplatz guidewire was placed and following serial tract dilatation, a 8 [place]vian pigtail locking catheter was placed. A specimen was collected and submitted for culture and sensitivity and Gram stain. Total of 5 mL old bloody reddish-brown fluid was drained. No purulent fluid was obtained. The fluid was not malodorous. The catheter was secured to the skin and connected to suction bulb drainage. Final CT images were obtained documenting catheter position. The patient tolerated the procedure well with no evidence of complication. Low dose optimization technique was utilized for this CT exam including automated exposure control and adjustment of the mA and/or kV according to patient size. COMPARISON: MRI lumbar spine 9/19/2017, CT abdomen and pelvis 9/19/2017. FINDINGS: The final CT images show satisfactory catheter position within the target collection.     1.  CT GUIDED RETROPERITONEAL (EXTRAPERITONEAL) RIGHT PELVIC CATHETER DRAINAGE WITHIN THE RIGHT ILIACUS MUSCLE. OLD DARK REDDISH-BROWN BLOODY FLUID WAS OBTAINED. NO ABSCESS OR PURULENT MATERIAL. 2.  THE CURRENT PLAN IS TO CHECK CULTURES AND LIKELY REMOVED CATHETER IN THE SHORT-TERM AT 48-72 HOURS AS THERE IS UNLIKELY TO BE AN ABSCESS.    Ct-head W/o    Result Date: 9/13/2017 9/13/2017 9:30 PM HISTORY/REASON FOR EXAM:  Altered Mental Status. TECHNIQUE/EXAM DESCRIPTION AND NUMBER OF VIEWS: CT of the head without contrast. The study was performed on a helical multidetector CT scanner. Contiguous 2.5 mm axial sections were obtained from the skull base through the vertex. Up to date radiation dose reduction adjustments have been utilized to meet ALARA standards for radiation dose reduction. COMPARISON:  7/12/2017 FINDINGS: The  lateral ventricles are enlarged. Cortical sulci are enlarged. Patchy areas of low attenuation in the white matter bilaterally. No significant mass effect or midline shift. Basal cisterns are patent. No evidence for intracranial hemorrhage. Calvaria are intact. Visualized orbits are unremarkable. Visualized mastoid air cells are clear. Visualized paranasal sinuses are unremarkable. Calcifications of the carotid arteries noted. Calcified scalp nodules again present.     1.  Diffuse atrophy and white matter changes. 2.  No acute intracranial hemorrhage or territorial infarct.     Dx-chest-portable (1 View)    Result Date: 9/16/2017 9/16/2017 10:31 PM HISTORY/REASON FOR EXAM:  Shortness of Breath. TECHNIQUE/EXAM DESCRIPTION AND NUMBER OF VIEWS: Single portable view of the chest. COMPARISON: 9/13/2017 FINDINGS: Cardiac mediastinal contour is unchanged. Mediastinum is again prominent. Mild prominence of the pulmonary interstitium. No gross pleural fluid or pneumothorax. Dextroconvex curvature of thoracic spine.     No significant change from prior exam.    Dx-chest-portable (1 View)    Result Date: 9/13/2017 9/13/2017 8:44 PM HISTORY/REASON FOR EXAM:  Possible sepsis. TECHNIQUE/EXAM DESCRIPTION AND NUMBER OF VIEWS: Single portable view of the chest. COMPARISON: 8/7/2017 FINDINGS: Cardiac mediastinal contour is unchanged. Mild diffuse prominence of the interstitium again seen. No focal consolidation. No pleural fluid collection or pneumothorax. Dextroconvex curvature of thoracic spine. Diffuse osteopenia.     1.  Diffuse prominence of interstitium again seen, likely interstitial lung disease.  Mild pulmonary edema is also a consideration. 2.  No pneumonia or pneumothorax. 3.  Stable cardiomegaly.    Dx-small Bowel Series    Result Date: 9/17/2017 9/16/2017 10:39 AM HISTORY/REASON FOR EXAM:  Nausea and vomiting. TECHNIQUE/EXAM DESCRIPTION AND NUMBER OF VIEWS: Single contrast barium small bowel follow-through performed.  Fluoroscopic images were obtained. No fluoroscopic images obtained. COMPARISON:  None available. FINDINGS:  view the abdomen demonstrates marked gaseous distention and small bowel and colonic distention. Patient drank thin barium. Contrast passed very slowly into dilated small bowel loops. Jejunal loops are dilated up to 5.2 cm. Overhead images were taken for a total of 22 hours before termination of the exam. Contrast reached the ileum, but does not extend into the colon. There is a large amount of stool in the colon.     1.  Gaseous distention with fairly diffuse bowel dilatation. Contrast extends into the ileum, but does not reach the colon within 22 hours. No transition point is identified to suggest mechanical bowel obstruction. 2.  Large amount of stool in the rectal vault.    Mr-lumbar Spine-with & W/o    Result Date: 9/19/2017 9/19/2017 1:35 PM HISTORY/REASON FOR EXAM:  Altered mental status, weakness. Possible discitis. Previous lumbar surgery. TECHNIQUE/EXAM DESCRIPTION: MRI of the lumbar spine without and with contrast. The study was performed on a [employer] 1.5 Lucille MRI scanner. T1 sagittal, T2 fast spin-echo sagittal, T2 axial images and T1 axial images were obtained of the lumbar spine. T1 post-contrast sagittal and T1 post-contrast axial images were obtained. Optional T2 fat-suppressed sagittal images may be obtained. 20 mL Omniscan gadolinium contrast was administered intravenously. COMPARISON:  MRI lumbar spine 7/7/2017, T abdomen and pelvis 1/6/2017 FINDINGS: Alignment in the lumbar spine again shows retrolisthesis of L3 on L4 of about 5 mm. There is fluid signal T2 hyperintensity again seen in the L3-L4 disc space and prominent marrow edema signal at the L3 inferior endplate and to a lesser extent L4 superior endplate. There is corresponding enhancement, particularly at the inferior endplate of L3. Findings are highly suspicious for discitis with osteomyelitis. There is postoperative  change with lumbar laminectomy at L2-L3 through L5. There is posterior fusion with transpedicular screw fixation 4-L5. The posterior paraspinous soft tissues show expected postoperative changes with atrophy and fatty replacement in  the posterior paraspinous muscles at the operative levels. There is no significant postoperative dorsal epidural fluid collection. However, there has been interval development of multilocular fluid collections in the right iliacus muscle spanning about 42 mm. These are consistent with intramuscular abscesses (T2 axial image 4, series 5, T1 postcontrast axial image 1, series 10). The conus is normal in position and signal with its tip at the L1 level. At T12-L1, is a tiny central and left paramedian disc protrusion with an underlying annular fissure. There is mild hypertrophic facet arthropathy. Thecal sac is toward the lower range of normal without karla central stenosis. The neural foramina are intact. At L1-2, there is negligible disc bulging. There is no central stenosis or significant foraminal stenosis. At L2-3, there is a small broad-based disc-osteophyte complex. There is no central stenosis as there is decompressive laminectomy at this level. There is no significant foraminal stenosis. At L3-4, there is posterior marginal spurring and disc bulging accentuated by the retrolisthesis. There is moderate bilateral lateral recess stenosis. Thecal sac is toward the lower range of normal even though there is a decompressive laminectomy at this  level. However, there is no karla central stenosis (T2 axial image 24, series 5). There is severe bilateral foraminal stenosis, right greater than left. This is unchanged from the previous exam. At L4-5, no central stenosis. There is inferior foraminal blunting with moderate bilateral foraminal stenosis. At L5-S1, there is minimal disc bulging. There is a left paramedian annular fissure. There is mild hypertrophic facet arthropathy. No central  stenosis. There is moderate bilateral foraminal stenosis.     1.  L3-4 findings again suggesting discitis with osteomyelitis. No evidence of epidural abscess or epidural phlegmon. 2.  L3-4 retrolisthesis. 3.  Postoperative multilevel lumbar laminectomy and posterior fusion at L4-5. 4.  Interval development of multilocular intramuscular abscesses in the right iliacus muscle, partly in the field of view 5.  Degenerative and spondylotic changes as detailed for each level above in the body of report.    Dx-esophagus - Barium Swallow    Result Date: 9/16/2017 9/16/2017 10:39 AM HISTORY/REASON FOR EXAM:  Nausea. Nausea and vomiting. TECHNIQUE/EXAM DESCRIPTION AND NUMBER OF VIEWS: Single contrast esophagram procedure was performed. 5 fluoroscopic images obtained. Fluoroscopy time: 0.37 minutes COMPARISON:  None. FINDINGS: Limited exam secondary to limited patient mobility. The esophagus appears normal in caliber and configuration. No definite mucosal lesions are identified on single-contrast techniques. Contrast passes freely into the stomach. No hiatal hernia is identified.     No abnormalities identified on limited single contrast esophagram.    Echocardiogram Comp W/o Cont    Result Date: 9/14/2017  Transthoracic Echo Report Echocardiography Laboratory CONCLUSIONS Prior study done on [id number] left ventricular systolic function. Normal regional wall motion. Left ventricular ejection fraction is visually estimated to be 70%. Diastolic function is difficult to assess with atrial fibrillation. The right ventricle was normal in size and function. Structurally normal mitral valve without significant stenosis or regurgitation. Aortic sclerosis without stenosis. No aortic insufficiency. Normal pericardium without effusion.FINDINGS Left Ventricle Normal left ventricular chamber size. Normal left ventricular wall thickness. Normal left ventricular systolic function. Normal regional wall motion. Left ventricular ejection  fraction is visually estimated to be 70%. Diastolic function is difficult to assess with atrial fibrillation. Right Ventricle The right ventricle was normal in size and function. Right Atrium The right atrium is normal in size.  Inferior vena cava is not well visualized. Left Atrium The left atrium is normal in size.  Left atrial volume index is 19  mL/sq m. Mitral Valve Structurally normal mitral valve without significant stenosis or regurgitation.  Trace mitral regurgitation. Aortic Valve Aortic sclerosis without stenosis. Tricuspid aortic valve. No aortic insufficiency. Tricuspid Valve Structurally normal tricuspid valve without significant stenosis or regurgitation. Pulmonic Valve Structurally normal pulmonic valve without significant stenosis or regurgitation. Pericardium Normal pericardium without effusion. Aorta The aortic root is normal.  Ascending aorta diameter is 3.1 cm. Kingsley Hung M.D. (Electronically Signed) Final Date:     14 September 2017                 11:10      Micro:  Results     Procedure Component Value Units Date/Time    URINE CULTURE(NEW) [[id number]] Collected:  10/07/17 0032    Order Status:  Completed Specimen:  Urine from Urine, Clean Catch Updated:  10/09/17 0829     Significant Indicator NEG     Source UR     Site URINE, CLEAN CATCH     Urine Culture No growth at 48 hours    Narrative:       Collected By:[id number] ARIA LI  Indication for culture:->Dysuria/Frequency/Burning          Assessment:  Active Hospital Problems    Diagnosis   • Persistent atrial fibrillation (CMS-[facility]) [I48.1]   • Sepsis (CMS-[facility]) [A41.9]   • 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.81]   • Chronic osteomyelitis of lumbar spine (CMS-[facility]) [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]       Plan:  L3-4 discitis osteomyelitis  Afebrile  Resolved leukocytosis  Status post treatment with Rocephin (prior strep viridans)  Persistent changes on repeat MRI of 9/19/17  Change ceftarolin to Zyvox  Stop date 10/18/2017  Repeat MRI done on 10/12/17  shows improvement in the discitis osteomyelitis  I would recommend continue to Zyvox through 10/18/17  Follow up with another 2-4 weeks of Bactrim and Augmentin    Right iliacus abscess versus hematoma  Status post drainage on 9/20/17  Possibly hematoma  Cultures are negative     Odynophagia  Has hiatal hernia  EGD shows severe erosive gastritis and esophagitis  Discontinued Diflucan    Generalized debility  Needs rehabilitation  ID will sign off. Follow up with ID as an out patient.

## 2017-10-14 NOTE — PROGRESS NOTES
[facility] Hospitalist Progress Note    Date of Service: 10/14/2017    Chief Complaint  86 y.o. male admitted 2017 with acute ground-level fall and back pain found to have  acute on chronic osteomyelitis and discitis, on long term abx     Interval Problem Update    Episodes of Aflutter w bradycardia- improved control  w IV lopressor. Reports his chest pain is improved w Gi cocktail.  Episodes of N/V    Consultants/Specialty  Infectious diseases Dr. Getachew Vincent  of cardiology - signed off  GI Dr. Waters    Disposition  Pt to go to [facility] if MRI Stable.         Review of Systems   Constitutional: Negative for chills and fever.   HENT: Negative for congestion.              Respiratory: Negative for cough, shortness of breath, wheezing and stridor.    Cardiovascular: Positive for chest pain (w intake.). Negative for leg swelling.   Gastrointestinal: Positive for nausea and vomiting. Negative for abdominal pain and diarrhea.   Genitourinary: Negative for flank pain and hematuria.   Musculoskeletal: Negative for back pain and neck pain.   Neurological: Positive for weakness. Negative for dizziness, speech change, focal weakness and headaches.   Psychiatric/Behavioral: Negative for hallucinations and substance abuse.      Physical Exam  Laboratory/Imaging   Hemodynamics  Temp (24hrs), Av.3 °C (97.3 °F), Min:35.8 °C (96.4 °F), Max:36.9 °C (98.4 °F)   Temperature: 36.6 °C (97.8 °F)  Pulse  Av.1  Min: 45  Max: 147   Blood Pressure : 149/68      Respiratory      Respiration: 18, Pulse Oximetry: 93 %     Work Of Breathing / Effort: Mild  RUL Breath Sounds: Clear, RML Breath Sounds: Diminished, RLL Breath Sounds: Diminished, MIKE Breath Sounds: Clear, LLL Breath Sounds: Diminished    Fluids    Intake/Output Summary (Last 24 hours) at 10/14/17 1636  Last data filed at 10/14/17 1400   Gross per 24 hour   Intake                0 ml   Output             1525 ml   Net            -1525 ml        Nutrition  Orders Placed This Encounter   Procedures   • DIET ORDER     Standing Status:   Standing     Number of Occurrences:   1     Order Specific Question:   Diet:     Answer:   Clear Liquids - No Red Foods [12]     Physical Exam   Constitutional: He is oriented to person, place, and time. He is cooperative. He does not appear ill. No distress.   HENT:   Head: Normocephalic and atraumatic.   Eyes: Conjunctivae and EOM are normal. Right eye exhibits no discharge. Left eye exhibits no discharge.   Neck: Normal range of motion.   Cardiovascular: Normal rate and regular rhythm.    No murmur heard.  Pulmonary/Chest: Effort normal and breath sounds normal. No stridor. No respiratory distress. He has no wheezes. He has no rales.   Abdominal: Soft. Bowel sounds are normal. He exhibits no distension. There is no tenderness.   Obese.    Musculoskeletal: Normal range of motion. He exhibits no edema or tenderness.   generalized 3-4/5 strength.   Neurological: He is alert and oriented to person, place, and time. No cranial nerve deficit.   Skin: Skin is warm and dry. No rash noted. He is not diaphoretic.   Psychiatric:   Calm, cooperative .   Vitals reviewed.      Recent Labs      10/13/17   1603   WBC  5.4   RBC  2.96*   HEMOGLOBIN  9.0*   HEMATOCRIT  28.0*   MCV  94.6   MCH  30.4   MCHC  32.1*   RDW  50.1*   PLATELETCT  173   MPV  10.0     Recent Labs      10/13/17   1603  10/13/17   2340  10/14/17   0617   SODIUM  136   --    --    POTASSIUM  2.7*  3.0*  3.5*   CHLORIDE  101   --    --    CO2  30   --    --    GLUCOSE  119*   --    --    BUN  5*   --    --    CREATININE  0.45*   --    --    CALCIUM  8.7   --    --                       Assessment/Plan     * Persistent atrial fibrillation (CMS-HCC)- (present on admission)   Assessment & Plan    Persistent w aflutter - periods of tachycardia Hr 150s w bradycardia   Prn IV lopressor   Digoxin , toprol xl for rate control   Not  Ac candidate w hematoma.  Cardiology  input appreciated.            Dysphagia   Assessment & Plan    Post EGD w recs by Gi for IV proton pump inhibitor and Carafate suspension with repeat endoscopy and dilation in 1-2 weeks   clears as yareli  slp re eval.        Discitis of lumbar region- (present on admission)   Assessment & Plan    Afebrile, improved pain -- fu Improved Mri findings   Oral zyvox, bactrim per ID thru 10/19             Nontraumatic psoas hematoma- (present on admission)   Assessment & Plan    Ac stopped, hgb stable.  Monitor for acute bleed.        Hypomagnesemia- (present on admission)   Assessment & Plan    IV magnesium given - fu         Esophageal stricture   Assessment & Plan    Fu outpt EGD for eval dilation         Esophagitis   Assessment & Plan    Improved symptoms overall - likely Candidal   trial Gi cocktail  Oral ppi, carafate   Diflucan  Prn anti emetics.   Dr. Mathias- recommends outpt fu endoscopy in 1-2 weeks.        Hypokalemia- (present on admission)   Assessment & Plan    kcl supplements   Replete low mag  Fu lytes.        DNR (do not resuscitate)- (present on admission)   Assessment & Plan    Discussed with patient- will update to DNR code status current admission .        Normocytic anemia- (present on admission)   Assessment & Plan    Stable - multi factorial - acute blood loss, chronic dz, iron def.  Fu hgb   Add Iron supplements.           Debility- (present on admission)   Assessment & Plan    Continue PT /OT   Anticipate dc to [facility] when cardiac stable.              Reviewed items::  Labs reviewed and Medications reviewed  Hearn catheter::  No Hearn  DVT prophylaxis - mechanical:  SCDs  Ulcer Prophylaxis::  Yes  Antibiotics:  Treating active infection/contamination beyond 24 hours perioperative coverage

## 2017-10-14 NOTE — PROGRESS NOTES
Metoprolol XL held this AM per Dr. Wu. Previous 2 days AM dose held as well.   Pt HR sustaining in the 150's this afternoon. Pt given IV Metoprolol (see MAR). Pt HR now sustaining in the 70's-80's.

## 2017-10-14 NOTE — PROGRESS NOTES
Kelsi from Lab called with critical result of potassium at 2.7. Critical lab result read back to Kelsi.   Dr. Wu notified of critical lab result of potassium at 2.7.  Critical lab result read back by Dr. Wu.    Orders received for 4 K riders, and 3g Mg IV.

## 2017-10-14 NOTE — PROGRESS NOTES
Pt was using Urinal to void but would spill every time. Placed a condom catheter in hopes that it helps pt stay dry and accident free.

## 2017-10-14 NOTE — PROGRESS NOTES
Late Note:  Pt's HR as high as 184 and as low as 42. Doctor notified. Labs ordered. Will follow up with doctor once labs result.

## 2017-10-14 NOTE — CARE PLAN
Problem: Safety  Goal: Will remain free from injury  Outcome: PROGRESSING AS EXPECTED  Pt encouraged to call for assistance. Bed is in lowest position. Bed alarm is on. Frequent bedside rounding.

## 2017-10-14 NOTE — CARE PLAN
Problem: Skin Integrity  Goal: Risk for impaired skin integrity will decrease    Intervention: Assess risk factors for impaired skin integrity and/or pressure ulcers  Pt Q2h turned/ambulated. Pt tolerates well. Waffle overlay in place, mepilex on sacrum.       Problem: Mobility  Goal: Risk for activity intolerance will decrease    Intervention: Assess and monitor signs of activity intolerance  Pt at risk for activity intolerance. Pt ambulated to edge bed. Pt tolerating well. Will continue to encourage and assist with mobility.

## 2017-10-14 NOTE — PROGRESS NOTES
"Pt experienced an acute episode of vomiting. Emesis was yellow, whitish and thick about 75ml. Pt denies any more nausea, or discomfort but states that he, \"feel better.\"  Will update MD.   "

## 2017-10-15 LAB
ANION GAP SERPL CALC-SCNC: 6 MMOL/L (ref 0–11.9)
BUN SERPL-MCNC: 3 MG/DL (ref 8–22)
CALCIUM SERPL-MCNC: 9.1 MG/DL (ref 8.5–10.5)
CHLORIDE SERPL-SCNC: 104 MMOL/L (ref 96–112)
CO2 SERPL-SCNC: 29 MMOL/L (ref 20–33)
CREAT SERPL-MCNC: 0.44 MG/DL (ref 0.5–1.4)
GFR SERPL CREATININE-BSD FRML MDRD: >60 ML/MIN/1.73 M 2
GLUCOSE SERPL-MCNC: 106 MG/DL (ref 65–99)
MAGNESIUM SERPL-MCNC: 1.7 MG/DL (ref 1.5–2.5)
POTASSIUM SERPL-SCNC: 3.4 MMOL/L (ref 3.6–5.5)
SODIUM SERPL-SCNC: 139 MMOL/L (ref 135–145)

## 2017-10-15 PROCEDURE — A9270 NON-COVERED ITEM OR SERVICE: HCPCS | Performed by: HOSPITALIST

## 2017-10-15 PROCEDURE — A9270 NON-COVERED ITEM OR SERVICE: HCPCS | Performed by: INTERNAL MEDICINE

## 2017-10-15 PROCEDURE — 80048 BASIC METABOLIC PNL TOTAL CA: CPT

## 2017-10-15 PROCEDURE — 770020 HCHG ROOM/CARE - TELE (206)

## 2017-10-15 PROCEDURE — 700102 HCHG RX REV CODE 250 W/ 637 OVERRIDE(OP): Performed by: HOSPITALIST

## 2017-10-15 PROCEDURE — 700111 HCHG RX REV CODE 636 W/ 250 OVERRIDE (IP): Performed by: HOSPITALIST

## 2017-10-15 PROCEDURE — 700102 HCHG RX REV CODE 250 W/ 637 OVERRIDE(OP): Performed by: INTERNAL MEDICINE

## 2017-10-15 PROCEDURE — 83735 ASSAY OF MAGNESIUM: CPT

## 2017-10-15 PROCEDURE — 99232 SBSQ HOSP IP/OBS MODERATE 35: CPT | Performed by: HOSPITALIST

## 2017-10-15 PROCEDURE — 700105 HCHG RX REV CODE 258: Performed by: HOSPITALIST

## 2017-10-15 PROCEDURE — 700101 HCHG RX REV CODE 250: Performed by: HOSPITALIST

## 2017-10-15 RX ORDER — POTASSIUM CHLORIDE 7.45 MG/ML
10 INJECTION INTRAVENOUS
Status: COMPLETED | OUTPATIENT
Start: 2017-10-15 | End: 2017-10-15

## 2017-10-15 RX ORDER — POTASSIUM CHLORIDE 20 MEQ/1
20 TABLET, EXTENDED RELEASE ORAL DAILY
Status: DISCONTINUED | OUTPATIENT
Start: 2017-10-15 | End: 2017-10-15

## 2017-10-15 RX ORDER — MAGNESIUM SULFATE HEPTAHYDRATE 40 MG/ML
2 INJECTION, SOLUTION INTRAVENOUS ONCE
Status: COMPLETED | OUTPATIENT
Start: 2017-10-15 | End: 2017-10-15

## 2017-10-15 RX ADMIN — MAGNESIUM SULFATE IN WATER 2 G: 40 INJECTION, SOLUTION INTRAVENOUS at 17:53

## 2017-10-15 RX ADMIN — SUCRALFATE 1 G: 1 SUSPENSION ORAL at 05:12

## 2017-10-15 RX ADMIN — LEVETIRACETAM 500 MG: 100 SOLUTION ORAL at 20:49

## 2017-10-15 RX ADMIN — LEVETIRACETAM 500 MG: 100 SOLUTION ORAL at 09:30

## 2017-10-15 RX ADMIN — GABAPENTIN 200 MG: 100 CAPSULE ORAL at 08:52

## 2017-10-15 RX ADMIN — SUCRALFATE 1 G: 1 SUSPENSION ORAL at 20:50

## 2017-10-15 RX ADMIN — LIDOCAINE HYDROCHLORIDE 15 ML: 20 SOLUTION OROPHARYNGEAL at 08:55

## 2017-10-15 RX ADMIN — SODIUM CHLORIDE: 9 INJECTION, SOLUTION INTRAVENOUS at 04:31

## 2017-10-15 RX ADMIN — METOPROLOL SUCCINATE 50 MG: 25 TABLET, EXTENDED RELEASE ORAL at 08:52

## 2017-10-15 RX ADMIN — POTASSIUM CHLORIDE 10 MEQ: 7.46 INJECTION, SOLUTION INTRAVENOUS at 16:35

## 2017-10-15 RX ADMIN — TAMSULOSIN HYDROCHLORIDE 0.4 MG: 0.4 CAPSULE ORAL at 08:52

## 2017-10-15 RX ADMIN — OMEPRAZOLE 40 MG: 20 CAPSULE, DELAYED RELEASE ORAL at 20:50

## 2017-10-15 RX ADMIN — ATORVASTATIN CALCIUM 10 MG: 10 TABLET, FILM COATED ORAL at 20:50

## 2017-10-15 RX ADMIN — ANTACID TABLETS 1000 MG: 500 TABLET, CHEWABLE ORAL at 21:09

## 2017-10-15 RX ADMIN — GABAPENTIN 200 MG: 100 CAPSULE ORAL at 20:50

## 2017-10-15 RX ADMIN — OMEPRAZOLE 40 MG: 20 CAPSULE, DELAYED RELEASE ORAL at 08:53

## 2017-10-15 RX ADMIN — POTASSIUM CHLORIDE 10 MEQ: 7.46 INJECTION, SOLUTION INTRAVENOUS at 17:53

## 2017-10-15 RX ADMIN — LINEZOLID 600 MG: 600 TABLET, FILM COATED ORAL at 20:50

## 2017-10-15 RX ADMIN — PROCHLORPERAZINE EDISYLATE 10 MG: 5 INJECTION INTRAMUSCULAR; INTRAVENOUS at 08:55

## 2017-10-15 RX ADMIN — SODIUM CHLORIDE: 9 INJECTION, SOLUTION INTRAVENOUS at 17:52

## 2017-10-15 RX ADMIN — Medication 325 MG: at 17:53

## 2017-10-15 RX ADMIN — SUCRALFATE 1 G: 1 SUSPENSION ORAL at 16:35

## 2017-10-15 RX ADMIN — ROSUVASTATIN CALCIUM 125 MCG: 10 TABLET, FILM COATED ORAL at 17:53

## 2017-10-15 RX ADMIN — LINEZOLID 600 MG: 600 TABLET, FILM COATED ORAL at 12:25

## 2017-10-15 RX ADMIN — GABAPENTIN 200 MG: 100 CAPSULE ORAL at 16:35

## 2017-10-15 RX ADMIN — SUCRALFATE 1 G: 1 SUSPENSION ORAL at 12:24

## 2017-10-15 ASSESSMENT — ENCOUNTER SYMPTOMS
VOMITING: 0
CHILLS: 0
DIARRHEA: 0
NECK PAIN: 0
ABDOMINAL PAIN: 0
BACK PAIN: 0
WEAKNESS: 1
FLANK PAIN: 0
SHORTNESS OF BREATH: 0
SPEECH CHANGE: 0
STRIDOR: 0
FEVER: 0
DIZZINESS: 0
COUGH: 0
NAUSEA: 0
WHEEZING: 0
FOCAL WEAKNESS: 0
HALLUCINATIONS: 0

## 2017-10-15 ASSESSMENT — PAIN SCALES - GENERAL
PAINLEVEL_OUTOF10: 0

## 2017-10-15 ASSESSMENT — LIFESTYLE VARIABLES: SUBSTANCE_ABUSE: 0

## 2017-10-15 NOTE — PROGRESS NOTES
Monitor tech called stating that pt was sustaining a HR of 149. Upon entering pt's room to assess pt and pt's need for metoprolol that is ordered PRN for sustained HR greater than 115, pt's HR went down to 70s without any intervention. Pt was attempting to use the urinal during the time of increase in HR.

## 2017-10-15 NOTE — CARE PLAN
Problem: Nutritional:  Goal: Achieve adequate nutritional intake  Diet will advance past NPO/clears vs nutrition support to start  Outcome: PROGRESSING SLOWER THAN EXPECTED  See dietary note.

## 2017-10-15 NOTE — CARE PLAN
Problem: Skin Integrity  Goal: Risk for impaired skin integrity will decrease    Intervention: Assess risk factors for impaired skin integrity and/or pressure ulcers  Pt evaluated for skin breakdown. Waffle mattress overlay applied to bed. Extra pillows in place. Mepilex in place. Silicon NC in use.      Problem: Mobility  Goal: Risk for activity intolerance will decrease    Intervention: Encourage patient to increase activity level in collaboration with Interdisciplinary Team  Pt encouraged to ambulate more frequently. Will attempt to get patient to side of bed or chair.

## 2017-10-15 NOTE — PROGRESS NOTES
[facility] Hospitalist Progress Note    Date of Service: 10/15/2017    Chief Complaint  86 y.o. male admitted 2017 with acute ground-level fall and back pain found to have  acute on chronic osteomyelitis and discitis, on long term abx     Interval Problem Update    Less odynophagia . Feeling better. Had episode aflutter yesterday - given IV lopressor- currently  sinus rhythm.      Consultants/Specialty  Infectious diseases Dr. Getachew Vincent, Dr. Stewart  of cardiology   GI Dr. Waters    Disposition  Pt to go to [facility] if MRI Stable.         Review of Systems   Constitutional: Negative for chills and fever.   HENT: Negative for congestion.              Respiratory: Negative for cough, shortness of breath, wheezing and stridor.    Cardiovascular: Positive for chest pain (w intake.). Negative for leg swelling.   Gastrointestinal: Negative for abdominal pain, diarrhea, nausea and vomiting.   Genitourinary: Negative for flank pain and hematuria.   Musculoskeletal: Negative for back pain and neck pain.   Neurological: Positive for weakness. Negative for dizziness, speech change and focal weakness.   Psychiatric/Behavioral: Negative for hallucinations and substance abuse.      Physical Exam  Laboratory/Imaging   Hemodynamics  Temp (24hrs), Av.3 °C (97.3 °F), Min:35.9 °C (96.7 °F), Max:36.6 °C (97.8 °F)   Temperature: 36.3 °C (97.3 °F)  Pulse  Av.9  Min: 45  Max: 147   Blood Pressure : 129/72      Respiratory      Respiration: 16, Pulse Oximetry: 97 %     Work Of Breathing / Effort: Mild  RUL Breath Sounds: Clear, RML Breath Sounds: Diminished, RLL Breath Sounds: Diminished, MIKE Breath Sounds: Clear, LLL Breath Sounds: Diminished    Fluids    Intake/Output Summary (Last 24 hours) at 10/15/17 1447  Last data filed at 10/15/17 1200   Gross per 24 hour   Intake             1510 ml   Output             2455 ml   Net             -945 ml       Nutrition  Orders Placed This Encounter   Procedures   • DIET  ORDER     Standing Status:   Standing     Number of Occurrences:   1     Order Specific Question:   Diet:     Answer:   Clear Liquids - No Red Foods [12]     Physical Exam   Constitutional: He is cooperative. He does not appear ill. No distress.   HENT:   Head: Normocephalic and atraumatic.   Eyes: EOM are normal. Right eye exhibits no discharge. Left eye exhibits no discharge.   Cardiovascular: Normal rate and regular rhythm.    No murmur heard.  Pulmonary/Chest: Effort normal and breath sounds normal. No stridor. No respiratory distress. He has no wheezes. He has no rales.   Abdominal: Soft. Bowel sounds are normal. He exhibits no distension. There is no tenderness.   Obese.    Musculoskeletal: Normal range of motion. He exhibits no edema or tenderness.   generalized 3-4/5 strength.   Neurological: No cranial nerve deficit.   Skin: Skin is warm and dry. No rash noted. He is not diaphoretic.   Psychiatric:   Calm, cooperative .   Vitals reviewed.      Recent Labs      10/13/17   1603   WBC  5.4   RBC  2.96*   HEMOGLOBIN  9.0*   HEMATOCRIT  28.0*   MCV  94.6   MCH  30.4   MCHC  32.1*   RDW  50.1*   PLATELETCT  173   MPV  10.0     Recent Labs      10/13/17   1603  10/13/17   2340  10/14/17   0617  10/15/17   0428   SODIUM  136   --    --   139   POTASSIUM  2.7*  3.0*  3.5*  3.4*   CHLORIDE  101   --    --   104   CO2  30   --    --   29   GLUCOSE  119*   --    --   106*   BUN  5*   --    --   3*   CREATININE  0.45*   --    --   0.44*   CALCIUM  8.7   --    --   9.1                      Assessment/Plan     * Persistent atrial fibrillation (CMS-[facility])- (present on admission)   Assessment & Plan    Persistent w aflutter - periods of tachycardia Hr 150s w bradycardia   Prn IV lopressor   Digoxin , toprol xl   Ep to eval.  Not  Ac candidate w hematoma.  Cardiology input appreciated.            Dysphagia   Assessment & Plan    Post EGD w recs by Gi for IV proton pump inhibitor and Carafate suspension with repeat endoscopy  and dilation in 1-2 weeks   clears as yareli  slp re eval.        Discitis of lumbar region- (present on admission)   Assessment & Plan    Afebrile, improved pain -- fu Improved Mri findings   Oral zyvox, bactrim, augmentin per ID thru 10/19             Nontraumatic psoas hematoma- (present on admission)   Assessment & Plan    Ac stopped, hgb stable.  Monitor for acute bleed.        Hypomagnesemia- (present on admission)   Assessment & Plan    IV magnesium given   Add oral mag ox        Esophageal stricture   Assessment & Plan    Fu outpt EGD for eval dilation         Esophagitis   Assessment & Plan    Improved symptoms overall - likely Candidal - improved.  trial Gi cocktail, Oral ppi, carafate   Diflucan  Prn anti emetics.   Dr. Mathias- recommends outpt fu endoscopy , will Discuss w Gi if prolonged stay.        Hypokalemia- (present on admission)   Assessment & Plan     Increase kcl dose.  Replete low mag  Fu lytes.        DNR (do not resuscitate)- (present on admission)   Assessment & Plan    Discussed with patient- will update to DNR code status current admission .        Normocytic anemia- (present on admission)   Assessment & Plan    Stable - multi factorial - acute blood loss, chronic dz, iron def.  Fu hgb    Iron supplements.           Debility- (present on admission)   Assessment & Plan    Continue PT /OT                 Reviewed items::  Labs reviewed and Medications reviewed  Hearn catheter::  No Hearn  DVT prophylaxis - mechanical:  SCDs  Ulcer Prophylaxis::  Yes  Antibiotics:  Treating active infection/contamination beyond 24 hours perioperative coverage

## 2017-10-15 NOTE — PROGRESS NOTES
Monitor Summary    Rhythm: Aflutter  Rate: 67-89 (Accordong to monitor tech, pt sustained at 149 for 4-5 min before spontaneously going back to normal rate)  -/.08/-

## 2017-10-15 NOTE — DIETARY
Nutrition Services Update: Day 32 of admit. Day 6 of clear liquid diet.     Spoke w/ RN about advancement of diet order. RN states that GI is to follow up w/ pt tomorrow to reassess severe esophagitis that was noted on 10/9. RD to follow up on nutrition plan of care after GI sees pt again.     Plan/Recommend:  1. Advance diet as feasible per MD  2. If diet is not able to be advanced in the next couple of days - consider nutrition support  3. Continue Boost Breeze supplements TID while on clear liquid diet - and encourage intake  4. RD to monitor for diet advancement vs. Nutrition support

## 2017-10-15 NOTE — PROGRESS NOTES
Received report from Katharine HENRY. Assumed pt care. Assessment completed. A&Ox4. Pt denies pain, complains of nausea.   Pt is currently in bed. Bed alarm on, call light within reach, pt calls appropriately and does not get out of bed. RN and CNA numbers provided, no further needs at this time. Hourly rounding in progress.

## 2017-10-15 NOTE — CARE PLAN
Problem: Skin Integrity  Goal: Risk for impaired skin integrity will decrease    Intervention: Assess risk factors for impaired skin integrity and/or pressure ulcers  Pt evaluated for skin breakdown. Waffle mattress overlay applied to bed. Extra pillows in place. Silicon NC in use. Q 2 turning.       Problem: Mobility  Goal: Risk for activity intolerance will decrease    Intervention: Encourage patient to increase activity level in collaboration with Interdisciplinary Team  Pt ambulated to side of bed and stood. Will continue to encourage patient to ambulate.

## 2017-10-15 NOTE — CARE PLAN
Problem: Safety  Goal: Will remain free from injury  Outcome: PROGRESSING AS EXPECTED  Pt encouraged to call for assistance     Problem: Skin Integrity  Goal: Risk for impaired skin integrity will decrease  Outcome: NOT MET  Pt declines repositioning. Skin integrity and the causes of impaired skin integrity, such as refusing repositioning assistance was discussed with pt.

## 2017-10-16 ENCOUNTER — APPOINTMENT (OUTPATIENT)
Dept: RADIOLOGY | Facility: MEDICAL CENTER | Age: 82
DRG: 871 | End: 2017-10-16
Attending: INTERNAL MEDICINE
Payer: MEDICARE

## 2017-10-16 LAB
ANION GAP SERPL CALC-SCNC: 4 MMOL/L (ref 0–11.9)
BUN SERPL-MCNC: <5 MG/DL (ref 8–22)
CALCIUM SERPL-MCNC: 8.7 MG/DL (ref 8.5–10.5)
CHLORIDE SERPL-SCNC: 101 MMOL/L (ref 96–112)
CO2 SERPL-SCNC: 30 MMOL/L (ref 20–33)
CREAT SERPL-MCNC: 0.55 MG/DL (ref 0.5–1.4)
GFR SERPL CREATININE-BSD FRML MDRD: >60 ML/MIN/1.73 M 2
GLUCOSE SERPL-MCNC: 94 MG/DL (ref 65–99)
POTASSIUM SERPL-SCNC: 2.9 MMOL/L (ref 3.6–5.5)
SODIUM SERPL-SCNC: 135 MMOL/L (ref 135–145)

## 2017-10-16 PROCEDURE — 700111 HCHG RX REV CODE 636 W/ 250 OVERRIDE (IP)

## 2017-10-16 PROCEDURE — 71010 DX-CHEST-PORTABLE (1 VIEW): CPT

## 2017-10-16 PROCEDURE — 0JH604Z INSERTION OF PACEMAKER, SINGLE CHAMBER INTO CHEST SUBCUTANEOUS TISSUE AND FASCIA, OPEN APPROACH: ICD-10-PCS | Performed by: INTERNAL MEDICINE

## 2017-10-16 PROCEDURE — 700101 HCHG RX REV CODE 250: Performed by: HOSPITALIST

## 2017-10-16 PROCEDURE — 700102 HCHG RX REV CODE 250 W/ 637 OVERRIDE(OP): Performed by: INTERNAL MEDICINE

## 2017-10-16 PROCEDURE — 99153 MOD SED SAME PHYS/QHP EA: CPT

## 2017-10-16 PROCEDURE — 304952 HCHG R 2 PADS

## 2017-10-16 PROCEDURE — 02HK3JZ INSERTION OF PACEMAKER LEAD INTO RIGHT VENTRICLE, PERCUTANEOUS APPROACH: ICD-10-PCS | Performed by: INTERNAL MEDICINE

## 2017-10-16 PROCEDURE — A9270 NON-COVERED ITEM OR SERVICE: HCPCS | Performed by: INTERNAL MEDICINE

## 2017-10-16 PROCEDURE — A9270 NON-COVERED ITEM OR SERVICE: HCPCS | Performed by: HOSPITALIST

## 2017-10-16 PROCEDURE — 93005 ELECTROCARDIOGRAM TRACING: CPT | Performed by: INTERNAL MEDICINE

## 2017-10-16 PROCEDURE — C1894 INTRO/SHEATH, NON-LASER: HCPCS

## 2017-10-16 PROCEDURE — 770020 HCHG ROOM/CARE - TELE (206)

## 2017-10-16 PROCEDURE — 700102 HCHG RX REV CODE 250 W/ 637 OVERRIDE(OP): Performed by: HOSPITALIST

## 2017-10-16 PROCEDURE — C1892 INTRO/SHEATH,FIXED,PEEL-AWAY: HCPCS

## 2017-10-16 PROCEDURE — C1898 LEAD, PMKR, OTHER THAN TRANS: HCPCS

## 2017-10-16 PROCEDURE — 305387 HCHG SUTURES

## 2017-10-16 PROCEDURE — 99152 MOD SED SAME PHYS/QHP 5/>YRS: CPT

## 2017-10-16 PROCEDURE — 99232 SBSQ HOSP IP/OBS MODERATE 35: CPT | Performed by: HOSPITALIST

## 2017-10-16 PROCEDURE — 700105 HCHG RX REV CODE 258: Performed by: HOSPITALIST

## 2017-10-16 PROCEDURE — 93010 ELECTROCARDIOGRAM REPORT: CPT | Performed by: INTERNAL MEDICINE

## 2017-10-16 PROCEDURE — C1786 PMKR, SINGLE, RATE-RESP: HCPCS

## 2017-10-16 PROCEDURE — 304853 HCHG PPM TEST CABLE

## 2017-10-16 PROCEDURE — 700111 HCHG RX REV CODE 636 W/ 250 OVERRIDE (IP): Performed by: NURSE PRACTITIONER

## 2017-10-16 PROCEDURE — 33207 INSERT HEART PM VENTRICULAR: CPT

## 2017-10-16 PROCEDURE — 97530 THERAPEUTIC ACTIVITIES: CPT

## 2017-10-16 PROCEDURE — 97535 SELF CARE MNGMENT TRAINING: CPT

## 2017-10-16 PROCEDURE — 80048 BASIC METABOLIC PNL TOTAL CA: CPT

## 2017-10-16 PROCEDURE — 700101 HCHG RX REV CODE 250

## 2017-10-16 RX ORDER — POTASSIUM CHLORIDE 20 MEQ/1
40 TABLET, EXTENDED RELEASE ORAL DAILY
Status: DISCONTINUED | OUTPATIENT
Start: 2017-10-17 | End: 2017-10-17

## 2017-10-16 RX ORDER — SODIUM CHLORIDE AND POTASSIUM CHLORIDE 150; 900 MG/100ML; MG/100ML
INJECTION, SOLUTION INTRAVENOUS CONTINUOUS
Status: DISCONTINUED | OUTPATIENT
Start: 2017-10-16 | End: 2017-10-17

## 2017-10-16 RX ORDER — POTASSIUM CHLORIDE 20 MEQ/1
20 TABLET, EXTENDED RELEASE ORAL DAILY
Status: DISCONTINUED | OUTPATIENT
Start: 2017-10-16 | End: 2017-10-16

## 2017-10-16 RX ORDER — MIDAZOLAM HYDROCHLORIDE 1 MG/ML
INJECTION INTRAMUSCULAR; INTRAVENOUS
Status: COMPLETED
Start: 2017-10-16 | End: 2017-10-16

## 2017-10-16 RX ORDER — LIDOCAINE HYDROCHLORIDE 20 MG/ML
INJECTION, SOLUTION INFILTRATION; PERINEURAL
Status: COMPLETED
Start: 2017-10-16 | End: 2017-10-16

## 2017-10-16 RX ORDER — BUPIVACAINE HYDROCHLORIDE 2.5 MG/ML
INJECTION, SOLUTION EPIDURAL; INFILTRATION; INTRACAUDAL
Status: COMPLETED
Start: 2017-10-16 | End: 2017-10-16

## 2017-10-16 RX ORDER — CEFAZOLIN SODIUM 1 G/3ML
INJECTION, POWDER, FOR SOLUTION INTRAMUSCULAR; INTRAVENOUS
Status: COMPLETED
Start: 2017-10-16 | End: 2017-10-16

## 2017-10-16 RX ORDER — METOPROLOL SUCCINATE 100 MG/1
200 TABLET, EXTENDED RELEASE ORAL DAILY
Status: DISCONTINUED | OUTPATIENT
Start: 2017-10-17 | End: 2017-10-18

## 2017-10-16 RX ORDER — POTASSIUM CHLORIDE 7.45 MG/ML
10 INJECTION INTRAVENOUS
Status: COMPLETED | OUTPATIENT
Start: 2017-10-16 | End: 2017-10-16

## 2017-10-16 RX ADMIN — ANTACID TABLETS 1000 MG: 500 TABLET, CHEWABLE ORAL at 20:08

## 2017-10-16 RX ADMIN — OMEPRAZOLE 40 MG: 20 CAPSULE, DELAYED RELEASE ORAL at 20:08

## 2017-10-16 RX ADMIN — POTASSIUM CHLORIDE 10 MEQ: 7.46 INJECTION, SOLUTION INTRAVENOUS at 11:53

## 2017-10-16 RX ADMIN — MIDAZOLAM 2 MG: 1 INJECTION INTRAMUSCULAR; INTRAVENOUS at 18:17

## 2017-10-16 RX ADMIN — LINEZOLID 600 MG: 600 TABLET, FILM COATED ORAL at 10:06

## 2017-10-16 RX ADMIN — TAMSULOSIN HYDROCHLORIDE 0.4 MG: 0.4 CAPSULE ORAL at 10:06

## 2017-10-16 RX ADMIN — LINEZOLID 600 MG: 600 TABLET, FILM COATED ORAL at 20:08

## 2017-10-16 RX ADMIN — OMEPRAZOLE 40 MG: 20 CAPSULE, DELAYED RELEASE ORAL at 10:06

## 2017-10-16 RX ADMIN — SUCRALFATE 1 G: 1 SUSPENSION ORAL at 20:08

## 2017-10-16 RX ADMIN — POTASSIUM CHLORIDE 10 MEQ: 7.46 INJECTION, SOLUTION INTRAVENOUS at 13:04

## 2017-10-16 RX ADMIN — SUCRALFATE 1 G: 1 SUSPENSION ORAL at 11:53

## 2017-10-16 RX ADMIN — FENTANYL CITRATE 100 MCG: 50 INJECTION, SOLUTION INTRAMUSCULAR; INTRAVENOUS at 18:17

## 2017-10-16 RX ADMIN — STANDARDIZED SENNA CONCENTRATE AND DOCUSATE SODIUM 1 TABLET: 8.6; 5 TABLET, FILM COATED ORAL at 10:06

## 2017-10-16 RX ADMIN — ANTACID TABLETS 1000 MG: 500 TABLET, CHEWABLE ORAL at 11:53

## 2017-10-16 RX ADMIN — LEVETIRACETAM 500 MG: 100 SOLUTION ORAL at 20:08

## 2017-10-16 RX ADMIN — LIDOCAINE HYDROCHLORIDE: 20 INJECTION, SOLUTION INFILTRATION; PERINEURAL at 18:10

## 2017-10-16 RX ADMIN — LEVETIRACETAM 500 MG: 100 SOLUTION ORAL at 10:06

## 2017-10-16 RX ADMIN — SODIUM CHLORIDE: 9 INJECTION, SOLUTION INTRAVENOUS at 05:46

## 2017-10-16 RX ADMIN — POTASSIUM CHLORIDE AND SODIUM CHLORIDE: 900; 150 INJECTION, SOLUTION INTRAVENOUS at 13:05

## 2017-10-16 RX ADMIN — Medication 325 MG: at 10:05

## 2017-10-16 RX ADMIN — CEFAZOLIN 3000 MG: 1 INJECTION, POWDER, FOR SOLUTION INTRAVENOUS at 18:10

## 2017-10-16 RX ADMIN — BUPIVACAINE HYDROCHLORIDE: 2.5 INJECTION, SOLUTION EPIDURAL; INFILTRATION; INTRACAUDAL; PERINEURAL at 18:10

## 2017-10-16 RX ADMIN — SUCRALFATE 1 G: 1 SUSPENSION ORAL at 05:46

## 2017-10-16 RX ADMIN — ANTACID TABLETS 1000 MG: 500 TABLET, CHEWABLE ORAL at 10:05

## 2017-10-16 RX ADMIN — METOPROLOL SUCCINATE 50 MG: 25 TABLET, EXTENDED RELEASE ORAL at 10:06

## 2017-10-16 RX ADMIN — GABAPENTIN 200 MG: 100 CAPSULE ORAL at 10:06

## 2017-10-16 RX ADMIN — MAGNESIUM GLUCONATE 500 MG ORAL TABLET 400 MG: 500 TABLET ORAL at 20:09

## 2017-10-16 ASSESSMENT — ENCOUNTER SYMPTOMS
NAUSEA: 0
DIZZINESS: 0
VOMITING: 0
BACK PAIN: 0
ABDOMINAL PAIN: 0
EYE REDNESS: 0
EYE DISCHARGE: 0
STRIDOR: 0
FEVER: 0
FLANK PAIN: 0
DIARRHEA: 0
BLOOD IN STOOL: 0
NECK PAIN: 0
CHILLS: 0
SPEECH CHANGE: 0
WHEEZING: 0
WEAKNESS: 1
COUGH: 0
FOCAL WEAKNESS: 0
HALLUCINATIONS: 0
SHORTNESS OF BREATH: 0

## 2017-10-16 ASSESSMENT — COGNITIVE AND FUNCTIONAL STATUS - GENERAL
DAILY ACTIVITIY SCORE: 15
SUGGESTED CMS G CODE MODIFIER DAILY ACTIVITY: CK
DRESSING REGULAR LOWER BODY CLOTHING: TOTAL
DRESSING REGULAR UPPER BODY CLOTHING: A LITTLE
TOILETING: TOTAL
HELP NEEDED FOR BATHING: A LOT

## 2017-10-16 ASSESSMENT — PAIN SCALES - GENERAL
PAINLEVEL_OUTOF10: 5

## 2017-10-16 ASSESSMENT — LIFESTYLE VARIABLES: SUBSTANCE_ABUSE: 0

## 2017-10-16 NOTE — THERAPY
"Occupational Therapy Treatment completed with focus on ADLs, ADL transfers and patient education.  Functional Status: Mod A supine > < EOB, unable to stand or pivot to chair, total A toileting and LB dressing   Plan of Care: Will benefit from Occupational Therapy 3 times per week  Discharge Recommendations:  Equipment Will Continue to Assess for Equipment Needs. Post-acute therapy Discharge to a transitional care facility for continued skilled therapy services.    See \"Rehab Therapy-Acute\" Patient Summary Report for complete documentation.     Pt seen for OT tx to include: BLE pre-standing warm-up (AAROM with light resistance), supine > EOB, partial stands at FWW, bed level toileting. Goal was for standing to perform temi care, however pt unable to clear hips even with 2-person A. Pt appears to have adequate strength, however is unable to compensate well with UE and FWW use. Assisted pt with return to supine for BM hygiene, rolling to change linens, attempted further BM on bedpan. Pt is limted by generalized weakness negatively impacting LB ADL and transfers. Acute OT to continue following.   "

## 2017-10-16 NOTE — PROGRESS NOTES
"Date of Service: 10/16/2017  Chief Complaint:  Chief Complaint   Patient presents with   • ALOC   86 y.o. male admitted 9/13/2017 with acute ground-level fall and back pain found to have  acute on chronic osteomyelitis and discitis, on long term abx   He is found in AFlutter + bpm, episodic fleeting and with basically period of bradycardia; Asx  Interval History:  GI issues predominent; PPM to plan after esophagitis is treated per EPS  No new cardiac complaints  All recent medication, labs, imaging studies and procedures reviewed    Physical Exam   Blood pressure 153/105, pulse 74, temperature 36.4 °C (97.6 °F), resp. rate (!) 21, height 1.803 m (5' 11\"), weight 91.5 kg (201 lb 11.5 oz), SpO2 96 %.    Constitutional:  He appears well-developed.   HENT: Normocephalic and atraumatic. No scleral icterus.   Neck: No JVD present.   Cardiovascular: Normal rate. RRR; Exam reveals no gallop and no friction rub. No murmur heard.   Pulmonary/Chest: CTAB coarse   Abdominal: S/NT/ND BS+   Musculoskeletal: He exhibits no edema. Pulses present.  Skin: Skin is warm and dry.     Intake/Output Summary (Last 24 hours) at 10/16/17 1033  Last data filed at 10/16/17 0459   Gross per 24 hour   Intake             2060 ml   Output             2300 ml   Net             -240 ml       LABS:  No results found for: CHOLSTRLTOT, LDL, HDL, TRIGLYCERIDE    Lab Results   Component Value Date/Time    WBC 5.4 10/13/2017 04:03 PM    RBC 2.96 (L) 10/13/2017 04:03 PM    HEMOGLOBIN 9.0 (L) 10/13/2017 04:03 PM    HEMATOCRIT 28.0 (L) 10/13/2017 04:03 PM    MCV 94.6 10/13/2017 04:03 PM    NEUTSPOLYS 55.90 10/13/2017 04:03 PM    LYMPHOCYTES 25.80 10/13/2017 04:03 PM    MONOCYTES 12.90 10/13/2017 04:03 PM    EOSINOPHILS 4.60 10/13/2017 04:03 PM    BASOPHILS 0.60 10/13/2017 04:03 PM     Lab Results   Component Value Date/Time    SODIUM 135 10/16/2017 03:40 AM    POTASSIUM 2.9 (L) 10/16/2017 03:40 AM    CHLORIDE 101 10/16/2017 03:40 AM    CO2 30 " 10/16/2017 03:40 AM    GLUCOSE 94 10/16/2017 03:40 AM    BUN <5 (L) 10/16/2017 03:40 AM    CREATININE 0.55 10/16/2017 03:40 AM     Lab Results   Component Value Date    HBA1C 5.5 09/15/2017      Lab Results   Component Value Date/Time    ALKPHOSPHAT 54 10/09/2017 04:32 AM    ASTSGOT 12 10/09/2017 04:32 AM    ALTSGPT 5 10/09/2017 04:32 AM    TBILIRUBIN 0.4 10/09/2017 04:32 AM      Lab Results   Component Value Date/Time    BNPBTYPENAT 166 (H) 09/13/2017 12:00 AM      No results found for: TSH  Lab Results   Component Value Date/Time    PROTHROMBTM 14.5 09/20/2017 09:31 AM    INR 1.09 09/20/2017 09:31 AM        Medications reviewed    Imaging reviewed    ECHO(9/13/2017):Normal regional wall motion.  Left ventricular ejection fraction is visually estimated to be 70%.  Diastolic function is difficult to assess with atrial fibrillation.  The right ventricle was normal in size and function.  Structurally normal mitral valve without significant stenosis or   regurgitation.  Aortic sclerosis without stenosis.  No aortic insufficiency.  Normal pericardium without effusion.     Impressions:Recommendations:       • Dysphagia [R13.10]       Priority: High   • Persistent atrial fibrillation (CMS-HCC) [I48.1]       Priority: High   • Discitis of lumbar region [M46.46]       Priority: High   • 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.81]       Priority: Medium   • DNR (do not resuscitate) [Z66]       Priority: Low   • Normocytic anemia [D64.9]       Priority: Low   • Debility [R53.81]       Priority: Low   • Esophagitis [K20.9]   • Esophageal stricture [K22.2]           Balance lytes  Atypical flutter may have breakthrough  Currently AFlutter with controlled VR  Agree with BB and Digoxin  Continue Digoxin at current dosing       PPM after esophagitis is treated, may take 2-3 weeks  Discussed with attending/RN  Will follow  Thx

## 2017-10-16 NOTE — CARE PLAN
Problem: Safety  Goal: Will remain free from injury  Outcome: PROGRESSING AS EXPECTED  Call light within reach. Bed is in lowest position and locked. Bed alarm is on.    Problem: Skin Integrity  Goal: Risk for impaired skin integrity will decrease    Intervention: Implement precautions to protect skin integrity in collaboration with the interdisciplinary team  Pt educated on the importance of repositioning. Pt turned every 2 hours. Incontinence care provided.

## 2017-10-16 NOTE — PROGRESS NOTES
[facility] Hospitalist Progress Note    Date of Service: 10/16/2017    Chief Complaint  86 y.o. male admitted 2017 with acute ground-level fall and back pain found to have  acute on chronic osteomyelitis and discitis, on long term abx     Interval Problem Update    Having periods of aflutter/ bradycardia- Discussed with patient and Dr. Tate Ep-- plan pacemaker placement.  Worsened hypokalemia. Odynophagia improved.    Consultants/Specialty  Infectious diseases Dr. Getachew Vincent, Dr. Stewart  of cardiology   GI Dr. Waters    Disposition  Pt to go to [facility] if MRI Stable.         Review of Systems   Constitutional: Negative for chills and fever.   HENT: Negative for congestion.              Eyes: Negative for discharge and redness.   Respiratory: Negative for cough, shortness of breath, wheezing and stridor.    Cardiovascular: Positive for chest pain (w intake.). Negative for leg swelling.   Gastrointestinal: Negative for abdominal pain, blood in stool, diarrhea, nausea and vomiting.   Genitourinary: Negative for flank pain and hematuria.   Musculoskeletal: Negative for back pain and neck pain.   Neurological: Positive for weakness. Negative for dizziness, speech change and focal weakness.   Psychiatric/Behavioral: Negative for hallucinations and substance abuse.      Physical Exam  Laboratory/Imaging   Hemodynamics  Temp (24hrs), Av.3 °C (97.4 °F), Min:36.2 °C (97.1 °F), Max:36.6 °C (97.9 °F)   Temperature: 36.4 °C (97.6 °F)  Pulse  Av.7  Min: 45  Max: 147   Blood Pressure : 153/105 (RN notified)      Respiratory      Respiration: (!) 21, Pulse Oximetry: 96 %     Work Of Breathing / Effort: Shallow;Mild  RUL Breath Sounds: Diminished, RML Breath Sounds: Diminished, RLL Breath Sounds: Diminished, MIKE Breath Sounds: Diminished, LLL Breath Sounds: Diminished    Fluids    Intake/Output Summary (Last 24 hours) at 10/16/17 1304  Last data filed at 10/16/17 0459   Gross per 24 hour   Intake              1550 ml   Output             2300 ml   Net             -750 ml       Nutrition  Orders Placed This Encounter   Procedures   • DIET NPO     Standing Status:   Standing     Number of Occurrences:   1     Order Specific Question:   Restrict to:     Answer:   Sips with Medications [3]     Physical Exam   Constitutional: He is oriented to person, place, and time. He is cooperative. He does not appear ill. No distress.   HENT:   Head: Normocephalic and atraumatic.   Eyes: EOM are normal. Right eye exhibits no discharge. Left eye exhibits no discharge. No scleral icterus.   Cardiovascular: Normal rate and regular rhythm.    No murmur heard.  Pulmonary/Chest: Effort normal and breath sounds normal. No stridor. No respiratory distress. He has no wheezes. He has no rales.   Abdominal: Soft. Bowel sounds are normal. He exhibits no distension. There is no tenderness.   Obese.    Musculoskeletal: He exhibits no edema or tenderness.   generalized 3-4/5 strength.   Neurological: He is alert and oriented to person, place, and time. No cranial nerve deficit.   Skin: Skin is warm and dry. No rash noted. He is not diaphoretic.   Psychiatric: He has a normal mood and affect. His behavior is normal.   Vitals reviewed.      Recent Labs      10/13/17   1603   WBC  5.4   RBC  2.96*   HEMOGLOBIN  9.0*   HEMATOCRIT  28.0*   MCV  94.6   MCH  30.4   MCHC  32.1*   RDW  50.1*   PLATELETCT  173   MPV  10.0     Recent Labs      10/13/17   1603   10/14/17   0617  10/15/17   0428  10/16/17   0340   SODIUM  136   --    --   139  135   POTASSIUM  2.7*   < >  3.5*  3.4*  2.9*   CHLORIDE  101   --    --   104  101   CO2  30   --    --   29  30   GLUCOSE  119*   --    --   106*  94   BUN  5*   --    --   3*  <5*   CREATININE  0.45*   --    --   0.44*  0.55   CALCIUM  8.7   --    --   9.1  8.7    < > = values in this interval not displayed.                      Assessment/Plan     * Persistent atrial fibrillation (CMS-HCC)- (present on admission)    Assessment & Plan    w aflutter - periods of tachycardia Hr 150s w bradycardia - recurrent   Plan pacemaker per Ep  Digoxin , toprol xl , prn iv lopressor.   Not  Ac candidate w hematoma.  Cardiology input appreciated.            Dysphagia   Assessment & Plan    Post EGD w recs by Gi for IV proton pump inhibitor and Carafate suspension with repeat endoscopy and dilation in 1-2 weeks   slp re eval.  Clears- adv as yareli        Discitis of lumbar region- (present on admission)   Assessment & Plan    Afebrile, improved pain -- fu Improved Mri findings   Oral zyvox, bactrim, augmentin per ID thru 10/19             Nontraumatic psoas hematoma- (present on admission)   Assessment & Plan    Ac stopped, hgb stable.  Monitor for acute bleed.        Hypomagnesemia- (present on admission)   Assessment & Plan    IV magnesium given   Add oral mag ox        Esophageal stricture   Assessment & Plan    Fu outpt EGD for eval dilation         Esophagitis   Assessment & Plan    Improved symptoms overall -  Candidal - improved.  trial Gi cocktail, Oral ppi, carafate   Diflucan  Prn anti emetics.   Dr. Mathias- recommends outpt fu endoscopy vs inpatient If prolonged stay.        Hypokalemia- (present on admission)   Assessment & Plan    \Worsened   Increase kcl   Add to IV maint fluid   low mag- replacement.   Fu lytes.        DNR (do not resuscitate)- (present on admission)   Assessment & Plan    Discussed with patient- will update to DNR code status current admission .        Normocytic anemia- (present on admission)   Assessment & Plan    Stable - multi factorial - acute blood loss, chronic dz, iron def.  Fu hgb    Iron supplements.           Debility- (present on admission)   Assessment & Plan    Continue PT /OT                 Reviewed items::  Labs reviewed and Medications reviewed  Hearn catheter::  No Hearn  DVT prophylaxis - mechanical:  SCDs  Ulcer Prophylaxis::  Yes  Antibiotics:  Treating active infection/contamination  beyond 24 hours perioperative coverage

## 2017-10-16 NOTE — PROGRESS NOTES
Report received, pt care assumed, tele box on. VSS, pt assessment complete. Pt aaox 4, no signs of distress noted at this time. POC discussed with pt and verbalizes no questions. Pt c/o of no pain, PRN pain med not administered. Pt denies any additional needs at this time. Bed in lowest position, bed alarm on, pt educated on fall risk and verbalized understanding, call light within reach, will continue to monitor.

## 2017-10-17 ENCOUNTER — APPOINTMENT (OUTPATIENT)
Dept: RADIOLOGY | Facility: MEDICAL CENTER | Age: 82
DRG: 871 | End: 2017-10-17
Attending: INTERNAL MEDICINE
Payer: MEDICARE

## 2017-10-17 PROBLEM — B37.81 CANDIDA ESOPHAGITIS (HCC): Status: ACTIVE | Noted: 2017-10-11

## 2017-10-17 LAB
EKG IMPRESSION: NORMAL
EKG IMPRESSION: NORMAL

## 2017-10-17 PROCEDURE — A9270 NON-COVERED ITEM OR SERVICE: HCPCS | Performed by: INTERNAL MEDICINE

## 2017-10-17 PROCEDURE — 99233 SBSQ HOSP IP/OBS HIGH 50: CPT | Performed by: INTERNAL MEDICINE

## 2017-10-17 PROCEDURE — A9270 NON-COVERED ITEM OR SERVICE: HCPCS | Performed by: HOSPITALIST

## 2017-10-17 PROCEDURE — 93010 ELECTROCARDIOGRAM REPORT: CPT | Performed by: INTERNAL MEDICINE

## 2017-10-17 PROCEDURE — 700102 HCHG RX REV CODE 250 W/ 637 OVERRIDE(OP): Performed by: HOSPITALIST

## 2017-10-17 PROCEDURE — 93005 ELECTROCARDIOGRAM TRACING: CPT | Performed by: INTERNAL MEDICINE

## 2017-10-17 PROCEDURE — 71010 DX-CHEST-PORTABLE (1 VIEW): CPT

## 2017-10-17 PROCEDURE — 700102 HCHG RX REV CODE 250 W/ 637 OVERRIDE(OP): Performed by: INTERNAL MEDICINE

## 2017-10-17 PROCEDURE — 700111 HCHG RX REV CODE 636 W/ 250 OVERRIDE (IP): Performed by: HOSPITALIST

## 2017-10-17 PROCEDURE — 700111 HCHG RX REV CODE 636 W/ 250 OVERRIDE (IP): Performed by: INTERNAL MEDICINE

## 2017-10-17 PROCEDURE — 700101 HCHG RX REV CODE 250: Performed by: HOSPITALIST

## 2017-10-17 PROCEDURE — 770020 HCHG ROOM/CARE - TELE (206)

## 2017-10-17 PROCEDURE — 700105 HCHG RX REV CODE 258

## 2017-10-17 RX ORDER — POTASSIUM CHLORIDE 20 MEQ/1
20 TABLET, EXTENDED RELEASE ORAL 3 TIMES DAILY
Status: DISCONTINUED | OUTPATIENT
Start: 2017-10-18 | End: 2017-10-17

## 2017-10-17 RX ORDER — FLUCONAZOLE 2 MG/ML
200 INJECTION, SOLUTION INTRAVENOUS EVERY 24 HOURS
Status: DISCONTINUED | OUTPATIENT
Start: 2017-10-17 | End: 2017-10-20

## 2017-10-17 RX ORDER — MAGNESIUM SULFATE HEPTAHYDRATE 40 MG/ML
2 INJECTION, SOLUTION INTRAVENOUS ONCE
Status: COMPLETED | OUTPATIENT
Start: 2017-10-17 | End: 2017-10-17

## 2017-10-17 RX ORDER — POTASSIUM CHLORIDE 7.45 MG/ML
10 INJECTION INTRAVENOUS
Status: COMPLETED | OUTPATIENT
Start: 2017-10-17 | End: 2017-10-18

## 2017-10-17 RX ORDER — POTASSIUM CHLORIDE 20 MEQ/1
40 TABLET, EXTENDED RELEASE ORAL
Status: DISCONTINUED | OUTPATIENT
Start: 2017-10-17 | End: 2017-10-17

## 2017-10-17 RX ORDER — SODIUM CHLORIDE 9 MG/ML
INJECTION, SOLUTION INTRAVENOUS
Status: COMPLETED
Start: 2017-10-17 | End: 2017-10-18

## 2017-10-17 RX ADMIN — OMEPRAZOLE 40 MG: 20 CAPSULE, DELAYED RELEASE ORAL at 08:35

## 2017-10-17 RX ADMIN — NYSTATIN 1000000 UNITS: 100000 SUSPENSION ORAL at 21:13

## 2017-10-17 RX ADMIN — LEVETIRACETAM 500 MG: 100 SOLUTION ORAL at 21:13

## 2017-10-17 RX ADMIN — SUCRALFATE 1 G: 1 SUSPENSION ORAL at 21:13

## 2017-10-17 RX ADMIN — Medication 325 MG: at 18:01

## 2017-10-17 RX ADMIN — SODIUM CHLORIDE 250 ML: 9 INJECTION, SOLUTION INTRAVENOUS at 12:46

## 2017-10-17 RX ADMIN — ROSUVASTATIN CALCIUM 125 MCG: 10 TABLET, FILM COATED ORAL at 18:00

## 2017-10-17 RX ADMIN — POTASSIUM CHLORIDE AND SODIUM CHLORIDE: 900; 150 INJECTION, SOLUTION INTRAVENOUS at 03:07

## 2017-10-17 RX ADMIN — NYSTATIN 1000000 UNITS: 100000 SUSPENSION ORAL at 16:17

## 2017-10-17 RX ADMIN — SUCRALFATE 1 G: 1 SUSPENSION ORAL at 16:17

## 2017-10-17 RX ADMIN — POTASSIUM CHLORIDE 10 MEQ: 10 INJECTION, SOLUTION INTRAVENOUS at 15:01

## 2017-10-17 RX ADMIN — LINEZOLID 600 MG: 600 TABLET, FILM COATED ORAL at 21:13

## 2017-10-17 RX ADMIN — SUCRALFATE 1 G: 1 SUSPENSION ORAL at 08:37

## 2017-10-17 RX ADMIN — FLUCONAZOLE 200 MG: 2 INJECTION, SOLUTION INTRAVENOUS at 16:08

## 2017-10-17 RX ADMIN — LINEZOLID 600 MG: 600 TABLET, FILM COATED ORAL at 08:37

## 2017-10-17 RX ADMIN — POTASSIUM CHLORIDE 40 MEQ: 1500 TABLET, EXTENDED RELEASE ORAL at 08:37

## 2017-10-17 RX ADMIN — PROCHLORPERAZINE EDISYLATE 10 MG: 5 INJECTION INTRAMUSCULAR; INTRAVENOUS at 10:04

## 2017-10-17 RX ADMIN — GABAPENTIN 200 MG: 100 CAPSULE ORAL at 08:36

## 2017-10-17 RX ADMIN — ANTACID TABLETS 1000 MG: 500 TABLET, CHEWABLE ORAL at 16:17

## 2017-10-17 RX ADMIN — METOPROLOL SUCCINATE 200 MG: 100 TABLET, EXTENDED RELEASE ORAL at 08:37

## 2017-10-17 RX ADMIN — LEVETIRACETAM 500 MG: 100 SOLUTION ORAL at 08:36

## 2017-10-17 RX ADMIN — GABAPENTIN 200 MG: 100 CAPSULE ORAL at 15:08

## 2017-10-17 RX ADMIN — TAMSULOSIN HYDROCHLORIDE 0.4 MG: 0.4 CAPSULE ORAL at 08:36

## 2017-10-17 RX ADMIN — POTASSIUM CHLORIDE 10 MEQ: 10 INJECTION, SOLUTION INTRAVENOUS at 16:04

## 2017-10-17 RX ADMIN — MAGNESIUM GLUCONATE 500 MG ORAL TABLET 400 MG: 500 TABLET ORAL at 08:37

## 2017-10-17 RX ADMIN — ANTACID TABLETS 1000 MG: 500 TABLET, CHEWABLE ORAL at 08:37

## 2017-10-17 RX ADMIN — Medication 325 MG: at 08:37

## 2017-10-17 RX ADMIN — GABAPENTIN 200 MG: 100 CAPSULE ORAL at 21:13

## 2017-10-17 RX ADMIN — POTASSIUM CHLORIDE 10 MEQ: 10 INJECTION, SOLUTION INTRAVENOUS at 13:00

## 2017-10-17 RX ADMIN — OMEPRAZOLE 40 MG: 20 CAPSULE, DELAYED RELEASE ORAL at 21:13

## 2017-10-17 RX ADMIN — POTASSIUM CHLORIDE 10 MEQ: 10 INJECTION, SOLUTION INTRAVENOUS at 12:46

## 2017-10-17 RX ADMIN — ONDANSETRON 4 MG: 2 INJECTION INTRAMUSCULAR; INTRAVENOUS at 16:17

## 2017-10-17 RX ADMIN — MAGNESIUM SULFATE IN WATER 2 G: 40 INJECTION, SOLUTION INTRAVENOUS at 10:01

## 2017-10-17 RX ADMIN — ATORVASTATIN CALCIUM 10 MG: 10 TABLET, FILM COATED ORAL at 21:13

## 2017-10-17 ASSESSMENT — PAIN SCALES - GENERAL
PAINLEVEL_OUTOF10: 0
PAINLEVEL_OUTOF10: 5
PAINLEVEL_OUTOF10: 0

## 2017-10-17 ASSESSMENT — ENCOUNTER SYMPTOMS
BLOOD IN STOOL: 0
COUGH: 0
SHORTNESS OF BREATH: 0
DIAPHORESIS: 0
CLAUDICATION: 0
DOUBLE VISION: 0
EYE DISCHARGE: 0
FLANK PAIN: 0
CHILLS: 1
EYE REDNESS: 0
MUSCULOSKELETAL NEGATIVE: 1
SORE THROAT: 0
HEADACHES: 0
CHILLS: 0
PSYCHIATRIC NEGATIVE: 1
WEIGHT LOSS: 0
ABDOMINAL PAIN: 0
WEAKNESS: 1
FOCAL WEAKNESS: 0
FEVER: 0
WHEEZING: 0
LOSS OF CONSCIOUSNESS: 0
HEARTBURN: 0
PALPITATIONS: 0
PND: 0
DIARRHEA: 0
SPUTUM PRODUCTION: 0
SEIZURES: 0
DIZZINESS: 0
VOMITING: 0
ORTHOPNEA: 0
SPEECH CHANGE: 0
BLURRED VISION: 0
NAUSEA: 1

## 2017-10-17 NOTE — CONSULTS
DATE OF SERVICE:  10/16/2017    REASON FOR CONSULTATION:  This consultation is performed at the request of Dr. Nehemias Stewart for evaluation of tachycardia and bradycardia in the setting of   atrial fibrillation and flutter in a patient with recurrent falls.    HISTORY OF PRESENT ILLNESS:  Patient has been in the hospital for a prolonged   hospitalization.  He presented approximately a month ago.  He was brought   after being found down by his son initially.  At that time, he was noted to   have diskitis and was given long-term antibiotics.  He came in septic, but has   recovered from his sepsis.  Over the course of his hospitalization, he has   been treated for this, diskitis, osteomyelitis.  He also has been having a   blood culture positive for strep viridans, but that was back in July, he got a   long course of antibiotics at that time and his white blood cell count was   severely elevated up to 18,000 during the course of these infections.    Currently, his white blood cell count is down.  He is in the hospital   recovering and feeling weak.  He tells me his birthday is in two days and he   just wants to celebrate his birthday with his family.  Unfortunately, he is   not able to get up.  He is having some severe pain from esophagitis.  It is   very, very difficult to swallow for him and he has had catheters, which make   urination difficult and painful when having these episodes of pain from the   swelling or from urination.  He notes that his heart rate can go up over 150   beats per minute.  It is usually pretty fleeting, but he is limited in how   much he is able to be treated because on his telemetry he seemed to have heart   rates down into the 30s.  None of these are sustaining.  He had not been   dizzy or lightheaded while in the hospital, lying flat in bed, but he does   note at home that he had several falls and some of the falls he thinks may   have been related to some dizziness.  He continues to  have the chest pain, but   that is with swallowing and feels that that is GI pain.  He did have a   cardiac stress test in March of this year that showed no evidence of   myocardial ischemia and a recent echo done approximately a month ago when he   first presented showed that he has a normal ejection fraction of approximately   70% and atrial fibrillation dating back to that time.    REVIEW OF SYSTEMS:  Significant for the odynophagia.  He does not really feel   any palpitations, and he tells me it hurts to swallow anything.  It takes a   long time to get his pills down.  In the morning, he eats some crust with   applesauce.  Otherwise, no significant GI complaints.  No significant   shortness of breath.  He is very weak and unable to walk.  Full review of   systems is otherwise, negative.    BRIEF HOSPITAL COURSE:  The patient has been followed in the hospital by ID   for the diskitis.  He has had cardiology consultation earlier because of   troponins, now because of the tachycardia bradycardia.  He has been followed   by GI with case of esophagitis.  He is nearing ability to go to the skilled   nursing facility, but after discussion today with Dr. Stewart and Dr. Herber Wu.  They feel that his progress is being limited by the tachycardia and   bradycardia.  He currently is getting IV antibiotics and there is going to be   reassessment for his diskitis, as well as reassessment for his esophagitis.  I   suggested to Dr. Wu possibly waiting until these things were completed to   try to be more definitive with treatment of his tachycardia and bradycardia,   but he feels like it is limiting his disposition and requested that we proceed   with pacemaker now despite chronic antibiotics and PICC line being in place.    PAST MEDICAL HISTORY:  Significant for atrial fibrillation, diabetes, reflux,   spinal stenosis and now his hospitalization for diskitis, osteomyelitis, and   severe esophagitis.    FAMILY HISTORY:   Significant for heart disease.  His mother had a pacemaker,   as well as previous heart attacks.    SOCIAL HISTORY:  He is a former smoker, but it has not been for nearly 30   years.    ALLERGIES:  TO VANCOMYCIN, WHICH CAUSED DIFFUSE RASH.    MEDICATIONS:  In the hospital include Augmentin, Lipitor, Tums, Lanoxin, iron,   Neurontin, Keppra, Zyvox, magnesium oxide, Toprol, omeprazole, potassium   supplementation, bowel regimen, sucralfate, Bactrim double strength, and   tamsulosin.    PHYSICAL EXAMINATION:  VITAL SIGNS:  He is afebrile.  His vital signs are stable.  I do not see   evidence of fever above 37 Fahrenheit in the last week.  Temperature today at   the time I am seeing him is 36.6 Fahrenheit or 97.9, pulse is currently in the   60s, blood pressure 140/75, respiratory rate is 19, and he is saturating 91%   on room air.  GENERAL:  He looks extremely uncomfortable trying to swallow the applesauce   and notes that it is because of his chest pain.  HEENT:  Normocephalic and atraumatic.  Mucous membranes are moist.  Eyes,   extraocular movements intact.  Pupils are equal.  NECK:  Difficult to assess jugular veins as he has a difficult time sitting up   and his neck is obese.  HEART:  S1, S2 regular rate and rhythm currently by auscultation, on   examination of the pulse as well.  Looking at the telemetry, there is some   irregularity, but it is currently subtle.  LUNGS:  Clear to auscultation bilaterally.  ABDOMEN:  Soft, nontender, and nondistended.  EXTREMITIES:  There is trace edema bilaterally.  Skin is warm and well   perfused.  NEUROLOGIC EXAMINATION:  Significant for diffuse weakness.  He is lying in bed   and a weak to get up.  PSYCHIATRIC:  He seems alert and oriented.  He is uncomfortable because his   birthday is in two days.  SKIN:  No significant lesions seen, but has not examined his back side.    LABORATORY DATA AND DIAGNOSTIC IMAGING:  Reviewing his data, I have personally   reviewed his  telemetry.  I see that there are dips down into the 30s, as well   as racing into the range of 150.  There is quite a bit of lability.  Most   recent 12-lead does show underlying atrial fibrillation, narrow QRS complexes.    His white blood cell count is down to 5.4, his hemoglobin is at 9, and   platelet count 173,000.  His sedimentation rate is still elevated at 40.    Chemistry is significant for a very low potassium at 2.9.  His creatinine is   0.55.  As noted, his echo has a normal EF and he had a normal stress test   earlier this year.    ASSESSMENT AND PLAN:  This is a complicated 86-year-old gentleman with a   chronic infection who has atrial fibrillation, limiting his ability to be   placed because he has both tachycardia and bradycardia.  He has failed   anticoagulation in the past with bleed.  He remembers being on warfarin and   does not remember exactly why it was stopped, other than he had a large   hematoma on his leg with a fall, but he thinks that he was told besides that   he really should not be on this medication.  Thus we are very limited in what   we are able to do other than trying to rate control him.  His rate control is   limited by the bradycardia in addition to his tachycardia, so I think ablate   and pace may be the best option.  Initially, I had thought that it would be   best if we could wait until he finished his course of antibiotics, but he may   be on long-term chronic suppressive antibiotics and he is at a point where he   could potentially be discharged for skilled nursing care if this were more   controlled.  So, we have discussed the risk and benefits and we will proceed   with pacemaker implantation.  Pacemaker will hopefully allow us to better rate   control the patient.  After the pacemaker in addition to his digoxin I will   be able to increase his metoprolol.  He appears to have the blood pressure to   maximize that from  a day, which would start after the pacemaker  is in   place.  If I am not able to chemically blunt the rapid rates we could then   proceed with AV ash ablation for ablation pace as controlled, again rhythm   control is not an option because this patient cannot be anticoagulated.    Thank you for this consultation.  Patient understands risks and benefits of   pacemaker and will proceed today.       ____________________________________     MD TOOTIE Castellon / JUSTINE    DD:  10/16/2017 19:35:17  DT:  10/16/2017 23:35:29    D#:  [id number]  Job#:  [id number]

## 2017-10-17 NOTE — CATH LAB
Immediate Post-Operative Note      PreOp Diagnosis: a fib with rvr alternating with slow vr    PostOp Diagnosis: same    Procedure(s) :  Single chamber ppm    Surgeon(s):  Hi Tate M.D.    Type of Anesthesia: Moderate Sedation    Specimen: None    Estimated Blood Loss: 10 cc's    Contrast Media:  0 cc's    Fluoro Time: <5 min        Findings: single chamber axillary access.  Active rv lead    Complications: none apparent      Hi Tate M.D.  10/16/2017 6:32 PM

## 2017-10-17 NOTE — PROGRESS NOTES
"Received bedside report, assumed care. Pt diaphoretic and pale, vitals remain stable. Pt stated, \"I just get like this sometimes.\" Denies any presence of dizziness, headache, shortness of breath or lightheadedness. Vitals stable. Aflutter/partially paced per telemetry, 2LNC, KITTY PICC SL. Respirations regular and non labored, lungs diminished throughout. Condom cath in place to aid in prevention of skin breakdown r/t incontinence. Call light within reach. TQ2. BA in place. Bed in lowest, locked position.  "

## 2017-10-17 NOTE — PROGRESS NOTES
"12 hr tele summary \"NOC\"    Rates 61-74  Ectopy None  FLORENCIO \".\"  QRS 0.10  QT \".\"  Rhythm Aflutter  "

## 2017-10-17 NOTE — PROGRESS NOTES
Cardiology Progress Note               Author: Alexandra Amezcua Date & Time created: 10/17/2017  10:05 AM     Interval History:  Patient seen on EPS rounds.  REASON FOR CONSULTATION:  This consultation is performed at the request of Dr. Nehemias Stewart for evaluation of tachycardia and bradycardia in the setting of  atrial fibrillation and flutter in a patient with recurrent falls.     HISTORY OF PRESENT ILLNESS:  Patient has been in the hospital for a prolonged hospitalization.  He presented approximately a month ago.  He was brought   after being found down by his son initially.  At that time, he was noted to have diskitis and was given long-term antibiotics.  He came in septic, but has recovered from his sepsis.  Over the course of his hospitalization, he has been treated for this, diskitis, osteomyelitis.  He also has been having a blood culture positive for strep viridans, but that was back in July, he got a long course of antibiotics at that time and his white blood cell count was severely elevated up to 18,000 during the course of these infections.    Currently, his white blood cell count is down.  He is in the hospital recovering and feeling weak.  He tells me his birthday is in two days and he just wants to celebrate his birthday with his family.  Unfortunately, he is not able to get up.  He is having some severe pain from esophagitis.  It is very, very difficult to swallow for him and he has had catheters, which make urination difficult and painful when having these episodes of pain from the   swelling or from urination.  He notes that his heart rate can go up over 150 beats per minute.  It is usually pretty fleeting, but he is limited in how much he is able to be treated because on his telemetry he seemed to have heart rates down into the 30s.  None of these are sustaining.  He had not been dizzy or lightheaded while in the hospital, lying flat in bed, but he does note at home that he had several falls and  some of the falls he thinks may have been related to some dizziness.  He continues to have the chest pain, but that is with swallowing and feels that that is GI pain.  He did have a   cardiac stress test in March of this year that showed no evidence of myocardial ischemia and a recent echo done approximately a month ago when he first presented showed that he has a normal ejection fraction of approximately 70% and atrial fibrillation dating back to that time.     ASSESSMENT AND PLAN:  This is a complicated 86-year-old gentleman with a chronic infection who has atrial fibrillation, limiting his ability to be placed because he has both tachycardia and bradycardia.  He has failed   anticoagulation in the past with bleed.  He remembers being on warfarin and does not remember exactly why it was stopped, other than he had a large hematoma on his leg with a fall, but he thinks that he was told besides that he really should not be on this medication.  Thus we are very limited in what we are able to do other than trying to rate control him.  His rate control is limited by the bradycardia in addition to his tachycardia, so I think ablate and pace may be the best option.  Initially, I had thought that it would be best if we could wait until he finished his course of antibiotics, but he may be on long-term chronic suppressive antibiotics and he is at a point where he could potentially be discharged for skilled nursing care if this were more controlled.  So, we have discussed the risk and benefits and we will proceed   with pacemaker implantation.  Pacemaker will hopefully allow us to better rate control the patient.  After the pacemaker in addition to his digoxin I will be able to increase his metoprolol.  He appears to have the blood pressure to maximize that from  a day, which would start after the pacemaker is in place.  If I am not able to chemically blunt the rapid rates we could then   proceed with AV ash ablation  for ablation pace as controlled, again rhythm control is not an option because this patient cannot be anticoagulated.  INDICATION FOR PROCEDURE:  Atrial fibrillation with tachycardia and   bradycardia.     PROCEDURE:  Single-chamber pacemaker implantation.     PROCEDURE IN DETAIL:  After obtaining informed consent, patient was brought to cardiac catheterization laboratory in the fasted state.  He was prepped and   draped in the usual sterile fashion.  He received IV antibiotic prior to any incision in addition to the chronic antibiotics that he is taking.  He does have a PICC line on the left side, but he is left-handed, so we implanted his device on the right side.  We began by anesthetizing the right deltopectoral area with a mix of lidocaine and bupivacaine and made a 4 cm incision, dissected down to the prepectoral fascia.  On the prepectoral fascia, I   initially tried to find the cephalic vein, but it was difficult to find in the deltopectoral groove, so I used micropuncture to access the medial right axillary vein and a wire was placed down the IVC.  This wire was used for sheath for a active fixation of right ventricular lead serial #[id number], this was screwed in near the RV apex where there were R waves of 10 mV, a threshold 0.6 volts at 0.4 msec and a pacing impedance of 670 ohms with good sensing   and pacing parameters on this lead.  The sheath was split and the lead was sutured down to the prepectoral fascia.  A pocket was made and rinsed with copious amount of antibiotic solution.  Then the lead was connected to a [employer] MRI conditional pacemaker system, serial #[id number].  This pacemaker was then placed in the pocket with the leads underneath.  The pocket was closed in 3 layers. Device is programmed VVI 60,  At the end of the case, the patient was in atrial fibrillation with conduction _____ the   pacemaker.  Impression       1. No pneumothorax status post right-sided pacemaker placement.    2.  Decreased pulmonary edema.     INDICATION FOR PROCEDURE:  Atrial fibrillation with tachycardia and   bradycardia.    Device interrogation:  R waves 12.6 mV,  impedance 659 ohms, and threshold 0.6V Paced 24 %.  Review of Systems   Constitutional: Negative for chills, fever, malaise/fatigue and weight loss.   HENT: Negative for congestion and sore throat.    Eyes: Negative for blurred vision and double vision.   Respiratory: Negative for cough, sputum production, shortness of breath and wheezing.    Cardiovascular: Negative for chest pain, palpitations, orthopnea, claudication, leg swelling and PND.   Gastrointestinal: Positive for nausea. Negative for abdominal pain, heartburn and vomiting.   Genitourinary: Negative for dysuria, flank pain and frequency.   Musculoskeletal: Negative.    Skin: Negative.    Neurological: Negative for dizziness, speech change, focal weakness, seizures, loss of consciousness and headaches.   Endo/Heme/Allergies: Negative.    Psychiatric/Behavioral: Negative.        Physical Exam   Constitutional: He is oriented to person, place, and time. He appears well-developed and well-nourished.   HENT:   Head: Normocephalic and atraumatic.   Eyes: Pupils are equal, round, and reactive to light.   Neck: Normal range of motion. Neck supple. No thyromegaly present.   Cardiovascular: Normal rate and regular rhythm.  Exam reveals no gallop and no friction rub.    No murmur heard.  Pulmonary/Chest: Effort normal and breath sounds normal. No respiratory distress. He has no wheezes. He has no rales.   Right pacemaker insertion site uncomplicated.   Abdominal: Soft. Bowel sounds are normal. He exhibits no distension. There is no tenderness. There is no guarding.   Musculoskeletal: Normal range of motion. He exhibits no edema.   Neurological: He is alert and oriented to person, place, and time.   Skin: Skin is warm and dry.   Psychiatric: He has a normal mood and affect.       Hemodynamics:  Temp (24hrs),  Av.5 °C (97.7 °F), Min:35.9 °C (96.7 °F), Max:37.1 °C (98.7 °F)  Temperature: 37.1 °C (98.7 °F)  Pulse  Av.4  Min: 45  Max: 147  Blood Pressure : 151/86 (RN notified)     Respiratory:    Respiration: 12, Pulse Oximetry: 91 %     Work Of Breathing / Effort: Shallow;Mild  RUL Breath Sounds: Diminished, RML Breath Sounds: Diminished, RLL Breath Sounds: Diminished, MIKE Breath Sounds: Diminished, LLL Breath Sounds: Diminished  Fluids:     Weight: 90.5 kg (199 lb 8.3 oz)  GI/Nutrition:  Orders Placed This Encounter   Procedures   • Diet Order     Standing Status:   Standing     Number of Occurrences:   1     Order Specific Question:   Diet:     Answer:   Clear Liquids - No Red Foods [12]     Lab Results:      Recent Labs      10/15/17   0428  10/16/17   0340   SODIUM  139  135   POTASSIUM  3.4*  2.9*   CHLORIDE  104  101   CO2  29  30   GLUCOSE  106*  94   BUN  3*  <5*   CREATININE  0.44*  0.55   CALCIUM  9.1  8.7                         Medical Decision Making, by Problem:  Active Hospital Problems    Diagnosis   • *Persistent atrial fibrillation (CMS-HCC) [I48.1]   • Dysphagia [R13.10]   • Discitis of lumbar region [M46.46]   • 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.81]   • Hypomagnesemia [E83.42]   • DNR (do not resuscitate) [Z66]   • Normocytic anemia [D64.9]   • Debility [R53.81]   • Esophagitis [K20.9]   • Esophageal stricture [K22.2]   • Hypokalemia [E87.6]       Plan:  Pacemaker function intact .  CXR no ptx.  Interrogation intact.  Site should be observed closely.  Tegaderm dressing in place. To keep dry.  Follow up appt in 1 week in our office.    Quality-Core Measures

## 2017-10-17 NOTE — CONSULTS
Discussed with Dr. Wu and Dr. Stewart.  Still on abx via picc but difficult placement with tachy avelina so after discussion will place ppm now despite ongoing treatment for esophagitis and osteo.  Wbc wnl.  Full dictated consult will follow.

## 2017-10-17 NOTE — CARE PLAN
Problem: Urinary Elimination:  Goal: Ability to reestablish a normal urinary elimination pattern will improve  Outcome: PROGRESSING SLOWER THAN EXPECTED  Condom cath in place to aid in skin breakdown prevention r/t incontinence.

## 2017-10-17 NOTE — CARE PLAN
Problem: Safety  Goal: Will remain free from falls  Outcome: PROGRESSING AS EXPECTED  Pt continues to remain free from falls at this time.

## 2017-10-17 NOTE — PROCEDURES
DATE OF SERVICE:  10/16/2017    INDICATION FOR PROCEDURE:  Atrial fibrillation with tachycardia and   bradycardia.    PROCEDURE:  Single-chamber pacemaker implantation.    PROCEDURE IN DETAIL:  After obtaining informed consent, patient was brought to   cardiac catheterization laboratory in the fasted state.  He was prepped and   draped in the usual sterile fashion.  He received IV antibiotic prior to any   incision in addition to the chronic antibiotics that he is taking.  He does   have a PICC line on the left side, but he is left-handed, so we implanted his   device on the right side.  We began by anesthetizing the right deltopectoral   area with a mix of lidocaine and bupivacaine and made a 4 cm incision,   dissected down to the prepectoral fascia.  On the prepectoral fascia, I   initially tried to find the cephalic vein, but it was difficult to find in the   deltopectoral groove, so I used micropuncture to access the medial right   axillary vein and a wire was placed down the IVC.  This wire was used for   sheath for a active fixation of right ventricular lead serial #[id number], this   was screwed in near the RV apex where there were R waves of 10 mV, a threshold   0.6 volts at 0.4 msec and a pacing impedance of 670 ohms with good sensing   and pacing parameters on this lead.  The sheath was split and the lead was   sutured down to the prepectoral fascia.  A pocket was made and rinsed with   copious amount of antibiotic solution.  Then the lead was connected to a   [employer] MRI conditional pacemaker system, serial #[id number].  This   pacemaker was then placed in the pocket with the leads underneath.  The pocket   was closed in 3 layers.  Device is programmed VVI 60,  At the end of the   case, the patient was in atrial fibrillation with conduction _____ the   pacemaker.    DISCUSSION:  Patient did well with the procedure.  I was reluctant to place   the device in the setting of recent infection, but his white  blood cell count   has normalized.  He is going to be on chronic longstanding antibiotics and I   think this may be needed to help treat him, so he can be discharged from the   hospital.       ____________________________________     MD TOOTIE Castellon / JUSTINE    DD:  10/16/2017 20:03:47  DT:  10/16/2017 21:08:14    D#:  [id number]  Job#:  [id number]

## 2017-10-18 LAB
ANION GAP SERPL CALC-SCNC: 6 MMOL/L (ref 0–11.9)
BUN SERPL-MCNC: 5 MG/DL (ref 8–22)
CALCIUM SERPL-MCNC: 10.3 MG/DL (ref 8.5–10.5)
CHLORIDE SERPL-SCNC: 101 MMOL/L (ref 96–112)
CO2 SERPL-SCNC: 28 MMOL/L (ref 20–33)
CREAT SERPL-MCNC: 0.55 MG/DL (ref 0.5–1.4)
GFR SERPL CREATININE-BSD FRML MDRD: >60 ML/MIN/1.73 M 2
GLUCOSE SERPL-MCNC: 99 MG/DL (ref 65–99)
MAGNESIUM SERPL-MCNC: 1.9 MG/DL (ref 1.5–2.5)
POTASSIUM SERPL-SCNC: 4.2 MMOL/L (ref 3.6–5.5)
SODIUM SERPL-SCNC: 135 MMOL/L (ref 135–145)

## 2017-10-18 PROCEDURE — 700101 HCHG RX REV CODE 250: Performed by: INTERNAL MEDICINE

## 2017-10-18 PROCEDURE — 700102 HCHG RX REV CODE 250 W/ 637 OVERRIDE(OP): Performed by: INTERNAL MEDICINE

## 2017-10-18 PROCEDURE — 83735 ASSAY OF MAGNESIUM: CPT

## 2017-10-18 PROCEDURE — 770020 HCHG ROOM/CARE - TELE (206)

## 2017-10-18 PROCEDURE — 700102 HCHG RX REV CODE 250 W/ 637 OVERRIDE(OP): Performed by: HOSPITALIST

## 2017-10-18 PROCEDURE — A9270 NON-COVERED ITEM OR SERVICE: HCPCS | Performed by: HOSPITALIST

## 2017-10-18 PROCEDURE — 99233 SBSQ HOSP IP/OBS HIGH 50: CPT | Performed by: INTERNAL MEDICINE

## 2017-10-18 PROCEDURE — A9270 NON-COVERED ITEM OR SERVICE: HCPCS | Performed by: INTERNAL MEDICINE

## 2017-10-18 PROCEDURE — 700111 HCHG RX REV CODE 636 W/ 250 OVERRIDE (IP): Performed by: INTERNAL MEDICINE

## 2017-10-18 PROCEDURE — 80048 BASIC METABOLIC PNL TOTAL CA: CPT

## 2017-10-18 RX ORDER — SULFAMETHOXAZOLE AND TRIMETHOPRIM 400; 80 MG/1; MG/1
2 TABLET ORAL EVERY 12 HOURS
Status: DISCONTINUED | OUTPATIENT
Start: 2017-10-18 | End: 2017-10-18

## 2017-10-18 RX ORDER — AMOXICILLIN AND CLAVULANATE POTASSIUM 875; 125 MG/1; MG/1
1 TABLET, FILM COATED ORAL EVERY 12 HOURS
Status: DISCONTINUED | OUTPATIENT
Start: 2017-10-18 | End: 2017-10-23 | Stop reason: HOSPADM

## 2017-10-18 RX ORDER — SULFAMETHOXAZOLE AND TRIMETHOPRIM 800; 160 MG/1; MG/1
1 TABLET ORAL EVERY 12 HOURS
Status: DISCONTINUED | OUTPATIENT
Start: 2017-10-18 | End: 2017-10-23 | Stop reason: HOSPADM

## 2017-10-18 RX ORDER — SODIUM CHLORIDE AND POTASSIUM CHLORIDE 150; 900 MG/100ML; MG/100ML
INJECTION, SOLUTION INTRAVENOUS CONTINUOUS
Status: DISCONTINUED | OUTPATIENT
Start: 2017-10-18 | End: 2017-10-23 | Stop reason: HOSPADM

## 2017-10-18 RX ORDER — DIGOXIN 0.05 MG/ML
0.12 SOLUTION ORAL DAILY
Status: DISCONTINUED | OUTPATIENT
Start: 2017-10-18 | End: 2017-10-20

## 2017-10-18 RX ORDER — AMOXICILLIN AND CLAVULANATE POTASSIUM 600; 42.9 MG/5ML; MG/5ML
900 POWDER, FOR SUSPENSION ORAL EVERY 12 HOURS
Status: DISCONTINUED | OUTPATIENT
Start: 2017-10-18 | End: 2017-10-18

## 2017-10-18 RX ORDER — SULFAMETHOXAZOLE AND TRIMETHOPRIM 200; 40 MG/5ML; MG/5ML
40 SUSPENSION ORAL EVERY 12 HOURS
Status: DISCONTINUED | OUTPATIENT
Start: 2017-10-18 | End: 2017-10-18

## 2017-10-18 RX ORDER — METOPROLOL TARTRATE 50 MG/1
100 TABLET, FILM COATED ORAL TWICE DAILY
Status: DISCONTINUED | OUTPATIENT
Start: 2017-10-18 | End: 2017-10-23 | Stop reason: HOSPADM

## 2017-10-18 RX ADMIN — SUCRALFATE 1 G: 1 SUSPENSION ORAL at 12:45

## 2017-10-18 RX ADMIN — NYSTATIN 1000000 UNITS: 100000 SUSPENSION ORAL at 17:23

## 2017-10-18 RX ADMIN — METOPROLOL TARTRATE 100 MG: 50 TABLET, FILM COATED ORAL at 12:45

## 2017-10-18 RX ADMIN — STANDARDIZED SENNA CONCENTRATE AND DOCUSATE SODIUM 2 TABLET: 8.6; 5 TABLET, FILM COATED ORAL at 08:48

## 2017-10-18 RX ADMIN — POTASSIUM BICARBONATE 25 MEQ: 25 TABLET, EFFERVESCENT ORAL at 20:38

## 2017-10-18 RX ADMIN — FLUCONAZOLE 200 MG: 2 INJECTION, SOLUTION INTRAVENOUS at 08:49

## 2017-10-18 RX ADMIN — OMEPRAZOLE 40 MG: 20 CAPSULE, DELAYED RELEASE ORAL at 08:47

## 2017-10-18 RX ADMIN — SULFAMETHOXAZOLE AND TRIMETHOPRIM 1 TABLET: 800; 160 TABLET ORAL at 12:45

## 2017-10-18 RX ADMIN — SUCRALFATE 1 G: 1 SUSPENSION ORAL at 20:40

## 2017-10-18 RX ADMIN — DIGOXIN 125 MCG: 0.05 SOLUTION ORAL at 17:24

## 2017-10-18 RX ADMIN — LEVETIRACETAM 500 MG: 100 SOLUTION ORAL at 08:47

## 2017-10-18 RX ADMIN — ATORVASTATIN CALCIUM 10 MG: 10 TABLET, FILM COATED ORAL at 20:40

## 2017-10-18 RX ADMIN — AMOXICILLIN AND CLAVULANATE POTASSIUM 1 TABLET: 875; 125 TABLET, FILM COATED ORAL at 12:45

## 2017-10-18 RX ADMIN — NYSTATIN 1000000 UNITS: 100000 SUSPENSION ORAL at 12:45

## 2017-10-18 RX ADMIN — METOPROLOL TARTRATE 100 MG: 50 TABLET, FILM COATED ORAL at 20:40

## 2017-10-18 RX ADMIN — MAGNESIUM GLUCONATE 500 MG ORAL TABLET 400 MG: 500 TABLET ORAL at 08:48

## 2017-10-18 RX ADMIN — POTASSIUM BICARBONATE 25 MEQ: 25 TABLET, EFFERVESCENT ORAL at 08:50

## 2017-10-18 RX ADMIN — Medication 325 MG: at 08:48

## 2017-10-18 RX ADMIN — LEVETIRACETAM 500 MG: 100 SOLUTION ORAL at 20:42

## 2017-10-18 RX ADMIN — POTASSIUM CHLORIDE AND SODIUM CHLORIDE: 900; 150 INJECTION, SOLUTION INTRAVENOUS at 12:00

## 2017-10-18 RX ADMIN — GABAPENTIN 200 MG: 100 CAPSULE ORAL at 08:48

## 2017-10-18 RX ADMIN — GABAPENTIN 200 MG: 100 CAPSULE ORAL at 20:40

## 2017-10-18 RX ADMIN — SULFAMETHOXAZOLE AND TRIMETHOPRIM 1 TABLET: 800; 160 TABLET ORAL at 20:40

## 2017-10-18 RX ADMIN — GABAPENTIN 200 MG: 100 CAPSULE ORAL at 15:40

## 2017-10-18 RX ADMIN — Medication 220 MG: at 12:48

## 2017-10-18 RX ADMIN — OMEPRAZOLE 40 MG: 20 CAPSULE, DELAYED RELEASE ORAL at 20:42

## 2017-10-18 RX ADMIN — ANTACID TABLETS 1000 MG: 500 TABLET, CHEWABLE ORAL at 08:46

## 2017-10-18 RX ADMIN — AMOXICILLIN AND CLAVULANATE POTASSIUM 1 TABLET: 875; 125 TABLET, FILM COATED ORAL at 20:40

## 2017-10-18 RX ADMIN — POTASSIUM BICARBONATE 25 MEQ: 25 TABLET, EFFERVESCENT ORAL at 15:40

## 2017-10-18 RX ADMIN — NYSTATIN 1000000 UNITS: 100000 SUSPENSION ORAL at 08:48

## 2017-10-18 RX ADMIN — SUCRALFATE 1 G: 1 SUSPENSION ORAL at 05:14

## 2017-10-18 RX ADMIN — SUCRALFATE 1 G: 1 SUSPENSION ORAL at 17:23

## 2017-10-18 RX ADMIN — NYSTATIN 1000000 UNITS: 100000 SUSPENSION ORAL at 20:38

## 2017-10-18 ASSESSMENT — ENCOUNTER SYMPTOMS
FEVER: 0
COUGH: 0
ABDOMINAL PAIN: 0
WEAKNESS: 1
ROS GI COMMENTS: ONGOING ISSUES
FOCAL WEAKNESS: 0
VOMITING: 0
EYE DISCHARGE: 0
EYE REDNESS: 0
DIAPHORESIS: 0
PALPITATIONS: 0
NAUSEA: 1
WHEEZING: 0
DIARRHEA: 0
SHORTNESS OF BREATH: 0
BLOOD IN STOOL: 0

## 2017-10-18 ASSESSMENT — PAIN SCALES - GENERAL
PAINLEVEL_OUTOF10: 3
PAINLEVEL_OUTOF10: 3

## 2017-10-18 NOTE — CARE PLAN
Problem: Nutritional:  Goal: Achieve adequate nutritional intake  Diet advanced to full liquid; PO 50% on new diet.  Outcome: NOT MET

## 2017-10-18 NOTE — CARE PLAN
Problem: Safety  Goal: Will remain free from falls    Intervention: Assess risk factors for falls  Patient educated to call for assistance when needed.      Problem: Infection  Goal: Will remain free from infection    Intervention: Implement standard precautions and perform hand washing before and after patient contact  Hand hygiene performed before and after patient contact.

## 2017-10-18 NOTE — DIETARY
Nutrition Services:  Brief Update    Day 10 of clears.  Spoke with MD.  Pt diet advanced to full liquid diet.  Spoke with pt at bedside to obtain preferences on new diet to encourage PO intake.  Pt currently receiving BOOST supplements TID.      RD will continue to follow.

## 2017-10-18 NOTE — PROGRESS NOTES
[facility] Hospitalist Progress Note    Date of Service: 10/17/2017    Chief Complaint  86 y.o. male admitted 2017 with acute ground-level fall and back pain found to have  acute on chronic osteomyelitis and discitis, on long term abx. He was found to have psoas fluid collection abscess could not be excluded. He is followed by infectious disease, antibiotic recommendations have been made. The cultures were negative. Patient was having recurrent falls and found to have irregular rhythms, he had pacemaker placement.  During this stay he was diagnosed with candidal esophagitis, his oral intake is marginal.     Interval Problem Update  He is still not eating much and says he has chest pain with swallowing. He is refusing some of his oral medications due to this. He is nauseated but not had any vomiting. He is cold today and is all bundled up in his bed. He requires correction of his electrolytes before he can be discharged to skilled. He also needs demonstrably improved oral intake.    Consultants/Specialty  Infectious diseases Dr. Chilel-signed off  Cardiology- ok for outpatient follow-up  GI Dr. Waters- signed off    Disposition  Pt to go to [facility] when above criteria met        Review of Systems   Constitutional: Positive for chills and malaise/fatigue. Negative for diaphoresis and fever.   HENT:             Eyes: Negative for discharge and redness.   Respiratory: Negative for cough, shortness of breath and wheezing.    Cardiovascular: Positive for chest pain (swallowing). Negative for palpitations and leg swelling.   Gastrointestinal: Positive for nausea. Negative for abdominal pain, blood in stool, diarrhea and vomiting.   Genitourinary: Negative for dysuria.   Neurological: Positive for weakness. Negative for focal weakness.      Physical Exam  Laboratory/Imaging   Hemodynamics  Temp (24hrs), Av.4 °C (97.5 °F), Min:35.7 °C (96.3 °F), Max:37.1 °C (98.7 °F)   Temperature: 36.7 °C (98 °F)  Pulse  Avg:  82.3  Min: 45  Max: 147   Blood Pressure : 140/74      Respiratory      Respiration: 15, Pulse Oximetry: 100 %     Work Of Breathing / Effort: Mild  RUL Breath Sounds: Diminished, RML Breath Sounds: Diminished, RLL Breath Sounds: Diminished, MIKE Breath Sounds: Diminished, LLL Breath Sounds: Diminished    Fluids    Intake/Output Summary (Last 24 hours) at 10/17/17 1859  Last data filed at 10/17/17 1700   Gross per 24 hour   Intake             2636 ml   Output             1300 ml   Net             1336 ml       Nutrition  Orders Placed This Encounter   Procedures   • Diet Order     Standing Status:   Standing     Number of Occurrences:   1     Order Specific Question:   Diet:     Answer:   Clear Liquids - No Red Foods [12]     Physical Exam   Constitutional: He is oriented to person, place, and time. He appears well-developed and well-nourished. No distress.   Pale, nontoxic   HENT:   Head: Normocephalic and atraumatic.   Oral thrush   Eyes: EOM are normal. Pupils are equal, round, and reactive to light. Right eye exhibits no discharge. Left eye exhibits no discharge. No scleral icterus.   Neck: Neck supple.   Cardiovascular: Normal rate.    No murmur heard.  Irregular   Pulmonary/Chest: Effort normal and breath sounds normal. No stridor. No respiratory distress. He has no wheezes. He has no rales.   Abdominal: Soft. Bowel sounds are normal. He exhibits no distension. There is no tenderness.   Obese.    Musculoskeletal: He exhibits no edema or tenderness.   generalized 3-4/5 strength.   Neurological: He is alert and oriented to person, place, and time. No cranial nerve deficit.   Skin: Skin is warm and dry. No rash noted. He is not diaphoretic.   Psychiatric: He has a normal mood and affect. His behavior is normal.   Nursing note and vitals reviewed.          Recent Labs      10/15/17   0428  10/16/17   0340   SODIUM  139  135   POTASSIUM  3.4*  2.9*   CHLORIDE  104  101   CO2  29  30   GLUCOSE  106*  94   BUN  3*   <5*   CREATININE  0.44*  0.55   CALCIUM  9.1  8.7                      Assessment/Plan     * Persistent atrial fibrillation (CMS-[facility])- (present on admission)   Assessment & Plan    Status post pacemaker  Okay for outpatient follow-up            Dysphagia   Assessment & Plan    Reports of candidal esophagitis  Resume Diflucan  Start oral nystatin with swallowing  Monitor oral intake        Discitis of lumbar region- (present on admission)   Assessment & Plan    Afebrile, improved pain -- fu Improved Mri findings   Oral zyvox, bactrim, augmentin per ID thru 10/19             Nontraumatic psoas hematoma- (present on admission)   Assessment & Plan    Ac stopped, hgb stable.  Monitor for acute bleed.        Hypomagnesemia- (present on admission)   Assessment & Plan    Supplementing  Follow-up labs        Esophageal stricture- (present on admission)   Assessment & Plan    Fu outpt EGD for eval dilation         Candida esophagitis (CMS-HCC)   Assessment & Plan    Plan as noted elsewhere        Hypokalemia- (present on admission)   Assessment & Plan    Increase supplement, refused by mouth  Ordered IV, follow-up labs not done due to PICC. RN asked to draw        DNR (do not resuscitate)- (present on admission)   Assessment & Plan    Discussed with patient- will update to DNR code status current admission .        Normocytic anemia- (present on admission)   Assessment & Plan    Stable - multi factorial - acute blood loss, chronic dz, iron def.  Fu hgb    Iron supplements.           Debility- (present on admission)   Assessment & Plan    Continue PT /OT                 Reviewed items::  Labs reviewed and Medications reviewed  Hearn catheter::  No Hearn  DVT prophylaxis - mechanical:  SCDs  Ulcer Prophylaxis::  Yes  Antibiotics:  Treating active infection/contamination beyond 24 hours perioperative coverage

## 2017-10-18 NOTE — PROGRESS NOTES
"Date of Service: 10/18/2017  Chief Complaint:  Chief Complaint   Patient presents with   • ALOC   86 y.o. male admitted 9/13/2017 with acute ground-level fall and back pain found to have  acute on chronic osteomyelitis and discitis, on long term abx   He is found in AFlutter + bpm, episodic fleeting and with basically period of bradycardia; Asx  Interval History:  AFluttr with rate controlled; No new cardiac complaints;  EPS on board  All recent medication, labs, imaging studies and procedures reviewed    Physical Exam   Blood pressure 118/64, pulse 75, temperature 36.1 °C (97 °F), resp. rate 18, height 1.803 m (5' 11\"), weight 90.3 kg (199 lb 1.2 oz), SpO2 99 %.    Constitutional:  He appears well-developed.   HENT: Normocephalic and atraumatic. No scleral icterus.   Neck: No JVD present.   Cardiovascular: Normal rate. RRR; Exam reveals no gallop and no friction rub. No murmur heard.   Pulmonary/Chest: CTAB coarse   Abdominal: S/NT/ND BS+   Musculoskeletal: He exhibits no edema. Pulses present.  Skin: Skin is warm and dry.       Intake/Output Summary (Last 24 hours) at 10/18/17 0710  Last data filed at 10/18/17 0500   Gross per 24 hour   Intake              655 ml   Output              725 ml   Net              -70 ml       LABS:  No results found for: CHOLSTRLTOT, LDL, HDL, TRIGLYCERIDE    Lab Results   Component Value Date/Time    WBC 5.4 10/13/2017 04:03 PM    RBC 2.96 (L) 10/13/2017 04:03 PM    HEMOGLOBIN 9.0 (L) 10/13/2017 04:03 PM    HEMATOCRIT 28.0 (L) 10/13/2017 04:03 PM    MCV 94.6 10/13/2017 04:03 PM    NEUTSPOLYS 55.90 10/13/2017 04:03 PM    LYMPHOCYTES 25.80 10/13/2017 04:03 PM    MONOCYTES 12.90 10/13/2017 04:03 PM    EOSINOPHILS 4.60 10/13/2017 04:03 PM    BASOPHILS 0.60 10/13/2017 04:03 PM     Lab Results   Component Value Date/Time    SODIUM 135 10/18/2017 12:50 AM    POTASSIUM 4.2 10/18/2017 12:50 AM    CHLORIDE 101 10/18/2017 12:50 AM    CO2 28 10/18/2017 12:50 AM    GLUCOSE 99 10/18/2017 " 12:50 AM    BUN 5 (L) 10/18/2017 12:50 AM    CREATININE 0.55 10/18/2017 12:50 AM     Lab Results   Component Value Date    HBA1C 5.5 09/15/2017      Lab Results   Component Value Date/Time    ALKPHOSPHAT 54 10/09/2017 04:32 AM    ASTSGOT 12 10/09/2017 04:32 AM    ALTSGPT 5 10/09/2017 04:32 AM    TBILIRUBIN 0.4 10/09/2017 04:32 AM      Lab Results   Component Value Date/Time    BNPBTYPENAT 166 (H) 09/13/2017 12:00 AM      No results found for: TSH  Lab Results   Component Value Date/Time    PROTHROMBTM 14.5 09/20/2017 09:31 AM    INR 1.09 09/20/2017 09:31 AM        Medications reviewed    Imaging reviewed    Imaging reviewed     ECHO(9/13/2017):Normal regional wall motion.  Left ventricular ejection fraction is visually estimated to be 70%.  Diastolic function is difficult to assess with atrial fibrillation.  The right ventricle was normal in size and function.  Structurally normal mitral valve without significant stenosis or   regurgitation.  Aortic sclerosis without stenosis.  No aortic insufficiency.  Normal pericardium without effusion.     Impressions:Recommendations:          • Dysphagia [R13.10]       Priority: High   • Persistent atrial fibrillation (CMS-HCC) [I48.1]       Priority: High   • Discitis of lumbar region [M46.46]       Priority: High   • 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.81]       Priority: Medium   • DNR (do not resuscitate) [Z66]       Priority: Low   • Normocytic anemia [D64.9]       Priority: Low   • Debility [R53.81]       Priority: Low   • Esophagitis [K20.9]   • Esophageal stricture [K22.2]            Balance lytes  Atypical flutter may have breakthrough  Currently AFlutter with controlled VR  Agree with BB and Digoxin  Continue Digoxin at current dosing       PPM after esophagitis is treated, may take 2-3 weeks  Discussed with attending/RN  Will follow alone with EPS  Thx

## 2017-10-18 NOTE — PROGRESS NOTES
Report received at bedside. Assessment completed. Pt A&Ox4. On RA with unlabored breathing. Continues to report pain with swallowing, difficulty swallowing r/t pain, had some dry heaving after given crushed meds in pudding, no vomiting. Pt denies nausea. Denies other pain/discomfort. Repositioned as pt tends to lean to left side of bed. Bed in low/locked position, treaded socks, call bell within reach. Encouraged to call with needs. Will continue to monitor.

## 2017-10-18 NOTE — CARE PLAN
Problem: Nutritional:  Goal: Achieve adequate nutritional intake  Diet will advance past NPO/clears vs nutrition support to start   Outcome: NOT MET  Inadequate nutrition day 9.  Pt remains on a clear liquid diet.   Last GI note was 10/9.  Spoke with RN, who stated MD did not wish to advance diet today. RN is encouraging pt to drink Boost supplements. Poor appetite noted.     Would consider advance diet to full liquids; RD can restrict thick items, like cream of wheat, pudding, etc., if necessary due to stricture.

## 2017-10-19 PROBLEM — T17.908A ASPIRATION INTO AIRWAY: Status: ACTIVE | Noted: 2017-10-19

## 2017-10-19 LAB
ANION GAP SERPL CALC-SCNC: 7 MMOL/L (ref 0–11.9)
BUN SERPL-MCNC: 6 MG/DL (ref 8–22)
CALCIUM SERPL-MCNC: 9.3 MG/DL (ref 8.5–10.5)
CHLORIDE SERPL-SCNC: 101 MMOL/L (ref 96–112)
CO2 SERPL-SCNC: 29 MMOL/L (ref 20–33)
CREAT SERPL-MCNC: 0.54 MG/DL (ref 0.5–1.4)
GFR SERPL CREATININE-BSD FRML MDRD: >60 ML/MIN/1.73 M 2
GLUCOSE SERPL-MCNC: 120 MG/DL (ref 65–99)
MAGNESIUM SERPL-MCNC: 1.7 MG/DL (ref 1.5–2.5)
POTASSIUM SERPL-SCNC: 4.6 MMOL/L (ref 3.6–5.5)
SODIUM SERPL-SCNC: 137 MMOL/L (ref 135–145)

## 2017-10-19 PROCEDURE — 700102 HCHG RX REV CODE 250 W/ 637 OVERRIDE(OP): Performed by: HOSPITALIST

## 2017-10-19 PROCEDURE — 83735 ASSAY OF MAGNESIUM: CPT

## 2017-10-19 PROCEDURE — 80048 BASIC METABOLIC PNL TOTAL CA: CPT

## 2017-10-19 PROCEDURE — 700101 HCHG RX REV CODE 250: Performed by: INTERNAL MEDICINE

## 2017-10-19 PROCEDURE — A9270 NON-COVERED ITEM OR SERVICE: HCPCS | Performed by: INTERNAL MEDICINE

## 2017-10-19 PROCEDURE — 92526 ORAL FUNCTION THERAPY: CPT

## 2017-10-19 PROCEDURE — 700102 HCHG RX REV CODE 250 W/ 637 OVERRIDE(OP): Performed by: INTERNAL MEDICINE

## 2017-10-19 PROCEDURE — G8997 SWALLOW GOAL STATUS: HCPCS | Mod: CI

## 2017-10-19 PROCEDURE — A9270 NON-COVERED ITEM OR SERVICE: HCPCS | Performed by: HOSPITALIST

## 2017-10-19 PROCEDURE — 94760 N-INVAS EAR/PLS OXIMETRY 1: CPT

## 2017-10-19 PROCEDURE — 97112 NEUROMUSCULAR REEDUCATION: CPT

## 2017-10-19 PROCEDURE — 700111 HCHG RX REV CODE 636 W/ 250 OVERRIDE (IP): Performed by: INTERNAL MEDICINE

## 2017-10-19 PROCEDURE — 99233 SBSQ HOSP IP/OBS HIGH 50: CPT | Performed by: INTERNAL MEDICINE

## 2017-10-19 PROCEDURE — 97535 SELF CARE MNGMENT TRAINING: CPT

## 2017-10-19 PROCEDURE — G8998 SWALLOW D/C STATUS: HCPCS | Mod: CI

## 2017-10-19 PROCEDURE — 97530 THERAPEUTIC ACTIVITIES: CPT

## 2017-10-19 PROCEDURE — 770020 HCHG ROOM/CARE - TELE (206)

## 2017-10-19 RX ADMIN — POTASSIUM CHLORIDE AND SODIUM CHLORIDE: 900; 150 INJECTION, SOLUTION INTRAVENOUS at 01:23

## 2017-10-19 RX ADMIN — STANDARDIZED SENNA CONCENTRATE AND DOCUSATE SODIUM 2 TABLET: 8.6; 5 TABLET, FILM COATED ORAL at 08:53

## 2017-10-19 RX ADMIN — SUCRALFATE 1 G: 1 SUSPENSION ORAL at 12:01

## 2017-10-19 RX ADMIN — SULFAMETHOXAZOLE AND TRIMETHOPRIM 1 TABLET: 800; 160 TABLET ORAL at 08:53

## 2017-10-19 RX ADMIN — POTASSIUM CHLORIDE AND SODIUM CHLORIDE: 900; 150 INJECTION, SOLUTION INTRAVENOUS at 17:28

## 2017-10-19 RX ADMIN — SUCRALFATE 1 G: 1 SUSPENSION ORAL at 05:54

## 2017-10-19 RX ADMIN — AMOXICILLIN AND CLAVULANATE POTASSIUM 1 TABLET: 875; 125 TABLET, FILM COATED ORAL at 08:53

## 2017-10-19 RX ADMIN — DIGOXIN 125 MCG: 0.05 SOLUTION ORAL at 17:23

## 2017-10-19 RX ADMIN — NYSTATIN 1000000 UNITS: 100000 SUSPENSION ORAL at 14:33

## 2017-10-19 RX ADMIN — GABAPENTIN 200 MG: 100 CAPSULE ORAL at 08:53

## 2017-10-19 RX ADMIN — POTASSIUM BICARBONATE 25 MEQ: 25 TABLET, EFFERVESCENT ORAL at 08:53

## 2017-10-19 RX ADMIN — METOPROLOL TARTRATE 100 MG: 50 TABLET, FILM COATED ORAL at 08:53

## 2017-10-19 RX ADMIN — FLUCONAZOLE 200 MG: 2 INJECTION, SOLUTION INTRAVENOUS at 08:54

## 2017-10-19 RX ADMIN — Medication 220 MG: at 08:54

## 2017-10-19 RX ADMIN — OMEPRAZOLE 40 MG: 20 CAPSULE, DELAYED RELEASE ORAL at 08:53

## 2017-10-19 RX ADMIN — POTASSIUM BICARBONATE 25 MEQ: 25 TABLET, EFFERVESCENT ORAL at 15:39

## 2017-10-19 RX ADMIN — NYSTATIN 1000000 UNITS: 100000 SUSPENSION ORAL at 17:21

## 2017-10-19 RX ADMIN — SUCRALFATE 1 G: 1 SUSPENSION ORAL at 17:21

## 2017-10-19 RX ADMIN — LEVETIRACETAM 500 MG: 100 SOLUTION ORAL at 08:53

## 2017-10-19 RX ADMIN — GABAPENTIN 200 MG: 100 CAPSULE ORAL at 15:39

## 2017-10-19 RX ADMIN — NYSTATIN 1000000 UNITS: 100000 SUSPENSION ORAL at 08:53

## 2017-10-19 ASSESSMENT — PAIN SCALES - GENERAL
PAINLEVEL_OUTOF10: 2
PAINLEVEL_OUTOF10: 2
PAINLEVEL_OUTOF10: 3

## 2017-10-19 ASSESSMENT — COGNITIVE AND FUNCTIONAL STATUS - GENERAL
MOVING FROM LYING ON BACK TO SITTING ON SIDE OF FLAT BED: UNABLE
WALKING IN HOSPITAL ROOM: TOTAL
HELP NEEDED FOR BATHING: A LOT
PERSONAL GROOMING: A LITTLE
DRESSING REGULAR UPPER BODY CLOTHING: A LITTLE
MOVING TO AND FROM BED TO CHAIR: A LOT
TURNING FROM BACK TO SIDE WHILE IN FLAT BAD: A LOT
DRESSING REGULAR LOWER BODY CLOTHING: A LOT
DAILY ACTIVITIY SCORE: 16
TOILETING: A LOT
SUGGESTED CMS G CODE MODIFIER DAILY ACTIVITY: CK
STANDING UP FROM CHAIR USING ARMS: TOTAL
MOBILITY SCORE: 8
SUGGESTED CMS G CODE MODIFIER MOBILITY: CM
CLIMB 3 TO 5 STEPS WITH RAILING: TOTAL

## 2017-10-19 ASSESSMENT — ENCOUNTER SYMPTOMS
PND: 0
COUGH: 0
VOMITING: 0
WHEEZING: 0
EYE REDNESS: 0
ORTHOPNEA: 0
FOCAL WEAKNESS: 0
NAUSEA: 1
DIAPHORESIS: 0
BLOOD IN STOOL: 0
FEVER: 0
SHORTNESS OF BREATH: 0
PALPITATIONS: 0
DIARRHEA: 0
WEAKNESS: 1
ABDOMINAL PAIN: 0
EYE DISCHARGE: 0
CLAUDICATION: 0

## 2017-10-19 ASSESSMENT — GAIT ASSESSMENTS: GAIT LEVEL OF ASSIST: UNABLE TO PARTICIPATE

## 2017-10-19 NOTE — PROGRESS NOTES
Bedside report received, assumed care of patient. Assessment performed. Discussed plan of care with pt.  Patient encouraged to eat more of dinner, patient did have all of the chocolate milk shake family brought him for his birthday.  Educated on need for nutrition. Call light within reach, pt educated to call for assistance.

## 2017-10-19 NOTE — THERAPY
"Occupational Therapy Treatment completed with focus on ADLs, ADL transfers and patient education.  Functional Status:  Min A supine > EOB, CGA sitting balance, max A LB dressing, mod A EOB > supine, unable to stand or pivot  Plan of Care: Will benefit from Occupational Therapy 3 times per week  Discharge Recommendations:  Equipment Will Continue to Assess for Equipment Needs. Post-acute therapy Discharge to a transitional care facility for continued skilled therapy services.    See \"Rehab Therapy-Acute\" Patient Summary Report for complete documentation.     Pt seen for OT tx. Just finishing using urinal. Reports he may have had BM. Pt able to roll for BM hygiene (small BM, dep for hygiene). Mobilized to EOB with min A. Engaged in alternating tailor sit for sock doffing/donning with mod A. Pt completed lateral scoots towards HOB (min A) in prep for sit to stands. Throughout EOB activity pt's posture/trunk control declined. Based on poor trunk control, reports of fatigue and back pain, did not attempt stand. Pt is limited by deconditioning, back pain. Requires encouragement to give maximal effort. Acute OT to continue following.   "

## 2017-10-19 NOTE — THERAPY
"Speech Language Therapy dysphagia treatment completed.   Functional Status:  Pt Aox4 and cooperative. Pt appears to be tolerating full liquid diet with no overt s/sx of aspiration however still with esophageal dysphagia. EGD dilation cancelled r/t other medical issues and will likely be on full liquid diet for remainder of stay per RN.      Recommendations: Diet per MD/GI   Plan of Care: No further acute dysphagia tx indicated at this time. Please reconsult with any further needs.    See \"Rehab Therapy-Acute\" Patient Summary Report for complete documentation.     "

## 2017-10-19 NOTE — THERAPY
"Pt pleasant and amenable. however, tolerance to activity is limited. Left sided leean upon EOB sitting.  Continue acute PT to address above problems    Physical Therapy Treatment completed.   Bed Mobility:  Supine to Sit: Moderate Assist  Transfers: Sit to Stand: Unable to Participate  Gait: Level Of Assist: Unable to Participate with Front-Wheel Walker       Plan of Care: Will benefit from Physical Therapy 3 times per week  Discharge Recommendations: Equipment: Will Continue to Assess for Equipment Needs. Post-acute therapy Discharge to a transitional care facility for continued skilled therapy services.     See \"Rehab Therapy-Acute\" Patient Summary Report for complete documentation.       "

## 2017-10-19 NOTE — PROGRESS NOTES
Cardiology Progress Note               Author: Edwin Hernández Date & Time created: 10/19/2017  8:29 AM     Consultation for A. fib/flutter RVR (history of A. fib) in the setting of sepsis syndrome    As well as EP consult for tachybradycardia syndrome  GI consult for esophagitis and dysphagia    Admitted with altered mental status, fever, hematuria, leukocytosis, sepsis (discitis at L3-L4)    History of atrial fib, diabetes, hyperlipidemia, GERD    Labs reviewed  Sodium, potassium, creatinine stable    BP = 109/70  HR =60-74 afib        Echocardiogram 17, normal regional wall motion, LVEF 70%, no significant valvular abnormality    Review of Systems   Respiratory: Negative for cough and shortness of breath.    Cardiovascular: Negative for chest pain, palpitations, orthopnea, claudication, leg swelling and PND.       Physical Exam   Constitutional: He is oriented to person, place, and time.   HENT:   Head: Normocephalic.   Eyes: Conjunctivae are normal.   Neck: No JVD present. No thyromegaly present.   Cardiovascular:   Pulses:       Carotid pulses are 2+ on the left side.       Radial pulses are 2+ on the right side, and 2+ on the left side.   Paced rhythm, atrial flutter   Pulmonary/Chest: He has no wheezes.   Abdominal: Soft.   Musculoskeletal: He exhibits no edema.   Neurological: He is alert and oriented to person, place, and time.   Skin: Skin is warm.   Pacemaker site uncomplicated  The old dressing was changed  New transparent dressing was applied       Hemodynamics:  Temp (24hrs), Av.5 °C (97.7 °F), Min:36 °C (96.8 °F), Max:37.2 °C (98.9 °F)  Temperature: 36 °C (96.8 °F)  Pulse  Av  Min: 45  Max: 147  Blood Pressure : 109/71     Respiratory:    Respiration: 16, Pulse Oximetry: 98 %, O2 Daily Delivery Respiratory : Silicone Nasal Cannula        RUL Breath Sounds: Diminished, RML Breath Sounds: Diminished, RLL Breath Sounds: Diminished, MIKE Breath Sounds: Diminished, LLL Breath Sounds:  Diminished  Fluids:     Weight: 97.1 kg (214 lb 1.1 oz)  GI/Nutrition:  Orders Placed This Encounter   Procedures   • Diet Order     Standing Status:   Standing     Number of Occurrences:   1     Order Specific Question:   Diet:     Answer:   Full Liquid [11]     Lab Results:      Recent Labs      10/18/17   0050  10/19/17   0123   SODIUM  135  137   POTASSIUM  4.2  4.6   CHLORIDE  101  101   CO2  28  29   GLUCOSE  99  120*   BUN  5*  6*   CREATININE  0.55  0.54   CALCIUM  10.3  9.3                         Medical Decision Making, by Problem:  Active Hospital Problems    Diagnosis   • *Persistent atrial fibrillation (CMS-HCC) [I48.1]   • Dysphagia [R13.10]   • Discitis of lumbar region [M46.46]   • 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.81]   • Hypomagnesemia [E83.42]   • DNR (do not resuscitate) [Z66]   • Normocytic anemia [D64.9]   • Debility [R53.81]   • Candida esophagitis (CMS-HCC) [B37.81]   • Esophageal stricture [K22.2]   • Hypokalemia [E87.6]       Assessment/Plan:    Consultation for A. fib/flutter RVR (history of permanent A. Fib), tachybradycardia syndrome this admission  , appreciate EP consult s/p single chamber pacemaker implantation, [employer], MRI conditional on 10/16/17    CHADS score ( age , DM, ), he has not been on OAC since his fall,  on 2/17 with hematoma, he understands the potential risk of stroke, and vieyra decided to be off of  coumadin     Pacemaker functioning appropriately    Currently on digoxin 0.125, metoprolol 100 mg BID, rate controlled, paed, flutter   EF 70%    Appointment has been scheduled with our device clinic on 10/25/17 4 PM    Reviewed items::  Medications reviewed and Labs reviewed

## 2017-10-19 NOTE — PROGRESS NOTES
[facility] Hospitalist Progress Note    Date of Service: 10/18/2017    Chief Complaint  86 y.o. male admitted 9/13/2017 with acute ground-level fall and back pain found to have  acute on chronic osteomyelitis and discitis, on long term abx. He was found to have psoas fluid collection abscess could not be excluded. He is followed by infectious disease, antibiotic recommendations have been made. The cultures were negative. Patient was having recurrent falls and found to have irregular rhythms, he had pacemaker placement.  During this stay he was diagnosed with candidal esophagitis, his oral intake is marginal.     Interval Problem Update  Swallowing issues continue- the RN had a difficult time getting a lot of his medications down this morning after they had been crushed and floated in applesauce. Discussed with RNs as well as with pharmacist. More success with the afternoon medications- however his oral intake remains marginal due to complaints of pain with swallowing. Diet has been advanced to full liquids.  Numerous medications were changed to liquid formulations when possible.    Consultants/Specialty  Infectious diseases Dr. Chilel-signed off  Cardiology- okay for outpatient follow-up  GI Dr. Waters- signed off    Disposition  Pt to go to [facility] when above criteria met        Review of Systems   Constitutional: Positive for malaise/fatigue. Negative for diaphoresis and fever.   HENT:             Eyes: Negative for discharge and redness.   Respiratory: Negative for cough, shortness of breath and wheezing.    Cardiovascular: Positive for chest pain (swallowing). Negative for palpitations and leg swelling.   Gastrointestinal: Positive for nausea. Negative for abdominal pain, blood in stool, diarrhea and vomiting.        Ongoing issues   Genitourinary: Negative for dysuria.   Neurological: Positive for weakness. Negative for focal weakness.      Physical Exam  Laboratory/Imaging   Hemodynamics  Temp (24hrs),  Av.4 °C (97.5 °F), Min:36.1 °C (97 °F), Max:37.2 °C (98.9 °F)   Temperature: 37.2 °C (98.9 °F)  Pulse  Av.1  Min: 45  Max: 147   Blood Pressure : 106/59      Respiratory      Respiration: 19, Pulse Oximetry: 93 %     Work Of Breathing / Effort: Mild  RUL Breath Sounds: Diminished, RML Breath Sounds: Diminished, RLL Breath Sounds: Diminished, MIKE Breath Sounds: Diminished, LLL Breath Sounds: Diminished    Fluids    Intake/Output Summary (Last 24 hours) at 10/18/17 8811  Last data filed at 10/18/17 0500   Gross per 24 hour   Intake              200 ml   Output              725 ml   Net             -525 ml       Nutrition  Orders Placed This Encounter   Procedures   • Diet Order     Standing Status:   Standing     Number of Occurrences:   1     Order Specific Question:   Diet:     Answer:   Full Liquid [11]     Physical Exam   Constitutional: He is oriented to person, place, and time. He appears well-developed and well-nourished. No distress.   HENT:   Head: Normocephalic and atraumatic.   Oral thrush   Eyes: EOM are normal. Pupils are equal, round, and reactive to light. Right eye exhibits no discharge. Left eye exhibits no discharge. No scleral icterus.   Neck: Neck supple.   Cardiovascular: Normal rate.    No murmur heard.  Irregular   Pulmonary/Chest: Effort normal and breath sounds normal. No stridor. No respiratory distress. He has no wheezes. He has no rales.   Old serosanguineous Peek on dressing from pacemaker   Abdominal: Soft. Bowel sounds are normal. He exhibits no distension. There is no tenderness.   Obese.    Musculoskeletal: He exhibits no edema or tenderness.   generalized 3-4/5 strength.   Neurological: He is alert and oriented to person, place, and time. No cranial nerve deficit.   Skin: Skin is warm and dry. No rash noted. He is not diaphoretic.   Psychiatric: He has a normal mood and affect. His behavior is normal.   Nursing note and vitals reviewed.          Recent Labs      10/16/17    0340  10/18/17   0050   SODIUM  135  135   POTASSIUM  2.9*  4.2   CHLORIDE  101  101   CO2  30  28   GLUCOSE  94  99   BUN  <5*  5*   CREATININE  0.55  0.55   CALCIUM  8.7  10.3                      Assessment/Plan     * Persistent atrial fibrillation (CMS-HCC)- (present on admission)   Assessment & Plan    Status post pacemaker  Okay for outpatient follow-up            Dysphagia   Assessment & Plan    Reports of candidal esophagitis  Resumed Diflucan  oral nystatin with swallowing  Monitoring oral intake        Discitis of lumbar region- (present on admission)   Assessment & Plan    Afebrile, improved pain -- fu Improved Mri findings   Oral  bactrim, augmentin for 2 more weeks          Nontraumatic psoas hematoma- (present on admission)   Assessment & Plan    Ac stopped, hgb stable.  Monitor for acute bleed.        Hypomagnesemia- (present on admission)   Assessment & Plan    Supplement on hold  Monitor        Esophageal stricture- (present on admission)   Assessment & Plan    Fu outpt EGD for eval dilation         Candida esophagitis (CMS-HCC)   Assessment & Plan    Plan as noted elsewhere        Hypokalemia- (present on admission)   Assessment & Plan    Continue to monitor        DNR (do not resuscitate)- (present on admission)   Assessment & Plan    Code status reflects        Normocytic anemia- (present on admission)   Assessment & Plan    Stable - multi factorial - acute blood loss, chronic dz, iron def.  Fu hgb    Iron supplements.           Debility- (present on admission)   Assessment & Plan    Continue PT /OT                 Reviewed items::  Labs reviewed and Medications reviewed  Hearn catheter::  No Hearn  DVT prophylaxis - mechanical:  SCDs  Ulcer Prophylaxis::  Yes  Antibiotics:  Treating active infection/contamination beyond 24 hours perioperative coverage

## 2017-10-19 NOTE — PROGRESS NOTES
[facility] Hospitalist Progress Note    Date of Service: 10/19/2017    Chief Complaint  86 y.o. male admitted 9/13/2017 with acute ground-level fall and back pain found to have  acute on chronic osteomyelitis and discitis, on long term abx. He was found to have psoas fluid collection abscess could not be excluded. He is followed by infectious disease, antibiotic recommendations have been made. The cultures were negative. Patient was having recurrent falls and found to have irregular rhythms, he had pacemaker placement.  During this stay he was diagnosed with candidal esophagitis, his oral intake is marginal.     Interval Problem Update  Walked around [facility] to see the patient the patient and found him semi-recombinant drinking boost-while his oral intake has improved substantially, unfortunately he clearly has aspirated his boost on exam. Patient was educated to only drink and eat while sitting straight up. Discussed with RN, patient encouraged to cough. We will provide incentive spirometry. We'll obtain follow-up labs and x-ray tomorrow to make sure he is not developing pneumonia. Otherwise he looks much better.  Per nursing, he did not participate well with physical therapy today.    Consultants/Specialty  Infectious diseases Dr. Chilel-signed off  Cardiology- ok for outpatient follow-up  GI Dr. Waters- signed off    Disposition  Pt to go to [facility] when above criteria met        Review of Systems   Constitutional: Positive for malaise/fatigue (improving). Negative for diaphoresis and fever.   HENT:             Eyes: Negative for discharge and redness.   Respiratory: Negative for cough, shortness of breath and wheezing.    Cardiovascular: Negative for chest pain, palpitations and leg swelling.   Gastrointestinal: Positive for nausea. Negative for abdominal pain, blood in stool, diarrhea and vomiting.        Less problems   Pain with swallowing is now intermittent   Genitourinary: Negative for dysuria.    Neurological: Positive for weakness (improving). Negative for focal weakness.      Physical Exam  Laboratory/Imaging   Hemodynamics  Temp (24hrs), Av.4 °C (97.5 °F), Min:35.9 °C (96.6 °F), Max:37.2 °C (98.9 °F)   Temperature: 36.2 °C (97.1 °F)  Pulse  Av.9  Min: 45  Max: 147   Blood Pressure : 127/61      Respiratory      Respiration: 18, Pulse Oximetry: 94 %, O2 Daily Delivery Respiratory : Silicone Nasal Cannula     Work Of Breathing / Effort: Mild  RUL Breath Sounds: Diminished, RML Breath Sounds: Diminished, RLL Breath Sounds: Diminished, MIKE Breath Sounds: Diminished, LLL Breath Sounds: Diminished    Fluids    Intake/Output Summary (Last 24 hours) at 10/19/17 1546  Last data filed at 10/19/17 0600   Gross per 24 hour   Intake             1670 ml   Output              360 ml   Net             1310 ml       Nutrition  Orders Placed This Encounter   Procedures   • Diet Order     Standing Status:   Standing     Number of Occurrences:   1     Order Specific Question:   Diet:     Answer:   Full Liquid [11]     Physical Exam   Constitutional: He is oriented to person, place, and time. He appears well-developed and well-nourished. No distress.   HENT:   Head: Normocephalic and atraumatic.   Oral thrush   Eyes: EOM are normal. Pupils are equal, round, and reactive to light. Right eye exhibits no discharge. Left eye exhibits no discharge. No scleral icterus.   Neck: Neck supple.   Cardiovascular: Normal rate.    No murmur heard.  Irregular   Pulmonary/Chest: Effort normal. No stridor. No respiratory distress. He has no wheezes. He has no rales.   Diffuse junky rhonchi all over.   Abdominal: Soft. Bowel sounds are normal. He exhibits no distension. There is no tenderness.   Obese.    Musculoskeletal: He exhibits no edema or tenderness.   generalized 3-4/5 strength.   Neurological: He is alert and oriented to person, place, and time. No cranial nerve deficit.   Skin: Skin is warm and dry. No rash noted. He is  not diaphoretic.   Psychiatric: He has a normal mood and affect. His behavior is normal.   Alert  and mobile   Nursing note and vitals reviewed.          Recent Labs      10/18/17   0050  10/19/17   0123   SODIUM  135  137   POTASSIUM  4.2  4.6   CHLORIDE  101  101   CO2  28  29   GLUCOSE  99  120*   BUN  5*  6*   CREATININE  0.55  0.54   CALCIUM  10.3  9.3                      Assessment/Plan     * Persistent atrial fibrillation (CMS-HCC)- (present on admission)   Assessment & Plan    Status post pacemaker  Okay for outpatient follow-up            Dysphagia   Assessment & Plan    Now aspirated likely due to positioning while taking in full liquid  Patient educated, nurse made aware to be careful of patient behaviors  Aspiration precautions    Reports of candidal esophagitis  Resumed Diflucan  oral nystatin with swallowing  Monitoring oral intake        Discitis of lumbar region- (present on admission)   Assessment & Plan    Afebrile, improved pain -- fu Improved Mri findings   Oral  bactrim, augmentin for 2 more weeks          Nontraumatic psoas hematoma- (present on admission)   Assessment & Plan    Ac stopped, hgb stable.  Monitor for acute bleed.        Hypomagnesemia- (present on admission)   Assessment & Plan    Supplement on hold  Monitor        Aspiration into airway   Assessment & Plan    due to positioning  CBC, pro-calcitonin, chest x-ray-in morning  Incentive spirometry, encouraged to cough  Patient education        Esophageal stricture- (present on admission)   Assessment & Plan    Fu outpt EGD for eval dilation   Tolerating full liquids at this time        Candida esophagitis (CMS-HCC)   Assessment & Plan    Plan as noted elsewhere        Hypokalemia- (present on admission)   Assessment & Plan    Resolved        DNR (do not resuscitate)- (present on admission)   Assessment & Plan    Code status reflects        Normocytic anemia- (present on admission)   Assessment & Plan    Stable - multi  factorial - acute blood loss, chronic dz, iron def.  Fu hgb    Iron supplements.           Debility- (present on admission)   Assessment & Plan    Continue PT /OT                 Reviewed items::  Labs reviewed and Medications reviewed  Hearn catheter::  No Hearn  DVT prophylaxis - mechanical:  SCDs  Ulcer Prophylaxis::  Yes  Antibiotics:  Treating active infection/contamination beyond 24 hours perioperative coverage

## 2017-10-19 NOTE — CARE PLAN
Problem: Knowledge Deficit  Goal: Knowledge of disease process/condition, treatment plan, diagnostic tests, and medications will improve    Intervention: Assess knowledge level of disease process/condition, treatment plan, diagnostic tests, and medications  Knowledge assessed regarding plan of care, patient verbalized understanding.      Problem: Skin Integrity  Goal: Risk for impaired skin integrity will decrease    Intervention: Implement precautions to protect skin integrity in collaboration with the interdisciplinary team  Patient repositioned every two hours while in bed.

## 2017-10-19 NOTE — ASSESSMENT & PLAN NOTE
due to positioning  Luckily CBC, pro-calcitonin, and chest x-ray are all fine today  His lungs sound much better

## 2017-10-20 ENCOUNTER — APPOINTMENT (OUTPATIENT)
Dept: RADIOLOGY | Facility: MEDICAL CENTER | Age: 82
DRG: 871 | End: 2017-10-20
Attending: INTERNAL MEDICINE
Payer: MEDICARE

## 2017-10-20 PROCEDURE — 700101 HCHG RX REV CODE 250: Performed by: INTERNAL MEDICINE

## 2017-10-20 PROCEDURE — A9270 NON-COVERED ITEM OR SERVICE: HCPCS | Performed by: HOSPITALIST

## 2017-10-20 PROCEDURE — 770020 HCHG ROOM/CARE - TELE (206)

## 2017-10-20 PROCEDURE — A9270 NON-COVERED ITEM OR SERVICE: HCPCS | Performed by: INTERNAL MEDICINE

## 2017-10-20 PROCEDURE — 700102 HCHG RX REV CODE 250 W/ 637 OVERRIDE(OP): Performed by: INTERNAL MEDICINE

## 2017-10-20 PROCEDURE — 71010 DX-CHEST-PORTABLE (1 VIEW): CPT

## 2017-10-20 PROCEDURE — 700111 HCHG RX REV CODE 636 W/ 250 OVERRIDE (IP): Performed by: HOSPITALIST

## 2017-10-20 PROCEDURE — 94760 N-INVAS EAR/PLS OXIMETRY 1: CPT

## 2017-10-20 PROCEDURE — 700102 HCHG RX REV CODE 250 W/ 637 OVERRIDE(OP): Performed by: HOSPITALIST

## 2017-10-20 PROCEDURE — 700111 HCHG RX REV CODE 636 W/ 250 OVERRIDE (IP): Performed by: INTERNAL MEDICINE

## 2017-10-20 PROCEDURE — 99232 SBSQ HOSP IP/OBS MODERATE 35: CPT | Performed by: INTERNAL MEDICINE

## 2017-10-20 RX ORDER — FLUCONAZOLE 100 MG/1
100 TABLET ORAL DAILY
Status: DISCONTINUED | OUTPATIENT
Start: 2017-10-21 | End: 2017-10-23 | Stop reason: HOSPADM

## 2017-10-20 RX ORDER — DIGOXIN 125 MCG
125 TABLET ORAL DAILY
Status: DISCONTINUED | OUTPATIENT
Start: 2017-10-20 | End: 2017-10-23 | Stop reason: HOSPADM

## 2017-10-20 RX ADMIN — POTASSIUM BICARBONATE 25 MEQ: 25 TABLET, EFFERVESCENT ORAL at 20:24

## 2017-10-20 RX ADMIN — SUCRALFATE 1 G: 1 SUSPENSION ORAL at 10:59

## 2017-10-20 RX ADMIN — LEVETIRACETAM 500 MG: 100 SOLUTION ORAL at 00:43

## 2017-10-20 RX ADMIN — NYSTATIN 1000000 UNITS: 100000 SUSPENSION ORAL at 09:27

## 2017-10-20 RX ADMIN — LIDOCAINE HYDROCHLORIDE 15 ML: 20 SOLUTION OROPHARYNGEAL at 20:26

## 2017-10-20 RX ADMIN — GABAPENTIN 200 MG: 100 CAPSULE ORAL at 00:39

## 2017-10-20 RX ADMIN — LEVETIRACETAM 500 MG: 100 SOLUTION ORAL at 09:26

## 2017-10-20 RX ADMIN — NYSTATIN 1000000 UNITS: 100000 SUSPENSION ORAL at 00:37

## 2017-10-20 RX ADMIN — OMEPRAZOLE 40 MG: 20 CAPSULE, DELAYED RELEASE ORAL at 00:43

## 2017-10-20 RX ADMIN — GABAPENTIN 200 MG: 100 CAPSULE ORAL at 20:23

## 2017-10-20 RX ADMIN — GABAPENTIN 200 MG: 100 CAPSULE ORAL at 09:20

## 2017-10-20 RX ADMIN — NYSTATIN 1000000 UNITS: 100000 SUSPENSION ORAL at 17:56

## 2017-10-20 RX ADMIN — FLUCONAZOLE 200 MG: 2 INJECTION, SOLUTION INTRAVENOUS at 09:27

## 2017-10-20 RX ADMIN — LEVETIRACETAM 500 MG: 100 SOLUTION ORAL at 20:38

## 2017-10-20 RX ADMIN — AMOXICILLIN AND CLAVULANATE POTASSIUM 1 TABLET: 875; 125 TABLET, FILM COATED ORAL at 20:31

## 2017-10-20 RX ADMIN — ATORVASTATIN CALCIUM 10 MG: 10 TABLET, FILM COATED ORAL at 20:37

## 2017-10-20 RX ADMIN — OMEPRAZOLE 40 MG: 20 CAPSULE, DELAYED RELEASE ORAL at 20:37

## 2017-10-20 RX ADMIN — SUCRALFATE 1 G: 1 SUSPENSION ORAL at 06:56

## 2017-10-20 RX ADMIN — SULFAMETHOXAZOLE AND TRIMETHOPRIM 1 TABLET: 800; 160 TABLET ORAL at 20:25

## 2017-10-20 RX ADMIN — LIDOCAINE HYDROCHLORIDE 15 ML: 20 SOLUTION OROPHARYNGEAL at 00:48

## 2017-10-20 RX ADMIN — SULFAMETHOXAZOLE AND TRIMETHOPRIM 1 TABLET: 800; 160 TABLET ORAL at 00:39

## 2017-10-20 RX ADMIN — SULFAMETHOXAZOLE AND TRIMETHOPRIM 1 TABLET: 800; 160 TABLET ORAL at 09:21

## 2017-10-20 RX ADMIN — HYDRALAZINE HYDROCHLORIDE 20 MG: 20 INJECTION INTRAMUSCULAR; INTRAVENOUS at 12:59

## 2017-10-20 RX ADMIN — OMEPRAZOLE 40 MG: 20 CAPSULE, DELAYED RELEASE ORAL at 09:25

## 2017-10-20 RX ADMIN — METOPROLOL TARTRATE 100 MG: 50 TABLET, FILM COATED ORAL at 20:30

## 2017-10-20 RX ADMIN — NYSTATIN 1000000 UNITS: 100000 SUSPENSION ORAL at 20:26

## 2017-10-20 RX ADMIN — POTASSIUM BICARBONATE 25 MEQ: 25 TABLET, EFFERVESCENT ORAL at 10:59

## 2017-10-20 RX ADMIN — ATORVASTATIN CALCIUM 10 MG: 10 TABLET, FILM COATED ORAL at 00:39

## 2017-10-20 RX ADMIN — METOPROLOL TARTRATE 100 MG: 50 TABLET, FILM COATED ORAL at 00:37

## 2017-10-20 RX ADMIN — DIGOXIN 125 MCG: 125 TABLET ORAL at 17:55

## 2017-10-20 RX ADMIN — SUCRALFATE 1 G: 1 SUSPENSION ORAL at 20:37

## 2017-10-20 RX ADMIN — METOPROLOL TARTRATE 100 MG: 50 TABLET, FILM COATED ORAL at 09:24

## 2017-10-20 RX ADMIN — AMOXICILLIN AND CLAVULANATE POTASSIUM 1 TABLET: 875; 125 TABLET, FILM COATED ORAL at 00:40

## 2017-10-20 RX ADMIN — AMOXICILLIN AND CLAVULANATE POTASSIUM 1 TABLET: 875; 125 TABLET, FILM COATED ORAL at 09:18

## 2017-10-20 RX ADMIN — POTASSIUM CHLORIDE AND SODIUM CHLORIDE: 900; 150 INJECTION, SOLUTION INTRAVENOUS at 09:28

## 2017-10-20 RX ADMIN — POTASSIUM BICARBONATE 25 MEQ: 25 TABLET, EFFERVESCENT ORAL at 00:39

## 2017-10-20 RX ADMIN — SUCRALFATE 1 G: 1 SUSPENSION ORAL at 00:37

## 2017-10-20 RX ADMIN — SUCRALFATE 1 G: 1 SUSPENSION ORAL at 17:56

## 2017-10-20 ASSESSMENT — COPD QUESTIONNAIRES
DURING THE PAST 4 WEEKS HOW MUCH DID YOU FEEL SHORT OF BREATH: SOME OF THE TIME
HAVE YOU SMOKED AT LEAST 100 CIGARETTES IN YOUR ENTIRE LIFE: YES
DO YOU EVER COUGH UP ANY MUCUS OR PHLEGM?: NO/ONLY WITH OCCASIONAL COLDS OR INFECTIONS
COPD SCREENING SCORE: 5

## 2017-10-20 ASSESSMENT — ENCOUNTER SYMPTOMS
FEVER: 0
EYE DISCHARGE: 0
ABDOMINAL PAIN: 0
SHORTNESS OF BREATH: 0
EYE REDNESS: 0
BLOOD IN STOOL: 0
WHEEZING: 0
DIAPHORESIS: 0
PALPITATIONS: 0
COUGH: 0
WEAKNESS: 0
NAUSEA: 0
VOMITING: 0
DIARRHEA: 0
FOCAL WEAKNESS: 0

## 2017-10-20 ASSESSMENT — PAIN SCALES - GENERAL
PAINLEVEL_OUTOF10: 0
PAINLEVEL_OUTOF10: 5
PAINLEVEL_OUTOF10: 0
PAINLEVEL_OUTOF10: 2
PAINLEVEL_OUTOF10: 0
PAINLEVEL_OUTOF10: 0
PAINLEVEL_OUTOF10: 4

## 2017-10-20 NOTE — DISCHARGE PLANNING
Medical Social Work    Per IDT rounds, anticipate pt will be ready for DC tomorrow to [facility]. VM left for admissions to inquire about bed availability.

## 2017-10-20 NOTE — PROGRESS NOTES
Pt given medications late secondary to admit on floor. Pt has difficulty taking medication in pudding. PT in high fowlers for administration after verifying strong cough. Medication crushed up well but pt vomited after trying to take in pudding. HR increased to 140's during event. Rest of medications held after vomiting. Pt given keppra and prilosec afterward and did not vomit. Will monitor  Current HR 84.

## 2017-10-20 NOTE — CARE PLAN
Problem: Nutritional:  Goal: Achieve adequate nutritional intake  Diet advanced to full liquid; PO 50% on new diet.   Outcome: PROGRESSING SLOWER THAN EXPECTED

## 2017-10-20 NOTE — PROGRESS NOTES
Pt resting in room. No complaints of pain or distress noted at this time. Pt tolerating PO well today. Hoping for possible discharge tomorrow to SNF. Social work aware and working on it for tomorrow. Call light within reach. Hourly rounding in place.

## 2017-10-20 NOTE — CARE PLAN
Problem: Infection  Goal: Will remain free from infection  Outcome: PROGRESSING AS EXPECTED  Pt monitored for signs and symptoms of infection. Vitals and labs assessed and monitored for signs of infection. Proper hand hygiene performed prior to entering and exiting pt's room. Pt educated on proper hand hygiene. Pt receiving IV abx and PO abx for infection. PICC in place for long term abx.     Problem: Pain Management  Goal: Pain level will decrease to patient's comfort goal  Outcome: PROGRESSING AS EXPECTED  Pt complains of no pain so far into shift. Pt encouraged to report pain when having some.

## 2017-10-20 NOTE — PROGRESS NOTES
Received report and assumed care of patient. Patient is alert and oriented x4. Patient is in no signs of distress and denies pain at this time. Per night RN patient is gagging on crushed pills. Will assess and talk with patient. Patient was updated on the plan of care for the day. Call light within reach, bed in low position, 2 side rails up. Educated on fall risk, verbalizes understanding. Will continue to monitor

## 2017-10-21 PROCEDURE — A9270 NON-COVERED ITEM OR SERVICE: HCPCS | Performed by: INTERNAL MEDICINE

## 2017-10-21 PROCEDURE — 700102 HCHG RX REV CODE 250 W/ 637 OVERRIDE(OP): Performed by: HOSPITALIST

## 2017-10-21 PROCEDURE — 99232 SBSQ HOSP IP/OBS MODERATE 35: CPT | Performed by: INTERNAL MEDICINE

## 2017-10-21 PROCEDURE — 700111 HCHG RX REV CODE 636 W/ 250 OVERRIDE (IP): Performed by: HOSPITALIST

## 2017-10-21 PROCEDURE — 700102 HCHG RX REV CODE 250 W/ 637 OVERRIDE(OP): Performed by: INTERNAL MEDICINE

## 2017-10-21 PROCEDURE — 700101 HCHG RX REV CODE 250: Performed by: INTERNAL MEDICINE

## 2017-10-21 PROCEDURE — A9270 NON-COVERED ITEM OR SERVICE: HCPCS | Performed by: HOSPITALIST

## 2017-10-21 PROCEDURE — 770020 HCHG ROOM/CARE - TELE (206)

## 2017-10-21 RX ADMIN — DIGOXIN 125 MCG: 125 TABLET ORAL at 17:24

## 2017-10-21 RX ADMIN — SULFAMETHOXAZOLE AND TRIMETHOPRIM 1 TABLET: 800; 160 TABLET ORAL at 08:46

## 2017-10-21 RX ADMIN — POTASSIUM BICARBONATE 25 MEQ: 25 TABLET, EFFERVESCENT ORAL at 21:51

## 2017-10-21 RX ADMIN — ATORVASTATIN CALCIUM 10 MG: 10 TABLET, FILM COATED ORAL at 22:01

## 2017-10-21 RX ADMIN — NYSTATIN 1000000 UNITS: 100000 SUSPENSION ORAL at 11:58

## 2017-10-21 RX ADMIN — NYSTATIN 1000000 UNITS: 100000 SUSPENSION ORAL at 17:23

## 2017-10-21 RX ADMIN — SULFAMETHOXAZOLE AND TRIMETHOPRIM 1 TABLET: 800; 160 TABLET ORAL at 21:57

## 2017-10-21 RX ADMIN — SUCRALFATE 1 G: 1 SUSPENSION ORAL at 05:35

## 2017-10-21 RX ADMIN — NYSTATIN 1000000 UNITS: 100000 SUSPENSION ORAL at 21:52

## 2017-10-21 RX ADMIN — SUCRALFATE 1 G: 1 SUSPENSION ORAL at 11:58

## 2017-10-21 RX ADMIN — LEVETIRACETAM 500 MG: 100 SOLUTION ORAL at 22:14

## 2017-10-21 RX ADMIN — AMOXICILLIN AND CLAVULANATE POTASSIUM 1 TABLET: 875; 125 TABLET, FILM COATED ORAL at 08:45

## 2017-10-21 RX ADMIN — SUCRALFATE 1 G: 1 SUSPENSION ORAL at 17:23

## 2017-10-21 RX ADMIN — SUCRALFATE 1 G: 1 SUSPENSION ORAL at 21:51

## 2017-10-21 RX ADMIN — POTASSIUM CHLORIDE AND SODIUM CHLORIDE: 900; 150 INJECTION, SOLUTION INTRAVENOUS at 13:56

## 2017-10-21 RX ADMIN — NYSTATIN 1000000 UNITS: 100000 SUSPENSION ORAL at 08:46

## 2017-10-21 RX ADMIN — POTASSIUM CHLORIDE AND SODIUM CHLORIDE: 900; 150 INJECTION, SOLUTION INTRAVENOUS at 00:50

## 2017-10-21 RX ADMIN — OMEPRAZOLE 40 MG: 20 CAPSULE, DELAYED RELEASE ORAL at 21:49

## 2017-10-21 RX ADMIN — HYDRALAZINE HYDROCHLORIDE 20 MG: 20 INJECTION INTRAMUSCULAR; INTRAVENOUS at 22:24

## 2017-10-21 RX ADMIN — GABAPENTIN 200 MG: 100 CAPSULE ORAL at 22:02

## 2017-10-21 RX ADMIN — OMEPRAZOLE 40 MG: 20 CAPSULE, DELAYED RELEASE ORAL at 08:46

## 2017-10-21 RX ADMIN — LEVETIRACETAM 500 MG: 100 SOLUTION ORAL at 08:46

## 2017-10-21 RX ADMIN — GABAPENTIN 200 MG: 100 CAPSULE ORAL at 08:45

## 2017-10-21 RX ADMIN — METOPROLOL TARTRATE 100 MG: 50 TABLET, FILM COATED ORAL at 08:45

## 2017-10-21 RX ADMIN — AMOXICILLIN AND CLAVULANATE POTASSIUM 1 TABLET: 875; 125 TABLET, FILM COATED ORAL at 21:54

## 2017-10-21 RX ADMIN — FLUCONAZOLE 100 MG: 100 TABLET ORAL at 08:46

## 2017-10-21 RX ADMIN — METOPROLOL TARTRATE 100 MG: 50 TABLET, FILM COATED ORAL at 21:59

## 2017-10-21 ASSESSMENT — ENCOUNTER SYMPTOMS
WEAKNESS: 0
COUGH: 0
SHORTNESS OF BREATH: 0
FOCAL WEAKNESS: 0
VOMITING: 0
ABDOMINAL PAIN: 0
EYE REDNESS: 0
NAUSEA: 0
EYE DISCHARGE: 0
DIAPHORESIS: 0
DIARRHEA: 0
BLOOD IN STOOL: 0
FEVER: 0
PALPITATIONS: 0
WHEEZING: 0

## 2017-10-21 ASSESSMENT — PAIN SCALES - GENERAL
PAINLEVEL_OUTOF10: 0
PAINLEVEL_OUTOF10: 4
PAINLEVEL_OUTOF10: 3
PAINLEVEL_OUTOF10: 0

## 2017-10-21 NOTE — PROGRESS NOTES
PT given complete bedbath. Moisturizers used to protection. Hibiclenz used for infection prevention. Pt repositioned for comfort.

## 2017-10-21 NOTE — PROGRESS NOTES
Pt resting in room. No complaints of pain. Discomfort when swallowing from time to time with strictures. Bed change performed for pt. Call light within reach. Bed in low position. Hourly rounding in place.

## 2017-10-21 NOTE — CARE PLAN
Problem: Infection  Goal: Will remain free from infection  Outcome: PROGRESSING AS EXPECTED  Pt monitored for signs and symptoms of infection. Vitals and labs assessed and monitored for signs of infection. Proper hand hygiene performed prior to entering and exiting pt's room. Pt educated on proper hand hygiene. Pt receiving IV and PO abx. Pt receiving antifungal medications.     Problem: Urinary Elimination:  Goal: Ability to reestablish a normal urinary elimination pattern will improve  Outcome: PROGRESSING AS EXPECTED  Pt incontinent. Condom cath applied to pt to keep pt dry and clean.

## 2017-10-21 NOTE — PROGRESS NOTES
[facility]    Date of Service: 10/21/2017    Chief Complaint  86 y.o. male admitted 9/13/2017 with acute ground-level fall and back pain found to have  acute on chronic osteomyelitis and discitis, on long term abx. He was found to have psoas fluid collection abscess could not be excluded. He is followed by infectious disease, antibiotic recommendations have been made. The cultures were negative. Patient was having recurrent falls and found to have irregular rhythms, he had pacemaker placement.  During this stay he was diagnosed with candidal esophagitis, his oral intake is marginal.     Interval Problem Update  He admits he is feeling better  He is compliant with swallowing recommendations.  He is concerned about when his stricture would be repaired. I stated that he needed to go through rehab 1st and get stronger before elective procedure could be done, in the meantime I would not advise him eating any meat products even if they are chopped. It is okay for him to have eggs however. He is requesting soft scrambled eggs.  Plan of care reviewed with patient  Plan of care discussed with nursing    10/21 Pts pain is stable. He is able to take full liquid diet and taking crushed pills. Continue Diflucan. OP follow up with GI for dilation.  Pending SNF placement    Consultants/Specialty  Infectious diseases Dr. Chilel-signed off  Cardiology- okay for outpatient follow-up  GI Dr. Waters- signed off    Disposition  Pt to [facility] to [facility] when above criteria met        Review of Systems   Constitutional: Negative for diaphoresis, fever and malaise/fatigue (improving).   HENT:             Eyes: Negative for discharge and redness.   Respiratory: Negative for cough, shortness of breath and wheezing.    Cardiovascular: Negative for chest pain, palpitations and leg swelling.   Gastrointestinal: Negative for abdominal pain, blood in stool, diarrhea, nausea and vomiting.        Seems to be eating more  consistently- would like eggs   Genitourinary: Negative for dysuria.   Neurological: Negative for focal weakness and weakness (still doesn't do much with physical therapy but looks stronger).      Physical Exam  Laboratory/Imaging   Hemodynamics  Temp (24hrs), Av.4 °C (97.5 °F), Min:36.2 °C (97.1 °F), Max:36.6 °C (97.8 °F)   Temperature: 36.2 °C (97.1 °F)  Pulse  Av.5  Min: 45  Max: 147   Blood Pressure : 106/64      Respiratory      Respiration: 19, Pulse Oximetry: 100 %, O2 Daily Delivery Respiratory : Silicone Nasal Cannula        RUL Breath Sounds: Clear, RML Breath Sounds: Clear;Diminished, RLL Breath Sounds: Clear;Diminished, MIKE Breath Sounds: Clear, LLL Breath Sounds: Clear;Diminished    Fluids    Intake/Output Summary (Last 24 hours) at 10/21/17 0922  Last data filed at 10/21/17 0500   Gross per 24 hour   Intake             2625 ml   Output             2800 ml   Net             -175 ml       Nutrition  Orders Placed This Encounter   Procedures   • Diet Order     Standing Status:   Standing     Number of Occurrences:   1     Order Specific Question:   Diet:     Answer:   Full Liquid [11]     Comments:   may have scrambled eggs     Order Specific Question:   Diet:     Answer:   2 Gram Sodium [7]     Physical Exam   Constitutional: He is oriented to person, place, and time. He appears well-developed and well-nourished. No distress.   HENT:   Head: Normocephalic and atraumatic.   thrush resolved except for mild residual hyperemia   Eyes: EOM are normal. Pupils are equal, round, and reactive to light. Right eye exhibits no discharge. Left eye exhibits no discharge. No scleral icterus.   Neck: Neck supple.   Cardiovascular: Normal rate.    No murmur heard.  Irregular   Pulmonary/Chest: Effort normal. No stridor. No respiratory distress. He has no wheezes. He has no rales.   Transient rhonchi resolves w/coughing  Much improved over yesterday     Abdominal: Soft. Bowel sounds are normal. He exhibits no  distension. There is no tenderness.   Obese.    Musculoskeletal: He exhibits no edema or tenderness.   He is is able to push self up in bed unassisted   Neurological: He is alert and oriented to person, place, and time. No cranial nerve deficit.   Skin: Skin is warm and dry. No rash noted. He is not diaphoretic.   Psychiatric: He has a normal mood and affect. His behavior is normal.   Nursing note and vitals reviewed.          Recent Labs      10/19/17   0123   SODIUM  137   POTASSIUM  4.6   CHLORIDE  101   CO2  29   GLUCOSE  120*   BUN  6*   CREATININE  0.54   CALCIUM  9.3                      Assessment/Plan     * Persistent atrial fibrillation (CMS-HCC)- (present on admission)   Assessment & Plan    Status post pacemaker  Okay for outpatient follow-up            Dysphagia   Assessment & Plan    secondary to persistent esophagitis    Esophagitis aggressively managed with IV Diflucan, topical Nystatin  His symptoms are much improved, will de-escalate Diflucan to oral        Discitis of lumbar region- (present on admission)   Assessment & Plan    Afebrile, improved pain -- fu Improved Mri findings   Oral  bactrim, augmentin for 2 more weeks from 10/18          Nontraumatic psoas hematoma- (present on admission)   Assessment & Plan    Ac stopped, hgb stable.          Hypomagnesemia- (present on admission)   Assessment & Plan    Resolved        Aspiration into airway   Assessment & Plan    due to positioning  Luckily CBC, pro-calcitonin, and chest x-ray are all fine today  His lungs sound much better          Esophageal stricture- (present on admission)   Assessment & Plan    Fu outpt EGD for eval dilation   Tolerating full liquids at this time        Candida esophagitis (CMS-HCC)   Assessment & Plan    Change Diflucan to oral 5 more days  His prior treatment was incomplete which resulted in clinical decline, due to poor oral intake        Hypokalemia- (present on admission)   Assessment & Plan    Resolved        DNR  (do not resuscitate)- (present on admission)   Assessment & Plan    Code status reflects        Normocytic anemia- (present on admission)   Assessment & Plan    Stable - multi factorial - acute blood loss, chronic dz, iron def.  Fu hgb    Iron supplements.           Debility- (present on admission)   Assessment & Plan    Continue PT /OT                 Reviewed items::  Labs reviewed and Medications reviewed  Hearn catheter::  No Hearn  DVT prophylaxis - mechanical:  SCDs  Ulcer Prophylaxis::  Yes  Antibiotics:  Treating active infection/contamination beyond 24 hours perioperative coverage      Patient plan of care discussed at multidisplinary team rounds, with patient and R.N at beside.

## 2017-10-22 PROBLEM — E83.42 HYPOMAGNESEMIA: Status: RESOLVED | Noted: 2017-07-09 | Resolved: 2017-10-22

## 2017-10-22 LAB
BASOPHILS # BLD AUTO: 0.6 % (ref 0–1.8)
BASOPHILS # BLD: 0.08 K/UL (ref 0–0.12)
EOSINOPHIL # BLD AUTO: 0.32 K/UL (ref 0–0.51)
EOSINOPHIL NFR BLD: 2.2 % (ref 0–6.9)
ERYTHROCYTE [DISTWIDTH] IN BLOOD BY AUTOMATED COUNT: 49.3 FL (ref 35.9–50)
GLUCOSE BLD-MCNC: 109 MG/DL (ref 65–99)
HCT VFR BLD AUTO: 33.3 % (ref 42–52)
HGB BLD-MCNC: 10.9 G/DL (ref 14–18)
IMM GRANULOCYTES # BLD AUTO: 0.1 K/UL (ref 0–0.11)
IMM GRANULOCYTES NFR BLD AUTO: 0.7 % (ref 0–0.9)
LYMPHOCYTES # BLD AUTO: 1.92 K/UL (ref 1–4.8)
LYMPHOCYTES NFR BLD: 13.5 % (ref 22–41)
MAGNESIUM SERPL-MCNC: 1.5 MG/DL (ref 1.5–2.5)
MCH RBC QN AUTO: 30 PG (ref 27–33)
MCHC RBC AUTO-ENTMCNC: 32.7 G/DL (ref 33.7–35.3)
MCV RBC AUTO: 91.7 FL (ref 81.4–97.8)
MONOCYTES # BLD AUTO: 1.58 K/UL (ref 0–0.85)
MONOCYTES NFR BLD AUTO: 11.1 % (ref 0–13.4)
NEUTROPHILS # BLD AUTO: 10.24 K/UL (ref 1.82–7.42)
NEUTROPHILS NFR BLD: 71.9 % (ref 44–72)
NRBC # BLD AUTO: 0 K/UL
NRBC BLD AUTO-RTO: 0 /100 WBC
PLATELET # BLD AUTO: 250 K/UL (ref 164–446)
PMV BLD AUTO: 9.5 FL (ref 9–12.9)
PROCALCITONIN SERPL-MCNC: <0.05 NG/ML
RBC # BLD AUTO: 3.63 M/UL (ref 4.7–6.1)
WBC # BLD AUTO: 14.2 K/UL (ref 4.8–10.8)

## 2017-10-22 PROCEDURE — 83735 ASSAY OF MAGNESIUM: CPT

## 2017-10-22 PROCEDURE — G8997 SWALLOW GOAL STATUS: HCPCS | Mod: CI

## 2017-10-22 PROCEDURE — 700101 HCHG RX REV CODE 250: Performed by: INTERNAL MEDICINE

## 2017-10-22 PROCEDURE — A9270 NON-COVERED ITEM OR SERVICE: HCPCS | Performed by: INTERNAL MEDICINE

## 2017-10-22 PROCEDURE — 700101 HCHG RX REV CODE 250: Performed by: HOSPITALIST

## 2017-10-22 PROCEDURE — 700102 HCHG RX REV CODE 250 W/ 637 OVERRIDE(OP): Performed by: HOSPITALIST

## 2017-10-22 PROCEDURE — 700102 HCHG RX REV CODE 250 W/ 637 OVERRIDE(OP): Performed by: INTERNAL MEDICINE

## 2017-10-22 PROCEDURE — 770020 HCHG ROOM/CARE - TELE (206)

## 2017-10-22 PROCEDURE — A9270 NON-COVERED ITEM OR SERVICE: HCPCS | Performed by: HOSPITALIST

## 2017-10-22 PROCEDURE — 92526 ORAL FUNCTION THERAPY: CPT

## 2017-10-22 PROCEDURE — G8996 SWALLOW CURRENT STATUS: HCPCS | Mod: CK

## 2017-10-22 PROCEDURE — 92610 EVALUATE SWALLOWING FUNCTION: CPT

## 2017-10-22 PROCEDURE — 82962 GLUCOSE BLOOD TEST: CPT

## 2017-10-22 PROCEDURE — 85025 COMPLETE CBC W/AUTO DIFF WBC: CPT

## 2017-10-22 PROCEDURE — 99232 SBSQ HOSP IP/OBS MODERATE 35: CPT | Performed by: INTERNAL MEDICINE

## 2017-10-22 PROCEDURE — 84145 PROCALCITONIN (PCT): CPT

## 2017-10-22 RX ORDER — SULFAMETHOXAZOLE AND TRIMETHOPRIM 800; 160 MG/1; MG/1
1 TABLET ORAL EVERY 12 HOURS
Qty: 22 TAB | Refills: 0 | Status: ON HOLD
Start: 2017-10-22 | End: 2017-10-27

## 2017-10-22 RX ORDER — FLUCONAZOLE 100 MG/1
100 TABLET ORAL DAILY
Qty: 3 TAB | Refills: 0 | Status: ON HOLD
Start: 2017-10-22 | End: 2017-10-27

## 2017-10-22 RX ORDER — LEVETIRACETAM 100 MG/ML
500 SOLUTION ORAL EVERY 12 HOURS
Qty: 240 ML | Refills: 0
Start: 2017-10-22 | End: 2017-10-23

## 2017-10-22 RX ORDER — AMOXICILLIN AND CLAVULANATE POTASSIUM 875; 125 MG/1; MG/1
1 TABLET, FILM COATED ORAL EVERY 12 HOURS
Qty: 22 TAB | Refills: 0
Start: 2017-10-22 | End: 2017-11-02

## 2017-10-22 RX ADMIN — LEVETIRACETAM 500 MG: 100 SOLUTION ORAL at 09:31

## 2017-10-22 RX ADMIN — NYSTATIN 1000000 UNITS: 100000 SUSPENSION ORAL at 09:26

## 2017-10-22 RX ADMIN — GABAPENTIN 200 MG: 100 CAPSULE ORAL at 17:28

## 2017-10-22 RX ADMIN — SULFAMETHOXAZOLE AND TRIMETHOPRIM 1 TABLET: 800; 160 TABLET ORAL at 22:06

## 2017-10-22 RX ADMIN — POTASSIUM CHLORIDE AND SODIUM CHLORIDE: 900; 150 INJECTION, SOLUTION INTRAVENOUS at 02:42

## 2017-10-22 RX ADMIN — OMEPRAZOLE 40 MG: 20 CAPSULE, DELAYED RELEASE ORAL at 22:06

## 2017-10-22 RX ADMIN — METOPROLOL TARTRATE 5 MG: 5 INJECTION INTRAVENOUS at 21:11

## 2017-10-22 RX ADMIN — POTASSIUM BICARBONATE 25 MEQ: 25 TABLET, EFFERVESCENT ORAL at 09:00

## 2017-10-22 RX ADMIN — AMOXICILLIN AND CLAVULANATE POTASSIUM 1 TABLET: 875; 125 TABLET, FILM COATED ORAL at 22:06

## 2017-10-22 RX ADMIN — GABAPENTIN 200 MG: 100 CAPSULE ORAL at 09:24

## 2017-10-22 RX ADMIN — OMEPRAZOLE 40 MG: 20 CAPSULE, DELAYED RELEASE ORAL at 09:31

## 2017-10-22 RX ADMIN — NYSTATIN 1000000 UNITS: 100000 SUSPENSION ORAL at 17:27

## 2017-10-22 RX ADMIN — SUCRALFATE 1 G: 1 SUSPENSION ORAL at 05:45

## 2017-10-22 RX ADMIN — SULFAMETHOXAZOLE AND TRIMETHOPRIM 1 TABLET: 800; 160 TABLET ORAL at 09:26

## 2017-10-22 RX ADMIN — Medication 220 MG: at 09:31

## 2017-10-22 RX ADMIN — SUCRALFATE 1 G: 1 SUSPENSION ORAL at 22:06

## 2017-10-22 RX ADMIN — SUCRALFATE 1 G: 1 SUSPENSION ORAL at 17:27

## 2017-10-22 RX ADMIN — AMOXICILLIN AND CLAVULANATE POTASSIUM 1 TABLET: 875; 125 TABLET, FILM COATED ORAL at 09:26

## 2017-10-22 RX ADMIN — FLUCONAZOLE 100 MG: 100 TABLET ORAL at 09:26

## 2017-10-22 RX ADMIN — TAMSULOSIN HYDROCHLORIDE 0.4 MG: 0.4 CAPSULE ORAL at 09:24

## 2017-10-22 RX ADMIN — NYSTATIN 1000000 UNITS: 100000 SUSPENSION ORAL at 22:06

## 2017-10-22 RX ADMIN — DIGOXIN 125 MCG: 125 TABLET ORAL at 17:28

## 2017-10-22 ASSESSMENT — ENCOUNTER SYMPTOMS
FOCAL WEAKNESS: 0
SHORTNESS OF BREATH: 0
EYE REDNESS: 0
DIAPHORESIS: 0
FEVER: 0
WHEEZING: 0
ABDOMINAL PAIN: 0
VOMITING: 0
WEAKNESS: 0
EYE DISCHARGE: 0
DIARRHEA: 0
COUGH: 0
NAUSEA: 0
PALPITATIONS: 0
BLOOD IN STOOL: 0

## 2017-10-22 ASSESSMENT — PAIN SCALES - GENERAL
PAINLEVEL_OUTOF10: 3
PAINLEVEL_OUTOF10: 2
PAINLEVEL_OUTOF10: 0
PAINLEVEL_OUTOF10: 0

## 2017-10-22 NOTE — PROGRESS NOTES
Pt is resting in bed, denies pain, extra blanket provided, call light and personal belongings are in reach, bed alarm and hourly rounding in place.

## 2017-10-22 NOTE — DISCHARGE PLANNING
Medical Social Work    SW placed f/u call to [facility] to see if they can take pt. Again, no answer. SW will attempt again in morning.

## 2017-10-22 NOTE — THERAPY
"Speech Language Therapy Clinical Swallow Evaluation completed.   Functional Status:  Patient was seen for a swallow re-assess this date. Patient currently consuming a full liquid diet. Patient was seen with full liquid tray and consuming crushed medication in puree with RN during assessment. Patient demonstrated continued belching however no s/sx of aspiration were noted. Effortful swallow was noted with PO trials Per MD patient to hae GI dilation when deemed medically appropriate. Continue full liquid diet at this time, defer upgrade to MD/GI when deemed appropriate. Patient verbalized understanding. Patient would benefit from continued SLP dysphagia tx and counseling for compensatory strategies.   Recommendations: Defer diet to MD/GI   Plan of Care: Will benefit from Speech Therapy 3 times per week   Post-Acute Therapy: Discharge to a transitional care facility for continued skilled therapy services. and Discharge to home with outpatient or home health for additional skilled therapy services.    See \"Rehab Therapy-Acute\" Patient Summary Report for complete documentation.     "

## 2017-10-22 NOTE — CARE PLAN
Problem: Skin Integrity  Goal: Risk for impaired skin integrity will decrease  Outcome: NOT MET  Cont to educate patient on the importance of turn q 2 hrs, and self reposition, to present impaired skin integrity, pt verbalized will try to reposition self in bed

## 2017-10-22 NOTE — PROGRESS NOTES
Received report from day shift RN, pt is resting in bed, denies any pain, call light and personal belongings are in reach, bed alarm and hourly rounding in place, updated on POC.

## 2017-10-22 NOTE — PROGRESS NOTES
Received bedside report from Debbi, patient resting quietly in bed, respiratory even and unlabored,  no distress nor discomfort noted, safety measures in place, cont with POC and monitoring of patient

## 2017-10-22 NOTE — CARE PLAN
Problem: Bowel/Gastric:  Goal: Normal bowel function is maintained or improved  Outcome: PROGRESSING AS EXPECTED  Patient was educated on Senna Plus medication stool softener, inform Patient he already had two large liquid  bowel movement this Am, patient request to hold on the Senna Plus medication

## 2017-10-22 NOTE — PROGRESS NOTES
Routine AM medications administered, pt. tolerated well crush in apple sauce. Currently resting in bed, respirations even and unlabored. No acute distress noted. Will continue to monitor.

## 2017-10-22 NOTE — CARE PLAN
Problem: Knowledge Deficit  Goal: Knowledge of the prescribed therapeutic regimen will improve  Outcome: PROGRESSING AS EXPECTED  RN educated pt on prescribed medications, answered all questions, encouraged pt to take active role in health care planning.     Problem: Psychosocial Needs:  Goal: Level of anxiety will decrease  Outcome: PROGRESSING AS EXPECTED  RN established therapeutic relationship with pt, provided pt with active listening, updated on POC.

## 2017-10-23 ENCOUNTER — HOSPITAL ENCOUNTER (INPATIENT)
Facility: MEDICAL CENTER | Age: 82
LOS: 6 days | DRG: 871 | End: 2017-10-29
Attending: EMERGENCY MEDICINE | Admitting: HOSPITALIST
Payer: MEDICARE

## 2017-10-23 ENCOUNTER — RESOLUTE PROFESSIONAL BILLING HOSPITAL PROF FEE (OUTPATIENT)
Dept: HOSPITALIST | Facility: MEDICAL CENTER | Age: 82
End: 2017-10-23
Payer: MEDICARE

## 2017-10-23 ENCOUNTER — APPOINTMENT (OUTPATIENT)
Dept: RADIOLOGY | Facility: MEDICAL CENTER | Age: 82
DRG: 871 | End: 2017-10-23
Attending: EMERGENCY MEDICINE
Payer: MEDICARE

## 2017-10-23 VITALS
OXYGEN SATURATION: 96 % | HEIGHT: 71 IN | DIASTOLIC BLOOD PRESSURE: 65 MMHG | HEART RATE: 72 BPM | RESPIRATION RATE: 17 BRPM | SYSTOLIC BLOOD PRESSURE: 124 MMHG | BODY MASS INDEX: 28.58 KG/M2 | TEMPERATURE: 97.5 F | WEIGHT: 204.15 LBS

## 2017-10-23 DIAGNOSIS — R41.0 DELIRIUM: ICD-10-CM

## 2017-10-23 DIAGNOSIS — R53.81 DEBILITY: ICD-10-CM

## 2017-10-23 DIAGNOSIS — I48.19 PERSISTENT ATRIAL FIBRILLATION (HCC): ICD-10-CM

## 2017-10-23 DIAGNOSIS — D72.829 LEUKOCYTOSIS, UNSPECIFIED TYPE: ICD-10-CM

## 2017-10-23 PROBLEM — R41.82 ALTERED MENTAL STATE: Status: ACTIVE | Noted: 2017-10-23

## 2017-10-23 PROBLEM — I48.91 A-FIB (HCC): Status: ACTIVE | Noted: 2017-10-23

## 2017-10-23 LAB
ALBUMIN SERPL BCP-MCNC: 3.4 G/DL (ref 3.2–4.9)
ALBUMIN/GLOB SERPL: 1 G/DL
ALP SERPL-CCNC: 86 U/L (ref 30–99)
ALT SERPL-CCNC: 6 U/L (ref 2–50)
ANION GAP SERPL CALC-SCNC: 10 MMOL/L (ref 0–11.9)
ANISOCYTOSIS BLD QL SMEAR: ABNORMAL
APPEARANCE UR: CLEAR
APTT PPP: 31.4 SEC (ref 24.7–36)
AST SERPL-CCNC: 15 U/L (ref 12–45)
BACTERIA #/AREA URNS HPF: NEGATIVE /HPF
BASOPHILS # BLD AUTO: 0.9 % (ref 0–1.8)
BASOPHILS # BLD: 0.12 K/UL (ref 0–0.12)
BILIRUB SERPL-MCNC: 0.5 MG/DL (ref 0.1–1.5)
BILIRUB UR QL STRIP.AUTO: NEGATIVE
BUN SERPL-MCNC: 7 MG/DL (ref 8–22)
CALCIUM SERPL-MCNC: 10.6 MG/DL (ref 8.5–10.5)
CHLORIDE SERPL-SCNC: 97 MMOL/L (ref 96–112)
CO2 SERPL-SCNC: 21 MMOL/L (ref 20–33)
COLOR UR: YELLOW
CREAT SERPL-MCNC: 0.66 MG/DL (ref 0.5–1.4)
CULTURE IF INDICATED INDCX: NO UA CULTURE
DEPRECATED D DIMER PPP IA-ACNC: 530 NG/ML(D-DU)
DIGOXIN SERPL-MCNC: 0.4 NG/ML (ref 0.8–2)
EOSINOPHIL # BLD AUTO: 0.26 K/UL (ref 0–0.51)
EOSINOPHIL NFR BLD: 1.9 % (ref 0–6.9)
EPI CELLS #/AREA URNS HPF: NEGATIVE /HPF
ERYTHROCYTE [DISTWIDTH] IN BLOOD BY AUTOMATED COUNT: 50.5 FL (ref 35.9–50)
GFR SERPL CREATININE-BSD FRML MDRD: >60 ML/MIN/1.73 M 2
GLOBULIN SER CALC-MCNC: 3.4 G/DL (ref 1.9–3.5)
GLUCOSE BLD-MCNC: 126 MG/DL (ref 65–99)
GLUCOSE SERPL-MCNC: 100 MG/DL (ref 65–99)
GLUCOSE UR STRIP.AUTO-MCNC: NEGATIVE MG/DL
HCT VFR BLD AUTO: 36.9 % (ref 42–52)
HGB BLD-MCNC: 12.1 G/DL (ref 14–18)
HYALINE CASTS #/AREA URNS LPF: ABNORMAL /LPF
INR PPP: 1.05 (ref 0.87–1.13)
KETONES UR STRIP.AUTO-MCNC: NEGATIVE MG/DL
LEUKOCYTE ESTERASE UR QL STRIP.AUTO: NEGATIVE
LYMPHOCYTES # BLD AUTO: 5.88 K/UL (ref 1–4.8)
LYMPHOCYTES NFR BLD: 42.6 % (ref 22–41)
MACROCYTES BLD QL SMEAR: ABNORMAL
MANUAL DIFF BLD: NORMAL
MCH RBC QN AUTO: 30.2 PG (ref 27–33)
MCHC RBC AUTO-ENTMCNC: 32.8 G/DL (ref 33.7–35.3)
MCV RBC AUTO: 92 FL (ref 81.4–97.8)
MICRO URNS: ABNORMAL
MONOCYTES # BLD AUTO: 0.9 K/UL (ref 0–0.85)
MONOCYTES NFR BLD AUTO: 6.5 % (ref 0–13.4)
MORPHOLOGY BLD-IMP: NORMAL
NEUTROPHILS # BLD AUTO: 6.64 K/UL (ref 1.82–7.42)
NEUTROPHILS NFR BLD: 48.1 % (ref 44–72)
NITRITE UR QL STRIP.AUTO: NEGATIVE
NRBC # BLD AUTO: 0 K/UL
NRBC BLD AUTO-RTO: 0 /100 WBC
PH UR STRIP.AUTO: >=9 [PH]
PLATELET # BLD AUTO: 311 K/UL (ref 164–446)
PLATELET BLD QL SMEAR: NORMAL
PMV BLD AUTO: 10.4 FL (ref 9–12.9)
POTASSIUM SERPL-SCNC: 4.4 MMOL/L (ref 3.6–5.5)
PROT SERPL-MCNC: 6.8 G/DL (ref 6–8.2)
PROT UR QL STRIP: NEGATIVE MG/DL
PROTHROMBIN TIME: 14 SEC (ref 12–14.6)
RBC # BLD AUTO: 4.01 M/UL (ref 4.7–6.1)
RBC # URNS HPF: ABNORMAL /HPF
RBC BLD AUTO: PRESENT
RBC UR QL AUTO: ABNORMAL
SODIUM SERPL-SCNC: 128 MMOL/L (ref 135–145)
SP GR UR STRIP.AUTO: 1.01
TROPONIN I SERPL-MCNC: <0.01 NG/ML (ref 0–0.04)
TSH SERPL DL<=0.005 MIU/L-ACNC: 2.25 UIU/ML (ref 0.3–3.7)
UROBILINOGEN UR STRIP.AUTO-MCNC: 1 MG/DL
WBC # BLD AUTO: 13.8 K/UL (ref 4.8–10.8)
WBC #/AREA URNS HPF: ABNORMAL /HPF

## 2017-10-23 PROCEDURE — 85379 FIBRIN DEGRADATION QUANT: CPT

## 2017-10-23 PROCEDURE — 84484 ASSAY OF TROPONIN QUANT: CPT

## 2017-10-23 PROCEDURE — 80053 COMPREHEN METABOLIC PANEL: CPT

## 2017-10-23 PROCEDURE — 81001 URINALYSIS AUTO W/SCOPE: CPT

## 2017-10-23 PROCEDURE — 85007 BL SMEAR W/DIFF WBC COUNT: CPT

## 2017-10-23 PROCEDURE — 99285 EMERGENCY DEPT VISIT HI MDM: CPT

## 2017-10-23 PROCEDURE — 96374 THER/PROPH/DIAG INJ IV PUSH: CPT

## 2017-10-23 PROCEDURE — 82962 GLUCOSE BLOOD TEST: CPT

## 2017-10-23 PROCEDURE — 85027 COMPLETE CBC AUTOMATED: CPT

## 2017-10-23 PROCEDURE — 93005 ELECTROCARDIOGRAM TRACING: CPT | Performed by: EMERGENCY MEDICINE

## 2017-10-23 PROCEDURE — 85610 PROTHROMBIN TIME: CPT

## 2017-10-23 PROCEDURE — A9270 NON-COVERED ITEM OR SERVICE: HCPCS | Performed by: HOSPITALIST

## 2017-10-23 PROCEDURE — 770020 HCHG ROOM/CARE - TELE (206)

## 2017-10-23 PROCEDURE — A9270 NON-COVERED ITEM OR SERVICE: HCPCS | Performed by: INTERNAL MEDICINE

## 2017-10-23 PROCEDURE — 700102 HCHG RX REV CODE 250 W/ 637 OVERRIDE(OP): Performed by: INTERNAL MEDICINE

## 2017-10-23 PROCEDURE — 700102 HCHG RX REV CODE 250 W/ 637 OVERRIDE(OP): Performed by: HOSPITALIST

## 2017-10-23 PROCEDURE — 80162 ASSAY OF DIGOXIN TOTAL: CPT

## 2017-10-23 PROCEDURE — 99239 HOSP IP/OBS DSCHRG MGMT >30: CPT | Performed by: INTERNAL MEDICINE

## 2017-10-23 PROCEDURE — 70450 CT HEAD/BRAIN W/O DYE: CPT

## 2017-10-23 PROCEDURE — 71010 DX-CHEST-PORTABLE (1 VIEW): CPT

## 2017-10-23 PROCEDURE — 83036 HEMOGLOBIN GLYCOSYLATED A1C: CPT

## 2017-10-23 PROCEDURE — 85730 THROMBOPLASTIN TIME PARTIAL: CPT

## 2017-10-23 PROCEDURE — 87493 C DIFF AMPLIFIED PROBE: CPT

## 2017-10-23 PROCEDURE — 700111 HCHG RX REV CODE 636 W/ 250 OVERRIDE (IP): Performed by: HOSPITALIST

## 2017-10-23 PROCEDURE — 96375 TX/PRO/DX INJ NEW DRUG ADDON: CPT

## 2017-10-23 PROCEDURE — 84443 ASSAY THYROID STIM HORMONE: CPT

## 2017-10-23 PROCEDURE — 700101 HCHG RX REV CODE 250: Performed by: INTERNAL MEDICINE

## 2017-10-23 PROCEDURE — 99223 1ST HOSP IP/OBS HIGH 75: CPT | Performed by: HOSPITALIST

## 2017-10-23 PROCEDURE — 36415 COLL VENOUS BLD VENIPUNCTURE: CPT

## 2017-10-23 PROCEDURE — 96376 TX/PRO/DX INJ SAME DRUG ADON: CPT

## 2017-10-23 RX ORDER — AMOXICILLIN AND CLAVULANATE POTASSIUM 875; 125 MG/1; MG/1
1 TABLET, FILM COATED ORAL EVERY 12 HOURS
Status: DISCONTINUED | OUTPATIENT
Start: 2017-10-24 | End: 2017-10-29 | Stop reason: HOSPADM

## 2017-10-23 RX ORDER — SODIUM CHLORIDE 9 MG/ML
INJECTION, SOLUTION INTRAVENOUS CONTINUOUS
Status: DISCONTINUED | OUTPATIENT
Start: 2017-10-23 | End: 2017-10-29 | Stop reason: HOSPADM

## 2017-10-23 RX ORDER — SUCRALFATE ORAL 1 G/10ML
1 SUSPENSION ORAL
Status: DISCONTINUED | OUTPATIENT
Start: 2017-10-24 | End: 2017-10-29 | Stop reason: HOSPADM

## 2017-10-23 RX ORDER — DIGOXIN 125 MCG
250 TABLET ORAL DAILY
Status: DISCONTINUED | OUTPATIENT
Start: 2017-10-24 | End: 2017-10-29 | Stop reason: HOSPADM

## 2017-10-23 RX ORDER — ACETAMINOPHEN 325 MG/1
650 TABLET ORAL EVERY 6 HOURS PRN
Status: DISCONTINUED | OUTPATIENT
Start: 2017-10-23 | End: 2017-10-29 | Stop reason: HOSPADM

## 2017-10-23 RX ORDER — ONDANSETRON 2 MG/ML
4 INJECTION INTRAMUSCULAR; INTRAVENOUS EVERY 4 HOURS PRN
Status: DISCONTINUED | OUTPATIENT
Start: 2017-10-23 | End: 2017-10-29 | Stop reason: HOSPADM

## 2017-10-23 RX ORDER — DILTIAZEM HYDROCHLORIDE 5 MG/ML
10 INJECTION INTRAVENOUS ONCE
Status: COMPLETED | OUTPATIENT
Start: 2017-10-23 | End: 2017-10-23

## 2017-10-23 RX ORDER — GABAPENTIN 100 MG/1
200 CAPSULE ORAL 3 TIMES DAILY
Status: DISCONTINUED | OUTPATIENT
Start: 2017-10-24 | End: 2017-10-29 | Stop reason: HOSPADM

## 2017-10-23 RX ORDER — ASPIRIN 81 MG/1
324 TABLET, CHEWABLE ORAL DAILY
Status: DISCONTINUED | OUTPATIENT
Start: 2017-10-24 | End: 2017-10-29 | Stop reason: HOSPADM

## 2017-10-23 RX ORDER — TAMSULOSIN HYDROCHLORIDE 0.4 MG/1
0.4 CAPSULE ORAL
Status: DISCONTINUED | OUTPATIENT
Start: 2017-10-24 | End: 2017-10-29 | Stop reason: HOSPADM

## 2017-10-23 RX ORDER — ONDANSETRON 4 MG/1
4 TABLET, ORALLY DISINTEGRATING ORAL EVERY 4 HOURS PRN
Status: DISCONTINUED | OUTPATIENT
Start: 2017-10-23 | End: 2017-10-29 | Stop reason: HOSPADM

## 2017-10-23 RX ORDER — LEVETIRACETAM 500 MG/1
500 TABLET ORAL 2 TIMES DAILY
Status: DISCONTINUED | OUTPATIENT
Start: 2017-10-24 | End: 2017-10-24

## 2017-10-23 RX ORDER — SULFAMETHOXAZOLE AND TRIMETHOPRIM 800; 160 MG/1; MG/1
1 TABLET ORAL EVERY 12 HOURS
Status: COMPLETED | OUTPATIENT
Start: 2017-10-24 | End: 2017-10-29

## 2017-10-23 RX ORDER — ASPIRIN 300 MG/1
300 SUPPOSITORY RECTAL DAILY
Status: DISCONTINUED | OUTPATIENT
Start: 2017-10-24 | End: 2017-10-29 | Stop reason: HOSPADM

## 2017-10-23 RX ORDER — DILTIAZEM HYDROCHLORIDE 5 MG/ML
10 INJECTION INTRAVENOUS EVERY 4 HOURS PRN
Status: DISCONTINUED | OUTPATIENT
Start: 2017-10-23 | End: 2017-10-29 | Stop reason: HOSPADM

## 2017-10-23 RX ORDER — ASPIRIN 325 MG
325 TABLET ORAL DAILY
Status: DISCONTINUED | OUTPATIENT
Start: 2017-10-23 | End: 2017-10-23 | Stop reason: HOSPADM

## 2017-10-23 RX ORDER — ASPIRIN 325 MG
325 TABLET ORAL DAILY
Status: DISCONTINUED | OUTPATIENT
Start: 2017-10-24 | End: 2017-10-29 | Stop reason: HOSPADM

## 2017-10-23 RX ORDER — ATORVASTATIN CALCIUM 10 MG/1
10 TABLET, FILM COATED ORAL NIGHTLY
Status: DISCONTINUED | OUTPATIENT
Start: 2017-10-24 | End: 2017-10-29 | Stop reason: HOSPADM

## 2017-10-23 RX ORDER — FLUCONAZOLE 100 MG/1
100 TABLET ORAL DAILY
Status: DISCONTINUED | OUTPATIENT
Start: 2017-10-24 | End: 2017-10-29 | Stop reason: HOSPADM

## 2017-10-23 RX ORDER — DIGOXIN 0.25 MG/ML
250 INJECTION INTRAMUSCULAR; INTRAVENOUS ONCE
Status: COMPLETED | OUTPATIENT
Start: 2017-10-23 | End: 2017-10-23

## 2017-10-23 RX ADMIN — SUCRALFATE 1 G: 1 SUSPENSION ORAL at 06:11

## 2017-10-23 RX ADMIN — OMEPRAZOLE 40 MG: 20 CAPSULE, DELAYED RELEASE ORAL at 10:41

## 2017-10-23 RX ADMIN — METOPROLOL TARTRATE 100 MG: 50 TABLET, FILM COATED ORAL at 10:42

## 2017-10-23 RX ADMIN — DILTIAZEM HYDROCHLORIDE 10 MG: 5 INJECTION INTRAVENOUS at 21:53

## 2017-10-23 RX ADMIN — ASPIRIN 325 MG: 325 TABLET, COATED ORAL at 13:43

## 2017-10-23 RX ADMIN — LEVETIRACETAM 500 MG: 100 SOLUTION ORAL at 10:41

## 2017-10-23 RX ADMIN — NYSTATIN 1000000 UNITS: 100000 SUSPENSION ORAL at 10:38

## 2017-10-23 RX ADMIN — DIGOXIN 250 MCG: 0.25 INJECTION INTRAMUSCULAR; INTRAVENOUS at 22:28

## 2017-10-23 RX ADMIN — DILTIAZEM HYDROCHLORIDE 10 MG: 5 INJECTION INTRAVENOUS at 23:05

## 2017-10-23 RX ADMIN — SULFAMETHOXAZOLE AND TRIMETHOPRIM 1 TABLET: 800; 160 TABLET ORAL at 10:52

## 2017-10-23 RX ADMIN — AMOXICILLIN AND CLAVULANATE POTASSIUM 1 TABLET: 875; 125 TABLET, FILM COATED ORAL at 10:41

## 2017-10-23 RX ADMIN — Medication 220 MG: at 10:41

## 2017-10-23 RX ADMIN — FLUCONAZOLE 100 MG: 100 TABLET ORAL at 10:41

## 2017-10-23 RX ADMIN — POTASSIUM CHLORIDE AND SODIUM CHLORIDE: 900; 150 INJECTION, SOLUTION INTRAVENOUS at 01:59

## 2017-10-23 RX ADMIN — SUCRALFATE 1 G: 1 SUSPENSION ORAL at 10:38

## 2017-10-23 ASSESSMENT — PAIN SCALES - GENERAL: PAINLEVEL_OUTOF10: ASSUMED PAIN PRESENT

## 2017-10-23 ASSESSMENT — LIFESTYLE VARIABLES: EVER_SMOKED: NEVER

## 2017-10-23 NOTE — PROGRESS NOTES
Discharge patient to SNF per MD order, discharge instruction review with patient and packet sent with patient, no distress nor discomfort noted, respiratory even and unlabored upon discharge, all personal belongings left with patient

## 2017-10-23 NOTE — CARE PLAN
Problem: Knowledge Deficit  Goal: Knowledge of disease process/condition, treatment plan, diagnostic tests, and medications will improve  Outcome: PROGRESSING AS EXPECTED  Continue to educate patient on plan of care and prescribe medication of Neurontin, potassium bicarbonate and Tamsulosin, ongoing education through hospitalization

## 2017-10-23 NOTE — DISCHARGE PLANNING
[facility]S received a PCS form to arrange transportation to transfer the patient to [facility] today. [facility]CS faxed the transfer form to [facility]. CCS called [facility] and spoke to Damon transportation has been arranged to transfer the patient to [facility] at 1400 via [facility]. SW on floor Cyn has been notified via voicemail.

## 2017-10-23 NOTE — PROGRESS NOTES
[facility]    Date of Service: 10/22/2017    Chief Complaint  86 y.o. male admitted 2017 with acute ground-level fall and back pain found to have  acute on chronic osteomyelitis and discitis, on long term abx. He was found to have psoas fluid collection abscess could not be excluded. He is followed by infectious disease, antibiotic recommendations have been made. The cultures were negative. Patient was having recurrent falls and found to have irregular rhythms, he had pacemaker placement.  During this stay he was diagnosed with candidal esophagitis, his oral intake is marginal.     Interval Problem Update    10/21 Pts pain is stable. He is able to take full liquid diet and taking crushed pills. Continue Diflucan. OP follow up with GI for dilation.  Pending SNF placement    10/22 No acute events overnight. His vitals remain stable. He continues to note discomfort with swallowing but has been improving. Awaiting SNF placement    Consultants/Specialty    Infectious diseases Dr. Chilel-signed off  Cardiology- okay for outpatient follow-up  GI Dr. Waters- signed off    Disposition    Patient is cleared for discharge from a medical standpoint. Awaiting acceptance to [facility]         Review of Systems   Constitutional: Negative for diaphoresis, fever and malaise/fatigue (improving).   HENT:             Eyes: Negative for discharge and redness.   Respiratory: Negative for cough, shortness of breath and wheezing.    Cardiovascular: Negative for chest pain, palpitations and leg swelling.   Gastrointestinal: Negative for abdominal pain, blood in stool, diarrhea, nausea and vomiting.        Seems to be eating more consistently- would like eggs   Genitourinary: Negative for dysuria.   Neurological: Negative for focal weakness and weakness (still doesn't do much with physical therapy but looks stronger).      Physical Exam  Laboratory/Imaging   Hemodynamics  Temp (24hrs), Av.1 °C (96.9 °F),  Min:35.7 °C (96.2 °F), Max:36.3 °C (97.3 °F)   Temperature: (!) 35.7 °C (96.2 °F)  Pulse  Av.3  Min: 45  Max: 147   Blood Pressure : 106/73      Respiratory      Respiration: 17, Pulse Oximetry: 98 %     Work Of Breathing / Effort: Mild  RUL Breath Sounds: Clear, RML Breath Sounds: Clear, RLL Breath Sounds: Diminished, MIKE Breath Sounds: Clear, LLL Breath Sounds: Diminished    Fluids    Intake/Output Summary (Last 24 hours) at 10/22/17 191  Last data filed at 10/22/17 1823   Gross per 24 hour   Intake              900 ml   Output             4100 ml   Net            -3200 ml       Nutrition  Orders Placed This Encounter   Procedures   • Diet Order     Standing Status:   Standing     Number of Occurrences:   1     Order Specific Question:   Diet:     Answer:   Full Liquid [11]     Comments:   may have scrambled eggs     Order Specific Question:   Diet:     Answer:   2 Gram Sodium [7]     Physical Exam   Constitutional: He is oriented to person, place, and time. He appears well-developed and well-nourished. No distress.   HENT:   Head: Normocephalic and atraumatic.   thrush resolved except for mild residual hyperemia   Eyes: EOM are normal. Pupils are equal, round, and reactive to light. Right eye exhibits no discharge. Left eye exhibits no discharge. No scleral icterus.   Neck: Neck supple.   Cardiovascular: Normal rate.    No murmur heard.  Irregular   Pulmonary/Chest: Effort normal. No stridor. No respiratory distress. He has no wheezes. He has no rales.   Transient rhonchi resolves w/coughing  Much improved over yesterday     Abdominal: Soft. Bowel sounds are normal. He exhibits no distension. There is no tenderness.   Obese.    Musculoskeletal: He exhibits no edema or tenderness.   He is is able to push self up in bed unassisted   Neurological: He is alert and oriented to person, place, and time. No cranial nerve deficit.   Skin: Skin is warm and dry. No rash noted. He is not diaphoretic.   Psychiatric: He  has a normal mood and affect. His behavior is normal.   Nursing note and vitals reviewed.      Recent Labs      10/22/17   0234   WBC  14.2*   RBC  3.63*   HEMOGLOBIN  10.9*   HEMATOCRIT  33.3*   MCV  91.7   MCH  30.0   MCHC  32.7*   RDW  49.3   PLATELETCT  250   MPV  9.5                          Assessment/Plan     * Persistent atrial fibrillation (CMS-[facility])- (present on admission)   Assessment & Plan    Status post pacemaker  Okay for outpatient follow-up            Dysphagia   Assessment & Plan    secondary to persistent esophagitis    Esophagitis aggressively managed with IV Diflucan, topical Nystatin  His symptoms are much improved, will de-escalate Diflucan to oral        Discitis of lumbar region- (present on admission)   Assessment & Plan    Afebrile, improved pain -- fu Improved Mri findings   Oral  bactrim, augmentin for 2 more weeks from 10/18          Nontraumatic psoas hematoma- (present on admission)   Assessment & Plan    Ac stopped, hgb stable.          Aspiration into airway   Assessment & Plan    due to positioning  Luckily CBC, pro-calcitonin, and chest x-ray are all fine today  His lungs sound much better          Esophageal stricture- (present on admission)   Assessment & Plan    Fu outpt EGD for eval dilation   Tolerating full liquids at this time        Candida esophagitis (CMS-HCC)   Assessment & Plan    Change Diflucan to oral 5 more days  His prior treatment was incomplete which resulted in clinical decline, due to poor oral intake        Hypokalemia- (present on admission)   Assessment & Plan    Resolved        DNR (do not resuscitate)- (present on admission)   Assessment & Plan    Code status reflects        Normocytic anemia- (present on admission)   Assessment & Plan    Stable - multi factorial - acute blood loss, chronic dz, iron def.  Fu hgb    Iron supplements.           Debility- (present on admission)   Assessment & Plan    Continue PT /OT                 Reviewed items::  Labs  reviewed and Medications reviewed  Hearn catheter::  No Hearn  DVT prophylaxis - mechanical:  SCDs  Ulcer Prophylaxis::  Yes  Antibiotics:  Treating active infection/contamination beyond 24 hours perioperative coverage      Patient plan of care discussed at multidisplinary team rounds, with patient and R.N at beside.

## 2017-10-23 NOTE — CARE PLAN
Problem: Safety  Goal: Will remain free from injury  Outcome: PROGRESSING AS EXPECTED  Patient will remain free from injury by hourly round, bed in low position, call light within reach, encourage patient to utilized safety measures

## 2017-10-23 NOTE — DISCHARGE SUMMARY
CHIEF COMPLAINT ON ADMISSION  Chief Complaint   Patient presents with   • ALOC       CODE STATUS  DNR    HPI & HOSPITAL COURSE  Please review H&P for details on admission.   This is a 87 y.o. male with a past medical history of atrial fibrillation, L3-L4 discitis treated with long-term antibiotics who presented on 9/13 2017 with altered mental status. He was found to be septic on admission and was started on empiric antibiotics and IV fluids for septic protocol. He underwent a lumbar spine MRI that revealed chronic osteomyelitis of L3-L4 and interval development of multilocular intramuscular abscesses in the right iliacus muscle. Infectious disease was consulted and the patient was started on  Ceftaroline and completed a four-week course. He underwent drainage of right iliac abscess versus hematoma. On admission patient was also found to be in A. fib with RVR and mild troponin elevation for which cardiology was consulted. He denied any chest pain and his troponin was down trending 2-D echo revealed EF of 70%. His rate was well-controlled and anti-coagulation was held due to pelvic hematoma. The patient had alternating RVR with slow rate therefore a single-chamber pacemaker was placed. Patient also developed painful swallowing and underwent an EGD which revealed pan esophagitis and 4 mm distal esophageal stricture. GI recommended medical therapy with PPI and Carafate and repeat EGD with dilation as outpatient once his esophagitis has resolved. He was also treated with Diflucan for concern of Candida esophagitis. The patient is to take oral Bactrim and Augmentin till 11/2/17 for his discitis. I have started him on full dose aspirin for his atrial fibrillation and recent hematoma. He may discuss with cardiology and his PCP on timing of starting anticoagulation. Patient is instructed to follow up with PCP, GI, cardiology and infectious disease in clinic in 2 weeks. The patient was evaluated by PT and OT and was  recommended further therapy at a skilled nursing facility. His vitals remained stable and his pain is well controlled.    The patient was instructed to return to the ER in the event of worsening symptoms. I have counseled the patient on the importance of compliance and the patient has agreed to proceed with all medical recommendations and follow up plan indicated above.        Therefore, he is discharged in fair and stable condition with close outpatient follow-up.    SPECIFIC OUTPATIENT FOLLOW-UP  As above    DISCHARGE PROBLEM LIST  Principal Problem:    Persistent atrial fibrillation (CMS-HCC) POA: Yes  Active Problems:    Discitis of lumbar region POA: Yes    Dysphagia POA: No    Nontraumatic psoas hematoma POA: Yes    Debility POA: Yes    Normocytic anemia POA: Yes    DNR (do not resuscitate) POA: Yes    Hypokalemia POA: Yes    Candida esophagitis (CMS-HCC) POA: Clinically Undetermined    Esophageal stricture POA: Yes    Aspiration into airway POA: No  Resolved Problems:    Sepsis(995.91) POA: Yes    Elevated troponin POA: Unknown    Chronic osteomyelitis of lumbar spine (CMS-HCC) POA: Yes    Hypomagnesemia POA: Yes    Hyperglycemia POA: Yes    Nausea & vomiting POA: Clinically Undetermined      FOLLOW UP  Future Appointments  Date Time Provider Department Center   10/25/2017 4:00 PM PACER CHECK-[facility] None     [facility]  [address] [facility] [id number]        Tanisha Flores M.D.  [address] [place] [id number]  [phone]    Schedule an appointment as soon as possible for a visit in 2 weeks  Hospital follow-up appointment with PCP      MEDICATIONS ON DISCHARGE   [facility]   Home Medication Instructions [id number]    Printed on:10/23/17 1247   Medication Information                      amoxicillin-clavulanate (AUGMENTIN) 875-125 MG Tab  Take 1 Tab by mouth every 12 hours for 11 days.             atorvastatin (LIPITOR) 10 MG Tab  Take 10 mg by mouth every  evening.             digoxin (LANOXIN) 125 MCG Tab  Take 125 mcg by mouth every day.             ferrous sulfate (FEOSOL) 220 (44 Fe) MG/5ML Elixir  Take 5 mL by mouth every day.             fluconazole (DIFLUCAN) 100 MG Tab  Take 1 Tab by mouth every day for 3 days.             gabapentin (NEURONTIN) 100 MG Cap  Take 2 Caps by mouth 3 times a day.             levetiracetam (KEPPRA) 100 MG/ML Solution  Take 5 mL by mouth every 12 hours.             levetiracetam (KEPPRA) 500 MG Tab  Take 1 Tab by mouth 2 Times a Day.             lisinopril (PRINIVIL) 10 MG Tab  Take 1 Tab by mouth every day.             metoprolol (LOPRESSOR) 25 MG Tab  Take 1 Tab by mouth 2 Times a Day.             nystatin (MYCOSTATIN) 108340 UNIT/ML Suspension  Take 10 mL by mouth 4 times a day for 20 days.             omeprazole (PRILOSEC) 20 MG delayed-release capsule  Take 1 Cap by mouth every day.             omeprazole 2 mg/mL in sodium bicarbonate (PRILOSEC)  Take 20 mL by mouth every 12 hours for 30 days.             oxyCODONE CR (OXYCONTIN) 10 MG Tablet Extended Release 12 hour Abuse-Deterrent  Take 10 mg by mouth every 12 hours.             sucralfate (CARAFATE) 1 GM/10ML Suspension  Take 10 mL by mouth 4 Times a Day,Before Meals and at Bedtime.             sulfamethoxazole-trimethoprim (BACTRIM DS) 800-160 MG tablet  Take 1 Tab by mouth every 12 hours for 11 days.             tamsulosin (FLOMAX) 0.4 MG capsule  Take 1 Cap by mouth ONE-HALF HOUR AFTER BREAKFAST.             tramadol (ULTRAM) 50 MG Tab  Take 50 mg by mouth every 8 hours as needed.                 DIET  Orders Placed This Encounter   Procedures   • Diet Order     Standing Status:   Standing     Number of Occurrences:   1     Order Specific Question:   Diet:     Answer:   Full Liquid [11]     Comments:   may have scrambled eggs     Order Specific Question:   Diet:     Answer:   2 Gram Sodium [7]       ACTIVITY  As tolerated.  Weight bearing as  tolerated      CONSULTATIONS  Cardiology   GI Dr.Sheykzadeh MAXIMILIANO Kimbrough    PROCEDURES  As above    DX-CHEST-PORTABLE (1 VIEW)   Final Result      Diffuse interstitial opacities, slightly improved compared with 10/17, consistent with resolving pulmonary edema.      DX-CHEST-PORTABLE (1 VIEW)   Final Result         1. No pneumothorax status post right-sided pacemaker placement.      2. Decreased pulmonary edema.      DX-CHEST-PORTABLE (1 VIEW)   Final Result         1. No pneumothorax status post right-sided pacemaker placement.      MR-LUMBAR SPINE-WITH & W/O   Final Result      1.  Abnormal L3-4 disc fluid with adjacent bone marrow edema and contrast enhancement. When compared with the previous MRI dated 9/19/2017, there has been interval reduction in the extent of disc fluid in the associated bone marrow edema suggesting    improving discitis and osteomyelitis.   2.  The previously seen right iliacus fluid collection is reduced in size.   3.  Mild central canal stenosis at L3-4.   4.  Severe L3 neural foraminal stenosis.   5.  Retrolisthesis of L3 on 4.   6.  Postsurgical changes.   7.  Mild bilateral hydronephrosis      DX-ESOPHAGUS - BARIUM SWALLOW   Final Result      1.  2 cm hiatal hernia is identified.      2.  Exam is otherwise within normal limits.      IR-PICC LINE PLACEMENT > AGE 5   Final Result                  Ultrasound-guided PICC placement performed by qualified nursing staff as    above.          DX-CHEST-FOR PICC LINE Perform procedure in: PICC Room   Final Result      1.  Peripherally inserted catheter has been placed and the tip projects over the superior vena cava.   2.  Stable enlargement of the cardiomediastinal silhouette.   3.  Increased mild diffuse interstitial edema.   4.  Mild bilateral basilar atelectasis. Underlying infection is possible.      CT-DRAIN-RETROPERITONEAL   Final Result      1.  CT GUIDED RETROPERITONEAL (EXTRAPERITONEAL) RIGHT PELVIC CATHETER DRAINAGE WITHIN  THE RIGHT ILIACUS MUSCLE. OLD DARK REDDISH-BROWN BLOODY FLUID WAS OBTAINED. NO ABSCESS OR PURULENT MATERIAL.   2.  THE CURRENT PLAN IS TO CHECK CULTURES AND LIKELY REMOVED CATHETER IN THE SHORT-TERM AT 48-72 HOURS AS THERE IS UNLIKELY TO BE AN ABSCESS.      CT-ABDOMEN-PELVIS WITH   Final Result      Prominence of the right iliopsoas muscle with surrounding inflammatory stranding. The known fluid collection was better delineated on the prior MRI.      Mild endplate irregularity at L3/L4 can be seen in discitis/osteomyelitis.      Air-fluid level in the bladder. Correlation with urinalysis is recommended. This can be seen in the setting of recent instrumentation, infection or fistula.      Colonic diverticulosis. Moderate amount of colonic stool.      Nonobstructing right renal calculi.      Mild prominence of the renal collecting systems bilaterally.      Small hypodense renal lesions are too small to characterize.      Fluid-filled loops of small bowel can be seen in the setting of enteritis.      Small hiatal hernia with mild thickening of the distal esophagus.      Calcifications in the pancreatic head can be seen in chronic pancreatitis.      Atherosclerotic plaque.      Small bilateral pleural effusions with overlying atelectasis.         MR-LUMBAR SPINE-WITH & W/O   Final Result      1.  L3-4 findings again suggesting discitis with osteomyelitis. No evidence of epidural abscess or epidural phlegmon.   2.  L3-4 retrolisthesis.   3.  Postoperative multilevel lumbar laminectomy and posterior fusion at L4-5.   4.  Interval development of multilocular intramuscular abscesses in the right iliacus muscle, partly in the field of view   5.  Degenerative and spondylotic changes as detailed for each level above in the body of report.      DX-SMALL BOWEL SERIES   Final Result      1.  Gaseous distention with fairly diffuse bowel dilatation. Contrast extends into the ileum, but does not reach the colon within 22 hours. No  transition point is identified to suggest mechanical bowel obstruction.      2.  Large amount of stool in the rectal vault.      DX-CHEST-PORTABLE (1 VIEW)   Final Result      No significant change from prior exam.      DX-ESOPHAGUS - BARIUM SWALLOW   Final Result      No abnormalities identified on limited single contrast esophagram.      ECHOCARDIOGRAM COMP W/O CONT   Final Result      CT-HEAD W/O   Final Result      1.  Diffuse atrophy and white matter changes.   2.  No acute intracranial hemorrhage or territorial infarct.         DX-CHEST-PORTABLE (1 VIEW)   Final Result      1.  Diffuse prominence of interstitium again seen, likely interstitial lung disease.  Mild pulmonary edema is also a consideration.   2.  No pneumonia or pneumothorax.   3.  Stable cardiomegaly.            LABORATORY  Lab Results   Component Value Date/Time    SODIUM 137 10/19/2017 01:23 AM    POTASSIUM 4.6 10/19/2017 01:23 AM    CHLORIDE 101 10/19/2017 01:23 AM    CO2 29 10/19/2017 01:23 AM    GLUCOSE 120 (H) 10/19/2017 01:23 AM    BUN 6 (L) 10/19/2017 01:23 AM    CREATININE 0.54 10/19/2017 01:23 AM        Lab Results   Component Value Date/Time    WBC 14.2 (H) 10/22/2017 02:34 AM    HEMOGLOBIN 10.9 (L) 10/22/2017 02:34 AM    HEMATOCRIT 33.3 (L) 10/22/2017 02:34 AM    PLATELETCT 250 10/22/2017 02:34 AM        Total time of the discharge process exceeds 38 minutes

## 2017-10-23 NOTE — PROGRESS NOTES
Pt is resting in bed, no signs of distress, call light and personal belongings in reach, bed alarm and hourly rounding in place.

## 2017-10-23 NOTE — DISCHARGE PLANNING
Medical Social Work    Called [facility] and spoke with French who requested a new referral.     Updated [facility]

## 2017-10-23 NOTE — DISCHARGE PLANNING
Medical Social Work     [facility] able to accept pt today. PEr bedside RN, pt can't get into a WC. [facility] form completed and faxed to [facility] for transfer to [facility].

## 2017-10-23 NOTE — PROGRESS NOTES
Received report from day shift RN, pt is sleeping in bed, no signs of distress, call light and personal belongings are in reach, bed alarm and hourly rounding are in place.

## 2017-10-23 NOTE — PROGRESS NOTES
Pass bedside report to incoming nurse Debbi, patient resting quietly in bed, respiratory even and unlabored,  no distress nor discomfort noted, safety measures in place

## 2017-10-23 NOTE — DISCHARGE INSTRUCTIONS
Discharge Instructions    Discharged to other by ambulance with escort. Discharged via ambulance, hospital escort: Yes.  Special equipment needed: Not Applicable    Be sure to schedule a follow-up appointment with your primary care doctor or any specialists as instructed.     Discharge Plan:   Diet Plan: Discussed  Activity Level: Discussed  Confirmed Follow up Appointment: Patient to Call and Schedule Appointment  Confirmed Symptoms Management: Discussed  Medication Reconciliation Updated: Yes  Pneumococcal Vaccine Given - only chart on this line when given: Given (See MAR)  Influenza Vaccine Indication: Not indicated: Previously immunized this influenza season and > 8 years of age  Influenza Vaccine Given - only chart on this line when given: Influenza Vaccine Given (See MAR)    I understand that a diet low in cholesterol, fat, and sodium is recommended for good health. Unless I have been given specific instructions below for another diet, I accept this instruction as my diet prescription.   Other diet: Full Liquid Diet    Special Instructions: Sepsis, Adult  Sepsis is a serious infection of your blood or tissues that affects your whole body. The infection that causes sepsis may be bacterial, viral, fungal, or parasitic. Sepsis may be life threatening. Sepsis can cause your blood pressure to drop. This may result in shock. Shock causes your central nervous system and your organs to stop working correctly.   RISK FACTORS  Sepsis can happen in anyone, but it is more likely to happen in people who have weakened immune systems.  SIGNS AND SYMPTOMS   Symptoms of sepsis can include:  · Fever or low body temperature (hypothermia).  · Rapid breathing (hyperventilation).  · Chills.  · Rapid heartbeat (tachycardia).  · Confusion or light-headedness.  · Trouble breathing.  · Urinating much less than usual.  · Cool, clammy skin or red, flushed skin.  · Other problems with the heart, kidneys, or brain.  DIAGNOSIS   Your  health care provider will likely do tests to look for an infection, to see if the infection has spread to your blood, and to see how serious your condition is. Tests can include:  · Blood tests, including cultures of your blood.  · Cultures of other fluids from your body, such as:  ¨ Urine.  ¨ Pus from wounds.  ¨ Mucus coughed up from your lungs.  · Urine tests other than cultures.  · X-ray exams or other imaging tests.  TREATMENT   Treatment will begin with elimination of the source of infection. If your sepsis is likely caused by a bacterial or fungal infection, you will be given antibiotic or antifungal medicines.  You may also receive:  · Oxygen.  · Fluids through an IV tube.  · Medicines to increase your blood pressure.  · A machine to clean your blood (dialysis) if your kidneys fail.  · A machine to help you breathe if your lungs fail.  SEEK IMMEDIATE MEDICAL CARE IF:  You get an infection or develop any of the signs and symptoms of sepsis after surgery or a hospitalization.     This information is not intended to replace advice given to you by your health care provider. Make sure you discuss any questions you have with your health care provider.     Document Released: 09/15/2004 Document Revised: 05/03/2016 Document Reviewed: 08/25/2014  [employer] Interactive Patient Education ©2016 [employer]      · Is patient discharged on Warfarin / Coumadin?   No     · Is patient Post Blood Transfusion?  No    Altered Mental Status  Altered mental status most often refers to an abnormal change in your responsiveness and awareness. It can affect your speech, thought, mobility, memory, attention span, or alertness. It can range from slight confusion to complete unresponsiveness (coma). Altered mental status can be a sign of a serious underlying medical condition. Rapid evaluation and medical treatment is necessary for patients having an altered mental status.  CAUSES   · Low blood sugar (hypoglycemia) or  diabetes.  · Severe loss of body fluids (dehydration) or a body salt (electrolyte) imbalance.  · A stroke or other neurologic problem, such as dementia or delirium.  · A head injury or tumor.  · A drug or alcohol overdose.  · Exposure to toxins or poisons.  · Depression, anxiety, and stress.  · A low oxygen level (hypoxia).  · An infection.  · Blood loss.  · Twitching or shaking (seizure).  · Heart problems, such as heart attack or heart rhythm problems (arrhythmias).  · A body temperature that is too low or too high (hypothermia or hyperthermia).  DIAGNOSIS   A diagnosis is based on your history, symptoms, physical and neurologic examinations, and diagnostic tests. Diagnostic tests may include:  · Measurement of your blood pressure, pulse, breathing, and oxygen levels (vital signs).  · Blood tests.  · Urine tests.  · X-ray exams.  · A computerized magnetic scan (magnetic resonance imaging, MRI).  · A computerized X-ray scan (computed tomography, CT scan).  TREATMENT   Treatment will depend on the cause. Treatment may include:  · Management of an underlying medical or mental health condition.  · Critical care or support in the hospital.  HOME CARE INSTRUCTIONS   · Only take over-the-counter or prescription medicines for pain, discomfort, or fever as directed by your caregiver.  · Manage underlying conditions as directed by your caregiver.  · Eat a healthy, well-balanced diet to maintain strength.  · Join a support group or prevention program to cope with the condition or trauma that caused the altered mental status. Ask your caregiver to help choose a program that works for you.  · Follow up with your caregiver for further examination, therapy, or testing as directed.  SEEK MEDICAL CARE IF:   · You feel unwell or have chills.  · You or your family notice a change in your behavior or your alertness.  · You have trouble following your caregiver's treatment plan.  · You have questions or concerns.  SEEK IMMEDIATE  MEDICAL CARE IF:   · You have a rapid heartbeat or have chest pain.  · You have difficulty breathing.  · You have a fever.  · You have a headache with a stiff neck.  · You cough up blood.  · You have blood in your urine or stool.  · You have severe agitation or confusion.  MAKE SURE YOU:   · Understand these instructions.  · Will watch your condition.  · Will get help right away if you are not doing well or get worse.     This information is not intended to replace advice given to you by your health care provider. Make sure you discuss any questions you have with your health care provider.     Document Released: 06/07/2011 Document Revised: 03/11/2013 Document Reviewed: 02/11/2016  [employer] Interactive Patient Education ©2016 [employer]      Fluconazole tablets  What is this medicine?  FLUCONAZOLE (floo MIR na zole) is an antifungal medicine. It is used to treat certain kinds of fungal or yeast infections.  This medicine may be used for other purposes; ask your health care provider or pharmacist if you have questions.  COMMON BRAND NAME(S): Diflucan  What should I tell my health care provider before I take this medicine?  They need to know if you have any of these conditions:  -electrolyte abnormalities  -history of irregular heart beat  -kidney disease  -an unusual or allergic reaction to fluconazole, other azole antifungals, medicines, foods, dyes, or preservatives  -pregnant or trying to get pregnant  -breast-feeding  How should I use this medicine?  Take this medicine by mouth. Follow the directions on the prescription label. Do not take your medicine more often than directed.  Talk to your pediatrician regarding the use of this medicine in children. Special care may be needed. This medicine has been used in children as young as 6 months of age.  Overdosage: If you think you have taken too much of this medicine contact a poison control center or emergency room at once.  NOTE: This medicine is only for you. Do  not share this medicine with others.  What if I miss a dose?  If you miss a dose, take it as soon as you can. If it is almost time for your next dose, take only that dose. Do not take double or extra doses.  What may interact with this medicine?  Do not take this medicine with any of the following medications:  -cisapride  -pimozide  -red yeast rice  This medicine may also interact with the following medications:  -birth control pills  -cyclosporine  -diuretics like hydrochlorothiazide  -medicines for diabetes that are taken by mouth  -medicines for high cholesterol like atorvastatin, lovastatin or simvastatin  -phenytoin  -ramelteon  -rifabutin  -rifampin  -some medicines for anxiety or sleep  -tacrolimus  -terfenadine  -theophylline  -tofacitinib  -warfarin  This list may not describe all possible interactions. Give your health care provider a list of all the medicines, herbs, non-prescription drugs, or dietary supplements you use. Also tell them if you smoke, drink alcohol, or use illegal drugs. Some items may interact with your medicine.  What should I watch for while using this medicine?  Visit your doctor or health care professional for regular checkups. If you are taking this medicine for a long time you may need blood work. Tell your doctor if your symptoms do not improve. Some fungal infections need many weeks or months of treatment to cure.  Alcohol can increase possible damage to your liver. Avoid alcoholic drinks.  If you have a vaginal infection, do not have sex until you have finished your treatment. You can wear a sanitary napkin. Do not use tampons. Wear freshly washed cotton, not synthetic, panties.  What side effects may I notice from receiving this medicine?  Side effects that you should report to your doctor or health care professional as soon as possible:  -allergic reactions like skin rash or itching, hives, swelling of the lips, mouth, tongue, or throat  -dark urine  -feeling dizzy or  faint  -irregular heartbeat or chest pain  -redness, blistering, peeling or loosening of the skin, including inside the mouth  -trouble breathing  -unusual bruising or bleeding  -vomiting  -yellowing of the eyes or skin  Side effects that usually do not require medical attention (report to your doctor or health care professional if they continue or are bothersome):  -changes in how food tastes  -diarrhea  -headache  -stomach upset or nausea  This list may not describe all possible side effects. Call your doctor for medical advice about side effects. You may report side effects to FDA at 7-176-FDA-7105.  Where should I keep my medicine?  Keep out of the reach of children.  Store at room temperature below 30 degrees C (86 degrees F). Throw away any medicine after the expiration date.  NOTE: This sheet is a summary. It may not cover all possible information. If you have questions about this medicine, talk to your doctor, pharmacist, or health care provider.  © 2014, [employer]. (9/17/2013 3:15:11 PM)  Amoxicillin; Clavulanic Acid oral suspension  What is this medicine?  AMOXICILLIN; CLAVULANIC ACID (a mox i SILL in; ROBERTO CARLOS oates ic AS id) is a penicillin antibiotic. It is used to treat certain kinds of bacterial infections. It will not work for colds, flu, or other viral infections.  This medicine may be used for other purposes; ask your health care provider or pharmacist if you have questions.  COMMON BRAND NAME(S): Amoclan , Augmentin ES, Augmentin  What should I tell my health care provider before I take this medicine?  They need to know if you have any of these conditions:  -bowel disease, like colitis  -kidney disease  -liver disease  -mononucleosis  -phenylketonuria  -an unusual or allergic reaction to amoxicillin, penicillin, cephalosporin, other antibiotics, clavulanic acid, other medicines, foods, dyes, or preservatives  -pregnant or trying to get pregnant  -breast-feeding  How should I use this  medicine?  Take this medicine by mouth just before a meal or snack. Follow the directions on the prescription label. Shake well before using. Use a specially marked spoon or container to measure your medicine. Ask your pharmacist if you do not have one. Household spoons are not accurate. Bottles of suspension may contain more liquid than you need to take. Follow your doctor's instructions about how much to take and for how many days to take it. Do not take more medicine than directed. But, finish all the medicine that is prescribed even if you think you are better.  Talk to your pediatrician regarding the use of this medicine in children. Special care may be needed. This medicine has been used in children as young as 3 months of age.  Overdosage: If you think you have taken too much of this medicine contact a poison control center or emergency room at once.  NOTE: This medicine is only for you. Do not share this medicine with others.  What if I miss a dose?  If you miss a dose, take it as soon as you can. If it is almost time for your next dose, take only that dose. Do not take double or extra doses.  What may interact with this medicine?  -allopurinol  -anticoagulants  -birth control pills  -methotrexate  -probenecid  This list may not describe all possible interactions. Give your health care provider a list of all the medicines, herbs, non-prescription drugs, or dietary supplements you use. Also tell them if you smoke, drink alcohol, or use illegal drugs. Some items may interact with your medicine.  What should I watch for while using this medicine?  Tell your doctor or health care professional if your symptoms do not improve.  Do not treat diarrhea with over the counter products. Contact your doctor if you have diarrhea that lasts more than 2 days or if it is severe and watery.  If you have diabetes, you may get a false-positive result for sugar in your urine. Check with your doctor or health care  professional.  Birth control pills may not work properly while you are taking this medicine. Talk to your doctor about using an extra method of birth control.  What side effects may I notice from receiving this medicine?  Side effects that you should report to your doctor or health care professional as soon as possible:  -allergic reactions like skin rash, itching or hives, swelling of the face, lips, or tongue  -breathing problems  -dark urine  -fever or chills, sore throat  -redness, blistering, peeling or loosening of the skin, including inside the mouth  -seizures  -trouble passing urine or change in the amount of urine  -unusual bleeding, bruising  -unusually weak or tired  -white patches or sores in the mouth or throat  Side effects that usually do not require medical attention (report to your doctor or health care professional if they continue or are bothersome):  -diarrhea  -dizziness  -headache  -nausea, vomiting  -stomach upset  -vaginal or anal irritation  This list may not describe all possible side effects. Call your doctor for medical advice about side effects. You may report side effects to FDA at 2-448-FDA-2423.  Where should I keep my medicine?  Keep out of the reach of children.  After this medicine is mixed by your pharmacist, store it in a refrigerator. Do not freeze. Throw away any unused medicine after 10 days.  NOTE: This sheet is a summary. It may not cover all possible information. If you have questions about this medicine, talk to your doctor, pharmacist, or health care provider.  © 2014, [employer]. (3/10/2009 2:36:08 PM)      Depression / Suicide Risk    As you are discharged from this [facility] facility, it is important to learn how to keep safe from harming yourself.    Recognize the warning signs:  · Abrupt changes in personality, positive or negative- including increase in energy   · Giving away possessions  · Change in eating patterns- significant weight changes-   positive or negative  · Change in sleeping patterns- unable to sleep or sleeping all the time   · Unwillingness or inability to communicate  · Depression  · Unusual sadness, discouragement and loneliness  · Talk of wanting to die  · Neglect of personal appearance   · Rebelliousness- reckless behavior  · Withdrawal from people/activities they love  · Confusion- inability to concentrate     If you or a loved one observes any of these behaviors or has concerns about self-harm, here's what you can do:  · Talk about it- your feelings and reasons for harming yourself  · Remove any means that you might use to hurt yourself (examples: pills, rope, extension cords, firearm)  · Get professional help from the community (Mental Health, Substance Abuse, psychological counseling)  · Do not be alone:Call your Safe Contact- someone whom you trust who will be there for you.  · Call your local CRISIS HOTLINE 872-8840 or 129-482-8377  · Call your local Children's Mobile Crisis Response Team [place] (964) 019-4663 or www.[facility]  · Call the toll free National Suicide Prevention Hotlines   · National Suicide Prevention Lifeline 258-098-VXTO (3793)  · National Hope Line Network 800-SUICIDE (532-5073)

## 2017-10-23 NOTE — PROGRESS NOTES
Routine AM medications administered, pt. tolerated well. Currently resting in bed, respirations even and unlabored. No acute distress noted. Will continue to monitor.

## 2017-10-23 NOTE — DISCHARGE PLANNING
Medical Social Work     Pt agreeable to transfer. DNR script and completed cobra placed in pt's chart. IMM letter delivered and left in chart.     Bedside and charge RN aware of transfer time.

## 2017-10-23 NOTE — CARE PLAN
Problem: Safety  Goal: Will remain free from falls  Outcome: PROGRESSING AS EXPECTED  RN educated pt on fall prevention, bed locked and in lowest position, call light and personal belongings in reach, bed alarm, hourly rounding, and treaded slipper socks in place.     Problem: Skin Integrity  Goal: Risk for impaired skin integrity will decrease  Outcome: PROGRESSING AS EXPECTED  RN completed thorough skin assessment, barrier cream applied, waffle mattress topper in place, moisture on skin decreased, frequent repositioning offered.

## 2017-10-24 ENCOUNTER — APPOINTMENT (OUTPATIENT)
Dept: RADIOLOGY | Facility: MEDICAL CENTER | Age: 82
DRG: 871 | End: 2017-10-24
Attending: INTERNAL MEDICINE
Payer: MEDICARE

## 2017-10-24 ENCOUNTER — HOSPITAL ENCOUNTER (OUTPATIENT)
Dept: LAB | Facility: MEDICAL CENTER | Age: 82
End: 2017-10-24
Attending: INTERNAL MEDICINE
Payer: COMMERCIAL

## 2017-10-24 LAB
ALBUMIN SERPL BCP-MCNC: 3.6 G/DL (ref 3.2–4.9)
ALBUMIN/GLOB SERPL: 0.9 G/DL
ALP SERPL-CCNC: 91 U/L (ref 30–99)
ALT SERPL-CCNC: 7 U/L (ref 2–50)
AMORPH CRY #/AREA URNS HPF: PRESENT /HPF
ANION GAP SERPL CALC-SCNC: 16 MMOL/L (ref 0–11.9)
ANISOCYTOSIS BLD QL SMEAR: ABNORMAL
APPEARANCE UR: CLEAR
AST SERPL-CCNC: 15 U/L (ref 12–45)
BACTERIA #/AREA URNS HPF: NEGATIVE /HPF
BASOPHILS # BLD AUTO: 0 % (ref 0–1.8)
BASOPHILS # BLD: 0 K/UL (ref 0–0.12)
BILIRUB SERPL-MCNC: 1.2 MG/DL (ref 0.1–1.5)
BILIRUB UR QL STRIP.AUTO: NEGATIVE
BUN SERPL-MCNC: 9 MG/DL (ref 8–22)
C DIFF TOX GENS STL QL NAA+PROBE: NORMAL
CALCIUM SERPL-MCNC: 10.6 MG/DL (ref 8.5–10.5)
CHLORIDE SERPL-SCNC: 95 MMOL/L (ref 96–112)
CHOLEST SERPL-MCNC: 106 MG/DL (ref 100–199)
CO2 SERPL-SCNC: 21 MMOL/L (ref 20–33)
COLOR UR: YELLOW
CREAT SERPL-MCNC: 0.79 MG/DL (ref 0.5–1.4)
EOSINOPHIL # BLD AUTO: 0 K/UL (ref 0–0.51)
EOSINOPHIL NFR BLD: 0 % (ref 0–6.9)
EPI CELLS #/AREA URNS HPF: ABNORMAL /HPF
ERYTHROCYTE [DISTWIDTH] IN BLOOD BY AUTOMATED COUNT: 49.3 FL (ref 35.9–50)
EST. AVERAGE GLUCOSE BLD GHB EST-MCNC: 91 MG/DL
GFR SERPL CREATININE-BSD FRML MDRD: >60 ML/MIN/1.73 M 2
GLOBULIN SER CALC-MCNC: 3.8 G/DL (ref 1.9–3.5)
GLUCOSE SERPL-MCNC: 172 MG/DL (ref 65–99)
GLUCOSE UR STRIP.AUTO-MCNC: NEGATIVE MG/DL
HBA1C MFR BLD: 4.8 % (ref 0–5.6)
HCT VFR BLD AUTO: 38.4 % (ref 42–52)
HDLC SERPL-MCNC: 43 MG/DL
HGB BLD-MCNC: 12.8 G/DL (ref 14–18)
HYALINE CASTS #/AREA URNS LPF: ABNORMAL /LPF
KETONES UR STRIP.AUTO-MCNC: ABNORMAL MG/DL
LACTATE BLD-SCNC: 1.4 MMOL/L (ref 0.5–2)
LACTATE BLD-SCNC: 5.9 MMOL/L (ref 0.5–2)
LDLC SERPL CALC-MCNC: 38 MG/DL
LEUKOCYTE ESTERASE UR QL STRIP.AUTO: NEGATIVE
LYMPHOCYTES # BLD AUTO: 1.37 K/UL (ref 1–4.8)
LYMPHOCYTES NFR BLD: 8.7 % (ref 22–41)
MACROCYTES BLD QL SMEAR: ABNORMAL
MANUAL DIFF BLD: NORMAL
MCH RBC QN AUTO: 30 PG (ref 27–33)
MCHC RBC AUTO-ENTMCNC: 33.3 G/DL (ref 33.7–35.3)
MCV RBC AUTO: 89.9 FL (ref 81.4–97.8)
MICRO URNS: ABNORMAL
MONOCYTES # BLD AUTO: 0.7 K/UL (ref 0–0.85)
MONOCYTES NFR BLD AUTO: 4.4 % (ref 0–13.4)
MORPHOLOGY BLD-IMP: NORMAL
NEUTROPHILS # BLD AUTO: 13.46 K/UL (ref 1.82–7.42)
NEUTROPHILS NFR BLD: 85.2 % (ref 44–72)
NITRITE UR QL STRIP.AUTO: NEGATIVE
NRBC # BLD AUTO: 0 K/UL
NRBC BLD AUTO-RTO: 0 /100 WBC
PATH REV: NORMAL
PATH REV: NORMAL
PH UR STRIP.AUTO: >=9 [PH]
PLATELET # BLD AUTO: 302 K/UL (ref 164–446)
PLATELET BLD QL SMEAR: NORMAL
PMV BLD AUTO: 10.1 FL (ref 9–12.9)
POIKILOCYTOSIS BLD QL SMEAR: NORMAL
POTASSIUM SERPL-SCNC: 4.9 MMOL/L (ref 3.6–5.5)
PROT SERPL-MCNC: 7.4 G/DL (ref 6–8.2)
PROT UR QL STRIP: NEGATIVE MG/DL
RBC # BLD AUTO: 4.27 M/UL (ref 4.7–6.1)
RBC # URNS HPF: ABNORMAL /HPF
RBC BLD AUTO: PRESENT
RBC UR QL AUTO: ABNORMAL
SODIUM SERPL-SCNC: 132 MMOL/L (ref 135–145)
SP GR UR STRIP.AUTO: 1.01
TRIGL SERPL-MCNC: 123 MG/DL (ref 0–149)
UROBILINOGEN UR STRIP.AUTO-MCNC: 0.2 MG/DL
WBC # BLD AUTO: 15.8 K/UL (ref 4.8–10.8)
WBC #/AREA URNS HPF: ABNORMAL /HPF
WBC OTHER NFR BLD MANUAL: 1.7 %

## 2017-10-24 PROCEDURE — 85007 BL SMEAR W/DIFF WBC COUNT: CPT

## 2017-10-24 PROCEDURE — 80500 HCHG CLINICAL PATH CONSULT-LIMITED: CPT

## 2017-10-24 PROCEDURE — 700101 HCHG RX REV CODE 250: Performed by: HOSPITALIST

## 2017-10-24 PROCEDURE — 700102 HCHG RX REV CODE 250 W/ 637 OVERRIDE(OP): Performed by: HOSPITALIST

## 2017-10-24 PROCEDURE — 95951 EEG: CPT | Mod: 52

## 2017-10-24 PROCEDURE — 87086 URINE CULTURE/COLONY COUNT: CPT

## 2017-10-24 PROCEDURE — 87493 C DIFF AMPLIFIED PROBE: CPT

## 2017-10-24 PROCEDURE — 80061 LIPID PANEL: CPT

## 2017-10-24 PROCEDURE — 700111 HCHG RX REV CODE 636 W/ 250 OVERRIDE (IP): Performed by: HOSPITALIST

## 2017-10-24 PROCEDURE — 83605 ASSAY OF LACTIC ACID: CPT

## 2017-10-24 PROCEDURE — 80053 COMPREHEN METABOLIC PANEL: CPT

## 2017-10-24 PROCEDURE — 85027 COMPLETE CBC AUTOMATED: CPT

## 2017-10-24 PROCEDURE — A9270 NON-COVERED ITEM OR SERVICE: HCPCS | Performed by: HOSPITALIST

## 2017-10-24 PROCEDURE — 770022 HCHG ROOM/CARE - ICU (200)

## 2017-10-24 PROCEDURE — 700105 HCHG RX REV CODE 258: Performed by: HOSPITALIST

## 2017-10-24 PROCEDURE — 87040 BLOOD CULTURE FOR BACTERIA: CPT

## 2017-10-24 PROCEDURE — 81001 URINALYSIS AUTO W/SCOPE: CPT

## 2017-10-24 RX ORDER — LEVETIRACETAM 100 MG/ML
500 SOLUTION ORAL EVERY 12 HOURS
Status: DISCONTINUED | OUTPATIENT
Start: 2017-10-24 | End: 2017-10-29 | Stop reason: HOSPADM

## 2017-10-24 RX ORDER — SODIUM CHLORIDE 9 MG/ML
30 INJECTION, SOLUTION INTRAVENOUS
Status: COMPLETED | OUTPATIENT
Start: 2017-10-24 | End: 2017-10-24

## 2017-10-24 RX ORDER — SODIUM CHLORIDE 9 MG/ML
500 INJECTION, SOLUTION INTRAVENOUS
Status: COMPLETED | OUTPATIENT
Start: 2017-10-24 | End: 2017-10-25

## 2017-10-24 RX ADMIN — ENOXAPARIN SODIUM 30 MG: 100 INJECTION SUBCUTANEOUS at 12:52

## 2017-10-24 RX ADMIN — METOPROLOL TARTRATE 25 MG: 25 TABLET, FILM COATED ORAL at 12:52

## 2017-10-24 RX ADMIN — METRONIDAZOLE 500 MG: 500 INJECTION, SOLUTION INTRAVENOUS at 06:51

## 2017-10-24 RX ADMIN — OMEPRAZOLE 40 MG: 20 CAPSULE, DELAYED RELEASE ORAL at 12:54

## 2017-10-24 RX ADMIN — SUCRALFATE 1 G: 1 SUSPENSION ORAL at 21:43

## 2017-10-24 RX ADMIN — OMEPRAZOLE 40 MG: 20 CAPSULE, DELAYED RELEASE ORAL at 21:43

## 2017-10-24 RX ADMIN — GABAPENTIN 200 MG: 100 CAPSULE ORAL at 21:43

## 2017-10-24 RX ADMIN — SODIUM CHLORIDE: 9 INJECTION, SOLUTION INTRAVENOUS at 00:49

## 2017-10-24 RX ADMIN — SULFAMETHOXAZOLE AND TRIMETHOPRIM 1 TABLET: 800; 160 TABLET ORAL at 15:05

## 2017-10-24 RX ADMIN — TAMSULOSIN HYDROCHLORIDE 0.4 MG: 0.4 CAPSULE ORAL at 12:51

## 2017-10-24 RX ADMIN — LEVETIRACETAM 500 MG: 100 SOLUTION ORAL at 16:44

## 2017-10-24 RX ADMIN — AMOXICILLIN AND CLAVULANATE POTASSIUM 1 TABLET: 875; 125 TABLET, FILM COATED ORAL at 22:13

## 2017-10-24 RX ADMIN — SUCRALFATE 1 G: 1 SUSPENSION ORAL at 12:52

## 2017-10-24 RX ADMIN — ENOXAPARIN SODIUM 30 MG: 100 INJECTION SUBCUTANEOUS at 21:52

## 2017-10-24 RX ADMIN — Medication 220 MG: at 15:05

## 2017-10-24 RX ADMIN — SODIUM CHLORIDE: 9 INJECTION, SOLUTION INTRAVENOUS at 06:51

## 2017-10-24 RX ADMIN — SODIUM CHLORIDE 2430 ML: 9 INJECTION, SOLUTION INTRAVENOUS at 03:32

## 2017-10-24 RX ADMIN — FLUCONAZOLE 100 MG: 100 TABLET ORAL at 12:53

## 2017-10-24 RX ADMIN — SUCRALFATE 1 G: 1 SUSPENSION ORAL at 16:44

## 2017-10-24 RX ADMIN — METRONIDAZOLE 500 MG: 500 INJECTION, SOLUTION INTRAVENOUS at 21:57

## 2017-10-24 RX ADMIN — LEVETIRACETAM 500 MG: 100 SOLUTION ORAL at 21:43

## 2017-10-24 RX ADMIN — GABAPENTIN 200 MG: 100 CAPSULE ORAL at 15:16

## 2017-10-24 RX ADMIN — DILTIAZEM HYDROCHLORIDE 10 MG: 5 INJECTION INTRAVENOUS at 03:13

## 2017-10-24 RX ADMIN — METRONIDAZOLE 500 MG: 500 INJECTION, SOLUTION INTRAVENOUS at 00:49

## 2017-10-24 RX ADMIN — SULFAMETHOXAZOLE AND TRIMETHOPRIM 1 TABLET: 800; 160 TABLET ORAL at 21:43

## 2017-10-24 RX ADMIN — DIGOXIN 250 MCG: 250 TABLET ORAL at 19:15

## 2017-10-24 RX ADMIN — METRONIDAZOLE 500 MG: 500 INJECTION, SOLUTION INTRAVENOUS at 15:04

## 2017-10-24 RX ADMIN — ASPIRIN 325 MG: 325 TABLET, COATED ORAL at 12:52

## 2017-10-24 RX ADMIN — AMOXICILLIN AND CLAVULANATE POTASSIUM 1 TABLET: 875; 125 TABLET, FILM COATED ORAL at 12:52

## 2017-10-24 RX ADMIN — ATORVASTATIN CALCIUM 10 MG: 10 TABLET, FILM COATED ORAL at 22:13

## 2017-10-24 ASSESSMENT — COGNITIVE AND FUNCTIONAL STATUS - GENERAL
PERSONAL GROOMING: TOTAL
CLIMB 3 TO 5 STEPS WITH RAILING: TOTAL
MOBILITY SCORE: 6
TURNING FROM BACK TO SIDE WHILE IN FLAT BAD: UNABLE
STANDING UP FROM CHAIR USING ARMS: TOTAL
SUGGESTED CMS G CODE MODIFIER DAILY ACTIVITY: CN
MOVING FROM LYING ON BACK TO SITTING ON SIDE OF FLAT BED: UNABLE
SUGGESTED CMS G CODE MODIFIER MOBILITY: CN
MOVING TO AND FROM BED TO CHAIR: UNABLE
WALKING IN HOSPITAL ROOM: TOTAL
DRESSING REGULAR LOWER BODY CLOTHING: TOTAL
HELP NEEDED FOR BATHING: TOTAL
TOILETING: TOTAL
DAILY ACTIVITIY SCORE: 6
DRESSING REGULAR UPPER BODY CLOTHING: TOTAL
EATING MEALS: TOTAL

## 2017-10-24 ASSESSMENT — PAIN SCALES - GENERAL
PAINLEVEL_OUTOF10: 0

## 2017-10-24 ASSESSMENT — LIFESTYLE VARIABLES: EVER_SMOKED: YES

## 2017-10-24 NOTE — PROGRESS NOTES
Pulmonary Critical Care Progress Note      Date of Service: 10/24/2017    Chief Complaint: AMS    History of Present Illness: 87-year-old male transferred from [facility] where he was just discharged today.  The patient was brought back   secondary to mental status changes status.  There is no other report given   other than the patient was alert and oriented x4 around 3 p.m. and staff saw   him at 5:00 p.m., he was alert and oriented.  Then at around 6 o'clock he   became confused and was unable to answer questions.  The patient here in the   emergency room is evaluated and was found to have leukocytosis, no fever.  He   has hyponatremia.  He is in AFib with RVR, tach'ed up to the 140s read now and   troponin is negative.  The patient's urinalysis is negative.  He seems to   have some diarrhea now and CT of the head was reported negative.  A chest   x-ray was done also negative.  The patient is to be admitted for further   evaluation and treatment.  The patient at this time is not a historian.  He is   awake, seems to be alert, but is not answering.  He is fumbling with his   Hearn tube.    ROS:  Respiratory: unable to perform due to the patient's inability to effectively communicate, Cardiac: unable to perform due to the patient's inability to effectively communicate, GI: unable to perform due to the patient's inability to effectively communicate.  All other systems negative.    Interval Events:  24 hour interval history reviewed   Tm 97.9  +1.2L over last 24hr  No Cxr this am  Remains moderately encephalopathic    PFSH:  No change.    Respiratory:     Pulse Oximetry: 97 %  Chest Tube Drains:          Exam: unlabored respirations, no intercostal retractions or accessory muscle use and diminished breath sounds mild  ImagingAvailable data reviewed         Invalid input(s): [id number]    HemoDynamics:  Pulse: 94, Heart Rate (Monitored): 74  Blood Pressure : 144/56, NIBP: 116/85       Exam:  regular rate and rhythm  Imaging: Available data reviewed  Recent Labs      10/23/17   1910   TROPONINI  <0.01       Neuro:  GCS Total [place] Coma Score: 13       Exam: encephalopathic Confused, awake but oriented only to birthdate  Imaging: Available data reviewed    Fluids:  Intake/Output       10/22/17 0700 - 10/23/17 0659 (Not Admitted) 10/23/17 0700 - 10/24/17 0659 10/24/17 0700 - 10/25/17 0659      0700-1859 1900-0659 Total 0700-1859 1900-0659 Total 0700-1859 1900-0659 Total       Intake    P.O.  --  -- --  --  0 0  --  -- --    P.O. -- -- -- -- 0 0 -- -- --    I.V.  --  -- --  --  2530 2530  --  -- --    IV Piggyback Volume (IV Piggyback) -- -- -- -- 100 100 -- -- --    IV Volume (< Sepsis Bolus>) -- -- -- -- 2430 2430 -- -- --    Total Intake -- -- -- -- 2530 2530 -- -- --       Output    Urine  --  -- --  --  1250 1250  --  -- --    Void (ml) -- -- -- -- 1250 1250 -- -- --    Stool  --  -- --  --  -- --  --  -- --    Number of Times Stooled -- -- -- -- 3 x 3 x -- -- --    Total Output -- -- -- -- 1250 1250 -- -- --       Net I/O     -- -- -- -- 1280 1280 -- -- --        Weight: 80.6 kg (177 lb 11.1 oz)  Recent Labs      10/22/17   0234  10/23/17   1910  10/24/17   0201   SODIUM   --   128*  132*   POTASSIUM   --   4.4  4.9   CHLORIDE   --   97  95*   CO2   --   21  21   BUN   --   7*  9   CREATININE   --   0.66  0.79   MAGNESIUM  1.5   --    --    CALCIUM   --   10.6*  10.6*       GI/Nutrition:  Exam: abdomen is soft and non-tender  Imaging: Available data reviewed  NPO  Liver Function  Recent Labs      10/23/17   1910  10/24/17   0201   ALTSGPT  6  7   ASTSGOT  15  15   ALKPHOSPHAT  86  91   TBILIRUBIN  0.5  1.2   GLUCOSE  100*  172*       Heme:  Recent Labs      10/22/17   0234  10/23/17   1910  10/24/17   0201   RBC  3.63*  4.01*  4.27*   HEMOGLOBIN  10.9*  12.1*  12.8*   HEMATOCRIT  33.3*  36.9*  38.4*   PLATELETCT  250  311  302   PROTHROMBTM   --   14.0   --    APTT   --   31.4   --    INR   --    1.05   --        Infectious Disease:  Temp  Av.5 °C (97.7 °F)  Min: 36.2 °C (97.2 °F)  Max: 36.6 °C (97.9 °F)  Micro: cultures reviewed  Recent Labs      10/22/17   0234  10/23/17   1910  10/24/17   020   WBC  14.2*  13.8*  15.8*   NEUTSPOLYS  71.90  48.10  85.20*   LYMPHOCYTES  13.50*  42.60*  8.70*   MONOCYTES  11.10  6.50  4.40   EOSINOPHILS  2.20  1.90  0.00   BASOPHILS  0.60  0.90  0.00   ASTSGOT   --   15  15   ALTSGPT   --   6  7   ALKPHOSPHAT   --   86  91   TBILIRUBIN   --   0.5  1.2     Current Facility-Administered Medications   Medication Dose Frequency Provider Last Rate Last Dose   • NS (BOLUS) infusion 500 mL  500 mL Once PRN Harsh Sage M.D.       • amoxicillin-clavulanate (AUGMENTIN) 875-125 MG per tablet 1 Tab  1 Tab Q12HRS Harsh Sage M.D.   Stopped at 10/24/17 0015   • atorvastatin (LIPITOR) tablet 10 mg  10 mg Nightly Harsh Sage M.D.   Stopped at 10/24/17 0015   • digoxin (LANOXIN) tablet 250 mcg  250 mcg DAILY Harsh Sage M.D.       • ferrous sulfate (FEOSOL) 220 (44 Fe) MG/5ML solution 220 mg  220 mg DAILY Harsh Sage M.D.       • fluconazole (DIFLUCAN) tablet 100 mg  100 mg DAILY Harsh Sage M.D.       • gabapentin (NEURONTIN) capsule 200 mg  200 mg TID Harsh Sage M.D.       • levetiracetam (KEPPRA) tablet 500 mg  500 mg BID Harsh Sage M.D.       • metoprolol (LOPRESSOR) tablet 25 mg  25 mg BID Harsh Sage M.D.       • omeprazole 2 mg/mL in sodium bicarbonate (PRILOSEC) oral susp 40 mg  40 mg Q12HRS Harsh Sage M.D.       • sucralfate (CARAFATE) 1 GM/10ML suspension 1 g  1 g 4X/DAY ACHS Harsh Sage M.D.   Stopped at 10/24/17 0700   • sulfamethoxazole-trimethoprim (BACTRIM DS) 800-160 MG tablet 1 Tab  1 Tab Q12HRS Harsh Sage M.D.       • tamsulosin (FLOMAX) capsule 0.4 mg  0.4 mg AFTER BREAKFAST Harsh Sage M.D.       • Respiratory Care per Protocol   Continuous RT Harsh  MARY Sage       • NS infusion   Continuous Harsh Sage M.D. 83 mL/hr at 10/24/17 0651     • enoxaparin (LOVENOX) inj 30 mg  30 mg Q12HRS Harsh Sage M.D.       • acetaminophen (TYLENOL) tablet 650 mg  650 mg Q6HRS PRN Harsh Sage M.D.       • aspirin (ASA) tablet 325 mg  325 mg DAILY Harsh Sage M.D.        Or   • aspirin (ASA) chewable tab 324 mg  324 mg DAILY Harsh Sage M.D.        Or   • aspirin (ASA) suppository 300 mg  300 mg DAILY Harsh Sage M.D.       • ondansetron (ZOFRAN) syringe/vial injection 4 mg  4 mg Q4HRS PRN Harsh Sage M.D.       • ondansetron (ZOFRAN ODT) dispertab 4 mg  4 mg Q4HRS PRN Harsh Sage M.D.       • diltiazem (CARDIZEM) injection 10 mg  10 mg Q4HRS PRN Harsh Sage M.D.   10 mg at 10/24/17 0313   • metronidazole (FLAGYL) IVPB 500 mg  500 mg Q8HRS Harsh Sage M.D. 100 mL/hr at 10/24/17 0651 500 mg at 10/24/17 0651     Last reviewed on 10/23/2017  8:01 PM by Rafia Colindres LUIZ    Quality  Measures:  Labs reviewed, Medications reviewed and Radiology images reviewed                      Assessment/Plan:  Confusion   - ct head 10/23 without acute findings   - ? Related to metabolic process, ? Post ictal    - on numerous medications that can impact mental status   - continue supportive therapy with ivf  Leukocytosis   - ? Reactive vs infectious vs hemoconcentration   - all 3 cell lines have progressively risen   - cultures and c diff ordered   A fib with RVR   - rate wnl   - bp improved  Chronic Debility   - transferred from SNF after d/c few hours earlier   Chronic Osteomyelitis of spine   - continue abx (bactrim/augmentin)  Recent Esophagitis and distal stricture   - diflucan  Psoas Hematoma  ? Sz disoder   - pt noted to have sz like activity 7/2017   - has been on Keppra since    DNR    Pt remains high risk for deterioration and vital end organ damage.     Discussed patient condition and risk of  morbidity and/or mortality with RN, RT, Therapies and Pharmacy.    The patient remains critically ill.  Critical care time = 31 minutes in directly providing and coordinating critical care and extensive data review.  No time overlap and excludes procedures.

## 2017-10-24 NOTE — ED NOTES
88 y/o male debi Weber from [facility] for evaluation of change in mental status. Per report pt arrived to skilled nursing today after being discharged this afternoon. He was A&O x 4 around 3pm when staff saw him. They reported that at 5pm he ate dinner and was again A&O. Around 1800 they state that he began to be confused and unable to answer questions appropriately. Pt remains A&O x 1 (person only), upon arrival with no unilateral deficits noted by EMS or RN and is able to follow simple commands. Pt noted to have generalized weakness. He is wincing in pain, unclear where his pain is as he is unable to localize it.

## 2017-10-24 NOTE — PROGRESS NOTES
Cortrak Placement    Tube Team verified patient name and medical record number prior to tube placement.  Cortrak tube (43 inches, 10 Malaysian) placed at 60 cm in right nare.  Per Cortrak picture, tube appears to be in the stomach.  Nursing Instructions: Awaiting KUB to confirm placement before use for medications or feeding. Once placement confirmed, flush tube with 30 ml of water, and then remove and save stylet, in patient medication drawer.

## 2017-10-24 NOTE — CARE PLAN
Problem: Fluid Volume:  Goal: Will maintain balanced intake and output  Outcome: PROGRESSING AS EXPECTED  3L bolus administered to patient, adequate urinary output.     Problem: Skin Integrity  Goal: Risk for impaired skin integrity will decrease  Outcome: PROGRESSING AS EXPECTED  Wound consult placed, mepilex on sacrum, q2 turns, skin barrier cream in use.

## 2017-10-24 NOTE — DISCHARGE PLANNING
Care Transition Team Assessment    Information Source  Orientation : Disoriented to Event, Disoriented to Person, Disoriented to Time, Disoriented to Place  Information Given By: Patient  Informant's Name: Jersey  Who is responsible for making decisions for patient? : Patient    Readmission Evaluation  Is this a readmission?: Yes - unplanned readmission  Why do you think you were readmitted?: confusion  Was an appointment arranged for you prior to discharge?: Yes, attended appointment  Were there new prescriptions you were supposed to fill after you were discharged?: Yes, prescriptions filled  Did you understand your discharge instructions?: Yes  Did you have enough support after your last discharge?: Yes    Elopement Risk  Legal Hold: No  Ambulatory or Self Mobile in Wheelchair: No-Not an Elopement Risk  Elopement Risk: Not at Risk for Elopement    Interdisciplinary Discharge Planning  Does Admitting Nurse Feel This Could be a Complex Discharge?: Yes  Primary Care Physician: Dr. Reese   Lives with - Patient's Self Care Capacity: Adult Children  Patient or legal guardian wants to designate a caregiver (see row info): No  Support Systems: Children, Family Member(s)  Housing / Facility: [address] [facility] [place] [facility]  Name of Care Facility: [facility]-pt was just admitted there   Do You Take your Prescribed Medications Regularly: Yes  Able to Return to Previous ADL's: Future Time w/Therapy  Mobility Issues: Yes  Prior Services: Intermittent Physical Support for ADL Per Family  Patient Expects to be Discharged to:: SNF  Assistance Needed: No  Durable Medical Equipment: Walker, Other - Specify (wheelchair, scooter)    Discharge Preparedness  What is your plan after discharge?: Skilled nursing facility  What are your discharge supports?: Child  Prior Functional Level: Needs Assist with Activities of Daily Living, Needs Assist with Medication Management, Uses Wheelchair  Difficulity with ADLs: Walking, Toileting, Eating, Dressing, Brushing  teeth, Bathing  Difficulity with IADLs: Using the telephone or computer, Shopping, Managing medication, Laundry, Keeping track of finances, Driving, Cooking    Functional Assesment  Prior Functional Level: Needs Assist with Activities of Daily Living, Needs Assist with Medication Management, Uses Wheelchair    Finances  Financial Barriers to Discharge: No  Prescription Coverage: Yes ([employer])              Advance Directive  Advance Directive?: None    Domestic Abuse  Have you ever been the victim of abuse or violence?: No    Psychological Assessment  History of Substance Abuse: None  History of Psychiatric Problems: No  Non-compliant with Treatment: No  Newly Diagnosed Illness: No    Discharge Risks or Barriers  Discharge risks or barriers?: No    Anticipated Discharge Information  Anticipated discharge disposition: SNF  Discharge Address: [address] [place] [id number]  Discharge Contact Phone Number: [phone]

## 2017-10-24 NOTE — CARE PLAN
Problem: Safety  Goal: Will remain free from injury  Safety precautions in place     Problem: Skin Integrity  Goal: Risk for impaired skin integrity will decrease  Waffle overlay applied to bed, moon boots in use, pillows in place and alternated q2h, silicone o2 tubing in use with foam ears.

## 2017-10-24 NOTE — ED NOTES
Pt discharged from [facility] today.  Med rec is medications pt was discharged on.  No medications given at skilled.  Med rec updated and complete.  Allergies reviewed.

## 2017-10-24 NOTE — H&P
DATE OF ADMISSION:  10/23/2017.    IDENTIFICATION:  An 87-year-old male transferred from [facility] where he was just discharged today.  The patient was brought back   secondary to mental status changes status.  There is no other report given   other than the patient was alert and oriented x4 around 3 p.m. and staff saw   him at 5:00 p.m., he was alert and oriented.  Then at around 6 o'clock he   became confused and was unable to answer questions.  The patient here in the   emergency room is evaluated and was found to have leukocytosis, no fever.  He   has hyponatremia.  He is in AFib with RVR, tach'ed up to the 140s read now and   troponin is negative.  The patient's urinalysis is negative.  He seems to   have some diarrhea now and CT of the head was reported negative.  A chest   x-ray was done also negative.  The patient is to be admitted for further   evaluation and treatment.  The patient at this time is not a historian.  He is   awake, seems to be alert, but is not answering.  He is fumbling with his   Hearn tube.    ALLERGIES:  VANCOMYCIN IV, WHERE HE TOLERATED SLOW INFUSION RATES.    MEDICAL DIAGNOSIS:  History of atrial fibrillation.    MEDICATIONS:  Regimen on admission as follows:  1.  Augmentin 875-125 one tablet p.o. b.i.d.  2.  Lipitor 10 mg daily.  3.  Digoxin 125 mcg daily.  4.  Ferrous sulfate 220 mg daily.  5.  Diflucan 100 mg daily.  6.  Neurontin 100 mg b.i.d.  7.  Keppra 500 mg b.i.d.  8.  Lopressor 25 mg b.i.d.  9.  Mycostatin p.r.n.  10.  Carafate 1 g 4 times daily.  11.  Oxycodone 10 mg q. 12 hours for pain.  12.  Omeprazole 20 mg daily.  13.  Bactrim 800/160, 1 tablet b.i.d.  14.  Flomax 0.4 mg daily.  15.  Tramadol 50 mg p.o. p.r.n. pain.    PAST MEDICAL HISTORY:  1.  Atrial fibrillation, persistent.  2.  Status post treatment for diskitis of the lumbar region and long term IV   antibiotics.  3.  Dysphagia.  4.  Status post treatment of iliopsoas hematoma.  5.  Chronic  debility.  6.  Anemia.  7.  The patient is a DNR.  8.  History of hypokalemia.  9.  History of Candida esophagitis.  10.  Esophageal stricture.  11.  History of aspiration.  12.  Status post sepsis treatment.  13.  Elevated troponin.  14.  Chronic osteomyelitis of the lumbar spine.  15.  Hypomagnesemia.  16.  Status post nausea, vomiting treatment in the hospital recently.  17.  History of spinal stenosis.    PAST SURGICAL HISTORY:  1.  History of laminotomy.  2.  History of appendectomy.  3.  History of tonsillectomy.  4.  History of cataract repair.    SOCIAL HISTORY:  The patient is a nonsmoker, nondrinker currently residing at   a nursing facility.  The patient does have a history of tobacco abuse, smoker   a pack and a half for 29 years, quit in 1980.    FAMILY HISTORY:  Apparently positive for heart disease, breast cancer in   sister, colostomy in a brother, cancer in a brother.    REVIEW OF SYSTEMS:  Unobtainable, the patient is not currently talking.     PHYSICAL EXAMINATION:  VITAL SIGNS:  The patient is found to have temperature 36.2, pulse 75,   respiration 18, blood pressure 121/70, and patient is saturating 90% on 2 L.  GENERAL:  An older male in no acute distress, no acute respiratory distress.    He is confused, does not talk currently.  He moves orienting all 4   extremities.  HEENT:  Normocephalic, atraumatic.  EOMI.  PERRLA.  NECK:  Stiff range of motion.  No lymphadenopathy or thyromegaly.  CHEST:  Normal bony structures.  LUNGS:  Clear to auscultation anteriorly.  HEART:  Tachycardia, irregular.  S1, S2 appear normal.  ABDOMEN:  Somewhat protuberant.  There is no tenderness, no distention.  GENITOURINARY:  Hearn is in place.  RECTAL:  Deferred.  There is diarrhea currently.  MUSCULOSKELETAL:  No clubbing, cyanosis, or edema.  NEUROLOGIC:  The patient is confused.  He is nonverbal currently.    LABORATORY DATA AND IMAGING:  Show white cell count 13.8, hemoglobin 12.1,   hematocrit 36.9 and  platelet count 311, a lymphocyte count of 42.  Sodium is   128, potassium 4.4, chloride 97, bicarbonate 29, glucose is 100, BUN is 7,   creatinine 0.66, calcium is 10.6.  Troponin is negative here.  Coagulation   found normal.  Urinalysis with overall negative findings.  A CT of the head   was read as no acute intracranial finding.  No evidence of acute intracranial   hemorrhage or mass lesion.  White matter lucencies were consistent with small   vessel ischemic changes.    A chest x-ray shows no significant interval change, mild diffuse interstitial   prominence.  No new pulmonary infiltrates, right-sided pacemaker is in place.    EKG shows atrial fibrillation/flutter, at a rate of 75 without acute ST-T   changes.    ASSESSMENT AND PLAN:  1.  Confusion.  The patient was just discharged from the hospital with   prolonged antibiotic use, now possible recurrent infection versus other.  The   patient at this time will be admitted for IV fluids and continue his   antibiotic regimen as well as Flagyl for possible Clostridium difficile. The   patient was on prolonged antibiotics.  2.  Atrial fibrillation, with rapid ventricular response.  The patient was on   digoxin.  We will give some Cardizem on top of his digoxin, check a digoxin   level.  The patient had a preserved ejection fraction of 70%.  3.  Chronic debility.  4.  Chronic osteomyelitis of the spine.  5.  Recent esophagitis where the patient had a distal esophageal stricture   that was found.  6.  Status post psoas hematoma.  7.  Normocytic anemia.  8.  Do not resuscitate status.  9.  Status with aspiration.  10.  Acute altered mental status, unclear if the patient really could have had   a stroke.  He was not on full anticoagulation given his recent bleeding   events and therefore certainly at stroke risk.    OVERALL PLAN:  The patient is readmitted with altered mental status, possible   infectious source, possible CNS source with uncontrolled rhythm in AFib and    RVR.  The patient was given Cardizem in the emergency room.  We will give some   additional digoxin at this time, continue his antibiotics including ruling   out Clostridium difficile.  The patient has no apparent other current active   severe infection.  The patient might need to be seen by infectious disease   again for further care once a diagnosis is established.  The patient is not a   historian.  His current care is difficult given his poor mental status and   poor history and a complicated medical course recently.  Overall plan, the   patient will be admitted to telemetry for possible infection and altered   mental status, possible stroke with AFib, RVR.  For full further details,   please refer to the computer system and paper chart.    Time spent on this admission is 55 minutes.       ____________________________________     MD MAYURI SHAIKH / NTS    DD:  10/23/2017 22:18:46  DT:  10/24/2017 00:14:57    D#:  [id number]  Job#:  [id number]

## 2017-10-24 NOTE — DIETARY
"Nutrition Support Assessment - Male    Jersey Alexis is a 87 y.o. male with admitting DX of:   1)  A-fib   2) Altered mental state    Pertinent History: spinal stenosis, hyperlipidemia, GERD, DM, a-fib  Allergies:  Vancomycin    Height: 177.8 cm (5' 10\")  Weight: 80.6 kg (177 lb 11.1 oz)  Weight to Use in Calculations: 81 kg (178 lb 9.2 oz)  Ideal Body Weight: 75.3 kg (166 lb)  Percent Ideal Body Weight: 107.6  Body mass index is 25.5 kg/m².     Pertinent Labs: glucose 172  Last BM: 10/24/17  Pertinent Medications: prilosec  Pertinent Fluids: NS @ 83 ml/hr  Surgery / Procedures: na  Skin: possible pressure ulcer to coccyx, not seen by wound team yet.     Estimated Needs: MSJ x 1.2  Total Calories / day: 1800 - 2000 kcals (22 - 25 kcals/kg)   Total Grams Protein / day: 97 - 113 g (1.2 - 1.4 g/kg)  Total Fluids ml / day: 2028.6 ml         Assessment / Evaluation:    1) pt unable to safely take oral diet  2) cortrak placed for enteral access  3) possible pressure ulcer - await wound team assessment    Plan / Recommendation:    1) when feeding tube placement is confirmed start and advance TF per protocol  2) Diabetisource goal 65 ml/hr will provide 1872 kcals, 94  g protein, 1267 ml H20/day    "

## 2017-10-24 NOTE — PROGRESS NOTES
2 RN skin check performed at bedside.     Areas of concern: skin tears on R shin, skin tear / possible pressure ulcer on sacrum, hemorrhoid in anus.     Interventions in place: Wound consult for sacrum, mepilex in place on sacrum, q2 hour turns/

## 2017-10-24 NOTE — PROGRESS NOTES
No family at bedside, attempted to educate patient on infection prevention measures, pt not able to understand education at this time.

## 2017-10-24 NOTE — ED NOTES
Pt HR continues to be 140-150's, admitting MD notified, 2nd dose 10 mg cardizem ordered and will be given

## 2017-10-24 NOTE — PROGRESS NOTES
Anh Fournier Fall Risk Assessment:     Last Known Fall: Within the last month  Mobility: Use of assistive device/requires assist of two people  Medications: No meds  Mental Status/LOC/Awareness: Memory loss/confusion and requires reorienting  Toileting Needs: Incontinence  Volume/Electrolyte Status: Use of IV fluids/tube feeds  Communication/Sensory: Non-English patient/unable to speak/slurred speech  Behavior: Behavioral noncompliance with instruction  Anh Fournier Fall Risk Total: 21  Fall Risk Level: HIGH RISK    Universal Fall Precautions:  call light/belongings in reach, bed in low position and locked, wheelchairs and assistive devices out of sight, siderails up x 2, use non-slip footwear, adequate lighting, clutter free and spill free environment, educate on level of risk, educate to call for assistance    Fall Risk Level Interventions:     TRIAL (TELE 8, NEURO, MED JANELLE 5) High Fall Risk Interventions  Place yellow fall risk ID band on patient: completed  Provide patient/family education based on risk assessment: completed  Educate patient/family to call staff for assistance when getting out of bed: completed  Place fall precaution signage outside patient door: completed  Place patient in room close to nursing station: completed  Utilize bed/chair fall alarm: completed  Notify charge of high risk for huddle: completed    Patient Specific Interventions:     Medication: review medications with patient and family and limit combination of prn medications  Mental Status/LOC/Awareness: reorient patient, reinforce falls education, encourage family to stay with patient, check on patient hourly, utilize bed/chair fall alarm, reinforce the use of call light and provide activity  Toileting: monitor intake and output/use of appropriate interventions, instruct patient/family on the use of grab bars and instruct patient/family on the need to call for assistance when toileting  Volume/Electrolyte Status: ensure patient  remains hydrated, advance diet as tolerated, monitor abnormal lab values and ensure IV fluids are appropriate  Communication/Sensory: update plan of care on whiteboard, ensure proper positioning when transferrng/ambulating, ensure patient has glasses/contacts and hearing aids/dentures and for visually impaired patients orient to their room surrounding and do not change their surroundings  Behavioral: encourage patient to voice feelings, engage patient in daily activities, administer medication as ordered, instruct/reinforce fall program rationale and encourage family to stay with impulsive patients  Mobility: schedule physical activity throughout the day, provide comfort measures during transport, dangle prior to standing, utilize bed/chair fall alarm, ensure bed is locked and in lowest position, provide appropriate assistive device and instruct patient to exit bed on their strongest side

## 2017-10-24 NOTE — PROGRESS NOTES
2 RN skin check with Parmjit HENRY. Pt identified to have slow to lonnie heels that are boggy, red, dry, and intact. Pt has abrasion on right shin that is scabbed and dry. Pt has stage 2 on coccyx, very red, and starting to open. Pt ears are red, blanching and intact.

## 2017-10-24 NOTE — WOUND TEAM
In to see pt earlier for pressure injury mgmt. Pt has red blanching heels, sacrococcygeal area discolored purplish but blanching. Pt cleaned of incontinent episode, pad changed and pt positioned R side lying. Appropriate interventions in place, wound team not following pt.   RSKIN: CURRENT (X) ORDERED (O)  Q shift Oli:  X  Q shift pressure point assessments:  X  Pressure redistribution mattress         DAVID  x    Bariatric DAVID      Bariatric foam        Heel float boots       Heels floated on pillows   x   Barrier wipes      Barrier Cream      Barrier paste      Sacral silicone dressing      Silicone O2 tubing      Anchorfast      Trach with Optifoam split foam       Waffle cushion      Rectal tube or BMS      Antifungal tx    Turn q 2 hours x  Up to chair    Ambulate   PT/OT     Dietician    x  PO     TF   TPN     PVN    NPO  1 # days   Other

## 2017-10-24 NOTE — PROGRESS NOTES
Pt up from ER, placed pt on bed, enabled bed alarm. Pt confused. Unable to complete admission profile because no family present and pt severely confused. Pt unable to stay awake enough for swallow screening, failed pt swallow screen. Hearn inserted on arrival and urinalysis sent down. Pt bed low and locked, call light in reach. No needs at this time.

## 2017-10-24 NOTE — ED NOTES
Pt received ordered cardizem, HR now 80-90's SR with bursts to 100's, then returns to SR. Admitting MD notified, additional meds ordered

## 2017-10-24 NOTE — ED NOTES
HR sustaining SR 70-80 s/p 2nd dose of dilitazem. Report called to CARL Spence. Pt to tele with ACLS ED tech.

## 2017-10-24 NOTE — CARE PLAN
Problem: Safety  Goal: Will remain free from injury  Outcome: PROGRESSING AS EXPECTED  Fall risk precautions in place. Pt has fall risk arm band, yellow non skid socks, bed alarm enabled, strip alarm enabled. Pt educated on need to call before getting up but pt is confused.    Problem: Venous Thromboembolism (VTW)/Deep Vein Thrombosis (DVT) Prevention:  Goal: Patient will participate in Venous Thrombosis (VTE)/Deep Vein Thrombosis (DVT)Prevention Measures  Outcome: PROGRESSING AS EXPECTED  Pt has Lovenox for VTE prophylaxis and is agreeable to taking prescribed VTE medications.

## 2017-10-24 NOTE — ED NOTES
Urine specimen collected and sent. Pt appears as if he may still be uncomfortable although still unable to tell RN where, repositioned and given pillow for comfort and appears more relaxed at this time.

## 2017-10-24 NOTE — ED PROVIDER NOTES
ED Provider Note    CHIEF COMPLAINT  Chief Complaint   Patient presents with   • ALOC       HPI  Jersey Alexis is a 87 y.o. male who presentsWith confusion. The patient was just discharged from the hospital today. Upon arriving in nursing home, apparently was fine and then became confused and was fine again and then again became acutely confused. The patient was in the hospital for 5 weeks with an altered mental status found septic on admission. 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 He completed 4 week course of antibiotics. He had a drainage of an iliac abscess versus hematoma. He also had been found to be in A. fib with RVR him a alternating with a slow rate and had a single chamber pacemaker placed. He also had painful swallowing had EGD. Patient was to take oral Bactrim and Augmentin until 11/2 for discitis. The patient for his part is confused and will not offer a fluent history but does quite clearly answer yes or no questions. He denies any headache, chest pain, belly pain or pain anywhere. No diarrhea. No urine changes. No shortness of breath. No fever.    PAST MEDICAL HISTORY  Past Medical History:   Diagnosis Date   • A-fib (CMS-[facility])    • Arrhythmia    • Arthritis    • Cataract    • Diabetes (CMS-[facility])    • GERD (gastroesophageal reflux disease)    • Hyperlipidemia    • Muscle disorder     nerve damage due to spinal stenosis       FAMILY HISTORY  Family History   Problem Relation Age of Onset   • Heart Failure Mother    • Heart Attack Mother    • Heart Disease Mother      pacemaker   • Paranoid behavior Mother    • Heart Failure Father    • Cancer Sister      Breast   • Other Brother      colostomy   • Cancer Brother    • No Known Problems Maternal Grandmother    • No Known Problems Maternal Grandfather    • No Known Problems Paternal Grandmother    • No Known Problems Paternal Grandfather        SOCIAL HISTORY  Social  History   Substance Use Topics   • Smoking status: Former Smoker     Packs/day: 1.50     Years: 29.00     Types: Cigarettes, Pipe     Quit date: 1/1/1980   • Smokeless tobacco: Never Used      Comment: Started smoking at age 21   • Alcohol use No         SURGICAL HISTORY  Past Surgical History:   Procedure Laterality Date   • GASTROSCOPY N/A 10/9/2017    Procedure: GASTROSCOPY;  Surgeon: Oh Hull D.O.;  Location: SURGERY [facility];  Service: Gastroenterology   • TONSILLECTOMY Bilateral 1936   • APPENDECTOMY  Early 2000's   • CATARACT PHACO WITH IOL Bilateral Early 2000's   • LAMINOTOMY  late 80s    spinal stenosis   • LAMINOTOMY  late 90s    spinal fusion        CURRENT MEDICATIONS    I have reviewed the nurses notes and/or the list brought with the patient.    ALLERGIES  Allergies   Allergen Reactions   • Vancomycin Rash     Diffuse  Tolerates at slower infusion rates.        REVIEW OF SYSTEMS  See HPI for further details. Review of systems as above, otherwiseLimited as the patient is confused    PHYSICAL EXAM  VITAL SIGNS: /70   Pulse 75   Temp 36.2 °C (97.2 °F)   Resp 18   Wt 92.5 kg (204 lb)   SpO2 99%   BMI 28.47 kg/m²   Constitutional: He appears chronically ill, confused, fidgeting with his cardiac leads.  HENT: Mucus membranes dry.  Oropharynx is clear. The head is atraumatic.  Eyes: Pupils equally round.  No scleral icterus.   Neck: Full nontender range of motion. There is no meningismus.  Cardiovascular: Regular heart rate and rhythm.  No murmurs, rubs, nor gallop appreciated.   Thorax & Lungs: Chest is nontender; healing right chest implant.  Lungs are clear to auscultation with good air movement bilaterally.  No wheeze, rhonchi, nor rales.   Abdomen: Soft, with no tenderness, rebound nor guarding.  No mass, pulsatile mass, nor hepatosplenomegaly appreciated.  Skin: No purpura nor petechia noted.  Extremities/Musculoskeletal: No sign of trauma. Appears to be a left brachial  PICC. Calves are nontender with no cords nor edema.  No Musa's sign.  Pulses are intact all around.   Neurologic: Alert & mentioned questions but confused.  Strength and sensation is intact all around.  Gait is not assessed. Cranial nerves possible expressive aphasia versus confusion otherwise unremarkable  Psychiatric: Somewhat anxious.    EKG  I interpreted this EKG myself.  This is a 12-lead study.  The rhythm is flutter a rate of 75.  There are no ST segment nor T wave abnormalities.  Interpretation: No ST segment elevation myocardial infarction.    LABS  Labs Reviewed   CBC WITH DIFFERENTIAL - Abnormal; Notable for the following:        Result Value    WBC 13.8 (*)     RBC 4.01 (*)     Hemoglobin 12.1 (*)     Hematocrit 36.9 (*)     MCHC 32.8 (*)     RDW 50.5 (*)     Lymphocytes 42.60 (*)     Lymphs (Absolute) 5.88 (*)     Monos (Absolute) 0.90 (*)     All other components within normal limits    Narrative:     Indicate which anticoagulants the patient is on:->UNKNOWN   COMP METABOLIC PANEL - Abnormal; Notable for the following:     Sodium 128 (*)     Glucose 100 (*)     Bun 7 (*)     Calcium 10.6 (*)     All other components within normal limits    Narrative:     Indicate which anticoagulants the patient is on:->UNKNOWN   TROPONIN    Narrative:     Indicate which anticoagulants the patient is on:->UNKNOWN   PROTHROMBIN TIME    Narrative:     Indicate which anticoagulants the patient is on:->UNKNOWN   APTT    Narrative:     Indicate which anticoagulants the patient is on:->UNKNOWN   ESTIMATED GFR    Narrative:     Indicate which anticoagulants the patient is on:->UNKNOWN   DIFFERENTIAL MANUAL    Narrative:     Indicate which anticoagulants the patient is on:->UNKNOWN   PERIPHERAL SMEAR REVIEW    Narrative:     Indicate which anticoagulants the patient is on:->UNKNOWN   PLATELET ESTIMATE    Narrative:     Indicate which anticoagulants the patient is on:->UNKNOWN   MORPHOLOGY    Narrative:     Indicate which  anticoagulants the patient is on:->UNKNOWN   URINALYSIS,CULTURE IF INDICATED         RADIOLOGY/PROCEDURES  I have reviewed the patient's film interpretations myself, and they are read out by the radiologist as:   DX-CHEST-PORTABLE (1 VIEW)   Final Result         1. No significant interval change.      2. Mild interstitial prominence.      CT-HEAD W/O    (Results Pending)     .    MEDICAL RECORD  I have reviewed patient's medical record and pertinent results are listed above.    COURSE & MEDICAL DECISION MAKING  I have reviewed any medical record information, laboratory studies and radiographic results as noted above.  This patient presents with apparent acute onset of altered mental status. He seems confused. He may have an expressive aphasia although this could be a confusion as well. His examination is otherwise nonfocal. Consideration for an acute stroke. Going through the records, it appears that the patient was not a candidate for anticoagulation based on the pelvic hematoma, and I take this to consequently mean that he is not a candidate for thrombolytics/alteplase/TPA. Consideration for seizure, however this is not a generalized process. He is on a number of medications; this would include a narcotic. However, it does not seem he has had any changes in his medications. And the waxing and waning mental status also not explain that. The records does show that he has recently worked to show a leukocytosis. Chest x-ray from the other day showed an improving pulmonary infiltrates. I tried to get a hold of his attending from earlier today but he had already signed out. I did speak with a nurse upstairs to care for him on Saturday. She stated he was typically not confused, slow to answer questions however. From her description, he sounds acutely different than previously. His laboratories show an ongoing leukocytosis, otherwise unrevealing. I did order a urinalysis. His chest x-ray is unchanged. Head CT is ordered  and is pending at this time. I discussed the patient's case with Dr. Sage. He'll be admitting him.      FINAL IMPRESSION  1. Delirium    2. Leukocytosis, unspecified type           This dictation was created using voice recognition software.    Electronically signed by: Rajesh Brito, 10/23/2017 7:48 PM

## 2017-10-24 NOTE — ED NOTES
Pt returned from imaging. -150's when reconnected to monitor, EKG ordered and completed. Shown to GEORGIE Mota and admitting MD, pt to receive 10 mg cardizem

## 2017-10-25 LAB
ANION GAP SERPL CALC-SCNC: 4 MMOL/L (ref 0–11.9)
ANISOCYTOSIS BLD QL SMEAR: ABNORMAL
BASOPHILS # BLD AUTO: 1 % (ref 0–1.8)
BASOPHILS # BLD: 0.09 K/UL (ref 0–0.12)
BUN SERPL-MCNC: 9 MG/DL (ref 8–22)
C DIFF DNA SPEC QL NAA+PROBE: NEGATIVE
C DIFF TOX GENS STL QL NAA+PROBE: NEGATIVE
CALCIUM SERPL-MCNC: 9 MG/DL (ref 8.5–10.5)
CHLORIDE SERPL-SCNC: 105 MMOL/L (ref 96–112)
CO2 SERPL-SCNC: 24 MMOL/L (ref 20–33)
CREAT SERPL-MCNC: 0.58 MG/DL (ref 0.5–1.4)
DIGOXIN SERPL-MCNC: 1 NG/ML (ref 0.8–2)
EOSINOPHIL # BLD AUTO: 0.09 K/UL (ref 0–0.51)
EOSINOPHIL NFR BLD: 1 % (ref 0–6.9)
ERYTHROCYTE [DISTWIDTH] IN BLOOD BY AUTOMATED COUNT: 52.3 FL (ref 35.9–50)
GFR SERPL CREATININE-BSD FRML MDRD: >60 ML/MIN/1.73 M 2
GLUCOSE SERPL-MCNC: 100 MG/DL (ref 65–99)
HCT VFR BLD AUTO: 27.6 % (ref 42–52)
HGB BLD-MCNC: 8.9 G/DL (ref 14–18)
LYMPHOCYTES # BLD AUTO: 2.46 K/UL (ref 1–4.8)
LYMPHOCYTES NFR BLD: 27 % (ref 22–41)
MANUAL DIFF BLD: NORMAL
MCH RBC QN AUTO: 30.6 PG (ref 27–33)
MCHC RBC AUTO-ENTMCNC: 32.2 G/DL (ref 33.7–35.3)
MCV RBC AUTO: 94.8 FL (ref 81.4–97.8)
MONOCYTES # BLD AUTO: 0.82 K/UL (ref 0–0.85)
MONOCYTES NFR BLD AUTO: 9 % (ref 0–13.4)
MORPHOLOGY BLD-IMP: NORMAL
NEUTROPHILS # BLD AUTO: 5.64 K/UL (ref 1.82–7.42)
NEUTROPHILS NFR BLD: 61 % (ref 44–72)
NEUTS BAND NFR BLD MANUAL: 1 % (ref 0–10)
NRBC # BLD AUTO: 0 K/UL
NRBC BLD AUTO-RTO: 0 /100 WBC
OVALOCYTES BLD QL SMEAR: NORMAL
PLATELET # BLD AUTO: 215 K/UL (ref 164–446)
PLATELET BLD QL SMEAR: NORMAL
PMV BLD AUTO: 10.4 FL (ref 9–12.9)
POIKILOCYTOSIS BLD QL SMEAR: NORMAL
POTASSIUM SERPL-SCNC: 4 MMOL/L (ref 3.6–5.5)
RBC # BLD AUTO: 2.91 M/UL (ref 4.7–6.1)
RBC BLD AUTO: PRESENT
SODIUM SERPL-SCNC: 133 MMOL/L (ref 135–145)
WBC # BLD AUTO: 9.1 K/UL (ref 4.8–10.8)

## 2017-10-25 PROCEDURE — 97166 OT EVAL MOD COMPLEX 45 MIN: CPT

## 2017-10-25 PROCEDURE — 85007 BL SMEAR W/DIFF WBC COUNT: CPT

## 2017-10-25 PROCEDURE — G8979 MOBILITY GOAL STATUS: HCPCS | Mod: CK

## 2017-10-25 PROCEDURE — 80048 BASIC METABOLIC PNL TOTAL CA: CPT

## 2017-10-25 PROCEDURE — G8987 SELF CARE CURRENT STATUS: HCPCS | Mod: CL

## 2017-10-25 PROCEDURE — A9270 NON-COVERED ITEM OR SERVICE: HCPCS | Performed by: INTERNAL MEDICINE

## 2017-10-25 PROCEDURE — 700102 HCHG RX REV CODE 250 W/ 637 OVERRIDE(OP): Performed by: INTERNAL MEDICINE

## 2017-10-25 PROCEDURE — 700102 HCHG RX REV CODE 250 W/ 637 OVERRIDE(OP): Performed by: HOSPITALIST

## 2017-10-25 PROCEDURE — 97162 PT EVAL MOD COMPLEX 30 MIN: CPT

## 2017-10-25 PROCEDURE — 700101 HCHG RX REV CODE 250: Performed by: HOSPITALIST

## 2017-10-25 PROCEDURE — 770020 HCHG ROOM/CARE - TELE (206)

## 2017-10-25 PROCEDURE — 700111 HCHG RX REV CODE 636 W/ 250 OVERRIDE (IP): Performed by: HOSPITALIST

## 2017-10-25 PROCEDURE — G8988 SELF CARE GOAL STATUS: HCPCS | Mod: CJ

## 2017-10-25 PROCEDURE — 80162 ASSAY OF DIGOXIN TOTAL: CPT

## 2017-10-25 PROCEDURE — G8978 MOBILITY CURRENT STATUS: HCPCS | Mod: CM

## 2017-10-25 PROCEDURE — 700105 HCHG RX REV CODE 258: Performed by: HOSPITALIST

## 2017-10-25 PROCEDURE — 85027 COMPLETE CBC AUTOMATED: CPT

## 2017-10-25 PROCEDURE — A9270 NON-COVERED ITEM OR SERVICE: HCPCS | Performed by: HOSPITALIST

## 2017-10-25 RX ADMIN — TAMSULOSIN HYDROCHLORIDE 0.4 MG: 0.4 CAPSULE ORAL at 09:35

## 2017-10-25 RX ADMIN — SUCRALFATE 1 G: 1 SUSPENSION ORAL at 21:29

## 2017-10-25 RX ADMIN — SODIUM CHLORIDE: 9 INJECTION, SOLUTION INTRAVENOUS at 21:21

## 2017-10-25 RX ADMIN — SODIUM CHLORIDE 500 ML: 9 INJECTION, SOLUTION INTRAVENOUS at 01:08

## 2017-10-25 RX ADMIN — LEVETIRACETAM 500 MG: 100 SOLUTION ORAL at 21:30

## 2017-10-25 RX ADMIN — GABAPENTIN 200 MG: 100 CAPSULE ORAL at 16:49

## 2017-10-25 RX ADMIN — SUCRALFATE 1 G: 1 SUSPENSION ORAL at 12:45

## 2017-10-25 RX ADMIN — ENOXAPARIN SODIUM 30 MG: 100 INJECTION SUBCUTANEOUS at 09:37

## 2017-10-25 RX ADMIN — ENOXAPARIN SODIUM 30 MG: 100 INJECTION SUBCUTANEOUS at 21:37

## 2017-10-25 RX ADMIN — FLUCONAZOLE 100 MG: 100 TABLET ORAL at 09:35

## 2017-10-25 RX ADMIN — ATORVASTATIN CALCIUM 10 MG: 10 TABLET, FILM COATED ORAL at 21:28

## 2017-10-25 RX ADMIN — Medication 220 MG: at 09:34

## 2017-10-25 RX ADMIN — LEVETIRACETAM 500 MG: 100 SOLUTION ORAL at 09:34

## 2017-10-25 RX ADMIN — SUCRALFATE 1 G: 1 SUSPENSION ORAL at 06:07

## 2017-10-25 RX ADMIN — SULFAMETHOXAZOLE AND TRIMETHOPRIM 1 TABLET: 800; 160 TABLET ORAL at 09:37

## 2017-10-25 RX ADMIN — GABAPENTIN 200 MG: 100 CAPSULE ORAL at 09:35

## 2017-10-25 RX ADMIN — DIGOXIN 250 MCG: 250 TABLET ORAL at 17:54

## 2017-10-25 RX ADMIN — METOPROLOL TARTRATE 25 MG: 25 TABLET, FILM COATED ORAL at 09:37

## 2017-10-25 RX ADMIN — SUCRALFATE 1 G: 1 SUSPENSION ORAL at 16:50

## 2017-10-25 RX ADMIN — METRONIDAZOLE 500 MG: 500 INJECTION, SOLUTION INTRAVENOUS at 06:07

## 2017-10-25 RX ADMIN — OMEPRAZOLE 40 MG: 20 CAPSULE, DELAYED RELEASE ORAL at 21:31

## 2017-10-25 RX ADMIN — METOPROLOL TARTRATE 25 MG: 25 TABLET, FILM COATED ORAL at 21:29

## 2017-10-25 RX ADMIN — SODIUM CHLORIDE: 9 INJECTION, SOLUTION INTRAVENOUS at 06:07

## 2017-10-25 RX ADMIN — GABAPENTIN 200 MG: 100 CAPSULE ORAL at 21:30

## 2017-10-25 RX ADMIN — ASPIRIN 325 MG: 325 TABLET, COATED ORAL at 09:35

## 2017-10-25 RX ADMIN — AMOXICILLIN AND CLAVULANATE POTASSIUM 1 TABLET: 875; 125 TABLET, FILM COATED ORAL at 21:28

## 2017-10-25 RX ADMIN — AMOXICILLIN AND CLAVULANATE POTASSIUM 1 TABLET: 875; 125 TABLET, FILM COATED ORAL at 09:35

## 2017-10-25 RX ADMIN — OMEPRAZOLE 40 MG: 20 CAPSULE, DELAYED RELEASE ORAL at 09:34

## 2017-10-25 RX ADMIN — SULFAMETHOXAZOLE AND TRIMETHOPRIM 1 TABLET: 800; 160 TABLET ORAL at 21:29

## 2017-10-25 ASSESSMENT — LIFESTYLE VARIABLES: ALCOHOL_USE: NO

## 2017-10-25 ASSESSMENT — GAIT ASSESSMENTS: GAIT LEVEL OF ASSIST: UNABLE TO PARTICIPATE

## 2017-10-25 ASSESSMENT — COGNITIVE AND FUNCTIONAL STATUS - GENERAL
PERSONAL GROOMING: A LITTLE
CLIMB 3 TO 5 STEPS WITH RAILING: TOTAL
EATING MEALS: TOTAL
SUGGESTED CMS G CODE MODIFIER MOBILITY: CN
MOVING FROM LYING ON BACK TO SITTING ON SIDE OF FLAT BED: UNABLE
DRESSING REGULAR LOWER BODY CLOTHING: A LOT
WALKING IN HOSPITAL ROOM: TOTAL
DAILY ACTIVITIY SCORE: 12
MOBILITY SCORE: 6
STANDING UP FROM CHAIR USING ARMS: TOTAL
DRESSING REGULAR UPPER BODY CLOTHING: A LOT
SUGGESTED CMS G CODE MODIFIER DAILY ACTIVITY: CL
MOVING TO AND FROM BED TO CHAIR: UNABLE
TURNING FROM BACK TO SIDE WHILE IN FLAT BAD: UNABLE
TOILETING: A LOT
HELP NEEDED FOR BATHING: A LOT

## 2017-10-25 ASSESSMENT — ACTIVITIES OF DAILY LIVING (ADL): TOILETING: REQUIRES ASSIST

## 2017-10-25 ASSESSMENT — PATIENT HEALTH QUESTIONNAIRE - PHQ9
1. LITTLE INTEREST OR PLEASURE IN DOING THINGS: NOT AT ALL
2. FEELING DOWN, DEPRESSED, IRRITABLE, OR HOPELESS: NOT AT ALL
SUM OF ALL RESPONSES TO PHQ QUESTIONS 1-9: 0
SUM OF ALL RESPONSES TO PHQ9 QUESTIONS 1 AND 2: 0

## 2017-10-25 ASSESSMENT — PAIN SCALES - GENERAL
PAINLEVEL_OUTOF10: 0

## 2017-10-25 NOTE — PROCEDURES
DATE OF SERVICE:  10/24/2017    This is inpatient EEG, was done on 10/24/2017.    ROUTINE ELECTROENCEPHALOGRAM REPORT        NAME: Jersey Alexis     CEFERINO Dr:     INDICATION: Mental status changes        TECHNIQUE: 30 channel routine electroencephalogram (EEG) was performed in accordance with the international 10-20 system. The study was reviewed in bipolar and referential montages. The recording examined the patient during wakeful and drowsy state(s).      DESCRIPTION OF THE RECORD:     The EEG shows diffuse theta and delta 3-6 Hz, polymorphic in the first half of EEG. In the second half, there are monomorphic delta 2 Hz more organized over the right frontal region-- lasting for 10 seconds or so. There are no definite epileptiform discharges      ACTIVATION PROCEDURES:       Hyperventilation and Photic Stimulation were not done     ICTAL AND/OR INTERICTAL FINDINGS:    No focal or generalized epileptiform activity noted. There are monomorphic delta 2 Hz more organized over the right frontal region-- lasting for 10 seconds or so. There are no definite epileptiform discharges         EKG: sampling of the EKG recording demonstrated sinus rhythm.          INTERPRETATION:           ________________________________________________________________________     This scalp EEG is consistent with      1. Diffuse nonspecific cortical dysfunction - moderate   2. Potential focal irritability over frontal ( potential focal seizure from frontal)     There are no epileptiform discharges in this record     If clinical suspicion of seizure remains high.  Prolonged EEG   monitoring may be of help.     EEG 10/24/17     ________________________________________________________________________                              ____________________________________     MD ADAM PETTY / JUSTINE    DD:  10/24/2017 18:57:58  DT:  10/24/2017 19:03:05    D#:  [id number]  Job#:  [id number]

## 2017-10-25 NOTE — CARE PLAN
Problem: Nutritional:  Goal: Nutrition support tolerated and meeting greater than 85% of estimated needs  Outcome: MET Date Met: 10/25/17  Diabetisource @ full goal 65 ml/hr

## 2017-10-25 NOTE — CARE PLAN
Problem: Communication  Goal: The ability to communicate needs accurately and effectively will improve    Intervention: Reorient patient to environment as needed  Reorienting pt as needed, to enviroment      Problem: Skin Integrity  Goal: Risk for impaired skin integrity will decrease    Intervention: Implement precautions to protect skin integrity in collaboration with the interdisciplinary team   10/24/17 2000 10/24/17 9669   OTHER   Skin Preventative Measures --  Heel Protectors in Use    Bed Types Low Air Loss Mattress --    Friction Interventions Draw Sheet / Pad Used for Repositioning --    Moisturizers Moisturizer ;Barrier Wipes --    Activity  Bed --    Assistance / Tolerance for Turning/Repositioning Assistance of Two or More;Tolerates Well --    Patient is Receiving Nutrition --  Tube Feeding Nutrition

## 2017-10-25 NOTE — EEG PROGRESS NOTE
EEG 10/24/17 6:35 PM    ROUTINE ELECTROENCEPHALOGRAM REPORT      NAME: Jersey Alexis    REFERRING Dr:    INDICATION: Mental status changes      TECHNIQUE: 30 channel routine electroencephalogram (EEG) was performed in accordance with the international 10-20 system. The study was reviewed in bipolar and referential montages. The recording examined the patient during wakeful and drowsy state(s).     DESCRIPTION OF THE RECORD:    The EEG shows diffuse theta and delta 3-6 Hz, polymorphic in the first half of EEG. In the second half, there are monomorphic delta 2 Hz more organized over the right frontal region-- lasting for 10 seconds or so. There are no definite epileptiform discharges     ACTIVATION PROCEDURES:      Hyperventilation and Photic Stimulation were not done    ICTAL AND/OR INTERICTAL FINDINGS:    No focal or generalized epileptiform activity noted. There are monomorphic delta 2 Hz more organized over the right frontal region-- lasting for 10 seconds or so. There are no definite epileptiform discharges        EKG: sampling of the EKG recording demonstrated sinus rhythm.        INTERPRETATION:        ________________________________________________________________________    This scalp EEG is consistent with     1. Diffuse nonspecific cortical dysfunction - moderate   2. Potential focal irritability over frontal ( potential focal seizure from frontal)    There are no epileptiform discharges in this record    If clinical suspicion of seizure remains high.  Prolonged EEG   monitoring may be of help.    EEG 10/24/17    ________________________________________________________________________

## 2017-10-25 NOTE — PROGRESS NOTES
Pulmonary Critical Care Progress Note      Date of Service: 10/25/2017    Chief Complaint: AMS    History of Present Illness: 87-year-old male transferred from [facility] where he was just discharged today.  The patient was brought back   secondary to mental status changes status.  There is no other report given   other than the patient was alert and oriented x4 around 3 p.m. and staff saw   him at 5:00 p.m., he was alert and oriented.  Then at around 6 o'clock he   became confused and was unable to answer questions.  The patient here in the   emergency room is evaluated and was found to have leukocytosis, no fever.  He   has hyponatremia.  He is in AFib with RVR, tach'ed up to the 140s read now and   troponin is negative.  The patient's urinalysis is negative.  He seems to   have some diarrhea now and CT of the head was reported negative.  A chest   x-ray was done also negative.  The patient is to be admitted for further   evaluation and treatment.  The patient at this time is not a historian.  He is   awake, seems to be alert, but is not answering.  He is fumbling with his   Hearn tube.    ROS:  Respiratory: negative shortness of breath and negative sputum production, Cardiac: negative chest pain and negative palpitations, GI: negative nausea, negative vomiting and negative abdominal pain.  All other systems negative.    Interval Events:  24 hour interval history reviewed   Tm 98.7  +1.9L over last 24hr, +3.2L over last 24hr  No Cxr this am  Pt now oriented, alert, and aware of current state  Wbc count much improved    Physical Exam   Constitutional: He is oriented to person, place, and time. He appears well-developed and well-nourished.   HENT:   Head: Normocephalic.   Eyes: Conjunctivae are normal. No scleral icterus.   Neck: No tracheal deviation present.   Cardiovascular: Normal rate.    ireg ireg   Pulmonary/Chest: Effort normal. No stridor. No respiratory distress. He has no wheezes.    Abdominal: Soft. Bowel sounds are normal. He exhibits distension.   Musculoskeletal: He exhibits no edema.   Neurological: He is alert and oriented to person, place, and time.   Skin: Skin is warm and dry.   Psychiatric: He has a normal mood and affect. His behavior is normal.   Nursing note and vitals reviewed.      PFSH:  No change.    Respiratory:     Pulse Oximetry: 99 %  Chest Tube Drains:          ImagingAvailable data reviewed         Invalid input(s): [id number]    HemoDynamics:  Pulse: 68, Heart Rate (Monitored): 72  NIBP: (!) 90/51         Imaging: Available data reviewed  Recent Labs      10/23/17   1910   TROPONINI  <0.01       Neuro:  GCS Total Deisy Coma Score: 14       Imaging: Available data reviewed    Fluids:  Intake/Output       10/23/17 0700 - 10/24/17 0659 10/24/17 0700 - 10/25/17 0659 10/25/17 0700 - 10/26/17 0659      [id number] 5727-9199 Total 9157-7902 3850-1605 Total 4185-7472 0292-3601 Total       Intake    P.O.  --  0 0  --  -- --  --  -- --    P.O. -- 0 0 -- -- -- -- -- --    I.V.  --  2530 2530  1096  1330 2426  --  -- --    IV Piggyback Volume (IV Piggyback) -- 100 100 100 -- 100 -- -- --    IV Volume (< Sepsis Bolus>) -- 2430 2430 -- 500 500 -- -- --    IV Volume (< Main >) -- -- --  -- -- --    Other  --  -- --  100  -- 100  --  -- --    Medications (P.O./ Enteral Liquids) -- -- -- 100 -- 100 -- -- --    Enteral  --  -- --  260  600 860  --  -- --    Enteral Volume -- -- -- 200 570 770 -- -- --    Free Water / Tube Flush -- -- -- 60 30 90 -- -- --    Total Intake -- 2530 2530 1456 1930 3386 -- -- --       Output    Urine  --  1250 1250    --  -- --    Indwelling Cathether -- -- -- -- 475 475 -- -- --    Void (ml) -- 1250 1250 950 -- 950 -- -- --    Stool  --  -- --  --  -- --  --  -- --    Number of Times Stooled -- 3 x 3 x -- -- -- -- -- --    Total Output -- 1250 1250  -- -- --       Net I/O     -- 1280 6495 662 8893 1961 -- -- --         Weight: 81.2 kg (179 lb 0.2 oz)  Recent Labs      10/23/17   1910  10/24/17   0201  10/25/17   0410   SODIUM  128*  132*  133*   POTASSIUM  4.4  4.9  4.0   CHLORIDE  97  95*  105   CO2     BUN  7*  9  9   CREATININE  0.66  0.79  0.58   CALCIUM  10.6*  10.6*  9.0       GI/Nutrition:    Imaging: Available data reviewed  NPO  Liver Function  Recent Labs      10/23/17   1910  10/24/17   0201  10/25/17   0410   ALTSGPT  6  7   --    ASTSGOT  15  15   --    ALKPHOSPHAT  86  91   --    TBILIRUBIN  0.5  1.2   --    GLUCOSE  100*  172*  100*       Heme:  Recent Labs      10/23/17   1910  10/24/17   0201  10/25/17   0500   RBC  4.01*  4.27*  2.91*   HEMOGLOBIN  12.1*  12.8*  8.9*   HEMATOCRIT  36.9*  38.4*  27.6*   PLATELETCT  311  302  215   PROTHROMBTM  14.0   --    --    APTT  31.4   --    --    INR  1.05   --    --        Infectious Disease:  Temp  Av.4 °C (97.5 °F)  Min: 35.8 °C (96.5 °F)  Max: 37.1 °C (98.7 °F)  Micro: cultures reviewed  Recent Labs      10/23/17   1910  10/24/17   0201  10/25/17   0500   WBC  13.8*  15.8*  9.1   NEUTSPOLYS  48.10  85.20*  61.00   LYMPHOCYTES  42.60*  8.70*  27.00   MONOCYTES  6.50  4.40  9.00   EOSINOPHILS  1.90  0.00  1.00   BASOPHILS  0.90  0.00  1.00   ASTSGOT  15  15   --    ALTSGPT  6  7   --    ALKPHOSPHAT  86  91   --    TBILIRUBIN  0.5  1.2   --      Current Facility-Administered Medications   Medication Dose Frequency Provider Last Rate Last Dose   • levetiracetam (KEPPRA) 100 MG/ML solution 500 mg  500 mg Q12HRS Harsh Sage M.D.   500 mg at 10/24/17 2143   • amoxicillin-clavulanate (AUGMENTIN) 875-125 MG per tablet 1 Tab  1 Tab Q12HRS Harsh Sage M.D.   1 Tab at 10/24/17 2213   • atorvastatin (LIPITOR) tablet 10 mg  10 mg Nightly Harsh Sage M.D.   10 mg at 10/24/17 2213   • digoxin (LANOXIN) tablet 250 mcg  250 mcg DAILY Harsh Sage M.D.   250 mcg at 10/24/17 1915   • ferrous sulfate (FEOSOL) 220 (44 Fe) MG/5ML solution 220  mg  220 mg DAILY Harsh Sage M.D.   220 mg at 10/24/17 1505   • fluconazole (DIFLUCAN) tablet 100 mg  100 mg DAILY Harsh Sage M.D.   100 mg at 10/24/17 1253   • gabapentin (NEURONTIN) capsule 200 mg  200 mg TID Harsh Sage M.D.   200 mg at 10/24/17 2143   • metoprolol (LOPRESSOR) tablet 25 mg  25 mg BID Harsh Sage M.D.   Stopped at 10/24/17 2100   • omeprazole 2 mg/mL in sodium bicarbonate (PRILOSEC) oral susp 40 mg  40 mg Q12HRS Harsh Sage M.D.   40 mg at 10/24/17 2143   • sucralfate (CARAFATE) 1 GM/10ML suspension 1 g  1 g 4X/DAY ACHS Harsh Sage M.D.   1 g at 10/25/17 0607   • sulfamethoxazole-trimethoprim (BACTRIM DS) 800-160 MG tablet 1 Tab  1 Tab Q12HRS Harsh Sage M.D.   1 Tab at 10/24/17 2143   • tamsulosin (FLOMAX) capsule 0.4 mg  0.4 mg AFTER BREAKFAST Harsh Sage M.D.   0.4 mg at 10/24/17 1251   • Respiratory Care per Protocol   Continuous RT Harsh Sage M.D.       • NS infusion   Continuous Harsh Sage M.D. 83 mL/hr at 10/25/17 0607     • enoxaparin (LOVENOX) inj 30 mg  30 mg Q12HRS Harsh Sage M.D.   30 mg at 10/24/17 2152   • acetaminophen (TYLENOL) tablet 650 mg  650 mg Q6HRS PRN Harsh Sage M.D.       • aspirin (ASA) tablet 325 mg  325 mg DAILY Harsh Sage M.D.   325 mg at 10/24/17 1252    Or   • aspirin (ASA) chewable tab 324 mg  324 mg DAILY Harsh Sage M.D.        Or   • aspirin (ASA) suppository 300 mg  300 mg DAILY Harsh Sage M.D.       • ondansetron (ZOFRAN) syringe/vial injection 4 mg  4 mg Q4HRS PRN Harsh Sage M.D.       • ondansetron (ZOFRAN ODT) dispertab 4 mg  4 mg Q4HRS PRN Harsh Sage M.D.       • diltiazem (CARDIZEM) injection 10 mg  10 mg Q4HRS PRALONDRA Sage M.D.   10 mg at 10/24/17 0313   • metronidazole (FLAGYL) IVPB 500 mg  500 mg Q8HRS Harsh Sage M.D. 100 mL/hr at 10/25/17 0607 500 mg at 10/25/17 0607     Last reviewed on  10/23/2017  8:01 PM by Tanja Gray    Quality  Measures:  Labs reviewed, Medications reviewed and Radiology images reviewed                  NS 83    Augmentin  Bactrim  Flagyl  Fluconazole     Assessment/Plan:  Confusion   - ct head 10/23 without acute findings   - ? Related to metabolic process, ? Post ictal    - on numerous medications that can impact mental status   - continue supportive therapy with ivf   - eeg (-) for sz 10/24  Leukocytosis   - ? Reactive vs infectious vs hemoconcentration   - all 3 cell lines have progressively risen   - cultures pending   - c diff (-), will dc flagyl   - improved with hydration  A fib with RVR   - rate wnl   - bp improved  Chronic Debility   - transferred from SNF after d/c few hours earlier   Chronic Osteomyelitis of spine   - continue abx (bactrim/augmentin)  Recent Esophagitis and distal stricture   - diflucan  Psoas Hematoma  ? Sz disoder   - pt noted to have sz like activity 7/2017   - has been on Keppra since    DNR    Ok out of ICU, [facility] critical care will sign off once patient moved.     Discussed patient condition and risk of morbidity and/or mortality with RN, RT, Therapies and Pharmacy.    .

## 2017-10-25 NOTE — DISCHARGE PLANNING
Medical Social Work    KERRIE met with Chandler, pt's son in law outside pt's waiting room. KERRIE provided Chandler with a list of assisted living facilities he can look into, as pt is unable to return home.

## 2017-10-25 NOTE — PALLIATIVE CARE
Palliative Care follow-up  PC RN met with Chandler and pt at bedside. Discussed GOC, pt would like to return to [facility] skilled nursing for therapies and them home. PC RN discussed having more assistance available for pt, Chandler stated that he is gone at work 12+ hours every day some weeks and he doesn't want pt falling at home alone. PC RN discussed assisted living facilities, the extra assistance pt could receive so he is safe but still independent.  provided Chandler with list of assisted living facilities. Pt would like to try SNF for therapies and return home but verbalize understanding that he needs to be more careful. Chandler reiterated that pt needs a safe discharge plan and he doesn't want pt falling at home without someone to help.         Plan:   Continue to support pt and family.    Thank you for allowing Palliative Care to participate in this patient's care. Please feel free to call [phone] with any questions or concerns.

## 2017-10-25 NOTE — THERAPY
"Occupational Therapy Evaluation completed.   Functional Status:  Pt re-admitted to hospital following AMS, a-fib from SNF after being d/c from hospital less than a day. Pt demonstrating significant decline with ADLs due to impaired balance, decreased cognition, decreased endurance, and strength deficits. Pt performed supine<>site with mod/max a x2, P+ dynamic sitting balance, STS eob with HHA x2 for 3 trials. Pt would benefit from acute skilled services while in house and would need post acute skilled therapy prior to d/c home once medically cleared. Pt unable to provided correct timeline and unclear how I pt was with ADLs and functional mobility after his last d/c from SNF to home, prior to re-admitting to hospital.   Plan of Care: Will benefit from Occupational Therapy 3 times per week  Discharge Recommendations:  Equipment: Will Continue to Assess for Equipment Needs. Post-acute therapy Discharge to a transitional care facility for continued skilled therapy services.    See \"Rehab Therapy-Acute\" Patient Summary Report for complete documentation.    "

## 2017-10-25 NOTE — THERAPY
"Pt is an 88 y/o male presenting to physical therapy with generalized deconditioning and weakness due to immobliity, and gross limitations in functional mobility, balance, gait and activity tolerance. Pt receptive to working with PT, unable to determine pt's PLOF before multiple hospital admissions. Pt is known to this therapist from previous admission, appears pt is mobilizing near where he was upon D/C from hosptial prior to current admission. Pt will benefit from acute skilled PT interventions to address present impairments    Physical Therapy Evaluation completed.   Bed Mobility:  Supine to Sit: Maximal Assist  Transfers: Sit to Stand: Maximal Assist (x2 people)  Gait: Level Of Assist: Unable to Participate       Plan of Care: Will benefit from Physical Therapy 3 times per week  Discharge Recommendations: Equipment: Will Continue to Assess for Equipment Needs. Post-acute therapy Discharge to a transitional care facility for continued skilled therapy services.    See \"Rehab Therapy-Acute\" Patient Summary Report for complete documentation.     "

## 2017-10-25 NOTE — CARE PLAN
Problem: Safety  Goal: Will remain free from injury  Safety precautions in place    Problem: Fluid Volume:  Goal: Will maintain balanced intake and output  Strict I/O monitoring.

## 2017-10-25 NOTE — CONSULTS
"Reason for PC Consult: Advance Care Planning    Consulted by:   Dr. Adhikari    Assessment:  General:   87 year old male admitted for a-fib on 10/23/17. Pt has a history of afib, dysphagia, anemia, hypokalemia, candida esophagitis, esophogeal stricture, aspiration, chronic osteomyelitis of the lumbar spine, hypomagnesemia, and spinal stenosis. Pt was admitted to [facility] and by that evening pt was sent to [facility] ED for altered mental status and confusion. Pt was last seen by PC on 7/19/17.      Dyspnea: No, 99% on 2L NC  Last BM: 10/24/17  Pain: No    Depression: Mood appropriate for situation      Spiritual:  Is Holiness or spirituality important for coping with this illness? No    Has a  or spiritual provider visit been requested? No    Palliative Performance Scale: 50%    Advance Directive: None on File    DPOA: None on File, Son-In-Law Chandler Seo ([phone])    POLST: None on File    Code Status: DNR    Outcome:  PC RN introduced self and role of PC to pt at bedside. Pt responds appropriately, answers questions appropriately. Pt is confused as to the events of the last few days. Pt states that he fell and was found by his Son-in-law Chandler on the ground after an unknown amount of time. Pt states that his goals are to return home after getting better. PC RN asked about advanced directive, pt doesn't know if he completed one. PC RN asked who he wanted to help make his decisions if he cannot, Pt stated he wants Chandler has his POA. PC RN asked about pt's wishes with life prolonging treatment, pt states \"I don't want that\". PC RN asked if pt was in a coma or vegetative state, would he want his life prolonged with life support or tube feedings. Pt stated \"no\". PC RN discussed calling Chandler to see if he has a copy of his advanced directive, Pt states that would be OK.     PC RN called Chandler, he will be at bedside around 1430 today to discuss GOC and ACP. Chandler states that he had just moved and pt can no longer live " "with him, pt will have to have long term placement elsewhere. Chandler states that \"I let them talk me into taking him back home with me and those two days he was home he was alone for 14 and 16 hours straight. When I got home I found him on the floor and I don't know how long he was there, probably 7 or 8 hours\". Chandler states he cannot accommodate pt's needs at home and he needs to live somewhere else.     Updated:   KERRIE Cagle    Plan:   Will meet with Chandler and pt at bedside today around 1430.     Thank you for allowing Palliative Care to participate in this patient's care. Please feel free to call [phone] with any questions or concerns.  "

## 2017-10-26 LAB
BACTERIA UR CULT: NORMAL
SIGNIFICANT IND 70042: NORMAL
SITE SITE: NORMAL
SOURCE SOURCE: NORMAL

## 2017-10-26 PROCEDURE — A9270 NON-COVERED ITEM OR SERVICE: HCPCS | Performed by: INTERNAL MEDICINE

## 2017-10-26 PROCEDURE — G8997 SWALLOW GOAL STATUS: HCPCS | Mod: CK

## 2017-10-26 PROCEDURE — A9270 NON-COVERED ITEM OR SERVICE: HCPCS | Performed by: HOSPITALIST

## 2017-10-26 PROCEDURE — 700102 HCHG RX REV CODE 250 W/ 637 OVERRIDE(OP): Performed by: INTERNAL MEDICINE

## 2017-10-26 PROCEDURE — 770020 HCHG ROOM/CARE - TELE (206)

## 2017-10-26 PROCEDURE — 700105 HCHG RX REV CODE 258: Performed by: HOSPITALIST

## 2017-10-26 PROCEDURE — 700102 HCHG RX REV CODE 250 W/ 637 OVERRIDE(OP): Performed by: HOSPITALIST

## 2017-10-26 PROCEDURE — 99233 SBSQ HOSP IP/OBS HIGH 50: CPT | Performed by: INTERNAL MEDICINE

## 2017-10-26 PROCEDURE — 92610 EVALUATE SWALLOWING FUNCTION: CPT

## 2017-10-26 PROCEDURE — 700111 HCHG RX REV CODE 636 W/ 250 OVERRIDE (IP): Performed by: HOSPITALIST

## 2017-10-26 PROCEDURE — G8996 SWALLOW CURRENT STATUS: HCPCS | Mod: CL

## 2017-10-26 RX ADMIN — ENOXAPARIN SODIUM 30 MG: 100 INJECTION SUBCUTANEOUS at 21:09

## 2017-10-26 RX ADMIN — GABAPENTIN 200 MG: 100 CAPSULE ORAL at 17:12

## 2017-10-26 RX ADMIN — GABAPENTIN 200 MG: 100 CAPSULE ORAL at 21:09

## 2017-10-26 RX ADMIN — ENOXAPARIN SODIUM 30 MG: 100 INJECTION SUBCUTANEOUS at 11:22

## 2017-10-26 RX ADMIN — ATORVASTATIN CALCIUM 10 MG: 10 TABLET, FILM COATED ORAL at 21:09

## 2017-10-26 RX ADMIN — OMEPRAZOLE 40 MG: 20 CAPSULE, DELAYED RELEASE ORAL at 21:08

## 2017-10-26 RX ADMIN — SULFAMETHOXAZOLE AND TRIMETHOPRIM 1 TABLET: 800; 160 TABLET ORAL at 21:09

## 2017-10-26 RX ADMIN — OMEPRAZOLE 40 MG: 20 CAPSULE, DELAYED RELEASE ORAL at 11:22

## 2017-10-26 RX ADMIN — AMOXICILLIN AND CLAVULANATE POTASSIUM 1 TABLET: 875; 125 TABLET, FILM COATED ORAL at 10:22

## 2017-10-26 RX ADMIN — SODIUM CHLORIDE: 9 INJECTION, SOLUTION INTRAVENOUS at 10:22

## 2017-10-26 RX ADMIN — SODIUM CHLORIDE: 9 INJECTION, SOLUTION INTRAVENOUS at 21:07

## 2017-10-26 RX ADMIN — FLUCONAZOLE 100 MG: 100 TABLET ORAL at 10:22

## 2017-10-26 RX ADMIN — GABAPENTIN 200 MG: 100 CAPSULE ORAL at 10:22

## 2017-10-26 RX ADMIN — SUCRALFATE 1 G: 1 SUSPENSION ORAL at 10:22

## 2017-10-26 RX ADMIN — SUCRALFATE 1 G: 1 SUSPENSION ORAL at 05:57

## 2017-10-26 RX ADMIN — SUCRALFATE 1 G: 1 SUSPENSION ORAL at 17:12

## 2017-10-26 RX ADMIN — LEVETIRACETAM 500 MG: 100 SOLUTION ORAL at 21:09

## 2017-10-26 RX ADMIN — AMOXICILLIN AND CLAVULANATE POTASSIUM 1 TABLET: 875; 125 TABLET, FILM COATED ORAL at 21:09

## 2017-10-26 RX ADMIN — SULFAMETHOXAZOLE AND TRIMETHOPRIM 1 TABLET: 800; 160 TABLET ORAL at 10:22

## 2017-10-26 RX ADMIN — TAMSULOSIN HYDROCHLORIDE 0.4 MG: 0.4 CAPSULE ORAL at 10:22

## 2017-10-26 RX ADMIN — DIGOXIN 250 MCG: 250 TABLET ORAL at 17:12

## 2017-10-26 RX ADMIN — METOPROLOL TARTRATE 25 MG: 25 TABLET, FILM COATED ORAL at 10:22

## 2017-10-26 RX ADMIN — HYDROCORTISONE 2.5%: 25 CREAM TOPICAL at 21:11

## 2017-10-26 RX ADMIN — LEVETIRACETAM 500 MG: 100 SOLUTION ORAL at 11:22

## 2017-10-26 RX ADMIN — SUCRALFATE 1 G: 1 SUSPENSION ORAL at 21:09

## 2017-10-26 RX ADMIN — ASPIRIN 325 MG: 325 TABLET, COATED ORAL at 10:22

## 2017-10-26 RX ADMIN — METOPROLOL TARTRATE 25 MG: 25 TABLET, FILM COATED ORAL at 21:09

## 2017-10-26 ASSESSMENT — PAIN SCALES - GENERAL
PAINLEVEL_OUTOF10: 0

## 2017-10-26 ASSESSMENT — ENCOUNTER SYMPTOMS
CHILLS: 0
BLURRED VISION: 0
WEAKNESS: 1
ABDOMINAL PAIN: 0
HEADACHES: 0
SHORTNESS OF BREATH: 0
FEVER: 0
COUGH: 0

## 2017-10-26 NOTE — PROGRESS NOTES
[facility] Hospitalist Progress Note    Date of Service: 10/26/2017    Chief Complaint  87 y.o. male admitted 10/23/2017 with confusion    Interval Problem Update  Patient was sent back from the skilled nursing facility by staff for confusion. He has no recollection of being confused at the skilled nursing facility. He was admitted to the ICU and monitored closely. CT head revealed no acute findings. EEG was negative for seizures. He was found to be in A. fib with RVR which was controlled with IV fluids. He is transferred to the telemetry unit. At this time he is laying comfortably in his bed with a cortrak and osman catheter in place. He reports mild pain in his substernal region swallowing secondary to his pan esophagitis. He was evaluated by SLP. He also has a PICC line in place, currently he is only receiving IV NS infusion. He is alert and oriented to person, place, time. He is answering all questions appropriately. His mentation is completely intact. He denies any other symptoms. We will discontinue osman catheter and PICC line to prevent any associated infections.     Consultants/Specialty  Palliative care  Pulmonary    Disposition  DC to [facility] once medically stable        Review of Systems   Constitutional: Positive for malaise/fatigue. Negative for chills and fever.   HENT:        Odynophagia   Eyes: Negative for blurred vision.   Respiratory: Negative for cough and shortness of breath.    Cardiovascular: Negative for chest pain.   Gastrointestinal: Negative for abdominal pain.   Neurological: Positive for weakness. Negative for headaches.   All other systems reviewed and are negative.     Physical Exam  Laboratory/Imaging   Hemodynamics  Temp (24hrs), Av.2 °C (97.2 °F), Min:36 °C (96.8 °F), Max:37.1 °C (98.7 °F)   Temperature: 36 °C (96.8 °F)  Pulse  Av.7  Min: 62  Max: 148 Heart Rate (Monitored): 95  Blood Pressure : 122/58, NIBP: 106/55      Respiratory      Respiration: 16, Pulse Oximetry: 98 %         RUL Breath Sounds: Clear, RML Breath Sounds: Diminished, RLL Breath Sounds: Diminished, MIKE Breath Sounds: Clear, LLL Breath Sounds: Diminished    Fluids    Intake/Output Summary (Last 24 hours) at 10/26/17 0856  Last data filed at 10/26/17 0600   Gross per 24 hour   Intake             3256 ml   Output             2630 ml   Net              626 ml       Nutrition  Orders Placed This Encounter   Procedures   • Diet NPO     Standing Status:   Standing     Number of Occurrences:   1     Order Specific Question:   Restrict to:     Answer:   Strict [1]     Physical Exam   Constitutional: He is oriented to person, place, and time. He appears well-nourished. No distress.   HENT:   Head: Normocephalic and atraumatic.   Mouth/Throat: Oropharynx is clear and moist.   Cortrak in place   Eyes: Conjunctivae are normal. Pupils are equal, round, and reactive to light.   Neck: Neck supple.   Cardiovascular: Normal rate.    Irregular rhythm   Pulmonary/Chest: Effort normal and breath sounds normal. No respiratory distress. He has no wheezes. He has no rales.   Abdominal: Soft. Bowel sounds are normal. He exhibits no distension. There is no tenderness. There is no rebound.   Musculoskeletal: He exhibits no edema or tenderness.   Neurological: He is alert and oriented to person, place, and time. No cranial nerve deficit. Coordination normal.   Strength and sensation intact bilaterally  Follows commands appropriately   Skin: Skin is warm and dry. He is not diaphoretic.   Psychiatric: He has a normal mood and affect. His behavior is normal.   Nursing note and vitals reviewed.      Recent Labs      10/23/17   1910  10/24/17   0201  10/25/17   0500   WBC  13.8*  15.8*  9.1   RBC  4.01*  4.27*  2.91*   HEMOGLOBIN  12.1*  12.8*  8.9*   HEMATOCRIT  36.9*  38.4*  27.6*   MCV  92.0  89.9  94.8   MCH  30.2  30.0  30.6   MCHC  32.8*  33.3*  32.2*   RDW  50.5*  49.3  52.3*   PLATELETCT  311  302  215   MPV  10.4  10.1  10.4     Recent Labs       10/23/17   1910  10/24/17   0201  10/25/17   0410   SODIUM  128*  132*  133*   POTASSIUM  4.4  4.9  4.0   CHLORIDE  97  95*  105   CO2  21  21  24   GLUCOSE  100*  172*  100*   BUN  7*  9  9   CREATININE  0.66  0.79  0.58   CALCIUM  10.6*  10.6*  9.0     Recent Labs      10/23/17   1910   APTT  31.4   INR  1.05         Recent Labs      10/24/17   0201   TRIGLYCERIDE  123   HDL  43   LDL  38          Assessment/Plan     Altered mental state- (present on admission)   Assessment & Plan    Resolved  CT head with no acute process  EEG negative for active seizures  No evidence of worsening infection          Persistent atrial fibrillation (CMS-[facility])- (present on admission)   Assessment & Plan    Rate well controlled  Continue digoxin   Continue aspirin        Discitis of lumbar region- (present on admission)   Assessment & Plan    Stable  Continue Augmentin and Bactrim        Esophageal stricture- (present on admission)   Assessment & Plan    Outpatient GI follow-up once esophagitis has resolved        Candida esophagitis (CMS-[facility])- (present on admission)   Assessment & Plan    Improving  Continue Diflucan  Continue PPI and Carafate  Follow up with GI as outpatient        Debility- (present on admission)   Assessment & Plan    Continue PT OT        Patient plan of care discussed at multidisplinary team rounds, with patient and R.N at beside.   Time spent: 38 minutes. > 50 % time spend providing counseling / co ordination of care.  Quality-Core Measures

## 2017-10-26 NOTE — PROGRESS NOTES
Bedside report received from day RN. Pt arrived from ICU. All belongings in place. Tele monitor placed and monitor room informed. Assumed pt care. Pt a&ox4. POC discussed. Pt denies any needs at this time. Bed alarm on. Bed locked and in lowest position. Call light within reach. Hourly rounding in place.

## 2017-10-26 NOTE — CARE PLAN
Problem: Skin Integrity  Goal: Risk for impaired skin integrity will decrease    Intervention: Assess risk factors for impaired skin integrity and/or pressure ulcers  Educated patient about the importance of frequent repositioning to prevent skin breakdown. Patient expressed understanding of importance of repositioning.

## 2017-10-26 NOTE — CARE PLAN
Problem: Safety  Goal: Will remain free from injury  Outcome: PROGRESSING AS EXPECTED  Fall precautions in place. Bed wheels locked, bed is in the lowest position. Call light in reach. Bed alarm on and in place. Non-skid socks provided. Patient provides successful verbalization of fall and safety precautions and demonstration of use of call bell. Upper side rails are up. Hourly rounding in progress.       Problem: Knowledge Deficit  Goal: Knowledge of disease process/condition, treatment plan, diagnostic tests, and medications will improve  Outcome: PROGRESSING SLOWER THAN EXPECTED  POC discussed with the patient. All questions and concerns addressed and answered. Patient is involved in POC and verbalizes the understanding of the disease process, medications, tests and treatments. Questions on POC encouraged throughout care.

## 2017-10-26 NOTE — PROGRESS NOTES
Two RN skin check. Pacemaker surgical incision to right upper chest, well approximated with steri strips still in place. Pt's bilateral heels are red but blanching. Sacrum is red/purple but blanching. New mepilex placed. Scabs to bilateral shins and BUE. All skin intact. No open wounds.

## 2017-10-26 NOTE — THERAPY
"Speech Language Therapy Clinical Swallow Evaluation completed.    Functional Status: Pt has an extensive history of esophageal dysphagia, with stricture, however EGD was not done due to medical complications.  Pt was on a clear liquid diet upon discharge to SNF.  Pt was AAOx2, with confusion regarding place and reason.  NO gross deficits were noted in oral exam, and cough was strong.  Pt consumed ice chips without any overt s/sx of aspirations or complaints of globus sensation.  PO trials of nectars, puree and thins (2 teaspoons of each) resulted in belching and complaints of severe globus sensation and odynophagia.  Although no s/sx of aspiration were noted, the patient remains at risk for ascending aspiration from retrograde flow from the esophagus.  Recommend NPO/cortrak.  Pt is OK for single ice chips as tolerated.  Pt would benefit from another GI consult to address esophageal dysphagia.  Will defer to MD/GI recommendations, however SLP will continue to follow.    Recommendations - Diet:  NPO; Single Ice chips OK as tolerated by patient without odynophagia                           Strategies: consider GI consult                          Medication Administration: Via Gastric Tube    Plan of Care: Will benefit from Speech Therapy 3 times per week    Post-Acute Therapy: Discharge to a transitional care facility for continued skilled therapy services.    See \"Rehab Therapy-Acute\" Patient Summary Report for complete documentation. Thank you for the consult.  "

## 2017-10-27 PROBLEM — R41.82 ALTERED MENTAL STATE: Status: RESOLVED | Noted: 2017-10-23 | Resolved: 2017-10-27

## 2017-10-27 PROCEDURE — A9270 NON-COVERED ITEM OR SERVICE: HCPCS | Performed by: INTERNAL MEDICINE

## 2017-10-27 PROCEDURE — 700102 HCHG RX REV CODE 250 W/ 637 OVERRIDE(OP): Performed by: INTERNAL MEDICINE

## 2017-10-27 PROCEDURE — A9270 NON-COVERED ITEM OR SERVICE: HCPCS | Performed by: HOSPITALIST

## 2017-10-27 PROCEDURE — 700102 HCHG RX REV CODE 250 W/ 637 OVERRIDE(OP): Performed by: HOSPITALIST

## 2017-10-27 PROCEDURE — 770020 HCHG ROOM/CARE - TELE (206)

## 2017-10-27 PROCEDURE — 700111 HCHG RX REV CODE 636 W/ 250 OVERRIDE (IP): Performed by: HOSPITALIST

## 2017-10-27 PROCEDURE — 700105 HCHG RX REV CODE 258: Performed by: HOSPITALIST

## 2017-10-27 PROCEDURE — 99232 SBSQ HOSP IP/OBS MODERATE 35: CPT | Performed by: INTERNAL MEDICINE

## 2017-10-27 RX ORDER — ASPIRIN 325 MG
325 TABLET ORAL DAILY
Qty: 100 TAB | Refills: 0
Start: 2017-10-27 | End: 2019-01-01

## 2017-10-27 RX ORDER — FLUCONAZOLE 100 MG/1
100 TABLET ORAL DAILY
Qty: 9 TAB | Refills: 0
Start: 2017-10-27 | End: 2017-11-05

## 2017-10-27 RX ORDER — SULFAMETHOXAZOLE AND TRIMETHOPRIM 800; 160 MG/1; MG/1
1 TABLET ORAL EVERY 12 HOURS
Qty: 14 TAB | Refills: 0
Start: 2017-10-27 | End: 2017-11-03

## 2017-10-27 RX ADMIN — SULFAMETHOXAZOLE AND TRIMETHOPRIM 1 TABLET: 800; 160 TABLET ORAL at 10:47

## 2017-10-27 RX ADMIN — ENOXAPARIN SODIUM 30 MG: 100 INJECTION SUBCUTANEOUS at 09:46

## 2017-10-27 RX ADMIN — GABAPENTIN 200 MG: 100 CAPSULE ORAL at 14:44

## 2017-10-27 RX ADMIN — HYDROCORTISONE 2.5%: 25 CREAM TOPICAL at 22:03

## 2017-10-27 RX ADMIN — AMOXICILLIN AND CLAVULANATE POTASSIUM 1 TABLET: 875; 125 TABLET, FILM COATED ORAL at 10:47

## 2017-10-27 RX ADMIN — ASPIRIN 325 MG: 325 TABLET, COATED ORAL at 09:47

## 2017-10-27 RX ADMIN — LEVETIRACETAM 500 MG: 100 SOLUTION ORAL at 22:04

## 2017-10-27 RX ADMIN — Medication 220 MG: at 09:46

## 2017-10-27 RX ADMIN — AMOXICILLIN AND CLAVULANATE POTASSIUM 1 TABLET: 875; 125 TABLET, FILM COATED ORAL at 22:03

## 2017-10-27 RX ADMIN — SUCRALFATE 1 G: 1 SUSPENSION ORAL at 09:46

## 2017-10-27 RX ADMIN — SULFAMETHOXAZOLE AND TRIMETHOPRIM 1 TABLET: 800; 160 TABLET ORAL at 22:03

## 2017-10-27 RX ADMIN — METOPROLOL TARTRATE 25 MG: 25 TABLET, FILM COATED ORAL at 09:47

## 2017-10-27 RX ADMIN — GABAPENTIN 200 MG: 100 CAPSULE ORAL at 22:02

## 2017-10-27 RX ADMIN — OMEPRAZOLE 40 MG: 20 CAPSULE, DELAYED RELEASE ORAL at 22:03

## 2017-10-27 RX ADMIN — SUCRALFATE 1 G: 1 SUSPENSION ORAL at 05:45

## 2017-10-27 RX ADMIN — OMEPRAZOLE 40 MG: 20 CAPSULE, DELAYED RELEASE ORAL at 09:46

## 2017-10-27 RX ADMIN — ATORVASTATIN CALCIUM 10 MG: 10 TABLET, FILM COATED ORAL at 22:03

## 2017-10-27 RX ADMIN — FLUCONAZOLE 100 MG: 100 TABLET ORAL at 10:47

## 2017-10-27 RX ADMIN — ENOXAPARIN SODIUM 30 MG: 100 INJECTION SUBCUTANEOUS at 22:03

## 2017-10-27 RX ADMIN — GABAPENTIN 200 MG: 100 CAPSULE ORAL at 09:47

## 2017-10-27 RX ADMIN — SUCRALFATE 1 G: 1 SUSPENSION ORAL at 22:03

## 2017-10-27 RX ADMIN — SODIUM CHLORIDE: 9 INJECTION, SOLUTION INTRAVENOUS at 22:31

## 2017-10-27 RX ADMIN — METOPROLOL TARTRATE 25 MG: 25 TABLET, FILM COATED ORAL at 22:02

## 2017-10-27 RX ADMIN — LEVETIRACETAM 500 MG: 100 SOLUTION ORAL at 09:46

## 2017-10-27 RX ADMIN — SUCRALFATE 1 G: 1 SUSPENSION ORAL at 17:59

## 2017-10-27 RX ADMIN — DIGOXIN 250 MCG: 250 TABLET ORAL at 17:59

## 2017-10-27 ASSESSMENT — PAIN SCALES - GENERAL
PAINLEVEL_OUTOF10: 0

## 2017-10-27 ASSESSMENT — ENCOUNTER SYMPTOMS
HEADACHES: 0
ABDOMINAL PAIN: 0
SHORTNESS OF BREATH: 0
WEAKNESS: 1
BLURRED VISION: 0
FEVER: 0
COUGH: 0
CHILLS: 0

## 2017-10-27 NOTE — DISCHARGE SUMMARY
CHIEF COMPLAINT ON ADMISSION  Chief Complaint   Patient presents with   • ALOC       CODE STATUS  DNR    HPI & HOSPITAL COURSE  This is a 87 y.o. male with a past medical history of atrial fibrillation, L3-L4 discitis treated with long-term antibiotics who was recently discharged from the hospital on 10/23/2017 after a prolonged hospital course where he was treated for osteomyelitis with IV antibiotics and eventually discharged to [facility] skilled nursing facility. He was alert and oriented ×4 however after discharge but later that day staff found him to be answering questions inappropriately and therefore he was sent back to the ER for altered mental status. Patient was admitted and underwent a CT head that revealed no acute findings. EEG was negative for seizures. He was found to be in A. fib with RVR which was controlled with IV fluids. He was transferred to the telemetry unit. He has been laying comfortably in his bed with a cortrak and osman catheter in place. He reports mild pain in his substernal region swallowing secondary to his pan esophagitis. He is being followed by SLP, PT and OT. He also has a PICC line in place, currently he is only receiving IV NS infusion therefore it will be discontinued. He is alert and oriented to person, place, time. He is answering all questions appropriately. His mentation is completely intact. He denies any other symptoms. His confusion was thought to be secondary to delirium on dementia. We will discontinue osman catheter and PICC line to prevent any associated infections.     On his previous he was found to be septic and was started on empiric antibiotics and IV fluids for septic protocol. He underwent a lumbar spine MRI that revealed chronic osteomyelitis of L3-L4 and interval development of multilocular intramuscular abscesses in the right iliacus muscle. Infectious disease was consulted and the patient was started on  Ceftaroline and completed a four-week course. He  underwent drainage of right iliac abscess versus hematoma. On admission patient was also found to be in A. fib with RVR and mild troponin elevation for which cardiology was consulted. He denied any chest pain and his troponin was down trending 2-D echo revealed EF of 70%. His rate was well-controlled and anti-coagulation was held due to pelvic hematoma. The patient had alternating RVR with slow rate therefore a single-chamber pacemaker was placed. Patient also developed painful swallowing and underwent an EGD which revealed pan esophagitis and 4 mm distal esophageal stricture. GI recommended medical therapy with PPI and Carafate and repeat EGD with dilation as outpatient once his esophagitis has resolved. He was also treated with Diflucan for concern of Candida esophagitis. The patient is to take oral Bactrim and Augmentin till 11/2/17 for his discitis. I have started him on full dose aspirin for his atrial fibrillation and recent hematoma. He may discuss with cardiology and his PCP on timing of starting anticoagulation. Patient is instructed to follow up with PCP, GI, cardiology and infectious disease in clinic in 2 weeks. The patient was evaluated by PT and OT and was recommended further therapy at a skilled nursing facility. His vitals remained stable and his pain is well controlled.    Therefore, he is discharged in fair and stable condition with close outpatient follow-up.    SPECIFIC OUTPATIENT FOLLOW-UP  Follow up with GI for repeat EGD in 2 weeks    DISCHARGE PROBLEM LIST  Active Problems:    Discitis of lumbar region POA: Yes    Persistent atrial fibrillation (CMS-HCC) POA: Yes    Candida esophagitis (CMS-HCC) POA: Yes    Esophageal stricture POA: Yes    Debility POA: Yes  Resolved Problems:    Altered mental state POA: Yes      FOLLOW UP  As above    MEDICATIONS ON DISCHARGE   Jersey Alexis   Home Medication Instructions TANWYA:[id number]    Printed on:10/27/17 1449   Medication Information                       amoxicillin-clavulanate (AUGMENTIN) 875-125 MG Tab  Take 1 Tab by mouth every 12 hours for 11 days.             aspirin (ASA) 325 MG Tab  Take 1 Tab by mouth every day.             atorvastatin (LIPITOR) 10 MG Tab  Take 10 mg by mouth every evening.             digoxin (LANOXIN) 125 MCG Tab  Take 125 mcg by mouth every day.             ferrous sulfate (FEOSOL) 220 (44 Fe) MG/5ML Elixir  Take 5 mL by mouth every day.             fluconazole (DIFLUCAN) 100 MG Tab  Take 1 Tab by mouth every day for 9 days.             gabapentin (NEURONTIN) 100 MG Cap  Take 2 Caps by mouth 3 times a day.             levetiracetam (KEPPRA) 500 MG Tab  Take 1 Tab by mouth 2 Times a Day.             metoprolol (LOPRESSOR) 25 MG Tab  Take 1 Tab by mouth 2 Times a Day.             nystatin (MYCOSTATIN) 500795 UNIT/ML Suspension  Take 10 mL by mouth 4 times a day for 20 days.             omeprazole 2 mg/mL in sodium bicarbonate (PRILOSEC)  Take 20 mL by mouth every 12 hours for 30 days.             oxyCODONE CR (OXYCONTIN) 10 MG Tablet Extended Release 12 hour Abuse-Deterrent  Take 10 mg by mouth every 12 hours.             sucralfate (CARAFATE) 1 GM/10ML Suspension  Take 10 mL by mouth 4 Times a Day,Before Meals and at Bedtime.             sulfamethoxazole-trimethoprim (BACTRIM DS) 800-160 MG tablet  Take 1 Tab by mouth every 12 hours for 7 days.             tamsulosin (FLOMAX) 0.4 MG capsule  Take 1 Cap by mouth ONE-HALF HOUR AFTER BREAKFAST.             tramadol (ULTRAM) 50 MG Tab  Take 50 mg by mouth every 8 hours as needed.                 DIET  Orders Placed This Encounter   Procedures   • Diet NPO     Standing Status:   Standing     Number of Occurrences:   1     Order Specific Question:   Restrict to:     Answer:   Strict [1]       ACTIVITY  As tolerated and directed by skilled nursing.  Weight bearing as tolerated      CONSULTATIONS  GI  ID  Palliative Care    PROCEDURES  None    DX-ABDOMEN FOR TUBE PLACEMENT   Final Result       Feeding tube tip at the gastric fundus.      DX-ABDOMEN FOR TUBE PLACEMENT   Final Result      Feeding tube tip at the GE junction level.      CT-HEAD W/O   Final Result         1. No acute intracranial findings. No evidence of acute intracranial hemorrhage or mass lesion.      2. White matter lucencies most consistent with chronic small vessel ischemic change (versus demyelination or gliosis).               DX-CHEST-PORTABLE (1 VIEW)   Final Result         1. No significant interval change.      2. Mild interstitial prominence.            LABORATORY  Lab Results   Component Value Date/Time    SODIUM 133 (L) 10/25/2017 04:10 AM    POTASSIUM 4.0 10/25/2017 04:10 AM    CHLORIDE 105 10/25/2017 04:10 AM    CO2 24 10/25/2017 04:10 AM    GLUCOSE 100 (H) 10/25/2017 04:10 AM    BUN 9 10/25/2017 04:10 AM    CREATININE 0.58 10/25/2017 04:10 AM        Lab Results   Component Value Date/Time    WBC 9.1 10/25/2017 05:00 AM    HEMOGLOBIN 8.9 (L) 10/25/2017 05:00 AM    HEMATOCRIT 27.6 (L) 10/25/2017 05:00 AM    PLATELETCT 215 10/25/2017 05:00 AM        Total time of the discharge process exceeds 32 minutes

## 2017-10-27 NOTE — CARE PLAN
Problem: Discharge Barriers/Planning  Goal: Patient's continuum of care needs will be met  Outcome: PROGRESSING AS EXPECTED  Discussed discharge barriers of PICC line. Patient understands he may have it removed within the next few days.     Problem: Skin Integrity  Goal: Risk for impaired skin integrity will decrease  Outcome: PROGRESSING AS EXPECTED  Patient being repositioned by nursing staff Q2 hours. Mepilex on sacrum is clean dry and intact. Skin on bottom is blanching. Will continue to turn and encourage patient to be up for every meal.

## 2017-10-27 NOTE — DISCHARGE PLANNING
Medical Social Work    VM left for [facility] admissions to see if they will be able to accept pt

## 2017-10-27 NOTE — PROGRESS NOTES
Bedside report completed. Assumed patient care. Pt lying in bed comfortably. Denies pain. AOx4. Cortak in place with diabetsource running at 65. Discussed POC. Bed locked in lowest position, call light in reach, bed alarm on. All patient needs met at this time.

## 2017-10-27 NOTE — ASSESSMENT & PLAN NOTE
Resolved  CT head with no acute process  EEG negative for active seizures  No evidence of worsening infection

## 2017-10-28 LAB
ANION GAP SERPL CALC-SCNC: 5 MMOL/L (ref 0–11.9)
BASOPHILS # BLD AUTO: 0.7 % (ref 0–1.8)
BASOPHILS # BLD: 0.07 K/UL (ref 0–0.12)
BUN SERPL-MCNC: 13 MG/DL (ref 8–22)
CALCIUM SERPL-MCNC: 9.2 MG/DL (ref 8.5–10.5)
CHLORIDE SERPL-SCNC: 99 MMOL/L (ref 96–112)
CO2 SERPL-SCNC: 28 MMOL/L (ref 20–33)
CREAT SERPL-MCNC: 0.52 MG/DL (ref 0.5–1.4)
EOSINOPHIL # BLD AUTO: 0.5 K/UL (ref 0–0.51)
EOSINOPHIL NFR BLD: 5.2 % (ref 0–6.9)
ERYTHROCYTE [DISTWIDTH] IN BLOOD BY AUTOMATED COUNT: 51.1 FL (ref 35.9–50)
GFR SERPL CREATININE-BSD FRML MDRD: >60 ML/MIN/1.73 M 2
GLUCOSE SERPL-MCNC: 96 MG/DL (ref 65–99)
HCT VFR BLD AUTO: 30.7 % (ref 42–52)
HGB BLD-MCNC: 9.9 G/DL (ref 14–18)
IMM GRANULOCYTES # BLD AUTO: 0.09 K/UL (ref 0–0.11)
IMM GRANULOCYTES NFR BLD AUTO: 0.9 % (ref 0–0.9)
LYMPHOCYTES # BLD AUTO: 1.9 K/UL (ref 1–4.8)
LYMPHOCYTES NFR BLD: 19.8 % (ref 22–41)
MCH RBC QN AUTO: 30 PG (ref 27–33)
MCHC RBC AUTO-ENTMCNC: 32.2 G/DL (ref 33.7–35.3)
MCV RBC AUTO: 93 FL (ref 81.4–97.8)
MONOCYTES # BLD AUTO: 0.97 K/UL (ref 0–0.85)
MONOCYTES NFR BLD AUTO: 10.1 % (ref 0–13.4)
NEUTROPHILS # BLD AUTO: 6.07 K/UL (ref 1.82–7.42)
NEUTROPHILS NFR BLD: 63.3 % (ref 44–72)
NRBC # BLD AUTO: 0 K/UL
NRBC BLD AUTO-RTO: 0 /100 WBC
PLATELET # BLD AUTO: 257 K/UL (ref 164–446)
PMV BLD AUTO: 10.9 FL (ref 9–12.9)
POTASSIUM SERPL-SCNC: 4.6 MMOL/L (ref 3.6–5.5)
RBC # BLD AUTO: 3.3 M/UL (ref 4.7–6.1)
SODIUM SERPL-SCNC: 132 MMOL/L (ref 135–145)
WBC # BLD AUTO: 9.6 K/UL (ref 4.8–10.8)

## 2017-10-28 PROCEDURE — A9270 NON-COVERED ITEM OR SERVICE: HCPCS | Performed by: INTERNAL MEDICINE

## 2017-10-28 PROCEDURE — A9270 NON-COVERED ITEM OR SERVICE: HCPCS | Performed by: HOSPITALIST

## 2017-10-28 PROCEDURE — 700105 HCHG RX REV CODE 258: Performed by: HOSPITALIST

## 2017-10-28 PROCEDURE — 770020 HCHG ROOM/CARE - TELE (206)

## 2017-10-28 PROCEDURE — 99232 SBSQ HOSP IP/OBS MODERATE 35: CPT | Performed by: INTERNAL MEDICINE

## 2017-10-28 PROCEDURE — 700102 HCHG RX REV CODE 250 W/ 637 OVERRIDE(OP): Performed by: INTERNAL MEDICINE

## 2017-10-28 PROCEDURE — 80048 BASIC METABOLIC PNL TOTAL CA: CPT

## 2017-10-28 PROCEDURE — 700102 HCHG RX REV CODE 250 W/ 637 OVERRIDE(OP): Performed by: HOSPITALIST

## 2017-10-28 PROCEDURE — 700111 HCHG RX REV CODE 636 W/ 250 OVERRIDE (IP): Performed by: HOSPITALIST

## 2017-10-28 PROCEDURE — 85025 COMPLETE CBC W/AUTO DIFF WBC: CPT

## 2017-10-28 RX ORDER — AMOXICILLIN 250 MG
2 CAPSULE ORAL 2 TIMES DAILY
Status: DISCONTINUED | OUTPATIENT
Start: 2017-10-28 | End: 2017-10-29 | Stop reason: HOSPADM

## 2017-10-28 RX ORDER — BISACODYL 10 MG
10 SUPPOSITORY, RECTAL RECTAL
Status: DISCONTINUED | OUTPATIENT
Start: 2017-10-28 | End: 2017-10-29 | Stop reason: HOSPADM

## 2017-10-28 RX ORDER — POLYETHYLENE GLYCOL 3350 17 G/17G
1 POWDER, FOR SOLUTION ORAL
Status: DISCONTINUED | OUTPATIENT
Start: 2017-10-28 | End: 2017-10-29 | Stop reason: HOSPADM

## 2017-10-28 RX ADMIN — GABAPENTIN 200 MG: 100 CAPSULE ORAL at 19:52

## 2017-10-28 RX ADMIN — Medication 220 MG: at 08:16

## 2017-10-28 RX ADMIN — GABAPENTIN 200 MG: 100 CAPSULE ORAL at 08:13

## 2017-10-28 RX ADMIN — HYDROCORTISONE 2.5%: 25 CREAM TOPICAL at 19:52

## 2017-10-28 RX ADMIN — DIGOXIN 250 MCG: 250 TABLET ORAL at 16:32

## 2017-10-28 RX ADMIN — AMOXICILLIN AND CLAVULANATE POTASSIUM 1 TABLET: 875; 125 TABLET, FILM COATED ORAL at 11:59

## 2017-10-28 RX ADMIN — OMEPRAZOLE 40 MG: 20 CAPSULE, DELAYED RELEASE ORAL at 19:53

## 2017-10-28 RX ADMIN — SODIUM CHLORIDE: 9 INJECTION, SOLUTION INTRAVENOUS at 23:39

## 2017-10-28 RX ADMIN — ATORVASTATIN CALCIUM 10 MG: 10 TABLET, FILM COATED ORAL at 19:52

## 2017-10-28 RX ADMIN — SUCRALFATE 1 G: 1 SUSPENSION ORAL at 16:31

## 2017-10-28 RX ADMIN — ENOXAPARIN SODIUM 30 MG: 100 INJECTION SUBCUTANEOUS at 19:52

## 2017-10-28 RX ADMIN — SUCRALFATE 1 G: 1 SUSPENSION ORAL at 11:59

## 2017-10-28 RX ADMIN — LEVETIRACETAM 500 MG: 100 SOLUTION ORAL at 08:16

## 2017-10-28 RX ADMIN — ASPIRIN 325 MG: 325 TABLET, COATED ORAL at 08:14

## 2017-10-28 RX ADMIN — STANDARDIZED SENNA CONCENTRATE AND DOCUSATE SODIUM 2 TABLET: 8.6; 5 TABLET, FILM COATED ORAL at 11:59

## 2017-10-28 RX ADMIN — SUCRALFATE 1 G: 1 SUSPENSION ORAL at 06:06

## 2017-10-28 RX ADMIN — FLUCONAZOLE 100 MG: 100 TABLET ORAL at 08:13

## 2017-10-28 RX ADMIN — SUCRALFATE 1 G: 1 SUSPENSION ORAL at 19:53

## 2017-10-28 RX ADMIN — METOPROLOL TARTRATE 25 MG: 25 TABLET, FILM COATED ORAL at 19:53

## 2017-10-28 RX ADMIN — GABAPENTIN 200 MG: 100 CAPSULE ORAL at 16:33

## 2017-10-28 RX ADMIN — AMOXICILLIN AND CLAVULANATE POTASSIUM 1 TABLET: 875; 125 TABLET, FILM COATED ORAL at 19:51

## 2017-10-28 RX ADMIN — LEVETIRACETAM 500 MG: 100 SOLUTION ORAL at 19:52

## 2017-10-28 RX ADMIN — TAMSULOSIN HYDROCHLORIDE 0.4 MG: 0.4 CAPSULE ORAL at 08:12

## 2017-10-28 RX ADMIN — ENOXAPARIN SODIUM 30 MG: 100 INJECTION SUBCUTANEOUS at 08:14

## 2017-10-28 RX ADMIN — SULFAMETHOXAZOLE AND TRIMETHOPRIM 1 TABLET: 800; 160 TABLET ORAL at 19:53

## 2017-10-28 RX ADMIN — METOPROLOL TARTRATE 25 MG: 25 TABLET, FILM COATED ORAL at 08:12

## 2017-10-28 RX ADMIN — SULFAMETHOXAZOLE AND TRIMETHOPRIM 1 TABLET: 800; 160 TABLET ORAL at 08:14

## 2017-10-28 RX ADMIN — MAGNESIUM HYDROXIDE 30 ML: 400 SUSPENSION ORAL at 11:59

## 2017-10-28 RX ADMIN — OMEPRAZOLE 40 MG: 20 CAPSULE, DELAYED RELEASE ORAL at 08:15

## 2017-10-28 RX ADMIN — SODIUM CHLORIDE: 9 INJECTION, SOLUTION INTRAVENOUS at 11:59

## 2017-10-28 ASSESSMENT — ENCOUNTER SYMPTOMS
WEAKNESS: 1
SHORTNESS OF BREATH: 0
FEVER: 0
CHILLS: 0
HEADACHES: 0
COUGH: 0
ABDOMINAL PAIN: 0
BLURRED VISION: 0

## 2017-10-28 ASSESSMENT — PAIN SCALES - GENERAL
PAINLEVEL_OUTOF10: 0

## 2017-10-28 NOTE — DISCHARGE PLANNING
Spoke with Ria at [facility], they will accept patient back, but will not be able to accept patient today, possible 10-29-17.  Per Ria she will review and advise.  Received call from Doe at [facility] they have declined patient as very few days left.

## 2017-10-28 NOTE — DISCHARGE PLANNING
KERRIE received report from weekday staff that pt has been medically cleared and is awaiting bed to become available at [facility].  Phone contact with pt's son-in-law Chandler [phone] to provide update on status of snf.  Informed Chandler pt has been medically cleared and we're waiting on bed to become available at [facility].  Discussed sending out a blanket referral to all local snfs to see if bed becomes available at alternate snf.  Chandler agreed with this and gave consent to send out blanket referral to all local snfs except [facility].      KERRIE faxed choice form to [facility].

## 2017-10-28 NOTE — PROGRESS NOTES
Received report from day shift nurse at bedside. Patient is A&O X 4, lying in bed, in no distress, denies pain. Aspiration precautions maintained, hob elevated greater than 30 degrees. Cortrak in place to right nare at 65 cm, patient tolerating feedings well with no residuals. Safety measures in place, bed is locked and at lowest position, bed alarm active and working, call light within reach. Hourly rounding in place and will continue to monitor the patient.

## 2017-10-28 NOTE — DISCHARGE PLANNING
Received call from Ria at [facility], they will accept patient back on Monday.

## 2017-10-28 NOTE — DISCHARGE PLANNING
Received choice form from [facility] Kristina at 1105.  Referral sent to SNF as per request at 1104 on 10-28-17.

## 2017-10-28 NOTE — CARE PLAN
Problem: Safety  Goal: Will remain free from injury  Outcome: PROGRESSING AS EXPECTED  Educated patient regarding fall preventions. Bed is locked and in lowest position, bed alarm in place, call light within reach, encouraged patient to call for assistance. Patient verbalized understanding and states he is not able to walk or try to get out of bed.

## 2017-10-28 NOTE — PROGRESS NOTES
[facility] Hospitalist Progress Note    Date of Service: 10/28/2017    Chief Complaint  87 y.o. male admitted 10/23/2017 with confusion    Interval Problem Update  Patient was sent back from the skilled nursing facility by staff for confusion. He has no recollection of being confused at the skilled nursing facility. He was admitted to the ICU and monitored closely. CT head revealed no acute findings. EEG was negative for seizures. He was found to be in A. fib with RVR which was controlled with IV fluids. He is transferred to the telemetry unit. At this time he is laying comfortably in his bed with a cortrak and osman catheter in place. He reports mild pain in his substernal region swallowing secondary to his pan esophagitis. He was evaluated by SLP. He also has a PICC line in place, currently he is only receiving IV NS infusion. He is alert and oriented to person, place, time. He is answering all questions appropriately. His mentation is completely intact. He denies any other symptoms. We will discontinue osman catheter and PICC line to prevent any associated infections.     10/27 Pt doing well, Continue cortrak. Will get nutrition consult. No confusion, CP or shortness of breath. Cleared for DC from medical standpoint. Ok to DC PICC line.     10/28 No acute events overnight. Patient has no active complaints. Vitals remain stable.     Consultants/Specialty  Palliative care  Pulmonary    Disposition  Cleared for DC from medical standpoint. DC to SNIF once medically stable        Review of Systems   Constitutional: Positive for malaise/fatigue. Negative for chills and fever.   HENT:        Odynophagia   Eyes: Negative for blurred vision.   Respiratory: Negative for cough and shortness of breath.    Cardiovascular: Negative for chest pain.   Gastrointestinal: Negative for abdominal pain.   Neurological: Positive for weakness. Negative for headaches.   All other systems reviewed and are negative.     Physical Exam   Laboratory/Imaging   Hemodynamics  Temp (24hrs), Av.6 °C (97.8 °F), Min:36.3 °C (97.3 °F), Max:36.9 °C (98.4 °F)   Temperature: 36.4 °C (97.5 °F)  Pulse  Av.5  Min: 62  Max: 148    Blood Pressure : 108/60      Respiratory      Respiration: 15, Pulse Oximetry: 92 %        RUL Breath Sounds: Clear, RML Breath Sounds: Diminished, RLL Breath Sounds: Diminished, MIKE Breath Sounds: Clear, LLL Breath Sounds: Diminished    Fluids    Intake/Output Summary (Last 24 hours) at 10/28/17 1144  Last data filed at 10/28/17 0830   Gross per 24 hour   Intake             2956 ml   Output             3225 ml   Net             -269 ml       Nutrition  Orders Placed This Encounter   Procedures   • Diet NPO     Standing Status:   Standing     Number of Occurrences:   1     Order Specific Question:   Restrict to:     Answer:   Strict [1]     Physical Exam   Constitutional: He is oriented to person, place, and time. He appears well-nourished. No distress.   HENT:   Head: Normocephalic and atraumatic.   Mouth/Throat: Oropharynx is clear and moist.   Cortrak in place   Eyes: Conjunctivae are normal. Pupils are equal, round, and reactive to light.   Neck: Neck supple.   Cardiovascular: Normal rate.    Irregular rhythm   Pulmonary/Chest: Effort normal and breath sounds normal. No respiratory distress. He has no wheezes. He has no rales.   Abdominal: Soft. Bowel sounds are normal. He exhibits no distension. There is no tenderness. There is no rebound.   Musculoskeletal: He exhibits no edema or tenderness.   Neurological: He is alert and oriented to person, place, and time. No cranial nerve deficit. Coordination normal.   Strength and sensation intact bilaterally  Follows commands appropriately   Skin: Skin is warm and dry. He is not diaphoretic.   Psychiatric: He has a normal mood and affect. His behavior is normal.   Nursing note and vitals reviewed.      Recent Labs      10/28/17   0452   WBC  9.6   RBC  3.30*   HEMOGLOBIN  9.9*    HEMATOCRIT  30.7*   MCV  93.0   MCH  30.0   MCHC  32.2*   RDW  51.1*   PLATELETCT  257   MPV  10.9     Recent Labs      10/28/17   0452   SODIUM  132*   POTASSIUM  4.6   CHLORIDE  99   CO2  28   GLUCOSE  96   BUN  13   CREATININE  0.52   CALCIUM  9.2                      Assessment/Plan     Persistent atrial fibrillation (CMS-HCC)- (present on admission)   Assessment & Plan    Rate well controlled  Continue digoxin   Continue aspirin        Discitis of lumbar region- (present on admission)   Assessment & Plan    Stable  Continue Augmentin and Bactrim        Esophageal stricture- (present on admission)   Assessment & Plan    Outpatient GI follow-up once esophagitis has resolved        Candida esophagitis (CMS-HCC)- (present on admission)   Assessment & Plan    Improving  Continue Diflucan  Continue PPI and Carafate  Follow up with GI as outpatient        Debility- (present on admission)   Assessment & Plan    Continue PT OT        Patient plan of care discussed at multidisplinary team rounds, with patient and R.N at beside.     Quality-Core Measures

## 2017-10-28 NOTE — DISCHARGE PLANNING
Received call from Doe at [facility] [place] [facility]  has accepted patent for remainder of his Medicare days.

## 2017-10-28 NOTE — CARE PLAN
Problem: Knowledge Deficit  Goal: Knowledge of disease process/condition, treatment plan, diagnostic tests, and medications will improve  Outcome: PROGRESSING AS EXPECTED  Educated patient regarding plan of care at bedside to turn and reposition every two hours and to keep hob elevated greater than 30 degrees to prevent aspiration pneumonia, patient verbalized understanding.

## 2017-10-29 VITALS
OXYGEN SATURATION: 92 % | WEIGHT: 195.77 LBS | RESPIRATION RATE: 19 BRPM | BODY MASS INDEX: 28.03 KG/M2 | TEMPERATURE: 97.1 F | SYSTOLIC BLOOD PRESSURE: 115 MMHG | HEART RATE: 75 BPM | HEIGHT: 70 IN | DIASTOLIC BLOOD PRESSURE: 70 MMHG

## 2017-10-29 PROCEDURE — A9270 NON-COVERED ITEM OR SERVICE: HCPCS | Performed by: HOSPITALIST

## 2017-10-29 PROCEDURE — 700105 HCHG RX REV CODE 258: Performed by: HOSPITALIST

## 2017-10-29 PROCEDURE — 700102 HCHG RX REV CODE 250 W/ 637 OVERRIDE(OP): Performed by: INTERNAL MEDICINE

## 2017-10-29 PROCEDURE — 700111 HCHG RX REV CODE 636 W/ 250 OVERRIDE (IP): Performed by: HOSPITALIST

## 2017-10-29 PROCEDURE — A9270 NON-COVERED ITEM OR SERVICE: HCPCS | Performed by: INTERNAL MEDICINE

## 2017-10-29 PROCEDURE — 99239 HOSP IP/OBS DSCHRG MGMT >30: CPT | Performed by: INTERNAL MEDICINE

## 2017-10-29 PROCEDURE — 700102 HCHG RX REV CODE 250 W/ 637 OVERRIDE(OP): Performed by: HOSPITALIST

## 2017-10-29 RX ADMIN — GABAPENTIN 200 MG: 100 CAPSULE ORAL at 09:10

## 2017-10-29 RX ADMIN — AMOXICILLIN AND CLAVULANATE POTASSIUM 1 TABLET: 875; 125 TABLET, FILM COATED ORAL at 09:10

## 2017-10-29 RX ADMIN — SUCRALFATE 1 G: 1 SUSPENSION ORAL at 11:00

## 2017-10-29 RX ADMIN — ENOXAPARIN SODIUM 30 MG: 100 INJECTION SUBCUTANEOUS at 09:08

## 2017-10-29 RX ADMIN — ASPIRIN 325 MG: 325 TABLET, COATED ORAL at 09:10

## 2017-10-29 RX ADMIN — SODIUM CHLORIDE: 9 INJECTION, SOLUTION INTRAVENOUS at 10:34

## 2017-10-29 RX ADMIN — GABAPENTIN 200 MG: 100 CAPSULE ORAL at 16:55

## 2017-10-29 RX ADMIN — METOPROLOL TARTRATE 25 MG: 25 TABLET, FILM COATED ORAL at 09:11

## 2017-10-29 RX ADMIN — STANDARDIZED SENNA CONCENTRATE AND DOCUSATE SODIUM 2 TABLET: 8.6; 5 TABLET, FILM COATED ORAL at 09:10

## 2017-10-29 RX ADMIN — DIGOXIN 250 MCG: 250 TABLET ORAL at 16:55

## 2017-10-29 RX ADMIN — TAMSULOSIN HYDROCHLORIDE 0.4 MG: 0.4 CAPSULE ORAL at 09:10

## 2017-10-29 RX ADMIN — SUCRALFATE 1 G: 1 SUSPENSION ORAL at 05:03

## 2017-10-29 RX ADMIN — LEVETIRACETAM 500 MG: 100 SOLUTION ORAL at 10:33

## 2017-10-29 RX ADMIN — MAGNESIUM HYDROXIDE 30 ML: 400 SUSPENSION ORAL at 09:09

## 2017-10-29 RX ADMIN — POLYETHYLENE GLYCOL 3350 1 PACKET: 17 POWDER, FOR SOLUTION ORAL at 09:09

## 2017-10-29 RX ADMIN — OMEPRAZOLE 40 MG: 20 CAPSULE, DELAYED RELEASE ORAL at 10:33

## 2017-10-29 RX ADMIN — FLUCONAZOLE 100 MG: 100 TABLET ORAL at 09:10

## 2017-10-29 RX ADMIN — SUCRALFATE 1 G: 1 SUSPENSION ORAL at 16:55

## 2017-10-29 RX ADMIN — Medication 220 MG: at 09:17

## 2017-10-29 RX ADMIN — SULFAMETHOXAZOLE AND TRIMETHOPRIM 1 TABLET: 800; 160 TABLET ORAL at 09:11

## 2017-10-29 ASSESSMENT — ENCOUNTER SYMPTOMS
ABDOMINAL PAIN: 0
HEADACHES: 0
WEAKNESS: 1
FEVER: 0
BLURRED VISION: 0
CHILLS: 0
COUGH: 0
SHORTNESS OF BREATH: 0

## 2017-10-29 ASSESSMENT — PAIN SCALES - GENERAL
PAINLEVEL_OUTOF10: 0

## 2017-10-29 NOTE — DISCHARGE PLANNING
Medical Social Work    SW left  for [facility] to see if they can accept pt today. [facility] won't have bed until tomorrow.

## 2017-10-29 NOTE — PROGRESS NOTES
[facility] Hospitalist Progress Note    Date of Service: 10/29/2017    Chief Complaint  87 y.o. male admitted 10/23/2017 with confusion    Interval Problem Update  Patient was sent back from the skilled nursing facility by staff for confusion. He has no recollection of being confused at the skilled nursing facility. He was admitted to the ICU and monitored closely. CT head revealed no acute findings. EEG was negative for seizures. He was found to be in A. fib with RVR which was controlled with IV fluids. He is transferred to the telemetry unit. At this time he is laying comfortably in his bed with a cortrak and osman catheter in place. He reports mild pain in his substernal region swallowing secondary to his pan esophagitis. He was evaluated by SLP. He also has a PICC line in place, currently he is only receiving IV NS infusion. He is alert and oriented to person, place, time. He is answering all questions appropriately. His mentation is completely intact. He denies any other symptoms. We will discontinue osman catheter and PICC line to prevent any associated infections.     10/27 Pt doing well, Continue cortrak. Will get nutrition consult. No confusion, CP or shortness of breath. Cleared for DC from medical standpoint. Ok to DC PICC line.     10/28 No acute events overnight. Patient has no active complaints. Vitals remain stable.     10/29 Pt's cortrak in place for odynophagia secondary to pan esophagitis. Feels it is improving. Patient able to stand up with assistance. Continue mobilizing patient per protocol. Awaiting transfer to SNF.    Consultants/Specialty  Palliative care  Pulmonary    Disposition  Cleared for DC from medical standpoint. DC to SNIF once medically stable        Review of Systems   Constitutional: Positive for malaise/fatigue. Negative for chills and fever.   HENT:        Odynophagia   Eyes: Negative for blurred vision.   Respiratory: Negative for cough and shortness of breath.    Cardiovascular:  Negative for chest pain.   Gastrointestinal: Negative for abdominal pain.   Neurological: Positive for weakness. Negative for headaches.   All other systems reviewed and are negative.     Physical Exam  Laboratory/Imaging   Hemodynamics  Temp (24hrs), Av.7 °C (98 °F), Min:36.2 °C (97.1 °F), Max:37.1 °C (98.8 °F)   Temperature: 36.2 °C (97.1 °F)  Pulse  Av.1  Min: 62  Max: 148    Blood Pressure : 115/70      Respiratory      Respiration: 19, Pulse Oximetry: 92 %        RUL Breath Sounds: Clear, RML Breath Sounds: Diminished, RLL Breath Sounds: Diminished, MIKE Breath Sounds: Clear, LLL Breath Sounds: Diminished    Fluids    Intake/Output Summary (Last 24 hours) at 10/29/17 0848  Last data filed at 10/29/17 0600   Gross per 24 hour   Intake             2026 ml   Output             2125 ml   Net              -99 ml       Nutrition  Orders Placed This Encounter   Procedures   • Diet NPO     Standing Status:   Standing     Number of Occurrences:   1     Order Specific Question:   Restrict to:     Answer:   Strict [1]     Physical Exam   Constitutional: He is oriented to person, place, and time. He appears well-nourished. No distress.   HENT:   Head: Normocephalic and atraumatic.   Mouth/Throat: Oropharynx is clear and moist.   Cortrak in place   Eyes: Conjunctivae are normal. Pupils are equal, round, and reactive to light.   Neck: Neck supple.   Cardiovascular: Normal rate.    Irregular rhythm   Pulmonary/Chest: Effort normal and breath sounds normal. No respiratory distress. He has no wheezes. He has no rales.   Abdominal: Soft. Bowel sounds are normal. He exhibits no distension. There is no tenderness. There is no rebound.   Musculoskeletal: He exhibits no edema or tenderness.   Neurological: He is alert and oriented to person, place, and time. No cranial nerve deficit. Coordination normal.   Strength and sensation intact bilaterally  Follows commands appropriately   Skin: Skin is warm and dry. He is not  diaphoretic.   Psychiatric: He has a normal mood and affect. His behavior is normal.   Nursing note and vitals reviewed.      Recent Labs      10/28/17   0452   WBC  9.6   RBC  3.30*   HEMOGLOBIN  9.9*   HEMATOCRIT  30.7*   MCV  93.0   MCH  30.0   MCHC  32.2*   RDW  51.1*   PLATELETCT  257   MPV  10.9     Recent Labs      10/28/17   0452   SODIUM  132*   POTASSIUM  4.6   CHLORIDE  99   CO2  28   GLUCOSE  96   BUN  13   CREATININE  0.52   CALCIUM  9.2                      Assessment/Plan     Persistent atrial fibrillation (CMS-[facility])- (present on admission)   Assessment & Plan    Rate well controlled  Continue digoxin   Continue aspirin        Discitis of lumbar region- (present on admission)   Assessment & Plan    Stable  Continue Augmentin and Bactrim        Esophageal stricture- (present on admission)   Assessment & Plan    Outpatient GI follow-up once esophagitis has resolved        Candida esophagitis (CMS-[facility])- (present on admission)   Assessment & Plan    Improving  Continue Diflucan  Continue PPI and Carafate  Follow up with GI as outpatient        Debility- (present on admission)   Assessment & Plan    Continue PT OT        Patient plan of care discussed at multidisplinary team rounds, with patient and R.N at beside.     Quality-Core Measures

## 2017-10-29 NOTE — PROGRESS NOTES
Discussed with patient that PICC line dressing must be changed today as per hospital protocol. Patient refused despite teaching/discussed infection prevention. As per patient, he wishes to see what the day brings; he is pending SNF transfer and is supposed to be leaving. Discussed with patient that he was not sent out yesterday because they were pending an available bed and that a blanket referral was sent to all local SNFs and would be taken to the first one who would have an available bed; but could maybe happen today but it is not guaranteed that it will happen today. Patient still refusing PICC line change but is willing to comply with dressing change if he is not sent to SNF today.

## 2017-10-29 NOTE — DISCHARGE PLANNING
Medical Social Work    KERRIE spoke with Rani at [facility]. Per Rani they can take pt today, but they do not have their own transport available. Rani requested we arrange transport. Pt requiring [facility]. KERRIE faxed [facility] form to [facility].     KERRIE updated Dr. Huang, pt's son in [facility], bedside RN, and charge RN.

## 2017-10-29 NOTE — DISCHARGE PLANNING
Per CCT Nati request, spoke with Rakesh at [facility] they will accept patient in transfer today.  Patient to transfer today at 1700 via [facility] to [facility].  Discharge Summary has been faxed to [facility]. CCT Nati has been advised of transport time.  Veronica at [facility] advised.

## 2017-10-29 NOTE — CARE PLAN
Problem: Knowledge Deficit  Goal: Knowledge of disease process/condition, treatment plan, diagnostic tests, and medications will improve    Intervention: Explain information regarding disease process/condition, treatment plan, diagnostic tests, and medications and document in education  Discussed information regarding plan of care, tube feeding, CHG baths, repositioning, and general medication information. All questions answered.

## 2017-10-29 NOTE — PROGRESS NOTES
Bedside shift report taken from CARL Love. Patient is sitting up in bed, right nare cortrak with bridle. Patient without any complaints of distress at this time. Bed is locked and in lowest position. Side rails up x2. Bed alarm in use. Call light and phone left within reach. Communication board updated. Will continue to monitor.

## 2017-10-29 NOTE — CARE PLAN
Problem: Skin Integrity  Goal: Risk for impaired skin integrity will decrease    Intervention: Implement precautions to protect skin integrity in collaboration with the interdisciplinary team   10/28/17 2003   OTHER   Skin Preventative Measures Pillows in Use for Support / Positioning   Bed Types Pressure Redistribution Mattress (Atmosair)  (waffle mattress overlay)   Friction Interventions Draw Sheet / Pad Used for Repositioning;Sacral Foam Dressing in Use   Moisturizers Moisturizer    Activity  Bed   Assistance / Tolerance for Turning/Repositioning Assistance of Two or More   Patient is Receiving Nutrition Tube Feeding Nutrition   Waffle mattress overlay; frequent rounds made; turning every 2 hours

## 2017-10-30 NOTE — DISCHARGE PLANNING
Care Transition Team Final Discharge Disposition    Actual Discharge Information  Actual Discharge Date: 10/29/17  Care Transitions Team Assisting with Transportation: Yes  Method of Transportation: Ambulance  Scheduled Transportation Date: 10/29/17  Scheduled Transportation Time: 1700  Ambulance Company: [facility]  PCS Form Completed: Yes  Actual Disposition: D/T to SNF with Medicare cert in anticipation of skilled care (03)

## 2017-11-20 LAB
EKG IMPRESSION: NORMAL
EKG IMPRESSION: NORMAL

## 2018-01-12 ENCOUNTER — HOSPITAL ENCOUNTER (OUTPATIENT)
Facility: MEDICAL CENTER | Age: 83
End: 2018-01-12
Attending: INTERNAL MEDICINE | Admitting: INTERNAL MEDICINE
Payer: MEDICARE

## 2018-01-12 VITALS
OXYGEN SATURATION: 99 % | BODY MASS INDEX: 26.3 KG/M2 | TEMPERATURE: 96.4 F | RESPIRATION RATE: 22 BRPM | SYSTOLIC BLOOD PRESSURE: 101 MMHG | HEIGHT: 71 IN | HEART RATE: 66 BPM | DIASTOLIC BLOOD PRESSURE: 74 MMHG | WEIGHT: 187.83 LBS

## 2018-01-12 LAB
ANION GAP SERPL CALC-SCNC: 9 MMOL/L (ref 0–11.9)
BUN SERPL-MCNC: 20 MG/DL (ref 8–22)
CALCIUM SERPL-MCNC: 10.5 MG/DL (ref 8.5–10.5)
CHLORIDE SERPL-SCNC: 102 MMOL/L (ref 96–112)
CO2 SERPL-SCNC: 25 MMOL/L (ref 20–33)
CREAT SERPL-MCNC: 0.7 MG/DL (ref 0.5–1.4)
GLUCOSE SERPL-MCNC: 110 MG/DL (ref 65–99)
POTASSIUM SERPL-SCNC: 5.1 MMOL/L (ref 3.6–5.5)
SODIUM SERPL-SCNC: 136 MMOL/L (ref 135–145)

## 2018-01-12 PROCEDURE — 160002 HCHG RECOVERY MINUTES (STAT): Performed by: INTERNAL MEDICINE

## 2018-01-12 PROCEDURE — 160048 HCHG OR STATISTICAL LEVEL 1-5: Performed by: INTERNAL MEDICINE

## 2018-01-12 PROCEDURE — 160046 HCHG PACU - 1ST 60 MINS PHASE II: Performed by: INTERNAL MEDICINE

## 2018-01-12 PROCEDURE — 160208 HCHG ENDO MINUTES - EA ADDL 1 MIN LEVEL 4: Performed by: INTERNAL MEDICINE

## 2018-01-12 PROCEDURE — 700101 HCHG RX REV CODE 250

## 2018-01-12 PROCEDURE — 160035 HCHG PACU - 1ST 60 MINS PHASE I: Performed by: INTERNAL MEDICINE

## 2018-01-12 PROCEDURE — 160009 HCHG ANES TIME/MIN: Performed by: INTERNAL MEDICINE

## 2018-01-12 PROCEDURE — 160203 HCHG ENDO MINUTES - 1ST 30 MINS LEVEL 4: Performed by: INTERNAL MEDICINE

## 2018-01-12 PROCEDURE — 700111 HCHG RX REV CODE 636 W/ 250 OVERRIDE (IP)

## 2018-01-12 PROCEDURE — 80048 BASIC METABOLIC PNL TOTAL CA: CPT

## 2018-01-12 PROCEDURE — 500066 HCHG BITE BLOCK, ECT: Performed by: INTERNAL MEDICINE

## 2018-01-12 PROCEDURE — 160025 RECOVERY II MINUTES (STATS): Performed by: INTERNAL MEDICINE

## 2018-01-12 RX ORDER — LIDOCAINE AND PRILOCAINE 25; 25 MG/G; MG/G
1 CREAM TOPICAL
Status: DISCONTINUED | OUTPATIENT
Start: 2018-01-12 | End: 2018-01-12 | Stop reason: HOSPADM

## 2018-01-12 RX ORDER — LIDOCAINE HYDROCHLORIDE 10 MG/ML
0.5 INJECTION, SOLUTION INFILTRATION; PERINEURAL
Status: DISCONTINUED | OUTPATIENT
Start: 2018-01-12 | End: 2018-01-12 | Stop reason: HOSPADM

## 2018-01-12 RX ORDER — OMEPRAZOLE 40 MG/1
40 CAPSULE, DELAYED RELEASE ORAL 2 TIMES DAILY
COMMUNITY
End: 2019-01-01

## 2018-01-12 RX ORDER — SODIUM CHLORIDE, SODIUM LACTATE, POTASSIUM CHLORIDE, CALCIUM CHLORIDE 600; 310; 30; 20 MG/100ML; MG/100ML; MG/100ML; MG/100ML
INJECTION, SOLUTION INTRAVENOUS CONTINUOUS
Status: DISCONTINUED | OUTPATIENT
Start: 2018-01-12 | End: 2018-01-12 | Stop reason: HOSPADM

## 2018-01-12 RX ADMIN — SODIUM CHLORIDE, SODIUM LACTATE, POTASSIUM CHLORIDE, CALCIUM CHLORIDE: 600; 310; 30; 20 INJECTION, SOLUTION INTRAVENOUS at 08:30

## 2018-01-12 ASSESSMENT — PAIN SCALES - GENERAL
PAINLEVEL_OUTOF10: 0

## 2018-01-12 NOTE — PROGRESS NOTES
Medication Reconciliation has been completed via interpretation of the MAR from an unknown facility.

## 2018-01-12 NOTE — OP REPORT
DATE OF SERVICE:  01/12/2018    PROCEDURE:  Esophagogastroduodenoscopy with wire-guided dilatation with the   flexible endoscope.    PREOPERATIVE DIAGNOSIS:  The patient with dysphagia as well as a history of   heartburn with known distal esophageal stricture.    POSTOPERATIVE DIAGNOSIS:  Tight benign stricture in the distal esophagus   dilated sequentially with a 27-Slovenian, then a 30-Slovenian and finally a   33-Slovenian wire-guided dilator without evident complication. We reexamined the   strictured area after the dilatation and there was a minor amount of bleeding,   but no significant complications of the dilatation.    PHYSICIAN:  Inderjit Mai MD    MEDICATIONS:  The patient was sedated by anesthesiology, please see their   report.    PROCEDURE IN DETAIL:  After informed consent was obtained and a time-out was   performed, a flexible endoscope was placed in the posterior pharynx and the   esophagus was selectively intubated.  In the distal esophagus, approximately   38 cm from the anal verge, there was a tight benign stricture.  With the   regular endoscope, we were unable to traverse the stricture.  We then removed   that endoscope and got a pediatric endoscope and we were able to easily   traverse the stricture. Examination of the stomach appeared unremarkable as   was the duodenal bulb and descending duodenum.  The scope was then withdrawn   into the antrum of the stomach.  A guidewire was placed in the antrum, its   position maintained and the scope was slowly withdrawn.  We then dilated him   sequentially initially with a 27-Slovenian, then a 30-Slovenian and finally a   33-Slovenian Savary-guided dilator over the wire.  We then removed the last   dilator and the guidewire, and we reexamined the area with the pediatric   endoscope. There seemed to be good response to the dilatation with a minor   amount of bleeding, went up to 2 mL, but no evidence of significant   complications of the dilatation.  The procedure was  then terminated. The   patient will need further dilatations in the future to get him to a larger   diameter.       ____________________________________     Inderjit Mai MD    JPREETI / JUSTINE    DD:  01/12/2018 10:03:15  DT:  01/12/2018 10:18:50    D#:  [id number]  Job#:  [id number]

## 2018-01-12 NOTE — OR SURGEON
Immediate Post OP Note    PreOp Diagnosis: Dysphagia, GERD, esophageal stricture    PostOp Diagnosis: Tight benign stricture distal esophagus dilated with 27,30 and 33 Thai wire guided dilators without evident complication    Procedure(s):  GASTROSCOPY W/DILATION - Wound Class: Clean Contaminated    Surgeon(s):  Inderjit Mai M.D.    Anesthesiologist/Type of Anesthesia:  Anesthesiologist: Amish Prather M.D./General    Surgical Staff:  Circulator: Epifanio Willis R.N.  Endoscopy Technician: Fidel Vann    Specimens:  None    Estimated Blood Loss: 1-2 cc    Findings: see above    Complications: None        1/12/2018 9:56 AM Inderjit Mai

## 2018-01-12 NOTE — DISCHARGE INSTRUCTIONS
ACTIVITY: Rest and take it easy for the first 24 hours.  A responsible adult is recommended to remain with you during that time.  It is normal to feel sleepy.  We encourage you to not do anything that requires balance, judgment or coordination.    MILD FLU-LIKE SYMPTOMS ARE NORMAL. YOU MAY EXPERIENCE GENERALIZED MUSCLE ACHES, THROAT IRRITATION, HEADACHE AND/OR SOME NAUSEA.    FOR 24 HOURS DO NOT:  Drive, operate machinery or run household appliances.  Drink beer or alcoholic beverages.   Make important decisions or sign legal documents.    SPECIAL INSTRUCTIONS:   Gastroscopy, Care After  Refer to this sheet in the next few weeks. These instructions provide you with information on caring for yourself after your procedure. Your caregiver may also give you more specific instructions. Your treatment has been planned according to current medical practices, but problems sometimes occur. Call your caregiver if you have any problems or questions after your procedure.  HOME CARE INSTRUCTIONS  · Avoid hot and warm beverages for the first 24 hours after the procedure.  · You may return to your normal diet and activities on the day after your procedure, or as directed by your caregiver.  · Only take over-the-counter or prescription medicines for pain, discomfort, or fever as directed by your caregiver.  · If you were given medicine to help you relax (sedative), do not drive or operate machinery for 24 hours.  SEEK IMMEDIATE MEDICAL CARE IF:  · You vomit blood or material that looks like coffee grounds.  · You have bloody, black, or tarry stools.  · You have shortness of breath.  · You have a fever.  · You have increasing abdominal pain that is not relieved with medicine.  · You have severe throat pain.  MAKE SURE YOU:  · Understand these instructions.  · Will watch your condition.  · Will get help right away if you are not doing well or get worse.     This information is not intended to replace advice given to you by your  health care provider. Make sure you discuss any questions you have with your health care provider.      DIET: To avoid nausea, slowly advance diet as tolerated, avoiding spicy or greasy foods for the first day.  Add more substantial food to your diet according to your physician's instructions.  Babies can be fed formula or breast milk as soon as they are hungry.  INCREASE FLUIDS AND FIBER TO AVOID CONSTIPATION.    SURGICAL DRESSING/BATHING: OK to bathe and shower.    FOLLOW-UP APPOINTMENT:  A follow-up appointment should be arranged with your doctor in 1-2 weeks call to schedule.    You should CALL YOUR PHYSICIAN if you develop:  Fever greater than 101 degrees F.  Pain not relieved by medication, or persistent nausea or vomiting.  Excessive bleeding (blood soaking through dressing) or unexpected drainage from the wound.  Extreme redness or swelling around the incision site, drainage of pus or foul smelling drainage.  Inability to urinate or empty your bladder within 8 hours.  Problems with breathing or chest pain.    You should call 911 if you develop problems with breathing or chest pain.  If you are unable to contact your doctor or surgical center, you should go to the nearest emergency room or urgent care center.  Physician's telephone #: Dr. Mai [phone].    If any questions arise, call your doctor.  If your doctor is not available, please feel free to call the Surgical Center at [phone].  The Center is open Monday through Friday from 7AM to 7PM.  You can also call the [employer] HOTLINE open 24 hours/day, 7 days/week and speak to a nurse at [phone], or toll free at [phone].    A registered nurse may call you a few days after your surgery to see how you are doing after your procedure.    MEDICATIONS: Resume taking daily medication.  Take prescribed pain medication with food.  If no medication is prescribed, you may take non-aspirin pain medication if needed.  PAIN MEDICATION CAN BE VERY  CONSTIPATING.  Take a stool softener or laxative such as senokot, pericolace, or milk of magnesia if needed.    No pain medication or prescription given.    If your physician has prescribed pain medication that includes Acetaminophen (Tylenol), do not take additional Acetaminophen (Tylenol) while taking the prescribed medication.    Depression / Suicide Risk    As you are discharged from this [facility] facility, it is important to learn how to keep safe from harming yourself.    Recognize the warning signs:  · Abrupt changes in personality, positive or negative- including increase in energy   · Giving away possessions  · Change in eating patterns- significant weight changes-  positive or negative  · Change in sleeping patterns- unable to sleep or sleeping all the time   · Unwillingness or inability to communicate  · Depression  · Unusual sadness, discouragement and loneliness  · Talk of wanting to die  · Neglect of personal appearance   · Rebelliousness- reckless behavior  · Withdrawal from people/activities they love  · Confusion- inability to concentrate     If you or a loved one observes any of these behaviors or has concerns about self-harm, here's what you can do:  · Talk about it- your feelings and reasons for harming yourself  · Remove any means that you might use to hurt yourself (examples: pills, rope, extension cords, firearm)  · Get professional help from the community (Mental Health, Substance Abuse, psychological counseling)  · Do not be alone:Call your Safe Contact- someone whom you trust who will be there for you.  · Call your local CRISIS HOTLINE 229-1204 or 218-615-5632  · Call your local Children's Mobile Crisis Response Team [place] (465) 535-7809 or www.Signicat  · Call the toll free National Suicide Prevention Hotlines   · National Suicide Prevention Lifeline 919-922-TAGF (3186)  · National Hope Line Network 800-SUICIDE (195-2237)

## 2018-01-12 NOTE — OR NURSING
1100 - pt getting dressed with help of CNA. Spoke with pt's geovannaeYaya, he will be here in roughly 30 min.  Discharge instructions reviewed with pt. Pt in bed watching tv. No needs at this time.

## 2018-01-17 ENCOUNTER — OFFICE VISIT (OUTPATIENT)
Dept: INFECTIOUS DISEASES | Facility: MEDICAL CENTER | Age: 83
End: 2018-01-17
Payer: MEDICARE

## 2018-01-17 VITALS
HEIGHT: 71 IN | SYSTOLIC BLOOD PRESSURE: 98 MMHG | OXYGEN SATURATION: 94 % | TEMPERATURE: 97.5 F | HEART RATE: 85 BPM | DIASTOLIC BLOOD PRESSURE: 52 MMHG

## 2018-01-17 DIAGNOSIS — M86.8X8 OTHER OSTEOMYELITIS, OTHER SITE (HCC): ICD-10-CM

## 2018-01-17 DIAGNOSIS — M46.46 DISCITIS OF LUMBAR REGION: ICD-10-CM

## 2018-01-17 PROCEDURE — 99214 OFFICE O/P EST MOD 30 MIN: CPT | Performed by: NURSE PRACTITIONER

## 2018-01-17 NOTE — PROGRESS NOTES
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 Primary care provider: Tanisha Flores M.D. This is my first time meeting Mr. Alexis. H/o DM, HTN, A fib, Hyperlipidemia, Arthritis, GERD.  A1C 4.8 10/23/17.     Interval History: pt hospitalized 9/13/17-10/23/17 due to discitis, osteomyelitis. He was admitted from 7/6/2017 for 7/25/17 for discitis at L3-4. His blood cultures ×1 was positive for strep viridans and his IR biopsy done on 7/10/17 was negative. He was treated with Rocephin through 8/20/17. He was found altered by his son at home and brought to the hospital. MRI showed L3-4 discitis with osteomyelitis but also multilocular intramuscular abscess in the right iliacus muscle.  He underwent drainage of right iliac abscess versus hematoma 9/20/17- cultures negative.  On admission patient was also found to be in A. fib with RVR and mild troponin elevation for which cardiology was consulted. He denied any chest pain and his troponin was down trending 2-D echo revealed EF of 70%. His rate was well-controlled and anti-coagulation was held due to pelvic hematoma. The patient had alternating RVR with slow rate therefore a single-chamber pacemaker was placed. Patient also developed painful swallowing and underwent an EGD which revealed pan esophagitis and 4 mm distal esophageal stricture. GI recommended medical therapy with PPI and Carafate and repeat EGD with dilation as outpatient once his esophagitis has resolved. He was also treated with Diflucan for concern of Candida esophagitis.  In regards to L3-L4 discitis, osteomyelitis. Patient status post treatment with Rocephin (prior strep viridans), persistent changes in repeat MRI 9/9/17. Changed to Zyvox. MRI 10/12/17 showed improvement. Zyvox continued through  "10/18/17, followed by 2 weeks oral bactrim and Augmentin to complete 11/2/17. Last seen by ID prior to discharge 10/14/17. Pt was transferred to SNF for rehabilitation.     Hospital records reviewed    Per patient, hospital FU delayed because he returned to the hospital. He returned to the hospital 10/23/17-10/29/17 due to mental status changes.  Staff at [facility] found him to be answering questions inappropriately. Patient was admitted and underwent a CT head that revealed no acute findings. EEG was negative for seizures. He was found to be in A. fib with RVR which was controlled with IV fluids.    1/12/18 pt underwent an outpatient Gastroscopy with dilation by Dr Mai (GI)    Today 1/17/18: Patient reports feeling well. He denies any open wounds, stating that the incisions have healed well. Denies drainage, pungent odor, redness.  Patient reporting low back muscle fatigue and pain when sitting in a chair for extended period time, but otherwise states that pain is dramatically improved.  Denies saddle paresthesia. No bowel/bladder incontinence. Patient involved in PT/OT at [facility]. Patient stating that he has been off antibiotics for multiple months. Denies feeling generally ill, fevers/chills, general malaise, headache, n/v/d.     Past Medical History:   Diagnosis Date   • A-fib (CMS-HCC)    • Arrhythmia    • Arthritis    • Cataract    • Diabetes (CMS-HCC)    • GERD (gastroesophageal reflux disease)    • Heart burn    • Hyperlipidemia    • Muscle disorder     nerve damage due to spinal stenosis   • Urinary incontinence        Allergies: Vancomycin    Current medications and problem list reviewed with patient and updated in EPIC.    ROS  As documented above in my HPI       Objective:     PE:  BP (!) 98/52   Pulse 85   Temp 36.4 °C (97.5 °F)   Ht 1.803 m (5' 11\")   SpO2 94%      Vital signs reviewed  Constitutional: patient is oriented to person, place, and time. He appears well-developed and " well-nourished. No distress  Eyes: Conjunctivae normal and EOM are normal. Pupils are equal, round, and reactive to light.   Mouth/Throat: Lips without lesions, good dentition, oropharynx is clear and moist.  Neck: Trachea midline. Normal range of motion. Neck supple. No masses  Cardiovascular: Normal rate, regular rhythm, normal heart sounds and intact distal pulses. No murmur, gallop, or friction rub. No edema.  Pulmonary/Chest: No respiratory distress. Unlabored respiratory effort, lungs clear to auscultation. No wheezes or rales.   Abdominal: Soft, non tender. BS + x 4. No masses or hepatosplenomegaly.   Musculoskeletal: Spine: Normal range of motion. No tenderness, swelling, erythema, deformity noted.  Neurological: He is alert and oriented to person, place, and time. No cranial nerve deficit. Coordination normal.   Skin: Skin is warm and dry. Good turgor. No rashes visable.  Psychiatric: He has a normal mood and affect. His behavior is normal.     Assessment and Plan:   The following treatment plan was discussed with patient at length  1. Discitis of lumbar region     2. Osteomyelitis, spine (CMS-HCC)       Patient has been off antibiotics since 11/2/17. No concern  Follow up: PRN. FU with PCP for ongoing chronic medical conditions.

## 2018-02-15 ENCOUNTER — HOSPITAL ENCOUNTER (EMERGENCY)
Facility: MEDICAL CENTER | Age: 83
End: 2018-02-15
Attending: EMERGENCY MEDICINE
Payer: MEDICARE

## 2018-02-15 ENCOUNTER — APPOINTMENT (OUTPATIENT)
Dept: RADIOLOGY | Facility: MEDICAL CENTER | Age: 83
End: 2018-02-15
Attending: EMERGENCY MEDICINE
Payer: MEDICARE

## 2018-02-15 ENCOUNTER — APPOINTMENT (OUTPATIENT)
Dept: RADIOLOGY | Facility: MEDICAL CENTER | Age: 83
End: 2018-02-15
Payer: MEDICARE

## 2018-02-15 VITALS
HEART RATE: 71 BPM | RESPIRATION RATE: 19 BRPM | BODY MASS INDEX: 26.88 KG/M2 | WEIGHT: 192 LBS | DIASTOLIC BLOOD PRESSURE: 59 MMHG | OXYGEN SATURATION: 98 % | SYSTOLIC BLOOD PRESSURE: 140 MMHG | TEMPERATURE: 97.5 F | HEIGHT: 71 IN

## 2018-02-15 DIAGNOSIS — R13.10 DYSPHAGIA, UNSPECIFIED TYPE: ICD-10-CM

## 2018-02-15 DIAGNOSIS — K22.2 ESOPHAGEAL STRICTURE: ICD-10-CM

## 2018-02-15 LAB
ALBUMIN SERPL BCP-MCNC: 3.8 G/DL (ref 3.2–4.9)
ALBUMIN/GLOB SERPL: 0.9 G/DL
ALP SERPL-CCNC: 102 U/L (ref 30–99)
ALT SERPL-CCNC: 12 U/L (ref 2–50)
ANION GAP SERPL CALC-SCNC: 12 MMOL/L (ref 0–11.9)
APTT PPP: 29.8 SEC (ref 24.7–36)
AST SERPL-CCNC: 29 U/L (ref 12–45)
BASOPHILS # BLD AUTO: 0.4 % (ref 0–1.8)
BASOPHILS # BLD: 0.06 K/UL (ref 0–0.12)
BILIRUB SERPL-MCNC: 0.5 MG/DL (ref 0.1–1.5)
BNP SERPL-MCNC: 17 PG/ML (ref 0–100)
BUN SERPL-MCNC: 17 MG/DL (ref 8–22)
CALCIUM SERPL-MCNC: 10.4 MG/DL (ref 8.5–10.5)
CHLORIDE SERPL-SCNC: 101 MMOL/L (ref 96–112)
CO2 SERPL-SCNC: 23 MMOL/L (ref 20–33)
CREAT SERPL-MCNC: 0.75 MG/DL (ref 0.5–1.4)
DIGOXIN SERPL-MCNC: 0.5 NG/ML (ref 0.8–2)
EOSINOPHIL # BLD AUTO: 0.14 K/UL (ref 0–0.51)
EOSINOPHIL NFR BLD: 1 % (ref 0–6.9)
ERYTHROCYTE [DISTWIDTH] IN BLOOD BY AUTOMATED COUNT: 44.6 FL (ref 35.9–50)
GLOBULIN SER CALC-MCNC: 4.4 G/DL (ref 1.9–3.5)
GLUCOSE SERPL-MCNC: 114 MG/DL (ref 65–99)
HCT VFR BLD AUTO: 44.6 % (ref 42–52)
HGB BLD-MCNC: 14.7 G/DL (ref 14–18)
IMM GRANULOCYTES # BLD AUTO: 0.1 K/UL (ref 0–0.11)
IMM GRANULOCYTES NFR BLD AUTO: 0.7 % (ref 0–0.9)
INR PPP: 1.07 (ref 0.87–1.13)
LIPASE SERPL-CCNC: 24 U/L (ref 11–82)
LYMPHOCYTES # BLD AUTO: 2.39 K/UL (ref 1–4.8)
LYMPHOCYTES NFR BLD: 16.8 % (ref 22–41)
MCH RBC QN AUTO: 29.3 PG (ref 27–33)
MCHC RBC AUTO-ENTMCNC: 33 G/DL (ref 33.7–35.3)
MCV RBC AUTO: 88.8 FL (ref 81.4–97.8)
MONOCYTES # BLD AUTO: 1.07 K/UL (ref 0–0.85)
MONOCYTES NFR BLD AUTO: 7.5 % (ref 0–13.4)
NEUTROPHILS # BLD AUTO: 10.49 K/UL (ref 1.82–7.42)
NEUTROPHILS NFR BLD: 73.6 % (ref 44–72)
NRBC # BLD AUTO: 0 K/UL
NRBC BLD-RTO: 0 /100 WBC
PLATELET # BLD AUTO: 275 K/UL (ref 164–446)
PMV BLD AUTO: 10.7 FL (ref 9–12.9)
POTASSIUM SERPL-SCNC: 5 MMOL/L (ref 3.6–5.5)
PROT SERPL-MCNC: 8.2 G/DL (ref 6–8.2)
PROTHROMBIN TIME: 13.6 SEC (ref 12–14.6)
RBC # BLD AUTO: 5.02 M/UL (ref 4.7–6.1)
SODIUM SERPL-SCNC: 136 MMOL/L (ref 135–145)
TROPONIN I SERPL-MCNC: <0.01 NG/ML (ref 0–0.04)
WBC # BLD AUTO: 14.3 K/UL (ref 4.8–10.8)

## 2018-02-15 PROCEDURE — 85025 COMPLETE CBC W/AUTO DIFF WBC: CPT

## 2018-02-15 PROCEDURE — 80053 COMPREHEN METABOLIC PANEL: CPT

## 2018-02-15 PROCEDURE — 83880 ASSAY OF NATRIURETIC PEPTIDE: CPT

## 2018-02-15 PROCEDURE — 93005 ELECTROCARDIOGRAM TRACING: CPT

## 2018-02-15 PROCEDURE — 99284 EMERGENCY DEPT VISIT MOD MDM: CPT

## 2018-02-15 PROCEDURE — 74220 X-RAY XM ESOPHAGUS 1CNTRST: CPT

## 2018-02-15 PROCEDURE — 93005 ELECTROCARDIOGRAM TRACING: CPT | Performed by: EMERGENCY MEDICINE

## 2018-02-15 PROCEDURE — 84484 ASSAY OF TROPONIN QUANT: CPT

## 2018-02-15 PROCEDURE — 36415 COLL VENOUS BLD VENIPUNCTURE: CPT

## 2018-02-15 PROCEDURE — 80162 ASSAY OF DIGOXIN TOTAL: CPT

## 2018-02-15 PROCEDURE — 85730 THROMBOPLASTIN TIME PARTIAL: CPT

## 2018-02-15 PROCEDURE — 83690 ASSAY OF LIPASE: CPT

## 2018-02-15 PROCEDURE — 85610 PROTHROMBIN TIME: CPT

## 2018-02-16 LAB — EKG IMPRESSION: NORMAL

## 2018-02-16 NOTE — ED NOTES
Patient requested urinal, urinal provided. Patient declined RN assist. Patient educated to utilize call bell if he requires assistance and to notify staff when he is done to empty urinal.

## 2018-02-16 NOTE — ED TRIAGE NOTES
"Patient arrives from [facility] for epigastric/chest pain after eating. Patient states he has had issues w/ dysphagia in the past secondary to esophagitis.     Per EMS en route, patient stated that he was having dyspnea and was placed on telemetry monitor and was noted to be in atrial flutter. Patient states he \"thinks I have had that before\".     Patient states after he felt the sensation of foreign body in esophagus, he self induced vomiting and cleared the foreign body which discontinued the pain.     Patient states only complaint on arrival is moderate SOB.   "

## 2018-02-16 NOTE — DISCHARGE INSTRUCTIONS
Your studies here were unremarkable (although your digoxin level was a little bit low). Because of you are a history of esophageal stricture, and the difficulty you've been having with solid food over the past few days, it is very important that you go back to year previous  liquid and soft mechanical diet until you follow up with Dr. Mai. Please call tomorrow to schedule follow-up with Dr. Mai. Return to the emergency department for worsening symptoms including choking, inability to swallow your own saliva, worsening shortness of breath, or fevers.    Dysphagia  Swallowing problems (dysphagia) occur when solids and liquids seem to stick in your throat on the way down to your stomach, or the food takes longer to get to the stomach. Other symptoms include regurgitating food, noises coming from the throat, chest discomfort with swallowing, and a feeling of fullness or the feeling of something being stuck in your throat when swallowing. When blockage in your throat is complete, it may be associated with drooling.  CAUSES   Problems with swallowing may occur because of problems with the muscles. The food cannot be propelled in the usual manner into your stomach. You may have ulcers, scar tissue, or inflammation in the tube down which food travels from your mouth to your stomach (esophagus), which blocks food from passing normally into the stomach. Causes of inflammation include:  · Acid reflux from your stomach into your esophagus.  · Infection.  · Radiation treatment for cancer.  · Medicines taken without enough fluids to wash them down into your stomach.  You may have nerve problems that prevent signals from being sent to the muscles of your esophagus to contract and move your food down to your stomach. Globus pharyngeus is a relatively common problem in which there is a sense of an obstruction or difficulty in swallowing, without any physical abnormalities of the swallowing passages being found. This problem  usually improves over time with reassurance and testing to rule out other causes.  DIAGNOSIS  Dysphagia can be diagnosed and its cause can be determined by tests in which you swallow a white substance that helps illuminate the inside of your throat (contrast medium) while X-rays are taken. Sometimes a flexible telescope that is inserted down your throat (endoscopy) to look at your esophagus and stomach is used.  TREATMENT   · If the dysphagia is caused by acid reflux or infection, medicines may be used.  · If the dysphagia is caused by problems with your swallowing muscles, swallowing therapy may be used to help you strengthen your swallowing muscles.  · If the dysphagia is caused by a blockage or mass, procedures to remove the blockage may be done.  HOME CARE INSTRUCTIONS  · Try to eat soft food that is easier to swallow and check your weight on a daily basis to be sure that it is not decreasing.  · Be sure to drink liquids when sitting upright (not lying down).  SEEK MEDICAL CARE IF:  · You are losing weight because you are unable to swallow.  · You are coughing when you drink liquids (aspiration).  · You are coughing up partially digested food.  SEEK IMMEDIATE MEDICAL CARE IF:  · You are unable to swallow your own saliva .  · You are having shortness of breath or a fever, or both.  · You have a hoarse voice along with difficulty swallowing.  MAKE SURE YOU:  · Understand these instructions.  · Will watch your condition.  · Will get help right away if you are not doing well or get worse.     This information is not intended to replace advice given to you by your health care provider. Make sure you discuss any questions you have with your health care provider.     Document Released: 12/15/2001 Document Revised: 01/08/2016 Document Reviewed: 06/06/2014  [employer] Patient Education ©2016 [employer]

## 2018-02-16 NOTE — ED NOTES
Patient dc'd to [facility] via [facility] due to inability to stay in sitting position due to numerous back problems.     Report previously called to [facility] Sophy Sánchez nurse. Patient verbalizes understanding of discharge instructions and follow up care. Patient denies questions upon discharge.

## 2018-02-16 NOTE — DISCHARGE PLANNING
Medical Social Work    SW asked to arrange transport back to [facility].  COBRA comppleted.  PCS completed and faxed to [facility].  [facility] contacted and transport arranged for 2100.  Transfer packet completed and given to RN    RN notified of transfer time 2100

## 2018-02-16 NOTE — ED PROVIDER NOTES
"ED Provider Note    Scribed for Martin Hargrove M.D. by Martin Hargrove. 2/15/2018,  5:59 PM.    CHIEF COMPLAINT  Chief Complaint   Patient presents with   • Epigastric Pain   • Chest Pain   • Dysrhythmia       HPI  Jersey Alexis is a 87 y.o. male who presents to the Emergency Department from [facility] for epigastric/upper chest pain after eating, which has been progressive over the past day, with crescendo course of food and pills feeling like there are getting stuck. This culminated today at lunch time, when he reports that his 1st bite of sandwich got stuck, and did not seem to want to pass, for long enough that EMS was called. He reports that in route in the ambulance, this food bolus did pass. He reports that only 2 weeks ago, he was started back on solid food after being treated for an esophageal stricture on January 12, with a gastroscopy and evaluation by Dr. Mai. This patient clearly reports chronic dysphasia secondary to esophagitis. His records confirm this. Per EMS, he was experiencing some dyspnea in route. He was placed on telemetry, and noted to be in atrial flutter. The patient states \"I think I've had that before.\" His records indeed confirm a history of chronic atrial fibrillation/atrial flutter, and he has a pacemaker. His EKG on arrival did show atrial flutter with a nature rate of about 300, and a ventricular rate in the 60s. His telemetry at the bedside does not show atrial flutter at the time of my evaluation. He feels slightly anxious now, but denies any chest pain.      REVIEW OF SYSTEMS  See HPI for further details. All other systems are negative.     PAST MEDICAL HISTORY   has a past medical history of A-fib (CMS-[facility]); Arrhythmia; Arthritis; Cataract; Diabetes (CMS-[facility]); GERD (gastroesophageal reflux disease); Heart burn; Hyperlipidemia; Muscle disorder; and Urinary incontinence.    SOCIAL HISTORY  Social History     Social History Main Topics   • " "Smoking status: Former Smoker     Packs/day: 1.50     Years: 29.00     Types: Cigarettes, Pipe     Quit date: 1/1/1980   • Smokeless tobacco: Never Used      Comment: Started smoking at age 21   • Alcohol use No   • Drug use: No   • Sexual activity: No     History   Drug Use No       SURGICAL HISTORY   has a past surgical history that includes laminotomy (late 80s); laminotomy (late 90s); appendectomy (Early 2000's); tonsillectomy (Bilateral, 1936); cataract phaco with iol (Bilateral, Early 2000's); gastroscopy (N/A, 10/9/2017); and gastroscopy (1/12/2018).    CURRENT MEDICATIONS  Home Medications    **Home medications have not yet been reviewed for this encounter**         ALLERGIES  Allergies   Allergen Reactions   • Vancomycin Rash     Diffuse  Tolerates at slower infusion rates.        PHYSICAL EXAM  VITAL SIGNS: /59   Pulse 71   Temp 36.4 °C (97.5 °F)   Resp 19   Ht 1.803 m (5' 11\")   Wt 87.1 kg (192 lb)   SpO2 98%   BMI 26.78 kg/m²   Pulse ox interpretation: I interpret this pulse ox as normal.  Constitutional: Alert in no apparent distress. Anxious.  HENT: No signs of trauma, Bilateral external ears normal, Nose normal.   Eyes: Conjunctiva normal, Non-icteric.   Neck: Normal range of motion, Supple, No stridor.   Lymphatic: No lymphadenopathy noted.   Cardiovascular: Regular rate and rhythm, no murmurs.   Thorax & Lungs: Normal breath sounds, No respiratory distress, No wheezing, No chest tenderness.   Abdomen: Bowel sounds normal, Soft, No tenderness, No masses, No pulsatile masses. No peritoneal signs.  Skin: Warm, Dry, No erythema, No rash.   Extremities: Intact distal pulses, No edema, No cyanosis.  Musculoskeletal: Good range of motion in all major joints. No or major deformities noted.   Neurologic: Alert , Normal motor function, Normal sensory function, No focal deficits noted.   Psychiatric: Affect normal, Judgment normal, Mood normal.     DIAGNOSTIC STUDIES / " PROCEDURES    EKG  Interpreted by me  AFIB/FLUT AND V-PACED COMPLEXES  NO FURTHER RHYTHM ANALYSIS ATTEMPTED DUE TO PACED RHYTHM  LEFT AXIS DEVIATION  BORDERLINE LOW VOLTAGE IN FRONTAL LEADS  NONSPECIFIC T ABNORMALITIES, DIFFUSE LEADS  PROLONGED QT INTERVAL  Compared to ECG 10/23/2017 21:22:45  Left-axis deviation now present  T-wave abnormality now present  Prolonged QT interval now present    LABS  Labs Reviewed   CBC WITH DIFFERENTIAL - Abnormal; Notable for the following:        Result Value    WBC 14.3 (*)     MCHC 33.0 (*)     Neutrophils-Polys 73.60 (*)     Lymphocytes 16.80 (*)     Neutrophils (Absolute) 10.49 (*)     Monos (Absolute) 1.07 (*)     All other components within normal limits    Narrative:     Indicate which anticoagulants the patient is on:->UNKNOWN   COMP METABOLIC PANEL - Abnormal; Notable for the following:     Anion Gap 12.0 (*)     Glucose 114 (*)     Alkaline Phosphatase 102 (*)     Globulin 4.4 (*)     All other components within normal limits    Narrative:     Indicate which anticoagulants the patient is on:->UNKNOWN   DIGOXIN - Abnormal; Notable for the following:     Digoxin 0.5 (*)     All other components within normal limits    Narrative:     Indicate which anticoagulants the patient is on:->UNKNOWN   TROPONIN    Narrative:     Indicate which anticoagulants the patient is on:->UNKNOWN   BTYPE NATRIURETIC PEPTIDE    Narrative:     Indicate which anticoagulants the patient is on:->UNKNOWN   PROTHROMBIN TIME    Narrative:     Indicate which anticoagulants the patient is on:->UNKNOWN   APTT    Narrative:     Indicate which anticoagulants the patient is on:->UNKNOWN   LIPASE    Narrative:     Indicate which anticoagulants the patient is on:->UNKNOWN   ESTIMATED GFR    Narrative:     Indicate which anticoagulants the patient is on:->UNKNOWN     All labs reviewed by me.    RADIOLOGY  DX-ESOPHAGUS - BARIUM SWALLOW   Final Result      1.  Negative esophagram. No evidence of esophageal  stricture.      2.  Small hiatal hernia. No gastroesophageal reflux identified.      3.  Tertiary contractions in the distal esophagus consistent with presbyesophagus.        The radiologist's interpretation of all radiological studies have been reviewed by me.    COURSE & MEDICAL DECISION MAKING  Nursing notes, ISABEL PMSFHx reviewed in chart.     5:59 PM Patient seen and examined at bedside. Differential diagnosis includes but is not limited to acute and chronic dysphagia, food impaction, esophagitis, acute on chronic atrial fibrillation/atrial flutter, less likely pneumonia/aspiration pneumonia or acute coronary syndrome. Ordered for chest pain protocol laboratory tests and chest x-ray to evaluate.     7:49 PM this patient's labs showed a mild and nonspecific leukocytosis. His esophagram was negative without evidence of stricture, or food bolus. There were tertiary constrictions in the distal esophagus consistent with presbyesophagus. From a cardiopulmonary standpoint, the patient remains stable, with no recurrence of A. fib/A flutter, and his initial EKG, as noted, showed a normal ventricular rate. Throughout his visit here, his rapid flutter waves have resolved. I did note that his digoxin was slightly on the low side, but this could be addressed by his facility in cooperation with his primary care doctor. I discussed all of this with the patient, who understands the importance of follow-up with Dr. Mai, as well as returning for worsening choking or respiratory symptoms.     The patient will return for new or worsening symptoms and is stable at the time of discharge.    The patient is referred to a primary physician for blood pressure management, diabetic screening, and for all other preventative health concerns.    DISPOSITION:  Patient will be discharged home in stable condition.    FOLLOW UP:  Inderjit Mai M.D.  [id number]  [place] [id number]  [phone]    In 1 day        OUTPATIENT  MEDICATIONS:  Discharge Medication List as of 2/15/2018  8:58 PM            FINAL IMPRESSION  1. Dysphagia, unspecified type    2. Esophageal stricture

## 2018-02-16 NOTE — ED NOTES
Report called to [facility] nurse, SNF nurse denied any questions.  contacted to assist in arranging transportation back to [facility].

## 2018-07-28 ENCOUNTER — PATIENT OUTREACH (OUTPATIENT)
Dept: HEALTH INFORMATION MANAGEMENT | Facility: OTHER | Age: 83
End: 2018-07-28

## 2018-07-28 NOTE — PROGRESS NOTES
Outcome: Left Message    Please transfer to Patient Outreach Team at [phone] when patient returns call.    WebIZ Checked & Epic Updated:  yes    [facility] Verified: no    Attempt # 1

## 2018-09-10 NOTE — PROGRESS NOTES
Per caregiver Chandler, [facility] now lives in a [facility]   He will talk to the pt to find out if he still wants to see Dr. Flores for primary care

## 2019-01-01 ENCOUNTER — APPOINTMENT (OUTPATIENT)
Dept: RADIOLOGY | Facility: MEDICAL CENTER | Age: 84
DRG: 389 | End: 2019-01-01
Attending: HOSPITALIST
Payer: MEDICARE

## 2019-01-01 ENCOUNTER — APPOINTMENT (OUTPATIENT)
Dept: RADIOLOGY | Facility: MEDICAL CENTER | Age: 84
DRG: 389 | End: 2019-01-01
Attending: INTERNAL MEDICINE
Payer: MEDICARE

## 2019-01-01 ENCOUNTER — APPOINTMENT (OUTPATIENT)
Dept: RADIOLOGY | Facility: MEDICAL CENTER | Age: 84
DRG: 389 | End: 2019-01-01
Attending: EMERGENCY MEDICINE
Payer: MEDICARE

## 2019-01-01 ENCOUNTER — APPOINTMENT (OUTPATIENT)
Dept: CARDIOLOGY | Facility: MEDICAL CENTER | Age: 84
DRG: 389 | End: 2019-01-01
Attending: INTERNAL MEDICINE
Payer: MEDICARE

## 2019-01-01 ENCOUNTER — HOSPITAL ENCOUNTER (INPATIENT)
Facility: MEDICAL CENTER | Age: 84
LOS: 11 days | DRG: 389 | End: 2019-12-20
Attending: EMERGENCY MEDICINE | Admitting: HOSPITALIST
Payer: MEDICARE

## 2019-01-01 VITALS
SYSTOLIC BLOOD PRESSURE: 113 MMHG | RESPIRATION RATE: 16 BRPM | WEIGHT: 210.1 LBS | HEIGHT: 71 IN | OXYGEN SATURATION: 97 % | BODY MASS INDEX: 29.41 KG/M2 | TEMPERATURE: 97.4 F | HEART RATE: 68 BPM | DIASTOLIC BLOOD PRESSURE: 47 MMHG

## 2019-01-01 LAB
ALBUMIN SERPL BCP-MCNC: 3.4 G/DL (ref 3.2–4.9)
ALBUMIN SERPL BCP-MCNC: 3.7 G/DL (ref 3.2–4.9)
ALBUMIN SERPL BCP-MCNC: 3.9 G/DL (ref 3.2–4.9)
ALBUMIN/GLOB SERPL: 1.1 G/DL
ALBUMIN/GLOB SERPL: 1.2 G/DL
ALBUMIN/GLOB SERPL: 1.3 G/DL
ALP SERPL-CCNC: 101 U/L (ref 30–99)
ALP SERPL-CCNC: 106 U/L (ref 30–99)
ALP SERPL-CCNC: 121 U/L (ref 30–99)
ALT SERPL-CCNC: 5 U/L (ref 2–50)
ALT SERPL-CCNC: 5 U/L (ref 2–50)
ALT SERPL-CCNC: 8 U/L (ref 2–50)
ANION GAP SERPL CALC-SCNC: 14 MMOL/L (ref 0–11.9)
ANION GAP SERPL CALC-SCNC: 14 MMOL/L (ref 0–11.9)
ANION GAP SERPL CALC-SCNC: 17 MMOL/L (ref 0–11.9)
ANION GAP SERPL CALC-SCNC: 5 MMOL/L (ref 0–11.9)
ANION GAP SERPL CALC-SCNC: 6 MMOL/L (ref 0–11.9)
ANION GAP SERPL CALC-SCNC: 7 MMOL/L (ref 0–11.9)
ANION GAP SERPL CALC-SCNC: 8 MMOL/L (ref 0–11.9)
ANION GAP SERPL CALC-SCNC: 8 MMOL/L (ref 0–11.9)
ANION GAP SERPL CALC-SCNC: 9 MMOL/L (ref 0–11.9)
APPEARANCE UR: ABNORMAL
AST SERPL-CCNC: 12 U/L (ref 12–45)
AST SERPL-CCNC: 13 U/L (ref 12–45)
AST SERPL-CCNC: 16 U/L (ref 12–45)
BACTERIA #/AREA URNS HPF: NEGATIVE /HPF
BASOPHILS # BLD AUTO: 0.2 % (ref 0–1.8)
BASOPHILS # BLD AUTO: 0.3 % (ref 0–1.8)
BASOPHILS # BLD AUTO: 0.3 % (ref 0–1.8)
BASOPHILS # BLD AUTO: 0.4 % (ref 0–1.8)
BASOPHILS # BLD AUTO: 0.5 % (ref 0–1.8)
BASOPHILS # BLD: 0.03 K/UL (ref 0–0.12)
BASOPHILS # BLD: 0.04 K/UL (ref 0–0.12)
BASOPHILS # BLD: 0.05 K/UL (ref 0–0.12)
BASOPHILS # BLD: 0.06 K/UL (ref 0–0.12)
BASOPHILS # BLD: 0.06 K/UL (ref 0–0.12)
BILIRUB SERPL-MCNC: 0.6 MG/DL (ref 0.1–1.5)
BILIRUB SERPL-MCNC: 0.6 MG/DL (ref 0.1–1.5)
BILIRUB SERPL-MCNC: 0.7 MG/DL (ref 0.1–1.5)
BILIRUB UR QL STRIP.AUTO: ABNORMAL
BUN SERPL-MCNC: 10 MG/DL (ref 8–22)
BUN SERPL-MCNC: 15 MG/DL (ref 8–22)
BUN SERPL-MCNC: 20 MG/DL (ref 8–22)
BUN SERPL-MCNC: 20 MG/DL (ref 8–22)
BUN SERPL-MCNC: 21 MG/DL (ref 8–22)
BUN SERPL-MCNC: 6 MG/DL (ref 8–22)
BUN SERPL-MCNC: 8 MG/DL (ref 8–22)
BUN SERPL-MCNC: 9 MG/DL (ref 8–22)
CALCIUM SERPL-MCNC: 8.9 MG/DL (ref 8.5–10.5)
CALCIUM SERPL-MCNC: 8.9 MG/DL (ref 8.5–10.5)
CALCIUM SERPL-MCNC: 9.1 MG/DL (ref 8.5–10.5)
CALCIUM SERPL-MCNC: 9.4 MG/DL (ref 8.5–10.5)
CALCIUM SERPL-MCNC: 9.5 MG/DL (ref 8.5–10.5)
CALCIUM SERPL-MCNC: 9.6 MG/DL (ref 8.5–10.5)
CALCIUM SERPL-MCNC: 9.6 MG/DL (ref 8.5–10.5)
CALCIUM SERPL-MCNC: 9.8 MG/DL (ref 8.5–10.5)
CALCIUM SERPL-MCNC: 9.8 MG/DL (ref 8.5–10.5)
CHLORIDE SERPL-SCNC: 100 MMOL/L (ref 96–112)
CHLORIDE SERPL-SCNC: 101 MMOL/L (ref 96–112)
CHLORIDE SERPL-SCNC: 102 MMOL/L (ref 96–112)
CHLORIDE SERPL-SCNC: 103 MMOL/L (ref 96–112)
CHLORIDE SERPL-SCNC: 103 MMOL/L (ref 96–112)
CHLORIDE SERPL-SCNC: 108 MMOL/L (ref 96–112)
CHLORIDE SERPL-SCNC: 95 MMOL/L (ref 96–112)
CHLORIDE SERPL-SCNC: 98 MMOL/L (ref 96–112)
CHLORIDE SERPL-SCNC: 98 MMOL/L (ref 96–112)
CO2 SERPL-SCNC: 13 MMOL/L (ref 20–33)
CO2 SERPL-SCNC: 16 MMOL/L (ref 20–33)
CO2 SERPL-SCNC: 23 MMOL/L (ref 20–33)
CO2 SERPL-SCNC: 24 MMOL/L (ref 20–33)
CO2 SERPL-SCNC: 25 MMOL/L (ref 20–33)
CO2 SERPL-SCNC: 25 MMOL/L (ref 20–33)
CO2 SERPL-SCNC: 26 MMOL/L (ref 20–33)
CO2 SERPL-SCNC: 26 MMOL/L (ref 20–33)
CO2 SERPL-SCNC: 29 MMOL/L (ref 20–33)
COLOR UR: ABNORMAL
COMMENT 1642: NORMAL
CREAT SERPL-MCNC: 0.61 MG/DL (ref 0.5–1.4)
CREAT SERPL-MCNC: 0.61 MG/DL (ref 0.5–1.4)
CREAT SERPL-MCNC: 0.65 MG/DL (ref 0.5–1.4)
CREAT SERPL-MCNC: 0.72 MG/DL (ref 0.5–1.4)
CREAT SERPL-MCNC: 0.72 MG/DL (ref 0.5–1.4)
CREAT SERPL-MCNC: 0.77 MG/DL (ref 0.5–1.4)
CREAT SERPL-MCNC: 0.96 MG/DL (ref 0.5–1.4)
CREAT SERPL-MCNC: 0.98 MG/DL (ref 0.5–1.4)
CREAT SERPL-MCNC: 1.02 MG/DL (ref 0.5–1.4)
EKG IMPRESSION: NORMAL
EOSINOPHIL # BLD AUTO: 0.01 K/UL (ref 0–0.51)
EOSINOPHIL # BLD AUTO: 0.04 K/UL (ref 0–0.51)
EOSINOPHIL # BLD AUTO: 0.07 K/UL (ref 0–0.51)
EOSINOPHIL # BLD AUTO: 0.08 K/UL (ref 0–0.51)
EOSINOPHIL # BLD AUTO: 0.11 K/UL (ref 0–0.51)
EOSINOPHIL # BLD AUTO: 0.12 K/UL (ref 0–0.51)
EOSINOPHIL # BLD AUTO: 0.13 K/UL (ref 0–0.51)
EOSINOPHIL NFR BLD: 0.1 % (ref 0–6.9)
EOSINOPHIL NFR BLD: 0.3 % (ref 0–6.9)
EOSINOPHIL NFR BLD: 0.5 % (ref 0–6.9)
EOSINOPHIL NFR BLD: 0.7 % (ref 0–6.9)
EOSINOPHIL NFR BLD: 0.9 % (ref 0–6.9)
EOSINOPHIL NFR BLD: 1 % (ref 0–6.9)
EOSINOPHIL NFR BLD: 1.1 % (ref 0–6.9)
EPI CELLS #/AREA URNS HPF: ABNORMAL /HPF
ERYTHROCYTE [DISTWIDTH] IN BLOOD BY AUTOMATED COUNT: 45.7 FL (ref 35.9–50)
ERYTHROCYTE [DISTWIDTH] IN BLOOD BY AUTOMATED COUNT: 46 FL (ref 35.9–50)
ERYTHROCYTE [DISTWIDTH] IN BLOOD BY AUTOMATED COUNT: 46.4 FL (ref 35.9–50)
ERYTHROCYTE [DISTWIDTH] IN BLOOD BY AUTOMATED COUNT: 46.5 FL (ref 35.9–50)
ERYTHROCYTE [DISTWIDTH] IN BLOOD BY AUTOMATED COUNT: 46.5 FL (ref 35.9–50)
ERYTHROCYTE [DISTWIDTH] IN BLOOD BY AUTOMATED COUNT: 46.7 FL (ref 35.9–50)
ERYTHROCYTE [DISTWIDTH] IN BLOOD BY AUTOMATED COUNT: 47.3 FL (ref 35.9–50)
ERYTHROCYTE [DISTWIDTH] IN BLOOD BY AUTOMATED COUNT: 47.8 FL (ref 35.9–50)
ERYTHROCYTE [DISTWIDTH] IN BLOOD BY AUTOMATED COUNT: 47.8 FL (ref 35.9–50)
FLUAV RNA SPEC QL NAA+PROBE: NEGATIVE
FLUBV RNA SPEC QL NAA+PROBE: NEGATIVE
GLOBULIN SER CALC-MCNC: 2.7 G/DL (ref 1.9–3.5)
GLOBULIN SER CALC-MCNC: 3.3 G/DL (ref 1.9–3.5)
GLOBULIN SER CALC-MCNC: 3.3 G/DL (ref 1.9–3.5)
GLUCOSE SERPL-MCNC: 108 MG/DL (ref 65–99)
GLUCOSE SERPL-MCNC: 116 MG/DL (ref 65–99)
GLUCOSE SERPL-MCNC: 116 MG/DL (ref 65–99)
GLUCOSE SERPL-MCNC: 65 MG/DL (ref 65–99)
GLUCOSE SERPL-MCNC: 69 MG/DL (ref 65–99)
GLUCOSE SERPL-MCNC: 95 MG/DL (ref 65–99)
GLUCOSE SERPL-MCNC: 95 MG/DL (ref 65–99)
GLUCOSE SERPL-MCNC: 97 MG/DL (ref 65–99)
GLUCOSE SERPL-MCNC: 98 MG/DL (ref 65–99)
GLUCOSE UR STRIP.AUTO-MCNC: 100 MG/DL
HCT VFR BLD AUTO: 41.9 % (ref 42–52)
HCT VFR BLD AUTO: 43.3 % (ref 42–52)
HCT VFR BLD AUTO: 43.7 % (ref 42–52)
HCT VFR BLD AUTO: 43.9 % (ref 42–52)
HCT VFR BLD AUTO: 44.8 % (ref 42–52)
HCT VFR BLD AUTO: 45.1 % (ref 42–52)
HCT VFR BLD AUTO: 47.5 % (ref 42–52)
HCT VFR BLD AUTO: 51 % (ref 42–52)
HCT VFR BLD AUTO: 51.7 % (ref 42–52)
HGB BLD-MCNC: 13.6 G/DL (ref 14–18)
HGB BLD-MCNC: 13.9 G/DL (ref 14–18)
HGB BLD-MCNC: 14.1 G/DL (ref 14–18)
HGB BLD-MCNC: 14.2 G/DL (ref 14–18)
HGB BLD-MCNC: 14.3 G/DL (ref 14–18)
HGB BLD-MCNC: 14.4 G/DL (ref 14–18)
HGB BLD-MCNC: 15.3 G/DL (ref 14–18)
HGB BLD-MCNC: 16.7 G/DL (ref 14–18)
HGB BLD-MCNC: 17.1 G/DL (ref 14–18)
HYALINE CASTS #/AREA URNS LPF: ABNORMAL /LPF
IMM GRANULOCYTES # BLD AUTO: 0.07 K/UL (ref 0–0.11)
IMM GRANULOCYTES # BLD AUTO: 0.08 K/UL (ref 0–0.11)
IMM GRANULOCYTES # BLD AUTO: 0.09 K/UL (ref 0–0.11)
IMM GRANULOCYTES # BLD AUTO: 0.1 K/UL (ref 0–0.11)
IMM GRANULOCYTES # BLD AUTO: 0.1 K/UL (ref 0–0.11)
IMM GRANULOCYTES # BLD AUTO: 0.12 K/UL (ref 0–0.11)
IMM GRANULOCYTES # BLD AUTO: 0.13 K/UL (ref 0–0.11)
IMM GRANULOCYTES NFR BLD AUTO: 0.7 % (ref 0–0.9)
IMM GRANULOCYTES NFR BLD AUTO: 0.8 % (ref 0–0.9)
IMM GRANULOCYTES NFR BLD AUTO: 1.1 % (ref 0–0.9)
KETONES UR STRIP.AUTO-MCNC: NEGATIVE MG/DL
LEUKOCYTE ESTERASE UR QL STRIP.AUTO: ABNORMAL
LV EJECT FRACT  99904: 60
LV EJECT FRACT MOD 2C 99903: 62.16
LV EJECT FRACT MOD 4C 99902: 57.78
LV EJECT FRACT MOD BP 99901: 58.22
LYMPHOCYTES # BLD AUTO: 1.48 K/UL (ref 1–4.8)
LYMPHOCYTES # BLD AUTO: 1.81 K/UL (ref 1–4.8)
LYMPHOCYTES # BLD AUTO: 2.27 K/UL (ref 1–4.8)
LYMPHOCYTES # BLD AUTO: 2.66 K/UL (ref 1–4.8)
LYMPHOCYTES # BLD AUTO: 2.73 K/UL (ref 1–4.8)
LYMPHOCYTES # BLD AUTO: 2.74 K/UL (ref 1–4.8)
LYMPHOCYTES # BLD AUTO: 2.76 K/UL (ref 1–4.8)
LYMPHOCYTES NFR BLD: 12.4 % (ref 22–41)
LYMPHOCYTES NFR BLD: 13.8 % (ref 22–41)
LYMPHOCYTES NFR BLD: 18.3 % (ref 22–41)
LYMPHOCYTES NFR BLD: 18.7 % (ref 22–41)
LYMPHOCYTES NFR BLD: 19.1 % (ref 22–41)
LYMPHOCYTES NFR BLD: 24.1 % (ref 22–41)
LYMPHOCYTES NFR BLD: 24.9 % (ref 22–41)
MAGNESIUM SERPL-MCNC: 1.6 MG/DL (ref 1.5–2.5)
MCH RBC QN AUTO: 29.4 PG (ref 27–33)
MCH RBC QN AUTO: 29.5 PG (ref 27–33)
MCH RBC QN AUTO: 29.6 PG (ref 27–33)
MCH RBC QN AUTO: 29.7 PG (ref 27–33)
MCH RBC QN AUTO: 29.7 PG (ref 27–33)
MCH RBC QN AUTO: 29.8 PG (ref 27–33)
MCH RBC QN AUTO: 29.9 PG (ref 27–33)
MCH RBC QN AUTO: 30 PG (ref 27–33)
MCH RBC QN AUTO: 30 PG (ref 27–33)
MCHC RBC AUTO-ENTMCNC: 31.9 G/DL (ref 33.7–35.3)
MCHC RBC AUTO-ENTMCNC: 31.9 G/DL (ref 33.7–35.3)
MCHC RBC AUTO-ENTMCNC: 32.1 G/DL (ref 33.7–35.3)
MCHC RBC AUTO-ENTMCNC: 32.1 G/DL (ref 33.7–35.3)
MCHC RBC AUTO-ENTMCNC: 32.2 G/DL (ref 33.7–35.3)
MCHC RBC AUTO-ENTMCNC: 32.5 G/DL (ref 33.7–35.3)
MCHC RBC AUTO-ENTMCNC: 32.5 G/DL (ref 33.7–35.3)
MCHC RBC AUTO-ENTMCNC: 32.7 G/DL (ref 33.7–35.3)
MCHC RBC AUTO-ENTMCNC: 33.1 G/DL (ref 33.7–35.3)
MCV RBC AUTO: 90.1 FL (ref 81.4–97.8)
MCV RBC AUTO: 90.7 FL (ref 81.4–97.8)
MCV RBC AUTO: 91.7 FL (ref 81.4–97.8)
MCV RBC AUTO: 92 FL (ref 81.4–97.8)
MCV RBC AUTO: 92.1 FL (ref 81.4–97.8)
MCV RBC AUTO: 92.5 FL (ref 81.4–97.8)
MCV RBC AUTO: 92.6 FL (ref 81.4–97.8)
MCV RBC AUTO: 92.8 FL (ref 81.4–97.8)
MCV RBC AUTO: 92.9 FL (ref 81.4–97.8)
MICRO URNS: ABNORMAL
MONOCYTES # BLD AUTO: 0.88 K/UL (ref 0–0.85)
MONOCYTES # BLD AUTO: 0.92 K/UL (ref 0–0.85)
MONOCYTES # BLD AUTO: 0.99 K/UL (ref 0–0.85)
MONOCYTES # BLD AUTO: 1.02 K/UL (ref 0–0.85)
MONOCYTES # BLD AUTO: 1.19 K/UL (ref 0–0.85)
MONOCYTES # BLD AUTO: 1.24 K/UL (ref 0–0.85)
MONOCYTES # BLD AUTO: 1.24 K/UL (ref 0–0.85)
MONOCYTES NFR BLD AUTO: 10 % (ref 0–13.4)
MONOCYTES NFR BLD AUTO: 7 % (ref 0–13.4)
MONOCYTES NFR BLD AUTO: 7.4 % (ref 0–13.4)
MONOCYTES NFR BLD AUTO: 8.3 % (ref 0–13.4)
MONOCYTES NFR BLD AUTO: 8.4 % (ref 0–13.4)
MONOCYTES NFR BLD AUTO: 9 % (ref 0–13.4)
MONOCYTES NFR BLD AUTO: 9.3 % (ref 0–13.4)
MORPHOLOGY BLD-IMP: NORMAL
NEUTROPHILS # BLD AUTO: 10.23 K/UL (ref 1.82–7.42)
NEUTROPHILS # BLD AUTO: 10.53 K/UL (ref 1.82–7.42)
NEUTROPHILS # BLD AUTO: 10.76 K/UL (ref 1.82–7.42)
NEUTROPHILS # BLD AUTO: 6.85 K/UL (ref 1.82–7.42)
NEUTROPHILS # BLD AUTO: 7.27 K/UL (ref 1.82–7.42)
NEUTROPHILS # BLD AUTO: 8.21 K/UL (ref 1.82–7.42)
NEUTROPHILS # BLD AUTO: 9.32 K/UL (ref 1.82–7.42)
NEUTROPHILS NFR BLD: 64 % (ref 44–72)
NEUTROPHILS NFR BLD: 64.2 % (ref 44–72)
NEUTROPHILS NFR BLD: 69 % (ref 44–72)
NEUTROPHILS NFR BLD: 71.4 % (ref 44–72)
NEUTROPHILS NFR BLD: 72 % (ref 44–72)
NEUTROPHILS NFR BLD: 77.9 % (ref 44–72)
NEUTROPHILS NFR BLD: 78.1 % (ref 44–72)
NITRITE UR QL STRIP.AUTO: NEGATIVE
NRBC # BLD AUTO: 0 K/UL
NRBC BLD-RTO: 0 /100 WBC
NT-PROBNP SERPL IA-MCNC: 2105 PG/ML (ref 0–125)
PH UR STRIP.AUTO: 5 [PH] (ref 5–8)
PLATELET # BLD AUTO: 129 K/UL (ref 164–446)
PLATELET # BLD AUTO: 201 K/UL (ref 164–446)
PLATELET # BLD AUTO: 211 K/UL (ref 164–446)
PLATELET # BLD AUTO: 221 K/UL (ref 164–446)
PLATELET # BLD AUTO: 224 K/UL (ref 164–446)
PLATELET # BLD AUTO: 234 K/UL (ref 164–446)
PLATELET # BLD AUTO: 238 K/UL (ref 164–446)
PLATELET # BLD AUTO: 241 K/UL (ref 164–446)
PLATELET # BLD AUTO: 269 K/UL (ref 164–446)
PMV BLD AUTO: 10.1 FL (ref 9–12.9)
PMV BLD AUTO: 10.2 FL (ref 9–12.9)
PMV BLD AUTO: 10.3 FL (ref 9–12.9)
PMV BLD AUTO: 10.4 FL (ref 9–12.9)
PMV BLD AUTO: 10.5 FL (ref 9–12.9)
PMV BLD AUTO: 10.5 FL (ref 9–12.9)
PMV BLD AUTO: 10.9 FL (ref 9–12.9)
PMV BLD AUTO: 11 FL (ref 9–12.9)
PMV BLD AUTO: 12.1 FL (ref 9–12.9)
POTASSIUM SERPL-SCNC: 3.5 MMOL/L (ref 3.6–5.5)
POTASSIUM SERPL-SCNC: 3.5 MMOL/L (ref 3.6–5.5)
POTASSIUM SERPL-SCNC: 3.6 MMOL/L (ref 3.6–5.5)
POTASSIUM SERPL-SCNC: 3.7 MMOL/L (ref 3.6–5.5)
POTASSIUM SERPL-SCNC: 4 MMOL/L (ref 3.6–5.5)
POTASSIUM SERPL-SCNC: 4.2 MMOL/L (ref 3.6–5.5)
POTASSIUM SERPL-SCNC: 4.4 MMOL/L (ref 3.6–5.5)
POTASSIUM SERPL-SCNC: 4.4 MMOL/L (ref 3.6–5.5)
POTASSIUM SERPL-SCNC: 4.5 MMOL/L (ref 3.6–5.5)
PROT SERPL-MCNC: 6.1 G/DL (ref 6–8.2)
PROT SERPL-MCNC: 7 G/DL (ref 6–8.2)
PROT SERPL-MCNC: 7.2 G/DL (ref 6–8.2)
PROT UR QL STRIP: 30 MG/DL
RBC # BLD AUTO: 4.53 M/UL (ref 4.7–6.1)
RBC # BLD AUTO: 4.7 M/UL (ref 4.7–6.1)
RBC # BLD AUTO: 4.74 M/UL (ref 4.7–6.1)
RBC # BLD AUTO: 4.82 M/UL (ref 4.7–6.1)
RBC # BLD AUTO: 4.82 M/UL (ref 4.7–6.1)
RBC # BLD AUTO: 4.9 M/UL (ref 4.7–6.1)
RBC # BLD AUTO: 5.12 M/UL (ref 4.7–6.1)
RBC # BLD AUTO: 5.56 M/UL (ref 4.7–6.1)
RBC # BLD AUTO: 5.74 M/UL (ref 4.7–6.1)
RBC # URNS HPF: ABNORMAL /HPF
RBC UR QL AUTO: NEGATIVE
SODIUM SERPL-SCNC: 126 MMOL/L (ref 135–145)
SODIUM SERPL-SCNC: 130 MMOL/L (ref 135–145)
SODIUM SERPL-SCNC: 131 MMOL/L (ref 135–145)
SODIUM SERPL-SCNC: 132 MMOL/L (ref 135–145)
SODIUM SERPL-SCNC: 135 MMOL/L (ref 135–145)
SODIUM SERPL-SCNC: 136 MMOL/L (ref 135–145)
SODIUM SERPL-SCNC: 137 MMOL/L (ref 135–145)
SODIUM SERPL-SCNC: 138 MMOL/L (ref 135–145)
SODIUM SERPL-SCNC: 138 MMOL/L (ref 135–145)
SP GR UR STRIP.AUTO: 1.03
TROPONIN T SERPL-MCNC: 20 NG/L (ref 6–19)
TROPONIN T SERPL-MCNC: 23 NG/L (ref 6–19)
UROBILINOGEN UR STRIP.AUTO-MCNC: 1 MG/DL
WBC # BLD AUTO: 10.7 K/UL (ref 4.8–10.8)
WBC # BLD AUTO: 11.3 K/UL (ref 4.8–10.8)
WBC # BLD AUTO: 11.9 K/UL (ref 4.8–10.8)
WBC # BLD AUTO: 13.1 K/UL (ref 4.8–10.8)
WBC # BLD AUTO: 14.8 K/UL (ref 4.8–10.8)
WBC # BLD AUTO: 15 K/UL (ref 4.8–10.8)
WBC # BLD AUTO: 9.6 K/UL (ref 4.8–10.8)
WBC #/AREA URNS HPF: ABNORMAL /HPF

## 2019-01-01 PROCEDURE — C9113 INJ PANTOPRAZOLE SODIUM, VIA: HCPCS | Performed by: HOSPITALIST

## 2019-01-01 PROCEDURE — 85027 COMPLETE CBC AUTOMATED: CPT

## 2019-01-01 PROCEDURE — 36415 COLL VENOUS BLD VENIPUNCTURE: CPT

## 2019-01-01 PROCEDURE — 85025 COMPLETE CBC W/AUTO DIFF WBC: CPT

## 2019-01-01 PROCEDURE — 700105 HCHG RX REV CODE 258: Performed by: INTERNAL MEDICINE

## 2019-01-01 PROCEDURE — 97530 THERAPEUTIC ACTIVITIES: CPT

## 2019-01-01 PROCEDURE — A9270 NON-COVERED ITEM OR SERVICE: HCPCS | Performed by: INTERNAL MEDICINE

## 2019-01-01 PROCEDURE — 700102 HCHG RX REV CODE 250 W/ 637 OVERRIDE(OP): Performed by: INTERNAL MEDICINE

## 2019-01-01 PROCEDURE — 92526 ORAL FUNCTION THERAPY: CPT

## 2019-01-01 PROCEDURE — A9270 NON-COVERED ITEM OR SERVICE: HCPCS | Performed by: HOSPITALIST

## 2019-01-01 PROCEDURE — 84484 ASSAY OF TROPONIN QUANT: CPT

## 2019-01-01 PROCEDURE — 3E02340 INTRODUCTION OF INFLUENZA VACCINE INTO MUSCLE, PERCUTANEOUS APPROACH: ICD-10-PCS | Performed by: INTERNAL MEDICINE

## 2019-01-01 PROCEDURE — 93306 TTE W/DOPPLER COMPLETE: CPT | Mod: 26 | Performed by: INTERNAL MEDICINE

## 2019-01-01 PROCEDURE — 700102 HCHG RX REV CODE 250 W/ 637 OVERRIDE(OP): Performed by: HOSPITALIST

## 2019-01-01 PROCEDURE — 700111 HCHG RX REV CODE 636 W/ 250 OVERRIDE (IP): Performed by: INTERNAL MEDICINE

## 2019-01-01 PROCEDURE — 97163 PT EVAL HIGH COMPLEX 45 MIN: CPT

## 2019-01-01 PROCEDURE — 770020 HCHG ROOM/CARE - TELE (206)

## 2019-01-01 PROCEDURE — 99233 SBSQ HOSP IP/OBS HIGH 50: CPT | Performed by: INTERNAL MEDICINE

## 2019-01-01 PROCEDURE — 700111 HCHG RX REV CODE 636 W/ 250 OVERRIDE (IP): Performed by: HOSPITALIST

## 2019-01-01 PROCEDURE — 302146: Performed by: INTERNAL MEDICINE

## 2019-01-01 PROCEDURE — 90662 IIV NO PRSV INCREASED AG IM: CPT | Performed by: INTERNAL MEDICINE

## 2019-01-01 PROCEDURE — 99232 SBSQ HOSP IP/OBS MODERATE 35: CPT | Performed by: INTERNAL MEDICINE

## 2019-01-01 PROCEDURE — 74018 RADEX ABDOMEN 1 VIEW: CPT

## 2019-01-01 PROCEDURE — 83880 ASSAY OF NATRIURETIC PEPTIDE: CPT

## 2019-01-01 PROCEDURE — 99231 SBSQ HOSP IP/OBS SF/LOW 25: CPT | Performed by: INTERNAL MEDICINE

## 2019-01-01 PROCEDURE — 93005 ELECTROCARDIOGRAM TRACING: CPT | Performed by: EMERGENCY MEDICINE

## 2019-01-01 PROCEDURE — 81001 URINALYSIS AUTO W/SCOPE: CPT

## 2019-01-01 PROCEDURE — 93306 TTE W/DOPPLER COMPLETE: CPT

## 2019-01-01 PROCEDURE — 80048 BASIC METABOLIC PNL TOTAL CA: CPT

## 2019-01-01 PROCEDURE — 97535 SELF CARE MNGMENT TRAINING: CPT

## 2019-01-01 PROCEDURE — 700105 HCHG RX REV CODE 258: Performed by: HOSPITALIST

## 2019-01-01 PROCEDURE — 92610 EVALUATE SWALLOWING FUNCTION: CPT

## 2019-01-01 PROCEDURE — G0378 HOSPITAL OBSERVATION PER HR: HCPCS

## 2019-01-01 PROCEDURE — 99285 EMERGENCY DEPT VISIT HI MDM: CPT

## 2019-01-01 PROCEDURE — 700105 HCHG RX REV CODE 258

## 2019-01-01 PROCEDURE — 97110 THERAPEUTIC EXERCISES: CPT

## 2019-01-01 PROCEDURE — 80053 COMPREHEN METABOLIC PANEL: CPT

## 2019-01-01 PROCEDURE — 96374 THER/PROPH/DIAG INJ IV PUSH: CPT

## 2019-01-01 PROCEDURE — 99239 HOSP IP/OBS DSCHRG MGMT >30: CPT | Performed by: INTERNAL MEDICINE

## 2019-01-01 PROCEDURE — 99220 PR INITIAL OBSERVATION CARE,LEVL III: CPT | Performed by: INTERNAL MEDICINE

## 2019-01-01 PROCEDURE — 700105 HCHG RX REV CODE 258: Performed by: EMERGENCY MEDICINE

## 2019-01-01 PROCEDURE — 99233 SBSQ HOSP IP/OBS HIGH 50: CPT | Mod: 25 | Performed by: HOSPITALIST

## 2019-01-01 PROCEDURE — 90471 IMMUNIZATION ADMIN: CPT

## 2019-01-01 PROCEDURE — 770006 HCHG ROOM/CARE - MED/SURG/GYN SEMI*

## 2019-01-01 PROCEDURE — 73501 X-RAY EXAM HIP UNI 1 VIEW: CPT | Mod: LT

## 2019-01-01 PROCEDURE — A9270 NON-COVERED ITEM OR SERVICE: HCPCS | Performed by: FAMILY MEDICINE

## 2019-01-01 PROCEDURE — 87502 INFLUENZA DNA AMP PROBE: CPT

## 2019-01-01 PROCEDURE — 99497 ADVNCD CARE PLAN 30 MIN: CPT | Performed by: HOSPITALIST

## 2019-01-01 PROCEDURE — 83735 ASSAY OF MAGNESIUM: CPT

## 2019-01-01 PROCEDURE — 96375 TX/PRO/DX INJ NEW DRUG ADDON: CPT

## 2019-01-01 PROCEDURE — 97167 OT EVAL HIGH COMPLEX 60 MIN: CPT

## 2019-01-01 PROCEDURE — 93005 ELECTROCARDIOGRAM TRACING: CPT

## 2019-01-01 PROCEDURE — 700102 HCHG RX REV CODE 250 W/ 637 OVERRIDE(OP): Performed by: FAMILY MEDICINE

## 2019-01-01 PROCEDURE — 71045 X-RAY EXAM CHEST 1 VIEW: CPT

## 2019-01-01 RX ORDER — CYCLOBENZAPRINE HCL 10 MG
10 TABLET ORAL ONCE
Status: COMPLETED | OUTPATIENT
Start: 2019-01-01 | End: 2019-01-01

## 2019-01-01 RX ORDER — HEPARIN SODIUM 5000 [USP'U]/ML
5000 INJECTION, SOLUTION INTRAVENOUS; SUBCUTANEOUS EVERY 8 HOURS
Status: DISCONTINUED | OUTPATIENT
Start: 2019-01-01 | End: 2019-01-01

## 2019-01-01 RX ORDER — POTASSIUM CHLORIDE 20 MEQ/1
40 TABLET, EXTENDED RELEASE ORAL ONCE
Status: COMPLETED | OUTPATIENT
Start: 2019-01-01 | End: 2019-01-01

## 2019-01-01 RX ORDER — OMEPRAZOLE 20 MG/1
20 CAPSULE, DELAYED RELEASE ORAL DAILY
Status: DISCONTINUED | OUTPATIENT
Start: 2019-01-01 | End: 2019-01-01 | Stop reason: HOSPADM

## 2019-01-01 RX ORDER — POLYETHYLENE GLYCOL 3350 17 G/17G
17 POWDER, FOR SOLUTION ORAL
Refills: 3
Start: 2019-01-01

## 2019-01-01 RX ORDER — SODIUM CHLORIDE 9 MG/ML
500 INJECTION, SOLUTION INTRAVENOUS ONCE
Status: COMPLETED | OUTPATIENT
Start: 2019-01-01 | End: 2019-01-01

## 2019-01-01 RX ORDER — ONDANSETRON 4 MG/1
4 TABLET, ORALLY DISINTEGRATING ORAL EVERY 4 HOURS PRN
Status: DISCONTINUED | OUTPATIENT
Start: 2019-01-01 | End: 2019-01-01 | Stop reason: HOSPADM

## 2019-01-01 RX ORDER — GABAPENTIN 100 MG/1
100 CAPSULE ORAL 2 TIMES DAILY
COMMUNITY

## 2019-01-01 RX ORDER — SODIUM CHLORIDE 9 MG/ML
INJECTION, SOLUTION INTRAVENOUS CONTINUOUS
Status: DISCONTINUED | OUTPATIENT
Start: 2019-01-01 | End: 2019-01-01 | Stop reason: HOSPADM

## 2019-01-01 RX ORDER — SODIUM CHLORIDE 9 MG/ML
INJECTION, SOLUTION INTRAVENOUS
Status: COMPLETED
Start: 2019-01-01 | End: 2019-01-01

## 2019-01-01 RX ORDER — CLONIDINE HYDROCHLORIDE 0.1 MG/1
0.1 TABLET ORAL TWICE DAILY
Status: DISCONTINUED | OUTPATIENT
Start: 2019-01-01 | End: 2019-01-01 | Stop reason: HOSPADM

## 2019-01-01 RX ORDER — ACETAMINOPHEN 325 MG/1
650 TABLET ORAL EVERY 6 HOURS PRN
Status: DISCONTINUED | OUTPATIENT
Start: 2019-01-01 | End: 2019-01-01 | Stop reason: HOSPADM

## 2019-01-01 RX ORDER — METOPROLOL SUCCINATE 50 MG/1
50 TABLET, EXTENDED RELEASE ORAL 2 TIMES DAILY
Status: DISCONTINUED | OUTPATIENT
Start: 2019-01-01 | End: 2019-01-01

## 2019-01-01 RX ORDER — SODIUM CHLORIDE 9 MG/ML
1000 INJECTION, SOLUTION INTRAVENOUS ONCE
Status: COMPLETED | OUTPATIENT
Start: 2019-01-01 | End: 2019-01-01

## 2019-01-01 RX ORDER — ONDANSETRON 4 MG/1
4 TABLET, ORALLY DISINTEGRATING ORAL EVERY 6 HOURS PRN
COMMUNITY

## 2019-01-01 RX ORDER — POLYETHYLENE GLYCOL 3350 17 G/17G
17 POWDER, FOR SOLUTION ORAL DAILY
COMMUNITY

## 2019-01-01 RX ORDER — DIGOXIN 250 MCG
125 TABLET ORAL DAILY
Status: DISCONTINUED | OUTPATIENT
Start: 2019-01-01 | End: 2019-01-01

## 2019-01-01 RX ORDER — METOCLOPRAMIDE HYDROCHLORIDE 5 MG/ML
10 INJECTION INTRAMUSCULAR; INTRAVENOUS EVERY 6 HOURS
Status: DISCONTINUED | OUTPATIENT
Start: 2019-01-01 | End: 2019-01-01

## 2019-01-01 RX ORDER — METOPROLOL SUCCINATE 25 MG/1
25 TABLET, EXTENDED RELEASE ORAL DAILY
Status: DISCONTINUED | OUTPATIENT
Start: 2019-01-01 | End: 2019-01-01

## 2019-01-01 RX ORDER — PANTOPRAZOLE SODIUM 40 MG/10ML
40 INJECTION, POWDER, LYOPHILIZED, FOR SOLUTION INTRAVENOUS DAILY
Status: DISCONTINUED | OUTPATIENT
Start: 2019-01-01 | End: 2019-01-01

## 2019-01-01 RX ORDER — SENNOSIDES A AND B 8.6 MG/1
8.6 TABLET, FILM COATED ORAL DAILY
COMMUNITY

## 2019-01-01 RX ORDER — CLONIDINE HYDROCHLORIDE 0.1 MG/1
0.1 TABLET ORAL DAILY
Status: DISCONTINUED | OUTPATIENT
Start: 2019-01-01 | End: 2019-01-01

## 2019-01-01 RX ORDER — ASPIRIN 325 MG
325 TABLET ORAL DAILY
Status: DISCONTINUED | OUTPATIENT
Start: 2019-01-01 | End: 2019-01-01

## 2019-01-01 RX ORDER — LEVETIRACETAM 500 MG/1
500 TABLET ORAL 2 TIMES DAILY
Qty: 60 TAB
Start: 2019-01-01

## 2019-01-01 RX ORDER — ASPIRIN 81 MG/1
81 TABLET, CHEWABLE ORAL DAILY
Status: DISCONTINUED | OUTPATIENT
Start: 2019-01-01 | End: 2019-01-01 | Stop reason: HOSPADM

## 2019-01-01 RX ORDER — ATORVASTATIN CALCIUM 10 MG/1
10 TABLET, FILM COATED ORAL NIGHTLY
Status: DISCONTINUED | OUTPATIENT
Start: 2019-01-01 | End: 2019-01-01

## 2019-01-01 RX ORDER — OMEPRAZOLE 20 MG/1
20 CAPSULE, DELAYED RELEASE ORAL DAILY
Status: DISCONTINUED | OUTPATIENT
Start: 2019-01-01 | End: 2019-01-01

## 2019-01-01 RX ORDER — LEVETIRACETAM 500 MG/1
500 TABLET ORAL 2 TIMES DAILY
Status: DISCONTINUED | OUTPATIENT
Start: 2019-01-01 | End: 2019-01-01 | Stop reason: HOSPADM

## 2019-01-01 RX ORDER — METOPROLOL TARTRATE 1 MG/ML
5 INJECTION, SOLUTION INTRAVENOUS
Status: DISCONTINUED | OUTPATIENT
Start: 2019-01-01 | End: 2019-01-01 | Stop reason: HOSPADM

## 2019-01-01 RX ORDER — AMOXICILLIN 250 MG
2 CAPSULE ORAL 2 TIMES DAILY
Status: DISCONTINUED | OUTPATIENT
Start: 2019-01-01 | End: 2019-01-01 | Stop reason: HOSPADM

## 2019-01-01 RX ORDER — OMEPRAZOLE 20 MG/1
20 CAPSULE, DELAYED RELEASE ORAL DAILY
COMMUNITY

## 2019-01-01 RX ORDER — SUCRALFATE ORAL 1 G/10ML
1 SUSPENSION ORAL
Status: DISCONTINUED | OUTPATIENT
Start: 2019-01-01 | End: 2019-01-01

## 2019-01-01 RX ORDER — MORPHINE SULFATE 4 MG/ML
1 INJECTION, SOLUTION INTRAMUSCULAR; INTRAVENOUS EVERY 4 HOURS PRN
Status: DISCONTINUED | OUTPATIENT
Start: 2019-01-01 | End: 2019-01-01

## 2019-01-01 RX ORDER — HYDRALAZINE HYDROCHLORIDE 20 MG/ML
10 INJECTION INTRAMUSCULAR; INTRAVENOUS EVERY 6 HOURS PRN
Status: DISCONTINUED | OUTPATIENT
Start: 2019-01-01 | End: 2019-01-01

## 2019-01-01 RX ORDER — CLONIDINE HYDROCHLORIDE 0.1 MG/1
0.1 TABLET ORAL 2 TIMES DAILY
Qty: 60 TAB | Refills: 0
Start: 2019-01-01

## 2019-01-01 RX ORDER — TRAMADOL HYDROCHLORIDE 50 MG/1
50 TABLET ORAL EVERY 8 HOURS
COMMUNITY

## 2019-01-01 RX ORDER — TAMSULOSIN HYDROCHLORIDE 0.4 MG/1
0.4 CAPSULE ORAL
Status: DISCONTINUED | OUTPATIENT
Start: 2019-01-01 | End: 2019-01-01

## 2019-01-01 RX ORDER — LABETALOL HYDROCHLORIDE 5 MG/ML
10 INJECTION, SOLUTION INTRAVENOUS EVERY 6 HOURS PRN
Status: DISCONTINUED | OUTPATIENT
Start: 2019-01-01 | End: 2019-01-01

## 2019-01-01 RX ORDER — METOCLOPRAMIDE 10 MG/1
10 TABLET ORAL
Status: DISCONTINUED | OUTPATIENT
Start: 2019-01-01 | End: 2019-01-01 | Stop reason: HOSPADM

## 2019-01-01 RX ORDER — METOPROLOL SUCCINATE 25 MG/1
25 TABLET, EXTENDED RELEASE ORAL DAILY
COMMUNITY

## 2019-01-01 RX ORDER — POLYETHYLENE GLYCOL 3350 17 G/17G
1 POWDER, FOR SOLUTION ORAL
Status: DISCONTINUED | OUTPATIENT
Start: 2019-01-01 | End: 2019-01-01 | Stop reason: HOSPADM

## 2019-01-01 RX ORDER — ASPIRIN 81 MG/1
81 TABLET, CHEWABLE ORAL DAILY
COMMUNITY

## 2019-01-01 RX ORDER — ONDANSETRON 2 MG/ML
4 INJECTION INTRAMUSCULAR; INTRAVENOUS EVERY 4 HOURS PRN
Status: DISCONTINUED | OUTPATIENT
Start: 2019-01-01 | End: 2019-01-01 | Stop reason: HOSPADM

## 2019-01-01 RX ORDER — MORPHINE SULFATE 4 MG/ML
0.5 INJECTION, SOLUTION INTRAMUSCULAR; INTRAVENOUS EVERY 4 HOURS PRN
Status: DISCONTINUED | OUTPATIENT
Start: 2019-01-01 | End: 2019-01-01

## 2019-01-01 RX ORDER — ACETAMINOPHEN 500 MG
500-1000 TABLET ORAL EVERY 6 HOURS PRN
COMMUNITY

## 2019-01-01 RX ORDER — CLONIDINE HYDROCHLORIDE 0.1 MG/1
0.1 TABLET ORAL DAILY
Status: ON HOLD | COMMUNITY
End: 2019-01-01

## 2019-01-01 RX ORDER — BISACODYL 10 MG
10 SUPPOSITORY, RECTAL RECTAL
Status: DISCONTINUED | OUTPATIENT
Start: 2019-01-01 | End: 2019-01-01 | Stop reason: HOSPADM

## 2019-01-01 RX ORDER — GABAPENTIN 100 MG/1
100 CAPSULE ORAL 2 TIMES DAILY
Status: DISCONTINUED | OUTPATIENT
Start: 2019-01-01 | End: 2019-01-01

## 2019-01-01 RX ORDER — TRAMADOL HYDROCHLORIDE 50 MG/1
50 TABLET ORAL EVERY 8 HOURS
Status: DISCONTINUED | OUTPATIENT
Start: 2019-01-01 | End: 2019-01-01

## 2019-01-01 RX ORDER — GABAPENTIN 100 MG/1
100 CAPSULE ORAL 2 TIMES DAILY
Status: DISCONTINUED | OUTPATIENT
Start: 2019-01-01 | End: 2019-01-01 | Stop reason: HOSPADM

## 2019-01-01 RX ORDER — MAGNESIUM OXIDE 420 MG/1
420 TABLET ORAL DAILY
COMMUNITY

## 2019-01-01 RX ORDER — PROCHLORPERAZINE EDISYLATE 5 MG/ML
10 INJECTION INTRAMUSCULAR; INTRAVENOUS EVERY 6 HOURS PRN
Status: DISCONTINUED | OUTPATIENT
Start: 2019-01-01 | End: 2019-01-01 | Stop reason: HOSPADM

## 2019-01-01 RX ADMIN — GABAPENTIN 100 MG: 100 CAPSULE ORAL at 04:56

## 2019-01-01 RX ADMIN — ASPIRIN 81 MG 81 MG: 81 TABLET ORAL at 04:56

## 2019-01-01 RX ADMIN — LEVETIRACETAM 500 MG: 500 TABLET ORAL at 04:56

## 2019-01-01 RX ADMIN — SENNOSIDES AND DOCUSATE SODIUM 2 TABLET: 8.6; 5 TABLET ORAL at 17:56

## 2019-01-01 RX ADMIN — CLONIDINE HYDROCHLORIDE 0.1 MG: 0.1 TABLET ORAL at 17:20

## 2019-01-01 RX ADMIN — METOCLOPRAMIDE 10 MG: 5 INJECTION, SOLUTION INTRAMUSCULAR; INTRAVENOUS at 05:18

## 2019-01-01 RX ADMIN — APIXABAN 2.5 MG: 2.5 TABLET, FILM COATED ORAL at 16:56

## 2019-01-01 RX ADMIN — SODIUM CHLORIDE: 9 INJECTION, SOLUTION INTRAVENOUS at 17:22

## 2019-01-01 RX ADMIN — SODIUM CHLORIDE 500 ML: 9 INJECTION, SOLUTION INTRAVENOUS at 02:31

## 2019-01-01 RX ADMIN — PANTOPRAZOLE SODIUM 40 MG: 40 INJECTION, POWDER, LYOPHILIZED, FOR SOLUTION INTRAVENOUS at 05:30

## 2019-01-01 RX ADMIN — METOCLOPRAMIDE 10 MG: 5 INJECTION, SOLUTION INTRAMUSCULAR; INTRAVENOUS at 05:31

## 2019-01-01 RX ADMIN — MAGNESIUM CITRATE 296 ML: 1.75 LIQUID ORAL at 12:02

## 2019-01-01 RX ADMIN — CLONIDINE HYDROCHLORIDE 0.1 MG: 0.1 TABLET ORAL at 17:21

## 2019-01-01 RX ADMIN — SODIUM CHLORIDE: 9 INJECTION, SOLUTION INTRAVENOUS at 05:39

## 2019-01-01 RX ADMIN — OMEPRAZOLE 20 MG: 20 CAPSULE, DELAYED RELEASE ORAL at 05:01

## 2019-01-01 RX ADMIN — SENNOSIDES AND DOCUSATE SODIUM 2 TABLET: 8.6; 5 TABLET ORAL at 05:29

## 2019-01-01 RX ADMIN — CLONIDINE HYDROCHLORIDE 0.1 MG: 0.1 TABLET ORAL at 04:50

## 2019-01-01 RX ADMIN — METOCLOPRAMIDE 10 MG: 5 INJECTION, SOLUTION INTRAMUSCULAR; INTRAVENOUS at 23:44

## 2019-01-01 RX ADMIN — CLONIDINE HYDROCHLORIDE 0.1 MG: 0.1 TABLET ORAL at 04:56

## 2019-01-01 RX ADMIN — OMEPRAZOLE 20 MG: 20 CAPSULE, DELAYED RELEASE ORAL at 04:56

## 2019-01-01 RX ADMIN — LEVETIRACETAM 500 MG: 500 TABLET ORAL at 18:13

## 2019-01-01 RX ADMIN — APIXABAN 2.5 MG: 2.5 TABLET, FILM COATED ORAL at 17:41

## 2019-01-01 RX ADMIN — APIXABAN 2.5 MG: 2.5 TABLET, FILM COATED ORAL at 17:29

## 2019-01-01 RX ADMIN — APIXABAN 2.5 MG: 2.5 TABLET, FILM COATED ORAL at 05:30

## 2019-01-01 RX ADMIN — ASPIRIN 81 MG 81 MG: 81 TABLET ORAL at 05:30

## 2019-01-01 RX ADMIN — SODIUM CHLORIDE: 9 INJECTION, SOLUTION INTRAVENOUS at 07:20

## 2019-01-01 RX ADMIN — SENNOSIDES AND DOCUSATE SODIUM 2 TABLET: 8.6; 5 TABLET ORAL at 17:40

## 2019-01-01 RX ADMIN — GABAPENTIN 100 MG: 100 CAPSULE ORAL at 17:56

## 2019-01-01 RX ADMIN — METOCLOPRAMIDE 10 MG: 5 INJECTION, SOLUTION INTRAMUSCULAR; INTRAVENOUS at 23:54

## 2019-01-01 RX ADMIN — SENNOSIDES AND DOCUSATE SODIUM 2 TABLET: 8.6; 5 TABLET ORAL at 05:31

## 2019-01-01 RX ADMIN — LEVETIRACETAM 500 MG: 500 TABLET ORAL at 17:21

## 2019-01-01 RX ADMIN — CLONIDINE HYDROCHLORIDE 0.1 MG: 0.1 TABLET ORAL at 06:03

## 2019-01-01 RX ADMIN — APIXABAN 2.5 MG: 2.5 TABLET, FILM COATED ORAL at 17:21

## 2019-01-01 RX ADMIN — METOPROLOL SUCCINATE 25 MG: 25 TABLET, EXTENDED RELEASE ORAL at 04:50

## 2019-01-01 RX ADMIN — CLONIDINE HYDROCHLORIDE 0.1 MG: 0.1 TABLET ORAL at 17:56

## 2019-01-01 RX ADMIN — METOCLOPRAMIDE 10 MG: 5 INJECTION, SOLUTION INTRAMUSCULAR; INTRAVENOUS at 17:42

## 2019-01-01 RX ADMIN — CLONIDINE HYDROCHLORIDE 0.1 MG: 0.1 TABLET ORAL at 06:21

## 2019-01-01 RX ADMIN — GABAPENTIN 100 MG: 100 CAPSULE ORAL at 04:55

## 2019-01-01 RX ADMIN — APIXABAN 2.5 MG: 2.5 TABLET, FILM COATED ORAL at 05:00

## 2019-01-01 RX ADMIN — LEVETIRACETAM 500 MG: 500 TABLET ORAL at 05:01

## 2019-01-01 RX ADMIN — CLONIDINE HYDROCHLORIDE 0.1 MG: 0.1 TABLET ORAL at 17:26

## 2019-01-01 RX ADMIN — CLONIDINE HYDROCHLORIDE 0.1 MG: 0.1 TABLET ORAL at 05:52

## 2019-01-01 RX ADMIN — GABAPENTIN 100 MG: 100 CAPSULE ORAL at 05:00

## 2019-01-01 RX ADMIN — APIXABAN 2.5 MG: 2.5 TABLET, FILM COATED ORAL at 06:22

## 2019-01-01 RX ADMIN — CLONIDINE HYDROCHLORIDE 0.1 MG: 0.1 TABLET ORAL at 17:29

## 2019-01-01 RX ADMIN — ASPIRIN 81 MG 81 MG: 81 TABLET ORAL at 05:52

## 2019-01-01 RX ADMIN — METOCLOPRAMIDE 10 MG: 5 INJECTION, SOLUTION INTRAMUSCULAR; INTRAVENOUS at 18:13

## 2019-01-01 RX ADMIN — CLONIDINE HYDROCHLORIDE 0.1 MG: 0.1 TABLET ORAL at 18:17

## 2019-01-01 RX ADMIN — APIXABAN 2.5 MG: 2.5 TABLET, FILM COATED ORAL at 05:36

## 2019-01-01 RX ADMIN — CYCLOBENZAPRINE 10 MG: 10 TABLET, FILM COATED ORAL at 23:39

## 2019-01-01 RX ADMIN — APIXABAN 2.5 MG: 2.5 TABLET, FILM COATED ORAL at 04:56

## 2019-01-01 RX ADMIN — OMEPRAZOLE 20 MG: 20 CAPSULE, DELAYED RELEASE ORAL at 05:35

## 2019-01-01 RX ADMIN — TRAMADOL HYDROCHLORIDE 50 MG: 50 TABLET, FILM COATED ORAL at 14:37

## 2019-01-01 RX ADMIN — GABAPENTIN 100 MG: 100 CAPSULE ORAL at 18:12

## 2019-01-01 RX ADMIN — APIXABAN 2.5 MG: 2.5 TABLET, FILM COATED ORAL at 05:29

## 2019-01-01 RX ADMIN — METOCLOPRAMIDE 10 MG: 10 TABLET ORAL at 06:04

## 2019-01-01 RX ADMIN — GABAPENTIN 100 MG: 100 CAPSULE ORAL at 05:31

## 2019-01-01 RX ADMIN — METOCLOPRAMIDE 10 MG: 5 INJECTION, SOLUTION INTRAMUSCULAR; INTRAVENOUS at 04:57

## 2019-01-01 RX ADMIN — APIXABAN 2.5 MG: 2.5 TABLET, FILM COATED ORAL at 18:12

## 2019-01-01 RX ADMIN — CLONIDINE HYDROCHLORIDE 0.1 MG: 0.1 TABLET ORAL at 05:36

## 2019-01-01 RX ADMIN — SENNOSIDES AND DOCUSATE SODIUM 2 TABLET: 8.6; 5 TABLET ORAL at 18:00

## 2019-01-01 RX ADMIN — OMEPRAZOLE 20 MG: 20 CAPSULE, DELAYED RELEASE ORAL at 06:04

## 2019-01-01 RX ADMIN — APIXABAN 2.5 MG: 2.5 TABLET, FILM COATED ORAL at 18:17

## 2019-01-01 RX ADMIN — LEVETIRACETAM 500 MG: 500 TABLET ORAL at 06:21

## 2019-01-01 RX ADMIN — CLONIDINE HYDROCHLORIDE 0.1 MG: 0.1 TABLET ORAL at 17:41

## 2019-01-01 RX ADMIN — APIXABAN 2.5 MG: 2.5 TABLET, FILM COATED ORAL at 17:58

## 2019-01-01 RX ADMIN — GABAPENTIN 100 MG: 100 CAPSULE ORAL at 17:26

## 2019-01-01 RX ADMIN — LEVETIRACETAM 500 MG: 500 TABLET ORAL at 05:29

## 2019-01-01 RX ADMIN — SENNOSIDES AND DOCUSATE SODIUM 2 TABLET: 8.6; 5 TABLET ORAL at 18:13

## 2019-01-01 RX ADMIN — LEVETIRACETAM 500 MG: 500 TABLET ORAL at 05:51

## 2019-01-01 RX ADMIN — METOCLOPRAMIDE 10 MG: 5 INJECTION, SOLUTION INTRAMUSCULAR; INTRAVENOUS at 12:24

## 2019-01-01 RX ADMIN — METOCLOPRAMIDE 10 MG: 10 TABLET ORAL at 19:46

## 2019-01-01 RX ADMIN — ASPIRIN 81 MG 81 MG: 81 TABLET ORAL at 06:04

## 2019-01-01 RX ADMIN — ONDANSETRON 4 MG: 2 INJECTION INTRAMUSCULAR; INTRAVENOUS at 04:58

## 2019-01-01 RX ADMIN — SODIUM CHLORIDE: 9 INJECTION, SOLUTION INTRAVENOUS at 19:01

## 2019-01-01 RX ADMIN — METOCLOPRAMIDE 10 MG: 10 TABLET ORAL at 06:22

## 2019-01-01 RX ADMIN — GABAPENTIN 100 MG: 100 CAPSULE ORAL at 17:29

## 2019-01-01 RX ADMIN — ASPIRIN 81 MG 81 MG: 81 TABLET ORAL at 05:00

## 2019-01-01 RX ADMIN — SENNOSIDES AND DOCUSATE SODIUM 2 TABLET: 8.6; 5 TABLET ORAL at 17:29

## 2019-01-01 RX ADMIN — SENNOSIDES AND DOCUSATE SODIUM 2 TABLET: 8.6; 5 TABLET ORAL at 06:04

## 2019-01-01 RX ADMIN — SODIUM CHLORIDE: 9 INJECTION, SOLUTION INTRAVENOUS at 20:57

## 2019-01-01 RX ADMIN — CLONIDINE HYDROCHLORIDE 0.1 MG: 0.1 TABLET ORAL at 05:00

## 2019-01-01 RX ADMIN — ASPIRIN 81 MG 81 MG: 81 TABLET ORAL at 05:36

## 2019-01-01 RX ADMIN — METOCLOPRAMIDE 10 MG: 5 INJECTION, SOLUTION INTRAMUSCULAR; INTRAVENOUS at 05:01

## 2019-01-01 RX ADMIN — METOCLOPRAMIDE 10 MG: 5 INJECTION, SOLUTION INTRAMUSCULAR; INTRAVENOUS at 12:06

## 2019-01-01 RX ADMIN — METOCLOPRAMIDE 10 MG: 10 TABLET ORAL at 12:02

## 2019-01-01 RX ADMIN — OMEPRAZOLE 20 MG: 20 CAPSULE, DELAYED RELEASE ORAL at 04:58

## 2019-01-01 RX ADMIN — CLONIDINE HYDROCHLORIDE 0.1 MG: 0.1 TABLET ORAL at 05:30

## 2019-01-01 RX ADMIN — GABAPENTIN 100 MG: 100 CAPSULE ORAL at 18:54

## 2019-01-01 RX ADMIN — PANTOPRAZOLE SODIUM 40 MG: 40 INJECTION, POWDER, LYOPHILIZED, FOR SOLUTION INTRAVENOUS at 11:12

## 2019-01-01 RX ADMIN — METOCLOPRAMIDE 10 MG: 5 INJECTION, SOLUTION INTRAMUSCULAR; INTRAVENOUS at 13:47

## 2019-01-01 RX ADMIN — PROCHLORPERAZINE EDISYLATE 10 MG: 5 INJECTION INTRAMUSCULAR; INTRAVENOUS at 03:49

## 2019-01-01 RX ADMIN — SODIUM CHLORIDE: 9 INJECTION, SOLUTION INTRAVENOUS at 23:11

## 2019-01-01 RX ADMIN — GABAPENTIN 100 MG: 100 CAPSULE ORAL at 17:20

## 2019-01-01 RX ADMIN — LEVETIRACETAM 500 MG: 500 TABLET ORAL at 06:04

## 2019-01-01 RX ADMIN — METOCLOPRAMIDE 10 MG: 10 TABLET ORAL at 17:21

## 2019-01-01 RX ADMIN — POTASSIUM CHLORIDE 40 MEQ: 1500 TABLET, EXTENDED RELEASE ORAL at 11:41

## 2019-01-01 RX ADMIN — METOCLOPRAMIDE 10 MG: 10 TABLET ORAL at 20:29

## 2019-01-01 RX ADMIN — SODIUM CHLORIDE: 9 INJECTION, SOLUTION INTRAVENOUS at 16:56

## 2019-01-01 RX ADMIN — LEVETIRACETAM 500 MG: 500 TABLET ORAL at 05:30

## 2019-01-01 RX ADMIN — LEVETIRACETAM 500 MG: 500 TABLET ORAL at 17:41

## 2019-01-01 RX ADMIN — LEVETIRACETAM 500 MG: 500 TABLET ORAL at 04:50

## 2019-01-01 RX ADMIN — POLYETHYLENE GLYCOL 3350 1 PACKET: 17 POWDER, FOR SOLUTION ORAL at 05:35

## 2019-01-01 RX ADMIN — GABAPENTIN 100 MG: 100 CAPSULE ORAL at 06:04

## 2019-01-01 RX ADMIN — LEVETIRACETAM 500 MG: 500 TABLET ORAL at 18:54

## 2019-01-01 RX ADMIN — SODIUM CHLORIDE: 9 INJECTION, SOLUTION INTRAVENOUS at 13:42

## 2019-01-01 RX ADMIN — ASPIRIN 81 MG 81 MG: 81 TABLET ORAL at 05:01

## 2019-01-01 RX ADMIN — GABAPENTIN 100 MG: 100 CAPSULE ORAL at 16:56

## 2019-01-01 RX ADMIN — GABAPENTIN 100 MG: 100 CAPSULE ORAL at 05:29

## 2019-01-01 RX ADMIN — SENNOSIDES AND DOCUSATE SODIUM 2 TABLET: 8.6; 5 TABLET ORAL at 18:54

## 2019-01-01 RX ADMIN — SODIUM CHLORIDE: 9 INJECTION, SOLUTION INTRAVENOUS at 17:57

## 2019-01-01 RX ADMIN — GABAPENTIN 100 MG: 100 CAPSULE ORAL at 17:42

## 2019-01-01 RX ADMIN — ASPIRIN 81 MG 81 MG: 81 TABLET ORAL at 05:29

## 2019-01-01 RX ADMIN — CLONIDINE HYDROCHLORIDE 0.1 MG: 0.1 TABLET ORAL at 18:54

## 2019-01-01 RX ADMIN — OMEPRAZOLE 20 MG: 20 CAPSULE, DELAYED RELEASE ORAL at 05:30

## 2019-01-01 RX ADMIN — APIXABAN 2.5 MG: 2.5 TABLET, FILM COATED ORAL at 17:20

## 2019-01-01 RX ADMIN — METOCLOPRAMIDE 10 MG: 5 INJECTION, SOLUTION INTRAMUSCULAR; INTRAVENOUS at 11:37

## 2019-01-01 RX ADMIN — METOCLOPRAMIDE 10 MG: 5 INJECTION, SOLUTION INTRAMUSCULAR; INTRAVENOUS at 18:00

## 2019-01-01 RX ADMIN — CLONIDINE HYDROCHLORIDE 0.1 MG: 0.1 TABLET ORAL at 17:59

## 2019-01-01 RX ADMIN — LEVETIRACETAM 500 MG: 500 TABLET ORAL at 17:29

## 2019-01-01 RX ADMIN — GABAPENTIN 100 MG: 100 CAPSULE ORAL at 06:21

## 2019-01-01 RX ADMIN — METOCLOPRAMIDE 10 MG: 5 INJECTION, SOLUTION INTRAMUSCULAR; INTRAVENOUS at 23:11

## 2019-01-01 RX ADMIN — LEVETIRACETAM 500 MG: 500 TABLET ORAL at 05:00

## 2019-01-01 RX ADMIN — METOCLOPRAMIDE 10 MG: 10 TABLET ORAL at 05:00

## 2019-01-01 RX ADMIN — OMEPRAZOLE 20 MG: 20 CAPSULE, DELAYED RELEASE ORAL at 06:21

## 2019-01-01 RX ADMIN — APIXABAN 2.5 MG: 2.5 TABLET, FILM COATED ORAL at 05:01

## 2019-01-01 RX ADMIN — METOCLOPRAMIDE 10 MG: 5 INJECTION, SOLUTION INTRAMUSCULAR; INTRAVENOUS at 17:20

## 2019-01-01 RX ADMIN — SODIUM CHLORIDE: 9 INJECTION, SOLUTION INTRAVENOUS at 23:27

## 2019-01-01 RX ADMIN — SODIUM CHLORIDE: 9 INJECTION, SOLUTION INTRAVENOUS at 05:21

## 2019-01-01 RX ADMIN — GABAPENTIN 100 MG: 100 CAPSULE ORAL at 08:59

## 2019-01-01 RX ADMIN — GABAPENTIN 100 MG: 100 CAPSULE ORAL at 05:36

## 2019-01-01 RX ADMIN — LEVETIRACETAM 500 MG: 500 TABLET ORAL at 05:35

## 2019-01-01 RX ADMIN — GABAPENTIN 100 MG: 100 CAPSULE ORAL at 17:21

## 2019-01-01 RX ADMIN — METOCLOPRAMIDE 10 MG: 5 INJECTION, SOLUTION INTRAMUSCULAR; INTRAVENOUS at 23:53

## 2019-01-01 RX ADMIN — LEVETIRACETAM 500 MG: 500 TABLET ORAL at 16:56

## 2019-01-01 RX ADMIN — METOCLOPRAMIDE 10 MG: 5 INJECTION, SOLUTION INTRAMUSCULAR; INTRAVENOUS at 23:06

## 2019-01-01 RX ADMIN — SODIUM CHLORIDE 1000 ML: 9 INJECTION, SOLUTION INTRAVENOUS at 20:49

## 2019-01-01 RX ADMIN — GABAPENTIN 100 MG: 100 CAPSULE ORAL at 18:18

## 2019-01-01 RX ADMIN — MORPHINE SULFATE 1 MG: 4 INJECTION INTRAVENOUS at 10:33

## 2019-01-01 RX ADMIN — METOCLOPRAMIDE 10 MG: 10 TABLET ORAL at 11:06

## 2019-01-01 RX ADMIN — SODIUM CHLORIDE: 9 INJECTION, SOLUTION INTRAVENOUS at 19:02

## 2019-01-01 RX ADMIN — APIXABAN 2.5 MG: 2.5 TABLET, FILM COATED ORAL at 06:04

## 2019-01-01 RX ADMIN — MAGNESIUM HYDROXIDE 30 ML: 400 SUSPENSION ORAL at 16:37

## 2019-01-01 RX ADMIN — METOCLOPRAMIDE 10 MG: 5 INJECTION, SOLUTION INTRAMUSCULAR; INTRAVENOUS at 18:20

## 2019-01-01 RX ADMIN — METOCLOPRAMIDE 10 MG: 5 INJECTION, SOLUTION INTRAMUSCULAR; INTRAVENOUS at 01:16

## 2019-01-01 RX ADMIN — METOCLOPRAMIDE 10 MG: 5 INJECTION, SOLUTION INTRAMUSCULAR; INTRAVENOUS at 05:29

## 2019-01-01 RX ADMIN — METOCLOPRAMIDE 10 MG: 5 INJECTION, SOLUTION INTRAMUSCULAR; INTRAVENOUS at 18:54

## 2019-01-01 RX ADMIN — LEVETIRACETAM 500 MG: 500 TABLET ORAL at 17:20

## 2019-01-01 RX ADMIN — HYDRALAZINE HYDROCHLORIDE 10 MG: 20 INJECTION INTRAMUSCULAR; INTRAVENOUS at 11:53

## 2019-01-01 RX ADMIN — TRAMADOL HYDROCHLORIDE 50 MG: 50 TABLET, FILM COATED ORAL at 06:23

## 2019-01-01 RX ADMIN — GABAPENTIN 100 MG: 100 CAPSULE ORAL at 17:58

## 2019-01-01 RX ADMIN — SENNOSIDES AND DOCUSATE SODIUM 2 TABLET: 8.6; 5 TABLET ORAL at 05:01

## 2019-01-01 RX ADMIN — SODIUM CHLORIDE: 9 INJECTION, SOLUTION INTRAVENOUS at 22:25

## 2019-01-01 RX ADMIN — METOCLOPRAMIDE 10 MG: 10 TABLET ORAL at 21:58

## 2019-01-01 RX ADMIN — SODIUM CHLORIDE: 9 INJECTION, SOLUTION INTRAVENOUS at 06:27

## 2019-01-01 RX ADMIN — LEVETIRACETAM 500 MG: 500 TABLET ORAL at 17:56

## 2019-01-01 RX ADMIN — SODIUM CHLORIDE: 9 INJECTION, SOLUTION INTRAVENOUS at 05:44

## 2019-01-01 RX ADMIN — PROCHLORPERAZINE EDISYLATE 10 MG: 5 INJECTION INTRAMUSCULAR; INTRAVENOUS at 10:09

## 2019-01-01 RX ADMIN — APIXABAN 2.5 MG: 2.5 TABLET, FILM COATED ORAL at 18:54

## 2019-01-01 RX ADMIN — APIXABAN 2.5 MG: 2.5 TABLET, FILM COATED ORAL at 05:52

## 2019-01-01 RX ADMIN — SENNOSIDES AND DOCUSATE SODIUM 2 TABLET: 8.6; 5 TABLET ORAL at 05:36

## 2019-01-01 RX ADMIN — SODIUM CHLORIDE: 9 INJECTION, SOLUTION INTRAVENOUS at 10:08

## 2019-01-01 RX ADMIN — ASPIRIN 81 MG 81 MG: 81 TABLET ORAL at 04:57

## 2019-01-01 RX ADMIN — APIXABAN 2.5 MG: 2.5 TABLET, FILM COATED ORAL at 04:50

## 2019-01-01 RX ADMIN — SODIUM CHLORIDE: 9 INJECTION, SOLUTION INTRAVENOUS at 12:59

## 2019-01-01 RX ADMIN — CLONIDINE HYDROCHLORIDE 0.1 MG: 0.1 TABLET ORAL at 18:12

## 2019-01-01 RX ADMIN — SODIUM CHLORIDE: 9 INJECTION, SOLUTION INTRAVENOUS at 16:58

## 2019-01-01 RX ADMIN — LEVETIRACETAM 500 MG: 500 TABLET ORAL at 17:59

## 2019-01-01 RX ADMIN — LEVETIRACETAM 500 MG: 500 TABLET ORAL at 18:19

## 2019-01-01 RX ADMIN — LEVETIRACETAM 500 MG: 500 TABLET ORAL at 17:26

## 2019-01-01 RX ADMIN — GABAPENTIN 100 MG: 100 CAPSULE ORAL at 05:51

## 2019-01-01 RX ADMIN — INFLUENZA A VIRUS A/MICHIGAN/45/2015 X-275 (H1N1) ANTIGEN (FORMALDEHYDE INACTIVATED), INFLUENZA A VIRUS A/SINGAPORE/INFIMH-16-0019/2016 IVR-186 (H3N2) ANTIGEN (FORMALDEHYDE INACTIVATED), AND INFLUENZA B VIRUS B/MARYLAND/15/2016 BX-69A (A B/COLORADO/6/2017-LIKE VIRUS) ANTIGEN (FORMALDEHYDE INACTIVATED) 0.5 ML: 60; 60; 60 INJECTION, SUSPENSION INTRAMUSCULAR at 04:53

## 2019-01-01 RX ADMIN — ASPIRIN 81 MG 81 MG: 81 TABLET ORAL at 06:22

## 2019-01-01 RX ADMIN — APIXABAN 2.5 MG: 2.5 TABLET, FILM COATED ORAL at 17:56

## 2019-01-01 RX ADMIN — OMEPRAZOLE 20 MG: 20 CAPSULE, DELAYED RELEASE ORAL at 05:52

## 2019-01-01 RX ADMIN — METOCLOPRAMIDE 10 MG: 5 INJECTION, SOLUTION INTRAMUSCULAR; INTRAVENOUS at 05:53

## 2019-01-01 RX ADMIN — METOCLOPRAMIDE 10 MG: 5 INJECTION, SOLUTION INTRAMUSCULAR; INTRAVENOUS at 11:35

## 2019-01-01 RX ADMIN — METOCLOPRAMIDE 10 MG: 10 TABLET ORAL at 17:26

## 2019-01-01 RX ADMIN — MAGNESIUM HYDROXIDE 30 ML: 400 SUSPENSION ORAL at 05:05

## 2019-01-01 RX ADMIN — CLONIDINE HYDROCHLORIDE 0.1 MG: 0.1 TABLET ORAL at 16:56

## 2019-01-01 RX ADMIN — APIXABAN 2.5 MG: 2.5 TABLET, FILM COATED ORAL at 17:26

## 2019-01-01 RX ADMIN — SENNOSIDES AND DOCUSATE SODIUM 2 TABLET: 8.6; 5 TABLET ORAL at 17:25

## 2019-01-01 RX ADMIN — GABAPENTIN 100 MG: 100 CAPSULE ORAL at 05:01

## 2019-01-01 RX ADMIN — METOCLOPRAMIDE 10 MG: 10 TABLET ORAL at 16:56

## 2019-01-01 RX ADMIN — CLONIDINE HYDROCHLORIDE 0.1 MG: 0.1 TABLET ORAL at 05:01

## 2019-01-01 ASSESSMENT — ENCOUNTER SYMPTOMS
FOCAL WEAKNESS: 0
BACK PAIN: 0
NECK PAIN: 0
FOCAL WEAKNESS: 0
SHORTNESS OF BREATH: 0
SPUTUM PRODUCTION: 0
DIARRHEA: 0
DEPRESSION: 0
WEAKNESS: 0
ABDOMINAL PAIN: 0
NERVOUS/ANXIOUS: 0
EYE DISCHARGE: 0
EYE PAIN: 0
WEIGHT LOSS: 0
SHORTNESS OF BREATH: 0
DIAPHORESIS: 0
VOMITING: 0
PALPITATIONS: 0
FEVER: 0
SPEECH CHANGE: 0
SHORTNESS OF BREATH: 0
NECK PAIN: 0
CHILLS: 0
SPEECH CHANGE: 0
INSOMNIA: 0
SPEECH CHANGE: 0
ORTHOPNEA: 0
INSOMNIA: 0
EYE REDNESS: 0
WEIGHT LOSS: 0
CHILLS: 0
FOCAL WEAKNESS: 0
INSOMNIA: 0
HALLUCINATIONS: 0
EYE PAIN: 0
ORTHOPNEA: 0
DIAPHORESIS: 0
PALPITATIONS: 0
VOMITING: 0
WEAKNESS: 0
ABDOMINAL PAIN: 0
EYE DISCHARGE: 0
NERVOUS/ANXIOUS: 0
DIARRHEA: 0
SPUTUM PRODUCTION: 0
FLANK PAIN: 0
VOMITING: 0
WHEEZING: 0
NAUSEA: 1
SHORTNESS OF BREATH: 0
ORTHOPNEA: 0
FOCAL WEAKNESS: 0
WEAKNESS: 0
INSOMNIA: 0
SEIZURES: 0
BRUISES/BLEEDS EASILY: 0
PHOTOPHOBIA: 0
INSOMNIA: 0
FOCAL WEAKNESS: 0
COUGH: 0
EYE REDNESS: 0
WEAKNESS: 0
NECK PAIN: 0
SPUTUM PRODUCTION: 0
ORTHOPNEA: 0
ORTHOPNEA: 0
DIAPHORESIS: 0
HEADACHES: 0
FOCAL WEAKNESS: 0
FOCAL WEAKNESS: 0
WHEEZING: 0
ABDOMINAL PAIN: 0
FOCAL WEAKNESS: 0
WEAKNESS: 0
EYE PAIN: 0
NECK PAIN: 0
ABDOMINAL PAIN: 0
MYALGIAS: 0
PALPITATIONS: 0
ABDOMINAL PAIN: 1
ORTHOPNEA: 0
NERVOUS/ANXIOUS: 0
PHOTOPHOBIA: 0
PHOTOPHOBIA: 0
CHILLS: 0
FLANK PAIN: 0
SPUTUM PRODUCTION: 0
DIARRHEA: 0
NECK PAIN: 0
FLANK PAIN: 0
NECK PAIN: 0
ORTHOPNEA: 0
WEIGHT LOSS: 0
SENSORY CHANGE: 0
VOMITING: 0
HALLUCINATIONS: 0
WEAKNESS: 0
STRIDOR: 0
COUGH: 0
EYE PAIN: 0
SHORTNESS OF BREATH: 0
EYE PAIN: 0
ABDOMINAL PAIN: 0
DIARRHEA: 0
DIZZINESS: 0
EYE PAIN: 0
SPEECH CHANGE: 0
COUGH: 0
DIZZINESS: 0
BLURRED VISION: 0
EYE PAIN: 0
WEIGHT LOSS: 0
SPEECH CHANGE: 0
NAUSEA: 1
TREMORS: 0
HEARTBURN: 0
WEIGHT LOSS: 0
WEAKNESS: 0
NECK PAIN: 0
TREMORS: 0
COUGH: 0
PHOTOPHOBIA: 0
SHORTNESS OF BREATH: 0
PHOTOPHOBIA: 0
MYALGIAS: 0
DIAPHORESIS: 0
MYALGIAS: 0
BLURRED VISION: 0
BACK PAIN: 0
DIARRHEA: 0
SPUTUM PRODUCTION: 0
WEIGHT LOSS: 0
DIARRHEA: 0
NAUSEA: 0
NERVOUS/ANXIOUS: 0
SHORTNESS OF BREATH: 1
BLOOD IN STOOL: 0
DIAPHORESIS: 0
SENSORY CHANGE: 0
SPEECH CHANGE: 0
ORTHOPNEA: 0
EYE DISCHARGE: 0
SPUTUM PRODUCTION: 0
CHILLS: 0
FLANK PAIN: 0
STRIDOR: 0
INSOMNIA: 0
DIAPHORESIS: 0
SEIZURES: 0
EYE DISCHARGE: 0
ABDOMINAL PAIN: 0
EYE DISCHARGE: 0
VOMITING: 0
SPEECH CHANGE: 0
ABDOMINAL PAIN: 0
DIAPHORESIS: 0
WHEEZING: 0
NAUSEA: 0
FOCAL WEAKNESS: 0
SHORTNESS OF BREATH: 0
ORTHOPNEA: 0
HALLUCINATIONS: 0
NECK PAIN: 0
DIAPHORESIS: 0
WEIGHT LOSS: 0
DIAPHORESIS: 0
FOCAL WEAKNESS: 0
COUGH: 0
DIARRHEA: 0
INSOMNIA: 0
EYE DISCHARGE: 0
INSOMNIA: 0
DIARRHEA: 0
BLURRED VISION: 0
BACK PAIN: 0
HEARTBURN: 0
FLANK PAIN: 0
SEIZURES: 0
PALPITATIONS: 0
WEAKNESS: 0
NAUSEA: 1
WEAKNESS: 0
SPUTUM PRODUCTION: 0
ABDOMINAL PAIN: 0
DIARRHEA: 0
SPEECH CHANGE: 0
WEIGHT LOSS: 0
WEIGHT LOSS: 0
HEADACHES: 0
FEVER: 0
HALLUCINATIONS: 0
MYALGIAS: 0
SENSORY CHANGE: 0
SEIZURES: 0
WEAKNESS: 1
EYE PAIN: 0
NECK PAIN: 0
STRIDOR: 0
SORE THROAT: 0
WEAKNESS: 0
TREMORS: 0
ABDOMINAL PAIN: 0
INSOMNIA: 0
NECK PAIN: 0
SPEECH CHANGE: 0
DIAPHORESIS: 0
VOMITING: 0
SHORTNESS OF BREATH: 0
EYE PAIN: 0
BACK PAIN: 0
DIARRHEA: 0
SPUTUM PRODUCTION: 0
MYALGIAS: 0
SPUTUM PRODUCTION: 0
SPUTUM PRODUCTION: 0

## 2019-01-01 ASSESSMENT — LIFESTYLE VARIABLES
TOTAL SCORE: 0
HAVE YOU EVER FELT YOU SHOULD CUT DOWN ON YOUR DRINKING: NO
CONSUMPTION TOTAL: NEGATIVE
DO YOU DRINK ALCOHOL: NO
AVERAGE NUMBER OF DAYS PER WEEK YOU HAVE A DRINK CONTAINING ALCOHOL: 1
SUBSTANCE_ABUSE: 0
EVER HAD A DRINK FIRST THING IN THE MORNING TO STEADY YOUR NERVES TO GET RID OF A HANGOVER: NO
TOTAL SCORE: 0
HOW MANY TIMES IN THE PAST YEAR HAVE YOU HAD 5 OR MORE DRINKS IN A DAY: 0
HAVE PEOPLE ANNOYED YOU BY CRITICIZING YOUR DRINKING: NO
TOTAL SCORE: 0
SUBSTANCE_ABUSE: 0
ON A TYPICAL DAY WHEN YOU DRINK ALCOHOL HOW MANY DRINKS DO YOU HAVE: 1
EVER_SMOKED: YES
EVER_SMOKED: YES
ALCOHOL_USE: NO
EVER FELT BAD OR GUILTY ABOUT YOUR DRINKING: NO
SUBSTANCE_ABUSE: 0

## 2019-01-01 ASSESSMENT — COGNITIVE AND FUNCTIONAL STATUS - GENERAL
SUGGESTED CMS G CODE MODIFIER DAILY ACTIVITY: CL
HELP NEEDED FOR BATHING: A LOT
TOILETING: A LOT
CLIMB 3 TO 5 STEPS WITH RAILING: TOTAL
TOILETING: A LOT
DRESSING REGULAR UPPER BODY CLOTHING: A LOT
TURNING FROM BACK TO SIDE WHILE IN FLAT BAD: A LOT
MOVING TO AND FROM BED TO CHAIR: A LITTLE
CLIMB 3 TO 5 STEPS WITH RAILING: TOTAL
DRESSING REGULAR LOWER BODY CLOTHING: A LOT
PERSONAL GROOMING: A LITTLE
SUGGESTED CMS G CODE MODIFIER MOBILITY: CL
EATING MEALS: A LITTLE
MOBILITY SCORE: 9
DRESSING REGULAR LOWER BODY CLOTHING: A LOT
MOVING FROM LYING ON BACK TO SITTING ON SIDE OF FLAT BED: UNABLE
CLIMB 3 TO 5 STEPS WITH RAILING: A LOT
WALKING IN HOSPITAL ROOM: A LOT
MOBILITY SCORE: 12
EATING MEALS: A LITTLE
TURNING FROM BACK TO SIDE WHILE IN FLAT BAD: A LITTLE
DAILY ACTIVITIY SCORE: 13
MOVING TO AND FROM BED TO CHAIR: A LOT
STANDING UP FROM CHAIR USING ARMS: A LOT
SUGGESTED CMS G CODE MODIFIER MOBILITY: CM
DRESSING REGULAR UPPER BODY CLOTHING: A LITTLE
SUGGESTED CMS G CODE MODIFIER DAILY ACTIVITY: CK
MOBILITY SCORE: 9
DRESSING REGULAR LOWER BODY CLOTHING: A LOT
HELP NEEDED FOR BATHING: A LOT
SUGGESTED CMS G CODE MODIFIER DAILY ACTIVITY: CK
WALKING IN HOSPITAL ROOM: A LOT
SUGGESTED CMS G CODE MODIFIER MOBILITY: CM
MOVING FROM LYING ON BACK TO SITTING ON SIDE OF FLAT BED: UNABLE
MOVING FROM LYING ON BACK TO SITTING ON SIDE OF FLAT BED: UNABLE
WALKING IN HOSPITAL ROOM: A LOT
MOVING TO AND FROM BED TO CHAIR: A LOT
TURNING FROM BACK TO SIDE WHILE IN FLAT BAD: A LOT
TOILETING: A LOT
CLIMB 3 TO 5 STEPS WITH RAILING: TOTAL
MOVING TO AND FROM BED TO CHAIR: UNABLE
WALKING IN HOSPITAL ROOM: TOTAL
MOBILITY SCORE: 12
TURNING FROM BACK TO SIDE WHILE IN FLAT BAD: A LITTLE
DAILY ACTIVITIY SCORE: 18
HELP NEEDED FOR BATHING: A LITTLE
DAILY ACTIVITIY SCORE: 15
STANDING UP FROM CHAIR USING ARMS: A LOT
STANDING UP FROM CHAIR USING ARMS: A LOT
DRESSING REGULAR UPPER BODY CLOTHING: A LITTLE
MOVING FROM LYING ON BACK TO SITTING ON SIDE OF FLAT BED: UNABLE
PERSONAL GROOMING: A LOT
SUGGESTED CMS G CODE MODIFIER MOBILITY: CL
STANDING UP FROM CHAIR USING ARMS: A LOT

## 2019-01-01 ASSESSMENT — PATIENT HEALTH QUESTIONNAIRE - PHQ9
2. FEELING DOWN, DEPRESSED, IRRITABLE, OR HOPELESS: NOT AT ALL
SUM OF ALL RESPONSES TO PHQ9 QUESTIONS 1 AND 2: 0
1. LITTLE INTEREST OR PLEASURE IN DOING THINGS: NOT AT ALL

## 2019-01-01 ASSESSMENT — CHA2DS2 SCORE
AGE 65 TO 74: NO
CHA2DS2 VASC SCORE: 4
HYPERTENSION: YES
AGE 75 OR GREATER: YES
CHF OR LEFT VENTRICULAR DYSFUNCTION: NO
PRIOR STROKE OR TIA OR THROMBOEMBOLISM: NO
VASCULAR DISEASE: NO
SEX: MALE
DIABETES: YES

## 2019-01-01 ASSESSMENT — COPD QUESTIONNAIRES
DURING THE PAST 4 WEEKS HOW MUCH DID YOU FEEL SHORT OF BREATH: NONE/LITTLE OF THE TIME
HAVE YOU SMOKED AT LEAST 100 CIGARETTES IN YOUR ENTIRE LIFE: YES
COPD SCREENING SCORE: 5
DO YOU EVER COUGH UP ANY MUCUS OR PHLEGM?: NO/ONLY WITH OCCASIONAL COLDS OR INFECTIONS

## 2019-01-01 ASSESSMENT — GAIT ASSESSMENTS
GAIT LEVEL OF ASSIST: UNABLE TO PARTICIPATE
GAIT LEVEL OF ASSIST: UNABLE TO PARTICIPATE

## 2019-01-01 ASSESSMENT — ACTIVITIES OF DAILY LIVING (ADL): TOILETING: INDEPENDENT

## 2019-12-09 PROBLEM — R53.1 GENERAL WEAKNESS: Status: ACTIVE | Noted: 2017-03-22

## 2019-12-09 PROBLEM — E87.1 HYPONATREMIA: Status: ACTIVE | Noted: 2019-01-01

## 2019-12-09 PROBLEM — K56.7 ILEUS (HCC): Status: ACTIVE | Noted: 2019-01-01

## 2019-12-09 NOTE — ASSESSMENT & PLAN NOTE
Denies active chest pain but does report some shortness of breath  Repeat serial cardiac enzymes flat.  Echo preserved LV function  No chest pain, follow clinically  Control rapid A. fib  Monitor telemetry

## 2019-12-09 NOTE — ASSESSMENT & PLAN NOTE
Hypovolemic hyponatremia-patient clinically dehydrated-sodium improved to 137  Continue IV NS,  Improved

## 2019-12-09 NOTE — ED PROVIDER NOTES
ED Provider Note    CHIEF COMPLAINT  Chief Complaint   Patient presents with   • Shortness of Breath   • Hypotension       HPI  Jersey Alexis is a 89 y.o. male who presents to the emergency department with chief complaint of shortness of breath and some hypotension.  The patient has a history of atrial flutter with a pacemaker and is currently staying in a rehab facility secondary to a left lower extremity tibial plateau fracture.  He states he is not on a blood thinner due to a history of severe bleeding.  He states the last few days has been feeling pretty weak and generally had a decreased appetite and unable to really eat or drink much, he states he has had a balloon dilatation of his esophagus in the past which is states he is having problems getting food down.  Denies feeling anything is stuck currently.  Denies any nausea or vomiting.  Denies any chest pain.  He has been feeling more short of breath especially when he lays flat over the last few days.  He has had a decreased appetite and has not really urinated much over the last 24 hours    He denies abdominal pain flank pain fevers chills cough nasal congestion.    At his rehab facility throughout the day he is been hypotensive they have been unable he IV access and an x-ray was concerning for congestive heart failure exacerbation    REVIEW OF SYSTEMS  Positives as above. Pertinent negatives include nausea vomiting chest pain fevers chills cough hemoptysis abdominal pain constipation diarrhea flank pain dysuria testicular pain  All other review of systems are negative    PAST MEDICAL HISTORY   has a past medical history of A-fib (HCC), Arrhythmia, Arthritis, Cataract, Diabetes (HCC), GERD (gastroesophageal reflux disease), Heart burn, Hyperlipidemia, Muscle disorder, and Urinary incontinence.    SOCIAL HISTORY  Social History     Tobacco Use   • Smoking status: Former Smoker     Packs/day: 1.50     Years: 29.00     Pack years: 43.50     Types: Cigarettes,  "Pipe     Last attempt to quit: 1980     Years since quittin.9   • Smokeless tobacco: Never Used   • Tobacco comment: Started smoking at age 21   Substance and Sexual Activity   • Alcohol use: No   • Drug use: No   • Sexual activity: Never     Partners: Female     Birth control/protection: Post-Menopausal       SURGICAL HISTORY   has a past surgical history that includes laminotomy (late 80s); laminotomy (late 90s); appendectomy (Early 's); tonsillectomy (Bilateral, 1936); cataract phaco with iol (Bilateral, Early ); gastroscopy (N/A, 10/9/2017); and gastroscopy (2018).    CURRENT MEDICATIONS  Home Medications    **Home medications have not yet been reviewed for this encounter**         ALLERGIES  Allergies   Allergen Reactions   • Vancomycin Rash     Diffuse  Tolerates at slower infusion rates.        PHYSICAL EXAM  VITAL SIGNS: Ht 1.803 m (5' 11\")   Wt 95.3 kg (210 lb)   BMI 29.29 kg/m²     Pulse ox interpretation: I interpret this pulse ox as normal.  Constitutional: Alert in no apparent distress.  HENT: Normocephalic atraumatic, MMM  Eyes: PER, Conjunctiva normal, Non-icteric.   Neck: Normal range of motion, No tenderness, Supple, No stridor.   Cardiovascular: Irregularly irregular not tachycardic no murmurs.  Pacemaker right chest wall  Thorax & Lungs: Rales in the bilateral bases, No respiratory distress, No wheezing, No chest tenderness.   Abdomen: Bowel sounds normal, Soft, No tenderness, No pulsatile masses. No peritoneal signs.  Skin: Warm, Dry, No erythema, No rash.   Back: No bony tenderness, No CVA tenderness.   Extremities/MSK: Intact distal pulses, No edema, No tenderness, No cyanosis, no major deformities noted  Left lower extremity in full long leg cast, distal cap refill less than 3 seconds moving all toes  Neurologic: Alert and oriented x3, No focal deficits noted.       DIFFERENTIAL DIAGNOSIS AND WORK UP PLAN    This is a 89 y.o. male who presents with hypotension some " rales no really lower extremity edema and atrial flutter atrial fibrillation without chest pain.  He has had decreased urination and decreased appetite and diet possible infection possible acute renal injury or damage possible congestive heart failure silent ACS without any chest pain.  Will perform laboratory analysis treat the patient with IV fluids secondary to his hypotension systolic in the 80s on arrival will evaluate with electrolytes laboratory analysis EKG and chest x-ray    DIAGNOSTIC STUDIES / PROCEDURES    EKG  Results for orders placed or performed during the hospital encounter of 19   EKG   Result Value Ref Range    Report       [facility] Emergency Dept.    Test Date:  2019  Pt Name:    BRIAN SPARROW               Department: ER  MRN:        [id number]                      Room:        04  Gender:     Male                         Technician: [id number]  :        1930-10-18                   Requested By:ER TRIAGE PROTOCOL  Order #:    [id number]                    Reading MD: Martha Beebe MD    Measurements  Intervals                                Axis  Rate:       64                           P:  MN:                                      QRS:        -20  QRSD:       96                           T:          -28  QT:         436  QTc:        450    Interpretive Statements  A flutter with some pacemaker spikes at a rate of 64 no ST elevations or ST  depressions  Evaluation limited due to some spikes or pacemakers and some or not, no  evidence  of acute cardiovascular injury  Compared to ECG 02/15/2018 17:25:34  Pacemaker spike continued a flutter no other significant change  Electronic ally Signed On 2019 21:02:47 PST by Martha Beebe MD         LABS  Pertinent Lab Findings  White blood cell count mildly elevated without a left shift, CMP with hyponatremia and hypochloremia likely secondary to dehydration troponin mildly elevated but not significant proBNP elevated  "at 2105 urinalysis without signs of infection influenza negative      RADIOLOGY  DX-CHEST-PORTABLE (1 VIEW)   Final Result      1.  Persistently enlarged cardiac silhouette   2.  Atherosclerosis   3.  Interstitial pulmonary edema, less likely chronic interstitial lung disease   4.  Emphysema        The radiologist's interpretation of all radiological studies have been reviewed by me.      COURSE & MEDICAL DECISION MAKING  Pertinent Labs & Imaging studies reviewed. (See chart for details)    9:46 PM  I reassessed patient at the bedside he is having a positive response to the IV fluids blood pressure slowly increasing, despite the setting of some intravascular dehydration in the setting of hyponatremia hypochloremia as well as worsening cardiac failure leading to pulmonary edema.  The patient will be admitted to the hospital for further management    9:51 PM  I discussed with Dr. Huang for hospitalization and he has accepted the patient    /63   Pulse 68   Ht 1.803 m (5' 11\")   Wt 95.3 kg (210 lb)   SpO2 98%   BMI 29.29 kg/m²       DISPOSITION:  Patient will be evaluated for hospitalization by Dr Huang in guarded condition.        FINAL IMPRESSION  1. Hypotension  2. Hyponatremia  3. Hypochloremia  4. Pulmonary edema          Electronically signed by: Martha Beebe, 12/8/2019 8:28 PM    This dictation has been created using voice recognition software and/or scribes. The accuracy of the dictation is limited by the abilities of the software and the expertise of the scribes. I expect there may be some errors of grammar and possibly content. I made every attempt to manually correct the errors within my dictation. However, errors related to voice recognition software and/or scribes may still exist and should be interpreted within the appropriate context.    "

## 2019-12-09 NOTE — ED NOTES
Pt resting in bed awaiting labs and transfer to floor. Discussed POC with Pt. Pt verbalizes understanding. Pt denies any needs at this time. Pt's call light is within reach and bed is in low locked position.

## 2019-12-09 NOTE — CARE PLAN
Problem: Communication  Goal: The ability to communicate needs accurately and effectively will improve  Outcome: PROGRESSING AS EXPECTED   Pt calls for staff appropriately with needs.    Problem: Safety  Goal: Will remain free from falls  Outcome: PROGRESSING AS EXPECTED   Pt has low mobility d/t LLE cast, calls for staff; bed alarm in place.    Problem: Knowledge Deficit  Goal: Knowledge of the prescribed therapeutic regimen will improve  Outcome: PROGRESSING AS EXPECTED   Pt verbalizes understanding.    Problem: Skin Integrity  Goal: Risk for impaired skin integrity will decrease  Outcome: PROGRESSING AS EXPECTED   Pt turns self; waffle mattress in place; barrier cream in use.

## 2019-12-09 NOTE — DISCHARGE PLANNING
Received Choice form at [phone]  Agency/Facility Name: [facility]  Referral sent per Choice form @ [phone]

## 2019-12-09 NOTE — PROGRESS NOTES
[facility] Medicine Daily Progress Note    Date of Service  12/9/2019    Chief Complaint  89 y.o. male admitted 12/8/2019 with weakness and hypotension.    Hospital Course    History of obesity, atrial for ablation, pacemaker placement, diabetes, GERD, dyslipidemia admitted with generalized weakness.    Patient currently residing in a rehab facility after he sustained a left tibial plateau fracture.  The patient reported symptoms of nausea, vomiting.  Patient hyponatremic started on IV fluids.    Interval Problem Update    Persistent nausea.  Follow-up x-ray with findings of ileus.  Reports a left hip pain.  Last bowel movement yesterday.  Uncontrolled hypertension-SBP at times in 190s.  Persistent Aflutter intermittently sustaining up to 150s.      I discussed findings, plan of care with patient.  Patient's advanced directives including DNR and his wishes for life support issues discussed.  Patient states he does not want artificial life support including breathing tubes, CPR, shock or cardiac resuscitation.  Will change to DNR per wishes.    Advanced care planning time spent 20 minutes.     Consultants/Specialty  None     Code Status  DNR    Disposition    Anticipate DC back to rehab when stable    Review of Systems  Review of Systems   Constitutional: Positive for malaise/fatigue. Negative for chills, diaphoresis and fever.   HENT: Negative for congestion.    Eyes: Negative for discharge and redness.   Respiratory: Negative for cough, shortness of breath, wheezing and stridor.    Cardiovascular: Negative for chest pain, palpitations and leg swelling.   Gastrointestinal: Positive for nausea. Negative for abdominal pain, diarrhea and vomiting.   Genitourinary: Negative for flank pain and hematuria.   Musculoskeletal: Negative for back pain, joint pain and neck pain.   Neurological: Negative for tremors, sensory change, speech change, focal weakness and weakness.   Psychiatric/Behavioral: Negative for hallucinations  and substance abuse.        Physical Exam  Temp:  [36.2 °C (97.2 °F)-36.4 °C (97.5 °F)] 36.2 °C (97.2 °F)  Pulse:  [] 61  Resp:  [16-18] 16  BP: ()/(58-96) 179/96  SpO2:  [94 %-100 %] 96 %    Physical Exam  Constitutional:       General: He is not in acute distress.     Appearance: He is not diaphoretic.   HENT:      Head: Normocephalic and atraumatic.      Right Ear: External ear normal.      Left Ear: External ear normal.      Nose: Nose normal.   Eyes:      General: No scleral icterus.     Conjunctiva/sclera: Conjunctivae normal.      Pupils: Pupils are equal, round, and reactive to light.   Neck:      Musculoskeletal: Normal range of motion. No neck rigidity.   Cardiovascular:      Heart sounds: No murmur.      Comments: Irregular, tachycardic   Pulmonary:      Breath sounds: No stridor. Rales (Basilar) present. No wheezing or rhonchi.   Abdominal:      General: There is no distension.      Palpations: Abdomen is soft.      Tenderness: There is no tenderness.      Comments: Hypoactive bowel sounds   Musculoskeletal:      Comments: Left leg in cast.   Skin:     General: Skin is dry.   Neurological:      General: No focal deficit present.      Comments: Alert, following commands, mild confusion         Fluids    Intake/Output Summary (Last 24 hours) at 12/9/2019 1133  Last data filed at 12/9/2019 1100  Gross per 24 hour   Intake --   Output 575 ml   Net -575 ml       Laboratory  Recent Labs     12/08/19 2045 12/09/19  0234   WBC 15.0* 11.9*   RBC 4.70 4.74   HEMOGLOBIN 13.9* 14.1   HEMATOCRIT 43.3 43.9   MCV 92.1 92.6   MCH 29.6 29.7   MCHC 32.1* 32.1*   RDW 46.7 46.4   PLATELETCT 269 211   MPV 10.2 10.4     Recent Labs     12/08/19 2045 12/09/19  0234   SODIUM 126* 130*   POTASSIUM 4.2 4.0   CHLORIDE 95* 98   CO2 26 25   GLUCOSE 95 97   BUN 21 20   CREATININE 1.02 0.98   CALCIUM 9.8 9.6                   Imaging  DX-HIP-UNILATERAL-WITH PELVIS-1 VIEW LEFT   Final Result      No acute left hip  fracture.      [id number]-2 VIEW   Final Result      Moderate gaseous distention of the small bowel and colon, most suggestive of ileus.      EC-ECHOCARDIOGRAM COMPLETE W/O CONT   Final Result      DX-CHEST-PORTABLE (1 VIEW)   Final Result      1.  Persistently enlarged cardiac silhouette   2.  Atherosclerosis   3.  Interstitial pulmonary edema, less likely chronic interstitial lung disease   4.  Emphysema           Assessment/Plan  * Persistent atrial fibrillation- (present on admission)  Assessment & Plan  With periods of rapid ventricular response, flutter.  Possibly provoked due to ileus . Blood pressure stable.    Continue Eliquis   Increase Lopressor  PRN IV Lopressor for sustained AF  >  130  Monitor telemetry  Treat ileus    Ileus (HCC)  Assessment & Plan  With persistent nausea.  Reports passing gas and has had bowel movement yesterday.  Downgrade diet to clears  Start scheduled Reglan  Try to mobilize if tolerates  If persistent nausea with onset of vomiting , abdominal distention-we will make N p.o. and start NG tube decompression.  Fu Am Xray Abd     Hyponatremia- (present on admission)  Assessment & Plan  Hypovolemic hyponatremia-patient clinically dehydrated-sodium improved to 130  Continue IV NS, decrease rate  Follow-up sodium level      Elevated troponin- (present on admission)  Assessment & Plan  Denies active chest pain but does report some shortness of breath  Repeat serial cardiac enzymes flat.  Echo preserved LV function  No chest pain, follow clinically  Control rapid A. fib  Monitor telemetry    Leukocytosis- (present on admission)  Assessment & Plan  No obvious source of infection at this time-WBC decreased  Monitor vitals  Follow clinically      Essential hypertension- (present on admission)  Assessment & Plan  UnControlled.  Continue with scheduled clonidine  Increase Lopressor  PRN labetalol, hydralazine    DNR (do not resuscitate)- (present on admission)  Assessment & Plan  Patient  currently full code, discussed wishes . Will change to DNR per patient wishes.     General weakness- (present on admission)  Assessment & Plan  With a recent left tibial fracture, cast in place.  Patient to follow-up with orthopedics in 2 weeks.  Complaint of left hip pain.  X-ray no acute findings Start PRN low-dose IV morphine for severe pain.  Caution with opiates given ileus  PT OT       VTE prophylaxis: Eliquis     Discussed with multi disciplinary team plan of care.

## 2019-12-09 NOTE — ASSESSMENT & PLAN NOTE
With a recent left tibial fracture, cast in place.  Patient to follow-up with orthopedics in 2 weeks.  PT OT

## 2019-12-09 NOTE — ED NOTES
Pt resting in bed awaiting transfer to floor. Discussed POC with Pt. Pt verbalizes understanding. Pt denies any needs at this time. Pt's call light is within reach and bed is in low locked position.       Called tele 8. RN to come down

## 2019-12-09 NOTE — ASSESSMENT & PLAN NOTE
With periods of rapid ventricular response, flutter.  Possibly provoked due to ileus . Blood pressure stable.    Continue Eliquis   Increase Lopressor  PRN IV Lopressor for sustained AF  >  130  Monitor telemetry  Optimized electrolytes with K >4 and Mg>2  Rate controlled  Improved

## 2019-12-09 NOTE — PROGRESS NOTES
2 RN Skin Check    2 RN skin check complete.   Devices in place: Nasal Cannula.  Skin assessed under devices: yes.  Confirmed pressure ulcers found on: left buttock.  New potential pressure ulcers noted on left buttock. Wound consult placed Yes.  The following interventions in place Pillows, Barrier cream and Waffle bed overlay.    Generalized bruising on bilat arms and across abdomen. Bilat ears intact. Bilat elbows dry and intact. Left buttock with 2 open skin tears; barrier cream applied, wound photo taken and in Epic. Right heel dry and boggy; L heel SHOLA d/t full leg cast.

## 2019-12-09 NOTE — DISCHARGE PLANNING
Anticipated Discharge Disposition: [facility]    Action: RN PIERRE met with patient at bedside. Patient stating that he came from [facility] and wants to return there upon discharge.   Further assessment not able to be completed as patient having nausea, he gave verbal choice for [place]. Faxed to [facility].      Barriers to Discharge: pending medical clearance    Plan: Care coordination to f/u with treatment team for medical clearance

## 2019-12-09 NOTE — ED TRIAGE NOTES
"Pt arrived to room via EMS. EMS reports \"He was hypotensive this AM. They tried to get an IV and start fluids but an xray showed that he might have CHF and they couldn't get access.\" Pt reports \"I feel SOB\". EMS gave nothing en route. Pt placed in gown. Pt attached to monitors. Primary assessment done.     "

## 2019-12-09 NOTE — PROGRESS NOTES
Pt retrieved from ER with ACLS RN. Pt placed on Zoll monitor. Pt transported to unit. Placed on tele monitor, monitor room notified - a flutter 60. Bed locked in lowest position; call light in reach. Bed alarm in place.

## 2019-12-09 NOTE — ED NOTES
Med rec updated and complete. Allergies reviewed. Per MARS from [facility].  No antibiotics noted. Documentation also included November.

## 2019-12-09 NOTE — RESPIRATORY CARE
COPD EDUCATION by COPD CLINICAL EDUCATOR  12/9/2019 at 6:12 AM by Tina Gao     Patient reviewed by COPD education team. Patient does not have a history or diagnosis of COPD and is a non-smoker, therefore does not qualify for the COPD program.

## 2019-12-09 NOTE — THERAPY
"Speech Therapy Contact Note    Attempted clinical swallow evaluation. Pt refusing to have HOB elevated higher than 30 degrees d/t back pain, had pain med at 1430 per RN. Pt states, \"This is how I have eaten my whole life.\" Pt also states he eats his meals while sitting upright in a chair at a table. Pt must be elevated to 90 degree hip flexion for safe PO intake. Will continue to attempt.   "

## 2019-12-09 NOTE — H&P
Hospital Medicine History & Physical Note    Date of Service  12/8/2019    Primary Care Physician  Tanisha Flores M.D.    Consultants  None    Code Status  Full code    Chief Complaint  Weakness and hypotension    History of Presenting Illness  89 y.o. male with a past medical history of obesity, atrial fibrillation, pacemaker placement, diabetes, GERD, hyperlipidemia who presented 12/8/2019 with generalized weakness.  The patient is currently residing in a rehab facility after he sustained a left tibial plateau fracture.  The patient reported symptoms of nausea, vomiting and generalized weakness for the past 2 days.  He has had a poor appetite and has not been able to eat and drink much.  He reports a history of esophageal balloon dilatations by GI Dr. Mai, however he does not feel like he is having food stuck in his throat at this time.  He reports some shortness of breath but denies any chest pain, orthopnea, cough or lower extremity edema.    EKG interpreted by me reveals atrial flutter with controlled ventricular response.  No ST elevation noted  Chest x-ray interpreted by me reveals mild pulmonary vascular congestion and cardiomegaly    Review of Systems  Review of Systems   Constitutional: Positive for malaise/fatigue. Negative for chills, diaphoresis and fever.   HENT: Negative for hearing loss and sore throat.    Eyes: Negative for blurred vision.   Respiratory: Positive for shortness of breath. Negative for cough, sputum production and wheezing.    Cardiovascular: Negative for chest pain, palpitations and leg swelling.   Gastrointestinal: Positive for nausea. Negative for abdominal pain, blood in stool, diarrhea and vomiting.   Genitourinary: Negative for dysuria, flank pain and urgency.   Musculoskeletal: Negative for back pain, joint pain, myalgias and neck pain.   Skin: Negative for rash.   Neurological: Positive for weakness. Negative for dizziness, focal weakness, seizures and headaches.    Endo/Heme/Allergies: Does not bruise/bleed easily.   Psychiatric/Behavioral: Negative for suicidal ideas.   All other systems reviewed and are negative.      Past Medical History   has a past medical history of A-fib (HCC), Arrhythmia, Arthritis, Cataract, Diabetes (HCC), GERD (gastroesophageal reflux disease), Heart burn, Hyperlipidemia, Muscle disorder, and Urinary incontinence.    Surgical History   has a past surgical history that includes laminotomy (late 80s); laminotomy (late 90s); appendectomy (Early 2000's); tonsillectomy (Bilateral, 1936); cataract phaco with iol (Bilateral, Early 2000's); gastroscopy (N/A, 10/9/2017); and gastroscopy (1/12/2018).     Family History  family history includes Cancer in his brother and sister; Heart Attack in his mother; Heart Disease in his mother; Heart Failure in his father and mother; No Known Problems in his maternal grandfather, maternal grandmother, paternal grandfather, and paternal grandmother; Other in his brother; Paranoid behavior in his mother.     Social History   reports that he quit smoking about 39 years ago. His smoking use included cigarettes and pipe. He has a 43.50 pack-year smoking history. He has never used smokeless tobacco. He reports that he does not drink alcohol or use drugs.    Allergies  Allergies   Allergen Reactions   • Vancomycin Rash     Diffuse  Tolerates at slower infusion rates.        Medications  Prior to Admission Medications   Prescriptions Last Dose Informant Patient Reported? Taking?   aspirin (ASA) 325 MG Tab  MAR from Other Facility No No   Sig: Take 1 Tab by mouth every day.   atorvastatin (LIPITOR) 10 MG Tab  MAR from Other Facility Yes No   Sig: Take 10 mg by mouth every evening.   digoxin (LANOXIN) 125 MCG Tab  MAR from Other Facility Yes No   Sig: Take 125 mcg by mouth every day.   ferrous sulfate (FEOSOL) 220 (44 Fe) MG/5ML Elixir  MAR from Other Facility No No   Sig: Take 5 mL by mouth every day.   gabapentin (NEURONTIN)  100 MG Cap  MAR from Other Facility No No   Sig: Take 2 Caps by mouth 3 times a day.   levetiracetam (KEPPRA) 500 MG Tab  MAR from Other Facility No No   Sig: Take 1 Tab by mouth 2 Times a Day.   metoprolol (LOPRESSOR) 25 MG Tab  MAR from Other Facility No No   Sig: Take 1 Tab by mouth 2 Times a Day.   omeprazole (PRILOSEC) 40 MG delayed-release capsule  MAR from Other Facility Yes No   Sig: Take 40 mg by mouth 2 times a day.   sucralfate (CARAFATE) 1 GM/10ML Suspension  MAR from Other Facility No No   Sig: Take 10 mL by mouth 4 Times a Day,Before Meals and at Bedtime.   tamsulosin (FLOMAX) 0.4 MG capsule  MAR from Other Facility No No   Sig: Take 1 Cap by mouth ONE-HALF HOUR AFTER BREAKFAST.      Facility-Administered Medications: None       Physical Exam  Pulse:  [60] 60  BP: ()/(58-63) 100/61  SpO2:  [94 %-99 %] 99 %    Physical Exam  Vitals signs and nursing note reviewed.   Constitutional:       General: He is not in acute distress.     Appearance: Normal appearance.   HENT:      Head: Normocephalic and atraumatic.      Nose: Nose normal.      Mouth/Throat:      Mouth: Mucous membranes are dry.   Eyes:      Extraocular Movements: Extraocular movements intact.      Conjunctiva/sclera: Conjunctivae normal.      Pupils: Pupils are equal, round, and reactive to light.   Neck:      Musculoskeletal: Normal range of motion and neck supple.   Cardiovascular:      Rate and Rhythm: Normal rate and regular rhythm.      Pulses: Normal pulses.      Heart sounds: Normal heart sounds.   Pulmonary:      Effort: No respiratory distress.      Breath sounds: No wheezing or rhonchi.      Comments: Bibasilar crackles  Abdominal:      General: Bowel sounds are normal. There is no distension.      Palpations: Abdomen is soft.      Tenderness: There is no tenderness.   Musculoskeletal: Normal range of motion.         General: No swelling or tenderness.   Lymphadenopathy:      Cervical: No cervical adenopathy.   Skin:      General: Skin is warm.      Coloration: Skin is not jaundiced.      Findings: No rash.   Neurological:      General: No focal deficit present.      Mental Status: He is alert and oriented to person, place, and time.      Cranial Nerves: No cranial nerve deficit.      Motor: No weakness.   Psychiatric:         Mood and Affect: Mood normal.         Behavior: Behavior normal.         Laboratory:  Recent Labs     12/08/19 2045   WBC 15.0*   RBC 4.70   HEMOGLOBIN 13.9*   HEMATOCRIT 43.3   MCV 92.1   MCH 29.6   MCHC 32.1*   RDW 46.7   PLATELETCT 269   MPV 10.2     Recent Labs     12/08/19 2045   SODIUM 126*   POTASSIUM 4.2   CHLORIDE 95*   CO2 26   GLUCOSE 95   BUN 21   CREATININE 1.02   CALCIUM 9.8     Recent Labs     12/08/19 2045   ALTSGPT 8   ASTSGOT 13   ALKPHOSPHAT 121*   TBILIRUBIN 0.6   GLUCOSE 95         Recent Labs     12/08/19 2045   NTPROBNP 2105*         Recent Labs     12/08/19 2045   TROPONINT 23*       Urinalysis:    Recent Labs     12/08/19 2136   SPECGRAVITY 1.026   GLUCOSEUR 100*   KETONES Negative   NITRITE Negative   LEUKESTERAS Trace*   WBCURINE 5-10*   RBCURINE 5-10*   BACTERIA Negative   EPITHELCELL Few        Imaging:  DX-CHEST-PORTABLE (1 VIEW)   Final Result      1.  Persistently enlarged cardiac silhouette   2.  Atherosclerosis   3.  Interstitial pulmonary edema, less likely chronic interstitial lung disease   4.  Emphysema            Assessment/Plan:  I anticipate this patient is appropriate for observation status at this time.    Hyponatremia- (present on admission)  Assessment & Plan  Hypovolemic hyponatremia  IV fluid hydration with normal saline  Monitor BMP every 4 hours to avoid overcorrection.  Goal correction of 6 mmol in the first 24 hours      Persistent atrial fibrillation- (present on admission)  Assessment & Plan  Continue Eliquis and Toprol    Elevated troponin- (present on admission)  Assessment & Plan  Denies active chest pain but does report some shortness of  breath  Rule out ACS  Continuous cardiac monitoring with serial EKG and troponin  Check 2D echo    Leukocytosis- (present on admission)  Assessment & Plan  No obvious source of infection at this time  Monitor vitals and CBC      General weakness- (present on admission)  Assessment & Plan  PT OT      VTE prophylaxis: Eliquis

## 2019-12-10 NOTE — PROGRESS NOTES
[facility] Medicine Daily Progress Note    Date of Service  12/10/2019    Chief Complaint  89 y.o. male admitted 12/8/2019 with weakness and hypotension.    Hospital Course    History of obesity, atrial for ablation, pacemaker placement, diabetes, GERD, dyslipidemia admitted with generalized weakness.    Patient currently residing in a rehab facility after he sustained a left tibial plateau fracture.  The patient reported symptoms of nausea, vomiting.  Patient hyponatremic started on IV fluids.    Interval Problem Update    Very labile BP with overnight BP low around 90s. Found to have ileus on xray and only on clear liquid diet. Last BM 2 days ago. Denies nausea, vomiting, diarrhea, abdominal pain. Tolerating clear liquid diet. I reviewed labs wbc 14.8, hg 15, plt 241, Na 131, Cr 0.9, K 4.4. Patient was seen and examined by me today. Case was discussed with RN and multidisplinary team during rounds.       Consultants/Specialty  None     Code Status  DNR    Disposition    Anticipate DC back to rehab when stable    Review of Systems  Review of Systems   Constitutional: Positive for malaise/fatigue. Negative for chills, diaphoresis, fever and weight loss.   HENT: Negative for congestion and nosebleeds.    Eyes: Negative for blurred vision, pain, discharge and redness.   Respiratory: Negative for cough, sputum production, shortness of breath, wheezing and stridor.    Cardiovascular: Negative for chest pain, palpitations, orthopnea and leg swelling.   Gastrointestinal: Positive for nausea. Negative for abdominal pain, diarrhea, heartburn and vomiting.   Genitourinary: Negative for dysuria, flank pain, frequency, hematuria and urgency.   Musculoskeletal: Negative for back pain, joint pain, myalgias and neck pain.   Skin: Negative for itching and rash.   Neurological: Negative for dizziness, tremors, sensory change, speech change, focal weakness, seizures, weakness and headaches.   Psychiatric/Behavioral: Negative for  depression, hallucinations and substance abuse. The patient is not nervous/anxious and does not have insomnia.         Physical Exam  Temp:  [36.1 °C (96.9 °F)-37.6 °C (99.6 °F)] 36.1 °C (96.9 °F)  Pulse:  [] 75  Resp:  [14-18] 17  BP: ()/(52-97) 96/69  SpO2:  [90 %-99 %] 99 %    Physical Exam  Vitals signs reviewed.   Constitutional:       General: He is not in acute distress.     Appearance: Normal appearance. He is not diaphoretic.   HENT:      Head: Normocephalic and atraumatic.      Right Ear: External ear normal.      Left Ear: External ear normal.      Nose: Nose normal. No congestion or rhinorrhea.   Eyes:      General: No scleral icterus.     Extraocular Movements: Extraocular movements intact.      Conjunctiva/sclera: Conjunctivae normal.      Pupils: Pupils are equal, round, and reactive to light.   Neck:      Musculoskeletal: Normal range of motion and neck supple. No neck rigidity.   Cardiovascular:      Rate and Rhythm: Normal rate and regular rhythm.      Pulses: Normal pulses.      Heart sounds: No murmur.      Comments: Irregular, tachycardic   Pulmonary:      Effort: Pulmonary effort is normal. No respiratory distress.      Breath sounds: No stridor. No wheezing or rhonchi. Rales: Basilar.   Abdominal:      General: Bowel sounds are normal. There is no distension.      Palpations: Abdomen is soft.      Tenderness: There is no tenderness.      Comments: Hypoactive bowel sounds   Musculoskeletal:         General: No swelling or tenderness.      Comments: Left leg in cast.   Skin:     General: Skin is warm and dry.      Findings: No erythema.   Neurological:      General: No focal deficit present.      Mental Status: He is alert and oriented to person, place, and time.      Comments: Alert, following commands, mild confusion         Fluids    Intake/Output Summary (Last 24 hours) at 12/10/2019 0743  Last data filed at 12/10/2019 0600  Gross per 24 hour   Intake 360 ml   Output 916 ml   Net  -556 ml       Laboratory  Recent Labs     12/08/19  2045 12/09/19  0234 12/10/19  0229   WBC 15.0* 11.9* 14.8*   RBC 4.70 4.74 5.12   HEMOGLOBIN 13.9* 14.1 15.3   HEMATOCRIT 43.3 43.9 47.5   MCV 92.1 92.6 92.8   MCH 29.6 29.7 29.9   MCHC 32.1* 32.1* 32.2*   RDW 46.7 46.4 47.8   PLATELETCT 269 211 241   MPV 10.2 10.4 10.5     Recent Labs     12/08/19  2045 12/09/19  0234 12/10/19  0229   SODIUM 126* 130* 131*   POTASSIUM 4.2 4.0 4.4   CHLORIDE 95* 98 98   CO2 26 25 25   GLUCOSE 95 97 95   BUN 21 20 20   CREATININE 1.02 0.98 0.96   CALCIUM 9.8 9.6 9.5                   Imaging  DX-HIP-UNILATERAL-WITH PELVIS-1 VIEW LEFT   Final Result      No acute left hip fracture.      [id number]-1 VIEW   Final Result      Moderate gaseous distention of the small bowel and colon, most suggestive of ileus.      EC-ECHOCARDIOGRAM COMPLETE W/O CONT   Final Result      DX-CHEST-PORTABLE (1 VIEW)   Final Result      1.  Persistently enlarged cardiac silhouette   2.  Atherosclerosis   3.  Interstitial pulmonary edema, less likely chronic interstitial lung disease   4.  Emphysema           Assessment/Plan  * Persistent atrial fibrillation- (present on admission)  Assessment & Plan  With periods of rapid ventricular response, flutter.  Possibly provoked due to ileus . Blood pressure stable.    Continue Eliquis   Increase Lopressor  PRN IV Lopressor for sustained AF  >  130  Monitor telemetry  Optimized electrolytes with K >4 and Mg>2    Ileus (HCC)  Assessment & Plan  On clear liquid diet  Supportive care  Start scheduled Reglan  Try to mobilize if tolerates  If persistent nausea with onset of vomiting , abdominal distention-we will make N p.o. and start NG tube decompression.  Fu Am Xray Abd     Hyponatremia- (present on admission)  Assessment & Plan  Hypovolemic hyponatremia-patient clinically dehydrated-sodium improved to 131  Continue IV NS, decrease rate  Follow-up sodium level      Elevated troponin- (present on admission)  Assessment  & Plan  Denies active chest pain but does report some shortness of breath  Repeat serial cardiac enzymes flat.  Echo preserved LV function  No chest pain, follow clinically  Control rapid A. fib  Monitor telemetry    Leukocytosis- (present on admission)  Assessment & Plan  Wbc 14.8, worsening  Likely related to reactive from ileus  Monitor vitals  Follow clinically      Essential hypertension- (present on admission)  Assessment & Plan  UnControlled.  Continue with scheduled clonidine  Increase Lopressor  PRN labetalol, hydralazine    DNR (do not resuscitate)- (present on admission)  Assessment & Plan  Patient currently full code, discussed wishes . Will change to DNR per patient wishes.     General weakness- (present on admission)  Assessment & Plan  With a recent left tibial fracture, cast in place.  Patient to follow-up with orthopedics in 2 weeks.  Complaint of left hip pain.  X-ray no acute findings Start PRN low-dose IV morphine for severe pain.  Caution with opiates given ileus  PT OT       VTE prophylaxis: Eliquis

## 2019-12-10 NOTE — ASSESSMENT & PLAN NOTE
BP is improving  Continue with scheduled clonidine  Increase Lopressor  PRN labetalol, hydralazine

## 2019-12-10 NOTE — THERAPY
"  Speech Language Therapy Clinical Swallow Evaluation completed.  Functional Status: Pt seen when sitting up in cam-chair. Per nurs, pt has been tolerating clears. Pt stating he normally has had no difficulty eating or drinking at the [facility] where he resides. Pt with fair intensity of voice. Pt currently is on clear liquids related to ileus. Pt given approx 1/2 cup each of thin water and apple juice. Pt with 2-4 swallows per sip from the cup. No coughing and clear vocal quality. Pt denied any sensation of esophageal difficulty with the thin liquids. Pt with intermittent belching that pt stated has been occurring the last few days. Approximately 5 inch circumference wet area noted on pt's gown with IV seen at pt's side. Nurs alerted that IV out. SLP provided educ to the pt and collaborated with nurs regarding cont with thin clear liquid diet as tolerated. SLP will assess for texture upgrade when pt medically cleared.   Recommendations - Diet: Diet / Liquid Recommendation: Other (Comments)(clear liquids per MD orders r/t ileus)                          Strategies: Head of Bed/sitting an near at 90 Degrees as tolerated. Small sips and slow rate, 2-4 swallows.                           Medication Administration:  As tolerated.   Plan of Care: Will benefit from Speech Therapy 3 times per week  Post-Acute Therapy: dependent on pt's level of function and POC. Thanks, Parmjit    See \"Rehab Therapy-Acute\" Patient Summary Report for complete documentation.   "

## 2019-12-10 NOTE — THERAPY
OT jd attempted.  Pt worked with PT this morning and was up in chair for a couple hours.  Pt back to bed upon OT arrival and reported very high fatigue.  Pt agreeable to participate tomorrow.  Will attempt back as able.

## 2019-12-10 NOTE — CARE PLAN
Problem: Communication  Goal: The ability to communicate needs accurately and effectively will improve  Outcome: PROGRESSING AS EXPECTED   Pt calls for staff appropriately with needs.    Problem: Skin Integrity  Goal: Risk for impaired skin integrity will decrease  Outcome: PROGRESSING SLOWER THAN EXPECTED   Pt has open wound on L buttock.    Problem: Knowledge Deficit  Goal: Knowledge of disease process/condition, treatment plan, diagnostic tests, and medications will improve  Outcome: PROGRESSING AS EXPECTED   Pt verbalizes understanding.    Problem: Mobility  Goal: Risk for activity intolerance will decrease  Outcome: PROGRESSING SLOWER THAN EXPECTED   Pt unable to stand/ambulate d/t L full leg cast for a tib/fib fracture prior to admit.    Problem: Urinary Elimination:  Goal: Ability to reestablish a normal urinary elimination pattern will improve  Outcome: PROGRESSING SLOWER THAN EXPECTED   Pt experiencing some incontinence; condom cath in place.

## 2019-12-10 NOTE — ASSESSMENT & PLAN NOTE
Seems to be improving.  Supportive care  Start scheduled Reglan  Advancing diet as tolerated   Monitor

## 2019-12-10 NOTE — PROGRESS NOTES
Received report from night shift RN Gonzalez. Assumed care of patient. Patient is A flutter on the monitor at this time. Patient is sitting up in bed with no family present at bedside at this time. Patient declines any needs at this time. POC discussed with patient. Bed locked and in the lowest position with call light within reach.

## 2019-12-11 NOTE — PROGRESS NOTES
Received report from day shift RNConchita. Assumed care of pt. Pt reports no pain at this time. Updated pt on plan of care. Pt resting comfortably in bed. Bed alarm in place. Educated on use of call light. Hourly rounding and continuous monitoring in place.

## 2019-12-11 NOTE — THERAPY
"Occupational Therapy Evaluation completed.   Plan of Care: Will benefit from Occupational Therapy 3 times per week  Discharge Recommendations:  Equipment: Will Continue to Assess for Equipment Needs. Post-acute therapy Recommend post-acute placement for additional occupational therapy services prior to discharge home. Patient can tolerate post-acute therapies at a 5x/week frequency.    Patient seen for OT eval with prior L LE injury, now L LE NWB, from SNF readmitted due to abnormal labs. See chart for details. Patient reports prior to SNF was I with ADLs and mobility. Patient, upon eval, presents with decreased ADLs, mobility, endurance, tolerance, cognition necessitating Max A standing ADLs and Mod/Max A SPT with FWW, especially from low surfaces. FWW, inconsistent NWB LLE adherence, unintentional noncompliance due to weight of cast and fatigue. Patient would benefit from skilled OT in this setting followed by post acute placement.     Patient would benefit from two person assist for safety, LE management, and fatigue.     See \"Rehab Therapy-Acute\" Patient Summary Report for complete documentation.    "

## 2019-12-11 NOTE — CARE PLAN
Problem: Communication  Goal: The ability to communicate needs accurately and effectively will improve  Outcome: PROGRESSING AS EXPECTED   Pt whiteboard updated on plan of care  Pt encouraged to call for assistance  Pt encouraged to voice any concerns or questions regarding care plan  Pt updated on plan of care as it develops and changes     Problem: Safety  Goal: Will remain free from injury  Outcome: PROGRESSING AS EXPECTED   Treaded socks in use  Call light within reach and patient calls appropriately  Medication education provided prior to administration  Medications administered as ordered  Bed alarm on and bed locked in lowest position

## 2019-12-11 NOTE — THERAPY
"Physical Therapy Treatment completed.   Bed Mobility:  Supine to Sit: mod A for LE management, able to scoot self in bed   Transfers: Sit to Stand: Maximal Assist(of 2 with FWW)  Gait: Level Of Assist: Unable to Participate; able to take 2-3 shuffled steps with right knee buckling noted   Plan of Care: Will benefit from Physical Therapy 3 times per week  Discharge Recommendations: Equipment: Will Continue to Assess for Equipment Needs.    Pt requiring significant cueing and assist for upright transfers and sit<>stand given full left LE cast, remains agreeable. will follow, recommend placement.     See \"Rehab Therapy-Acute\" Patient Summary Report for complete documentation.       "

## 2019-12-11 NOTE — THERAPY
"Physical Therapy Evaluation completed.   Bed Mobility:  Supine to Sit: Supervised (HOB elevated, used rail)  Transfers: Sit to Stand: Moderate Assist  Gait: Level Of Assist: Unable to Participate  Plan of Care: Will benefit from Physical Therapy 3 times per week  Discharge Recommendations: Equipment: Will Continue to Assess for Equipment Needs. Post-acute therapy: see below.    See \"Rehab Therapy-Acute\" Patient Summary Report for complete documentation.    Patient is a pleasant 89 YO male that was admitted from [facility] following complaints of weakness and hypotension. PMHx significant for Afib, PPM, DM, GERD, DLD, obesity. Patient also has L tibial plateau fracture and is currently in cast; patient reported fracture occurred 11/9 and he is NWB for 6-8 weeks. He was able to perform transfer EOB to chair with hop to with FWW and mod A. He required max verbal cueing and facilitation for anterior weight shift for sit to stand technique though with good demonstration of NWB LLE. Anticipate patient will require continued post acute placement following medical DC. Will follow while in house.  "

## 2019-12-11 NOTE — DISCHARGE PLANNING
Care Transition Team Assessment     Patient came from [facility] and would like to return there upon discharge.    Information Source  Orientation : Oriented x 4  Information Given By: Other (Comments)(staff at [facility])  Informant's Name: n/a  Who is responsible for making decisions for patient? : Patient    Readmission Evaluation  Is this a readmission?: No    Elopement Risk  Legal Hold: No  Ambulatory or Self Mobile in Wheelchair: No-Not an Elopement Risk  Elopement Risk: Not at Risk for Elopement    Interdisciplinary Discharge Planning  Does Admitting Nurse Feel This Could be a Complex Discharge?: No  Primary Care Physician: Tanisha Yates  Lives with - Patient's Self Care Capacity: Attendant / Paid Care Giver  Patient or legal guardian wants to designate a caregiver (see row info): No  Housing / Facility: Other (Comments)([facility])  Name of Care Facility: [facility]  Able to Return to Previous ADL's: Future Time w/Therapy  Mobility Issues: Yes  Prior Services: Other (Comments)(SNF)  Patient Expects to be Discharged to:: SNF  Assistance Needed: Unknown at this Time    Discharge Preparedness  What is your plan after discharge?: Skilled nursing facility  Prior Functional Level: Needs Assist with Activities of Daily Living, Needs Assist with Medication Management  Difficulity with ADLs: Walking, Bathing    Functional Assesment  Prior Functional Level: Needs Assist with Activities of Daily Living, Needs Assist with Medication Management    Finances  Financial Barriers to Discharge: No  Prescription Coverage: Yes    Vision / Hearing Impairment  Vision Impairment : Yes  Right Eye Vision: Impaired, Wears Contacts  Left Eye Vision: Impaired, Wears Contacts  Hearing Impairment : No    Domestic Abuse  Have you ever been the victim of abuse or violence?: No  Physical Abuse or Sexual Abuse: No  Verbal Abuse or Emotional Abuse: No  Possible Abuse Reported to:: Not Applicable    Anticipated Discharge  Information  Anticipated discharge disposition: SNF  Discharge Address: [address] [place] [address]  Discharge Contact Phone Number: [phone]

## 2019-12-11 NOTE — PROGRESS NOTES
[facility] Medicine Daily Progress Note    Date of Service  12/11/2019    Chief Complaint  89 y.o. male admitted 12/8/2019 with weakness and hypotension.    Hospital Course    History of obesity, atrial for ablation, pacemaker placement, diabetes, GERD, dyslipidemia admitted with generalized weakness.    Patient currently residing in a rehab facility after he sustained a left tibial plateau fracture.  The patient reported symptoms of nausea, vomiting.  Patient hyponatremic started on IV fluids.    Interval Problem Update  The patient is still not having BM today. Nausea, and felt bloated in abdomen. I ordered KUB today. Kept him NPO. Continue IVF. Monitor closely for acute abdomen. If his symptoms continue to get worse, I will consult surgery. Labs wbc 11, Na 138, K 4.5.   Patient was seen and examined by me today. Case was discussed with RN and multidisplinary team during rounds.            Consultants/Specialty  None     Code Status  DNR    Disposition    Anticipate DC back to rehab when stable    Review of Systems  Review of Systems   Constitutional: Positive for malaise/fatigue. Negative for chills, diaphoresis and weight loss.   HENT: Negative for congestion and nosebleeds.    Eyes: Negative for blurred vision, pain and discharge.   Respiratory: Negative for sputum production, shortness of breath, wheezing and stridor.    Cardiovascular: Negative for palpitations, orthopnea and leg swelling.   Gastrointestinal: Positive for abdominal pain and nausea. Negative for diarrhea, heartburn and vomiting.   Genitourinary: Negative for flank pain, frequency, hematuria and urgency.   Musculoskeletal: Negative for back pain, myalgias and neck pain.   Skin: Negative for itching and rash.   Neurological: Negative for tremors, sensory change, speech change, focal weakness, seizures and weakness.   Psychiatric/Behavioral: Negative for hallucinations and substance abuse. The patient is not nervous/anxious and does not have  insomnia.         Physical Exam  Temp:  [36.1 °C (96.9 °F)-36.9 °C (98.5 °F)] 36.6 °C (97.9 °F)  Pulse:  [61-71] 71  Resp:  [16] 16  BP: ()/(54-87) 139/87  SpO2:  [94 %-98 %] 94 %    Physical Exam  Vitals signs reviewed.   Constitutional:       General: He is not in acute distress.     Appearance: Normal appearance. He is not diaphoretic.   HENT:      Head: Normocephalic and atraumatic.      Right Ear: External ear normal.      Left Ear: External ear normal.      Nose: Nose normal. No congestion or rhinorrhea.   Eyes:      Extraocular Movements: Extraocular movements intact.      Conjunctiva/sclera: Conjunctivae normal.      Pupils: Pupils are equal, round, and reactive to light.   Neck:      Musculoskeletal: Normal range of motion and neck supple. No neck rigidity.   Cardiovascular:      Rate and Rhythm: Normal rate and regular rhythm.      Pulses: Normal pulses.      Heart sounds: No murmur.      Comments: Irregular, tachycardic   Pulmonary:      Effort: Pulmonary effort is normal. No respiratory distress.      Breath sounds: No wheezing. Rales: Basilar.   Abdominal:      General: Bowel sounds are normal. There is no distension.      Palpations: Abdomen is soft.      Comments: Hypoactive bowel sounds   Musculoskeletal:         General: No swelling.      Comments: Left leg in cast.   Skin:     General: Skin is warm and dry.   Neurological:      General: No focal deficit present.      Mental Status: He is alert and oriented to person, place, and time.      Comments: Alert, following commands, mild confusion         Fluids    Intake/Output Summary (Last 24 hours) at 12/11/2019 0811  Last data filed at 12/11/2019 0723  Gross per 24 hour   Intake --   Output 4880 ml   Net -4880 ml       Laboratory  Recent Labs     12/09/19  0234 12/10/19  0229 12/11/19  0258   WBC 11.9* 14.8* 11.3*   RBC 4.74 5.12 5.56   HEMOGLOBIN 14.1 15.3 16.7   HEMATOCRIT 43.9 47.5 51.0   MCV 92.6 92.8 91.7   MCH 29.7 29.9 30.0   MCHC 32.1*  32.2* 32.7*   RDW 46.4 47.8 47.3   PLATELETCT 211 241 129*   MPV 10.4 10.5 12.1     Recent Labs     12/08/19  2045 12/09/19  0234 12/10/19  0229 12/11/19  0207   SODIUM 126* 130* 131* 138   POTASSIUM 4.2 4.0 4.4 4.5   CHLORIDE 95* 98 98 108   CO2 26 25 25 16*   GLUCOSE 95 97 95 65   BUN 21 20 20  --    CREATININE 1.02 0.98 0.96 0.65   CALCIUM 9.8 9.6 9.5 8.9                   Imaging  DX-HIP-UNILATERAL-WITH PELVIS-1 VIEW LEFT   Final Result      No acute left hip fracture.      [id number] VIEW   Final Result      Moderate gaseous distention of the small bowel and colon, most suggestive of ileus.      EC-ECHOCARDIOGRAM COMPLETE W/O CONT   Final Result      DX-CHEST-PORTABLE (1 VIEW)   Final Result      1.  Persistently enlarged cardiac silhouette   2.  Atherosclerosis   3.  Interstitial pulmonary edema, less likely chronic interstitial lung disease   4.  Emphysema      [id number] VIEW    (Results Pending)        Assessment/Plan  * Persistent atrial fibrillation- (present on admission)  Assessment & Plan  With periods of rapid ventricular response, flutter.  Possibly provoked due to ileus . Blood pressure stable.    Continue Eliquis   Increase Lopressor  PRN IV Lopressor for sustained AF  >  130  Monitor telemetry  Optimized electrolytes with K >4 and Mg>2    Ileus (HCC)  Assessment & Plan  NPO  Supportive care  Start scheduled Reglan  Try to mobilize if tolerates  If persistent nausea with onset of vomiting , abdominal distention-we will make N p.o. and start NG tube decompression.  Fu Am Xray Abd today    Hyponatremia- (present on admission)  Assessment & Plan  Hypovolemic hyponatremia-patient clinically dehydrated-sodium improved to 138  Continue IV NS, decrease rate  Follow-up sodium level      Elevated troponin- (present on admission)  Assessment & Plan  Denies active chest pain but does report some shortness of breath  Repeat serial cardiac enzymes flat.  Echo preserved LV function  No chest pain, follow  clinically  Control rapid A. fib  Monitor telemetry    Leukocytosis- (present on admission)  Assessment & Plan  Wbc 11, improving  Likely related to reactive from ileus  Monitor vitals  Follow clinically      Essential hypertension- (present on admission)  Assessment & Plan  UnControlled.  Continue with scheduled clonidine  Increase Lopressor  PRN labetalol, hydralazine    DNR (do not resuscitate)- (present on admission)  Assessment & Plan  Patient currently full code, discussed wishes . Will change to DNR per patient wishes.     General weakness- (present on admission)  Assessment & Plan  With a recent left tibial fracture, cast in place.  Patient to follow-up with orthopedics in 2 weeks.  Complaint of left hip pain.  X-ray no acute findings Start PRN low-dose IV morphine for severe pain.  Caution with opiates given ileus  PT OT       VTE prophylaxis: Eliquis

## 2019-12-12 NOTE — PROGRESS NOTES
Received report from day shift RNElaine. Assumed care of pt. Pt reports no pain at this time. Updated pt on plan of care. Pt resting comfortably in bed. Bed alarm in place. Educated on use of call light. Hourly rounding and continuous monitoring in place.

## 2019-12-12 NOTE — PROGRESS NOTES
Report received from CARL Beltrán. Assumed care of patient. Updated patient on plan of care. Fall precautions in place, call light within reach.

## 2019-12-12 NOTE — PROGRESS NOTES
[facility] Medicine Daily Progress Note    Date of Service  12/12/2019    Chief Complaint  89 y.o. male admitted 12/8/2019 with weakness and hypotension.    Hospital Course    History of obesity, atrial for ablation, pacemaker placement, diabetes, GERD, dyslipidemia admitted with generalized weakness.    Patient currently residing in a rehab facility after he sustained a left tibial plateau fracture.  The patient reported symptoms of nausea, vomiting.  Patient hyponatremic started on IV fluids.    Interval Problem Update  The patient had small BM overnight. Passing gas. Feeling much better but complaint about weakness. KUB improving ileus.  Continue IVF. Monitor closely for acute abdomen. If his symptoms continue to get worse, I will consult surgery. Labs wbc 10, Na 137, K 3.5.   Patient was seen and examined by me today. Case was discussed with RN and multidisplinary team during rounds.            Consultants/Specialty  None     Code Status  DNR    Disposition    Anticipate DC back to rehab when stable    Review of Systems  Review of Systems   Constitutional: Positive for malaise/fatigue. Negative for chills, diaphoresis and weight loss.   HENT: Negative for congestion and nosebleeds.    Eyes: Negative for pain and discharge.   Respiratory: Negative for sputum production and shortness of breath.    Cardiovascular: Negative for palpitations, orthopnea and leg swelling.   Gastrointestinal: Negative for abdominal pain, diarrhea, nausea and vomiting.   Genitourinary: Negative for flank pain, frequency and hematuria.   Musculoskeletal: Negative for back pain, myalgias and neck pain.   Skin: Negative for itching and rash.   Neurological: Negative for speech change, focal weakness, seizures and weakness.   Psychiatric/Behavioral: Negative for hallucinations. The patient is not nervous/anxious and does not have insomnia.         Physical Exam  Temp:  [36.1 °C (96.9 °F)-37.6 °C (99.6 °F)] 37.6 °C (99.6 °F)  Pulse:  [65-98]  88  Resp:  [14-16] 16  BP: ()/(72-92) 125/77  SpO2:  [91 %-96 %] 96 %    Physical Exam  Vitals signs reviewed.   Constitutional:       General: He is not in acute distress.     Appearance: Normal appearance. He is not diaphoretic.   HENT:      Head: Normocephalic.      Right Ear: External ear normal.      Nose: Nose normal. No congestion or rhinorrhea.   Eyes:      Conjunctiva/sclera: Conjunctivae normal.      Pupils: Pupils are equal, round, and reactive to light.   Neck:      Musculoskeletal: Normal range of motion and neck supple. No neck rigidity.   Cardiovascular:      Rate and Rhythm: Normal rate and regular rhythm.      Pulses: Normal pulses.      Heart sounds: No murmur.      Comments: Irregular, tachycardic   Pulmonary:      Effort: Pulmonary effort is normal. No respiratory distress.      Breath sounds: Rales: Basilar.   Abdominal:      General: Bowel sounds are normal. There is no distension.      Palpations: Abdomen is soft.      Comments: Hypoactive bowel sounds   Musculoskeletal:         General: No swelling.      Comments: Left leg in cast.   Skin:     General: Skin is warm and dry.   Neurological:      General: No focal deficit present.      Mental Status: He is alert and oriented to person, place, and time.      Comments: Alert, following commands, mild confusion         Fluids    Intake/Output Summary (Last 24 hours) at 12/12/2019 0802  Last data filed at 12/12/2019 0540  Gross per 24 hour   Intake 1000 ml   Output 1520 ml   Net -520 ml       Laboratory  Recent Labs     12/10/19  0229 12/11/19  0258 12/12/19  0052   WBC 14.8* 11.3* 10.7   RBC 5.12 5.56 4.53*   HEMOGLOBIN 15.3 16.7 13.6*   HEMATOCRIT 47.5 51.0 41.9*   MCV 92.8 91.7 92.5   MCH 29.9 30.0 30.0   MCHC 32.2* 32.7* 32.5*   RDW 47.8 47.3 46.5   PLATELETCT 241 129* 201   MPV 10.5 12.1 10.5     Recent Labs     12/10/19  0229 12/11/19  0207 12/12/19  0052   SODIUM 131* 138 137   POTASSIUM 4.4 4.5 3.5*   CHLORIDE 98 108 103   CO2 25 16*  26   GLUCOSE 95 65 98   BUN 20  --  10   CREATININE 0.96 0.65 0.72   CALCIUM 9.5 8.9 8.9                   Imaging  [id number] VIEW   Final Result      Increased small bowel and colonic gas most suggestive of ileus, slightly improved from prior exam.      DX-HIP-UNILATERAL-WITH PELVIS-1 VIEW LEFT   Final Result      No acute left hip fracture.      [id number] VIEW   Final Result      Moderate gaseous distention of the small bowel and colon, most suggestive of ileus.      EC-ECHOCARDIOGRAM COMPLETE W/O CONT   Final Result      DX-CHEST-PORTABLE (1 VIEW)   Final Result      1.  Persistently enlarged cardiac silhouette   2.  Atherosclerosis   3.  Interstitial pulmonary edema, less likely chronic interstitial lung disease   4.  Emphysema           Assessment/Plan  * Persistent atrial fibrillation- (present on admission)  Assessment & Plan  With periods of rapid ventricular response, flutter.  Possibly provoked due to ileus . Blood pressure stable.    Continue Eliquis   Increase Lopressor  PRN IV Lopressor for sustained AF  >  130  Monitor telemetry  Optimized electrolytes with K >4 and Mg>2    Ileus (HCC)  Assessment & Plan  NPO   KUB improving ileus  Supportive care  Start scheduled Reglan  If persistent nausea with onset of vomiting , abdominal distention-we will make N p.o. and start NG tube decompression.      Hyponatremia- (present on admission)  Assessment & Plan  Hypovolemic hyponatremia-patient clinically dehydrated-sodium improved to 137  Continue IV NS, decrease rate  Follow-up sodium level      Elevated troponin- (present on admission)  Assessment & Plan  Denies active chest pain but does report some shortness of breath  Repeat serial cardiac enzymes flat.  Echo preserved LV function  No chest pain, follow clinically  Control rapid A. fib  Monitor telemetry    Leukocytosis- (present on admission)  Assessment & Plan  Wbc 10, improving  Likely related to reactive from ileus  Monitor vitals  Follow  clinically      Essential hypertension- (present on admission)  Assessment & Plan  BP is improving  Continue with scheduled clonidine  Increase Lopressor  PRN labetalol, hydralazine    DNR (do not resuscitate)- (present on admission)  Assessment & Plan  Patient currently full code, discussed wishes . Will change to DNR per patient wishes.     General weakness- (present on admission)  Assessment & Plan  With a recent left tibial fracture, cast in place.  Patient to follow-up with orthopedics in 2 weeks.  PT OT       VTE prophylaxis: Eliquis

## 2019-12-13 NOTE — THERAPY
"Physical Therapy Treatment completed.   Discharge Recommendations: Equipment: Will Continue to Assess for Equipment Needs. Post-acute therapy: Recommend post-acute placement for continued physical therapy services prior to discharge home. Patient can tolerate post-acute therapies at a 5x/week frequency.       See \"Rehab Therapy-Acute\" Patient Summary Report for complete documentation.     Treatment completed 12/13. Documentation completed on paper and in patient's chart.  "

## 2019-12-13 NOTE — THERAPY
"Speech Language Therapy dysphagia treatment completed.   Functional Status:  The patient was seen on this date for dysphagia therapy. The patient was awake, alert and upright for therapy. The patient was provided trials of thin liquids via cup sip and purees. The patient was willing to consume approximately 1 oz thin liquid, 2 oz jello and 1 oz of applesauce before reporting feeling too full to continue. The patient consumed all bites and sips with no overt s/sx of aspiration appreciated. Swallow noted to be complete and timely throughout trials. Patient denied globus sensation or esophageal difficulty with trials. Vocal quality remained clear throughout. Patient was offered trials of soft solids but refused at this time d/t feeling of fullness. Patient reported feeling increased fatigue and generally weaker than his baseline which remains concerning for aspiration/penetration. Recommend continuation of full liquid diet.   Recommendations: Recommend continuation of full liquid diet.    Plan of Care: Will benefit from Speech Therapy 3 times per week  Post-Acute Therapy: Dependant on patient's level of function upon d/c.    See \"Rehab Therapy-Acute\" Patient Summary Report for complete documentation.     "

## 2019-12-14 NOTE — PROGRESS NOTES
Report received from Leigh Ann. PT/OT/SLP saw pt today. Pt on full liquid diet. Pt awake in bed; no complaints at this time. POC discussed; all questions answered. Bed locked in lowest position; call light in reach.

## 2019-12-14 NOTE — CARE PLAN
Problem: Communication  Goal: The ability to communicate needs accurately and effectively will improve  Outcome: PROGRESSING AS EXPECTED   Pt notifies staff of needs appropriately.    Problem: Safety  Goal: Will remain free from falls  Outcome: PROGRESSING AS EXPECTED   Pt calls for staff; bed alarm in place.    Problem: Bowel/Gastric:  Goal: Normal bowel function is maintained or improved  Outcome: PROGRESSING SLOWER THAN EXPECTED   Pt experiencing loose BMs.    Problem: Knowledge Deficit  Goal: Knowledge of the prescribed therapeutic regimen will improve  Outcome: PROGRESSING AS EXPECTED   Pt verbalizes understanding.    Problem: Skin Integrity  Goal: Risk for impaired skin integrity will decrease  Outcome: PROGRESSING SLOWER THAN EXPECTED   Pt has decreasing skin integrity on sacrum/buttock; q2 turns and waffle mattress interventions in place.

## 2019-12-14 NOTE — PROGRESS NOTES
[facility] Medicine Daily Progress Note    Date of Service  12/14/2019    Chief Complaint  89 y.o. male admitted 12/8/2019 with weakness and hypotension.    Hospital Course    History of obesity, atrial for ablation, pacemaker placement, diabetes, GERD, dyslipidemia admitted with generalized weakness.    Patient currently residing in a rehab facility after he sustained a left tibial plateau fracture.  The patient reported symptoms of nausea, vomiting.  Patient hyponatremic started on IV fluids.    Interval Problem Update  The patient had small BM overnight. Passing gas. Tolerating full liquid diet. Encouraged him to sit on the chair. To get KUB today. Monitor closely for acute abdomen. If his symptoms continue to get worse, I will consult surgery. Patient was seen and examined by me today. Case was discussed with RN and multidisplinary team during rounds.            Consultants/Specialty  None     Code Status  DNR    Disposition    Anticipate DC back to rehab when stable    Review of Systems  Review of Systems   Constitutional: Positive for malaise/fatigue. Negative for diaphoresis and weight loss.   HENT: Negative for congestion and nosebleeds.    Eyes: Negative for pain and discharge.   Respiratory: Negative for sputum production.    Cardiovascular: Negative for orthopnea and leg swelling.   Gastrointestinal: Negative for abdominal pain, diarrhea, nausea and vomiting.   Genitourinary: Negative for frequency, hematuria and urgency.   Musculoskeletal: Negative for neck pain.   Skin: Negative for rash.   Neurological: Negative for speech change, focal weakness and weakness.   Psychiatric/Behavioral: The patient is not nervous/anxious and does not have insomnia.         Physical Exam  Temp:  [35.9 °C (96.7 °F)-36.2 °C (97.2 °F)] 35.9 °C (96.7 °F)  Pulse:  [69-98] 75  Resp:  [17-18] 17  BP: (105-126)/(64-81) 118/74  SpO2:  [93 %-98 %] 93 %    Physical Exam  Vitals signs reviewed.   Constitutional:       General: He is  not in acute distress.     Appearance: Normal appearance.   HENT:      Head: Normocephalic.      Right Ear: External ear normal.      Nose: Nose normal. No congestion.   Eyes:      Pupils: Pupils are equal, round, and reactive to light.   Neck:      Musculoskeletal: Normal range of motion and neck supple.   Cardiovascular:      Rate and Rhythm: Normal rate and regular rhythm.      Pulses: Normal pulses.      Comments: Irregular, tachycardic   Pulmonary:      Effort: Pulmonary effort is normal.      Breath sounds: Rales: Basilar.   Abdominal:      General: Bowel sounds are normal. There is no distension.      Comments: Hypoactive bowel sounds   Musculoskeletal:         General: No swelling.      Comments: Left leg in cast.   Skin:     General: Skin is warm.   Neurological:      General: No focal deficit present.      Mental Status: He is alert.      Comments: Alert, following commands, mild confusion         Fluids    Intake/Output Summary (Last 24 hours) at 12/14/2019 0815  Last data filed at 12/14/2019 0600  Gross per 24 hour   Intake 240 ml   Output 900 ml   Net -660 ml       Laboratory  Recent Labs     12/12/19  0052 12/13/19  0228   WBC 10.7 13.1*   RBC 4.53* 4.82   HEMOGLOBIN 13.6* 14.3   HEMATOCRIT 41.9* 44.8   MCV 92.5 92.9   MCH 30.0 29.7   MCHC 32.5* 31.9*   RDW 46.5 46.5   PLATELETCT 201 221   MPV 10.5 11.0     Recent Labs     12/12/19  0052   SODIUM 137   POTASSIUM 3.5*   CHLORIDE 103   CO2 26   GLUCOSE 98   BUN 10   CREATININE 0.72   CALCIUM 8.9                   Imaging  [id number] VIEW   Final Result      Increased small bowel and colonic gas most suggestive of ileus, slightly improved from prior exam.      DX-HIP-UNILATERAL-WITH PELVIS-1 VIEW LEFT   Final Result      No acute left hip fracture.      [id number] VIEW   Final Result      Moderate gaseous distention of the small bowel and colon, most suggestive of ileus.      EC-ECHOCARDIOGRAM COMPLETE W/O CONT   Final Result      DX-CHEST-PORTABLE (1  VIEW)   Final Result      1.  Persistently enlarged cardiac silhouette   2.  Atherosclerosis   3.  Interstitial pulmonary edema, less likely chronic interstitial lung disease   4.  Emphysema           Assessment/Plan  * Persistent atrial fibrillation- (present on admission)  Assessment & Plan  With periods of rapid ventricular response, flutter.  Possibly provoked due to ileus . Blood pressure stable.    Continue Eliquis   Increase Lopressor  PRN IV Lopressor for sustained AF  >  130  Monitor telemetry  Optimized electrolytes with K >4 and Mg>2    Ileus (HCC)  Assessment & Plan  Having small BM  KUB improving ileus  Supportive care  Start scheduled Reglan  If persistent nausea with onset of vomiting , abdominal distention-we will make N p.o. and start NG tube decompression.  on full liquid diet today      Hyponatremia- (present on admission)  Assessment & Plan  Hypovolemic hyponatremia-patient clinically dehydrated-sodium improved to 137  Continue IV NS, decrease rate  Follow-up sodium level      Elevated troponin- (present on admission)  Assessment & Plan  Denies active chest pain but does report some shortness of breath  Repeat serial cardiac enzymes flat.  Echo preserved LV function  No chest pain, follow clinically  Control rapid A. fib  Monitor telemetry    Leukocytosis- (present on admission)  Assessment & Plan    Likely related to reactive from ileus  Monitor vitals  Follow clinically      Essential hypertension- (present on admission)  Assessment & Plan  BP is improving  Continue with scheduled clonidine  Increase Lopressor  PRN labetalol, hydralazine    DNR (do not resuscitate)- (present on admission)  Assessment & Plan  Patient currently full code, discussed wishes . Will change to DNR per patient wishes.     General weakness- (present on admission)  Assessment & Plan  With a recent left tibial fracture, cast in place.  Patient to follow-up with orthopedics in 2 weeks.  PT OT       VTE prophylaxis: Eliquis

## 2019-12-14 NOTE — CARE PLAN
Problem: Communication  Goal: The ability to communicate needs accurately and effectively will improve  Outcome: PROGRESSING AS EXPECTED  Intervention: [place] patient and significant other/support system to call light to alert staff of needs  Flowsheets (Taken 12/14/2019 1230)  Oriented to:: All of the Following : Location of Bathroom, Visiting Policy, Unit Routine, Call Light and Bedside Controls, Bedside Rail Policy, Smoking Policy, Rights and Responsibilities, Bedside Report, and Patient Education Notebook  Intervention: Educate patient and significant other/support system about the plan of care, procedures, treatments, medications and allow for questions  Flowsheets (Taken 12/14/2019 1230)  Pt & Family Have Been Educated on Methods Available to Report Concerns Related to Care, Treatment, Services, and Patient Safety Issues: Yes     Problem: Safety  Goal: Will remain free from injury  Outcome: PROGRESSING AS EXPECTED  Goal: Will remain free from falls  Outcome: PROGRESSING AS EXPECTED

## 2019-12-14 NOTE — PROGRESS NOTES
[facility] Medicine Daily Progress Note    Date of Service  12/14/2019    Chief Complaint  89 y.o. male admitted 12/8/2019 with weakness and hypotension.    Hospital Course    History of obesity, atrial for ablation, pacemaker placement, diabetes, GERD, dyslipidemia admitted with generalized weakness.    Patient currently residing in a rehab facility after he sustained a left tibial plateau fracture.  The patient reported symptoms of nausea, vomiting.  Patient hyponatremic started on IV fluids.    Interval Problem Update  The patient had small BM overnight. Passing gas. Feeling much better today. Started on clear liquid diet. Monitor closely for acute abdomen. If his symptoms continue to get worse, I will consult surgery. Labs wbc 13, platelet 221  Patient was seen and examined by me today. Case was discussed with RN and multidisplinary team during rounds.            Consultants/Specialty  None     Code Status  DNR    Disposition    Anticipate DC back to rehab when stable    Review of Systems  Review of Systems   Constitutional: Positive for malaise/fatigue. Negative for diaphoresis and weight loss.   HENT: Negative for congestion and nosebleeds.    Eyes: Negative for pain and discharge.   Respiratory: Negative for sputum production and shortness of breath.    Cardiovascular: Negative for orthopnea and leg swelling.   Gastrointestinal: Negative for abdominal pain, diarrhea and vomiting.   Genitourinary: Negative for flank pain, frequency and hematuria.   Musculoskeletal: Negative for myalgias and neck pain.   Skin: Negative for itching and rash.   Neurological: Negative for speech change, focal weakness and weakness.   Psychiatric/Behavioral: The patient is not nervous/anxious and does not have insomnia.         Physical Exam  Temp:  [35.9 °C (96.7 °F)-36.2 °C (97.2 °F)] 35.9 °C (96.7 °F)  Pulse:  [69-98] 75  Resp:  [17-18] 17  BP: (105-126)/(64-81) 118/74  SpO2:  [93 %-98 %] 93 %    Physical Exam  Vitals signs  reviewed.   Constitutional:       General: He is not in acute distress.     Appearance: Normal appearance.   HENT:      Head: Normocephalic.      Right Ear: External ear normal.      Nose: Nose normal. No congestion or rhinorrhea.   Eyes:      Conjunctiva/sclera: Conjunctivae normal.      Pupils: Pupils are equal, round, and reactive to light.   Neck:      Musculoskeletal: Normal range of motion and neck supple.   Cardiovascular:      Rate and Rhythm: Normal rate and regular rhythm.      Pulses: Normal pulses.      Comments: Irregular, tachycardic   Pulmonary:      Effort: Pulmonary effort is normal.      Breath sounds: Rales: Basilar.   Abdominal:      General: Bowel sounds are normal. There is no distension.      Palpations: Abdomen is soft.      Comments: Hypoactive bowel sounds   Musculoskeletal:         General: No swelling.      Comments: Left leg in cast.   Skin:     General: Skin is warm and dry.   Neurological:      General: No focal deficit present.      Mental Status: He is alert.      Comments: Alert, following commands, mild confusion         Fluids    Intake/Output Summary (Last 24 hours) at 12/14/2019 0813  Last data filed at 12/14/2019 0600  Gross per 24 hour   Intake 240 ml   Output 900 ml   Net -660 ml       Laboratory  Recent Labs     12/12/19  0052 12/13/19  0228   WBC 10.7 13.1*   RBC 4.53* 4.82   HEMOGLOBIN 13.6* 14.3   HEMATOCRIT 41.9* 44.8   MCV 92.5 92.9   MCH 30.0 29.7   MCHC 32.5* 31.9*   RDW 46.5 46.5   PLATELETCT 201 221   MPV 10.5 11.0     Recent Labs     12/12/19  0052   SODIUM 137   POTASSIUM 3.5*   CHLORIDE 103   CO2 26   GLUCOSE 98   BUN 10   CREATININE 0.72   CALCIUM 8.9                   Imaging  [id number] VIEW   Final Result      Increased small bowel and colonic gas most suggestive of ileus, slightly improved from prior exam.      DX-HIP-UNILATERAL-WITH PELVIS-1 VIEW LEFT   Final Result      No acute left hip fracture.      [id number] VIEW   Final Result      Moderate  gaseous distention of the small bowel and colon, most suggestive of ileus.      EC-ECHOCARDIOGRAM COMPLETE W/O CONT   Final Result      DX-CHEST-PORTABLE (1 VIEW)   Final Result      1.  Persistently enlarged cardiac silhouette   2.  Atherosclerosis   3.  Interstitial pulmonary edema, less likely chronic interstitial lung disease   4.  Emphysema           Assessment/Plan  * Persistent atrial fibrillation- (present on admission)  Assessment & Plan  With periods of rapid ventricular response, flutter.  Possibly provoked due to ileus . Blood pressure stable.    Continue Eliquis   Increase Lopressor  PRN IV Lopressor for sustained AF  >  130  Monitor telemetry  Optimized electrolytes with K >4 and Mg>2    Ileus (HCC)  Assessment & Plan  Having small BM  KUB improving ileus  Supportive care  Start scheduled Reglan  If persistent nausea with onset of vomiting , abdominal distention-we will make N p.o. and start NG tube decompression.  Started on clear liquid diet today      Hyponatremia- (present on admission)  Assessment & Plan  Hypovolemic hyponatremia-patient clinically dehydrated-sodium improved to 137  Continue IV NS, decrease rate  Follow-up sodium level      Elevated troponin- (present on admission)  Assessment & Plan  Denies active chest pain but does report some shortness of breath  Repeat serial cardiac enzymes flat.  Echo preserved LV function  No chest pain, follow clinically  Control rapid A. fib  Monitor telemetry    Leukocytosis- (present on admission)  Assessment & Plan  Wbc 13, worsening  Likely related to reactive from ileus  Monitor vitals  Follow clinically      Essential hypertension- (present on admission)  Assessment & Plan  BP is improving  Continue with scheduled clonidine  Increase Lopressor  PRN labetalol, hydralazine    DNR (do not resuscitate)- (present on admission)  Assessment & Plan  Patient currently full code, discussed wishes . Will change to DNR per patient wishes.     General weakness-  (present on admission)  Assessment & Plan  With a recent left tibial fracture, cast in place.  Patient to follow-up with orthopedics in 2 weeks.  PT OT       VTE prophylaxis: Eliquis

## 2019-12-15 NOTE — WOUND TEAM
Received wound consult for sacrum. Pt assessed and does not present with open wounds, skin is  pink and blanching. Pt reports history of seborrheic keratosis, however no abnormality noted to back or sacrum.  No advanced wound care needs at this time, WT to sign off. Please re consult as needed.

## 2019-12-15 NOTE — PROGRESS NOTES
Monitor summary: Paced with underlying A-flutter 60-63, rare PVCs per strip from monitor room.

## 2019-12-15 NOTE — PROGRESS NOTES
[facility] Medicine Daily Progress Note    Date of Service  12/15/2019    Chief Complaint  89 y.o. male admitted 12/8/2019 with weakness and hypotension.    Hospital Course    History of obesity, atrial for ablation, pacemaker placement, diabetes, GERD, dyslipidemia admitted with generalized weakness.    Patient currently residing in a rehab facility after he sustained a left tibial plateau fracture.  The patient reported symptoms of nausea, vomiting.  Patient hyponatremic started on IV fluids.    Interval Problem Update  He is feeling better.  Tolerating full liquid diet.  Denies abdominal pain, nausea and vomiting.  Continue full liquid diet for now.  Will repeat KUB in the morning.  Monitor closely for acute abdomen. If his symptoms continue to get worse, I will consult surgery. Patient was seen and examined by me today. Case was discussed with RN and multidisplinary team during rounds.            Consultants/Specialty  None     Code Status  DNR    Disposition    Anticipate DC back to rehab when stable    Review of Systems  Review of Systems   Constitutional: Positive for malaise/fatigue. Negative for diaphoresis and weight loss.   HENT: Negative for congestion and nosebleeds.    Eyes: Negative for photophobia, pain and discharge.   Respiratory: Negative for sputum production.    Cardiovascular: Negative for orthopnea and leg swelling.   Gastrointestinal: Negative for abdominal pain, diarrhea and vomiting.   Genitourinary: Negative for frequency and hematuria.   Musculoskeletal: Negative for neck pain.   Skin: Negative for rash.   Neurological: Negative for focal weakness and weakness.   Psychiatric/Behavioral: The patient does not have insomnia.         Physical Exam  Temp:  [35.9 °C (96.7 °F)-37.2 °C (98.9 °F)] 36.1 °C (97 °F)  Pulse:  [60-85] 65  Resp:  [16-17] 17  BP: ()/(56-99) 111/65  SpO2:  [90 %-97 %] 97 %    Physical Exam  Vitals signs reviewed.   Constitutional:       Appearance: Normal appearance.    HENT:      Head: Normocephalic.      Right Ear: External ear normal.      Nose: Nose normal. No congestion.   Eyes:      Pupils: Pupils are equal, round, and reactive to light.   Neck:      Musculoskeletal: Normal range of motion and neck supple.   Cardiovascular:      Rate and Rhythm: Normal rate and regular rhythm.      Comments: Irregular, tachycardic   Pulmonary:      Breath sounds: Rales: Basilar.   Abdominal:      General: Bowel sounds are normal. There is no distension.      Comments: Hypoactive bowel sounds   Musculoskeletal:         General: No swelling.      Comments: Left leg in cast.   Neurological:      General: No focal deficit present.      Mental Status: He is alert.      Comments: Alert, following commands, mild confusion         Fluids    Intake/Output Summary (Last 24 hours) at 12/15/2019 0811  Last data filed at 12/15/2019 0300  Gross per 24 hour   Intake --   Output 800 ml   Net -800 ml       Laboratory  Recent Labs     12/13/19  0228   WBC 13.1*   RBC 4.82   HEMOGLOBIN 14.3   HEMATOCRIT 44.8   MCV 92.9   MCH 29.7   MCHC 31.9*   RDW 46.5   PLATELETCT 221   MPV 11.0                       Imaging  [id number] VIEW   Final Result      1.  Possible ileus but no evidence of significant small bowel obstruction.      2.  No free air is identified.                  [id number]-9 VIEW   Final Result      Increased small bowel and colonic gas most suggestive of ileus, slightly improved from prior exam.      DX-HIP-UNILATERAL-WITH PELVIS-1 VIEW LEFT   Final Result      No acute left hip fracture.      [id number] VIEW   Final Result      Moderate gaseous distention of the small bowel and colon, most suggestive of ileus.      EC-ECHOCARDIOGRAM COMPLETE W/O CONT   Final Result      DX-CHEST-PORTABLE (1 VIEW)   Final Result      1.  Persistently enlarged cardiac silhouette   2.  Atherosclerosis   3.  Interstitial pulmonary edema, less likely chronic interstitial lung disease   4.  Emphysema            Assessment/Plan  * Persistent atrial fibrillation- (present on admission)  Assessment & Plan  With periods of rapid ventricular response, flutter.  Possibly provoked due to ileus . Blood pressure stable.    Continue Eliquis   Increase Lopressor  PRN IV Lopressor for sustained AF  >  130  Monitor telemetry  Optimized electrolytes with K >4 and Mg>2    Ileus (HCC)  Assessment & Plan  Seems to be improving.  KUB showed ileus without evidence of small bowel obstruction  Supportive care  Start scheduled Reglan  If persistent nausea with onset of vomiting , abdominal distention-we will make N p.o. and start NG tube decompression.  on full liquid diet today  We will get another KUB tomorrow      Hyponatremia- (present on admission)  Assessment & Plan  Hypovolemic hyponatremia-patient clinically dehydrated-sodium improved to 137  Continue IV NS, decrease rate  Follow-up sodium level      Elevated troponin- (present on admission)  Assessment & Plan  Denies active chest pain but does report some shortness of breath  Repeat serial cardiac enzymes flat.  Echo preserved LV function  No chest pain, follow clinically  Control rapid A. fib  Monitor telemetry    Leukocytosis- (present on admission)  Assessment & Plan    Likely related to reactive from ileus  Monitor vitals  Follow clinically      Essential hypertension- (present on admission)  Assessment & Plan  BP is improving  Continue with scheduled clonidine  Increase Lopressor  PRN labetalol, hydralazine    DNR (do not resuscitate)- (present on admission)  Assessment & Plan  Patient currently full code, discussed wishes . Will change to DNR per patient wishes.     General weakness- (present on admission)  Assessment & Plan  With a recent left tibial fracture, cast in place.  Patient to follow-up with orthopedics in 2 weeks.  PT OT       VTE prophylaxis: Eliquis

## 2019-12-15 NOTE — CARE PLAN
Problem: Safety  Goal: Will remain free from injury  Outcome: PROGRESSING AS EXPECTED  Goal: Will remain free from falls  Outcome: PROGRESSING AS EXPECTED     Problem: Discharge Barriers/Planning  Goal: Patient's continuum of care needs will be met  Outcome: PROGRESSING AS EXPECTED     Problem: Fluid Volume:  Goal: Will maintain balanced intake and output  Outcome: PROGRESSING AS EXPECTED

## 2019-12-16 NOTE — PROGRESS NOTES
Received report from day shift RNSol. Pt resting in bed and does not appear to be in any distress. Call light and personal belongings within reach, bed in lowest locked position. POC addressed, white board updated. No further questions at this time.

## 2019-12-16 NOTE — PROGRESS NOTES
Patient A+Ox4, on room air, resting in bed on left side. On telemetry, IV fluids infusing. Tolerating full liquid diet, no nausea or vomiting, reports he is passing gas. Continent of urine. Call bell in hand, bed locked and in lowest position, whiteboard updated, patient updated on plan of care

## 2019-12-16 NOTE — PROGRESS NOTES
"Attempted to get patient out of bed to the chair, patient refused. This RN educated patient on risk for pneumonia, aspiration, pressure ulcers, increased weakness, constipation. Patient continued to refuse ,\"I understand, but I can't put any weight on my left leg and these chairs are so low that 3 people have to get me back to bed, I can't help you. I'm afraid you'll drop me without a therapist here, I would rather try with therapy tomorrow.\" Patient turns himself side to side with 1 assist.   "

## 2019-12-16 NOTE — PROGRESS NOTES
[facility] Medicine Daily Progress Note    Date of Service  12/16/2019    Chief Complaint  89 y.o. male admitted 12/8/2019 with weakness and hypotension.    Hospital Course    History of obesity, atrial for ablation, pacemaker placement, diabetes, GERD, dyslipidemia admitted with generalized weakness.    Patient currently residing in a rehab facility after he sustained a left tibial plateau fracture.  The patient reported symptoms of nausea, vomiting.  Patient hyponatremic started on IV fluids.    Interval Problem Update  He has been tolerating full liquid diet.  Has good bowel movement overnight.  Denies abdominal pain, nausea and vomiting.   Will repeat KUB today.  If continues to improve then I will advance diet to GI soft diet.  Monitor closely for acute abdomen. If his symptoms continue to get worse, I will consult surgery. Patient was seen and examined by me today. Case was discussed with RN and multidisplinary team during rounds.            Consultants/Specialty  None     Code Status  DNR    Disposition    Anticipate DC back to rehab when stable    Review of Systems  Review of Systems   Constitutional: Positive for malaise/fatigue. Negative for diaphoresis and weight loss.   HENT: Negative for congestion and nosebleeds.    Eyes: Negative for photophobia and pain.   Respiratory: Negative for sputum production.    Cardiovascular: Negative for orthopnea and leg swelling.   Gastrointestinal: Negative for abdominal pain and diarrhea.   Genitourinary: Negative for frequency and hematuria.   Musculoskeletal: Negative for neck pain.   Skin: Negative for rash.   Neurological: Negative for speech change, focal weakness and weakness.   Psychiatric/Behavioral: The patient does not have insomnia.         Physical Exam  Temp:  [35.8 °C (96.5 °F)-36.7 °C (98.1 °F)] 36.2 °C (97.2 °F)  Pulse:  [60-86] 61  Resp:  [15-18] 18  BP: (105-137)/(65-81) 121/81  SpO2:  [92 %-95 %] 95 %    Physical Exam  Vitals signs reviewed.   HENT:       Head: Normocephalic.      Right Ear: External ear normal.      Nose: Nose normal. No congestion.   Eyes:      Pupils: Pupils are equal, round, and reactive to light.   Neck:      Musculoskeletal: Normal range of motion.   Cardiovascular:      Rate and Rhythm: Normal rate.      Comments: Irregular, tachycardic   Pulmonary:      Breath sounds: Rales: Basilar.   Abdominal:      General: Bowel sounds are normal. There is no distension.      Comments: Hypoactive bowel sounds   Musculoskeletal:         General: No swelling.      Comments: Left leg in cast.   Neurological:      General: No focal deficit present.      Mental Status: He is alert.      Comments: Alert, following commands, mild confusion         Fluids    Intake/Output Summary (Last 24 hours) at 12/16/2019 0810  Last data filed at 12/16/2019 0119  Gross per 24 hour   Intake --   Output 475 ml   Net -475 ml       Laboratory                        Imaging  [id number] VIEW   Final Result      1.  Possible ileus but no evidence of significant small bowel obstruction.      2.  No free air is identified.                  LW-OHOGONO-2 VIEW   Final Result      Increased small bowel and colonic gas most suggestive of ileus, slightly improved from prior exam.      DX-HIP-UNILATERAL-WITH PELVIS-1 VIEW LEFT   Final Result      No acute left hip fracture.      SG-UMDIHBA-2 VIEW   Final Result      Moderate gaseous distention of the small bowel and colon, most suggestive of ileus.      EC-ECHOCARDIOGRAM COMPLETE W/O CONT   Final Result      DX-CHEST-PORTABLE (1 VIEW)   Final Result      1.  Persistently enlarged cardiac silhouette   2.  Atherosclerosis   3.  Interstitial pulmonary edema, less likely chronic interstitial lung disease   4.  Emphysema      WE-CHUIYEY-1 VIEW    (Results Pending)        Assessment/Plan  * Persistent atrial fibrillation- (present on admission)  Assessment & Plan  With periods of rapid ventricular response, flutter.  Possibly provoked due to  ileus . Blood pressure stable.    Continue Eliquis   Increase Lopressor  PRN IV Lopressor for sustained AF  >  130  Monitor telemetry  Optimized electrolytes with K >4 and Mg>2  Rate controlled    Ileus (HCC)  Assessment & Plan  Seems to be improving.  Repeat KUB today  Supportive care  Start scheduled Reglan  GI soft diet starting today if KUB shows improvement        Hyponatremia- (present on admission)  Assessment & Plan  Hypovolemic hyponatremia-patient clinically dehydrated-sodium improved to 137  Continue IV NS, decrease rate  Follow-up sodium level      Elevated troponin- (present on admission)  Assessment & Plan  Denies active chest pain but does report some shortness of breath  Repeat serial cardiac enzymes flat.  Echo preserved LV function  No chest pain, follow clinically  Control rapid A. fib  Monitor telemetry    Leukocytosis- (present on admission)  Assessment & Plan    Likely related to reactive from ileus  Monitor vitals  Follow clinically      Essential hypertension- (present on admission)  Assessment & Plan  BP is improving  Continue with scheduled clonidine  Increase Lopressor  PRN labetalol, hydralazine    DNR (do not resuscitate)- (present on admission)  Assessment & Plan  Patient currently full code, discussed wishes . Will change to DNR per patient wishes.     General weakness- (present on admission)  Assessment & Plan  With a recent left tibial fracture, cast in place.  Patient to follow-up with orthopedics in 2 weeks.  PT OT       VTE prophylaxis: Eliquis

## 2019-12-16 NOTE — PROGRESS NOTES
Assumed care at 0700. Bedside report received from Sonal. Patient's chart and MAR reviewed. Pt denies pain at this time. Pt is A & O 4. Patient was updated on plan of care for the day. Questions answered and concerns addressed.  Pt denies any additional needs at this time. White board updated. Call light, phone and personal belongings within reach.

## 2019-12-17 NOTE — DIETARY
"Nutrition services: Day 8 of admit.  Jersey Alexis is a 89 y.o. male with admitting DX of nausea and vomiting, elevated troponin.  Poor oral intake noted per chart review.    Assessment:  Height: 180.3 cm (5' 11\")  Weight: (bed scale broken; rn notified)  Body mass index is 29.83 kg/m²., BMI classification: Overweight  Diet/Intake: Full liquid    Evaluation:   1. Recorded PO intake <25% x 1 meal each on 12/17 and 12/16.  PO 50-75% x 3 meals on 12/15.  2. Pt states he is tired of full liquid diet, stating it's the same food every meal.    3. He is interested in addition of Boost with meals.  Obtained order for supplement.  4. Per MD note 12/16, pt with Ileus.  May advance diet if KUB improved.  5. Noted Hx of Diabetes per H&P.  Pt currently not receiving medication for Diabetes and glucose is under good control ranging 65-98 with recent labs.    Malnutrition Risk: Criteria not met.    Recommendations/Plan:  1. Diet advancement per MD.  2. Boost Plus with meals.   3. Encourage intake of meals and supplements.  4. Document intake of all meals and supplements as % taken in ADL's to provide interdisciplinary communication across all shifts.   5. Monitor weight.  6. Nutrition rep will continue to see patient for ongoing meal and snack preferences.     RD following.        "

## 2019-12-17 NOTE — PROGRESS NOTES
[facility] Medicine Daily Progress Note    Date of Service  12/17/2019    Chief Complaint  89 y.o. male admitted 12/8/2019 with weakness and hypotension.    Hospital Course    History of obesity, atrial for ablation, pacemaker placement, diabetes, GERD, dyslipidemia admitted with generalized weakness.    Patient currently residing in a rehab facility after he sustained a left tibial plateau fracture.  The patient reported symptoms of nausea, vomiting.  Patient hyponatremic started on IV fluids.    Interval Problem Update  Pt seen and examined, afebrile, no overnight events, has been tolerating full liquid diet and having good BM, will advance diet today.   Denies any nausea or vomiting.  Monitor closely for acute abdomen. If his symptoms continue to get worse, I will consult surgery.    Case was discussed with RN and multidisplinary team during rounds.      Consultants/Specialty  None     Code Status  DNR    Disposition  Anticipate DC back to rehab when stable    Review of Systems  Review of Systems   Constitutional: Positive for malaise/fatigue. Negative for diaphoresis and weight loss.   HENT: Negative for congestion, ear pain, nosebleeds and tinnitus.    Eyes: Negative for photophobia and pain.   Respiratory: Negative for cough, sputum production and shortness of breath.    Cardiovascular: Negative for chest pain, orthopnea and leg swelling.   Gastrointestinal: Negative for abdominal pain and diarrhea.   Genitourinary: Negative for dysuria, frequency and urgency.   Musculoskeletal: Negative for neck pain.   Skin: Negative for rash.   Neurological: Negative for speech change, focal weakness and weakness.   Psychiatric/Behavioral: The patient does not have insomnia.    All other systems reviewed and are negative.       Physical Exam  Temp:  [36.3 °C (97.4 °F)-37.2 °C (99 °F)] 36.9 °C (98.4 °F)  Pulse:  [60-67] 60  Resp:  [14-18] 16  BP: (113-186)/() 164/96  SpO2:  [93 %-96 %] 96 %    Physical Exam  Vitals signs  and nursing note reviewed.   HENT:      Head: Normocephalic.      Right Ear: External ear normal.      Nose: Nose normal. No congestion.   Eyes:      General: No scleral icterus.     Conjunctiva/sclera: Conjunctivae normal.      Pupils: Pupils are equal, round, and reactive to light.   Neck:      Musculoskeletal: Normal range of motion.   Cardiovascular:      Rate and Rhythm: Normal rate.      Heart sounds: No gallop.       Comments: Irregular, tachycardic   Pulmonary:      Effort: No respiratory distress.      Breath sounds: No wheezing.   Abdominal:      General: Bowel sounds are normal. There is no distension.      Tenderness: There is no tenderness. There is no guarding.      Comments: Hypoactive bowel sounds   Musculoskeletal:         General: No swelling.      Comments: Left leg in cast.   Skin:     General: Skin is warm.      Coloration: Skin is not jaundiced.   Neurological:      General: No focal deficit present.      Mental Status: He is alert and oriented to person, place, and time.      Comments: Alert, following commands, mild confusion         Fluids    Intake/Output Summary (Last 24 hours) at 12/17/2019 1455  Last data filed at 12/17/2019 1106  Gross per 24 hour   Intake 50 ml   Output 2525 ml   Net -2475 ml       Laboratory  Recent Labs     12/17/19  1045   WBC 11.9*   RBC 5.74   HEMOGLOBIN 17.1   HEMATOCRIT 51.7   MCV 90.1   MCH 29.8   MCHC 33.1*   RDW 46.0   PLATELETCT 224   MPV 10.9     Recent Labs     12/17/19  1045   SODIUM 138   POTASSIUM 3.7   CHLORIDE 100   CO2 24   GLUCOSE 116*   BUN 6*   CREATININE 0.61   CALCIUM 9.6                   Imaging  SU-[id number]-1 VIEW   Final Result      1.  Postoperative change lumbar spine.   2.  Pacemaker in place, partly in the field-of-view.   3.  Moderate gastric distention. Diffusely air-filled small bowel and colon without significant dilatation. Findings suggest mild ileus.      [id number] VIEW   Final Result      1.  Possible ileus but no evidence of  significant small bowel obstruction.      2.  No free air is identified.                  VE-[id number]-5 VIEW   Final Result      Increased small bowel and colonic gas most suggestive of ileus, slightly improved from prior exam.      DX-HIP-UNILATERAL-WITH PELVIS-1 VIEW LEFT   Final Result      No acute left hip fracture.      TR-[id number]-2 VIEW   Final Result      Moderate gaseous distention of the small bowel and colon, most suggestive of ileus.      EC-ECHOCARDIOGRAM COMPLETE W/O CONT   Final Result      DX-CHEST-PORTABLE (1 VIEW)   Final Result      1.  Persistently enlarged cardiac silhouette   2.  Atherosclerosis   3.  Interstitial pulmonary edema, less likely chronic interstitial lung disease   4.  Emphysema           Assessment/Plan  * Persistent atrial fibrillation- (present on admission)  Assessment & Plan  With periods of rapid ventricular response, flutter.  Possibly provoked due to ileus . Blood pressure stable.    Continue Eliquis   Increase Lopressor  PRN IV Lopressor for sustained AF  >  130  Monitor telemetry  Optimized electrolytes with K >4 and Mg>2  Rate controlled  Improved      Ileus (HCC)  Assessment & Plan  Seems to be improving.  Supportive care  Start scheduled Reglan  Advancing diet as tolerated   Monitor         Hyponatremia- (present on admission)  Assessment & Plan  Hypovolemic hyponatremia-patient clinically dehydrated-sodium improved to 137  Continue IV NS,  Improved        Elevated troponin- (present on admission)  Assessment & Plan  Denies active chest pain but does report some shortness of breath  Repeat serial cardiac enzymes flat.  Echo preserved LV function  No chest pain, follow clinically  Control rapid A. fib  Monitor telemetry    Leukocytosis- (present on admission)  Assessment & Plan    Likely  reactive from ileus  Improving       Essential hypertension- (present on admission)  Assessment & Plan  BP is improving  Continue with scheduled clonidine  Increase Lopressor  PRN  labetalol, hydralazine    DNR (do not resuscitate)- (present on admission)  Assessment & Plan  Patient currently full code, discussed wishes . Will change to DNR per patient wishes.     General weakness- (present on admission)  Assessment & Plan  With a recent left tibial fracture, cast in place.  Patient to follow-up with orthopedics in 2 weeks.  PT OT       VTE prophylaxis: Eliquis

## 2019-12-17 NOTE — CARE PLAN
Problem: Nutritional:  Goal: Achieve adequate nutritional intake  Description  Advance diet as tolerated.  Patient will consume >50% of meals   Outcome: NOT MET

## 2019-12-17 NOTE — CARE PLAN
Problem: Communication  Goal: The ability to communicate needs accurately and effectively will improve  Outcome: PROGRESSING AS EXPECTED     Problem: Safety  Goal: Will remain free from injury  Outcome: PROGRESSING AS EXPECTED  Goal: Will remain free from falls  Outcome: PROGRESSING AS EXPECTED     Problem: Bowel/Gastric:  Goal: Normal bowel function is maintained or improved  Outcome: PROGRESSING AS EXPECTED     Problem: Skin Integrity  Goal: Risk for impaired skin integrity will decrease  Outcome: PROGRESSING AS EXPECTED     Problem: Pain Management  Goal: Pain level will decrease to patient's comfort goal  Outcome: PROGRESSING AS EXPECTED     Problem: Mobility  Goal: Risk for activity intolerance will decrease  Outcome: PROGRESSING SLOWER THAN EXPECTED   Pt unable to ambulate out of bed due to left leg cast and pain.

## 2019-12-17 NOTE — PROGRESS NOTES
Pt stable. Denies nausea or vomiting. Pt states he is passing gas.     Pt refuses turns. Educating provided. Pt on low air loss mattress.     Spoke with provider regarding sodium level, appropriate labs ordered.     Plan to advance diet this evening if pt tolerates lunch.     Will continue to monitor.

## 2019-12-18 NOTE — THERAPY
"Speech Language Therapy dysphagia treatment completed.   Functional Status:  Pt awakens easily and agreeable to soft diced fruit. Per pt and the nurs, pt to d/c to [facility] at [place]. Pt stating that he noticed stomach issues when he laid down flat following a meal. Pt re-educ regarding remaining upright for 15-30 min following oral intake related to hist of esophageal difficulty. Pt verbalized understanding and stating he sleeps with a \"wedge\" normally to promote HOB at 30 degrees at all times. Pt taking appropriate bite size with min increase in time to masticate one cup of soft diced fruit. No oral residue post swallow. No s/s of difficulty during intake of diced fruit. SLP educ pt regarding recommended feeding/swallow strategies and collaborated with nurs. No further SLP indicated at this time.   Recommendations: GI soft thins per MD order.   Plan of Care: no further SLP  Post-Acute Therapy: not indicated. Thanks, Parmjit    See \"Rehab Therapy-Acute\" Patient Summary Report for complete documentation.     "

## 2019-12-18 NOTE — PROGRESS NOTES
[facility] Medicine Daily Progress Note    Date of Service  12/18/2019    Chief Complaint  89 y.o. male admitted 12/8/2019 with weakness and hypotension.    Hospital Course    History of obesity, atrial for ablation, pacemaker placement, diabetes, GERD, dyslipidemia admitted with generalized weakness.    Patient currently residing in a rehab facility after he sustained a left tibial plateau fracture.  The patient reported symptoms of nausea, vomiting.  Patient hyponatremic started on IV fluids.    Interval Problem Update  Afebrile, no overnight events, diet advanced, denies any abdominal apin, feeling better,  Denies any nausea or vomiting.  Monitor closely for acute abdomen. If his symptoms continue to get worse, I will consult surgery.    Case was discussed with RN and multidisplinary team during rounds.      Consultants/Specialty  None     Code Status  DNR    Disposition  Anticipate DC back to SNF    Review of Systems  Review of Systems   Constitutional: Positive for malaise/fatigue. Negative for diaphoresis and weight loss.   HENT: Negative for congestion, ear pain, nosebleeds and tinnitus.    Eyes: Negative for photophobia and pain.   Respiratory: Negative for cough, sputum production and shortness of breath.    Cardiovascular: Negative for chest pain, orthopnea and leg swelling.   Gastrointestinal: Negative for abdominal pain and diarrhea.   Genitourinary: Negative for dysuria, frequency and urgency.   Musculoskeletal: Negative for neck pain.   Skin: Negative for rash.   Neurological: Negative for speech change, focal weakness and weakness.   Psychiatric/Behavioral: The patient does not have insomnia.    All other systems reviewed and are negative.       Physical Exam  Temp:  [36.1 °C (96.9 °F)-36.7 °C (98.1 °F)] 36.2 °C (97.1 °F)  Pulse:  [60-74] 74  Resp:  [14-18] 18  BP: (103-139)/(63-88) 103/63  SpO2:  [93 %-99 %] 93 %    Physical Exam  Vitals signs and nursing note reviewed.   HENT:      Head:  Normocephalic.      Right Ear: External ear normal.      Nose: Nose normal. No congestion.   Eyes:      General: No scleral icterus.     Conjunctiva/sclera: Conjunctivae normal.      Pupils: Pupils are equal, round, and reactive to light.   Neck:      Musculoskeletal: Normal range of motion.   Cardiovascular:      Rate and Rhythm: Normal rate.      Heart sounds: No gallop.       Comments: Irregular, tachycardic   Pulmonary:      Effort: No respiratory distress.      Breath sounds: No wheezing.   Abdominal:      General: Bowel sounds are normal. There is no distension.      Tenderness: There is no tenderness. There is no guarding.      Comments: Hypoactive bowel sounds   Musculoskeletal:         General: No swelling.      Comments: Left leg in cast.   Skin:     General: Skin is warm.      Coloration: Skin is not jaundiced.   Neurological:      General: No focal deficit present.      Mental Status: He is alert and oriented to person, place, and time.      Comments: Alert, following commands, mild confusion         Fluids    Intake/Output Summary (Last 24 hours) at 12/18/2019 1334  Last data filed at 12/18/2019 0300  Gross per 24 hour   Intake 240 ml   Output 1205 ml   Net -965 ml       Laboratory  Recent Labs     12/17/19  1045 12/18/19  0253   WBC 11.9* 9.6   RBC 5.74 4.82   HEMOGLOBIN 17.1 14.2   HEMATOCRIT 51.7 43.7   MCV 90.1 90.7   MCH 29.8 29.5   MCHC 33.1* 32.5*   RDW 46.0 45.7   PLATELETCT 224 238   MPV 10.9 10.3     Recent Labs     12/17/19  1045 12/18/19  0253   SODIUM 138 136   POTASSIUM 3.7 3.5*   CHLORIDE 100 101   CO2 24 29   GLUCOSE 116* 116*   BUN 6* 8   CREATININE 0.61 0.77   CALCIUM 9.6 9.4                   Imaging  [id number] VIEW   Final Result      1.  Postoperative change lumbar spine.   2.  Pacemaker in place, partly in the field-of-view.   3.  Moderate gastric distention. Diffusely air-filled small bowel and colon without significant dilatation. Findings suggest mild ileus.      [id number]  VIEW   Final Result      1.  Possible ileus but no evidence of significant small bowel obstruction.      2.  No free air is identified.                  [id number] VIEW   Final Result      Increased small bowel and colonic gas most suggestive of ileus, slightly improved from prior exam.      DX-HIP-UNILATERAL-WITH PELVIS-1 VIEW LEFT   Final Result      No acute left hip fracture.      [id number] VIEW   Final Result      Moderate gaseous distention of the small bowel and colon, most suggestive of ileus.      EC-ECHOCARDIOGRAM COMPLETE W/O CONT   Final Result      DX-CHEST-PORTABLE (1 VIEW)   Final Result      1.  Persistently enlarged cardiac silhouette   2.  Atherosclerosis   3.  Interstitial pulmonary edema, less likely chronic interstitial lung disease   4.  Emphysema           Assessment/Plan  * Persistent atrial fibrillation- (present on admission)  Assessment & Plan  With periods of rapid ventricular response, flutter.  Possibly provoked due to ileus . Blood pressure stable.    Continue Eliquis   Increase Lopressor  PRN IV Lopressor for sustained AF  >  130  Monitor telemetry  Optimized electrolytes with K >4 and Mg>2  Rate controlled  Improved      Ileus (HCC)  Assessment & Plan  Seems to be improving.  Supportive care  Start scheduled Reglan  Advancing diet as tolerated   Monitor         Hyponatremia- (present on admission)  Assessment & Plan  Hypovolemic hyponatremia-patient clinically dehydrated-sodium improved to 137  Continue IV NS,  Improved        Elevated troponin- (present on admission)  Assessment & Plan  Denies active chest pain but does report some shortness of breath  Repeat serial cardiac enzymes flat.  Echo preserved LV function  No chest pain, follow clinically  Control rapid A. fib  Monitor telemetry    Leukocytosis- (present on admission)  Assessment & Plan    Likely  reactive from ileus  Improving       Essential hypertension- (present on admission)  Assessment & Plan  BP is  improving  Continue with scheduled clonidine  Increase Lopressor  PRN labetalol, hydralazine    DNR (do not resuscitate)- (present on admission)  Assessment & Plan  Patient currently full code, discussed wishes . Will change to DNR per patient wishes.     General weakness- (present on admission)  Assessment & Plan  With a recent left tibial fracture, cast in place.  Patient to follow-up with orthopedics in 2 weeks.  PT OT       VTE prophylaxis: Eliquis

## 2019-12-18 NOTE — THERAPY
"Occupational Therapy Treatment completed with focus on ADLs and patient education.  Functional Status:  Pt was seen for Occupational Therapy treatment today, see Therapy Kardex for details. Treatment included education in breath control with activity and at rest, self pacing techs and energy conservation for pain management. Educated pt in safety awareness techs as well.Psychosocial intervention addressed. Pt agreed to do specific self care tasks. Pt stated initially he did not want to get to EOB that \"I put on my pants in bed because it is easier.\" Pt attempted and struggled not able to don pants even with using AE requiring Max A. Suggested pt come to EOB. Pt agreed and required Mod A for supine to sit EOB.  Pt demo only fair sitting balance demo lateral and posterior leaning. Pt using AE needed Mod A to clear heel on LLE donning over cast. Pt able to don RLE without assist and rolled side to side and bridged on RLE for clothing management up over hips,maintaining NWB LLE with LB dressing. Able to don sock on RLE with SBA. Discussed attempting BSC transfers. Pt stated, \"No,  I use a bed pan\". Informed pt he will not use one at home and a BSC would be safer and easier for clean up post BM. Pt adamantly refused stating, \"I can't do that yet\". However pt did state that he was getting up to a chair at the bedside . Informed pt that getting up and stand pivoting on RLE to a BSC is the same transfer as getting to the chair. Pt disagreed and did not want to attempt.  Pt stated he has a walk in shower at home. Discussed how he has been getting into the shower. Pt stated he \"has a scooter and can transfer to/from the shower chair and toilet from scooter\". Pt demonstrated Min A for UB dressing. Pt c/o dizziness and fatigue and requested to get back in bed. Pt required SBA for BTB. Min A to remove pants in bed using reacher. Pt stated he has a CG at his Assisted living place and will ask for help when needed. Pt will need " "further OT services prior to getting back to AL. Pt stated assistance is not always available; short handed\". Pt was left up in bed , call light in reach, bedside table in reach and nursing is aware. RN updated on OT tx findings and recommendations. Continue Occupational Therapy services as per plan.    Plan of Care: Will benefit from Occupational Therapy 3 times per week  Discharge Recommendations:  Equipment Will Continue to Assess for Equipment Needs. Post-acute therapy Discharge to a transitional care facility for continued skilled therapy services prior to d/c home.    See \"Rehab Therapy-Acute\" Patient Summary Report for complete documentation.   "

## 2019-12-18 NOTE — PROGRESS NOTES
Pt stable this morning. Pt denies nausea, vomiting and abdominal pain. Pt tolerating diet. Bowel sounds present; normoactive. No needs at this time. Will continue to monitor.

## 2019-12-18 NOTE — DISCHARGE PLANNING
Anticipated Discharge Disposition: [facility]    Action: Patient discussed in IDT rounds and is medically cleared to return to SNF today. RN PIERRE updated [facility] to check if they can receive patient today.    Barriers to Discharge: [facility] availability to take patient back    Plan: RN CM to f/u with [facility] for referral

## 2019-12-18 NOTE — THERAPY
"Physical Therapy Treatment completed.   Bed Mobility:  Supine to Sit: Minimal Assist (used bed features)  Transfers: Sit to Stand: (max A x2)  Gait: Level Of Assist: Unable to Participate  Plan of Care: Will benefit from Physical Therapy 3 times per week  Discharge Recommendations: Equipment: Will Continue to Assess for Equipment Needs. Post-acute therapy: see below.    See \"Rehab Therapy-Acute\" Patient Summary Report for complete documentation.     Patient progressing with functional mobility. Today he was able to perform sit to stand x3 with FWW and max A x2. He demonstrated improved technique and required decreased assist with first attempt to stand. He was able to perform bed mobility with supervision with use of bed features. Continue to recommend post acute placement. Will continue to follow.  "

## 2019-12-19 NOTE — DISCHARGE PLANNING
Received Transport Form @ 5768  Spoke to Renuka @ [facility]    Transport is scheduled for 12/20 @8501 going to [facility].

## 2019-12-19 NOTE — DISCHARGE PLANNING
Bedside RN had concerns regarding patient's left leg needing to stay straight for transport. CARL JAY spoke with Renuka at [facility] who states that they can attach the left leg rest to wheelchair for patient's transport.    Patient also requesting that this RN CM call Chandler, his son in law, to update on patient's plans for discharge to [facility] tomorrow. RN CM called and spoke with Chandler regarding transfer.

## 2019-12-19 NOTE — CARE PLAN
Problem: Nutritional:  Goal: Achieve adequate nutritional intake  Description  Advance diet as tolerated.  Patient will consume >50% of meals   Outcome: MET  Note:   Diet advanced to low fiber (GI soft), pt consumed >50% for last 4 meals charted and currently receiving Boost Plus TID

## 2019-12-19 NOTE — DISCHARGE PLANNING
Anticipated Discharge Disposition: [facility]    Action: RN PIERRE spoke with Renuka at [facility], they do not have a bed available today but can take patient tomorrow and transport is set up for 1330.    Barriers to Discharge: bed availability    Plan: Patient to discharge to [facility] 12/20 at 1330

## 2019-12-19 NOTE — PROGRESS NOTES
[facility] Medicine Daily Progress Note    Date of Service  12/19/2019    Chief Complaint  89 y.o. male admitted 12/8/2019 with weakness and hypotension.    Hospital Course    History of obesity, atrial for ablation, pacemaker placement, diabetes, GERD, dyslipidemia admitted with generalized weakness.    Patient currently residing in a rehab facility after he sustained a left tibial plateau fracture.  The patient reported symptoms of nausea, vomiting.  Patient hyponatremic started on IV fluids.    Interval Problem Update  Pt seen and examined no overnight events, diet advanced, denies any abdominal pain, feeling better,  Denies any nausea or vomiting.   Case was discussed with RN and multidisplinary team during rounds.      Consultants/Specialty  None     Code Status  DNR    Disposition  SNF     Review of Systems  Review of Systems   Constitutional: Positive for malaise/fatigue. Negative for diaphoresis and weight loss.   HENT: Negative for congestion, ear pain, nosebleeds and tinnitus.    Eyes: Negative for photophobia and pain.   Respiratory: Negative for cough, sputum production and shortness of breath.    Cardiovascular: Negative for chest pain, orthopnea and leg swelling.   Gastrointestinal: Negative for abdominal pain and diarrhea.   Genitourinary: Negative for dysuria, frequency and urgency.   Musculoskeletal: Negative for neck pain.   Skin: Negative for rash.   Neurological: Negative for speech change, focal weakness and weakness.   Psychiatric/Behavioral: The patient does not have insomnia.    All other systems reviewed and are negative.       Physical Exam  Temp:  [36.2 °C (97.1 °F)-36.6 °C (97.9 °F)] 36.4 °C (97.6 °F)  Pulse:  [60-95] 60  Resp:  [16-18] 16  BP: (115-144)/(61-87) 120/61  SpO2:  [96 %-98 %] 98 %    Physical Exam  Vitals signs and nursing note reviewed.   HENT:      Head: Normocephalic.      Right Ear: External ear normal.      Nose: Nose normal. No congestion.   Eyes:      General: No scleral  icterus.     Conjunctiva/sclera: Conjunctivae normal.      Pupils: Pupils are equal, round, and reactive to light.   Neck:      Musculoskeletal: Normal range of motion.   Cardiovascular:      Rate and Rhythm: Normal rate.      Heart sounds: No gallop.       Comments: Irregular, tachycardic   Pulmonary:      Effort: No respiratory distress.      Breath sounds: No wheezing.   Abdominal:      General: Bowel sounds are normal. There is no distension.      Tenderness: There is no tenderness. There is no guarding.      Comments: Hypoactive bowel sounds   Musculoskeletal:         General: No swelling.      Comments: Left leg in cast.   Skin:     General: Skin is warm.      Coloration: Skin is not jaundiced.   Neurological:      General: No focal deficit present.      Mental Status: He is alert and oriented to person, place, and time.      Comments: Alert, following commands, mild confusion         Fluids    Intake/Output Summary (Last 24 hours) at 12/19/2019 1514  Last data filed at 12/19/2019 1300  Gross per 24 hour   Intake 360 ml   Output 1275 ml   Net -915 ml       Laboratory  Recent Labs     12/17/19  1045 12/18/19  0253 12/19/19  0141   WBC 11.9* 9.6 11.9*   RBC 5.74 4.82 4.90   HEMOGLOBIN 17.1 14.2 14.4   HEMATOCRIT 51.7 43.7 45.1   MCV 90.1 90.7 92.0   MCH 29.8 29.5 29.4   MCHC 33.1* 32.5* 31.9*   RDW 46.0 45.7 47.8   PLATELETCT 224 238 234   MPV 10.9 10.3 10.1     Recent Labs     12/17/19  1045 12/18/19  0253 12/19/19  0141   SODIUM 138 136 135   POTASSIUM 3.7 3.5* 3.6   CHLORIDE 100 101 103   CO2 24 29 23   GLUCOSE 116* 116* 108*   BUN 6* 8 15   CREATININE 0.61 0.77 0.61   CALCIUM 9.6 9.4 9.1                   Imaging  [id number] VIEW   Final Result      1.  Postoperative change lumbar spine.   2.  Pacemaker in place, partly in the field-of-view.   3.  Moderate gastric distention. Diffusely air-filled small bowel and colon without significant dilatation. Findings suggest mild ileus.      [id number] VIEW    Final Result      1.  Possible ileus but no evidence of significant small bowel obstruction.      2.  No free air is identified.                  [id number] VIEW   Final Result      Increased small bowel and colonic gas most suggestive of ileus, slightly improved from prior exam.      DX-HIP-UNILATERAL-WITH PELVIS-1 VIEW LEFT   Final Result      No acute left hip fracture.      [id number] VIEW   Final Result      Moderate gaseous distention of the small bowel and colon, most suggestive of ileus.      EC-ECHOCARDIOGRAM COMPLETE W/O CONT   Final Result      DX-CHEST-PORTABLE (1 VIEW)   Final Result      1.  Persistently enlarged cardiac silhouette   2.  Atherosclerosis   3.  Interstitial pulmonary edema, less likely chronic interstitial lung disease   4.  Emphysema           Assessment/Plan  * Persistent atrial fibrillation- (present on admission)  Assessment & Plan  With periods of rapid ventricular response, flutter.  Possibly provoked due to ileus . Blood pressure stable.    Continue Eliquis   Increase Lopressor  PRN IV Lopressor for sustained AF  >  130  Monitor telemetry  Optimized electrolytes with K >4 and Mg>2  Rate controlled  Improved      Ileus (HCC)  Assessment & Plan  Seems to be improving.  Supportive care  Start scheduled Reglan  Advancing diet as tolerated   Monitor         Hyponatremia- (present on admission)  Assessment & Plan  Hypovolemic hyponatremia-patient clinically dehydrated-sodium improved to 137  Continue IV NS,  Improved        Elevated troponin- (present on admission)  Assessment & Plan  Denies active chest pain but does report some shortness of breath  Repeat serial cardiac enzymes flat.  Echo preserved LV function  No chest pain, follow clinically  Control rapid A. fib  Monitor telemetry    Leukocytosis- (present on admission)  Assessment & Plan    Likely  reactive from ileus  Improving       Essential hypertension- (present on admission)  Assessment & Plan  BP is  improving  Continue with scheduled clonidine  Increase Lopressor  PRN labetalol, hydralazine    DNR (do not resuscitate)- (present on admission)  Assessment & Plan  Patient currently full code, discussed wishes . Will change to DNR per patient wishes.     General weakness- (present on admission)  Assessment & Plan  With a recent left tibial fracture, cast in place.  Patient to follow-up with orthopedics in 2 weeks.  PT OT       VTE prophylaxis: Eliquis

## 2019-12-19 NOTE — DISCHARGE PLANNING
Agency/Facility Name: [facility]  Outcome: Left message in regards to patient's return to facility.

## 2019-12-19 NOTE — PROGRESS NOTES
Assumed care at 0700, bedside report received from [facility]. Pt. Is Paced on the monitor. Initial assessment completed, orders reviewed, call light within reach, bed alarm is in use, and hourly rounding in place. POC addressed with patient, no additional questions at this time.

## 2019-12-19 NOTE — CARE PLAN
Problem: Safety  Goal: Will remain free from injury  Outcome: PROGRESSING AS EXPECTED  Goal: Will remain free from falls  Outcome: PROGRESSING AS EXPECTED     Problem: Infection  Goal: Will remain free from infection  Outcome: PROGRESSING AS EXPECTED     Problem: Venous Thromboembolism (VTW)/Deep Vein Thrombosis (DVT) Prevention:  Goal: Patient will participate in Venous Thrombosis (VTE)/Deep Vein Thrombosis (DVT)Prevention Measures  Outcome: PROGRESSING AS EXPECTED  Flowsheets (Taken 12/19/2019 0319)  Pharmacologic Prophylaxis Used: Apixaban     Problem: Knowledge Deficit  Goal: Knowledge of disease process/condition, treatment plan, diagnostic tests, and medications will improve  Outcome: PROGRESSING AS EXPECTED  Goal: Knowledge of the prescribed therapeutic regimen will improve  Outcome: PROGRESSING AS EXPECTED     Problem: Skin Integrity  Goal: Risk for impaired skin integrity will decrease  Outcome: PROGRESSING AS EXPECTED  Note:   Pt refusing turns but compliant with frequent repositioning     Problem: Urinary Elimination:  Goal: Ability to reestablish a normal urinary elimination pattern will improve  Outcome: PROGRESSING AS EXPECTED     Problem: Bowel/Gastric:  Goal: Normal bowel function is maintained or improved  Outcome: PROGRESSING SLOWER THAN EXPECTED  Goal: Will not experience complications related to bowel motility  Outcome: PROGRESSING SLOWER THAN EXPECTED  Note:   Passing lots of gas.  11/17. Continue IV reglan to increase motility     Problem: Mobility  Goal: Risk for activity intolerance will decrease  Outcome: PROGRESSING SLOWER THAN EXPECTED  Note:   D/C to SNF     Problem: Respiratory:  Goal: Respiratory status will improve  Outcome: MET     Problem: Fluid Volume:  Goal: Will maintain balanced intake and output  Outcome: MET  Note:   Labs improved, BP stable. IVF infusing as ordered     Problem: Pain Management  Goal: Pain level will decrease to patient's comfort goal  Outcome: MET      Problem: Psychosocial Needs:  Goal: Level of anxiety will decrease  Outcome: MET     Problem: Discharge Barriers/Planning  Goal: Patient's continuum of care needs will be met  Note:   Awaiting bed at [place]. Patient medically cleared/stable

## 2019-12-20 NOTE — PROGRESS NOTES
Pt discharging to [facility]. Report called to CARL Reyez. Pt left room via wheelchair with LLE elevated and transport assist. IV removed prior to discharge and pt has no complaints of pain.

## 2019-12-20 NOTE — DISCHARGE INSTRUCTIONS
Discharge Instructions    Discharged to other by medical transportation with escort. Discharged via wheelchair, hospital escort: Yes.  Special equipment needed: Immobilizer    Be sure to schedule a follow-up appointment with your primary care doctor or any specialists as instructed.     Discharge Plan:   Diet Plan: Discussed  Activity Level: Discussed  Confirmed Follow up Appointment: Appointment Scheduled  Confirmed Symptoms Management: Discussed  Medication Reconciliation Updated: Yes  Influenza Vaccine Indication: Indicated: 65 years and older  Influenza Vaccine Given - only chart on this line when given: Influenza Vaccine Given (See MAR)    I understand that a diet low in cholesterol, fat, and sodium is recommended for good health. Unless I have been given specific instructions below for another diet, I accept this instruction as my diet prescription.   Other diet: Low Fiber GI    Special Instructions: None    · Is patient discharged on Warfarin / Coumadin?   No     Depression / Suicide Risk    As you are discharged from this [facility] facility, it is important to learn how to keep safe from harming yourself.    Recognize the warning signs:  · Abrupt changes in personality, positive or negative- including increase in energy   · Giving away possessions  · Change in eating patterns- significant weight changes-  positive or negative  · Change in sleeping patterns- unable to sleep or sleeping all the time   · Unwillingness or inability to communicate  · Depression  · Unusual sadness, discouragement and loneliness  · Talk of wanting to die  · Neglect of personal appearance   · Rebelliousness- reckless behavior  · Withdrawal from people/activities they love  · Confusion- inability to concentrate     If you or a loved one observes any of these behaviors or has concerns about self-harm, here's what you can do:  · Talk about it- your feelings and reasons for harming yourself  · Remove any means that you might use to  hurt yourself (examples: pills, rope, extension cords, firearm)  · Get professional help from the community (Mental Health, Substance Abuse, psychological counseling)  · Do not be alone:Call your Safe Contact- someone whom you trust who will be there for you.  · Call your local CRISIS HOTLINE 537-6385 or 745-861-6033  · Call your local Children's Mobile Crisis Response Team [place] (599) 930-4037 or www.Switchcam  · Call the toll free National Suicide Prevention Hotlines   · National Suicide Prevention Lifeline 746-807-XJJB (7966)  · National Hope Line Network 800-SUICIDE (394-5942)      Fall Prevention in the Home  Introduction  Falls can cause injuries. They can happen to people of all ages. There are many things you can do to make your home safe and to help prevent falls.  What can I do on the outside of my home?  · Regularly fix the edges of walkways and driveways and fix any cracks.  · Remove anything that might make you trip as you walk through a door, such as a raised step or threshold.  · Trim any bushes or trees on the path to your home.  · Use bright outdoor lighting.  · Clear any walking paths of anything that might make someone trip, such as rocks or tools.  · Regularly check to see if handrails are loose or broken. Make sure that both sides of any steps have handrails.  · Any raised decks and porches should have guardrails on the edges.  · Have any leaves, snow, or ice cleared regularly.  · Use sand or salt on walking paths during winter.  · Clean up any spills in your garage right away. This includes oil or grease spills.  What can I do in the bathroom?  · Use night lights.  · Install grab bars by the toilet and in the tub and shower. Do not use towel bars as grab bars.  · Use non-skid mats or decals in the tub or shower.  · If you need to sit down in the shower, use a plastic, non-slip stool.  · Keep the floor dry. Clean up any water that spills on the floor as soon as it  happens.  · Remove soap buildup in the tub or shower regularly.  · Attach bath mats securely with double-sided non-slip rug tape.  · Do not have throw rugs and other things on the floor that can make you trip.  What can I do in the bedroom?  · Use night lights.  · Make sure that you have a light by your bed that is easy to reach.  · Do not use any sheets or blankets that are too big for your bed. They should not hang down onto the floor.  · Have a firm chair that has side arms. You can use this for support while you get dressed.  · Do not have throw rugs and other things on the floor that can make you trip.  What can I do in the kitchen?  · Clean up any spills right away.  · Avoid walking on wet floors.  · Keep items that you use a lot in easy-to-reach places.  · If you need to reach something above you, use a strong step stool that has a grab bar.  · Keep electrical cords out of the way.  · Do not use floor polish or wax that makes floors slippery. If you must use wax, use non-skid floor wax.  · Do not have throw rugs and other things on the floor that can make you trip.  What can I do with my stairs?  · Do not leave any items on the stairs.  · Make sure that there are handrails on both sides of the stairs and use them. Fix handrails that are broken or loose. Make sure that handrails are as long as the stairways.  · Check any carpeting to make sure that it is firmly attached to the stairs. Fix any carpet that is loose or worn.  · Avoid having throw rugs at the top or bottom of the stairs. If you do have throw rugs, attach them to the floor with carpet tape.  · Make sure that you have a light switch at the top of the stairs and the bottom of the stairs. If you do not have them, ask someone to add them for you.  What else can I do to help prevent falls?  · Wear shoes that:  ¨ Do not have high heels.  ¨ Have rubber bottoms.  ¨ Are comfortable and fit you well.  ¨ Are closed at the toe. Do not wear sandals.  · If you  use a stepladder:  ¨ Make sure that it is fully opened. Do not climb a closed stepladder.  ¨ Make sure that both sides of the stepladder are locked into place.  ¨ Ask someone to hold it for you, if possible.  · Clearly margo and make sure that you can see:  ¨ Any grab bars or handrails.  ¨ First and last steps.  ¨ Where the edge of each step is.  · Use tools that help you move around (mobility aids) if they are needed. These include:  ¨ Canes.  ¨ Walkers.  ¨ Scooters.  ¨ Crutches.  · Turn on the lights when you go into a dark area. Replace any light bulbs as soon as they burn out.  · Set up your furniture so you have a clear path. Avoid moving your furniture around.  · If any of your floors are uneven, fix them.  · If there are any pets around you, be aware of where they are.  · Review your medicines with your doctor. Some medicines can make you feel dizzy. This can increase your chance of falling.  Ask your doctor what other things that you can do to help prevent falls.  This information is not intended to replace advice given to you by your health care provider. Make sure you discuss any questions you have with your health care provider.  Document Released: 10/14/2010 Document Revised: 05/25/2017 Document Reviewed: 01/22/2016  © 2017 [employer]

## 2019-12-20 NOTE — CARE PLAN
Problem: Bowel/Gastric:  Goal: Normal bowel function is maintained or improved  Outcome: PROGRESSING SLOWER THAN EXPECTED  Goal: Will not experience complications related to bowel motility  Outcome: PROGRESSING SLOWER THAN EXPECTED     Problem: Mobility  Goal: Risk for activity intolerance will decrease  Outcome: PROGRESSING SLOWER THAN EXPECTED     Problem: Safety  Goal: Will remain free from injury  Outcome: PROGRESSING AS EXPECTED     Problem: Venous Thromboembolism (VTW)/Deep Vein Thrombosis (DVT) Prevention:  Goal: Patient will participate in Venous Thrombosis (VTE)/Deep Vein Thrombosis (DVT)Prevention Measures  Outcome: PROGRESSING AS EXPECTED     Problem: Knowledge Deficit  Goal: Knowledge of disease process/condition, treatment plan, diagnostic tests, and medications will improve  Outcome: PROGRESSING AS EXPECTED  Goal: Knowledge of the prescribed therapeutic regimen will improve  Outcome: PROGRESSING AS EXPECTED     Problem: Discharge Barriers/Planning  Goal: Patient's continuum of care needs will be met  Outcome: PROGRESSING AS EXPECTED     Problem: Skin Integrity  Goal: Risk for impaired skin integrity will decrease  Outcome: PROGRESSING AS EXPECTED

## 2019-12-20 NOTE — PROGRESS NOTES
2 RN skin check done with Gauri HENRY    Pt's temi area, including groin buttocks and sacrum, red but blanches, no open areas noted, pt incontinent of stool since he received a dose of Mg citrate.  Other skin intact.      Barrier cream applied to temi area, encourage pt to move self in bed frequently, pt agrees, refusing q2hr turns.

## 2019-12-20 NOTE — DISCHARGE SUMMARY
Discharge Summary    CHIEF COMPLAINT ON ADMISSION  Chief Complaint   Patient presents with   • Shortness of Breath   • Hypotension       Reason for Admission  ems     CODE STATUS  DNAR/DNI  During this hospitalization, he has been made DNR/DNI per patient wishes. Please see attending hospitalist   notes.  HPI & HOSPITAL COURSE  This is a 89 y.o. male here with Shortness of Breath and Hypotension  Please review Dr. Ger Huang M.D. notes for further details of history of present illness, past medical/social/family histories, allergies and medications.   History of atrial fibrillationand pacer on Eliquis and baby aspirin  Presented with Shortness of Breath and Hypotension  At the ED, afebrile, normotensive albeit low normal pressures.  CXR:  1.  Persistently enlarged cardiac silhouette  2.  Atherosclerosis  3.  Interstitial pulmonary edema, less likely chronic interstitial lung disease  4.  Emphysema  Echo  Normal left ventricular systolic function.  Left ventricular ejection fraction is visually estimated to be 60%.  Diastolic function is difficult to assess with atrial flutter.  The right ventricle was normal in size and function.  No significant valve disease or flow abnormalities.   Compared to the images of the prior study done 9/14/17 -  there has   been no significant change.   AXR:  Moderate gaseous distention of the small bowel and colon, most suggestive of ileus.  Leukocytosis, hyponatremia, elevated troponin  It was felt on admission that he was dehydrated with hyponatremia. He also had ileus. IV fluids were given. Bowel rest and Reglan started for management of ileus.  He improved on that regimen.  He was planned with transportation to SNF already for 12/20, when I inherited his care. I discussed with Dr. Guzmán, who took care of patient at telemetry:  Leukocytosis was felt to be reactive, no clinical signs of infection. Therefore no further work-up or antibiotics were indicated.  Elevated troponin was felt  to be rate related or demand ischemia. There were no signs of ACS.   Patients HR is in the 60s now and is on clonidine, therefore he was not on metoprolol at discharge date. He does have a pacer so there would be no harm in resuming his metoprolol but because of weakness I will hold off for now, since he has adequate rate control on clonidine. If his HR on average is 80 or greater, then metoprolol can be restarted.  His ileus has resolved therefore I see no need to continue scheduled Reglan which has side effects and proarrhythmogenic.  He has a left leg cast where he follows Dr. [facility]. He is supposed to be scheduled for follow up possible removal on 1/4/20.  He will therefore be discharged to SNF today as planned.    At SNF, his HR and blood pressures can be checked.    At discharge date, Jersey Alexis afebrile and hemodynamically stable.  Jersey Alexis wanted to be discharged today.    Discharge Physical Exam  Vitals signs and nursing note reviewed.   HENT:      Head: Normocephalic.      Right Ear: External ear normal.      Nose: Nose normal. No congestion.   Eyes:      General: No scleral icterus.     Conjunctiva/sclera: Conjunctivae normal.      Pupils: Pupils are equal, round, and reactive to light.   Neck:      Musculoskeletal: Normal range of motion.   Cardiovascular:      Rate and Rhythm: Normal rate.      Heart sounds: No gallop.       Comments: Irregular   Pulmonary:      Effort: No respiratory distress.      Breath sounds: No wheezing.   Abdominal:      General: Bowel sounds are normal. There is no distension.      Tenderness: There is no tenderness. There is no guarding.      Comments:   Musculoskeletal:         General: No swelling.      Comments: Left leg in cast.   Skin:     General: Skin is warm.      Coloration: Skin is not jaundiced.   Neurological:      General: No focal deficit present.      Mental Status: He is alert and oriented to person, place, and time.      Comments: Alert,  following commands, mild confusion     Imaging  [id number] VIEW   Final Result      1.  Postoperative change lumbar spine.   2.  Pacemaker in place, partly in the field-of-view.   3.  Moderate gastric distention. Diffusely air-filled small bowel and colon without significant dilatation. Findings suggest mild ileus.      [id number]-4 VIEW   Final Result      1.  Possible ileus but no evidence of significant small bowel obstruction.      2.  No free air is identified.                  [id number] VIEW   Final Result      Increased small bowel and colonic gas most suggestive of ileus, slightly improved from prior exam.      DX-HIP-UNILATERAL-WITH PELVIS-1 VIEW LEFT   Final Result      No acute left hip fracture.      [id number]-7 VIEW   Final Result      Moderate gaseous distention of the small bowel and colon, most suggestive of ileus.      EC-ECHOCARDIOGRAM COMPLETE W/O CONT   Final Result      DX-CHEST-PORTABLE (1 VIEW)   Final Result      1.  Persistently enlarged cardiac silhouette   2.  Atherosclerosis   3.  Interstitial pulmonary edema, less likely chronic interstitial lung disease   4.  Emphysema                      Therefore, he is discharged in good and stable condition to skilled nursing facility.    The patient met 2-midnight criteria for an inpatient stay at the time of discharge.      FOLLOW UP ITEMS POST DISCHARGE      DISCHARGE DIAGNOSES  Principal Problem:    Persistent atrial fibrillation POA: Yes  Active Problems:    Hyponatremia POA: Yes    Ileus (HCC) POA: Unknown    Elevated troponin POA: Yes    Essential hypertension POA: Yes    Leukocytosis POA: Yes    General weakness POA: Yes    DNR (do not resuscitate) POA: Yes        FOLLOW UP  Follow up with Tanisha Flores M.D. and attending physician at [facility]  Follow up with his Cardiologist in 1-2 weeks for management of his Afib.  Follow up with Dr. Coulter[facility] to address left leg cast in 1 week or as scheduled    MEDICATIONS ON  DISCHARGE     Medication List      CHANGE how you take these medications      Instructions   cloNIDine 0.1 MG Tabs  What changed:  when to take this  Commonly known as:  CATAPRES   Take 1 Tab by mouth 2 Times a Day.  Dose:  0.1 mg     * polyethylene glycol/lytes Pack  What changed:  Another medication with the same name was added. Make sure you understand how and when to take each.  Commonly known as:  MIRALAX   Take 17 g by mouth every day.  Dose:  17 g     * polyethylene glycol/lytes Pack  What changed:  You were already taking a medication with the same name, and this prescription was added. Make sure you understand how and when to take each.  Commonly known as:  MIRALAX   Take 1 Packet by mouth 1 time daily as needed (if sennosides and docusate ineffective after 24 hours).  Dose:  17 g         * This list has 2 medication(s) that are the same as other medications prescribed for you. Read the directions carefully, and ask your doctor or other care provider to review them with you.            CONTINUE taking these medications      Instructions   acetaminophen 500 MG Tabs  Commonly known as:  TYLENOL   Take 500-1,000 mg by mouth every 6 hours as needed for Mild Pain.  Dose:  500-1,000 mg     aspirin 81 MG Chew chewable tablet  Commonly known as:  ASA   Take 81 mg by mouth every day.  Dose:  81 mg     cyanocobalamin 1000 MCG Tabs  Commonly known as:  VITAMIN B12   Take 1,000 mcg by mouth every day.  Dose:  1,000 mcg     ELIQUIS 2.5mg Tabs  Generic drug:  apixaban   Take 2.5 mg by mouth 2 Times a Day.  Dose:  2.5 mg     gabapentin 100 MG Caps  Commonly known as:  NEURONTIN   Take 100 mg by mouth 2 Times a Day.  Dose:  100 mg     levETIRAcetam 500 MG Tabs  Commonly known as:  KEPPRA   Take 1 Tab by mouth 2 Times a Day.  Dose:  500 mg     magnesium hydroxide 400 MG/5ML Susp  Commonly known as:  MILK OF MAGNESIA   Take 30 mL by mouth 2 times a day as needed. Constipation  Dose:  30 mL     Magnesium Oxide 420 MG Tabs    Take 420 mg by mouth every day.  Dose:  420 mg     metoprolol SR 25 MG Tb24  Commonly known as:  TOPROL XL   Take 25 mg by mouth every day.  Dose:  25 mg     omeprazole 20 MG delayed-release capsule  Commonly known as:  PRILOSEC   Take 20 mg by mouth every day.  Dose:  20 mg     ondansetron 4 MG Tbdp  Commonly known as:  ZOFRAN ODT   Take 4 mg by mouth every 6 hours as needed for Nausea.  Dose:  4 mg     sennosides 8.6 MG Tabs  Commonly known as:  SENOKOT   Take 8.6 mg by mouth every day.  Dose:  8.6 mg     tramadol 50 MG Tabs  Commonly known as:  ULTRAM   Take 50 mg by mouth every 8 hours.  Dose:  50 mg            Allergies  Allergies   Allergen Reactions   • Vancomycin Rash     Diffuse  Tolerates at slower infusion rates.        DIET  Orders Placed This Encounter   Procedures   • Diet Order Low Fiber(GI Soft)     Standing Status:   Standing     Number of Occurrences:   1     Order Specific Question:   Diet:     Answer:   Low Fiber(GI Soft) [2]       ACTIVITY  As per SNF    LINES, DRAINS, AND WOUNDS  This is an automated list. Peripheral IVs will be removed prior to discharge.  Peripheral IV 12/17/19 20 G Anterior;Left;Proximal Forearm (Active)   Site Assessment Clean;Dry;Intact 12/19/2019  9:40 PM   Dressing Type Transparent 12/19/2019  9:40 PM   Line Status Infusing 12/19/2019  9:40 PM   Dressing Status Clean;Dry;Intact 12/19/2019  9:40 PM   Dressing Intervention N/A 12/19/2019  9:40 PM   Infiltration Grading ([facility], [facility]) 0 12/19/2019  9:40 PM   Phlebitis Scale ([facility] Only) 0 12/19/2019  9:40 PM          Peripheral IV 12/17/19 20 G Anterior;Left;Proximal Forearm (Active)   Site Assessment Clean;Dry;Intact 12/19/2019  9:40 PM   Dressing Type Transparent 12/19/2019  9:40 PM   Line Status Infusing 12/19/2019  9:40 PM   Dressing Status Clean;Dry;Intact 12/19/2019  9:40 PM   Dressing Intervention N/A 12/19/2019  9:40 PM   Infiltration Grading ([facility]) 0 12/19/2019  9:40 PM   Phlebitis Scale ([facility] Only) 0  12/19/2019  9:40 PM               MENTAL STATUS ON TRANSFER  Level of Consciousness: Alert  Orientation : Oriented x 4  Speech: Speech Clear    CONSULTATIONS      PROCEDURES  Jk-[id number]-9 View    Result Date: 12/16/2019 12/16/2019 12:37 PM HISTORY/REASON FOR EXAM:  Distention; ileus. TECHNIQUE/EXAM DESCRIPTION AND NUMBER OF VIEWS:  1 view(s) of the abdomen. COMPARISON: 1 view abdomen 12/14/2019 FINDINGS: There are postoperative changes in the lower lumbar spine with laminectomy at L4-5 and posterior fusion with transpedicular screw fixation. Pacemaker is present, partly in the field of view. There are 2 phleboliths noted over the pelvis. No other pathologic calcifications are evident. There is moderate gaseous distention of the gastric bubble. There is diffusely air-filled small bowel and colon which is not particularly dilated. This may represent air swallowing or mild ileus. No mass lesions or overt organomegaly are evident. There is no free air or other extraluminal gas collection.     1.  Postoperative change lumbar spine. 2.  Pacemaker in place, partly in the field-of-view. 3.  Moderate gastric distention. Diffusely air-filled small bowel and colon without significant dilatation. Findings suggest mild ileus.    Nr-uqkyreh-1 View    Result Date: 12/14/2019 12/14/2019 11:56 AM HISTORY/REASON FOR EXAM:  ileus. TECHNIQUE/EXAM DESCRIPTION AND NUMBER OF VIEWS:  1 views of the abdomen. COMPARISON: 12/11/2019 FINDINGS: Air is present in the large bowel and to a lesser extent the small bowel. There is no evidence of significant dilatation. There is no evidence of free intraperitoneal air. No abnormal calcifications are seen.     1.  Possible ileus but no evidence of significant small bowel obstruction. 2.  No free air is identified.     Py-ipjzrbe-7 View    Result Date: 12/11/2019 12/11/2019 1:15 PM HISTORY/REASON FOR EXAM:  Nausea. TECHNIQUE/EXAM DESCRIPTION AND NUMBER OF VIEWS:  1 view(s) of the abdomen.  COMPARISON: 12/9/2019 FINDINGS: Diffuse osteopenia. Prior lumbar spine surgery. Increased colonic gas. Moderately increased small bowel gas. Pacemaker lead noted.     Increased small bowel and colonic gas most suggestive of ileus, slightly improved from prior exam.    Hq-cpnzjux-2 View    Result Date: 12/9/2019 12/9/2019 1:00 PM HISTORY/REASON FOR EXAM:  Nausea. Distention, abdominal pain. TECHNIQUE/EXAM DESCRIPTION AND NUMBER OF VIEWS:  1 view(s) of the abdomen. COMPARISON: 10/24/2017 FINDINGS: Pacemaker lead noted. Stomach is distended with gas. Mildly increased small bowel gas. Moderately increased colonic gas. Postoperative change of lower lumbar spine.     Moderate gaseous distention of the small bowel and colon, most suggestive of ileus.    Dx-chest-portable (1 View)    Result Date: 12/8/2019 12/8/2019 8:50 PM HISTORY/REASON FOR EXAM:  Shortness of Breath TECHNIQUE/EXAM DESCRIPTION AND NUMBER OF VIEWS: Single portable view of the chest. COMPARISON: 10/23/2017 FINDINGS: RIGHT subclavian single lead cardiac pacing device is redemonstrated. HEART: Stable size. There is atherosclerotic calcification in the aortic arch. LUNGS: There is peripheral interstitial prominence, particularly in the bases. Apices are hyperlucent. PLEURA: No effusion or pneumothorax.     1.  Persistently enlarged cardiac silhouette 2.  Atherosclerosis 3.  Interstitial pulmonary edema, less likely chronic interstitial lung disease 4.  Emphysema    Ec-echocardiogram Complete W/o Cont    Result Date: 12/9/2019  Transthoracic Echo Report Echocardiography Laboratory CONCLUSIONS Normal left ventricular systolic function. Left ventricular ejection fraction is visually estimated to be 60%. Diastolic function is difficult to assess with atrial flutter. The right ventricle was normal in size and function. No significant valve disease or flow abnormalities. Compared to the images of the prior study done 9/14/17 -  there has been no significant  change. BRIAN SPARROW Exam Date:         2019                    09:59 Exam Location:     Inpatient Priority:          Routine Ordering Physician:        GIO MURO Referring Physician:       BHASKAR Garza Sonographer:               Jeremias Batres RDCS,                            RVT Age:    89     Gender:    M MRN:    [id number] :    10/18/1930 BSA:    2.16   Ht (in):    71     Wt (lb):    212 Exam Type:     Complete Indications:     Shortness of breath ICD Codes:       786.05 CPT Codes:       77825 BP:   119    /   85     HR:   60 Technical Quality:       Technically difficult study -                          adequate information is obtained MEASUREMENTS  (Male / Female) Normal Values 2D ECHO LV Diastolic Diameter PLAX        4.3 cm                4.2 - 5.9 / 3.9 - 5.3 cm LV Systolic Diameter PLAX         3 cm                  2.1 - 4.0 cm IVS Diastolic Thickness           0.94 cm               LVPW Diastolic Thickness          0.9 cm                RV Diameter 4C                    2.1 cm                2.5 - 2.9 cm LVOT Diameter                     1.9 cm                RA Diameter                       3.1 cm                Estimated LV Ejection Fraction    60 %                  LV Ejection Fraction MOD BP       58.2 %                >= 55  % LV Ejection Fraction MOD 4C       57.8 %                LV Ejection Fraction MOD 2C       62.2 %                LA Volume Index                   22.8 cm???/m???           16 - 28 cm???/m??? IVC Diameter                      1.3 cm                DOPPLER AV Peak Velocity                  1.1 m/s               AV Peak Gradient                  4.8 mmHg              AV Mean Gradient                  2.5 mmHg              LVOT Peak Velocity                0.71 m/s              AV Area Cont Eq vti               2 cm???                 PV Peak Velocity                  0.92 m/s              PV Peak Gradient                  3.4 mmHg              RVOT Peak Velocity                 0.73 m/s              * Indicates values subject to auto-interpretation LV EF:  60    % FINDINGS Left Ventricle The left ventricle was normal in size and function.  Normal left ventricular wall thickness. Normal left ventricular systolic function. Left ventricular ejection fraction is visually estimated to be 60%. Normal regional wall motion. Diastolic function is difficult to assess with atrial flutter. Right Ventricle The right ventricle was normal in size and function. Right Atrium The right atrium is normal in size.  Normal inferior vena cava size and inspiratory collapse. Left Atrium The left atrium is normal in size.  Left atrial volume index is 22.8  mL/sq m. Mitral Valve Structurally normal mitral valve without significant stenosis. Trace mitral regurgitation. Aortic Valve Structurally normal aortic valve. Aortic sclerosis without stenosis. Tricuspid Valve Structurally normal tricuspid valve without significant stenosis. Trace tricuspid regurgitation. Unable to estimate pulmonary artery pressure due to an inadequate tricuspid regurgitant jet. Pulmonic Valve The pulmonic valve is not well visualized. Pericardium Normal pericardium without effusion. Aorta The aortic root is normal.  Ascending aorta is dilated with a diameter of 2.9  cm. Tawanda Lemus MD (Electronically Signed) Final Date:     09 December 2019                 17:16    Dx-hip-unilateral-with Pelvis-1 View Left    Result Date: 12/9/2019 12/9/2019 1:27 PM HISTORY/REASON FOR EXAM:  Pelvic/Hip Pain Following Trauma; Left hip, pelvic pain. TECHNIQUE/EXAM DESCRIPTION AND NUMBER OF VIEWS:  3 views of the LEFT hip. COMPARISON: None FINDINGS: Moderate osteopenia. No acute fracture of the left hip identified. No hip joint dislocation. Both hip joint spaces preserved. Postoperative changes lower lumbar spine.     No acute left hip fracture.      LABORATORY  Lab Results   Component Value Date    SODIUM 135 12/19/2019    POTASSIUM 3.6  12/19/2019    CHLORIDE 103 12/19/2019    CO2 23 12/19/2019    GLUCOSE 108 (H) 12/19/2019    BUN 15 12/19/2019    CREATININE 0.61 12/19/2019        Lab Results   Component Value Date    WBC 11.9 (H) 12/19/2019    HEMOGLOBIN 14.4 12/19/2019    HEMATOCRIT 45.1 12/19/2019    PLATELETCT 234 12/19/2019        Total time of the discharge process exceeds 40 minutes.

## 2019-12-20 NOTE — PROGRESS NOTES
Bedside report received. Patient A&O x4. No complaints of pain. Pt had 2 bowel movements. Changed and repositioned. Pt is on a low air loss mattress - refusing q2 turns. Sacrum red and blanching. Barrier cream applied.     POC discussed with patient. Patient verbalized understanding. Call light and belongings within reach. Bed locked and in lowest position, alarm and fall precautions in place.

## 2019-12-20 NOTE — DISCHARGE PLANNING
Agency/Facility Name: [facility]  Spoke To: [facility]  Outcome: [facility] notified of room change. Transport to pick patient up @ 1330.

## 2019-12-20 NOTE — CARE PLAN
Problem: Communication  Goal: The ability to communicate needs accurately and effectively will improve  Outcome: PROGRESSING AS EXPECTED     Problem: Safety  Goal: Will remain free from injury  Outcome: PROGRESSING AS EXPECTED  Goal: Will remain free from falls  Outcome: PROGRESSING AS EXPECTED     Problem: Infection  Goal: Will remain free from infection  Outcome: PROGRESSING AS EXPECTED     Problem: Venous Thromboembolism (VTW)/Deep Vein Thrombosis (DVT) Prevention:  Goal: Patient will participate in Venous Thrombosis (VTE)/Deep Vein Thrombosis (DVT)Prevention Measures  Outcome: PROGRESSING AS EXPECTED     Problem: Bowel/Gastric:  Goal: Normal bowel function is maintained or improved  Outcome: PROGRESSING AS EXPECTED  Goal: Will not experience complications related to bowel motility  Outcome: PROGRESSING AS EXPECTED     Problem: Knowledge Deficit  Goal: Knowledge of disease process/condition, treatment plan, diagnostic tests, and medications will improve  Outcome: PROGRESSING AS EXPECTED  Goal: Knowledge of the prescribed therapeutic regimen will improve  Outcome: PROGRESSING AS EXPECTED     Problem: Discharge Barriers/Planning  Goal: Patient's continuum of care needs will be met  Outcome: PROGRESSING AS EXPECTED     Problem: Skin Integrity  Goal: Risk for impaired skin integrity will decrease  Outcome: PROGRESSING AS EXPECTED     Problem: Mobility  Goal: Risk for activity intolerance will decrease  Outcome: PROGRESSING AS EXPECTED     Problem: Urinary Elimination:  Goal: Ability to reestablish a normal urinary elimination pattern will improve  Outcome: PROGRESSING AS EXPECTED

## 2019-12-20 NOTE — DISCHARGE PLANNING
Anticipated Discharge Disposition:   [facility] via [facility]    Action:   RN CM prepared discharge packet with completed COBRA.    Barriers to Discharge:   None     Plan:   RN CM gave discharge packet to Andreina HENRY.

## 2020-01-01 ENCOUNTER — APPOINTMENT (OUTPATIENT)
Dept: RADIOLOGY | Facility: MEDICAL CENTER | Age: 85
DRG: 871 | End: 2020-01-01
Attending: EMERGENCY MEDICINE
Payer: MEDICARE

## 2020-01-01 ENCOUNTER — HOSPICE ADMISSION (OUTPATIENT)
Dept: HOSPICE | Facility: HOSPICE | Age: 85
End: 2020-01-01
Payer: MEDICARE

## 2020-01-01 ENCOUNTER — HOME CARE VISIT (OUTPATIENT)
Dept: HOSPICE | Facility: HOSPICE | Age: 85
End: 2020-01-01
Payer: MEDICARE

## 2020-01-01 ENCOUNTER — APPOINTMENT (OUTPATIENT)
Dept: RADIOLOGY | Facility: MEDICAL CENTER | Age: 85
DRG: 871 | End: 2020-01-01
Attending: INTERNAL MEDICINE
Payer: MEDICARE

## 2020-01-01 ENCOUNTER — HOSPITAL ENCOUNTER (INPATIENT)
Facility: MEDICAL CENTER | Age: 85
LOS: 5 days | DRG: 871 | End: 2020-02-04
Attending: EMERGENCY MEDICINE | Admitting: HOSPITALIST
Payer: MEDICARE

## 2020-01-01 ENCOUNTER — DOCUMENTATION (OUTPATIENT)
Dept: MEDICAL GROUP | Facility: MEDICAL CENTER | Age: 85
End: 2020-01-01

## 2020-01-01 VITALS
SYSTOLIC BLOOD PRESSURE: 114 MMHG | WEIGHT: 203.93 LBS | DIASTOLIC BLOOD PRESSURE: 70 MMHG | TEMPERATURE: 97.4 F | BODY MASS INDEX: 28.55 KG/M2 | RESPIRATION RATE: 16 BRPM | OXYGEN SATURATION: 78 % | HEART RATE: 78 BPM | HEIGHT: 71 IN

## 2020-01-01 DIAGNOSIS — I95.89 HYPOTENSION DUE TO HYPOVOLEMIA: ICD-10-CM

## 2020-01-01 DIAGNOSIS — A41.9 SEPSIS, DUE TO UNSPECIFIED ORGANISM, UNSPECIFIED WHETHER ACUTE ORGAN DYSFUNCTION PRESENT (HCC): ICD-10-CM

## 2020-01-01 DIAGNOSIS — R57.9 SHOCK (HCC): ICD-10-CM

## 2020-01-01 DIAGNOSIS — E86.0 DEHYDRATION: ICD-10-CM

## 2020-01-01 DIAGNOSIS — N17.9 ACUTE KIDNEY INJURY (HCC): ICD-10-CM

## 2020-01-01 DIAGNOSIS — K63.89 PNEUMATOSIS INTESTINALIS: ICD-10-CM

## 2020-01-01 DIAGNOSIS — E86.1 HYPOTENSION DUE TO HYPOVOLEMIA: ICD-10-CM

## 2020-01-01 DIAGNOSIS — R79.89 ELEVATED TROPONIN: ICD-10-CM

## 2020-01-01 DIAGNOSIS — K92.2 UPPER GI BLEED: ICD-10-CM

## 2020-01-01 DIAGNOSIS — K92.0 HEMATEMESIS, PRESENCE OF NAUSEA NOT SPECIFIED: ICD-10-CM

## 2020-01-01 LAB
ABO + RH BLD: NORMAL
ABO GROUP BLD: NORMAL
ACTION RANGE TRIGGERED IACRT: NO
ALBUMIN SERPL BCP-MCNC: 4 G/DL (ref 3.2–4.9)
ALBUMIN/GLOB SERPL: 1 G/DL
ALP SERPL-CCNC: 81 U/L (ref 30–99)
ALT SERPL-CCNC: 6 U/L (ref 2–50)
ANION GAP SERPL CALC-SCNC: 12 MMOL/L (ref 0–11.9)
ANION GAP SERPL CALC-SCNC: 16 MMOL/L (ref 0–11.9)
ANION GAP SERPL CALC-SCNC: 31 MMOL/L (ref 0–11.9)
APTT PPP: 30.9 SEC (ref 24.7–36)
AST SERPL-CCNC: 20 U/L (ref 12–45)
B-OH-BUTYR SERPL-MCNC: 0.3 MMOL/L (ref 0.02–0.27)
BASE EXCESS BLDA CALC-SCNC: -6 MMOL/L (ref -4–3)
BASOPHILS # BLD AUTO: 0 % (ref 0–1.8)
BASOPHILS # BLD AUTO: 0 % (ref 0–1.8)
BASOPHILS # BLD: 0 K/UL (ref 0–0.12)
BASOPHILS # BLD: 0 K/UL (ref 0–0.12)
BILIRUB SERPL-MCNC: 0.9 MG/DL (ref 0.1–1.5)
BLD GP AB SCN SERPL QL: NORMAL
BLOOD CULTURE HOLD CXBCH: NORMAL
BODY FLD TYPE: ABNORMAL
BODY TEMPERATURE: ABNORMAL DEGREES
BUN SERPL-MCNC: 36 MG/DL (ref 8–22)
BUN SERPL-MCNC: 45 MG/DL (ref 8–22)
BUN SERPL-MCNC: 56 MG/DL (ref 8–22)
CALCIUM SERPL-MCNC: 10.3 MG/DL (ref 8.5–10.5)
CALCIUM SERPL-MCNC: 8.1 MG/DL (ref 8.5–10.5)
CALCIUM SERPL-MCNC: 8.3 MG/DL (ref 8.5–10.5)
CHLORIDE SERPL-SCNC: 92 MMOL/L (ref 96–112)
CHLORIDE SERPL-SCNC: 95 MMOL/L (ref 96–112)
CHLORIDE SERPL-SCNC: 99 MMOL/L (ref 96–112)
CK SERPL-CCNC: 153 U/L (ref 0–154)
CO2 BLDA-SCNC: 20 MMOL/L (ref 20–33)
CO2 SERPL-SCNC: 12 MMOL/L (ref 20–33)
CO2 SERPL-SCNC: 24 MMOL/L (ref 20–33)
CO2 SERPL-SCNC: 28 MMOL/L (ref 20–33)
CREAT SERPL-MCNC: 2.05 MG/DL (ref 0.5–1.4)
CREAT SERPL-MCNC: 2.26 MG/DL (ref 0.5–1.4)
CREAT SERPL-MCNC: 2.32 MG/DL (ref 0.5–1.4)
EKG IMPRESSION: NORMAL
EKG IMPRESSION: NORMAL
EOSINOPHIL # BLD AUTO: 0 K/UL (ref 0–0.51)
EOSINOPHIL # BLD AUTO: 0.13 K/UL (ref 0–0.51)
EOSINOPHIL NFR BLD: 0 % (ref 0–6.9)
EOSINOPHIL NFR BLD: 1 % (ref 0–6.9)
ERYTHROCYTE [DISTWIDTH] IN BLOOD BY AUTOMATED COUNT: 46.7 FL (ref 35.9–50)
ERYTHROCYTE [DISTWIDTH] IN BLOOD BY AUTOMATED COUNT: 47.9 FL (ref 35.9–50)
ERYTHROCYTE [DISTWIDTH] IN BLOOD BY AUTOMATED COUNT: 50.5 FL (ref 35.9–50)
GLOBULIN SER CALC-MCNC: 3.9 G/DL (ref 1.9–3.5)
GLUCOSE SERPL-MCNC: 123 MG/DL (ref 65–99)
GLUCOSE SERPL-MCNC: 152 MG/DL (ref 65–99)
GLUCOSE SERPL-MCNC: 192 MG/DL (ref 65–99)
HCO3 BLDA-SCNC: 19.2 MMOL/L (ref 17–25)
HCT VFR BLD AUTO: 35.8 % (ref 42–52)
HCT VFR BLD AUTO: 37.2 % (ref 42–52)
HCT VFR BLD AUTO: 42 % (ref 42–52)
HCT VFR BLD AUTO: 42.3 % (ref 42–52)
HCT VFR BLD AUTO: 52.4 % (ref 42–52)
HEMOCCULT SP1 SPEC QL: POSITIVE
HGB BLD-MCNC: 12.1 G/DL (ref 14–18)
HGB BLD-MCNC: 12.1 G/DL (ref 14–18)
HGB BLD-MCNC: 13.6 G/DL (ref 14–18)
HGB BLD-MCNC: 14 G/DL (ref 14–18)
HGB BLD-MCNC: 16.6 G/DL (ref 14–18)
HOROWITZ INDEX BLDA+IHG-RTO: 234 MM[HG]
INR PPP: 1.29 (ref 0.87–1.13)
INST. QUALIFIED PATIENT IIQPT: YES
LACTATE BLD-SCNC: 3.2 MMOL/L (ref 0.5–2)
LACTATE BLD-SCNC: 4.4 MMOL/L (ref 0.5–2)
LACTATE BLD-SCNC: 6.7 MMOL/L (ref 0.5–2)
LIPASE SERPL-CCNC: 22 U/L (ref 11–82)
LYMPHOCYTES # BLD AUTO: 2.41 K/UL (ref 1–4.8)
LYMPHOCYTES # BLD AUTO: 2.57 K/UL (ref 1–4.8)
LYMPHOCYTES NFR BLD: 10.4 % (ref 22–41)
LYMPHOCYTES NFR BLD: 18.1 % (ref 22–41)
MAGNESIUM SERPL-MCNC: 1.2 MG/DL (ref 1.5–2.5)
MANUAL DIFF BLD: NORMAL
MANUAL DIFF BLD: NORMAL
MCH RBC QN AUTO: 29.5 PG (ref 27–33)
MCH RBC QN AUTO: 30 PG (ref 27–33)
MCH RBC QN AUTO: 30 PG (ref 27–33)
MCHC RBC AUTO-ENTMCNC: 31.7 G/DL (ref 33.7–35.3)
MCHC RBC AUTO-ENTMCNC: 33.1 G/DL (ref 33.7–35.3)
MCHC RBC AUTO-ENTMCNC: 33.8 G/DL (ref 33.7–35.3)
MCV RBC AUTO: 88.6 FL (ref 81.4–97.8)
MCV RBC AUTO: 90.8 FL (ref 81.4–97.8)
MCV RBC AUTO: 93.2 FL (ref 81.4–97.8)
METAMYELOCYTES NFR BLD MANUAL: 6.7 %
MONOCYTES # BLD AUTO: 1.26 K/UL (ref 0–0.85)
MONOCYTES # BLD AUTO: 2.37 K/UL (ref 0–0.85)
MONOCYTES NFR BLD AUTO: 9.5 % (ref 0–13.4)
MONOCYTES NFR BLD AUTO: 9.6 % (ref 0–13.4)
MORPHOLOGY BLD-IMP: NORMAL
MORPHOLOGY BLD-IMP: NORMAL
MYELOCYTES NFR BLD MANUAL: 3.8 %
NEUTROPHILS # BLD AUTO: 19.76 K/UL (ref 1.82–7.42)
NEUTROPHILS # BLD AUTO: 8.1 K/UL (ref 1.82–7.42)
NEUTROPHILS NFR BLD: 51.4 % (ref 44–72)
NEUTROPHILS NFR BLD: 73 % (ref 44–72)
NEUTS BAND NFR BLD MANUAL: 7 % (ref 0–10)
NEUTS BAND NFR BLD MANUAL: 9.5 % (ref 0–10)
NRBC # BLD AUTO: 0 K/UL
NRBC # BLD AUTO: 0 K/UL
NRBC BLD-RTO: 0 /100 WBC
NRBC BLD-RTO: 0 /100 WBC
O2/TOTAL GAS SETTING VFR VENT: 32 %
PCO2 BLDA: 37.2 MMHG (ref 26–37)
PCO2 TEMP ADJ BLDA: 35.9 MMHG (ref 26–37)
PH BLDA: 7.32 [PH] (ref 7.4–7.5)
PH TEMP ADJ BLDA: 7.33 [PH] (ref 7.4–7.5)
PLATELET # BLD AUTO: 201 K/UL (ref 164–446)
PLATELET # BLD AUTO: 275 K/UL (ref 164–446)
PLATELET # BLD AUTO: 322 K/UL (ref 164–446)
PLATELET BLD QL SMEAR: NORMAL
PLATELET BLD QL SMEAR: NORMAL
PMV BLD AUTO: 11.1 FL (ref 9–12.9)
PMV BLD AUTO: 11.1 FL (ref 9–12.9)
PMV BLD AUTO: 11.2 FL (ref 9–12.9)
PO2 BLDA: 75 MMHG (ref 64–87)
PO2 TEMP ADJ BLDA: 71 MMHG (ref 64–87)
POTASSIUM SERPL-SCNC: 3.4 MMOL/L (ref 3.6–5.5)
POTASSIUM SERPL-SCNC: 3.8 MMOL/L (ref 3.6–5.5)
POTASSIUM SERPL-SCNC: 4 MMOL/L (ref 3.6–5.5)
PROT SERPL-MCNC: 7.9 G/DL (ref 6–8.2)
PROTHROMBIN TIME: 16.4 SEC (ref 12–14.6)
RBC # BLD AUTO: 4.04 M/UL (ref 4.7–6.1)
RBC # BLD AUTO: 4.66 M/UL (ref 4.7–6.1)
RBC # BLD AUTO: 5.62 M/UL (ref 4.7–6.1)
RBC BLD AUTO: NORMAL
RBC BLD AUTO: NORMAL
RH BLD: NORMAL
SAO2 % BLDA: 94 % (ref 93–99)
SODIUM SERPL-SCNC: 135 MMOL/L (ref 135–145)
SODIUM SERPL-SCNC: 135 MMOL/L (ref 135–145)
SODIUM SERPL-SCNC: 139 MMOL/L (ref 135–145)
SPECIMEN DRAWN FROM PATIENT: ABNORMAL
TROPONIN T SERPL-MCNC: 125 NG/L (ref 6–19)
TROPONIN T SERPL-MCNC: 139 NG/L (ref 6–19)
TROPONIN T SERPL-MCNC: 171 NG/L (ref 6–19)
WBC # BLD AUTO: 13.3 K/UL (ref 4.8–10.8)
WBC # BLD AUTO: 15.4 K/UL (ref 4.8–10.8)
WBC # BLD AUTO: 24.7 K/UL (ref 4.8–10.8)

## 2020-01-01 PROCEDURE — 700111 HCHG RX REV CODE 636 W/ 250 OVERRIDE (IP): Performed by: INTERNAL MEDICINE

## 2020-01-01 PROCEDURE — 96368 THER/DIAG CONCURRENT INF: CPT

## 2020-01-01 PROCEDURE — 84484 ASSAY OF TROPONIN QUANT: CPT | Mod: 91

## 2020-01-01 PROCEDURE — 700101 HCHG RX REV CODE 250: Performed by: INTERNAL MEDICINE

## 2020-01-01 PROCEDURE — 700105 HCHG RX REV CODE 258: Performed by: EMERGENCY MEDICINE

## 2020-01-01 PROCEDURE — 83735 ASSAY OF MAGNESIUM: CPT

## 2020-01-01 PROCEDURE — 05HN33Z INSERTION OF INFUSION DEVICE INTO LEFT INTERNAL JUGULAR VEIN, PERCUTANEOUS APPROACH: ICD-10-PCS | Performed by: INTERNAL MEDICINE

## 2020-01-01 PROCEDURE — 700111 HCHG RX REV CODE 636 W/ 250 OVERRIDE (IP): Performed by: HOSPITALIST

## 2020-01-01 PROCEDURE — 99292 CRITICAL CARE ADDL 30 MIN: CPT | Mod: 25 | Performed by: INTERNAL MEDICINE

## 2020-01-01 PROCEDURE — 85014 HEMATOCRIT: CPT | Mod: 91

## 2020-01-01 PROCEDURE — 770022 HCHG ROOM/CARE - ICU (200)

## 2020-01-01 PROCEDURE — 36415 COLL VENOUS BLD VENIPUNCTURE: CPT

## 2020-01-01 PROCEDURE — 99232 SBSQ HOSP IP/OBS MODERATE 35: CPT | Performed by: HOSPITALIST

## 2020-01-01 PROCEDURE — C1751 CATH, INF, PER/CENT/MIDLINE: HCPCS

## 2020-01-01 PROCEDURE — 99231 SBSQ HOSP IP/OBS SF/LOW 25: CPT | Performed by: HOSPITALIST

## 2020-01-01 PROCEDURE — 99291 CRITICAL CARE FIRST HOUR: CPT | Performed by: INTERNAL MEDICINE

## 2020-01-01 PROCEDURE — 86901 BLOOD TYPING SEROLOGIC RH(D): CPT

## 2020-01-01 PROCEDURE — 770001 HCHG ROOM/CARE - MED/SURG/GYN PRIV*

## 2020-01-01 PROCEDURE — 85027 COMPLETE CBC AUTOMATED: CPT

## 2020-01-01 PROCEDURE — 96365 THER/PROPH/DIAG IV INF INIT: CPT

## 2020-01-01 PROCEDURE — 83690 ASSAY OF LIPASE: CPT

## 2020-01-01 PROCEDURE — 96366 THER/PROPH/DIAG IV INF ADDON: CPT

## 2020-01-01 PROCEDURE — 96375 TX/PRO/DX INJ NEW DRUG ADDON: CPT

## 2020-01-01 PROCEDURE — 700105 HCHG RX REV CODE 258: Performed by: INTERNAL MEDICINE

## 2020-01-01 PROCEDURE — 700111 HCHG RX REV CODE 636 W/ 250 OVERRIDE (IP): Performed by: EMERGENCY MEDICINE

## 2020-01-01 PROCEDURE — C9132 KCENTRA, PER I.U.: HCPCS | Mod: JG | Performed by: INTERNAL MEDICINE

## 2020-01-01 PROCEDURE — 85007 BL SMEAR W/DIFF WBC COUNT: CPT

## 2020-01-01 PROCEDURE — 82271 OCCULT BLOOD OTHER SOURCES: CPT

## 2020-01-01 PROCEDURE — 82010 KETONE BODYS QUAN: CPT

## 2020-01-01 PROCEDURE — 93005 ELECTROCARDIOGRAM TRACING: CPT | Performed by: EMERGENCY MEDICINE

## 2020-01-01 PROCEDURE — 36600 WITHDRAWAL OF ARTERIAL BLOOD: CPT

## 2020-01-01 PROCEDURE — 700105 HCHG RX REV CODE 258: Performed by: HOSPITALIST

## 2020-01-01 PROCEDURE — 71045 X-RAY EXAM CHEST 1 VIEW: CPT

## 2020-01-01 PROCEDURE — 99223 1ST HOSP IP/OBS HIGH 75: CPT | Performed by: HOSPITALIST

## 2020-01-01 PROCEDURE — 99291 CRITICAL CARE FIRST HOUR: CPT

## 2020-01-01 PROCEDURE — 80048 BASIC METABOLIC PNL TOTAL CA: CPT

## 2020-01-01 PROCEDURE — 85730 THROMBOPLASTIN TIME PARTIAL: CPT

## 2020-01-01 PROCEDURE — 304561 HCHG STAT O2

## 2020-01-01 PROCEDURE — 86850 RBC ANTIBODY SCREEN: CPT

## 2020-01-01 PROCEDURE — 83605 ASSAY OF LACTIC ACID: CPT | Mod: 91

## 2020-01-01 PROCEDURE — 86900 BLOOD TYPING SEROLOGIC ABO: CPT

## 2020-01-01 PROCEDURE — 85610 PROTHROMBIN TIME: CPT

## 2020-01-01 PROCEDURE — 36556 INSERT NON-TUNNEL CV CATH: CPT

## 2020-01-01 PROCEDURE — 36556 INSERT NON-TUNNEL CV CATH: CPT | Mod: LT | Performed by: INTERNAL MEDICINE

## 2020-01-01 PROCEDURE — C9113 INJ PANTOPRAZOLE SODIUM, VIA: HCPCS | Performed by: HOSPITALIST

## 2020-01-01 PROCEDURE — C9113 INJ PANTOPRAZOLE SODIUM, VIA: HCPCS | Performed by: EMERGENCY MEDICINE

## 2020-01-01 PROCEDURE — 85027 COMPLETE CBC AUTOMATED: CPT | Mod: 91

## 2020-01-01 PROCEDURE — 96361 HYDRATE IV INFUSION ADD-ON: CPT

## 2020-01-01 PROCEDURE — 80053 COMPREHEN METABOLIC PANEL: CPT

## 2020-01-01 PROCEDURE — 99291 CRITICAL CARE FIRST HOUR: CPT | Mod: 25 | Performed by: INTERNAL MEDICINE

## 2020-01-01 PROCEDURE — 51798 US URINE CAPACITY MEASURE: CPT

## 2020-01-01 PROCEDURE — 82803 BLOOD GASES ANY COMBINATION: CPT

## 2020-01-01 PROCEDURE — 82550 ASSAY OF CK (CPK): CPT

## 2020-01-01 PROCEDURE — 74176 CT ABD & PELVIS W/O CONTRAST: CPT

## 2020-01-01 PROCEDURE — 85018 HEMOGLOBIN: CPT

## 2020-01-01 RX ORDER — METOCLOPRAMIDE HYDROCHLORIDE 5 MG/ML
5 INJECTION INTRAMUSCULAR; INTRAVENOUS EVERY 6 HOURS PRN
Status: DISCONTINUED | OUTPATIENT
Start: 2020-01-01 | End: 2020-01-01 | Stop reason: HOSPADM

## 2020-01-01 RX ORDER — OCTREOTIDE ACETATE 100 UG/ML
50 INJECTION, SOLUTION INTRAVENOUS; SUBCUTANEOUS ONCE
Status: DISCONTINUED | OUTPATIENT
Start: 2020-01-01 | End: 2020-01-01

## 2020-01-01 RX ORDER — ONDANSETRON 2 MG/ML
8 INJECTION INTRAMUSCULAR; INTRAVENOUS EVERY 8 HOURS PRN
Status: DISCONTINUED | OUTPATIENT
Start: 2020-01-01 | End: 2020-01-01 | Stop reason: HOSPADM

## 2020-01-01 RX ORDER — HYDROMORPHONE HYDROCHLORIDE 2 MG/ML
2 INJECTION, SOLUTION INTRAMUSCULAR; INTRAVENOUS; SUBCUTANEOUS
Status: DISCONTINUED | OUTPATIENT
Start: 2020-01-01 | End: 2020-01-01 | Stop reason: HOSPADM

## 2020-01-01 RX ORDER — SODIUM CHLORIDE 9 MG/ML
1000 INJECTION, SOLUTION INTRAVENOUS ONCE
Status: COMPLETED | OUTPATIENT
Start: 2020-01-01 | End: 2020-01-01

## 2020-01-01 RX ORDER — POTASSIUM CHLORIDE 14.9 MG/ML
20 INJECTION INTRAVENOUS ONCE
Status: COMPLETED | OUTPATIENT
Start: 2020-01-01 | End: 2020-01-01

## 2020-01-01 RX ORDER — ONDANSETRON 4 MG/1
4 TABLET, ORALLY DISINTEGRATING ORAL EVERY 4 HOURS PRN
Status: DISCONTINUED | OUTPATIENT
Start: 2020-01-01 | End: 2020-01-01

## 2020-01-01 RX ORDER — POLYETHYLENE GLYCOL 3350 17 G/17G
1 POWDER, FOR SOLUTION ORAL
Status: DISCONTINUED | OUTPATIENT
Start: 2020-01-01 | End: 2020-01-01 | Stop reason: HOSPADM

## 2020-01-01 RX ORDER — ONDANSETRON 4 MG/1
8 TABLET, ORALLY DISINTEGRATING ORAL EVERY 8 HOURS PRN
Status: DISCONTINUED | OUTPATIENT
Start: 2020-01-01 | End: 2020-01-01 | Stop reason: HOSPADM

## 2020-01-01 RX ORDER — LORAZEPAM 2 MG/ML
1 CONCENTRATE ORAL
Status: DISCONTINUED | OUTPATIENT
Start: 2020-01-01 | End: 2020-01-01 | Stop reason: HOSPADM

## 2020-01-01 RX ORDER — LORAZEPAM 2 MG/ML
1 INJECTION INTRAMUSCULAR
Status: DISCONTINUED | OUTPATIENT
Start: 2020-01-01 | End: 2020-01-01 | Stop reason: HOSPADM

## 2020-01-01 RX ORDER — LEVETIRACETAM 500 MG/1
500 TABLET ORAL 2 TIMES DAILY
Status: DISCONTINUED | OUTPATIENT
Start: 2020-01-01 | End: 2020-01-01

## 2020-01-01 RX ORDER — PHENYLEPHRINE HCL IN 0.9% NACL 0.5 MG/5ML
100 SYRINGE (ML) INTRAVENOUS
Status: ACTIVE | OUTPATIENT
Start: 2020-01-01 | End: 2020-01-01

## 2020-01-01 RX ORDER — ATROPINE SULFATE 10 MG/ML
2 SOLUTION/ DROPS OPHTHALMIC EVERY 4 HOURS PRN
Status: DISCONTINUED | OUTPATIENT
Start: 2020-01-01 | End: 2020-01-01 | Stop reason: HOSPADM

## 2020-01-01 RX ORDER — MAGNESIUM SULFATE HEPTAHYDRATE 40 MG/ML
4 INJECTION, SOLUTION INTRAVENOUS ONCE
Status: COMPLETED | OUTPATIENT
Start: 2020-01-01 | End: 2020-01-01

## 2020-01-01 RX ORDER — PROCHLORPERAZINE EDISYLATE 5 MG/ML
10 INJECTION INTRAMUSCULAR; INTRAVENOUS EVERY 6 HOURS PRN
Status: DISCONTINUED | OUTPATIENT
Start: 2020-01-01 | End: 2020-01-01 | Stop reason: HOSPADM

## 2020-01-01 RX ORDER — METOPROLOL SUCCINATE 25 MG/1
25 TABLET, EXTENDED RELEASE ORAL DAILY
Status: DISCONTINUED | OUTPATIENT
Start: 2020-01-01 | End: 2020-01-01

## 2020-01-01 RX ORDER — ONDANSETRON 2 MG/ML
4 INJECTION INTRAMUSCULAR; INTRAVENOUS EVERY 4 HOURS PRN
Status: DISCONTINUED | OUTPATIENT
Start: 2020-01-01 | End: 2020-01-01

## 2020-01-01 RX ORDER — HYDROMORPHONE HYDROCHLORIDE 2 MG/ML
4 INJECTION, SOLUTION INTRAMUSCULAR; INTRAVENOUS; SUBCUTANEOUS
Status: DISCONTINUED | OUTPATIENT
Start: 2020-01-01 | End: 2020-01-01 | Stop reason: HOSPADM

## 2020-01-01 RX ORDER — ACETAMINOPHEN 325 MG/1
650 TABLET ORAL EVERY 4 HOURS PRN
Status: DISCONTINUED | OUTPATIENT
Start: 2020-01-01 | End: 2020-01-01 | Stop reason: HOSPADM

## 2020-01-01 RX ORDER — GABAPENTIN 100 MG/1
100 CAPSULE ORAL 2 TIMES DAILY
Status: DISCONTINUED | OUTPATIENT
Start: 2020-01-01 | End: 2020-01-01

## 2020-01-01 RX ORDER — AMOXICILLIN 250 MG
2 CAPSULE ORAL 2 TIMES DAILY
Status: DISCONTINUED | OUTPATIENT
Start: 2020-01-01 | End: 2020-01-01 | Stop reason: HOSPADM

## 2020-01-01 RX ORDER — DIGOXIN 0.25 MG/ML
500 INJECTION INTRAMUSCULAR; INTRAVENOUS ONCE
Status: COMPLETED | OUTPATIENT
Start: 2020-01-01 | End: 2020-01-01

## 2020-01-01 RX ORDER — PHENYLEPHRINE HCL IN 0.9% NACL 0.5 MG/5ML
SYRINGE (ML) INTRAVENOUS
Status: ACTIVE
Start: 2020-01-01 | End: 2020-01-01

## 2020-01-01 RX ORDER — ONDANSETRON 2 MG/ML
4 INJECTION INTRAMUSCULAR; INTRAVENOUS ONCE
Status: COMPLETED | OUTPATIENT
Start: 2020-01-01 | End: 2020-01-01

## 2020-01-01 RX ORDER — SODIUM CHLORIDE, SODIUM LACTATE, POTASSIUM CHLORIDE, AND CALCIUM CHLORIDE .6; .31; .03; .02 G/100ML; G/100ML; G/100ML; G/100ML
2000 INJECTION, SOLUTION INTRAVENOUS ONCE
Status: COMPLETED | OUTPATIENT
Start: 2020-01-01 | End: 2020-01-01

## 2020-01-01 RX ORDER — BISACODYL 10 MG
10 SUPPOSITORY, RECTAL RECTAL
Status: DISCONTINUED | OUTPATIENT
Start: 2020-01-01 | End: 2020-01-01 | Stop reason: HOSPADM

## 2020-01-01 RX ORDER — PROMETHAZINE HYDROCHLORIDE 25 MG/1
25 SUPPOSITORY RECTAL EVERY 6 HOURS PRN
Status: DISCONTINUED | OUTPATIENT
Start: 2020-01-01 | End: 2020-01-01 | Stop reason: HOSPADM

## 2020-01-01 RX ORDER — ACETAMINOPHEN 650 MG/1
650 SUPPOSITORY RECTAL EVERY 4 HOURS PRN
Status: DISCONTINUED | OUTPATIENT
Start: 2020-01-01 | End: 2020-01-01 | Stop reason: HOSPADM

## 2020-01-01 RX ORDER — PANTOPRAZOLE SODIUM 40 MG/10ML
40 INJECTION, POWDER, LYOPHILIZED, FOR SOLUTION INTRAVENOUS 2 TIMES DAILY
Status: DISCONTINUED | OUTPATIENT
Start: 2020-01-01 | End: 2020-01-01

## 2020-01-01 RX ORDER — SODIUM CHLORIDE, SODIUM LACTATE, POTASSIUM CHLORIDE, CALCIUM CHLORIDE 600; 310; 30; 20 MG/100ML; MG/100ML; MG/100ML; MG/100ML
INJECTION, SOLUTION INTRAVENOUS CONTINUOUS
Status: DISCONTINUED | OUTPATIENT
Start: 2020-01-01 | End: 2020-01-01

## 2020-01-01 RX ADMIN — PROCHLORPERAZINE EDISYLATE 10 MG: 5 INJECTION INTRAMUSCULAR; INTRAVENOUS at 10:07

## 2020-01-01 RX ADMIN — SODIUM CHLORIDE 500 MG: 9 INJECTION, SOLUTION INTRAVENOUS at 17:21

## 2020-01-01 RX ADMIN — PANTOPRAZOLE SODIUM 40 MG: 40 INJECTION, POWDER, LYOPHILIZED, FOR SOLUTION INTRAVENOUS at 17:45

## 2020-01-01 RX ADMIN — SODIUM CHLORIDE 500 MG: 9 INJECTION, SOLUTION INTRAVENOUS at 06:07

## 2020-01-01 RX ADMIN — METRONIDAZOLE 500 MG: 500 INJECTION, SOLUTION INTRAVENOUS at 05:22

## 2020-01-01 RX ADMIN — ATROPINE SULFATE 2 DROP: 10 SOLUTION OPHTHALMIC at 01:48

## 2020-01-01 RX ADMIN — HYDROMORPHONE HYDROCHLORIDE 2 MG: 2 INJECTION, SOLUTION INTRAMUSCULAR; INTRAVENOUS; SUBCUTANEOUS at 10:12

## 2020-01-01 RX ADMIN — NOREPINEPHRINE BITARTRATE 20 MCG/MIN: 1 INJECTION INTRAVENOUS at 06:18

## 2020-01-01 RX ADMIN — SODIUM CHLORIDE 1000 ML: 9 INJECTION, SOLUTION INTRAVENOUS at 01:40

## 2020-01-01 RX ADMIN — SODIUM CHLORIDE, POTASSIUM CHLORIDE, SODIUM LACTATE AND CALCIUM CHLORIDE 2000 ML: 600; 310; 30; 20 INJECTION, SOLUTION INTRAVENOUS at 15:46

## 2020-01-01 RX ADMIN — SODIUM CHLORIDE 1000 ML: 9 INJECTION, SOLUTION INTRAVENOUS at 04:28

## 2020-01-01 RX ADMIN — HYDROMORPHONE HYDROCHLORIDE 2 MG: 2 INJECTION, SOLUTION INTRAMUSCULAR; INTRAVENOUS; SUBCUTANEOUS at 01:54

## 2020-01-01 RX ADMIN — METRONIDAZOLE 500 MG: 500 INJECTION, SOLUTION INTRAVENOUS at 14:54

## 2020-01-01 RX ADMIN — PHENYLEPHRINE HYDROCHLORIDE 50 MCG/MIN: 10 INJECTION INTRAVENOUS at 21:52

## 2020-01-01 RX ADMIN — PHENYLEPHRINE HYDROCHLORIDE 100 MCG/MIN: 10 INJECTION INTRAVENOUS at 06:55

## 2020-01-01 RX ADMIN — CEFTRIAXONE SODIUM 2 G: 2 INJECTION, POWDER, FOR SOLUTION INTRAMUSCULAR; INTRAVENOUS at 02:57

## 2020-01-01 RX ADMIN — SODIUM CHLORIDE 80 MG: 9 INJECTION, SOLUTION INTRAVENOUS at 00:30

## 2020-01-01 RX ADMIN — HYDROMORPHONE HYDROCHLORIDE 2 MG: 2 INJECTION, SOLUTION INTRAMUSCULAR; INTRAVENOUS; SUBCUTANEOUS at 22:50

## 2020-01-01 RX ADMIN — FENTANYL CITRATE 50 MCG: 0.05 INJECTION, SOLUTION INTRAMUSCULAR; INTRAVENOUS at 11:12

## 2020-01-01 RX ADMIN — ONDANSETRON 8 MG: 2 INJECTION INTRAMUSCULAR; INTRAVENOUS at 10:12

## 2020-01-01 RX ADMIN — PROCHLORPERAZINE EDISYLATE 10 MG: 5 INJECTION INTRAMUSCULAR; INTRAVENOUS at 18:38

## 2020-01-01 RX ADMIN — SODIUM CHLORIDE 500 MG: 9 INJECTION, SOLUTION INTRAVENOUS at 09:23

## 2020-01-01 RX ADMIN — POTASSIUM CHLORIDE 20 MEQ: 14.9 INJECTION, SOLUTION INTRAVENOUS at 09:51

## 2020-01-01 RX ADMIN — Medication 200 MCG: at 06:20

## 2020-01-01 RX ADMIN — PHENYLEPHRINE HYDROCHLORIDE 75 MCG/MIN: 10 INJECTION INTRAVENOUS at 12:55

## 2020-01-01 RX ADMIN — DIGOXIN 500 MCG: 0.25 INJECTION INTRAMUSCULAR; INTRAVENOUS at 07:52

## 2020-01-01 RX ADMIN — HYDROMORPHONE HYDROCHLORIDE 2 MG: 2 INJECTION, SOLUTION INTRAMUSCULAR; INTRAVENOUS; SUBCUTANEOUS at 13:28

## 2020-01-01 RX ADMIN — SODIUM CHLORIDE, POTASSIUM CHLORIDE, SODIUM LACTATE AND CALCIUM CHLORIDE: 600; 310; 30; 20 INJECTION, SOLUTION INTRAVENOUS at 09:53

## 2020-01-01 RX ADMIN — METOCLOPRAMIDE 5 MG: 5 INJECTION, SOLUTION INTRAMUSCULAR; INTRAVENOUS at 16:40

## 2020-01-01 RX ADMIN — ONDANSETRON 8 MG: 2 INJECTION INTRAMUSCULAR; INTRAVENOUS at 17:29

## 2020-01-01 RX ADMIN — MAGNESIUM SULFATE IN WATER 4 G: 40 INJECTION, SOLUTION INTRAVENOUS at 10:53

## 2020-01-01 RX ADMIN — METRONIDAZOLE 500 MG: 500 INJECTION, SOLUTION INTRAVENOUS at 21:52

## 2020-01-01 RX ADMIN — PANTOPRAZOLE SODIUM 40 MG: 40 INJECTION, POWDER, LYOPHILIZED, FOR SOLUTION INTRAVENOUS at 05:22

## 2020-01-01 RX ADMIN — ONDANSETRON 8 MG: 2 INJECTION INTRAMUSCULAR; INTRAVENOUS at 17:21

## 2020-01-01 RX ADMIN — HYDROMORPHONE HYDROCHLORIDE 2 MG: 2 INJECTION, SOLUTION INTRAMUSCULAR; INTRAVENOUS; SUBCUTANEOUS at 08:26

## 2020-01-01 RX ADMIN — ONDANSETRON 4 MG: 2 INJECTION INTRAMUSCULAR; INTRAVENOUS at 00:45

## 2020-01-01 RX ADMIN — HYDROMORPHONE HYDROCHLORIDE 2 MG: 2 INJECTION, SOLUTION INTRAMUSCULAR; INTRAVENOUS; SUBCUTANEOUS at 17:40

## 2020-01-01 RX ADMIN — CEFTRIAXONE SODIUM 2 G: 2 INJECTION, POWDER, FOR SOLUTION INTRAMUSCULAR; INTRAVENOUS at 03:10

## 2020-01-01 RX ADMIN — ONDANSETRON 8 MG: 2 INJECTION INTRAMUSCULAR; INTRAVENOUS at 18:21

## 2020-01-01 RX ADMIN — ONDANSETRON 8 MG: 2 INJECTION INTRAMUSCULAR; INTRAVENOUS at 13:28

## 2020-01-01 RX ADMIN — SODIUM CHLORIDE 8 MG/HR: 9 INJECTION, SOLUTION INTRAVENOUS at 01:04

## 2020-01-01 RX ADMIN — PROTHROMBIN, COAGULATION FACTOR VII HUMAN, COAGULATION FACTOR IX HUMAN, COAGULATION FACTOR X HUMAN, PROTEIN C, PROTEIN S HUMAN, AND WATER 2000 UNITS: KIT at 07:40

## 2020-01-01 RX ADMIN — Medication 200 MCG: at 06:10

## 2020-01-01 RX ADMIN — SODIUM CHLORIDE 1000 ML: 9 INJECTION, SOLUTION INTRAVENOUS at 02:29

## 2020-01-01 RX ADMIN — SODIUM CHLORIDE, POTASSIUM CHLORIDE, SODIUM LACTATE AND CALCIUM CHLORIDE: 600; 310; 30; 20 INJECTION, SOLUTION INTRAVENOUS at 21:54

## 2020-01-01 RX ADMIN — SODIUM CHLORIDE 1000 ML: 9 INJECTION, SOLUTION INTRAVENOUS at 00:14

## 2020-01-01 RX ADMIN — SODIUM CHLORIDE 500 MG: 9 INJECTION, SOLUTION INTRAVENOUS at 17:45

## 2020-01-01 RX ADMIN — METRONIDAZOLE 500 MG: 500 INJECTION, SOLUTION INTRAVENOUS at 13:45

## 2020-01-01 RX ADMIN — SODIUM CHLORIDE, POTASSIUM CHLORIDE, SODIUM LACTATE AND CALCIUM CHLORIDE: 600; 310; 30; 20 INJECTION, SOLUTION INTRAVENOUS at 11:16

## 2020-01-01 RX ADMIN — METRONIDAZOLE 500 MG: 500 INJECTION, SOLUTION INTRAVENOUS at 07:49

## 2020-01-01 RX ADMIN — CALCIUM GLUCONATE 1 G: 98 INJECTION, SOLUTION INTRAVENOUS at 09:59

## 2020-01-01 RX ADMIN — PANTOPRAZOLE SODIUM 40 MG: 40 INJECTION, POWDER, LYOPHILIZED, FOR SOLUTION INTRAVENOUS at 08:15

## 2020-01-01 RX ADMIN — PROCHLORPERAZINE EDISYLATE 10 MG: 5 INJECTION INTRAMUSCULAR; INTRAVENOUS at 20:00

## 2020-01-01 RX ADMIN — SODIUM CHLORIDE, POTASSIUM CHLORIDE, SODIUM LACTATE AND CALCIUM CHLORIDE: 600; 310; 30; 20 INJECTION, SOLUTION INTRAVENOUS at 03:31

## 2020-01-01 ASSESSMENT — ENCOUNTER SYMPTOMS
SHORTNESS OF BREATH: 0
NAUSEA: 1
TINGLING: 0
FOCAL WEAKNESS: 0
NERVOUS/ANXIOUS: 0
DIARRHEA: 0
SENSORY CHANGE: 0
DOUBLE VISION: 0
SORE THROAT: 0
SPEECH CHANGE: 0
MYALGIAS: 0
SPEECH CHANGE: 0
BLURRED VISION: 0
SPUTUM PRODUCTION: 0
ABDOMINAL PAIN: 1
MYALGIAS: 1
FEVER: 0
FOCAL WEAKNESS: 0
CHILLS: 0
DIZZINESS: 0
FEVER: 0
ABDOMINAL PAIN: 1
HEADACHES: 0
NAUSEA: 1
PHOTOPHOBIA: 0
CHILLS: 0
ABDOMINAL PAIN: 0
NAUSEA: 0
WEAKNESS: 1
PALPITATIONS: 0
VOMITING: 1
ORTHOPNEA: 0
ABDOMINAL PAIN: 1
COUGH: 0
SHORTNESS OF BREATH: 0
BLOOD IN STOOL: 0
STRIDOR: 0
ABDOMINAL PAIN: 1
NAUSEA: 0
DIZZINESS: 0
SPUTUM PRODUCTION: 0
NERVOUS/ANXIOUS: 0
BLURRED VISION: 0
STRIDOR: 0
MYALGIAS: 0
DIZZINESS: 0
FEVER: 0
DIZZINESS: 0
FEVER: 0
SHORTNESS OF BREATH: 0
NAUSEA: 1
WHEEZING: 0
VOMITING: 0
DEPRESSION: 0
BLOOD IN STOOL: 0
BACK PAIN: 1
COUGH: 0
BACK PAIN: 1

## 2020-01-01 ASSESSMENT — ACTIVITIES OF DAILY LIVING (ADL)
DRESSING_REQUIRES_ASSISTANCE: 1
BATHING_REQUIRES_ASSISTANCE: 1
PHYSICAL_TRANSFER_REQUIRES_ASSISTANCE: 1
CONTINENCE_REQUIRES_ASSISTANCE: 1
AMBULATION_REQUIRES_ASSISTANCE: 1

## 2020-01-01 ASSESSMENT — COPD QUESTIONNAIRES: COPD: 1

## 2020-01-30 PROBLEM — K92.0 HEMATEMESIS: Status: ACTIVE | Noted: 2020-01-01

## 2020-01-30 PROBLEM — I95.9 HYPOTENSION: Status: ACTIVE | Noted: 2020-01-01

## 2020-01-30 PROBLEM — A41.9 SEPSIS (HCC): Status: ACTIVE | Noted: 2020-01-01

## 2020-01-30 PROBLEM — N17.9 AKI (ACUTE KIDNEY INJURY) (HCC): Status: ACTIVE | Noted: 2020-01-01

## 2020-01-30 PROBLEM — R56.9 SEIZURE (HCC): Status: ACTIVE | Noted: 2020-01-01

## 2020-01-30 NOTE — CARE PLAN
Problem: Safety  Goal: Will remain free from injury  Outcome: PROGRESSING AS EXPECTED  Pt remains free from falls, all safety interventions in place   Goal: Will remain free from falls  Outcome: PROGRESSING AS EXPECTED     Problem: Infection  Goal: Will remain free from infection  Outcome: PROGRESSING AS EXPECTED     Problem: Venous Thromboembolism (VTW)/Deep Vein Thrombosis (DVT) Prevention:  Goal: Patient will participate in Venous Thrombosis (VTE)/Deep Vein Thrombosis (DVT)Prevention Measures  Outcome: PROGRESSING AS EXPECTED

## 2020-01-30 NOTE — PROGRESS NOTES
Pulmonary/Critical Care Medicine   Progress Note    Date of service: 2020  Time: 8:17 AM    Extensive discussion held with the patient and his next of kin Chandler Seo (son-in-law of his  daughter).  The patient wishes that if he is unable to make decisions that Chandler make additional decisions for him.  Discussed the patient's current condition with high likelihood to decompensate leading to death given his CT scan findings of ischemic bowel.  Discussed treatment options with the patient and Chandler including surgical consultation, endoscopy if needed.  Jersey wishes for no heroic measures including surgery or endoscopy and understands this will likely lead to his death.  He wishes to continue with medical management until additional family members (Chandler's sister in [place]) can arrive at bedside then transition to comfort care.  They have been informed that Jersey may not survive until then but we will continue current treatment.  If he has any worsening rediscussion regarding comfort care and palliation will be rediscussed.  Throughout the day the patient has lost over 2 L of blood from his NG tube.  Crystalloid resuscitation continues and hemoglobin continues to be trended however transfusion will likely be futile in the setting of ongoing bleeding from ischemic bowel.  Analgesia and vasopressors continue be titrated throughout the day.    I spent 55 min in reviewing the patient's condition, physical examination, laboratory and imaging data, prior documentation, in discussion with RN, RT, pharmacy, dietary, social work, patient, family, and in formulating an assessment/plan.    Critical Care time: 55 min. No time overlap, procedures not included in time.  [id number] x2

## 2020-01-30 NOTE — ED TRIAGE NOTES
"Chief Complaint   Patient presents with   • Upper GI Bleed        BP (!) 58/38   Pulse (!) 110   Temp 35.9 °C (96.6 °F) (Temporal)   Resp (!) 37   Ht 1.803 m (5' 11\")   Wt 95.3 kg (210 lb)   SpO2 94%   Pt is a 89 yo male presenting to Ed from [facility]. Pt reports coffee ground emesis x4 days. Per EMS facility reports 2 days. P lethargic, pale in color. Answers questions appropriately and follows commands.   "

## 2020-01-30 NOTE — PROGRESS NOTES
2 RN skin check complete.   Devices in place pulse ox, BP cuff, EKG leads, 2 PIV, central line.  Skin assessed under devices intact.  Confirmed pressure ulcers found on sacrum.  New potential pressure ulcers noted on back of right ear.  Pt has scabs throughout back, healing, intact. Pt has a healing wound/scab on left heel. Wound consult placed.  The following interventions in place mepilex, barrier paste.  Wound to be consulted and pictures uploaded.

## 2020-01-30 NOTE — ASSESSMENT & PLAN NOTE
Suspect prerenal versus hypoperfusion  Check UA, urine lites, and renal ultrasound  Renally dose medications and minimize nephrotoxic substances  Strict I's and O's  Monitor urine output

## 2020-01-30 NOTE — ED NOTES
Rn assumed acre. Bedside report given. Pt taken to CT then to floor. Pt to go to [id number]. Trn via RN on [facility] w O2 on monitor.

## 2020-01-30 NOTE — CONSULTS
Critical Care Consultation    Date of consult: 1/30/2020    Referring Physician  Ger Sheehan M.D.    Reason for Consultation  Hypotension    History of Presenting Illness  89 y.o. male with history of HTN, GERD, A. fib on Xarelto history of seizure, who presented 1/29/2020 with 4 days of nausea, vomiting and development of coffee-ground emesis.  He denies any fever, chills, sweats, dysuria, diarrhea, lower extremity edema, or orthostatic symptoms.  He does endorse some chest discomfort associated with frequent vomiting, mentions vague abdominal discomfort, poor oral intake, and general unwell feeling.  He currently resides in a rehab facility after having sustained a left tibial plateau fracture.    ED course  Patient identified as being hypotensive, A. fib with RVR (rate low 100s), setting 94% on 4 L.  Patient given 2 L bolus, labs drawn and below, bolused Protonix and initiated on Protonix infusion for concern of UGIB    Work-up in ED  WBC 24, Hb 16, platelets 322, , K4, CL 92, CO2 12, AG 31, glucose 192, BUN 36, CR 2.2, AST 20, ALT 6, alk phos 81, total bili 0.9, lipase 22, troponin I 71, INR 1.29, occult blood positive.    Code Status  Prior    Review of Systems  Review of Systems   Constitutional: Positive for malaise/fatigue. Negative for chills and fever.   HENT: Negative for congestion and sore throat.    Eyes: Negative for blurred vision and double vision.   Respiratory: Negative for cough, sputum production, shortness of breath and stridor.    Cardiovascular: Positive for chest pain (Musculoskeletal associated with retching). Negative for palpitations and orthopnea.   Gastrointestinal: Positive for abdominal pain, nausea and vomiting. Negative for diarrhea and melena.   Genitourinary: Negative for dysuria and urgency.   Neurological: Negative for focal weakness.   Psychiatric/Behavioral: Negative for depression.   All other systems reviewed and are negative.      Past Medical History   has a  past medical history of A-fib (HCC), Arrhythmia, Arthritis, Cataract, Diabetes (HCC), GERD (gastroesophageal reflux disease), Heart burn, Hyperlipidemia, Muscle disorder, and Urinary incontinence.    Surgical History   has a past surgical history that includes laminotomy (late 80s); laminotomy (late 90s); appendectomy (Early 2000's); tonsillectomy (Bilateral, 1936); cataract phaco with iol (Bilateral, Early 2000's); gastroscopy (N/A, 10/9/2017); and gastroscopy (1/12/2018).    Family History  family history includes Cancer in his brother and sister; Heart Attack in his mother; Heart Disease in his mother; Heart Failure in his father and mother; No Known Problems in his maternal grandfather, maternal grandmother, paternal grandfather, and paternal grandmother; Other in his brother; Paranoid behavior in his mother.    Social History   reports that he quit smoking about 40 years ago. His smoking use included cigarettes and pipe. He has a 43.50 pack-year smoking history. He has never used smokeless tobacco. He reports that he does not drink alcohol or use drugs.    Medications  Home Medications     Reviewed by Cam Oneil R.N. (Registered Nurse) on 01/30/20 at 0007  Med List Status: Partial   Medication Last Dose Status   acetaminophen (TYLENOL) 500 MG Tab  Active   apixaban (ELIQUIS) 2.5mg Tab  Active   aspirin (ASA) 81 MG Chew Tab chewable tablet  Active   cloNIDine (CATAPRES) 0.1 MG Tab  Active   cyanocobalamin (VITAMIN B12) 1000 MCG Tab  Active   gabapentin (NEURONTIN) 100 MG Cap  Active   levETIRAcetam (KEPPRA) 500 MG Tab  Active   magnesium hydroxide (MILK OF MAGNESIA) 400 MG/5ML Suspension  Active   Magnesium Oxide 420 MG Tab  Active   metoprolol SR (TOPROL XL) 25 MG TABLET SR 24 HR  Active   omeprazole (PRILOSEC) 20 MG delayed-release capsule  Active   ondansetron (ZOFRAN ODT) 4 MG TABLET DISPERSIBLE  Active   polyethylene glycol/lytes (MIRALAX) Pack  Active   polyethylene glycol/lytes (MIRALAX) Pack   Active   sennosides (SENOKOT) 8.6 MG Tab  Active   tramadol (ULTRAM) 50 MG Tab  Active              Current Facility-Administered Medications   Medication Dose Route Frequency Provider Last Rate Last Dose   • pantoprazole (PROTONIX) 80 mg in  mL Infusion  8 mg/hr Intravenous Continuous Ger Sheehan M.D. 25 mL/hr at 01/30/20 0104 8 mg/hr at 01/30/20 0104     Current Outpatient Medications   Medication Sig Dispense Refill   • cloNIDine (CATAPRES) 0.1 MG Tab Take 1 Tab by mouth 2 Times a Day. 60 Tab 0   • levETIRAcetam (KEPPRA) 500 MG Tab Take 1 Tab by mouth 2 Times a Day. 60 Tab    • polyethylene glycol/lytes (MIRALAX) Pack Take 1 Packet by mouth 1 time daily as needed (if sennosides and docusate ineffective after 24 hours).  3   • acetaminophen (TYLENOL) 500 MG Tab Take 500-1,000 mg by mouth every 6 hours as needed for Mild Pain.     • aspirin (ASA) 81 MG Chew Tab chewable tablet Take 81 mg by mouth every day.     • cyanocobalamin (VITAMIN B12) 1000 MCG Tab Take 1,000 mcg by mouth every day.     • apixaban (ELIQUIS) 2.5mg Tab Take 2.5 mg by mouth 2 Times a Day.     • gabapentin (NEURONTIN) 100 MG Cap Take 100 mg by mouth 2 Times a Day.     • Magnesium Oxide 420 MG Tab Take 420 mg by mouth every day.     • metoprolol SR (TOPROL XL) 25 MG TABLET SR 24 HR Take 25 mg by mouth every day.     • magnesium hydroxide (MILK OF MAGNESIA) 400 MG/5ML Suspension Take 30 mL by mouth 2 times a day as needed. Constipation     • polyethylene glycol/lytes (MIRALAX) Pack Take 17 g by mouth every day.     • omeprazole (PRILOSEC) 20 MG delayed-release capsule Take 20 mg by mouth every day.     • sennosides (SENOKOT) 8.6 MG Tab Take 8.6 mg by mouth every day.     • tramadol (ULTRAM) 50 MG Tab Take 50 mg by mouth every 8 hours.     • ondansetron (ZOFRAN ODT) 4 MG TABLET DISPERSIBLE Take 4 mg by mouth every 6 hours as needed for Nausea.         Allergies  Allergies   Allergen Reactions   • Vancomycin Rash     Diffuse  Tolerates at  slower infusion rates.        Vital Signs last 24 hours  Temp:  [35.9 °C (96.6 °F)] 35.9 °C (96.6 °F)  Pulse:  [] 101  Resp:  [21-37] 26  BP: ()/(36-67) 91/63  SpO2:  [94 %-97 %] 96 %    Physical Exam  Physical Exam  Vitals signs and nursing note reviewed.   Constitutional:       Appearance: He is ill-appearing. He is not diaphoretic.   HENT:      Head: Normocephalic and atraumatic.      Mouth/Throat:      Mouth: Mucous membranes are dry.   Eyes:      Conjunctiva/sclera: Conjunctivae normal.      Pupils: Pupils are equal, round, and reactive to light.   Neck:      Musculoskeletal: Neck supple.   Cardiovascular:      Rate and Rhythm: Tachycardia present. Rhythm irregular.   Pulmonary:      Breath sounds: No wheezing or rales.   Abdominal:      General: There is distension.      Palpations: Abdomen is soft.      Tenderness: There is tenderness (Mild and generalized). There is no guarding or rebound.   Musculoskeletal:         General: Swelling (1+ left, none on right) present.   Skin:     General: Skin is warm and dry.   Neurological:      Mental Status: He is alert.      Cranial Nerves: No cranial nerve deficit.   Psychiatric:         Mood and Affect: Mood normal.         Fluids    Intake/Output Summary (Last 24 hours) at 2020 0241  Last data filed at 2020 0225  Gross per 24 hour   Intake 2000 ml   Output --   Net 2000 ml       Laboratory  Recent Results (from the past 48 hour(s))   EKG (NOW)    Collection Time: 20 11:50 PM   Result Value Ref Range    Report       [facility] Emergency Dept.    Test Date:  2020  Pt Name:    BRIAN SPARROW               Department: ER  MRN:        [id number]                      Room:        02  Gender:     Male                         Technician: [id number]  :        1930-10-18                   Requested By:ER TRIAGE PROTOCOL  Order #:    [id number]                    Reading MD:    Measurements  Intervals                                 Axis  Rate:       117                          P:  GA:                                      QRS:        -61  QRSD:       132                          T:          -48  QT:         336  QTc:        469    Interpretive Statements  ATRIAL FLUTTER/FIBRILLATION, A-RATE 294  RIGHT BUNDLE BRANCH BLOCK  Compared to ECG 12/08/2019 20:26:21  Right bundle-branch block now present     CBC WITH DIFFERENTIAL    Collection Time: 01/30/20 12:00 AM   Result Value Ref Range    WBC 24.7 (H) 4.8 - 10.8 K/uL    RBC 5.62 4.70 - 6.10 M/uL    Hemoglobin 16.6 14.0 - 18.0 g/dL    Hematocrit 52.4 (H) 42.0 - 52.0 %    MCV 93.2 81.4 - 97.8 fL    MCH 29.5 27.0 - 33.0 pg    MCHC 31.7 (L) 33.7 - 35.3 g/dL    RDW 50.5 (H) 35.9 - 50.0 fL    Platelet Count 322 164 - 446 K/uL    MPV 11.2 9.0 - 12.9 fL    Neutrophils-Polys 73.00 (H) 44.00 - 72.00 %    Lymphocytes 10.40 (L) 22.00 - 41.00 %    Monocytes 9.60 0.00 - 13.40 %    Eosinophils 0.00 0.00 - 6.90 %    Basophils 0.00 0.00 - 1.80 %    Nucleated RBC 0.00 /100 WBC    Neutrophils (Absolute) 19.76 (H) 1.82 - 7.42 K/uL    Lymphs (Absolute) 2.57 1.00 - 4.80 K/uL    Monos (Absolute) 2.37 (H) 0.00 - 0.85 K/uL    Eos (Absolute) 0.00 0.00 - 0.51 K/uL    Baso (Absolute) 0.00 0.00 - 0.12 K/uL    NRBC (Absolute) 0.00 K/uL   COMP METABOLIC PANEL    Collection Time: 01/30/20 12:00 AM   Result Value Ref Range    Sodium 135 135 - 145 mmol/L    Potassium 4.0 3.6 - 5.5 mmol/L    Chloride 92 (L) 96 - 112 mmol/L    Co2 12 (L) 20 - 33 mmol/L    Anion Gap 31.0 (H) 0.0 - 11.9    Glucose 192 (H) 65 - 99 mg/dL    Bun 36 (H) 8 - 22 mg/dL    Creatinine 2.26 (H) 0.50 - 1.40 mg/dL    Calcium 10.3 8.5 - 10.5 mg/dL    AST(SGOT) 20 12 - 45 U/L    ALT(SGPT) 6 2 - 50 U/L    Alkaline Phosphatase 81 30 - 99 U/L    Total Bilirubin 0.9 0.1 - 1.5 mg/dL    Albumin 4.0 3.2 - 4.9 g/dL    Total Protein 7.9 6.0 - 8.2 g/dL    Globulin 3.9 (H) 1.9 - 3.5 g/dL    A-G Ratio 1.0 g/dL   LIPASE    Collection Time: 01/30/20 12:00 AM   Result Value  Ref Range    Lipase 22 11 - 82 U/L   TROPONIN    Collection Time: 01/30/20 12:00 AM   Result Value Ref Range    Troponin T 171 (H) 6 - 19 ng/L   COD (ADULT)    Collection Time: 01/30/20 12:00 AM   Result Value Ref Range    ABO Grouping Only O     Rh Grouping Only POS     Antibody Screen-Cod NEG    DIFFERENTIAL MANUAL    Collection Time: 01/30/20 12:00 AM   Result Value Ref Range    Bands-Stabs 7.00 0.00 - 10.00 %    Manual Diff Status PERFORMED    PERIPHERAL SMEAR REVIEW    Collection Time: 01/30/20 12:00 AM   Result Value Ref Range    Peripheral Smear Review see below    PLATELET ESTIMATE    Collection Time: 01/30/20 12:00 AM   Result Value Ref Range    Plt Estimation Normal    MORPHOLOGY    Collection Time: 01/30/20 12:00 AM   Result Value Ref Range    RBC Morphology Normal    ESTIMATED GFR    Collection Time: 01/30/20 12:00 AM   Result Value Ref Range    GFR If  33 (A) >60 mL/min/1.73 m 2    GFR If Non  27 (A) >60 mL/min/1.73 m 2   FLUID OCCULT BLOOD (Gastrocult, lab)    Collection Time: 01/30/20 12:03 AM   Result Value Ref Range    Occult Blood Positive (A) Negative   PROTHROMBIN TIME (INR)    Collection Time: 01/30/20 12:16 AM   Result Value Ref Range    PT 16.4 (H) 12.0 - 14.6 sec    INR 1.29 (H) 0.87 - 1.13   APTT    Collection Time: 01/30/20 12:16 AM   Result Value Ref Range    APTT 30.9 24.7 - 36.0 sec   ISTAT ARTERIAL BLOOD GAS    Collection Time: 01/30/20  2:16 AM   Result Value Ref Range    Ph 7.321 (L) 7.400 - 7.500    Pco2 37.2 (H) 26.0 - 37.0 mmHg    Po2 75 64 - 87 mmHg    Tco2 20 20 - 33 mmol/L    S02 94 93 - 99 %    Hco3 19.2 17.0 - 25.0 mmol/L    BE -6 (L) -4 - 3 mmol/L    Body Temp 97.1 F degrees    O2 Therapy 32 %    iPF Ratio 234     Ph Temp Sindy 7.333 (L) 7.400 - 7.500    Pco2 Temp Co 35.9 26.0 - 37.0 mmHg    Po2 Temp Cor 71 64 - 87 mmHg    Specimen Arterial     Action Range Triggered NO     Inst. Qualified Patient YES        Imaging  DX-CHEST-PORTABLE (1 VIEW)    Final Result      Stable interstitial lung disease, chronic obstructive pulmonary disease and cardiac silhouette enlargement          Assessment/Plan  * Shock ([facility])- (present on admission)  Assessment & Plan  Undifferentiated hypovolemic versus distributive with infectious etiology -less likely obstructive or cardiogenic  Volume resuscitation, blood transfusions as necessary  Serial H&H and transfusion as necessary  Panculture  Check lactate  Initiate pressor support with active titration and maintain map greater than 65  Empiric antibiotics until can rule in/out infectious etiology    HEDY (acute kidney injury) ([facility])  Assessment & Plan  Suspect prerenal versus hypoperfusion  Check UA, urine lites, and renal ultrasound  Renally dose medications and minimize nephrotoxic substances  Strict I's and O's  Monitor urine output  CPK    Hematemesis  Assessment & Plan  Coronary ulcer disease versus Lety-Baca versus other, no history of portal hypertension  Continue Protonix infusion  No anticoagulant or antiplatelet therapies  Serial H&H  Conservative transfusion strategy  Volume resuscitation  PRN for nausea  GI evaluation  Reversal of Xarelto    Elevated troponin- (present on admission)  Assessment & Plan  Suspect demand  EKG with A. fib RVR    Seizure ([facility])  Assessment & Plan  Seizure precautions  Continue Keppra    Paroxysmal atrial fibrillation ([facility])- (present on admission)  Assessment & Plan  Keep K greater than 4 mag greater than 2  Has been on Xarelto- hold for now given concern of UGIB and in setting of HEDY  Rate control    Addendum  CT abdomen and pelvis performed showing pneumatosis, small free intraperitoneal air with portal venous gas affecting small bowel as well as dilatation of small bowel and stomach.  Findings are concerning for ischemic bowel.  Patient with multiorgan failure and hypotension with advanced age and DNR/DNI status, he would not be a good surgical candidate further he is anticoagulated  with NOAC pending reversal.  At this time will continue full medical management, his abdomen is slightly distended but otherwise soft and without guarding or rebound.  He will be maintained on antibiotic empirically, continue with volume resuscitation, given significant filling of stomach and small bowel will have NG tube placed to suction and keep patient n.p.o. for now.  Further I discussed this plan with patient and he is in agreement, that we will pursue full medical management and forego surgical intervention      Discussed patient condition and risk of morbidity and/or mortality with Hospitalist, RN, RT, Patient and ERP.    The patient remains critically ill.  Critical care time = 115 minutes in directly providing and coordinating critical care and extensive data review.  No time overlap and excludes procedures.

## 2020-01-30 NOTE — ED NOTES
Break RN:  Pt hypotensive, reporting dizziness/lightheadedness. Pt's gurney trendelenburged, head remains elevated as pt is spitting into emesis bag. Second liter bolus placed on a pressure bag.Third PIV using US placed, second purple top drawn and tubed to lab for ABO.   Pulmonologist bedside.

## 2020-01-30 NOTE — ASSESSMENT & PLAN NOTE
Coronary ulcer disease vs Lety-Baca vs other, no history of portal hypertension  Continue Protonix infusion  No anticoagulant or antiplatelet therapies  Serial H&H  Conservative transfusion strategy  S/p volume resuscitation  PRN for nausea  Reversal of Xarelto  Patient wishes to have no endoscopic evaluation or GI consultation

## 2020-01-30 NOTE — ED NOTES
Pt states to RN x 2 and MD @ bedside that he would wish to be DNR. Denies chest compressions or intubation if needed.

## 2020-01-30 NOTE — ASSESSMENT & PLAN NOTE
Septic versus hemorrhagic shock versus combination  Volume resuscitation, blood transfusions as necessary  Serial H&H and transfusion as necessary  Follow-up panculture  Lactate trended downward  Weaning pressor support with active titration and maintain map greater than 65  Continue Rocephin and Flagyl

## 2020-01-30 NOTE — DISCHARGE PLANNING
1120- Agency/Facility Name: [place]  Spoke To: Rani  Outcome: Onsite for a visit. Patient is a resumption at [facility].

## 2020-01-30 NOTE — ED NOTES
Lab called with critical result of trop at 171. Critical lab result read back to Lab.   ERP notified of critical lab result at 0038.  Critical lab result read back by ERP  .

## 2020-01-30 NOTE — PROCEDURES
"Date: 1/30/2020    Procedure:  Central Venous Catheter Insertion    Indication: hypotension    Physician:  Martin Adhikari M.D.    Consent:  Obtained; time out performed    Procedure:  The patient was placed in the [place] position.  Appropriate \"time out\" was performed.  The left neck was prepped and draped in the usual sterile fashion.  Under full body sterile barrier precautions, a triple lumen central venous catheter was placed without difficulty in the left IJ position.  US was used for localization and placement.  All lumens were flushed with sterile saline and sterile caps were applied.  The line was sutured in place and a sterile dressing was applied.  There were no immediate complications.  A post-procedure CXR will be reviewed.  Estimated blood loss < 5cc.      "

## 2020-01-30 NOTE — PROGRESS NOTES
Assumed care of pt. Recvd bedside report from ED RN Cam.   MD at bedside, patient expressed abd pain to MD.  MD placed order for CT of abd.   RN and CNA transferred pt to CT on monitor. Pt tolerated well.    After CT in route to [facility] pt BP was 48/32 MD made aware. Recvd orders.   06:05 Arrived to MD jaden at bedside for Central line placement.

## 2020-01-30 NOTE — ED PROVIDER NOTES
"ED Provider Note    CHIEF COMPLAINT  Chief Complaint   Patient presents with   • Upper GI Bleed       HPI  Jersey Alexis is a 89 y.o. male who presents for evaluation of 4 days of coffee-ground emesis and intermittent epigastric pain.  Patient notes he has been telling nursing staff at his nursing home for 4 days \"he has not been transferred until today.  He was found hypotensive, pale, and very tired appearing by EMS.  They had a systolic blood pressure in the 50s in route but he has been maintaining his neurologic status and is oriented.    REVIEW OF SYSTEMS  Constitutional: No recent fevers or chills.  Generalized fatigue and weakness noted  Skin: No new rashes  HEENT: No ear pain, ringing in ears, or decreased hearing. No sore throat, runny nose, sores, trouble swallowing, trouble speaking.  Neck: No neck pain, stiffness, or masses.  Chest: No pain or rashes  Pulm: No shortness of breath, cough, wheezing, stridor, or pain with inspiration/expiration  Gastrointestinal: No constipation or bloating  Genitourinary: No dysuria or hematuria  Musculoskeletal: No recent trauma, pain, swelling, weakness  Neurologic: No sensory or motor changes.   Heme: Bruises and bleeds easily due to anticoagulation  Immuno: No hx of recurrent infections      PAST MEDICAL HISTORY   has a past medical history of A-fib (HCC), Arrhythmia, Arthritis, Cataract, Diabetes (HCC), GERD (gastroesophageal reflux disease), Heart burn, Hyperlipidemia, Muscle disorder, and Urinary incontinence.    SOCIAL HISTORY  Social History     Tobacco Use   • Smoking status: Former Smoker     Packs/day: 1.50     Years: 29.00     Pack years: 43.50     Types: Cigarettes, Pipe     Last attempt to quit: 1980     Years since quittin.1   • Smokeless tobacco: Never Used   • Tobacco comment: Started smoking at age 21   Substance and Sexual Activity   • Alcohol use: No   • Drug use: No   • Sexual activity: Never     Partners: Female     Birth " "control/protection: Post-Menopausal       SURGICAL HISTORY   has a past surgical history that includes laminotomy (late 80s); laminotomy (late 90s); appendectomy (Early 2000's); tonsillectomy (Bilateral, 1936); cataract phaco with iol (Bilateral, Early 2000's); gastroscopy (N/A, 10/9/2017); and gastroscopy (1/12/2018).    CURRENT MEDICATIONS  Home Medications     Reviewed by Cam Oneil R.N. (Registered Nurse) on 01/30/20 at 0007  Med List Status: Partial   Medication Last Dose Status   acetaminophen (TYLENOL) 500 MG Tab  Active   apixaban (ELIQUIS) 2.5mg Tab  Active   aspirin (ASA) 81 MG Chew Tab chewable tablet  Active   cloNIDine (CATAPRES) 0.1 MG Tab  Active   cyanocobalamin (VITAMIN B12) 1000 MCG Tab  Active   gabapentin (NEURONTIN) 100 MG Cap  Active   levETIRAcetam (KEPPRA) 500 MG Tab  Active   magnesium hydroxide (MILK OF MAGNESIA) 400 MG/5ML Suspension  Active   Magnesium Oxide 420 MG Tab  Active   metoprolol SR (TOPROL XL) 25 MG TABLET SR 24 HR  Active   omeprazole (PRILOSEC) 20 MG delayed-release capsule  Active   ondansetron (ZOFRAN ODT) 4 MG TABLET DISPERSIBLE  Active   polyethylene glycol/lytes (MIRALAX) Pack  Active   polyethylene glycol/lytes (MIRALAX) Pack  Active   sennosides (SENOKOT) 8.6 MG Tab  Active   tramadol (ULTRAM) 50 MG Tab  Active                ALLERGIES  Allergies   Allergen Reactions   • Vancomycin Rash     Diffuse  Tolerates at slower infusion rates.        PHYSICAL EXAM  VITAL SIGNS: /61   Pulse (!) 101   Temp 35.9 °C (96.6 °F) (Temporal)   Resp (!) 22   Ht 1.803 m (5' 11\")   Wt 95.3 kg (210 lb)   SpO2 96%   BMI 29.29 kg/m²    Gen: Appears distressed, pale  HEENT: No signs of trauma, Bilateral external ears normal, Nose normal. Conjunctiva normal, Non-icteric.   Neck:  No tenderness, Supple, No masses  Lymphatic: No cervical lymphadenopathy noted.   Cardiovascular: Tachycardia noted.  Capillary refill 5 to 7 seconds all extremities.   Thorax & Lungs: Normal breath " sounds, No respiratory distress, No wheezing bilateral chest rise  Abdomen: Bowel sounds normal, Soft, No tenderness, No masses, No pulsatile masses. No Guarding or rebound  Skin: Cool, dry, mottled extremities  Back: No bony tenderness, No CVA tenderness.   Extremities: No significant edema or apparent injuries  Neurologic: Appears tired, no facial droop  Psychiatric: Affect normal, Judgment normal, Mood normal.       LABS  Results for orders placed or performed during the hospital encounter of 01/29/20   CBC WITH DIFFERENTIAL   Result Value Ref Range    WBC 24.7 (H) 4.8 - 10.8 K/uL    RBC 5.62 4.70 - 6.10 M/uL    Hemoglobin 16.6 14.0 - 18.0 g/dL    Hematocrit 52.4 (H) 42.0 - 52.0 %    MCV 93.2 81.4 - 97.8 fL    MCH 29.5 27.0 - 33.0 pg    MCHC 31.7 (L) 33.7 - 35.3 g/dL    RDW 50.5 (H) 35.9 - 50.0 fL    Platelet Count 322 164 - 446 K/uL    MPV 11.2 9.0 - 12.9 fL    Neutrophils-Polys 73.00 (H) 44.00 - 72.00 %    Lymphocytes 10.40 (L) 22.00 - 41.00 %    Monocytes 9.60 0.00 - 13.40 %    Eosinophils 0.00 0.00 - 6.90 %    Basophils 0.00 0.00 - 1.80 %    Nucleated RBC 0.00 /100 WBC    Neutrophils (Absolute) 19.76 (H) 1.82 - 7.42 K/uL    Lymphs (Absolute) 2.57 1.00 - 4.80 K/uL    Monos (Absolute) 2.37 (H) 0.00 - 0.85 K/uL    Eos (Absolute) 0.00 0.00 - 0.51 K/uL    Baso (Absolute) 0.00 0.00 - 0.12 K/uL    NRBC (Absolute) 0.00 K/uL   COMP METABOLIC PANEL   Result Value Ref Range    Sodium 135 135 - 145 mmol/L    Potassium 4.0 3.6 - 5.5 mmol/L    Chloride 92 (L) 96 - 112 mmol/L    Co2 12 (L) 20 - 33 mmol/L    Anion Gap 31.0 (H) 0.0 - 11.9    Glucose 192 (H) 65 - 99 mg/dL    Bun 36 (H) 8 - 22 mg/dL    Creatinine 2.26 (H) 0.50 - 1.40 mg/dL    Calcium 10.3 8.5 - 10.5 mg/dL    AST(SGOT) 20 12 - 45 U/L    ALT(SGPT) 6 2 - 50 U/L    Alkaline Phosphatase 81 30 - 99 U/L    Total Bilirubin 0.9 0.1 - 1.5 mg/dL    Albumin 4.0 3.2 - 4.9 g/dL    Total Protein 7.9 6.0 - 8.2 g/dL    Globulin 3.9 (H) 1.9 - 3.5 g/dL    A-G Ratio 1.0 g/dL    LIPASE   Result Value Ref Range    Lipase 22 11 - 82 U/L   TROPONIN   Result Value Ref Range    Troponin T 171 (H) 6 - 19 ng/L   FLUID OCCULT BLOOD (Gastrocult, lab)   Result Value Ref Range    Occult Blood Positive (A) Negative   PROTHROMBIN TIME (INR)   Result Value Ref Range    PT 16.4 (H) 12.0 - 14.6 sec    INR 1.29 (H) 0.87 - 1.13   APTT   Result Value Ref Range    APTT 30.9 24.7 - 36.0 sec   COD (ADULT)   Result Value Ref Range    ABO Grouping Only O     Rh Grouping Only POS     Antibody Screen-Cod NEG    DIFFERENTIAL MANUAL   Result Value Ref Range    Bands-Stabs 7.00 0.00 - 10.00 %    Manual Diff Status PERFORMED    PERIPHERAL SMEAR REVIEW   Result Value Ref Range    Peripheral Smear Review see below    PLATELET ESTIMATE   Result Value Ref Range    Plt Estimation Normal    MORPHOLOGY   Result Value Ref Range    RBC Morphology Normal    ABO Rh Confirm   Result Value Ref Range    ABO Rh Confirm O POS    ESTIMATED GFR   Result Value Ref Range    GFR If  33 (A) >60 mL/min/1.73 m 2    GFR If Non  27 (A) >60 mL/min/1.73 m 2   LACTIC ACID   Result Value Ref Range    Lactic Acid 6.7 (HH) 0.5 - 2.0 mmol/L   BETA-HYDROXYBUTYRIC ACID   Result Value Ref Range    beta-Hydroxybutyric Acid 0.30 (H) 0.02 - 0.27 mmol/L   TROPONIN   Result Value Ref Range    Troponin T 125 (H) 6 - 19 ng/L   EKG (NOW)   Result Value Ref Range    Report       [facility]    Test Date:  2020  Pt Name:    BRIAN SPARROW               Department: ER  MRN:        [id number]                      Room:       RD 02  Gender:     Male                         Technician: [id number]  :        1930-10-18                   Requested By:ER TRIAGE PROTOCOL  Order #:    [id number]                    Reading MD:    Measurements  Intervals                                Axis  Rate:       117                          P:  NV:                                      QRS:        -61  QRSD:       132                           T:          -48  QT:         336  QTc:        469    Interpretive Statements  ATRIAL FLUTTER/FIBRILLATION, A-RATE 294  RIGHT BUNDLE BRANCH BLOCK  Compared to ECG 12/08/2019 20:26:21  Right bundle-branch block now present     ISTAT ARTERIAL BLOOD GAS   Result Value Ref Range    Ph 7.321 (L) 7.400 - 7.500    Pco2 37.2 (H) 26.0 - 37.0 mmHg    Po2 75 64 - 87 mmHg    Tco2 20 20 - 33 mmol/L    S02 94 93 - 99 %    Hco3 19.2 17.0 - 25.0 mmol/L    BE -6 (L) -4 - 3 mmol/L    Body Temp 97.1 F degrees    O2 Therapy 32 %    iPF Ratio 234     Ph Temp Sindy 7.333 (L) 7.400 - 7.500    Pco2 Temp Co 35.9 26.0 - 37.0 mmHg    Po2 Temp Cor 71 64 - 87 mmHg    Specimen Arterial     Action Range Triggered NO     Inst. Qualified Patient YES        RADIOLOGY  DX-CHEST-PORTABLE (1 VIEW)   Final Result      Stable interstitial lung disease, chronic obstructive pulmonary disease and cardiac silhouette enlargement        Critical Care Note  Upon my evaluation, this patient had high probability of imminent and life-threatening deterioration due to hypotension, GI bleeding which required my direct attention, intervention, and personal management. I personally provided 35 minutes of critical care time exclusive of time spent on separately billable procedures. Time includes review of laboratory data, radiology results, discussion with consultants, and monitoring for potential decompensation.     HYDRATION: Based on the patient's presentation of Tachycardia the patient was given IV fluids. IV Hydration was used because oral hydration was not adequate alone. Upon recheck following hydration, the patient was improved.    COURSE & MEDICAL DECISION MAKING  Pertinent Labs & Imaging studies reviewed. (See chart for details)  Patient's initial evaluation is highly concerning as he is hypotensive and has signs of poor perfusion.  He had no recent findings to suggest an infectious etiology to explain his symptoms today however this is on  the differential diagnosis.  The patient is noted to be DNR/DNI in the record which I will confirm with him.  He appears to be alert and oriented x4 although very tired appearing.    1:35 AM  Patient's color appears slightly improved however he is once again drifting back down in terms of his blood pressure.  He was fluid responsive and is noted to be quite dehydrated clinically and by labs with elevated BUN and creatinine.  It is likely this is producing the demand ischemia and resulting troponin elevation however he is noted to have a scant amount of coffee-ground emesis in the emesis back at his bedside.  As he is on Eliquis I suspect he is having slow bleeding from what is likely a gastric or esophageal ulceration.  Patient is maintaining his neurologic status but will need to be admitted for further treatment and evaluation.  I discussed CODE STATUS with the patient on arrival and he remains DNR/DNI.     2:35 AM  Patient is having waxing and waning blood pressure which is fluid responsive and generally remains tachycardic and tachypneic.  He is still very pale but otherwise mentating normally.  Patient was discussed with the hospitalist as well as the pulmonologist who will admit the patient and consult respectively.  Patient will likely need antibiotic prophylaxis although there is no convincing findings clinically to suggest a source of infection.  Is notable that he has a leukocytosis and an elevated lactate in the setting of hypotension which triggers all the sepsis alerts which seems to necessitate antibiotics although, again, there is no clear etiology.    FINAL IMPRESSION  1. Upper GI bleed    2. Hypotension due to hypovolemia    3. Dehydration    4. Acute kidney injury (HCC)    5. Elevated troponin        Electronically signed by: Ger Sheehan M.D., 1/29/2020 11:53 PM

## 2020-01-31 PROBLEM — A41.9 SEPTIC SHOCK (HCC): Status: ACTIVE | Noted: 2017-07-15

## 2020-01-31 PROBLEM — K55.9 SMALL BOWEL ISCHEMIA (HCC): Status: ACTIVE | Noted: 2020-01-01

## 2020-01-31 PROBLEM — R65.21 SEPTIC SHOCK (HCC): Status: ACTIVE | Noted: 2017-07-15

## 2020-01-31 NOTE — CARE PLAN
Problem: Infection  Goal: Will remain free from infection  Outcome: PROGRESSING AS EXPECTED  Intervention: Assess signs and symptoms of infection  Note:   S/S infections assessed  Intervention: Implement standard precautions and perform hand washing before and after patient contact  Note:   Standard precautions in place, hand washing completed  Intervention: Give CDC handouts for infection prevention (infection prevention/hand washing, disease specific, and device specific) and document in Education  Note:   N/A at this time  Intervention: Assess for removal of potential routes of infection, such as IV, central line, intra-arterial or urinary catheters  Note:   PIVs and central line indicated at this time     Problem: Fluid Volume:  Goal: Will maintain balanced intake and output  Outcome: PROGRESSING AS EXPECTED  Intervention: Monitor, educate, and encourage compliance with therapeutic intake of liquids  Note:   Pt NPO but has received 6 L boluses to increase BP

## 2020-01-31 NOTE — DIETARY
Nutrition Services: Suspect inadequate nutrition for ~5 days    Pt is a 89 y.o. male with Dx: Hypotension    Admit day 1.  Pt is NPO with NGT in place.   Per chart: 4 days of coffee-ground emesis and intermittent epigastric pain PTA.    RD will monitor.

## 2020-01-31 NOTE — RESPIRATORY CARE
COPD EDUCATION by COPD CLINICAL EDUCATOR  1/31/2020 at 7:28 AM by Tami Valiente, RRT     Patient reviewed by COPD education team. Patient does not have a history or diagnosis of COPD and is a non-smoker, therefore does not qualify for the COPD program.

## 2020-01-31 NOTE — DISCHARGE PLANNING
Care Transition Team Discharge Planning    Anticipated Discharge Disposition: d/c to comfort care, hospice    Action: LSw received order for Hospice. Lsw conducted chart review. Lsw spoke to order writer, Dr Lim.    Lsw faxed completed hospice choice of [facility] to [facility], and advised of pending fax.    Barriers to Discharge: acceptance    Plan: f/u w/ medical team, [facility] hosp[facility], pt's decision maker- son in law Chandler

## 2020-01-31 NOTE — PROGRESS NOTES
"Critical Care Progress Note    Date of admission  1/29/2020    Chief Complaint  89 y.o. male admitted 1/29/2020 with septic shock, massive hematemesis    Hospital Course    \"89 y.o. male with history of HTN, GERD, A. fib on Xarelto history of seizure, who presented 1/29/2020 with 4 days of nausea, vomiting and development of coffee-ground emesis.  He denies any fever, chills, sweats, dysuria, diarrhea, lower extremity edema, or orthostatic symptoms.  He does endorse some chest discomfort associated with frequent vomiting, mentions vague abdominal discomfort, poor oral intake, and general unwell feeling.  He currently resides in a rehab facility after having sustained a left tibial plateau fracture.     ED course  Patient identified as being hypotensive, A. fib with RVR (rate low 100s), setting 94% on 4 L.  Patient given 2 L bolus, labs drawn and below, bolused Protonix and initiated on Protonix infusion for concern of UGIB     Work-up in ED  WBC 24, Hb 16, platelets 322, , K4, CL 92, CO2 12, AG 31, glucose 192, BUN 36, CR 2.2, AST 20, ALT 6, alk phos 81, total bili 0.9, lipase 22, troponin I 71, INR 1.29, occult blood positive.\"      Interval Problem Update  Reviewed last 24 hour events:   - Decided on medical management of septic shock and likely ischemic bowel   - Improvement in lactic acidosis and septic shock, abdominal pain continues.   - Hg 16.6 -> 12.1   - Neuro: AOx4   - HR: 90s-100s   - SBP: off hazel   - GI: 2.6L out of NGT/24 hrs   - UOP: not measured   - Hearn: no   - Tm: 37   - Lines: CVC   - PPx: GI PPI, DVT contraindicated   - 1L NC   - CXR (personally reviewed and compared to prior): no new    Review of Systems  Review of Systems   Constitutional: Positive for malaise/fatigue. Negative for chills and fever.   Eyes: Negative for blurred vision.   Respiratory: Negative for cough, sputum production, shortness of breath and stridor.    Cardiovascular: Negative for chest pain.   Gastrointestinal: " Positive for abdominal pain. Negative for nausea and vomiting.   Genitourinary: Negative for dysuria.   Musculoskeletal: Negative for myalgias.   Skin: Negative for rash.   Neurological: Negative for dizziness, sensory change and focal weakness.        Vital Signs for last 24 hours   Temp:  [35.6 °C (96.1 °F)-37 °C (98.6 °F)] 36.7 °C (98.1 °F)  Pulse:  [] 96  Resp:  [16-34] 18  BP: ()/() 106/61  SpO2:  [79 %-100 %] 95 %    Hemodynamic parameters for last 24 hours       Respiratory Information for the last 24 hours       Physical Exam   Physical Exam  Vitals signs and nursing note reviewed.   Constitutional:       General: He is in acute distress.      Appearance: Normal appearance. He is well-developed. He is obese. He is ill-appearing.   HENT:      Head: Normocephalic and atraumatic.      Right Ear: External ear normal.      Left Ear: External ear normal.      Nose: Nose normal.      Mouth/Throat:      Mouth: Mucous membranes are moist.   Eyes:      Extraocular Movements: Extraocular movements intact.      Conjunctiva/sclera: Conjunctivae normal.   Neck:      Musculoskeletal: Neck supple.      Vascular: No JVD.      Trachea: No tracheal deviation.   Cardiovascular:      Rate and Rhythm: Normal rate and regular rhythm.      Pulses: Normal pulses.   Pulmonary:      Effort: Pulmonary effort is normal.      Breath sounds: Normal breath sounds. No rales.   Abdominal:      General: Bowel sounds are decreased. There is distension.      Palpations: Abdomen is soft.      Tenderness: There is tenderness in the right upper quadrant, right lower quadrant, periumbilical area and suprapubic area. There is guarding and rebound.   Musculoskeletal:         General: No tenderness.      Right lower leg: No edema.      Left lower leg: No edema.   Skin:     General: Skin is warm and dry.      Capillary Refill: Capillary refill takes less than 2 seconds.      Findings: No rash.   Neurological:      General: No focal  deficit present.      Mental Status: He is alert and oriented to person, place, and time.      Cranial Nerves: No cranial nerve deficit.      Sensory: No sensory deficit.      Motor: No weakness.   Psychiatric:         Mood and Affect: Mood normal.         Behavior: Behavior normal.         Medications  Current Facility-Administered Medications   Medication Dose Route Frequency Provider Last Rate Last Dose   • senna-docusate (PERICOLACE or SENOKOT S) 8.6-50 MG per tablet 2 Tab  2 Tab Oral BID Cornelia Meza M.D.   Stopped at 01/30/20 0600    And   • polyethylene glycol/lytes (MIRALAX) PACKET 1 Packet  1 Packet Oral QDAY PRN Cornelia Meza M.D.        And   • magnesium hydroxide (MILK OF MAGNESIA) suspension 30 mL  30 mL Oral QDAY PRN Cornelia Meza M.D.        And   • bisacodyl (DULCOLAX) suppository 10 mg  10 mg Rectal QDAY PRN Cornelia Meza M.D.       • lactated ringers infusion   Intravenous Continuous Cornelia Meza M.D. 100 mL/hr at 01/30/20 2154     • cefTRIAXone (ROCEPHIN) 2 g in  mL IVPB  2 g Intravenous Q24HR Cornelia Meza M.D.   Stopped at 01/31/20 0327   • ondansetron (ZOFRAN) syringe/vial injection 4 mg  4 mg Intravenous Q4HRS PRN Cornelia Meza M.D.       • ondansetron (ZOFRAN ODT) dispertab 4 mg  4 mg Oral Q4HRS PRN Cornelia Meza M.D.       • pantoprazole (PROTONIX) injection 40 mg  40 mg Intravenous BID Cornelia Meza M.D.   40 mg at 01/31/20 0522   • phenylephrine (SABIHA-SYNEPHRINE) 40 mg in  mL Infusion  0-300 mcg/min Intravenous Continuous Martin Adhikari M.D.   Stopped at 01/31/20 0530   • levETIRAcetam (KEPPRA) 500 mg in  mL IVPB  500 mg Intravenous Q12HRS Martin Adhikari M.D.   Stopped at 01/31/20 0622   • metroNIDAZOLE (FLAGYL) IVPB 500 mg  500 mg Intravenous Q8HRS Martin Adhikari M.D.   Stopped at 01/31/20 0622   • fentaNYL (SUBLIMAZE) injection  mcg   mcg Intravenous Q HOUR PRN Kingsley Lim Jr., D.O.   50 mcg at 01/30/20 1113        Fluids    Intake/Output Summary (Last 24 hours) at 1/31/2020 0745  Last data filed at 1/31/2020 0600  Gross per 24 hour   Intake 2951.16 ml   Output 2950 ml   Net 1.16 ml       Laboratory  Recent Labs     01/30/20  0216   ISTATAPH 7.321*   ISTATAPCO2 37.2*   ISTATAPO2 75   ISTATATCO2 20   [id number] 94   ISTATARTHCO3 19.2   ISTATARTBE -6*   ISTATTEMP 97.1 F   ISTATFIO2 32   ISTATSPEC Arterial   ISTATAPHTC 7.333*   [id number] 71     Recent Labs     01/30/20  0315   CPKTOTAL 153     Recent Labs     01/30/20  0000 01/30/20  0745 01/31/20  0330   SODIUM 135 135 139   POTASSIUM 4.0 3.8 3.4*   CHLORIDE 92* 95* 99   CO2 12* 24 28   BUN 36* 45* 56*   CREATININE 2.26* 2.32* 2.05*   MAGNESIUM  --  1.2*  --    CALCIUM 10.3 8.1* 8.3*     Recent Labs     01/30/20  0000 01/30/20  0745 01/31/20  0330   ALTSGPT 6  --   --    ASTSGOT 20  --   --    ALKPHOSPHAT 81  --   --    TBILIRUBIN 0.9  --   --    LIPASE 22  --   --    GLUCOSE 192* 152* 123*     Recent Labs     01/30/20  0000 01/30/20  0745 01/31/20  0330   WBC 24.7* 13.3* 15.4*   NEUTSPOLYS 73.00* 51.40  --    LYMPHOCYTES 10.40* 18.10*  --    MONOCYTES 9.60 9.50  --    EOSINOPHILS 0.00 1.00  --    BASOPHILS 0.00 0.00  --    ASTSGOT 20  --   --    ALTSGPT 6  --   --    ALKPHOSPHAT 81  --   --    TBILIRUBIN 0.9  --   --      Recent Labs     01/30/20  0000 01/30/20  0016 01/30/20  0745 01/30/20  1500 01/30/20  2240 01/31/20  0330   RBC 5.62  --  4.66*  --   --  4.04*   HEMOGLOBIN 16.6  --  14.0 13.6* 12.1* 12.1*   HEMATOCRIT 52.4*  --  42.3 42.0 37.2* 35.8*   PLATELETCT 322  --  275  --   --  201   PROTHROMBTM  --  16.4*  --   --   --   --    APTT  --  30.9  --   --   --   --    INR  --  1.29*  --   --   --   --        Imaging  X-Ray:  I have personally reviewed the images and compared with prior images.  CT:    Reviewed    Assessment/Plan  * Septic shock (HCC)- (present on admission)  Assessment & Plan  Septic versus hemorrhagic shock versus combination  Volume  "resuscitation, blood transfusions as necessary  Serial H&H and transfusion as necessary  Follow-up panculture  Lactate trended downward  Weaning pressor support with active titration and maintain map greater than 65  Continue Rocephin and Flagyl    HEDY (acute kidney injury) (HCC)- (present on admission)  Assessment & Plan  Suspect prerenal versus hypoperfusion  Check UA, urine lites, and renal ultrasound  Renally dose medications and minimize nephrotoxic substances  Strict I's and O's  Monitor urine output    Hematemesis- (present on admission)  Assessment & Plan  Coronary ulcer disease vs Lety-Baca vs other, no history of portal hypertension  Continue Protonix infusion  No anticoagulant or antiplatelet therapies  Serial H&H  Conservative transfusion strategy  S/p volume resuscitation  PRN for nausea  Reversal of Xarelto  Patient wishes to have no endoscopic evaluation or GI consultation    Elevated troponin- (present on admission)  Assessment & Plan  Suspect demand  EKG with ALIN bashir RVR    Small bowel ischemia (HCC)- (present on admission)  Assessment & Plan  Pneumatosis intestinalis with biliary air seen on CT scan suggesting bowel ischemia with free intraperitoneal air suggesting perforation  Patient wishes to have no surgical intervention \"no heroic measures\". Requests no surgical consultation  He has been informed that bowel ischemia is a surgical emergency and again requests no intervention  Continue antimicrobials and hemodynamic support    Seizure (HCC)- (present on admission)  Assessment & Plan  Seizure precautions  Continue Keppra    Paroxysmal atrial fibrillation (HCC)- (present on admission)  Assessment & Plan  Keep K greater than 4 mag greater than 2  Has been on Xarelto- hold for now given concern of UGIB and in setting of HEDY  Rate control       VTE:  Contraindicated  Ulcer: PPI  Lines: Central Line  Ongoing indication addressed    I have performed a physical exam and reviewed and updated ROS and " Plan today (1/31/2020). In review of yesterday's note (1/30/2020), there are no changes except as documented above.     Ongoing management of massive upper gastrointestinal hemorrhage, ischemic bowel, renal failure; patient is a high likelihood to develop worsening metabolic condition leading to decompensation. This patient is critically ill, at high risk for decompensation leading to worsening vital organ dysfunction and death without critical care interventions.    Discussed patient condition and risk of morbidity and/or mortality with Hospitalist, Family, RN, RT, Pharmacy, Dietary, , Code status disscussed, Charge nurse / hot rounds and Patient    The patient remains critically ill.  Critical care time = 33 minutes in directly providing and coordinating critical care and extensive data review.  No time overlap and excludes procedures.    Addendum: I have informed the patient of his clinical improvement and increased probability of favorable outcome.  He states that his preceding quality of life was poor due to a recent fracture and inability to ambulate; because of his preceding debility in addition to this recent infectious insult he would like to transition his goals of care to comfort based measures and progression to hospice.  He has been informed that because of his improvement no guarantees regarding short-term mortality can be given and is aware of this.  He possesses decision-making capacity at this time for this decision, I have discussed with his next of kin Chandler who agrees with this decision.    Comfort care orders and hospice consult have been placed.  Patient will be transferred out of ICU today.  I have discussed this case with hospitalist Dr. Yaya Ragland he will assume care at this time. [facility] will sign off. Please call with any questions.

## 2020-01-31 NOTE — DISCHARGE PLANNING
ATTN: Case Management   RE: Referral for Hospice    [facility] is currently reviewing this referral. A nurse liaison will be in contact with the patient and family to assess for Hospice appropriateness. For immediate assistance, please call [phone] to speak to a member of our intake team.

## 2020-01-31 NOTE — PROGRESS NOTES
Lab at bedside to draw labs, patient refused all lab draws. RN provided education but patient still refused lab draws. MD notified.

## 2020-02-01 NOTE — PROGRESS NOTES
RN spoke with MD regarding patient's refusal of lab draw and medications.  Patient wishes to be comfortable at this time. Patient has also expressed these feelings to family at bedside today. Family would like to respect his wishes to be comfortable. Patient is now comfort care.

## 2020-02-01 NOTE — ASSESSMENT & PLAN NOTE
"Pneumatosis intestinalis with biliary air seen on CT scan suggesting bowel ischemia with free intraperitoneal air suggesting perforation  Patient wishes to have no surgical intervention \"no heroic measures\". Requests no surgical consultation  He has been informed that bowel ischemia is a surgical emergency and again requests no intervention  Continue antimicrobials and hemodynamic support  "

## 2020-02-01 NOTE — PROGRESS NOTES
"[facility] Medicine Daily Progress Note    Date of Service  2/1/2020    Chief Complaint  Vomiting blood/coffee ground emesis and abdominal pain x 4 days    Hospital Course    89 y.o. male w/ hx of atrial fibrillation on anticoagulation, GERD, recent left tibial plateau fracture, admitted 1/29/2020 with septic shock and hematemesis.      Interval Problem Update  Enjoying his ice cream.  Awake.  \"Hurts to mobilize too much.\"  Care discussed with ICU RN.    Consultants/Specialty  Pulmonary  Palliative    Code Status  COMFORT CARE    Disposition  Transfer out of ICU    Review of Systems  Review of Systems   Constitutional: Positive for malaise/fatigue. Negative for fever.   Gastrointestinal: Positive for abdominal pain. Negative for blood in stool and nausea.   Musculoskeletal: Positive for back pain and myalgias.   Neurological: Negative for dizziness and speech change.   Psychiatric/Behavioral: The patient is not nervous/anxious.         Physical Exam  Temp:  [35.8 °C (96.5 °F)-36.2 °C (97.1 °F)] 35.8 °C (96.5 °F)  Pulse:  [] 90  Resp:  [16-21] 16  BP: ()/(67-76) 106/75  SpO2:  [82 %-97 %] 82 %    Physical Exam  Vitals signs reviewed.   Constitutional:       Appearance: He is ill-appearing.   HENT:      Head: Normocephalic and atraumatic.      Nose: Nose normal.      Mouth/Throat:      Mouth: Mucous membranes are moist.      Pharynx: Oropharynx is clear.   Eyes:      General: No scleral icterus.        Right eye: No discharge.         Left eye: No discharge.      Extraocular Movements: Extraocular movements intact.      Conjunctiva/sclera: Conjunctivae normal.   Neck:      Musculoskeletal: Neck supple.   Cardiovascular:      Rate and Rhythm: Normal rate and regular rhythm.      Pulses: Normal pulses.      Heart sounds: Normal heart sounds. No murmur. No gallop.    Pulmonary:      Effort: Pulmonary effort is normal. No respiratory distress.      Breath sounds: Normal breath sounds. No wheezing.   Abdominal: "      General: Bowel sounds are normal. There is no distension.      Palpations: Abdomen is soft.      Tenderness: There is no tenderness.   Musculoskeletal:         General: No swelling.   Lymphadenopathy:      Cervical: No cervical adenopathy.   Skin:     Coloration: Skin is pale. Skin is not jaundiced.   Neurological:      General: No focal deficit present.      Mental Status: He is alert.      Motor: Weakness present.         Fluids    Intake/Output Summary (Last 24 hours) at 2/1/2020 1539  Last data filed at 2/1/2020 1200  Gross per 24 hour   Intake 600 ml   Output 1130 ml   Net -530 ml       Laboratory  Recent Labs     01/30/20  0000 01/30/20  0745 01/30/20  1500 01/30/20  2240 01/31/20  0330   WBC 24.7* 13.3*  --   --  15.4*   RBC 5.62 4.66*  --   --  4.04*   HEMOGLOBIN 16.6 14.0 13.6* 12.1* 12.1*   HEMATOCRIT 52.4* 42.3 42.0 37.2* 35.8*   MCV 93.2 90.8  --   --  88.6   MCH 29.5 30.0  --   --  30.0   MCHC 31.7* 33.1*  --   --  33.8   RDW 50.5* 47.9  --   --  46.7   PLATELETCT 322 275  --   --  201   MPV 11.2 11.1  --   --  11.1     Recent Labs     01/30/20  0000 01/30/20  0745 01/31/20  0330   SODIUM 135 135 139   POTASSIUM 4.0 3.8 3.4*   CHLORIDE 92* 95* 99   CO2 12* 24 28   GLUCOSE 192* 152* 123*   BUN 36* 45* 56*   CREATININE 2.26* 2.32* 2.05*   CALCIUM 10.3 8.1* 8.3*     Recent Labs     01/30/20  0016   APTT 30.9   INR 1.29*               Imaging  DX-CHEST-LIMITED (1 VIEW)   Final Result      New left IJ line tip overlies the brachiocephalic confluence and no pneumothorax is seen      Stable interstitial lung disease, chronic obstructive pulmonary disease and cardiac silhouette enlargement      CT-ABDOMEN-PELVIS W/O   Final Result      Pneumatosis, small free intraperitoneal air and portal venous gas affecting small bowel and there is dilatation of the small bowel and stomach. This is highly worrisome for ischemic bowel which would most likely be secondary to hypotension or    atherosclerotic disease     "  Colon diverticulosis      Diffuse bladder wall thickening suggesting chronic outlet obstruction related to prostate enlargement although it could be from cystitis or neurogenic bladder      Fluid in the distal esophagus with adjacent fat stranding suggest esophagitis. Some gas is also seen with pneumatosis. Not excluded      Page pending for SHASTA ADHIKARI on 1/30/2020 6:07 AM.  As of 6:27, 3 pages have gone out, the last two to Dr. Livingston, covering for Dr. Adhikari.  Discussed with Dr. Livingston at 6:31 AM      DX-CHEST-PORTABLE (1 VIEW)   Final Result      Stable interstitial lung disease, chronic obstructive pulmonary disease and cardiac silhouette enlargement           Assessment/Plan  * Septic shock (HCC)- (present on admission)  Assessment & Plan  Ischemic bowel as etiology  COMFORT CARE    HEDY (acute kidney injury) (HCC)- (present on admission)  Assessment & Plan  S/p IV fluids.  Comfort measures now.    Hematemesis- (present on admission)  Assessment & Plan  1/31 hgb:12.1  2/1 No sign of acute blood loss     Sepsis (HCC)  Assessment & Plan  1/31 WBC:15.4  C/o ischemic bowel with pneumatosis noted on CT scan.  Comfort measures      Elevated troponin- (present on admission)  Assessment & Plan  C/o demand ischemia  Comfort meaures    Small bowel ischemia (HCC)- (present on admission)  Assessment & Plan  Pain control  Diet as desires  2/1 \"I like the ice cream\"    Hypotension- (present on admission)  Assessment & Plan  Hold BP meds.  S/P IV fluids    Seizure (HCC)- (present on admission)  Assessment & Plan  Resume home Keppra and gabapentin.    Paroxysmal atrial fibrillation (HCC)- (present on admission)  Assessment & Plan  Holding Eliquis in light of his hematemesis.  Comfort measures       VTE prophylaxis: none      "

## 2020-02-01 NOTE — PROGRESS NOTES
Report called to CARL Teran. Patient to transfer to [place] [id number]. Patient and patient's son in law, Chandler at bedside and aware of transport.

## 2020-02-01 NOTE — CARE PLAN
Problem: Communication  Goal: The ability to communicate needs accurately and effectively will improve  Outcome: PROGRESSING AS EXPECTED     Problem: Safety  Goal: Will remain free from injury  Outcome: PROGRESSING AS EXPECTED  Goal: Will remain free from falls  Outcome: PROGRESSING AS EXPECTED     Problem: Bowel/Gastric:  Goal: Normal bowel function is maintained or improved  Outcome: PROGRESSING SLOWER THAN EXPECTED  Goal: Will not experience complications related to bowel motility  Outcome: PROGRESSING SLOWER THAN EXPECTED   -Patient had 400mL dark bloody output from NGT this shift.  Problem: Pain Management  Goal: Pain level will decrease to patient's comfort goal  Outcome: PROGRESSING AS EXPECTED   -Pain well controlled with PRN medications.  Problem: Respiratory:  Goal: Respiratory status will improve  Outcome: PROGRESSING AS EXPECTED   -1-2LNC with adequate O2 sats.   Problem: Mobility  Goal: Risk for activity intolerance will decrease  Outcome: PROGRESSING SLOWER THAN EXPECTED   -Patient refused mobility today, RN provided education frequently.

## 2020-02-01 NOTE — PALLIATIVE CARE
Palliative Care follow-up  Consult received and EMR reviewed; case discussed with BS RN. Pt placed on comfort measures following PC order, with hospice referral and [facility] already involved in POC. RN unaware of any needs from PC at this time. Will f/u with hospice team today to determine if PC can be of any assistance.      Plan: assist as needed with POC    Thank you for allowing Palliative Care to support this patient and family. Contact [phone] for additional assistance, change in patient status, or with any questions/concerns.

## 2020-02-01 NOTE — PROGRESS NOTES
Patient to unit in stable condition with margo at bedside. C/o nausea currently. Declines vital signs or being turned for skin check at this time. Left IJ removed- dressing CDI. Patient oriented to unit and states he will use call bell appropriately.

## 2020-02-02 PROBLEM — R11.0 NAUSEA: Status: ACTIVE | Noted: 2020-01-01

## 2020-02-02 NOTE — CONSULTS
Reason for PC Consult: Advance Care Planning    Consulted by: Dr. Lim    Assessment:  General: 88 y/o male admitted with abdominal pain, septic shock and found to have concern of ischemic bowel and partial bowel obstruction. Hx of atrial fibrillation on anticoagulation, GERD, recent left tibial plateau fracture, coming from SNF where he was going therapies. Pt opted for comfort measures in lieu of aggressive measures/treatment. At this time, Palliative Care requested for AD/POLST completion only. This consult did not include a full discussion on goals of care given pt on CC and in contact with hospice.     Dyspnea: No-    Last BM: 02/01/20-    Pain: No-    Depression: Mood appropriate for situation-    Dementia: No;       Spiritual:  Is Moravian or spirituality important for coping with this illness? Unable to determine-    Has a  or spiritual provider visit been requested? Unable to determine    Palliative Performance Scale: 30%    Advance Directive: None-    DPOA: No-    POLST: No-      Code Status: Comfort Measures-      Outcome:  EMR further reviewed and hospice team recommending placement as he is currently not meeting criteria for inpatient hospice. Discussed with unit KERRIE Nazario and she will look more into plan for placement given pt came from SNF on this admission. PC SW will assist with AD/POLST completion prior to DC.       Updated: PC team; SW    Plan: appreciate updates from SW when more info available re: DC options. PC SW and RN available to help as needed    Thank you for allowing Palliative Care to support this patient and family. Contact [phone] for additional assistance, change in patient status, or with any questions/concerns.

## 2020-02-02 NOTE — PROGRESS NOTES
Assumed care of patient at 0700. Report received from CARL Cruz. Patient is comfort care, A&Ox4, denies pain, patient refuses supplemental oxygen, other vitals stable on room oxygen. Patient is 1 person assist, but refuses ambulation, skin assessment, and turns. No complaints. Call light within reach; will continue to monitor.

## 2020-02-02 NOTE — PROGRESS NOTES
Overnight patient reported nausea and pain. PRN compazine and dilaudid given. Hearn in place. Pt kept comfortable. Pt refused skin assessment. Safety precautions maintained. Will continue to monitor and report to the oncoming nurse.

## 2020-02-02 NOTE — CARE PLAN
Problem: Safety  Goal: Will remain free from falls  Outcome: PROGRESSING AS EXPECTED     Problem: Bowel/Gastric:  Goal: Normal bowel function is maintained or improved  Outcome: PROGRESSING SLOWER THAN EXPECTED

## 2020-02-02 NOTE — PROGRESS NOTES
"Patient refusing keppra- states \"he hasn't taken it for years and his seizures were misdiagnosed anyway.\"   "

## 2020-02-02 NOTE — PROGRESS NOTES
Hospital Medicine Daily Progress Note    Date of Service  2/2/2020    Chief Complaint  Vomiting blood/coffee ground emesis and abdominal pain x 4 days    Hospital Course    89 y.o. male w/ hx of atrial fibrillation on anticoagulation, GERD, recent left tibial plateau fracture, admitted 1/29/2020 with septic shock and hematemesis. Found to have concern of ischemic bowel and partial bowel obstruction.  He made decision for comfort measures.       Interval Problem Update  Some nausea  I addressed with RN to give compazine.  Enjoying occassional ice crease  A+Ox3   No other concerns or needs    Consultants/Specialty  Pulmonary  Palliative    Code Status  COMFORT CARE    Disposition  Transfer out of ICU    Review of Systems  Review of Systems   Constitutional: Positive for malaise/fatigue. Negative for fever.   Respiratory: Negative for shortness of breath.    Gastrointestinal: Positive for abdominal pain and nausea. Negative for blood in stool.   Musculoskeletal: Positive for back pain and joint pain.   Neurological: Negative for dizziness and speech change.   Psychiatric/Behavioral: The patient is not nervous/anxious.         Physical Exam  Temp:  [36.1 °C (97 °F)-36.4 °C (97.6 °F)] 36.1 °C (97 °F)  Pulse:  [] 115  Resp:  [17] 17  BP: (111-140)/(59-72) 111/59  SpO2:  [89 %-97 %] 89 %    Physical Exam  Vitals signs reviewed.   Constitutional:       Appearance: He is ill-appearing.   HENT:      Head: Normocephalic and atraumatic.      Nose: Nose normal.      Mouth/Throat:      Mouth: Mucous membranes are moist.      Pharynx: Oropharynx is clear.   Eyes:      General: No scleral icterus.        Right eye: No discharge.         Left eye: No discharge.      Extraocular Movements: Extraocular movements intact.      Conjunctiva/sclera: Conjunctivae normal.   Neck:      Musculoskeletal: Neck supple.   Cardiovascular:      Rate and Rhythm: Normal rate and regular rhythm.      Pulses: Normal pulses.      Heart sounds:  Normal heart sounds. No murmur. No gallop.    Pulmonary:      Effort: Pulmonary effort is normal. No respiratory distress.      Breath sounds: Normal breath sounds. No wheezing.   Abdominal:      General: Bowel sounds are normal. There is no distension.      Palpations: Abdomen is soft.      Tenderness: There is no tenderness.   Musculoskeletal:         General: No swelling.   Lymphadenopathy:      Cervical: No cervical adenopathy.   Skin:     Coloration: Skin is pale. Skin is not jaundiced.   Neurological:      General: No focal deficit present.      Mental Status: He is alert.      Motor: Weakness present.         Fluids    Intake/Output Summary (Last 24 hours) at 2/2/2020 1051  Last data filed at 2/2/2020 0600  Gross per 24 hour   Intake 1140 ml   Output 500 ml   Net 640 ml       Laboratory  Recent Labs     01/30/20  1500 01/30/20  2240 01/31/20  0330   WBC  --   --  15.4*   RBC  --   --  4.04*   HEMOGLOBIN 13.6* 12.1* 12.1*   HEMATOCRIT 42.0 37.2* 35.8*   MCV  --   --  88.6   MCH  --   --  30.0   MCHC  --   --  33.8   RDW  --   --  46.7   PLATELETCT  --   --  201   MPV  --   --  11.1     Recent Labs     01/31/20  0330   SODIUM 139   POTASSIUM 3.4*   CHLORIDE 99   CO2 28   GLUCOSE 123*   BUN 56*   CREATININE 2.05*   CALCIUM 8.3*                   Imaging  DX-CHEST-LIMITED (1 VIEW)   Final Result      New left IJ line tip overlies the brachiocephalic confluence and no pneumothorax is seen      Stable interstitial lung disease, chronic obstructive pulmonary disease and cardiac silhouette enlargement      CT-ABDOMEN-PELVIS W/O   Final Result      Pneumatosis, small free intraperitoneal air and portal venous gas affecting small bowel and there is dilatation of the small bowel and stomach. This is highly worrisome for ischemic bowel which would most likely be secondary to hypotension or    atherosclerotic disease      Colon diverticulosis      Diffuse bladder wall thickening suggesting chronic outlet obstruction  "related to prostate enlargement although it could be from cystitis or neurogenic bladder      Fluid in the distal esophagus with adjacent fat stranding suggest esophagitis. Some gas is also seen with pneumatosis. Not excluded      Page pending for SHASTA ADHIKARI on 1/30/2020 6:07 AM.  As of 6:27, 3 pages have gone out, the last two to Dr. Livingston, covering for Dr. Adhikari.  Discussed with Dr. Livingston at 6:31 AM      DX-CHEST-PORTABLE (1 VIEW)   Final Result      Stable interstitial lung disease, chronic obstructive pulmonary disease and cardiac silhouette enlargement           Assessment/Plan  * Septic shock (HCC)- (present on admission)  Assessment & Plan  Ischemic bowel as etiology  COMFORT CARE    HEDY (acute kidney injury) ([facility])- (present on admission)  Assessment & Plan  S/p IV fluids.  Comfort measures now.    Hematemesis- (present on admission)  Assessment & Plan  1/31 hgb:12.1  2/1 No sign of acute blood loss     Sepsis (HCC)  Assessment & Plan  1/31 WBC:15.4  C/o ischemic bowel with pneumatosis noted on CT scan.  Comfort measures      Nausea  Assessment & Plan  Antiemetics prn  2/2 discussed with RN  Likely due to ischemic gut.    Elevated troponin- (present on admission)  Assessment & Plan  C/o demand ischemia  Comfort meaures    Small bowel ischemia (HCC)- (present on admission)  Assessment & Plan  Pain control  Diet as desires  2/1 \"I like the ice cream\"    Hypotension- (present on admission)  Assessment & Plan  Hold BP meds.  S/P IV fluids    Seizure (HCC)- (present on admission)  Assessment & Plan  Resume home Keppra and gabapentin.    Paroxysmal atrial fibrillation (HCC)- (present on admission)  Assessment & Plan  Holding Eliquis in light of his hematemesis.  Comfort measures       VTE prophylaxis: none      "

## 2020-02-03 NOTE — DISCHARGE PLANNING
Agency/Facility Name: [facility]  Outcome: Via Epic response and note, patient's referral accepted.    JUSTIN Senior notified.

## 2020-02-03 NOTE — CARE PLAN
Problem: Communication  Goal: The ability to communicate needs accurately and effectively will improve  Outcome: PROGRESSING AS EXPECTED  Note:   Pt voicing needs appropriately     Problem: Safety  Goal: Will remain free from injury  Outcome: PROGRESSING AS EXPECTED  Note:   Pt ringing appropriately for needs

## 2020-02-03 NOTE — DISCHARGE PLANNING
Anticipated Discharge Disposition: TBD     Action:LSW met with son in [place] chinchilla, LSW provided caregiving and group home resources.  LSW called [facility] to see if they would take the pt with hospice, left vm. LSW called [facility] admissions and hospice care and left vms for both departments.     Barriers to Discharge: discharge placement    Plan: LSW will continue to assess for any discharge needs.

## 2020-02-03 NOTE — DISCHARGE PLANNING
Anticipated Discharge Disposition: TBD     Action: LSW called hospice to get update on what the plan for placement was going to be. Nati from Hospice called LSW back stating that LSW would need to find a placement for pt. LSW called son in law to get more information on placement plans. LSW will meet with son in law at bedside this afternoon to discuss more options for placement.     Barriers to Discharge: none     Plan: LSW will continue to assess for any discharge needs.

## 2020-02-03 NOTE — PROGRESS NOTES
No acute events overnight. Pt kept comfortable. Denies pain. Pt states compazine is helping with nausea. Refused skin assessment or repositioning. Safety precautions maintained. Will continue to monitor and report to the oncoming nurse.

## 2020-02-03 NOTE — PALLIATIVE CARE
"Palliative Care follow-up  Pt has capacity to complete AD and POLST per Dr. Ragland.  LSW met with pt at bedside and explained both documents and the importance of completing them.  Pt refused to complete the AD and POLST.  When asked why he is refusing, pt replied \"I'm already dead\".   Updated:    CARL Padron      Plan: Pt was accepted by [facility] hospice, however will need assistance with placement.  KERRIE Senior will speak to pt's son-in-law to discuss placement options.     UPDATE:  LSW spoke to JUSTIN Senior, she will attempt to get pt into the [facility] hospice.  Resources were provided to the son-in-law for group homes.       Thank you for allowing Palliative Care to participate in this patient's care. Please feel free to call [phone] with any questions or concerns.  "

## 2020-02-03 NOTE — PROGRESS NOTES
Hospital Medicine Daily Progress Note    Date of Service  2/3/2020    Chief Complaint  Vomiting blood/coffee ground emesis and abdominal pain x 4 days    Hospital Course    89 y.o. male w/ hx of atrial fibrillation on anticoagulation, GERD, recent left tibial plateau fracture, admitted 1/29/2020 with septic shock and hematemesis. Found to have concern of ischemic bowel and partial bowel obstruction.  He made decision for comfort measures.       Interval Problem Update  Discussed wit 6th floor .  Await possible placement options  More lethargic this am but opens eyes and follows intermittantly  Denies pain and in no distress    Consultants/Specialty  Pulmonary  Palliative    Code Status  COMFORT CARE    Disposition  Discuss SNF options with .    Review of Systems  Review of Systems   Unable to perform ROS: Medical condition        Physical Exam  Temp:  [36.3 °C (97.4 °F)] 36.3 °C (97.4 °F)  Pulse:  [78] 78  Resp:  [16] 16  BP: (114)/(70) 114/70  SpO2:  [78 %] 78 %    Physical Exam  Vitals signs reviewed.   Constitutional:       Appearance: He is ill-appearing.   HENT:      Head: Normocephalic and atraumatic.      Nose: Nose normal.      Mouth/Throat:      Mouth: Mucous membranes are moist.      Pharynx: Oropharynx is clear.   Eyes:      General: No scleral icterus.        Right eye: No discharge.         Left eye: No discharge.      Extraocular Movements: Extraocular movements intact.      Conjunctiva/sclera: Conjunctivae normal.   Cardiovascular:      Rate and Rhythm: Normal rate and regular rhythm.      Pulses: Normal pulses.      Heart sounds: Normal heart sounds. No murmur. No gallop.    Pulmonary:      Effort: Pulmonary effort is normal. No respiratory distress.      Breath sounds: Normal breath sounds. No wheezing.   Musculoskeletal:         General: No swelling.   Skin:     Coloration: Skin is pale. Skin is not jaundiced.   Neurological:      General: No focal deficit present.       Mental Status: He is lethargic.         Fluids    Intake/Output Summary (Last 24 hours) at 2/3/2020 2028  Last data filed at 2/3/2020 1700  Gross per 24 hour   Intake 100 ml   Output 775 ml   Net -675 ml       Laboratory                        Imaging  DX-CHEST-LIMITED (1 VIEW)   Final Result      New left IJ line tip overlies the brachiocephalic confluence and no pneumothorax is seen      Stable interstitial lung disease, chronic obstructive pulmonary disease and cardiac silhouette enlargement      CT-ABDOMEN-PELVIS W/O   Final Result      Pneumatosis, small free intraperitoneal air and portal venous gas affecting small bowel and there is dilatation of the small bowel and stomach. This is highly worrisome for ischemic bowel which would most likely be secondary to hypotension or    atherosclerotic disease      Colon diverticulosis      Diffuse bladder wall thickening suggesting chronic outlet obstruction related to prostate enlargement although it could be from cystitis or neurogenic bladder      Fluid in the distal esophagus with adjacent fat stranding suggest esophagitis. Some gas is also seen with pneumatosis. Not excluded      Page pending for SHASTA ADHIKARI on 1/30/2020 6:07 AM.  As of 6:27, 3 pages have gone out, the last two to Dr. Livingston, covering for Dr. Adhikari.  Discussed with Dr. Livingston at 6:31 AM      DX-CHEST-PORTABLE (1 VIEW)   Final Result      Stable interstitial lung disease, chronic obstructive pulmonary disease and cardiac silhouette enlargement           Assessment/Plan  * Septic shock (HCC)- (present on admission)  Assessment & Plan  Ischemic bowel as etiology  COMFORT CARE    HEDY (acute kidney injury) (HCC)- (present on admission)  Assessment & Plan  S/p IV fluids.  Comfort measures now.    Hematemesis- (present on admission)  Assessment & Plan  1/31 hgb:12.1  2/1 No sign of acute blood loss     Sepsis (HCC)  Assessment & Plan  1/31 WBC:15.4  C/o ischemic bowel with pneumatosis noted on CT  "scan.  Comfort measures      Nausea  Assessment & Plan  Antiemetics prn  2/2 discussed with RN  Likely due to ischemic gut.    Elevated troponin- (present on admission)  Assessment & Plan  C/o demand ischemia  Comfort meaures    Small bowel ischemia (HCC)- (present on admission)  Assessment & Plan  Pain control  Diet as desires  2/1 \"I like the ice cream\"    Hypotension- (present on admission)  Assessment & Plan  Hold BP meds.  S/P IV fluids    Seizure (HCC)- (present on admission)  Assessment & Plan  Resume home Keppra and gabapentin.    Paroxysmal atrial fibrillation (HCC)- (present on admission)  Assessment & Plan  Holding Eliquis in light of his hematemesis.  Comfort measures       VTE prophylaxis: none      "

## 2020-02-04 NOTE — DISCHARGE SUMMARY
Death Summary    Cause of Death  Septic shock due to ischemic bowel and parital bowel obstruction due to atherosclerotic disease.      Comorbid Conditions at the Time of Death  Principal Problem:    Septic shock (HCC) POA: Yes  Active Problems:    Sepsis (HCC) POA: Unknown    Hematemesis POA: Yes    HEDY (acute kidney injury) (HCC) POA: Yes    Elevated troponin POA: Yes    Nausea POA: Unknown    Paroxysmal atrial fibrillation (HCC) POA: Yes    Seizure (HCC) POA: Yes    Hypotension POA: Yes    Small bowel ischemia (HCC) POA: Yes  Resolved Problems:    * No resolved hospital problems. *      History of Presenting Illness and Hospital Course  This is an 89 y.o. male w/ hx of atrial fibrillation on anticoagulation, GERD, recent left tibial plateau fracture, admitted 1/29/2020 with septic shock and hematemesis. Found to have concern of ischemic bowel and partial bowel obstruction.  He made decision for comfort measures.           Death Date: 02/04/20   Death Time: 0337         Pronounced By (RN1): Nancy Valdovinos  Pronounced By (RN2): Veronica Bull

## 2020-02-04 NOTE — PROGRESS NOTES
Son in law called back. Informed him that [place] Alexis had passed. Per son in law [place] had chosen [facility]. Called [facility] to inform them

## 2020-02-04 NOTE — PROGRESS NOTES
Patient on comfort care. Patient passed away at 0337. Pronounced by two RN's; Nancy and Veronica. Dr. Meza notified at 0355. Called son in law - [place] and left a voicemail.  office and tissue office notified; not a donor candidate. Hearn catheter and IV removed. Patient cleansed. No belongings at bedside. Awaiting for traction to  patient.

## 2020-02-04 NOTE — PROGRESS NOTES
Pt more lethargic today, pain meds given per pt request, sleeping comfortably between doses, osman with small output, clear, pt refusing turns most of shift but able to turn a couple of times, cleaned up, mepilex placed to coccyx, family at bedside, WCTM.

## 2020-02-04 NOTE — PROGRESS NOTES
Pt refusing skin check. Was able to roll with CNA, mepilex placed to coccyx, known wound, no other open spots to skin.

## 2022-01-17 NOTE — PROGRESS NOTES
[facility] Hospitalist Progress Note    Date of Service: 10/27/2017    Chief Complaint  87 y.o. male admitted 10/23/2017 with confusion    Interval Problem Update  Patient was sent back from the skilled nursing facility by staff for confusion. He has no recollection of being confused at the skilled nursing facility. He was admitted to the ICU and monitored closely. CT head revealed no acute findings. EEG was negative for seizures. He was found to be in A. fib with RVR which was controlled with IV fluids. He is transferred to the telemetry unit. At this time he is laying comfortably in his bed with a cortrak and osman catheter in place. He reports mild pain in his substernal region swallowing secondary to his pan esophagitis. He was evaluated by SLP. He also has a PICC line in place, currently he is only receiving IV NS infusion. He is alert and oriented to person, place, time. He is answering all questions appropriately. His mentation is completely intact. He denies any other symptoms. We will discontinue osman catheter and PICC line to prevent any associated infections.     10/27 Pt doing well, Continue cortrak. Will get nutrition consult. No confusion, CP or shortness of breath. Cleared for DC from medical standpoint. Ok to DC PICC line.     Consultants/Specialty  Palliative care  Pulmonary    Disposition  DC to SNIF once medically stable        Review of Systems   Constitutional: Positive for malaise/fatigue. Negative for chills and fever.   HENT:        Odynophagia   Eyes: Negative for blurred vision.   Respiratory: Negative for cough and shortness of breath.    Cardiovascular: Negative for chest pain.   Gastrointestinal: Negative for abdominal pain.   Neurological: Positive for weakness. Negative for headaches.   All other systems reviewed and are negative.     Physical Exam  Laboratory/Imaging   Hemodynamics  Temp (24hrs), Av.3 °C (97.3 °F), Min:35.9 °C (96.7 °F), Max:36.6 °C (97.8 °F)   Temperature: 35.9 °C  noted   (96.7 °F)  Pulse  Av.9  Min: 62  Max: 148    Blood Pressure : 116/58      Respiratory      Respiration: 12, Pulse Oximetry: 98 %        RUL Breath Sounds: Clear, RML Breath Sounds: Clear, RLL Breath Sounds: Diminished, MIKE Breath Sounds: Clear, LLL Breath Sounds: Diminished    Fluids    Intake/Output Summary (Last 24 hours) at 10/27/17 0915  Last data filed at 10/27/17 0800   Gross per 24 hour   Intake             4002 ml   Output             3395 ml   Net              607 ml       Nutrition  Orders Placed This Encounter   Procedures   • Diet NPO     Standing Status:   Standing     Number of Occurrences:   1     Order Specific Question:   Restrict to:     Answer:   Strict [1]     Physical Exam   Constitutional: He is oriented to person, place, and time. He appears well-nourished. No distress.   HENT:   Head: Normocephalic and atraumatic.   Mouth/Throat: Oropharynx is clear and moist.   Cortrak in place   Eyes: Conjunctivae are normal. Pupils are equal, round, and reactive to light.   Neck: Neck supple.   Cardiovascular: Normal rate.    Irregular rhythm   Pulmonary/Chest: Effort normal and breath sounds normal. No respiratory distress. He has no wheezes. He has no rales.   Abdominal: Soft. Bowel sounds are normal. He exhibits no distension. There is no tenderness. There is no rebound.   Musculoskeletal: He exhibits no edema or tenderness.   Neurological: He is alert and oriented to person, place, and time. No cranial nerve deficit. Coordination normal.   Strength and sensation intact bilaterally  Follows commands appropriately   Skin: Skin is warm and dry. He is not diaphoretic.   Psychiatric: He has a normal mood and affect. His behavior is normal.   Nursing note and vitals reviewed.      Recent Labs      10/25/17   0500   WBC  9.1   RBC  2.91*   HEMOGLOBIN  8.9*   HEMATOCRIT  27.6*   MCV  94.8   MCH  30.6   MCHC  32.2*   RDW  52.3*   PLATELETCT  215   MPV  10.4     Recent Labs      10/25/17   0410   SODIUM   133*   POTASSIUM  4.0   CHLORIDE  105   CO2  24   GLUCOSE  100*   BUN  9   CREATININE  0.58   CALCIUM  9.0                      Assessment/Plan     Altered mental state- (present on admission)   Assessment & Plan    Resolved  CT head with no acute process  EEG negative for active seizures  No evidence of worsening infection          Persistent atrial fibrillation ([facility])- (present on admission)   Assessment & Plan    Rate well controlled  Continue digoxin   Continue aspirin        Discitis of lumbar region- (present on admission)   Assessment & Plan    Stable  Continue Augmentin and Bactrim        Esophageal stricture- (present on admission)   Assessment & Plan    Outpatient GI follow-up once esophagitis has resolved        Candida esophagitis (CMS-HCC)- (present on admission)   Assessment & Plan    Improving  Continue Diflucan  Continue PPI and Carafate  Follow up with GI as outpatient        Debility- (present on admission)   Assessment & Plan    Continue PT OT        Patient plan of care discussed at multidisplinary team rounds, with patient and R.N at beside.     Quality-Core Measures

## 2022-02-02 NOTE — CARE PLAN
Problem: Safety  Goal: Will remain free from falls  Outcome: PROGRESSING AS EXPECTED  Call bell and possessions within reach, yellow non-slip slippers in place, bed alarm in use, and patient near nursing station.  Discussed fall precautions with patient, verbalizes understanding.    Problem: Bowel/Gastric:  Goal: Will not experience complications related to bowel motility  Outcome: PROGRESSING SLOWER THAN EXPECTED  Patient's last BM 7/9.  Discussed in rounds.  Bowel protocol ordered.  Discussed with patient, verbalizes understanding.         Patient

## 2022-12-16 NOTE — DISCHARGE PLANNING
Order recd for HH. Choice form completed by pt for [facility] and faxed to [facility] to send. Await acceptance.    message was left for patient to return phone call.

## 2025-04-01 NOTE — PROGRESS NOTES
12 hour chart check   Phase III Cardiopulmonary Rehab:  Pt attended self pay maintenance exercise session. Vitals and exercise logged per pt.     Present, unchanged

## (undated) DEVICE — BITE BLOCK ADULT 60FR (100EA/CA)

## (undated) DEVICE — BITEBLOCK ENDOSCOPIC PEDI. - (25/BX)

## (undated) DEVICE — FORCEP RADIAL JAW 4 STANDARD CAPACITY W/NEEDLE 240CM (40EA/BX)

## (undated) DEVICE — CONTAINER, SPECIMEN, STERILE

## (undated) DEVICE — FILM CASSETTE ENDO

## (undated) DEVICE — KIT CUSTOM PROCEDURE SINGLE FOR ENDO  (15/CA)

## (undated) DEVICE — GOWN SURGEONS X-LARGE - DISP. (30/CA)

## (undated) DEVICE — SOD. CHL 10CC SYRINGE PREFILL - W/10 CC (30/BX)